# Patient Record
Sex: FEMALE | Race: BLACK OR AFRICAN AMERICAN | Employment: OTHER | URBAN - METROPOLITAN AREA
[De-identification: names, ages, dates, MRNs, and addresses within clinical notes are randomized per-mention and may not be internally consistent; named-entity substitution may affect disease eponyms.]

---

## 2017-01-20 ENCOUNTER — TELEPHONE (OUTPATIENT)
Dept: NEUROLOGY | Facility: CLINIC | Age: 72
End: 2017-01-20
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
## 2017-03-16 NOTE — PROGRESS NOTES
NEW TO CPM 3-16-17 FOR BACK PAIN SINCE 1996, FELL THROUGH SOME STAIRS AND FELL BACKWARDS. PAIN WENT AWAY. CAME BACK. SEVERE SINCE 2014. SAW NEUROSURGEON. SAID SURGERY WOULDN'T HELP. REFERRED BY DR. Ernst Sweeney FOR PAIN MEDS AND POSSIBLE MODALITIES FOR PAIN.  H

## 2017-04-11 ENCOUNTER — OFFICE VISIT (OUTPATIENT)
Dept: PAIN CLINIC | Facility: HOSPITAL | Age: 72
End: 2017-04-11
Attending: ANESTHESIOLOGY
Payer: MEDICARE

## 2017-04-11 VITALS
SYSTOLIC BLOOD PRESSURE: 110 MMHG | HEIGHT: 62 IN | BODY MASS INDEX: 44.16 KG/M2 | WEIGHT: 240 LBS | DIASTOLIC BLOOD PRESSURE: 60 MMHG

## 2017-04-11 DIAGNOSIS — M70.61 TROCHANTERIC BURSITIS OF RIGHT HIP: ICD-10-CM

## 2017-04-11 DIAGNOSIS — M54.16 LUMBAR RADICULOPATHY: ICD-10-CM

## 2017-04-11 DIAGNOSIS — M19.90 OSTEOARTHRITIS, UNSPECIFIED OSTEOARTHRITIS TYPE, UNSPECIFIED SITE: Primary | ICD-10-CM

## 2017-04-11 PROCEDURE — 99211 OFF/OP EST MAY X REQ PHY/QHP: CPT

## 2017-04-11 NOTE — PROGRESS NOTES
NEW TO CPM 3-16-17 FOR BACK PAIN SINCE 1996, FELL THROUGH SOME STAIRS AND FELL BACKWARDS. PAIN WENT AWAY. CAME BACK. SEVERE SINCE 2014. SAW NEUROSURGEON. SAID SURGERY WOULDN'T HELP. REFERRED BY DR. Stephany De Jesus FOR PAIN MEDS AND POSSIBLE MODALITIES FOR PAIN.  H

## 2017-04-11 NOTE — CHRONIC PAIN
Follow-up Note    HISTORY OF PRESENT ILLNESS:  Angeli Quinn is a 73year old old female, returns to the clinic for for evaluation of her chronic low back pain and right knee pain he saw Dr. Eliel Brush a few weeks ago and that time she discussed ethan Oral Tab Take 10 mg by mouth daily with breakfast.   Disp:  Rfl:    HYDROcodone-acetaminophen  MG Oral Tab  Disp:  Rfl: 0   lisinopril 40 MG Oral Tab Take 40 mg by mouth daily. Disp:  Rfl:    Meclizine HCl 25 MG Oral Tab Take 25 mg by mouth daily. SURGICAL HISTORY:      Past Surgical History    REPAIR ROTATOR CUFF,ACUTE Left     CARPAL TUNNEL RELEASE Right     TUBAL LIGATION      CHOLECYSTECTOMY      HAND/FINGER SURGERY UNLISTED Right 2004    Comment hand surgery       FAMILY HISTORY:  Family Present    SLR: negative   SIJ tenderness:  negative     TPs: Present:       IMAGING:  reviewed    [place] PHYSICIAN MONITORING PROGRAM REVIEWED  Yes      ASSESSMENT:   Renea Garner is a 73year old  female, with multi level lumbar spinal stenosis bilateral f

## 2017-05-17 ENCOUNTER — OFFICE VISIT (OUTPATIENT)
Dept: PAIN CLINIC | Facility: HOSPITAL | Age: 72
End: 2017-05-17
Attending: ANESTHESIOLOGY
Payer: MEDICARE

## 2017-05-17 VITALS
SYSTOLIC BLOOD PRESSURE: 112 MMHG | DIASTOLIC BLOOD PRESSURE: 60 MMHG | WEIGHT: 240 LBS | HEIGHT: 62 IN | HEART RATE: 68 BPM | BODY MASS INDEX: 44.16 KG/M2

## 2017-05-17 DIAGNOSIS — M47.817 FACET DEGENERATION OF LUMBOSACRAL REGION: Primary | ICD-10-CM

## 2017-05-17 PROCEDURE — 99211 OFF/OP EST MAY X REQ PHY/QHP: CPT

## 2017-05-17 RX ORDER — DIAZEPAM 10 MG/1
10 TABLET ORAL EVERY 8 HOURS PRN
Qty: 90 TABLET | Refills: 0 | Status: SHIPPED | OUTPATIENT
Start: 2017-05-17 | End: 2017-05-17

## 2017-05-17 RX ORDER — HYDROCODONE BITARTRATE AND ACETAMINOPHEN 10; 325 MG/1; MG/1
1 TABLET ORAL EVERY 4 HOURS PRN
Qty: 180 TABLET | Refills: 0 | Status: SHIPPED | OUTPATIENT
Start: 2017-05-24 | End: 2017-06-23

## 2017-05-17 RX ORDER — DIAZEPAM 10 MG/1
10 TABLET ORAL EVERY 8 HOURS PRN
Qty: 90 TABLET | Refills: 0 | Status: SHIPPED | OUTPATIENT
Start: 2017-06-16 | End: 2017-07-16

## 2017-05-17 NOTE — PROGRESS NOTES
renew her prescription for Norco and Valium and also going to set her up for an injection for medial nerve branch block the same areas where we did the facet injections L3-4 L4-5 L5-S1 bilaterally to see if she has a positive results from the medial branch

## 2017-05-17 NOTE — PROGRESS NOTES
NEW TO CPM 3-16-17 FOR BACK PAIN SINCE 1996, FELL THROUGH SOME STAIRS AND FELL BACKWARDS. PAIN WENT AWAY. CAME BACK. SEVERE SINCE 2014. SAW NEUROSURGEON. SAID SURGERY WOULDN'T HELP. REFERRED BY DR. Graeme Collins FOR PAIN MEDS AND POSSIBLE MODALITIES FOR PAIN.  H

## 2017-05-17 NOTE — CHRONIC PAIN
Follow-up Note    HISTORY OF PRESENT ILLNESS:  Ruperto Hannon is a 73year old old female, returns to the clinic for evaluation of her chronic lumbar pain gait dysfunction leg weakness on her last visit we recommended facet injections which she received pr 2 (two) times daily. Disp:  Rfl:    ferrous sulfate 325 (65 FE) MG Oral Tab EC Take 325 mg by mouth 3 (three) times daily with meals. Disp:  Rfl:    Pravastatin Sodium 40 MG Oral Tab Take 40 mg by mouth nightly.  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

## 2017-06-22 RX ORDER — HYDROCODONE BITARTRATE AND ACETAMINOPHEN 10; 325 MG/1; MG/1
1 TABLET ORAL EVERY 4 HOURS PRN
Qty: 180 TABLET | Refills: 0 | Status: SHIPPED | OUTPATIENT
Start: 2017-06-22 | End: 2017-06-29

## 2017-06-29 ENCOUNTER — OFFICE VISIT (OUTPATIENT)
Dept: PAIN CLINIC | Facility: HOSPITAL | Age: 72
End: 2017-06-29
Attending: ANESTHESIOLOGY
Payer: MEDICARE

## 2017-06-29 VITALS — SYSTOLIC BLOOD PRESSURE: 106 MMHG | HEART RATE: 68 BPM | DIASTOLIC BLOOD PRESSURE: 59 MMHG

## 2017-06-29 DIAGNOSIS — M48.061 LUMBAR SPINAL STENOSIS: Primary | ICD-10-CM

## 2017-06-29 DIAGNOSIS — M47.816 LUMBAR FACET ARTHROPATHY: ICD-10-CM

## 2017-06-29 PROCEDURE — 99211 OFF/OP EST MAY X REQ PHY/QHP: CPT

## 2017-06-29 RX ORDER — HYDROCODONE BITARTRATE AND ACETAMINOPHEN 10; 325 MG/1; MG/1
1 TABLET ORAL EVERY 4 HOURS PRN
Qty: 180 TABLET | Refills: 0 | Status: SHIPPED | OUTPATIENT
Start: 2017-07-20 | End: 2017-06-29

## 2017-06-29 RX ORDER — GABAPENTIN 300 MG/1
300 CAPSULE ORAL 4 TIMES DAILY
Qty: 120 CAPSULE | Refills: 1 | Status: SHIPPED | OUTPATIENT
Start: 2017-06-29 | End: 2018-04-13

## 2017-06-29 RX ORDER — GABAPENTIN 300 MG/1
300 CAPSULE ORAL 4 TIMES DAILY
Qty: 120 CAPSULE | Refills: 1 | Status: SHIPPED | OUTPATIENT
Start: 2017-06-29 | End: 2017-06-29

## 2017-06-29 RX ORDER — HYDROCODONE BITARTRATE AND ACETAMINOPHEN 10; 325 MG/1; MG/1
1 TABLET ORAL EVERY 4 HOURS PRN
Qty: 180 TABLET | Refills: 0 | Status: SHIPPED | OUTPATIENT
Start: 2017-08-19 | End: 2017-08-23

## 2017-06-29 NOTE — CHRONIC PAIN
Follow-up Note    HISTORY OF PRESENT ILLNESS:  Joselito Bey is a 73year old old female, returns to the clinic for evaluation of her chronic lumbar pain. Patient s/p lumbar medial branch blocks. Reports 2 days relief of pain. Pain has since returned. 325 mg by mouth daily. Disp:  Rfl:    simvastatin 20 MG Oral Tab Take 20 mg by mouth nightly. Disp:  Rfl:    Pravastatin Sodium 40 MG Oral Tab Take 40 mg by mouth nightly.  Disp:  Rfl:         REVIEW OF SYSTEMS:   Bowel/Bladder Incontinence: as above  Cough HISTORY:  Family History   Problem Relation Age of Onset   • Stroke Father    • Other[other] [OTHER] Mother      tuberculosis       SOCIAL HISTORY:    Social History  Social History   Marital status:   Spouse name: N/A    Years of education: N/A  Nu

## 2017-06-29 NOTE — PROGRESS NOTES
Patient here for a medication evaluation for chronic lumbar pain. Seen today by Dr. Devan Pavon (see dictation and recommendations). Obtained prescription for Gabapentin 300 mg qam, 300 mg q pm and 600 mg qhs. Will follow up in 2-3 months.   Discharged in

## 2017-08-23 ENCOUNTER — OFFICE VISIT (OUTPATIENT)
Dept: PAIN CLINIC | Facility: HOSPITAL | Age: 72
End: 2017-08-23
Attending: ANESTHESIOLOGY
Payer: MEDICARE

## 2017-08-23 VITALS
HEIGHT: 62 IN | HEART RATE: 63 BPM | RESPIRATION RATE: 18 BRPM | SYSTOLIC BLOOD PRESSURE: 118 MMHG | BODY MASS INDEX: 44.16 KG/M2 | WEIGHT: 240 LBS | DIASTOLIC BLOOD PRESSURE: 61 MMHG

## 2017-08-23 DIAGNOSIS — M47.816 LUMBAR FACET ARTHROPATHY: Primary | ICD-10-CM

## 2017-08-23 DIAGNOSIS — M48.061 LUMBAR SPINAL STENOSIS: ICD-10-CM

## 2017-08-23 PROCEDURE — 99211 OFF/OP EST MAY X REQ PHY/QHP: CPT

## 2017-08-23 RX ORDER — DIAZEPAM 10 MG/1
10 TABLET ORAL EVERY 8 HOURS PRN
Qty: 90 TABLET | Refills: 2 | Status: SHIPPED | OUTPATIENT
Start: 2017-08-23 | End: 2017-09-22

## 2017-08-23 RX ORDER — HYDROCODONE BITARTRATE AND ACETAMINOPHEN 10; 325 MG/1; MG/1
1 TABLET ORAL EVERY 4 HOURS PRN
Qty: 180 TABLET | Refills: 0 | Status: SHIPPED | OUTPATIENT
Start: 2017-09-18 | End: 2017-10-13

## 2017-08-23 NOTE — PROGRESS NOTES
NEW TO CPM 3-16-17 FOR BACK PAIN SINCE 1996, FELL THROUGH SOME STAIRS AND FELL BACKWARDS. PAIN WENT AWAY. CAME BACK. SEVERE SINCE 2014. SAW NEUROSURGEON. SAID SURGERY WOULDN'T HELP. REFERRED BY DR. Alberta Deluca FOR PAIN MEDS AND POSSIBLE MODALITIES FOR PAIN.  H

## 2017-08-23 NOTE — CHRONIC PAIN
HPI:    Patient ID: Landry Gitelman is a 81year old female.     HPI Radha Márquez returns to the pain clinic for evaluation of her of her chronic low back pain she has multiple issues with her back she did undergo a median branch nerve block ×2 her last medial bra Oral Tab 80 mg daily. Disp:  Rfl:    glipiZIDE 10 MG Oral Tab Take 10 mg by mouth daily with breakfast.   Disp:  Rfl:    lisinopril 40 MG Oral Tab Take 40 mg by mouth daily. Disp:  Rfl:    Meclizine HCl 25 MG Oral Tab Take 25 mg by mouth daily.    Disp:

## 2017-10-13 ENCOUNTER — OFFICE VISIT (OUTPATIENT)
Dept: PAIN CLINIC | Facility: HOSPITAL | Age: 72
End: 2017-10-13
Attending: NURSE PRACTITIONER
Payer: MEDICARE

## 2017-10-13 VITALS — HEART RATE: 70 BPM | DIASTOLIC BLOOD PRESSURE: 61 MMHG | SYSTOLIC BLOOD PRESSURE: 116 MMHG

## 2017-10-13 DIAGNOSIS — M15.9 PRIMARY OSTEOARTHRITIS INVOLVING MULTIPLE JOINTS: Primary | ICD-10-CM

## 2017-10-13 PROCEDURE — 99211 OFF/OP EST MAY X REQ PHY/QHP: CPT

## 2017-10-13 RX ORDER — HYDROCODONE BITARTRATE AND ACETAMINOPHEN 10; 325 MG/1; MG/1
1 TABLET ORAL EVERY 4 HOURS PRN
Qty: 180 TABLET | Refills: 0 | Status: SHIPPED | OUTPATIENT
Start: 2017-10-17 | End: 2017-11-16

## 2017-10-13 RX ORDER — AMITRIPTYLINE HYDROCHLORIDE 50 MG/1
50 TABLET, FILM COATED ORAL NIGHTLY
Status: ON HOLD | COMMUNITY
End: 2020-08-19

## 2017-10-13 RX ORDER — HYDROCODONE BITARTRATE AND ACETAMINOPHEN 10; 325 MG/1; MG/1
1 TABLET ORAL EVERY 4 HOURS PRN
Qty: 180 TABLET | Refills: 0 | Status: SHIPPED | OUTPATIENT
Start: 2017-11-17 | End: 2017-12-13
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
## 2017-10-13 NOTE — PROGRESS NOTES
Patient presents to [facility] ambulatory with walker for follow up lumbar rhizotomy done 9-18. She reports no pain relief. She did not take her pain medication today (norco) because she states \"I never take it before I go to the doctor\".   Her pain is an 8/10

## 2017-12-13 ENCOUNTER — OFFICE VISIT (OUTPATIENT)
Dept: PAIN CLINIC | Facility: HOSPITAL | Age: 72
End: 2017-12-13
Attending: ANESTHESIOLOGY
Payer: MEDICARE

## 2017-12-13 VITALS — DIASTOLIC BLOOD PRESSURE: 61 MMHG | SYSTOLIC BLOOD PRESSURE: 120 MMHG | HEART RATE: 80 BPM

## 2017-12-13 DIAGNOSIS — M48.061 LUMBAR FORAMINAL STENOSIS: ICD-10-CM

## 2017-12-13 DIAGNOSIS — M51.9 LUMBAR DISC DISEASE: ICD-10-CM

## 2017-12-13 DIAGNOSIS — M15.9 PRIMARY OSTEOARTHRITIS INVOLVING MULTIPLE JOINTS: ICD-10-CM

## 2017-12-13 DIAGNOSIS — M54.9 BILATERAL BACK PAIN, UNSPECIFIED BACK LOCATION, UNSPECIFIED CHRONICITY: Primary | ICD-10-CM

## 2017-12-13 DIAGNOSIS — M51.26 LUMBAR HERNIATED DISC: ICD-10-CM

## 2017-12-13 PROCEDURE — 99211 OFF/OP EST MAY X REQ PHY/QHP: CPT

## 2017-12-13 RX ORDER — HYDROCODONE BITARTRATE AND ACETAMINOPHEN 10; 325 MG/1; MG/1
1 TABLET ORAL EVERY 4 HOURS PRN
Qty: 180 TABLET | Refills: 0 | Status: SHIPPED | OUTPATIENT
Start: 2018-01-14 | End: 2018-02-13

## 2017-12-13 RX ORDER — HYDROCODONE BITARTRATE AND ACETAMINOPHEN 10; 325 MG/1; MG/1
1 TABLET ORAL EVERY 4 HOURS PRN
Qty: 180 TABLET | Refills: 0 | Status: SHIPPED | OUTPATIENT
Start: 2017-12-16 | End: 2018-02-16

## 2017-12-13 RX ORDER — DIAZEPAM 10 MG/1
10 TABLET ORAL EVERY 8 HOURS PRN
Qty: 90 TABLET | Refills: 2 | Status: SHIPPED | OUTPATIENT
Start: 2017-12-13 | End: 2018-02-16

## 2017-12-13 NOTE — CHRONIC PAIN
Patient presents to [facility] ambulatory for med eval.  She has generalized joint pain due to DJD and low back pain that radiates to her abdomen. She had a rhizotomy in September but states it made things worse.   She takes norco for pain but did not take it [facility]

## 2017-12-13 NOTE — CHRONIC PAIN
Follow-up Note  HISTORY OF PRESENT ILLNESS:  Zeina Pat is a 81year old old female, returns to the clinic for evaluation of her chronic lumbar pain. She continues to report \"joint\" pain and low back pain.  She denies significant improvement in her l Rfl:    lansoprazole 30 MG Oral Capsule Delayed Release Take 30 mg by mouth 2 (two) times daily. Disp:  Rfl:    Calcium Carbonate-Vitamin D 600-400 MG-UNIT Oral Tab Take 1 tablet by mouth 2 (two) times daily.  Disp:  Rfl:    Pravastatin Sodium 40 MG Oral T Neurologic gait dysfunction    Past Medical History:   Diagnosis Date   • Anemia    • Anxiety state    • Back problem    • Bursitis     r hip   • Chronic kidney disease (CKD)    • Diabetes ([facility] [address]    • Essential hypertension    • Gastritis    • Hyperlipidemi She has been managed with injection therapy and medications. - Discussed repeating injection. She wishes to defer at this time  - CPM with norco up to six tabs per day PRN. MME/day= 90.  Discussed with patient the risks of opioid medications including but

## 2018-02-16 ENCOUNTER — OFFICE VISIT (OUTPATIENT)
Dept: PAIN CLINIC | Facility: HOSPITAL | Age: 73
End: 2018-02-16
Attending: ANESTHESIOLOGY
Payer: MEDICARE

## 2018-02-16 VITALS
BODY MASS INDEX: 44.16 KG/M2 | HEIGHT: 62 IN | WEIGHT: 240 LBS | SYSTOLIC BLOOD PRESSURE: 121 MMHG | HEART RATE: 75 BPM | DIASTOLIC BLOOD PRESSURE: 62 MMHG

## 2018-02-16 DIAGNOSIS — M48.062 SPINAL STENOSIS OF LUMBAR REGION WITH NEUROGENIC CLAUDICATION: Primary | ICD-10-CM

## 2018-02-16 PROCEDURE — 99211 OFF/OP EST MAY X REQ PHY/QHP: CPT

## 2018-02-16 RX ORDER — DIAZEPAM 10 MG/1
10 TABLET ORAL EVERY 8 HOURS PRN
Qty: 90 TABLET | Refills: 1 | Status: SHIPPED | OUTPATIENT
Start: 2018-02-16 | End: 2018-03-18

## 2018-02-16 RX ORDER — HYDROCODONE BITARTRATE AND ACETAMINOPHEN 10; 325 MG/1; MG/1
1 TABLET ORAL EVERY 4 HOURS PRN
Qty: 180 TABLET | Refills: 0 | Status: SHIPPED | OUTPATIENT
Start: 2018-02-16 | End: 2018-03-18

## 2018-02-16 RX ORDER — HYDROCODONE BITARTRATE AND ACETAMINOPHEN 10; 325 MG/1; MG/1
1 TABLET ORAL EVERY 4 HOURS PRN
Qty: 180 TABLET | Refills: 0 | Status: SHIPPED | OUTPATIENT
Start: 2018-03-17 | End: 2018-04-13

## 2018-02-16 RX ORDER — HYDROCODONE BITARTRATE AND ACETAMINOPHEN 10; 325 MG/1; MG/1
1 TABLET ORAL EVERY 4 HOURS PRN
COMMUNITY
End: 2018-02-16

## 2018-02-16 NOTE — CHRONIC PAIN
Initial Consultation Note      HISTORY OF PRESENT ILLNESS:  Bonnie Anne is a 81year old old female referred to the pain clinic  for evaluation treatment of her chronic low back pain next is a very nice 27-year-old female with multiple levels of degener 600-400 MG-UNIT Oral Tab Take 1 tablet by mouth 2 (two) times daily. Disp:  Rfl:    Pravastatin Sodium 40 MG Oral Tab Take 40 mg by mouth nightly. Disp:  Rfl:    atenolol 50 MG Oral Tab Take 50 mg by mouth daily.    Disp:  Rfl:    Atorvastatin Calcium 80 MG HISTORY:  Family History   Problem Relation Age of Onset   • Stroke Father    • Other[other] [OTHER] Mother      tuberculosis       SOCIAL HISTORY:    Social History  Social History   Marital status:   Spouse name: N/A    Years of education: N/A  Nu Temperature:  normal to touch bilateral upper and lower extremities  Edema - Absent  *    Sensation (light touch/pinprick/temperature):     Right Lower Extremity:  Normal  Left Lower Extremity: Normal  Right Upper Extremity:  Normal  Left Upper Extremity

## 2018-02-16 NOTE — PROGRESS NOTES
NEW TO CPM 3-16-17 FOR BACK PAIN SINCE 1996, FELL THROUGH SOME STAIRS AND FELL BACKWARDS. PAIN WENT AWAY. CAME BACK. SEVERE SINCE 2014. SAW NEUROSURGEON. SAID SURGERY WOULDN'T HELP. REFERRED BY DR. Trini Meyer FOR PAIN MEDS AND POSSIBLE MODALITIES FOR PAIN.  H

## 2018-04-13 ENCOUNTER — OFFICE VISIT (OUTPATIENT)
Dept: PAIN CLINIC | Facility: HOSPITAL | Age: 73
End: 2018-04-13
Attending: ANESTHESIOLOGY
Payer: MEDICARE

## 2018-04-13 VITALS
HEIGHT: 61 IN | HEART RATE: 97 BPM | WEIGHT: 219 LBS | DIASTOLIC BLOOD PRESSURE: 96 MMHG | BODY MASS INDEX: 41.35 KG/M2 | SYSTOLIC BLOOD PRESSURE: 204 MMHG

## 2018-04-13 DIAGNOSIS — M47.816 LUMBAR FACET ARTHROPATHY: ICD-10-CM

## 2018-04-13 DIAGNOSIS — M15.9 PRIMARY OSTEOARTHRITIS INVOLVING MULTIPLE JOINTS: ICD-10-CM

## 2018-04-13 DIAGNOSIS — M48.062 SPINAL STENOSIS OF LUMBAR REGION WITH NEUROGENIC CLAUDICATION: Primary | ICD-10-CM

## 2018-04-13 PROCEDURE — 99211 OFF/OP EST MAY X REQ PHY/QHP: CPT

## 2018-04-13 RX ORDER — HYDROCODONE BITARTRATE AND ACETAMINOPHEN 10; 325 MG/1; MG/1
1 TABLET ORAL EVERY 4 HOURS PRN
Qty: 180 TABLET | Refills: 0 | Status: SHIPPED | OUTPATIENT
Start: 2018-05-16 | End: 2018-04-13

## 2018-04-13 RX ORDER — DIAZEPAM 10 MG/1
10 TABLET ORAL EVERY 8 HOURS PRN
COMMUNITY
End: 2018-10-01

## 2018-04-13 RX ORDER — HYDROCODONE BITARTRATE AND ACETAMINOPHEN 10; 325 MG/1; MG/1
1 TABLET ORAL EVERY 4 HOURS PRN
Qty: 180 TABLET | Refills: 0 | Status: SHIPPED | OUTPATIENT
Start: 2018-04-16 | End: 2018-05-16

## 2018-04-13 RX ORDER — ACETAMINOPHEN AND CODEINE PHOSPHATE 300; 30 MG/1; MG/1
1 TABLET ORAL EVERY 4 HOURS PRN
COMMUNITY
End: 2019-07-16 | Stop reason: ALTCHOICE

## 2018-04-13 NOTE — PROGRESS NOTES
PT OF CPM SINCE 3-16-17 FOR BACK PAIN SINCE 1996, FELL THROUGH SOME STAIRS AND FELL BACKWARDS. PAIN WENT AWAY. CAME BACK. SEVERE SINCE 2014. SAW NEUROSURGEON. SAID SURGERY WOULDN'T HELP.  REFERRED BY DR. Maggie Bajwa FOR PAIN MEDS AND POSSIBLE MODALITIES FOR PA

## 2018-04-13 NOTE — CHRONIC PAIN
Initial Consultation Note      HISTORY OF PRESENT ILLNESS:  Patricia Randle is a 81year old old female referred to the pain clinic  for evaluation treatment of her chronic low back pain next is a very nice 79-year-old female with multiple levels of degener Oral Tab Take 50 mg by mouth nightly. Disp:  Rfl:    lansoprazole 30 MG Oral Capsule Delayed Release Take 30 mg by mouth 2 (two) times daily. Disp:  Rfl:    Calcium Carbonate-Vitamin D 600-400 MG-UNIT Oral Tab Take 1 tablet by mouth 2 (two) times daily.  Luiza Vides surgery       FAMILY HISTORY:  Family History   Problem Relation Age of Onset   • Stroke Father    • Other Sadia Toro Mother      tuberculosis       SOCIAL HISTORY:    Social History  Social History   Marital status:   Spouse name: N/A    Years of e Reflex: absent bilaterally   Temperature:  normal to touch bilateral upper and lower extremities  Edema - Absent  *    Sensation (light touch/pinprick/temperature):     Right Lower Extremity:  Normal  Left Lower Extremity: Normal  Right Upper Extremity:  N

## 2018-06-12 ENCOUNTER — OFFICE VISIT (OUTPATIENT)
Dept: PAIN CLINIC | Facility: HOSPITAL | Age: 73
End: 2018-06-12
Attending: NURSE PRACTITIONER
Payer: MEDICARE

## 2018-06-12 VITALS
RESPIRATION RATE: 18 BRPM | WEIGHT: 219 LBS | HEIGHT: 61 IN | DIASTOLIC BLOOD PRESSURE: 79 MMHG | HEART RATE: 108 BPM | BODY MASS INDEX: 41.35 KG/M2 | SYSTOLIC BLOOD PRESSURE: 144 MMHG

## 2018-06-12 DIAGNOSIS — M54.42 CHRONIC BILATERAL LOW BACK PAIN WITH BILATERAL SCIATICA: Primary | ICD-10-CM

## 2018-06-12 DIAGNOSIS — G89.29 CHRONIC BILATERAL LOW BACK PAIN WITH BILATERAL SCIATICA: Primary | ICD-10-CM

## 2018-06-12 DIAGNOSIS — M54.41 CHRONIC BILATERAL LOW BACK PAIN WITH BILATERAL SCIATICA: Primary | ICD-10-CM

## 2018-06-12 DIAGNOSIS — M48.061 SPINAL STENOSIS OF LUMBAR REGION, UNSPECIFIED WHETHER NEUROGENIC CLAUDICATION PRESENT: ICD-10-CM

## 2018-06-12 PROCEDURE — 99211 OFF/OP EST MAY X REQ PHY/QHP: CPT

## 2018-06-12 RX ORDER — HYDROCODONE BITARTRATE AND ACETAMINOPHEN 10; 325 MG/1; MG/1
1 TABLET ORAL EVERY 4 HOURS PRN
Qty: 180 TABLET | Refills: 0 | Status: SHIPPED | OUTPATIENT
Start: 2018-06-19 | End: 2018-07-19

## 2018-06-12 RX ORDER — HYDROCODONE BITARTRATE AND ACETAMINOPHEN 10; 325 MG/1; MG/1
1 TABLET ORAL EVERY 4 HOURS PRN
Qty: 180 TABLET | Refills: 0 | Status: SHIPPED | OUTPATIENT
Start: 2018-07-18 | End: 2018-08-06

## 2018-06-12 NOTE — PROGRESS NOTES
PT OF CPM SINCE 3-16-17 FOR BACK PAIN SINCE 1996, FELL THROUGH SOME STAIRS AND FELL BACKWARDS. PAIN WENT AWAY. CAME BACK. SEVERE SINCE 2014. SAW NEUROSURGEON. SAID SURGERY WOULDN'T HELP.  REFERRED BY DR. Radha Rehman FOR PAIN MEDS AND POSSIBLE MODALITIES FOR PA

## 2018-06-12 NOTE — CHRONIC PAIN
Follow-up Note  HISTORY OF PRESENT ILLNESS:  Monica Mathis is a 81year old old female, returns to the clinic for evaluation of her chronic lumbar pain. Presents for medication refill today.  C/O \"hurting all over\"  States has a \"bad cold\" has had for lisinopril 40 MG Oral Tab Take 40 mg by mouth daily. Disp:  Rfl:    Meclizine HCl 25 MG Oral Tab Take 25 mg by mouth daily.    Disp:  Rfl:    MetFORMIN HCl  MG Oral Tablet 24 Hr Take 500 mg by mouth daily with breakfast.   Disp:  Rfl:    aspirin 3 CHOLECYSTECTOMY  2004: HAND/FINGER SURGERY UNLISTED Right      Comment: hand surgery  No date: REPAIR ROTATOR CUFF,ACUTE Left  No date: TUBAL LIGATION    FAMILY HISTORY:  Family History   Problem Relation Age of Onset   • Stroke Father    • Other Filippo MOSLEY instructed to call primary care MD if cold symptoms do not improve or worsen  -PHQ9 decreased from last visit; continue to monitor consider behavioral health

## 2018-08-06 ENCOUNTER — OFFICE VISIT (OUTPATIENT)
Dept: PAIN CLINIC | Facility: HOSPITAL | Age: 73
End: 2018-08-06
Attending: ANESTHESIOLOGY
Payer: MEDICARE

## 2018-08-06 VITALS
WEIGHT: 219 LBS | SYSTOLIC BLOOD PRESSURE: 148 MMHG | BODY MASS INDEX: 41.35 KG/M2 | HEIGHT: 61 IN | DIASTOLIC BLOOD PRESSURE: 68 MMHG | RESPIRATION RATE: 18 BRPM | HEART RATE: 90 BPM

## 2018-08-06 DIAGNOSIS — M15.9 PRIMARY OSTEOARTHRITIS INVOLVING MULTIPLE JOINTS: ICD-10-CM

## 2018-08-06 DIAGNOSIS — M51.26 LUMBAR HERNIATED DISC: Primary | ICD-10-CM

## 2018-08-06 DIAGNOSIS — M48.061 SPINAL STENOSIS OF LUMBAR REGION, UNSPECIFIED WHETHER NEUROGENIC CLAUDICATION PRESENT: ICD-10-CM

## 2018-08-06 DIAGNOSIS — M48.061 LUMBAR FORAMINAL STENOSIS: ICD-10-CM

## 2018-08-06 DIAGNOSIS — M51.9 LUMBAR DISC DISEASE: ICD-10-CM

## 2018-08-06 PROCEDURE — 99211 OFF/OP EST MAY X REQ PHY/QHP: CPT

## 2018-08-06 RX ORDER — HYDROCODONE BITARTRATE AND ACETAMINOPHEN 10; 325 MG/1; MG/1
1 TABLET ORAL EVERY 4 HOURS PRN
Qty: 180 TABLET | Refills: 0 | Status: SHIPPED | OUTPATIENT
Start: 2018-08-16 | End: 2018-09-15

## 2018-08-06 RX ORDER — HYDROCODONE BITARTRATE AND ACETAMINOPHEN 10; 325 MG/1; MG/1
1 TABLET ORAL EVERY 4 HOURS PRN
Qty: 180 TABLET | Refills: 0 | Status: SHIPPED | OUTPATIENT
Start: 2018-09-15 | End: 2018-09-24

## 2018-08-06 RX ORDER — HYDROCODONE BITARTRATE AND ACETAMINOPHEN 10; 325 MG/1; MG/1
1 TABLET ORAL EVERY 4 HOURS PRN
COMMUNITY
End: 2018-09-24

## 2018-08-06 NOTE — PROGRESS NOTES
NEW TO CPM 3-16-17 FOR BACK PAIN SINCE 1996, FELL THROUGH SOME STAIRS AND FELL BACKWARDS. PAIN WENT AWAY. CAME BACK. SEVERE SINCE 2014. SAW NEUROSURGEON. SAID SURGERY WOULDN'T HELP. REFERRED BY DR. Damon Simental FOR PAIN MEDS AND POSSIBLE MODALITIES FOR PAIN.  H

## 2018-08-06 NOTE — CHRONIC PAIN
[facility]  Pain Clinic  Follow Up Visit Report       Patient name: Joselito Bey 81year old female  : 1945  MRN: [id number]  Referring MD: No ref.  provider found     COMPLAINT:  Patient presents with:  Medication Eval: BACK PAIN CHOLECYSTECTOMY  2004: HAND/FINGER SURGERY UNLISTED Right      Comment: hand surgery  No date: REPAIR ROTATOR CUFF,ACUTE Left  No date: TUBAL LIGATION  FAMILY HISTORY:  Family History   Problem Relation Age of Onset   • Stroke Father    • Other Erika Hill palpitations, or shortness of breath  GI: no anorexia, nausea or vomiting, or bowel incontinence   : no dysuria, or pyuria, or bladder incontinence   Endo: no goiter, lethargy, or heat/cold intolerance  Heme/Onc: no pallor, bruising, or bleeding   Muscul follow-up as needed. Also offered her p.o. Medrol Dosepak but because of her diabetes and possible glucose fluctuations she does not want it to. Comprehensive analgesic plan was formulated.  Conservative vs. Aggressive measures were discussed at [facility]

## 2018-10-01 ENCOUNTER — OFFICE VISIT (OUTPATIENT)
Dept: PAIN CLINIC | Facility: HOSPITAL | Age: 73
End: 2018-10-01
Attending: ANESTHESIOLOGY
Payer: MEDICARE

## 2018-10-01 VITALS
DIASTOLIC BLOOD PRESSURE: 78 MMHG | HEIGHT: 62.4 IN | WEIGHT: 240 LBS | SYSTOLIC BLOOD PRESSURE: 167 MMHG | BODY MASS INDEX: 43.61 KG/M2 | HEART RATE: 106 BPM

## 2018-10-01 DIAGNOSIS — M15.9 PRIMARY OSTEOARTHRITIS INVOLVING MULTIPLE JOINTS: Primary | ICD-10-CM

## 2018-10-01 DIAGNOSIS — M48.061 SPINAL STENOSIS OF LUMBAR REGION, UNSPECIFIED WHETHER NEUROGENIC CLAUDICATION PRESENT: ICD-10-CM

## 2018-10-01 PROCEDURE — 99211 OFF/OP EST MAY X REQ PHY/QHP: CPT

## 2018-10-01 RX ORDER — DIAZEPAM 10 MG/1
10 TABLET ORAL EVERY 8 HOURS PRN
Qty: 90 TABLET | Refills: 2 | Status: SHIPPED | OUTPATIENT
Start: 2018-10-01 | End: 2018-10-31

## 2018-10-01 RX ORDER — HYDROCODONE BITARTRATE AND ACETAMINOPHEN 10; 325 MG/1; MG/1
1 TABLET ORAL EVERY 4 HOURS PRN
Qty: 180 TABLET | Refills: 0 | Status: SHIPPED | OUTPATIENT
Start: 2018-11-20 | End: 2018-12-13

## 2018-10-01 RX ORDER — HYDROCODONE BITARTRATE AND ACETAMINOPHEN 10; 325 MG/1; MG/1
1 TABLET ORAL EVERY 4 HOURS PRN
Qty: 180 TABLET | Refills: 0 | Status: SHIPPED | OUTPATIENT
Start: 2018-10-21 | End: 2018-11-20

## 2018-10-01 NOTE — CHRONIC PAIN
Follow-up Note    HISTORY OF PRESENT ILLNESS:  Prema Jerome is a 72year old old female, returns to the clinic for evaluation treatment of her chronic low back pain  Is well-known to the pain clinic she has severe spinal stenosis and the multiple element Tab Take 25 mg by mouth daily. Disp:  Rfl:    MetFORMIN HCl  MG Oral Tablet 24 Hr Take 500 mg by mouth daily with breakfast.   Disp:  Rfl:    aspirin 325 MG Oral Tab Take 325 mg by mouth daily.  Disp:  Rfl:    Calcium Carbonate-Vitamin D 600-400 MG- CHOLECYSTECTOMY  2004: HAND/FINGER SURGERY UNLISTED; Right      Comment:  hand surgery  No date: REPAIR ROTATOR CUFF,ACUTE; Left  No date: TUBAL LIGATION    FAMILY HISTORY:  Family History   Problem Relation Age of Onset   • Stroke Father    • Other (Other CTAB   Gait: Broad-based;    Spine: Kyphosis    ROM:   Lumbar spine  Flexion dec  Extension dec    Cervical Spine  ROM **    MOTOR EXAMINATION:  UPPER EXTREMITY      LEFT RIGHT   Deltoid 5/5 5/5   Biceps 5/5 5/5   Triceps 5/5 5/5 treatment options), face to face time, time spent reviewing data, obtaining patient information and discussing the care with the patients health care providers.

## 2018-10-01 NOTE — PROGRESS NOTES
NEW TO CPM 3-16-17 FOR BACK PAIN SINCE 1996, FELL THROUGH SOME STAIRS AND FELL BACKWARDS. PAIN WENT AWAY. CAME BACK. SEVERE SINCE 2014. SAW NEUROSURGEON. SAID SURGERY WOULDN'T HELP. REFERRED BY DR. Tawnya Cooper FOR PAIN MEDS AND POSSIBLE MODALITIES FOR PAIN.  H

## 2018-12-13 ENCOUNTER — OFFICE VISIT (OUTPATIENT)
Dept: PAIN CLINIC | Facility: HOSPITAL | Age: 73
End: 2018-12-13
Attending: NURSE PRACTITIONER
Payer: MEDICARE

## 2018-12-13 DIAGNOSIS — M15.9 PRIMARY OSTEOARTHRITIS INVOLVING MULTIPLE JOINTS: Primary | ICD-10-CM

## 2018-12-13 DIAGNOSIS — M54.41 CHRONIC BILATERAL LOW BACK PAIN WITH BILATERAL SCIATICA: ICD-10-CM

## 2018-12-13 DIAGNOSIS — G89.29 CHRONIC BILATERAL LOW BACK PAIN WITH BILATERAL SCIATICA: ICD-10-CM

## 2018-12-13 DIAGNOSIS — M54.42 CHRONIC BILATERAL LOW BACK PAIN WITH BILATERAL SCIATICA: ICD-10-CM

## 2018-12-13 DIAGNOSIS — M48.061 SPINAL STENOSIS OF LUMBAR REGION, UNSPECIFIED WHETHER NEUROGENIC CLAUDICATION PRESENT: ICD-10-CM

## 2018-12-13 PROCEDURE — 99212 OFFICE O/P EST SF 10 MIN: CPT

## 2018-12-13 RX ORDER — HYDROCODONE BITARTRATE AND ACETAMINOPHEN 10; 325 MG/1; MG/1
1 TABLET ORAL EVERY 6 HOURS PRN
Qty: 180 TABLET | Refills: 0 | Status: SHIPPED | OUTPATIENT
Start: 2019-02-14 | End: 2019-03-12

## 2018-12-13 RX ORDER — HYDROCODONE BITARTRATE AND ACETAMINOPHEN 10; 325 MG/1; MG/1
1 TABLET ORAL EVERY 4 HOURS PRN
Qty: 180 TABLET | Refills: 0 | Status: SHIPPED | OUTPATIENT
Start: 2019-01-17 | End: 2019-02-16

## 2018-12-13 RX ORDER — HYDROCODONE BITARTRATE AND ACETAMINOPHEN 10; 325 MG/1; MG/1
1 TABLET ORAL EVERY 4 HOURS PRN
Qty: 180 TABLET | Refills: 0 | Status: SHIPPED | OUTPATIENT
Start: 2018-12-19 | End: 2019-01-18

## 2018-12-13 NOTE — PROGRESS NOTES
Patient presents to [facility] ambulatory with walker for med eval.  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 She would have been cancer free for 9 years in April but it has come back.   She has abdominal linda

## 2018-12-13 NOTE — CHRONIC PAIN
Follow-up Note  HISTORY OF PRESENT ILLNESS:  Mickey Head is a 72year old old female, returns to the clinic for evaluation treatment of her chronic low back pain  Is well-known to the pain clinic she has severe spinal stenosis and now reports that her s Tab Take 325 mg by mouth daily. Disp:  Rfl:         REVIEW OF SYSTEMS:   Bowel/Bladder Incontinence: as above  Coughing/sneezing/straining does not exacerbate the pain.   Numbness/tingling: as above  Weakness: as above  Weight Loss: Negative   Fever: Negati HISTORY:  Social History    Socioeconomic History      Marital status:       Spouse name: Not on file      Number of children: Not on file      Years of education: Not on file      Highest education level: Not on file    Social Needs      Financial 5/5 5/5   Foot DF 5/5 5/5   Foot EHL 5/5 5/5            Sensation (light touch/pinprick/temperature):   Right Lower Extremity:  Normal  Left Lower Extremity:  Normal  Right Upper Extremity: Normal  Left Upper Extremity: Normal  SLR: negative   SIJ tenderne

## 2019-03-14 NOTE — CHRONIC PAIN
Follow-up Note  HISTORY OF PRESENT ILLNESS:  Patricia Randle is a 72year old old female, returns to the clinic for evaluation treatment of her chronic low back pain  Is well-known to the pain clinic she has severe spinal stenosis and now reports that her s MG Oral Tab Take 25 mg by mouth daily. Disp:  Rfl:    MetFORMIN HCl  MG Oral Tablet 24 Hr Take 500 mg by mouth daily with breakfast.   Disp:  Rfl:    aspirin 325 MG Oral Tab Take 325 mg by mouth daily.  Disp:  Rfl:         REVIEW OF SYSTEMS:   Bowel FAMILY HISTORY:  Family History   Problem Relation Age of Onset   • Stroke Father    • Other (Other) Mother         tuberculosis       SOCIAL HISTORY:  Social History    Socioeconomic History      Marital status:       Spouse name: Not on file Exercise: No        Bike Helmet: Not Asked        Seat Belt: Not Asked        Self-Exams: Not Asked    Social History Narrative      The patient uses the following assistive device(s):  Cane, Power Scooter, walker.         The patient does not live in a yosvany analgesic plan was formulated. Conservative vs. Aggressive measures were discussed at length including pharmacotherapy (eg. Anti- inflammatories, muscle relaxants, neuropathic medications, oral steroids, analgesics), injections, and further testing.  Risks

## 2019-03-14 NOTE — PROGRESS NOTES
Patient presents to [facility] ambulatory with walker for med eval. 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 She states she is \"always tired and doesn't feel like doing anything\". She also has joint pain.   Her February script was incorrect so

## 2019-06-07 NOTE — PROGRESS NOTES
NEW TO CPM 3-16-17 FOR BACK PAIN SINCE 1996, FELL THROUGH SOME STAIRS AND FELL BACKWARDS. PAIN WENT AWAY. CAME BACK. SEVERE SINCE 2014. SAW NEUROSURGEON. SAID SURGERY WOULDN'T HELP. REFERRED BY DR. Ish Moreau FOR PAIN MEDS AND POSSIBLE MODALITIES FOR PAIN.  H

## 2019-06-07 NOTE — CHRONIC PAIN
Follow-up Note  HISTORY OF PRESENT ILLNESS:  Yovany Deleon is a 72year old old female, returns to the clinic for evaluation treatment of her chronic low back pain  Is well-known to the pain clinic she has severe spinal stenosis and now reports that her s lisinopril 40 MG Oral Tab Take 40 mg by mouth daily. Disp:  Rfl:    Meclizine HCl 25 MG Oral Tab Take 25 mg by mouth daily.    Disp:  Rfl:    MetFORMIN HCl  MG Oral Tablet 24 Hr Take 500 mg by mouth daily with breakfast.   Disp:  Rfl:    aspirin 3 LIGATION         FAMILY HISTORY:  Family History   Problem Relation Age of Onset   • Stroke Father    • Other (Other) Mother         tuberculosis       SOCIAL HISTORY:  Social History    Socioeconomic History      Marital status:       Spouse name: Exercise: No        Bike Helmet: Not Asked        Seat Belt: Not Asked        Self-Exams: Not Asked    Social History Narrative      The patient uses the following assistive device(s):  Cane, Power Scooter, walker.         The patient does not live in a technique. FINDINGS:  CHEST:    The heart is normal size. There is no pericardial effusion. The thoracic aorta and central pulmonary arteries are normal caliber. The thyroid gland is prominent but stable in size, with no discrete nodule.  A prominent interval increase in size of the retroperitoneal mass measures centered in the central abdomen, with extension in between the aorta and IVC; the mass is approximately 7.9 cm TV x 6.9 cm AP (series 2b, image 159), compared to 7.3 cm x 6.8 cm previously by m weeks following procedure       Comprehensive analgesic plan was formulated. Conservative vs. Aggressive measures were discussed at length including pharmacotherapy (eg.  Anti- inflammatories, muscle relaxants, neuropathic medications, oral steroids, analge

## 2019-07-12 RX ORDER — HYDROCODONE BITARTRATE AND ACETAMINOPHEN 10; 325 MG/1; MG/1
1 TABLET ORAL EVERY 6 HOURS PRN
Qty: 180 TABLET | Refills: 0 | Status: CANCELLED | OUTPATIENT
Start: 2019-08-09 | End: 2019-09-08

## 2019-07-12 RX ORDER — HYDROCODONE BITARTRATE AND ACETAMINOPHEN 10; 325 MG/1; MG/1
1 TABLET ORAL EVERY 4 HOURS PRN
Qty: 180 TABLET | Refills: 0 | Status: CANCELLED | OUTPATIENT
Start: 2019-09-08 | End: 2019-10-08

## 2019-07-16 ENCOUNTER — OFFICE VISIT (OUTPATIENT)
Dept: PAIN CLINIC | Facility: HOSPITAL | Age: 74
End: 2019-07-16
Attending: NURSE PRACTITIONER
Payer: MEDICARE

## 2019-07-16 DIAGNOSIS — M54.41 CHRONIC BILATERAL LOW BACK PAIN WITH BILATERAL SCIATICA: Primary | ICD-10-CM

## 2019-07-16 DIAGNOSIS — M15.9 PRIMARY OSTEOARTHRITIS INVOLVING MULTIPLE JOINTS: ICD-10-CM

## 2019-07-16 DIAGNOSIS — G89.3 CANCER ASSOCIATED PAIN: ICD-10-CM

## 2019-07-16 DIAGNOSIS — G89.29 CHRONIC BILATERAL LOW BACK PAIN WITH BILATERAL SCIATICA: Primary | ICD-10-CM

## 2019-07-16 DIAGNOSIS — M54.42 CHRONIC BILATERAL LOW BACK PAIN WITH BILATERAL SCIATICA: Primary | ICD-10-CM

## 2019-07-16 PROCEDURE — 99211 OFF/OP EST MAY X REQ PHY/QHP: CPT

## 2019-07-16 RX ORDER — DIAZEPAM 10 MG/1
TABLET ORAL
COMMUNITY
Start: 2016-10-29 | End: 2019-07-17

## 2019-07-16 RX ORDER — OXYCODONE AND ACETAMINOPHEN 10; 325 MG/1; MG/1
1 TABLET ORAL EVERY 4 HOURS PRN
Qty: 150 TABLET | Refills: 0 | Status: SHIPPED | OUTPATIENT
Start: 2019-07-16 | End: 2019-08-15

## 2019-07-16 RX ORDER — NIFEDIPINE 30 MG/1
30 TABLET, FILM COATED, EXTENDED RELEASE ORAL DAILY
COMMUNITY
Start: 2015-05-19 | End: 2020-12-29

## 2019-07-16 RX ORDER — HYDROXYCHLOROQUINE SULFATE 200 MG/1
200 TABLET, FILM COATED ORAL DAILY
Status: ON HOLD | COMMUNITY
Start: 2017-04-06 | End: 2021-01-26

## 2019-07-16 RX ORDER — ALBUTEROL SULFATE 90 UG/1
2 AEROSOL, METERED RESPIRATORY (INHALATION)
Status: ON HOLD | COMMUNITY
Start: 2019-05-02 | End: 2021-01-26

## 2019-07-16 NOTE — CHRONIC PAIN
Follow-up Note  HISTORY OF PRESENT ILLNESS:  Bonnie Anne is a 72year old old female, returns to the clinic for evaluation treatment of her chronic low back pain  Is well-known to the pain clinic she has severe spinal stenosis and now reports that her s Capsule Delayed Release Take 30 mg by mouth 2 (two) times daily. Disp:  Rfl:    Calcium Carbonate-Vitamin D 600-400 MG-UNIT Oral Tab Take 1 tablet by mouth 2 (two) times daily. Disp:  Rfl:    atenolol 50 MG Oral Tab Take 50 mg by mouth daily.    Disp:  Rfl: [facility])    • Sarcoma ([facility] [address] 2010    surgery       SURGICAL HISTORY:  Past Surgical History:   Procedure Laterality Date   • CARPAL TUNNEL RELEASE Right    • CHOLECYSTECTOMY     • HAND/FINGER SURGERY UNLISTED Right 2004    hand surgery   • REPAIR ROTATOR CUFF, Caffeine Concern: No          5 cups soda daily        Occupational Exposure: Not Asked        Hobby Hazards: Not Asked        Sleep Concern: Not Asked        Stress Concern: Not Asked        Weight Concern: Not Asked        Special Diet: Not Asked maintaining diagnostic image quality. At least one of the following dose reduction techniques was used: Automated exposure control, adjustment of the mA and/or kV according to patient size, use of iterative reconstruction technique.     FINDINGS:  CHEST: noted.    The uterus is in place. Lobularity along the posterior uterine wall suggests the presence of a fibroid. No focal adnexal abnormality is identified.     Compared to the prior CT from 02/25/2019, there has been slight interval increase in size of th medical marijuana as this was recommended by her oncologist. She knows that if she chooses this route we will not prescribe opioids. She has not filled the paperwork out yet.    - UDS and NA updated 6/7/19, appropriate   - refill valium PRN     Discussed wi

## 2019-07-16 NOTE — PROGRESS NOTES
Patient presents to [facility] ambulatory with walker for follow jose angel. She reports that the injection made her pain worse. \"I don't want any more shots in my back\". She also has right shoulder pain and abdominal pain. States her cancer is back.   \"I'm out of

## 2019-07-18 RX ORDER — DIAZEPAM 10 MG/1
TABLET ORAL
Qty: 90 TABLET | Refills: 2 | OUTPATIENT
Start: 2019-07-18 | End: 2019-11-26

## 2019-08-16 ENCOUNTER — OFFICE VISIT (OUTPATIENT)
Dept: PAIN CLINIC | Facility: HOSPITAL | Age: 74
End: 2019-08-16
Attending: NURSE PRACTITIONER
Payer: MEDICARE

## 2019-08-16 DIAGNOSIS — G89.29 CHRONIC BILATERAL LOW BACK PAIN WITH BILATERAL SCIATICA: Primary | ICD-10-CM

## 2019-08-16 DIAGNOSIS — M54.41 CHRONIC BILATERAL LOW BACK PAIN WITH BILATERAL SCIATICA: Primary | ICD-10-CM

## 2019-08-16 DIAGNOSIS — F11.90 CHRONIC, CONTINUOUS USE OF OPIOIDS: ICD-10-CM

## 2019-08-16 DIAGNOSIS — G89.3 CANCER ASSOCIATED PAIN: ICD-10-CM

## 2019-08-16 DIAGNOSIS — M15.9 PRIMARY OSTEOARTHRITIS INVOLVING MULTIPLE JOINTS: ICD-10-CM

## 2019-08-16 DIAGNOSIS — M54.42 CHRONIC BILATERAL LOW BACK PAIN WITH BILATERAL SCIATICA: Primary | ICD-10-CM

## 2019-08-16 PROCEDURE — 99211 OFF/OP EST MAY X REQ PHY/QHP: CPT

## 2019-08-16 RX ORDER — OXYCODONE AND ACETAMINOPHEN 10; 325 MG/1; MG/1
1 TABLET ORAL EVERY 4 HOURS PRN
Qty: 150 TABLET | Refills: 0 | Status: SHIPPED | OUTPATIENT
Start: 2019-08-16 | End: 2019-09-15

## 2019-08-16 RX ORDER — OXYCODONE AND ACETAMINOPHEN 10; 325 MG/1; MG/1
1 TABLET ORAL EVERY 4 HOURS PRN
Qty: 150 TABLET | Refills: 0 | Status: SHIPPED | OUTPATIENT
Start: 2019-09-15 | End: 2019-10-09

## 2019-08-16 NOTE — CHRONIC PAIN
Follow-up Note  HISTORY OF PRESENT ILLNESS:  Angeli Quinn is a 69year old old female, returns to the clinic for evaluation treatment of her chronic low back pain  Is well-known to the pain clinic she has severe spinal stenosis and now reports that her s Disp:  Rfl:    Calcium Carbonate-Vitamin D 600-400 MG-UNIT Oral Tab Take 1 tablet by mouth 2 (two) times daily. Disp:  Rfl:    atenolol 50 MG Oral Tab Take 50 mg by mouth daily. Disp:  Rfl:    Atorvastatin Calcium 80 MG Oral Tab 80 mg daily.    Disp:  Rfl RELEASE Right    • CHOLECYSTECTOMY     • HAND/FINGER SURGERY UNLISTED Right 2004    hand surgery   • REPAIR ROTATOR CUFF,ACUTE Left    • TUBAL LIGATION         FAMILY HISTORY:  Family History   Problem Relation Age of Onset   • Stroke Father    • Other (Ot Sleep Concern: Not Asked        Stress Concern: Not Asked        Weight Concern: Not Asked        Special Diet: Not Asked        Back Care: Not Asked        Exercise: No        Bike Helmet: Not Asked        Seat Belt: Not Asked        Self-Exams: Not Ask prescription opioid use      PLAN:  - discussed patients pain expectations, Reinforced that pain free is not obtainable   - spent a large portion of visit discussing nutrition, offered a dietician consult here but patient states its too difficult to get he

## 2019-08-16 NOTE — PROGRESS NOTES
Patient presents to [facility] ambulatory with walker for med eval.  She has hx of sarcoidosis. She has chronic low back pain, joint pain and abdominal pain. She was changed from norco to percocet at last visit on 7-16 and states it does help a little more.   Sh

## 2019-10-11 ENCOUNTER — OFFICE VISIT (OUTPATIENT)
Dept: PAIN CLINIC | Facility: HOSPITAL | Age: 74
End: 2019-10-11
Attending: NURSE PRACTITIONER
Payer: MEDICARE

## 2019-10-11 VITALS — DIASTOLIC BLOOD PRESSURE: 81 MMHG | HEART RATE: 100 BPM | RESPIRATION RATE: 20 BRPM | SYSTOLIC BLOOD PRESSURE: 168 MMHG

## 2019-10-11 DIAGNOSIS — M54.50 CHRONIC MIDLINE LOW BACK PAIN WITHOUT SCIATICA: Primary | ICD-10-CM

## 2019-10-11 DIAGNOSIS — G89.29 CHRONIC MIDLINE LOW BACK PAIN WITHOUT SCIATICA: Primary | ICD-10-CM

## 2019-10-11 PROCEDURE — 99211 OFF/OP EST MAY X REQ PHY/QHP: CPT

## 2019-10-11 RX ORDER — OXYCODONE AND ACETAMINOPHEN 10; 325 MG/1; MG/1
1 TABLET ORAL EVERY 4 HOURS PRN
Qty: 150 TABLET | Refills: 0 | Status: SHIPPED | OUTPATIENT
Start: 2019-11-14 | End: 2019-10-15

## 2019-10-11 RX ORDER — OXYCODONE AND ACETAMINOPHEN 10; 325 MG/1; MG/1
1 TABLET ORAL EVERY 4 HOURS PRN
Qty: 150 TABLET | Refills: 0 | Status: SHIPPED | OUTPATIENT
Start: 2019-10-15 | End: 2019-11-14
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
## 2019-10-11 NOTE — PROGRESS NOTES
Patient presents to [facility] ambulatory with walker for med eval.  She has chronic joint pain r/t sacroma and complains of severe abdominal pain right now. She states she was going to go to the ED but \"then they will keep me and I don't want that\".   She meir

## 2019-10-15 RX ORDER — OXYCODONE AND ACETAMINOPHEN 10; 325 MG/1; MG/1
1 TABLET ORAL EVERY 4 HOURS PRN
Qty: 150 TABLET | Refills: 0 | Status: SHIPPED | OUTPATIENT
Start: 2019-10-15 | End: 2019-11-14

## 2019-11-14 ENCOUNTER — HOSPITAL ENCOUNTER (OUTPATIENT)
Dept: MRI IMAGING | Facility: HOSPITAL | Age: 74
Discharge: HOME | End: 2019-11-14
Attending: NURSE PRACTITIONER
Payer: MEDICARE

## 2019-11-14 DIAGNOSIS — M54.50 CHRONIC MIDLINE LOW BACK PAIN WITHOUT SCIATICA: ICD-10-CM

## 2019-11-14 DIAGNOSIS — G89.29 CHRONIC MIDLINE LOW BACK PAIN WITHOUT SCIATICA: ICD-10-CM

## 2019-11-14 PROCEDURE — 72148 MRI LUMBAR SPINE W/O DYE: CPT | Performed by: NURSE PRACTITIONER

## 2019-11-26 RX ORDER — DIAZEPAM 10 MG/1
TABLET ORAL
Qty: 90 TABLET | Refills: 2 | Status: SHIPPED | OUTPATIENT
Start: 2019-11-26 | End: 2020-03-23

## 2019-12-12 ENCOUNTER — OFFICE VISIT (OUTPATIENT)
Dept: PAIN CLINIC | Facility: HOSPITAL | Age: 74
End: 2019-12-12
Attending: NURSE PRACTITIONER
Payer: MEDICARE

## 2019-12-12 VITALS — SYSTOLIC BLOOD PRESSURE: 134 MMHG | DIASTOLIC BLOOD PRESSURE: 61 MMHG | HEART RATE: 68 BPM

## 2019-12-12 DIAGNOSIS — M47.816 LUMBAR FACET ARTHROPATHY: ICD-10-CM

## 2019-12-12 DIAGNOSIS — M54.41 CHRONIC BILATERAL LOW BACK PAIN WITH BILATERAL SCIATICA: Primary | ICD-10-CM

## 2019-12-12 DIAGNOSIS — F11.90 CHRONIC, CONTINUOUS USE OF OPIOIDS: ICD-10-CM

## 2019-12-12 DIAGNOSIS — G89.29 CHRONIC BILATERAL LOW BACK PAIN WITH BILATERAL SCIATICA: Primary | ICD-10-CM

## 2019-12-12 DIAGNOSIS — M15.9 PRIMARY OSTEOARTHRITIS INVOLVING MULTIPLE JOINTS: ICD-10-CM

## 2019-12-12 DIAGNOSIS — M54.42 CHRONIC BILATERAL LOW BACK PAIN WITH BILATERAL SCIATICA: Primary | ICD-10-CM

## 2019-12-12 PROCEDURE — 99211 OFF/OP EST MAY X REQ PHY/QHP: CPT

## 2019-12-12 RX ORDER — ATORVASTATIN CALCIUM 40 MG/1
40 TABLET, FILM COATED ORAL
Refills: 3 | COMMUNITY
Start: 2019-08-13

## 2019-12-12 RX ORDER — LIDOCAINE HYDROCHLORIDE 20 MG/ML
SOLUTION ORAL; TOPICAL
Refills: 3 | COMMUNITY
Start: 2019-08-16 | End: 2021-03-04

## 2019-12-12 RX ORDER — OXYCODONE AND ACETAMINOPHEN 10; 325 MG/1; MG/1
TABLET ORAL
Refills: 0 | COMMUNITY
Start: 2019-11-14 | End: 2020-01-30

## 2019-12-12 RX ORDER — OXYCODONE AND ACETAMINOPHEN 10; 325 MG/1; MG/1
1 TABLET ORAL EVERY 4 HOURS PRN
Qty: 150 TABLET | Refills: 0 | Status: SHIPPED | OUTPATIENT
Start: 2020-01-10 | End: 2019-12-13 | Stop reason: RX

## 2019-12-12 RX ORDER — OXYCODONE AND ACETAMINOPHEN 10; 325 MG/1; MG/1
1 TABLET ORAL EVERY 4 HOURS PRN
Qty: 150 TABLET | Refills: 0 | Status: SHIPPED | OUTPATIENT
Start: 2019-12-12 | End: 2019-12-13 | Stop reason: RX

## 2019-12-12 NOTE — PROGRESS NOTES
Patient presents to [facility] ambulatory with walker for med eval.  She has chronic abdominal pain, shoulder pain and right ankle pain. She has hx of sarcoidosis. She continues to see her oncologist but is not having treatment, she has had radiation.   She take

## 2019-12-12 NOTE — CHRONIC PAIN
Follow-up Note  HISTORY OF PRESENT ILLNESS:  Patricia Randle is a 69year old old female, returns to the clinic for evaluation treatment of her chronic low back pain  Is well-known to the pain clinic she has severe spinal stenosis and now reports that her s • atenolol 50 MG Oral Tab Take 50 mg by mouth daily. • Atorvastatin Calcium 80 MG Oral Tab 80 mg daily. • lisinopril 40 MG Oral Tab Take 40 mg by mouth daily. • Meclizine HCl 25 MG Oral Tab Take 25 mg by mouth daily.        • aspirin 325 HISTORY:  Family History   Problem Relation Age of Onset   • Stroke Father    • Other (Other) Mother         tuberculosis       SOCIAL HISTORY:  Social History    Socioeconomic History      Marital status:       Spouse name: Not on file      Number Exercise: No        Bike Helmet: Not Asked        Seat Belt: Not Asked        Self-Exams: Not Asked    Social History Narrative      The patient uses the following assistive device(s):  Cane, Power Scooter, walker.         The patient does not live in a [place] Recommended surgical eval prior to any injections, she declines stating that injections have never helped and she is not willing to have surgery. - patient again with aberrant behavior with medications.  I discussed with her that I do not see her oncology

## 2019-12-13 RX ORDER — OXYCODONE AND ACETAMINOPHEN 10; 325 MG/1; MG/1
1 TABLET ORAL EVERY 4 HOURS PRN
Qty: 150 TABLET | Refills: 0 | Status: SHIPPED | OUTPATIENT
Start: 2020-01-11 | End: 2020-02-10

## 2019-12-13 RX ORDER — OXYCODONE AND ACETAMINOPHEN 10; 325 MG/1; MG/1
1 TABLET ORAL EVERY 4 HOURS PRN
Qty: 150 TABLET | Refills: 0 | Status: SHIPPED | OUTPATIENT
Start: 2019-12-13 | End: 2020-01-12

## 2020-01-30 RX ORDER — OXYCODONE AND ACETAMINOPHEN 10; 325 MG/1; MG/1
TABLET ORAL
Qty: 150 TABLET | Refills: 0 | Status: SHIPPED | OUTPATIENT
Start: 2020-01-31 | End: 2020-02-28

## 2020-01-30 NOTE — TELEPHONE ENCOUNTER
Pharmacy called stating that they accidentally deleted her prescription for percocet today. She last filled 1-10 for #110 tabs, was shorted 40# due to insufficient availability. Sending new rx over to be filled 1-31.

## 2020-02-21 RX ORDER — OXYCODONE AND ACETAMINOPHEN 10; 325 MG/1; MG/1
1 TABLET ORAL EVERY 4 HOURS PRN
Qty: 150 TABLET | Refills: 0 | Status: CANCELLED | OUTPATIENT
Start: 2020-03-31 | End: 2020-04-30

## 2020-02-27 ENCOUNTER — OFFICE VISIT (OUTPATIENT)
Dept: PAIN CLINIC | Facility: HOSPITAL | Age: 75
End: 2020-02-27
Attending: NURSE PRACTITIONER
Payer: MEDICARE

## 2020-02-27 DIAGNOSIS — M54.41 CHRONIC BILATERAL LOW BACK PAIN WITH BILATERAL SCIATICA: Primary | ICD-10-CM

## 2020-02-27 DIAGNOSIS — M54.42 CHRONIC BILATERAL LOW BACK PAIN WITH BILATERAL SCIATICA: Primary | ICD-10-CM

## 2020-02-27 DIAGNOSIS — F11.90 CHRONIC, CONTINUOUS USE OF OPIOIDS: ICD-10-CM

## 2020-02-27 DIAGNOSIS — M15.9 PRIMARY OSTEOARTHRITIS INVOLVING MULTIPLE JOINTS: ICD-10-CM

## 2020-02-27 DIAGNOSIS — M48.062 SPINAL STENOSIS OF LUMBAR REGION WITH NEUROGENIC CLAUDICATION: ICD-10-CM

## 2020-02-27 DIAGNOSIS — G89.3 CANCER ASSOCIATED PAIN: ICD-10-CM

## 2020-02-27 DIAGNOSIS — G89.29 CHRONIC BILATERAL LOW BACK PAIN WITH BILATERAL SCIATICA: Primary | ICD-10-CM

## 2020-02-27 PROCEDURE — 99211 OFF/OP EST MAY X REQ PHY/QHP: CPT

## 2020-02-27 RX ORDER — OXYCODONE AND ACETAMINOPHEN 10; 325 MG/1; MG/1
1 TABLET ORAL EVERY 4 HOURS PRN
Qty: 150 TABLET | Refills: 0 | Status: SHIPPED | OUTPATIENT
Start: 2020-02-28 | End: 2020-02-28 | Stop reason: RX

## 2020-02-27 NOTE — PROGRESS NOTES
Pt presents to [facility] ambulatory with assistance of walker. Pt states pain has increased. Pt feels as the medication is not helping. \"I need more than 150 pills a month. \" Pt states she has 2 pills of Oxycodone 10/325mg left, taking 5-7 pills per day.  PT las

## 2020-02-27 NOTE — CHRONIC PAIN
Follow-up Note  HISTORY OF PRESENT ILLNESS:  Bonnie Anne is a 69year old old female, returns to the clinic for evaluation treatment of her chronic low back pain  Is well-known to the pain clinic,  she has severe spinal stenosis and a retroperitoneal sa Amitriptyline HCl 50 MG Oral Tab Take 50 mg by mouth nightly. • lansoprazole 30 MG Oral Capsule Delayed Release Take 30 mg by mouth 2 (two) times daily.      • Calcium Carbonate-Vitamin D 600-400 MG-UNIT Oral Tab Take 1 tablet by mouth 2 (two) times evelin SURGICAL HISTORY:  Past Surgical History:   Procedure Laterality Date   • CARPAL TUNNEL RELEASE Right    • CHOLECYSTECTOMY     • HAND/FINGER SURGERY UNLISTED Right 2004    hand surgery   • REPAIR ROTATOR CUFF,ACUTE Left    • TUBAL LIGATION         FA 5 cups soda daily        Occupational Exposure: Not Asked        Hobby Hazards: Not Asked        Sleep Concern: Not Asked        Stress Concern: Not Asked        Weight Concern: Not Asked        Special Diet: Not Asked        Back Care: Not Asked        Ex sarcoma currently being followed at [facility]. She is s/p RFA without any relief. She has escalated from norco to percocet and denies relief from either medication. She continues to overuse medication. PLAN:  - patient continues with  aberrant behavior.

## 2020-02-28 RX ORDER — OXYCODONE AND ACETAMINOPHEN 10; 325 MG/1; MG/1
TABLET ORAL
Qty: 150 TABLET | Refills: 0 | Status: SHIPPED | OUTPATIENT
Start: 2020-02-28 | End: 2020-03-23

## 2020-03-04 ENCOUNTER — DOCUMENTATION ONLY (OUTPATIENT)
Dept: PAIN CLINIC | Facility: HOSPITAL | Age: 75
End: 2020-03-04

## 2020-03-04 NOTE — PROGRESS NOTES
Writer spoke with pt answered all questions. No Further  needs, if pt has any additional questions pt may reach out to our office.

## 2020-03-23 ENCOUNTER — TELEPHONE (OUTPATIENT)
Dept: PAIN CLINIC | Facility: HOSPITAL | Age: 75
End: 2020-03-23

## 2020-03-23 RX ORDER — OXYCODONE AND ACETAMINOPHEN 10; 325 MG/1; MG/1
1 TABLET ORAL EVERY 4 HOURS PRN
Qty: 150 TABLET | Refills: 0 | Status: SHIPPED | OUTPATIENT
Start: 2020-03-29 | End: 2020-04-21

## 2020-03-23 RX ORDER — DIAZEPAM 10 MG/1
TABLET ORAL
Qty: 90 TABLET | Refills: 2 | Status: SHIPPED | OUTPATIENT
Start: 2020-03-23 | End: 2020-09-28

## 2020-03-27 ENCOUNTER — TELEPHONE (OUTPATIENT)
Dept: PAIN CLINIC | Facility: HOSPITAL | Age: 75
End: 2020-03-27

## 2020-03-27 NOTE — TELEPHONE ENCOUNTER
SPOKE WITH ALEXIS BENSON;  AND WE BOTH CHECK THE DATES;  HER DATES WERE CORRECT SHE IS DUE TO FILL PERCOCET 10/325 3/29/2020  NOTIFIED PT OF THIS;  PT VERBALIZED UNDERSTANDING

## 2020-06-30 ENCOUNTER — OFFICE VISIT (OUTPATIENT)
Dept: PAIN CLINIC | Facility: HOSPITAL | Age: 75
End: 2020-06-30
Attending: NURSE PRACTITIONER
Payer: MEDICARE

## 2020-06-30 DIAGNOSIS — G89.29 CHRONIC BILATERAL LOW BACK PAIN WITH BILATERAL SCIATICA: Primary | ICD-10-CM

## 2020-06-30 DIAGNOSIS — F11.90 CHRONIC, CONTINUOUS USE OF OPIOIDS: ICD-10-CM

## 2020-06-30 DIAGNOSIS — M48.062 SPINAL STENOSIS OF LUMBAR REGION WITH NEUROGENIC CLAUDICATION: ICD-10-CM

## 2020-06-30 DIAGNOSIS — M15.9 PRIMARY OSTEOARTHRITIS INVOLVING MULTIPLE JOINTS: ICD-10-CM

## 2020-06-30 DIAGNOSIS — M54.41 CHRONIC BILATERAL LOW BACK PAIN WITH BILATERAL SCIATICA: Primary | ICD-10-CM

## 2020-06-30 DIAGNOSIS — M54.42 CHRONIC BILATERAL LOW BACK PAIN WITH BILATERAL SCIATICA: Primary | ICD-10-CM

## 2020-06-30 PROCEDURE — 99211 OFF/OP EST MAY X REQ PHY/QHP: CPT

## 2020-06-30 RX ORDER — OXYCODONE AND ACETAMINOPHEN 10; 325 MG/1; MG/1
1 TABLET ORAL EVERY 4 HOURS PRN
Qty: 150 TABLET | Refills: 0 | Status: ON HOLD | OUTPATIENT
Start: 2020-07-30 | End: 2020-08-31

## 2020-06-30 RX ORDER — OXYCODONE AND ACETAMINOPHEN 10; 325 MG/1; MG/1
1 TABLET ORAL EVERY 4 HOURS PRN
Qty: 150 TABLET | Refills: 0 | Status: SHIPPED | OUTPATIENT
Start: 2020-06-30 | End: 2020-07-30

## 2020-06-30 NOTE — PROGRESS NOTES
Pt presents to [facility] ambulatory for medication fill. Pt states had a fall 2 weeks ago at home and injured back. Pt is having increased low back pain due to the fact that the patient ran out of medication yesterday 06/29/20.   Pt is taking Percocet,5 pills per

## 2020-06-30 NOTE — CHRONIC PAIN
Follow-up Note  HISTORY OF PRESENT ILLNESS:  Shanika Hedrick is a 69year old old female, returns to the clinic for evaluation treatment of her chronic low back pain  Is well-known to the pain clinic,  she has severe spinal stenosis and a retroperitoneal sa Aero Soln Inhale 2 puffs into the lungs. • Amitriptyline HCl 50 MG Oral Tab Take 50 mg by mouth nightly. • lansoprazole 30 MG Oral Capsule Delayed Release Take 30 mg by mouth 2 (two) times daily.      • Calcium Carbonate-Vitamin D 600-400 MG-UNIT Or [facility])    • Sarcoma ([facility] [address] 2010    surgery       SURGICAL HISTORY:  Past Surgical History:   Procedure Laterality Date   • CARPAL TUNNEL RELEASE Right    • CHOLECYSTECTOMY     • HAND/FINGER SURGERY UNLISTED Right 2004    hand surgery   • REPAIR ROTATOR CUFF, Asked        Caffeine Concern: No          5 cups soda daily        Occupational Exposure: Not Asked        Hobby Hazards: Not Asked        Sleep Concern: Not Asked        Stress Concern: Not Asked        Weight Concern: Not Asked        Special Diet: Not disease and now recurrent retroperitoneal sarcoma currently being followed at [facility]. She is s/p RFA without any relief. She has escalated from norco to percocet and denies relief from either medication. She continues to overuse medication.        PLAN:  - di

## 2020-08-19 ENCOUNTER — HOSPITAL ENCOUNTER (INPATIENT)
Facility: HOSPITAL | Age: 75
LOS: 12 days | Discharge: HOME | DRG: 291 | End: 2020-08-31
Attending: EMERGENCY MEDICINE | Admitting: HOSPITALIST
Payer: MEDICARE

## 2020-08-19 ENCOUNTER — APPOINTMENT (OUTPATIENT)
Dept: GENERAL RADIOLOGY | Facility: HOSPITAL | Age: 75
DRG: 291 | End: 2020-08-19
Attending: EMERGENCY MEDICINE
Payer: MEDICARE

## 2020-08-19 DIAGNOSIS — G89.3 CANCER ASSOCIATED PAIN: ICD-10-CM

## 2020-08-19 DIAGNOSIS — G89.29 CHRONIC BILATERAL LOW BACK PAIN WITH BILATERAL SCIATICA: ICD-10-CM

## 2020-08-19 DIAGNOSIS — J81.0 FLASH PULMONARY EDEMA (HCC): ICD-10-CM

## 2020-08-19 DIAGNOSIS — M54.42 CHRONIC BILATERAL LOW BACK PAIN WITH BILATERAL SCIATICA: ICD-10-CM

## 2020-08-19 DIAGNOSIS — M54.41 CHRONIC BILATERAL LOW BACK PAIN WITH BILATERAL SCIATICA: ICD-10-CM

## 2020-08-19 DIAGNOSIS — E87.70 HYPERVOLEMIA, UNSPECIFIED HYPERVOLEMIA TYPE: Primary | ICD-10-CM

## 2020-08-19 LAB
ANION GAP SERPL CALC-SCNC: 3 MMOL/L (ref 0–18)
APTT PPP: 34.1 SECONDS (ref 23.2–35.3)
BASOPHILS # BLD AUTO: 0.04 X10(3) UL (ref 0–0.2)
BASOPHILS NFR BLD AUTO: 0.5 %
BUN BLD-MCNC: 15 MG/DL (ref 7–18)
BUN/CREAT SERPL: 9.8 (ref 10–20)
CALCIUM BLD-MCNC: 7.8 MG/DL (ref 8.5–10.1)
CHLORIDE SERPL-SCNC: 112 MMOL/L (ref 98–112)
CO2 SERPL-SCNC: 30 MMOL/L (ref 21–32)
CREAT BLD-MCNC: 1.53 MG/DL (ref 0.55–1.02)
DEPRECATED RDW RBC AUTO: 57.8 FL (ref 35.1–46.3)
EOSINOPHIL # BLD AUTO: 0.23 X10(3) UL (ref 0–0.7)
EOSINOPHIL NFR BLD AUTO: 2.8 %
ERYTHROCYTE [DISTWIDTH] IN BLOOD BY AUTOMATED COUNT: 16.6 % (ref 11–15)
EST. AVERAGE GLUCOSE BLD GHB EST-MCNC: 111 MG/DL (ref 68–126)
GLUCOSE BLD-MCNC: 168 MG/DL (ref 70–99)
HAV IGM SER QL: 2.2 MG/DL (ref 1.6–2.6)
HBA1C MFR BLD HPLC: 5.5 % (ref ?–5.7)
HCT VFR BLD AUTO: 29.2 % (ref 35–48)
HGB BLD-MCNC: 9.3 G/DL (ref 12–16)
IMM GRANULOCYTES # BLD AUTO: 0.03 X10(3) UL (ref 0–1)
IMM GRANULOCYTES NFR BLD: 0.4 %
INR BLD: 1.07 (ref 0.9–1.2)
LYMPHOCYTES # BLD AUTO: 1.73 X10(3) UL (ref 1–4)
LYMPHOCYTES NFR BLD AUTO: 21.1 %
MCH RBC QN AUTO: 30.5 PG (ref 26–34)
MCHC RBC AUTO-ENTMCNC: 31.8 G/DL (ref 31–37)
MCV RBC AUTO: 95.7 FL (ref 80–100)
MONOCYTES # BLD AUTO: 0.45 X10(3) UL (ref 0.1–1)
MONOCYTES NFR BLD AUTO: 5.5 %
NEUTROPHILS # BLD AUTO: 5.72 X10 (3) UL (ref 1.5–7.7)
NEUTROPHILS # BLD AUTO: 5.72 X10(3) UL (ref 1.5–7.7)
NEUTROPHILS NFR BLD AUTO: 69.7 %
NT-PROBNP SERPL-MCNC: 6343 PG/ML (ref ?–125)
OSMOLALITY SERPL CALC.SUM OF ELEC: 305 MOSM/KG (ref 275–295)
PLATELET # BLD AUTO: 204 10(3)UL (ref 150–450)
POTASSIUM SERPL-SCNC: 3.8 MMOL/L (ref 3.5–5.1)
PROTHROMBIN TIME: 13.7 SECONDS (ref 11.8–14.5)
RBC # BLD AUTO: 3.05 X10(6)UL (ref 3.8–5.3)
SARS-COV-2 RNA RESP QL NAA+PROBE: NOT DETECTED
SODIUM SERPL-SCNC: 145 MMOL/L (ref 136–145)
TROPONIN I SERPL-MCNC: <0.045 NG/ML (ref ?–0.04)
WBC # BLD AUTO: 8.2 X10(3) UL (ref 4–11)

## 2020-08-19 PROCEDURE — 5A09357 ASSISTANCE WITH RESPIRATORY VENTILATION, LESS THAN 24 CONSECUTIVE HOURS, CONTINUOUS POSITIVE AIRWAY PRESSURE: ICD-10-PCS | Performed by: HOSPITALIST

## 2020-08-19 PROCEDURE — 85025 COMPLETE CBC W/AUTO DIFF WBC: CPT | Performed by: EMERGENCY MEDICINE

## 2020-08-19 PROCEDURE — 94002 VENT MGMT INPAT INIT DAY: CPT

## 2020-08-19 PROCEDURE — 84484 ASSAY OF TROPONIN QUANT: CPT | Performed by: EMERGENCY MEDICINE

## 2020-08-19 PROCEDURE — 85730 THROMBOPLASTIN TIME PARTIAL: CPT | Performed by: EMERGENCY MEDICINE

## 2020-08-19 PROCEDURE — 85610 PROTHROMBIN TIME: CPT | Performed by: EMERGENCY MEDICINE

## 2020-08-19 PROCEDURE — 71045 X-RAY EXAM CHEST 1 VIEW: CPT | Performed by: EMERGENCY MEDICINE

## 2020-08-19 PROCEDURE — 83880 ASSAY OF NATRIURETIC PEPTIDE: CPT | Performed by: EMERGENCY MEDICINE

## 2020-08-19 PROCEDURE — 93010 ELECTROCARDIOGRAM REPORT: CPT | Performed by: EMERGENCY MEDICINE

## 2020-08-19 PROCEDURE — 94660 CPAP INITIATION&MGMT: CPT

## 2020-08-19 PROCEDURE — 94640 AIRWAY INHALATION TREATMENT: CPT

## 2020-08-19 PROCEDURE — 99285 EMERGENCY DEPT VISIT HI MDM: CPT

## 2020-08-19 PROCEDURE — 83036 HEMOGLOBIN GLYCOSYLATED A1C: CPT | Performed by: HOSPITALIST

## 2020-08-19 PROCEDURE — 83735 ASSAY OF MAGNESIUM: CPT | Performed by: EMERGENCY MEDICINE

## 2020-08-19 PROCEDURE — 93005 ELECTROCARDIOGRAM TRACING: CPT

## 2020-08-19 PROCEDURE — 96365 THER/PROPH/DIAG IV INF INIT: CPT

## 2020-08-19 PROCEDURE — 80048 BASIC METABOLIC PNL TOTAL CA: CPT | Performed by: EMERGENCY MEDICINE

## 2020-08-19 RX ORDER — ATORVASTATIN CALCIUM 40 MG/1
40 TABLET, FILM COATED ORAL NIGHTLY
Status: DISCONTINUED | OUTPATIENT
Start: 2020-08-19 | End: 2020-08-31

## 2020-08-19 RX ORDER — ENOXAPARIN SODIUM 100 MG/ML
40 INJECTION SUBCUTANEOUS DAILY
Status: DISCONTINUED | OUTPATIENT
Start: 2020-08-19 | End: 2020-08-25

## 2020-08-19 RX ORDER — ONDANSETRON 2 MG/ML
4 INJECTION INTRAMUSCULAR; INTRAVENOUS EVERY 6 HOURS PRN
Status: DISCONTINUED | OUTPATIENT
Start: 2020-08-19 | End: 2020-08-31

## 2020-08-19 RX ORDER — FUROSEMIDE 10 MG/ML
40 INJECTION INTRAMUSCULAR; INTRAVENOUS ONCE
Status: COMPLETED | OUTPATIENT
Start: 2020-08-19 | End: 2020-08-19

## 2020-08-19 RX ORDER — HYDROXYCHLOROQUINE SULFATE 200 MG/1
200 TABLET, FILM COATED ORAL DAILY
Status: DISCONTINUED | OUTPATIENT
Start: 2020-08-19 | End: 2020-08-31

## 2020-08-19 RX ORDER — POLYETHYLENE GLYCOL 3350 17 G/17G
17 POWDER, FOR SOLUTION ORAL DAILY PRN
Status: DISCONTINUED | OUTPATIENT
Start: 2020-08-19 | End: 2020-08-31

## 2020-08-19 RX ORDER — LISINOPRIL 40 MG/1
40 TABLET ORAL DAILY
Status: DISCONTINUED | OUTPATIENT
Start: 2020-08-19 | End: 2020-08-25

## 2020-08-19 RX ORDER — OXYCODONE AND ACETAMINOPHEN 10; 325 MG/1; MG/1
1 TABLET ORAL EVERY 4 HOURS PRN
Status: DISCONTINUED | OUTPATIENT
Start: 2020-08-19 | End: 2020-08-31

## 2020-08-19 RX ORDER — SODIUM CHLORIDE 0.9 % (FLUSH) 0.9 %
3 SYRINGE (ML) INJECTION AS NEEDED
Status: DISCONTINUED | OUTPATIENT
Start: 2020-08-19 | End: 2020-08-31

## 2020-08-19 RX ORDER — MELATONIN
325 3 TIMES DAILY
Status: DISCONTINUED | OUTPATIENT
Start: 2020-08-19 | End: 2020-08-22

## 2020-08-19 RX ORDER — ATENOLOL 50 MG/1
50 TABLET ORAL DAILY
Status: DISCONTINUED | OUTPATIENT
Start: 2020-08-19 | End: 2020-08-31

## 2020-08-19 RX ORDER — DOCUSATE SODIUM 100 MG/1
100 CAPSULE, LIQUID FILLED ORAL 2 TIMES DAILY
Status: DISCONTINUED | OUTPATIENT
Start: 2020-08-19 | End: 2020-08-31

## 2020-08-19 RX ORDER — ASPIRIN 81 MG/1
81 TABLET ORAL DAILY
Status: DISCONTINUED | OUTPATIENT
Start: 2020-08-19 | End: 2020-08-31

## 2020-08-19 RX ORDER — ACETAMINOPHEN 325 MG/1
650 TABLET ORAL EVERY 4 HOURS PRN
Status: DISCONTINUED | OUTPATIENT
Start: 2020-08-19 | End: 2020-08-31

## 2020-08-19 RX ORDER — NITROGLYCERIN 20 MG/100ML
20 INJECTION INTRAVENOUS CONTINUOUS
Status: DISCONTINUED | OUTPATIENT
Start: 2020-08-19 | End: 2020-08-31

## 2020-08-19 RX ORDER — NITROGLYCERIN 20 MG/100ML
INJECTION INTRAVENOUS
Status: COMPLETED
Start: 2020-08-19 | End: 2020-08-19

## 2020-08-19 RX ORDER — FUROSEMIDE 10 MG/ML
40 INJECTION INTRAMUSCULAR; INTRAVENOUS ONCE
Status: DISCONTINUED | OUTPATIENT
Start: 2020-08-19 | End: 2020-08-19

## 2020-08-19 RX ORDER — METOCLOPRAMIDE HYDROCHLORIDE 5 MG/ML
5 INJECTION INTRAMUSCULAR; INTRAVENOUS EVERY 8 HOURS PRN
Status: DISCONTINUED | OUTPATIENT
Start: 2020-08-19 | End: 2020-08-31

## 2020-08-19 RX ORDER — BISACODYL 10 MG
10 SUPPOSITORY, RECTAL RECTAL
Status: DISCONTINUED | OUTPATIENT
Start: 2020-08-19 | End: 2020-08-31

## 2020-08-19 RX ORDER — ASPIRIN 81 MG/1
81 TABLET ORAL DAILY
COMMUNITY

## 2020-08-19 RX ORDER — HYDRALAZINE HYDROCHLORIDE 20 MG/ML
10 INJECTION INTRAMUSCULAR; INTRAVENOUS ONCE
Status: COMPLETED | OUTPATIENT
Start: 2020-08-19 | End: 2020-08-19

## 2020-08-19 RX ORDER — NIFEDIPINE 30 MG/1
30 TABLET, EXTENDED RELEASE ORAL DAILY
Status: DISCONTINUED | OUTPATIENT
Start: 2020-08-19 | End: 2020-08-21

## 2020-08-19 RX ORDER — HYDRALAZINE HYDROCHLORIDE 20 MG/ML
10 INJECTION INTRAMUSCULAR; INTRAVENOUS EVERY 6 HOURS PRN
Status: DISCONTINUED | OUTPATIENT
Start: 2020-08-19 | End: 2020-08-31

## 2020-08-19 RX ORDER — IPRATROPIUM BROMIDE AND ALBUTEROL SULFATE 2.5; .5 MG/3ML; MG/3ML
3 SOLUTION RESPIRATORY (INHALATION) ONCE
Status: COMPLETED | OUTPATIENT
Start: 2020-08-19 | End: 2020-08-19

## 2020-08-19 NOTE — ED NOTES
Pt transported to floor with transporter and son at bedside. Pt in nad at this time. Iv access patent. Vss. Corral. A&ox3. Pt on 8l hi-nicola nc. Face mask in place for transport. Belongings with pt. All questions and concerns addressed.

## 2020-08-19 NOTE — ED PROVIDER NOTES
Patient Seen in: [facility] AND [facility] Emergency Department      History   Patient presents with:  Dyspnea PASCUAL SOB    Stated Complaint: RESPIRATORY DISTRESS    HPI    74-year-old female with CKD, type 2 diabetes, hypertension, hyperlipidemia, retroperitoneal reviewed. Constitutional:       Appearance: She is well-developed. She is ill-appearing. HENT:      Head: Normocephalic and atraumatic. Neck:      Musculoskeletal: Normal range of motion. Cardiovascular:      Rate and Rhythm: Regular rhythm.  Tachyc Abnormality         Status                     ---------                               -----------         ------                     CBC W/ DIFFERENTIAL[[id number]]          Abnormal            Final result                 Please view results for thes disposition: 8/19/2020  5:27 AM             Disposition and Plan     Clinical Impression:  Hypervolemia, unspecified hypervolemia type  (primary encounter diagnosis)  Flash pulmonary edema (HCC)    Disposition:  Admit  8/19/2020  5:27 am    Follow-up:  No

## 2020-08-19 NOTE — ED NOTES
Pt states she feels a little better with bipap and after in-line neb. Pt noted to have decreased work of breathing and decreased respiratory rate.

## 2020-08-19 NOTE — CONSULTS
Reason for Consultation: HFpEF    Assessment/Plan:     1. Acute on chronic HFpEF  - Echo 1/28/2020: EF 02-21: diastolic dysfunction; RVSP 52  2. CAD  - moderate coronary calcification by CT at [facility] 7/28/2020  3.  Hypertensive crisis  - non-compliant with BP Smokeless tobacco: Never Used    Alcohol use: No      Alcohol/week: 0.0 standard drinks    Drug use: No       Medications:  nitroGLYCERIN infusion 50mg in D5W 250ml, 20 mcg/min, Intravenous, Continuous  aspirin EC tab 81 mg, 81 mg, Oral, Daily  atenolol 24 100 % — —   08/19/20 0430 (!) 163/78 — — 69 21 99 % — —   08/19/20 0415 (!) 171/81 — — 72 23 99 % — —   08/19/20 0400 (!) 184/78 — — 83 22 100 % — —   08/19/20 0353 — — — 118 — 100 % — —   08/19/20 0345 (!) 217/127 — — 120 (!) 33 100 % — —   08/19/20 03 major discrepancies. Dictated by (CST): Kendall Miranda MD on 8/19/2020 at 9:02 AM     Finalized by (CST): Kendall Miranda MD on 8/19/2020 at 9:05 AM                 Thank you for allowing me to participate in the care of your patient.        *This n

## 2020-08-19 NOTE — ED NOTES
Orders for admission. Patient and/or next of kin aware of plan and is ready to go upstairs. Please call ED RN Kimmie Joseph at listed extension should you have any further questions or concerns. Thank you.     Nurse and Hua Noble RN, [phone]    Chief Presentation: SOB

## 2020-08-19 NOTE — H&P
[facility] Hospitalist History and Physical      Patient presents with:  Dyspnea PASCUAL SOB       PCP: PHYSICIAN NONSTAFF      History of Present Illness: Patient is a 70year old female with PMH sig for HFpEF, CKD III, T2DM c/b nephropathy, HTN, HL, RP sarcoma, pHT Wt 205 lb 3.2 oz (93.1 kg)   SpO2 99%   BMI 35.22 kg/m²   General:  Alert, no distress, appears stated age. Head:  Normocephalic, without obvious abnormality, atraumatic. Eyes:  Sclera anicteric, No conjunctival pallor, EOMs intact.     Nose: Nares nor adenopathy. LUNGS/PLEURA:   Markings are diffusely prominent but no gross consolidation. No effusion or pleural thickening.  BONES:         There is demineralization of the bony s interested at this point as \"I already have cancer, I'm not quitting now\"  -3-10 min spent in smoking cessation education    Chronic medical conditions (CKD III, T2DM c/b nephropathy, HTN, HL, RP sarcoma, pHTN, HL, RA, severe obesity with BMI 35 w/ comor

## 2020-08-19 NOTE — PLAN OF CARE
Patient tachypneic, SOB, speaking in broken sentences, stopping to catch breathe. Dr. Farida Armando notified, to given IV hydralazine and place her on Bi pap. RT notified.

## 2020-08-19 NOTE — CM/SW NOTE
SW self referred pt for d/c planning. SW met w/ pt in her room. Pt verified her address and confirmed living w/ her son, Cam Leonard. They live in a single level home w/ 0 steps to enter.     Pt informed SW that she uses a cane or walker for ambulation, depen

## 2020-08-19 NOTE — PLAN OF CARE
Problem: Patient Centered Care  Goal: Patient preferences are identified and integrated in the patient's plan of care  Description  Interventions:  - What would you like us to know as we care for you?   - Provide timely, complete, and accurate informatio administer replacement therapy as ordered  Outcome: Progressing     Problem: SAFETY ADULT - FALL  Goal: Free from fall injury  Description  INTERVENTIONS:  - Assess pt frequently for physical needs  - Identify cognitive and physical deficits and behaviors

## 2020-08-20 LAB
ANION GAP SERPL CALC-SCNC: 3 MMOL/L (ref 0–18)
BUN BLD-MCNC: 21 MG/DL (ref 7–18)
BUN/CREAT SERPL: 17.5 (ref 10–20)
CALCIUM BLD-MCNC: 8.5 MG/DL (ref 8.5–10.1)
CHLORIDE SERPL-SCNC: 110 MMOL/L (ref 98–112)
CO2 SERPL-SCNC: 29 MMOL/L (ref 21–32)
CREAT BLD-MCNC: 1.2 MG/DL (ref 0.55–1.02)
DEPRECATED RDW RBC AUTO: 57 FL (ref 35.1–46.3)
ERYTHROCYTE [DISTWIDTH] IN BLOOD BY AUTOMATED COUNT: 16.3 % (ref 11–15)
GLUCOSE BLD-MCNC: 127 MG/DL (ref 70–99)
HCT VFR BLD AUTO: 27.8 % (ref 35–48)
HGB BLD-MCNC: 8.9 G/DL (ref 12–16)
MCH RBC QN AUTO: 30.4 PG (ref 26–34)
MCHC RBC AUTO-ENTMCNC: 32 G/DL (ref 31–37)
MCV RBC AUTO: 94.9 FL (ref 80–100)
OSMOLALITY SERPL CALC.SUM OF ELEC: 299 MOSM/KG (ref 275–295)
PLATELET # BLD AUTO: 193 10(3)UL (ref 150–450)
POTASSIUM SERPL-SCNC: 3.9 MMOL/L (ref 3.5–5.1)
RBC # BLD AUTO: 2.93 X10(6)UL (ref 3.8–5.3)
SODIUM SERPL-SCNC: 142 MMOL/L (ref 136–145)
WBC # BLD AUTO: 7 X10(3) UL (ref 4–11)

## 2020-08-20 PROCEDURE — 85027 COMPLETE CBC AUTOMATED: CPT | Performed by: HOSPITALIST

## 2020-08-20 PROCEDURE — 80048 BASIC METABOLIC PNL TOTAL CA: CPT | Performed by: HOSPITALIST

## 2020-08-20 RX ORDER — FUROSEMIDE 40 MG/1
40 TABLET ORAL DAILY
Status: DISCONTINUED | OUTPATIENT
Start: 2020-08-20 | End: 2020-08-22

## 2020-08-20 RX ORDER — CALCIUM CARBONATE 200(500)MG
500 TABLET,CHEWABLE ORAL 3 TIMES DAILY PRN
Status: DISCONTINUED | OUTPATIENT
Start: 2020-08-20 | End: 2020-08-31

## 2020-08-20 RX ORDER — PANTOPRAZOLE SODIUM 40 MG/1
40 TABLET, DELAYED RELEASE ORAL
Status: DISCONTINUED | OUTPATIENT
Start: 2020-08-20 | End: 2020-08-31

## 2020-08-20 NOTE — PROGRESS NOTES
[facility] Hospitalist Progress Note                                                                   [address]  8/14/1945    SUBJECTIVE:  No acute events overnight    Cr improved.   BP improv hours.    Meds:   Scheduled:   • furosemide  40 mg Oral Daily   • Pantoprazole Sodium  40 mg Oral QAM AC   • aspirin  81 mg Oral Daily   • atenolol  50 mg Oral Daily   • atorvastatin  40 mg Oral Nightly   • lisinopril  40 mg Oral Daily   • NIFEdipine ER  3 not quitting now\"  -3-10 min spent in smoking cessation education     Chronic medical conditions (CKD III, T2DM c/b nephropathy, HTN, HL, RP sarcoma, pHTN, HL, RA, severe obesity with BMI 35 w/ comorbid condition)  -stable  -cpm     Sindhu Rosen MD  [facility]

## 2020-08-20 NOTE — PLAN OF CARE
IV lasix switched to PO. Percocet PRN. Bp much better today and patient educated on compliance of medications, especially Bp meds. Patient ambulating herself in her room. 3L of O2, 98%- wean as able. Non skid socks on, bed in lowest position.      Patient a color and temperature  - Assess for signs of decreased coronary artery perfusion - ex.  Angina  - Evaluate fluid balance, assess for edema, trend weights  Outcome: Progressing  Goal: Absence of cardiac arrhythmias or at baseline  Description  INTERVENTIONS: Progressing

## 2020-08-20 NOTE — CM/SW NOTE
[employer] order form for home O2 placed on pt's chart - need MD signature.     Documentation for O2 sats: (RN to add to progress note)  Patient's O2 sat on room air is ____% at rest. Pt's O2 sat on room is ____% when ambulating, and ____% on ____ liter while ambu

## 2020-08-20 NOTE — PROGRESS NOTES
Assessment and Plan:     1. Acute on chronic HFpEF  - Echo 1/28/2020: EF 93-56: diastolic dysfunction; RVSP 52  2. CAD  - moderate coronary calcification by CT at [facility] 7/28/2020  3.  Hypertensive crisis  - non-compliant with BP meds  4. RP sarcoma (inoper Dictated by (CST): Juliana Sepulveda MD on 8/19/2020 at 9:02 AM     Finalized by (CST):  Juliana Sepulveda MD on 8/19/2020 at 9:05 AM          Ekg 12-lead    Result Date: 8/19/2020  ECG Report  Interpretation  -------------------------- Sinus Rhythm -Left a

## 2020-08-20 NOTE — PLAN OF CARE
Problem: Patient Centered Care  Goal: Patient preferences are identified and integrated in the patient's plan of care  Description  Interventions:  - What would you like us to know as we care for you?  I have chronic pain and come to the [address] [place] [address] pain [facility] if indicated  - Evaluate effectiveness of antiarrhythmic and heart rate control medications as ordered  - Initiate emergency measures for life threatening arrhythmias  - Monitor electrolytes and administer replacement therapy as ordered  Outcome: Progressi

## 2020-08-21 LAB
ANION GAP SERPL CALC-SCNC: 3 MMOL/L (ref 0–18)
BUN BLD-MCNC: 23 MG/DL (ref 7–18)
BUN/CREAT SERPL: 19.8 (ref 10–20)
CALCIUM BLD-MCNC: 8.3 MG/DL (ref 8.5–10.1)
CHLORIDE SERPL-SCNC: 110 MMOL/L (ref 98–112)
CO2 SERPL-SCNC: 27 MMOL/L (ref 21–32)
CREAT BLD-MCNC: 1.16 MG/DL (ref 0.55–1.02)
DEPRECATED HBV CORE AB SER IA-ACNC: 178.4 NG/ML (ref 18–340)
DEPRECATED RDW RBC AUTO: 56.7 FL (ref 35.1–46.3)
ERYTHROCYTE [DISTWIDTH] IN BLOOD BY AUTOMATED COUNT: 16 % (ref 11–15)
FOLATE SERPL-MCNC: 5.3 NG/ML (ref 8.7–?)
GLUCOSE BLD-MCNC: 132 MG/DL (ref 70–99)
HCT VFR BLD AUTO: 24 % (ref 35–48)
HGB BLD-MCNC: 7.6 G/DL (ref 12–16)
IRON SATURATION: 13 % (ref 15–50)
IRON SERPL-MCNC: 50 UG/DL (ref 50–170)
MCH RBC QN AUTO: 30.5 PG (ref 26–34)
MCHC RBC AUTO-ENTMCNC: 31.7 G/DL (ref 31–37)
MCV RBC AUTO: 96.4 FL (ref 80–100)
OSMOLALITY SERPL CALC.SUM OF ELEC: 296 MOSM/KG (ref 275–295)
PLATELET # BLD AUTO: 173 10(3)UL (ref 150–450)
POTASSIUM SERPL-SCNC: 3.9 MMOL/L (ref 3.5–5.1)
RBC # BLD AUTO: 2.49 X10(6)UL (ref 3.8–5.3)
SODIUM SERPL-SCNC: 140 MMOL/L (ref 136–145)
TOTAL IRON BINDING CAPACITY: 396 UG/DL (ref 240–450)
TRANSFERRIN SERPL-MCNC: 266 MG/DL (ref 200–360)
VIT B12 SERPL-MCNC: 397 PG/ML (ref 193–986)
WBC # BLD AUTO: 6.2 X10(3) UL (ref 4–11)

## 2020-08-21 PROCEDURE — 83540 ASSAY OF IRON: CPT | Performed by: HOSPITALIST

## 2020-08-21 PROCEDURE — 80048 BASIC METABOLIC PNL TOTAL CA: CPT | Performed by: HOSPITALIST

## 2020-08-21 PROCEDURE — 82746 ASSAY OF FOLIC ACID SERUM: CPT | Performed by: HOSPITALIST

## 2020-08-21 PROCEDURE — 82728 ASSAY OF FERRITIN: CPT | Performed by: HOSPITALIST

## 2020-08-21 PROCEDURE — 84466 ASSAY OF TRANSFERRIN: CPT | Performed by: HOSPITALIST

## 2020-08-21 PROCEDURE — 85027 COMPLETE CBC AUTOMATED: CPT | Performed by: HOSPITALIST

## 2020-08-21 PROCEDURE — 82607 VITAMIN B-12: CPT | Performed by: HOSPITALIST

## 2020-08-21 RX ORDER — NIFEDIPINE 60 MG/1
60 TABLET, EXTENDED RELEASE ORAL DAILY
Status: DISCONTINUED | OUTPATIENT
Start: 2020-08-22 | End: 2020-08-21

## 2020-08-21 RX ORDER — NIFEDIPINE 30 MG/1
30 TABLET, EXTENDED RELEASE ORAL DAILY
Status: DISCONTINUED | OUTPATIENT
Start: 2020-08-22 | End: 2020-08-21

## 2020-08-21 RX ORDER — FUROSEMIDE 10 MG/ML
40 INJECTION INTRAMUSCULAR; INTRAVENOUS ONCE
Status: COMPLETED | OUTPATIENT
Start: 2020-08-21 | End: 2020-08-21

## 2020-08-21 RX ORDER — NIFEDIPINE 60 MG/1
60 TABLET, EXTENDED RELEASE ORAL DAILY
Status: DISCONTINUED | OUTPATIENT
Start: 2020-08-22 | End: 2020-08-31

## 2020-08-21 RX ORDER — GABAPENTIN 300 MG/1
300 CAPSULE ORAL NIGHTLY
Status: DISCONTINUED | OUTPATIENT
Start: 2020-08-21 | End: 2020-08-31

## 2020-08-21 NOTE — PROGRESS NOTES
[facility] Hospitalist Progress Note                                                                   [address]  8/14/1945    SUBJECTIVE:  Still hypoxic, given an extra dose of IV lasix today ALB, AMYLASE, LIPASE, LDH in the last 168 hours. Invalid input(s): ALPHOS, TBIL, DBIL, TPROT    No results for input(s): PGLU in the last 168 hours.     Meds:   Scheduled:   • [START ON 8/22/2020] NIFEdipine ER  60 mg Oral Daily   • gabapentin  300 mg Or bp  -control bp  -02 prn  -diuresis as above     Tobacco abuse:  -still 1ppd smoker  -educated regarding importance of quitting - not interested at this point as \"I already have cancer, I'm not quitting now\"  -3-10 min spent in smoking cessation educatio

## 2020-08-21 NOTE — CARDIAC REHAB
CARDIAC REHAB HEART FAILURE EDUCATION    Handouts provided and reviewed: CHF Booklet and Smoking Cessation Handout.      Activity: Chair for all meals: yes       Ambulation: yes       Tolerated Activity: yes         Disease Process: Disease process reviewed

## 2020-08-21 NOTE — PROGRESS NOTES
Assessment and Plan:     1. Acute on chronic HFpEF  - Echo 1/28/2020: EF 19-97: diastolic dysfunction; RVSP 52  - BNP 6343, still on O2 2L, not on oxygen at home  2. CAD  - moderate coronary calcification by CT at [facility] 7/28/2020  3.  Hypertensive crisis > = values in this interval not displayed. Recent Labs   Lab 08/19/20  0347   INR 1.07       No results found. Echo 1/2020  CONCLUSIONS    SUMMARY:    1. Left ventricle: The cavity size is normal. Wall thickness is   mildly increased.  Systolic f

## 2020-08-21 NOTE — PLAN OF CARE
Problem: Patient Centered Care  Goal: Patient preferences are identified and integrated in the patient's plan of care  Description  Interventions:  - What would you like us to know as we care for you?  I have chronic pain and come to the [address] [place] [address] pain [facility] if indicated  - Evaluate effectiveness of antiarrhythmic and heart rate control medications as ordered  - Initiate emergency measures for life threatening arrhythmias  - Monitor electrolytes and administer replacement therapy as ordered  Outcome: Progressi

## 2020-08-21 NOTE — DIETARY NOTE
ADULT NUTRITION INITIAL ASSESSMENT    Pt is at moderate nutrition risk. Pt does not meet malnutrition criteria.       RECOMMENDATIONS TO MD:  See Nutrition Intervention     NUTRITION DIAGNOSIS/PROBLEM:  Unintentional weight loss related to physiological ca 106-112%  Of recent weight however masked by fluid.       WEIGHT HISTORY:  Patient Weight(s) for the past 336 hrs:   Weight   08/21/20 0518 91.2 kg (201 lb 1.6 oz)   08/20/20 0400 90.6 kg (199 lb 12.8 oz)   08/19/20 0628 93.1 kg (205 lb 3.2 oz)     Wt Readi edema +3 RLE & foot and +2 LLE & foot per RN documentation.  - Skin Integrity: intact.     NUTRITION PRESCRIPTION:  Diet: Cardiac, 2 gm Na and 1500 ml fluid restriction  Oral Supplements: as above  ESTIMATED NUTRITION NEEDS:  91.2 kg dosing weight  Calories

## 2020-08-22 LAB
ANION GAP SERPL CALC-SCNC: 4 MMOL/L (ref 0–18)
BUN BLD-MCNC: 26 MG/DL (ref 7–18)
BUN/CREAT SERPL: 20.6 (ref 10–20)
CALCIUM BLD-MCNC: 8.9 MG/DL (ref 8.5–10.1)
CHLORIDE SERPL-SCNC: 107 MMOL/L (ref 98–112)
CO2 SERPL-SCNC: 29 MMOL/L (ref 21–32)
CREAT BLD-MCNC: 1.26 MG/DL (ref 0.55–1.02)
DEPRECATED RDW RBC AUTO: 54.6 FL (ref 35.1–46.3)
ERYTHROCYTE [DISTWIDTH] IN BLOOD BY AUTOMATED COUNT: 15.6 % (ref 11–15)
GLUCOSE BLD-MCNC: 158 MG/DL (ref 70–99)
HCT VFR BLD AUTO: 25.3 % (ref 35–48)
HGB BLD-MCNC: 8.2 G/DL (ref 12–16)
MCH RBC QN AUTO: 30.8 PG (ref 26–34)
MCHC RBC AUTO-ENTMCNC: 32.4 G/DL (ref 31–37)
MCV RBC AUTO: 95.1 FL (ref 80–100)
OSMOLALITY SERPL CALC.SUM OF ELEC: 298 MOSM/KG (ref 275–295)
PLATELET # BLD AUTO: 211 10(3)UL (ref 150–450)
POTASSIUM SERPL-SCNC: 4 MMOL/L (ref 3.5–5.1)
RBC # BLD AUTO: 2.66 X10(6)UL (ref 3.8–5.3)
SODIUM SERPL-SCNC: 140 MMOL/L (ref 136–145)
WBC # BLD AUTO: 7.2 X10(3) UL (ref 4–11)

## 2020-08-22 PROCEDURE — 80048 BASIC METABOLIC PNL TOTAL CA: CPT | Performed by: HOSPITALIST

## 2020-08-22 PROCEDURE — 85027 COMPLETE CBC AUTOMATED: CPT | Performed by: HOSPITALIST

## 2020-08-22 RX ORDER — FOLIC ACID 1 MG/1
1 TABLET ORAL DAILY
Status: DISCONTINUED | OUTPATIENT
Start: 2020-08-22 | End: 2020-08-31

## 2020-08-22 RX ORDER — FUROSEMIDE 10 MG/ML
40 INJECTION INTRAMUSCULAR; INTRAVENOUS
Status: DISCONTINUED | OUTPATIENT
Start: 2020-08-22 | End: 2020-08-25

## 2020-08-22 RX ORDER — MELATONIN
325
Status: DISCONTINUED | OUTPATIENT
Start: 2020-08-23 | End: 2020-08-31

## 2020-08-22 NOTE — PROGRESS NOTES
[facility] Hospitalist Progress Note                                                                   [address]  8/14/1945    SUBJECTIVE:  Remains hypoxic, continuing IV diuresis.   Unable to ALT, AST, ALB, AMYLASE, LIPASE, LDH in the last 168 hours. Invalid input(s): ALPHOS, TBIL, DBIL, TPROT    No results for input(s): PGLU in the last 168 hours.     Meds:   Scheduled:   • furosemide  40 mg Intravenous BID (Diuretic)   • [START ON 8/23/2020 pulm edema/acute hypoxic resp failure:  -2/2 med non-compliance/high bp  -control bp  -02 prn  -diuresis as above     Tobacco abuse:  -still 1ppd smoker  -educated regarding importance of quitting - not interested at this point as \"I already have cancer,

## 2020-08-22 NOTE — PLAN OF CARE
Continue diuresis per cards. Pt still requiring 2 L o2 at this time. Desats when on room air. Will try o2 walk after further diuresis. Pain controlled with Percocet. Pt ambulating independently in room. Will continue to monitor.      Problem: Patient Center injury  - Provide assistive devices as appropriate  - Consider OT/PT consult to assist with strengthening/mobility  - Encourage toileting schedule  Outcome: Progressing

## 2020-08-22 NOTE — PROGRESS NOTES
Assessment and Plan:     1. Acute on chronic HFpEF  - Echo 1/28/2020: EF 60-72: diastolic dysfunction; RVSP 52  - BNP 6343, still on O2 2L, not on oxygen at home  2. CAD  - moderate coronary calcification by CT at [facility] 7/28/2020  3.  Hypertensive crisis 1/2020  CONCLUSIONS    SUMMARY:    1. Left ventricle: The cavity size is normal. Wall thickness is   mildly increased. Systolic function is vigorous. The estimated   ejection fraction is 65-70%.  Wall motion is normal; there are no   regional wall motion ab

## 2020-08-23 LAB
ANION GAP SERPL CALC-SCNC: 4 MMOL/L (ref 0–18)
BUN BLD-MCNC: 35 MG/DL (ref 7–18)
BUN/CREAT SERPL: 25.7 (ref 10–20)
CALCIUM BLD-MCNC: 8.8 MG/DL (ref 8.5–10.1)
CHLORIDE SERPL-SCNC: 106 MMOL/L (ref 98–112)
CO2 SERPL-SCNC: 32 MMOL/L (ref 21–32)
CREAT BLD-MCNC: 1.36 MG/DL (ref 0.55–1.02)
GLUCOSE BLD-MCNC: 108 MG/DL (ref 70–99)
OSMOLALITY SERPL CALC.SUM OF ELEC: 303 MOSM/KG (ref 275–295)
POTASSIUM SERPL-SCNC: 4.2 MMOL/L (ref 3.5–5.1)
SODIUM SERPL-SCNC: 142 MMOL/L (ref 136–145)

## 2020-08-23 PROCEDURE — 80048 BASIC METABOLIC PNL TOTAL CA: CPT | Performed by: HOSPITALIST

## 2020-08-23 RX ORDER — METOLAZONE 2.5 MG/1
2.5 TABLET ORAL ONCE
Status: COMPLETED | OUTPATIENT
Start: 2020-08-23 | End: 2020-08-23

## 2020-08-23 NOTE — PROGRESS NOTES
[facility] Hospitalist Progress Note                                                                   [address]  8/14/1945    SUBJECTIVE:  Getting metolazone today.      Down another pound - a 8. 3* 8.9 8.8   MG 2.2  --   --   --   --    * 127* 132* 158* 108*       No results for input(s): ALT, AST, ALB, AMYLASE, LIPASE, LDH in the last 168 hours.     Invalid input(s): ALPHOS, TBIL, DBIL, TPROT    No results for input(s): PGLU in the last 1 180  -educated regarding importance of medication compliance      Flash pulm edema/acute hypoxic resp failure:  -2/2 med non-compliance/high bp  -control bp  -02 prn  -diuresis as above    Folate deficiency  -start folate supplements   -f/u with PCP     To

## 2020-08-23 NOTE — PLAN OF CARE
Patient is a/ox4. Weaned to RA. Self care. Percocet given for pain PRN. Fluid restriction. IV lasix continued. Will continue to monitor.     Problem: Patient Centered Care  Goal: Patient preferences are identified and integrated in the patient's plan of car balance, assess for edema, trend weights  Outcome: Progressing  Goal: Absence of cardiac arrhythmias or at baseline  Description  INTERVENTIONS:  - Continuous cardiac monitoring, monitor vital signs, obtain 12 lead EKG if indicated  - Evaluate effectivenes

## 2020-08-23 NOTE — PROGRESS NOTES
Assessment and Plan:     1. Acute on chronic HFpEF  - Echo 1/28/2020: EF 88-62: diastolic dysfunction; RVSP 52  - BNP 6343, still on O2 2L, not on oxygen at home  2. CAD  - moderate coronary calcification by CT at [facility] 7/28/2020  3.  Hypertensive crisis 08/19/20  0347   INR 1.07       No results found. Echo 1/2020  CONCLUSIONS    SUMMARY:    1. Left ventricle: The cavity size is normal. Wall thickness is   mildly increased. Systolic function is vigorous. The estimated   ejection fraction is 65-70%.

## 2020-08-23 NOTE — PLAN OF CARE
VSS, no acute events overnight. Pt ambulating independently in room, voiding freely in bathroom. 2L O2 via HFNC. Pain controlled with Percocet 10mg q4h prn for pain.  Plan to continue to monitor, administer medications prn and as scheduled, follow plan of c pulses, skin color and temperature  - Assess for signs of decreased coronary artery perfusion - ex.  Angina  - Evaluate fluid balance, assess for edema, trend weights  Outcome: Progressing  Goal: Absence of cardiac arrhythmias or at baseline  Description  I retention  Outcome: Progressing

## 2020-08-24 LAB
ANION GAP SERPL CALC-SCNC: 1 MMOL/L (ref 0–18)
BUN BLD-MCNC: 40 MG/DL (ref 7–18)
BUN/CREAT SERPL: 28 (ref 10–20)
CALCIUM BLD-MCNC: 8.3 MG/DL (ref 8.5–10.1)
CHLORIDE SERPL-SCNC: 107 MMOL/L (ref 98–112)
CO2 SERPL-SCNC: 33 MMOL/L (ref 21–32)
CREAT BLD-MCNC: 1.43 MG/DL (ref 0.55–1.02)
GLUCOSE BLD-MCNC: 117 MG/DL (ref 70–99)
OSMOLALITY SERPL CALC.SUM OF ELEC: 303 MOSM/KG (ref 275–295)
POTASSIUM SERPL-SCNC: 4.6 MMOL/L (ref 3.5–5.1)
SODIUM SERPL-SCNC: 141 MMOL/L (ref 136–145)

## 2020-08-24 PROCEDURE — 80048 BASIC METABOLIC PNL TOTAL CA: CPT | Performed by: HOSPITALIST

## 2020-08-24 RX ORDER — NICOTINE 21 MG/24HR
1 PATCH, TRANSDERMAL 24 HOURS TRANSDERMAL DAILY
Status: DISCONTINUED | OUTPATIENT
Start: 2020-08-24 | End: 2020-08-31

## 2020-08-24 NOTE — PLAN OF CARE
Problem: Patient Centered Care  Goal: Patient preferences are identified and integrated in the patient's plan of care  Description  Interventions:  - What would you like us to know as we care for you?  I have chronic pain and come to the [address] [place] [address] pain [facility] baseline  Description  INTERVENTIONS:  - Continuous cardiac monitoring, monitor vital signs, obtain 12 lead EKG if indicated  - Evaluate effectiveness of antiarrhythmic and heart rate control medications as ordered  - Initiate emergency measures for life t

## 2020-08-24 NOTE — PLAN OF CARE
Problem: Patient Centered Care  Goal: Patient preferences are identified and integrated in the patient's plan of care  Description  Interventions:  - What would you like us to know as we care for you?  I have chronic pain and come to the [facility] pain [facility] baseline  Description  INTERVENTIONS:  - Continuous cardiac monitoring, monitor vital signs, obtain 12 lead EKG if indicated  - Evaluate effectiveness of antiarrhythmic and heart rate control medications as ordered  - Initiate emergency measures for life t

## 2020-08-24 NOTE — PROGRESS NOTES
[facility] Hospitalist Progress Note                                                                   [address]  [birth year]    SUBJECTIVE:  No sob    OBJECTIVE:  Temp:  [97.8 °F (36.6 °C)-98. 5 in the last 168 hours. Invalid input(s): ALPHOS, TBIL, DBIL, TPROT    No results for input(s): PGLU in the last 168 hours.     Meds:   Scheduled:   • furosemide  40 mg Intravenous BID (Diuretic)   • ferrous sulfate  325 mg Oral Daily with breakfast   • f 1ppd smoker  -educated regarding importance of quitting - not interested at this point as \"I already have cancer, I'm not quitting now\"  -3-10 min spent in smoking cessation education     Chronic medical conditions (CKD III, T2DM c/b nephropathy, HTN, HL

## 2020-08-24 NOTE — CM/SW NOTE
HOME OXYGEN EVALUATION  From Nursing Note:     OXYGEN SATURATION ON ROOM AIR AT REST:   XX%  OXYGEN SATURATION  WALKING ON ROOM AIR  XX%  OXYGEN SATURATION  WALKING ON  Alisa Manifold     Referral sent to: [facility]  Ph: [phone]

## 2020-08-24 NOTE — PLAN OF CARE
Pt c/o difficulty breathing, PO checked 88 - 89%. Pt placed on O2 @ 1L/NC, encouraged to take slow deep breath PO - 97%. Will continue to monitor resp status.

## 2020-08-24 NOTE — PROGRESS NOTES
Assessment and Plan:     1. Acute on chronic HFpEF  - Echo 1/28/2020: EF 68-52: diastolic dysfunction; RVSP 52  - BNP 6343, still on O2, not on oxygen at home  2. CAD  - moderate coronary calcification by CT at [facility] 7/28/2020  3.  Hypertensive crisis  - n 32.4   RDW 16.6*   < > 15.6*   NEPRELIM 5.72  --   --    WBC 8.2   < > 7.2   .0   < > 211.0    < > = values in this interval not displayed. Recent Labs   Lab 08/19/20  0347   INR 1.07       No results found.         Echo 1/2020  CONCLUSIONS    PEDROZA

## 2020-08-25 LAB
ANION GAP SERPL CALC-SCNC: 3 MMOL/L (ref 0–18)
BUN BLD-MCNC: 42 MG/DL (ref 7–18)
BUN/CREAT SERPL: 26.1 (ref 10–20)
CALCIUM BLD-MCNC: 8.3 MG/DL (ref 8.5–10.1)
CHLORIDE SERPL-SCNC: 105 MMOL/L (ref 98–112)
CO2 SERPL-SCNC: 32 MMOL/L (ref 21–32)
CREAT BLD-MCNC: 1.61 MG/DL (ref 0.55–1.02)
DEPRECATED RDW RBC AUTO: 53 FL (ref 35.1–46.3)
DEPRECATED RDW RBC AUTO: 53.4 FL (ref 35.1–46.3)
ERYTHROCYTE [DISTWIDTH] IN BLOOD BY AUTOMATED COUNT: 15.3 % (ref 11–15)
ERYTHROCYTE [DISTWIDTH] IN BLOOD BY AUTOMATED COUNT: 15.6 % (ref 11–15)
GLUCOSE BLD-MCNC: 120 MG/DL (ref 70–99)
HCT VFR BLD AUTO: 21.9 % (ref 35–48)
HCT VFR BLD AUTO: 22 % (ref 35–48)
HCT VFR BLD AUTO: 22.6 % (ref 35–48)
HGB BLD-MCNC: 6.9 G/DL (ref 12–16)
HGB BLD-MCNC: 7 G/DL (ref 12–16)
HGB BLD-MCNC: 7.1 G/DL (ref 12–16)
MCH RBC QN AUTO: 29.7 PG (ref 26–34)
MCH RBC QN AUTO: 30.3 PG (ref 26–34)
MCHC RBC AUTO-ENTMCNC: 31.4 G/DL (ref 31–37)
MCHC RBC AUTO-ENTMCNC: 32 G/DL (ref 31–37)
MCV RBC AUTO: 94.8 FL (ref 80–100)
MCV RBC AUTO: 94.8 FL (ref 80–100)
OSMOLALITY SERPL CALC.SUM OF ELEC: 302 MOSM/KG (ref 275–295)
PLATELET # BLD AUTO: 210 10(3)UL (ref 150–450)
PLATELET # BLD AUTO: 218 10(3)UL (ref 150–450)
POTASSIUM SERPL-SCNC: 4.2 MMOL/L (ref 3.5–5.1)
RBC # BLD AUTO: 2.31 X10(6)UL (ref 3.8–5.3)
RBC # BLD AUTO: 2.32 X10(6)UL (ref 3.8–5.3)
SODIUM SERPL-SCNC: 140 MMOL/L (ref 136–145)
WBC # BLD AUTO: 4.9 X10(3) UL (ref 4–11)
WBC # BLD AUTO: 5.1 X10(3) UL (ref 4–11)

## 2020-08-25 PROCEDURE — P9047 ALBUMIN (HUMAN), 25%, 50ML: HCPCS | Performed by: HOSPITALIST

## 2020-08-25 PROCEDURE — 85060 BLOOD SMEAR INTERPRETATION: CPT | Performed by: HOSPITALIST

## 2020-08-25 PROCEDURE — 85018 HEMOGLOBIN: CPT | Performed by: HOSPITALIST

## 2020-08-25 PROCEDURE — 85027 COMPLETE CBC AUTOMATED: CPT | Performed by: HOSPITALIST

## 2020-08-25 PROCEDURE — 85014 HEMATOCRIT: CPT | Performed by: HOSPITALIST

## 2020-08-25 PROCEDURE — 80048 BASIC METABOLIC PNL TOTAL CA: CPT | Performed by: INTERNAL MEDICINE

## 2020-08-25 RX ORDER — ALBUMIN (HUMAN) 12.5 G/50ML
25 SOLUTION INTRAVENOUS EVERY 12 HOURS
Status: COMPLETED | OUTPATIENT
Start: 2020-08-25 | End: 2020-08-26

## 2020-08-25 RX ORDER — FENTANYL 12 UG/H
1 PATCH TRANSDERMAL
Status: DISCONTINUED | OUTPATIENT
Start: 2020-08-25 | End: 2020-08-28

## 2020-08-25 RX ORDER — BUMETANIDE 0.25 MG/ML
1 INJECTION, SOLUTION INTRAMUSCULAR; INTRAVENOUS
Status: DISCONTINUED | OUTPATIENT
Start: 2020-08-25 | End: 2020-08-25

## 2020-08-25 RX ORDER — BUMETANIDE 1 MG/1
1 TABLET ORAL
Status: DISCONTINUED | OUTPATIENT
Start: 2020-08-25 | End: 2020-08-26

## 2020-08-25 RX ORDER — BUMETANIDE 1 MG/1
1 TABLET ORAL
Status: DISCONTINUED | OUTPATIENT
Start: 2020-08-25 | End: 2020-08-25

## 2020-08-25 NOTE — PLAN OF CARE
Problem: Patient Centered Care  Goal: Patient preferences are identified and integrated in the patient's plan of care  Description  Interventions:  - What would you like us to know as we care for you?  I have chronic pain and come to the [address] [place] [address] pain [facility] baseline  Description  INTERVENTIONS:  - Continuous cardiac monitoring, monitor vital signs, obtain 12 lead EKG if indicated  - Evaluate effectiveness of antiarrhythmic and heart rate control medications as ordered  - Initiate emergency measures for life t hours for her chronic back and abdomen. Pt is self care. Possible discharge back to home today when medically cleared. Call light within reach. Will continue to monitor pt. Hgb this AM is 6.9. [facility] hospitalist notified and asked for a re-draw stat.  If l

## 2020-08-25 NOTE — PLAN OF CARE
Fentanyl patch applied to R arm. Nicotine patch left arm. Percocet Q4H PRN. Hgb 7.1 this morning, redraw Hgb 7. Roomair. Walking o2 done, see previous note. IV lasix switched to PO bumex.          Problem: Patient Centered Care  Goal: Patient preferences a coronary artery perfusion - ex.  Angina  - Evaluate fluid balance, assess for edema, trend weights  Outcome: Progressing  Goal: Absence of cardiac arrhythmias or at baseline  Description  INTERVENTIONS:  - Continuous cardiac monitoring, monitor vital signs,

## 2020-08-25 NOTE — CHRONIC PAIN
[facility]  Report of Consultation    Tomasz Andrea Patient Status:  Inpatient    1945 MRN [id number]   Location [facility] 3W/SW Attending Yony Atkins MD   [facility] Day # 6 PCP PHYSICIAN NONSTAFF     Date of Admission:   10/325mg one tab every 4 hours while inpatient which she states does help with the pain. She is also using gabapentin 300mg QHS. She denies side effects from the medications. She underwent RFAs for her low back pain as an outpatient and denies any relief. drink alcohol or use drugs.     Allergies:  No Known Allergies    Medications:    Current Facility-Administered Medications:   •  nicotine (NICODERM CQ) 14 MG/24HR 1 patch, 1 patch, Transdermal, Daily  •  benzocaine-menthol (CEPACOL (SUGAR-FREE)) 1 lozenge, pressure 146/57, pulse 69, temperature 98.3 °F (36.8 °C), temperature source Oral, resp. rate 22, height 5' 4\" (1.626 m), weight 180 lb 3.2 oz (81.7 kg), SpO2 96 %, not currently breastfeeding.     General: Alert and oriented x3, NAD, appears stated age, a demineralization of the bony structures. There is mild scoliosis. Mild osteoarthritic changes are seen in the spine and both shoulders. OTHER:             Negative.      =====  CONCLUSION:   1. Cardiomegaly with moderate CHF. 2. Atherosclerosis.   3. Marge nodules are identified. Pleura/ Pleural Space: No pleural effusion or pneumothorax. Mediastinum and Sharron: No enlarged mediastinal lymph nodes.  Hilar nodes are difficult to evaluate without the use of IV contrast. Again noted are calcified mediastinal of the retroperitoneal mass, which now measures approximately 7.4 x 4.6 cm (3/38), previously up to 7.6 x 5.5 cm by my measurement. This mass remains centered just inferior to the SMA root along the anterior margin of the abdominal aorta and IVC.  Without t report and concur with its findings and conclusions, as affirmed by my electronic signature.     Resident/Fellow: Gavin Boyle on 07/28/2020 9:16 AM    Attending: Handy Aponte on 07/28/2020 10:11 AM    Impression:  Patient Active Problem List:     Back pa diagnostic component,  recommend to follow this injection with therapeutic neuroablation of the Superior Hypogastric Plexus with SHP with phenol or ethanol.  May consider addition of peripheral blocks of iliohypogastric, ilioinguinal and iliofemoral nerves

## 2020-08-25 NOTE — PROGRESS NOTES
Assessment and Plan:     1. Acute on chronic HFpEF  - Echo 1/28/2020: EF 95-08: diastolic dysfunction; RVSP 52  - BNP 6343  2. CAD  - moderate coronary calcification by CT at [facility] 7/28/2020  3.  Hypertensive crisis  - non-compliant with BP meds  4. RP maximino --    NEPRELIM 5.72  --   --   --    WBC 8.2   < > 5.1  --    .0   < > 218.0  --     < > = values in this interval not displayed. Recent Labs   Lab 08/19/20  0347   INR 1.07       No results found.         Echo 1/2020  CONCLUSIONS    SUMMARY:

## 2020-08-25 NOTE — PROGRESS NOTES
OXYGEN SATURATION ON ROOM AIR AT REST:   96%  OXYGEN SATURATION  WALKING ON ROOM AIR : 91%       No Oxygen applied when ambulating, tolerated well, and recovered to 97% after ambulating.

## 2020-08-26 ENCOUNTER — APPOINTMENT (OUTPATIENT)
Dept: MRI IMAGING | Facility: HOSPITAL | Age: 75
DRG: 291 | End: 2020-08-26
Attending: ANESTHESIOLOGY
Payer: MEDICARE

## 2020-08-26 LAB
ANION GAP SERPL CALC-SCNC: 3 MMOL/L (ref 0–18)
ANTIBODY SCREEN: NEGATIVE
BASOPHILS # BLD AUTO: 0.03 X10(3) UL (ref 0–0.2)
BASOPHILS NFR BLD AUTO: 0.7 %
BUN BLD-MCNC: 47 MG/DL (ref 7–18)
BUN/CREAT SERPL: 25.8 (ref 10–20)
CALCIUM BLD-MCNC: 8.6 MG/DL (ref 8.5–10.1)
CHLORIDE SERPL-SCNC: 104 MMOL/L (ref 98–112)
CO2 SERPL-SCNC: 32 MMOL/L (ref 21–32)
CREAT BLD-MCNC: 1.82 MG/DL (ref 0.55–1.02)
DEPRECATED RDW RBC AUTO: 54.1 FL (ref 35.1–46.3)
EOSINOPHIL # BLD AUTO: 0.29 X10(3) UL (ref 0–0.7)
EOSINOPHIL NFR BLD AUTO: 6.8 %
ERYTHROCYTE [DISTWIDTH] IN BLOOD BY AUTOMATED COUNT: 15.5 % (ref 11–15)
GLUCOSE BLD-MCNC: 113 MG/DL (ref 70–99)
HCT VFR BLD AUTO: 22.1 % (ref 35–48)
HGB BLD-MCNC: 7.1 G/DL (ref 12–16)
IMM GRANULOCYTES # BLD AUTO: 0.01 X10(3) UL (ref 0–1)
IMM GRANULOCYTES NFR BLD: 0.2 %
LYMPHOCYTES # BLD AUTO: 0.87 X10(3) UL (ref 1–4)
LYMPHOCYTES NFR BLD AUTO: 20.3 %
MCH RBC QN AUTO: 30.7 PG (ref 26–34)
MCHC RBC AUTO-ENTMCNC: 32.1 G/DL (ref 31–37)
MCV RBC AUTO: 95.7 FL (ref 80–100)
MONOCYTES # BLD AUTO: 0.39 X10(3) UL (ref 0.1–1)
MONOCYTES NFR BLD AUTO: 9.1 %
NEUTROPHILS # BLD AUTO: 2.69 X10 (3) UL (ref 1.5–7.7)
NEUTROPHILS # BLD AUTO: 2.69 X10(3) UL (ref 1.5–7.7)
NEUTROPHILS NFR BLD AUTO: 62.9 %
OSMOLALITY SERPL CALC.SUM OF ELEC: 301 MOSM/KG (ref 275–295)
PLATELET # BLD AUTO: 229 10(3)UL (ref 150–450)
POTASSIUM SERPL-SCNC: 4.4 MMOL/L (ref 3.5–5.1)
RBC # BLD AUTO: 2.31 X10(6)UL (ref 3.8–5.3)
RH BLOOD TYPE: POSITIVE
SARS-COV-2 RNA RESP QL NAA+PROBE: NOT DETECTED
SODIUM SERPL-SCNC: 139 MMOL/L (ref 136–145)
WBC # BLD AUTO: 4.3 X10(3) UL (ref 4–11)

## 2020-08-26 PROCEDURE — 85025 COMPLETE CBC W/AUTO DIFF WBC: CPT | Performed by: HOSPITALIST

## 2020-08-26 PROCEDURE — 80048 BASIC METABOLIC PNL TOTAL CA: CPT | Performed by: NURSE PRACTITIONER

## 2020-08-26 PROCEDURE — P9047 ALBUMIN (HUMAN), 25%, 50ML: HCPCS | Performed by: HOSPITALIST

## 2020-08-26 PROCEDURE — 86850 RBC ANTIBODY SCREEN: CPT | Performed by: HOSPITALIST

## 2020-08-26 PROCEDURE — 30233N1 TRANSFUSION OF NONAUTOLOGOUS RED BLOOD CELLS INTO PERIPHERAL VEIN, PERCUTANEOUS APPROACH: ICD-10-PCS | Performed by: HOSPITALIST

## 2020-08-26 PROCEDURE — 86900 BLOOD TYPING SEROLOGIC ABO: CPT | Performed by: HOSPITALIST

## 2020-08-26 PROCEDURE — 86920 COMPATIBILITY TEST SPIN: CPT

## 2020-08-26 PROCEDURE — 72148 MRI LUMBAR SPINE W/O DYE: CPT | Performed by: ANESTHESIOLOGY

## 2020-08-26 PROCEDURE — 86901 BLOOD TYPING SEROLOGIC RH(D): CPT | Performed by: HOSPITALIST

## 2020-08-26 PROCEDURE — 36430 TRANSFUSION BLD/BLD COMPNT: CPT

## 2020-08-26 RX ORDER — SODIUM CHLORIDE 9 MG/ML
INJECTION, SOLUTION INTRAVENOUS ONCE
Status: COMPLETED | OUTPATIENT
Start: 2020-08-26 | End: 2020-08-26

## 2020-08-26 NOTE — CHRONIC PAIN
[facility]  Anesthesiology Pain Management Progress Note      Patient name: Tomasz Mendez 70year old female  : 1945  MRN: [id number]    Diagnosis: [unfilled]    Reason for Consult: pain in abdomen    Current hospital day: Hospital Day

## 2020-08-26 NOTE — PLAN OF CARE
Problem: Patient Centered Care  Goal: Patient preferences are identified and integrated in the patient's plan of care  Description  Interventions:  - What would you like us to know as we care for you?  I have chronic pain and come to the [facility] pain [facility] baseline  Description  INTERVENTIONS:  - Continuous cardiac monitoring, monitor vital signs, obtain 12 lead EKG if indicated  - Evaluate effectiveness of antiarrhythmic and heart rate control medications as ordered  - Initiate emergency measures for life t home today. Call light within reach. Will continue to monitor pt.

## 2020-08-26 NOTE — PROGRESS NOTES
[facility] Hospitalist Progress Note                                                                   [address]  8/14/1945    SUBJECTIVE:  No acute events overnight.   Cr. Up - holding bumex t 1.36* 1.43* 1.61* 1.82*   CA 8.9 8.8 8.3* 8.3* 8.6   * 108* 117* 120* 113*       No results for input(s): ALT, AST, ALB, AMYLASE, LIPASE, LDH in the last 168 hours.     Invalid input(s): ALPHOS, TBIL, DBIL, TPROT    No results for input(s): PGLU in t nephrotoxins    Mixed MAXIMO and Folate deficiency Anemia:   -start folate supplements   -patient wasn't taking her iron supplements at home - encouraged to start taking  -Hgb fluctuating between 6.9-7.1 - still dyspneic - will transfuse 1u pRBC's     Hyperte

## 2020-08-26 NOTE — DIETARY NOTE
ADULT NUTRITION REASSESSMENT     Pt is at moderate nutrition risk. Pt does not meet malnutrition criteria.       RECOMMENDATIONS TO MD:  See Nutrition Intervention     NUTRITION DIAGNOSIS/PROBLEM:  Unintentional weight loss related to physiological causes( (2010). ANTHROPOMETRICS:  HT: 162.6 cm (5' 4\")  WT: 83.2 kg (183 lb 8 oz)   Down 17.6 lbs(8.8% over past 5 days. Less edema noted  BMI: Body mass index is 31.5 kg/m².   BMI CLASSIFICATION: 30-34.9 kg/m2 - obesity class I  IBW: 120 lbs        Now 153% I  105 104   CO2 33.0* 32.0 32.0   OSMOCALC 303* 302* 301*     NUTRITION RELATED PHYSICAL FINDINGS:  - Body Fat/Muscle Mass: well nourished despite weight loss history per visual exam.  - Fluid Accumulation: lower extremity edema +2 RLE & foot and +1

## 2020-08-26 NOTE — PLAN OF CARE
Patient is a/ox4. RA. Self care. Fluid restriction. Percocet for pain PRN. Plan is MRI today and to transfuse 1 unit PRBCs. Plan for tomorrow is a Superior Hypogastric Plexus Block, NPO at midnight. Consents signed. Will continue to monitor.     Problem: Pa and temperature  - Assess for signs of decreased coronary artery perfusion - ex.  Angina  - Evaluate fluid balance, assess for edema, trend weights  Outcome: Progressing  Goal: Absence of cardiac arrhythmias or at baseline  Description  INTERVENTIONS:  - Co Progressing

## 2020-08-26 NOTE — PROGRESS NOTES
Assessment and Plan:     1. Acute on chronic HFpEF  - Echo 1/28/2020: EF 57-48: diastolic dysfunction; RVSP 52  2. CAD  - moderate coronary calcification by CT at [facility] 7/28/2020  3.  Hypertensive crisis  - non-compliant with BP meds  4. RP sarcoma (inoper 120*   BUN 35* 40* 42*   CREATSERUM 1.36* 1.43* 1.61*   GFRAA 45* 42* 36*   GFRNAA 39* 36* 32*   CA 8.8 8.3* 8.3*    141 140   K 4.2 4.6 4.2    107 105   CO2 32.0 33.0* 32.0     No results for input(s): TROP, CK in the last 168 hours.   Recent L

## 2020-08-27 ENCOUNTER — APPOINTMENT (OUTPATIENT)
Dept: GENERAL RADIOLOGY | Facility: HOSPITAL | Age: 75
DRG: 291 | End: 2020-08-27
Attending: ANESTHESIOLOGY
Payer: MEDICARE

## 2020-08-27 LAB
ANION GAP SERPL CALC-SCNC: 3 MMOL/L (ref 0–18)
BUN BLD-MCNC: 56 MG/DL (ref 7–18)
BUN/CREAT SERPL: 28 (ref 10–20)
CALCIUM BLD-MCNC: 8.6 MG/DL (ref 8.5–10.1)
CHLORIDE SERPL-SCNC: 105 MMOL/L (ref 98–112)
CO2 SERPL-SCNC: 31 MMOL/L (ref 21–32)
CREAT BLD-MCNC: 2 MG/DL (ref 0.55–1.02)
DEPRECATED RDW RBC AUTO: 51.7 FL (ref 35.1–46.3)
ERYTHROCYTE [DISTWIDTH] IN BLOOD BY AUTOMATED COUNT: 15.2 % (ref 11–15)
GLUCOSE BLD-MCNC: 115 MG/DL (ref 70–99)
HCT VFR BLD AUTO: 23.5 % (ref 35–48)
HGB BLD-MCNC: 7.6 G/DL (ref 12–16)
MCH RBC QN AUTO: 30.2 PG (ref 26–34)
MCHC RBC AUTO-ENTMCNC: 32.3 G/DL (ref 31–37)
MCV RBC AUTO: 93.3 FL (ref 80–100)
OSMOLALITY SERPL CALC.SUM OF ELEC: 304 MOSM/KG (ref 275–295)
PLATELET # BLD AUTO: 215 10(3)UL (ref 150–450)
POTASSIUM SERPL-SCNC: 4.3 MMOL/L (ref 3.5–5.1)
RBC # BLD AUTO: 2.52 X10(6)UL (ref 3.8–5.3)
SODIUM SERPL-SCNC: 139 MMOL/L (ref 136–145)
WBC # BLD AUTO: 5.3 X10(3) UL (ref 4–11)

## 2020-08-27 PROCEDURE — BR161ZZ FLUOROSCOPY OF LUMBAR FACET JOINT(S) USING LOW OSMOLAR CONTRAST: ICD-10-PCS | Performed by: ANESTHESIOLOGY

## 2020-08-27 PROCEDURE — 3E0T3BZ INTRODUCTION OF ANESTHETIC AGENT INTO PERIPHERAL NERVES AND PLEXI, PERCUTANEOUS APPROACH: ICD-10-PCS | Performed by: ANESTHESIOLOGY

## 2020-08-27 PROCEDURE — BR1F1ZZ FLUOROSCOPY OF SACRUM AND COCCYX USING LOW OSMOLAR CONTRAST: ICD-10-PCS | Performed by: ANESTHESIOLOGY

## 2020-08-27 PROCEDURE — 80048 BASIC METABOLIC PNL TOTAL CA: CPT | Performed by: HOSPITALIST

## 2020-08-27 PROCEDURE — BR1B1ZZ FLUOROSCOPY OF LUMBOSACRAL JOINT USING LOW OSMOLAR CONTRAST: ICD-10-PCS | Performed by: ANESTHESIOLOGY

## 2020-08-27 PROCEDURE — 3E0T33Z INTRODUCTION OF ANTI-INFLAMMATORY INTO PERIPHERAL NERVES AND PLEXI, PERCUTANEOUS APPROACH: ICD-10-PCS | Performed by: ANESTHESIOLOGY

## 2020-08-27 PROCEDURE — 85027 COMPLETE CBC AUTOMATED: CPT | Performed by: HOSPITALIST

## 2020-08-27 RX ORDER — LIDOCAINE HYDROCHLORIDE 10 MG/ML
INJECTION, SOLUTION EPIDURAL; INFILTRATION; INTRACAUDAL; PERINEURAL AS NEEDED
Status: DISCONTINUED | OUTPATIENT
Start: 2020-08-27 | End: 2020-08-27 | Stop reason: HOSPADM

## 2020-08-27 RX ORDER — DEXAMETHASONE SODIUM PHOSPHATE 10 MG/ML
INJECTION, SOLUTION INTRAMUSCULAR; INTRAVENOUS AS NEEDED
Status: DISCONTINUED | OUTPATIENT
Start: 2020-08-27 | End: 2020-08-27 | Stop reason: HOSPADM

## 2020-08-27 RX ORDER — BUPIVACAINE HYDROCHLORIDE 2.5 MG/ML
INJECTION, SOLUTION EPIDURAL; INFILTRATION; INTRACAUDAL AS NEEDED
Status: DISCONTINUED | OUTPATIENT
Start: 2020-08-27 | End: 2020-08-27 | Stop reason: HOSPADM

## 2020-08-27 NOTE — PROGRESS NOTES
[facility] Hospitalist Progress Note                                                                   [address]  8/14/1945    SUBJECTIVE:  No acute events overnight.   Cr. Up - holding bumex t CREATSERUM 1.36* 1.43* 1.61* 1.82* 2.00*   CA 8.8 8.3* 8.3* 8.6 8.6   * 117* 120* 113* 115*       No results for input(s): ALT, AST, ALB, AMYLASE, LIPASE, LDH in the last 168 hours.     Invalid input(s): ALPHOS, TBIL, DBIL, TPROT    No results for nephrotoxins    Mixed MAXIMO and Folate deficiency Anemia:   -start folate supplements   -patient wasn't taking her iron supplements at home - encouraged to start taking  -Hgb fluctuating between 6.9-7.1 - still dyspneic - transfused 1u pRBC's 8/26 with mild

## 2020-08-27 NOTE — CARDIAC REHAB
CARDIAC REHAB HEART FAILURE EDUCATION    Handouts provided and reviewed: CHF Booklet. Activity: Chair for all meals: tolerating chair       Ambulation: walked to the bathroom         Disease Process: Disease process reviewed.  Education reviewed with so

## 2020-08-27 NOTE — PLAN OF CARE
Patient is a/ox4. RA. Self care. Percocet for pain PRN. Superior hypogastric plexus block today, NPO before the procedure. Plan is for an abdominal US. Diuretics on hold. Will continue to monitor.     Problem: Patient Centered Care  Goal: Patient preference decreased coronary artery perfusion - ex.  Angina  - Evaluate fluid balance, assess for edema, trend weights  Outcome: Progressing  Goal: Absence of cardiac arrhythmias or at baseline  Description  INTERVENTIONS:  - Continuous cardiac monitoring, monitor vi

## 2020-08-27 NOTE — PLAN OF CARE
Pt vss overnight. Percocet given for pain. Hgb increased. Superior hypogastric plexis block today.    Problem: Patient Centered Care  Goal: Patient preferences are identified and integrated in the patient's plan of care  Description  Interventions:  - What weights  Outcome: Progressing  Goal: Absence of cardiac arrhythmias or at baseline  Description  INTERVENTIONS:  - Continuous cardiac monitoring, monitor vital signs, obtain 12 lead EKG if indicated  - Evaluate effectiveness of antiarrhythmic and heart rat

## 2020-08-27 NOTE — PROGRESS NOTES
Assessment and Plan:     1. Acute on chronic HFpEF  - Echo 1/28/2020: EF 92-50: diastolic dysfunction; RVSP 52. 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  CAD  - moderate coronary calcification by 301 (H)   eGFR NON-AFR.  AMERICAN      >=60 27 (L)   eGFR       >=60 31 (L)             Recent Labs   Lab 08/25/20  0433 08/26/20  0504 08/27/20  0453   * 113* 115*   BUN 42* 47* 56*   CREATSERUM 1.61* 1.82* 2.00*   GFRAA 36* 31* 28* cavity size is normal. Wall thickness is   mildly increased. Systolic function is vigorous. The estimated   ejection fraction is 65-70%. Wall motion is normal; there are no   regional wall motion abnormalities. grade 2 diastolic   dysfunction.  Doppler para

## 2020-08-27 NOTE — CARDIAC REHAB
Attempted to meet with pt for CHF teaching but pt leaving floor for a procedure. Will follow up this afternoon.

## 2020-08-27 NOTE — CHRONIC PAIN
[facility]  Inpatient Pain Management Progress Note      Patient name: Adriana Scanlon 70year old female  : 1945  MRN: [id number]    Diagnosis: Hypervolemia, unspecified hypervolemia type  (primary encounter diagnosis)  Flash pulmon • atorvastatin  40 mg Oral Nightly   • Hydroxychloroquine Sulfate  200 mg Oral Daily   • docusate sodium  100 mg Oral BID     Continuous Infusions:  • Nitroglycerin in D5W Stopped (08/19/20 0450)     PRN Meds:.benzocaine-menthol, Calcium Carbonate Antaci for hypogastric block today   - continue Duragesic patch, consider titration up based on results of block today   - continue percocet PRN breakthrough pain   - continue gabapentin 300mg QHS       Lengthy discussion of risks, benefits, and alternative treat

## 2020-08-27 NOTE — BRIEF OP NOTE
Pre-Operative Diagnosis: Abdominal/pelvic pain cancer-related pain     Post-Operative Diagnosis: Abdominal/pelvic pain cancer-related pain     Procedure Performed:   Procedure(s): Diagnostic superior hypogastric nerve block under fluoroscopic guidance;  Lat to decrease lumbar lordosis. Continuous cardiac, pulse oximetry, and clinical monitoring were applied and kept on throughout the procedure. The patient's back was disinfected with chlorhexidine and draped with sterile towels in the usual fashion.   Timeou was taken to the recovery area before returning to the medical floor.         Wyman Cushing, DO  8/27/2020  5:02 PM

## 2020-08-28 ENCOUNTER — APPOINTMENT (OUTPATIENT)
Dept: ULTRASOUND IMAGING | Facility: HOSPITAL | Age: 75
DRG: 291 | End: 2020-08-28
Attending: INTERNAL MEDICINE
Payer: MEDICARE

## 2020-08-28 LAB
ANION GAP SERPL CALC-SCNC: 7 MMOL/L (ref 0–18)
BLOOD TYPE BARCODE: 5100
BUN BLD-MCNC: 59 MG/DL (ref 7–18)
BUN/CREAT SERPL: 28 (ref 10–20)
CALCIUM BLD-MCNC: 8.5 MG/DL (ref 8.5–10.1)
CHLORIDE SERPL-SCNC: 104 MMOL/L (ref 98–112)
CO2 SERPL-SCNC: 28 MMOL/L (ref 21–32)
CREAT BLD-MCNC: 2.11 MG/DL (ref 0.55–1.02)
DEPRECATED RDW RBC AUTO: 51.5 FL (ref 35.1–46.3)
ERYTHROCYTE [DISTWIDTH] IN BLOOD BY AUTOMATED COUNT: 15.1 % (ref 11–15)
GLUCOSE BLD-MCNC: 180 MG/DL (ref 70–99)
HCT VFR BLD AUTO: 25.3 % (ref 35–48)
HGB BLD-MCNC: 8.4 G/DL (ref 12–16)
MCH RBC QN AUTO: 30.9 PG (ref 26–34)
MCHC RBC AUTO-ENTMCNC: 33.2 G/DL (ref 31–37)
MCV RBC AUTO: 93 FL (ref 80–100)
OSMOLALITY SERPL CALC.SUM OF ELEC: 309 MOSM/KG (ref 275–295)
PLATELET # BLD AUTO: 241 10(3)UL (ref 150–450)
POTASSIUM SERPL-SCNC: 4.4 MMOL/L (ref 3.5–5.1)
RBC # BLD AUTO: 2.72 X10(6)UL (ref 3.8–5.3)
SODIUM SERPL-SCNC: 139 MMOL/L (ref 136–145)
WBC # BLD AUTO: 7.4 X10(3) UL (ref 4–11)

## 2020-08-28 PROCEDURE — 76700 US EXAM ABDOM COMPLETE: CPT | Performed by: INTERNAL MEDICINE

## 2020-08-28 PROCEDURE — 80048 BASIC METABOLIC PNL TOTAL CA: CPT | Performed by: HOSPITALIST

## 2020-08-28 PROCEDURE — 85027 COMPLETE CBC AUTOMATED: CPT | Performed by: HOSPITALIST

## 2020-08-28 PROCEDURE — 93975 VASCULAR STUDY: CPT | Performed by: INTERNAL MEDICINE

## 2020-08-28 RX ORDER — FENTANYL 12 UG/H
1 PATCH TRANSDERMAL
Status: DISCONTINUED | OUTPATIENT
Start: 2020-08-28 | End: 2020-08-31

## 2020-08-28 NOTE — CHRONIC PAIN
[facility]  Inpatient Pain Management Progress Note      Patient name: Angeli Quinn 70year old female  : 1945  MRN: [id number]    Diagnosis: Hypervolemia, unspecified hypervolemia type  (primary encounter diagnosis)  Flash pulmon Onset   • Stroke Father    • Other (Other) Mother         tuberculosis      reports that she has been smoking. She has been smoking about 1.00 pack per day. She has never used smokeless tobacco. She reports that she does not drink alcohol or use drugs. retroperitoneal sarcoma, chronic back pain admitted for acute on chronic CHF, known to the outpatient clinic chronic pain patient, with longstanding low back pain and cancer related abdominal/pelvic pain s/p POD #1  diagnostic superior hypogastric plexus b

## 2020-08-28 NOTE — PLAN OF CARE
Problem: Patient Centered Care  Goal: Patient preferences are identified and integrated in the patient's plan of care  Description  Interventions:  - What would you like us to know as we care for you?  I have chronic pain and come to the [address] [place] [address] pain [facility] baseline  Description  INTERVENTIONS:  - Continuous cardiac monitoring, monitor vital signs, obtain 12 lead EKG if indicated  - Evaluate effectiveness of antiarrhythmic and heart rate control medications as ordered  - Initiate emergency measures for life t

## 2020-08-28 NOTE — CONSULTS
[facility] - Nephrology  Inpatient Consultation    Corby Marcos Patient Status:  Inpatient    1945 MRN [id number]   Location [facility] 3W/SW Attending Alyce Garner MD   Hosp Day # 9 PCP PHYSICIAN release (PROCARDIA XL) 24 hr tab 60 mg, 60 mg, Oral, Daily  •  gabapentin (NEURONTIN) cap 300 mg, 300 mg, Oral, Nightly  •  Pantoprazole Sodium (PROTONIX) EC tab 40 mg, 40 mg, Oral, QAM AC  •  Calcium Carbonate Antacid (TUMS) chewable tab 500 mg, 500 mg, O 30 mg by mouth daily. , Disp: , Rfl: , 8/17/2020  Albuterol Sulfate HFA (PROVENTIL HFA) 108 (90 Base) MCG/ACT Inhalation Aero Soln, Inhale 2 puffs into the lungs. , Disp: , Rfl: , Taking  lansoprazole 30 MG Oral Capsule Delayed Release, Take 30 mg by mouth Genitourinary: Negative. Negative for dysuria and hematuria. Musculoskeletal: Negative. Negative for myalgias. Skin: Negative. Negative for rash. Neurological: Negative. All other systems reviewed and are negative.       PHYSICAL EXAM:     Vit OSMOCALC 309 (H) 08/28/2020     08/28/2020    K 4.4 08/28/2020     08/28/2020    CO2 28.0 08/28/2020     Lab Results   Component Value Date    WBC 7.4 08/28/2020    RBC 2.72 (L) 08/28/2020    HGB 8.4 (L) 08/28/2020    HCT 25.3 (L) 08/28/2020 Cardiomegaly with moderate CHF. 2. Atherosclerosis. 3. Demineralization. 4. Scoliosis. 5. Osteoarthritis. 6. A preliminary report was submitted and there is agreement without major discrepancies. Dictated by (CST):  Chantelle Headley MD on 8/

## 2020-08-28 NOTE — PROGRESS NOTES
[facility] Hospitalist Progress Note                                                                   [address]  8/14/1945    SUBJECTIVE:  Cr still up but seems to be reaching its zenith.   Leopold Martens CREATSERUM 1.43* 1.61* 1.82* 2.00* 2.11*   CA 8.3* 8.3* 8.6 8.6 8.5   * 120* 113* 115* 180*       No results for input(s): ALT, AST, ALB, AMYLASE, LIPASE, LDH in the last 168 hours.     Invalid input(s): ALPHOS, TBIL, DBIL, TPROT    No results for meds  -avoid nephrotoxins  -nephro consulted    Mixed MAXIMO and Folate deficiency Anemia:   -start folate supplements   -patient wasn't taking her iron supplements at home - encouraged to start taking  -Hgb fluctuating between 6.9-7.1 - still dyspneic - lino

## 2020-08-28 NOTE — PROGRESS NOTES
Assessment and Plan:     1. Acute on chronic HFpEF  - Echo 1/28/2020: EF 08-69: diastolic dysfunction; RVSP 52. Worsening edema with increasing creat with diuretics; abd ultrasound negative for IVC compression  2.  CAD  - moderate coronary calcification b 275 - 295 mOsm/kg 301 (H)   eGFR NON-AFR.  AMERICAN      >=60 27 (L)   eGFR       >=60 31 (L)             Recent Labs   Lab 08/26/20  0504 08/27/20  0453 08/28/20  0441   * 115* 180*   BUN 47* 56* 59*   CREATSERUM 1.82* 2.00* 2.11* (CST): Trena Kwong MD on 8/26/2020 at 5:24 PM          Us Abdominal Doppler (srg=[id number])+(edw [id number])    Result Date: 8/28/2020  CONCLUSION:   The visualized IVC and proximal bilateral common iliac veins are patent without significant mass effect.    If th previous  report, the left ventricular diastolic function and pulmonary  arterial systolic pressure has worsened.

## 2020-08-28 NOTE — PLAN OF CARE
Problem: Patient Centered Care  Goal: Patient preferences are identified and integrated in the patient's plan of care  Description  Interventions:  - What would you like us to know as we care for you?  I have chronic pain and come to the [address] [place] [address] pain [facility] baseline  Description  INTERVENTIONS:  - Continuous cardiac monitoring, monitor vital signs, obtain 12 lead EKG if indicated  - Evaluate effectiveness of antiarrhythmic and heart rate control medications as ordered  - Initiate emergency measures for life t

## 2020-08-29 ENCOUNTER — APPOINTMENT (OUTPATIENT)
Dept: GENERAL RADIOLOGY | Facility: HOSPITAL | Age: 75
DRG: 291 | End: 2020-08-29
Attending: ANESTHESIOLOGY
Payer: MEDICARE

## 2020-08-29 ENCOUNTER — APPOINTMENT (OUTPATIENT)
Dept: GENERAL RADIOLOGY | Facility: HOSPITAL | Age: 75
DRG: 291 | End: 2020-08-29
Attending: STUDENT IN AN ORGANIZED HEALTH CARE EDUCATION/TRAINING PROGRAM
Payer: MEDICARE

## 2020-08-29 ENCOUNTER — ANESTHESIA EVENT (OUTPATIENT)
Dept: SURGERY | Facility: HOSPITAL | Age: 75
DRG: 291 | End: 2020-08-29
Payer: MEDICARE

## 2020-08-29 ENCOUNTER — ANESTHESIA (OUTPATIENT)
Dept: SURGERY | Facility: HOSPITAL | Age: 75
DRG: 291 | End: 2020-08-29
Payer: MEDICARE

## 2020-08-29 LAB
ANION GAP SERPL CALC-SCNC: 5 MMOL/L (ref 0–18)
BILIRUB UR QL: NEGATIVE
BUN BLD-MCNC: 64 MG/DL (ref 7–18)
BUN/CREAT SERPL: 34.8 (ref 10–20)
CALCIUM BLD-MCNC: 8.5 MG/DL (ref 8.5–10.1)
CHLORIDE SERPL-SCNC: 107 MMOL/L (ref 98–112)
CO2 SERPL-SCNC: 29 MMOL/L (ref 21–32)
COLOR UR: YELLOW
CREAT BLD-MCNC: 1.84 MG/DL (ref 0.55–1.02)
DEPRECATED RDW RBC AUTO: 53.2 FL (ref 35.1–46.3)
ERYTHROCYTE [DISTWIDTH] IN BLOOD BY AUTOMATED COUNT: 15.3 % (ref 11–15)
GLUCOSE BLD-MCNC: 126 MG/DL (ref 70–99)
GLUCOSE UR-MCNC: NEGATIVE MG/DL
HCT VFR BLD AUTO: 26.7 % (ref 35–48)
HGB BLD-MCNC: 8.5 G/DL (ref 12–16)
HGB UR QL STRIP.AUTO: NEGATIVE
KETONES UR-MCNC: NEGATIVE MG/DL
LEUKOCYTE ESTERASE UR QL STRIP.AUTO: NEGATIVE
MCH RBC QN AUTO: 30.4 PG (ref 26–34)
MCHC RBC AUTO-ENTMCNC: 31.8 G/DL (ref 31–37)
MCV RBC AUTO: 95.4 FL (ref 80–100)
NITRITE UR QL STRIP.AUTO: NEGATIVE
OSMOLALITY SERPL CALC.SUM OF ELEC: 312 MOSM/KG (ref 275–295)
PH UR: 5 [PH] (ref 5–8)
PLATELET # BLD AUTO: 239 10(3)UL (ref 150–450)
POTASSIUM SERPL-SCNC: 4.4 MMOL/L (ref 3.5–5.1)
PROT UR-MCNC: NEGATIVE MG/DL
RBC # BLD AUTO: 2.8 X10(6)UL (ref 3.8–5.3)
SODIUM SERPL-SCNC: 141 MMOL/L (ref 136–145)
SP GR UR STRIP: 1.02 (ref 1–1.03)
UROBILINOGEN UR STRIP-ACNC: <2
WBC # BLD AUTO: 6.4 X10(3) UL (ref 4–11)

## 2020-08-29 PROCEDURE — 85027 COMPLETE CBC AUTOMATED: CPT | Performed by: HOSPITALIST

## 2020-08-29 PROCEDURE — 71045 X-RAY EXAM CHEST 1 VIEW: CPT | Performed by: STUDENT IN AN ORGANIZED HEALTH CARE EDUCATION/TRAINING PROGRAM

## 2020-08-29 PROCEDURE — 3E0T3TZ INTRODUCTION OF DESTRUCTIVE AGENT INTO PERIPHERAL NERVES AND PLEXI, PERCUTANEOUS APPROACH: ICD-10-PCS | Performed by: ANESTHESIOLOGY

## 2020-08-29 PROCEDURE — BR191ZZ FLUOROSCOPY OF LUMBAR SPINE USING LOW OSMOLAR CONTRAST: ICD-10-PCS | Performed by: ANESTHESIOLOGY

## 2020-08-29 PROCEDURE — 80048 BASIC METABOLIC PNL TOTAL CA: CPT | Performed by: HOSPITALIST

## 2020-08-29 PROCEDURE — 94640 AIRWAY INHALATION TREATMENT: CPT

## 2020-08-29 PROCEDURE — 81003 URINALYSIS AUTO W/O SCOPE: CPT | Performed by: ANESTHESIOLOGY

## 2020-08-29 PROCEDURE — BR1F1ZZ FLUOROSCOPY OF SACRUM AND COCCYX USING LOW OSMOLAR CONTRAST: ICD-10-PCS | Performed by: ANESTHESIOLOGY

## 2020-08-29 RX ORDER — METOPROLOL TARTRATE 5 MG/5ML
2.5 INJECTION INTRAVENOUS ONCE
Status: DISCONTINUED | OUTPATIENT
Start: 2020-08-29 | End: 2020-08-29 | Stop reason: HOSPADM

## 2020-08-29 RX ORDER — HYDROMORPHONE HYDROCHLORIDE 1 MG/ML
0.4 INJECTION, SOLUTION INTRAMUSCULAR; INTRAVENOUS; SUBCUTANEOUS EVERY 5 MIN PRN
Status: DISCONTINUED | OUTPATIENT
Start: 2020-08-29 | End: 2020-08-29 | Stop reason: HOSPADM

## 2020-08-29 RX ORDER — MORPHINE SULFATE 4 MG/ML
4 INJECTION, SOLUTION INTRAMUSCULAR; INTRAVENOUS EVERY 10 MIN PRN
Status: DISCONTINUED | OUTPATIENT
Start: 2020-08-29 | End: 2020-08-29 | Stop reason: HOSPADM

## 2020-08-29 RX ORDER — MORPHINE SULFATE 4 MG/ML
2 INJECTION, SOLUTION INTRAMUSCULAR; INTRAVENOUS EVERY 10 MIN PRN
Status: DISCONTINUED | OUTPATIENT
Start: 2020-08-29 | End: 2020-08-29 | Stop reason: HOSPADM

## 2020-08-29 RX ORDER — HYDROMORPHONE HYDROCHLORIDE 1 MG/ML
0.6 INJECTION, SOLUTION INTRAMUSCULAR; INTRAVENOUS; SUBCUTANEOUS EVERY 5 MIN PRN
Status: DISCONTINUED | OUTPATIENT
Start: 2020-08-29 | End: 2020-08-29 | Stop reason: HOSPADM

## 2020-08-29 RX ORDER — LIDOCAINE HYDROCHLORIDE 10 MG/ML
INJECTION, SOLUTION EPIDURAL; INFILTRATION; INTRACAUDAL; PERINEURAL AS NEEDED
Status: DISCONTINUED | OUTPATIENT
Start: 2020-08-29 | End: 2020-08-29 | Stop reason: SURG

## 2020-08-29 RX ORDER — ONDANSETRON 2 MG/ML
4 INJECTION INTRAMUSCULAR; INTRAVENOUS ONCE AS NEEDED
Status: DISCONTINUED | OUTPATIENT
Start: 2020-08-29 | End: 2020-08-29 | Stop reason: HOSPADM

## 2020-08-29 RX ORDER — BUPIVACAINE HYDROCHLORIDE 2.5 MG/ML
INJECTION, SOLUTION EPIDURAL; INFILTRATION; INTRACAUDAL AS NEEDED
Status: DISCONTINUED | OUTPATIENT
Start: 2020-08-29 | End: 2020-08-29 | Stop reason: HOSPADM

## 2020-08-29 RX ORDER — HYDRALAZINE HYDROCHLORIDE 20 MG/ML
INJECTION INTRAMUSCULAR; INTRAVENOUS AS NEEDED
Status: DISCONTINUED | OUTPATIENT
Start: 2020-08-29 | End: 2020-08-29 | Stop reason: SURG

## 2020-08-29 RX ORDER — LABETALOL HYDROCHLORIDE 5 MG/ML
INJECTION, SOLUTION INTRAVENOUS AS NEEDED
Status: DISCONTINUED | OUTPATIENT
Start: 2020-08-29 | End: 2020-08-29 | Stop reason: SURG

## 2020-08-29 RX ORDER — IPRATROPIUM BROMIDE AND ALBUTEROL SULFATE 2.5; .5 MG/3ML; MG/3ML
3 SOLUTION RESPIRATORY (INHALATION) ONCE
Status: COMPLETED | OUTPATIENT
Start: 2020-08-29 | End: 2020-08-29

## 2020-08-29 RX ORDER — PROCHLORPERAZINE EDISYLATE 5 MG/ML
5 INJECTION INTRAMUSCULAR; INTRAVENOUS ONCE AS NEEDED
Status: DISCONTINUED | OUTPATIENT
Start: 2020-08-29 | End: 2020-08-29 | Stop reason: HOSPADM

## 2020-08-29 RX ORDER — NALOXONE HYDROCHLORIDE 0.4 MG/ML
80 INJECTION, SOLUTION INTRAMUSCULAR; INTRAVENOUS; SUBCUTANEOUS AS NEEDED
Status: DISCONTINUED | OUTPATIENT
Start: 2020-08-29 | End: 2020-08-29 | Stop reason: HOSPADM

## 2020-08-29 RX ORDER — HYDROMORPHONE HYDROCHLORIDE 1 MG/ML
0.2 INJECTION, SOLUTION INTRAMUSCULAR; INTRAVENOUS; SUBCUTANEOUS EVERY 5 MIN PRN
Status: DISCONTINUED | OUTPATIENT
Start: 2020-08-29 | End: 2020-08-29 | Stop reason: HOSPADM

## 2020-08-29 RX ORDER — HYDROCODONE BITARTRATE AND ACETAMINOPHEN 5; 325 MG/1; MG/1
1 TABLET ORAL AS NEEDED
Status: DISCONTINUED | OUTPATIENT
Start: 2020-08-29 | End: 2020-08-29 | Stop reason: HOSPADM

## 2020-08-29 RX ORDER — SODIUM CHLORIDE, SODIUM LACTATE, POTASSIUM CHLORIDE, CALCIUM CHLORIDE 600; 310; 30; 20 MG/100ML; MG/100ML; MG/100ML; MG/100ML
INJECTION, SOLUTION INTRAVENOUS CONTINUOUS
Status: DISCONTINUED | OUTPATIENT
Start: 2020-08-29 | End: 2020-08-29 | Stop reason: HOSPADM

## 2020-08-29 RX ORDER — HYDROCODONE BITARTRATE AND ACETAMINOPHEN 5; 325 MG/1; MG/1
2 TABLET ORAL AS NEEDED
Status: DISCONTINUED | OUTPATIENT
Start: 2020-08-29 | End: 2020-08-29 | Stop reason: HOSPADM

## 2020-08-29 RX ORDER — ALCOHOL 0.98 ML/ML
5 INJECTION INTRASPINAL ONCE
Status: COMPLETED | OUTPATIENT
Start: 2020-08-29 | End: 2020-08-29

## 2020-08-29 RX ORDER — MORPHINE SULFATE 10 MG/ML
6 INJECTION, SOLUTION INTRAMUSCULAR; INTRAVENOUS EVERY 10 MIN PRN
Status: DISCONTINUED | OUTPATIENT
Start: 2020-08-29 | End: 2020-08-29 | Stop reason: HOSPADM

## 2020-08-29 RX ORDER — DEXTROSE MONOHYDRATE 25 G/50ML
50 INJECTION, SOLUTION INTRAVENOUS
Status: DISCONTINUED | OUTPATIENT
Start: 2020-08-29 | End: 2020-08-29 | Stop reason: HOSPADM

## 2020-08-29 RX ORDER — HALOPERIDOL 5 MG/ML
0.25 INJECTION INTRAMUSCULAR ONCE AS NEEDED
Status: DISCONTINUED | OUTPATIENT
Start: 2020-08-29 | End: 2020-08-29 | Stop reason: HOSPADM

## 2020-08-29 RX ADMIN — LIDOCAINE HYDROCHLORIDE 25 MG: 10 INJECTION, SOLUTION EPIDURAL; INFILTRATION; INTRACAUDAL; PERINEURAL at 10:52:00

## 2020-08-29 RX ADMIN — LABETALOL HYDROCHLORIDE 15 MG: 5 INJECTION, SOLUTION INTRAVENOUS at 12:55:00

## 2020-08-29 RX ADMIN — HYDRALAZINE HYDROCHLORIDE 5 MG: 20 INJECTION INTRAMUSCULAR; INTRAVENOUS at 12:44:00

## 2020-08-29 RX ADMIN — LABETALOL HYDROCHLORIDE 5 MG: 5 INJECTION, SOLUTION INTRAVENOUS at 13:04:00

## 2020-08-29 RX ADMIN — HYDRALAZINE HYDROCHLORIDE 5 MG: 20 INJECTION INTRAMUSCULAR; INTRAVENOUS at 12:55:00

## 2020-08-29 RX ADMIN — HYDRALAZINE HYDROCHLORIDE 5 MG: 20 INJECTION INTRAMUSCULAR; INTRAVENOUS at 10:35:00

## 2020-08-29 NOTE — ANESTHESIA PREPROCEDURE EVALUATION
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    surgery       Past Surgical History:   Procedure Laterality Date   • CARPAL TUNNEL RELEASE Right    • CHOLECYSTECTOMY     • HAND/FINGER SURGERY UNLISTED Right 2004    hand surgery [MAR Hold] fentaNYL (DURAGESIC) 12 MCG/HR 1 patch, 1 patch, Transdermal, Q72H, Timmy Curtis MD, 1 patch at 08/28/20 2052  [facility] Hold] nicotine (NICODERM CQ) 14 MG/24HR 1 patch, 1 patch, Transdermal, Daily, Timmy Curtis MD, 1 patch at 08/28/20 0930  [MAR H [MAR Hold] Normal Saline Flush 0.9 % injection 3 mL, 3 mL, Intravenous, PRN, Tesfaye Salazar MD  [facility] Hold] docusate sodium (COLACE) cap 100 mg, 100 mg, Oral, BID, Tesfaye Salazar MD, 100 mg at 08/28/20 0952  [MAR Hold] PEG 3350 (MIRALAX) powder packet 17 g, 1 Active member of club or organization: Not on file        Attends meetings of clubs or organizations: Not on file        Relationship status: Not on file      Intimate partner violence:        Fear of current or ex partner: Not on file        Emotion BP: 139/90 126/55 117/62 133/55   Pulse:  52 52 55   Resp:  18 18 18   Temp:  98.1 °F (36.7 °C) 98 °F (36.7 °C) 98 °F (36.7 °C)   TempSrc:  Oral Oral Oral   SpO2:  96% 95% 98%   Weight:   82.2 kg (181 lb 4.8 oz)    Height:            Anesthesia Evaluation

## 2020-08-29 NOTE — PLAN OF CARE
S/p hypogastric neurotomy; up ambulating to BR; denies any SOB at this time; diuretics currently on hold; call light in reach.      Problem: Patient Centered Care  Goal: Patient preferences are identified and integrated in the patient's plan of care  Neal assess for edema, trend weights  Outcome: Progressing  Goal: Absence of cardiac arrhythmias or at baseline  Description  INTERVENTIONS:  - Continuous cardiac monitoring, monitor vital signs, obtain 12 lead EKG if indicated  - Evaluate effectiveness of anti

## 2020-08-29 NOTE — PROGRESS NOTES
NEPHROLOGY DAILY PROGRESS NOTE     Follow up Reason: DICK     SUBJECTIVE:  Denies SOB, has some swelling in the legs  .     OBJECTIVE:    Total Intake/Output:    Intake/Output Summary (Last 24 hours) at 8/29/2020 1515  Last data filed at 8/29/2020 1443  Pilar Merlin mg, Oral, Q4H PRN  [MAR Hold] Normal Saline Flush 0.9 % injection 3 mL, 3 mL, Intravenous, PRN  [MAR Hold] docusate sodium (COLACE) cap 100 mg, 100 mg, Oral, BID  [MAR Hold] PEG 3350 (MIRALAX) powder packet 17 g, 17 g, Oral, Daily PRN  [MAR Hold] bisacodyl 08/29/2020    .0 08/29/2020       IMAGING:  Us Abdominal Doppler (dkp=53079)+(edw 95796)    Result Date: 8/28/2020  CONCLUSION:   The visualized IVC and proximal bilateral common iliac veins are patent without significant mass effect.    If there is    RECOMMENDATIONS:     - continue to hold ACEI and diuretics today, will reassess creatinine tomorrow    - leg elevation and compression stockings for lower extremity edema. - Checking UA. - Avoid nephrotoxins such as NSAIDs, IV contrast, fleet enema.

## 2020-08-29 NOTE — ANESTHESIA POSTPROCEDURE EVALUATION
Patient: Renea Garner    Procedure Summary     Date:  08/29/20 Room / Location:  [facility] OR 04 / [facility] OR    Anesthesia Start:  5479 Anesthesia Stop:  5322    Procedure:  SACROILIAC JOINT INJECTION AND LUMBAR FACET INJECTION (Left ) Diagnosis:

## 2020-08-30 LAB
ANION GAP SERPL CALC-SCNC: 3 MMOL/L (ref 0–18)
BUN BLD-MCNC: 56 MG/DL (ref 7–18)
BUN/CREAT SERPL: 32.7 (ref 10–20)
CALCIUM BLD-MCNC: 8.3 MG/DL (ref 8.5–10.1)
CHLORIDE SERPL-SCNC: 108 MMOL/L (ref 98–112)
CO2 SERPL-SCNC: 29 MMOL/L (ref 21–32)
CREAT BLD-MCNC: 1.71 MG/DL (ref 0.55–1.02)
DEPRECATED RDW RBC AUTO: 54.4 FL (ref 35.1–46.3)
ERYTHROCYTE [DISTWIDTH] IN BLOOD BY AUTOMATED COUNT: 15.3 % (ref 11–15)
GLUCOSE BLD-MCNC: 130 MG/DL (ref 70–99)
HCT VFR BLD AUTO: 25.4 % (ref 35–48)
HGB BLD-MCNC: 8 G/DL (ref 12–16)
MCH RBC QN AUTO: 30.2 PG (ref 26–34)
MCHC RBC AUTO-ENTMCNC: 31.5 G/DL (ref 31–37)
MCV RBC AUTO: 95.8 FL (ref 80–100)
OSMOLALITY SERPL CALC.SUM OF ELEC: 307 MOSM/KG (ref 275–295)
PLATELET # BLD AUTO: 224 10(3)UL (ref 150–450)
POTASSIUM SERPL-SCNC: 5.1 MMOL/L (ref 3.5–5.1)
RBC # BLD AUTO: 2.65 X10(6)UL (ref 3.8–5.3)
SODIUM SERPL-SCNC: 140 MMOL/L (ref 136–145)
WBC # BLD AUTO: 7.6 X10(3) UL (ref 4–11)

## 2020-08-30 PROCEDURE — 80048 BASIC METABOLIC PNL TOTAL CA: CPT | Performed by: ANESTHESIOLOGY

## 2020-08-30 PROCEDURE — 85027 COMPLETE CBC AUTOMATED: CPT | Performed by: ANESTHESIOLOGY

## 2020-08-30 NOTE — CHRONIC PAIN
[facility]  Inpatient Pain Management Progress Note      Patient name: Dearl Parvez 70year old female  : 1945  MRN: [id number]    Diagnosis: Hypervolemia, unspecified hypervolemia type  (primary encounter diagnosis)  Flash pulmon drugs.     Allergies:  No Known Allergies    Current Medications:  Scheduled Meds:  • fentaNYL  1 patch Transdermal Q72H   • nicotine  1 patch Transdermal Daily   • ferrous sulfate  325 mg Oral Daily with breakfast   • folic acid  1 mg Oral Daily   • NIFEdi neurolytic superior hypogastric plexus block with ethanol with 40% pain relief. Chronic Back pain most severe, now with mild soreness at the injection side. Recommend increasing fentanyl patch to 25mcg/hr q72 hrs. Continue percocet prn.          Lengthy

## 2020-08-30 NOTE — PROGRESS NOTES
NEPHROLOGY DAILY PROGRESS NOTE     Follow up Reason: DICK     SUBJECTIVE:  Continues to have back pain  Feels SOB at times     OBJECTIVE:    Total Intake/Output:    Intake/Output Summary (Last 24 hours) at 8/30/2020 1330  Last data filed at 8/30/2020 0900 Daily PRN  bisacodyl (DULCOLAX) rectal suppository 10 mg, 10 mg, Rectal, Daily PRN  ondansetron HCl (ZOFRAN) injection 4 mg, 4 mg, Intravenous, Q6H PRN  Metoclopramide HCl (REGLAN) injection 5 mg, 5 mg, Intravenous, Q8H PRN  hydrALAzine HCl (APRESOLINE) in 8/29/2020 at 2:24 PM     Finalized by (CST): Samantha Morrissey MD on 8/29/2020 at 2:25 PM          [facility] Fluoroscopy - N/c    Result Date: 8/29/2020  CONCLUSION:  1. 456 seconds of intraoperative fluoroscopy was utilized.     Dictated by (CST

## 2020-08-30 NOTE — BRIEF OP NOTE
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 Patient's back was prepared and draped in standard sterile fashion.   Timeout was performed before proceeding with the injection to identify the correct p dilution of dehydrated ethanol and 0.25% bupivacaine was administered after careful negative aspiration for blood. There was no complications reported by the patient, who tolerated procedure well.   The patient was taken to the recovery area before return

## 2020-08-30 NOTE — PROGRESS NOTES
[facility] Hospitalist Progress Note                                                                   [address]  8/14/1945    SUBJECTIVE:  No cp/sob, unable to see patient yesterday as in OR hours.    Invalid input(s): ALPHOS, TBIL, DBIL, TPROT    No results for input(s): PGLU in the last 168 hours.     Meds:   Scheduled:   • fentaNYL  1 patch Transdermal Q72H   • nicotine  1 patch Transdermal Daily   • ferrous sulfate  325 mg Oral Daily with b response     Hypertensive emergency:  -non compliant with bp meds at home  -improved now     Flash pulm edema/acute hypoxic resp failure:  -2/2 med non-compliance/high bp          Pain: s/p SI injection left 8/29 in OR  -MRI with multiple disc herniations,

## 2020-08-30 NOTE — PROGRESS NOTES
[facility]    Cardiology Progress Note    Tenisha Nunez Patient Status:  Inpatient    1945 MRN [id number]   Location [facility] 3W/SW Attending Robert Marinelli,  [facility] Se Day # 11 PCP PHYSICIAN NONSTAFF       Impression/Plan:  7 ml   Net 500 ml       Last 3 Weights  08/30/20 0220 : 184 lb 6.4 oz (83.6 kg)  08/29/20 0507 : 181 lb 4.8 oz (82.2 kg)  08/28/20 0500 : 181 lb 11.2 oz (82.4 kg)  08/27/20 0620 : 185 lb 11.2 oz (84.2 kg)  08/26/20 0549 : 183 lb 8 oz (83.2 kg)  08/25/20 7658 LIPASE, LDH in the last 168 hours. Invalid input(s): ALPHOS, TBIL, DBIL, TPROT    No results for input(s): TROP in the last 168 hours.     Allergies:   No Known Allergies    Medications:  fentaNYL (DURAGESIC) 12 MCG/HR 1 patch, 1 patch, Transdermal, Q72H

## 2020-08-31 ENCOUNTER — TELEPHONE (OUTPATIENT)
Dept: PAIN CLINIC | Facility: HOSPITAL | Age: 75
End: 2020-08-31

## 2020-08-31 VITALS
HEIGHT: 64 IN | DIASTOLIC BLOOD PRESSURE: 54 MMHG | TEMPERATURE: 99 F | RESPIRATION RATE: 18 BRPM | OXYGEN SATURATION: 99 % | WEIGHT: 188.81 LBS | SYSTOLIC BLOOD PRESSURE: 150 MMHG | BODY MASS INDEX: 32.23 KG/M2 | HEART RATE: 59 BPM

## 2020-08-31 LAB
ANION GAP SERPL CALC-SCNC: 2 MMOL/L (ref 0–18)
BUN BLD-MCNC: 60 MG/DL (ref 7–18)
BUN/CREAT SERPL: 34.3 (ref 10–20)
CALCIUM BLD-MCNC: 9 MG/DL (ref 8.5–10.1)
CHLORIDE SERPL-SCNC: 108 MMOL/L (ref 98–112)
CO2 SERPL-SCNC: 30 MMOL/L (ref 21–32)
CREAT BLD-MCNC: 1.75 MG/DL (ref 0.55–1.02)
DEPRECATED RDW RBC AUTO: 54.4 FL (ref 35.1–46.3)
ERYTHROCYTE [DISTWIDTH] IN BLOOD BY AUTOMATED COUNT: 15.3 % (ref 11–15)
GLUCOSE BLD-MCNC: 118 MG/DL (ref 70–99)
HCT VFR BLD AUTO: 25.6 % (ref 35–48)
HGB BLD-MCNC: 8 G/DL (ref 12–16)
MCH RBC QN AUTO: 30 PG (ref 26–34)
MCHC RBC AUTO-ENTMCNC: 31.3 G/DL (ref 31–37)
MCV RBC AUTO: 95.9 FL (ref 80–100)
OSMOLALITY SERPL CALC.SUM OF ELEC: 308 MOSM/KG (ref 275–295)
PLATELET # BLD AUTO: 207 10(3)UL (ref 150–450)
POTASSIUM SERPL-SCNC: 5 MMOL/L (ref 3.5–5.1)
RBC # BLD AUTO: 2.67 X10(6)UL (ref 3.8–5.3)
SODIUM SERPL-SCNC: 140 MMOL/L (ref 136–145)
WBC # BLD AUTO: 5 X10(3) UL (ref 4–11)

## 2020-08-31 PROCEDURE — 80048 BASIC METABOLIC PNL TOTAL CA: CPT | Performed by: ANESTHESIOLOGY

## 2020-08-31 PROCEDURE — 85027 COMPLETE CBC AUTOMATED: CPT | Performed by: ANESTHESIOLOGY

## 2020-08-31 RX ORDER — FENTANYL 25 UG/H
1 PATCH TRANSDERMAL
Status: DISCONTINUED | OUTPATIENT
Start: 2020-08-31 | End: 2020-08-31

## 2020-08-31 RX ORDER — BUMETANIDE 1 MG/1
1 TABLET ORAL DAILY
Status: DISCONTINUED | OUTPATIENT
Start: 2020-08-31 | End: 2020-08-31

## 2020-08-31 RX ORDER — NALOXONE HYDROCHLORIDE 4 MG/.1ML
4 SPRAY, METERED NASAL AS NEEDED
Qty: 1 KIT | Refills: 0 | Status: ON HOLD | OUTPATIENT
Start: 2020-08-31 | End: 2021-01-26

## 2020-08-31 RX ORDER — GABAPENTIN 300 MG/1
300 CAPSULE ORAL NIGHTLY
Qty: 30 CAPSULE | Refills: 0 | Status: SHIPPED | OUTPATIENT
Start: 2020-08-31 | End: 2020-09-30

## 2020-08-31 RX ORDER — OXYCODONE AND ACETAMINOPHEN 10; 325 MG/1; MG/1
1 TABLET ORAL EVERY 4 HOURS PRN
Qty: 35 TABLET | Refills: 0 | Status: SHIPPED | OUTPATIENT
Start: 2020-08-31 | End: 2020-11-12

## 2020-08-31 RX ORDER — FENTANYL 25 UG/H
1 PATCH TRANSDERMAL
Qty: 10 PATCH | Refills: 0 | Status: SHIPPED | OUTPATIENT
Start: 2020-09-03 | End: 2020-10-03

## 2020-08-31 RX ORDER — BUMETANIDE 1 MG/1
1 TABLET ORAL DAILY
Qty: 30 TABLET | Refills: 0 | Status: SHIPPED | OUTPATIENT
Start: 2020-08-31 | End: 2020-09-30

## 2020-08-31 NOTE — DISCHARGE SUMMARY
General Medicine Discharge Summary     Patient ID:  Ruchi Miller  70year old  8/14/1945    Admit date: 8/19/2020    Discharge date and time: 8/31/20    Attending Physician: Eliezer Frausto MD     Consults: IP CONSULT TO HOSPITALIST  IP CONSULT TO [facility] Fluoroscopy - N/c    Result Date: 8/29/2020  CONCLUSION:  1. 456 seconds of intraoperative fluoroscopy was utilized.     Dictated by (CST): Guero Morrissey MD on 8/29/2020 at 1:53 PM     Finalized by (CST): Guero Morrissey MD on 8/29/

## 2020-08-31 NOTE — PLAN OF CARE
Patient and her son Mariella Garcia were both provided with discharge instructions, education, and follow up information. Printed prescriptions for controlled substances (fentanyl patch & oxycodone tablet) were given to patient.  Patient's other prescriptions were a Discharge     Problem: CARDIOVASCULAR - ADULT  Goal: Maintains optimal cardiac output and hemodynamic stability  Description  INTERVENTIONS:  - Monitor vital signs, rhythm, and trends  - Monitor for bleeding, hypotension and signs of decreased cardiac outp oxygenation and minimize respiratory effort  - Oxygen supplementation based on oxygen saturation or ABGs  - Provide Smoking Cessation handout, if applicable  - Encourage broncho-pulmonary hygiene including cough, deep breathe, Incentive Spirometry  - Asses

## 2020-08-31 NOTE — CHRONIC PAIN
[facility]  Inpatient Pain Management Progress Note      Patient name: Deana Trent 70year old female  : 1945  MRN: [id number]    Diagnosis: Hypervolemia, unspecified hypervolemia type  (primary encounter diagnosis)  Flash pulmon drink alcohol or use drugs.     Allergies:  No Known Allergies    Current Medications:  Scheduled Meds:  • fentaNYL  1 patch Transdermal Q72H   • nicotine  1 patch Transdermal Daily   • ferrous sulfate  325 mg Oral Daily with breakfast   • folic acid  1 mg abdominal/pelvic pain s/p neurolytic superior hypogastric plexus block with ethanol with 40% pain relief. Chronic Back pain most severe, now with mild soreness at the injection side.     - add lidoderm to low back   - increase duragesic patch to 25mcg eloise

## 2020-08-31 NOTE — PROGRESS NOTES
NEPHROLOGY DAILY PROGRESS NOTE     Follow up Reason: DICK     SUBJECTIVE:  Reports having pain in her side  Breathing comes and goes  Has some swelling in her legs     OBJECTIVE:    Total Intake/Output:    Intake/Output Summary (Last 24 hours) at 8/31/2020 injection 3 mL, 3 mL, Intravenous, PRN  docusate sodium (COLACE) cap 100 mg, 100 mg, Oral, BID  PEG 3350 (MIRALAX) powder packet 17 g, 17 g, Oral, Daily PRN  bisacodyl (DULCOLAX) rectal suppository 10 mg, 10 mg, Rectal, Daily PRN  ondansetron HCl (ZOFRAN) (cpt=71045)    Result Date: 8/29/2020  CONCLUSION:  1. Persistent diffuse bilateral pulmonary interstitial alveolar opacification.     Dictated by (CST): Dilcia Morrissey MD on 8/29/2020 at 2:24 PM     Finalized by (CST): Dilcia Morrissey MD o

## 2020-08-31 NOTE — PLAN OF CARE
Problem: Patient Centered Care  Goal: Patient preferences are identified and integrated in the patient's plan of care  Description  Interventions:  - What would you like us to know as we care for you?  I have chronic pain and come to the [address] [place] [address] pain [facility] baseline  Description  INTERVENTIONS:  - Continuous cardiac monitoring, monitor vital signs, obtain 12 lead EKG if indicated  - Evaluate effectiveness of antiarrhythmic and heart rate control medications as ordered  - Initiate emergency measures for life t

## 2020-08-31 NOTE — TELEPHONE ENCOUNTER
Spoke with pt and advised that on discharge pt will be given Rx for her medication fill. Pt verbalized understanding no further needs.

## 2020-08-31 NOTE — PROGRESS NOTES
[facility]    Cardiology Progress Note    Virginia Nickerson Patient Status:  Inpatient    1945 MRN [id number]   Location [facility] 3W/SW Attending Shellie Crigler, MD   Hosp Day # 12 PCP PHYSICIAN NONSTAFF       Impression/Plan:  7 kg)  08/28/20 0500 : 181 lb 11.2 oz (82.4 kg)  08/27/20 0620 : 185 lb 11.2 oz (84.2 kg)  08/26/20 0549 : 183 lb 8 oz (83.2 kg)  08/25/20 0620 : 180 lb 3.2 oz (81.7 kg)  08/24/20 0620 : 185 lb 12.8 oz (84.3 kg)  08/23/20 0440 : 184 lb (83.5 kg)  08/22/20 06 common iliac veins are patent without significant mass effect.    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

## 2020-09-17 ENCOUNTER — OFFICE VISIT (OUTPATIENT)
Dept: PAIN CLINIC | Facility: HOSPITAL | Age: 75
End: 2020-09-17
Attending: NURSE PRACTITIONER
Payer: MEDICARE

## 2020-09-17 VITALS — OXYGEN SATURATION: 98 % | HEART RATE: 77 BPM | DIASTOLIC BLOOD PRESSURE: 71 MMHG | SYSTOLIC BLOOD PRESSURE: 149 MMHG

## 2020-09-17 DIAGNOSIS — M54.41 CHRONIC BILATERAL LOW BACK PAIN WITH BILATERAL SCIATICA: Primary | ICD-10-CM

## 2020-09-17 DIAGNOSIS — F11.90 CHRONIC, CONTINUOUS USE OF OPIOIDS: ICD-10-CM

## 2020-09-17 DIAGNOSIS — M15.9 PRIMARY OSTEOARTHRITIS INVOLVING MULTIPLE JOINTS: ICD-10-CM

## 2020-09-17 DIAGNOSIS — M48.062 SPINAL STENOSIS OF LUMBAR REGION WITH NEUROGENIC CLAUDICATION: ICD-10-CM

## 2020-09-17 DIAGNOSIS — G89.3 CANCER ASSOCIATED PAIN: ICD-10-CM

## 2020-09-17 DIAGNOSIS — M54.42 CHRONIC BILATERAL LOW BACK PAIN WITH BILATERAL SCIATICA: Primary | ICD-10-CM

## 2020-09-17 DIAGNOSIS — G89.29 CHRONIC BILATERAL LOW BACK PAIN WITH BILATERAL SCIATICA: Primary | ICD-10-CM

## 2020-09-17 PROCEDURE — 99211 OFF/OP EST MAY X REQ PHY/QHP: CPT

## 2020-09-17 RX ORDER — FENTANYL 25 UG/H
1 PATCH TRANSDERMAL
Qty: 10 PATCH | Refills: 0 | Status: SHIPPED | OUTPATIENT
Start: 2020-11-02 | End: 2020-12-02

## 2020-09-17 RX ORDER — OXYCODONE AND ACETAMINOPHEN 10; 325 MG/1; MG/1
1 TABLET ORAL EVERY 4 HOURS PRN
Qty: 180 TABLET | Refills: 0 | Status: SHIPPED | OUTPATIENT
Start: 2020-10-17 | End: 2020-11-16

## 2020-09-17 RX ORDER — OXYCODONE AND ACETAMINOPHEN 10; 325 MG/1; MG/1
1 TABLET ORAL EVERY 4 HOURS PRN
Qty: 180 TABLET | Refills: 0 | Status: SHIPPED | OUTPATIENT
Start: 2020-09-17 | End: 2020-10-17

## 2020-09-17 RX ORDER — FENTANYL 25 UG/H
1 PATCH TRANSDERMAL
Qty: 10 PATCH | Refills: 0 | Status: SHIPPED | OUTPATIENT
Start: 2020-10-03 | End: 2020-11-02

## 2020-09-17 NOTE — PROGRESS NOTES
Pt presents to [facility] ambulatory for medication fill. Pt is having increase pain as she has been out of meds for 3 days. Pt was inpatient for 11 days for SOB. Pt states was only given 35 pills at discharge. NA updated at this appt.  KELLEY Dean to see

## 2020-09-17 NOTE — CHRONIC PAIN
Follow-up Note  HISTORY OF PRESENT ILLNESS:  Joselito Bey is a 70year old old female, returns to the clinic for evaluation treatment of her chronic low back pain  Is well-known to the pain clinic,  she has severe spinal stenosis and a retroperitoneal sa needed. If patient remains unresponsive, repeat dose in other nostril 2-5 minutes after first dose. 1 kit 0   • oxyCODONE-acetaminophen  MG Oral Tab Take 1 tablet by mouth every 4 (four) hours as needed for Pain.  No more than 5 tablets per day 35 tab right mod/left severe, L4-5 right mod-severe/left mild, L3-4 right mild-mod/left mod foraminal stenosis     L4-5 mod central stenosis     Degenerative arthritis     Leg weakness     Neurologic gait dysfunction     Cancer associated pain     Hypervolemia Activity      Alcohol use: No        Alcohol/week: 0.0 standard drinks      Drug use: No      Sexual activity: Not on file    Lifestyle      Physical activity:        Days per week: Not on file        Minutes per session: Not on file      Stress: Not on fi due to lack of intravenous contrast on current study. 2. Stable borderline enlarged left common iliac chain lymph node. Otherwise, no enlarged lymph nodes in the chest, abdomen or pelvis. 3. Stable solitary pulmonary nodule in the left lower lobe.   4. Fi two months     Comprehensive analgesic plan was formulated. Conservative vs. Aggressive measures were discussed at length including pharmacotherapy (eg.  Anti- inflammatories, muscle relaxants, neuropathic medications, oral steroids, analgesics), injections

## 2020-09-28 RX ORDER — DIAZEPAM 10 MG/1
TABLET ORAL
Qty: 90 TABLET | Refills: 0 | Status: SHIPPED | OUTPATIENT
Start: 2020-09-28 | End: 2020-11-19

## 2020-10-20 ENCOUNTER — TELEPHONE (OUTPATIENT)
Dept: PAIN CLINIC | Facility: HOSPITAL | Age: 75
End: 2020-10-20

## 2020-11-12 RX ORDER — OXYCODONE AND ACETAMINOPHEN 10; 325 MG/1; MG/1
1 TABLET ORAL EVERY 4 HOURS PRN
COMMUNITY
End: 2020-11-12

## 2020-11-12 RX ORDER — FENTANYL 25 UG/H
1 PATCH TRANSDERMAL
Qty: 10 PATCH | Refills: 0 | Status: CANCELLED | OUTPATIENT
Start: 2020-12-02 | End: 2021-01-01

## 2020-11-12 RX ORDER — FENTANYL 25 UG/H
1 PATCH TRANSDERMAL
Status: ON HOLD | COMMUNITY
End: 2020-12-15

## 2020-11-12 RX ORDER — FENTANYL 25 UG/H
1 PATCH TRANSDERMAL
Qty: 10 PATCH | Refills: 0 | Status: CANCELLED | OUTPATIENT
Start: 2021-01-01 | End: 2021-01-31

## 2020-11-17 ENCOUNTER — E-VISIT (OUTPATIENT)
Dept: TELEHEALTH | Age: 75
End: 2020-11-17
Payer: MEDICARE

## 2020-11-17 ENCOUNTER — VIRTUAL PHONE E/M (OUTPATIENT)
Dept: PAIN CLINIC | Facility: HOSPITAL | Age: 75
End: 2020-11-17
Attending: NURSE PRACTITIONER
Payer: MEDICARE

## 2020-11-17 DIAGNOSIS — Z02.9 ADMINISTRATIVE ENCOUNTER: Primary | ICD-10-CM

## 2020-11-17 DIAGNOSIS — G89.29 CHRONIC BILATERAL LOW BACK PAIN WITH BILATERAL SCIATICA: ICD-10-CM

## 2020-11-17 DIAGNOSIS — M54.42 CHRONIC BILATERAL LOW BACK PAIN WITH BILATERAL SCIATICA: ICD-10-CM

## 2020-11-17 DIAGNOSIS — M54.41 CHRONIC BILATERAL LOW BACK PAIN WITH BILATERAL SCIATICA: ICD-10-CM

## 2020-11-17 RX ORDER — OXYCODONE AND ACETAMINOPHEN 10; 325 MG/1; MG/1
1 TABLET ORAL EVERY 4 HOURS PRN
Qty: 180 TABLET | Refills: 0 | Status: ON HOLD | OUTPATIENT
Start: 2020-11-17 | End: 2020-12-15

## 2020-11-17 RX ORDER — OXYCODONE AND ACETAMINOPHEN 10; 325 MG/1; MG/1
1 TABLET ORAL EVERY 4 HOURS PRN
Qty: 180 TABLET | Refills: 0 | Status: SHIPPED | OUTPATIENT
Start: 2020-12-17 | End: 2021-01-12
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
## 2020-11-17 NOTE — CHRONIC PAIN
Virtual Telephone Check-In    Prema Jerome verbally consents to a Virtual/Telephone Check-In visit on 11/17/20. Patient has been referred to the [facility] website at [email or web address] to review the yearly Consent to Treat document.     Patient Josseline Take 1 tablet by mouth every 4 (four) hours as needed for Pain (max 6 per day). 180 tablet 0   • oxyCODONE-acetaminophen  MG Oral Tab Take 1 tablet by mouth every 4 (four) hours as needed for Pain (max 6/day).  180 tablet 0   • fentaNYL 25 MCG/HR Erma Luo above  Neurological: As above    MEDICAL HISTORY:  Patient Active Problem List:     Back pain     L5-S1 left paracentral mild HNP, L4-5 right lateral recess & foraminal mod HNP, L3-4 right paracentral mild HNP     L5-S1 left mod foraminal, L3-4 bilateral m Financial resource strain: Not on file      Food insecurity        Worry: Not on file        Inability: Not on file      Transportation needs        Medical: Not on file        Non-medical: Not on file    Tobacco Use      Smoking status: Current Every Day compatible with known soft tissue sarcoma. Exact comparison is somewhat limited due to lack of intravenous contrast on current study. 2. Stable borderline enlarged left common iliac chain lymph node.  Otherwise, no enlarged lymph nodes in the chest, abdome Patient to follow in two months     Comprehensive analgesic plan was formulated. Conservative vs. Aggressive measures were discussed at length including pharmacotherapy (eg.  Anti- inflammatories, muscle relaxants, neuropathic medications, oral steroids,

## 2020-11-18 ENCOUNTER — TELEPHONE (OUTPATIENT)
Dept: PAIN CLINIC | Facility: HOSPITAL | Age: 75
End: 2020-11-18

## 2020-11-18 NOTE — TELEPHONE ENCOUNTER
Magdiel BENSON filled pain meds yesterday which was great. However, she did not send Diazepam to [facility] [employer] in [place]. Patient is requesting script be sent today as she is need of them.  Please call patient at [phone]

## 2020-11-19 RX ORDER — DIAZEPAM 10 MG/1
TABLET ORAL
Qty: 90 TABLET | Refills: 0 | Status: SHIPPED | OUTPATIENT
Start: 2020-11-19 | End: 2020-12-29

## 2020-11-21 ENCOUNTER — APPOINTMENT (OUTPATIENT)
Dept: GENERAL RADIOLOGY | Facility: HOSPITAL | Age: 75
End: 2020-11-21
Attending: NURSE PRACTITIONER
Payer: MEDICARE

## 2020-11-21 ENCOUNTER — HOSPITAL ENCOUNTER (EMERGENCY)
Facility: HOSPITAL | Age: 75
Discharge: HOME | End: 2020-11-21
Payer: MEDICARE

## 2020-11-21 ENCOUNTER — APPOINTMENT (OUTPATIENT)
Dept: CT IMAGING | Facility: HOSPITAL | Age: 75
End: 2020-11-21
Attending: NURSE PRACTITIONER
Payer: MEDICARE

## 2020-11-21 VITALS
SYSTOLIC BLOOD PRESSURE: 201 MMHG | HEIGHT: 64 IN | RESPIRATION RATE: 20 BRPM | HEART RATE: 76 BPM | TEMPERATURE: 98 F | OXYGEN SATURATION: 95 % | BODY MASS INDEX: 31.24 KG/M2 | WEIGHT: 183 LBS | DIASTOLIC BLOOD PRESSURE: 105 MMHG

## 2020-11-21 DIAGNOSIS — S70.01XA CONTUSION OF RIGHT HIP, INITIAL ENCOUNTER: ICD-10-CM

## 2020-11-21 DIAGNOSIS — I10 HYPERTENSION, UNSPECIFIED TYPE: ICD-10-CM

## 2020-11-21 DIAGNOSIS — S40.011A CONTUSION OF RIGHT SHOULDER, INITIAL ENCOUNTER: ICD-10-CM
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
## 2020-11-21 PROCEDURE — 99284 EMERGENCY DEPT VISIT MOD MDM: CPT

## 2020-11-21 PROCEDURE — 73030 X-RAY EXAM OF SHOULDER: CPT | Performed by: NURSE PRACTITIONER

## 2020-11-21 PROCEDURE — 71045 X-RAY EXAM CHEST 1 VIEW: CPT | Performed by: NURSE PRACTITIONER

## 2020-11-21 PROCEDURE — 72125 CT NECK SPINE W/O DYE: CPT | Performed by: NURSE PRACTITIONER

## 2020-11-21 PROCEDURE — 70450 CT HEAD/BRAIN W/O DYE: CPT | Performed by: NURSE PRACTITIONER

## 2020-11-21 PROCEDURE — 73502 X-RAY EXAM HIP UNI 2-3 VIEWS: CPT | Performed by: NURSE PRACTITIONER

## 2020-11-21 RX ORDER — TRAMADOL HYDROCHLORIDE 50 MG/1
50 TABLET ORAL ONCE
Status: COMPLETED | OUTPATIENT
Start: 2020-11-21 | End: 2020-11-21

## 2020-11-21 RX ORDER — NIFEDIPINE 30 MG/1
30 TABLET, EXTENDED RELEASE ORAL ONCE
Status: COMPLETED | OUTPATIENT
Start: 2020-11-21 | End: 2020-11-21

## 2020-11-21 RX ORDER — ATENOLOL 50 MG/1
50 TABLET ORAL ONCE
Status: COMPLETED | OUTPATIENT
Start: 2020-11-21 | End: 2020-11-21

## 2020-11-21 NOTE — ED INITIAL ASSESSMENT (HPI)
Patient complains of complete right side pain s/p falling at the [employer]. Denies hitting head. Denies LOC.

## 2020-11-21 NOTE — ED PROVIDER NOTES
Patient Seen in: [facility] Emergency Department      History   Patient presents with:  Fall    Stated Complaint: fall    76yo/f w hx of ckd, htn, anemia, hld, ra, walks with a cane or walker at baseline reports to the ED s/p mechanical fall.  She SpO2 100 %   O2 Device None (Room air)       Current:BP (!) 201/105   Pulse 76   Temp 98 °F (36.7 °C) (Oral)   Resp 20   Ht 162.6 cm (5' 4\")   Wt 83 kg   SpO2 95%   BMI 31.41 kg/m²         Physical Exam  Vitals signs and nursing note reviewed.    Constit subluxation.  Mild degenerative change of glenohumeral joint and AC joint.  Nonspecific remodeling of the greater tuberosity.  No suspicious bone lesion.  Degenerative changes in the spine. SOFT TISSUES: Negative. No visible soft tissue swelling.    EFFUS periventicular white matter and centrum semiovale in both cerebral hemispheres.    CEREBELLUM: No edema, hemorrhage, mass, acute infarction, or significant atrophy.     BRAINSTEM: No edema, hemorrhage, mass, acute infarction, or significant atrophy.     CARLOS arteries.  Partially   imaged coarse left thyroid calcification.                      Impression     CONCLUSION:   1.  No acute fracture or traumatic subluxation of the cervical spine.     2. Mild-to-moderate multilevel cervical spine degenerative changes,

## 2020-12-15 ENCOUNTER — APPOINTMENT (OUTPATIENT)
Dept: GENERAL RADIOLOGY | Facility: HOSPITAL | Age: 75
DRG: 291 | End: 2020-12-15
Attending: EMERGENCY MEDICINE
Payer: MEDICARE

## 2020-12-15 ENCOUNTER — HOSPITAL ENCOUNTER (INPATIENT)
Facility: HOSPITAL | Age: 75
LOS: 6 days | Discharge: HOME | DRG: 291 | End: 2020-12-21
Attending: EMERGENCY MEDICINE | Admitting: HOSPITALIST
Payer: MEDICARE

## 2020-12-15 DIAGNOSIS — J81.0 FLASH PULMONARY EDEMA (HCC): Primary | ICD-10-CM

## 2020-12-15 PROBLEM — I50.33 ACUTE ON CHRONIC DIASTOLIC (CONGESTIVE) HEART FAILURE (HCC): Status: ACTIVE | Noted: 2020-12-15

## 2020-12-15 PROCEDURE — 71045 X-RAY EXAM CHEST 1 VIEW: CPT | Performed by: EMERGENCY MEDICINE

## 2020-12-15 PROCEDURE — 99223 1ST HOSP IP/OBS HIGH 75: CPT | Performed by: HOSPITALIST

## 2020-12-15 RX ORDER — ISOSORBIDE MONONITRATE 30 MG/1
30 TABLET, EXTENDED RELEASE ORAL ONCE
Status: COMPLETED | OUTPATIENT
Start: 2020-12-15 | End: 2020-12-15

## 2020-12-15 RX ORDER — NITROGLYCERIN 0.4 MG/1
0.4 TABLET SUBLINGUAL EVERY 5 MIN PRN
Status: DISCONTINUED | OUTPATIENT
Start: 2020-12-15 | End: 2020-12-21

## 2020-12-15 RX ORDER — SODIUM CHLORIDE 0.9 % (FLUSH) 0.9 %
10 SYRINGE (ML) INJECTION AS NEEDED
Status: DISCONTINUED | OUTPATIENT
Start: 2020-12-15 | End: 2020-12-21

## 2020-12-15 RX ORDER — FUROSEMIDE 10 MG/ML
40 INJECTION INTRAMUSCULAR; INTRAVENOUS ONCE
Status: COMPLETED | OUTPATIENT
Start: 2020-12-15 | End: 2020-12-15

## 2020-12-15 RX ORDER — NITROGLYCERIN 0.4 MG/1
0.4 TABLET SUBLINGUAL EVERY 5 MIN PRN
Status: DISCONTINUED | OUTPATIENT
Start: 2020-12-15 | End: 2020-12-16

## 2020-12-15 RX ORDER — MORPHINE SULFATE 4 MG/ML
4 INJECTION, SOLUTION INTRAMUSCULAR; INTRAVENOUS EVERY 2 HOUR PRN
Status: DISCONTINUED | OUTPATIENT
Start: 2020-12-15 | End: 2020-12-19

## 2020-12-15 RX ORDER — ONDANSETRON 2 MG/ML
4 INJECTION INTRAMUSCULAR; INTRAVENOUS EVERY 6 HOURS PRN
Status: DISCONTINUED | OUTPATIENT
Start: 2020-12-15 | End: 2020-12-19

## 2020-12-15 RX ORDER — MORPHINE SULFATE 2 MG/ML
2 INJECTION, SOLUTION INTRAMUSCULAR; INTRAVENOUS EVERY 2 HOUR PRN
Status: DISCONTINUED | OUTPATIENT
Start: 2020-12-15 | End: 2020-12-19

## 2020-12-15 RX ORDER — HYDROCODONE BITARTRATE AND ACETAMINOPHEN 5; 325 MG/1; MG/1
2 TABLET ORAL EVERY 4 HOURS PRN
Status: DISCONTINUED | OUTPATIENT
Start: 2020-12-15 | End: 2020-12-21

## 2020-12-15 RX ORDER — ATENOLOL 50 MG/1
50 TABLET ORAL DAILY
Status: DISCONTINUED | OUTPATIENT
Start: 2020-12-15 | End: 2020-12-21

## 2020-12-15 RX ORDER — BUMETANIDE 0.25 MG/ML
2 INJECTION, SOLUTION INTRAMUSCULAR; INTRAVENOUS
Status: DISCONTINUED | OUTPATIENT
Start: 2020-12-15 | End: 2020-12-17

## 2020-12-15 RX ORDER — IPRATROPIUM BROMIDE AND ALBUTEROL SULFATE 2.5; .5 MG/3ML; MG/3ML
3 SOLUTION RESPIRATORY (INHALATION) EVERY 6 HOURS PRN
Status: DISCONTINUED | OUTPATIENT
Start: 2020-12-15 | End: 2020-12-16

## 2020-12-15 RX ORDER — MORPHINE SULFATE 2 MG/ML
1 INJECTION, SOLUTION INTRAMUSCULAR; INTRAVENOUS EVERY 2 HOUR PRN
Status: DISCONTINUED | OUTPATIENT
Start: 2020-12-15 | End: 2020-12-19

## 2020-12-15 RX ORDER — METOCLOPRAMIDE HYDROCHLORIDE 5 MG/ML
5 INJECTION INTRAMUSCULAR; INTRAVENOUS EVERY 8 HOURS PRN
Status: DISCONTINUED | OUTPATIENT
Start: 2020-12-15 | End: 2020-12-21

## 2020-12-15 RX ORDER — HEPARIN SODIUM 5000 [USP'U]/ML
5000 INJECTION, SOLUTION INTRAVENOUS; SUBCUTANEOUS EVERY 12 HOURS SCHEDULED
Status: DISCONTINUED | OUTPATIENT
Start: 2020-12-15 | End: 2020-12-21

## 2020-12-15 RX ORDER — ACETAMINOPHEN 325 MG/1
650 TABLET ORAL EVERY 6 HOURS PRN
Status: DISCONTINUED | OUTPATIENT
Start: 2020-12-15 | End: 2020-12-21

## 2020-12-15 RX ORDER — ONDANSETRON 2 MG/ML
4 INJECTION INTRAMUSCULAR; INTRAVENOUS EVERY 4 HOURS PRN
Status: DISCONTINUED | OUTPATIENT
Start: 2020-12-15 | End: 2020-12-21

## 2020-12-15 RX ORDER — ACETAMINOPHEN 325 MG/1
650 TABLET ORAL EVERY 4 HOURS PRN
Status: DISCONTINUED | OUTPATIENT
Start: 2020-12-15 | End: 2020-12-21

## 2020-12-15 RX ORDER — HYDROCODONE BITARTRATE AND ACETAMINOPHEN 5; 325 MG/1; MG/1
1 TABLET ORAL EVERY 4 HOURS PRN
Status: DISCONTINUED | OUTPATIENT
Start: 2020-12-15 | End: 2020-12-21

## 2020-12-15 RX ORDER — NICOTINE 21 MG/24HR
1 PATCH, TRANSDERMAL 24 HOURS TRANSDERMAL DAILY
Status: DISCONTINUED | OUTPATIENT
Start: 2020-12-15 | End: 2020-12-21

## 2020-12-15 NOTE — ED NOTES
rec'd care of pt from ems, sitting up on cart. Awake and alert and oriented x4. Came to er for shortness of breath. On cpap from ems. Removed and replaced with nasal cannula at 3L. Tolerating. Wheezing bilaterally. Able to speak in 2 word sentences.  Rafy Osborn

## 2020-12-16 ENCOUNTER — APPOINTMENT (OUTPATIENT)
Dept: NUCLEAR MEDICINE | Facility: HOSPITAL | Age: 75
DRG: 291 | End: 2020-12-16
Attending: HOSPITALIST
Payer: MEDICARE

## 2020-12-16 PROCEDURE — 78582 LUNG VENTILAT&PERFUS IMAGING: CPT | Performed by: HOSPITALIST

## 2020-12-16 PROCEDURE — 99233 SBSQ HOSP IP/OBS HIGH 50: CPT | Performed by: HOSPITALIST

## 2020-12-16 RX ORDER — NIFEDIPINE 30 MG/1
30 TABLET, EXTENDED RELEASE ORAL DAILY
Status: DISCONTINUED | OUTPATIENT
Start: 2020-12-16 | End: 2020-12-16

## 2020-12-16 RX ORDER — ASPIRIN 81 MG/1
81 TABLET ORAL DAILY
Status: DISCONTINUED | OUTPATIENT
Start: 2020-12-16 | End: 2020-12-21

## 2020-12-16 RX ORDER — DEXTROSE MONOHYDRATE 25 G/50ML
50 INJECTION, SOLUTION INTRAVENOUS
Status: DISCONTINUED | OUTPATIENT
Start: 2020-12-16 | End: 2020-12-21

## 2020-12-16 RX ORDER — ALBUTEROL SULFATE 90 UG/1
2 AEROSOL, METERED RESPIRATORY (INHALATION) EVERY 6 HOURS PRN
Status: DISCONTINUED | OUTPATIENT
Start: 2020-12-16 | End: 2020-12-21

## 2020-12-16 RX ORDER — ISOSORBIDE MONONITRATE 60 MG/1
60 TABLET, EXTENDED RELEASE ORAL DAILY
Status: DISCONTINUED | OUTPATIENT
Start: 2020-12-16 | End: 2020-12-21

## 2020-12-16 RX ORDER — GARLIC EXTRACT 500 MG
1 CAPSULE ORAL 2 TIMES DAILY
Status: DISCONTINUED | OUTPATIENT
Start: 2020-12-16 | End: 2020-12-21

## 2020-12-16 RX ORDER — DIAZEPAM 5 MG/1
10 TABLET ORAL 3 TIMES DAILY
Status: DISCONTINUED | OUTPATIENT
Start: 2020-12-16 | End: 2020-12-21

## 2020-12-16 RX ORDER — HYDROXYCHLOROQUINE SULFATE 200 MG/1
200 TABLET, FILM COATED ORAL DAILY
Status: DISCONTINUED | OUTPATIENT
Start: 2020-12-16 | End: 2020-12-21

## 2020-12-16 RX ORDER — HYDRALAZINE HYDROCHLORIDE 50 MG/1
50 TABLET, FILM COATED ORAL 2 TIMES DAILY
Status: DISCONTINUED | OUTPATIENT
Start: 2020-12-16 | End: 2020-12-17

## 2020-12-16 RX ORDER — VANCOMYCIN HYDROCHLORIDE 125 MG/1
125 CAPSULE ORAL 4 TIMES DAILY
Status: DISCONTINUED | OUTPATIENT
Start: 2020-12-16 | End: 2020-12-21

## 2020-12-16 RX ORDER — ENALAPRILAT 2.5 MG/2ML
1.25 INJECTION INTRAVENOUS EVERY 6 HOURS PRN
Status: DISCONTINUED | OUTPATIENT
Start: 2020-12-16 | End: 2020-12-21

## 2020-12-16 RX ORDER — PANTOPRAZOLE SODIUM 40 MG/1
40 TABLET, DELAYED RELEASE ORAL
Status: DISCONTINUED | OUTPATIENT
Start: 2020-12-16 | End: 2020-12-21

## 2020-12-16 RX ORDER — ATORVASTATIN CALCIUM 40 MG/1
40 TABLET, FILM COATED ORAL DAILY
Status: DISCONTINUED | OUTPATIENT
Start: 2020-12-16 | End: 2020-12-21

## 2020-12-16 RX ORDER — POTASSIUM CHLORIDE 20 MEQ/1
40 TABLET, EXTENDED RELEASE ORAL ONCE
Status: COMPLETED | OUTPATIENT
Start: 2020-12-16 | End: 2020-12-16

## 2020-12-16 RX ORDER — MELATONIN
325 3 TIMES DAILY
Status: DISCONTINUED | OUTPATIENT
Start: 2020-12-16 | End: 2020-12-21

## 2020-12-16 RX ORDER — IPRATROPIUM BROMIDE AND ALBUTEROL SULFATE 2.5; .5 MG/3ML; MG/3ML
3 SOLUTION RESPIRATORY (INHALATION)
Status: DISCONTINUED | OUTPATIENT
Start: 2020-12-17 | End: 2020-12-21

## 2020-12-16 NOTE — OCCUPATIONAL THERAPY NOTE
OCCUPATIONAL THERAPY EVALUATION - INPATIENT     Room Number: 329/329-A  Evaluation Date: 12/16/2020  Type of Evaluation: Initial  Presenting Problem: (Flash pulmonary edema )    Physician Order: IP Consult to Occupational Therapy  Reason for Therapy: ADL/I education;Patient/Family training;Equipment eval/education; Compensatory technique education       OCCUPATIONAL THERAPY MEDICAL/SOCIAL HISTORY     Problem List  Principal Problem:    Flash pulmonary edema ([address]  Active Problems:    Acute on chronic diastoli difficulty    SENSATION  Reports no BUE numbness or tingling     Communication: Makes needs known     Behavioral/Emotional/Social: Pt was pleasant and cooperative     RANGE OF MOTION   Upper extremity ROM is within functional limits    STRENGTH ASSESSMENT will complete LE dressing with SBA with AE prn.    Comment:          Goals  on: 2020  Frequency: 3x/week     ROBYN Babb/L

## 2020-12-16 NOTE — PHYSICAL THERAPY NOTE
PHYSICAL THERAPY EVALUATION - INPATIENT     Room Number: 329/329-A  Evaluation Date: 12/16/2020  Type of Evaluation: Initial   Physician Order: PT Eval and Treat    Presenting Problem: flash pulmonary edema  Reason for Therapy: Mobility Dysfunction and Daija Lopez 5x/week       PHYSICAL THERAPY MEDICAL/SOCIAL HISTORY   Problem List  Principal Problem:    Flash pulmonary edema (HCC)  Active Problems:    Acute on chronic diastolic (congestive) heart failure [facility])      Past Medical History  Past Medical History:   Diag strength is within functional limits BLE WNL    BALANCE  Static Sitting: Good  Dynamic Sitting: Good  Static Standing: Fair +  Dynamic Standin Lokesh Paulino 2 '6-Clicks' INPATIENT SHORT FORM - BASIC MOBILITY  How much difficulty does the patient current Patient will negotiate 2 stairs/one curb w/ assistive device and supervision   Goal #4   Current Status    Goal #5 Patient to demonstrate independence with home activity/exercise instructions provided to patient in preparation for discharge.    Goal #5   Cu

## 2020-12-16 NOTE — PLAN OF CARE
Alert and oriented. Forgetful. Home meds placed in . IV Bumex given. Purewick in place. Norco given for abdomen and back pain r/t to CA. Bed locked and in lowest position. Bed alarm in place. Call light in reach. Will continue to monitor.        Probl signs, rhythm, and trends  - Monitor for bleeding, hypotension and signs of decreased cardiac output  - Evaluate effectiveness of vasoactive medications to optimize hemodynamic stability  - Monitor arterial and/or venous puncture sites for bleeding and/or

## 2020-12-16 NOTE — ED PROVIDER NOTES
Patient Seen in: [facility] AND [facility] Emergency Department    History   Patient presents with:  Difficulty Breathing      HPI    Patient presents to the ED complaining of shortness of breath and anterior chest discomfort.   Symptoms started today over the la Concerns:        Caffeine Concern: No          5 cups soda daily        Exercise: No    Social History Narrative      The patient uses the following assistive device(s):  Cane, Power Scooter, walker. The patient does not live in a home with stairs. and time. Skin: Skin is warm and dry. Psychiatric: She has a normal mood and affect. Her behavior is normal.   Nursing note and vitals reviewed.       ED Course        Labs Reviewed   BASIC METABOLIC PANEL (8) - Abnormal; Notable for the following compo interstitial opacity which may reflect interstitial edema. Trace bilateral pleural effusions. Patchy left basilar opacity may reflect superimposed pneumonia. Recommend short-term follow-up chest radiographs.     Dictated by (CST): Marino Mendoza MD on of breath and I feel patient will need inpatient admission for diuresis. Severely elevated BNP and pulmonary edema on x-ray. Discussed with Dr. Kandi Daniel for admission and Dr. Wilfredo Polanco for cardiology consultation. Critical Care:   I spent a total of 43

## 2020-12-16 NOTE — H&P
[facility]    PATIENT'S NAME: Wangnatalee Brea   ATTENDING PHYSICIAN: Willard Mullen.  Opal Valenzuela MD   PATIENT ACCOUNT#:   [id number]    LOCATION:  [place] [address]  MEDICAL RECORD #:   [id number]       YOB: 1945  ADMISSION DATE:       12/1 Also had a history of cholecystectomy, left shoulder rotator cuff repair, tubal ligation, right carpal tunnel release, breast biopsy. MEDICATIONS:  Please see medication reconciliation list.    ALLERGIES:  No known drug allergies.      FAMILY HISTORY:  F chronic obstructive pulmonary disease. Patient will be admitted to telemetry floor. IV Bumex. Racquel. Cardiology consult. I will hold on ordering a new echocardiogram on her until seen by Cardiology.   Monitor her kidney function, blood pressure, an

## 2020-12-16 NOTE — CONSULTS
Reason for Consultation: HFpEF    Assessment/Plan:      1. Acute on chronic HFpEF  - Echo 1/28/2020: EF 26-65: diastolic dysfunction; RVSP 52  - Echo 9/2020 Sheridan Lara): EF 65-70%; RVSP 68 mmHg; mild AS  2.  CAD  - moderate coronary calcification by CT at [facility] 7 ILIOINGUINAL NERVE BLOCK N/A 8/27/2020    Performed by Abimbola Hunt DO at [address] [facility] Left    • SACROILIAC JOINT INJECTION AND LUMBAR FACET INJECTION Left 8/29/2020    Performed by Abimbola Hunt DO at [address] [facility] MAIN Units, 5,000 Units, Subcutaneous, Q12H [facility]    •  acetaminophen (TYLENOL) tab 650 mg, 650 mg, Oral, Q6H PRN    •  ondansetron HCl (ZOFRAN) injection 4 mg, 4 mg, Intravenous, Q6H PRN    •  Metoclopramide HCl (REGLAN) injection 5 mg, 5 mg, Intravenous, Q8H IA 12/15/20 2030 (!) 190/135 — — 71 22 — — —   12/15/20 2015 (!) 194/141 — — 66 21 — — —   12/15/20 1830 (!) 176/100 — — 63 19 95 % — —   12/15/20 1815 (!) 198/79 — — 60 (!) 30 99 % — —   12/15/20 1800 (!) 191/74 — — 56 25 100 % — —   12/15/20 1715 (!) 218/ Borderline cardiomegaly with bilateral interstitial opacity which may reflect interstitial edema. Trace bilateral pleural effusions. Patchy left basilar opacity may reflect superimposed pneumonia. Recommend short-term follow-up chest radiographs.     Dic

## 2020-12-16 NOTE — ED NOTES
Verbal order from dr Asher Dhillon to give sublingual nitroglycerin x3 pills of 0.4mg at once for a total of 1.2mg.

## 2020-12-16 NOTE — PROGRESS NOTES
[facility]    Progress Note    Melecio Duque Patient Status:  Inpatient    1945 MRN [id number]   Location [facility] 3W/SW Attending Fady Jean MD   Hosp Day # 1 PCP PHYSICIAN NONSTAFF       Subjective:   Melecio Duque Oral BID   • Isosorbide Mononitrate ER  60 mg Oral Daily   • NIFEdipine ER  90 mg Oral Daily   • Acidophilus/Pectin  1 capsule Oral BID   • Heparin Sodium (Porcine)  5,000 Units Subcutaneous Q12H [address]   • bumetanide  2 mg Intravenous BID (Diuretic)   • rah findings are very low probability for pulmonary embolism    Dictated by (CST): Karen Chris MD on 12/16/2020 at 9:26 AM     Finalized by (CST): Karen Chris MD on 12/16/2020 at 9:28 AM          Ekg 12-lead    Result Date: 12/15/2020  ECG Report

## 2020-12-16 NOTE — PLAN OF CARE
Pt alert and orientatedx4 but can be forgetful at times. No complaints of chest pain, but pt states she is short of breath. Pt saying few word sentences at a time. 3L nasal cannula. PRN neb treatments when needed. BP elevated, MD aware.   VQ scan done today ADULT  Goal: Maintains optimal cardiac output and hemodynamic stability  Description: INTERVENTIONS:  - Monitor vital signs, rhythm, and trends  - Monitor for bleeding, hypotension and signs of decreased cardiac output  - Evaluate effectiveness of vasoacti

## 2020-12-16 NOTE — ED NOTES
Orders for admission, patient is aware of plan and ready to go upstairs. Any questions, please call ED ELLIE martins  at extension [phone].    Type of COVID test sent: rapid  COVID Suspicion level: Low    Titratable drug(s) infusing: none  Rate: na    LOC at time

## 2020-12-16 NOTE — HOME CARE LIAISON
Received referral from [address] [place] [address]. Spoke with patient and her son Jessie Mcardle, confirmed agreeable to [address] [place] [address] , pending orders. All questions addressed and answered. Patient will need a face to face entered prior to discharge.

## 2020-12-17 ENCOUNTER — APPOINTMENT (OUTPATIENT)
Dept: GENERAL RADIOLOGY | Facility: HOSPITAL | Age: 75
DRG: 291 | End: 2020-12-17
Attending: HOSPITALIST
Payer: MEDICARE

## 2020-12-17 PROCEDURE — 71045 X-RAY EXAM CHEST 1 VIEW: CPT | Performed by: HOSPITALIST

## 2020-12-17 PROCEDURE — 99233 SBSQ HOSP IP/OBS HIGH 50: CPT | Performed by: HOSPITALIST

## 2020-12-17 PROCEDURE — 99223 1ST HOSP IP/OBS HIGH 75: CPT | Performed by: INTERNAL MEDICINE

## 2020-12-17 RX ORDER — METOLAZONE 5 MG/1
5 TABLET ORAL DAILY
Status: DISCONTINUED | OUTPATIENT
Start: 2020-12-17 | End: 2020-12-17

## 2020-12-17 RX ORDER — METOLAZONE 5 MG/1
5 TABLET ORAL ONCE
Status: COMPLETED | OUTPATIENT
Start: 2020-12-17 | End: 2020-12-17

## 2020-12-17 RX ORDER — METOLAZONE 2.5 MG/1
2.5 TABLET ORAL DAILY
Status: COMPLETED | OUTPATIENT
Start: 2020-12-17 | End: 2020-12-21

## 2020-12-17 RX ORDER — HYDRALAZINE HYDROCHLORIDE 50 MG/1
50 TABLET, FILM COATED ORAL EVERY 8 HOURS SCHEDULED
Status: DISCONTINUED | OUTPATIENT
Start: 2020-12-17 | End: 2020-12-21

## 2020-12-17 NOTE — PLAN OF CARE
Alert and oriented, forgetful at times, has some c/o SOB. Relived by sitting at the edge of bed and repositioning. Stella for pain. Purewick in place.  Will continue to monitior    Problem: Patient Centered Care  Goal: Patient preferences are identified and signs of decreased cardiac output  - Evaluate effectiveness of vasoactive medications to optimize hemodynamic stability  - Monitor arterial and/or venous puncture sites for bleeding and/or hematoma  - Assess quality of pulses, skin color and temperature  -

## 2020-12-17 NOTE — PROGRESS NOTES
[facility]    Progress Note    Adri Parish Patient Status:  Inpatient    1945 MRN [id number]   Location [facility] 3W/SW Attending Rosa Elena Richard MD   Hosp Day # 2 PCP PHYSICIAN NONSTAFF       Subjective:   Adri Parish mg Oral Daily   • diazepam  10 mg Oral TID   • ferrous sulfate  325 mg Oral TID   • Hydroxychloroquine Sulfate  200 mg Oral Daily   • Pantoprazole Sodium  40 mg Oral QAM AC   • vancomycin HCl  125 mg Oral QID   • Insulin Aspart Pen  1-11 Units Subcutaneous on the right. Small right-sided effusion.  3. Follow-up to interval resolution    Dictated by (CST): Ryan Ramirez MD on 12/17/2020 at 9:07 AM     Finalized by (CST): Ryan Ramirez MD on 12/17/2020 at 9:11 AM          Nm Lung Vq Vent / Perfusio

## 2020-12-17 NOTE — PLAN OF CARE
Dyspneic at rest. Cardiology aware. Continue IV Bumex as ordered. Low sodium diet. Fluid restriction reinforced. Daily weight monitoring.    Problem: CARDIOVASCULAR - ADULT  Goal: Maintains optimal cardiac output and hemodynamic stability  Description: INTE

## 2020-12-17 NOTE — PROGRESS NOTES
Assessment and Plan:     1. Acute on chronic HFpEF  - Echo 1/28/2020: EF 41-67: diastolic dysfunction; RVSP 52  - Echo 9/2020 Demaris Ser): EF 65-70%; RVSP 68 mmHg; mild AS  2. CAD  - moderate coronary calcification by CT at [facility] 7/28/2020  3.  Hypertensive cri vasculature. 2. Bilateral mixed alveolar and interstitial pulmonary congestive changes and/or superimposed multifocal pneumonitis with interval progression on the right. Small right-sided effusion.  3. Follow-up to interval resolution    Dictated by (CST):

## 2020-12-18 PROCEDURE — 99233 SBSQ HOSP IP/OBS HIGH 50: CPT | Performed by: INTERNAL MEDICINE

## 2020-12-18 RX ORDER — POTASSIUM CHLORIDE 20 MEQ/1
40 TABLET, EXTENDED RELEASE ORAL ONCE
Status: COMPLETED | OUTPATIENT
Start: 2020-12-18 | End: 2020-12-18

## 2020-12-18 NOTE — PROGRESS NOTES
Assessment and Plan:     1. Acute on chronic HFpEF  - Echo 1/28/2020: EF 05-33: diastolic dysfunction; RVSP 52  - Echo 9/2020 Minor Bitter): EF 65-70%; RVSP 68 mmHg; mild AS  2. CAD  - moderate coronary calcification by CT at [facility] 7/28/2020  3.  Hypertensive cri Portable  (cpt=71045)    Result Date: 12/17/2020  CONCLUSION:  1. Cardiomegaly. Prominent central vasculature.  2. Bilateral mixed alveolar and interstitial pulmonary congestive changes and/or superimposed multifocal pneumonitis with interval progression o

## 2020-12-18 NOTE — OCCUPATIONAL THERAPY NOTE
OCCUPATIONAL THERAPY TREATMENT NOTE - INPATIENT        Room Number: 329/329-A           Presenting Problem: (Flash pulmonary edema )    Problem List  Principal Problem:    Flash pulmonary edema (HCC)  Active Problems:    Acute on chronic diastolic (congest Form  How much help from another person does the patient currently need…  -   Putting on and taking off regular lower body clothing?: A Little  -   Bathing (including washing, rinsing, drying)?: A Little  -   Toileting, which includes using toilet, bedpan

## 2020-12-18 NOTE — PROGRESS NOTES
[facility]    Progress Note    Bárbara Field Patient Status:  Inpatient    1945 MRN [id number]   Location [facility] 3W/SW Attending Mickey John MD   Hosp Day # 3 PCP PHYSICIAN NONSTAFF       Subjective:   No acute e Oral Q8H [address]   • metolazone  2.5 mg Oral Daily   • aspirin  81 mg Oral Daily   • atorvastatin  40 mg Oral Daily   • diazepam  10 mg Oral TID   • ferrous sulfate  325 mg Oral TID   • Hydroxychloroquine Sulfate  200 mg Oral Daily   • Pantoprazole Sodium  40 08/21/2020       A1c:  Lab Results   Component Value Date    A1C 6.1 (H) 12/15/2020    A1C 5.5 08/19/2020       Culture:  No results found for this visit on 12/15/20.     Imaging/EKG:   Xr Chest Ap Portable  (cpt=71045)    Result Date: 12/17/2020  CONCLUSIO needed       CKD III  - BMP daily   - avoid any nephrotoxins   - monitor closely whle on diuretics.    - nephrology consulted, appreciate further recommendations.      C.diff colitis  - vancomycin 125mg PO QID x 10 days   - florastor   - no further diarrhea

## 2020-12-18 NOTE — CONSULTS
[facility]    PATIENT'S NAME: Sincere Sharif   ATTENDING PHYSICIAN: Austin Joseph MD   CONSULTING PHYSICIAN: Shelbi Perez.  Elsa Ramirez MD   PATIENT ACCOUNT#:   [id number]    LOCATION:  [address] [place] [address] #:   [id number]       DATE OF BIRTH for the last 3 to 4 days. PHYSICAL EXAMINATION:    VITAL SIGNS:  The patient's weight since her admission has been up and down, today it is down to 195, which is about 6 pounds down. She still feels terrible, however. NECK:  Supple.   There is no JVD

## 2020-12-18 NOTE — PLAN OF CARE
Bumex gtt infusing as ordered. 3L /O2/NC. Continue to monitor closely.    Problem: CARDIOVASCULAR - ADULT  Goal: Maintains optimal cardiac output and hemodynamic stability  Description: INTERVENTIONS:  - Monitor vital signs, rhythm, and trends  - Monitor fo

## 2020-12-18 NOTE — PLAN OF CARE
Problem: Patient Centered Care  Goal: Patient preferences are identified and integrated in the patient's plan of care  Description: Interventions:  - What would you like us to know as we care for you?  My son lives with me  - Provide timely, complete, and and/or hematoma  - Assess quality of pulses, skin color and temperature  - Assess for signs of decreased coronary artery perfusion - ex.  Angina  - Evaluate fluid balance, assess for edema, trend weights  Outcome: Progressing  Goal: Absence of cardiac arrhy

## 2020-12-18 NOTE — PROGRESS NOTES
[facility]    Progress Note    Tenisha Nunez Patient Status:  Inpatient    1945 MRN [id number]   Location [facility] 3W/SW Attending Mallika Bains MD   Hosp Day # 3 PCP PHYSICIAN NONSTAFF       Subjective:   Dave Welch noted  Musculoskeletal: full ROM all extremities good strength  no deformities  Extremities: 1+ edema  Neurological:  Grossly normal    Results:     Laboratory Data:  Lab Results   Component Value Date    WBC 5.3 12/18/2020    HGB 8.6 (L) 12/18/2020    HCT

## 2020-12-19 PROCEDURE — 99233 SBSQ HOSP IP/OBS HIGH 50: CPT | Performed by: HOSPITALIST

## 2020-12-19 PROCEDURE — 99233 SBSQ HOSP IP/OBS HIGH 50: CPT | Performed by: INTERNAL MEDICINE

## 2020-12-19 NOTE — PLAN OF CARE
Patient is alert and oriented. Currently on 3L of 02. No events during shift. Will plan to continue to monitor.      Problem: Patient Centered Care  Goal: Patient preferences are identified and integrated in the patient's plan of care  Description: Interven vasoactive medications to optimize hemodynamic stability  - Monitor arterial and/or venous puncture sites for bleeding and/or hematoma  - Assess quality of pulses, skin color and temperature  - Assess for signs of decreased coronary artery perfusion - ex.

## 2020-12-19 NOTE — PROGRESS NOTES
[facility]    Progress Note    Ruchi Miller Patient Status:  Inpatient    1945 MRN [id number]   Location [facility] 3W/SW Attending Giovanna Henriquez MD   Hosp Day # 4 PCP PHYSICIAN NONSTAFF       Subjective:   No acute e • diazepam  10 mg Oral TID   • ferrous sulfate  325 mg Oral TID   • Hydroxychloroquine Sulfate  200 mg Oral Daily   • Pantoprazole Sodium  40 mg Oral QAM AC   • vancomycin HCl  125 mg Oral QID   • Insulin Aspart Pen  1-11 Units Subcutaneous TID CC   • Is Culture:  No results found for this visit on 12/15/20. Imaging/EKG:   Xr Chest Ap Portable  (cpt=71045)    Result Date: 12/17/2020  CONCLUSION:  1. Cardiomegaly. Prominent central vasculature.  2. Bilateral mixed alveolar and interstitial pulmonar drip and metolazone     Hypertensive urgency   Hypertension  - Hypertensive urgency likely caused by dyspnea on admission, current BP well controlled.     C.diff colitis  - Stool C diff positive 12/15/20  - vancomycin 125mg PO QID x 10 days     Tobacco abus

## 2020-12-19 NOTE — PROGRESS NOTES
[facility]    Progress Note    Da Mejia Patient Status:  Inpatient    1945 MRN [id number]   Location [facility] 3W/SW Attending Jasmin Reddy MD   [facility] Day # 4 PCP PHYSICIAN NONSTAFF       Subjective:   Da Mejia is noted  Back/Spine: no abnormalities noted  Musculoskeletal: full ROM all extremities good strength  no deformities  Extremitie 1+ edema  Neurological:  Grossly normal    Results:     Laboratory Data:  Lab Results   Component Value Date    WBC 7.1 12/19/202

## 2020-12-19 NOTE — PROGRESS NOTES
Assessment and Plan:     1. Acute on chronic HFpEF  - Echo 1/28/2020: EF 05-19: diastolic dysfunction; RVSP 52  - Echo 9/2020 Danni Gallagher): EF 65-70%; RVSP 68 mmHg; mild AS  2. CAD  - moderate coronary calcification by CT at [facility] 7/28/2020  3.  Hypertensive cri last 168 hours. No results found.

## 2020-12-20 PROCEDURE — 99233 SBSQ HOSP IP/OBS HIGH 50: CPT | Performed by: HOSPITALIST

## 2020-12-20 PROCEDURE — 99233 SBSQ HOSP IP/OBS HIGH 50: CPT | Performed by: INTERNAL MEDICINE

## 2020-12-20 NOTE — PLAN OF CARE
Hannah Aox4, resting in bed, reports of pain PRN Norco given, NC in place, up with 1 and a walker, karimi in place for strict I/O, will continue to monitor.        Problem: Patient Centered Care  Goal: Patient preferences are identified and integrated in the cardiac output  - Evaluate effectiveness of vasoactive medications to optimize hemodynamic stability  - Monitor arterial and/or venous puncture sites for bleeding and/or hematoma  - Assess quality of pulses, skin color and temperature  - Assess for signs o

## 2020-12-20 NOTE — CM/SW NOTE
12/20/20 0900   CM/SW Referral Data   Referral Source Physician   Reason for Referral Discharge planning   Informant Patient   Patient Info   Patient's Mental Status Alert;Oriented   Patient lives with Children   Patient Status Prior to Admission   Inde

## 2020-12-20 NOTE — PLAN OF CARE
Bumex GTT 4ml/hr continues, plan to change to PO diuretics later today per MD note. QD venofer continues. Carter in place. 2L NC continues. [facility] before DC home. Pain medications given PRN. Monitoring.     Problem: Diabetes/Glucose EKG if indicated  - Evaluate effectiveness of antiarrhythmic and heart rate control medications as ordered  - Initiate emergency measures for life threatening arrhythmias  - Monitor electrolytes and administer replacement therapy as ordered  Outcome: Progr

## 2020-12-20 NOTE — PLAN OF CARE
Bumex gtt infusing as ordered. Strict I&O's. Daily weight monitoring. Low salt diet maintained.    Problem: CARDIOVASCULAR - ADULT  Goal: Maintains optimal cardiac output and hemodynamic stability  Description: INTERVENTIONS:  - Monitor vital signs, rhythm,

## 2020-12-20 NOTE — PROGRESS NOTES
Assessment and Plan:     1. Acute on chronic HFpEF  - Echo 1/28/2020: EF 60-54: diastolic dysfunction; RVSP 52  - Echo 9/2020 Irina Pena): EF 65-70%; RVSP 68 mmHg; mild AS  2. CAD  - moderate coronary calcification by CT at [facility] 7/28/2020  3.  Hypertensive cri 31.3   RDW 15.7* 15.7*   NEPRELIM 5.49  --    WBC 7.1 5.5   .0 181.0     No results for input(s): INR in the last 168 hours. No results found.

## 2020-12-20 NOTE — PROGRESS NOTES
[facility]    Progress Note    Tomasz Mendez Patient Status:  Inpatient    1945 MRN [id number]   Location [facility] 3W/SW Attending Cristobal Mendieta MD   Hosp Day # 5 PCP PHYSICIAN NONSTAFF       Subjective:   No acute e atorvastatin  40 mg Oral Daily   • diazepam  10 mg Oral TID   • ferrous sulfate  325 mg Oral TID   • Hydroxychloroquine Sulfate  200 mg Oral Daily   • Pantoprazole Sodium  40 mg Oral QAM AC   • vancomycin HCl  125 mg Oral QID   • Isosorbide Mononitrate ER A1C 5.5 08/19/2020       Culture:  No results found for this visit on 12/15/20. Imaging/EKG:   Xr Chest Ap Portable  (cpt=71045)    Result Date: 12/17/2020  CONCLUSION:  1. Cardiomegaly. Prominent central vasculature.  2. Bilateral mixed alveolar and in consultation  - IV Venofer  - Bumex drip and metolazone     # Hypertensive urgency   # Hypertension  - Hypertensive urgency likely caused by dyspnea on admission, current BP well controlled.     # C.diff colitis  - Stool C diff positive 12/15/20  - vancomyc

## 2020-12-21 ENCOUNTER — TELEPHONE (OUTPATIENT)
Dept: NEPHROLOGY | Facility: CLINIC | Age: 75
End: 2020-12-21

## 2020-12-21 ENCOUNTER — PATIENT OUTREACH (OUTPATIENT)
Dept: CASE MANAGEMENT | Age: 75
End: 2020-12-21

## 2020-12-21 ENCOUNTER — PATIENT MESSAGE (OUTPATIENT)
Dept: NEPHROLOGY | Facility: CLINIC | Age: 75
End: 2020-12-21

## 2020-12-21 VITALS
OXYGEN SATURATION: 97 % | HEART RATE: 60 BPM | SYSTOLIC BLOOD PRESSURE: 115 MMHG | DIASTOLIC BLOOD PRESSURE: 58 MMHG | TEMPERATURE: 98 F | RESPIRATION RATE: 18 BRPM | BODY MASS INDEX: 28.32 KG/M2 | HEIGHT: 64 IN | WEIGHT: 165.88 LBS

## 2020-12-21 DIAGNOSIS — N17.9 AKI (ACUTE KIDNEY INJURY) (HCC): Primary | ICD-10-CM

## 2020-12-21 PROBLEM — I50.33 ACUTE ON CHRONIC DIASTOLIC (CONGESTIVE) HEART FAILURE (HCC): Status: RESOLVED | Noted: 2020-12-15 | Resolved: 2020-12-21

## 2020-12-21 PROBLEM — E87.70 HYPERVOLEMIA: Status: RESOLVED | Noted: 2020-08-19 | Resolved: 2020-12-21

## 2020-12-21 PROBLEM — E87.70 HYPERVOLEMIA, UNSPECIFIED HYPERVOLEMIA TYPE: Status: RESOLVED | Noted: 2020-08-19 | Resolved: 2020-12-21

## 2020-12-21 PROBLEM — J81.0 FLASH PULMONARY EDEMA (HCC): Status: RESOLVED | Noted: 2020-08-19 | Resolved: 2020-12-21

## 2020-12-21 PROCEDURE — 99239 HOSP IP/OBS DSCHRG MGMT >30: CPT | Performed by: HOSPITALIST

## 2020-12-21 PROCEDURE — 99233 SBSQ HOSP IP/OBS HIGH 50: CPT | Performed by: INTERNAL MEDICINE

## 2020-12-21 RX ORDER — FUROSEMIDE 20 MG/1
20 TABLET ORAL EVERY EVENING
Status: DISCONTINUED | OUTPATIENT
Start: 2020-12-21 | End: 2020-12-21

## 2020-12-21 RX ORDER — FUROSEMIDE 20 MG/1
20 TABLET ORAL EVERY EVENING
Qty: 90 TABLET | Refills: 0 | Status: SHIPPED | OUTPATIENT
Start: 2020-12-21 | End: 2020-12-29 | Stop reason: DRUGHIGH

## 2020-12-21 RX ORDER — FUROSEMIDE 40 MG/1
40 TABLET ORAL DAILY
Status: DISCONTINUED | OUTPATIENT
Start: 2020-12-22 | End: 2020-12-21

## 2020-12-21 RX ORDER — TORSEMIDE 20 MG/1
TABLET ORAL
Qty: 90 TABLET | Refills: 1 | Status: SHIPPED | OUTPATIENT
Start: 2020-12-21 | End: 2020-12-22

## 2020-12-21 RX ORDER — VANCOMYCIN HYDROCHLORIDE 125 MG/1
125 CAPSULE ORAL 4 TIMES DAILY
Qty: 16 CAPSULE | Refills: 0 | Status: SHIPPED | OUTPATIENT
Start: 2020-12-21 | End: 2020-12-25

## 2020-12-21 RX ORDER — FUROSEMIDE 40 MG/1
40 TABLET ORAL EVERY MORNING
Qty: 90 TABLET | Refills: 0 | Status: SHIPPED | OUTPATIENT
Start: 2020-12-21 | End: 2021-01-07

## 2020-12-21 RX ORDER — POTASSIUM CHLORIDE 750 MG/1
10 TABLET, EXTENDED RELEASE ORAL 2 TIMES DAILY
Qty: 180 TABLET | Refills: 0 | Status: SHIPPED | OUTPATIENT
Start: 2020-12-21 | End: 2020-12-29 | Stop reason: ALTCHOICE

## 2020-12-21 NOTE — DISCHARGE SUMMARY
[facility]    Discharge Summary    Landry Gitelman Patient Status:  Inpatient    [birth year] MRN [id number]   Location [facility] 3W/SW Attending Geovanna Arnold MD   [facility] Day # 6 PCP PHYSICIAN NONSTAFF     Date of Admission: 12/15/20 consultation  - IV Venofer  - Bumex drip and metolazone during this hospitalization. Lasix 40mg in AM and 20mg in PM w/ KCl at discharge.  Follow up with Dr. Paul Tinsley in 2 weeks.     # Hypertensive urgency   # Hypertension  - Hypertensive urgency likely caused Date: 12/15/2020  CONCLUSION:   Borderline cardiomegaly with bilateral interstitial opacity which may reflect interstitial edema. Trace bilateral pleural effusions. Patchy left basilar opacity may reflect superimposed pneumonia.   Recommend short-term fol CREATSERUM 1.34*  --  1.83* 2.03*   GFRAA 45*  --  31* 27*   GFRNAA 39*  --  27* 24*   CA 8.9  --  8.7 8.5   ALB 3.2*  --  3.2* 3.0*     --  141 140   K 3.8 4.2 4.0 4.5     --  98 98   CO2 33.0*  --  38.0* 37.0*     Lab Results   Component Va BY MOUTH 3 TIMES DAILY   Refills: 3     Hydroxychloroquine Sulfate 200 MG Tabs  Commonly known as: PLAQUENIL      Take 200 mg by mouth daily.    Refills: 0     lansoprazole 30 MG Cpdr  Commonly known as: PREVACID      Take 30 mg by mouth 2 (two) times daily

## 2020-12-21 NOTE — PLAN OF CARE
Problem: Patient Centered Care  Goal: Patient preferences are identified and integrated in the patient's plan of care  Description: Interventions:  - What would you like us to know as we care for you?  My son lives with me  - Provide timely, complete, and venous puncture sites for bleeding and/or hematoma  - Assess quality of pulses, skin color and temperature  - Assess for signs of decreased coronary artery perfusion - ex.  Angina  - Evaluate fluid balance, assess for edema, trend weights  Outcome: Adequate

## 2020-12-21 NOTE — PROGRESS NOTES
Spoke with patient advise shed like me to call her son Segundo Echeverria to schedule follow up appointment.                 [facility]  Cardiology  [address]  [address] with son francesca

## 2020-12-21 NOTE — PROGRESS NOTES
[place] [facility]    Progress Note    Yovany Deleon Patient Status:  Inpatient    1945 MRN [id number]   Location [facility] 3W/SW Attending Kylee Palomino MD   [address] Day # 5 PCP PHYSICIAN NONSTAFF       Subjective:   Yovany Deleon is noted  Back/Spine: no abnormalities noted  Musculoskeletal: full ROM all extremities good strength  no deformities  Extremities:1+ edema  Neurological:  Grossly normal    Results:     Laboratory Data:  Lab Results   Component Value Date    WBC 5.5 12/20/20

## 2020-12-21 NOTE — PROGRESS NOTES
Assessment and Plan:     1. Acute on chronic HFpEF  - Echo 1/28/2020: EF 45-64: diastolic dysfunction; RVSP 52  - Echo 9/2020 Rachel Esquivel): EF 65-70%; RVSP 68 mmHg; mild AS  2. CAD  - moderate coronary calcification by CT at [facility] 7/28/2020  3.  Hypertensive cri 31.7   RDW 15.7*   < > 15.7*   NEPRELIM 5.49  --   --    WBC 7.1   < > 6.3   .0   < > 197.0    < > = values in this interval not displayed. No results for input(s): INR in the last 168 hours. No results found.

## 2020-12-21 NOTE — PHYSICAL THERAPY NOTE
PHYSICAL THERAPY TREATMENT NOTE - INPATIENT     Room Number: 329/329-A       Presenting Problem: flash pulmonary edema    Problem List  Active Problems:    * No active hospital problems.  *      PHYSICAL THERAPY ASSESSMENT     RN consulted and gave approval training;Transfer training;Balance training    SUBJECTIVE  \"My stomach hurts, I just am tired. \"     OBJECTIVE  Precautions: None    WEIGHT BEARING RESTRICTION  Weight Bearing Restriction: None                PAIN ASSESSMENT   Ratin          BALANCE Current Status  supine to sit, SBA   Goal #2 Patient is able to demonstrate transfers Sit to/from Stand at assistance level: modified independent with walker - rolling      Goal #2  Current Status  sit to stand, RW, CGA   Goal #3 Patient is able to ambul

## 2020-12-21 NOTE — PLAN OF CARE
Norco PRN for pain. Bumex DC last night at change of shift. PO herrera continues. Monitoring.       Problem: Diabetes/Glucose Control  Goal: Glucose maintained within prescribed range  Description: INTERVENTIONS:  - Monitor Blood Glucose as ordered  - Assess measures for life threatening arrhythmias  - Monitor electrolytes and administer replacement therapy as ordered  Outcome: Progressing     Problem: PAIN - ADULT  Goal: Verbalizes/displays adequate comfort level or patient's stated pain goal  Description: IN

## 2020-12-21 NOTE — PROGRESS NOTES
Spoke with patient advise shed like me to call her son Cam Leonard to schedule follow up appointment.                 [facility]  Cardiology  [address] pts son Etienne Dean

## 2020-12-22 NOTE — PROGRESS NOTES
[facility]    Progress Note    Mandy Calzada Patient Status:  Inpatient    1945 MRN [id number]   Location [facility] 3W/SW Attending No att. providers found   [facility] Day # 6 PCP PHYSICIAN NONSTAFF       Subjective:   Jose Guadalupe bruising noted  Back/Spine: no abnormalities noted  Musculoskeletal: full ROM all extremities good strength  no deformities  Extremities: no edema, cyanosis  Neurological:  Grossly normal    Results:     Laboratory Data:  Lab Results   Component Value Date

## 2020-12-28 NOTE — PROGRESS NOTES
[address] Patient Status:  Outpatient    1945 MRN [id number]   Location [facility] Sterling Ramirez is a 70year old female who presents to clinic f 12/29/2020 01:25 PM    BUN 49 (H) 12/29/2020 01:25 PM     12/29/2020 01:25 PM    K 3.7 12/29/2020 01:25 PM     12/29/2020 01:25 PM    CO2 30.0 12/29/2020 01:25 PM    GLU 94 12/29/2020 01:25 PM    CA 8.8 12/29/2020 01:25 PM    ALB 3.0 (L) 12/21/ Assessment:  Acute on chronic HF p EF with grade 2 diastolic dysfunction, NYHA class III, stage C with severe pulmonary HTN  -diuretics: furosemide 40 mg am, 20 mg afternoon- pt taking furosemide 40 mg in am and bumex 0.5 mg daily .  Not taking potassiu mg one tablet daily in the morning     Decrease atenolol to 50 mg tablet ( half of 100 mg tablet)     Set up your pills in a weekly pill box to make it easier for you to take you correct medications each day and prevent missing medications    Continue your Take 0.5 tablets (50 mg total) by mouth daily. , Disp: , Rfl:   •  diazepam 10 MG Oral Tab, Take 1 tablet (10 mg total) by mouth every 8 (eight) hours as needed. , Disp: , Rfl:   •  furosemide 40 MG Oral Tab, Take 1 tablet (40 mg total) by mouth every mornin

## 2020-12-29 ENCOUNTER — OFFICE VISIT (OUTPATIENT)
Dept: CARDIOLOGY CLINIC | Facility: HOSPITAL | Age: 75
End: 2020-12-29
Attending: NURSE PRACTITIONER
Payer: MEDICARE

## 2020-12-29 VITALS
SYSTOLIC BLOOD PRESSURE: 152 MMHG | BODY MASS INDEX: 28 KG/M2 | OXYGEN SATURATION: 99 % | DIASTOLIC BLOOD PRESSURE: 58 MMHG | HEART RATE: 66 BPM | WEIGHT: 160.63 LBS

## 2020-12-29 DIAGNOSIS — I50.33 ACUTE ON CHRONIC HEART FAILURE WITH PRESERVED EJECTION FRACTION (HFPEF) (HCC): Chronic | ICD-10-CM

## 2020-12-29 DIAGNOSIS — I50.9 HEART FAILURE, UNSPECIFIED (HCC): Primary | ICD-10-CM

## 2020-12-29 DIAGNOSIS — I50.33 ACUTE ON CHRONIC DIASTOLIC HEART FAILURE (HCC): ICD-10-CM

## 2020-12-29 PROBLEM — I27.20 PULMONARY HYPERTENSION (HCC): Chronic | Status: ACTIVE | Noted: 2020-12-29

## 2020-12-29 PROBLEM — N18.32 STAGE 3B CHRONIC KIDNEY DISEASE (HCC): Chronic | Status: ACTIVE | Noted: 2020-12-29

## 2020-12-29 PROCEDURE — 99212 OFFICE O/P EST SF 10 MIN: CPT | Performed by: NURSE PRACTITIONER

## 2020-12-29 PROCEDURE — 99214 OFFICE O/P EST MOD 30 MIN: CPT | Performed by: NURSE PRACTITIONER

## 2020-12-29 PROCEDURE — 83880 ASSAY OF NATRIURETIC PEPTIDE: CPT | Performed by: NURSE PRACTITIONER

## 2020-12-29 PROCEDURE — 36415 COLL VENOUS BLD VENIPUNCTURE: CPT | Performed by: NURSE PRACTITIONER

## 2020-12-29 PROCEDURE — 80048 BASIC METABOLIC PNL TOTAL CA: CPT | Performed by: NURSE PRACTITIONER

## 2020-12-29 RX ORDER — ISOSORBIDE MONONITRATE 60 MG/1
60 TABLET, EXTENDED RELEASE ORAL DAILY
COMMUNITY
End: 2020-12-29 | Stop reason: ALTCHOICE

## 2020-12-29 RX ORDER — ATENOLOL 100 MG/1
50 TABLET ORAL DAILY
Status: ON HOLD | COMMUNITY
Start: 2020-12-29 | End: 2021-02-01

## 2020-12-29 RX ORDER — DIAZEPAM 10 MG/1
10 TABLET ORAL EVERY 8 HOURS PRN
COMMUNITY
Start: 2020-12-29 | End: 2021-01-21

## 2020-12-29 RX ORDER — GABAPENTIN 300 MG/1
300 CAPSULE ORAL NIGHTLY
COMMUNITY

## 2020-12-29 RX ORDER — HYDRALAZINE HYDROCHLORIDE 25 MG/1
25 TABLET, FILM COATED ORAL 2 TIMES DAILY
COMMUNITY
End: 2020-12-29 | Stop reason: ALTCHOICE

## 2020-12-29 RX ORDER — NIFEDIPINE 60 MG/1
60 TABLET, FILM COATED, EXTENDED RELEASE ORAL DAILY
Status: ON HOLD | COMMUNITY
Start: 2020-12-29 | End: 2021-02-01

## 2020-12-29 NOTE — PATIENT INSTRUCTIONS
Stop taking bumex (bumetidine), hydralazine, d isosorbide and potassium chloride     Decrease nifedipine to 60 mg one tablet daily ( instead of 2 tabs daily)     Decrease furosemide to 40 mg one tablet daily in the morning     Decrease atenolol to 50 mg ta

## 2021-01-07 ENCOUNTER — OFFICE VISIT (OUTPATIENT)
Dept: CARDIOLOGY CLINIC | Facility: HOSPITAL | Age: 76
End: 2021-01-07
Attending: NURSE PRACTITIONER
Payer: MEDICARE

## 2021-01-07 VITALS
DIASTOLIC BLOOD PRESSURE: 55 MMHG | WEIGHT: 176 LBS | SYSTOLIC BLOOD PRESSURE: 133 MMHG | BODY MASS INDEX: 30 KG/M2 | HEART RATE: 52 BPM | OXYGEN SATURATION: 97 %

## 2021-01-07 DIAGNOSIS — I50.9 HEART FAILURE, UNSPECIFIED (HCC): ICD-10-CM

## 2021-01-07 DIAGNOSIS — N18.32 STAGE 3B CHRONIC KIDNEY DISEASE (HCC): Chronic | ICD-10-CM

## 2021-01-07 DIAGNOSIS — I27.20 PULMONARY HYPERTENSION (HCC): Chronic | ICD-10-CM

## 2021-01-07 DIAGNOSIS — I50.33 ACUTE ON CHRONIC HEART FAILURE WITH PRESERVED EJECTION FRACTION (HFPEF) (HCC): Primary | Chronic | ICD-10-CM

## 2021-01-07 PROBLEM — N17.9 AKI (ACUTE KIDNEY INJURY): Chronic | Status: ACTIVE | Noted: 2021-01-07

## 2021-01-07 PROBLEM — N17.9 AKI (ACUTE KIDNEY INJURY) (HCC): Chronic | Status: ACTIVE | Noted: 2021-01-07

## 2021-01-07 LAB
ANION GAP SERPL CALC-SCNC: 1 MMOL/L (ref 0–18)
BUN BLD-MCNC: 57 MG/DL (ref 7–18)
BUN/CREAT SERPL: 24.1 (ref 10–20)
CALCIUM BLD-MCNC: 8.7 MG/DL (ref 8.5–10.1)
CHLORIDE SERPL-SCNC: 114 MMOL/L (ref 98–112)
CO2 SERPL-SCNC: 30 MMOL/L (ref 21–32)
CREAT BLD-MCNC: 2.37 MG/DL
GLUCOSE BLD-MCNC: 87 MG/DL (ref 70–99)
NT-PROBNP SERPL-MCNC: 5109 PG/ML (ref ?–125)
OSMOLALITY SERPL CALC.SUM OF ELEC: 315 MOSM/KG (ref 275–295)
PATIENT FASTING Y/N/NP: NO
POTASSIUM SERPL-SCNC: 4.5 MMOL/L (ref 3.5–5.1)
SODIUM SERPL-SCNC: 145 MMOL/L (ref 136–145)

## 2021-01-07 PROCEDURE — 80048 BASIC METABOLIC PNL TOTAL CA: CPT | Performed by: NURSE PRACTITIONER

## 2021-01-07 PROCEDURE — 36415 COLL VENOUS BLD VENIPUNCTURE: CPT | Performed by: NURSE PRACTITIONER

## 2021-01-07 PROCEDURE — 83880 ASSAY OF NATRIURETIC PEPTIDE: CPT | Performed by: NURSE PRACTITIONER

## 2021-01-07 PROCEDURE — 99215 OFFICE O/P EST HI 40 MIN: CPT | Performed by: NURSE PRACTITIONER

## 2021-01-07 PROCEDURE — 99212 OFFICE O/P EST SF 10 MIN: CPT | Performed by: NURSE PRACTITIONER

## 2021-01-07 PROCEDURE — 96374 THER/PROPH/DIAG INJ IV PUSH: CPT | Performed by: NURSE PRACTITIONER

## 2021-01-07 RX ORDER — FUROSEMIDE 40 MG/1
TABLET ORAL
Qty: 135 TABLET | Refills: 0 | COMMUNITY
Start: 2021-01-07 | End: 2021-01-13

## 2021-01-07 RX ORDER — FUROSEMIDE 10 MG/ML
INJECTION INTRAMUSCULAR; INTRAVENOUS
Status: COMPLETED
Start: 2021-01-07 | End: 2021-01-07

## 2021-01-07 RX ADMIN — FUROSEMIDE: 10 INJECTION INTRAMUSCULAR; INTRAVENOUS at 14:15:00

## 2021-01-07 NOTE — PATIENT INSTRUCTIONS
Do not take furosemide tablet today then increase furosemide to 40 mg in the morning and 20 mg in the afternoon starting tomorrow 1/8/21    Continue all your same medications    Call if having any dizziness, lightheadedness, heart racing, palpitations, sang

## 2021-01-07 NOTE — PROGRESS NOTES
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 Greater than 50 minutes with this patient providing counseling, coordination of care and education given. Patient and son receptive. Reviewed all pts home meds and  medications pt should not be taking. dizziness, lightheadedness, heart racing, palpitations, chest pain, shortness of breath, coughing,  wheezing, swelling, weight gain,  or weakness    Weigh yourself daily in the morning before breakfast, call if gaining 3 lbs or more overnight or more than oxyCODONE-acetaminophen  MG Oral Tab, Take 1 tablet by mouth every 4 (four) hours as needed for Pain (max 6 per day). , Disp: 180 tablet, Rfl: 0  •  Naloxone HCl 4 MG/0.1ML Nasal Liquid, 4 mg by Nasal route as needed.  If patient remains unresponsive,

## 2021-01-12 ENCOUNTER — OFFICE VISIT (OUTPATIENT)
Dept: PAIN CLINIC | Facility: HOSPITAL | Age: 76
End: 2021-01-12
Attending: NURSE PRACTITIONER
Payer: MEDICARE

## 2021-01-12 VITALS — HEART RATE: 56 BPM | OXYGEN SATURATION: 99 % | DIASTOLIC BLOOD PRESSURE: 54 MMHG | SYSTOLIC BLOOD PRESSURE: 150 MMHG

## 2021-01-12 DIAGNOSIS — M54.42 CHRONIC BILATERAL LOW BACK PAIN WITH BILATERAL SCIATICA: ICD-10-CM

## 2021-01-12 DIAGNOSIS — G89.29 CHRONIC BILATERAL LOW BACK PAIN WITH BILATERAL SCIATICA: ICD-10-CM

## 2021-01-12 DIAGNOSIS — M54.41 CHRONIC BILATERAL LOW BACK PAIN WITH BILATERAL SCIATICA: ICD-10-CM

## 2021-01-12 DIAGNOSIS — Z79.891 ENCOUNTER FOR MONITORING OPIOID MAINTENANCE THERAPY: Primary | ICD-10-CM

## 2021-01-12 DIAGNOSIS — Z51.81 ENCOUNTER FOR MONITORING OPIOID MAINTENANCE THERAPY: Primary | ICD-10-CM

## 2021-01-12 DIAGNOSIS — G89.3 CANCER ASSOCIATED PAIN: ICD-10-CM

## 2021-01-12 LAB
AMPHET UR QL SCN: NEGATIVE
BARBITURATES UR QL SCN: NEGATIVE
CANNABINOIDS UR QL SCN: NEGATIVE
COCAINE UR QL: NEGATIVE
CREAT UR-SCNC: 197 MG/DL
MDMA UR QL SCN: NEGATIVE
METHADONE UR QL SCN: NEGATIVE
PCP UR QL SCN: NEGATIVE

## 2021-01-12 PROCEDURE — 80361 OPIATES 1 OR MORE: CPT | Performed by: NURSE PRACTITIONER

## 2021-01-12 PROCEDURE — 80307 DRUG TEST PRSMV CHEM ANLYZR: CPT | Performed by: NURSE PRACTITIONER

## 2021-01-12 PROCEDURE — 80346 BENZODIAZEPINES1-12: CPT | Performed by: NURSE PRACTITIONER

## 2021-01-12 RX ORDER — OXYCODONE AND ACETAMINOPHEN 10; 325 MG/1; MG/1
1 TABLET ORAL EVERY 4 HOURS PRN
Qty: 180 TABLET | Refills: 0 | Status: ON HOLD | OUTPATIENT
Start: 2021-02-15 | End: 2021-01-26

## 2021-01-12 RX ORDER — OXYCODONE AND ACETAMINOPHEN 10; 325 MG/1; MG/1
1 TABLET ORAL EVERY 4 HOURS PRN
Qty: 180 TABLET | Refills: 0 | Status: SHIPPED | OUTPATIENT
Start: 2021-01-16 | End: 2021-02-15

## 2021-01-12 NOTE — CHRONIC PAIN
Follow-up Note  HISTORY OF PRESENT ILLNESS:  Tenisha Nunez is a 70year old old female, returns to the clinic for evaluation treatment of her chronic low back pain  Is well-known to the pain clinic,  she has severe spinal stenosis and a retroperitoneal sa MG Oral Tab Take 1 tablet (10 mg total) by mouth every 8 (eight) hours as needed. • oxyCODONE-acetaminophen  MG Oral Tab Take 1 tablet by mouth every 4 (four) hours as needed for Pain (max 6 per day).  180 tablet 0   • Naloxone HCl 4 MG/0.1ML Nasa foraminal stenosis     L4-5 mod central stenosis     Degenerative arthritis     Leg weakness     Neurologic gait dysfunction     Cancer associated pain     Acute on chronic heart failure with preserved ejection fraction (HFpEF) ([address]     Pulmonary hyperten Sexual Activity      Alcohol use: No        Alcohol/week: 0.0 standard drinks      Drug use: No      Sexual activity: Not on file    Lifestyle      Physical activity        Days per week: Not on file        Minutes per session: Not on file      Stress: Not limited due to lack of intravenous contrast on current study. 2. Stable borderline enlarged left common iliac chain lymph node. Otherwise, no enlarged lymph nodes in the chest, abdomen or pelvis.   3. Stable solitary pulmonary nodule in the left lower lobe The anatomic cervical lordosis is preserved. BONES:             No fractures or osseous lesions are apparent. DISCS: There are mild-moderate multilevel discogenic and facet related degenerative changes of the cervical spine.    CRANIOCERVICAL AREA: defer   - offered to repeat hypogastric block and MBBs, patient is unwilling to have any procedures at this time.      - renewed percocet, will allows to take up to 6 tabs per day  - no longer using duragesic patches   - pt to follow up with [facility]  - UDS and

## 2021-01-12 NOTE — PROGRESS NOTES
PT presents to the CPM in [facility]. NA and UDS given. KELLEY Cárdenas saw PT for med eval. See notes for POC.

## 2021-01-13 ENCOUNTER — OFFICE VISIT (OUTPATIENT)
Dept: CARDIOLOGY CLINIC | Facility: HOSPITAL | Age: 76
End: 2021-01-13
Attending: NURSE PRACTITIONER
Payer: MEDICARE

## 2021-01-13 VITALS
SYSTOLIC BLOOD PRESSURE: 142 MMHG | WEIGHT: 183.69 LBS | DIASTOLIC BLOOD PRESSURE: 54 MMHG | BODY MASS INDEX: 32 KG/M2 | OXYGEN SATURATION: 100 % | HEART RATE: 47 BPM

## 2021-01-13 DIAGNOSIS — I50.9 HEART FAILURE, UNSPECIFIED (HCC): ICD-10-CM

## 2021-01-13 DIAGNOSIS — I27.20 PULMONARY HYPERTENSION (HCC): Chronic | ICD-10-CM

## 2021-01-13 DIAGNOSIS — R60.0 BILATERAL LEG EDEMA: ICD-10-CM

## 2021-01-13 DIAGNOSIS — I50.33 ACUTE ON CHRONIC HEART FAILURE WITH PRESERVED EJECTION FRACTION (HFPEF) (HCC): Primary | Chronic | ICD-10-CM

## 2021-01-13 DIAGNOSIS — N18.32 STAGE 3B CHRONIC KIDNEY DISEASE (HCC): Chronic | ICD-10-CM

## 2021-01-13 LAB
ANION GAP SERPL CALC-SCNC: 0 MMOL/L (ref 0–18)
BUN BLD-MCNC: 42 MG/DL (ref 7–18)
BUN/CREAT SERPL: 20.7 (ref 10–20)
CALCIUM BLD-MCNC: 8.2 MG/DL (ref 8.5–10.1)
CHLORIDE SERPL-SCNC: 115 MMOL/L (ref 98–112)
CO2 SERPL-SCNC: 28 MMOL/L (ref 21–32)
CREAT BLD-MCNC: 2.03 MG/DL
GLUCOSE BLD-MCNC: 130 MG/DL (ref 70–99)
NT-PROBNP SERPL-MCNC: 6738 PG/ML (ref ?–125)
OSMOLALITY SERPL CALC.SUM OF ELEC: 308 MOSM/KG (ref 275–295)
PATIENT FASTING Y/N/NP: NO
POTASSIUM SERPL-SCNC: 4.3 MMOL/L (ref 3.5–5.1)
SODIUM SERPL-SCNC: 143 MMOL/L (ref 136–145)

## 2021-01-13 PROCEDURE — 83880 ASSAY OF NATRIURETIC PEPTIDE: CPT | Performed by: NURSE PRACTITIONER

## 2021-01-13 PROCEDURE — 99212 OFFICE O/P EST SF 10 MIN: CPT | Performed by: NURSE PRACTITIONER

## 2021-01-13 PROCEDURE — 36415 COLL VENOUS BLD VENIPUNCTURE: CPT | Performed by: NURSE PRACTITIONER

## 2021-01-13 PROCEDURE — 96374 THER/PROPH/DIAG INJ IV PUSH: CPT | Performed by: NURSE PRACTITIONER

## 2021-01-13 PROCEDURE — 99215 OFFICE O/P EST HI 40 MIN: CPT | Performed by: NURSE PRACTITIONER

## 2021-01-13 PROCEDURE — 80048 BASIC METABOLIC PNL TOTAL CA: CPT | Performed by: NURSE PRACTITIONER

## 2021-01-13 RX ORDER — FUROSEMIDE 10 MG/ML
INJECTION INTRAMUSCULAR; INTRAVENOUS
Status: DISCONTINUED
Start: 2021-01-13 | End: 2021-01-13

## 2021-01-13 RX ORDER — FUROSEMIDE 40 MG/1
60 TABLET ORAL EVERY MORNING
Qty: 135 TABLET | Refills: 0 | Status: ON HOLD | OUTPATIENT
Start: 2021-01-13 | End: 2021-02-01

## 2021-01-13 RX ORDER — ISOSORBIDE DINITRATE 20 MG/1
20 TABLET ORAL 3 TIMES DAILY
Refills: 0 | COMMUNITY
Start: 2021-01-13 | End: 2021-02-09

## 2021-01-13 NOTE — PROGRESS NOTES
[address] Patient Status:  Outpatient    1945 MRN [id number]   Location Jacqueline Nazario is a 70year old female who presents to clinic f 197.0 12/21/2020 07:45 AM    CREATSERUM 2.37 (H) 01/07/2021 01:11 PM    BUN 57 (H) 01/07/2021 01:11 PM     01/07/2021 01:11 PM    K 4.5 01/07/2021 01:11 PM     (H) 01/07/2021 01:11 PM    CO2 30.0 01/07/2021 01:11 PM    GLU 87 01/07/2021 01:11 P exacerbation and when to call APN/clinic. Greater than 50 minutes with this patient providing counseling, coordination of care and education given. Patient and son receptive. Reviewed all pts home meds and  medications pt should not be taking. with sudden wt gain or worsening symptoms  - follow 2 gm sodium restricted heart healthy diet and keep fluids at about 48 oz/day   - follow up with specialty care clinic on 1/26/21 with repeat bmp  -Begin wearing compression stockings    CKD stage III  - r Pace your activity to prevent shortness of breath or fatigue.  Stop exercise if you develop chest pain, lightheadedness, or significant shortness of breath      Current Outpatient Medications:   •  [START ON 2/15/2021] oxyCODONE-acetaminophen  MG Oral daily., Disp: , Rfl:   •  Meclizine HCl 25 MG Oral Tab, Take 25 mg by mouth daily as needed.   , Disp: , Rfl:         Argelia Lerner NP  1/13/2021

## 2021-01-13 NOTE — PATIENT INSTRUCTIONS
Increase furosemide to 60 mg daily  In the morning , if you are going out in the daytime take your furosemide dose before dinner but don't skip your daily dose     Restart taking isosorbide 20 mg one tab three times daily      Continue your listed medicati

## 2021-01-16 LAB
7-AMINOCLONAZEPAM, URINE: <5 NG/ML
ALPHA-HYDROXYALPRAZOLAM, URINE: <5 NG/ML
ALPHA-HYDROXYMIDAZOLAM, URINE: <20 NG/ML
ALPRAZOLAM, URINE: <5 NG/ML
CHLORDIAZEPOXIDE, URINE: <20 NG/ML
CLONAZEPAM, URINE: <5 NG/ML
DIAZEPAM, URINE: <20 NG/ML
LORAZEPAM, URINE: <20 NG/ML
MIDAZOLAM, URINE: <20 NG/ML
NORDIAZEPAM, URINE: 1393 NG/ML
OPIATES, UR, 6-ACETYLMORPHINE: <10 NG/ML
OPIATES, URINE, CODEINE: <20 NG/ML
OPIATES, URINE, HYDROCODONE: <20 NG/ML
OPIATES, URINE, HYDROMORPHONE: <20 NG/ML
OPIATES, URINE, MORPHINE: <20 NG/ML
OPIATES, URINE, NORHYDROCODONE: <20 NG/ML
OPIATES, URINE, NOROXYCODONE: >4000 NG/ML
OPIATES, URINE, NOROXYMORPHONE: 970 NG/ML
OPIATES, URINE, OXYCODONE: 2828 NG/ML
OPIATES, URINE, OXYMORPHONE: 48 NG/ML
OXAZEPAM, URINE: 3034 NG/ML
TEMAZEPAM, URINE: 3090 NG/ML

## 2021-01-21 RX ORDER — DIAZEPAM 10 MG/1
TABLET ORAL
Qty: 90 TABLET | Refills: 0 | Status: ON HOLD | OUTPATIENT
Start: 2021-01-21 | End: 2021-02-01

## 2021-01-25 DIAGNOSIS — Z23 NEED FOR VACCINATION: ICD-10-CM

## 2021-01-26 ENCOUNTER — APPOINTMENT (OUTPATIENT)
Dept: GENERAL RADIOLOGY | Facility: HOSPITAL | Age: 76
DRG: 291 | End: 2021-01-26
Attending: EMERGENCY MEDICINE
Payer: MEDICARE

## 2021-01-26 ENCOUNTER — HOSPITAL ENCOUNTER (INPATIENT)
Facility: HOSPITAL | Age: 76
LOS: 6 days | Discharge: HOME | DRG: 291 | End: 2021-02-01
Attending: EMERGENCY MEDICINE | Admitting: HOSPITALIST
Payer: MEDICARE

## 2021-01-26 DIAGNOSIS — I10 UNCONTROLLED HYPERTENSION: ICD-10-CM

## 2021-01-26 DIAGNOSIS — I50.9 ACUTE ON CHRONIC CONGESTIVE HEART FAILURE, UNSPECIFIED HEART FAILURE TYPE (HCC): Primary | ICD-10-CM

## 2021-01-26 LAB
ANION GAP SERPL CALC-SCNC: 5 MMOL/L (ref 0–18)
BASOPHILS # BLD AUTO: 0.03 X10(3) UL (ref 0–0.2)
BASOPHILS NFR BLD AUTO: 0.7 %
BUN BLD-MCNC: 28 MG/DL (ref 7–18)
BUN/CREAT SERPL: 16.1 (ref 10–20)
CALCIUM BLD-MCNC: 8.8 MG/DL (ref 8.5–10.1)
CHLORIDE SERPL-SCNC: 114 MMOL/L (ref 98–112)
CO2 SERPL-SCNC: 23 MMOL/L (ref 21–32)
CREAT BLD-MCNC: 1.74 MG/DL
DEPRECATED RDW RBC AUTO: 55.7 FL (ref 35.1–46.3)
EOSINOPHIL # BLD AUTO: 0.09 X10(3) UL (ref 0–0.7)
EOSINOPHIL NFR BLD AUTO: 2.1 %
ERYTHROCYTE [DISTWIDTH] IN BLOOD BY AUTOMATED COUNT: 15.9 % (ref 11–15)
GLUCOSE BLD-MCNC: 88 MG/DL (ref 70–99)
HCT VFR BLD AUTO: 30.9 %
HGB BLD-MCNC: 9.9 G/DL
IMM GRANULOCYTES # BLD AUTO: 0.01 X10(3) UL (ref 0–1)
IMM GRANULOCYTES NFR BLD: 0.2 %
LYMPHOCYTES # BLD AUTO: 0.66 X10(3) UL (ref 1–4)
LYMPHOCYTES NFR BLD AUTO: 15.3 %
MCH RBC QN AUTO: 30.6 PG (ref 26–34)
MCHC RBC AUTO-ENTMCNC: 32 G/DL (ref 31–37)
MCV RBC AUTO: 95.4 FL
MONOCYTES # BLD AUTO: 0.33 X10(3) UL (ref 0.1–1)
MONOCYTES NFR BLD AUTO: 7.7 %
NEUTROPHILS # BLD AUTO: 3.18 X10 (3) UL (ref 1.5–7.7)
NEUTROPHILS # BLD AUTO: 3.18 X10(3) UL (ref 1.5–7.7)
NEUTROPHILS NFR BLD AUTO: 74 %
NT-PROBNP SERPL-MCNC: 7692 PG/ML (ref ?–125)
OSMOLALITY SERPL CALC.SUM OF ELEC: 299 MOSM/KG (ref 275–295)
PLATELET # BLD AUTO: 151 10(3)UL (ref 150–450)
POTASSIUM SERPL-SCNC: 4.5 MMOL/L (ref 3.5–5.1)
RBC # BLD AUTO: 3.24 X10(6)UL
SARS-COV-2 RNA RESP QL NAA+PROBE: NOT DETECTED
SODIUM SERPL-SCNC: 142 MMOL/L (ref 136–145)
WBC # BLD AUTO: 4.3 X10(3) UL (ref 4–11)

## 2021-01-26 PROCEDURE — 99223 1ST HOSP IP/OBS HIGH 75: CPT | Performed by: HOSPITALIST

## 2021-01-26 PROCEDURE — 71045 X-RAY EXAM CHEST 1 VIEW: CPT | Performed by: EMERGENCY MEDICINE

## 2021-01-26 RX ORDER — ATENOLOL 50 MG/1
50 TABLET ORAL DAILY
Status: DISCONTINUED | OUTPATIENT
Start: 2021-01-26 | End: 2021-01-27

## 2021-01-26 RX ORDER — BUMETANIDE 0.25 MG/ML
2 INJECTION, SOLUTION INTRAMUSCULAR; INTRAVENOUS ONCE
Status: COMPLETED | OUTPATIENT
Start: 2021-01-26 | End: 2021-01-26

## 2021-01-26 RX ORDER — METOLAZONE 2.5 MG/1
2.5 TABLET ORAL DAILY
Status: DISCONTINUED | OUTPATIENT
Start: 2021-01-26 | End: 2021-01-29

## 2021-01-26 RX ORDER — DIAZEPAM 10 MG/1
10 TABLET ORAL 3 TIMES DAILY PRN
Status: DISCONTINUED | OUTPATIENT
Start: 2021-01-26 | End: 2021-02-01

## 2021-01-26 RX ORDER — ATORVASTATIN CALCIUM 40 MG/1
40 TABLET, FILM COATED ORAL NIGHTLY
Status: DISCONTINUED | OUTPATIENT
Start: 2021-01-26 | End: 2021-02-01

## 2021-01-26 RX ORDER — MECLIZINE HYDROCHLORIDE 25 MG/1
25 TABLET ORAL DAILY PRN
Status: DISCONTINUED | OUTPATIENT
Start: 2021-01-26 | End: 2021-02-01

## 2021-01-26 RX ORDER — MELATONIN
325 3 TIMES DAILY
Status: DISCONTINUED | OUTPATIENT
Start: 2021-01-26 | End: 2021-02-01

## 2021-01-26 RX ORDER — ISOSORBIDE DINITRATE 20 MG/1
20 TABLET ORAL
Status: DISCONTINUED | OUTPATIENT
Start: 2021-01-26 | End: 2021-02-01

## 2021-01-26 RX ORDER — HEPARIN SODIUM 5000 [USP'U]/ML
5000 INJECTION, SOLUTION INTRAVENOUS; SUBCUTANEOUS EVERY 12 HOURS SCHEDULED
Status: DISCONTINUED | OUTPATIENT
Start: 2021-01-26 | End: 2021-02-01

## 2021-01-26 RX ORDER — OXYCODONE AND ACETAMINOPHEN 10; 325 MG/1; MG/1
1 TABLET ORAL EVERY 4 HOURS PRN
Status: DISCONTINUED | OUTPATIENT
Start: 2021-01-26 | End: 2021-02-01

## 2021-01-26 RX ORDER — ASPIRIN 81 MG/1
81 TABLET ORAL DAILY
Status: DISCONTINUED | OUTPATIENT
Start: 2021-01-26 | End: 2021-02-01

## 2021-01-26 RX ORDER — BUMETANIDE 0.25 MG/ML
2 INJECTION, SOLUTION INTRAMUSCULAR; INTRAVENOUS
Status: DISCONTINUED | OUTPATIENT
Start: 2021-01-26 | End: 2021-01-26 | Stop reason: ALTCHOICE

## 2021-01-26 RX ORDER — PANTOPRAZOLE SODIUM 40 MG/1
40 TABLET, DELAYED RELEASE ORAL
Status: DISCONTINUED | OUTPATIENT
Start: 2021-01-26 | End: 2021-02-01

## 2021-01-26 RX ORDER — NITROGLYCERIN 0.4 MG/1
0.4 TABLET SUBLINGUAL EVERY 5 MIN PRN
Status: DISCONTINUED | OUTPATIENT
Start: 2021-01-26 | End: 2021-02-01

## 2021-01-26 RX ORDER — ACETAMINOPHEN 325 MG/1
650 TABLET ORAL EVERY 6 HOURS PRN
Status: DISCONTINUED | OUTPATIENT
Start: 2021-01-26 | End: 2021-02-01

## 2021-01-26 RX ORDER — GABAPENTIN 300 MG/1
300 CAPSULE ORAL NIGHTLY
Status: DISCONTINUED | OUTPATIENT
Start: 2021-01-26 | End: 2021-02-01

## 2021-01-26 RX ORDER — ONDANSETRON 2 MG/ML
4 INJECTION INTRAMUSCULAR; INTRAVENOUS EVERY 4 HOURS PRN
Status: DISCONTINUED | OUTPATIENT
Start: 2021-01-26 | End: 2021-01-26

## 2021-01-26 RX ORDER — NIFEDIPINE 30 MG/1
60 TABLET, EXTENDED RELEASE ORAL DAILY
Status: DISCONTINUED | OUTPATIENT
Start: 2021-01-26 | End: 2021-01-27

## 2021-01-26 RX ORDER — METOCLOPRAMIDE HYDROCHLORIDE 5 MG/ML
5 INJECTION INTRAMUSCULAR; INTRAVENOUS EVERY 8 HOURS PRN
Status: DISCONTINUED | OUTPATIENT
Start: 2021-01-26 | End: 2021-02-01

## 2021-01-26 RX ORDER — ONDANSETRON 2 MG/ML
4 INJECTION INTRAMUSCULAR; INTRAVENOUS EVERY 6 HOURS PRN
Status: DISCONTINUED | OUTPATIENT
Start: 2021-01-26 | End: 2021-02-01

## 2021-01-26 RX ORDER — SODIUM CHLORIDE 0.9 % (FLUSH) 0.9 %
10 SYRINGE (ML) INJECTION AS NEEDED
Status: DISCONTINUED | OUTPATIENT
Start: 2021-01-26 | End: 2021-02-01

## 2021-01-26 NOTE — CONSULTS
Reason for Consultation: Acute on chronic HFpEF    Assessment/Plan:     1.  Acute on chronic HFpEF  - Echo 1/28/2020: EF 02-15: diastolic dysfunction; RVSP 52  - Echo 9/2020 Keke Clothier): EF 65-70%; RVSP 68 mmHg; mild aortic stenosis  - Dr Gilberto Suárez at [facility] follows CUFF,ACUTE Left    • SACROILIAC JOINT INJECTION AND LUMBAR FACET INJECTION Left 8/29/2020    Performed by Mo Ramirez DO at [facility] MAIN OR   • TUBAL LIGATION       Family History of Premature CAD: No   Social History:  Social History    Tobacco Use Lab 01/26/21  1451   RBC 3.24*   HGB 9.9*   HCT 30.9*   MCV 95.4   MCH 30.6   MCHC 32.0   RDW 15.9*   NEPRELIM 3.18   WBC 4.3   .0         Imaging:  Xr Chest Ap Portable  (cpt=71045)    Result Date: 1/26/2021  CONCLUSION:  1.  Mild CHF/fluid overlo

## 2021-01-26 NOTE — ED NOTES
Orders for admission, patient is aware of plan and ready to go upstairs. Any questions, please call ED RN Calista Peguero  at extension [phone]. purwick in place, sl, low suspicion for covid.

## 2021-01-26 NOTE — H&P
[facility]    PATIENT'S NAME: Evan Guidry   ATTENDING PHYSICIAN: Willard Mullen.  Opal Valenzuela MD   PATIENT ACCOUNT#:   [id number]    LOCATION:  [place] [address]  MEDICAL RECORD #:   [id number]       YOB: 1945  ADMISSION DATE:       01/26 cholecystectomy, left shoulder rotator cuff repair, tubal ligation, right carpal tunnel release, and breast biopsy. MEDICATIONS:  Please see medication reconciliation list.     ALLERGIES:  No known drug allergies.     FAMILY HISTORY:  Father had cerebrov floor.  IV Bumex monitor her hemodynamic and kidney function. Obtain cardiology consult. Repeat 2_d echocardiogram with Doppler. Fall precautions. Further recommendations to follow.     Dictated By Tristan Ascencio MD  d: 01/26/2021 16:05:41  t: 01/26/20

## 2021-01-26 NOTE — ED PROVIDER NOTES
Patient Seen in: [facility] AND [facility] Emergency Department    History   Patient presents with:  Difficulty Breathing  Swelling Edema      HPI    The patient presents to the ED complaining of increasing shortness of breath and swelling of bilateral legs.   Hi hours as needed for Pain (MAX 6/DAY). , Disp: 180 tablet, Rfl: 0, 1/26/2021 at 0900    •  gabapentin 300 MG Oral Cap, Take 300 mg by mouth nightly., Disp: , Rfl: , 1/25/2021 at 2100    •  NIFEdipine ER (AFEDITAB CR) 60 MG Oral Tablet 24 Hr, Take 60 mg by mo walker. The patient does not live in a home with stairs. ROS  Pertinent Positives: Shortness of breath  All other organ systems are reviewed and are negative. Constitutional and vital signs reviewed.       Social History and Family History el the following components:       Result Value    Chloride 114 (*)     BUN 28 (*)     Creatinine 1.74 (*)     Calculated Osmolality 299 (*)     GFR, Non- 29 (*)     GFR, -American 33 (*)     All other components within normal limits range)   acetaminophen (TYLENOL) tab 650 mg (has no administration in time range)   ondansetron HCl (ZOFRAN) injection 4 mg (has no administration in time range)   Metoclopramide HCl (REGLAN) injection 5 mg (has no administration in time range)   Atropine vitals.     Pulse Ox: 98%, Room air, Normal     Monitor Interpretation:   normal sinus rhythm, sinus bradycardia    Differential Diagnosis/ Diagnostic Considerations: CHF exacerbation, pulmonary edema, ACS    Medical Record Review: I personally reviewed jason

## 2021-01-26 NOTE — ED INITIAL ASSESSMENT (HPI)
Patient presents with shortness of breath and bilateral leg swelling. Patient taking lasix.    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

## 2021-01-27 ENCOUNTER — APPOINTMENT (OUTPATIENT)
Dept: CV DIAGNOSTICS | Facility: HOSPITAL | Age: 76
DRG: 291 | End: 2021-01-27
Attending: HOSPITALIST
Payer: MEDICARE

## 2021-01-27 LAB
ALBUMIN SERPL-MCNC: 3 G/DL (ref 3.4–5)
ALBUMIN/GLOB SERPL: 0.8 {RATIO} (ref 1–2)
ALP LIVER SERPL-CCNC: 188 U/L
ALT SERPL-CCNC: 32 U/L
ANION GAP SERPL CALC-SCNC: 4 MMOL/L (ref 0–18)
AST SERPL-CCNC: 20 U/L (ref 15–37)
BASOPHILS # BLD AUTO: 0.03 X10(3) UL (ref 0–0.2)
BASOPHILS NFR BLD AUTO: 0.6 %
BILIRUB SERPL-MCNC: 0.8 MG/DL (ref 0.1–2)
BUN BLD-MCNC: 31 MG/DL (ref 7–18)
BUN/CREAT SERPL: 18.9 (ref 10–20)
CALCIUM BLD-MCNC: 8.6 MG/DL (ref 8.5–10.1)
CHLORIDE SERPL-SCNC: 113 MMOL/L (ref 98–112)
CO2 SERPL-SCNC: 25 MMOL/L (ref 21–32)
CREAT BLD-MCNC: 1.64 MG/DL
DEPRECATED RDW RBC AUTO: 54.6 FL (ref 35.1–46.3)
EOSINOPHIL # BLD AUTO: 0.13 X10(3) UL (ref 0–0.7)
EOSINOPHIL NFR BLD AUTO: 2.8 %
ERYTHROCYTE [DISTWIDTH] IN BLOOD BY AUTOMATED COUNT: 15.8 % (ref 11–15)
GLOBULIN PLAS-MCNC: 3.8 G/DL (ref 2.8–4.4)
GLUCOSE BLD-MCNC: 113 MG/DL (ref 70–99)
HAV IGM SER QL: 2 MG/DL (ref 1.6–2.6)
HCT VFR BLD AUTO: 28.8 %
HGB BLD-MCNC: 9.4 G/DL
IMM GRANULOCYTES # BLD AUTO: 0.02 X10(3) UL (ref 0–1)
IMM GRANULOCYTES NFR BLD: 0.4 %
LYMPHOCYTES # BLD AUTO: 0.66 X10(3) UL (ref 1–4)
LYMPHOCYTES NFR BLD AUTO: 14.1 %
M PROTEIN MFR SERPL ELPH: 6.8 G/DL (ref 6.4–8.2)
MCH RBC QN AUTO: 30.8 PG (ref 26–34)
MCHC RBC AUTO-ENTMCNC: 32.6 G/DL (ref 31–37)
MCV RBC AUTO: 94.4 FL
MONOCYTES # BLD AUTO: 0.33 X10(3) UL (ref 0.1–1)
MONOCYTES NFR BLD AUTO: 7 %
NEUTROPHILS # BLD AUTO: 3.52 X10 (3) UL (ref 1.5–7.7)
NEUTROPHILS # BLD AUTO: 3.52 X10(3) UL (ref 1.5–7.7)
NEUTROPHILS NFR BLD AUTO: 75.1 %
OSMOLALITY SERPL CALC.SUM OF ELEC: 301 MOSM/KG (ref 275–295)
PLATELET # BLD AUTO: 149 10(3)UL (ref 150–450)
POTASSIUM SERPL-SCNC: 4.6 MMOL/L (ref 3.5–5.1)
RBC # BLD AUTO: 3.05 X10(6)UL
SODIUM SERPL-SCNC: 142 MMOL/L (ref 136–145)
WBC # BLD AUTO: 4.7 X10(3) UL (ref 4–11)

## 2021-01-27 PROCEDURE — 93306 TTE W/DOPPLER COMPLETE: CPT | Performed by: HOSPITALIST

## 2021-01-27 PROCEDURE — 99233 SBSQ HOSP IP/OBS HIGH 50: CPT | Performed by: HOSPITALIST

## 2021-01-27 RX ORDER — HYDRALAZINE HYDROCHLORIDE 25 MG/1
25 TABLET, FILM COATED ORAL 2 TIMES DAILY
Status: DISCONTINUED | OUTPATIENT
Start: 2021-01-27 | End: 2021-02-01

## 2021-01-27 RX ORDER — ATENOLOL 50 MG/1
25 TABLET ORAL DAILY
Status: DISCONTINUED | OUTPATIENT
Start: 2021-01-28 | End: 2021-01-30

## 2021-01-27 NOTE — PLAN OF CARE
PATIENT ADMITTED TODAY FROM ER WITH CHF, CARDIOLOGY CONSULT, ORAL AND IV DIURETICS, FLUID RESTRICTION, 2D ECHO ORDERED    Problem: Patient Centered Care  Goal: Patient preferences are identified and integrated in the patient's plan of care  Description:  In function  Description: INTERVENTIONS:  - Assess bowel function  - Maintain adequate hydration with IV or PO as ordered and tolerated  - Evaluate effectiveness of GI medications  - Encourage mobilization and activity  - Obtain nutritional consult as needed

## 2021-01-27 NOTE — SIGNIFICANT EVENT
Patient found in bed stating \"I wet all over the floor and in the bathroom. I don't feel good and I would like to get some sleep.  I want to be taken off this iv medicine\" Will call MD.

## 2021-01-27 NOTE — PROGRESS NOTES
Assessment and Plan:     1.  Acute on chronic HFpEF  - Echo 1/28/2020: EF 37-48: diastolic dysfunction; RVSP 52  - Echo 9/2020 Naila Mejias): EF 65-70%; RVSP 68 mmHg; mild aortic stenosis  - Dr Rajeev Yang at [facility] follows (onc cardiology)  - difficult to tell ideal 28.8*   MCV 94.4   MCH 30.8   MCHC 32.6   RDW 15.8*   NEPRELIM 3.52   WBC 4.7   .0*     No results for input(s): INR in the last 168 hours. Xr Chest Ap Portable  (cpt=71045)    Result Date: 1/26/2021  CONCLUSION:  1. Mild CHF/fluid overload.     D

## 2021-01-27 NOTE — PLAN OF CARE
Faye Guerin is aware of POC at this time. She can be forgetful at times. Bumex gtt continued. Patient is voiding freely with clear straw colored output. +2 to BLE. Encouraging patient to elevate legs.       Problem: Patient Centered Care  Goal: Patient pre GASTROINTESTINAL - ADULT  Goal: Maintains or returns to baseline bowel function  Description: INTERVENTIONS:  - Assess bowel function  - Maintain adequate hydration with IV or PO as ordered and tolerated  - Evaluate effectiveness of GI medications  - Encou

## 2021-01-27 NOTE — PROGRESS NOTES
[facility]  Progress Note     Liban Doran  :     Status: Inpatient  Day #: 1    Attending: Michael Irving MD  PCP: PHYSICIAN NONSTAFF      Assessment and Plan     Acute on chronic diastolic CHF  Aortic stenosis  Severe Pulm HTN  - (CST): Jhonny Hanson MD on 1/26/2021 at 3:39 PM          Ekg 12-lead    Result Date: 1/26/2021  ECG Report  Interpretation  -------------------------- Sinus Rhythm -Left atrial enlargement.  - Nonspecific T-abnormality.  ABNORMAL When compared with ECG of

## 2021-01-28 LAB
ANION GAP SERPL CALC-SCNC: 2 MMOL/L (ref 0–18)
BUN BLD-MCNC: 33 MG/DL (ref 7–18)
BUN/CREAT SERPL: 18.1 (ref 10–20)
C DIFF TOX B STL QL: POSITIVE
CALCIUM BLD-MCNC: 8.8 MG/DL (ref 8.5–10.1)
CHLORIDE SERPL-SCNC: 107 MMOL/L (ref 98–112)
CO2 SERPL-SCNC: 32 MMOL/L (ref 21–32)
CREAT BLD-MCNC: 1.82 MG/DL
GLUCOSE BLD-MCNC: 133 MG/DL (ref 70–99)
OSMOLALITY SERPL CALC.SUM OF ELEC: 301 MOSM/KG (ref 275–295)
POTASSIUM SERPL-SCNC: 4.3 MMOL/L (ref 3.5–5.1)
SODIUM SERPL-SCNC: 141 MMOL/L (ref 136–145)

## 2021-01-28 PROCEDURE — 99233 SBSQ HOSP IP/OBS HIGH 50: CPT | Performed by: HOSPITALIST

## 2021-01-28 RX ORDER — VANCOMYCIN HYDROCHLORIDE 125 MG/1
125 CAPSULE ORAL EVERY 6 HOURS
Status: DISCONTINUED | OUTPATIENT
Start: 2021-01-28 | End: 2021-02-01

## 2021-01-28 NOTE — PROGRESS NOTES
Assessment and Plan:     1.  Acute on chronic HFpEF  - Echo 1/28/2020: EF 53-93: diastolic dysfunction; RVSP 52  - Echo 9/2020 Екатерина Webb): EF 65-70%; RVSP 68 mmHg; mild aortic stenosis  - Dr Sd Harris at [facility] follows (onc cardiology)  - difficult to tell ideal 3.05*   HGB 9.4*   HCT 28.8*   MCV 94.4   MCH 30.8   MCHC 32.6   RDW 15.8*   NEPRELIM 3.52   WBC 4.7   .0*     No results for input(s): INR in the last 168 hours. Xr Chest Ap Portable  (cpt=71045)    Result Date: 1/26/2021  CONCLUSION:  1.  Mild C

## 2021-01-28 NOTE — DIETARY NOTE
ADULT NUTRITION INITIAL ASSESSMENT    Pt is at moderate nutrition risk. Pt does not meet malnutrition criteria.       RECOMMENDATIONS TO MD:  See Nutrition Intervention and CPM    ADMITTING DIAGNOSIS:   Uncontrolled hypertension [I10]  Acute on chronic con GASTROINTESTINAL: +BM 1/28/21 Large Brown green stool, +C-Diff  NUTRITION RELATED PHYSICAL FINDINGS:  - Body Fat/Muscle Mass: appears well nourished per visual exam.  - Fluid Accumulation: lower extremity edema, R LE +3, L LE +2 per RN documentation  - supplement of Magic cup daily per pt preference.     NUTRITION DIAGNOSIS/PROBLEM:  Increased nutrient needs related to physiological causes increasing needs related to acute on chronic illness as evidenced by reported Poor PO intake <75% of usual for > 1 mo

## 2021-01-28 NOTE — PROGRESS NOTES
[facility]  Progress Note     Dearl Grief  :     Status: Inpatient  Day #: 2    Attending: Louise Bautista MD  PCP: PHYSICIAN NONSTAFF      Assessment and Plan     Acute on chronic diastolic CHF  Aortic stenosis  Severe Pulm HTN  - 1/26/2021  CONCLUSION:  1. Mild CHF/fluid overload.     Dictated by (CST): Iron Al MD on 1/26/2021 at 3:37 PM     Finalized by (CST): Iron Al MD on 1/26/2021 at 3:39 PM          Ekg 12-lead    Result Date: 1/26/2021  ECG Report  Interpretation

## 2021-01-29 LAB
ANION GAP SERPL CALC-SCNC: 3 MMOL/L (ref 0–18)
BUN BLD-MCNC: 36 MG/DL (ref 7–18)
BUN/CREAT SERPL: 19 (ref 10–20)
CALCIUM BLD-MCNC: 8.8 MG/DL (ref 8.5–10.1)
CHLORIDE SERPL-SCNC: 107 MMOL/L (ref 98–112)
CO2 SERPL-SCNC: 31 MMOL/L (ref 21–32)
CREAT BLD-MCNC: 1.89 MG/DL
GLUCOSE BLD-MCNC: 98 MG/DL (ref 70–99)
OSMOLALITY SERPL CALC.SUM OF ELEC: 300 MOSM/KG (ref 275–295)
POTASSIUM SERPL-SCNC: 5 MMOL/L (ref 3.5–5.1)
SODIUM SERPL-SCNC: 141 MMOL/L (ref 136–145)

## 2021-01-29 PROCEDURE — 99233 SBSQ HOSP IP/OBS HIGH 50: CPT | Performed by: HOSPITALIST

## 2021-01-29 NOTE — PLAN OF CARE
Diuresing well on Bumex gtt 4ml/hr. Purewick in place. Continue po vanco for cdiff.  Plan for PT/OT eval.     Problem: Patient Centered Care  Goal: Patient preferences are identified and integrated in the patient's plan of care  Description: Interventions: Assess bowel function  - Maintain adequate hydration with IV or PO as ordered and tolerated  - Evaluate effectiveness of GI medications  - Encourage mobilization and activity  - Obtain nutritional consult as needed  - Establish a toileting routine/schedule

## 2021-01-29 NOTE — PLAN OF CARE
Problem: Patient Centered Care  Goal: Patient preferences are identified and integrated in the patient's plan of care  Description: Interventions:  - What would you like us to know as we care for you?  I live with my son  - Provide timely, complete, and a effectiveness of GI medications  - Encourage mobilization and activity  - Obtain nutritional consult as needed  - Establish a toileting routine/schedule  - Consider collaborating with pharmacy to review patient's medication profile  Outcome: Progressing

## 2021-01-29 NOTE — PHYSICAL THERAPY NOTE
MD order received, chart reviewed. Spoke with RN who approves participation. Patient does have a purewick but has been up ad leonie in room. She presents standing at the edge of bed and asking for a blue pad and new depends.  Explained purpose of therapy and s

## 2021-01-29 NOTE — PROGRESS NOTES
[facility] HOSP - [facility]  Progress Note     Angeli Cheyenne  : [id number]    Status: Inpatient  Day #: 3    Attending: Deondre Camarillo MD  PCP: PHYSICIAN NONSTAFF      Assessment and Plan     Acute on chronic diastolic CHF  Aortic stenosis  Severe Pulm HTN  - Daily   • hydrALAzine HCl  25 mg Oral BID   • Heparin Sodium (Porcine)  5,000 Units Subcutaneous Q12H [address]   • aspirin  81 mg Oral Daily   • atorvastatin  40 mg Oral Nightly   • ferrous sulfate  325 mg Oral TID   • gabapentin  300 mg Oral Nightly   • isosor

## 2021-01-29 NOTE — PROGRESS NOTES
Assessment and Plan:     1.  Acute on chronic HFpEF  - Echo 1/28/2020: EF 60-38: diastolic dysfunction; RVSP 52  - Echo 9/2020 Екатерина Webb): EF 65-70%; RVSP 68 mmHg; mild aortic stenosis  - Dr Sd Harris at [facility] follows (onc cardiology)  - difficult to tell ideal 8.6 8.8 8.8    141 141   K 4.6 4.3 5.0   * 107 107   CO2 25.0 32.0 31.0     No results for input(s): TROP, CK in the last 168 hours.   Recent Labs   Lab 01/27/21  0713   RBC 3.05*   HGB 9.4*   HCT 28.8*   MCV 94.4   MCH 30.8   MCHC 32.6   RDW 15

## 2021-01-30 LAB
ANION GAP SERPL CALC-SCNC: 2 MMOL/L (ref 0–18)
BUN BLD-MCNC: 46 MG/DL (ref 7–18)
BUN/CREAT SERPL: 20.9 (ref 10–20)
CALCIUM BLD-MCNC: 8.5 MG/DL (ref 8.5–10.1)
CHLORIDE SERPL-SCNC: 104 MMOL/L (ref 98–112)
CO2 SERPL-SCNC: 32 MMOL/L (ref 21–32)
CREAT BLD-MCNC: 2.2 MG/DL
DEPRECATED RDW RBC AUTO: 53.5 FL (ref 35.1–46.3)
ERYTHROCYTE [DISTWIDTH] IN BLOOD BY AUTOMATED COUNT: 15.1 % (ref 11–15)
GLUCOSE BLD-MCNC: 128 MG/DL (ref 70–99)
HCT VFR BLD AUTO: 30.6 %
HGB BLD-MCNC: 9.7 G/DL
MCH RBC QN AUTO: 30.2 PG (ref 26–34)
MCHC RBC AUTO-ENTMCNC: 31.7 G/DL (ref 31–37)
MCV RBC AUTO: 95.3 FL
OSMOLALITY SERPL CALC.SUM OF ELEC: 300 MOSM/KG (ref 275–295)
PLATELET # BLD AUTO: 151 10(3)UL (ref 150–450)
POTASSIUM SERPL-SCNC: 4.5 MMOL/L (ref 3.5–5.1)
RBC # BLD AUTO: 3.21 X10(6)UL
SODIUM SERPL-SCNC: 138 MMOL/L (ref 136–145)
WBC # BLD AUTO: 3.1 X10(3) UL (ref 4–11)

## 2021-01-30 PROCEDURE — 99233 SBSQ HOSP IP/OBS HIGH 50: CPT | Performed by: HOSPITALIST

## 2021-01-30 NOTE — PLAN OF CARE
Bumex drip continues. Diuresing well. C/o abdominal&generalized body pain, percocet given with relief. Enteric/ contact isolation maintained. Will continue to monitor.     Problem: Patient Centered Care  Goal: Patient preferences are identified and integrat

## 2021-01-30 NOTE — PROGRESS NOTES
[facility] HOSP - [facility]  Progress Note     Mira Nava  : [birth year]    Status: Inpatient  Day #: 4    Attending: Deandre Kemp MD  PCP: PHYSICIAN NONSTAFF      Assessment and Plan     Acute on chronic diastolic CHF  Aortic stenosis  Severe Pulm HTN  - 4.3 5.0 4.5   * 107 107 104   CO2 25.0 32.0 31.0 32.0   * 133* 98 128*   MG 2.0  --   --   --    BILT 0.8  --   --   --    AST 20  --   --   --    ALT 32  --   --   --    ALKPHO 188*  --   --   --    TP 6.8  --   --   --        No results sanjayun

## 2021-01-30 NOTE — PROGRESS NOTES
Assessment and Plan:     1. Acute on chronic HFpEF  - Echo 1/28/2020: EF 89-88: diastolic dysfunction; RVSP 52  - Echo 9/2020 Petra Russo): EF 65-70%; RVSP 68 mmHg; mild aortic stenosis  - Dr Reinier Lopez at [facility] follows (onc cardiology)  -only bed weights.   2. CAD 25*   GFRNAA 27* 26* 22*   CA 8.8 8.8 8.5    141 138   K 4.3 5.0 4.5    107 104   CO2 32.0 31.0 32.0     No results for input(s): TROP, CK in the last 168 hours.   Recent Labs   Lab 01/27/21  0713 01/30/21  0854   RBC 3.05* 3.21*   HGB 9.4* 9.7*

## 2021-01-30 NOTE — PLAN OF CARE
VS stable. SR/SB. Bumex gtt and atenolol dcd today. Up with assist. Strict intake and output. Fall prec.     Problem: Patient Centered Care  Goal: Patient preferences are identified and integrated in the patient's plan of care  Description: Interventions: Assess bowel function  - Maintain adequate hydration with IV or PO as ordered and tolerated  - Evaluate effectiveness of GI medications  - Encourage mobilization and activity  - Obtain nutritional consult as needed  - Establish a toileting routine/schedule

## 2021-01-30 NOTE — OCCUPATIONAL THERAPY NOTE
OCCUPATIONAL THERAPY EVALUATION - INPATIENT     Room Number: 341/341-A  Evaluation Date: 1/30/2021  Type of Evaluation: Initial  Presenting Problem: acute on chronic CHF    Physician Order: IP Consult to Occupational Therapy  Reason for Therapy: ADL/IADL D below baseline and would benefit from skilled inpatient OT to address the above deficits, maximizing patient's ability to return to prior level of function.     The patient's Approx Degree of Impairment: 32.79% has been calculated based on documentation in House  Home Layout: One level  Lives With: Son     Toilet and Equipment: Standard height toilet  Shower/Tub and Equipment: Walk-in shower;Grab bar; Shower chair  Other Equipment: None    Occupation/Status: retired  Hand Dominance: Right  Drives: No  Patient 32.79%  Standardized Score (AM-PAC Scale): 44.27  CMS Modifier (G-Code): CJ    FUNCTIONAL TRANSFER ASSESSMENT  Supine to Sit : Supervision  Sit to Stand: Contact guard assist  Toilet Transfer: CGA  Shower Transfer: NT  Chair Transfer: CGA    Bedroom Mobili

## 2021-01-30 NOTE — PHYSICAL THERAPY NOTE
PHYSICAL THERAPY EVALUATION - INPATIENT     Room Number: 341/341-A  Evaluation Date: 1/30/2021  Type of Evaluation: Initial   Physician Order: PT Eval and Treat    Presenting Problem: leg edema, CANTU, orthopnea, acute on chronic CHF  Reason for Therapy:  Johnny Hanson congestive heart failure, unspecified heart failure type ([facility] [address]  Active Problems:    Acute on chronic congestive heart failure ([facility] [address]    Uncontrolled hypertension      Past Medical History  Past Medical History:   Diagnosis Date   • Anemia    • Anxiety state and strength are within functional limits     Lower extremity ROM limited in B ankles due to swelling and pain        BALANCE           Dynamic Standing: Fair +    ADDITIONAL TESTS                                    NEUROLOGICAL FINDINGS Current Status    Goal #2 Patient is able to demonstrate transfers Sit to/from Stand at assistance level: independent with walker - rolling     Goal #2  Current Status    Goal #3 Patient is able to ambulate 100 feet with assist device: walker - rolling a

## 2021-01-31 LAB
ANION GAP SERPL CALC-SCNC: 2 MMOL/L (ref 0–18)
BUN BLD-MCNC: 52 MG/DL (ref 7–18)
BUN/CREAT SERPL: 22.1 (ref 10–20)
CALCIUM BLD-MCNC: 8.7 MG/DL (ref 8.5–10.1)
CHLORIDE SERPL-SCNC: 99 MMOL/L (ref 98–112)
CO2 SERPL-SCNC: 34 MMOL/L (ref 21–32)
CREAT BLD-MCNC: 2.35 MG/DL
GLUCOSE BLD-MCNC: 102 MG/DL (ref 70–99)
OSMOLALITY SERPL CALC.SUM OF ELEC: 294 MOSM/KG (ref 275–295)
POTASSIUM SERPL-SCNC: 4.5 MMOL/L (ref 3.5–5.1)
SODIUM SERPL-SCNC: 135 MMOL/L (ref 136–145)

## 2021-01-31 PROCEDURE — 99233 SBSQ HOSP IP/OBS HIGH 50: CPT | Performed by: HOSPITALIST

## 2021-01-31 NOTE — PHYSICAL THERAPY NOTE
PHYSICAL THERAPY TREATMENT NOTE - INPATIENT     Room Number: 341/341-A       Presenting Problem: Leg edema, CANTU, CHF    Problem List  Principal Problem:    Acute on chronic congestive heart failure, unspecified heart failure type [facility])  Active Problems: AM-PAC '6-Clicks' INPATIENT SHORT FORM - BASIC MOBILITY  How much difficulty does the patient currently have. ..  -   Turning over in bed (including adjusting bedclothes, sheets and blankets)?: None   -   Sitting down on and standing up from a chair w negotiate 5 stairs/one curb w/ assistive device and supervision   Goal #4   Current Status NT   Goal #5 Patient to demonstrate independence with home activity/exercise instructions provided to patient in preparation for discharge.    Goal #5   Current Statu

## 2021-01-31 NOTE — PROGRESS NOTES
[facility] HOSP - [facility]  Progress Note     Shanika Hedrick  : [birth year]    Status: Inpatient  Day #: 5    Attending: Meir Sheridan MD  PCP: PHYSICIAN NONSTAFF      Assessment and Plan     Acute on chronic diastolic CHF  Aortic stenosis  Severe Pulm HTN  - 4.6 4.3 5.0 4.5   * 107 107 104   CO2 25.0 32.0 31.0 32.0   * 133* 98 128*   MG 2.0  --   --   --    BILT 0.8  --   --   --    AST 20  --   --   --    ALT 32  --   --   --    ALKPHO 188*  --   --   --    TP 6.8  --   --   --        No results

## 2021-01-31 NOTE — PROGRESS NOTES
Assessment and Plan:     1. Acute on chronic HFpEF  - Echo 1/28/2020: EF 42-38: diastolic dysfunction; RVSP 52  - Echo 9/2020 Danni Gallagher): EF 65-70%; RVSP 68 mmHg; mild aortic stenosis  - Dr Philip Bai at [facility] (onc cardiology)  -only bed weights. BUN 33* 36* 46*   CREATSERUM 1.82* 1.89* 2.20*   GFRAA 31* 30* 25*   GFRNAA 27* 26* 22*   CA 8.8 8.8 8.5    141 138   K 4.3 5.0 4.5    107 104   CO2 32.0 31.0 32.0     No results for input(s): TROP, CK in the last 168 hours.   Recent Labs   La

## 2021-01-31 NOTE — PLAN OF CARE
Pt lying in bed resting. PRN pain medication given for abdominal pain. Vanco for C Diff infection. Denies  Diarrhea. No acute events overnight. Frequent monitoring.     Problem: Patient Centered Care  Goal: Patient preferences are identified and integrated to baseline bowel function  Description: INTERVENTIONS:  - Assess bowel function  - Maintain adequate hydration with IV or PO as ordered and tolerated  - Evaluate effectiveness of GI medications  - Encourage mobilization and activity  - Obtain nutritional

## 2021-01-31 NOTE — HOME CARE LIAISON
Received referral from Esha Montes. Spoke to pt about hh services and she declined at this time. Esha Montes updated.

## 2021-01-31 NOTE — CM/SW NOTE
Addendum 947:   Pt refuses [place] services per Rolanda Bain @ [facility]. SW self referral for [place] services at discharge. Pt admitted 1/26/21 for acute chronic CHF.     Per chart review and multidisciplinary rounds,  Pt lives with her son(works FT) in a ranch style home

## 2021-02-01 VITALS
HEART RATE: 64 BPM | RESPIRATION RATE: 18 BRPM | WEIGHT: 169.38 LBS | DIASTOLIC BLOOD PRESSURE: 48 MMHG | OXYGEN SATURATION: 98 % | BODY MASS INDEX: 29 KG/M2 | TEMPERATURE: 99 F | SYSTOLIC BLOOD PRESSURE: 128 MMHG

## 2021-02-01 LAB
ANION GAP SERPL CALC-SCNC: 3 MMOL/L (ref 0–18)
BUN BLD-MCNC: 55 MG/DL (ref 7–18)
BUN/CREAT SERPL: 24.4 (ref 10–20)
CALCIUM BLD-MCNC: 8.6 MG/DL (ref 8.5–10.1)
CHLORIDE SERPL-SCNC: 100 MMOL/L (ref 98–112)
CO2 SERPL-SCNC: 32 MMOL/L (ref 21–32)
CREAT BLD-MCNC: 2.25 MG/DL
GLUCOSE BLD-MCNC: 84 MG/DL (ref 70–99)
HAV IGM SER QL: 2.1 MG/DL (ref 1.6–2.6)
OSMOLALITY SERPL CALC.SUM OF ELEC: 294 MOSM/KG (ref 275–295)
POTASSIUM SERPL-SCNC: 5 MMOL/L (ref 3.5–5.1)
SODIUM SERPL-SCNC: 135 MMOL/L (ref 136–145)

## 2021-02-01 PROCEDURE — 99239 HOSP IP/OBS DSCHRG MGMT >30: CPT | Performed by: HOSPITALIST

## 2021-02-01 RX ORDER — FUROSEMIDE 40 MG/1
40 TABLET ORAL DAILY
Qty: 135 TABLET | Refills: 0 | Status: SHIPPED | COMMUNITY
Start: 2021-02-01 | End: 2021-02-23 | Stop reason: ALTCHOICE

## 2021-02-01 RX ORDER — VANCOMYCIN HYDROCHLORIDE 125 MG/1
125 CAPSULE ORAL EVERY 6 HOURS
Qty: 24 CAPSULE | Refills: 0 | Status: SHIPPED | OUTPATIENT
Start: 2021-02-01 | End: 2021-02-07

## 2021-02-01 RX ORDER — HYDRALAZINE HYDROCHLORIDE 25 MG/1
25 TABLET, FILM COATED ORAL 2 TIMES DAILY
Qty: 60 TABLET | Refills: 0 | Status: SHIPPED | OUTPATIENT
Start: 2021-02-01

## 2021-02-01 RX ORDER — DIAZEPAM 10 MG/1
10 TABLET ORAL 3 TIMES DAILY PRN
Status: SHIPPED | COMMUNITY
Start: 2021-02-01 | End: 2021-03-04

## 2021-02-01 NOTE — PLAN OF CARE
Pt ambulating around room without difficulty. C/o mild shortness of breath (pt states this is her baseline). Pt is discharging home today. Declining HHC. Cardiology cleared pt for discharge- meds adjusted. CHF f/u appt made prior to discharge.  Discharge co - ex. Angina  - Evaluate fluid balance, assess for edema, trend weights  Outcome: Adequate for Discharge     Problem: GASTROINTESTINAL - ADULT  Goal: Maintains or returns to baseline bowel function  Description: INTERVENTIONS:  - Assess bowel function  - M

## 2021-02-01 NOTE — CM/SW NOTE
02/01/21 1300   Discharge disposition   Expected discharge disposition Home or Self   Name of Facillity/Home Care/Hospice   ( [address])   Discharge transportation Private car    MDO received for discharge.  Patient declining Home Health care

## 2021-02-01 NOTE — DISCHARGE SUMMARY
[facility]  Discharge Summary     Monica Mathis  :     Status: Inpatient  Day #: 6    Attending: Ti Ramirez MD  PCP: PHYSICIAN NONSTAFF     Date of Admission:  2021  Date of Discharge:  2021     Hospital Discharge Diagn %, not currently breastfeeding.   General:  Alert, no distress  HEENT:  Normocephalic, atraumatic  Cardiac:  Regular rate, regular rhythm  Pulmonary:  Clear to auscultation bilaterally, respirations unlabored  Gastrointestinal:  Soft, non-tender, normal bow 30 MG Cpdr  Commonly known as: PREVACID      Take 30 mg by mouth 2 (two) times daily as needed. Refills: 0     Meclizine HCl 25 MG Tabs  Commonly known as: ANTIVERT      Take 25 mg by mouth daily as needed.    Refills: 0     oxyCODONE-acetaminophen

## 2021-02-01 NOTE — PLAN OF CARE
Managing pain with Percocet with some relief. PO Vanco for C.diff. Plan is to DC home today - pt declining HH services at this time.     Problem: Patient Centered Care  Goal: Patient preferences are identified and integrated in the patient's plan of care  D function  Description: INTERVENTIONS:  - Assess bowel function  - Maintain adequate hydration with IV or PO as ordered and tolerated  - Evaluate effectiveness of GI medications  - Encourage mobilization and activity  - Obtain nutritional consult as needed

## 2021-02-01 NOTE — PROGRESS NOTES
Assessment and Plan:     1. Acute on chronic HFpEF  - Echo 1/28/2020: EF 12-13: diastolic dysfunction; RVSP 52  - Echo 9/2020 Rae Navarro): EF 65-70%; RVSP 68 mmHg; mild aortic stenosis  - Dr Jase Azar at [facility] follows (onc cardiology)  -only bed weights. 100   CO2 32.0 34.0* 32.0     No results for input(s): TROP, CK in the last 168 hours.   Recent Labs   Lab 01/27/21  0713 01/30/21  0854   RBC 3.05* 3.21*   HGB 9.4* 9.7*   HCT 28.8* 30.6*   MCV 94.4 95.3   MCH 30.8 30.2   MCHC 32.6 31.7   RDW 15.8* 15.1*

## 2021-02-01 NOTE — PROGRESS NOTES
[facility] HOSP - [facility]  Progress Note     Ruchi Miller  : [birth year]    Status: Inpatient  Day #: 6    Attending: Christine Tapia MD  PCP: PHYSICIAN NONSTAFF      Assessment and Plan     Acute on chronic diastolic CHF  Aortic stenosis  Severe Pulm HTN  - BUN 31*   < > 46* 52* 55*   CREATSERUM 1.64*   < > 2.20* 2.35* 2.25*   GFRAA 36*   < > 25* 23* 24*   GFRNAA 31*   < > 22* 20* 21*   CA 8.6   < > 8.5 8.7 8.6   ALB 3.0*  --   --   --   --       < > 138 135* 135*   K 4.6   < > 4.5 4.5 5.0   *

## 2021-02-01 NOTE — DISCHARGE PLANNING
Patient was provided with discharge instructions, education, and follow up information. Printed prescriptions were called in to patient to [facility] in [employer] in [place].  Patient verbalizes understanding of follow up information, specifically medicatio

## 2021-02-01 NOTE — CM/SW NOTE
BPCI Bundled Advanced Medicare Program    Plan of care reviewed for care coordination and discharge planning. Noted pt falls under  BPCI/Medicare program, with DRG Congestive Heart Failure ([address], [address])    [facility] Proposal:  [address]   PT recommendi

## 2021-02-09 ENCOUNTER — OFFICE VISIT (OUTPATIENT)
Dept: CARDIOLOGY CLINIC | Facility: HOSPITAL | Age: 76
End: 2021-02-09
Attending: NURSE PRACTITIONER
Payer: MEDICARE

## 2021-02-09 VITALS
DIASTOLIC BLOOD PRESSURE: 62 MMHG | HEART RATE: 78 BPM | BODY MASS INDEX: 31 KG/M2 | OXYGEN SATURATION: 99 % | WEIGHT: 179 LBS | SYSTOLIC BLOOD PRESSURE: 139 MMHG

## 2021-02-09 DIAGNOSIS — I50.33 ACUTE ON CHRONIC HEART FAILURE WITH PRESERVED EJECTION FRACTION (HFPEF) (HCC): Primary | Chronic | ICD-10-CM

## 2021-02-09 DIAGNOSIS — N18.32 STAGE 3B CHRONIC KIDNEY DISEASE (HCC): Chronic | ICD-10-CM

## 2021-02-09 DIAGNOSIS — I27.20 PULMONARY HYPERTENSION (HCC): Chronic | ICD-10-CM

## 2021-02-09 DIAGNOSIS — F17.200 TOBACCO USE DISORDER: Chronic | ICD-10-CM

## 2021-02-09 DIAGNOSIS — R60.0 BILATERAL LEG EDEMA: Chronic | ICD-10-CM

## 2021-02-09 DIAGNOSIS — I50.9 HEART FAILURE, UNSPECIFIED (HCC): ICD-10-CM

## 2021-02-09 PROBLEM — I10 BENIGN HYPERTENSION: Chronic | Status: ACTIVE | Noted: 2021-02-09

## 2021-02-09 LAB
ANION GAP SERPL CALC-SCNC: 1 MMOL/L (ref 0–18)
BUN BLD-MCNC: 43 MG/DL (ref 7–18)
BUN/CREAT SERPL: 21.8 (ref 10–20)
CALCIUM BLD-MCNC: 8.6 MG/DL (ref 8.5–10.1)
CHLORIDE SERPL-SCNC: 113 MMOL/L (ref 98–112)
CO2 SERPL-SCNC: 29 MMOL/L (ref 21–32)
CREAT BLD-MCNC: 1.97 MG/DL
GLUCOSE BLD-MCNC: 88 MG/DL (ref 70–99)
NT-PROBNP SERPL-MCNC: 1482 PG/ML (ref ?–125)
OSMOLALITY SERPL CALC.SUM OF ELEC: 306 MOSM/KG (ref 275–295)
PATIENT FASTING Y/N/NP: NO
POTASSIUM SERPL-SCNC: 5.2 MMOL/L (ref 3.5–5.1)
SODIUM SERPL-SCNC: 143 MMOL/L (ref 136–145)

## 2021-02-09 PROCEDURE — 99214 OFFICE O/P EST MOD 30 MIN: CPT | Performed by: NURSE PRACTITIONER

## 2021-02-09 PROCEDURE — 83880 ASSAY OF NATRIURETIC PEPTIDE: CPT | Performed by: NURSE PRACTITIONER

## 2021-02-09 PROCEDURE — 80048 BASIC METABOLIC PNL TOTAL CA: CPT | Performed by: NURSE PRACTITIONER

## 2021-02-09 PROCEDURE — 36415 COLL VENOUS BLD VENIPUNCTURE: CPT | Performed by: NURSE PRACTITIONER

## 2021-02-09 PROCEDURE — 99212 OFFICE O/P EST SF 10 MIN: CPT | Performed by: NURSE PRACTITIONER

## 2021-02-09 RX ORDER — ISOSORBIDE DINITRATE 20 MG/1
20 TABLET ORAL 3 TIMES DAILY
Qty: 270 TABLET | Refills: 0 | Status: SHIPPED | OUTPATIENT
Start: 2021-02-09 | End: 2021-02-23 | Stop reason: ALTCHOICE

## 2021-02-09 NOTE — PATIENT INSTRUCTIONS
Increase furosemide to 40 mg twice daily for 3 days then go back to furosemide 40 mg daily in the morning     Begin taking isosorbide 20 mg one tablet 3 times daily     Continue all your same medications    Call if having any dizziness, lightheadedness, he

## 2021-02-09 NOTE — PROGRESS NOTES
[address] Patient Status:  Outpatient    1945 MRN [id number]   Location [facility] Asad Steel is a 70year old female who presents to clinic f cough, hemoptysis and wheezing  Cardiovascular: negative for chest pain, exertional chest pressure/discomfort, near-syncope, orthopnea and palpitations  Gastrointestinal: negative for  diarrhea, melena, nausea and vomiting  Hematologic/lymphatic: negative tight at ankle, +2 LLE edema from mid tibial to pedal,  Skin soft , no blisters or open wounds. Pale  Redness above R ankle  Pulses: 2+ and symmetric  Neurologic: Grossly normal    Diagnostics:  EC/15/2020 SR, nsst chgs.      Echo: 2021 EF 69% mil significantly improved but still continued hypervolemia, up 10  pounds since discharge, +2-3 vitaliy LE edema R>L greatly improved since hospitalization and CANTU with exertion walking 50 feet or more  - no hypoxemia walking 300 feet, 02 sat  % on RA, HR 8 stockings during the day, remove at nighttime     Cut down on smoking, call the [facility] get quit line- 0-800 -QUIT-YES for counseling and free nicotine patches and gum    Use incentive spirometer 4 sets of 10 breaths daily       Limit sodium to  2000 mg d Delayed Release, Take 30 mg by mouth 2 (two) times daily as needed. , Disp: , Rfl:   •  Meclizine HCl 25 MG Oral Tab, Take 25 mg by mouth daily as needed.   , Disp: , Rfl:       Elvira Huffman NP  2/9/21

## 2021-02-23 ENCOUNTER — OFFICE VISIT (OUTPATIENT)
Dept: CARDIOLOGY CLINIC | Facility: HOSPITAL | Age: 76
End: 2021-02-23
Attending: NURSE PRACTITIONER
Payer: MEDICARE

## 2021-02-23 VITALS
BODY MASS INDEX: 31 KG/M2 | DIASTOLIC BLOOD PRESSURE: 64 MMHG | WEIGHT: 183 LBS | OXYGEN SATURATION: 99 % | HEART RATE: 81 BPM | SYSTOLIC BLOOD PRESSURE: 157 MMHG

## 2021-02-23 DIAGNOSIS — N18.32 STAGE 3B CHRONIC KIDNEY DISEASE (HCC): Chronic | ICD-10-CM

## 2021-02-23 DIAGNOSIS — R60.0 BILATERAL LEG EDEMA: Chronic | ICD-10-CM

## 2021-02-23 DIAGNOSIS — I10 BENIGN HYPERTENSION: Chronic | ICD-10-CM

## 2021-02-23 DIAGNOSIS — I50.33 ACUTE ON CHRONIC HEART FAILURE WITH PRESERVED EJECTION FRACTION (HFPEF) (HCC): Primary | Chronic | ICD-10-CM

## 2021-02-23 DIAGNOSIS — I50.9 HEART FAILURE, UNSPECIFIED (HCC): ICD-10-CM

## 2021-02-23 DIAGNOSIS — F17.200 TOBACCO USE DISORDER: Chronic | ICD-10-CM

## 2021-02-23 DIAGNOSIS — I27.20 PULMONARY HYPERTENSION (HCC): Chronic | ICD-10-CM

## 2021-02-23 LAB
ANION GAP SERPL CALC-SCNC: 3 MMOL/L (ref 0–18)
BUN BLD-MCNC: 29 MG/DL (ref 7–18)
BUN/CREAT SERPL: 16.2 (ref 10–20)
CALCIUM BLD-MCNC: 8.5 MG/DL (ref 8.5–10.1)
CHLORIDE SERPL-SCNC: 110 MMOL/L (ref 98–112)
CO2 SERPL-SCNC: 30 MMOL/L (ref 21–32)
CREAT BLD-MCNC: 1.79 MG/DL
GLUCOSE BLD-MCNC: 82 MG/DL (ref 70–99)
NT-PROBNP SERPL-MCNC: 2093 PG/ML (ref ?–125)
OSMOLALITY SERPL CALC.SUM OF ELEC: 301 MOSM/KG (ref 275–295)
PATIENT FASTING Y/N/NP: NO
POTASSIUM SERPL-SCNC: 5 MMOL/L (ref 3.5–5.1)
SODIUM SERPL-SCNC: 143 MMOL/L (ref 136–145)

## 2021-02-23 PROCEDURE — 36415 COLL VENOUS BLD VENIPUNCTURE: CPT | Performed by: NURSE PRACTITIONER

## 2021-02-23 PROCEDURE — 96374 THER/PROPH/DIAG INJ IV PUSH: CPT | Performed by: NURSE PRACTITIONER

## 2021-02-23 PROCEDURE — 99212 OFFICE O/P EST SF 10 MIN: CPT | Performed by: NURSE PRACTITIONER

## 2021-02-23 PROCEDURE — 83880 ASSAY OF NATRIURETIC PEPTIDE: CPT | Performed by: NURSE PRACTITIONER

## 2021-02-23 PROCEDURE — 99215 OFFICE O/P EST HI 40 MIN: CPT | Performed by: NURSE PRACTITIONER

## 2021-02-23 PROCEDURE — 80048 BASIC METABOLIC PNL TOTAL CA: CPT | Performed by: NURSE PRACTITIONER

## 2021-02-23 RX ORDER — BUMETANIDE 1 MG/1
1 TABLET ORAL 2 TIMES DAILY
Qty: 60 TABLET | Refills: 1 | Status: SHIPPED | OUTPATIENT
Start: 2021-02-24 | End: 2021-03-22

## 2021-02-23 RX ORDER — ISOSORBIDE MONONITRATE 60 MG/1
60 TABLET, EXTENDED RELEASE ORAL DAILY
Status: ON HOLD | COMMUNITY
End: 2021-09-15

## 2021-02-23 RX ORDER — FUROSEMIDE 10 MG/ML
INJECTION INTRAMUSCULAR; INTRAVENOUS
Status: COMPLETED
Start: 2021-02-23 | End: 2021-02-23

## 2021-02-23 RX ADMIN — FUROSEMIDE 40 MG: 10 INJECTION INTRAMUSCULAR; INTRAVENOUS at 13:05:00

## 2021-02-23 NOTE — PROGRESS NOTES
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  Pale  Redness above R ankle  Pulses: 2+ and symmet meds today. Taking  Furosemide 40 mg daily.    - pro BNP up  :  2093 from  1482 <--- 6738 <--- 5109  <--2824 from >14,000  -fluid overload worsening ,  up 14  pounds since discharge, +2-3 vitaliy LE edema R>L and CANTU with exertion walking 50 feet or more  -Furo 1-800 -QUIT-YES for counseling and free nicotine patches and gum    Use incentive spirometer 4 sets of 10 breaths daily       Limit sodium to  2000 mg daily.  Common high sodium foods include frozen dinners, soups (not homemade), some cereal, vegetable juic

## 2021-03-04 ENCOUNTER — OFFICE VISIT (OUTPATIENT)
Dept: CARDIOLOGY CLINIC | Facility: HOSPITAL | Age: 76
End: 2021-03-04
Attending: NURSE PRACTITIONER
Payer: MEDICARE

## 2021-03-04 VITALS
BODY MASS INDEX: 30 KG/M2 | SYSTOLIC BLOOD PRESSURE: 149 MMHG | WEIGHT: 176 LBS | DIASTOLIC BLOOD PRESSURE: 63 MMHG | HEART RATE: 81 BPM | OXYGEN SATURATION: 99 %

## 2021-03-04 DIAGNOSIS — I50.9 HEART FAILURE, UNSPECIFIED (HCC): ICD-10-CM

## 2021-03-04 DIAGNOSIS — I10 BENIGN HYPERTENSION: Chronic | ICD-10-CM

## 2021-03-04 DIAGNOSIS — F17.200 TOBACCO USE DISORDER: Chronic | ICD-10-CM

## 2021-03-04 DIAGNOSIS — I50.33 ACUTE ON CHRONIC HEART FAILURE WITH PRESERVED EJECTION FRACTION (HFPEF) (HCC): Primary | Chronic | ICD-10-CM

## 2021-03-04 DIAGNOSIS — R60.0 BILATERAL LEG EDEMA: Chronic | ICD-10-CM

## 2021-03-04 DIAGNOSIS — N18.32 STAGE 3B CHRONIC KIDNEY DISEASE (HCC): Chronic | ICD-10-CM

## 2021-03-04 LAB
ANION GAP SERPL CALC-SCNC: 1 MMOL/L (ref 0–18)
BUN BLD-MCNC: 30 MG/DL (ref 7–18)
BUN/CREAT SERPL: 17.8 (ref 10–20)
CALCIUM BLD-MCNC: 8.5 MG/DL (ref 8.5–10.1)
CHLORIDE SERPL-SCNC: 114 MMOL/L (ref 98–112)
CO2 SERPL-SCNC: 28 MMOL/L (ref 21–32)
CREAT BLD-MCNC: 1.69 MG/DL
GLUCOSE BLD-MCNC: 89 MG/DL (ref 70–99)
NT-PROBNP SERPL-MCNC: 1740 PG/ML (ref ?–125)
OSMOLALITY SERPL CALC.SUM OF ELEC: 302 MOSM/KG (ref 275–295)
PATIENT FASTING Y/N/NP: NO
POTASSIUM SERPL-SCNC: 4.6 MMOL/L (ref 3.5–5.1)
SODIUM SERPL-SCNC: 143 MMOL/L (ref 136–145)

## 2021-03-04 PROCEDURE — 99214 OFFICE O/P EST MOD 30 MIN: CPT | Performed by: NURSE PRACTITIONER

## 2021-03-04 PROCEDURE — 80048 BASIC METABOLIC PNL TOTAL CA: CPT | Performed by: NURSE PRACTITIONER

## 2021-03-04 PROCEDURE — 99212 OFFICE O/P EST SF 10 MIN: CPT | Performed by: NURSE PRACTITIONER

## 2021-03-04 PROCEDURE — 83880 ASSAY OF NATRIURETIC PEPTIDE: CPT | Performed by: NURSE PRACTITIONER

## 2021-03-04 PROCEDURE — 36415 COLL VENOUS BLD VENIPUNCTURE: CPT | Performed by: NURSE PRACTITIONER

## 2021-03-04 RX ORDER — LIDOCAINE HYDROCHLORIDE 20 MG/ML
1 SOLUTION ORAL; TOPICAL 2 TIMES DAILY WITH MEALS
Refills: 3 | COMMUNITY
Start: 2021-03-04

## 2021-03-04 RX ORDER — POLYETHYLENE GLYCOL 3350 17 G
2 POWDER IN PACKET (EA) ORAL AS NEEDED
COMMUNITY

## 2021-03-04 RX ORDER — DIAZEPAM 10 MG/1
TABLET ORAL
Qty: 90 TABLET | Refills: 0 | Status: SHIPPED | OUTPATIENT
Start: 2021-03-04 | End: 2021-04-05

## 2021-03-04 NOTE — PROGRESS NOTES
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  P <---49/3.69<---32/2.03, K 4.6  sodium 143  -bp elevated on arrival, improved to 154/64, pt has not taken diuretics today.    - pro BNP down   : 1740 <-- 2093 from  1482 <--- 6738 <--- 5109  <--2824 from >14,000  -Improved fluid overload after switching to B Common high sodium foods include frozen dinners, soups (not homemade), some cereal, vegetable juice, canned vegetables, lunch meats, processed meats like hotdogs, sausage, tillman, pepperoni, soy sauce, pre-packaged rice or potatoes.  Please remember to read

## 2021-03-04 NOTE — PATIENT INSTRUCTIONS
Continue all your same medications including bumex 1 mg twice daily     Decrease iron ( ferusol) 324 mg capsule twice daily with food     Call if having any dizziness, lightheadedness, heart racing, palpitations, chest pain, shortness of breath, coughing,

## 2021-03-09 ENCOUNTER — OFFICE VISIT (OUTPATIENT)
Dept: PAIN CLINIC | Facility: HOSPITAL | Age: 76
End: 2021-03-09
Attending: NURSE PRACTITIONER
Payer: MEDICARE

## 2021-03-09 VITALS — HEART RATE: 69 BPM | DIASTOLIC BLOOD PRESSURE: 66 MMHG | SYSTOLIC BLOOD PRESSURE: 159 MMHG

## 2021-03-09 DIAGNOSIS — G89.29 CHRONIC BILATERAL LOW BACK PAIN WITH BILATERAL SCIATICA: ICD-10-CM

## 2021-03-09 DIAGNOSIS — G89.29 CHRONIC MIDLINE LOW BACK PAIN WITHOUT SCIATICA: ICD-10-CM

## 2021-03-09 DIAGNOSIS — Z51.81 ENCOUNTER FOR MONITORING OPIOID MAINTENANCE THERAPY: Primary | ICD-10-CM

## 2021-03-09 DIAGNOSIS — M54.50 CHRONIC MIDLINE LOW BACK PAIN WITHOUT SCIATICA: ICD-10-CM

## 2021-03-09 DIAGNOSIS — G89.3 CANCER ASSOCIATED PAIN: ICD-10-CM

## 2021-03-09 DIAGNOSIS — M54.42 CHRONIC BILATERAL LOW BACK PAIN WITH BILATERAL SCIATICA: ICD-10-CM

## 2021-03-09 DIAGNOSIS — Z79.891 ENCOUNTER FOR MONITORING OPIOID MAINTENANCE THERAPY: Primary | ICD-10-CM

## 2021-03-09 DIAGNOSIS — M54.41 CHRONIC BILATERAL LOW BACK PAIN WITH BILATERAL SCIATICA: ICD-10-CM

## 2021-03-09 DIAGNOSIS — M47.816 LUMBAR FACET ARTHROPATHY: ICD-10-CM

## 2021-03-09 DIAGNOSIS — F11.90 CHRONIC, CONTINUOUS USE OF OPIOIDS: ICD-10-CM

## 2021-03-09 DIAGNOSIS — M15.9 PRIMARY OSTEOARTHRITIS INVOLVING MULTIPLE JOINTS: ICD-10-CM

## 2021-03-09 DIAGNOSIS — M48.062 SPINAL STENOSIS OF LUMBAR REGION WITH NEUROGENIC CLAUDICATION: ICD-10-CM

## 2021-03-09 PROCEDURE — 99211 OFF/OP EST MAY X REQ PHY/QHP: CPT

## 2021-03-09 RX ORDER — OXYCODONE AND ACETAMINOPHEN 10; 325 MG/1; MG/1
1 TABLET ORAL EVERY 4 HOURS PRN
Qty: 180 TABLET | Refills: 0 | Status: SHIPPED | OUTPATIENT
Start: 2021-03-17 | End: 2021-04-16

## 2021-03-09 RX ORDER — OXYCODONE AND ACETAMINOPHEN 10; 325 MG/1; MG/1
1 TABLET ORAL EVERY 4 HOURS PRN
Qty: 180 TABLET | Refills: 0 | Status: SHIPPED | OUTPATIENT
Start: 2021-05-16 | End: 2021-05-18

## 2021-03-09 RX ORDER — OXYCODONE AND ACETAMINOPHEN 10; 325 MG/1; MG/1
1 TABLET ORAL EVERY 4 HOURS PRN
Qty: 180 TABLET | Refills: 0 | Status: SHIPPED | OUTPATIENT
Start: 2021-04-16 | End: 2021-05-16

## 2021-03-09 NOTE — PROGRESS NOTES
PT presents to the CPM in wheelchair accompanied by son. KELLEY Cárdenas saw PT for med eval.  See notes for POC.

## 2021-03-09 NOTE — CHRONIC PAIN
Follow-up Note  HISTORY OF PRESENT ILLNESS:  Paulo Duke is a 70year old old female, returns to the clinic for evaluation treatment of her chronic low back pain  Is well-known to the pain clinic,  she has severe spinal stenosis and a retroperitoneal sa atorvastatin 40 MG Oral Tab Take 40 mg by mouth. AT BEDTIME  3   • lansoprazole 30 MG Oral Capsule Delayed Release Take 30 mg by mouth 2 (two) times daily as needed. • Meclizine HCl 25 MG Oral Tab Take 25 mg by mouth daily as needed.             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    surgery       SURGICAL HISTORY:  Past Surgical History:   Procedure Laterality Date   • CARPAL TUNNEL RELEASE Right    • CHOLECYSTECTOMY     • HAND/FINGER SURGERY UNLISTED Right 2004    hand surgery   • [facility]  Financial Resource Strain:       Difficulty of Paying Living Expenses:   Food Insecurity:       Worried About [address] in the Last Year:       Ran Out of Food in the Last Year:   Transportation Needs:       Lack of Transportation ([facility] on 07/25/2019 12:46 PM    Attending: Myrna Khan on 07/25/2019 1:02 PM  PROCEDURE:  XR HIP W OR WO PELVIS 2 OR 3 VIEWS, RIGHT (CPT=73502)     COMPARISON: None. INDICATIONS:  Right hip pain radiating to back after sustaining a fall today.      DIMITRY carotid arteries. Partially   imaged coarse left thyroid calcification.              =====  CONCLUSION:   1. No acute fracture or traumatic subluxation of the cervical spine.     2. Mild-to-moderate multilevel cervical spine degenerative changes, which are medication, dose, instructions, refill dates, and pharmacy verified with patient. Prescriptions were electronically sent to pharmacy. Patient to follow in two months   Total Time: 13 minutes     Comprehensive analgesic plan was formulated.  Conservative v

## 2021-03-22 ENCOUNTER — PATIENT MESSAGE (OUTPATIENT)
Dept: FAMILY MEDICINE CLINIC | Facility: CLINIC | Age: 76
End: 2021-03-22

## 2021-03-22 DIAGNOSIS — N18.30 STAGE 3 CHRONIC KIDNEY DISEASE, UNSPECIFIED WHETHER STAGE 3A OR 3B CKD (HCC): Primary | ICD-10-CM

## 2021-03-22 RX ORDER — BUMETANIDE 1 MG/1
1 TABLET ORAL 2 TIMES DAILY
Qty: 60 TABLET | Refills: 1 | Status: SHIPPED | OUTPATIENT
Start: 2021-03-22 | End: 2021-04-19

## 2021-04-05 RX ORDER — DIAZEPAM 10 MG/1
10 TABLET ORAL 3 TIMES DAILY
Qty: 90 TABLET | Refills: 0 | Status: SHIPPED | OUTPATIENT
Start: 2021-04-05 | End: 2021-05-04

## 2021-04-19 RX ORDER — BUMETANIDE 1 MG/1
1 TABLET ORAL 2 TIMES DAILY
Qty: 60 TABLET | Refills: 1 | Status: SHIPPED | OUTPATIENT
Start: 2021-04-19 | End: 2021-09-15

## 2021-05-04 ENCOUNTER — TELEPHONE (OUTPATIENT)
Dept: PAIN CLINIC | Facility: HOSPITAL | Age: 76
End: 2021-05-04

## 2021-05-04 RX ORDER — DIAZEPAM 10 MG/1
TABLET ORAL
Qty: 90 TABLET | Refills: 0 | Status: SHIPPED | OUTPATIENT
Start: 2021-05-04 | End: 2021-06-15

## 2021-05-18 RX ORDER — OXYCODONE AND ACETAMINOPHEN 10; 325 MG/1; MG/1
1 TABLET ORAL EVERY 4 HOURS PRN
Qty: 126 TABLET | Refills: 0 | Status: SHIPPED | OUTPATIENT
Start: 2021-05-18 | End: 2021-06-15

## 2021-06-15 ENCOUNTER — OFFICE VISIT (OUTPATIENT)
Dept: PAIN CLINIC | Facility: HOSPITAL | Age: 76
End: 2021-06-15
Attending: NURSE PRACTITIONER
Payer: MEDICARE

## 2021-06-15 VITALS — HEART RATE: 65 BPM | OXYGEN SATURATION: 99 % | DIASTOLIC BLOOD PRESSURE: 66 MMHG | SYSTOLIC BLOOD PRESSURE: 150 MMHG

## 2021-06-15 DIAGNOSIS — Z51.81 ENCOUNTER FOR MONITORING OPIOID MAINTENANCE THERAPY: Primary | ICD-10-CM

## 2021-06-15 DIAGNOSIS — G89.3 CANCER ASSOCIATED PAIN: ICD-10-CM

## 2021-06-15 DIAGNOSIS — G89.29 CHRONIC MIDLINE LOW BACK PAIN WITHOUT SCIATICA: ICD-10-CM

## 2021-06-15 DIAGNOSIS — F11.90 CHRONIC, CONTINUOUS USE OF OPIOIDS: ICD-10-CM

## 2021-06-15 DIAGNOSIS — G89.29 CHRONIC BILATERAL LOW BACK PAIN WITH BILATERAL SCIATICA: ICD-10-CM

## 2021-06-15 DIAGNOSIS — M54.42 CHRONIC BILATERAL LOW BACK PAIN WITH BILATERAL SCIATICA: ICD-10-CM

## 2021-06-15 DIAGNOSIS — M54.50 CHRONIC MIDLINE LOW BACK PAIN WITHOUT SCIATICA: ICD-10-CM

## 2021-06-15 DIAGNOSIS — M47.816 LUMBAR FACET ARTHROPATHY: ICD-10-CM

## 2021-06-15 DIAGNOSIS — M48.062 SPINAL STENOSIS OF LUMBAR REGION WITH NEUROGENIC CLAUDICATION: ICD-10-CM

## 2021-06-15 DIAGNOSIS — M15.9 PRIMARY OSTEOARTHRITIS INVOLVING MULTIPLE JOINTS: ICD-10-CM

## 2021-06-15 DIAGNOSIS — M54.41 CHRONIC BILATERAL LOW BACK PAIN WITH BILATERAL SCIATICA: ICD-10-CM

## 2021-06-15 DIAGNOSIS — Z79.891 ENCOUNTER FOR MONITORING OPIOID MAINTENANCE THERAPY: Primary | ICD-10-CM

## 2021-06-15 PROCEDURE — 99211 OFF/OP EST MAY X REQ PHY/QHP: CPT

## 2021-06-15 RX ORDER — OXYCODONE AND ACETAMINOPHEN 10; 325 MG/1; MG/1
1 TABLET ORAL EVERY 4 HOURS PRN
Qty: 180 TABLET | Refills: 0 | Status: SHIPPED | OUTPATIENT
Start: 2021-07-15 | End: 2021-08-14

## 2021-06-15 RX ORDER — DIAZEPAM 10 MG/1
10 TABLET ORAL 3 TIMES DAILY
Qty: 90 TABLET | Refills: 2 | Status: SHIPPED | OUTPATIENT
Start: 2021-06-15 | End: 2021-11-15

## 2021-06-15 RX ORDER — OXYCODONE AND ACETAMINOPHEN 10; 325 MG/1; MG/1
1 TABLET ORAL EVERY 4 HOURS PRN
Qty: 180 TABLET | Refills: 0 | Status: SHIPPED | OUTPATIENT
Start: 2021-06-15 | End: 2021-07-15

## 2021-06-15 RX ORDER — OXYCODONE AND ACETAMINOPHEN 10; 325 MG/1; MG/1
1 TABLET ORAL EVERY 4 HOURS PRN
Qty: 180 TABLET | Refills: 0 | Status: SHIPPED | OUTPATIENT
Start: 2021-08-14 | End: 2021-09-07

## 2021-06-15 NOTE — PROGRESS NOTES
PT presents to the CPM in a wheelchair. Son in [place]. KELLEY Cárdenas saw PT for 3 month med eval.  See notes for POC.
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
## 2021-09-07 ENCOUNTER — OFFICE VISIT (OUTPATIENT)
Dept: PAIN CLINIC | Facility: HOSPITAL | Age: 76
End: 2021-09-07
Attending: NURSE PRACTITIONER
Payer: MEDICARE

## 2021-09-07 VITALS — OXYGEN SATURATION: 96 % | DIASTOLIC BLOOD PRESSURE: 68 MMHG | HEART RATE: 74 BPM | SYSTOLIC BLOOD PRESSURE: 154 MMHG

## 2021-09-07 DIAGNOSIS — M15.9 PRIMARY OSTEOARTHRITIS INVOLVING MULTIPLE JOINTS: ICD-10-CM

## 2021-09-07 DIAGNOSIS — M54.42 CHRONIC BILATERAL LOW BACK PAIN WITH BILATERAL SCIATICA: ICD-10-CM

## 2021-09-07 DIAGNOSIS — Z79.891 ENCOUNTER FOR MONITORING OPIOID MAINTENANCE THERAPY: Primary | ICD-10-CM

## 2021-09-07 DIAGNOSIS — M47.816 LUMBAR FACET ARTHROPATHY: ICD-10-CM

## 2021-09-07 DIAGNOSIS — G89.3 CANCER ASSOCIATED PAIN: ICD-10-CM

## 2021-09-07 DIAGNOSIS — Z51.81 ENCOUNTER FOR MONITORING OPIOID MAINTENANCE THERAPY: Primary | ICD-10-CM

## 2021-09-07 DIAGNOSIS — G89.29 CHRONIC BILATERAL LOW BACK PAIN WITH BILATERAL SCIATICA: ICD-10-CM

## 2021-09-07 DIAGNOSIS — M48.062 SPINAL STENOSIS OF LUMBAR REGION WITH NEUROGENIC CLAUDICATION: ICD-10-CM

## 2021-09-07 DIAGNOSIS — M54.41 CHRONIC BILATERAL LOW BACK PAIN WITH BILATERAL SCIATICA: ICD-10-CM

## 2021-09-07 DIAGNOSIS — F11.90 CHRONIC, CONTINUOUS USE OF OPIOIDS: ICD-10-CM

## 2021-09-07 LAB
AMPHET UR QL SCN: NEGATIVE
BARBITURATES UR QL SCN: NEGATIVE
CANNABINOIDS UR QL SCN: NEGATIVE
COCAINE UR QL: NEGATIVE
CREAT UR-SCNC: 205 MG/DL
MDMA UR QL SCN: NEGATIVE
METHADONE UR QL SCN: NEGATIVE
PCP UR QL SCN: NEGATIVE

## 2021-09-07 PROCEDURE — 80346 BENZODIAZEPINES1-12: CPT | Performed by: NURSE PRACTITIONER

## 2021-09-07 PROCEDURE — 99211 OFF/OP EST MAY X REQ PHY/QHP: CPT

## 2021-09-07 PROCEDURE — 80361 OPIATES 1 OR MORE: CPT | Performed by: NURSE PRACTITIONER

## 2021-09-07 PROCEDURE — 80307 DRUG TEST PRSMV CHEM ANLYZR: CPT | Performed by: NURSE PRACTITIONER

## 2021-09-07 RX ORDER — OXYCODONE AND ACETAMINOPHEN 10; 325 MG/1; MG/1
1 TABLET ORAL EVERY 4 HOURS PRN
Qty: 180 TABLET | Refills: 0 | Status: SHIPPED | OUTPATIENT
Start: 2021-11-16 | End: 2021-09-15

## 2021-09-07 RX ORDER — OXYCODONE AND ACETAMINOPHEN 10; 325 MG/1; MG/1
1 TABLET ORAL EVERY 4 HOURS PRN
Qty: 180 TABLET | Refills: 0 | Status: SHIPPED | OUTPATIENT
Start: 2021-09-17 | End: 2021-10-17

## 2021-09-07 RX ORDER — OXYCODONE AND ACETAMINOPHEN 10; 325 MG/1; MG/1
1 TABLET ORAL EVERY 4 HOURS PRN
Qty: 180 TABLET | Refills: 0 | Status: SHIPPED | OUTPATIENT
Start: 2021-10-17 | End: 2021-11-15

## 2021-09-07 NOTE — PROGRESS NOTES
Pt presents to CPM in wheel chair with CG. Pt states no changes although pt is having increased low back pain today. UDS and NA updated at this visit. KELLEY Cintron to see pt see notes for POC.
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
## 2021-09-10 LAB
7-AMINOCLONAZEPAM, URINE: <5 NG/ML
ALPHA-HYDROXYALPRAZOLAM, URINE: <5 NG/ML
ALPHA-HYDROXYMIDAZOLAM, URINE: <20 NG/ML
ALPRAZOLAM, URINE: <5 NG/ML
CHLORDIAZEPOXIDE, URINE: <20 NG/ML
CLONAZEPAM, URINE: <5 NG/ML
DIAZEPAM, URINE: 25 NG/ML
LORAZEPAM, URINE: <20 NG/ML
MIDAZOLAM, URINE: <20 NG/ML
NORDIAZEPAM, URINE: 2500 NG/ML
OPIATES, UR, 6-ACETYLMORPHINE: <10 NG/ML
OPIATES, URINE, CODEINE: <20 NG/ML
OPIATES, URINE, HYDROCODONE: <20 NG/ML
OPIATES, URINE, HYDROMORPHONE: <20 NG/ML
OPIATES, URINE, MORPHINE: <20 NG/ML
OPIATES, URINE, NORHYDROCODONE: <20 NG/ML
OPIATES, URINE, NOROXYCODONE: >4000 NG/ML
OPIATES, URINE, NOROXYMORPHONE: 512 NG/ML
OPIATES, URINE, OXYCODONE: 2264 NG/ML
OPIATES, URINE, OXYMORPHONE: 26 NG/ML
OXAZEPAM, URINE: >4000 NG/ML
TEMAZEPAM, URINE: >4000 NG/ML

## 2021-09-12 ENCOUNTER — APPOINTMENT (OUTPATIENT)
Dept: CT IMAGING | Facility: HOSPITAL | Age: 76
DRG: 683 | End: 2021-09-12
Attending: EMERGENCY MEDICINE
Payer: MEDICARE

## 2021-09-12 ENCOUNTER — HOSPITAL ENCOUNTER (INPATIENT)
Facility: HOSPITAL | Age: 76
LOS: 3 days | Discharge: HOME HEALTH | DRG: 683 | End: 2021-09-15
Attending: EMERGENCY MEDICINE | Admitting: INTERNAL MEDICINE
Payer: MEDICARE

## 2021-09-12 ENCOUNTER — APPOINTMENT (OUTPATIENT)
Dept: GENERAL RADIOLOGY | Facility: HOSPITAL | Age: 76
DRG: 683 | End: 2021-09-12
Attending: EMERGENCY MEDICINE
Payer: MEDICARE

## 2021-09-12 DIAGNOSIS — N28.9 ACUTE ON CHRONIC RENAL INSUFFICIENCY: ICD-10-CM

## 2021-09-12 DIAGNOSIS — R74.01 TRANSAMINITIS: ICD-10-CM

## 2021-09-12 DIAGNOSIS — E87.5 HYPERKALEMIA: Primary | ICD-10-CM

## 2021-09-12 DIAGNOSIS — N18.9 ACUTE ON CHRONIC RENAL INSUFFICIENCY: ICD-10-CM

## 2021-09-12 DIAGNOSIS — N30.00 ACUTE CYSTITIS WITHOUT HEMATURIA: ICD-10-CM

## 2021-09-12 DIAGNOSIS — R77.8 ELEVATED TROPONIN: ICD-10-CM

## 2021-09-12 PROBLEM — R79.89 ELEVATED TROPONIN: Status: ACTIVE | Noted: 2021-09-12

## 2021-09-12 LAB
ALBUMIN SERPL-MCNC: 3.5 G/DL (ref 3.4–5)
ALP LIVER SERPL-CCNC: 210 U/L
ALT SERPL-CCNC: 486 U/L
ANION GAP SERPL CALC-SCNC: 4 MMOL/L (ref 0–18)
APTT PPP: 31.8 SECONDS (ref 23.2–35.3)
AST SERPL-CCNC: 897 U/L (ref 15–37)
BASOPHILS # BLD AUTO: 0.03 X10(3) UL (ref 0–0.2)
BASOPHILS NFR BLD AUTO: 0.5 %
BILIRUB DIRECT SERPL-MCNC: <0.1 MG/DL (ref 0–0.2)
BILIRUB SERPL-MCNC: 0.4 MG/DL (ref 0.1–2)
BILIRUB UR QL: NEGATIVE
BUN BLD-MCNC: 55 MG/DL (ref 7–18)
BUN/CREAT SERPL: 26.1 (ref 10–20)
CALCIUM BLD-MCNC: 8.6 MG/DL (ref 8.5–10.1)
CHLORIDE SERPL-SCNC: 117 MMOL/L (ref 98–112)
CHOLEST SMN-MCNC: 117 MG/DL (ref ?–200)
CO2 SERPL-SCNC: 21 MMOL/L (ref 21–32)
COLOR UR: YELLOW
CREAT BLD-MCNC: 2.11 MG/DL
DEPRECATED RDW RBC AUTO: 45.1 FL (ref 35.1–46.3)
EOSINOPHIL # BLD AUTO: 0.04 X10(3) UL (ref 0–0.7)
EOSINOPHIL NFR BLD AUTO: 0.7 %
ERYTHROCYTE [DISTWIDTH] IN BLOOD BY AUTOMATED COUNT: 13.2 % (ref 11–15)
GLUCOSE BLD-MCNC: 99 MG/DL (ref 70–99)
GLUCOSE BLDC GLUCOMTR-MCNC: 100 MG/DL (ref 70–99)
GLUCOSE BLDC GLUCOMTR-MCNC: 152 MG/DL (ref 70–99)
GLUCOSE UR-MCNC: NEGATIVE MG/DL
HCT VFR BLD AUTO: 31.6 %
HDLC SERPL-MCNC: 66 MG/DL (ref 40–59)
HGB BLD-MCNC: 10.3 G/DL
HYALINE CASTS #/AREA URNS AUTO: PRESENT /LPF
IMM GRANULOCYTES # BLD AUTO: 0.01 X10(3) UL (ref 0–1)
IMM GRANULOCYTES NFR BLD: 0.2 %
INR BLD: 1.07 (ref 0.9–1.2)
KETONES UR-MCNC: NEGATIVE MG/DL
LDLC SERPL CALC-MCNC: 37 MG/DL (ref ?–100)
LYMPHOCYTES # BLD AUTO: 0.86 X10(3) UL (ref 1–4)
LYMPHOCYTES NFR BLD AUTO: 15.8 %
M PROTEIN MFR SERPL ELPH: 7.4 G/DL (ref 6.4–8.2)
MAGNESIUM SERPL-MCNC: 2.4 MG/DL (ref 1.6–2.6)
MCH RBC QN AUTO: 30.4 PG (ref 26–34)
MCHC RBC AUTO-ENTMCNC: 32.6 G/DL (ref 31–37)
MCV RBC AUTO: 93.2 FL
MONOCYTES # BLD AUTO: 0.48 X10(3) UL (ref 0.1–1)
MONOCYTES NFR BLD AUTO: 8.8 %
NEUTROPHILS # BLD AUTO: 4.04 X10 (3) UL (ref 1.5–7.7)
NEUTROPHILS # BLD AUTO: 4.04 X10(3) UL (ref 1.5–7.7)
NEUTROPHILS NFR BLD AUTO: 74 %
NITRITE UR QL STRIP.AUTO: POSITIVE
NONHDLC SERPL-MCNC: 51 MG/DL (ref ?–130)
OSMOLALITY SERPL CALC.SUM OF ELEC: 309 MOSM/KG (ref 275–295)
PH UR: 5 [PH] (ref 5–8)
PLATELET # BLD AUTO: 214 10(3)UL (ref 150–450)
POTASSIUM SERPL-SCNC: 4.8 MMOL/L (ref 3.5–5.1)
POTASSIUM SERPL-SCNC: 6.1 MMOL/L (ref 3.5–5.1)
PROT UR-MCNC: 30 MG/DL
PROTHROMBIN TIME: 13.7 SECONDS (ref 11.8–14.5)
RBC # BLD AUTO: 3.39 X10(6)UL
SARS-COV-2 RNA RESP QL NAA+PROBE: NOT DETECTED
SODIUM SERPL-SCNC: 142 MMOL/L (ref 136–145)
SP GR UR STRIP: 1.02 (ref 1–1.03)
TRIGL SERPL-MCNC: 69 MG/DL (ref 30–149)
TROPONIN I SERPL-MCNC: 0.35 NG/ML (ref ?–0.04)
UROBILINOGEN UR STRIP-ACNC: <2
VLDLC SERPL CALC-MCNC: 9 MG/DL (ref 0–30)
WBC # BLD AUTO: 5.5 X10(3) UL (ref 4–11)

## 2021-09-12 PROCEDURE — 74176 CT ABD & PELVIS W/O CONTRAST: CPT | Performed by: EMERGENCY MEDICINE

## 2021-09-12 PROCEDURE — 70450 CT HEAD/BRAIN W/O DYE: CPT | Performed by: EMERGENCY MEDICINE

## 2021-09-12 PROCEDURE — 71045 X-RAY EXAM CHEST 1 VIEW: CPT | Performed by: EMERGENCY MEDICINE

## 2021-09-12 RX ORDER — GABAPENTIN 100 MG/1
100 CAPSULE ORAL NIGHTLY
Status: DISCONTINUED | OUTPATIENT
Start: 2021-09-13 | End: 2021-09-15

## 2021-09-12 RX ORDER — ISOSORBIDE MONONITRATE 60 MG/1
60 TABLET, EXTENDED RELEASE ORAL DAILY
Status: DISCONTINUED | OUTPATIENT
Start: 2021-09-13 | End: 2021-09-15

## 2021-09-12 RX ORDER — HEPARIN SODIUM 5000 [USP'U]/ML
5000 INJECTION, SOLUTION INTRAVENOUS; SUBCUTANEOUS EVERY 12 HOURS
Status: DISCONTINUED | OUTPATIENT
Start: 2021-09-13 | End: 2021-09-15

## 2021-09-12 RX ORDER — ATORVASTATIN CALCIUM 40 MG/1
40 TABLET, FILM COATED ORAL NIGHTLY
Status: DISCONTINUED | OUTPATIENT
Start: 2021-09-13 | End: 2021-09-15

## 2021-09-12 RX ORDER — PANTOPRAZOLE SODIUM 20 MG/1
20 TABLET, DELAYED RELEASE ORAL
Status: DISCONTINUED | OUTPATIENT
Start: 2021-09-13 | End: 2021-09-15

## 2021-09-12 RX ORDER — VANCOMYCIN HYDROCHLORIDE 125 MG/1
125 CAPSULE ORAL DAILY
Status: DISCONTINUED | OUTPATIENT
Start: 2021-09-12 | End: 2021-09-15

## 2021-09-12 RX ORDER — OXYCODONE AND ACETAMINOPHEN 10; 325 MG/1; MG/1
1 TABLET ORAL EVERY 6 HOURS PRN
Status: DISCONTINUED | OUTPATIENT
Start: 2021-09-12 | End: 2021-09-15

## 2021-09-12 RX ORDER — HYDRALAZINE HYDROCHLORIDE 25 MG/1
25 TABLET, FILM COATED ORAL 2 TIMES DAILY
Status: DISCONTINUED | OUTPATIENT
Start: 2021-09-13 | End: 2021-09-15

## 2021-09-12 RX ORDER — ALBUTEROL SULFATE 2.5 MG/3ML
10 SOLUTION RESPIRATORY (INHALATION) ONCE
Status: COMPLETED | OUTPATIENT
Start: 2021-09-12 | End: 2021-09-12

## 2021-09-12 RX ORDER — SODIUM CHLORIDE 9 MG/ML
INJECTION, SOLUTION INTRAVENOUS CONTINUOUS
Status: DISCONTINUED | OUTPATIENT
Start: 2021-09-12 | End: 2021-09-14

## 2021-09-12 RX ORDER — ASPIRIN 81 MG/1
81 TABLET ORAL DAILY
Status: DISCONTINUED | OUTPATIENT
Start: 2021-09-13 | End: 2021-09-15

## 2021-09-12 RX ORDER — DEXTROSE MONOHYDRATE 25 G/50ML
50 INJECTION, SOLUTION INTRAVENOUS ONCE
Status: DISCONTINUED | OUTPATIENT
Start: 2021-09-12 | End: 2021-09-15

## 2021-09-12 RX ORDER — DEXTROSE MONOHYDRATE 25 G/50ML
50 INJECTION, SOLUTION INTRAVENOUS ONCE
Status: COMPLETED | OUTPATIENT
Start: 2021-09-12 | End: 2021-09-12

## 2021-09-12 NOTE — ED PROVIDER NOTES
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    CREATININE, URINE, RANDOM   MICROALB/CREAT RATIO, RANDOM URINE   SODIUM, URINE, RANDOM   RAINBOW DRAW LAVENDER   RAINBOW DRAW LIGHT GREEN   RAINBOW DRAW GOLD   URINE CULTURE, ROUTINE     EKG    Rate, intervals and axes as noted on mass.  SKULL: No mass or other significant visible lesion. SINUSES: Limited views demonstrate no significant mucosal thickening or fluid. ORBITS: Limited views are unremarkable. OTHER: Atherosclerotic calcification cavernous carotid arteries.  Dystrophic amount of stool in colon. No obstruction, bowel wall thickening or mesenteric mass. ABDOMINAL WALL: Small fat containing umbilical hernia. Small fat containing right inguinal hernia. URINARY BLADDER: Normal. ASCITES:    None.  PELVIC ORGANS: Posterior ut Time: 60 minutes including time spent examining and re-evaluating the patient, ordering and reviewing laboratory tests, documenting, reviewing previous records, obtaining information from the family, and speaking with consultants, admitting doctors, nurses

## 2021-09-12 NOTE — ED INITIAL ASSESSMENT (HPI)
Patient brought in by EMS from home with son. Son told EMS patient had fallen two times today unwitnessed. Patient has no complaints at this time.

## 2021-09-13 ENCOUNTER — APPOINTMENT (OUTPATIENT)
Dept: CV DIAGNOSTICS | Facility: HOSPITAL | Age: 76
DRG: 683 | End: 2021-09-13
Attending: INTERNAL MEDICINE
Payer: MEDICARE

## 2021-09-13 LAB
ALBUMIN SERPL-MCNC: 3 G/DL (ref 3.4–5)
ALBUMIN/GLOB SERPL: 0.9 {RATIO} (ref 1–2)
ALP LIVER SERPL-CCNC: 225 U/L
ALT SERPL-CCNC: 761 U/L
ANION GAP SERPL CALC-SCNC: 3 MMOL/L (ref 0–18)
AST SERPL-CCNC: 1257 U/L (ref 15–37)
BASOPHILS # BLD AUTO: 0.04 X10(3) UL (ref 0–0.2)
BASOPHILS NFR BLD AUTO: 0.9 %
BILIRUB SERPL-MCNC: 0.7 MG/DL (ref 0.1–2)
BUN BLD-MCNC: 44 MG/DL (ref 7–18)
BUN/CREAT SERPL: 25.3 (ref 10–20)
CALCIUM BLD-MCNC: 7.9 MG/DL (ref 8.5–10.1)
CHLORIDE SERPL-SCNC: 116 MMOL/L (ref 98–112)
CO2 SERPL-SCNC: 21 MMOL/L (ref 21–32)
CREAT BLD-MCNC: 1.74 MG/DL
CREAT UR-SCNC: 65 MG/DL
CREAT UR-SCNC: 65 MG/DL
DEPRECATED HBV CORE AB SER IA-ACNC: 2223.2 NG/ML
DEPRECATED RDW RBC AUTO: 44.9 FL (ref 35.1–46.3)
EOSINOPHIL # BLD AUTO: 0.09 X10(3) UL (ref 0–0.7)
EOSINOPHIL NFR BLD AUTO: 2.1 %
ERYTHROCYTE [DISTWIDTH] IN BLOOD BY AUTOMATED COUNT: 12.9 % (ref 11–15)
EST. AVERAGE GLUCOSE BLD GHB EST-MCNC: 126 MG/DL (ref 68–126)
GLOBULIN PLAS-MCNC: 3.4 G/DL (ref 2.8–4.4)
GLUCOSE BLD-MCNC: 73 MG/DL (ref 70–99)
GLUCOSE BLDC GLUCOMTR-MCNC: 88 MG/DL (ref 70–99)
GLUCOSE BLDC GLUCOMTR-MCNC: 94 MG/DL (ref 70–99)
HAV IGM SER QL: NONREACTIVE
HBA1C MFR BLD HPLC: 6 % (ref ?–5.7)
HBV CORE IGM SER QL: NONREACTIVE
HBV SURFACE AG SERPL QL IA: NONREACTIVE
HCT VFR BLD AUTO: 29.8 %
HCV AB SERPL QL IA: NONREACTIVE
HGB BLD-MCNC: 9.5 G/DL
IMM GRANULOCYTES # BLD AUTO: 0.01 X10(3) UL (ref 0–1)
IMM GRANULOCYTES NFR BLD: 0.2 %
IRON SATURATION: 28 %
IRON SERPL-MCNC: 102 UG/DL
LYMPHOCYTES # BLD AUTO: 0.85 X10(3) UL (ref 1–4)
LYMPHOCYTES NFR BLD AUTO: 20 %
M PROTEIN MFR SERPL ELPH: 6.4 G/DL (ref 6.4–8.2)
MAGNESIUM SERPL-MCNC: 2.1 MG/DL (ref 1.6–2.6)
MCH RBC QN AUTO: 29.8 PG (ref 26–34)
MCHC RBC AUTO-ENTMCNC: 31.9 G/DL (ref 31–37)
MCV RBC AUTO: 93.4 FL
MICROALBUMIN UR-MCNC: 7.87 MG/DL
MICROALBUMIN/CREAT 24H UR-RTO: 121.1 UG/MG (ref ?–30)
MONOCYTES # BLD AUTO: 0.34 X10(3) UL (ref 0.1–1)
MONOCYTES NFR BLD AUTO: 8 %
NEUTROPHILS # BLD AUTO: 2.91 X10 (3) UL (ref 1.5–7.7)
NEUTROPHILS # BLD AUTO: 2.91 X10(3) UL (ref 1.5–7.7)
NEUTROPHILS NFR BLD AUTO: 68.8 %
OSMOLALITY SERPL CALC.SUM OF ELEC: 300 MOSM/KG (ref 275–295)
PHOSPHATE SERPL-MCNC: 3.9 MG/DL (ref 2.5–4.9)
PLATELET # BLD AUTO: 174 10(3)UL (ref 150–450)
POTASSIUM SERPL-SCNC: 5.4 MMOL/L (ref 3.5–5.1)
RBC # BLD AUTO: 3.19 X10(6)UL
SODIUM SERPL-SCNC: 135 MMOL/L
SODIUM SERPL-SCNC: 140 MMOL/L (ref 136–145)
TOTAL IRON BINDING CAPACITY: 368 UG/DL (ref 240–450)
TRANSFERRIN SERPL-MCNC: 247 MG/DL (ref 200–360)
TROPONIN I SERPL-MCNC: 0.4 NG/ML (ref ?–0.04)
WBC # BLD AUTO: 4.2 X10(3) UL (ref 4–11)

## 2021-09-13 PROCEDURE — 99223 1ST HOSP IP/OBS HIGH 75: CPT | Performed by: INTERNAL MEDICINE

## 2021-09-13 PROCEDURE — 93306 TTE W/DOPPLER COMPLETE: CPT | Performed by: INTERNAL MEDICINE

## 2021-09-13 RX ORDER — AMLODIPINE BESYLATE 5 MG/1
5 TABLET ORAL DAILY
Status: DISCONTINUED | OUTPATIENT
Start: 2021-09-13 | End: 2021-09-15

## 2021-09-13 NOTE — PLAN OF CARE
Problem: Patient Centered Care  Goal: Patient preferences are identified and integrated in the patient's plan of care  Description: Interventions:  - What would you like us to know as we care for you?   - Provide timely, complete, and accurate informatio of electrolyte imbalances  - Administer electrolyte replacement as ordered  - Monitor response to electrolyte replacements, including rhythm and repeat lab results as appropriate  - Fluid restriction as ordered  - Instruct patient on fluid and nutrition re

## 2021-09-13 NOTE — ED QUICK NOTES
Report given to CCU ELLIE Coleman. Transport request placed. Pt and son updated on POC. Continue to monitor.

## 2021-09-13 NOTE — CONSULTS
[facility]    PATIENT'S NAME: Trever Martines   ATTENDING PHYSICIAN: Avni Reynolds MD   CONSULTING PHYSICIAN: Willy Shi MD   PATIENT ACCOUNT#:   [id number]    LOCATION:  2WSW [address] #:   [id number]       DATE OF ASHIA Continues to smoke 1 pack of cigarettes per day. Normally lives at home. FAMILY HISTORY:  Negative for renal pathology. REVIEW OF SYSTEMS:  The patient states she just does not feel quite right but no chest pain or shortness of breath.   No change in enlarged retroperitoneal lymph nodes. She is status post cholecystectomy but does have mild to moderate dilatation of the extrahepatic biliary tree. IMPRESSION:  The patient is a 22-year-old female who now has:    1.    Acute on chronic renal insufficie

## 2021-09-13 NOTE — PROGRESS NOTES
[facility] - [facility]    Progress Note    Angeli Quinn Patient Status:  Inpatient    1945 MRN [id number]   Location [facility] 2W/SW Attending [address], [address] Day # 1 PCP PHYSICIAN NONSTAFF       Subjective:   Seth Lance 09/13/2021    K 5.4 (H) 09/13/2021     (H) 09/13/2021    CO2 21.0 09/13/2021    GLU 73 09/13/2021    CA 7.9 (L) 09/13/2021    ALB 3.0 (L) 09/13/2021    ALKPHO 225 (H) 09/13/2021    BILT 0.7 09/13/2021    TP 6.4 09/13/2021    AST 1,257 (H) 09/13/2021 fluids  Hold diuretics   Monitor renal function  Cr trending down   Nephrology following        Elevated troponin  Cardiology consult noted   No ischemic w/u at this time        Transaminitis  CT abdomen showed large retroperitoneal mass mild to moderate d

## 2021-09-13 NOTE — H&P
[facility] Patient Status:  Inpatient    1945 MRN [id number]   [facility] 2W/SW Attending [address], [address] [facility] Day # 0 PCP PHYSICIAN NONSTAFF     Date:  2021 use: No      Alcohol/week: 0.0 standard drinks    Drug use: No    Allergies/Medications:    Allergies: No Known Allergies  [START ON 10/17/2021] oxyCODONE-acetaminophen (PERCOCET)  MG Oral Tab, Take 1 tablet by mouth every 4 (four) hours as needed for (1.626 m), weight 186 lb 4.6 oz (84.5 kg), SpO2 100 %, not currently breastfeeding.      General appearance: alert and cooperative  Head: Normocephalic,   Neck:  no JVD and supple  Pulmonary:  clear to auscultation bilaterally  Cardiovascular: S1, S2 normal 9/12/2021 at [address] by (CST): Christopher Ruano MD on 9/12/2021 at 6:25 PM          XR CHEST AP PORTABLE  (CPT=71045)    Result Date: 9/12/2021  CONCLUSION:  1. No acute cardiopulmonary finding.     Dictated by (CST): Christopher Ruano MD on 9/12/2

## 2021-09-13 NOTE — CONSULTS
[address] Patient Status:  Inpatient   Date of Birth 8/14/1945 MRN [id number]   [facility] 2W/SW Attending [address], [address] Day # 1 PCP PHYSICIAN NONSTAFF     Date of Date 11/16/21, Taking? Yes, Authorizing Provider Evelyne Silveira, APRN    Medication diazepam 10 MG Oral Tab, Sig Take 1 tablet (10 mg total) by mouth 3 (three) times daily. , Start Date 6/15/21, End Date , Taking?  Yes, Authorizing Provider Evelyne Silveira End Date 12/16/21, Taking? , Authorizing Provider Chandra Severance, APRN    Medication bumetanide (BUMEX) 1 MG Oral Tab, Sig Take 1 tablet (1 mg total) by mouth 2 (two) times daily. , Start Date 4/19/21, End Date , Taking? , [id number] Provider Jayesh Muse Pertinent positives and negatives noted in the the HPI.       Physical Exam:    Vital Signs:    09/13/21  0500   BP: 145/60   Pulse: 85   Resp: 19   Temp:      General: Alert, oriented and in no acute distress  HEENT: normocephalic; non-icteric; oral cavity Normal unenhanced liver. BILIARY:            Post cholecystectomy with mild to moderate dilatation of the extrahepatic biliary tree. SPLEEN:           Multiple calcified splenic granulomas. PANCREAS:      Normal unenhanced pancreas.     ADRENALS:      No etiologies should be considered but seem less likely. The bile duct dilation is chronic and most likely secondary to her previous cholecystectomy. Plan:    1. Follow liver enzymes. 2. Acute hepatitis panel. 3. Follow clinical course.       Keyla Obrien

## 2021-09-13 NOTE — PLAN OF CARE
Patient up to chair, ambulated in room. VSS. Remains on IV antibiotics. MD rounded and ordered for patient to be downgraded to remote tele. Awaiting on a bed for transfer. Discussed plan of care with patient. Will continue to monitor.       Problem: Patient consult as needed  - Instruct patient on self management of diabetes  Outcome: Progressing  Goal: Electrolytes maintained within normal limits  Description: INTERVENTIONS:  - Monitor labs and rhythm and assess patient for signs and symptoms of electrolyte Progressing     Problem: SAFETY ADULT - FALL  Goal: Free from fall injury  Description: INTERVENTIONS:  - Assess pt frequently for physical needs  - Identify cognitive and physical deficits and behaviors that affect risk of falls.   - [place] fall precaut

## 2021-09-13 NOTE — CONSULTS
[facility] CARDIOLOGY CONSULT      Monica Mathis is a 78year old female who I assessed as follows:  Patient presents with:  [address]:    Elevated troponin            ASSESSMENT/PLAN:     IMPRESSIONS:  1. Weakness with falls. History:  Social History    Tobacco Use      Smoking status: Current Every Day Smoker        Packs/day: 1.00      Smokeless tobacco: Never Used    Vaping Use      Vaping Use: Never used    Alcohol use: No      Alcohol/week: 0.0 standard drinks    Drug use: Max:98.8 °F (37.1 °C)    Wt Readings from Last 3 Encounters:  09/12/21 : 186 lb 4.6 oz (84.5 kg)  03/04/21 : 176 lb (79.8 kg)  02/23/21 : 183 lb (83 kg)            GENERAL: well developed, well nourished, in no apparent distress  SKIN: no rashes  HEENT: at Labs   Lab 09/13/21  0446   RBC 3.19*   HGB 9.5*   HCT 29.8*   MCV 93.4   MCH 29.8   MCHC 31.9   RDW 12.9   NEPRELIM 2.91   WBC 4.2   .0         Imaging:  CT BRAIN OR HEAD (94228)    Result Date: 9/12/2021  CONCLUSION:  1.  No acute intracranial find

## 2021-09-14 LAB
ALBUMIN SERPL-MCNC: 2.8 G/DL (ref 3.4–5)
ALBUMIN/GLOB SERPL: 0.8 {RATIO} (ref 1–2)
ALP LIVER SERPL-CCNC: 228 U/L
ALT SERPL-CCNC: 445 U/L
ANION GAP SERPL CALC-SCNC: 5 MMOL/L (ref 0–18)
AST SERPL-CCNC: 387 U/L (ref 15–37)
BASOPHILS # BLD AUTO: 0.04 X10(3) UL (ref 0–0.2)
BASOPHILS NFR BLD AUTO: 1 %
BILIRUB DIRECT SERPL-MCNC: 0.2 MG/DL (ref 0–0.2)
BILIRUB SERPL-MCNC: 0.7 MG/DL (ref 0.1–2)
BUN BLD-MCNC: 33 MG/DL (ref 7–18)
BUN/CREAT SERPL: 20.1 (ref 10–20)
CALCIUM BLD-MCNC: 8.1 MG/DL (ref 8.5–10.1)
CHLORIDE SERPL-SCNC: 117 MMOL/L (ref 98–112)
CO2 SERPL-SCNC: 21 MMOL/L (ref 21–32)
CREAT BLD-MCNC: 1.64 MG/DL
DEPRECATED RDW RBC AUTO: 42.8 FL (ref 35.1–46.3)
EOSINOPHIL # BLD AUTO: 0.21 X10(3) UL (ref 0–0.7)
EOSINOPHIL NFR BLD AUTO: 5.3 %
ERYTHROCYTE [DISTWIDTH] IN BLOOD BY AUTOMATED COUNT: 12.8 % (ref 11–15)
GLOBULIN PLAS-MCNC: 3.4 G/DL (ref 2.8–4.4)
GLUCOSE BLD-MCNC: 88 MG/DL (ref 70–99)
GLUCOSE BLDC GLUCOMTR-MCNC: 96 MG/DL (ref 70–99)
HCT VFR BLD AUTO: 28.7 %
HGB BLD-MCNC: 9.4 G/DL
IMM GRANULOCYTES # BLD AUTO: 0.01 X10(3) UL (ref 0–1)
IMM GRANULOCYTES NFR BLD: 0.3 %
LYMPHOCYTES # BLD AUTO: 0.86 X10(3) UL (ref 1–4)
LYMPHOCYTES NFR BLD AUTO: 21.8 %
M PROTEIN MFR SERPL ELPH: 6.2 G/DL (ref 6.4–8.2)
MAGNESIUM SERPL-MCNC: 1.9 MG/DL (ref 1.6–2.6)
MCH RBC QN AUTO: 30.3 PG (ref 26–34)
MCHC RBC AUTO-ENTMCNC: 32.8 G/DL (ref 31–37)
MCV RBC AUTO: 92.6 FL
MONOCYTES # BLD AUTO: 0.29 X10(3) UL (ref 0.1–1)
MONOCYTES NFR BLD AUTO: 7.4 %
NEUTROPHILS # BLD AUTO: 2.53 X10 (3) UL (ref 1.5–7.7)
NEUTROPHILS # BLD AUTO: 2.53 X10(3) UL (ref 1.5–7.7)
NEUTROPHILS NFR BLD AUTO: 64.2 %
OSMOLALITY SERPL CALC.SUM OF ELEC: 303 MOSM/KG (ref 275–295)
PHOSPHATE SERPL-MCNC: 3.3 MG/DL (ref 2.5–4.9)
PLATELET # BLD AUTO: 160 10(3)UL (ref 150–450)
POTASSIUM SERPL-SCNC: 5 MMOL/L (ref 3.5–5.1)
RBC # BLD AUTO: 3.1 X10(6)UL
SODIUM SERPL-SCNC: 143 MMOL/L (ref 136–145)
WBC # BLD AUTO: 3.9 X10(3) UL (ref 4–11)

## 2021-09-14 PROCEDURE — 99233 SBSQ HOSP IP/OBS HIGH 50: CPT | Performed by: INTERNAL MEDICINE

## 2021-09-14 RX ORDER — HYDRALAZINE HYDROCHLORIDE 20 MG/ML
10 INJECTION INTRAMUSCULAR; INTRAVENOUS EVERY 6 HOURS PRN
Status: DISCONTINUED | OUTPATIENT
Start: 2021-09-14 | End: 2021-09-15

## 2021-09-14 NOTE — PROGRESS NOTES
Assessment and Plan:     1. Acute on chronic HFpEF  - Echo 1/28/2020: EF 40-17: diastolic dysfunction; RVSP 52  - Echo 9/2020 Rachel Esquivel): EF 65-70%; RVSP 68 mmHg; mild aortic stenosis  - Dr Sharon Orellana at [facility] follows (onc cardiology)   2.  Elevated troponin  - n (85870)    Result Date: 9/12/2021  CONCLUSION:  1. No acute intracranial finding. 2. Atherosclerotic calcification cavernous carotid arteries.     Dictated by (CST): Lissett Todd MD on 9/12/2021 at 6:12 PM     Finalized by (CST): Lissett Todd MD on 9/12

## 2021-09-14 NOTE — PLAN OF CARE
PT/OT ordered to evaluate for hhc needs vs REVA  Remote tele order  Problem: Patient Centered Care  Goal: Patient preferences are identified and integrated in the patient's plan of care  Description: Interventions:  - What would you like us to know as we ca falls.  - [place] fall precautions as indicated by assessment.  - Educate pt/family on patient safety including physical limitations  - Instruct pt to call for assistance with activity based on assessment  - Modify environment to reduce risk of injury  - nutrition consult as needed  - Instruct patient on self management of diabetes  Outcome: Not Progressing     Problem: SKIN/TISSUE INTEGRITY - ADULT  Goal: Skin integrity remains intact  Description: INTERVENTIONS  - Assess and document risk factors for pre

## 2021-09-14 NOTE — PLAN OF CARE
Problem: Patient Centered Care  Goal: Patient preferences are identified and integrated in the patient's plan of care  Description: Interventions:  - What would you like us to know as we care for yo  - Provide timely, complete, and accurate information t electrolyte imbalances  - Administer electrolyte replacement as ordered  - Monitor response to electrolyte replacements, including rhythm and repeat lab results as appropriate  - Fluid restriction as ordered  - Instruct patient on fluid and nutrition restr

## 2021-09-14 NOTE — PROGRESS NOTES
[facility] AND [facility]  GI Progress Note      Elvis Obregon Patient Status:  Inpatient    1945 MRN [id number]   Location [facility] 2W/SW Attending [address], [address] Day # 2 PCP PHYSICIAN NONSTAFF          SUBJECTIVE:     She feel

## 2021-09-14 NOTE — PROGRESS NOTES
[place] [facility] - [facility]    Progress Note    Mandy Calzada Patient Status:  Inpatient    1945 MRN [id number]   Location [facility] 2W/SW Attending [address], [address] Day # 2 PCP PHYSICIAN NONSTAFF       Subjective:   Anastacio Covarrubias .0 09/14/2021    CREATSERUM 1.64 (H) 09/14/2021    BUN 33 (H) 09/14/2021     09/14/2021    K 5.0 09/14/2021     (H) 09/14/2021    CO2 21.0 09/14/2021    GLU 88 09/14/2021    CA 8.1 (L) 09/14/2021    ALB 2.8 (L) 09/14/2021    ALKPHO 22 Quinten Swain MD          Assessment and Plan:   Hyperkalemia  Acute kidney injury  Improved   Cr trending down   Nephrology following        Elevated troponin  Cardiology consult noted   No ischemic w/u at this time        Transaminitis  CT abdomen emma

## 2021-09-14 NOTE — PROGRESS NOTES
[facility]  Nephrology Daily Progress Note    Melecio Duque  [id number]  78year old      HPI:   Melecio Duque is a 78year old female. Overall feels better. Ambulating in room and anxious to go home. Eating and drinking well.   No CP a reflexes and motor skills appropriate for age    Labs:  Lab Results   Component Value Date    WBC 3.9 09/14/2021    HGB 9.4 09/14/2021    HCT 28.7 09/14/2021    .0 09/14/2021    CREATSERUM 1.64 09/14/2021    BUN 33 09/14/2021     09/14/2021 tree.  Findings may be related to the post cholecystectomy state. Correlation with cholestatic enzymes recommended. 3. Colonic diverticulosis. Large amount of stool in colon.     Dictated by (CST): Xavier James MD on 9/12/2021 at [address] b foraminal mod HNP, L3-4 right paracentral mild HNP     L5-S1 left mod foraminal, L3-4 bilateral mod foraminal, L2-3 mild-mod diffuse, L1-2 right mild foraminal bulging discs     L5-S1 right mod/left severe, L4-5 right mod-severe/left mild, L3-4 right mild-

## 2021-09-14 NOTE — PROGRESS NOTES
Pt received from PCCU. Alert and oriented x4. VSS. On room air. Tolerating diet, no nausea. Had BM, attempted to collect cdiff, pt went in hat but mixed with urine, will continue to attempt cdiff collection.  Pt educated on importance of calling prior to am

## 2021-09-14 NOTE — PROGRESS NOTES
09/13/21 2132   Provider Notification   Reason for Communication Other (comment)   Test/Procedure Name pt [facility]   Provider Name Aleshia Porter MD   Method of Communication Page   Response See orders   Notification Time 2133     Pt w

## 2021-09-14 NOTE — CM/SW NOTE
09/14/21 1400   CM/SW Referral Data   Referral Source Physician;    Reason for Referral Discharge planning   Informant Patient   Patient Info   Patient's Current Mental Status at Time of Assessment Alert; Oriented   Patient's Home Environmen

## 2021-09-15 VITALS
RESPIRATION RATE: 18 BRPM | HEART RATE: 88 BPM | TEMPERATURE: 98 F | SYSTOLIC BLOOD PRESSURE: 146 MMHG | HEIGHT: 64 IN | OXYGEN SATURATION: 98 % | DIASTOLIC BLOOD PRESSURE: 70 MMHG | BODY MASS INDEX: 30.85 KG/M2 | WEIGHT: 180.69 LBS

## 2021-09-15 LAB
ALBUMIN SERPL-MCNC: 3 G/DL (ref 3.4–5)
ALBUMIN SERPL-MCNC: 3 G/DL (ref 3.4–5)
ALP LIVER SERPL-CCNC: 223 U/L
ALT SERPL-CCNC: 307 U/L
ANION GAP SERPL CALC-SCNC: 4 MMOL/L (ref 0–18)
AST SERPL-CCNC: 171 U/L (ref 15–37)
BASOPHILS # BLD AUTO: 0.03 X10(3) UL (ref 0–0.2)
BASOPHILS NFR BLD AUTO: 0.6 %
BILIRUB DIRECT SERPL-MCNC: 0.2 MG/DL (ref 0–0.2)
BILIRUB SERPL-MCNC: 0.3 MG/DL (ref 0.1–2)
BUN BLD-MCNC: 28 MG/DL (ref 7–18)
BUN/CREAT SERPL: 19.2 (ref 10–20)
CALCIUM BLD-MCNC: 8.3 MG/DL (ref 8.5–10.1)
CHLORIDE SERPL-SCNC: 116 MMOL/L (ref 98–112)
CO2 SERPL-SCNC: 22 MMOL/L (ref 21–32)
CREAT BLD-MCNC: 1.46 MG/DL
DEPRECATED RDW RBC AUTO: 44.3 FL (ref 35.1–46.3)
EOSINOPHIL # BLD AUTO: 0.18 X10(3) UL (ref 0–0.7)
EOSINOPHIL NFR BLD AUTO: 3.7 %
ERYTHROCYTE [DISTWIDTH] IN BLOOD BY AUTOMATED COUNT: 12.9 % (ref 11–15)
GLUCOSE BLD-MCNC: 85 MG/DL (ref 70–99)
HCT VFR BLD AUTO: 28 %
HGB BLD-MCNC: 9.1 G/DL
IMM GRANULOCYTES # BLD AUTO: 0.01 X10(3) UL (ref 0–1)
IMM GRANULOCYTES NFR BLD: 0.2 %
LYMPHOCYTES # BLD AUTO: 0.95 X10(3) UL (ref 1–4)
LYMPHOCYTES NFR BLD AUTO: 19.7 %
MAGNESIUM SERPL-MCNC: 2 MG/DL (ref 1.6–2.6)
MCH RBC QN AUTO: 30.4 PG (ref 26–34)
MCHC RBC AUTO-ENTMCNC: 32.5 G/DL (ref 31–37)
MCV RBC AUTO: 93.6 FL
MONOCYTES # BLD AUTO: 0.43 X10(3) UL (ref 0.1–1)
MONOCYTES NFR BLD AUTO: 8.9 %
NEUTROPHILS # BLD AUTO: 3.22 X10 (3) UL (ref 1.5–7.7)
NEUTROPHILS # BLD AUTO: 3.22 X10(3) UL (ref 1.5–7.7)
NEUTROPHILS NFR BLD AUTO: 66.9 %
OSMOLALITY SERPL CALC.SUM OF ELEC: 299 MOSM/KG (ref 275–295)
PHOSPHATE SERPL-MCNC: 3.5 MG/DL (ref 2.5–4.9)
PLATELET # BLD AUTO: 161 10(3)UL (ref 150–450)
POTASSIUM SERPL-SCNC: 5 MMOL/L (ref 3.5–5.1)
PROT SERPL-MCNC: 6.4 G/DL (ref 6.4–8.2)
RBC # BLD AUTO: 2.99 X10(6)UL
SODIUM SERPL-SCNC: 142 MMOL/L (ref 136–145)
WBC # BLD AUTO: 4.8 X10(3) UL (ref 4–11)

## 2021-09-15 PROCEDURE — 99232 SBSQ HOSP IP/OBS MODERATE 35: CPT | Performed by: INTERNAL MEDICINE

## 2021-09-15 RX ORDER — AMLODIPINE BESYLATE 5 MG/1
5 TABLET ORAL DAILY
Qty: 30 TABLET | Refills: 0 | Status: SHIPPED | OUTPATIENT
Start: 2021-09-16

## 2021-09-15 RX ORDER — HYDRALAZINE HYDROCHLORIDE 25 MG/1
25 TABLET, FILM COATED ORAL EVERY 8 HOURS SCHEDULED
Status: DISCONTINUED | OUTPATIENT
Start: 2021-09-15 | End: 2021-09-15

## 2021-09-15 RX ORDER — ISOSORBIDE MONONITRATE 60 MG/1
60 TABLET, EXTENDED RELEASE ORAL DAILY
Qty: 30 TABLET | Refills: 0 | Status: SHIPPED | OUTPATIENT
Start: 2021-09-15

## 2021-09-15 RX ORDER — VANCOMYCIN HYDROCHLORIDE 125 MG/1
125 CAPSULE ORAL DAILY
Status: DISCONTINUED | OUTPATIENT
Start: 2021-09-15 | End: 2021-09-15

## 2021-09-15 RX ORDER — VANCOMYCIN HYDROCHLORIDE 125 MG/1
125 CAPSULE ORAL DAILY
Qty: 7 CAPSULE | Refills: 0 | Status: SHIPPED | OUTPATIENT
Start: 2021-09-16 | End: 2021-09-23

## 2021-09-15 RX ORDER — CEFADROXIL 500 MG/1
500 CAPSULE ORAL 2 TIMES DAILY
Qty: 4 CAPSULE | Refills: 0 | Status: SHIPPED | OUTPATIENT
Start: 2021-09-15 | End: 2021-09-17

## 2021-09-15 RX ORDER — MAGNESIUM HYDROXIDE/ALUMINUM HYDROXICE/SIMETHICONE 120; 1200; 1200 MG/30ML; MG/30ML; MG/30ML
30 SUSPENSION ORAL EVERY 6 HOURS PRN
Status: DISCONTINUED | OUTPATIENT
Start: 2021-09-15 | End: 2021-09-15

## 2021-09-15 NOTE — HOME CARE LIAISON
Received referral via [facility] for Home Health. Spoke w/ pt - pt currently declining HH services. Notified SW/Kailey Woodall.

## 2021-09-15 NOTE — PROGRESS NOTES
[facility]    Progress Note    Corby Marcos Patient Status:  Inpatient    1945 MRN [id number]   Location [facility] 2W/SW Attending [address], [address] Day # 3 PCP PHYSICIAN NONSTAFF       Subjective:   Andreina Campbell WBC 4.8 09/15/2021    HGB 9.1 (L) 09/15/2021    HCT 28.0 (L) 09/15/2021    .0 09/15/2021    CREATSERUM 1.46 (H) 09/15/2021    BUN 28 (H) 09/15/2021     09/15/2021    K 5.0 09/15/2021     (H) 09/15/2021    CO2 22.0 09/15/2021    GLU 85 09

## 2021-09-15 NOTE — PHYSICAL THERAPY NOTE
PHYSICAL THERAPY EVALUATION - INPATIENT     Room Number: 467/467-A  Evaluation Date: 9/15/2021  Type of Evaluation: Initial   Physician Order: PT Eval and Treat    Presenting Problem: hyperkalemia  Reason for Therapy: Mobility Dysfunction and Discharge Pl Fair  Frequency (Obs): Daily       PHYSICAL THERAPY MEDICAL/SOCIAL HISTORY     History related to current admission:      Problem List  Principal Problem:    Hyperkalemia  Active Problems:    Elevated troponin    Transaminitis    Acute on chronic renal ins blankets)?: A Little   -   Sitting down on and standing up from a chair with arms (e.g., wheelchair, bedside commode, etc.): A Little   -   Moving from lying on back to sitting on the side of the bed?: A Little   How much help from another person does the

## 2021-09-15 NOTE — PROGRESS NOTES
[facility] AND [facility]  GI Progress Note      Mandy Calzada Patient Status:  Inpatient    1945 MRN [id number]   Location [facility] 2W/SW Attending [address], [address] [place] [address] Day # 3 PCP PHYSICIAN NONSTAFF          SUBJECTIVE:     She feel

## 2021-09-15 NOTE — PROGRESS NOTES
09/15/21 1300   Clinical Encounter Type   Visited With Patient and family together   Routine Visit Introduction   Continue Visiting No     Abilio Fuentes visited with pt and pts son. Introduced self as member of spiritual care for the unit.  Pt retired

## 2021-09-15 NOTE — OCCUPATIONAL THERAPY NOTE
OCCUPATIONAL THERAPY EVALUATION - INPATIENT     Room Number: 467/467-A  Evaluation Date: 9/15/2021  Type of Evaluation: Initial  Presenting Problem:  (hyperkalemia)    Physician Order: IP Consult to Occupational Therapy  Reason for Therapy: ADL/IADL Dysfun patient on benefits of [facility] services to ensure safety with ADLs within the home environment and reduce risk of falls, but patient is refusing all [facility] services.      DISCHARGE RECOMMENDATIONS  OT Discharge Recommendations: Home; Home with home health PT/OT  OT driving, at baseline. MI for functional mobility/transfers using RW. SUBJECTIVE  \"I'm hoping I can go home today. \"    OCCUPATIONAL THERAPY EXAMINATION      OBJECTIVE  Precautions: None  Fall Risk: High fall risk    PAIN ASSESSMENT  Ratin (per obs)  Feeding: Independent (per obs)  Bathing: Min assist (per obs)  Toileting: CGA  Upper Extremity Dressing: Independent while seated (per obs)  Lower Extremity Dressing: SBA while seated at EOB for d/d socks    Education Provided: OT role in acute

## 2021-09-15 NOTE — PLAN OF CARE
Patient is alert &oriented x4. On room air. Tele in place, no calls received. IV Rocephin, PO Vanco. Percocet prn for pain management. Voiding WNL. Stool sample for c.diff still needs to be obtained. Tolerating cardiac/renal/sodium restricted diet. Up SBA. target range  - Assess barriers to adequate nutritional intake and initiate nutrition consult as needed  - Instruct patient on self management of diabetes  Outcome: Progressing  Goal: Electrolytes maintained within normal limits  Description: INTERVENTIONS precautions as appropriate  - Initiate Pressure Ulcer prevention bundle as indicated  Outcome: Progressing     Problem: SAFETY ADULT - FALL  Goal: Free from fall injury  Description: INTERVENTIONS:  - Assess pt frequently for physical needs  - Identify cog

## 2021-09-15 NOTE — PROGRESS NOTES
Assessment and Plan:     1. Acute on chronic HFpEF  - Echo 1/28/2020: EF 19-66: diastolic dysfunction; RVSP 52  - Echo 9/2020 [facility]): EF 65-70%; RVSP 68 mmHg; mild aortic stenosis  - Dr Jesus French at [facility] follows (onc cardiology)   2.  Elevated troponin  - n

## 2021-09-15 NOTE — PROGRESS NOTES
[facility] - [facility]  Nephrology Daily Progress Note    Tenisha Nunez  [id number]  78year old      HPI:   Tenisha Nunez is a 78year old female. Feeling well and hoping to go home. Has chronic diarrhea but eating and drinking well.    Ambulating deformities  Extremities: no edema, cyanosis, or clubbing  Neurological: exam appropriate for age reflexes and motor skills appropriate for age    Labs:  Lab Results   Component Value Date    WBC 4.8 09/15/2021    HGB 9.1 09/15/2021    HCT 28.0 09/15/2021 Colonic diverticulosis. Large amount of stool in colon.     Dictated by (CST): Monet Cowart MD on 9/12/2021 at [address] by (CST): Monet Cowart MD on 9/12/2021 at 6:25 PM          XR CHEST AP PORTABLE  (CPT=71045)    Result Date: 9/12/2021 mod foraminal, L2-3 mild-mod diffuse, L1-2 right mild foraminal bulging discs     L5-S1 right mod/left severe, L4-5 right mod-severe/left mild, L3-4 right mild-mod/left mod foraminal stenosis     L4-5 mod central stenosis     Degenerative arthritis     Leg

## 2021-09-15 NOTE — PLAN OF CARE
Patient is alert and orientated x4 but has poor safety cues. Patient is on a cardiac/renal diet with caloric restrictions. Patient tolerates the diet well without complaints of nausea or cramping.  Patient uses a walker and is a standby assist. Patient had ordered  - Assess for signs and symptoms of hyperglycemia and hypoglycemia  - Administer ordered medications to maintain glucose within target range  - Assess barriers to adequate nutritional intake and initiate nutrition consult as needed  - Instruct christy document dressing/incision, wound bed, drain sites and surrounding tissue  - Implement wound care per orders  - Initiate isolation precautions as appropriate  - Initiate Pressure Ulcer prevention bundle as indicated  Outcome: Progressing     Problem: SAFET

## 2021-10-15 NOTE — DISCHARGE SUMMARY
[place] [facility] - [facility]    Discharge Summary    Adri Parish Patient Status:  Inpatient    1945 MRN [id number]   Location [facility] 4W/SW/SE Attending No att. providers found   Hosp Day # 3 PCP PHYSICIAN NONSTAFF     Date of Admission: troponin cardiology was consulted and recommended to have no further ischemic work-up once medically cleared she was discharged home recommended to follow-up with the PCP and cardiologist as outpatient in 1 to 2 weeks.     Course     Hyperkalemia  Acute kid mouth daily. , Print Script, Disp-30 tablet, R-0      Home Meds - Modified    isosorbide mononitrate ER 60 MG Oral Tablet 24 Hr  Take 1 tablet (60 mg total) by mouth daily. , Normal, Disp-30 tablet, R-0      Home Meds - Unchanged    !! oxyCODONE-acetaminophe Follow up Labs: as ordered.         Jolanta Magana MD  10/15/2021

## 2021-11-15 ENCOUNTER — OFFICE VISIT (OUTPATIENT)
Dept: PAIN CLINIC | Facility: HOSPITAL | Age: 76
End: 2021-11-15
Attending: ANESTHESIOLOGY
Payer: MEDICARE

## 2021-11-15 VITALS
DIASTOLIC BLOOD PRESSURE: 71 MMHG | BODY MASS INDEX: 31.1 KG/M2 | WEIGHT: 182.19 LBS | RESPIRATION RATE: 18 BRPM | HEIGHT: 64 IN | HEART RATE: 74 BPM | SYSTOLIC BLOOD PRESSURE: 127 MMHG

## 2021-11-15 DIAGNOSIS — M54.42 CHRONIC BILATERAL LOW BACK PAIN WITH BILATERAL SCIATICA: ICD-10-CM

## 2021-11-15 DIAGNOSIS — G89.3 CANCER ASSOCIATED PAIN: ICD-10-CM

## 2021-11-15 DIAGNOSIS — M54.41 CHRONIC BILATERAL LOW BACK PAIN WITH BILATERAL SCIATICA: ICD-10-CM

## 2021-11-15 DIAGNOSIS — G89.29 CHRONIC BILATERAL LOW BACK PAIN WITH BILATERAL SCIATICA: ICD-10-CM

## 2021-11-15 DIAGNOSIS — Z79.891 ENCOUNTER FOR MONITORING OPIOID MAINTENANCE THERAPY: Primary | ICD-10-CM

## 2021-11-15 DIAGNOSIS — Z51.81 ENCOUNTER FOR MONITORING OPIOID MAINTENANCE THERAPY: Primary | ICD-10-CM

## 2021-11-15 PROCEDURE — 99211 OFF/OP EST MAY X REQ PHY/QHP: CPT

## 2021-11-15 RX ORDER — OXYCODONE AND ACETAMINOPHEN 10; 325 MG/1; MG/1
1 TABLET ORAL EVERY 4 HOURS PRN
Qty: 180 TABLET | Refills: 0 | Status: SHIPPED | OUTPATIENT
Start: 2021-12-15 | End: 2022-01-13

## 2021-11-15 RX ORDER — DIAZEPAM 10 MG/1
10 TABLET ORAL 3 TIMES DAILY
Qty: 90 TABLET | Refills: 2 | Status: SHIPPED | OUTPATIENT
Start: 2021-11-15 | End: 2021-12-15

## 2021-11-15 RX ORDER — OXYCODONE AND ACETAMINOPHEN 10; 325 MG/1; MG/1
1 TABLET ORAL EVERY 4 HOURS PRN
Qty: 180 TABLET | Refills: 0 | Status: SHIPPED | OUTPATIENT
Start: 2021-11-15 | End: 2021-12-15

## 2021-11-15 NOTE — CHRONIC PAIN
Follow-up Note  HISTORY OF PRESENT ILLNESS:  Bonnie Anne is a 78year old old female, returns to the clinic for evaluation treatment of her chronic low back pain  Is well-known to the pain clinic,  she has severe spinal stenosis and a retroperitoneal sa FEROSUL 325 (65 Fe) MG Oral Tab Take 1 tablet (325 mg total) by mouth 2 (two) times daily with meals. 3   • nicotine polacrilex (NICOTINE MINI) 2 MG Mouth/Throat Lozenge Place 2 mg inside cheek as needed for Smoking cessation.      • hydrALAzine HCl 25 MG Acute on chronic congestive heart failure, unspecified heart failure type (HCC)     Uncontrolled hypertension     Benign hypertension     Tobacco use disorder     Hyperkalemia     Elevated troponin     Transaminitis     Acute on chronic renal insufficien Asked        Special Diet: Not Asked        Back Care: Not Asked        Exercise: No        Bike Helmet: Not Asked        Seat Belt: Not Asked        Self-Exams: Not Asked    Social History Narrative      The patient uses the following assistive device(s): back after sustaining a fall today. TECHNIQUE:    3 views were obtained. FINDINGS:          BONES:             No acute fracture or subluxation. No suspicious bone lesion. Hip joints are intact. Mild degenerative change of both SI joints.   Deg cervical spine degenerative changes, which are most pronounced at C4-C5 and C5-C6. 3. Nonspecific straightening of the normal cervical lordosis. 4. Bilateral carotid bifurcation atherosclerosis. 5. Probable mild biapical emphysema.         Remote - PS including pharmacotherapy (eg. Anti- inflammatories, muscle relaxants, neuropathic medications, oral steroids, analgesics), injections, and further testing.  Risks and benefits of all options were discussed at length to patients satisfaction during a compre

## 2021-11-15 NOTE — PROGRESS NOTES
11/15/2021, Reason F/U medication eval, Presented ambulatory in wheel chair and patient has walker and Son is with patient, Chronic whole body pain 9/10, Seen by Dr. Sam Yao refer to dictation for contiune plan of care.        NA -----9/7/2021  UDS---9/7/2

## 2021-12-03 ENCOUNTER — TELEPHONE (OUTPATIENT)
Dept: PAIN CLINIC | Facility: HOSPITAL | Age: 76
End: 2021-12-03

## 2021-12-09 RX ORDER — DIAZEPAM 10 MG/1
10 TABLET ORAL 3 TIMES DAILY
Qty: 90 TABLET | Refills: 2 | OUTPATIENT
Start: 2021-12-09 | End: 2022-01-08

## 2021-12-18 ENCOUNTER — APPOINTMENT (OUTPATIENT)
Dept: CT IMAGING | Facility: HOSPITAL | Age: 76
End: 2021-12-18
Attending: EMERGENCY MEDICINE
Payer: MEDICARE

## 2021-12-18 ENCOUNTER — HOSPITAL ENCOUNTER (EMERGENCY)
Facility: HOSPITAL | Age: 76
Discharge: HOME | End: 2021-12-18
Attending: EMERGENCY MEDICINE
Payer: MEDICARE

## 2021-12-18 ENCOUNTER — APPOINTMENT (OUTPATIENT)
Dept: GENERAL RADIOLOGY | Facility: HOSPITAL | Age: 76
End: 2021-12-18
Attending: EMERGENCY MEDICINE
Payer: MEDICARE

## 2021-12-18 VITALS
BODY MASS INDEX: 27.46 KG/M2 | OXYGEN SATURATION: 96 % | TEMPERATURE: 98 F | HEIGHT: 63 IN | DIASTOLIC BLOOD PRESSURE: 73 MMHG | SYSTOLIC BLOOD PRESSURE: 176 MMHG | HEART RATE: 73 BPM | RESPIRATION RATE: 12 BRPM | WEIGHT: 155 LBS
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
## 2021-12-18 PROCEDURE — 36415 COLL VENOUS BLD VENIPUNCTURE: CPT

## 2021-12-18 PROCEDURE — 80048 BASIC METABOLIC PNL TOTAL CA: CPT | Performed by: EMERGENCY MEDICINE

## 2021-12-18 PROCEDURE — 85025 COMPLETE CBC W/AUTO DIFF WBC: CPT | Performed by: EMERGENCY MEDICINE

## 2021-12-18 PROCEDURE — 99285 EMERGENCY DEPT VISIT HI MDM: CPT

## 2021-12-18 PROCEDURE — 70450 CT HEAD/BRAIN W/O DYE: CPT | Performed by: EMERGENCY MEDICINE

## 2021-12-18 PROCEDURE — 71045 X-RAY EXAM CHEST 1 VIEW: CPT | Performed by: EMERGENCY MEDICINE

## 2021-12-18 PROCEDURE — 93005 ELECTROCARDIOGRAM TRACING: CPT

## 2021-12-18 PROCEDURE — 83735 ASSAY OF MAGNESIUM: CPT | Performed by: EMERGENCY MEDICINE

## 2021-12-18 PROCEDURE — 93010 ELECTROCARDIOGRAM REPORT: CPT | Performed by: EMERGENCY MEDICINE

## 2021-12-18 NOTE — ED INITIAL ASSESSMENT (HPI)
Patient was found by her son laying by the bed. Patient doesn't recall falling off the bed. Per son, patient is \"confused all the time. \" Per EMS, patient seemed to be confused a little bit at the beginning, but afterwards patient was oriented. .  P

## 2021-12-18 NOTE — ED PROVIDER NOTES
Patient Seen in: [facility] AND [facility] Emergency Department      History   Patient presents with:  Fall    Stated Complaint:     Subjective:   HPI    72-year-old female presents for evaluation after a fall at home. Patient fell from her bed this evening.   Virginia Reynolds 0102 94 %   O2 Device 12/18/21 0102 None (Room air)       Current:/60   Pulse 69   Temp 97.8 °F (36.6 °C) (Temporal)   Resp 10   Ht 160 cm (5' 3\")   Wt 70.3 kg   SpO2 95%   Breastfeeding No   BMI 27.46 kg/m²         Physical Exam  Vitals and nursing General: No deformity. Normal range of motion.       Right shoulder: Normal.      Left shoulder: Normal.      Right elbow: Normal.      Left elbow: Normal.      Right wrist: Normal.      Left wrist: Normal.      Right hand: Normal.      Left hand: Normal. CBC With Differential With Platelet.   Procedure                               Abnormality         Status                     ---------                               -----------         ------                     CBC W/ DIFFERENTIAL[[id number]]          Abno case directly please call [phone] (extension [phone]). If you can't reach me at this number, do not leave a voicemail. Please call [phone] ext [phone] and ask for the next available Radiologist.    Bonifacio Majano M.D.   This report has been electronica

## 2022-01-13 ENCOUNTER — OFFICE VISIT (OUTPATIENT)
Dept: PAIN CLINIC | Facility: HOSPITAL | Age: 77
End: 2022-01-13
Attending: NURSE PRACTITIONER
Payer: MEDICARE

## 2022-01-13 VITALS — HEART RATE: 85 BPM | OXYGEN SATURATION: 99 % | DIASTOLIC BLOOD PRESSURE: 61 MMHG | SYSTOLIC BLOOD PRESSURE: 138 MMHG

## 2022-01-13 DIAGNOSIS — M47.816 LUMBAR FACET ARTHROPATHY: ICD-10-CM

## 2022-01-13 DIAGNOSIS — M15.9 PRIMARY OSTEOARTHRITIS INVOLVING MULTIPLE JOINTS: ICD-10-CM

## 2022-01-13 DIAGNOSIS — F11.90 CHRONIC, CONTINUOUS USE OF OPIOIDS: ICD-10-CM

## 2022-01-13 DIAGNOSIS — G89.29 CHRONIC BILATERAL LOW BACK PAIN WITH BILATERAL SCIATICA: ICD-10-CM

## 2022-01-13 DIAGNOSIS — Z79.891 ENCOUNTER FOR MONITORING OPIOID MAINTENANCE THERAPY: Primary | ICD-10-CM

## 2022-01-13 DIAGNOSIS — G89.3 CANCER ASSOCIATED PAIN: ICD-10-CM

## 2022-01-13 DIAGNOSIS — G89.29 CHRONIC MIDLINE LOW BACK PAIN WITHOUT SCIATICA: ICD-10-CM

## 2022-01-13 DIAGNOSIS — M48.062 SPINAL STENOSIS OF LUMBAR REGION WITH NEUROGENIC CLAUDICATION: ICD-10-CM

## 2022-01-13 DIAGNOSIS — M54.41 CHRONIC BILATERAL LOW BACK PAIN WITH BILATERAL SCIATICA: ICD-10-CM

## 2022-01-13 DIAGNOSIS — M54.50 CHRONIC MIDLINE LOW BACK PAIN WITHOUT SCIATICA: ICD-10-CM

## 2022-01-13 DIAGNOSIS — M54.42 CHRONIC BILATERAL LOW BACK PAIN WITH BILATERAL SCIATICA: ICD-10-CM

## 2022-01-13 DIAGNOSIS — Z51.81 ENCOUNTER FOR MONITORING OPIOID MAINTENANCE THERAPY: Primary | ICD-10-CM

## 2022-01-13 PROCEDURE — 99211 OFF/OP EST MAY X REQ PHY/QHP: CPT

## 2022-01-13 RX ORDER — OXYCODONE AND ACETAMINOPHEN 10; 325 MG/1; MG/1
1 TABLET ORAL EVERY 4 HOURS PRN
Qty: 180 TABLET | Refills: 0 | Status: SHIPPED | OUTPATIENT
Start: 2022-02-12 | End: 2022-03-14

## 2022-01-13 RX ORDER — OXYCODONE AND ACETAMINOPHEN 10; 325 MG/1; MG/1
1 TABLET ORAL EVERY 4 HOURS PRN
Qty: 180 TABLET | Refills: 0 | Status: SHIPPED | OUTPATIENT
Start: 2022-01-13 | End: 2022-01-20

## 2022-01-13 RX ORDER — NALOXONE HYDROCHLORIDE 4 MG/.1ML
4 SPRAY, METERED NASAL AS NEEDED
Qty: 1 KIT | Refills: 0 | Status: SHIPPED | OUTPATIENT
Start: 2022-01-13

## 2022-01-13 RX ORDER — OXYCODONE 9 MG/1
1 CAPSULE, EXTENDED RELEASE ORAL EVERY 12 HOURS
Qty: 60 CAPSULE | Refills: 0 | Status: SHIPPED | OUTPATIENT
Start: 2022-01-13 | End: 2022-02-12

## 2022-01-13 NOTE — PROGRESS NOTES
PT presents to the CPM in a wheelchair. PT reports she is in excruciating pain. She is out of medication has been taking 2 pills instead of one. KELLEY Cárdenas saw PT for 3 month med eval.See notes for POC.

## 2022-01-13 NOTE — CHRONIC PAIN
Follow-up Note  HISTORY OF PRESENT ILLNESS:  Mg Hong is a 78year old old female, returns to the clinic for evaluation treatment of her chronic low back pain  Is well-known to the pain clinic,  she has severe spinal stenosis and a retroperitoneal sa HCl 25 MG Oral Tab Take 1 tablet (25 mg total) by mouth 2 (two) times a day. 60 tablet 0   • gabapentin 300 MG Oral Cap Take 300 mg by mouth nightly. • aspirin 81 MG Oral Tab EC Take 81 mg by mouth daily.      • atorvastatin 40 MG Oral Tab Take 40 mg by insufficiency     Acute cystitis without hematuria    Past Medical History:   Diagnosis Date   • Anemia    • Anxiety state    • Back problem    • Bursitis     r hip   • Chronic kidney disease (CKD)    • Diabetes ([facility]    • Essential hypertension    • Diane Seo device(s):  Elex Sterling, Bhavana, walker. The patient does not live in a home with stairs.     Social Determinants of Health  Financial Resource Strain: Not on file  Food Insecurity: Not on file  Transportation Needs: Not on file  Physical Activity: N Degenerative disc disease in lower lumbar spine. SOFT TISSUES:            Negative. No visible soft tissue swelling. EFFUSION:       None visible. OTHER:             Postsurgical clips in the right hemipelvis.              =====  CONCLUSION:   1.  No a Dictated by (CST): Michelle Figueroa MD on 11/21/2020 at 3:56 PM       Finalized by (CST): Michelle Figueroa MD on 11/21/2020 at 4:00 PM            [address]  Yes    ASSESSMENT:   Romeo Light is a 78year old  female, with l satisfaction during a comprehensive interactive discussion. All questions were answered during extended questions and answer session. Patient agreeable to discussion plan.  Greater than 50% of the time was spent with counseling (nature of discussion centere

## 2022-01-18 ENCOUNTER — APPOINTMENT (OUTPATIENT)
Dept: ULTRASOUND IMAGING | Facility: HOSPITAL | Age: 77
DRG: 684 | End: 2022-01-18
Attending: INTERNAL MEDICINE
Payer: MEDICARE

## 2022-01-18 ENCOUNTER — HOSPITAL ENCOUNTER (INPATIENT)
Facility: HOSPITAL | Age: 77
LOS: 1 days | Discharge: HOME | DRG: 684 | End: 2022-01-20
Attending: EMERGENCY MEDICINE | Admitting: INTERNAL MEDICINE
Payer: MEDICARE

## 2022-01-18 ENCOUNTER — APPOINTMENT (OUTPATIENT)
Dept: ULTRASOUND IMAGING | Facility: HOSPITAL | Age: 77
DRG: 684 | End: 2022-01-18
Attending: EMERGENCY MEDICINE
Payer: MEDICARE

## 2022-01-18 ENCOUNTER — HOSPITAL ENCOUNTER (OUTPATIENT)
Facility: HOSPITAL | Age: 77
Setting detail: OBSERVATION
Discharge: HOME | DRG: 684 | End: 2022-01-20
Attending: EMERGENCY MEDICINE | Admitting: INTERNAL MEDICINE
Payer: MEDICARE

## 2022-01-18 DIAGNOSIS — N18.9 CHRONIC RENAL IMPAIRMENT, UNSPECIFIED CKD STAGE: Primary | ICD-10-CM

## 2022-01-18 DIAGNOSIS — R53.1 WEAKNESS GENERALIZED: ICD-10-CM

## 2022-01-18 DIAGNOSIS — E86.0 DEHYDRATION: ICD-10-CM

## 2022-01-18 LAB
BILIRUB UR QL: NEGATIVE
COLOR UR: YELLOW
GLUCOSE BLDC GLUCOMTR-MCNC: 107 MG/DL (ref 70–99)
GLUCOSE BLDC GLUCOMTR-MCNC: 151 MG/DL (ref 70–99)
GLUCOSE UR-MCNC: NEGATIVE MG/DL
HGB UR QL STRIP.AUTO: NEGATIVE
HYALINE CASTS #/AREA URNS AUTO: PRESENT /LPF
KETONES UR-MCNC: NEGATIVE MG/DL
NITRITE UR QL STRIP.AUTO: NEGATIVE
PH UR: 5 [PH] (ref 5–8)
PROT UR-MCNC: 30 MG/DL
SARS-COV-2 RNA RESP QL NAA+PROBE: NOT DETECTED
SP GR UR STRIP: 1.02 (ref 1–1.03)
UROBILINOGEN UR STRIP-ACNC: <2

## 2022-01-18 PROCEDURE — 96360 HYDRATION IV INFUSION INIT: CPT

## 2022-01-18 PROCEDURE — 87086 URINE CULTURE/COLONY COUNT: CPT | Performed by: EMERGENCY MEDICINE

## 2022-01-18 PROCEDURE — 76770 US EXAM ABDO BACK WALL COMP: CPT | Performed by: EMERGENCY MEDICINE

## 2022-01-18 PROCEDURE — 93970 EXTREMITY STUDY: CPT | Performed by: INTERNAL MEDICINE

## 2022-01-18 PROCEDURE — 99285 EMERGENCY DEPT VISIT HI MDM: CPT

## 2022-01-18 PROCEDURE — 81001 URINALYSIS AUTO W/SCOPE: CPT | Performed by: EMERGENCY MEDICINE

## 2022-01-18 PROCEDURE — 82962 GLUCOSE BLOOD TEST: CPT

## 2022-01-18 PROCEDURE — 96361 HYDRATE IV INFUSION ADD-ON: CPT

## 2022-01-18 RX ORDER — OXYCODONE AND ACETAMINOPHEN 10; 325 MG/1; MG/1
1 TABLET ORAL EVERY 4 HOURS PRN
Status: DISCONTINUED | OUTPATIENT
Start: 2022-01-18 | End: 2022-01-20

## 2022-01-18 RX ORDER — METOCLOPRAMIDE HYDROCHLORIDE 5 MG/ML
5 INJECTION INTRAMUSCULAR; INTRAVENOUS EVERY 8 HOURS PRN
Status: DISCONTINUED | OUTPATIENT
Start: 2022-01-18 | End: 2022-01-20

## 2022-01-18 RX ORDER — MELATONIN
325 2 TIMES DAILY WITH MEALS
Status: DISCONTINUED | OUTPATIENT
Start: 2022-01-18 | End: 2022-01-20

## 2022-01-18 RX ORDER — NIFEDIPINE 60 MG/1
120 TABLET, FILM COATED, EXTENDED RELEASE ORAL DAILY
COMMUNITY
Start: 2020-09-03 | End: 2022-01-20

## 2022-01-18 RX ORDER — OXYCODONE HCL 10 MG/1
10 TABLET, FILM COATED, EXTENDED RELEASE ORAL EVERY 12 HOURS
Status: DISCONTINUED | OUTPATIENT
Start: 2022-01-18 | End: 2022-01-20

## 2022-01-18 RX ORDER — METOCLOPRAMIDE HYDROCHLORIDE 5 MG/ML
10 INJECTION INTRAMUSCULAR; INTRAVENOUS EVERY 8 HOURS PRN
Status: DISCONTINUED | OUTPATIENT
Start: 2022-01-18 | End: 2022-01-18

## 2022-01-18 RX ORDER — SODIUM PHOSPHATE, DIBASIC AND SODIUM PHOSPHATE, MONOBASIC 7; 19 G/133ML; G/133ML
1 ENEMA RECTAL ONCE AS NEEDED
Status: DISCONTINUED | OUTPATIENT
Start: 2022-01-18 | End: 2022-01-20

## 2022-01-18 RX ORDER — ATORVASTATIN CALCIUM 40 MG/1
40 TABLET, FILM COATED ORAL NIGHTLY
Status: DISCONTINUED | OUTPATIENT
Start: 2022-01-18 | End: 2022-01-20

## 2022-01-18 RX ORDER — HEPARIN SODIUM 5000 [USP'U]/ML
5000 INJECTION, SOLUTION INTRAVENOUS; SUBCUTANEOUS EVERY 8 HOURS SCHEDULED
Status: DISCONTINUED | OUTPATIENT
Start: 2022-01-18 | End: 2022-01-20

## 2022-01-18 RX ORDER — SODIUM CHLORIDE 9 MG/ML
INJECTION, SOLUTION INTRAVENOUS CONTINUOUS
Status: DISCONTINUED | OUTPATIENT
Start: 2022-01-18 | End: 2022-01-20

## 2022-01-18 RX ORDER — ISOSORBIDE MONONITRATE 60 MG/1
60 TABLET, EXTENDED RELEASE ORAL DAILY
Status: DISCONTINUED | OUTPATIENT
Start: 2022-01-18 | End: 2022-01-20

## 2022-01-18 RX ORDER — PANTOPRAZOLE SODIUM 40 MG/1
40 TABLET, DELAYED RELEASE ORAL
Status: DISCONTINUED | OUTPATIENT
Start: 2022-01-19 | End: 2022-01-20

## 2022-01-18 RX ORDER — HYDRALAZINE HYDROCHLORIDE 25 MG/1
25 TABLET, FILM COATED ORAL 2 TIMES DAILY
Status: DISCONTINUED | OUTPATIENT
Start: 2022-01-18 | End: 2022-01-20

## 2022-01-18 RX ORDER — POLYETHYLENE GLYCOL 3350 17 G
2 POWDER IN PACKET (EA) ORAL AS NEEDED
Status: DISCONTINUED | OUTPATIENT
Start: 2022-01-18 | End: 2022-01-20

## 2022-01-18 RX ORDER — SENNOSIDES 8.6 MG
17.2 TABLET ORAL NIGHTLY PRN
Status: DISCONTINUED | OUTPATIENT
Start: 2022-01-18 | End: 2022-01-20

## 2022-01-18 RX ORDER — AMLODIPINE BESYLATE 5 MG/1
5 TABLET ORAL DAILY
Status: DISCONTINUED | OUTPATIENT
Start: 2022-01-18 | End: 2022-01-19

## 2022-01-18 RX ORDER — ACETAMINOPHEN 325 MG/1
650 TABLET ORAL EVERY 6 HOURS PRN
Status: DISCONTINUED | OUTPATIENT
Start: 2022-01-18 | End: 2022-01-20

## 2022-01-18 RX ORDER — BUMETANIDE 0.5 MG/1
0.5 TABLET ORAL DAILY
COMMUNITY
Start: 2020-09-03 | End: 2022-01-20

## 2022-01-18 RX ORDER — GABAPENTIN 300 MG/1
300 CAPSULE ORAL NIGHTLY
Status: DISCONTINUED | OUTPATIENT
Start: 2022-01-18 | End: 2022-01-20

## 2022-01-18 RX ORDER — NIFEDIPINE 60 MG/1
120 TABLET, EXTENDED RELEASE ORAL DAILY
Status: DISCONTINUED | OUTPATIENT
Start: 2022-01-18 | End: 2022-01-19

## 2022-01-18 RX ORDER — SODIUM CHLORIDE 9 MG/ML
1000 INJECTION, SOLUTION INTRAVENOUS ONCE
Status: COMPLETED | OUTPATIENT
Start: 2022-01-18 | End: 2022-01-18

## 2022-01-18 RX ORDER — POLYETHYLENE GLYCOL 3350 17 G/17G
17 POWDER, FOR SOLUTION ORAL DAILY PRN
Status: DISCONTINUED | OUTPATIENT
Start: 2022-01-18 | End: 2022-01-20

## 2022-01-18 RX ORDER — ONDANSETRON 2 MG/ML
4 INJECTION INTRAMUSCULAR; INTRAVENOUS EVERY 6 HOURS PRN
Status: DISCONTINUED | OUTPATIENT
Start: 2022-01-18 | End: 2022-01-20

## 2022-01-18 RX ORDER — ASPIRIN 81 MG/1
81 TABLET ORAL DAILY
Status: DISCONTINUED | OUTPATIENT
Start: 2022-01-18 | End: 2022-01-20

## 2022-01-18 RX ORDER — BISACODYL 10 MG
10 SUPPOSITORY, RECTAL RECTAL
Status: DISCONTINUED | OUTPATIENT
Start: 2022-01-18 | End: 2022-01-20

## 2022-01-18 NOTE — ED INITIAL ASSESSMENT (HPI)
Pt reports pt was sent here for possible admission following blood draw today. Pt reports oncologist suggested she come here for further evaluation of abnormal kidney function labs.  Pt hx Sarcoma

## 2022-01-18 NOTE — ED QUICK NOTES
Orders for admission, patient is aware of plan and ready to go upstairs. Any questions, please call ED ELLIE Donald  at extension [phone].    Type of COVID test sent:Rapid  COVID Suspicion level: low    LOC at time of transport:a/ox3    Other pertinent informatio

## 2022-01-18 NOTE — ED PROVIDER NOTES
Patient Seen in: [facility] AND [facility] Emergency Department      History   Patient presents with:  Abnormal Labs    Stated Complaint: renal failure, creat.  2.89,     Subjective:   HPI    72-year-old female with history of sarcoma and other medical problems w Physical Exam    Constitutional: Oriented to person, place, and time. Appears well-developed. No distress. Head: Normocephalic and atraumatic. Eyes: Conjunctivae are normal. Pupils are equal, round, and reactive to light.    Neck: Normal range CONCLUSION:  1. Small echogenic kidneys suggesting medical renal disease. Simple cyst in the right kidney. Both kidneys are otherwise unremarkable, however evaluation of the left kidney is somewhat limited due to overlying bowel.  2.  Unremarkable ultras

## 2022-01-19 LAB
ALBUMIN SERPL-MCNC: 2.9 G/DL (ref 3.4–5)
ALBUMIN/GLOB SERPL: 1 {RATIO} (ref 1–2)
ALP LIVER SERPL-CCNC: 137 U/L
ALT SERPL-CCNC: 29 U/L
ANION GAP SERPL CALC-SCNC: 4 MMOL/L (ref 0–18)
AST SERPL-CCNC: 31 U/L (ref 15–37)
BASOPHILS # BLD AUTO: 0.04 X10(3) UL (ref 0–0.2)
BASOPHILS NFR BLD AUTO: 1.1 %
BILIRUB SERPL-MCNC: 0.2 MG/DL (ref 0.1–2)
BUN BLD-MCNC: 31 MG/DL (ref 7–18)
BUN/CREAT SERPL: 15.7 (ref 10–20)
CALCIUM BLD-MCNC: 8 MG/DL (ref 8.5–10.1)
CHLORIDE SERPL-SCNC: 117 MMOL/L (ref 98–112)
CHLORIDE UR-SCNC: 57 MMOL/L
CO2 SERPL-SCNC: 24 MMOL/L (ref 21–32)
CREAT BLD-MCNC: 1.97 MG/DL
CREAT UR-SCNC: 215 MG/DL
DEPRECATED RDW RBC AUTO: 45.5 FL (ref 35.1–46.3)
EOSINOPHIL # BLD AUTO: 0.15 X10(3) UL (ref 0–0.7)
EOSINOPHIL NFR BLD AUTO: 4.1 %
ERYTHROCYTE [DISTWIDTH] IN BLOOD BY AUTOMATED COUNT: 12.9 % (ref 11–15)
EST. AVERAGE GLUCOSE BLD GHB EST-MCNC: 120 MG/DL (ref 68–126)
GLOBULIN PLAS-MCNC: 2.9 G/DL (ref 2.8–4.4)
GLUCOSE BLD-MCNC: 88 MG/DL (ref 70–99)
GLUCOSE BLDC GLUCOMTR-MCNC: 101 MG/DL (ref 70–99)
GLUCOSE BLDC GLUCOMTR-MCNC: 126 MG/DL (ref 70–99)
GLUCOSE BLDC GLUCOMTR-MCNC: 133 MG/DL (ref 70–99)
GLUCOSE BLDC GLUCOMTR-MCNC: 164 MG/DL (ref 70–99)
HBA1C MFR BLD: 5.8 % (ref ?–5.7)
HCT VFR BLD AUTO: 28.9 %
HGB BLD-MCNC: 9 G/DL
IMM GRANULOCYTES # BLD AUTO: 0.01 X10(3) UL (ref 0–1)
IMM GRANULOCYTES NFR BLD: 0.3 %
IRON SATN MFR SERPL: 29 %
IRON SERPL-MCNC: 91 UG/DL
LYMPHOCYTES # BLD AUTO: 1.26 X10(3) UL (ref 1–4)
LYMPHOCYTES NFR BLD AUTO: 34.1 %
MCH RBC QN AUTO: 30 PG (ref 26–34)
MCHC RBC AUTO-ENTMCNC: 31.1 G/DL (ref 31–37)
MCV RBC AUTO: 96.3 FL
MONOCYTES # BLD AUTO: 0.35 X10(3) UL (ref 0.1–1)
MONOCYTES NFR BLD AUTO: 9.5 %
NEUTROPHILS # BLD AUTO: 1.88 X10 (3) UL (ref 1.5–7.7)
NEUTROPHILS # BLD AUTO: 1.88 X10(3) UL (ref 1.5–7.7)
NEUTROPHILS NFR BLD AUTO: 50.9 %
OSMOLALITY SERPL CALC.SUM OF ELEC: 306 MOSM/KG (ref 275–295)
PHOSPHATE SERPL-MCNC: 3.9 MG/DL (ref 2.5–4.9)
PLATELET # BLD AUTO: 181 10(3)UL (ref 150–450)
POTASSIUM SERPL-SCNC: 5.1 MMOL/L (ref 3.5–5.1)
PROT SERPL-MCNC: 5.8 G/DL (ref 6.4–8.2)
PROT UR-MCNC: 49.3 MG/DL
PTH-INTACT SERPL-MCNC: 96.8 PG/ML (ref 18.5–88)
RBC # BLD AUTO: 3 X10(6)UL
SODIUM SERPL-SCNC: 145 MMOL/L (ref 136–145)
SODIUM SERPL-SCNC: 48 MMOL/L
TIBC SERPL-MCNC: 311 UG/DL (ref 240–450)
TRANSFERRIN SERPL-MCNC: 209 MG/DL (ref 200–360)
URATE SERPL-MCNC: 6.9 MG/DL
WBC # BLD AUTO: 3.7 X10(3) UL (ref 4–11)

## 2022-01-19 PROCEDURE — 82570 ASSAY OF URINE CREATININE: CPT | Performed by: FAMILY MEDICINE

## 2022-01-19 PROCEDURE — 97162 PT EVAL MOD COMPLEX 30 MIN: CPT

## 2022-01-19 PROCEDURE — 85025 COMPLETE CBC W/AUTO DIFF WBC: CPT | Performed by: INTERNAL MEDICINE

## 2022-01-19 PROCEDURE — 83970 ASSAY OF PARATHORMONE: CPT | Performed by: FAMILY MEDICINE

## 2022-01-19 PROCEDURE — 80053 COMPREHEN METABOLIC PANEL: CPT | Performed by: INTERNAL MEDICINE

## 2022-01-19 PROCEDURE — 97166 OT EVAL MOD COMPLEX 45 MIN: CPT

## 2022-01-19 PROCEDURE — 83036 HEMOGLOBIN GLYCOSYLATED A1C: CPT | Performed by: INTERNAL MEDICINE

## 2022-01-19 PROCEDURE — 82962 GLUCOSE BLOOD TEST: CPT

## 2022-01-19 PROCEDURE — 97535 SELF CARE MNGMENT TRAINING: CPT

## 2022-01-19 PROCEDURE — 84550 ASSAY OF BLOOD/URIC ACID: CPT | Performed by: FAMILY MEDICINE

## 2022-01-19 PROCEDURE — 82436 ASSAY OF URINE CHLORIDE: CPT | Performed by: FAMILY MEDICINE

## 2022-01-19 PROCEDURE — 84466 ASSAY OF TRANSFERRIN: CPT | Performed by: FAMILY MEDICINE

## 2022-01-19 PROCEDURE — 84300 ASSAY OF URINE SODIUM: CPT | Performed by: FAMILY MEDICINE

## 2022-01-19 PROCEDURE — 97116 GAIT TRAINING THERAPY: CPT

## 2022-01-19 PROCEDURE — 84100 ASSAY OF PHOSPHORUS: CPT | Performed by: FAMILY MEDICINE

## 2022-01-19 PROCEDURE — 83540 ASSAY OF IRON: CPT | Performed by: FAMILY MEDICINE

## 2022-01-19 PROCEDURE — 84156 ASSAY OF PROTEIN URINE: CPT | Performed by: FAMILY MEDICINE

## 2022-01-19 NOTE — PROGRESS NOTES
[facility]    Progress Note    Johannamagali Stevensas Patient Status:  Observation    1945 MRN [id number]   Location [facility] 4W/SW/SE Attending Sawyer Allan MD   Hosp Day # 0 PCP PHYSICIAN NONSTAFF     Subjective:   Aaron Ziegler deep venous thrombosis in either lower extremity. A preliminary report was issued by the [address] [place] [address] Radiology teleradiology service. There are no major discrepancies. elm-remote.    Dictated by (CST): Suzanne Tay MD on 1/18/2022 at 11:59 PM     Chandra Williamson

## 2022-01-19 NOTE — PLAN OF CARE
Received patient from the ED. Patient is alert and orientated. Vitals stable. Blood sugar 109. Patient reports dyspnea with exertion. Med rec completed. Wears glasses. Had a fall 2 weeks ago. Complains of bilateral lower extremity pain. Right leg swollen.

## 2022-01-19 NOTE — PLAN OF CARE
Pt is alert and oriented x4. Tolerating renal diet. IVF infusing. PRN Percocet and scheduled Oxy ER for pain management. Up with a walker. Call light within reach. Fall precautions in place.     Problem: Patient Centered Care  Goal: Patient preferences are severity of pain and evaluate response  - Implement non-pharmacological measures as appropriate and evaluate response  - Consider cultural and social influences on pain and pain management  - Manage/alleviate anxiety  - Utilize distraction and/or relaxatio

## 2022-01-19 NOTE — H&P
[facility] Patient Status:  Observation    1945 MRN [id number]   Location [facility] 4W/SW/SE Attending Dawson Jacobs MD   Hosp Day # 0 PCP PHYSICIAN NONSTAFF     Date:  2022 • Other (Other) Mother         tuberculosis     Social History:  Social History    Tobacco Use      Smoking status: Current Every Day Smoker        Packs/day: 1.00      Smokeless tobacco: Never Used    Vaping Use      Vaping Use: Never used    Alcohol us are noted in HPI. Physical Exam:   Vital Signs:  Blood pressure 138/57, pulse 73, temperature 98.5 °F (36.9 °C), temperature source Oral, resp. rate 17, height 5' 4\" (1.626 m), weight 175 lb 12.8 oz (79.7 kg), SpO2 96 %, not currently breastfeeding. chronic pain percocet, gabapentin.  Pt/ot eval    #dyslipidemia- on ASA, statin         FEN  [email or web address]  Lytes prn  Renal diet    Heparin sq  protonix     FULL CODE  Dispo: pending renal clearance   Charan Chauhan MD  1/18/2022

## 2022-01-19 NOTE — CM/SW NOTE
01/19/22 1500   CM/SW Referral Data   Referral Source Social Work (self-referral)   Reason for Referral Discharge planning   Informant Patient   Pertinent Medical Hx   Does patient have an established PCP?  Yes   Patient Info   Patient's Current Mental S

## 2022-01-19 NOTE — CONSULTS
[facility]    Nephrology Consultation    [facility] Patient Status:  Observation    1945 MRN [id number]   Location [facility] 4W/SW/SE Attending Duglas Waters MD   Hosp Day # 0 PCP PHYSICIAN NONSTAFF     Date of Admi History  Social History    Tobacco Use      Smoking status: Current Every Day Smoker        Packs/day: 1.00      Smokeless tobacco: Never Used    Vaping Use      Vaping Use: Never used    Alcohol use: No      Alcohol/week: 0.0 standard drinks    Drug use: day).  oxyCODONE ER (XTAMPZA ER) 9 MG Oral Capsule Extended Release 12 hour Abuse-Deterrent, Take 1 capsule by mouth every 12 (twelve) hours. isosorbide mononitrate ER 60 MG Oral Tablet 24 Hr, Take 1 tablet (60 mg total) by mouth daily.   amLODIPine 5 MG O rebound tenderness  Neurologic: No focal neurological deficits. Musculoskeletal: Full range of motion of all extremities. Integument: No lesions. No erythema.   Psychiatric: Appropriate mood and affect, No focal deficits appreciated    Results:     Labo renally dose medications for GFR less than 15.    3.  Anemia: Likely anemia of inflammation, anemia of kidney disease as well. We will check iron studies to assess for iron deficiency anemia.   Would transfuse as needed to maintain a hemoglobin greater oscar
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
## 2022-01-19 NOTE — PHYSICAL THERAPY NOTE
PHYSICAL THERAPY EVALUATION - INPATIENT     Room Number: 468/468-A  Evaluation Date: 1/19/2022  Type of Evaluation: Initial   Physician Order: PT Eval and Treat    Presenting Problem: DICK on CKD; weakness; fall at home 2 weeks ago; RLE swelling;  Hx of maximino deficits in preparation for discharge.     DISCHARGE RECOMMENDATIONS  PT Discharge Recommendations: Home with home health PT;24 hour care/supervision    PLAN  PT Treatment Plan: Bed mobility;Transfer training;Gait training;Family education;Patient education [facility]) 2010    surgery       Past Surgical History  Past Surgical History:   Procedure Laterality Date   • CARPAL TUNNEL RELEASE Right    • CHOLECYSTECTOMY     • HAND/FINGER SURGERY UNLISTED Right 2004    hand surgery   • REPAIR ROTATOR CUFF,ACUTE L from a bed to a chair (including a wheelchair)?: A Little   Help from Another: Need to walk in hospital room?: A Little   Help from Another: Climbing 3-5 steps with a railing?: A Little     AM-PAC Score:  Raw Score: 18   Approx Degree of Impairment: 46.58%

## 2022-01-19 NOTE — PLAN OF CARE
Alert and orientated. Slightly drowsy in the AM. Vitals at baseline. Creat improved today. Tolerating renal diet. IVF infusing. Percocet PRN and OxyContin ER for chronic pain control. Up with walker.  PT recommending home with home health hopefully tomorrow Encourage pt to monitor pain and request assistance  - Assess pain using appropriate pain scale  - Administer analgesics based on type and severity of pain and evaluate response  - Implement non-pharmacological measures as appropriate and evaluate response

## 2022-01-20 VITALS
WEIGHT: 175.81 LBS | BODY MASS INDEX: 30.02 KG/M2 | OXYGEN SATURATION: 97 % | HEIGHT: 64 IN | HEART RATE: 71 BPM | SYSTOLIC BLOOD PRESSURE: 145 MMHG | RESPIRATION RATE: 18 BRPM | DIASTOLIC BLOOD PRESSURE: 59 MMHG | TEMPERATURE: 98 F

## 2022-01-20 LAB
ANION GAP SERPL CALC-SCNC: 3 MMOL/L (ref 0–18)
BASOPHILS # BLD AUTO: 0.04 X10(3) UL (ref 0–0.2)
BASOPHILS NFR BLD AUTO: 0.9 %
BUN BLD-MCNC: 32 MG/DL (ref 7–18)
BUN/CREAT SERPL: 18 (ref 10–20)
CALCIUM BLD-MCNC: 8.2 MG/DL (ref 8.5–10.1)
CHLORIDE SERPL-SCNC: 113 MMOL/L (ref 98–112)
CO2 SERPL-SCNC: 23 MMOL/L (ref 21–32)
CREAT BLD-MCNC: 1.78 MG/DL
DEPRECATED RDW RBC AUTO: 47 FL (ref 35.1–46.3)
EOSINOPHIL # BLD AUTO: 0.19 X10(3) UL (ref 0–0.7)
EOSINOPHIL NFR BLD AUTO: 4.3 %
ERYTHROCYTE [DISTWIDTH] IN BLOOD BY AUTOMATED COUNT: 13.2 % (ref 11–15)
GLUCOSE BLD-MCNC: 120 MG/DL (ref 70–99)
GLUCOSE BLDC GLUCOMTR-MCNC: 101 MG/DL (ref 70–99)
GLUCOSE BLDC GLUCOMTR-MCNC: 108 MG/DL (ref 70–99)
HCT VFR BLD AUTO: 26.7 %
HGB BLD-MCNC: 8.3 G/DL
IMM GRANULOCYTES # BLD AUTO: 0.01 X10(3) UL (ref 0–1)
IMM GRANULOCYTES NFR BLD: 0.2 %
LYMPHOCYTES # BLD AUTO: 1.17 X10(3) UL (ref 1–4)
LYMPHOCYTES NFR BLD AUTO: 26.5 %
MCH RBC QN AUTO: 30.2 PG (ref 26–34)
MCHC RBC AUTO-ENTMCNC: 31.1 G/DL (ref 31–37)
MCV RBC AUTO: 97.1 FL
MONOCYTES # BLD AUTO: 0.51 X10(3) UL (ref 0.1–1)
MONOCYTES NFR BLD AUTO: 11.6 %
NEUTROPHILS # BLD AUTO: 2.49 X10 (3) UL (ref 1.5–7.7)
NEUTROPHILS # BLD AUTO: 2.49 X10(3) UL (ref 1.5–7.7)
NEUTROPHILS NFR BLD AUTO: 56.5 %
OSMOLALITY SERPL CALC.SUM OF ELEC: 296 MOSM/KG (ref 275–295)
PLATELET # BLD AUTO: 177 10(3)UL (ref 150–450)
POTASSIUM SERPL-SCNC: 4.8 MMOL/L (ref 3.5–5.1)
RBC # BLD AUTO: 2.75 X10(6)UL
SODIUM SERPL-SCNC: 139 MMOL/L (ref 136–145)
WBC # BLD AUTO: 4.4 X10(3) UL (ref 4–11)

## 2022-01-20 PROCEDURE — 82962 GLUCOSE BLOOD TEST: CPT

## 2022-01-20 PROCEDURE — 80048 BASIC METABOLIC PNL TOTAL CA: CPT | Performed by: INTERNAL MEDICINE

## 2022-01-20 PROCEDURE — 36415 COLL VENOUS BLD VENIPUNCTURE: CPT | Performed by: INTERNAL MEDICINE

## 2022-01-20 PROCEDURE — 85025 COMPLETE CBC W/AUTO DIFF WBC: CPT | Performed by: INTERNAL MEDICINE

## 2022-01-20 PROCEDURE — 97116 GAIT TRAINING THERAPY: CPT

## 2022-01-20 PROCEDURE — 97530 THERAPEUTIC ACTIVITIES: CPT

## 2022-01-20 NOTE — CM/SW NOTE
DC order present    Met with patient at bedside to readdress home health care. At this time, patient is still refusing. SW encouraged patient to reach out to her PCP if she changes her mind, patient understood.      [employer] contact in

## 2022-01-20 NOTE — DISCHARGE SUMMARY
[facility]    Discharge Summary    Génesis Marcos Patient Status:  Inpatient    1945 MRN [id number]   Location [facility] 4W/SW/SE Attending Ana Minaya MD   Hosp Day # 1 PCP PHYSICIAN NONSTAFF     Date of Admission: 1 some weakness but denies any fevers, chills, nausea, vomiting, chest pain, abdominal pain, diarrhea, constipation, dysuria. Patient reports some worsening right lower extremity swelling and had fallen 2 weeks ago. Patient noted for temperature 98.5, heart BEDTIME    lansoprazole 30 MG Oral Capsule Delayed Release  Take 30 mg by mouth 2 (two) times daily as needed. Naloxone HCl 4 MG/0.1ML Nasal Liquid  4 mg by Nasal route as needed.  If patient remains unresponsive, repeat dose in other nostril 2-5 minut

## 2022-01-20 NOTE — PROGRESS NOTES
[facility] HOSP - [facility]    Progress Note    Charles Morocho Patient Status:  Observation    1945 MRN [id number]   Location [facility] 4W/SW/SE Attending Victoria Garrett MD   Hosp Day # 0 PCP PHYSICIAN NONSTAFF     Subjective:   Sheree Holloway

## 2022-01-20 NOTE — PLAN OF CARE
Discharge instructions given to patient. PIV removed and patient discharged in stable condition.      Problem: Patient Centered Care  Goal: Patient preferences are identified and integrated in the patient's plan of care  Description: Interventions:  - What and evaluate response  - Consider cultural and social influences on pain and pain management  - Manage/alleviate anxiety  - Utilize distraction and/or relaxation techniques  - Monitor for opioid side effects  - Notify MD/LIP if interventions unsuccessful o

## 2022-01-20 NOTE — PHYSICAL THERAPY NOTE
PHYSICAL THERAPY TREATMENT NOTE - INPATIENT     Room Number: 468/468-A       Presenting Problem: DICK on CKD; weakness; fall at home 2 weeks ago; RLE swelling;  Hx of sarcoma in remission    Problem List  Principal Problem:    Chronic renal impairment, unspe Treatment Plan: Bed mobility; Body mechanics; Patient education;Gait training;Transfer training;Balance training;Strengthening    SUBJECTIVE  \"I have so much pain\"    OBJECTIVE  Precautions:  (contact PLUS)    PAIN ASSESSMENT   Rating: Unable to rate  Loca walker - rolling      Goal #2  Current Status CGA with rolling walker    Goal #3 Patient is able to ambulate 150 feet with assist device: walker - rolling at assistance level: modified independent   Goal #3   Current Status 20ft with rolling walker CGA   G

## 2022-01-20 NOTE — PAYOR COMM NOTE
---------------  CONTINUED STAY REVIEW    Jacqui Tse [place] [facility] [place]  Subscriber #:  [id number]  Authorization Number: [id number]    Admit date: 1/19/22  Admit time:  8:37 PM    Admitting Physician: Neo Johns MD  Attending Physician:  Garo Devries  Positive bowel sounds. No rebound tenderness  Neurologic: No focal neurological deficits. Musculoskeletal: Full range of motion of all extremities.    Integument: No lesions. No erythema.   Psychiatric: Appropriate mood and affect, No focal deficits appre reviewed  Avoid nephrotoxic agents such as contrast and NSAIDs, aminoglycosides etc.       01/19/22 1500   CM/SW Referral Data   Referral Source Social Work (self-referral)   Reason for Referral Discharge planning   Informant Patient   Pertinent Medical Hx 2145 Given 300 mg Oral Lorrain Stands, RN      Heparin Sodium (Porcine) 5000 UNIT/ML injection 5,000 Units     Date Action Dose Route User    1/20/2022 5334 Given 5,000 Units Subcutaneous (Right Upper Arm) Lorrain Stands, RN    1/19/2022 2145 Given 5 and Mobility Yes   Discharge Needs   Anticipated D/C needs Home health care;Caregiver services;Subacute rehab   Services Requested   DON Screen Requested Yes       History & Physical    Chief Complaint:   Patient presents with:  Abnormal Labs        HPI: 2.89 on 12/18/21 and 1/18/22, patient reports no renal workup done previously. Renal US unremarkable, will hold bumex.    continue [email or web address] and renal consulted for further evaluation.      #RLE swelling- check dopplers      #HTN- resume amlodipine, hydralaz No

## 2022-01-21 NOTE — PAYOR COMM NOTE
--------------  CONTINUED STAY REVIEW    Demetrio Oliver [place] [facility]  [place]scriber #:  [id number]  Authorization Number: [id number]    Admit date: 1/19/22  Admit time:  8:37 PM    Admitting Physician: Radha Reed MD  Attending Physician:  Elsy att. providers sanjayu

## 2022-01-23 NOTE — PAYOR COMM NOTE
--------------  DISCHARGE REVIEW    Kala Stearns MA [facility]  Subscriber #:  [id number]  Authorization Number: [id number]    Admit date: 1/19/22  Admit time:   8:37 PM  Discharge Date: 1/20/2022  5:35 PM     Admitting Physician: Stephanie Logan MD  Attending Positive bowel sounds. No rebound tenderness  Neurologic: No focal neurological deficits. Musculoskeletal: Full range of motion of all extremities. No swelling noted. Integument: No lesions. No erythema. Psychiatric: Appropriate mood and affect.     Hos as needed for Pain (max 6 per day). oxyCODONE ER (XTAMPZA ER) 9 MG Oral Capsule Extended Release 12 hour Abuse-Deterrent  Take 1 capsule by mouth every 12 (twelve) hours.     isosorbide mononitrate ER 60 MG Oral Tablet 24 Hr  Take 1 tablet (60 mg total)

## 2022-02-24 RX ORDER — DIAZEPAM 10 MG/1
10 TABLET ORAL EVERY 8 HOURS PRN
COMMUNITY
End: 2022-02-24

## 2022-02-24 RX ORDER — OXYCODONE AND ACETAMINOPHEN 10; 325 MG/1; MG/1
1 TABLET ORAL EVERY 4 HOURS PRN
COMMUNITY
End: 2022-02-24

## 2022-02-24 RX ORDER — OXYCODONE 9 MG/1
1 CAPSULE, EXTENDED RELEASE ORAL EVERY 12 HOURS
COMMUNITY
End: 2022-02-24

## 2022-03-08 ENCOUNTER — OFFICE VISIT (OUTPATIENT)
Dept: PAIN CLINIC | Facility: HOSPITAL | Age: 77
End: 2022-03-08
Attending: NURSE PRACTITIONER
Payer: MEDICARE

## 2022-03-08 VITALS — HEART RATE: 79 BPM | SYSTOLIC BLOOD PRESSURE: 131 MMHG | DIASTOLIC BLOOD PRESSURE: 54 MMHG | OXYGEN SATURATION: 98 %

## 2022-03-08 DIAGNOSIS — M54.41 CHRONIC BILATERAL LOW BACK PAIN WITH BILATERAL SCIATICA: Primary | ICD-10-CM

## 2022-03-08 DIAGNOSIS — M48.062 SPINAL STENOSIS OF LUMBAR REGION WITH NEUROGENIC CLAUDICATION: ICD-10-CM

## 2022-03-08 DIAGNOSIS — M54.42 CHRONIC BILATERAL LOW BACK PAIN WITH BILATERAL SCIATICA: Primary | ICD-10-CM

## 2022-03-08 DIAGNOSIS — G89.29 CHRONIC BILATERAL LOW BACK PAIN WITH BILATERAL SCIATICA: Primary | ICD-10-CM

## 2022-03-08 DIAGNOSIS — M15.9 PRIMARY OSTEOARTHRITIS INVOLVING MULTIPLE JOINTS: ICD-10-CM

## 2022-03-08 DIAGNOSIS — G89.3 CANCER ASSOCIATED PAIN: ICD-10-CM

## 2022-03-08 DIAGNOSIS — Z79.891 ENCOUNTER FOR MONITORING OPIOID MAINTENANCE THERAPY: ICD-10-CM

## 2022-03-08 DIAGNOSIS — F11.90 CHRONIC, CONTINUOUS USE OF OPIOIDS: ICD-10-CM

## 2022-03-08 DIAGNOSIS — Z51.81 ENCOUNTER FOR MONITORING OPIOID MAINTENANCE THERAPY: ICD-10-CM

## 2022-03-08 PROCEDURE — 99211 OFF/OP EST MAY X REQ PHY/QHP: CPT

## 2022-03-08 RX ORDER — OXYCODONE 9 MG/1
1 CAPSULE, EXTENDED RELEASE ORAL EVERY 12 HOURS
Qty: 60 CAPSULE | Refills: 0 | Status: SHIPPED | OUTPATIENT
Start: 2022-03-14 | End: 2022-04-13

## 2022-03-08 RX ORDER — OXYCODONE AND ACETAMINOPHEN 10; 325 MG/1; MG/1
1 TABLET ORAL EVERY 4 HOURS PRN
Qty: 180 TABLET | Refills: 0 | Status: SHIPPED | OUTPATIENT
Start: 2022-03-14 | End: 2022-04-13

## 2022-03-08 RX ORDER — OXYCODONE 9 MG/1
1 CAPSULE, EXTENDED RELEASE ORAL EVERY 12 HOURS
Qty: 60 CAPSULE | Refills: 0 | Status: SHIPPED | OUTPATIENT
Start: 2022-04-13 | End: 2022-05-13

## 2022-03-08 RX ORDER — DIAZEPAM 10 MG/1
10 TABLET ORAL EVERY 8 HOURS PRN
Qty: 90 TABLET | Refills: 2 | Status: SHIPPED | OUTPATIENT
Start: 2022-03-08 | End: 2022-04-07

## 2022-03-08 RX ORDER — OXYCODONE AND ACETAMINOPHEN 10; 325 MG/1; MG/1
1 TABLET ORAL EVERY 4 HOURS PRN
Qty: 180 TABLET | Refills: 0 | Status: SHIPPED | OUTPATIENT
Start: 2022-04-13 | End: 2022-05-13

## 2022-03-08 NOTE — PROGRESS NOTES
PT presents to the CPM in [facility]. Son in attendance. KELLEY Cárdenas saw PT for 2 month med eval.  See notes for POC.

## 2022-03-24 NOTE — PROGRESS NOTES
[facility] Hospitalist Progress Note                                                                   [address]  8/14/1945    SUBJECTIVE:  No sob, smoking in bathroom again    OBJECTIVE:  Tem Patient denies pain/needs, states she is ready for d/c  Right groin drsg c/d/i, neurochecks WNL, no hematoma noted  drsgs to RCW/LCW c/d/i 8.8 8.3* 8.3*   MG 2.2  --   --   --   --   --   --    *   < > 132* 158* 108* 117* 120*    < > = values in this interval not displayed. No results for input(s): ALT, AST, ALB, AMYLASE, LIPASE, LDH in the last 168 hours.     Invalid input(s): AL no need to repeat    arf on ckd: cr up, bumex/albumin, hold ace    Anemia: b/l 7.6-8.9, iron sat 13%, repeat hgb 4 pm, transfuse if hgb <7     Hypertensive emergency:  -non compliant with bp meds at home  -improved now     Flash pulm edema/acute hypoxic re

## 2022-04-17 ENCOUNTER — APPOINTMENT (OUTPATIENT)
Dept: GENERAL RADIOLOGY | Facility: HOSPITAL | Age: 77
End: 2022-04-17
Attending: EMERGENCY MEDICINE
Payer: MEDICARE

## 2022-04-17 ENCOUNTER — HOSPITAL ENCOUNTER (OUTPATIENT)
Facility: HOSPITAL | Age: 77
Setting detail: OBSERVATION
LOS: 3 days | Discharge: HOME HEALTH | End: 2022-04-21
Attending: EMERGENCY MEDICINE | Admitting: HOSPITALIST
Payer: MEDICARE

## 2022-04-17 ENCOUNTER — APPOINTMENT (OUTPATIENT)
Dept: CT IMAGING | Facility: HOSPITAL | Age: 77
End: 2022-04-17
Attending: EMERGENCY MEDICINE
Payer: MEDICARE

## 2022-04-17 DIAGNOSIS — R53.83 OTHER FATIGUE: ICD-10-CM

## 2022-04-17 DIAGNOSIS — N39.0 URINARY TRACT INFECTION WITHOUT HEMATURIA, SITE UNSPECIFIED: Primary | ICD-10-CM

## 2022-04-17 DIAGNOSIS — M62.82 NON-TRAUMATIC RHABDOMYOLYSIS: ICD-10-CM

## 2022-04-17 DIAGNOSIS — R79.89 ABNORMAL LFTS: ICD-10-CM

## 2022-04-17 LAB
ALBUMIN SERPL-MCNC: 3.8 G/DL (ref 3.4–5)
ALP LIVER SERPL-CCNC: 166 U/L
ALT SERPL-CCNC: 74 U/L
ANION GAP SERPL CALC-SCNC: 8 MMOL/L (ref 0–18)
AST SERPL-CCNC: 243 U/L (ref 15–37)
BASOPHILS # BLD AUTO: 0.01 X10(3) UL (ref 0–0.2)
BASOPHILS NFR BLD AUTO: 0.1 %
BILIRUB DIRECT SERPL-MCNC: 0.1 MG/DL (ref 0–0.2)
BILIRUB SERPL-MCNC: 0.4 MG/DL (ref 0.1–2)
BILIRUB UR QL: NEGATIVE
BUN BLD-MCNC: 42 MG/DL (ref 7–18)
BUN/CREAT SERPL: 21.9 (ref 10–20)
CALCIUM BLD-MCNC: 9 MG/DL (ref 8.5–10.1)
CHLORIDE SERPL-SCNC: 109 MMOL/L (ref 98–112)
CHOLEST SERPL-MCNC: 127 MG/DL (ref ?–200)
CK SERPL-CCNC: 9145 U/L
CO2 SERPL-SCNC: 24 MMOL/L (ref 21–32)
COLOR UR: YELLOW
CREAT BLD-MCNC: 1.92 MG/DL
DEPRECATED RDW RBC AUTO: 44.6 FL (ref 35.1–46.3)
EOSINOPHIL # BLD AUTO: 0.01 X10(3) UL (ref 0–0.7)
EOSINOPHIL NFR BLD AUTO: 0.1 %
ERYTHROCYTE [DISTWIDTH] IN BLOOD BY AUTOMATED COUNT: 13.2 % (ref 11–15)
GLUCOSE BLD-MCNC: 113 MG/DL (ref 70–99)
GLUCOSE BLDC GLUCOMTR-MCNC: 120 MG/DL (ref 70–99)
GLUCOSE BLDC GLUCOMTR-MCNC: 133 MG/DL (ref 70–99)
GLUCOSE BLDC GLUCOMTR-MCNC: 167 MG/DL (ref 70–99)
GLUCOSE UR-MCNC: NEGATIVE MG/DL
HCT VFR BLD AUTO: 35.5 %
HDLC SERPL-MCNC: 65 MG/DL (ref 40–59)
HGB BLD-MCNC: 11.4 G/DL
IMM GRANULOCYTES # BLD AUTO: 0.01 X10(3) UL (ref 0–1)
IMM GRANULOCYTES NFR BLD: 0.1 %
KETONES UR-MCNC: NEGATIVE MG/DL
LDLC SERPL CALC-MCNC: 39 MG/DL (ref ?–100)
LEUKOCYTE ESTERASE UR QL STRIP.AUTO: NEGATIVE
LIPASE SERPL-CCNC: 39 U/L (ref 73–393)
LYMPHOCYTES # BLD AUTO: 1.1 X10(3) UL (ref 1–4)
LYMPHOCYTES NFR BLD AUTO: 14.5 %
MCH RBC QN AUTO: 29.7 PG (ref 26–34)
MCHC RBC AUTO-ENTMCNC: 32.1 G/DL (ref 31–37)
MCV RBC AUTO: 92.4 FL
MONOCYTES # BLD AUTO: 0.54 X10(3) UL (ref 0.1–1)
MONOCYTES NFR BLD AUTO: 7.1 %
NEUTROPHILS # BLD AUTO: 5.9 X10 (3) UL (ref 1.5–7.7)
NEUTROPHILS # BLD AUTO: 5.9 X10(3) UL (ref 1.5–7.7)
NEUTROPHILS NFR BLD AUTO: 78.1 %
NITRITE UR QL STRIP.AUTO: NEGATIVE
NONHDLC SERPL-MCNC: 62 MG/DL (ref ?–130)
OSMOLALITY SERPL CALC.SUM OF ELEC: 303 MOSM/KG (ref 275–295)
PH UR: 5 [PH] (ref 5–8)
PLATELET # BLD AUTO: 205 10(3)UL (ref 150–450)
POTASSIUM SERPL-SCNC: 4.2 MMOL/L (ref 3.5–5.1)
PROT SERPL-MCNC: 8 G/DL (ref 6.4–8.2)
PROT UR-MCNC: 100 MG/DL
RBC # BLD AUTO: 3.84 X10(6)UL
RBC #/AREA URNS AUTO: >10 /HPF
SARS-COV-2 RNA RESP QL NAA+PROBE: NOT DETECTED
SODIUM SERPL-SCNC: 141 MMOL/L (ref 136–145)
SP GR UR STRIP: 1.02 (ref 1–1.03)
TRIGL SERPL-MCNC: 132 MG/DL (ref 30–149)
TROPONIN I HIGH SENSITIVITY: 58 NG/L
TROPONIN I HIGH SENSITIVITY: 60 NG/L
UROBILINOGEN UR STRIP-ACNC: <2
VIT C UR-MCNC: NEGATIVE MG/DL
VLDLC SERPL CALC-MCNC: 18 MG/DL (ref 0–30)
WBC # BLD AUTO: 7.6 X10(3) UL (ref 4–11)

## 2022-04-17 PROCEDURE — 93010 ELECTROCARDIOGRAM REPORT: CPT | Performed by: EMERGENCY MEDICINE

## 2022-04-17 PROCEDURE — 70450 CT HEAD/BRAIN W/O DYE: CPT | Performed by: EMERGENCY MEDICINE

## 2022-04-17 PROCEDURE — 80076 HEPATIC FUNCTION PANEL: CPT | Performed by: EMERGENCY MEDICINE

## 2022-04-17 PROCEDURE — 80048 BASIC METABOLIC PNL TOTAL CA: CPT | Performed by: EMERGENCY MEDICINE

## 2022-04-17 PROCEDURE — 82962 GLUCOSE BLOOD TEST: CPT

## 2022-04-17 PROCEDURE — 96365 THER/PROPH/DIAG IV INF INIT: CPT

## 2022-04-17 PROCEDURE — 96361 HYDRATE IV INFUSION ADD-ON: CPT

## 2022-04-17 PROCEDURE — 96366 THER/PROPH/DIAG IV INF ADDON: CPT

## 2022-04-17 PROCEDURE — 83690 ASSAY OF LIPASE: CPT | Performed by: EMERGENCY MEDICINE

## 2022-04-17 PROCEDURE — 96375 TX/PRO/DX INJ NEW DRUG ADDON: CPT

## 2022-04-17 PROCEDURE — 93005 ELECTROCARDIOGRAM TRACING: CPT

## 2022-04-17 PROCEDURE — 71045 X-RAY EXAM CHEST 1 VIEW: CPT | Performed by: EMERGENCY MEDICINE

## 2022-04-17 PROCEDURE — 99285 EMERGENCY DEPT VISIT HI MDM: CPT

## 2022-04-17 PROCEDURE — 80061 LIPID PANEL: CPT | Performed by: EMERGENCY MEDICINE

## 2022-04-17 PROCEDURE — 81001 URINALYSIS AUTO W/SCOPE: CPT | Performed by: EMERGENCY MEDICINE

## 2022-04-17 PROCEDURE — 85025 COMPLETE CBC W/AUTO DIFF WBC: CPT | Performed by: EMERGENCY MEDICINE

## 2022-04-17 PROCEDURE — 84484 ASSAY OF TROPONIN QUANT: CPT | Performed by: EMERGENCY MEDICINE

## 2022-04-17 PROCEDURE — 82550 ASSAY OF CK (CPK): CPT | Performed by: EMERGENCY MEDICINE

## 2022-04-17 RX ORDER — SENNOSIDES 8.6 MG
17.2 TABLET ORAL NIGHTLY PRN
Status: DISCONTINUED | OUTPATIENT
Start: 2022-04-17 | End: 2022-04-21

## 2022-04-17 RX ORDER — LABETALOL HYDROCHLORIDE 5 MG/ML
20 INJECTION, SOLUTION INTRAVENOUS ONCE
Status: COMPLETED | OUTPATIENT
Start: 2022-04-17 | End: 2022-04-17

## 2022-04-17 RX ORDER — ASPIRIN 81 MG/1
81 TABLET ORAL DAILY
Status: DISCONTINUED | OUTPATIENT
Start: 2022-04-17 | End: 2022-04-21

## 2022-04-17 RX ORDER — ONDANSETRON 2 MG/ML
4 INJECTION INTRAMUSCULAR; INTRAVENOUS EVERY 6 HOURS PRN
Status: DISCONTINUED | OUTPATIENT
Start: 2022-04-17 | End: 2022-04-21

## 2022-04-17 RX ORDER — METOCLOPRAMIDE HYDROCHLORIDE 5 MG/ML
5 INJECTION INTRAMUSCULAR; INTRAVENOUS EVERY 8 HOURS PRN
Status: DISCONTINUED | OUTPATIENT
Start: 2022-04-17 | End: 2022-04-21

## 2022-04-17 RX ORDER — HYDROCODONE BITARTRATE AND ACETAMINOPHEN 5; 325 MG/1; MG/1
1 TABLET ORAL EVERY 4 HOURS PRN
Status: DISCONTINUED | OUTPATIENT
Start: 2022-04-17 | End: 2022-04-17

## 2022-04-17 RX ORDER — MELATONIN
325 2 TIMES DAILY WITH MEALS
Status: DISCONTINUED | OUTPATIENT
Start: 2022-04-17 | End: 2022-04-21

## 2022-04-17 RX ORDER — NICOTINE POLACRILEX 4 MG
15 LOZENGE BUCCAL
Status: DISCONTINUED | OUTPATIENT
Start: 2022-04-17 | End: 2022-04-21

## 2022-04-17 RX ORDER — OXYCODONE HCL 10 MG/1
10 TABLET, FILM COATED, EXTENDED RELEASE ORAL EVERY 12 HOURS
Status: DISCONTINUED | OUTPATIENT
Start: 2022-04-17 | End: 2022-04-21

## 2022-04-17 RX ORDER — POLYETHYLENE GLYCOL 3350 17 G/17G
17 POWDER, FOR SOLUTION ORAL DAILY PRN
Status: DISCONTINUED | OUTPATIENT
Start: 2022-04-17 | End: 2022-04-21

## 2022-04-17 RX ORDER — ISOSORBIDE MONONITRATE 60 MG/1
60 TABLET, EXTENDED RELEASE ORAL DAILY
Status: DISCONTINUED | OUTPATIENT
Start: 2022-04-17 | End: 2022-04-21

## 2022-04-17 RX ORDER — SODIUM CHLORIDE 9 MG/ML
INJECTION, SOLUTION INTRAVENOUS CONTINUOUS
Status: DISCONTINUED | OUTPATIENT
Start: 2022-04-17 | End: 2022-04-21

## 2022-04-17 RX ORDER — AMLODIPINE BESYLATE 5 MG/1
5 TABLET ORAL DAILY
Status: DISCONTINUED | OUTPATIENT
Start: 2022-04-17 | End: 2022-04-21

## 2022-04-17 RX ORDER — NICOTINE POLACRILEX 4 MG
30 LOZENGE BUCCAL
Status: DISCONTINUED | OUTPATIENT
Start: 2022-04-17 | End: 2022-04-21

## 2022-04-17 RX ORDER — HYDRALAZINE HYDROCHLORIDE 25 MG/1
25 TABLET, FILM COATED ORAL 2 TIMES DAILY
Status: DISCONTINUED | OUTPATIENT
Start: 2022-04-17 | End: 2022-04-21

## 2022-04-17 RX ORDER — OXYCODONE AND ACETAMINOPHEN 10; 325 MG/1; MG/1
1 TABLET ORAL EVERY 4 HOURS PRN
Status: DISCONTINUED | OUTPATIENT
Start: 2022-04-17 | End: 2022-04-21

## 2022-04-17 RX ORDER — HYDROCODONE BITARTRATE AND ACETAMINOPHEN 5; 325 MG/1; MG/1
2 TABLET ORAL EVERY 4 HOURS PRN
Status: DISCONTINUED | OUTPATIENT
Start: 2022-04-17 | End: 2022-04-20

## 2022-04-17 RX ORDER — HYDRALAZINE HYDROCHLORIDE 20 MG/ML
20 INJECTION INTRAMUSCULAR; INTRAVENOUS EVERY 6 HOURS PRN
Status: DISCONTINUED | OUTPATIENT
Start: 2022-04-17 | End: 2022-04-21

## 2022-04-17 RX ORDER — HEPARIN SODIUM 5000 [USP'U]/ML
5000 INJECTION, SOLUTION INTRAVENOUS; SUBCUTANEOUS EVERY 8 HOURS SCHEDULED
Status: DISCONTINUED | OUTPATIENT
Start: 2022-04-17 | End: 2022-04-21

## 2022-04-17 RX ORDER — GABAPENTIN 300 MG/1
300 CAPSULE ORAL NIGHTLY
Status: DISCONTINUED | OUTPATIENT
Start: 2022-04-17 | End: 2022-04-21

## 2022-04-17 RX ORDER — DEXTROSE MONOHYDRATE 25 G/50ML
50 INJECTION, SOLUTION INTRAVENOUS
Status: DISCONTINUED | OUTPATIENT
Start: 2022-04-17 | End: 2022-04-21

## 2022-04-17 RX ORDER — ATORVASTATIN CALCIUM 40 MG/1
40 TABLET, FILM COATED ORAL DAILY
Status: DISCONTINUED | OUTPATIENT
Start: 2022-04-17 | End: 2022-04-17

## 2022-04-17 RX ORDER — BISACODYL 10 MG
10 SUPPOSITORY, RECTAL RECTAL
Status: DISCONTINUED | OUTPATIENT
Start: 2022-04-17 | End: 2022-04-21

## 2022-04-17 RX ORDER — ACETAMINOPHEN 325 MG/1
650 TABLET ORAL EVERY 4 HOURS PRN
Status: DISCONTINUED | OUTPATIENT
Start: 2022-04-17 | End: 2022-04-21

## 2022-04-17 RX ORDER — PANTOPRAZOLE SODIUM 20 MG/1
20 TABLET, DELAYED RELEASE ORAL
Status: DISCONTINUED | OUTPATIENT
Start: 2022-04-18 | End: 2022-04-21

## 2022-04-17 NOTE — ED QUICK NOTES
Orders for admission, patient is aware of plan and ready to go upstairs. Any questions, please call ED RN Hope Parmar at [phone]. Patient Covid vaccination status: Unvaccinated     COVID Test Ordered in ED: Rapid SARS-CoV-2 by PCR    COVID Suspicion at Admission: Low clinical suspicion for COVID    Running Infusions:      Mental Status/LOC at time of transport: aox4 and wakes to verbal stimulus, but drowsy.   Other pertinent information:   CIWA score: N/A   NIH score:  N/A
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
## 2022-04-17 NOTE — ED INITIAL ASSESSMENT (HPI)
Pt brought via ems from home, per ems pt lives at home with her son and ems was called for generalized pain. Pt arrived disheleved and malodorous.
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
## 2022-04-18 ENCOUNTER — APPOINTMENT (OUTPATIENT)
Dept: ULTRASOUND IMAGING | Facility: HOSPITAL | Age: 77
End: 2022-04-18
Attending: HOSPITALIST
Payer: MEDICARE

## 2022-04-18 LAB
ANION GAP SERPL CALC-SCNC: 6 MMOL/L (ref 0–18)
BASOPHILS # BLD AUTO: 0.03 X10(3) UL (ref 0–0.2)
BASOPHILS NFR BLD AUTO: 0.5 %
BUN BLD-MCNC: 44 MG/DL (ref 7–18)
BUN/CREAT SERPL: 27.8 (ref 10–20)
CALCIUM BLD-MCNC: 8.6 MG/DL (ref 8.5–10.1)
CHLORIDE SERPL-SCNC: 116 MMOL/L (ref 98–112)
CK SERPL-CCNC: 4416 U/L
CO2 SERPL-SCNC: 23 MMOL/L (ref 21–32)
CREAT BLD-MCNC: 1.58 MG/DL
DEPRECATED RDW RBC AUTO: 45.7 FL (ref 35.1–46.3)
EOSINOPHIL # BLD AUTO: 0.07 X10(3) UL (ref 0–0.7)
EOSINOPHIL NFR BLD AUTO: 1.2 %
ERYTHROCYTE [DISTWIDTH] IN BLOOD BY AUTOMATED COUNT: 13.2 % (ref 11–15)
GLUCOSE BLD-MCNC: 113 MG/DL (ref 70–99)
GLUCOSE BLDC GLUCOMTR-MCNC: 107 MG/DL (ref 70–99)
GLUCOSE BLDC GLUCOMTR-MCNC: 113 MG/DL (ref 70–99)
GLUCOSE BLDC GLUCOMTR-MCNC: 156 MG/DL (ref 70–99)
GLUCOSE BLDC GLUCOMTR-MCNC: 156 MG/DL (ref 70–99)
HCT VFR BLD AUTO: 30.6 %
HGB BLD-MCNC: 9.5 G/DL
IMM GRANULOCYTES # BLD AUTO: 0.02 X10(3) UL (ref 0–1)
IMM GRANULOCYTES NFR BLD: 0.4 %
LYMPHOCYTES # BLD AUTO: 0.98 X10(3) UL (ref 1–4)
LYMPHOCYTES NFR BLD AUTO: 17.5 %
MCH RBC QN AUTO: 29.3 PG (ref 26–34)
MCHC RBC AUTO-ENTMCNC: 31 G/DL (ref 31–37)
MCV RBC AUTO: 94.4 FL
MONOCYTES # BLD AUTO: 0.41 X10(3) UL (ref 0.1–1)
MONOCYTES NFR BLD AUTO: 7.3 %
NEUTROPHILS # BLD AUTO: 4.1 X10 (3) UL (ref 1.5–7.7)
NEUTROPHILS # BLD AUTO: 4.1 X10(3) UL (ref 1.5–7.7)
NEUTROPHILS NFR BLD AUTO: 73.1 %
OSMOLALITY SERPL CALC.SUM OF ELEC: 312 MOSM/KG (ref 275–295)
PLATELET # BLD AUTO: 192 10(3)UL (ref 150–450)
POTASSIUM SERPL-SCNC: 3.9 MMOL/L (ref 3.5–5.1)
RBC # BLD AUTO: 3.24 X10(6)UL
SODIUM SERPL-SCNC: 145 MMOL/L (ref 136–145)
WBC # BLD AUTO: 5.6 X10(3) UL (ref 4–11)

## 2022-04-18 PROCEDURE — 97166 OT EVAL MOD COMPLEX 45 MIN: CPT

## 2022-04-18 PROCEDURE — 85025 COMPLETE CBC W/AUTO DIFF WBC: CPT | Performed by: HOSPITALIST

## 2022-04-18 PROCEDURE — 92610 EVALUATE SWALLOWING FUNCTION: CPT

## 2022-04-18 PROCEDURE — 93971 EXTREMITY STUDY: CPT | Performed by: HOSPITALIST

## 2022-04-18 PROCEDURE — 82962 GLUCOSE BLOOD TEST: CPT

## 2022-04-18 PROCEDURE — 82550 ASSAY OF CK (CPK): CPT | Performed by: HOSPITALIST

## 2022-04-18 PROCEDURE — 80048 BASIC METABOLIC PNL TOTAL CA: CPT | Performed by: HOSPITALIST

## 2022-04-18 PROCEDURE — 97530 THERAPEUTIC ACTIVITIES: CPT

## 2022-04-19 LAB
ALBUMIN SERPL-MCNC: 2.6 G/DL (ref 3.4–5)
ANION GAP SERPL CALC-SCNC: 6 MMOL/L (ref 0–18)
BASOPHILS # BLD AUTO: 0.04 X10(3) UL (ref 0–0.2)
BASOPHILS NFR BLD AUTO: 0.8 %
BUN BLD-MCNC: 38 MG/DL (ref 7–18)
BUN/CREAT SERPL: 25.5 (ref 10–20)
CALCIUM BLD-MCNC: 8.3 MG/DL (ref 8.5–10.1)
CHLORIDE SERPL-SCNC: 117 MMOL/L (ref 98–112)
CK SERPL-CCNC: 2562 U/L
CO2 SERPL-SCNC: 21 MMOL/L (ref 21–32)
CREAT BLD-MCNC: 1.49 MG/DL
DEPRECATED RDW RBC AUTO: 50 FL (ref 35.1–46.3)
EOSINOPHIL # BLD AUTO: 0.19 X10(3) UL (ref 0–0.7)
EOSINOPHIL NFR BLD AUTO: 3.6 %
ERYTHROCYTE [DISTWIDTH] IN BLOOD BY AUTOMATED COUNT: 13.5 % (ref 11–15)
GLUCOSE BLD-MCNC: 92 MG/DL (ref 70–99)
GLUCOSE BLDC GLUCOMTR-MCNC: 113 MG/DL (ref 70–99)
GLUCOSE BLDC GLUCOMTR-MCNC: 132 MG/DL (ref 70–99)
GLUCOSE BLDC GLUCOMTR-MCNC: 150 MG/DL (ref 70–99)
GLUCOSE BLDC GLUCOMTR-MCNC: 156 MG/DL (ref 70–99)
GLUCOSE BLDC GLUCOMTR-MCNC: 99 MG/DL (ref 70–99)
HCT VFR BLD AUTO: 31.4 %
HGB BLD-MCNC: 9.2 G/DL
IMM GRANULOCYTES # BLD AUTO: 0.03 X10(3) UL (ref 0–1)
IMM GRANULOCYTES NFR BLD: 0.6 %
LYMPHOCYTES # BLD AUTO: 1.25 X10(3) UL (ref 1–4)
LYMPHOCYTES NFR BLD AUTO: 23.5 %
MCH RBC QN AUTO: 29.7 PG (ref 26–34)
MCHC RBC AUTO-ENTMCNC: 29.3 G/DL (ref 31–37)
MCV RBC AUTO: 101.3 FL
MONOCYTES # BLD AUTO: 0.46 X10(3) UL (ref 0.1–1)
MONOCYTES NFR BLD AUTO: 8.7 %
NEUTROPHILS # BLD AUTO: 3.34 X10 (3) UL (ref 1.5–7.7)
NEUTROPHILS # BLD AUTO: 3.34 X10(3) UL (ref 1.5–7.7)
NEUTROPHILS NFR BLD AUTO: 62.8 %
OSMOLALITY SERPL CALC.SUM OF ELEC: 307 MOSM/KG (ref 275–295)
PHOSPHATE SERPL-MCNC: 2.6 MG/DL (ref 2.5–4.9)
PLATELET # BLD AUTO: 176 10(3)UL (ref 150–450)
POTASSIUM SERPL-SCNC: 4.2 MMOL/L (ref 3.5–5.1)
RBC # BLD AUTO: 3.1 X10(6)UL
SODIUM SERPL-SCNC: 144 MMOL/L (ref 136–145)
WBC # BLD AUTO: 5.3 X10(3) UL (ref 4–11)

## 2022-04-19 PROCEDURE — 97116 GAIT TRAINING THERAPY: CPT

## 2022-04-19 PROCEDURE — 80069 RENAL FUNCTION PANEL: CPT | Performed by: HOSPITALIST

## 2022-04-19 PROCEDURE — 82550 ASSAY OF CK (CPK): CPT | Performed by: HOSPITALIST

## 2022-04-19 PROCEDURE — 82962 GLUCOSE BLOOD TEST: CPT

## 2022-04-19 PROCEDURE — 92526 ORAL FUNCTION THERAPY: CPT

## 2022-04-19 PROCEDURE — 97162 PT EVAL MOD COMPLEX 30 MIN: CPT

## 2022-04-19 PROCEDURE — 85025 COMPLETE CBC W/AUTO DIFF WBC: CPT | Performed by: HOSPITALIST

## 2022-04-19 RX ORDER — VANCOMYCIN HYDROCHLORIDE 125 MG/1
125 CAPSULE ORAL DAILY
Status: DISCONTINUED | OUTPATIENT
Start: 2022-04-19 | End: 2022-04-21

## 2022-04-19 NOTE — PROGRESS NOTES
[facility]    CDI Prediction Tool Protocol (Vancomycin Initiated)    OVP (oral vancomycin prophylaxis) 125 mg PO Daily is being started in this patient based on a score of 16.1. Score Breakdown:  History of CDI > 1 year ago AND high risk antibiotic use (8 points)  High risk antibiotic use (5 points)  Age 77 - 78 years (2 points)  PPI use in hospital (1 point)  History of CDI regardless of high risk antibiotic use (0.1 point)    This patient is currently at high risk for developing CDI due to his/her score being >/=13 points and is being started on prophylactic oral vancomycin to prevent Cdiff. This patient does not have C. difficile infection. All measures taken within this protocol are to decrease the risk of CDI development.     Scarlett Pompa, PharmD  4/19/2022  7:20 AM  [address] Extension: [phone]
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
## 2022-04-20 LAB
ALBUMIN SERPL-MCNC: 2.7 G/DL (ref 3.4–5)
ANION GAP SERPL CALC-SCNC: 6 MMOL/L (ref 0–18)
BASOPHILS # BLD AUTO: 0.04 X10(3) UL (ref 0–0.2)
BASOPHILS NFR BLD AUTO: 0.8 %
BUN BLD-MCNC: 30 MG/DL (ref 7–18)
BUN/CREAT SERPL: 25 (ref 10–20)
CALCIUM BLD-MCNC: 8.6 MG/DL (ref 8.5–10.1)
CHLORIDE SERPL-SCNC: 116 MMOL/L (ref 98–112)
CK SERPL-CCNC: 1950 U/L
CO2 SERPL-SCNC: 21 MMOL/L (ref 21–32)
CREAT BLD-MCNC: 1.2 MG/DL
DEPRECATED RDW RBC AUTO: 47.8 FL (ref 35.1–46.3)
EOSINOPHIL # BLD AUTO: 0.24 X10(3) UL (ref 0–0.7)
EOSINOPHIL NFR BLD AUTO: 4.7 %
ERYTHROCYTE [DISTWIDTH] IN BLOOD BY AUTOMATED COUNT: 13.2 % (ref 11–15)
GLUCOSE BLD-MCNC: 93 MG/DL (ref 70–99)
GLUCOSE BLDC GLUCOMTR-MCNC: 116 MG/DL (ref 70–99)
GLUCOSE BLDC GLUCOMTR-MCNC: 126 MG/DL (ref 70–99)
GLUCOSE BLDC GLUCOMTR-MCNC: 131 MG/DL (ref 70–99)
GLUCOSE BLDC GLUCOMTR-MCNC: 234 MG/DL (ref 70–99)
HCT VFR BLD AUTO: 31.7 %
HGB BLD-MCNC: 9.4 G/DL
IMM GRANULOCYTES # BLD AUTO: 0.02 X10(3) UL (ref 0–1)
IMM GRANULOCYTES NFR BLD: 0.4 %
LYMPHOCYTES # BLD AUTO: 1.29 X10(3) UL (ref 1–4)
LYMPHOCYTES NFR BLD AUTO: 25 %
MCH RBC QN AUTO: 29.5 PG (ref 26–34)
MCHC RBC AUTO-ENTMCNC: 29.7 G/DL (ref 31–37)
MCV RBC AUTO: 99.4 FL
MONOCYTES # BLD AUTO: 0.38 X10(3) UL (ref 0.1–1)
MONOCYTES NFR BLD AUTO: 7.4 %
NEUTROPHILS # BLD AUTO: 3.18 X10 (3) UL (ref 1.5–7.7)
NEUTROPHILS # BLD AUTO: 3.18 X10(3) UL (ref 1.5–7.7)
NEUTROPHILS NFR BLD AUTO: 61.7 %
OSMOLALITY SERPL CALC.SUM OF ELEC: 302 MOSM/KG (ref 275–295)
PHOSPHATE SERPL-MCNC: 2.6 MG/DL (ref 2.5–4.9)
PLATELET # BLD AUTO: 168 10(3)UL (ref 150–450)
POTASSIUM SERPL-SCNC: 4.2 MMOL/L (ref 3.5–5.1)
RBC # BLD AUTO: 3.19 X10(6)UL
SODIUM SERPL-SCNC: 143 MMOL/L (ref 136–145)
WBC # BLD AUTO: 5.2 X10(3) UL (ref 4–11)

## 2022-04-20 PROCEDURE — 85025 COMPLETE CBC W/AUTO DIFF WBC: CPT | Performed by: HOSPITALIST

## 2022-04-20 PROCEDURE — 92526 ORAL FUNCTION THERAPY: CPT

## 2022-04-20 PROCEDURE — 82962 GLUCOSE BLOOD TEST: CPT

## 2022-04-20 PROCEDURE — 80069 RENAL FUNCTION PANEL: CPT | Performed by: HOSPITALIST

## 2022-04-20 PROCEDURE — 82550 ASSAY OF CK (CPK): CPT | Performed by: HOSPITALIST

## 2022-04-21 ENCOUNTER — TELEPHONE (OUTPATIENT)
Dept: INTERNAL MEDICINE UNIT | Facility: HOSPITAL | Age: 77
End: 2022-04-21

## 2022-04-21 VITALS
WEIGHT: 172.81 LBS | RESPIRATION RATE: 18 BRPM | HEART RATE: 88 BPM | TEMPERATURE: 98 F | OXYGEN SATURATION: 98 % | SYSTOLIC BLOOD PRESSURE: 128 MMHG | HEIGHT: 64 IN | DIASTOLIC BLOOD PRESSURE: 55 MMHG | BODY MASS INDEX: 29.5 KG/M2

## 2022-04-21 LAB
ANION GAP SERPL CALC-SCNC: 7 MMOL/L (ref 0–18)
BUN BLD-MCNC: 20 MG/DL (ref 7–18)
BUN/CREAT SERPL: 19.2 (ref 10–20)
CALCIUM BLD-MCNC: 9.1 MG/DL (ref 8.5–10.1)
CHLORIDE SERPL-SCNC: 115 MMOL/L (ref 98–112)
CO2 SERPL-SCNC: 23 MMOL/L (ref 21–32)
CREAT BLD-MCNC: 1.04 MG/DL
GLUCOSE BLD-MCNC: 109 MG/DL (ref 70–99)
GLUCOSE BLDC GLUCOMTR-MCNC: 113 MG/DL (ref 70–99)
GLUCOSE BLDC GLUCOMTR-MCNC: 117 MG/DL (ref 70–99)
GLUCOSE BLDC GLUCOMTR-MCNC: 121 MG/DL (ref 70–99)
OSMOLALITY SERPL CALC.SUM OF ELEC: 303 MOSM/KG (ref 275–295)
POTASSIUM SERPL-SCNC: 4.1 MMOL/L (ref 3.5–5.1)
SODIUM SERPL-SCNC: 145 MMOL/L (ref 136–145)

## 2022-04-21 PROCEDURE — 82962 GLUCOSE BLOOD TEST: CPT

## 2022-04-21 PROCEDURE — 80048 BASIC METABOLIC PNL TOTAL CA: CPT | Performed by: HOSPITALIST

## 2022-04-21 PROCEDURE — 92526 ORAL FUNCTION THERAPY: CPT

## 2022-04-21 RX ORDER — VANCOMYCIN HYDROCHLORIDE 125 MG/1
125 CAPSULE ORAL DAILY
Qty: 10 CAPSULE | Refills: 0 | Status: SHIPPED | COMMUNITY
Start: 2022-04-22 | End: 2022-04-21

## 2022-04-21 RX ORDER — OXYCODONE 9 MG/1
1 CAPSULE, EXTENDED RELEASE ORAL EVERY 12 HOURS
Qty: 60 CAPSULE | Refills: 0 | Status: SHIPPED | OUTPATIENT
Start: 2022-04-21 | End: 2022-04-21

## 2022-04-21 RX ORDER — OXYCODONE 9 MG/1
1 CAPSULE, EXTENDED RELEASE ORAL EVERY 12 HOURS
Refills: 0 | Status: ON HOLD | COMMUNITY
Start: 2022-04-21

## 2022-04-21 RX ORDER — VANCOMYCIN HYDROCHLORIDE 125 MG/1
125 CAPSULE ORAL DAILY
Qty: 10 CAPSULE | Refills: 0 | Status: ON HOLD | OUTPATIENT
Start: 2022-04-22

## 2022-04-21 RX ORDER — CEFDINIR 300 MG/1
300 CAPSULE ORAL 2 TIMES DAILY
Qty: 6 CAPSULE | Refills: 0 | Status: SHIPPED | OUTPATIENT
Start: 2022-04-21 | End: 2022-04-21

## 2022-04-21 RX ORDER — CEFDINIR 300 MG/1
300 CAPSULE ORAL 2 TIMES DAILY
Qty: 6 CAPSULE | Refills: 0 | Status: ON HOLD | OUTPATIENT
Start: 2022-04-21 | End: 2022-04-24

## 2022-04-21 NOTE — TELEPHONE ENCOUNTER
Fax received from [address] [place] [address] stating Vancomycin tabs were not covered by the patients insurance. MD Notified. Good RX coupon called in to pharmacy. Price drop to $74 .

## 2022-04-21 NOTE — CM/SW NOTE
SW following for discharge planning. MDO present for discharge. SW met with patient and her son, Marlyn Eaton, at bedside to confirm discharge plan. SW discussed REVA and HH. Patient and Marlyn Eaton both are declining REVA and report that goal is for patient to return home with family support. Marlyn Eaton reports that he has time off work and is going to apply w/ FMLA through his employer to increase assistance and supervision for patient. Patient is agreeable to [facility]. SW discussed [facility] services and frequency of visits. SW discussed homemaker services. Patient is agreeable to referral for homemaker services. SW shared [employer] list of available HH. Patient confirmed agreement for [facility] and reports that choice is [facility]. Reserved via [employer] and notified of DC today. SW made referral for homemaker services. Plan: Home w/ son, [facility], Referral for homemaker services    Updated RN. DANIAL/RAJ to remain available for support and/or discharge planning.      CATINA Gresham, [facility]   [id number]

## 2022-04-22 ENCOUNTER — HOSPITAL ENCOUNTER (OUTPATIENT)
Facility: HOSPITAL | Age: 77
Setting detail: OBSERVATION
Discharge: NURSING, REHAB OR LONG-TERM CARE | End: 2022-04-26
Attending: EMERGENCY MEDICINE | Admitting: HOSPITALIST
Payer: MEDICARE

## 2022-04-22 ENCOUNTER — APPOINTMENT (OUTPATIENT)
Dept: MRI IMAGING | Facility: HOSPITAL | Age: 77
End: 2022-04-22
Attending: EMERGENCY MEDICINE
Payer: MEDICARE

## 2022-04-22 ENCOUNTER — APPOINTMENT (OUTPATIENT)
Dept: CT IMAGING | Facility: HOSPITAL | Age: 77
End: 2022-04-22
Attending: EMERGENCY MEDICINE
Payer: MEDICARE

## 2022-04-22 DIAGNOSIS — R41.82 ALTERED MENTAL STATUS, UNSPECIFIED ALTERED MENTAL STATUS TYPE: Primary | ICD-10-CM

## 2022-04-22 LAB
ALBUMIN SERPL-MCNC: 3.3 G/DL (ref 3.4–5)
ALP LIVER SERPL-CCNC: 131 U/L
ALT SERPL-CCNC: 38 U/L
AMMONIA PLAS-MCNC: 27 UMOL/L (ref 11–32)
AMPHET UR QL SCN: NEGATIVE
ANION GAP SERPL CALC-SCNC: 5 MMOL/L (ref 0–18)
AST SERPL-CCNC: 41 U/L (ref 15–37)
BASOPHILS # BLD AUTO: 0.03 X10(3) UL (ref 0–0.2)
BASOPHILS NFR BLD AUTO: 0.5 %
BILIRUB DIRECT SERPL-MCNC: <0.1 MG/DL (ref 0–0.2)
BILIRUB DIRECT SERPL-MCNC: <0.1 MG/DL (ref 0–0.2)
BILIRUB SERPL-MCNC: 0.4 MG/DL (ref 0.1–2)
BILIRUB UR QL: NEGATIVE
BUN BLD-MCNC: 17 MG/DL (ref 7–18)
BUN/CREAT SERPL: 13.9 (ref 10–20)
CALCIUM BLD-MCNC: 9.1 MG/DL (ref 8.5–10.1)
CANNABINOIDS UR QL SCN: NEGATIVE
CHLORIDE SERPL-SCNC: 111 MMOL/L (ref 98–112)
CK SERPL-CCNC: 727 U/L
CO2 SERPL-SCNC: 25 MMOL/L (ref 21–32)
COCAINE UR QL: NEGATIVE
COLOR UR: YELLOW
CREAT BLD-MCNC: 1.22 MG/DL
CREAT UR-SCNC: 117 MG/DL
DEPRECATED RDW RBC AUTO: 48.7 FL (ref 35.1–46.3)
EOSINOPHIL # BLD AUTO: 0.42 X10(3) UL (ref 0–0.7)
EOSINOPHIL NFR BLD AUTO: 7.4 %
ERYTHROCYTE [DISTWIDTH] IN BLOOD BY AUTOMATED COUNT: 13.6 % (ref 11–15)
GLUCOSE BLD-MCNC: 128 MG/DL (ref 70–99)
GLUCOSE BLDC GLUCOMTR-MCNC: 106 MG/DL (ref 70–99)
GLUCOSE BLDC GLUCOMTR-MCNC: 107 MG/DL (ref 70–99)
GLUCOSE BLDC GLUCOMTR-MCNC: 111 MG/DL (ref 70–99)
GLUCOSE BLDC GLUCOMTR-MCNC: 135 MG/DL (ref 70–99)
GLUCOSE BLDC GLUCOMTR-MCNC: 153 MG/DL (ref 70–99)
GLUCOSE UR-MCNC: NEGATIVE MG/DL
HCT VFR BLD AUTO: 31.3 %
HGB BLD-MCNC: 9.5 G/DL
HGB UR QL STRIP.AUTO: NEGATIVE
IMM GRANULOCYTES # BLD AUTO: 0.05 X10(3) UL (ref 0–1)
IMM GRANULOCYTES NFR BLD: 0.9 %
LEUKOCYTE ESTERASE UR QL STRIP.AUTO: NEGATIVE
LYMPHOCYTES # BLD AUTO: 0.95 X10(3) UL (ref 1–4)
LYMPHOCYTES NFR BLD AUTO: 16.8 %
MCH RBC QN AUTO: 29.6 PG (ref 26–34)
MCHC RBC AUTO-ENTMCNC: 30.4 G/DL (ref 31–37)
MCV RBC AUTO: 97.5 FL
MDMA UR QL SCN: NEGATIVE
MONOCYTES # BLD AUTO: 0.36 X10(3) UL (ref 0.1–1)
MONOCYTES NFR BLD AUTO: 6.4 %
NEUTROPHILS # BLD AUTO: 3.84 X10 (3) UL (ref 1.5–7.7)
NEUTROPHILS # BLD AUTO: 3.84 X10(3) UL (ref 1.5–7.7)
NEUTROPHILS NFR BLD AUTO: 68 %
NITRITE UR QL STRIP.AUTO: NEGATIVE
OSMOLALITY SERPL CALC.SUM OF ELEC: 295 MOSM/KG (ref 275–295)
PH UR: 5 [PH] (ref 5–8)
PLATELET # BLD AUTO: 207 10(3)UL (ref 150–450)
POTASSIUM SERPL-SCNC: 4 MMOL/L (ref 3.5–5.1)
PROT SERPL-MCNC: 7.5 G/DL (ref 6.4–8.2)
PROT UR-MCNC: NEGATIVE MG/DL
RBC # BLD AUTO: 3.21 X10(6)UL
SARS-COV-2 RNA RESP QL NAA+PROBE: NOT DETECTED
SODIUM SERPL-SCNC: 141 MMOL/L (ref 136–145)
SP GR UR STRIP: >1.03 (ref 1–1.03)
TSI SER-ACNC: 3.12 MIU/ML (ref 0.36–3.74)
UROBILINOGEN UR STRIP-ACNC: <2
VIT B12 SERPL-MCNC: 399 PG/ML (ref 193–986)
VIT C UR-MCNC: NEGATIVE MG/DL
WBC # BLD AUTO: 5.7 X10(3) UL (ref 4–11)

## 2022-04-22 PROCEDURE — 70498 CT ANGIOGRAPHY NECK: CPT | Performed by: EMERGENCY MEDICINE

## 2022-04-22 PROCEDURE — 99219 INITIAL OBSERVATION CARE,LEVL II: CPT | Performed by: HOSPITALIST

## 2022-04-22 PROCEDURE — 95816 EEG AWAKE AND DROWSY: CPT | Performed by: OTHER

## 2022-04-22 PROCEDURE — 70551 MRI BRAIN STEM W/O DYE: CPT | Performed by: EMERGENCY MEDICINE

## 2022-04-22 PROCEDURE — 70496 CT ANGIOGRAPHY HEAD: CPT | Performed by: EMERGENCY MEDICINE

## 2022-04-22 PROCEDURE — 70450 CT HEAD/BRAIN W/O DYE: CPT | Performed by: EMERGENCY MEDICINE

## 2022-04-22 RX ORDER — HYDRALAZINE HYDROCHLORIDE 25 MG/1
25 TABLET, FILM COATED ORAL EVERY 8 HOURS SCHEDULED
Status: DISCONTINUED | OUTPATIENT
Start: 2022-04-22 | End: 2022-04-26

## 2022-04-22 RX ORDER — ONDANSETRON 2 MG/ML
4 INJECTION INTRAMUSCULAR; INTRAVENOUS EVERY 6 HOURS PRN
Status: DISCONTINUED | OUTPATIENT
Start: 2022-04-22 | End: 2022-04-26

## 2022-04-22 RX ORDER — SODIUM CHLORIDE 9 MG/ML
INJECTION, SOLUTION INTRAVENOUS CONTINUOUS
Status: DISCONTINUED | OUTPATIENT
Start: 2022-04-22 | End: 2022-04-26

## 2022-04-22 RX ORDER — NICOTINE POLACRILEX 4 MG
15 LOZENGE BUCCAL
Status: DISCONTINUED | OUTPATIENT
Start: 2022-04-22 | End: 2022-04-26

## 2022-04-22 RX ORDER — DEXTROSE MONOHYDRATE 25 G/50ML
50 INJECTION, SOLUTION INTRAVENOUS
Status: DISCONTINUED | OUTPATIENT
Start: 2022-04-22 | End: 2022-04-26

## 2022-04-22 RX ORDER — ISOSORBIDE MONONITRATE 60 MG/1
60 TABLET, EXTENDED RELEASE ORAL DAILY
Status: DISCONTINUED | OUTPATIENT
Start: 2022-04-22 | End: 2022-04-26

## 2022-04-22 RX ORDER — NICOTINE POLACRILEX 4 MG
30 LOZENGE BUCCAL
Status: DISCONTINUED | OUTPATIENT
Start: 2022-04-22 | End: 2022-04-26

## 2022-04-22 RX ORDER — HYDRALAZINE HYDROCHLORIDE 20 MG/ML
10 INJECTION INTRAMUSCULAR; INTRAVENOUS EVERY 4 HOURS PRN
Status: DISCONTINUED | OUTPATIENT
Start: 2022-04-22 | End: 2022-04-26

## 2022-04-22 RX ORDER — HEPARIN SODIUM 5000 [USP'U]/ML
5000 INJECTION, SOLUTION INTRAVENOUS; SUBCUTANEOUS EVERY 12 HOURS SCHEDULED
Status: DISCONTINUED | OUTPATIENT
Start: 2022-04-22 | End: 2022-04-26

## 2022-04-22 NOTE — CM/SW NOTE
Received MDO for discharge planning. PT/OT evaluations pending. Patient was discharged from [facility] yesterday to home w/ her son Sigifredo Burris. Spoke w/ son Marlyn Eaton. He states he started FMLA today and plan from previous discharge was to be home 24/7 to care for patient. Discussed discharge plan w/ Marlyn Eaton, he now agrees to SNF placement. He states [facility] his home and would prefer that facility if accepted. Discussed that patient is a [facility] & will check if in network. Referral made to SNF facilities via [facility], added [facility]. PASRR/level 1 completed & uploaded in [address]. / to send PT/OT evaluations when available. Patient will need insurance authorization which was started via [employer] (ID# [id number]). Will need list of options & insurance authorization.     Yue Mahmood, [facility]

## 2022-04-22 NOTE — CM/SW NOTE
Department  notified of request for REVA, aidin referrals started. Assigned CM/SW to follow up with pt/family on further discharge planning. Darci Chris       Submitted clinical via [employer] portal.  [facility] Case/Auth ID is [id number]. Final insurance authorization is pending at this time. SW/CM assigned to the case will continue to follow auth status.

## 2022-04-22 NOTE — PLAN OF CARE
Neuro checks completed per order. IV fluids infusing. SLP/PT/OT to see pt today. EEG and MRI brain complete. Problem: Patient Centered Care  Goal: Patient preferences are identified and integrated in the patient's plan of care  Description: Interventions:  - What would you like us to know as we care for you?  Was recently admitted for UTI  - Provide timely, complete, and accurate information to patient/family  - Incorporate patient and family knowledge, values, beliefs, and cultural backgrounds into the planning and delivery of care  - Encourage patient/family to participate in care and decision-making at the level they choose  - Honor patient and family perspectives and choices  Outcome: Progressing     Problem: Diabetes/Glucose Control  Goal: Glucose maintained within prescribed range  Description: INTERVENTIONS:  - Monitor Blood Glucose as ordered  - Assess for signs and symptoms of hyperglycemia and hypoglycemia  - Administer ordered medications to maintain glucose within target range  - Assess barriers to adequate nutritional intake and initiate nutrition consult as needed  - Instruct patient on self management of diabetes  Outcome: Progressing     Problem: Patient/Family Goals  Goal: Patient/Family Long Term Goal  Description: Patient's Long Term Goal: Go home    Interventions:  - EEG  - MRI brain  - IVF  - Tele monitoring  - See additional Care Plan goals for specific interventions  Outcome: Progressing  Goal: Patient/Family Short Term Goal  Description: Patient's Short Term Goal: Return to baseline cognition and strength    Interventions:   - EEG  - MRI brain  - Neuro checks  - See additional Care Plan goals for specific interventions  Outcome: Progressing
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
## 2022-04-22 NOTE — ED QUICK NOTES
Orders for admission, patient is aware of plan and ready to go upstairs. Any questions, please call ED RN Sen Morgan at extension [phone]. Patient Covid vaccination status: Unvaccinated     COVID Test Ordered in ED: Rapid SARS-CoV-2 by PCR    COVID Suspicion at Admission: Low clinical suspicion for COVID    Running Infusions:  None    Mental Status/LOC at time of transport: AOx1     Other pertinent information: pt. Recently dc from hospital. Per pt. Son he believes pt. Was last walking with walker last Sunday.    CIWA score: N/A   NIH score:  3

## 2022-04-22 NOTE — PROCEDURES
[facility], [address]      PATIENT'S NAME: Olvin Lee   ATTENDING PHYSICIAN: Shawn Prado. Miguel Angel Lane MD   PATIENT ACCOUNT #: [de-identified] LOCATION: [facility] Leyla Buckner #: [id number] YOB: 1945   DATE OF SERVICE: 04/22/2022       ELECTROENCEPHALOGRAM REPORT    DATE OF EXAMINATION:  04/22/2022  AGE: 78 Yrs. SEX: F   EEG #:      INTERPRETATION:  This is a routine awake-drowsy electroencephalogram using referential and bipolar montages. Background activity is poorly-formed 9 Hz alpha activity. Symmetrical.  There is diffuse intermittent moderate amplitude 6-7 Hz theta activity. Drowsiness was recorded. No stage 2 sleep. IMPRESSION:  Moderate generalized slowing of cerebral activity. No focal slowing. No focal or generalized epileptiform discharges.       Dictated By Rhonda Jimenez MD  d: 04/22/2022 09:52:01  t: 04/22/2022 12:16:29  Job [id number]  [facility]/

## 2022-04-22 NOTE — ED QUICK NOTES
MRI Jenn Ramirez) paged to come in for pts MRI--per MD. Per Tiffanie Berrios to be in at [facility]. MD aware and ok with plan for 0530 MRI.
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
## 2022-04-22 NOTE — ED INITIAL ASSESSMENT (HPI)
Pt. Presents to ED via EMS with a complaint of left side weakness. Pt was in hospital yesterday with a diagnoses of UTI.  Son states when she got home pt was not acting normal.

## 2022-04-23 LAB
ALBUMIN SERPL-MCNC: 3.2 G/DL (ref 3.4–5)
ALBUMIN/GLOB SERPL: 0.9 {RATIO} (ref 1–2)
ALP LIVER SERPL-CCNC: 120 U/L
ALT SERPL-CCNC: 32 U/L
ANION GAP SERPL CALC-SCNC: 6 MMOL/L (ref 0–18)
AST SERPL-CCNC: 34 U/L (ref 15–37)
BASOPHILS # BLD AUTO: 0.03 X10(3) UL (ref 0–0.2)
BASOPHILS NFR BLD AUTO: 0.5 %
BILIRUB SERPL-MCNC: 0.3 MG/DL (ref 0.1–2)
BUN BLD-MCNC: 12 MG/DL (ref 7–18)
BUN/CREAT SERPL: 10.6 (ref 10–20)
CALCIUM BLD-MCNC: 9 MG/DL (ref 8.5–10.1)
CHLORIDE SERPL-SCNC: 115 MMOL/L (ref 98–112)
CK SERPL-CCNC: 460 U/L
CO2 SERPL-SCNC: 22 MMOL/L (ref 21–32)
CREAT BLD-MCNC: 1.13 MG/DL
DEPRECATED RDW RBC AUTO: 46 FL (ref 35.1–46.3)
EOSINOPHIL # BLD AUTO: 0.12 X10(3) UL (ref 0–0.7)
EOSINOPHIL NFR BLD AUTO: 1.9 %
ERYTHROCYTE [DISTWIDTH] IN BLOOD BY AUTOMATED COUNT: 13.3 % (ref 11–15)
GLOBULIN PLAS-MCNC: 3.5 G/DL (ref 2.8–4.4)
GLUCOSE BLD-MCNC: 101 MG/DL (ref 70–99)
GLUCOSE BLDC GLUCOMTR-MCNC: 109 MG/DL (ref 70–99)
GLUCOSE BLDC GLUCOMTR-MCNC: 109 MG/DL (ref 70–99)
GLUCOSE BLDC GLUCOMTR-MCNC: 112 MG/DL (ref 70–99)
GLUCOSE BLDC GLUCOMTR-MCNC: 163 MG/DL (ref 70–99)
HCT VFR BLD AUTO: 31 %
HGB BLD-MCNC: 9.6 G/DL
IMM GRANULOCYTES # BLD AUTO: 0.03 X10(3) UL (ref 0–1)
IMM GRANULOCYTES NFR BLD: 0.5 %
LYMPHOCYTES # BLD AUTO: 0.82 X10(3) UL (ref 1–4)
LYMPHOCYTES NFR BLD AUTO: 12.7 %
MAGNESIUM SERPL-MCNC: 1.9 MG/DL (ref 1.6–2.6)
MCH RBC QN AUTO: 29.4 PG (ref 26–34)
MCHC RBC AUTO-ENTMCNC: 31 G/DL (ref 31–37)
MCV RBC AUTO: 94.8 FL
MONOCYTES # BLD AUTO: 0.36 X10(3) UL (ref 0.1–1)
MONOCYTES NFR BLD AUTO: 5.6 %
NEUTROPHILS # BLD AUTO: 5.09 X10 (3) UL (ref 1.5–7.7)
NEUTROPHILS # BLD AUTO: 5.09 X10(3) UL (ref 1.5–7.7)
NEUTROPHILS NFR BLD AUTO: 78.8 %
OSMOLALITY SERPL CALC.SUM OF ELEC: 296 MOSM/KG (ref 275–295)
PHOSPHATE SERPL-MCNC: 2.8 MG/DL (ref 2.5–4.9)
PLATELET # BLD AUTO: 230 10(3)UL (ref 150–450)
POTASSIUM SERPL-SCNC: 3.8 MMOL/L (ref 3.5–5.1)
PROT SERPL-MCNC: 6.7 G/DL (ref 6.4–8.2)
RBC # BLD AUTO: 3.27 X10(6)UL
SODIUM SERPL-SCNC: 143 MMOL/L (ref 136–145)
WBC # BLD AUTO: 6.5 X10(3) UL (ref 4–11)

## 2022-04-23 PROCEDURE — 99226 SUBSEQUENT OBSERVATION CARE: CPT | Performed by: OTHER

## 2022-04-23 PROCEDURE — 99226 SUBSEQUENT OBSERVATION CARE: CPT | Performed by: HOSPITALIST

## 2022-04-23 RX ORDER — AMLODIPINE BESYLATE 5 MG/1
5 TABLET ORAL DAILY
Status: DISCONTINUED | OUTPATIENT
Start: 2022-04-23 | End: 2022-04-26

## 2022-04-23 RX ORDER — VANCOMYCIN HYDROCHLORIDE 125 MG/1
125 CAPSULE ORAL DAILY
Status: DISCONTINUED | OUTPATIENT
Start: 2022-04-23 | End: 2022-04-26

## 2022-04-23 RX ORDER — CEFDINIR 300 MG/1
300 CAPSULE ORAL 2 TIMES DAILY
Status: COMPLETED | OUTPATIENT
Start: 2022-04-23 | End: 2022-04-25

## 2022-04-23 RX ORDER — GABAPENTIN 300 MG/1
300 CAPSULE ORAL NIGHTLY
Status: DISCONTINUED | OUTPATIENT
Start: 2022-04-23 | End: 2022-04-26

## 2022-04-23 RX ORDER — OXYCODONE HCL 10 MG/1
10 TABLET, FILM COATED, EXTENDED RELEASE ORAL EVERY 12 HOURS
Status: DISCONTINUED | OUTPATIENT
Start: 2022-04-23 | End: 2022-04-26

## 2022-04-23 RX ORDER — ASPIRIN 81 MG/1
81 TABLET ORAL DAILY
Status: DISCONTINUED | OUTPATIENT
Start: 2022-04-23 | End: 2022-04-26

## 2022-04-23 RX ORDER — PANTOPRAZOLE SODIUM 40 MG/1
40 TABLET, DELAYED RELEASE ORAL
Status: DISCONTINUED | OUTPATIENT
Start: 2022-04-23 | End: 2022-04-26

## 2022-04-23 NOTE — PLAN OF CARE
Problem: Patient Centered Care  Goal: Patient preferences are identified and integrated in the patient's plan of care  Description: Interventions:    - Provide timely, complete, and accurate information to patient/family  - Incorporate patient and family knowledge, values, beliefs, and cultural backgrounds into the planning and delivery of care  - Encourage patient/family to participate in care and decision-making at the level they choose  - Honor patient and family perspectives and choices  4/22/2022 2227 by Cleo Paula RN  Outcome: Progressing  4/22/2022 2227 by Cleo Paula RN  Outcome: Progressing     Problem: Diabetes/Glucose Control  Goal: Glucose maintained within prescribed range  Description: INTERVENTIONS:  - Monitor Blood Glucose as ordered  - Assess for signs and symptoms of hyperglycemia and hypoglycemia  - Administer ordered medications to maintain glucose within target range  - Assess barriers to adequate nutritional intake and initiate nutrition consult as needed  - Instruct patient on self management of diabetes  4/22/2022 2227 by Cleo Paula RN  Outcome: Progressing  4/22/2022 2227 by Cleo Paula RN  Outcome: Progressing     Problem: Patient/Family Goals  Goal: Patient/Family Long Term Goal  Description: Patient's Long Term Goal: Go home    Interventions:  - EEG  - MRI brain  - IVF  - Tele monitoring  - See additional Care Plan goals for specific interventions  4/22/2022 2227 by Cleo Paula RN  Outcome: Progressing  4/22/2022 2227 by Cleo Paula RN  Outcome: Progressing  Goal: Patient/Family Short Term Goal  Description: Patient's Short Term Goal: Return to baseline cognition and strength    Interventions:   - EEG  - MRI brain  - Neuro checks  - See additional Care Plan goals for specific interventions  4/22/2022 2227 by Cleo Paula RN  Outcome: Progressing  4/22/2022 2227 by Cleo Paula RN  Outcome: Progressing

## 2022-04-24 LAB
ALBUMIN SERPL-MCNC: 2.8 G/DL (ref 3.4–5)
ALBUMIN/GLOB SERPL: 0.9 {RATIO} (ref 1–2)
ALP LIVER SERPL-CCNC: 106 U/L
ALT SERPL-CCNC: 25 U/L
ANION GAP SERPL CALC-SCNC: 5 MMOL/L (ref 0–18)
AST SERPL-CCNC: 22 U/L (ref 15–37)
BASOPHILS # BLD AUTO: 0.02 X10(3) UL (ref 0–0.2)
BASOPHILS NFR BLD AUTO: 0.3 %
BILIRUB SERPL-MCNC: 0.3 MG/DL (ref 0.1–2)
BUN BLD-MCNC: 12 MG/DL (ref 7–18)
BUN/CREAT SERPL: 11.7 (ref 10–20)
CALCIUM BLD-MCNC: 8.5 MG/DL (ref 8.5–10.1)
CHLORIDE SERPL-SCNC: 115 MMOL/L (ref 98–112)
CO2 SERPL-SCNC: 25 MMOL/L (ref 21–32)
CREAT BLD-MCNC: 1.03 MG/DL
DEPRECATED RDW RBC AUTO: 48.5 FL (ref 35.1–46.3)
EOSINOPHIL # BLD AUTO: 0.2 X10(3) UL (ref 0–0.7)
EOSINOPHIL NFR BLD AUTO: 3.3 %
ERYTHROCYTE [DISTWIDTH] IN BLOOD BY AUTOMATED COUNT: 13.9 % (ref 11–15)
GLOBULIN PLAS-MCNC: 3.2 G/DL (ref 2.8–4.4)
GLUCOSE BLD-MCNC: 90 MG/DL (ref 70–99)
GLUCOSE BLDC GLUCOMTR-MCNC: 112 MG/DL (ref 70–99)
GLUCOSE BLDC GLUCOMTR-MCNC: 114 MG/DL (ref 70–99)
GLUCOSE BLDC GLUCOMTR-MCNC: 131 MG/DL (ref 70–99)
GLUCOSE BLDC GLUCOMTR-MCNC: 202 MG/DL (ref 70–99)
HCT VFR BLD AUTO: 27.7 %
HGB BLD-MCNC: 8.6 G/DL
IMM GRANULOCYTES # BLD AUTO: 0.02 X10(3) UL (ref 0–1)
IMM GRANULOCYTES NFR BLD: 0.3 %
LYMPHOCYTES # BLD AUTO: 1.04 X10(3) UL (ref 1–4)
LYMPHOCYTES NFR BLD AUTO: 17.3 %
MCH RBC QN AUTO: 29.5 PG (ref 26–34)
MCHC RBC AUTO-ENTMCNC: 31 G/DL (ref 31–37)
MCV RBC AUTO: 94.9 FL
MONOCYTES # BLD AUTO: 0.5 X10(3) UL (ref 0.1–1)
MONOCYTES NFR BLD AUTO: 8.3 %
NEUTROPHILS # BLD AUTO: 4.22 X10 (3) UL (ref 1.5–7.7)
NEUTROPHILS # BLD AUTO: 4.22 X10(3) UL (ref 1.5–7.7)
NEUTROPHILS NFR BLD AUTO: 70.5 %
OSMOLALITY SERPL CALC.SUM OF ELEC: 299 MOSM/KG (ref 275–295)
PLATELET # BLD AUTO: 221 10(3)UL (ref 150–450)
POTASSIUM SERPL-SCNC: 3.6 MMOL/L (ref 3.5–5.1)
PROT SERPL-MCNC: 6 G/DL (ref 6.4–8.2)
RBC # BLD AUTO: 2.92 X10(6)UL
SODIUM SERPL-SCNC: 145 MMOL/L (ref 136–145)
WBC # BLD AUTO: 6 X10(3) UL (ref 4–11)

## 2022-04-24 PROCEDURE — 99226 SUBSEQUENT OBSERVATION CARE: CPT | Performed by: HOSPITALIST

## 2022-04-24 NOTE — PLAN OF CARE
Problem: Patient Centered Care  Goal: Patient preferences are identified and integrated in the patient's plan of care  Description: Interventions:  - What would you like us to know as we care for you?   - Provide timely, complete, and accurate information to patient/family  - Incorporate patient and family knowledge, values, beliefs, and cultural backgrounds into the planning and delivery of care  - Encourage patient/family to participate in care and decision-making at the level they choose  - Honor patient and family perspectives and choices  Outcome: Progressing     Problem: Diabetes/Glucose Control  Goal: Glucose maintained within prescribed range  Description: INTERVENTIONS:  - Monitor Blood Glucose as ordered  - Assess for signs and symptoms of hyperglycemia and hypoglycemia  - Administer ordered medications to maintain glucose within target range  - Assess barriers to adequate nutritional intake and initiate nutrition consult as needed  - Instruct patient on self management of diabetes  Outcome: Progressing     Problem: Patient/Family Goals  Goal: Patient/Family Long Term Goal  Description: Patient's Long Term Goal: Go home    Interventions:  - EEG  - MRI brain  - IVF  - Tele monitoring  - See additional Care Plan goals for specific interventions  Outcome: Progressing  Goal: Patient/Family Short Term Goal  Description: Patient's Short Term Goal: Return to baseline cognition and strength    Interventions:   - EEG  - MRI brain  - Neuro checks  - See additional Care Plan goals for specific interventions  Outcome: Progressing

## 2022-04-25 LAB
GLUCOSE BLDC GLUCOMTR-MCNC: 106 MG/DL (ref 70–99)
GLUCOSE BLDC GLUCOMTR-MCNC: 107 MG/DL (ref 70–99)
GLUCOSE BLDC GLUCOMTR-MCNC: 115 MG/DL (ref 70–99)
GLUCOSE BLDC GLUCOMTR-MCNC: 166 MG/DL (ref 70–99)
T PALLIDUM AB SER QL: NEGATIVE

## 2022-04-25 PROCEDURE — 99226 SUBSEQUENT OBSERVATION CARE: CPT | Performed by: HOSPITALIST

## 2022-04-25 NOTE — PLAN OF CARE
Neuro q 4. Plan is Video swallow and [facility] [place] when medically cleared for discharge. Call light within reach. Fall precautions in place. Problem: Patient Centered Care  Goal: Patient preferences are identified and integrated in the patient's plan of care  Description: Interventions:  - What would you like us to know as we care for you? I am from home home with my son   - Provide timely, complete, and accurate information to patient/family  - Incorporate patient and family knowledge, values, beliefs, and cultural backgrounds into the planning and delivery of care  - Encourage patient/family to participate in care and decision-making at the level they choose  - Honor patient and family perspectives and choices  Outcome: Progressing     Problem: Diabetes/Glucose Control  Goal: Glucose maintained within prescribed range  Description: INTERVENTIONS:  - Monitor Blood Glucose as ordered  - Assess for signs and symptoms of hyperglycemia and hypoglycemia  - Administer ordered medications to maintain glucose within target range  - Assess barriers to adequate nutritional intake and initiate nutrition consult as needed  - Instruct patient on self management of diabetes  Outcome: Progressing     Problem: Patient/Family Goals  Goal: Patient/Family Long Term Goal  Description: Patient's Long Term Goal: Go home    Interventions:  - EEG  - MRI brain  - IVF  - Tele monitoring  - See additional Care Plan goals for specific interventions  Outcome: Progressing  Goal: Patient/Family Short Term Goal  Description: Patient's Short Term Goal: Return to baseline cognition and strength    Interventions:   - EEG  - MRI brain  - Neuro checks  - See additional Care Plan goals for specific interventions  Outcome: Progressing     Problem: NEUROLOGICAL - ADULT  Goal: Achieves stable or improved neurological status  Description: INTERVENTIONS  - Assess for and report changes in neurological status  - Initiate measures to prevent increased intracranial pressure  - Maintain blood pressure and fluid volume within ordered parameters to optimize cerebral perfusion and minimize risk of hemorrhage  - Monitor temperature, glucose, and sodium.  Initiate appropriate interventions as ordered  Outcome: Progressing

## 2022-04-25 NOTE — PLAN OF CARE
Problem: Diabetes/Glucose Control  Goal: Glucose maintained within prescribed range  Description: INTERVENTIONS:  - Monitor Blood Glucose as ordered  - Assess for signs and symptoms of hyperglycemia and hypoglycemia  - Administer ordered medications to maintain glucose within target range  - Assess barriers to adequate nutritional intake and initiate nutrition consult as needed  - Instruct patient on self management of diabetes  4/25/2022 0817 by Clementina Hollis RN  Outcome: Progressing    Problem: NEUROLOGICAL - ADULT  Goal: Achieves stable or improved neurological status  Description: INTERVENTIONS  - Assess for and report changes in neurological status  - Initiate measures to prevent increased intracranial pressure  - Maintain blood pressure and fluid volume within ordered parameters to optimize cerebral perfusion and minimize risk of hemorrhage  - Monitor temperature, glucose, and sodium. Initiate appropriate interventions as ordered  Outcome: Progressing    Received awake,oriented. Slept fairly during the night. IVF continued. Possible discharge to [facility] today. Call light within reach. Instructed to call for assistance. Will continue to monitor pt.

## 2022-04-25 NOTE — SLP NOTE
SPEECH DAILY NOTE - INPATIENT    ASSESSMENT & PLAN   ASSESSMENT  Pt seen to monitor tolerance of PO diet, train compensatory strategies and assess candidacy for diet upgrade. Pt afebrile and tolerating room air. Pt presented with trials of current diet while sitting upright in bed. Slow, but complete mastication of soft solid. No oral residue. The pharyngeal response was mildly delayed and with slow, sluggish excursion with mildly reduced ROM. Cough noted x1 with mildly thick liquids. Recommend continue soft bite sized diet with mildly thick liquids in single sips. VFSS is warranted to rule out aspiration, assess pharyngeal physiology and develop a treatment plan. Diet Recommendations - Solids: Mechanical soft chopped/ Soft & Bite Sized  Diet Recommendations - Liquids: Nectar thick liquids/ Mildly thick    Compensatory Strategies Recommended: No straws; Alternate consistencies  Aspiration Precautions: Upright position; Slow rate;Small bites and sips  Medication Administration Recommendations: Whole in puree    Patient Experiencing Pain: No                Discharge Recommendations  Discharge Recommendations/Plan: Undetermined    Treatment Plan  Treatment Plan/Recommendations: Aspiration precautions    Interdisciplinary Communication: Discussed with RN            GOALS  Goal #1 The patient will tolerate bite size consistency and mildly thick  liquids without overt signs or symptoms of aspiration with 100 % accuracy over one session(s). Cough x1 with mildly thick liquids. Slow sluggish swallows. In Progress   Goal #2 The patient/family/caregiver will demonstrate understanding and implementation of aspiration precautions and swallow strategies independently over one session(s). Family not available. Reviewed strategies with patient.     In Progress   Goal #3 The patient will utilize compensatory strategies as outlined by  BSSE (clinical evaluation) including Slow rate, Small bites, Small sips, Alternate liquids/solids, No straws, Upright 90 degrees with PRN feeding assistance 90 % of the time across one session. Assistance provided and pt cued for strategies.   In Progress       FOLLOW UP  Follow Up Needed (Documentation Required): Yes  SLP Follow-up Date: 04/26/22  Number of Visits to Meet Established Goals: 1    Session: 1    If you have any questions, please contact Leyla Muniz [address] MA/[facility]-SLP  Speech Language Pathologist  [address]

## 2022-04-25 NOTE — CM/SW NOTE
Spoke to patient and son. Patient was at [facility] and patient prefers this. Referral updated and [facility] accepted patient per patient's choice. Requires open from facility. Patient is ready for dc. Report to be called into [phone]. Plan:  DC to [facility] as son as insurance is approved.     Conner Ramirez RN  [phone]

## 2022-04-26 ENCOUNTER — APPOINTMENT (OUTPATIENT)
Dept: GENERAL RADIOLOGY | Facility: HOSPITAL | Age: 77
End: 2022-04-26
Attending: HOSPITALIST
Payer: MEDICARE

## 2022-04-26 VITALS
DIASTOLIC BLOOD PRESSURE: 51 MMHG | HEART RATE: 78 BPM | RESPIRATION RATE: 18 BRPM | OXYGEN SATURATION: 100 % | BODY MASS INDEX: 30 KG/M2 | TEMPERATURE: 98 F | SYSTOLIC BLOOD PRESSURE: 144 MMHG | WEIGHT: 177.38 LBS

## 2022-04-26 LAB — GLUCOSE BLDC GLUCOMTR-MCNC: 107 MG/DL (ref 70–99)

## 2022-04-26 PROCEDURE — 74230 X-RAY XM SWLNG FUNCJ C+: CPT | Performed by: HOSPITALIST

## 2022-04-26 PROCEDURE — 99226 SUBSEQUENT OBSERVATION CARE: CPT | Performed by: HOSPITALIST

## 2022-04-26 RX ORDER — OXYCODONE HCL 10 MG/1
10 TABLET, FILM COATED, EXTENDED RELEASE ORAL EVERY 12 HOURS
Qty: 60 TABLET | Refills: 0 | Status: SHIPPED | OUTPATIENT
Start: 2022-04-26 | End: 2022-05-26

## 2022-04-26 RX ORDER — HYDRALAZINE HYDROCHLORIDE 25 MG/1
25 TABLET, FILM COATED ORAL 3 TIMES DAILY
Qty: 60 TABLET | Refills: 0 | Status: SHIPPED | COMMUNITY
Start: 2022-04-26

## 2022-04-26 NOTE — PROGRESS NOTES
Patient is alert and oriented x3. Denies pain. Ambulates x1 with a walker. Large incontinent bowel movement this morning. Plan is for discharge to rehab. Patient and family updated on plan of care. Will continue to monitor.

## 2022-04-26 NOTE — PLAN OF CARE
Michael Redmond is A/O x3. Confused at times throughout the night. IVF continued. No complaints overnight. Plan for video swallow. Will go to [address] pending medical clearance. Problem: Patient Centered Care  Goal: Patient preferences are identified and integrated in the patient's plan of care  Description: Interventions:  - What would you like us to know as we care for you?  From home with son and [facility]  - Provide timely, complete, and accurate information to patient/family  - Incorporate patient and family knowledge, values, beliefs, and cultural backgrounds into the planning and delivery of care  - Encourage patient/family to participate in care and decision-making at the level they choose  - Honor patient and family perspectives and choices  Outcome: Progressing     Problem: Diabetes/Glucose Control  Goal: Glucose maintained within prescribed range  Description: INTERVENTIONS:  - Monitor Blood Glucose as ordered  - Assess for signs and symptoms of hyperglycemia and hypoglycemia  - Administer ordered medications to maintain glucose within target range  - Assess barriers to adequate nutritional intake and initiate nutrition consult as needed  - Instruct patient on self management of diabetes  Outcome: Progressing     Problem: Patient/Family Goals  Goal: Patient/Family Long Term Goal  Description: Patient's Long Term Goal: Go home    Interventions:  - EEG  - MRI brain  - IVF  - Tele monitoring  - See additional Care Plan goals for specific interventions  Outcome: Progressing  Goal: Patient/Family Short Term Goal  Description: Patient's Short Term Goal: Return to baseline cognition and strength    Interventions:   - EEG  - MRI brain  - Neuro checks  - See additional Care Plan goals for specific interventions  Outcome: Progressing     Problem: NEUROLOGICAL - ADULT  Goal: Achieves stable or improved neurological status  Description: INTERVENTIONS  - Assess for and report changes in neurological status  - Initiate measures to prevent increased intracranial pressure  - Maintain blood pressure and fluid volume within ordered parameters to optimize cerebral perfusion and minimize risk of hemorrhage  - Monitor temperature, glucose, and sodium.  Initiate appropriate interventions as ordered  Outcome: Progressing

## 2022-04-26 NOTE — DISCHARGE PLANNING
I called and gave report to nurse Lilian Ramirez at [facility] rehab [facility]. Patient's physical and history were relayed to nursing staff and included past medical history, admitting diagnosis of altered mental status. Patient will be discharging at 12:30pm as arranged by social work/case management. Patient's diet: Regular/General diet Fluid Consistency: Thin Liquids ; Texture Consistency: Soft / Easy to Chew ; Is Patient on Accuchecks? Yes; Misc Restriction: No Straw   Patient takes medication   Patient's mobility status is one pill at a time with aspiration precautions.     Cherelle Mccloud, Discharge Leader [id number]

## 2022-04-27 LAB
MMA: 0.2 UMOL/L
VITAMIN B1 (THIAMINE), WHOLE B: 67 NMOL/L

## 2022-05-12 ENCOUNTER — OFFICE VISIT (OUTPATIENT)
Dept: PAIN CLINIC | Facility: HOSPITAL | Age: 77
End: 2022-05-12
Attending: ANESTHESIOLOGY
Payer: MEDICARE

## 2022-05-12 VITALS
SYSTOLIC BLOOD PRESSURE: 120 MMHG | BODY MASS INDEX: 31.07 KG/M2 | HEART RATE: 78 BPM | HEIGHT: 64 IN | RESPIRATION RATE: 18 BRPM | WEIGHT: 182 LBS | DIASTOLIC BLOOD PRESSURE: 57 MMHG

## 2022-05-12 DIAGNOSIS — M15.9 PRIMARY OSTEOARTHRITIS INVOLVING MULTIPLE JOINTS: ICD-10-CM

## 2022-05-12 DIAGNOSIS — Z79.891 ENCOUNTER FOR MONITORING OPIOID MAINTENANCE THERAPY: ICD-10-CM

## 2022-05-12 DIAGNOSIS — M48.062 SPINAL STENOSIS OF LUMBAR REGION WITH NEUROGENIC CLAUDICATION: ICD-10-CM

## 2022-05-12 DIAGNOSIS — Z51.81 ENCOUNTER FOR MONITORING OPIOID MAINTENANCE THERAPY: ICD-10-CM

## 2022-05-12 DIAGNOSIS — G89.29 CHRONIC BILATERAL LOW BACK PAIN WITH BILATERAL SCIATICA: Primary | ICD-10-CM

## 2022-05-12 DIAGNOSIS — F11.90 CHRONIC, CONTINUOUS USE OF OPIOIDS: ICD-10-CM

## 2022-05-12 DIAGNOSIS — M54.41 CHRONIC BILATERAL LOW BACK PAIN WITH BILATERAL SCIATICA: Primary | ICD-10-CM

## 2022-05-12 DIAGNOSIS — G89.3 CANCER ASSOCIATED PAIN: ICD-10-CM

## 2022-05-12 DIAGNOSIS — M47.816 LUMBAR FACET ARTHROPATHY: ICD-10-CM

## 2022-05-12 DIAGNOSIS — M54.42 CHRONIC BILATERAL LOW BACK PAIN WITH BILATERAL SCIATICA: Primary | ICD-10-CM

## 2022-05-12 PROCEDURE — 99211 OFF/OP EST MAY X REQ PHY/QHP: CPT

## 2022-05-12 NOTE — PROGRESS NOTES
5/12/2022-presents in [facility] accompanied her son Brisa Cobian;  Medical management for chronic lbp, bilat. Shldr, bilat knee, bilat. Ankle pain; Today 8/10; pt is still in rehab at [facility]; planning to be d/cd Sat. 5/14/2022; states she needs refills on her Percocet; seen by Dr. Fani Agudelo; refer to dictation for the POC.

## 2022-05-20 ENCOUNTER — TELEPHONE (OUTPATIENT)
Dept: PAIN CLINIC | Facility: HOSPITAL | Age: 77
End: 2022-05-20

## 2022-05-20 RX ORDER — OXYCODONE AND ACETAMINOPHEN 10; 325 MG/1; MG/1
1 TABLET ORAL EVERY 4 HOURS PRN
Qty: 180 TABLET | Refills: 0 | Status: SHIPPED | OUTPATIENT
Start: 2022-05-20 | End: 2022-06-19

## 2022-05-27 RX ORDER — OXYCODONE AND ACETAMINOPHEN 10; 325 MG/1; MG/1
1 TABLET ORAL EVERY 4 HOURS PRN
COMMUNITY

## 2022-06-09 ENCOUNTER — OFFICE VISIT (OUTPATIENT)
Dept: PAIN CLINIC | Facility: HOSPITAL | Age: 77
End: 2022-06-09
Attending: ANESTHESIOLOGY
Payer: MEDICARE

## 2022-06-09 VITALS
HEIGHT: 64 IN | HEART RATE: 84 BPM | DIASTOLIC BLOOD PRESSURE: 56 MMHG | BODY MASS INDEX: 31.07 KG/M2 | WEIGHT: 182 LBS | SYSTOLIC BLOOD PRESSURE: 112 MMHG

## 2022-06-09 DIAGNOSIS — Z79.891 ENCOUNTER FOR MONITORING OPIOID MAINTENANCE THERAPY: Primary | ICD-10-CM

## 2022-06-09 DIAGNOSIS — R60.0 BILATERAL LEG EDEMA: Chronic | ICD-10-CM

## 2022-06-09 DIAGNOSIS — M48.061 SPINAL STENOSIS OF LUMBAR REGION AT MULTIPLE LEVELS: ICD-10-CM

## 2022-06-09 DIAGNOSIS — Z51.81 ENCOUNTER FOR MONITORING OPIOID MAINTENANCE THERAPY: Primary | ICD-10-CM

## 2022-06-09 DIAGNOSIS — R29.898 WEAKNESS OF BOTH LOWER EXTREMITIES: ICD-10-CM

## 2022-06-09 DIAGNOSIS — M54.41 CHRONIC BILATERAL LOW BACK PAIN WITH BILATERAL SCIATICA: ICD-10-CM

## 2022-06-09 DIAGNOSIS — G89.29 CHRONIC BILATERAL LOW BACK PAIN WITH BILATERAL SCIATICA: ICD-10-CM

## 2022-06-09 DIAGNOSIS — M54.42 CHRONIC BILATERAL LOW BACK PAIN WITH BILATERAL SCIATICA: ICD-10-CM

## 2022-06-09 PROCEDURE — 99211 OFF/OP EST MAY X REQ PHY/QHP: CPT

## 2022-06-09 RX ORDER — OXYCODONE AND ACETAMINOPHEN 10; 325 MG/1; MG/1
1 TABLET ORAL EVERY 4 HOURS PRN
Qty: 180 TABLET | Refills: 0 | Status: SHIPPED | OUTPATIENT
Start: 2022-06-19 | End: 2022-07-19

## 2022-06-09 RX ORDER — DIAZEPAM 10 MG/1
10 TABLET ORAL EVERY 8 HOURS PRN
Qty: 90 TABLET | Refills: 2 | Status: SHIPPED | OUTPATIENT
Start: 2022-06-19 | End: 2022-07-19

## 2022-06-09 RX ORDER — OXYCODONE AND ACETAMINOPHEN 10; 325 MG/1; MG/1
1 TABLET ORAL EVERY 4 HOURS PRN
Qty: 180 TABLET | Refills: 0 | Status: SHIPPED | OUTPATIENT
Start: 2022-08-18 | End: 2022-09-17

## 2022-06-09 RX ORDER — OXYCODONE AND ACETAMINOPHEN 10; 325 MG/1; MG/1
1 TABLET ORAL EVERY 4 HOURS PRN
Qty: 180 TABLET | Refills: 0 | Status: SHIPPED | OUTPATIENT
Start: 2022-07-19 | End: 2022-08-18

## 2022-06-09 NOTE — PROGRESS NOTES
6/9/2022---presents in [facility] accompanied her son Brisa Cobian;  Medical management for chronic lbp, bilat. Shldr, bilat knee, bilat. Ankle pain; Today 8/10; stable on medications; Dr. Kortney Markham; refer to dictation for the POC.

## 2022-08-11 ENCOUNTER — APPOINTMENT (OUTPATIENT)
Dept: CT IMAGING | Facility: HOSPITAL | Age: 77
End: 2022-08-11
Attending: EMERGENCY MEDICINE
Payer: MEDICARE

## 2022-08-11 ENCOUNTER — HOSPITAL ENCOUNTER (EMERGENCY)
Facility: HOSPITAL | Age: 77
Discharge: HOME | End: 2022-08-11
Attending: EMERGENCY MEDICINE
Payer: MEDICARE

## 2022-08-11 VITALS
DIASTOLIC BLOOD PRESSURE: 54 MMHG | OXYGEN SATURATION: 100 % | SYSTOLIC BLOOD PRESSURE: 128 MMHG | HEART RATE: 70 BPM | RESPIRATION RATE: 20 BRPM

## 2022-08-11 DIAGNOSIS — M79.10 MYALGIA: ICD-10-CM
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
## 2022-08-11 PROCEDURE — 72125 CT NECK SPINE W/O DYE: CPT | Performed by: EMERGENCY MEDICINE

## 2022-08-11 PROCEDURE — 99284 EMERGENCY DEPT VISIT MOD MDM: CPT

## 2022-08-11 PROCEDURE — 96374 THER/PROPH/DIAG INJ IV PUSH: CPT

## 2022-08-11 PROCEDURE — 70450 CT HEAD/BRAIN W/O DYE: CPT | Performed by: EMERGENCY MEDICINE

## 2022-08-11 RX ORDER — OXYCODONE AND ACETAMINOPHEN 10; 325 MG/1; MG/1
1 TABLET ORAL EVERY 4 HOURS PRN
Qty: 20 TABLET | Refills: 0 | Status: SHIPPED | OUTPATIENT
Start: 2022-08-11

## 2022-08-11 RX ORDER — MORPHINE SULFATE 4 MG/ML
6 INJECTION, SOLUTION INTRAMUSCULAR; INTRAVENOUS ONCE
Status: COMPLETED | OUTPATIENT
Start: 2022-08-11 | End: 2022-08-11

## 2022-08-11 NOTE — ED INITIAL ASSESSMENT (HPI)
Patient presents with:  Fall    Patient is A&Ox4 , arrived via ems with c/o unwitnessed fall. Per patient, she does not remember the fall. Patient is able to answer questions but unable to keep eyes open. States she did not hit her head and denies any LOC. Patient c/o generalized body pain and stated the cancer makes her body aching all over. Patient changed into gown. Side rails up x2, call light within reach, blanket provided.

## 2022-08-11 NOTE — ED QUICK NOTES
Patient ambulated in bathroom, stated she normally uses a walker at home and did not use one today prior to the fall. Patient able to ambulate with some difficulty but tolerated well.

## 2022-09-01 PROBLEM — E78.5 DYSLIPIDEMIA: Status: ACTIVE | Noted: 2022-09-01

## 2022-09-01 PROBLEM — C48.0 RETROPERITONEAL SARCOMA (HCC): Status: ACTIVE | Noted: 2018-12-27

## 2022-09-01 PROBLEM — I35.0 AORTIC STENOSIS: Status: ACTIVE | Noted: 2022-09-01

## 2022-09-01 PROBLEM — K59.03 CONSTIPATION DUE TO OPIOID THERAPY: Status: ACTIVE | Noted: 2017-09-18

## 2022-09-01 PROBLEM — R19.00 ABDOMINAL MASS: Status: ACTIVE | Noted: 2018-11-01

## 2022-09-01 PROBLEM — T40.2X5A CONSTIPATION DUE TO OPIOID THERAPY: Status: ACTIVE | Noted: 2017-09-18

## 2022-09-01 PROBLEM — R60.9 EDEMA: Status: ACTIVE | Noted: 2020-09-05

## 2022-09-01 PROBLEM — E78.00 HYPERCHOLESTEREMIA: Status: ACTIVE | Noted: 2022-09-01

## 2022-09-01 PROBLEM — R06.00 DYSPNEA: Status: ACTIVE | Noted: 2020-09-05

## 2022-09-01 NOTE — PROGRESS NOTES
PT presents to the [facility] on an electric scooter. Son in attendance. PT was recently in the ER. PT states she has been falling a lot. \"I've been falling every day\". PT is having 9/10 R hip pain today in addition to her back pain. . Dr. Nani Morrison saw PT for med eval.  See notes for POC.

## 2022-09-13 ENCOUNTER — APPOINTMENT (OUTPATIENT)
Dept: CT IMAGING | Facility: HOSPITAL | Age: 77
End: 2022-09-13
Attending: EMERGENCY MEDICINE
Payer: MEDICARE

## 2022-09-13 ENCOUNTER — APPOINTMENT (OUTPATIENT)
Dept: GENERAL RADIOLOGY | Facility: HOSPITAL | Age: 77
End: 2022-09-13
Attending: EMERGENCY MEDICINE
Payer: MEDICARE

## 2022-09-13 ENCOUNTER — HOSPITAL ENCOUNTER (OUTPATIENT)
Facility: HOSPITAL | Age: 77
Setting detail: OBSERVATION
Discharge: HOME | End: 2022-09-14
Attending: EMERGENCY MEDICINE | Admitting: INTERNAL MEDICINE
Payer: MEDICARE

## 2022-09-13 DIAGNOSIS — R07.9 CHEST PAIN OF UNCERTAIN ETIOLOGY: Primary | ICD-10-CM

## 2022-09-13 LAB
ANION GAP SERPL CALC-SCNC: 8 MMOL/L (ref 0–18)
BASOPHILS # BLD AUTO: 0.04 X10(3) UL (ref 0–0.2)
BASOPHILS NFR BLD AUTO: 0.7 %
BUN BLD-MCNC: 21 MG/DL (ref 7–18)
BUN/CREAT SERPL: 15.1 (ref 10–20)
CALCIUM BLD-MCNC: 9.1 MG/DL (ref 8.5–10.1)
CHLORIDE SERPL-SCNC: 112 MMOL/L (ref 98–112)
CO2 SERPL-SCNC: 20 MMOL/L (ref 21–32)
CREAT BLD-MCNC: 1.39 MG/DL
D DIMER PPP FEU-MCNC: 0.96 UG/ML FEU (ref ?–0.75)
DEPRECATED RDW RBC AUTO: 50.1 FL (ref 35.1–46.3)
EOSINOPHIL # BLD AUTO: 0.06 X10(3) UL (ref 0–0.7)
EOSINOPHIL NFR BLD AUTO: 1.1 %
ERYTHROCYTE [DISTWIDTH] IN BLOOD BY AUTOMATED COUNT: 15.2 % (ref 11–15)
GFR SERPLBLD BASED ON 1.73 SQ M-ARVRAT: 40 ML/MIN/1.73M2 (ref 60–?)
GLUCOSE BLD-MCNC: 82 MG/DL (ref 70–99)
HCT VFR BLD AUTO: 31.2 %
HGB BLD-MCNC: 9.8 G/DL
IMM GRANULOCYTES # BLD AUTO: 0.01 X10(3) UL (ref 0–1)
IMM GRANULOCYTES NFR BLD: 0.2 %
LYMPHOCYTES # BLD AUTO: 1.2 X10(3) UL (ref 1–4)
LYMPHOCYTES NFR BLD AUTO: 21.6 %
MCH RBC QN AUTO: 28.5 PG (ref 26–34)
MCHC RBC AUTO-ENTMCNC: 31.4 G/DL (ref 31–37)
MCV RBC AUTO: 90.7 FL
MONOCYTES # BLD AUTO: 0.35 X10(3) UL (ref 0.1–1)
MONOCYTES NFR BLD AUTO: 6.3 %
NEUTROPHILS # BLD AUTO: 3.89 X10 (3) UL (ref 1.5–7.7)
NEUTROPHILS # BLD AUTO: 3.89 X10(3) UL (ref 1.5–7.7)
NEUTROPHILS NFR BLD AUTO: 70.1 %
NT-PROBNP SERPL-MCNC: 1335 PG/ML (ref ?–450)
OSMOLALITY SERPL CALC.SUM OF ELEC: 292 MOSM/KG (ref 275–295)
PLATELET # BLD AUTO: 239 10(3)UL (ref 150–450)
POTASSIUM SERPL-SCNC: 4.6 MMOL/L (ref 3.5–5.1)
RBC # BLD AUTO: 3.44 X10(6)UL
SARS-COV-2 RNA RESP QL NAA+PROBE: NOT DETECTED
SODIUM SERPL-SCNC: 140 MMOL/L (ref 136–145)
TROPONIN I HIGH SENSITIVITY: 16 NG/L
WBC # BLD AUTO: 5.6 X10(3) UL (ref 4–11)

## 2022-09-13 PROCEDURE — 83880 ASSAY OF NATRIURETIC PEPTIDE: CPT | Performed by: EMERGENCY MEDICINE

## 2022-09-13 PROCEDURE — 85025 COMPLETE CBC W/AUTO DIFF WBC: CPT | Performed by: EMERGENCY MEDICINE

## 2022-09-13 PROCEDURE — 71260 CT THORAX DX C+: CPT | Performed by: EMERGENCY MEDICINE

## 2022-09-13 PROCEDURE — 99285 EMERGENCY DEPT VISIT HI MDM: CPT

## 2022-09-13 PROCEDURE — 80048 BASIC METABOLIC PNL TOTAL CA: CPT | Performed by: EMERGENCY MEDICINE

## 2022-09-13 PROCEDURE — 85379 FIBRIN DEGRADATION QUANT: CPT | Performed by: EMERGENCY MEDICINE

## 2022-09-13 PROCEDURE — 36415 COLL VENOUS BLD VENIPUNCTURE: CPT

## 2022-09-13 PROCEDURE — 93010 ELECTROCARDIOGRAM REPORT: CPT | Performed by: EMERGENCY MEDICINE

## 2022-09-13 PROCEDURE — 82962 GLUCOSE BLOOD TEST: CPT

## 2022-09-13 PROCEDURE — 93005 ELECTROCARDIOGRAM TRACING: CPT

## 2022-09-13 PROCEDURE — 71045 X-RAY EXAM CHEST 1 VIEW: CPT | Performed by: EMERGENCY MEDICINE

## 2022-09-13 PROCEDURE — 84484 ASSAY OF TROPONIN QUANT: CPT | Performed by: EMERGENCY MEDICINE

## 2022-09-13 RX ORDER — HYDRALAZINE HYDROCHLORIDE 25 MG/1
25 TABLET, FILM COATED ORAL 3 TIMES DAILY
Status: DISCONTINUED | OUTPATIENT
Start: 2022-09-13 | End: 2022-09-14

## 2022-09-13 RX ORDER — ACETAMINOPHEN 500 MG
500 TABLET ORAL EVERY 4 HOURS PRN
Status: DISCONTINUED | OUTPATIENT
Start: 2022-09-13 | End: 2022-09-14

## 2022-09-13 RX ORDER — AMLODIPINE BESYLATE 5 MG/1
5 TABLET ORAL DAILY
Status: DISCONTINUED | OUTPATIENT
Start: 2022-09-14 | End: 2022-09-14

## 2022-09-13 RX ORDER — OXYCODONE AND ACETAMINOPHEN 10; 325 MG/1; MG/1
1 TABLET ORAL ONCE
Status: COMPLETED | OUTPATIENT
Start: 2022-09-13 | End: 2022-09-13

## 2022-09-13 RX ORDER — HEPARIN SODIUM 5000 [USP'U]/ML
5000 INJECTION, SOLUTION INTRAVENOUS; SUBCUTANEOUS EVERY 8 HOURS SCHEDULED
Status: DISCONTINUED | OUTPATIENT
Start: 2022-09-13 | End: 2022-09-14

## 2022-09-13 RX ORDER — ONDANSETRON 2 MG/ML
4 INJECTION INTRAMUSCULAR; INTRAVENOUS EVERY 6 HOURS PRN
Status: DISCONTINUED | OUTPATIENT
Start: 2022-09-13 | End: 2022-09-14

## 2022-09-13 RX ORDER — ATORVASTATIN CALCIUM 40 MG/1
40 TABLET, FILM COATED ORAL NIGHTLY
Status: DISCONTINUED | OUTPATIENT
Start: 2022-09-14 | End: 2022-09-14

## 2022-09-13 RX ORDER — MELATONIN
3 NIGHTLY PRN
Status: DISCONTINUED | OUTPATIENT
Start: 2022-09-13 | End: 2022-09-14

## 2022-09-13 RX ORDER — GABAPENTIN 300 MG/1
300 CAPSULE ORAL NIGHTLY
Status: DISCONTINUED | OUTPATIENT
Start: 2022-09-13 | End: 2022-09-14

## 2022-09-13 RX ORDER — ASPIRIN 81 MG/1
81 TABLET ORAL DAILY
Status: DISCONTINUED | OUTPATIENT
Start: 2022-09-14 | End: 2022-09-14

## 2022-09-13 NOTE — ED INITIAL ASSESSMENT (HPI)
PATIENT TO ED VIA EMS CO OF 10/10 CHEST PAIN, WORSENS WITH MOVEMENT WITH PASCUAL X 3 DAYS, PATIENT RECEIVED 4 ASA AND NITROGLYCERIN WITHOUT RELIEF

## 2022-09-14 ENCOUNTER — APPOINTMENT (OUTPATIENT)
Dept: CV DIAGNOSTICS | Facility: HOSPITAL | Age: 77
End: 2022-09-14
Attending: INTERNAL MEDICINE
Payer: MEDICARE

## 2022-09-14 ENCOUNTER — APPOINTMENT (OUTPATIENT)
Dept: NUCLEAR MEDICINE | Facility: HOSPITAL | Age: 77
End: 2022-09-14
Attending: INTERNAL MEDICINE
Payer: MEDICARE

## 2022-09-14 VITALS
HEART RATE: 83 BPM | WEIGHT: 156.19 LBS | DIASTOLIC BLOOD PRESSURE: 57 MMHG | TEMPERATURE: 99 F | HEIGHT: 64 IN | RESPIRATION RATE: 18 BRPM | SYSTOLIC BLOOD PRESSURE: 138 MMHG | BODY MASS INDEX: 26.67 KG/M2 | OXYGEN SATURATION: 98 %

## 2022-09-14 PROBLEM — R07.9 CHEST PAIN OF UNCERTAIN ETIOLOGY: Status: RESOLVED | Noted: 2022-09-13 | Resolved: 2022-09-14

## 2022-09-14 LAB
ANION GAP SERPL CALC-SCNC: 6 MMOL/L (ref 0–18)
BASOPHILS # BLD AUTO: 0.03 X10(3) UL (ref 0–0.2)
BASOPHILS NFR BLD AUTO: 0.5 %
BUN BLD-MCNC: 23 MG/DL (ref 7–18)
BUN/CREAT SERPL: 15.4 (ref 10–20)
C DIFF TOX B STL QL: POSITIVE
CALCIUM BLD-MCNC: 8.8 MG/DL (ref 8.5–10.1)
CHLORIDE SERPL-SCNC: 112 MMOL/L (ref 98–112)
CO2 SERPL-SCNC: 23 MMOL/L (ref 21–32)
CREAT BLD-MCNC: 1.49 MG/DL
DEPRECATED RDW RBC AUTO: 50.8 FL (ref 35.1–46.3)
EOSINOPHIL # BLD AUTO: 0.03 X10(3) UL (ref 0–0.7)
EOSINOPHIL NFR BLD AUTO: 0.5 %
ERYTHROCYTE [DISTWIDTH] IN BLOOD BY AUTOMATED COUNT: 15.2 % (ref 11–15)
EST. AVERAGE GLUCOSE BLD GHB EST-MCNC: 137 MG/DL (ref 68–126)
GFR SERPLBLD BASED ON 1.73 SQ M-ARVRAT: 36 ML/MIN/1.73M2 (ref 60–?)
GLUCOSE BLD-MCNC: 174 MG/DL (ref 70–99)
GLUCOSE BLDC GLUCOMTR-MCNC: 86 MG/DL (ref 70–99)
GLUCOSE BLDC GLUCOMTR-MCNC: 96 MG/DL (ref 70–99)
HBA1C MFR BLD: 6.4 % (ref ?–5.7)
HCT VFR BLD AUTO: 32.4 %
HGB BLD-MCNC: 10.4 G/DL
IMM GRANULOCYTES # BLD AUTO: 0.02 X10(3) UL (ref 0–1)
IMM GRANULOCYTES NFR BLD: 0.3 %
INR BLD: 1.08 (ref 0.85–1.16)
LYMPHOCYTES # BLD AUTO: 0.64 X10(3) UL (ref 1–4)
LYMPHOCYTES NFR BLD AUTO: 10.2 %
MCH RBC QN AUTO: 29.2 PG (ref 26–34)
MCHC RBC AUTO-ENTMCNC: 32.1 G/DL (ref 31–37)
MCV RBC AUTO: 91 FL
MONOCYTES # BLD AUTO: 0.25 X10(3) UL (ref 0.1–1)
MONOCYTES NFR BLD AUTO: 4 %
NEUTROPHILS # BLD AUTO: 5.29 X10 (3) UL (ref 1.5–7.7)
NEUTROPHILS # BLD AUTO: 5.29 X10(3) UL (ref 1.5–7.7)
NEUTROPHILS NFR BLD AUTO: 84.5 %
OSMOLALITY SERPL CALC.SUM OF ELEC: 300 MOSM/KG (ref 275–295)
PLATELET # BLD AUTO: 220 10(3)UL (ref 150–450)
POTASSIUM SERPL-SCNC: 4.5 MMOL/L (ref 3.5–5.1)
PROTHROMBIN TIME: 13.9 SECONDS (ref 11.6–14.8)
RBC # BLD AUTO: 3.56 X10(6)UL
SODIUM SERPL-SCNC: 141 MMOL/L (ref 136–145)
TROPONIN I HIGH SENSITIVITY: 18 NG/L
WBC # BLD AUTO: 6.3 X10(3) UL (ref 4–11)

## 2022-09-14 PROCEDURE — 78452 HT MUSCLE IMAGE SPECT MULT: CPT | Performed by: INTERNAL MEDICINE

## 2022-09-14 PROCEDURE — 93306 TTE W/DOPPLER COMPLETE: CPT | Performed by: INTERNAL MEDICINE

## 2022-09-14 PROCEDURE — 93018 CV STRESS TEST I&R ONLY: CPT | Performed by: INTERNAL MEDICINE

## 2022-09-14 PROCEDURE — 85610 PROTHROMBIN TIME: CPT | Performed by: INTERNAL MEDICINE

## 2022-09-14 PROCEDURE — 93016 CV STRESS TEST SUPVJ ONLY: CPT | Performed by: INTERNAL MEDICINE

## 2022-09-14 PROCEDURE — 80048 BASIC METABOLIC PNL TOTAL CA: CPT | Performed by: INTERNAL MEDICINE

## 2022-09-14 PROCEDURE — 83036 HEMOGLOBIN GLYCOSYLATED A1C: CPT | Performed by: INTERNAL MEDICINE

## 2022-09-14 PROCEDURE — 93017 CV STRESS TEST TRACING ONLY: CPT | Performed by: INTERNAL MEDICINE

## 2022-09-14 PROCEDURE — 85025 COMPLETE CBC W/AUTO DIFF WBC: CPT | Performed by: INTERNAL MEDICINE

## 2022-09-14 PROCEDURE — 87493 C DIFF AMPLIFIED PROBE: CPT | Performed by: INTERNAL MEDICINE

## 2022-09-14 PROCEDURE — 82962 GLUCOSE BLOOD TEST: CPT

## 2022-09-14 PROCEDURE — 84484 ASSAY OF TROPONIN QUANT: CPT | Performed by: INTERNAL MEDICINE

## 2022-09-14 RX ORDER — OXYCODONE AND ACETAMINOPHEN 10; 325 MG/1; MG/1
1 TABLET ORAL EVERY 6 HOURS PRN
Status: DISCONTINUED | OUTPATIENT
Start: 2022-09-14 | End: 2022-09-14

## 2022-09-14 RX ORDER — VANCOMYCIN HYDROCHLORIDE 125 MG/1
125 CAPSULE ORAL EVERY 6 HOURS
Qty: 40 CAPSULE | Refills: 0 | Status: SHIPPED | OUTPATIENT
Start: 2022-09-14 | End: 2022-09-24

## 2022-09-14 RX ORDER — HYDRALAZINE HYDROCHLORIDE 25 MG/1
25 TABLET, FILM COATED ORAL 3 TIMES DAILY
Qty: 60 TABLET | Refills: 0 | Status: SHIPPED | COMMUNITY
Start: 2022-09-14

## 2022-09-14 RX ORDER — VANCOMYCIN HYDROCHLORIDE 125 MG/1
125 CAPSULE ORAL EVERY 6 HOURS
Status: DISCONTINUED | OUTPATIENT
Start: 2022-09-14 | End: 2022-09-14

## 2022-09-14 RX ORDER — KETOROLAC TROMETHAMINE 15 MG/ML
15 INJECTION, SOLUTION INTRAMUSCULAR; INTRAVENOUS EVERY 6 HOURS PRN
Status: DISCONTINUED | OUTPATIENT
Start: 2022-09-14 | End: 2022-09-14

## 2022-09-14 NOTE — ED NOTES
Orders for admission, patient is aware of plan and ready to go upstairs. Any questions, please call ED ELLIE Walsh  at extension [phone].    Type of COVID test sent:rapid  COVID Suspicion level: Low    Titratable drug(s) infusing:n/a    Rate:n/a    LOC at time of transport:x4    Other pertinent information:    States \"too weak to ambulate\"    CIWA score=n/a  NIH score=n/a

## 2022-09-14 NOTE — CM/SW NOTE
09/14/22 1300   Discharge disposition   Expected discharge disposition Hm or Self   Patient Declines Recommended Services Yes   Discharge transportation Private car     Per chart review, pt has DC order for today. SW spoke w/ pt again and [facility]. Pt is cleared from SW/CM stand point.     PLAN: Home, declining [address], [facility] [address], [facility]

## 2022-09-14 NOTE — CM/SW NOTE
09/14/22 1000   CM/SW Referral Data   Referral Source Social Work (self-referral)   Reason for Referral Discharge planning   Informant Son  Valenitna Del Real)   Pertinent Medical Hx   Does patient have an established PCP? Yes  Domingo Padron)   Patient Info   Patient's [address]   Patient lives with Son   Patient Status Prior to Admission   Independent with ADLs and Mobility No   Pt. requires assistance with Housework;Driving; Ambulating   Services in place prior to admission DME/Supplies at home   Type of DME/Supplies Straight Cane;Standard Walker;Scooter   Discharge Needs   Anticipated D/C needs Home health care   Choice of Post-Acute Provider   Informed patient of right to choose their preferred provider Yes   List of appropriate post-acute services provided to patient/family with quality data   (pending in Aidin - needs f/up)     10:35AM  SW self referred pt for DC Planning after RN rounds report unsure how pt ambulates at home. SW attempted to see pt multiple times this AM - pt currently out of her room at a stress test.    DANIAL contacted pt's son Thomas Carpenter via phone. Above assessment completed. Thomas Carpenter confirmed he works outside the home and pt is left alone at that time. Pt primarily uses a cane for ambulation but also has a walker and scooter if needed. Pt does not drive or cook. Pt's son prepares meals and drives pt to/from MD appts. Thomas Carpenter confirmed pt's PCP for SW. Confirmed pt has hx w/ REVA at [facility]. Pt's son denies hx w/ [facility] services. Per chart, pt may have hx w/ [facility]. DANIAL discussed benefits of [facility] services upon DC - pt's son confirmed agreeable. DANIAL explained referral process and next steps - Ever Meline expressed understanding. DANIAL sent [facility] referrals via Aidin. F2F entered/sent. Will need f/up to provide [facility] list & obtain final agency choice prior to DC.    12:40PM  DANIAL obtained list of accepting [facility] providers via Aidin. DANIAL met w/ pt in her room to discuss.  DANIAL explained speaking w/ her son earlier. Pt expressed understanding. SW discussed HH w/ pt. She does not feel services are needed at this time. SW provided her w/ [facility] list of quality data. SW provided direct phone # and instructed pt to call SW prior to DC if she changes her mind about going home w/ HH. Pt also inquired about CG's. SW informed her that is typically private pay/OOP. Pt stated her neighbor gets it through her insurance. SW informed her that her [employer] might cover it but she would have to call the phone # on the back of her insurance card to confirm. Pt expressed understanding to all of the above. [facility] accepted for services at DC if pt becomes agreeable. PLAN: Home, declining [facility] at this time - pending med clear     SW/RAJ to remain available for support and/or discharge planning.          Javier Hare, MSW, [facility]

## 2022-09-14 NOTE — PLAN OF CARE
Problem: CARDIOVASCULAR - ADULT  Goal: Maintains optimal cardiac output and hemodynamic stability  Description: INTERVENTIONS:  - Monitor vital signs, rhythm, and trends  - Monitor for bleeding, hypotension and signs of decreased cardiac output  - Evaluate effectiveness of vasoactive medications to optimize hemodynamic stability  - Monitor arterial and/or venous puncture sites for bleeding and/or hematoma  - Assess quality of pulses, skin color and temperature  - Assess for signs of decreased coronary artery perfusion - ex. Angina  - Evaluate fluid balance, assess for edema, trend weights  Outcome: Progressing  Goal: Absence of cardiac arrhythmias or at baseline  Description: INTERVENTIONS:  - Continuous cardiac monitoring, monitor vital signs, obtain 12 lead EKG if indicated  - Evaluate effectiveness of antiarrhythmic and heart rate control medications as ordered  - Initiate emergency measures for life threatening arrhythmias  - Monitor electrolytes and administer replacement therapy as ordered  Outcome: Progressing     Received awake,oriented. Still with complaints of mid chest pain(aching),worse on movement.  notified with orders for toradol. Percocet given for rt.hip pain. Plans for lexiscan and ECHO today. Home med needs to be reconciled(pt.unable to recall amlodipine dosage)- notified. Endorsed to Day Jennifer ARGUETA. Will continue to monitor pt.

## 2022-09-14 NOTE — PROGRESS NOTES
Patient cleared for discharge. After visit summary given. Educated patient and son on medications, follow up care and when to seek care. Education given on C. Diff infection and how to clean, and to take full course of vanco. All questions answered. IV and tele monitor removed. Patient to discharge home with son via private vehicle.

## 2022-09-14 NOTE — PLAN OF CARE
Patient denies chest pain and shortness of breath. Room air. Ambulates with walker and standby assist. Diarrhea today, Dr. Nupur Simeon notified. Echo and stress test today. Plan is to discharge home with son this afternoon. Problem: Patient Centered Care  Goal: Patient preferences are identified and integrated in the patient's plan of care  Description: Interventions:  - What would you like us to know as we care for you? I live with my son  - Provide timely, complete, and accurate information to patient/family  - Incorporate patient and family knowledge, values, beliefs, and cultural backgrounds into the planning and delivery of care  - Encourage patient/family to participate in care and decision-making at the level they choose  - Honor patient and family perspectives and choices  Outcome: Progressing     Problem: Patient/Family Goals  Goal: Patient/Family Long Term Goal  Description: Patient's Long Term Goal: Return home    Interventions:  - Consults  - stress test  - echo  - See additional Care Plan goals for specific interventions  Outcome: Progressing  Goal: Patient/Family Short Term Goal  Description: Patient's Short Term Goal: manage pain    Interventions:   - prn medications  - cardiac workup  - See additional Care Plan goals for specific interventions  Outcome: Progressing     Problem: CARDIOVASCULAR - ADULT  Goal: Maintains optimal cardiac output and hemodynamic stability  Description: INTERVENTIONS:  - Monitor vital signs, rhythm, and trends  - Monitor for bleeding, hypotension and signs of decreased cardiac output  - Evaluate effectiveness of vasoactive medications to optimize hemodynamic stability  - Monitor arterial and/or venous puncture sites for bleeding and/or hematoma  - Assess quality of pulses, skin color and temperature  - Assess for signs of decreased coronary artery perfusion - ex.  Angina  - Evaluate fluid balance, assess for edema, trend weights  Outcome: Progressing  Goal: Absence of cardiac arrhythmias or at baseline  Description: INTERVENTIONS:  - Continuous cardiac monitoring, monitor vital signs, obtain 12 lead EKG if indicated  - Evaluate effectiveness of antiarrhythmic and heart rate control medications as ordered  - Initiate emergency measures for life threatening arrhythmias  - Monitor electrolytes and administer replacement therapy as ordered  Outcome: Progressing

## 2022-11-01 ENCOUNTER — OFFICE VISIT (OUTPATIENT)
Dept: PAIN CLINIC | Facility: HOSPITAL | Age: 77
End: 2022-11-01
Attending: ANESTHESIOLOGY
Payer: MEDICARE

## 2022-11-01 VITALS
DIASTOLIC BLOOD PRESSURE: 74 MMHG | HEIGHT: 64 IN | HEART RATE: 69 BPM | BODY MASS INDEX: 31.07 KG/M2 | OXYGEN SATURATION: 93 % | SYSTOLIC BLOOD PRESSURE: 128 MMHG | WEIGHT: 182 LBS

## 2022-11-01 DIAGNOSIS — M48.061 SPINAL STENOSIS OF LUMBAR REGION AT MULTIPLE LEVELS: Primary | ICD-10-CM

## 2022-11-01 DIAGNOSIS — G89.3 CANCER ASSOCIATED PAIN: ICD-10-CM

## 2022-11-01 PROCEDURE — 99211 OFF/OP EST MAY X REQ PHY/QHP: CPT

## 2022-11-01 RX ORDER — DIAZEPAM 10 MG/1
10 TABLET ORAL EVERY 8 HOURS PRN
Qty: 90 TABLET | Refills: 1 | Status: SHIPPED | OUTPATIENT
Start: 2022-11-17 | End: 2022-12-17

## 2022-11-01 RX ORDER — OXYCODONE AND ACETAMINOPHEN 10; 325 MG/1; MG/1
1 TABLET ORAL EVERY 4 HOURS PRN
Qty: 180 TABLET | Refills: 0 | Status: SHIPPED | OUTPATIENT
Start: 2022-11-17 | End: 2022-11-02

## 2022-11-01 NOTE — PROGRESS NOTES
Pain Navigator    Pt presents ambulatory to the [facility].  Seen by Jacqueline   Here for med eval / refills

## 2022-11-02 ENCOUNTER — TELEPHONE (OUTPATIENT)
Dept: PAIN CLINIC | Facility: HOSPITAL | Age: 77
End: 2022-11-02

## 2022-11-02 RX ORDER — OXYCODONE AND ACETAMINOPHEN 10; 325 MG/1; MG/1
1 TABLET ORAL EVERY 4 HOURS PRN
Qty: 180 TABLET | Refills: 0 | Status: SHIPPED | OUTPATIENT
Start: 2022-12-17 | End: 2023-01-16

## 2022-11-04 RX ORDER — OXYCODONE AND ACETAMINOPHEN 10; 325 MG/1; MG/1
1 TABLET ORAL EVERY 4 HOURS PRN
Qty: 180 TABLET | Refills: 0 | Status: SHIPPED | OUTPATIENT
Start: 2022-11-17

## 2022-12-14 ENCOUNTER — TELEPHONE (OUTPATIENT)
Dept: PAIN CLINIC | Facility: HOSPITAL | Age: 77
End: 2022-12-14

## 2022-12-14 RX ORDER — OXYCODONE AND ACETAMINOPHEN 10; 325 MG/1; MG/1
1 TABLET ORAL EVERY 4 HOURS PRN
Qty: 180 TABLET | Refills: 0 | Status: SHIPPED | OUTPATIENT
Start: 2022-12-15

## 2022-12-14 NOTE — TELEPHONE ENCOUNTER
Pt had left a message that she needs her Percocet e-prescribed to [facility] [place]. Chart and [place]- reviewed. Pt last filled Percocet 10/325 #180 tablets on 11/16/2022. Thus, I sent the prescription for December to her pharmacy. I then called to let patient know.

## 2022-12-28 ENCOUNTER — APPOINTMENT (OUTPATIENT)
Dept: GENERAL RADIOLOGY | Facility: HOSPITAL | Age: 77
End: 2022-12-28
Attending: STUDENT IN AN ORGANIZED HEALTH CARE EDUCATION/TRAINING PROGRAM
Payer: MEDICARE

## 2022-12-28 ENCOUNTER — HOSPITAL ENCOUNTER (OUTPATIENT)
Facility: HOSPITAL | Age: 77
Setting detail: OBSERVATION
Discharge: NURSING, REHAB OR LONG-TERM CARE | End: 2023-01-01
Attending: STUDENT IN AN ORGANIZED HEALTH CARE EDUCATION/TRAINING PROGRAM | Admitting: HOSPITALIST
Payer: MEDICARE

## 2022-12-28 ENCOUNTER — APPOINTMENT (OUTPATIENT)
Dept: GENERAL RADIOLOGY | Facility: HOSPITAL | Age: 77
End: 2022-12-28
Attending: INTERNAL MEDICINE
Payer: MEDICARE

## 2022-12-28 ENCOUNTER — APPOINTMENT (OUTPATIENT)
Dept: CT IMAGING | Facility: HOSPITAL | Age: 77
End: 2022-12-28
Attending: STUDENT IN AN ORGANIZED HEALTH CARE EDUCATION/TRAINING PROGRAM
Payer: MEDICARE

## 2022-12-28 DIAGNOSIS — N17.9 AKI (ACUTE KIDNEY INJURY) (HCC): Primary | ICD-10-CM

## 2022-12-28 DIAGNOSIS — R53.1 WEAKNESS GENERALIZED: ICD-10-CM

## 2022-12-28 DIAGNOSIS — T79.6XXA TRAUMATIC RHABDOMYOLYSIS, INITIAL ENCOUNTER (HCC): ICD-10-CM

## 2022-12-28 LAB
ALBUMIN SERPL-MCNC: 3.3 G/DL (ref 3.4–5)
ALP LIVER SERPL-CCNC: 218 U/L
ALT SERPL-CCNC: 40 U/L
ANION GAP SERPL CALC-SCNC: 4 MMOL/L (ref 0–18)
AST SERPL-CCNC: 44 U/L (ref 15–37)
BASOPHILS # BLD AUTO: 0.04 X10(3) UL (ref 0–0.2)
BASOPHILS NFR BLD AUTO: 0.8 %
BILIRUB DIRECT SERPL-MCNC: <0.1 MG/DL (ref 0–0.2)
BILIRUB SERPL-MCNC: 0.4 MG/DL (ref 0.1–2)
BILIRUB UR QL: NEGATIVE
BUN BLD-MCNC: 29 MG/DL (ref 7–18)
BUN/CREAT SERPL: 16.8 (ref 10–20)
CALCIUM BLD-MCNC: 9.3 MG/DL (ref 8.5–10.1)
CHLORIDE SERPL-SCNC: 111 MMOL/L (ref 98–112)
CK SERPL-CCNC: 322 U/L
CLARITY UR: CLEAR
CO2 SERPL-SCNC: 26 MMOL/L (ref 21–32)
COLOR UR: YELLOW
CREAT BLD-MCNC: 1.73 MG/DL
DEPRECATED RDW RBC AUTO: 45.1 FL (ref 35.1–46.3)
EOSINOPHIL # BLD AUTO: 0.09 X10(3) UL (ref 0–0.7)
EOSINOPHIL NFR BLD AUTO: 1.8 %
ERYTHROCYTE [DISTWIDTH] IN BLOOD BY AUTOMATED COUNT: 12.8 % (ref 11–15)
EST. AVERAGE GLUCOSE BLD GHB EST-MCNC: 117 MG/DL (ref 68–126)
GFR SERPLBLD BASED ON 1.73 SQ M-ARVRAT: 30 ML/MIN/1.73M2 (ref 60–?)
GLUCOSE BLD-MCNC: 92 MG/DL (ref 70–99)
GLUCOSE BLDC GLUCOMTR-MCNC: 101 MG/DL (ref 70–99)
GLUCOSE BLDC GLUCOMTR-MCNC: 112 MG/DL (ref 70–99)
GLUCOSE BLDC GLUCOMTR-MCNC: 157 MG/DL (ref 70–99)
GLUCOSE BLDC GLUCOMTR-MCNC: 99 MG/DL (ref 70–99)
GLUCOSE UR-MCNC: NEGATIVE MG/DL
HBA1C MFR BLD: 5.7 % (ref ?–5.7)
HCT VFR BLD AUTO: 33 %
HGB BLD-MCNC: 10.5 G/DL
HGB UR QL STRIP.AUTO: NEGATIVE
IMM GRANULOCYTES # BLD AUTO: 0 X10(3) UL (ref 0–1)
IMM GRANULOCYTES NFR BLD: 0 %
KETONES UR-MCNC: NEGATIVE MG/DL
LEUKOCYTE ESTERASE UR QL STRIP.AUTO: NEGATIVE
LYMPHOCYTES # BLD AUTO: 0.76 X10(3) UL (ref 1–4)
LYMPHOCYTES NFR BLD AUTO: 15.3 %
MCH RBC QN AUTO: 30.5 PG (ref 26–34)
MCHC RBC AUTO-ENTMCNC: 31.8 G/DL (ref 31–37)
MCV RBC AUTO: 95.9 FL
MONOCYTES # BLD AUTO: 0.35 X10(3) UL (ref 0.1–1)
MONOCYTES NFR BLD AUTO: 7 %
NEUTROPHILS # BLD AUTO: 3.73 X10 (3) UL (ref 1.5–7.7)
NEUTROPHILS # BLD AUTO: 3.73 X10(3) UL (ref 1.5–7.7)
NEUTROPHILS NFR BLD AUTO: 75.1 %
NITRITE UR QL STRIP.AUTO: NEGATIVE
OSMOLALITY SERPL CALC.SUM OF ELEC: 297 MOSM/KG (ref 275–295)
PH UR: 5 [PH] (ref 5–8)
PLATELET # BLD AUTO: 230 10(3)UL (ref 150–450)
POTASSIUM SERPL-SCNC: 5.7 MMOL/L (ref 3.5–5.1)
PROT SERPL-MCNC: 7.1 G/DL (ref 6.4–8.2)
PROT UR-MCNC: NEGATIVE MG/DL
RBC # BLD AUTO: 3.44 X10(6)UL
SARS-COV-2 RNA RESP QL NAA+PROBE: NOT DETECTED
SODIUM SERPL-SCNC: 141 MMOL/L (ref 136–145)
SP GR UR STRIP: 1.01 (ref 1–1.03)
UROBILINOGEN UR STRIP-ACNC: <2
VIT C UR-MCNC: NEGATIVE MG/DL
WBC # BLD AUTO: 5 X10(3) UL (ref 4–11)

## 2022-12-28 PROCEDURE — 70450 CT HEAD/BRAIN W/O DYE: CPT | Performed by: STUDENT IN AN ORGANIZED HEALTH CARE EDUCATION/TRAINING PROGRAM

## 2022-12-28 PROCEDURE — 73610 X-RAY EXAM OF ANKLE: CPT | Performed by: INTERNAL MEDICINE

## 2022-12-28 PROCEDURE — 72170 X-RAY EXAM OF PELVIS: CPT | Performed by: STUDENT IN AN ORGANIZED HEALTH CARE EDUCATION/TRAINING PROGRAM

## 2022-12-28 PROCEDURE — 99223 1ST HOSP IP/OBS HIGH 75: CPT | Performed by: INTERNAL MEDICINE

## 2022-12-28 PROCEDURE — 71045 X-RAY EXAM CHEST 1 VIEW: CPT | Performed by: STUDENT IN AN ORGANIZED HEALTH CARE EDUCATION/TRAINING PROGRAM

## 2022-12-28 RX ORDER — SENNOSIDES 8.6 MG
17.2 TABLET ORAL NIGHTLY PRN
Status: DISCONTINUED | OUTPATIENT
Start: 2022-12-28 | End: 2023-01-01

## 2022-12-28 RX ORDER — DEXTROSE MONOHYDRATE 25 G/50ML
50 INJECTION, SOLUTION INTRAVENOUS
Status: DISCONTINUED | OUTPATIENT
Start: 2022-12-28 | End: 2023-01-01

## 2022-12-28 RX ORDER — ACETAMINOPHEN 500 MG
500 TABLET ORAL EVERY 4 HOURS PRN
Status: DISCONTINUED | OUTPATIENT
Start: 2022-12-28 | End: 2023-01-01

## 2022-12-28 RX ORDER — TRAMADOL HYDROCHLORIDE 50 MG/1
50 TABLET ORAL EVERY 12 HOURS PRN
Status: DISCONTINUED | OUTPATIENT
Start: 2022-12-28 | End: 2022-12-31

## 2022-12-28 RX ORDER — HYDRALAZINE HYDROCHLORIDE 25 MG/1
25 TABLET, FILM COATED ORAL 3 TIMES DAILY
Status: DISCONTINUED | OUTPATIENT
Start: 2022-12-28 | End: 2022-12-28

## 2022-12-28 RX ORDER — NICOTINE POLACRILEX 4 MG
15 LOZENGE BUCCAL
Status: DISCONTINUED | OUTPATIENT
Start: 2022-12-28 | End: 2023-01-01

## 2022-12-28 RX ORDER — BISACODYL 10 MG
10 SUPPOSITORY, RECTAL RECTAL
Status: DISCONTINUED | OUTPATIENT
Start: 2022-12-28 | End: 2023-01-01

## 2022-12-28 RX ORDER — SODIUM CHLORIDE 9 MG/ML
125 INJECTION, SOLUTION INTRAVENOUS CONTINUOUS
Status: DISCONTINUED | OUTPATIENT
Start: 2022-12-28 | End: 2022-12-29

## 2022-12-28 RX ORDER — HYDRALAZINE HYDROCHLORIDE 25 MG/1
25 TABLET, FILM COATED ORAL ONCE
Status: COMPLETED | OUTPATIENT
Start: 2022-12-28 | End: 2022-12-28

## 2022-12-28 RX ORDER — ACETAMINOPHEN 325 MG/1
650 TABLET ORAL EVERY 6 HOURS PRN
Status: DISCONTINUED | OUTPATIENT
Start: 2022-12-28 | End: 2023-01-01

## 2022-12-28 RX ORDER — SODIUM CHLORIDE 9 MG/ML
INJECTION, SOLUTION INTRAVENOUS CONTINUOUS
Status: DISCONTINUED | OUTPATIENT
Start: 2022-12-28 | End: 2023-01-01

## 2022-12-28 RX ORDER — LISINOPRIL 10 MG/1
10 TABLET ORAL DAILY
Status: DISCONTINUED | OUTPATIENT
Start: 2022-12-28 | End: 2022-12-28

## 2022-12-28 RX ORDER — AMLODIPINE BESYLATE 10 MG/1
10 TABLET ORAL DAILY
Status: DISCONTINUED | OUTPATIENT
Start: 2022-12-28 | End: 2023-01-01

## 2022-12-28 RX ORDER — POLYETHYLENE GLYCOL 3350 17 G/17G
17 POWDER, FOR SOLUTION ORAL DAILY PRN
Status: DISCONTINUED | OUTPATIENT
Start: 2022-12-28 | End: 2023-01-01

## 2022-12-28 RX ORDER — ISOSORBIDE MONONITRATE 30 MG/1
60 TABLET, EXTENDED RELEASE ORAL DAILY
Status: DISCONTINUED | OUTPATIENT
Start: 2022-12-28 | End: 2023-01-01

## 2022-12-28 RX ORDER — DIAZEPAM 10 MG/1
10 TABLET ORAL EVERY 8 HOURS PRN
COMMUNITY
End: 2023-01-01

## 2022-12-28 RX ORDER — ATORVASTATIN CALCIUM 40 MG/1
40 TABLET, FILM COATED ORAL NIGHTLY
Status: DISCONTINUED | OUTPATIENT
Start: 2022-12-28 | End: 2023-01-01

## 2022-12-28 RX ORDER — NICOTINE POLACRILEX 4 MG
30 LOZENGE BUCCAL
Status: DISCONTINUED | OUTPATIENT
Start: 2022-12-28 | End: 2023-01-01

## 2022-12-28 RX ORDER — GABAPENTIN 300 MG/1
300 CAPSULE ORAL NIGHTLY
Status: DISCONTINUED | OUTPATIENT
Start: 2022-12-28 | End: 2023-01-01

## 2022-12-28 NOTE — CM/SW NOTE
Submitted clinical via [facility] portal.  [employer] Case/Auth ID is [id number]. Final insurance authorization is pending at this time. SW/CM assigned to the case will continue to follow auth status.       Ana Norris   December 28, 2022   16:19
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
## 2022-12-28 NOTE — CM/SW NOTE
12/28/22 1600   / Referral Data   Referral Source    Reason for Referral Discharge planning   Informant Patient   Pertinent Medical Hx   Does patient have an established PCP? No   Patient Info   Patient's Current Mental Status at Time of Assessment Alert;Oriented   Patient's Home Environment House   Patient lives with Alone   Patient Status Prior to Admission   Independent with ADLs and Mobility Yes   Discharge Needs   Anticipated D/C needs Subacute rehab   Services Requested   PASRR Level 1 Submitted Yes   Choice of Post-Acute Provider   Informed patient of right to choose their preferred provider Yes     Pt discussed during nursing rounds. Dx DICK, weakness. From home alone, independent at baseline. PT recommending REVA at dc, pt agreeable w/dc recommendation. REVA referrals sent in [facility]. List of accepting facilities will be needed for choice.  requested that [address] [place] [address] submit for ins auth for REVA via [facility] portal and that choice of facility be added to portal once obtained. Plan: REVA pending facility choice, ins auth, and medical clearance. / to remain available for support and/or discharge planning.      CHLOE Washington    [phone]

## 2022-12-28 NOTE — ED INITIAL ASSESSMENT (HPI)
Pt [facility]. Pt states she got up and walk to the bathroom and is unsure how she fell but states she wasn't using her walker. Pt states she did hit her head. Denies LOC. Pt A&ox4. Denies CP. No Dizziness.  No n/v.

## 2022-12-28 NOTE — PLAN OF CARE
Patient alert and oriented, on room air. Admitted this morning. No complaints of pain. Need stool sample for rule out c-diff due to patient having loose bowel movements. Patient up 1x assist with a walker. PT/OT saw patient, recommending REVA. Call light within reach. All safety precautions in place. Frequent rounding by staff. Problem: Patient Centered Care  Goal: Patient preferences are identified and integrated in the patient's plan of care  Description: Interventions:  - What would you like us to know as we care for you?  Patient lives home alone where her son often helps her to the grocery store and to doctors visits.   - Provide timely, complete, and accurate information to patient/family  - Incorporate patient and family knowledge, values, beliefs, and cultural backgrounds into the planning and delivery of care  - Encourage patient/family to participate in care and decision-making at the level they choose  - Honor patient and family perspectives and choices  Outcome: Progressing     Problem: Diabetes/Glucose Control  Goal: Glucose maintained within prescribed range  Description: INTERVENTIONS:  - Monitor Blood Glucose as ordered  - Assess for signs and symptoms of hyperglycemia and hypoglycemia  - Administer ordered medications to maintain glucose within target range  - Assess barriers to adequate nutritional intake and initiate nutrition consult as needed  - Instruct patient on self management of diabetes  Outcome: Progressing

## 2022-12-29 LAB
ANION GAP SERPL CALC-SCNC: 6 MMOL/L (ref 0–18)
BUN BLD-MCNC: 21 MG/DL (ref 7–18)
BUN/CREAT SERPL: 17.1 (ref 10–20)
C DIFF TOX B STL QL: POSITIVE
CALCIUM BLD-MCNC: 8.6 MG/DL (ref 8.5–10.1)
CHLORIDE SERPL-SCNC: 114 MMOL/L (ref 98–112)
CK SERPL-CCNC: 214 U/L
CO2 SERPL-SCNC: 25 MMOL/L (ref 21–32)
CREAT BLD-MCNC: 1.23 MG/DL
DEPRECATED RDW RBC AUTO: 42.6 FL (ref 35.1–46.3)
ERYTHROCYTE [DISTWIDTH] IN BLOOD BY AUTOMATED COUNT: 12.3 % (ref 11–15)
GFR SERPLBLD BASED ON 1.73 SQ M-ARVRAT: 46 ML/MIN/1.73M2 (ref 60–?)
GLUCOSE BLD-MCNC: 92 MG/DL (ref 70–99)
GLUCOSE BLDC GLUCOMTR-MCNC: 107 MG/DL (ref 70–99)
GLUCOSE BLDC GLUCOMTR-MCNC: 124 MG/DL (ref 70–99)
GLUCOSE BLDC GLUCOMTR-MCNC: 159 MG/DL (ref 70–99)
GLUCOSE BLDC GLUCOMTR-MCNC: 99 MG/DL (ref 70–99)
HCT VFR BLD AUTO: 26.6 %
HGB BLD-MCNC: 8.6 G/DL
MCH RBC QN AUTO: 30.4 PG (ref 26–34)
MCHC RBC AUTO-ENTMCNC: 32.3 G/DL (ref 31–37)
MCV RBC AUTO: 94 FL
OSMOLALITY SERPL CALC.SUM OF ELEC: 303 MOSM/KG (ref 275–295)
PLATELET # BLD AUTO: 192 10(3)UL (ref 150–450)
POTASSIUM SERPL-SCNC: 4.4 MMOL/L (ref 3.5–5.1)
RBC # BLD AUTO: 2.83 X10(6)UL
SODIUM SERPL-SCNC: 145 MMOL/L (ref 136–145)
WBC # BLD AUTO: 5 X10(3) UL (ref 4–11)

## 2022-12-29 PROCEDURE — 99233 SBSQ HOSP IP/OBS HIGH 50: CPT | Performed by: INTERNAL MEDICINE

## 2022-12-29 RX ORDER — VANCOMYCIN HYDROCHLORIDE 125 MG/1
125 CAPSULE ORAL 4 TIMES DAILY
Status: DISCONTINUED | OUTPATIENT
Start: 2022-12-29 | End: 2023-01-01

## 2022-12-29 NOTE — PLAN OF CARE
Patient vital signs stable overnight. Patient did report pain overnight; MD was notified. Gave Tramadol for pain. No acute changes overnight. Safety precautions in place. Call light within reach. Continue to monitor. Problem: Patient Centered Care  Goal: Patient preferences are identified and integrated in the patient's plan of care  Description: Interventions:  - What would you like us to know as we care for you? Patient lives alone with occasional help from son.   - Provide timely, complete, and accurate information to patient/family  - Incorporate patient and family knowledge, values, beliefs, and cultural backgrounds into the planning and delivery of care  - Encourage patient/family to participate in care and decision-making at the level they choose  - Honor patient and family perspectives and choices  Outcome: Progressing     Problem: Diabetes/Glucose Control  Goal: Glucose maintained within prescribed range  Description: INTERVENTIONS:  - Monitor Blood Glucose as ordered  - Assess for signs and symptoms of hyperglycemia and hypoglycemia  - Administer ordered medications to maintain glucose within target range  - Assess barriers to adequate nutritional intake and initiate nutrition consult as needed  - Instruct patient on self management of diabetes  Outcome: Progressing

## 2022-12-29 NOTE — PLAN OF CARE
Patient alert and oriented, on room air. Up 1x assist wit a walker. Vital signs stable. Stool sample positive for C-diff, MD notified. Patient started on PO vanco. Call light within reach, all safety precautions in place. Frequent rounding by staff. Problem: Patient Centered Care  Goal: Patient preferences are identified and integrated in the patient's plan of care  Description: Interventions:  - What would you like us to know as we care for you? Patient sons birthday is today 12[birth year]29, she was so excited to call him.    - Provide timely, complete, and accurate information to patient/family  - Incorporate patient and family knowledge, values, beliefs, and cultural backgrounds into the planning and delivery of care  - Encourage patient/family to participate in care and decision-making at the level they choose  - Honor patient and family perspectives and choices  Outcome: Progressing     Problem: Diabetes/Glucose Control  Goal: Glucose maintained within prescribed range  Description: INTERVENTIONS:  - Monitor Blood Glucose as ordered  - Assess for signs and symptoms of hyperglycemia and hypoglycemia  - Administer ordered medications to maintain glucose within target range  - Assess barriers to adequate nutritional intake and initiate nutrition consult as needed  - Instruct patient on self management of diabetes  Outcome: Progressing

## 2022-12-29 NOTE — CDS QUERY
Type/Etiology of Stated Diagnosis  CLINICAL DOCUMENTATION CLARIFICATION FORM    Dear Doctor:  Clinical information (provided below) includes a diagnosis that requires additional specificity regarding type/etiology. For accurate ICD-10-CM code assignment to reflect severity of illness and risk of mortality,  PLEASE (X)  SELECT THAT WHICH APPLY TO THE DIAGNOSIS OF:    RHABDOMYOLYSIS   ______________________________________________  SELECTION BY PROVIDER ONLY    ( X )  Traumatic      (  )  Other/Unspecified     (  )  Other (please specify):     (  )  Unable to Determine (please comment)           ER MD MDM:   Found down, unclear downtime, no definite signs of trauma on examination. Patient is mentating and perfusing appropriately at this time, wounds are a bit unkept and she appears mildly dehydrated. ER MD Clinical Imp: Traumatic rhabdomyolysis, initial encounter ([facility] / DICK   Lab Results:   / BUN 21 / Cr 1.23   Therapeutic Treatment(s):  mL/hr continuous   H&P: Acute Kidney Injury   -with mild rhabdomyolysis  - Starting IVF  - Avoiding Nephrotoxic agents  - Cont to monitor    PMH:  DM 2, RA      If you have any questions, please contact Clinical Documentation  Specialist:  LIU Angeles at [phone]     Thank You!      THIS FORM IS A PERMANENT PART OF THE MEDICAL RECORD

## 2022-12-29 NOTE — PROGRESS NOTES
[facility] Pharmacy Note:  Renal Dose Adjustment for Tramadol Melanie Coca)    Allie Alfonso has been prescribed Tramadol (ULTRAM) 50 mg orally every 6 hours as needed for pain. Estimated Creatinine Clearance: 24.3 mL/min (A) (based on SCr of 1.73 mg/dL (H)). Her calculated creatinine clearance is < 30 ml/min, therefore, the dose of Tramadol (ULTRAM) has been changed to 50 mg every 12 hours as needed for pain per P&T approved protocol. Pharmacy will continue to follow, and if renal function improves, will resume the original order.      Thank you,  Evan Coppola, PharmD  12/28/2022 8:18 PM

## 2022-12-29 NOTE — CM/SW NOTE
REVA list provided to patient at bedside. SW contact information provided, requested patient to call back with her choice. 159pm  Notified by RN that patient has chosen [facility] [address] [place] [address], SW reserved in [facility]. 240pm   Notified by Rambo Barbour obtained 12/29/2022-01/03/2023. SW notified MD and RN, likely will be ready tomorrow. PLAN: REVA- [facility] [address] [place] [address]. Chris Hernandez approved-Case/Auth ID is [id number].       CATINA Ga, [facility]    [id number]

## 2022-12-30 LAB
ANION GAP SERPL CALC-SCNC: 7 MMOL/L (ref 0–18)
BASOPHILS # BLD AUTO: 0.04 X10(3) UL (ref 0–0.2)
BASOPHILS NFR BLD AUTO: 0.5 %
BUN BLD-MCNC: 15 MG/DL (ref 7–18)
BUN/CREAT SERPL: 11.4 (ref 10–20)
CALCIUM BLD-MCNC: 8.8 MG/DL (ref 8.5–10.1)
CHLORIDE SERPL-SCNC: 111 MMOL/L (ref 98–112)
CO2 SERPL-SCNC: 27 MMOL/L (ref 21–32)
CREAT BLD-MCNC: 1.32 MG/DL
DEPRECATED RDW RBC AUTO: 43.8 FL (ref 35.1–46.3)
EOSINOPHIL # BLD AUTO: 0.03 X10(3) UL (ref 0–0.7)
EOSINOPHIL NFR BLD AUTO: 0.4 %
ERYTHROCYTE [DISTWIDTH] IN BLOOD BY AUTOMATED COUNT: 12.6 % (ref 11–15)
GFR SERPLBLD BASED ON 1.73 SQ M-ARVRAT: 42 ML/MIN/1.73M2 (ref 60–?)
GLUCOSE BLD-MCNC: 117 MG/DL (ref 70–99)
GLUCOSE BLDC GLUCOMTR-MCNC: 111 MG/DL (ref 70–99)
GLUCOSE BLDC GLUCOMTR-MCNC: 116 MG/DL (ref 70–99)
GLUCOSE BLDC GLUCOMTR-MCNC: 120 MG/DL (ref 70–99)
GLUCOSE BLDC GLUCOMTR-MCNC: 127 MG/DL (ref 70–99)
HCT VFR BLD AUTO: 31.1 %
HGB BLD-MCNC: 9.9 G/DL
IMM GRANULOCYTES # BLD AUTO: 0.03 X10(3) UL (ref 0–1)
IMM GRANULOCYTES NFR BLD: 0.4 %
LYMPHOCYTES # BLD AUTO: 0.93 X10(3) UL (ref 1–4)
LYMPHOCYTES NFR BLD AUTO: 11.7 %
MCH RBC QN AUTO: 30.3 PG (ref 26–34)
MCHC RBC AUTO-ENTMCNC: 31.8 G/DL (ref 31–37)
MCV RBC AUTO: 95.1 FL
MONOCYTES # BLD AUTO: 0.29 X10(3) UL (ref 0.1–1)
MONOCYTES NFR BLD AUTO: 3.6 %
NEUTROPHILS # BLD AUTO: 6.63 X10 (3) UL (ref 1.5–7.7)
NEUTROPHILS # BLD AUTO: 6.63 X10(3) UL (ref 1.5–7.7)
NEUTROPHILS NFR BLD AUTO: 83.4 %
OSMOLALITY SERPL CALC.SUM OF ELEC: 302 MOSM/KG (ref 275–295)
PLATELET # BLD AUTO: 234 10(3)UL (ref 150–450)
POTASSIUM SERPL-SCNC: 3.8 MMOL/L (ref 3.5–5.1)
RBC # BLD AUTO: 3.27 X10(6)UL
SODIUM SERPL-SCNC: 145 MMOL/L (ref 136–145)
WBC # BLD AUTO: 8 X10(3) UL (ref 4–11)

## 2022-12-30 PROCEDURE — 99233 SBSQ HOSP IP/OBS HIGH 50: CPT | Performed by: INTERNAL MEDICINE

## 2022-12-30 RX ORDER — ONDANSETRON 2 MG/ML
4 INJECTION INTRAMUSCULAR; INTRAVENOUS EVERY 6 HOURS PRN
Status: DISCONTINUED | OUTPATIENT
Start: 2022-12-30 | End: 2023-01-01

## 2022-12-30 NOTE — PLAN OF CARE
Patient vital signs stable overnight. No complaints of pain overnight. Patient had three episode of emesis; PRN Zofran given for nausea/vomiting. IV fluids continued. PO Vancomycin given. Safety precautions in place, call light in place. Continue to monitor. Problem: Patient Centered Care  Goal: Patient preferences are identified and integrated in the patient's plan of care  Description: Interventions:  - What would you like us to know as we care for you? From home alone.   - Provide timely, complete, and accurate information to patient/family  - Incorporate patient and family knowledge, values, beliefs, and cultural backgrounds into the planning and delivery of care  - Encourage patient/family to participate in care and decision-making at the level they choose  - Honor patient and family perspectives and choices  Outcome: Progressing     Problem: Diabetes/Glucose Control  Goal: Glucose maintained within prescribed range  Description: INTERVENTIONS:  - Monitor Blood Glucose as ordered  - Assess for signs and symptoms of hyperglycemia and hypoglycemia  - Administer ordered medications to maintain glucose within target range  - Assess barriers to adequate nutritional intake and initiate nutrition consult as needed  - Instruct patient on self management of diabetes  Outcome: Progressing

## 2022-12-31 LAB
ANION GAP SERPL CALC-SCNC: 5 MMOL/L (ref 0–18)
BASOPHILS # BLD AUTO: 0.03 X10(3) UL (ref 0–0.2)
BASOPHILS NFR BLD AUTO: 0.4 %
BUN BLD-MCNC: 9 MG/DL (ref 7–18)
BUN/CREAT SERPL: 7.8 (ref 10–20)
CALCIUM BLD-MCNC: 8.3 MG/DL (ref 8.5–10.1)
CHLORIDE SERPL-SCNC: 110 MMOL/L (ref 98–112)
CO2 SERPL-SCNC: 24 MMOL/L (ref 21–32)
CREAT BLD-MCNC: 1.15 MG/DL
DEPRECATED RDW RBC AUTO: 43.7 FL (ref 35.1–46.3)
EOSINOPHIL # BLD AUTO: 0.01 X10(3) UL (ref 0–0.7)
EOSINOPHIL NFR BLD AUTO: 0.1 %
ERYTHROCYTE [DISTWIDTH] IN BLOOD BY AUTOMATED COUNT: 12.4 % (ref 11–15)
GFR SERPLBLD BASED ON 1.73 SQ M-ARVRAT: 50 ML/MIN/1.73M2 (ref 60–?)
GLUCOSE BLD-MCNC: 104 MG/DL (ref 70–99)
GLUCOSE BLDC GLUCOMTR-MCNC: 109 MG/DL (ref 70–99)
GLUCOSE BLDC GLUCOMTR-MCNC: 109 MG/DL (ref 70–99)
GLUCOSE BLDC GLUCOMTR-MCNC: 145 MG/DL (ref 70–99)
GLUCOSE BLDC GLUCOMTR-MCNC: 160 MG/DL (ref 70–99)
HCT VFR BLD AUTO: 28.8 %
HGB BLD-MCNC: 9.1 G/DL
IMM GRANULOCYTES # BLD AUTO: 0.02 X10(3) UL (ref 0–1)
IMM GRANULOCYTES NFR BLD: 0.3 %
LYMPHOCYTES # BLD AUTO: 0.78 X10(3) UL (ref 1–4)
LYMPHOCYTES NFR BLD AUTO: 10.3 %
MCH RBC QN AUTO: 30.4 PG (ref 26–34)
MCHC RBC AUTO-ENTMCNC: 31.6 G/DL (ref 31–37)
MCV RBC AUTO: 96.3 FL
MONOCYTES # BLD AUTO: 0.29 X10(3) UL (ref 0.1–1)
MONOCYTES NFR BLD AUTO: 3.8 %
NEUTROPHILS # BLD AUTO: 6.47 X10 (3) UL (ref 1.5–7.7)
NEUTROPHILS # BLD AUTO: 6.47 X10(3) UL (ref 1.5–7.7)
NEUTROPHILS NFR BLD AUTO: 85.1 %
OSMOLALITY SERPL CALC.SUM OF ELEC: 287 MOSM/KG (ref 275–295)
PLATELET # BLD AUTO: 194 10(3)UL (ref 150–450)
POTASSIUM SERPL-SCNC: 3.9 MMOL/L (ref 3.5–5.1)
RBC # BLD AUTO: 2.99 X10(6)UL
SODIUM SERPL-SCNC: 139 MMOL/L (ref 136–145)
WBC # BLD AUTO: 7.6 X10(3) UL (ref 4–11)

## 2022-12-31 PROCEDURE — 99233 SBSQ HOSP IP/OBS HIGH 50: CPT | Performed by: INTERNAL MEDICINE

## 2022-12-31 RX ORDER — TRAMADOL HYDROCHLORIDE 50 MG/1
50 TABLET ORAL EVERY 8 HOURS PRN
Status: DISCONTINUED | OUTPATIENT
Start: 2022-12-31 | End: 2023-01-01

## 2022-12-31 RX ORDER — VANCOMYCIN HYDROCHLORIDE 125 MG/1
125 CAPSULE ORAL 4 TIMES DAILY
Qty: 40 CAPSULE | Refills: 0 | Status: SHIPPED | OUTPATIENT
Start: 2022-12-31 | End: 2023-01-10

## 2022-12-31 RX ORDER — HYDROCODONE BITARTRATE AND ACETAMINOPHEN 5; 325 MG/1; MG/1
1 TABLET ORAL EVERY 6 HOURS PRN
Status: DISCONTINUED | OUTPATIENT
Start: 2022-12-31 | End: 2023-01-01

## 2022-12-31 NOTE — PLAN OF CARE
Patient alert and oriented, on room air. Up standby with a walker. No complaints of nausea on shift. Patient tolerating diet. PRN norco given for pain. IV fluids infusing. No diarrhea during shift. Call light within reach. All safety precautions in place. Frequent rounding by staff. Problem: Patient Centered Care  Goal: Patient preferences are identified and integrated in the patient's plan of care  Description: Interventions:  - What would you like us to know as we care for you? Patient lives at home alone where her son frequently helps her out at home with groceries and doctors appointments. - Provide timely, complete, and accurate information to patient/family  - Incorporate patient and family knowledge, values, beliefs, and cultural backgrounds into the planning and delivery of care  - Encourage patient/family to participate in care and decision-making at the level they choose  - Honor patient and family perspectives and choices  Outcome: Progressing     Problem: Diabetes/Glucose Control  Goal: Glucose maintained within prescribed range  Description: INTERVENTIONS:  - Monitor Blood Glucose as ordered  - Assess for signs and symptoms of hyperglycemia and hypoglycemia  - Administer ordered medications to maintain glucose within target range  - Assess barriers to adequate nutritional intake and initiate nutrition consult as needed  - Instruct patient on self management of diabetes  Outcome: Progressing     Problem: Patient/Family Goals  Goal: Patient/Family Long Term Goal  Description: Patient's Long Term Goal: go home     Interventions:  -  Monitor vital signs  - Monitor appropriate labs  - Follow MD orders  - Diagnostics per order  - See additional Care Plan goals for specific interventions  Outcome: Progressing  Goal: Patient/Family Short Term Goal  Description: Patient's Short Term Goal: no diarrhea    Interventions:   - continue intake and output monitoring.  -monitor vital signs.   -follow md orders  -abx per order  - See additional Care Plan goals for specific interventions  Outcome: Progressing

## 2022-12-31 NOTE — PLAN OF CARE
Vss. bm x6 today, no emesis noted but very poor appetite, will continue to encourage oral intake as tolerated. Iv fluids maintained. Bed alarm active and call light within reach. Problem: Patient Centered Care  Goal: Patient preferences are identified and integrated in the patient's plan of care  Description: Interventions:  - What would you like us to know as we care for you? My son help with groceries but I live alone. - Provide timely, complete, and accurate information to patient/family  - Incorporate patient and family knowledge, values, beliefs, and cultural backgrounds into the planning and delivery of care  - Encourage patient/family to participate in care and decision-making at the level they choose  - Honor patient and family perspectives and choices  Outcome: Progressing     Problem: Diabetes/Glucose Control  Goal: Glucose maintained within prescribed range  Description: INTERVENTIONS:  - Monitor Blood Glucose as ordered  - Assess for signs and symptoms of hyperglycemia and hypoglycemia  - Administer ordered medications to maintain glucose within target range  - Assess barriers to adequate nutritional intake and initiate nutrition consult as needed  - Instruct patient on self management of diabetes  Outcome: Progressing     Problem: Patient/Family Goals  Goal: Patient/Family Long Term Goal  Description: Patient's Long Term Goal: go home     Interventions:  -  Monitor vital signs  - Monitor appropriate labs  - Follow MD orders  - Diagnostics per order  - See additional Care Plan goals for specific interventions  Outcome: Progressing  Goal: Patient/Family Short Term Goal  Description: Patient's Short Term Goal: no diarrhea    Interventions:   - continue intake and output monitoring.  -monitor vital signs.   -follow md orders  -abx per order  - See additional Care Plan goals for specific interventions  Outcome: Progressing

## 2022-12-31 NOTE — CM/SW NOTE
Pt is ready for dc, MD dc order entered. Ins auth for REVA already obtained on 12/29. The [address] [place] [address] unable to admit patient after CM made several attempts via phone call. No one is staffed in admissions office, and DON failed to return call to .  will make another attempt to transfer patient to [facility] tomorrow 1/1. Jhonny Elvira placed on will call from 1/1 to 1/3, PCS updated. RN and MD made aware of the above. Plan: The [address] [place] [address] for [facility] pending bed availability.     INDIGO SalgueroN    [phone]

## 2022-12-31 NOTE — PLAN OF CARE
Pt had 2 episodes of emesis overnight. Nausea eased after PRN Zofran. Legs with edema and kept elevated on pillows. No drainage strikethrough noted on leg wound dressings. Isolation precautions and fall risk precautions maintained. Problem: Patient Centered Care  Goal: Patient preferences are identified and integrated in the patient's plan of care  Description: Interventions:  - What would you like us to know as we care for you? \"I don't want to go to the [facility]\"  - Provide timely, complete, and accurate information to patient/family  - Incorporate patient and family knowledge, values, beliefs, and cultural backgrounds into the planning and delivery of care  - Encourage patient/family to participate in care and decision-making at the level they choose  - Honor patient and family perspectives and choices  Outcome: Progressing     Problem: Diabetes/Glucose Control  Goal: Glucose maintained within prescribed range  Description: INTERVENTIONS:  - Monitor Blood Glucose as ordered  - Assess for signs and symptoms of hyperglycemia and hypoglycemia  - Administer ordered medications to maintain glucose within target range  - Assess barriers to adequate nutritional intake and initiate nutrition consult as needed  - Instruct patient on self management of diabetes  Outcome: Progressing     Problem: Patient/Family Goals  Goal: Patient/Family Long Term Goal  Description: Patient's Long Term Goal: go home     Interventions:  -  Monitor vital signs  - Monitor appropriate labs  - Follow MD orders  - Diagnostics per order  - See additional Care Plan goals for specific interventions  Outcome: Progressing  Goal: Patient/Family Short Term Goal  Description: Patient's Short Term Goal: no diarrhea    Interventions:   - continue intake and output monitoring.  -monitor vital signs.   -follow md orders  -abx per order  - See additional Care Plan goals for specific interventions  Outcome: Progressing right facial droop

## 2023-01-01 VITALS
OXYGEN SATURATION: 95 % | DIASTOLIC BLOOD PRESSURE: 58 MMHG | WEIGHT: 167.19 LBS | RESPIRATION RATE: 20 BRPM | HEART RATE: 79 BPM | HEIGHT: 64 IN | TEMPERATURE: 98 F | BODY MASS INDEX: 28.54 KG/M2 | SYSTOLIC BLOOD PRESSURE: 122 MMHG

## 2023-01-01 LAB
ANION GAP SERPL CALC-SCNC: 3 MMOL/L (ref 0–18)
BASOPHILS # BLD AUTO: 0.04 X10(3) UL (ref 0–0.2)
BASOPHILS NFR BLD AUTO: 0.6 %
BUN BLD-MCNC: 12 MG/DL (ref 7–18)
BUN/CREAT SERPL: 7.2 (ref 10–20)
CALCIUM BLD-MCNC: 8.2 MG/DL (ref 8.5–10.1)
CHLORIDE SERPL-SCNC: 112 MMOL/L (ref 98–112)
CO2 SERPL-SCNC: 27 MMOL/L (ref 21–32)
CREAT BLD-MCNC: 1.67 MG/DL
DEPRECATED RDW RBC AUTO: 44.5 FL (ref 35.1–46.3)
EOSINOPHIL # BLD AUTO: 0.24 X10(3) UL (ref 0–0.7)
EOSINOPHIL NFR BLD AUTO: 3.8 %
ERYTHROCYTE [DISTWIDTH] IN BLOOD BY AUTOMATED COUNT: 12.3 % (ref 11–15)
GFR SERPLBLD BASED ON 1.73 SQ M-ARVRAT: 32 ML/MIN/1.73M2 (ref 60–?)
GLUCOSE BLD-MCNC: 85 MG/DL (ref 70–99)
GLUCOSE BLDC GLUCOMTR-MCNC: 94 MG/DL (ref 70–99)
GLUCOSE BLDC GLUCOMTR-MCNC: 98 MG/DL (ref 70–99)
HCT VFR BLD AUTO: 27 %
HGB BLD-MCNC: 8.2 G/DL
IMM GRANULOCYTES # BLD AUTO: 0.01 X10(3) UL (ref 0–1)
IMM GRANULOCYTES NFR BLD: 0.2 %
LYMPHOCYTES # BLD AUTO: 1.22 X10(3) UL (ref 1–4)
LYMPHOCYTES NFR BLD AUTO: 19.2 %
MCH RBC QN AUTO: 29.7 PG (ref 26–34)
MCHC RBC AUTO-ENTMCNC: 30.4 G/DL (ref 31–37)
MCV RBC AUTO: 97.8 FL
MONOCYTES # BLD AUTO: 0.35 X10(3) UL (ref 0.1–1)
MONOCYTES NFR BLD AUTO: 5.5 %
NEUTROPHILS # BLD AUTO: 4.51 X10 (3) UL (ref 1.5–7.7)
NEUTROPHILS # BLD AUTO: 4.51 X10(3) UL (ref 1.5–7.7)
NEUTROPHILS NFR BLD AUTO: 70.7 %
OSMOLALITY SERPL CALC.SUM OF ELEC: 293 MOSM/KG (ref 275–295)
PLATELET # BLD AUTO: 167 10(3)UL (ref 150–450)
POTASSIUM SERPL-SCNC: 3.8 MMOL/L (ref 3.5–5.1)
RBC # BLD AUTO: 2.76 X10(6)UL
SARS-COV-2 RNA RESP QL NAA+PROBE: NOT DETECTED
SODIUM SERPL-SCNC: 142 MMOL/L (ref 136–145)
WBC # BLD AUTO: 6.4 X10(3) UL (ref 4–11)

## 2023-01-01 PROCEDURE — 99239 HOSP IP/OBS DSCHRG MGMT >30: CPT | Performed by: INTERNAL MEDICINE

## 2023-01-01 NOTE — CM/SW NOTE
01/01/23 0900   Discharge disposition   Expected discharge disposition [address] [facility]   (The [address] [place] [address])   Discharge transportation [address]     Pt discussed during nursing rounds. Pt is stable for dc today. MD dc order entered. Pt will dc to The [facility] for REVA.  at facility confirmed bed is available today. Pt agreeable to transfer today. [address] scheduled for 1pm . Number for nurse report is [phone]. Plan: The [address] [place] [address] for REVA today. / to remain available for support and/or discharge planning.      CHLOE Alvarado    [phone]

## 2023-01-01 NOTE — PLAN OF CARE
Patient discharged via [address] to [address] [place] [address]. All discharge education completed and questions answered. Report given to Yoel Dumas at [address] [place] [address]. Problem: Patient Centered Care  Goal: Patient preferences are identified and integrated in the patient's plan of care  Description: Interventions:  - What would you like us to know as we care for you? From home alone, but son lives nearby and helps out  - Provide timely, complete, and accurate information to patient/family  - Incorporate patient and family knowledge, values, beliefs, and cultural backgrounds into the planning and delivery of care  - Encourage patient/family to participate in care and decision-making at the level they choose  - Honor patient and family perspectives and choices  Outcome: Adequate for Discharge     Problem: Diabetes/Glucose Control  Goal: Glucose maintained within prescribed range  Description: INTERVENTIONS:  - Monitor Blood Glucose as ordered  - Assess for signs and symptoms of hyperglycemia and hypoglycemia  - Administer ordered medications to maintain glucose within target range  - Assess barriers to adequate nutritional intake and initiate nutrition consult as needed  - Instruct patient on self management of diabetes  Outcome: Adequate for Discharge     Problem: Patient/Family Goals  Goal: Patient/Family Long Term Goal  Description: Patient's Long Term Goal: go home     Interventions:  -  Monitor vital signs  - Monitor appropriate labs  - Follow MD orders  - Diagnostics per order  - See additional Care Plan goals for specific interventions  Outcome: Adequate for Discharge  Goal: Patient/Family Short Term Goal  Description: Patient's Short Term Goal: no diarrhea    Interventions:   - continue intake and output monitoring.  -monitor vital signs.   -follow md orders  -abx per order  - See additional Care Plan goals for specific interventions  Outcome: Adequate for Discharge

## 2023-01-01 NOTE — PLAN OF CARE
Pt slept well overnight. No episodes of emesis nor diarrhea. Has discharge orders to [facility] pending acceptance and bed availability. Call light in reach and safety precautions in place. Problem: Patient Centered Care  Goal: Patient preferences are identified and integrated in the patient's plan of care  Description: Interventions:  - What would you like us to know as we care for you? \"I don't want to go to the [address] but it's [address] to my house\". - Provide timely, complete, and accurate information to patient/family  - Incorporate patient and family knowledge, values, beliefs, and cultural backgrounds into the planning and delivery of care  - Encourage patient/family to participate in care and decision-making at the level they choose  - Honor patient and family perspectives and choices  Outcome: Progressing     Problem: Diabetes/Glucose Control  Goal: Glucose maintained within prescribed range  Description: INTERVENTIONS:  - Monitor Blood Glucose as ordered  - Assess for signs and symptoms of hyperglycemia and hypoglycemia  - Administer ordered medications to maintain glucose within target range  - Assess barriers to adequate nutritional intake and initiate nutrition consult as needed  - Instruct patient on self management of diabetes  Outcome: Progressing     Problem: Patient/Family Goals  Goal: Patient/Family Long Term Goal  Description: Patient's Long Term Goal: go home     Interventions:  -  Monitor vital signs  - Monitor appropriate labs  - Follow MD orders  - Diagnostics per order  - See additional Care Plan goals for specific interventions  Outcome: Progressing  Goal: Patient/Family Short Term Goal  Description: Patient's Short Term Goal: no diarrhea    Interventions:   - continue intake and output monitoring.  -monitor vital signs.   -follow md orders  -abx per order  - See additional Care Plan goals for specific interventions  Outcome: Progressing

## 2023-01-02 NOTE — PAYOR COMM NOTE
--------------  DISCHARGE REVIEW    Angela Meléndez MA O  Subscriber #:  [id number]  Authorization Number: [id number]    Admit date: 12/28/22  Admit time:   8:08 AM  Discharge Date: 1/1/2023  1:17 PM     Admitting Physician: Madeleine Baeza MD  Attending Physician:  No att. providers found  Primary Care Physician: Pcp, None        REVIEWER COMMENTS  Discharged to [facility]

## 2023-01-05 ENCOUNTER — OFFICE VISIT (OUTPATIENT)
Dept: PAIN CLINIC | Facility: HOSPITAL | Age: 78
End: 2023-01-05
Attending: NURSE PRACTITIONER
Payer: MEDICARE

## 2023-01-05 VITALS — OXYGEN SATURATION: 98 % | HEART RATE: 71 BPM | SYSTOLIC BLOOD PRESSURE: 112 MMHG | DIASTOLIC BLOOD PRESSURE: 63 MMHG

## 2023-01-05 DIAGNOSIS — Z79.891 ENCOUNTER FOR MONITORING OPIOID MAINTENANCE THERAPY: Primary | ICD-10-CM

## 2023-01-05 DIAGNOSIS — Z51.81 ENCOUNTER FOR MONITORING OPIOID MAINTENANCE THERAPY: Primary | ICD-10-CM

## 2023-01-05 DIAGNOSIS — M48.061 SPINAL STENOSIS OF LUMBAR REGION AT MULTIPLE LEVELS: ICD-10-CM

## 2023-01-05 PROCEDURE — 99211 OFF/OP EST MAY X REQ PHY/QHP: CPT

## 2023-01-05 RX ORDER — OXYCODONE AND ACETAMINOPHEN 10; 325 MG/1; MG/1
1 TABLET ORAL EVERY 4 HOURS PRN
Qty: 180 TABLET | Refills: 0 | Status: SHIPPED | OUTPATIENT
Start: 2023-01-14 | End: 2023-02-13

## 2023-01-05 RX ORDER — DIAZEPAM 10 MG/1
10 TABLET ORAL EVERY 8 HOURS PRN
Qty: 90 TABLET | Refills: 1 | Status: SHIPPED | OUTPATIENT
Start: 2023-01-17 | End: 2023-02-16

## 2023-01-05 RX ORDER — OXYCODONE AND ACETAMINOPHEN 10; 325 MG/1; MG/1
1 TABLET ORAL EVERY 4 HOURS PRN
Qty: 180 TABLET | Refills: 0 | Status: SHIPPED | OUTPATIENT
Start: 2023-02-13 | End: 2023-03-15

## 2023-01-05 NOTE — PROGRESS NOTES
PT presents to the C[facility] in [facility]. Son in attendance. PT states she has been falling a lot. Currently in rehab after recent hospital stay. PT is having 8/10. KELLEY Valles saw PT for med eval.  See notes for POC.

## 2023-03-02 ENCOUNTER — OFFICE VISIT (OUTPATIENT)
Dept: PAIN CLINIC | Facility: HOSPITAL | Age: 78
End: 2023-03-02
Attending: NURSE PRACTITIONER
Payer: MEDICARE

## 2023-03-02 VITALS — SYSTOLIC BLOOD PRESSURE: 119 MMHG | HEART RATE: 84 BPM | OXYGEN SATURATION: 97 % | DIASTOLIC BLOOD PRESSURE: 62 MMHG

## 2023-03-02 DIAGNOSIS — Z51.81 ENCOUNTER FOR MONITORING OPIOID MAINTENANCE THERAPY: Primary | ICD-10-CM

## 2023-03-02 DIAGNOSIS — M48.061 SPINAL STENOSIS OF LUMBAR REGION AT MULTIPLE LEVELS: ICD-10-CM

## 2023-03-02 DIAGNOSIS — Z79.891 ENCOUNTER FOR MONITORING OPIOID MAINTENANCE THERAPY: Primary | ICD-10-CM

## 2023-03-02 LAB
AMPHET UR QL SCN: NEGATIVE
BARBITURATES UR QL SCN: NEGATIVE
CANNABINOIDS UR QL SCN: NEGATIVE
COCAINE UR QL: NEGATIVE
CREAT UR-SCNC: 128 MG/DL
MDMA UR QL SCN: NEGATIVE
METHADONE UR QL SCN: NEGATIVE
PCP UR QL SCN: NEGATIVE

## 2023-03-02 PROCEDURE — 80307 DRUG TEST PRSMV CHEM ANLYZR: CPT | Performed by: NURSE PRACTITIONER

## 2023-03-02 PROCEDURE — 80361 OPIATES 1 OR MORE: CPT | Performed by: NURSE PRACTITIONER

## 2023-03-02 PROCEDURE — 99211 OFF/OP EST MAY X REQ PHY/QHP: CPT

## 2023-03-02 PROCEDURE — 80346 BENZODIAZEPINES1-12: CPT | Performed by: NURSE PRACTITIONER

## 2023-03-02 RX ORDER — DIAZEPAM 10 MG/1
10 TABLET ORAL EVERY 8 HOURS PRN
Qty: 90 TABLET | Refills: 1 | Status: SHIPPED | OUTPATIENT
Start: 2023-03-15 | End: 2023-04-14

## 2023-03-02 RX ORDER — OXYCODONE AND ACETAMINOPHEN 10; 325 MG/1; MG/1
1 TABLET ORAL EVERY 4 HOURS PRN
Qty: 180 TABLET | Refills: 0 | Status: SHIPPED | OUTPATIENT
Start: 2023-04-14 | End: 2023-05-14

## 2023-03-02 RX ORDER — OXYCODONE AND ACETAMINOPHEN 10; 325 MG/1; MG/1
1 TABLET ORAL EVERY 4 HOURS PRN
Qty: 180 TABLET | Refills: 0 | Status: SHIPPED | OUTPATIENT
Start: 2023-03-15 | End: 2023-04-14

## 2023-03-05 LAB
OPIATES, UR, 6-ACETYLMORPHINE: <10 NG/ML
OPIATES, URINE, CODEINE: <20 NG/ML
OPIATES, URINE, HYDROCODONE: <20 NG/ML
OPIATES, URINE, HYDROMORPHONE: <20 NG/ML
OPIATES, URINE, MORPHINE: <20 NG/ML
OPIATES, URINE, NORHYDROCODONE: <20 NG/ML
OPIATES, URINE, NOROXYCODONE: >4000 NG/ML
OPIATES, URINE, NOROXYMORPHONE: 640 NG/ML
OPIATES, URINE, OXYCODONE: 668 NG/ML
OPIATES, URINE, OXYMORPHONE: 76 NG/ML

## 2023-03-06 LAB
7-AMINOCLONAZEPAM, URINE: <5 NG/ML
ALPHA-HYDROXYALPRAZOLAM, URINE: <5 NG/ML
ALPHA-HYDROXYMIDAZOLAM, URINE: <20 NG/ML
ALPRAZOLAM, URINE: <5 NG/ML
CHLORDIAZEPOXIDE, URINE: <20 NG/ML
CLONAZEPAM, URINE: <5 NG/ML
DIAZEPAM, URINE: <20 NG/ML
LORAZEPAM, URINE: <20 NG/ML
MIDAZOLAM, URINE: <20 NG/ML
NORDIAZEPAM, URINE: 1025 NG/ML
OXAZEPAM, URINE: >4000 NG/ML
TEMAZEPAM, URINE: 3484 NG/ML

## 2023-05-11 ENCOUNTER — OFFICE VISIT (OUTPATIENT)
Dept: PAIN CLINIC | Facility: HOSPITAL | Age: 78
End: 2023-05-11
Attending: NURSE PRACTITIONER
Payer: MEDICARE

## 2023-05-11 VITALS — HEART RATE: 74 BPM | DIASTOLIC BLOOD PRESSURE: 62 MMHG | SYSTOLIC BLOOD PRESSURE: 120 MMHG | OXYGEN SATURATION: 98 %

## 2023-05-11 DIAGNOSIS — Z51.81 ENCOUNTER FOR MONITORING OPIOID MAINTENANCE THERAPY: Primary | ICD-10-CM

## 2023-05-11 DIAGNOSIS — G89.3 CANCER ASSOCIATED PAIN: ICD-10-CM

## 2023-05-11 DIAGNOSIS — M48.061 SPINAL STENOSIS OF LUMBAR REGION AT MULTIPLE LEVELS: ICD-10-CM

## 2023-05-11 DIAGNOSIS — Z79.891 ENCOUNTER FOR MONITORING OPIOID MAINTENANCE THERAPY: Primary | ICD-10-CM

## 2023-05-11 PROCEDURE — 99211 OFF/OP EST MAY X REQ PHY/QHP: CPT

## 2023-05-11 RX ORDER — OXYCODONE AND ACETAMINOPHEN 10; 325 MG/1; MG/1
1 TABLET ORAL EVERY 4 HOURS PRN
Qty: 180 TABLET | Refills: 0 | Status: SHIPPED | OUTPATIENT
Start: 2023-06-12 | End: 2023-05-11 | Stop reason: CLARIF

## 2023-05-11 RX ORDER — DIAZEPAM 10 MG/1
10 TABLET ORAL EVERY 8 HOURS PRN
Qty: 90 TABLET | Refills: 1 | Status: SHIPPED | OUTPATIENT
Start: 2023-05-11 | End: 2023-06-10

## 2023-05-11 RX ORDER — OXYCODONE AND ACETAMINOPHEN 10; 325 MG/1; MG/1
1 TABLET ORAL EVERY 4 HOURS PRN
Qty: 180 TABLET | Refills: 0 | Status: SHIPPED | OUTPATIENT
Start: 2023-05-13

## 2023-05-11 RX ORDER — OXYCODONE AND ACETAMINOPHEN 10; 325 MG/1; MG/1
1 TABLET ORAL EVERY 4 HOURS PRN
Qty: 180 TABLET | Refills: 0 | Status: SHIPPED | OUTPATIENT
Start: 2023-06-12

## 2023-05-11 RX ORDER — OXYCODONE AND ACETAMINOPHEN 10; 325 MG/1; MG/1
1 TABLET ORAL EVERY 4 HOURS PRN
Qty: 180 TABLET | Refills: 0 | Status: SHIPPED | OUTPATIENT
Start: 2023-05-13 | End: 2023-05-11 | Stop reason: CLARIF

## 2023-06-05 ENCOUNTER — APPOINTMENT (OUTPATIENT)
Dept: GENERAL RADIOLOGY | Facility: HOSPITAL | Age: 78
End: 2023-06-05
Attending: EMERGENCY MEDICINE
Payer: MEDICARE

## 2023-06-05 ENCOUNTER — HOSPITAL ENCOUNTER (INPATIENT)
Facility: HOSPITAL | Age: 78
LOS: 4 days | Discharge: HOME HEALTH | End: 2023-06-09
Attending: EMERGENCY MEDICINE | Admitting: HOSPITALIST
Payer: MEDICARE

## 2023-06-05 DIAGNOSIS — R07.9 CHEST PAIN WITH HIGH RISK FOR CARDIAC ETIOLOGY: Primary | ICD-10-CM

## 2023-06-05 DIAGNOSIS — E87.5 HYPERKALEMIA: ICD-10-CM

## 2023-06-05 DIAGNOSIS — N28.9 RENAL INSUFFICIENCY: ICD-10-CM

## 2023-06-05 LAB
ALBUMIN SERPL-MCNC: 3.4 G/DL (ref 3.4–5)
ALBUMIN/GLOB SERPL: 0.9 {RATIO} (ref 1–2)
ALP LIVER SERPL-CCNC: 151 U/L
ALT SERPL-CCNC: 90 U/L
ANION GAP SERPL CALC-SCNC: 4 MMOL/L (ref 0–18)
AST SERPL-CCNC: 37 U/L (ref 15–37)
BASOPHILS # BLD AUTO: 0.04 X10(3) UL (ref 0–0.2)
BASOPHILS NFR BLD AUTO: 0.7 %
BILIRUB SERPL-MCNC: 0.2 MG/DL (ref 0.1–2)
BUN BLD-MCNC: 54 MG/DL (ref 7–18)
BUN/CREAT SERPL: 23.9 (ref 10–20)
CALCIUM BLD-MCNC: 9 MG/DL (ref 8.5–10.1)
CHLORIDE SERPL-SCNC: 118 MMOL/L (ref 98–112)
CO2 SERPL-SCNC: 17 MMOL/L (ref 21–32)
CREAT BLD-MCNC: 2.26 MG/DL
D DIMER PPP FEU-MCNC: 1.13 UG/ML FEU (ref ?–0.75)
DEPRECATED RDW RBC AUTO: 46.6 FL (ref 35.1–46.3)
EOSINOPHIL # BLD AUTO: 0.15 X10(3) UL (ref 0–0.7)
EOSINOPHIL NFR BLD AUTO: 2.6 %
ERYTHROCYTE [DISTWIDTH] IN BLOOD BY AUTOMATED COUNT: 13.6 % (ref 11–15)
GFR SERPLBLD BASED ON 1.73 SQ M-ARVRAT: 22 ML/MIN/1.73M2 (ref 60–?)
GLOBULIN PLAS-MCNC: 3.9 G/DL (ref 2.8–4.4)
GLUCOSE BLD-MCNC: 223 MG/DL (ref 70–99)
GLUCOSE BLDC GLUCOMTR-MCNC: 123 MG/DL (ref 70–99)
GLUCOSE BLDC GLUCOMTR-MCNC: 148 MG/DL (ref 70–99)
GLUCOSE BLDC GLUCOMTR-MCNC: 177 MG/DL (ref 70–99)
HCT VFR BLD AUTO: 32.8 %
HGB BLD-MCNC: 10.3 G/DL
IMM GRANULOCYTES # BLD AUTO: 0.02 X10(3) UL (ref 0–1)
IMM GRANULOCYTES NFR BLD: 0.3 %
LYMPHOCYTES # BLD AUTO: 2.21 X10(3) UL (ref 1–4)
LYMPHOCYTES NFR BLD AUTO: 38.2 %
MCH RBC QN AUTO: 29.2 PG (ref 26–34)
MCHC RBC AUTO-ENTMCNC: 31.4 G/DL (ref 31–37)
MCV RBC AUTO: 92.9 FL
MONOCYTES # BLD AUTO: 0.43 X10(3) UL (ref 0.1–1)
MONOCYTES NFR BLD AUTO: 7.4 %
NEUTROPHILS # BLD AUTO: 2.94 X10 (3) UL (ref 1.5–7.7)
NEUTROPHILS # BLD AUTO: 2.94 X10(3) UL (ref 1.5–7.7)
NEUTROPHILS NFR BLD AUTO: 50.8 %
OSMOLALITY SERPL CALC.SUM OF ELEC: 310 MOSM/KG (ref 275–295)
PLATELET # BLD AUTO: 206 10(3)UL (ref 150–450)
POTASSIUM SERPL-SCNC: 5.6 MMOL/L (ref 3.5–5.1)
PROT SERPL-MCNC: 7.3 G/DL (ref 6.4–8.2)
Q-T INTERVAL: 316 MS
QRS DURATION: 90 MS
QTC CALCULATION (BEZET): 435 MS
R AXIS: 41 DEGREES
RBC # BLD AUTO: 3.53 X10(6)UL
SARS-COV-2 RNA RESP QL NAA+PROBE: NOT DETECTED
SODIUM SERPL-SCNC: 139 MMOL/L (ref 136–145)
T AXIS: 98 DEGREES
TROPONIN I HIGH SENSITIVITY: 13 NG/L
VENTRICULAR RATE: 114 BPM
WBC # BLD AUTO: 5.8 X10(3) UL (ref 4–11)

## 2023-06-05 PROCEDURE — 99223 1ST HOSP IP/OBS HIGH 75: CPT | Performed by: HOSPITALIST

## 2023-06-05 PROCEDURE — 71045 X-RAY EXAM CHEST 1 VIEW: CPT | Performed by: EMERGENCY MEDICINE

## 2023-06-05 RX ORDER — HEPARIN SODIUM 5000 [USP'U]/ML
5000 INJECTION, SOLUTION INTRAVENOUS; SUBCUTANEOUS EVERY 12 HOURS SCHEDULED
Status: DISCONTINUED | OUTPATIENT
Start: 2023-06-05 | End: 2023-06-07

## 2023-06-05 RX ORDER — ONDANSETRON 2 MG/ML
4 INJECTION INTRAMUSCULAR; INTRAVENOUS EVERY 6 HOURS PRN
Status: DISCONTINUED | OUTPATIENT
Start: 2023-06-05 | End: 2023-06-09

## 2023-06-05 RX ORDER — CHOLECALCIFEROL (VITAMIN D3) 125 MCG
2000 CAPSULE ORAL DAILY
COMMUNITY

## 2023-06-05 RX ORDER — ACETAMINOPHEN 500 MG
500 TABLET ORAL EVERY 4 HOURS PRN
Status: DISCONTINUED | OUTPATIENT
Start: 2023-06-05 | End: 2023-06-09

## 2023-06-05 RX ORDER — SODIUM POLYSTYRENE SULFONATE 4.1 MEQ/G
15 POWDER, FOR SUSPENSION ORAL; RECTAL ONCE
Status: COMPLETED | OUTPATIENT
Start: 2023-06-05 | End: 2023-06-05

## 2023-06-05 RX ORDER — HYDROCODONE BITARTRATE AND ACETAMINOPHEN 5; 325 MG/1; MG/1
1 TABLET ORAL EVERY 4 HOURS PRN
Status: DISCONTINUED | OUTPATIENT
Start: 2023-06-05 | End: 2023-06-06

## 2023-06-05 RX ORDER — NICOTINE POLACRILEX 4 MG
30 LOZENGE BUCCAL
Status: DISCONTINUED | OUTPATIENT
Start: 2023-06-05 | End: 2023-06-09

## 2023-06-05 RX ORDER — NICOTINE POLACRILEX 4 MG
15 LOZENGE BUCCAL
Status: DISCONTINUED | OUTPATIENT
Start: 2023-06-05 | End: 2023-06-09

## 2023-06-05 RX ORDER — DEXTROSE MONOHYDRATE 25 G/50ML
50 INJECTION, SOLUTION INTRAVENOUS
Status: DISCONTINUED | OUTPATIENT
Start: 2023-06-05 | End: 2023-06-09

## 2023-06-05 RX ORDER — MORPHINE SULFATE 2 MG/ML
INJECTION, SOLUTION INTRAMUSCULAR; INTRAVENOUS
Status: COMPLETED
Start: 2023-06-05 | End: 2023-06-05

## 2023-06-05 RX ORDER — MORPHINE SULFATE 2 MG/ML
2 INJECTION, SOLUTION INTRAMUSCULAR; INTRAVENOUS ONCE
Status: COMPLETED | OUTPATIENT
Start: 2023-06-05 | End: 2023-06-05

## 2023-06-05 RX ORDER — METOCLOPRAMIDE HYDROCHLORIDE 5 MG/ML
5 INJECTION INTRAMUSCULAR; INTRAVENOUS EVERY 8 HOURS PRN
Status: DISCONTINUED | OUTPATIENT
Start: 2023-06-05 | End: 2023-06-09

## 2023-06-05 RX ORDER — HYDROCODONE BITARTRATE AND ACETAMINOPHEN 5; 325 MG/1; MG/1
2 TABLET ORAL EVERY 4 HOURS PRN
Status: DISCONTINUED | OUTPATIENT
Start: 2023-06-05 | End: 2023-06-06

## 2023-06-05 RX ORDER — DEXTROSE MONOHYDRATE 25 G/50ML
25 INJECTION, SOLUTION INTRAVENOUS ONCE
Status: COMPLETED | OUTPATIENT
Start: 2023-06-05 | End: 2023-06-05

## 2023-06-05 RX ORDER — SODIUM CHLORIDE 9 MG/ML
INJECTION, SOLUTION INTRAVENOUS CONTINUOUS
Status: DISCONTINUED | OUTPATIENT
Start: 2023-06-05 | End: 2023-06-08

## 2023-06-05 RX ORDER — METOPROLOL TARTRATE 5 MG/5ML
5 INJECTION INTRAVENOUS ONCE
Status: COMPLETED | OUTPATIENT
Start: 2023-06-05 | End: 2023-06-05

## 2023-06-05 RX ORDER — ACETAMINOPHEN 325 MG/1
650 TABLET ORAL EVERY 4 HOURS PRN
Status: DISCONTINUED | OUTPATIENT
Start: 2023-06-05 | End: 2023-06-09

## 2023-06-05 NOTE — ED INITIAL ASSESSMENT (HPI)
Pt to ED from home for c/o chest pain. Pt reports pain in her chest that radiates to her back. Pt took 162 mg ASA at home and EMS gave another 162 mg ASA. Pt arrives tachycardic in 140s.  A/Ox4 Hx HTN

## 2023-06-05 NOTE — ED QUICK NOTES
Patient's son called for update on pt. Requested to be informed about patient's status on whether the patient is to be admitted or going home.

## 2023-06-05 NOTE — H&P
Psychiatric    PATIENT'S NAME: Simon Dutton   ATTENDING PHYSICIAN: Tarik Quintero MD   PATIENT ACCOUNT#:   [id number]    LOCATION:  [address]  MEDICAL RECORD #:   [id number]       YOB: 1945  ADMISSION DATE:       06/05/2023    HISTORY AND PHYSICAL EXAMINATION    CHIEF COMPLAINT:  Acute kidney injury and hyperkalemia. HISTORY OF PRESENT ILLNESS:  Patient is a 56-year-old [facility] American female who had known underlying retroperitoneal recurrent leiomyosarcoma, after radiation therapy showed stable findings on April 2023 CT scan. Today, came into the emergency department with complaint of midsternal chest discomfort that has been going on for the last several days. CBC and troponin were unremarkable. D-dimer slightly elevated at 1. 13. V/Q scan, rule out PE, still pending. Chemistry showed BUN and creatinine of 54 and 2.26; GFR is 22, which is below her baseline; potassium 5.6; chloride 118; bicarb 17; and glucose 223. EKG today showed junctional rhythm, rate 100. 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 She has history of retroperitoneal leiomyosarcoma, status post surgical resection with recurrence requiring radiation therapy. Imaging studies including CT of the abdomen showed stabilization as of April 2023. PAST SURGICAL HISTORY:  As mentioned above. Also, she had right carpal tunnel release, left rotator cuff repair, tubal ligation, cholecystectomy. MEDICATIONS:  Please see medication reconciliation list.     ALLERGIES:  No known drug allergies. FAMILY HISTORY:  Mother had TB. Father had cerebrovascular accident. SOCIAL HISTORY:  Chronic tobacco use. No alcohol or drug use. Lives by herself.   Usually independent for basic activities of daily living. REVIEW OF SYSTEMS:  Patient reports poor appetite. She only takes protein shakes. She continues to take lisinopril for blood pressure. She has been feeling fatigued, tired, and lightheaded especially when she sits up. She had poor appetite overall. She started having midsternal chest discomfort, sharp in nature, increased with deep breathing a few days ago. She does have limited mobility. Reviewing the record, she had negative stress test on a hospitalization in September 2022. PHYSICAL EXAMINATION:    GENERAL:  Alert and oriented to time, place and person. Poor eye contact. No acute distress. VITAL SIGNS:  Temperature 97.4, pulse 115, respiratory rate 22, blood pressure 95/57, pulse ox 96% on room air. HEENT:  Atraumatic. Oropharynx clear. Dry mucous membranes. Normal hard and soft palate. Eyes:  Anicteric sclerae. NECK:  Supple. No lymphadenopathy. Trachea midline. Full range of motion. LUNGS:  Clear to auscultation bilaterally. Normal respiratory effort. HEART:  Palpation to midsternal area reproduces similar pain to her chief complaint. ABDOMEN:  Soft, nondistended. No tenderness. Positive bowel sounds. EXTREMITIES:  Trace edema. No clubbing or cyanosis, both legs. NEUROLOGIC:  Motor and sensory intact. ASSESSMENT:    1. Acute on chronic kidney injury with metabolic acidosis and hyperkalemia. 2.   Diabetes mellitus type 2.   3.   Retroperitoneal leiomyosarcoma, recurrent, stable on imaging studies as outlined above. 4.   Chest pain, musculoskeletal, with low probability for cardiac etiology. 5.   Accelerated junctional rhythm. PLAN:  Patient will be admitted to telemetry floor. We will hold lisinopril. Start her on IV fluids. Monitor her kidney function. Monitor Accu-Cheks. Obtain cardiology and nephrology consults.   Obtain kidney ultrasound, rule out obstructive uropathy, though her kidney function most likely deteriorated from poor oral intake and lisinopril. Place her on fall precautions. Further recommendations to follow.      Dictated By Ragini Mccoy MD  d: 06/05/2023 18:25:08  t: 06/05/2023 18:37:45  [facility] [id number]  ML/

## 2023-06-06 ENCOUNTER — APPOINTMENT (OUTPATIENT)
Dept: ULTRASOUND IMAGING | Facility: HOSPITAL | Age: 78
End: 2023-06-06
Attending: HOSPITALIST
Payer: MEDICARE

## 2023-06-06 ENCOUNTER — APPOINTMENT (OUTPATIENT)
Dept: NUCLEAR MEDICINE | Facility: HOSPITAL | Age: 78
End: 2023-06-06
Attending: EMERGENCY MEDICINE
Payer: MEDICARE

## 2023-06-06 LAB
ANION GAP SERPL CALC-SCNC: 0 MMOL/L (ref 0–18)
ANION GAP SERPL CALC-SCNC: 0 MMOL/L (ref 0–18)
BASOPHILS # BLD AUTO: 0.04 X10(3) UL (ref 0–0.2)
BASOPHILS NFR BLD AUTO: 0.7 %
BUN BLD-MCNC: 47 MG/DL (ref 7–18)
BUN BLD-MCNC: 52 MG/DL (ref 7–18)
BUN/CREAT SERPL: 24.2 (ref 10–20)
BUN/CREAT SERPL: 26.3 (ref 10–20)
CALCIUM BLD-MCNC: 8.4 MG/DL (ref 8.5–10.1)
CALCIUM BLD-MCNC: 8.6 MG/DL (ref 8.5–10.1)
CHLORIDE SERPL-SCNC: 120 MMOL/L (ref 98–112)
CHLORIDE SERPL-SCNC: 121 MMOL/L (ref 98–112)
CO2 SERPL-SCNC: 20 MMOL/L (ref 21–32)
CO2 SERPL-SCNC: 22 MMOL/L (ref 21–32)
CREAT BLD-MCNC: 1.94 MG/DL
CREAT BLD-MCNC: 1.98 MG/DL
DEPRECATED RDW RBC AUTO: 46.8 FL (ref 35.1–46.3)
EOSINOPHIL # BLD AUTO: 0.19 X10(3) UL (ref 0–0.7)
EOSINOPHIL NFR BLD AUTO: 3.3 %
ERYTHROCYTE [DISTWIDTH] IN BLOOD BY AUTOMATED COUNT: 13.7 % (ref 11–15)
EST. AVERAGE GLUCOSE BLD GHB EST-MCNC: 137 MG/DL (ref 68–126)
GFR SERPLBLD BASED ON 1.73 SQ M-ARVRAT: 26 ML/MIN/1.73M2 (ref 60–?)
GFR SERPLBLD BASED ON 1.73 SQ M-ARVRAT: 27 ML/MIN/1.73M2 (ref 60–?)
GLUCOSE BLD-MCNC: 42 MG/DL (ref 70–99)
GLUCOSE BLD-MCNC: 89 MG/DL (ref 70–99)
GLUCOSE BLDC GLUCOMTR-MCNC: 107 MG/DL (ref 70–99)
GLUCOSE BLDC GLUCOMTR-MCNC: 132 MG/DL (ref 70–99)
GLUCOSE BLDC GLUCOMTR-MCNC: 140 MG/DL (ref 70–99)
GLUCOSE BLDC GLUCOMTR-MCNC: 94 MG/DL (ref 70–99)
HBA1C MFR BLD: 6.4 % (ref ?–5.7)
HCT VFR BLD AUTO: 29.1 %
HGB BLD-MCNC: 9.2 G/DL
IMM GRANULOCYTES # BLD AUTO: 0.01 X10(3) UL (ref 0–1)
IMM GRANULOCYTES NFR BLD: 0.2 %
LYMPHOCYTES # BLD AUTO: 1.36 X10(3) UL (ref 1–4)
LYMPHOCYTES NFR BLD AUTO: 23.4 %
MCH RBC QN AUTO: 29.4 PG (ref 26–34)
MCHC RBC AUTO-ENTMCNC: 31.6 G/DL (ref 31–37)
MCV RBC AUTO: 93 FL
MONOCYTES # BLD AUTO: 0.48 X10(3) UL (ref 0.1–1)
MONOCYTES NFR BLD AUTO: 8.2 %
NEUTROPHILS # BLD AUTO: 3.74 X10 (3) UL (ref 1.5–7.7)
NEUTROPHILS # BLD AUTO: 3.74 X10(3) UL (ref 1.5–7.7)
NEUTROPHILS NFR BLD AUTO: 64.2 %
OSMOLALITY SERPL CALC.SUM OF ELEC: 301 MOSM/KG (ref 275–295)
OSMOLALITY SERPL CALC.SUM OF ELEC: 308 MOSM/KG (ref 275–295)
PLATELET # BLD AUTO: 173 10(3)UL (ref 150–450)
POTASSIUM SERPL-SCNC: 5.4 MMOL/L (ref 3.5–5.1)
POTASSIUM SERPL-SCNC: 6.1 MMOL/L (ref 3.5–5.1)
RBC # BLD AUTO: 3.13 X10(6)UL
SODIUM SERPL-SCNC: 141 MMOL/L (ref 136–145)
SODIUM SERPL-SCNC: 142 MMOL/L (ref 136–145)
WBC # BLD AUTO: 5.8 X10(3) UL (ref 4–11)

## 2023-06-06 PROCEDURE — 99233 SBSQ HOSP IP/OBS HIGH 50: CPT | Performed by: HOSPITALIST

## 2023-06-06 PROCEDURE — 76775 US EXAM ABDO BACK WALL LIM: CPT | Performed by: HOSPITALIST

## 2023-06-06 PROCEDURE — 93970 EXTREMITY STUDY: CPT | Performed by: HOSPITALIST

## 2023-06-06 PROCEDURE — 99223 1ST HOSP IP/OBS HIGH 75: CPT | Performed by: INTERNAL MEDICINE

## 2023-06-06 PROCEDURE — 78582 LUNG VENTILAT&PERFUS IMAGING: CPT | Performed by: EMERGENCY MEDICINE

## 2023-06-06 RX ORDER — OXYCODONE AND ACETAMINOPHEN 10; 325 MG/1; MG/1
1 TABLET ORAL EVERY 4 HOURS PRN
Status: DISCONTINUED | OUTPATIENT
Start: 2023-06-06 | End: 2023-06-09

## 2023-06-06 RX ORDER — ATORVASTATIN CALCIUM 40 MG/1
40 TABLET, FILM COATED ORAL NIGHTLY
Status: DISCONTINUED | OUTPATIENT
Start: 2023-06-06 | End: 2023-06-09

## 2023-06-06 RX ORDER — ISOSORBIDE MONONITRATE 60 MG/1
60 TABLET, EXTENDED RELEASE ORAL DAILY
Status: DISCONTINUED | OUTPATIENT
Start: 2023-06-06 | End: 2023-06-09

## 2023-06-06 RX ORDER — DIAZEPAM 5 MG/1
10 TABLET ORAL EVERY 8 HOURS PRN
Status: DISCONTINUED | OUTPATIENT
Start: 2023-06-06 | End: 2023-06-09

## 2023-06-06 RX ORDER — DEXTROSE MONOHYDRATE 25 G/50ML
50 INJECTION, SOLUTION INTRAVENOUS ONCE
Status: COMPLETED | OUTPATIENT
Start: 2023-06-06 | End: 2023-06-06

## 2023-06-06 RX ORDER — ASPIRIN 81 MG/1
81 TABLET ORAL DAILY
Status: DISCONTINUED | OUTPATIENT
Start: 2023-06-06 | End: 2023-06-09

## 2023-06-06 RX ORDER — PANTOPRAZOLE SODIUM 40 MG/1
40 TABLET, DELAYED RELEASE ORAL
Status: DISCONTINUED | OUTPATIENT
Start: 2023-06-06 | End: 2023-06-09

## 2023-06-06 RX ORDER — GABAPENTIN 300 MG/1
300 CAPSULE ORAL NIGHTLY
Status: DISCONTINUED | OUTPATIENT
Start: 2023-06-06 | End: 2023-06-09

## 2023-06-06 NOTE — ED QUICK NOTES
Orders for admission, patient is aware of plan and ready to go upstairs. Any questions, please call ED RN Deisy Landon at extension [phone]. Patient Covid vaccination status: Unvaccinated     COVID Test Ordered in ED: Rapid SARS-CoV-2 by PCR    COVID Suspicion at Admission: N/A    Running Infusions:  None    Mental Status/LOC at time of transport: A/Ox4    Other pertinent information: From home, ambulatory with walker. Increased weakness as of lately.   CIWA score: N/A   NIH score:  N/A

## 2023-06-06 NOTE — PLAN OF CARE
Pt A/Ox4. Room air. Complains of generalized pain including chest,  bilat lower legs and stomach(chronic). Percocet given. Potassium critical in am- lokelma and insulin given to reverse. Repeat K- 5.4. Blood glucose critical- md aware and last . VQ scan neg for PE. Kidney US showed cyst. Bladder scan after void- 23ml; not retaining. IVF running at 75ml hr. Venous doppler performed. Chest pain in PM- pain meds given, VS stable, cards notified, and ekg performed. No significance on ekg. Safety precautions in place and call light within reach. Problem: Patient Centered Care  Goal: Patient preferences are identified and integrated in the patient's plan of care  Description: Interventions:  - What would you like us to know as we care for you?  Home by self  - Provide timely, complete, and accurate information to patient/family  - Incorporate patient and family knowledge, values, beliefs, and cultural backgrounds into the planning and delivery of care  - Encourage patient/family to participate in care and decision-making at the level they choose  - Honor patient and family perspectives and choices  6/6/2023 1456 by Robbie Napier RN  Outcome: Progressing  6/6/2023 1421 by Robbie Napier RN  Outcome: Progressing     Problem: Diabetes/Glucose Control  Goal: Glucose maintained within prescribed range  Description: INTERVENTIONS:  - Monitor Blood Glucose as ordered  - Assess for signs and symptoms of hyperglycemia and hypoglycemia  - Administer ordered medications to maintain glucose within target range  - Assess barriers to adequate nutritional intake and initiate nutrition consult as needed  - Instruct patient on self management of diabetes  6/6/2023 1456 by Robbie Napier RN  Outcome: Progressing  6/6/2023 1421 by Robbie Napier RN  Outcome: Progressing     Problem: Patient/Family Goals  Goal: Patient/Family Long Term Goal  Description: Patient's Long Term Goal:     Interventions:  -   - See additional Care Plan goals for specific interventions  6/6/2023 1456 by Robbie Napier RN  Outcome: Progressing  6/6/2023 1421 by Robbie Napier RN  Outcome: Progressing  Goal: Patient/Family Short Term Goal  Description: Patient's Short Term Goal:     Interventions:   -  - See additional Care Plan goals for specific interventions  6/6/2023 1456 by Robbie Napier RN  Outcome: Progressing  6/6/2023 1421 by Robbie Napier RN  Outcome: Progressing     Problem: PAIN - ADULT  Goal: Verbalizes/displays adequate comfort level or patient's stated pain goal  Description: INTERVENTIONS:  - Encourage pt to monitor pain and request assistance  - Assess pain using appropriate pain scale  - Administer analgesics based on type and severity of pain and evaluate response  - Implement non-pharmacological measures as appropriate and evaluate response  - Consider cultural and social influences on pain and pain management  - Manage/alleviate anxiety  - Utilize distraction and/or relaxation techniques  - Monitor for opioid side effects  - Notify MD/LIP if interventions unsuccessful or patient reports new pain  - Anticipate increased pain with activity and pre-medicate as appropriate  6/6/2023 1456 by Robbie Napier RN  Outcome: Progressing  6/6/2023 1421 by Robbie Napier RN  Outcome: Progressing     Problem: CARDIOVASCULAR - ADULT  Goal: Maintains optimal cardiac output and hemodynamic stability  Description: INTERVENTIONS:  - Monitor vital signs, rhythm, and trends  - Monitor for bleeding, hypotension and signs of decreased cardiac output  - Evaluate effectiveness of vasoactive medications to optimize hemodynamic stability  - Monitor arterial and/or venous puncture sites for bleeding and/or hematoma  - Assess quality of pulses, skin color and temperature  - Assess for signs of decreased coronary artery perfusion - ex.  Angina  - Evaluate fluid balance, assess for edema, trend weights  6/6/2023 1456 by Wandy Altman Ana Vaca RN  Outcome: Progressing  6/6/2023 1421 by Jayy Jung RN  Outcome: Progressing  Goal: Absence of cardiac arrhythmias or at baseline  Description: INTERVENTIONS:  - Continuous cardiac monitoring, monitor vital signs, obtain 12 lead EKG if indicated  - Evaluate effectiveness of antiarrhythmic and heart rate control medications as ordered  - Initiate emergency measures for life threatening arrhythmias  - Monitor electrolytes and administer replacement therapy as ordered  6/6/2023 1456 by Jayy Jung RN  Outcome: Progressing  6/6/2023 1421 by Jyay Jung RN  Outcome: Progressing

## 2023-06-06 NOTE — CM/SW NOTE
06/06/23 1100   CM/SW Referral Data   Referral Source Social Work (self-referral)   Reason for Referral Discharge planning   Informant Patient   Patient [address]   Number of Levels in Home 1   Number of Stair in Home 0   Patient lives with Alone   Patient Status Prior to Admission   Independent with ADLs and Mobility No   Pt. requires assistance with Ambulating   Services in place prior to admission DME/Supplies at home   Type of DME/Supplies Straight Cane;Standard Walker;Scooter   Discharge Needs   Anticipated D/C needs Home health care; To be determined   Services Requested   PASRR Level 1 Submitted   (no, will need completed if rec REVA, unlikely need)   Choice of Post-Acute Provider   Informed patient of right to choose their preferred provider Yes   List of appropriate post-acute services provided to patient/family with quality data   ([facility] ref in [facility] - needs f/up)     SW self referred pt for DC Planning after RN rounds report that pt mentioned need for CG at home. Met w/ pt at bedside. Above assessment completed. Pt informed SW she has been in contact w/ her MD and SW for at home CG's. SW offered additional resources. Pt currently declining. Discussed HH w/ pt. Pt stating she is open to [facility] but unsure what she will need at DC. Per pt, \"every time I come to the hospital, I go to [address] [place] [address] for rehab after. \" Pt confirmed she would like to go to [address] [place] [address] if REVA is needed. Per RN rounds this AM, pt was presenting w/ no needs. SW requested [facility] send tentative [facility] referrals via [facility]. F2F entered. DANIAL also sent tentative referral to [address] [place] [address] in the event REVA is recommended for DC. UPDATE: PASRR completed and uploaded to pending REVA referral. [address] [place] [address] can accept if REVA needed at time of DC. Need for DC:  1. Confirm any needs  2. If needed: HH list/choice  3.  If REVA: needs ins auth      PLAN: Home w/ possible HH vs [facility] - pending above & clinical course      SW/CM to remain available for support and/or discharge planning.          Diya Islas, MSW, [address] [facility]

## 2023-06-06 NOTE — PLAN OF CARE
Pt A&O x4. Denies SOB. Complained of pain in chest and stomach - PRN norco was given. Plan is for ultrasound of kidney today. Problem: Patient Centered Care  Goal: Patient preferences are identified and integrated in the patient's plan of care  Description: Interventions:  - What would you like us to know as we care for you?  Home alone  - Provide timely, complete, and accurate information to patient/family  - Incorporate patient and family knowledge, values, beliefs, and cultural backgrounds into the planning and delivery of care  - Encourage patient/family to participate in care and decision-making at the level they choose  - Honor patient and family perspectives and choices  Outcome: Progressing     Problem: Diabetes/Glucose Control  Goal: Glucose maintained within prescribed range  Description: INTERVENTIONS:  - Monitor Blood Glucose as ordered  - Assess for signs and symptoms of hyperglycemia and hypoglycemia  - Administer ordered medications to maintain glucose within target range  - Assess barriers to adequate nutritional intake and initiate nutrition consult as needed  - Instruct patient on self management of diabetes  Outcome: Progressing     Problem: Patient/Family Goals  Goal: Patient/Family Long Term Goal  Description: Patient's Long Term Goal:     Interventions:  - See additional Care Plan goals for specific interventions  Outcome: Progressing  Goal: Patient/Family Short Term Goal  Description: Patient's Short Term Goal:     Interventions:   - See additional Care Plan goals for specific interventions  Outcome: Progressing     Problem: PAIN - ADULT  Goal: Verbalizes/displays adequate comfort level or patient's stated pain goal  Description: INTERVENTIONS:  - Encourage pt to monitor pain and request assistance  - Assess pain using appropriate pain scale  - Administer analgesics based on type and severity of pain and evaluate response  - Implement non-pharmacological measures as appropriate and evaluate response  - Consider cultural and social influences on pain and pain management  - Manage/alleviate anxiety  - Utilize distraction and/or relaxation techniques  - Monitor for opioid side effects  - Notify MD/LIP if interventions unsuccessful or patient reports new pain  - Anticipate increased pain with activity and pre-medicate as appropriate  Outcome: Progressing     Problem: CARDIOVASCULAR - ADULT  Goal: Maintains optimal cardiac output and hemodynamic stability  Description: INTERVENTIONS:  - Monitor vital signs, rhythm, and trends  - Monitor for bleeding, hypotension and signs of decreased cardiac output  - Evaluate effectiveness of vasoactive medications to optimize hemodynamic stability  - Monitor arterial and/or venous puncture sites for bleeding and/or hematoma  - Assess quality of pulses, skin color and temperature  - Assess for signs of decreased coronary artery perfusion - ex.  Angina  - Evaluate fluid balance, assess for edema, trend weights  Outcome: Progressing  Goal: Absence of cardiac arrhythmias or at baseline  Description: INTERVENTIONS:  - Continuous cardiac monitoring, monitor vital signs, obtain 12 lead EKG if indicated  - Evaluate effectiveness of antiarrhythmic and heart rate control medications as ordered  - Initiate emergency measures for life threatening arrhythmias  - Monitor electrolytes and administer replacement therapy as ordered  Outcome: Progressing

## 2023-06-06 NOTE — CM/SW NOTE
Department  notified of request for anselmo Young referrals started. Assigned CM/SW to follow up with pt/family on further discharge planning.      [facility]   June 06, 2023   11:58

## 2023-06-07 LAB
ALBUMIN SERPL-MCNC: 3.2 G/DL (ref 3.4–5)
ANION GAP SERPL CALC-SCNC: 1 MMOL/L (ref 0–18)
ATRIAL RATE: 75 BPM
BASOPHILS # BLD AUTO: 0.02 X10(3) UL (ref 0–0.2)
BASOPHILS NFR BLD AUTO: 0.5 %
BUN BLD-MCNC: 46 MG/DL (ref 7–18)
BUN/CREAT SERPL: 26.3 (ref 10–20)
CALCIUM BLD-MCNC: 8.6 MG/DL (ref 8.5–10.1)
CHLORIDE SERPL-SCNC: 119 MMOL/L (ref 98–112)
CO2 SERPL-SCNC: 19 MMOL/L (ref 21–32)
CREAT BLD-MCNC: 1.75 MG/DL
DEPRECATED HBV CORE AB SER IA-ACNC: 185.4 NG/ML
DEPRECATED RDW RBC AUTO: 46.8 FL (ref 35.1–46.3)
EOSINOPHIL # BLD AUTO: 0.15 X10(3) UL (ref 0–0.7)
EOSINOPHIL NFR BLD AUTO: 3.6 %
ERYTHROCYTE [DISTWIDTH] IN BLOOD BY AUTOMATED COUNT: 13.7 % (ref 11–15)
GFR SERPLBLD BASED ON 1.73 SQ M-ARVRAT: 30 ML/MIN/1.73M2 (ref 60–?)
GLUCOSE BLD-MCNC: 82 MG/DL (ref 70–99)
GLUCOSE BLDC GLUCOMTR-MCNC: 102 MG/DL (ref 70–99)
GLUCOSE BLDC GLUCOMTR-MCNC: 105 MG/DL (ref 70–99)
GLUCOSE BLDC GLUCOMTR-MCNC: 123 MG/DL (ref 70–99)
GLUCOSE BLDC GLUCOMTR-MCNC: 136 MG/DL (ref 70–99)
HCT VFR BLD AUTO: 30.7 %
HGB BLD-MCNC: 9.6 G/DL
IMM GRANULOCYTES # BLD AUTO: 0.01 X10(3) UL (ref 0–1)
IMM GRANULOCYTES NFR BLD: 0.2 %
IRON SATN MFR SERPL: 36 %
IRON SERPL-MCNC: 142 UG/DL
LYMPHOCYTES # BLD AUTO: 0.88 X10(3) UL (ref 1–4)
LYMPHOCYTES NFR BLD AUTO: 21 %
MCH RBC QN AUTO: 29.3 PG (ref 26–34)
MCHC RBC AUTO-ENTMCNC: 31.3 G/DL (ref 31–37)
MCV RBC AUTO: 93.6 FL
MONOCYTES # BLD AUTO: 0.35 X10(3) UL (ref 0.1–1)
MONOCYTES NFR BLD AUTO: 8.4 %
NEUTROPHILS # BLD AUTO: 2.78 X10 (3) UL (ref 1.5–7.7)
NEUTROPHILS # BLD AUTO: 2.78 X10(3) UL (ref 1.5–7.7)
NEUTROPHILS NFR BLD AUTO: 66.3 %
OSMOLALITY SERPL CALC.SUM OF ELEC: 299 MOSM/KG (ref 275–295)
P AXIS: 49 DEGREES
P-R INTERVAL: 130 MS
PHOSPHATE SERPL-MCNC: 3.4 MG/DL (ref 2.5–4.9)
PLATELET # BLD AUTO: 176 10(3)UL (ref 150–450)
POTASSIUM SERPL-SCNC: 5.5 MMOL/L (ref 3.5–5.1)
POTASSIUM SERPL-SCNC: 5.7 MMOL/L (ref 3.5–5.1)
PTH-INTACT SERPL-MCNC: 86.1 PG/ML (ref 18.5–88)
Q-T INTERVAL: 392 MS
QRS DURATION: 72 MS
QTC CALCULATION (BEZET): 437 MS
R AXIS: 18 DEGREES
RBC # BLD AUTO: 3.28 X10(6)UL
SODIUM SERPL-SCNC: 139 MMOL/L (ref 136–145)
T AXIS: 79 DEGREES
TIBC SERPL-MCNC: 395 UG/DL (ref 240–450)
TRANSFERRIN SERPL-MCNC: 265 MG/DL (ref 200–360)
VENTRICULAR RATE: 75 BPM
WBC # BLD AUTO: 4.2 X10(3) UL (ref 4–11)

## 2023-06-07 PROCEDURE — 99233 SBSQ HOSP IP/OBS HIGH 50: CPT | Performed by: INTERNAL MEDICINE

## 2023-06-07 PROCEDURE — 99233 SBSQ HOSP IP/OBS HIGH 50: CPT | Performed by: HOSPITALIST

## 2023-06-07 RX ORDER — SODIUM BICARBONATE 650 MG/1
650 TABLET ORAL 2 TIMES DAILY
Status: DISCONTINUED | OUTPATIENT
Start: 2023-06-07 | End: 2023-06-09

## 2023-06-07 NOTE — CM/SW NOTE
MDO for discharge planning to rehab. PT/OT recommending [facility]. CM met with patient at bedside. Patient is agreeable. Patient requested the [facility], she has been there two times previously. Reserved in aidin and notified them of discharge soon. Will need insurance auth. CM requested facility to initiate auth. Plan: [facility] pending insurance auth and medical clearance.      See Smith, RN, BSN

## 2023-06-07 NOTE — PLAN OF CARE
Patient with potassium early this morning of 5.5. Repeat this afternoon was 5.7. Valtessa packet 1x ordered. Diet changed to low potassium. Pt was seen by PT and OT today. Likely discharge to rehab. Pt refusing IV line placement, order from physician placed (pt does not need IV line). Plan for likely discharge to [facility] tomorrow pending placement. Problem: Patient Centered Care  Goal: Patient preferences are identified and integrated in the patient's plan of care  Description: Interventions:  - What would you like us to know as we care for you?  I live at home alone  - Provide timely, complete, and accurate information to patient/family  - Incorporate patient and family knowledge, values, beliefs, and cultural backgrounds into the planning and delivery of care  - Encourage patient/family to participate in care and decision-making at the level they choose  - Honor patient and family perspectives and choices  Outcome: Progressing     Problem: Diabetes/Glucose Control  Goal: Glucose maintained within prescribed range  Description: INTERVENTIONS:  - Monitor Blood Glucose as ordered  - Assess for signs and symptoms of hyperglycemia and hypoglycemia  - Administer ordered medications to maintain glucose within target range  - Assess barriers to adequate nutritional intake and initiate nutrition consult as needed  - Instruct patient on self management of diabetes  Outcome: Progressing     Problem: Patient/Family Goals  Goal: Patient/Family Long Term Goal  Description: Patient's Long Term Goal: To stay out of the hospital    Interventions:  - Medical management, appropriate follow up care  - See additional Care Plan goals for specific interventions  Outcome: Progressing  Goal: Patient/Family Short Term Goal  Description: Patient's Short Term Goal: To feel better    Interventions:   - Hospitalization, nephrology consult  - See additional Care Plan goals for specific interventions  Outcome: Progressing     Problem: PAIN - ADULT  Goal: Verbalizes/displays adequate comfort level or patient's stated pain goal  Description: INTERVENTIONS:  - Encourage pt to monitor pain and request assistance  - Assess pain using appropriate pain scale  - Administer analgesics based on type and severity of pain and evaluate response  - Implement non-pharmacological measures as appropriate and evaluate response  - Consider cultural and social influences on pain and pain management  - Manage/alleviate anxiety  - Utilize distraction and/or relaxation techniques  - Monitor for opioid side effects  - Notify MD/LIP if interventions unsuccessful or patient reports new pain  - Anticipate increased pain with activity and pre-medicate as appropriate  Outcome: Progressing     Problem: CARDIOVASCULAR - ADULT  Goal: Maintains optimal cardiac output and hemodynamic stability  Description: INTERVENTIONS:  - Monitor vital signs, rhythm, and trends  - Monitor for bleeding, hypotension and signs of decreased cardiac output  - Evaluate effectiveness of vasoactive medications to optimize hemodynamic stability  - Monitor arterial and/or venous puncture sites for bleeding and/or hematoma  - Assess quality of pulses, skin color and temperature  - Assess for signs of decreased coronary artery perfusion - ex.  Angina  - Evaluate fluid balance, assess for edema, trend weights  Outcome: Progressing  Goal: Absence of cardiac arrhythmias or at baseline  Description: INTERVENTIONS:  - Continuous cardiac monitoring, monitor vital signs, obtain 12 lead EKG if indicated  - Evaluate effectiveness of antiarrhythmic and heart rate control medications as ordered  - Initiate emergency measures for life threatening arrhythmias  - Monitor electrolytes and administer replacement therapy as ordered  Outcome: Progressing

## 2023-06-08 LAB
ALBUMIN SERPL-MCNC: 3.2 G/DL (ref 3.4–5)
ANION GAP SERPL CALC-SCNC: 3 MMOL/L (ref 0–18)
BUN BLD-MCNC: 44 MG/DL (ref 7–18)
BUN/CREAT SERPL: 27.5 (ref 10–20)
CALCIUM BLD-MCNC: 8.7 MG/DL (ref 8.5–10.1)
CHLORIDE SERPL-SCNC: 115 MMOL/L (ref 98–112)
CO2 SERPL-SCNC: 22 MMOL/L (ref 21–32)
CREAT BLD-MCNC: 1.6 MG/DL
GFR SERPLBLD BASED ON 1.73 SQ M-ARVRAT: 33 ML/MIN/1.73M2 (ref 60–?)
GLUCOSE BLD-MCNC: 96 MG/DL (ref 70–99)
GLUCOSE BLDC GLUCOMTR-MCNC: 103 MG/DL (ref 70–99)
GLUCOSE BLDC GLUCOMTR-MCNC: 117 MG/DL (ref 70–99)
GLUCOSE BLDC GLUCOMTR-MCNC: 119 MG/DL (ref 70–99)
GLUCOSE BLDC GLUCOMTR-MCNC: 85 MG/DL (ref 70–99)
OSMOLALITY SERPL CALC.SUM OF ELEC: 301 MOSM/KG (ref 275–295)
PHOSPHATE SERPL-MCNC: 3.4 MG/DL (ref 2.5–4.9)
POTASSIUM SERPL-SCNC: 5.6 MMOL/L (ref 3.5–5.1)
SODIUM SERPL-SCNC: 140 MMOL/L (ref 136–145)

## 2023-06-08 PROCEDURE — 99233 SBSQ HOSP IP/OBS HIGH 50: CPT | Performed by: INTERNAL MEDICINE

## 2023-06-08 PROCEDURE — 99233 SBSQ HOSP IP/OBS HIGH 50: CPT | Performed by: HOSPITALIST

## 2023-06-08 RX ORDER — FLUDROCORTISONE ACETATE 0.1 MG/1
0.1 TABLET ORAL DAILY
Status: COMPLETED | OUTPATIENT
Start: 2023-06-08 | End: 2023-06-08

## 2023-06-08 RX ORDER — FLUDROCORTISONE ACETATE 0.1 MG/1
0.1 TABLET ORAL DAILY
Status: DISCONTINUED | OUTPATIENT
Start: 2023-06-08 | End: 2023-06-08

## 2023-06-08 NOTE — CM/SW NOTE
10: 40AM  Updates sent this AM per request of [address]. Per RN rounds - pt medically cleared pending ins auth. DANIAL spoke to Faith w/ [employer] on WILL CALL through 6/10. Quote is $50 OOP. PCS completed and will need date added day of actual DC.    03:45PM  Consulted w/ helio Diaz - confirmed insurance [employer] is still pending at this time. PLAN: [facility] [employer] on WILL CALL, PCS completed (needs date) - pending ins auth      SW/CM to remain available for support and/or discharge planning.        Charity Pedroza, MSW, [address] [facility]

## 2023-06-08 NOTE — PLAN OF CARE
No acute changes overnight. Pt requested nicotine gum - MD said they will place order today. Plan is to go to [facility] when cleared. Problem: Patient Centered Care  Goal: Patient preferences are identified and integrated in the patient's plan of care  Description: Interventions:  - What would you like us to know as we care for you?  I live at home alone  - Provide timely, complete, and accurate information to patient/family  - Incorporate patient and family knowledge, values, beliefs, and cultural backgrounds into the planning and delivery of care  - Encourage patient/family to participate in care and decision-making at the level they choose  - Honor patient and family perspectives and choices  Outcome: Progressing     Problem: Diabetes/Glucose Control  Goal: Glucose maintained within prescribed range  Description: INTERVENTIONS:  - Monitor Blood Glucose as ordered  - Assess for signs and symptoms of hyperglycemia and hypoglycemia  - Administer ordered medications to maintain glucose within target range  - Assess barriers to adequate nutritional intake and initiate nutrition consult as needed  - Instruct patient on self management of diabetes  Outcome: Progressing     Problem: Patient/Family Goals  Goal: Patient/Family Long Term Goal  Description: Patient's Long Term Goal: To stay out of the hospital    Interventions:  - Medical management, appropriate follow up care  - See additional Care Plan goals for specific interventions  Outcome: Progressing  Goal: Patient/Family Short Term Goal  Description: Patient's Short Term Goal: To feel better    Interventions:   - Hospitalization, nephrology consult  - See additional Care Plan goals for specific interventions  Outcome: Progressing     Problem: PAIN - ADULT  Goal: Verbalizes/displays adequate comfort level or patient's stated pain goal  Description: INTERVENTIONS:  - Encourage pt to monitor pain and request assistance  - Assess pain using appropriate pain scale  - Administer analgesics based on type and severity of pain and evaluate response  - Implement non-pharmacological measures as appropriate and evaluate response  - Consider cultural and social influences on pain and pain management  - Manage/alleviate anxiety  - Utilize distraction and/or relaxation techniques  - Monitor for opioid side effects  - Notify MD/LIP if interventions unsuccessful or patient reports new pain  - Anticipate increased pain with activity and pre-medicate as appropriate  Outcome: Progressing     Problem: CARDIOVASCULAR - ADULT  Goal: Maintains optimal cardiac output and hemodynamic stability  Description: INTERVENTIONS:  - Monitor vital signs, rhythm, and trends  - Monitor for bleeding, hypotension and signs of decreased cardiac output  - Evaluate effectiveness of vasoactive medications to optimize hemodynamic stability  - Monitor arterial and/or venous puncture sites for bleeding and/or hematoma  - Assess quality of pulses, skin color and temperature  - Assess for signs of decreased coronary artery perfusion - ex.  Angina  - Evaluate fluid balance, assess for edema, trend weights  Outcome: Progressing  Goal: Absence of cardiac arrhythmias or at baseline  Description: INTERVENTIONS:  - Continuous cardiac monitoring, monitor vital signs, obtain 12 lead EKG if indicated  - Evaluate effectiveness of antiarrhythmic and heart rate control medications as ordered  - Initiate emergency measures for life threatening arrhythmias  - Monitor electrolytes and administer replacement therapy as ordered  Outcome: Progressing

## 2023-06-08 NOTE — PLAN OF CARE
Patients K this morning was 5.6. Elaina Dais packet given this afternoon along with n/o for Florinef for Hyperkalemia. Pt is medically cleared for discharge. Awaiting insurance approval for pt to discharge to [address] [place] [address]. Problem: Patient Centered Care  Goal: Patient preferences are identified and integrated in the patient's plan of care  Description: Interventions:  - What would you like us to know as we care for you?  I live at home alone  - Provide timely, complete, and accurate information to patient/family  - Incorporate patient and family knowledge, values, beliefs, and cultural backgrounds into the planning and delivery of care  - Encourage patient/family to participate in care and decision-making at the level they choose  - Honor patient and family perspectives and choices  Outcome: Progressing     Problem: Diabetes/Glucose Control  Goal: Glucose maintained within prescribed range  Description: INTERVENTIONS:  - Monitor Blood Glucose as ordered  - Assess for signs and symptoms of hyperglycemia and hypoglycemia  - Administer ordered medications to maintain glucose within target range  - Assess barriers to adequate nutritional intake and initiate nutrition consult as needed  - Instruct patient on self management of diabetes  Outcome: Progressing     Problem: Patient/Family Goals  Goal: Patient/Family Long Term Goal  Description: Patient's Long Term Goal: To stay out of the hospital    Interventions:  - Medical management, appropriate follow up care  - See additional Care Plan goals for specific interventions  Outcome: Progressing  Goal: Patient/Family Short Term Goal  Description: Patient's Short Term Goal: To feel better    Interventions:   - Hospitalization, nephrology consult  - See additional Care Plan goals for specific interventions  Outcome: Progressing     Problem: PAIN - ADULT  Goal: Verbalizes/displays adequate comfort level or patient's stated pain goal  Description: INTERVENTIONS:  - Encourage pt to monitor pain and request assistance  - Assess pain using appropriate pain scale  - Administer analgesics based on type and severity of pain and evaluate response  - Implement non-pharmacological measures as appropriate and evaluate response  - Consider cultural and social influences on pain and pain management  - Manage/alleviate anxiety  - Utilize distraction and/or relaxation techniques  - Monitor for opioid side effects  - Notify MD/LIP if interventions unsuccessful or patient reports new pain  - Anticipate increased pain with activity and pre-medicate as appropriate  Outcome: Progressing     Problem: CARDIOVASCULAR - ADULT  Goal: Maintains optimal cardiac output and hemodynamic stability  Description: INTERVENTIONS:  - Monitor vital signs, rhythm, and trends  - Monitor for bleeding, hypotension and signs of decreased cardiac output  - Evaluate effectiveness of vasoactive medications to optimize hemodynamic stability  - Monitor arterial and/or venous puncture sites for bleeding and/or hematoma  - Assess quality of pulses, skin color and temperature  - Assess for signs of decreased coronary artery perfusion - ex.  Angina  - Evaluate fluid balance, assess for edema, trend weights  Outcome: Progressing  Goal: Absence of cardiac arrhythmias or at baseline  Description: INTERVENTIONS:  - Continuous cardiac monitoring, monitor vital signs, obtain 12 lead EKG if indicated  - Evaluate effectiveness of antiarrhythmic and heart rate control medications as ordered  - Initiate emergency measures for life threatening arrhythmias  - Monitor electrolytes and administer replacement therapy as ordered  Outcome: Progressing

## 2023-06-09 VITALS
HEIGHT: 64 IN | OXYGEN SATURATION: 99 % | RESPIRATION RATE: 16 BRPM | DIASTOLIC BLOOD PRESSURE: 59 MMHG | SYSTOLIC BLOOD PRESSURE: 150 MMHG | HEART RATE: 63 BPM | BODY MASS INDEX: 29.4 KG/M2 | TEMPERATURE: 99 F | WEIGHT: 172.19 LBS

## 2023-06-09 LAB
ALBUMIN SERPL-MCNC: 2.8 G/DL (ref 3.4–5)
ANION GAP SERPL CALC-SCNC: 5 MMOL/L (ref 0–18)
BUN BLD-MCNC: 42 MG/DL (ref 7–18)
BUN/CREAT SERPL: 26.8 (ref 10–20)
CALCIUM BLD-MCNC: 8.2 MG/DL (ref 8.5–10.1)
CHLORIDE SERPL-SCNC: 117 MMOL/L (ref 98–112)
CO2 SERPL-SCNC: 21 MMOL/L (ref 21–32)
CREAT BLD-MCNC: 1.57 MG/DL
DEPRECATED RDW RBC AUTO: 43.5 FL (ref 35.1–46.3)
ERYTHROCYTE [DISTWIDTH] IN BLOOD BY AUTOMATED COUNT: 13.1 % (ref 11–15)
GFR SERPLBLD BASED ON 1.73 SQ M-ARVRAT: 34 ML/MIN/1.73M2 (ref 60–?)
GLUCOSE BLD-MCNC: 78 MG/DL (ref 70–99)
GLUCOSE BLDC GLUCOMTR-MCNC: 87 MG/DL (ref 70–99)
GLUCOSE BLDC GLUCOMTR-MCNC: 89 MG/DL (ref 70–99)
HCT VFR BLD AUTO: 26.6 %
HGB BLD-MCNC: 8.6 G/DL
MCH RBC QN AUTO: 29.5 PG (ref 26–34)
MCHC RBC AUTO-ENTMCNC: 32.3 G/DL (ref 31–37)
MCV RBC AUTO: 91.1 FL
OSMOLALITY SERPL CALC.SUM OF ELEC: 305 MOSM/KG (ref 275–295)
PHOSPHATE SERPL-MCNC: 3.4 MG/DL (ref 2.5–4.9)
PLATELET # BLD AUTO: 163 10(3)UL (ref 150–450)
POTASSIUM SERPL-SCNC: 5.2 MMOL/L (ref 3.5–5.1)
RBC # BLD AUTO: 2.92 X10(6)UL
SODIUM SERPL-SCNC: 143 MMOL/L (ref 136–145)
WBC # BLD AUTO: 4.2 X10(3) UL (ref 4–11)

## 2023-06-09 PROCEDURE — 99232 SBSQ HOSP IP/OBS MODERATE 35: CPT | Performed by: INTERNAL MEDICINE

## 2023-06-09 PROCEDURE — 99239 HOSP IP/OBS DSCHRG MGMT >30: CPT | Performed by: HOSPITALIST

## 2023-06-09 RX ORDER — SODIUM BICARBONATE 650 MG/1
650 TABLET ORAL DAILY
Qty: 30 TABLET | Refills: 0 | Status: SHIPPED | OUTPATIENT
Start: 2023-06-09

## 2023-06-09 NOTE — DISCHARGE INSTRUCTIONS
Home Health: Sometimes managing your health at home requires assistance. The [facility] team has recognized your preference to use [address] They can be reached by phone at [phone]. The fax number for your reference is [phone]. A representative from the home health agency will contact you or your family to schedule your first visit.       Mable Baltazar (formerly [facility])  [address]  Phone: [phone]  Fax: [phone]      Blood draw for CBC and renal panel in 3-5 days, results to Dr. Odalis Mchugh

## 2023-06-09 NOTE — CM/SW NOTE
Received request from Dr. Héctor Stafford for Eliquis coverage. SW confirmed Rx sent via e-script to pt's pharmacy. DANIAL/RAJ contacted pt's [facility] via phone #: [phone] and spoke to Spencer Castaneda. Per Spencer Castaneda, pt's OOP cost is $0/month. MD, RN, and KHARI RN updated.       Dean Espinoza, MSW, [facility]

## 2023-06-09 NOTE — PLAN OF CARE
Patient cleared for discharging home today. Discharge instructions reviewed with the patient and son including med changes follow up appts and home health. . Pt discharged home with her son in stable condition    Problem: Patient Centered Care  Goal: Patient preferences are identified and integrated in the patient's plan of care  Description: Interventions:  - What would you like us to know as we care for you?  I live at home alone  - Provide timely, complete, and accurate information to patient/family  - Incorporate patient and family knowledge, values, beliefs, and cultural backgrounds into the planning and delivery of care  - Encourage patient/family to participate in care and decision-making at the level they choose  - Honor patient and family perspectives and choices  Outcome: Completed     Problem: Diabetes/Glucose Control  Goal: Glucose maintained within prescribed range  Description: INTERVENTIONS:  - Monitor Blood Glucose as ordered  - Assess for signs and symptoms of hyperglycemia and hypoglycemia  - Administer ordered medications to maintain glucose within target range  - Assess barriers to adequate nutritional intake and initiate nutrition consult as needed  - Instruct patient on self management of diabetes  Outcome: Completed     Problem: Patient/Family Goals  Goal: Patient/Family Long Term Goal  Description: Patient's Long Term Goal: To stay out of the hospital    Interventions:  - Medical management, appropriate follow up care  - See additional Care Plan goals for specific interventions  Outcome: Completed  Goal: Patient/Family Short Term Goal  Description: Patient's Short Term Goal: To feel better    Interventions:   - Hospitalization, nephrology consult  - See additional Care Plan goals for specific interventions  Outcome: Completed     Problem: PAIN - ADULT  Goal: Verbalizes/displays adequate comfort level or patient's stated pain goal  Description: INTERVENTIONS:  - Encourage pt to monitor pain and request assistance  - Assess pain using appropriate pain scale  - Administer analgesics based on type and severity of pain and evaluate response  - Implement non-pharmacological measures as appropriate and evaluate response  - Consider cultural and social influences on pain and pain management  - Manage/alleviate anxiety  - Utilize distraction and/or relaxation techniques  - Monitor for opioid side effects  - Notify MD/LIP if interventions unsuccessful or patient reports new pain  - Anticipate increased pain with activity and pre-medicate as appropriate  Outcome: Completed     Problem: CARDIOVASCULAR - ADULT  Goal: Maintains optimal cardiac output and hemodynamic stability  Description: INTERVENTIONS:  - Monitor vital signs, rhythm, and trends  - Monitor for bleeding, hypotension and signs of decreased cardiac output  - Evaluate effectiveness of vasoactive medications to optimize hemodynamic stability  - Monitor arterial and/or venous puncture sites for bleeding and/or hematoma  - Assess quality of pulses, skin color and temperature  - Assess for signs of decreased coronary artery perfusion - ex.  Angina  - Evaluate fluid balance, assess for edema, trend weights  Outcome: Completed  Goal: Absence of cardiac arrhythmias or at baseline  Description: INTERVENTIONS:  - Continuous cardiac monitoring, monitor vital signs, obtain 12 lead EKG if indicated  - Evaluate effectiveness of antiarrhythmic and heart rate control medications as ordered  - Initiate emergency measures for life threatening arrhythmias  - Monitor electrolytes and administer replacement therapy as ordered  Outcome: Completed

## 2023-06-09 NOTE — PLAN OF CARE
Pt is alert & oriented x4. Currently on room air. PRN pain medication administered for pain - see MAR. Safety precautions in place. Bed is locked and in lowest position. Problem: Patient Centered Care  Goal: Patient preferences are identified and integrated in the patient's plan of care  Description: Interventions:  - What would you like us to know as we care for you?  I live at home alone  - Provide timely, complete, and accurate information to patient/family  - Incorporate patient and family knowledge, values, beliefs, and cultural backgrounds into the planning and delivery of care  - Encourage patient/family to participate in care and decision-making at the level they choose  - Honor patient and family perspectives and choices  Outcome: Progressing     Problem: Diabetes/Glucose Control  Goal: Glucose maintained within prescribed range  Description: INTERVENTIONS:  - Monitor Blood Glucose as ordered  - Assess for signs and symptoms of hyperglycemia and hypoglycemia  - Administer ordered medications to maintain glucose within target range  - Assess barriers to adequate nutritional intake and initiate nutrition consult as needed  - Instruct patient on self management of diabetes  Outcome: Progressing     Problem: Patient/Family Goals  Goal: Patient/Family Long Term Goal  Description: Patient's Long Term Goal: To stay out of the hospital    Interventions:  - Medical management, appropriate follow up care  - See additional Care Plan goals for specific interventions  Outcome: Progressing  Goal: Patient/Family Short Term Goal  Description: Patient's Short Term Goal: To feel better    Interventions:   - Hospitalization, nephrology consult  - See additional Care Plan goals for specific interventions  Outcome: Progressing     Problem: PAIN - ADULT  Goal: Verbalizes/displays adequate comfort level or patient's stated pain goal  Description: INTERVENTIONS:  - Encourage pt to monitor pain and request assistance  - Assess pain using appropriate pain scale  - Administer analgesics based on type and severity of pain and evaluate response  - Implement non-pharmacological measures as appropriate and evaluate response  - Consider cultural and social influences on pain and pain management  - Manage/alleviate anxiety  - Utilize distraction and/or relaxation techniques  - Monitor for opioid side effects  - Notify MD/LIP if interventions unsuccessful or patient reports new pain  - Anticipate increased pain with activity and pre-medicate as appropriate  Outcome: Progressing     Problem: CARDIOVASCULAR - ADULT  Goal: Maintains optimal cardiac output and hemodynamic stability  Description: INTERVENTIONS:  - Monitor vital signs, rhythm, and trends  - Monitor for bleeding, hypotension and signs of decreased cardiac output  - Evaluate effectiveness of vasoactive medications to optimize hemodynamic stability  - Monitor arterial and/or venous puncture sites for bleeding and/or hematoma  - Assess quality of pulses, skin color and temperature  - Assess for signs of decreased coronary artery perfusion - ex.  Angina  - Evaluate fluid balance, assess for edema, trend weights  Outcome: Progressing  Goal: Absence of cardiac arrhythmias or at baseline  Description: INTERVENTIONS:  - Continuous cardiac monitoring, monitor vital signs, obtain 12 lead EKG if indicated  - Evaluate effectiveness of antiarrhythmic and heart rate control medications as ordered  - Initiate emergency measures for life threatening arrhythmias  - Monitor electrolytes and administer replacement therapy as ordered  Outcome: Progressing

## 2023-06-09 NOTE — DIETARY NOTE
Nutrition Education Note    MD consult received for \"low potassium\" diet education. Provided \"Potassium content of foods\" handout. Pt acknowledged understanding. RD to follow up per protocol.     Oxana Barker, MS, [address] [place] [address]  Clinical Nutrition  [phone]

## 2023-06-12 ENCOUNTER — PATIENT OUTREACH (OUTPATIENT)
Dept: CASE MANAGEMENT | Age: 78
End: 2023-06-12

## 2023-06-12 NOTE — PROGRESS NOTES
TCM chart review. No TCM as patient follows with outside [facility] PCP. Encounter closing.

## 2023-06-20 ENCOUNTER — MED REC SCAN ONLY (OUTPATIENT)
Facility: CLINIC | Age: 78
End: 2023-06-20

## 2023-06-22 ENCOUNTER — OFFICE VISIT (OUTPATIENT)
Dept: PAIN CLINIC | Facility: HOSPITAL | Age: 78
End: 2023-06-22
Attending: ANESTHESIOLOGY
Payer: MEDICARE

## 2023-06-22 VITALS — OXYGEN SATURATION: 94 % | DIASTOLIC BLOOD PRESSURE: 57 MMHG | HEART RATE: 74 BPM | SYSTOLIC BLOOD PRESSURE: 102 MMHG

## 2023-06-22 DIAGNOSIS — G89.3 CANCER RELATED PAIN: Primary | ICD-10-CM

## 2023-06-22 DIAGNOSIS — M79.89 BILATERAL SWELLING OF FEET: ICD-10-CM

## 2023-06-22 DIAGNOSIS — C48.0 RETROPERITONEAL SARCOMA (HCC): ICD-10-CM

## 2023-06-22 DIAGNOSIS — M79.672 PAIN IN BOTH FEET: ICD-10-CM

## 2023-06-22 DIAGNOSIS — M79.671 PAIN IN BOTH FEET: ICD-10-CM

## 2023-06-22 DIAGNOSIS — R60.0 BILATERAL LEG EDEMA: Chronic | ICD-10-CM

## 2023-06-22 PROCEDURE — 99211 OFF/OP EST MAY X REQ PHY/QHP: CPT

## 2023-06-22 RX ORDER — OXYCODONE HCL 10 MG/1
10 TABLET, FILM COATED, EXTENDED RELEASE ORAL DAILY
Qty: 30 TABLET | Refills: 0 | Status: SHIPPED | OUTPATIENT
Start: 2023-07-20 | End: 2023-08-19

## 2023-06-22 RX ORDER — OXYCODONE HCL 10 MG/1
10 TABLET, FILM COATED, EXTENDED RELEASE ORAL DAILY
Qty: 30 TABLET | Refills: 0 | Status: SHIPPED | OUTPATIENT
Start: 2023-06-22 | End: 2023-07-22

## 2023-06-22 RX ORDER — OXYCODONE AND ACETAMINOPHEN 10; 325 MG/1; MG/1
1 TABLET ORAL EVERY 4 HOURS PRN
Qty: 180 TABLET | Refills: 0 | Status: SHIPPED | OUTPATIENT
Start: 2023-07-12 | End: 2023-08-11

## 2023-06-22 RX ORDER — DIAZEPAM 10 MG/1
10 TABLET ORAL EVERY 8 HOURS PRN
Qty: 90 TABLET | Refills: 2 | Status: SHIPPED | OUTPATIENT
Start: 2023-07-09 | End: 2023-10-07

## 2023-06-22 RX ORDER — OXYCODONE AND ACETAMINOPHEN 10; 325 MG/1; MG/1
1 TABLET ORAL EVERY 4 HOURS PRN
Qty: 180 TABLET | Refills: 0 | Status: SHIPPED | OUTPATIENT
Start: 2023-08-11 | End: 2023-09-10
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
## 2023-06-22 NOTE — PROGRESS NOTES
PT presents to the CPM in [facility]. Son in attendance. Pt reports recent hospital admit on 06.05.23 for a blood clot. .  Increased BLE pain and swelling. 9/10 pain. Dr. Dallin Mayorga saw PT for med eval. See notes for POC.

## 2023-06-23 ENCOUNTER — HOSPITAL ENCOUNTER (OUTPATIENT)
Dept: ULTRASOUND IMAGING | Facility: HOSPITAL | Age: 78
Discharge: HOME | End: 2023-06-23
Attending: ANESTHESIOLOGY
Payer: MEDICARE

## 2023-06-23 DIAGNOSIS — M79.671 PAIN IN BOTH FEET: ICD-10-CM

## 2023-06-23 DIAGNOSIS — M79.89 BILATERAL SWELLING OF FEET: ICD-10-CM

## 2023-06-23 DIAGNOSIS — M79.672 PAIN IN BOTH FEET: ICD-10-CM

## 2023-06-23 PROCEDURE — 93970 EXTREMITY STUDY: CPT | Performed by: ANESTHESIOLOGY

## 2023-06-26 ENCOUNTER — APPOINTMENT (OUTPATIENT)
Dept: GENERAL RADIOLOGY | Facility: HOSPITAL | Age: 78
End: 2023-06-26
Attending: EMERGENCY MEDICINE
Payer: MEDICARE

## 2023-06-26 ENCOUNTER — HOSPITAL ENCOUNTER (INPATIENT)
Facility: HOSPITAL | Age: 78
LOS: 3 days | Discharge: HOME HEALTH | End: 2023-06-30
Attending: EMERGENCY MEDICINE
Payer: MEDICARE

## 2023-06-26 ENCOUNTER — HOSPITAL ENCOUNTER (INPATIENT)
Facility: HOSPITAL | Age: 78
LOS: 3 days | Discharge: HOME HEALTH | End: 2023-06-30
Attending: EMERGENCY MEDICINE | Admitting: INTERNAL MEDICINE
Payer: MEDICARE

## 2023-06-26 DIAGNOSIS — E87.5 HYPERKALEMIA: ICD-10-CM

## 2023-06-26 DIAGNOSIS — I82.4Z2 DEEP VEIN THROMBOSIS (DVT) OF DISTAL VEIN OF LEFT LOWER EXTREMITY, UNSPECIFIED CHRONICITY (HCC): ICD-10-CM

## 2023-06-26 DIAGNOSIS — I35.9 AORTIC VALVULAR DISEASE: ICD-10-CM

## 2023-06-26 DIAGNOSIS — N17.9 AKI (ACUTE KIDNEY INJURY) (HCC): ICD-10-CM

## 2023-06-26 DIAGNOSIS — R06.09 EXERTIONAL DYSPNEA: Primary | ICD-10-CM

## 2023-06-26 PROBLEM — M79.671 PAIN IN BOTH FEET: Status: ACTIVE | Noted: 2023-06-26

## 2023-06-26 PROBLEM — M79.672 PAIN IN BOTH FEET: Status: ACTIVE | Noted: 2023-06-26

## 2023-06-26 PROBLEM — M79.89 BILATERAL SWELLING OF FEET: Status: ACTIVE | Noted: 2023-06-26

## 2023-06-26 PROBLEM — G89.3 CANCER RELATED PAIN: Status: ACTIVE | Noted: 2019-07-16

## 2023-06-26 LAB
BASOPHILS # BLD AUTO: 0.03 X10(3) UL (ref 0–0.2)
BASOPHILS NFR BLD AUTO: 0.5 %
DEPRECATED RDW RBC AUTO: 50.3 FL (ref 35.1–46.3)
EOSINOPHIL # BLD AUTO: 0.26 X10(3) UL (ref 0–0.7)
EOSINOPHIL NFR BLD AUTO: 4.1 %
ERYTHROCYTE [DISTWIDTH] IN BLOOD BY AUTOMATED COUNT: 14.4 % (ref 11–15)
HCT VFR BLD AUTO: 28.3 %
HGB BLD-MCNC: 8.8 G/DL
IMM GRANULOCYTES # BLD AUTO: 0.02 X10(3) UL (ref 0–1)
IMM GRANULOCYTES NFR BLD: 0.3 %
LYMPHOCYTES # BLD AUTO: 1.26 X10(3) UL (ref 1–4)
LYMPHOCYTES NFR BLD AUTO: 19.9 %
MCH RBC QN AUTO: 29.8 PG (ref 26–34)
MCHC RBC AUTO-ENTMCNC: 31.1 G/DL (ref 31–37)
MCV RBC AUTO: 95.9 FL
MONOCYTES # BLD AUTO: 0.48 X10(3) UL (ref 0.1–1)
MONOCYTES NFR BLD AUTO: 7.6 %
NEUTROPHILS # BLD AUTO: 4.29 X10 (3) UL (ref 1.5–7.7)
NEUTROPHILS # BLD AUTO: 4.29 X10(3) UL (ref 1.5–7.7)
NEUTROPHILS NFR BLD AUTO: 67.6 %
PLATELET # BLD AUTO: 228 10(3)UL (ref 150–450)
RBC # BLD AUTO: 2.95 X10(6)UL
WBC # BLD AUTO: 6.3 X10(3) UL (ref 4–11)

## 2023-06-26 PROCEDURE — 71045 X-RAY EXAM CHEST 1 VIEW: CPT | Performed by: EMERGENCY MEDICINE

## 2023-06-26 RX ORDER — MORPHINE SULFATE 4 MG/ML
4 INJECTION, SOLUTION INTRAMUSCULAR; INTRAVENOUS ONCE
Status: COMPLETED | OUTPATIENT
Start: 2023-06-26 | End: 2023-06-26

## 2023-06-27 ENCOUNTER — APPOINTMENT (OUTPATIENT)
Dept: CT IMAGING | Facility: HOSPITAL | Age: 78
End: 2023-06-27
Attending: EMERGENCY MEDICINE
Payer: MEDICARE

## 2023-06-27 ENCOUNTER — APPOINTMENT (OUTPATIENT)
Dept: PICC SERVICES | Facility: HOSPITAL | Age: 78
End: 2023-06-27
Attending: INTERNAL MEDICINE
Payer: MEDICARE

## 2023-06-27 ENCOUNTER — APPOINTMENT (OUTPATIENT)
Dept: NUCLEAR MEDICINE | Facility: HOSPITAL | Age: 78
End: 2023-06-27
Attending: EMERGENCY MEDICINE
Payer: MEDICARE

## 2023-06-27 ENCOUNTER — APPOINTMENT (OUTPATIENT)
Dept: CV DIAGNOSTICS | Facility: HOSPITAL | Age: 78
End: 2023-06-27
Attending: EMERGENCY MEDICINE
Payer: MEDICARE

## 2023-06-27 PROBLEM — I82.4Z2 DEEP VEIN THROMBOSIS (DVT) OF DISTAL VEIN OF LEFT LOWER EXTREMITY, UNSPECIFIED CHRONICITY (HCC): Status: ACTIVE | Noted: 2023-06-27

## 2023-06-27 PROBLEM — R06.09 EXERTIONAL DYSPNEA: Status: ACTIVE | Noted: 2023-06-27

## 2023-06-27 PROBLEM — I35.9 AORTIC VALVULAR DISEASE: Status: ACTIVE | Noted: 2023-06-27

## 2023-06-27 LAB
ALBUMIN SERPL-MCNC: 3.3 G/DL (ref 3.4–5)
ALBUMIN/GLOB SERPL: 0.9 {RATIO} (ref 1–2)
ALP LIVER SERPL-CCNC: 192 U/L
ALT SERPL-CCNC: 218 U/L
ANION GAP SERPL CALC-SCNC: 1 MMOL/L (ref 0–18)
ANION GAP SERPL CALC-SCNC: 5 MMOL/L (ref 0–18)
AST SERPL-CCNC: 115 U/L (ref 15–37)
ATRIAL RATE: 81 BPM
BASE EXCESS BLD CALC-SCNC: 0.7 MMOL/L (ref ?–2)
BASOPHILS # BLD AUTO: 0.04 X10(3) UL (ref 0–0.2)
BASOPHILS NFR BLD AUTO: 0.8 %
BILIRUB SERPL-MCNC: 0.3 MG/DL (ref 0.1–2)
BILIRUB UR QL: NEGATIVE
BUN BLD-MCNC: 63 MG/DL (ref 7–18)
BUN BLD-MCNC: 67 MG/DL (ref 7–18)
BUN/CREAT SERPL: 30.1 (ref 10–20)
BUN/CREAT SERPL: 30.3 (ref 10–20)
CA-I BLD-SCNC: 1.17 MMOL/L (ref 0.95–1.32)
CALCIUM BLD-MCNC: 8.1 MG/DL (ref 8.5–10.1)
CALCIUM BLD-MCNC: 8.3 MG/DL (ref 8.5–10.1)
CHLORIDE SERPL-SCNC: 115 MMOL/L (ref 98–112)
CHLORIDE SERPL-SCNC: 116 MMOL/L (ref 98–112)
CK SERPL-CCNC: 249 U/L
CK SERPL-CCNC: 255 U/L
CLARITY UR: CLEAR
CO2 SERPL-SCNC: 22 MMOL/L (ref 21–32)
CO2 SERPL-SCNC: 24 MMOL/L (ref 21–32)
COHGB MFR BLD: 6.1 % (ref 0–3)
COLOR UR: COLORLESS
CREAT BLD-MCNC: 2.09 MG/DL
CREAT BLD-MCNC: 2.21 MG/DL
DEPRECATED RDW RBC AUTO: 49.3 FL (ref 35.1–46.3)
EOSINOPHIL # BLD AUTO: 0.26 X10(3) UL (ref 0–0.7)
EOSINOPHIL NFR BLD AUTO: 5.2 %
ERYTHROCYTE [DISTWIDTH] IN BLOOD BY AUTOMATED COUNT: 14.2 % (ref 11–15)
GFR SERPLBLD BASED ON 1.73 SQ M-ARVRAT: 23 ML/MIN/1.73M2 (ref 60–?)
GFR SERPLBLD BASED ON 1.73 SQ M-ARVRAT: 24 ML/MIN/1.73M2 (ref 60–?)
GLOBULIN PLAS-MCNC: 3.6 G/DL (ref 2.8–4.4)
GLUCOSE BLD-MCNC: 121 MG/DL (ref 70–99)
GLUCOSE BLD-MCNC: 92 MG/DL (ref 70–99)
GLUCOSE BLDC GLUCOMTR-MCNC: 100 MG/DL (ref 70–99)
GLUCOSE BLDC GLUCOMTR-MCNC: 112 MG/DL (ref 70–99)
GLUCOSE BLDC GLUCOMTR-MCNC: 123 MG/DL (ref 70–99)
GLUCOSE BLDC GLUCOMTR-MCNC: 135 MG/DL (ref 70–99)
GLUCOSE BLDC GLUCOMTR-MCNC: 156 MG/DL (ref 70–99)
GLUCOSE BLDC GLUCOMTR-MCNC: 185 MG/DL (ref 70–99)
GLUCOSE BLDC GLUCOMTR-MCNC: 98 MG/DL (ref 70–99)
GLUCOSE UR-MCNC: NORMAL MG/DL
HCO3 BLDA-SCNC: 25.3 MEQ/L (ref 21–27)
HCT VFR BLD AUTO: 26.8 %
HGB BLD-MCNC: 8.5 G/DL
HGB BLD-MCNC: 8.9 G/DL
HGB UR QL STRIP.AUTO: NEGATIVE
IMM GRANULOCYTES # BLD AUTO: 0.01 X10(3) UL (ref 0–1)
IMM GRANULOCYTES NFR BLD: 0.2 %
KETONES UR-MCNC: NEGATIVE MG/DL
LACTATE BLD-SCNC: 0.6 MMOL/L (ref 0.5–2)
LEUKOCYTE ESTERASE UR QL STRIP.AUTO: NEGATIVE
LYMPHOCYTES # BLD AUTO: 0.93 X10(3) UL (ref 1–4)
LYMPHOCYTES NFR BLD AUTO: 18.6 %
MAGNESIUM SERPL-MCNC: 2.6 MG/DL (ref 1.6–2.6)
MCH RBC QN AUTO: 29.7 PG (ref 26–34)
MCHC RBC AUTO-ENTMCNC: 31.7 G/DL (ref 31–37)
MCV RBC AUTO: 93.7 FL
METHGB MFR BLD: 2 % SAT (ref 0.4–1.5)
MODIFIED ALLEN TEST: POSITIVE
MONOCYTES # BLD AUTO: 0.44 X10(3) UL (ref 0.1–1)
MONOCYTES NFR BLD AUTO: 8.8 %
NEUTROPHILS # BLD AUTO: 3.33 X10 (3) UL (ref 1.5–7.7)
NEUTROPHILS # BLD AUTO: 3.33 X10(3) UL (ref 1.5–7.7)
NEUTROPHILS NFR BLD AUTO: 66.4 %
NITRITE UR QL STRIP.AUTO: NEGATIVE
NT-PROBNP SERPL-MCNC: 1139 PG/ML (ref ?–450)
O2 CT BLD-SCNC: 11.2 VOL% (ref 15–23)
OSMOLALITY SERPL CALC.SUM OF ELEC: 311 MOSM/KG (ref 275–295)
OSMOLALITY SERPL CALC.SUM OF ELEC: 313 MOSM/KG (ref 275–295)
P AXIS: 59 DEGREES
P-R INTERVAL: 138 MS
PCO2 BLDA: 44 MM HG (ref 35–45)
PH BLDA: 7.38 [PH] (ref 7.35–7.45)
PH UR: 6.5 [PH] (ref 5–8)
PLATELET # BLD AUTO: 190 10(3)UL (ref 150–450)
PO2 BLDA: 79 MM HG (ref 80–100)
POTASSIUM BLD-SCNC: 6.3 MMOL/L (ref 3.6–5.1)
POTASSIUM SERPL-SCNC: 5.8 MMOL/L (ref 3.5–5.1)
POTASSIUM SERPL-SCNC: 6.1 MMOL/L (ref 3.5–5.1)
POTASSIUM SERPL-SCNC: 6.5 MMOL/L (ref 3.5–5.1)
PROT SERPL-MCNC: 6.9 G/DL (ref 6.4–8.2)
PROT UR-MCNC: NEGATIVE MG/DL
PUNCTURE CHARGE: YES
Q-T INTERVAL: 380 MS
QRS DURATION: 86 MS
QTC CALCULATION (BEZET): 441 MS
R AXIS: 20 DEGREES
RBC # BLD AUTO: 2.86 X10(6)UL
SAO2 % BLDA: 96.6 % (ref 94–100)
SODIUM BLD-SCNC: 136 MMOL/L (ref 135–145)
SODIUM SERPL-SCNC: 141 MMOL/L (ref 136–145)
SODIUM SERPL-SCNC: 142 MMOL/L (ref 136–145)
SP GR UR STRIP: 1.01 (ref 1–1.03)
T AXIS: 63 DEGREES
TROPONIN I HIGH SENSITIVITY: 14 NG/L
UROBILINOGEN UR STRIP-ACNC: NORMAL
VENTRICULAR RATE: 81 BPM
WBC # BLD AUTO: 5 X10(3) UL (ref 4–11)

## 2023-06-27 PROCEDURE — 93306 TTE W/DOPPLER COMPLETE: CPT | Performed by: EMERGENCY MEDICINE

## 2023-06-27 PROCEDURE — 78582 LUNG VENTILAT&PERFUS IMAGING: CPT | Performed by: EMERGENCY MEDICINE

## 2023-06-27 PROCEDURE — 99223 1ST HOSP IP/OBS HIGH 75: CPT | Performed by: INTERNAL MEDICINE

## 2023-06-27 PROCEDURE — 05HA33Z INSERTION OF INFUSION DEVICE INTO LEFT BRACHIAL VEIN, PERCUTANEOUS APPROACH: ICD-10-PCS | Performed by: INTERNAL MEDICINE

## 2023-06-27 PROCEDURE — 74176 CT ABD & PELVIS W/O CONTRAST: CPT | Performed by: EMERGENCY MEDICINE

## 2023-06-27 RX ORDER — PANTOPRAZOLE SODIUM 40 MG/1
40 TABLET, DELAYED RELEASE ORAL
Status: DISCONTINUED | OUTPATIENT
Start: 2023-06-27 | End: 2023-06-30

## 2023-06-27 RX ORDER — GABAPENTIN 300 MG/1
300 CAPSULE ORAL NIGHTLY
Status: DISCONTINUED | OUTPATIENT
Start: 2023-06-27 | End: 2023-06-28

## 2023-06-27 RX ORDER — METOCLOPRAMIDE HYDROCHLORIDE 5 MG/ML
5 INJECTION INTRAMUSCULAR; INTRAVENOUS EVERY 8 HOURS PRN
Status: DISCONTINUED | OUTPATIENT
Start: 2023-06-27 | End: 2023-06-30

## 2023-06-27 RX ORDER — DEXTROSE MONOHYDRATE 25 G/50ML
50 INJECTION, SOLUTION INTRAVENOUS
Status: DISCONTINUED | OUTPATIENT
Start: 2023-06-27 | End: 2023-06-30

## 2023-06-27 RX ORDER — DEXTROSE MONOHYDRATE 25 G/50ML
50 INJECTION, SOLUTION INTRAVENOUS ONCE
Status: COMPLETED | OUTPATIENT
Start: 2023-06-27 | End: 2023-06-27

## 2023-06-27 RX ORDER — ISOSORBIDE MONONITRATE 60 MG/1
60 TABLET, EXTENDED RELEASE ORAL DAILY
Status: DISCONTINUED | OUTPATIENT
Start: 2023-06-27 | End: 2023-06-30

## 2023-06-27 RX ORDER — MELATONIN
3 NIGHTLY PRN
Status: DISCONTINUED | OUTPATIENT
Start: 2023-06-27 | End: 2023-06-30

## 2023-06-27 RX ORDER — OXYCODONE AND ACETAMINOPHEN 10; 325 MG/1; MG/1
1 TABLET ORAL EVERY 4 HOURS PRN
Status: DISCONTINUED | OUTPATIENT
Start: 2023-06-27 | End: 2023-06-28

## 2023-06-27 RX ORDER — NICOTINE POLACRILEX 4 MG
30 LOZENGE BUCCAL
Status: DISCONTINUED | OUTPATIENT
Start: 2023-06-27 | End: 2023-06-30

## 2023-06-27 RX ORDER — MORPHINE SULFATE 4 MG/ML
4 INJECTION, SOLUTION INTRAMUSCULAR; INTRAVENOUS EVERY 2 HOUR PRN
Status: DISCONTINUED | OUTPATIENT
Start: 2023-06-27 | End: 2023-06-30

## 2023-06-27 RX ORDER — OXYCODONE HCL 10 MG/1
10 TABLET, FILM COATED, EXTENDED RELEASE ORAL DAILY
Status: DISCONTINUED | OUTPATIENT
Start: 2023-06-27 | End: 2023-06-29

## 2023-06-27 RX ORDER — MORPHINE SULFATE 2 MG/ML
1 INJECTION, SOLUTION INTRAMUSCULAR; INTRAVENOUS EVERY 2 HOUR PRN
Status: DISCONTINUED | OUTPATIENT
Start: 2023-06-27 | End: 2023-06-30

## 2023-06-27 RX ORDER — ASPIRIN 81 MG/1
81 TABLET ORAL DAILY
Status: DISCONTINUED | OUTPATIENT
Start: 2023-06-27 | End: 2023-06-30

## 2023-06-27 RX ORDER — ONDANSETRON 2 MG/ML
4 INJECTION INTRAMUSCULAR; INTRAVENOUS EVERY 6 HOURS PRN
Status: DISCONTINUED | OUTPATIENT
Start: 2023-06-27 | End: 2023-06-30

## 2023-06-27 RX ORDER — BISACODYL 10 MG
10 SUPPOSITORY, RECTAL RECTAL
Status: DISCONTINUED | OUTPATIENT
Start: 2023-06-27 | End: 2023-06-30

## 2023-06-27 RX ORDER — MORPHINE SULFATE 2 MG/ML
2 INJECTION, SOLUTION INTRAMUSCULAR; INTRAVENOUS EVERY 2 HOUR PRN
Status: DISCONTINUED | OUTPATIENT
Start: 2023-06-27 | End: 2023-06-30

## 2023-06-27 RX ORDER — SENNOSIDES 8.6 MG
17.2 TABLET ORAL NIGHTLY PRN
Status: DISCONTINUED | OUTPATIENT
Start: 2023-06-27 | End: 2023-06-30

## 2023-06-27 RX ORDER — NICOTINE POLACRILEX 4 MG
15 LOZENGE BUCCAL
Status: DISCONTINUED | OUTPATIENT
Start: 2023-06-27 | End: 2023-06-30

## 2023-06-27 RX ORDER — ACETAMINOPHEN 500 MG
500 TABLET ORAL EVERY 4 HOURS PRN
Status: DISCONTINUED | OUTPATIENT
Start: 2023-06-27 | End: 2023-06-30

## 2023-06-27 RX ORDER — BENZONATATE 100 MG/1
200 CAPSULE ORAL 3 TIMES DAILY PRN
Status: DISCONTINUED | OUTPATIENT
Start: 2023-06-27 | End: 2023-06-30

## 2023-06-27 RX ORDER — DEXTROSE AND SODIUM CHLORIDE 5; .45 G/100ML; G/100ML
INJECTION, SOLUTION INTRAVENOUS ONCE
Status: COMPLETED | OUTPATIENT
Start: 2023-06-27 | End: 2023-06-27

## 2023-06-27 RX ORDER — ATORVASTATIN CALCIUM 40 MG/1
40 TABLET, FILM COATED ORAL NIGHTLY
Status: DISCONTINUED | OUTPATIENT
Start: 2023-06-27 | End: 2023-06-30

## 2023-06-27 RX ORDER — SODIUM CHLORIDE 9 MG/ML
INJECTION, SOLUTION INTRAVENOUS CONTINUOUS
Status: DISCONTINUED | OUTPATIENT
Start: 2023-06-27 | End: 2023-06-27

## 2023-06-27 RX ORDER — POLYETHYLENE GLYCOL 3350 17 G/17G
17 POWDER, FOR SOLUTION ORAL DAILY PRN
Status: DISCONTINUED | OUTPATIENT
Start: 2023-06-27 | End: 2023-06-30

## 2023-06-27 NOTE — PLAN OF CARE
Patient complains of shortness of breath with exertion. Complains of generalized pain, managed by medications. Elevated potassium -see orders. Echo and V/Q scan completed today. Plan is to monitor labs in AM. Patient updated on plan of care. Problem: Patient Centered Care  Goal: Patient preferences are identified and integrated in the patient's plan of care  Description: Interventions:  - What would you like us to know as we care for you?  Home alone, sons involved in care  - Provide timely, complete, and accurate information to patient/family  - Incorporate patient and family knowledge, values, beliefs, and cultural backgrounds into the planning and delivery of care  - Encourage patient/family to participate in care and decision-making at the level they choose  - Honor patient and family perspectives and choices  Outcome: Progressing     Problem: Diabetes/Glucose Control  Goal: Glucose maintained within prescribed range  Description: INTERVENTIONS:  - Monitor Blood Glucose as ordered  - Assess for signs and symptoms of hyperglycemia and hypoglycemia  - Administer ordered medications to maintain glucose within target range  - Assess barriers to adequate nutritional intake and initiate nutrition consult as needed  - Instruct patient on self management of diabetes  Outcome: Progressing     Problem: Patient/Family Goals  Goal: Patient/Family Long Term Goal  Description: Patient's Long Term Goal: return home    Interventions:  - cardiac monitor   -IVF  -   - See additional Care Plan goals for specific interventions  Outcome: Progressing  Goal: Patient/Family Short Term Goal  Description: Patient's Short Term Goal: managepain    Interventions:   - prnmedication  - repositioning   - increase ambulation   - See additional Care Plan goals for specific interventions  Outcome: Progressing

## 2023-06-27 NOTE — PROGRESS NOTES
Patient seen and examined independently today. She complains of pain \"everywhere\". She states this is her usual pain and she takes Percocet at home for this. She states her legs back arms hurt. She is on room air is in no acute distress otherwise. Eating breakfast.      -Pain control with OxyContin and Percocet as needed  -Awaiting IV access may need vascular to address, discussed with nursing  -Awaiting CT abdomen pelvis and VQ scan  -CK added  -Potassium 6.1 somewhat improved from yesterday we will repeat insulin and dextrose as well as Lokelma and repeat potassium at noon today.  -further recommendations to follow. Se Pool MD

## 2023-06-27 NOTE — ED QUICK NOTES
Orders for admission, patient is aware of plan and ready to go upstairs. Any questions, please call ED RN Leif Going  at extension [phone]. Patient Covid vaccination status: Unvaccinated     COVID Test Ordered in ED: None    COVID Suspicion at Admission: N/A    Running Infusions:  None    Mental Status/LOC at time of transport:  A&ox4. Other pertinent information: Pt ambulates with a walker. Pt continent. Uses a brief.    CIWA score: N/A   NIH score:  N/A

## 2023-06-27 NOTE — ED QUICK NOTES
Pt updated on room assignment, remains agreeable to plan of care for admission. Pt is A&Ox4, respirations even and unlabored, skin warm and dry. VSS, IV intact, D5 45 continued to floor.

## 2023-06-27 NOTE — CM/SW NOTE
06/27/23 0700   CM/SW Referral Data   Referral Source Social Work (self-referral)   Reason for Referral Discharge planning;Readmission   Informant EMR   Readmission Assessment   Factors that patient feels contributed to this readmission Acute/Chronic Clinical Presentation   Pt's living situation prior to admission? Home alone   Pt's level of independence at discharge? Some assist (mod)   Was full assessment completed by SW/CM on prior admission? Yes   Was the recommended discharge plan achieved? Yes   Was pt. discharged w/out services? No   Medical Hx   Does patient have an established PCP? Yes  [facility])   Patient [address]   Number of Levels in Home 1   Number of Stair in Home 0   Patient lives with Alone   Patient Status Prior to Admission   Independent with ADLs and Mobility No   Pt. requires assistance with Ambulating   Services in place prior to admission DME/Supplies at [facility] Provider Info [facility]   Type of DME/Supplies Straight Cane;Standard Walker;Scooter   Discharge Needs   Anticipated D/C needs Home health care; To be determined       Self referred pt for DC Planning as pt is a READMISSION from home alone. Pt was last Dc'd from [address] [place] [address] on 6/9/23 after her insurance denied request for [employer]. Above assessment completed based on chart review and SW's knowledge of pt from previous Admissions. Current clinicals sent to [phone] [address] via [employer]. RHODA order entered/sent. PLAN: Home w/ [facility] - pending clinical course & med clear       SW/CM to remain available for support and/or discharge planning.        Javier Hare, MSW, [address] [facility]

## 2023-06-27 NOTE — PLAN OF CARE
Problem: Patient Centered Care  Goal: Patient preferences are identified and integrated in the patient's plan of care  Description: Interventions:  - What would you like us to know as we care for you?  Home alone, sons involved in care  - Provide timely, complete, and accurate information to patient/family  - Incorporate patient and family knowledge, values, beliefs, and cultural backgrounds into the planning and delivery of care  - Encourage patient/family to participate in care and decision-making at the level they choose  - Honor patient and family perspectives and choices  Outcome: Not Progressing     Problem: Diabetes/Glucose Control  Goal: Glucose maintained within prescribed range  Description: INTERVENTIONS:  - Monitor Blood Glucose as ordered  - Assess for signs and symptoms of hyperglycemia and hypoglycemia  - Administer ordered medications to maintain glucose within target range  - Assess barriers to adequate nutritional intake and initiate nutrition consult as needed  - Instruct patient on self management of diabetes  Outcome: Not Progressing     Problem: Patient/Family Goals  Goal: Patient/Family Long Term Goal  Description: Patient's Long Term Goal: return home    Interventions:  - cardiac monitor   -IVF  -   - See additional Care Plan goals for specific interventions  Outcome: Not Progressing  Goal: Patient/Family Short Term Goal  Description: Patient's Short Term Goal:     Interventions:   -   - See additional Care Plan goals for specific interventions  Outcome: Not Progressing

## 2023-06-27 NOTE — ED INITIAL ASSESSMENT (HPI)
Pt to ED for DVT in left leg for the past month, sent here by MD due to DVT getting larger after recent ultrasound. Pt is on Eliquis. Pt also complaining of chest pain and back pain that has also been occurring since DVT was discovered.

## 2023-06-28 LAB
ALBUMIN SERPL-MCNC: 3.2 G/DL (ref 3.4–5)
ANION GAP SERPL CALC-SCNC: 3 MMOL/L (ref 0–18)
BUN BLD-MCNC: 50 MG/DL (ref 7–18)
BUN/CREAT SERPL: 28.9 (ref 10–20)
CALCIUM BLD-MCNC: 8.9 MG/DL (ref 8.5–10.1)
CHLORIDE SERPL-SCNC: 114 MMOL/L (ref 98–112)
CO2 SERPL-SCNC: 25 MMOL/L (ref 21–32)
CREAT BLD-MCNC: 1.73 MG/DL
GFR SERPLBLD BASED ON 1.73 SQ M-ARVRAT: 30 ML/MIN/1.73M2 (ref 60–?)
GLUCOSE BLD-MCNC: 86 MG/DL (ref 70–99)
GLUCOSE BLDC GLUCOMTR-MCNC: 115 MG/DL (ref 70–99)
GLUCOSE BLDC GLUCOMTR-MCNC: 123 MG/DL (ref 70–99)
GLUCOSE BLDC GLUCOMTR-MCNC: 133 MG/DL (ref 70–99)
GLUCOSE BLDC GLUCOMTR-MCNC: 136 MG/DL (ref 70–99)
OSMOLALITY SERPL CALC.SUM OF ELEC: 307 MOSM/KG (ref 275–295)
PHOSPHATE SERPL-MCNC: 4 MG/DL (ref 2.5–4.9)
POTASSIUM SERPL-SCNC: 5.8 MMOL/L (ref 3.5–5.1)
SODIUM SERPL-SCNC: 142 MMOL/L (ref 136–145)

## 2023-06-28 PROCEDURE — 99233 SBSQ HOSP IP/OBS HIGH 50: CPT | Performed by: INTERNAL MEDICINE

## 2023-06-28 RX ORDER — OXYCODONE AND ACETAMINOPHEN 10; 325 MG/1; MG/1
2 TABLET ORAL EVERY 4 HOURS PRN
Status: DISCONTINUED | OUTPATIENT
Start: 2023-06-28 | End: 2023-06-30

## 2023-06-28 RX ORDER — GABAPENTIN 300 MG/1
300 CAPSULE ORAL 2 TIMES DAILY
Status: DISCONTINUED | OUTPATIENT
Start: 2023-06-28 | End: 2023-06-29

## 2023-06-28 NOTE — PLAN OF CARE
Problem: Patient Centered Care  Goal: Patient preferences are identified and integrated in the patient's plan of care  Description: Interventions:  - What would you like us to know as we care for you?  Home alone, sons involved in care  - Provide timely, complete, and accurate information to patient/family  - Incorporate patient and family knowledge, values, beliefs, and cultural backgrounds into the planning and delivery of care  - Encourage patient/family to participate in care and decision-making at the level they choose  - Honor patient and family perspectives and choices  Outcome: Progressing     Problem: Diabetes/Glucose Control  Goal: Glucose maintained within prescribed range  Description: INTERVENTIONS:  - Monitor Blood Glucose as ordered  - Assess for signs and symptoms of hyperglycemia and hypoglycemia  - Administer ordered medications to maintain glucose within target range  - Assess barriers to adequate nutritional intake and initiate nutrition consult as needed  - Instruct patient on self management of diabetes  Outcome: Progressing     Problem: Patient/Family Goals  Goal: Patient/Family Long Term Goal  Description: Patient's Long Term Goal: return home    Interventions:  - cardiac monitor   -IVF  -   - See additional Care Plan goals for specific interventions  Outcome: Progressing  Goal: Patient/Family Short Term Goal  Description: Patient's Short Term Goal: BREATHE BETTER    Interventions:   - IV LASIX  - MONITOR K+     - See additional Care Plan goals for specific interventions  Outcome: Progressing

## 2023-06-28 NOTE — PLAN OF CARE
Patient denies shortness of breath. Chronic pain managed with medication. Patient ambulating in room without complaints. Patient remains high, see mar. Plan is to recheck labs in AM. Patient updated on plan of care. Problem: Patient Centered Care  Goal: Patient preferences are identified and integrated in the patient's plan of care  Description: Interventions:  - What would you like us to know as we care for you?  Home alone, sons involved in care  - Provide timely, complete, and accurate information to patient/family  - Incorporate patient and family knowledge, values, beliefs, and cultural backgrounds into the planning and delivery of care  - Encourage patient/family to participate in care and decision-making at the level they choose  - Honor patient and family perspectives and choices  Outcome: Progressing     Problem: Diabetes/Glucose Control  Goal: Glucose maintained within prescribed range  Description: INTERVENTIONS:  - Monitor Blood Glucose as ordered  - Assess for signs and symptoms of hyperglycemia and hypoglycemia  - Administer ordered medications to maintain glucose within target range  - Assess barriers to adequate nutritional intake and initiate nutrition consult as needed  - Instruct patient on self management of diabetes  Outcome: Progressing     Problem: Patient/Family Goals  Goal: Patient/Family Long Term Goal  Description: Patient's Long Term Goal: return home    Interventions:  - cardiac monitor   -IVF  -   - See additional Care Plan goals for specific interventions  Outcome: Progressing  Goal: Patient/Family Short Term Goal  Description: Patient's Short Term Goal: BREATHE BETTER    Interventions:   - IV LASIX  - MONITOR K+     - See additional Care Plan goals for specific interventions  Outcome: Progressing

## 2023-06-29 LAB
ALBUMIN SERPL-MCNC: 2.9 G/DL (ref 3.4–5)
ANION GAP SERPL CALC-SCNC: 2 MMOL/L (ref 0–18)
ANION GAP SERPL CALC-SCNC: 2 MMOL/L (ref 0–18)
BUN BLD-MCNC: 48 MG/DL (ref 7–18)
BUN BLD-MCNC: 48 MG/DL (ref 7–18)
BUN/CREAT SERPL: 27.4 (ref 10–20)
BUN/CREAT SERPL: 27.4 (ref 10–20)
CALCIUM BLD-MCNC: 8.1 MG/DL (ref 8.5–10.1)
CALCIUM BLD-MCNC: 8.1 MG/DL (ref 8.5–10.1)
CHLORIDE SERPL-SCNC: 115 MMOL/L (ref 98–112)
CHLORIDE SERPL-SCNC: 115 MMOL/L (ref 98–112)
CO2 SERPL-SCNC: 26 MMOL/L (ref 21–32)
CO2 SERPL-SCNC: 26 MMOL/L (ref 21–32)
CREAT BLD-MCNC: 1.75 MG/DL
CREAT BLD-MCNC: 1.75 MG/DL
GFR SERPLBLD BASED ON 1.73 SQ M-ARVRAT: 30 ML/MIN/1.73M2 (ref 60–?)
GFR SERPLBLD BASED ON 1.73 SQ M-ARVRAT: 30 ML/MIN/1.73M2 (ref 60–?)
GLUCOSE BLD-MCNC: 95 MG/DL (ref 70–99)
GLUCOSE BLD-MCNC: 95 MG/DL (ref 70–99)
GLUCOSE BLDC GLUCOMTR-MCNC: 103 MG/DL (ref 70–99)
GLUCOSE BLDC GLUCOMTR-MCNC: 107 MG/DL (ref 70–99)
GLUCOSE BLDC GLUCOMTR-MCNC: 112 MG/DL (ref 70–99)
GLUCOSE BLDC GLUCOMTR-MCNC: 139 MG/DL (ref 70–99)
OSMOLALITY SERPL CALC.SUM OF ELEC: 308 MOSM/KG (ref 275–295)
OSMOLALITY SERPL CALC.SUM OF ELEC: 308 MOSM/KG (ref 275–295)
PHOSPHATE SERPL-MCNC: 3.6 MG/DL (ref 2.5–4.9)
POTASSIUM SERPL-SCNC: 5.1 MMOL/L (ref 3.5–5.1)
POTASSIUM SERPL-SCNC: 5.1 MMOL/L (ref 3.5–5.1)
SODIUM SERPL-SCNC: 143 MMOL/L (ref 136–145)
SODIUM SERPL-SCNC: 143 MMOL/L (ref 136–145)

## 2023-06-29 PROCEDURE — 99232 SBSQ HOSP IP/OBS MODERATE 35: CPT | Performed by: INTERNAL MEDICINE

## 2023-06-29 PROCEDURE — 99233 SBSQ HOSP IP/OBS HIGH 50: CPT | Performed by: INTERNAL MEDICINE

## 2023-06-29 RX ORDER — OXYCODONE HCL 20 MG/1
20 TABLET, FILM COATED, EXTENDED RELEASE ORAL EVERY MORNING
Status: DISCONTINUED | OUTPATIENT
Start: 2023-06-29 | End: 2023-06-30

## 2023-06-29 RX ORDER — OXYCODONE HCL 10 MG/1
10 TABLET, FILM COATED, EXTENDED RELEASE ORAL EVERY EVENING
Status: DISCONTINUED | OUTPATIENT
Start: 2023-06-29 | End: 2023-06-30

## 2023-06-29 RX ORDER — GABAPENTIN 300 MG/1
300 CAPSULE ORAL EVERY 8 HOURS
Status: DISCONTINUED | OUTPATIENT
Start: 2023-06-29 | End: 2023-06-30

## 2023-06-29 RX ORDER — OXYCODONE HCL 10 MG/1
10 TABLET, FILM COATED, EXTENDED RELEASE ORAL ONCE
Status: COMPLETED | OUTPATIENT
Start: 2023-06-29 | End: 2023-06-29

## 2023-06-29 RX ORDER — FUROSEMIDE 10 MG/ML
40 INJECTION INTRAMUSCULAR; INTRAVENOUS ONCE
Status: COMPLETED | OUTPATIENT
Start: 2023-06-29 | End: 2023-06-29

## 2023-06-29 NOTE — PLAN OF CARE
Patient complaining of back, legs, and lower abdominal pain. Scheduled OxyContin administered. Patient reports pain decreased with pain medication but is never completely gone. Son at bedside today. Patient updated on POC and verbalized understanding. Problem: Patient Centered Care  Goal: Patient preferences are identified and integrated in the patient's plan of care  Description: Interventions:  - What would you like us to know as we care for you?  Home alone, sons involved in care  - Provide timely, complete, and accurate information to patient/family  - Incorporate patient and family knowledge, values, beliefs, and cultural backgrounds into the planning and delivery of care  - Encourage patient/family to participate in care and decision-making at the level they choose  - Honor patient and family perspectives and choices  Outcome: Progressing     Problem: Diabetes/Glucose Control  Goal: Glucose maintained within prescribed range  Description: INTERVENTIONS:  - Monitor Blood Glucose as ordered  - Assess for signs and symptoms of hyperglycemia and hypoglycemia  - Administer ordered medications to maintain glucose within target range  - Assess barriers to adequate nutritional intake and initiate nutrition consult as needed  - Instruct patient on self management of diabetes  Outcome: Progressing     Problem: Patient/Family Goals  Goal: Patient/Family Long Term Goal  Description: Patient's Long Term Goal: Return home    Interventions:  - Medication compliance  - Follow provider recommendations  - See additional Care Plan goals for specific interventions  Outcome: Progressing  Goal: Patient/Family Short Term Goal  Description: Patient's Short Term Goal: Breathe better    Interventions:   - IV Lasix x1  - Monitor labs and vital signs  - Medication compliance  - Follow provider recommendations  - See additional Care Plan goals for specific interventions  Outcome: Progressing     Problem: METABOLIC/FLUID AND ELECTROLYTES - ADULT  Goal: Electrolytes maintained within normal limits  Description: INTERVENTIONS:  - Monitor labs and rhythm and assess patient for signs and symptoms of electrolyte imbalances  - Administer electrolyte replacement as ordered  - Monitor response to electrolyte replacements, including rhythm and repeat lab results as appropriate  - Fluid restriction as ordered  - Instruct patient on fluid and nutrition restrictions as appropriate  Outcome: Progressing  Goal: Hemodynamic stability and optimal renal function maintained  Description: INTERVENTIONS:  - Monitor labs and assess for signs and symptoms of volume excess or deficit  - Monitor intake, output and patient weight  - Monitor urine specific gravity, serum osmolarity and serum sodium as indicated or ordered  - Monitor response to interventions for patient's volume status, including labs, urine output, blood pressure (other measures as available)  - Encourage oral intake as appropriate  - Instruct patient on fluid and nutrition restrictions as appropriate  Outcome: Progressing     Problem: PAIN - ADULT  Goal: Verbalizes/displays adequate comfort level or patient's stated pain goal  Description: INTERVENTIONS:  - Encourage pt to monitor pain and request assistance  - Assess pain using appropriate pain scale  - Administer analgesics based on type and severity of pain and evaluate response  - Implement non-pharmacological measures as appropriate and evaluate response  - Consider cultural and social influences on pain and pain management  - Manage/alleviate anxiety  - Utilize distraction and/or relaxation techniques  - Monitor for opioid side effects  - Notify MD/LIP if interventions unsuccessful or patient reports new pain  - Anticipate increased pain with activity and pre-medicate as appropriate  Outcome: Progressing

## 2023-06-29 NOTE — PLAN OF CARE
Patient given Lokema q8. No arrhythmia, CP or SOB overnight. Pt up ambulating in the hallways without any discomfort. Given Percocet prn for shoulder pain, generalized pain which afforded relief. Blood drawn for renal function panel this morning. Problem: Patient Centered Care  Goal: Patient preferences are identified and integrated in the patient's plan of care  Description: Interventions:  - What would you like us to know as we care for you?  Home alone, sons involved in care  - Provide timely, complete, and accurate information to patient/family  - Incorporate patient and family knowledge, values, beliefs, and cultural backgrounds into the planning and delivery of care  - Encourage patient/family to participate in care and decision-making at the level they choose  - Honor patient and family perspectives and choices  Outcome: Progressing     Problem: Diabetes/Glucose Control  Goal: Glucose maintained within prescribed range  Description: INTERVENTIONS:  - Monitor Blood Glucose as ordered  - Assess for signs and symptoms of hyperglycemia and hypoglycemia  - Administer ordered medications to maintain glucose within target range  - Assess barriers to adequate nutritional intake and initiate nutrition consult as needed  - Instruct patient on self management of diabetes  Outcome: Progressing     Problem: Patient/Family Goals  Goal: Patient/Family Long Term Goal  Description: Patient's Long Term Goal: return home    Interventions:  - cardiac monitor   -IVF  -   - See additional Care Plan goals for specific interventions  Outcome: Progressing  Goal: Patient/Family Short Term Goal  Description: Patient's Short Term Goal: BREATHE BETTER    Interventions:   - IV LASIX  - MONITOR K+     - See additional Care Plan goals for specific interventions  Outcome: Progressing     Problem: METABOLIC/FLUID AND ELECTROLYTES - ADULT  Goal: Electrolytes maintained within normal limits  Description: INTERVENTIONS:  - Monitor labs and rhythm and assess patient for signs and symptoms of electrolyte imbalances  - Administer electrolyte replacement as ordered  - Monitor response to electrolyte replacements, including rhythm and repeat lab results as appropriate  - Fluid restriction as ordered  - Instruct patient on fluid and nutrition restrictions as appropriate  Outcome: Progressing  Goal: Hemodynamic stability and optimal renal function maintained  Description: INTERVENTIONS:  - Monitor labs and assess for signs and symptoms of volume excess or deficit  - Monitor intake, output and patient weight  - Monitor urine specific gravity, serum osmolarity and serum sodium as indicated or ordered  - Monitor response to interventions for patient's volume status, including labs, urine output, blood pressure (other measures as available)  - Encourage oral intake as appropriate  - Instruct patient on fluid and nutrition restrictions as appropriate  Outcome: Progressing     Problem: PAIN - ADULT  Goal: Verbalizes/displays adequate comfort level or patient's stated pain goal  Description: INTERVENTIONS:  - Encourage pt to monitor pain and request assistance  - Assess pain using appropriate pain scale  - Administer analgesics based on type and severity of pain and evaluate response  - Implement non-pharmacological measures as appropriate and evaluate response  - Consider cultural and social influences on pain and pain management  - Manage/alleviate anxiety  - Utilize distraction and/or relaxation techniques  - Monitor for opioid side effects  - Notify MD/LIP if interventions unsuccessful or patient reports new pain  - Anticipate increased pain with activity and pre-medicate as appropriate  Outcome: Progressing

## 2023-06-30 VITALS
TEMPERATURE: 99 F | HEIGHT: 64 IN | DIASTOLIC BLOOD PRESSURE: 46 MMHG | RESPIRATION RATE: 16 BRPM | OXYGEN SATURATION: 97 % | BODY MASS INDEX: 29.42 KG/M2 | SYSTOLIC BLOOD PRESSURE: 113 MMHG | WEIGHT: 172.31 LBS | HEART RATE: 68 BPM

## 2023-06-30 PROBLEM — N18.30 STAGE 3 CHRONIC KIDNEY DISEASE (HCC): Status: ACTIVE | Noted: 2020-12-29

## 2023-06-30 LAB
ANION GAP SERPL CALC-SCNC: 2 MMOL/L (ref 0–18)
BUN BLD-MCNC: 48 MG/DL (ref 7–18)
BUN/CREAT SERPL: 25.7 (ref 10–20)
CALCIUM BLD-MCNC: 8.3 MG/DL (ref 8.5–10.1)
CHLORIDE SERPL-SCNC: 110 MMOL/L (ref 98–112)
CO2 SERPL-SCNC: 28 MMOL/L (ref 21–32)
CREAT BLD-MCNC: 1.87 MG/DL
GFR SERPLBLD BASED ON 1.73 SQ M-ARVRAT: 28 ML/MIN/1.73M2 (ref 60–?)
GLUCOSE BLD-MCNC: 130 MG/DL (ref 70–99)
GLUCOSE BLDC GLUCOMTR-MCNC: 107 MG/DL (ref 70–99)
GLUCOSE BLDC GLUCOMTR-MCNC: 94 MG/DL (ref 70–99)
OSMOLALITY SERPL CALC.SUM OF ELEC: 304 MOSM/KG (ref 275–295)
POTASSIUM SERPL-SCNC: 5 MMOL/L (ref 3.5–5.1)
SODIUM SERPL-SCNC: 140 MMOL/L (ref 136–145)

## 2023-06-30 PROCEDURE — 99239 HOSP IP/OBS DSCHRG MGMT >30: CPT | Performed by: INTERNAL MEDICINE

## 2023-06-30 PROCEDURE — 99233 SBSQ HOSP IP/OBS HIGH 50: CPT | Performed by: INTERNAL MEDICINE

## 2023-06-30 RX ORDER — FUROSEMIDE 10 MG/ML
40 INJECTION INTRAMUSCULAR; INTRAVENOUS ONCE
Status: COMPLETED | OUTPATIENT
Start: 2023-06-30 | End: 2023-06-30

## 2023-06-30 RX ORDER — FUROSEMIDE 20 MG/1
20 TABLET ORAL
Qty: 12 TABLET | Refills: 0 | Status: SHIPPED | OUTPATIENT
Start: 2023-07-03 | End: 2023-08-02

## 2023-06-30 RX ORDER — FUROSEMIDE 20 MG/1
20 TABLET ORAL
Status: DISCONTINUED | OUTPATIENT
Start: 2023-07-03 | End: 2023-06-30

## 2023-06-30 RX ORDER — LANSOPRAZOLE 30 MG/1
30 CAPSULE, DELAYED RELEASE ORAL 2 TIMES DAILY PRN
Qty: 60 CAPSULE | Refills: 0 | Status: SHIPPED | OUTPATIENT
Start: 2023-06-30 | End: 2023-07-30

## 2023-06-30 NOTE — PROGRESS NOTES
Discharge instructions reviewed with patient and son. All questions answered. Prescriptions for furosemide, apixaban, lansoprazole, and vitamin D3 sent to patient's pharmacy by providers. Patient and son verbalized understanding of discharge instructions. Patient's son carrying her belongings and walker to exit for discharge. Patient transported in wheelchair by PCT at 1556. No belongings left in room.

## 2023-06-30 NOTE — PLAN OF CARE
Patient complaining of back, bilateral lower extremity, and abdominal pain. Patient had miralax at [phone]. Patient still has not had a bowel movement. Patient declined further medication at this time. Patient updated on POC and verbalized understanding. Problem: Patient Centered Care  Goal: Patient preferences are identified and integrated in the patient's plan of care  Description: Interventions:  - What would you like us to know as we care for you?  Home alone, sons involved in care  - Provide timely, complete, and accurate information to patient/family  - Incorporate patient and family knowledge, values, beliefs, and cultural backgrounds into the planning and delivery of care  - Encourage patient/family to participate in care and decision-making at the level they choose  - Honor patient and family perspectives and choices  Outcome: Progressing     Problem: Diabetes/Glucose Control  Goal: Glucose maintained within prescribed range  Description: INTERVENTIONS:  - Monitor Blood Glucose as ordered  - Assess for signs and symptoms of hyperglycemia and hypoglycemia  - Administer ordered medications to maintain glucose within target range  - Assess barriers to adequate nutritional intake and initiate nutrition consult as needed  - Instruct patient on self management of diabetes  Outcome: Progressing     Problem: Patient/Family Goals  Goal: Patient/Family Long Term Goal  Description: Patient's Long Term Goal: Return home    Interventions:  - Medication compliance  - Follow provider recommendations  - See additional Care Plan goals for specific interventions  Outcome: Progressing  Goal: Patient/Family Short Term Goal  Description: Patient's Short Term Goal: Breathe better    Interventions:   - Monitor labs and vital signs  - Medication compliance  - Follow provider recommendations  - See additional Care Plan goals for specific interventions  Outcome: Progressing     Problem: METABOLIC/FLUID AND ELECTROLYTES - ADULT  Goal: Electrolytes maintained within normal limits  Description: INTERVENTIONS:  - Monitor labs and rhythm and assess patient for signs and symptoms of electrolyte imbalances  - Administer electrolyte replacement as ordered  - Monitor response to electrolyte replacements, including rhythm and repeat lab results as appropriate  - Fluid restriction as ordered  - Instruct patient on fluid and nutrition restrictions as appropriate  Outcome: Progressing  Goal: Hemodynamic stability and optimal renal function maintained  Description: INTERVENTIONS:  - Monitor labs and assess for signs and symptoms of volume excess or deficit  - Monitor intake, output and patient weight  - Monitor urine specific gravity, serum osmolarity and serum sodium as indicated or ordered  - Monitor response to interventions for patient's volume status, including labs, urine output, blood pressure (other measures as available)  - Encourage oral intake as appropriate  - Instruct patient on fluid and nutrition restrictions as appropriate  Outcome: Progressing     Problem: PAIN - ADULT  Goal: Verbalizes/displays adequate comfort level or patient's stated pain goal  Description: INTERVENTIONS:  - Encourage pt to monitor pain and request assistance  - Assess pain using appropriate pain scale  - Administer analgesics based on type and severity of pain and evaluate response  - Implement non-pharmacological measures as appropriate and evaluate response  - Consider cultural and social influences on pain and pain management  - Manage/alleviate anxiety  - Utilize distraction and/or relaxation techniques  - Monitor for opioid side effects  - Notify MD/LIP if interventions unsuccessful or patient reports new pain  - Anticipate increased pain with activity and pre-medicate as appropriate  Outcome: Progressing

## 2023-06-30 NOTE — CM/SW NOTE
06/30/23 1300   Discharge disposition   Expected discharge disposition Home-Health   Post Acute Care Provider [facility] known as [facility])   Discharge transportation Private car       Per chart, pt has DC order for today. [facility] notified via [employer] messenger. Pt is cleared from SW/CM stand point.        PLAN: Home w/ [address] Glendy Espinoza, MSW, [address] [facility]

## 2023-06-30 NOTE — CDS QUERY
.How to Answer this Query:     1.) DON'T CLICK COSIGN BUTTON FIRST  1.) Click \"3 dots. Ifrah Mcdonnell \" to the right of cosign button and click EDIT on the toolbar  2.) Type an \"X\" in the bracket for the diagnosis that applies. (You may also add additional clinical details as you feel necessary to substantiate your response). 3.) Finally click \"Sign\" to complete response. Thank you    . Clinical Significance - Opioid Use  CLINICAL DOCUMENTATION CLARIFICATION FORM  Dear Doctor Lizy Garrett:  Please clarify the pattern of usage of Opioids    PLEASE (X) ALL DIAGNOSES THAT APPLY. THIS SECTION FOR PROVIDER DOCUMENTATION ONLY       (  )  Opioid dependence    (X  )  Opioid use    (  )  Other (please specify):     Documentation from Medical Records:  Risk Factors:  Dr. Tatyana Huber -H&P (6/27/23): [de-identified] 78year old female with a past medical history of of HTN, HLD, CKD, retroperitoneal leiomyosarcoma s/p resection and recent DVT\"    Clinical Indicators:  Dr. Tatyana DAVID (6/27/23): \"Chronic pain syndrome\"  Dr. Lizy Garrett (6/27/23): \"She complains of pain \"everywhere\". She states this is her usual pain and she takes Percocet at home for this. She states her legs back arms hurt. Dr. Lizy Garrett (6/27/23): \"Chronic pain management: poorly controlled          -2/2 retroperitoneal leiomyosarcoma s/p resection\"    Treatment:  Dr. Lizy Garrett (6/27/23): \"On oxycontin 10mg once daily (max due to renal          failure). Increased oxycodone from 10 to 20 mg po prn q6 h, increased          gabapentin from 300 at bedtime to bid\"    If you have any questions, please contact Clinical :  Karan Hammond RN at [phone]     Thank You!     THIS FORM IS A PERMANENT PART OF THE MEDICAL RECORD

## 2023-06-30 NOTE — PLAN OF CARE
Patient up in the room without difficulty. No reports of SOB, CP, or headaches. Given prn Percocet for chronic, generalized pain which afforded some relief. Segundo Mccartney continued. Pt did not have any BM in the past 2 days which is unusual for her. She reports that she goes daily. Given Miralax prn as ordered. Side effects of Percocet and Oxycodone discussed with patient. Problem: Patient Centered Care  Goal: Patient preferences are identified and integrated in the patient's plan of care  Description: Interventions:  - What would you like us to know as we care for you?  Home alone, sons involved in care  - Provide timely, complete, and accurate information to patient/family  - Incorporate patient and family knowledge, values, beliefs, and cultural backgrounds into the planning and delivery of care  - Encourage patient/family to participate in care and decision-making at the level they choose  - Honor patient and family perspectives and choices  Outcome: Progressing     Problem: Diabetes/Glucose Control  Goal: Glucose maintained within prescribed range  Description: INTERVENTIONS:  - Monitor Blood Glucose as ordered  - Assess for signs and symptoms of hyperglycemia and hypoglycemia  - Administer ordered medications to maintain glucose within target range  - Assess barriers to adequate nutritional intake and initiate nutrition consult as needed  - Instruct patient on self management of diabetes  Outcome: Progressing     Problem: Patient/Family Goals  Goal: Patient/Family Long Term Goal  Description: Patient's Long Term Goal: Return home    Interventions:  - Medication compliance  - Follow provider recommendations  - See additional Care Plan goals for specific interventions  Outcome: Progressing  Goal: Patient/Family Short Term Goal  Description: Patient's Short Term Goal: Breathe better    Interventions:   - IV Lasix x1  - Monitor labs and vital signs  - Medication compliance  - Follow provider recommendations  - See additional Care Plan goals for specific interventions  Outcome: Progressing     Problem: METABOLIC/FLUID AND ELECTROLYTES - ADULT  Goal: Electrolytes maintained within normal limits  Description: INTERVENTIONS:  - Monitor labs and rhythm and assess patient for signs and symptoms of electrolyte imbalances  - Administer electrolyte replacement as ordered  - Monitor response to electrolyte replacements, including rhythm and repeat lab results as appropriate  - Fluid restriction as ordered  - Instruct patient on fluid and nutrition restrictions as appropriate  Outcome: Progressing  Goal: Hemodynamic stability and optimal renal function maintained  Description: INTERVENTIONS:  - Monitor labs and assess for signs and symptoms of volume excess or deficit  - Monitor intake, output and patient weight  - Monitor urine specific gravity, serum osmolarity and serum sodium as indicated or ordered  - Monitor response to interventions for patient's volume status, including labs, urine output, blood pressure (other measures as available)  - Encourage oral intake as appropriate  - Instruct patient on fluid and nutrition restrictions as appropriate  Outcome: Progressing     Problem: PAIN - ADULT  Goal: Verbalizes/displays adequate comfort level or patient's stated pain goal  Description: INTERVENTIONS:  - Encourage pt to monitor pain and request assistance  - Assess pain using appropriate pain scale  - Administer analgesics based on type and severity of pain and evaluate response  - Implement non-pharmacological measures as appropriate and evaluate response  - Consider cultural and social influences on pain and pain management  - Manage/alleviate anxiety  - Utilize distraction and/or relaxation techniques  - Monitor for opioid side effects  - Notify MD/LIP if interventions unsuccessful or patient reports new pain  - Anticipate increased pain with activity and pre-medicate as appropriate  Outcome: Progressing

## 2023-06-30 NOTE — DIETARY NOTE
NUTRITION EDUCATION NOTE     Received consult for low K+ diet education. Verbally reviewed basic diet restrictions. Provided with Potassium Content of Food NCM handout to reinforce. Receptive to instruction. Son present for visit. Expect fair compliance.         Bhavik Lester RD, LDN  Clinical Dietitian  P: [phone]

## 2023-07-03 ENCOUNTER — PATIENT OUTREACH (OUTPATIENT)
Dept: CASE MANAGEMENT | Age: 78
End: 2023-07-03

## 2023-07-11 ENCOUNTER — TELEPHONE (OUTPATIENT)
Dept: ENDOCRINOLOGY CLINIC | Facility: CLINIC | Age: 78
End: 2023-07-11

## 2023-07-11 NOTE — TELEPHONE ENCOUNTER
Receive lab results for this patient  I do not think patient follows with our endo group  This might be for the nephrology physician  Can we please forward to staff of Dr. Faustino Craig    Thanks

## 2023-07-28 ENCOUNTER — HOSPITAL ENCOUNTER (INPATIENT)
Facility: HOSPITAL | Age: 78
LOS: 3 days | Discharge: HOME | End: 2023-07-31
Attending: EMERGENCY MEDICINE | Admitting: HOSPITALIST
Payer: MEDICARE

## 2023-07-28 ENCOUNTER — APPOINTMENT (OUTPATIENT)
Dept: CT IMAGING | Facility: HOSPITAL | Age: 78
End: 2023-07-28
Attending: EMERGENCY MEDICINE
Payer: MEDICARE

## 2023-07-28 DIAGNOSIS — E87.5 HYPERKALEMIA: ICD-10-CM

## 2023-07-28 DIAGNOSIS — N17.9 AKI (ACUTE KIDNEY INJURY) (HCC): Primary | ICD-10-CM

## 2023-07-28 DIAGNOSIS — S09.8XXA BLUNT HEAD TRAUMA, INITIAL ENCOUNTER: ICD-10-CM

## 2023-07-28 DIAGNOSIS — S01.311A LACERATION OF AURICLE OF RIGHT EAR, INITIAL ENCOUNTER: ICD-10-CM

## 2023-07-28 LAB
ANION GAP SERPL CALC-SCNC: 5 MMOL/L (ref 0–18)
BASOPHILS # BLD AUTO: 0.03 X10(3) UL (ref 0–0.2)
BASOPHILS NFR BLD AUTO: 0.5 %
BILIRUB UR QL: NEGATIVE
BUN BLD-MCNC: 68 MG/DL (ref 7–18)
BUN/CREAT SERPL: 26.9 (ref 10–20)
CALCIUM BLD-MCNC: 9.1 MG/DL (ref 8.5–10.1)
CHLORIDE SERPL-SCNC: 116 MMOL/L (ref 98–112)
CLARITY UR: CLEAR
CO2 SERPL-SCNC: 21 MMOL/L (ref 21–32)
CREAT BLD-MCNC: 2.53 MG/DL
DEPRECATED RDW RBC AUTO: 50.6 FL (ref 35.1–46.3)
EGFRCR SERPLBLD CKD-EPI 2021: 19 ML/MIN/1.73M2 (ref 60–?)
EOSINOPHIL # BLD AUTO: 0.02 X10(3) UL (ref 0–0.7)
EOSINOPHIL NFR BLD AUTO: 0.3 %
ERYTHROCYTE [DISTWIDTH] IN BLOOD BY AUTOMATED COUNT: 14.5 % (ref 11–15)
GLUCOSE BLD-MCNC: 124 MG/DL (ref 70–99)
GLUCOSE BLDC GLUCOMTR-MCNC: 128 MG/DL (ref 70–99)
GLUCOSE BLDC GLUCOMTR-MCNC: 131 MG/DL (ref 70–99)
GLUCOSE BLDC GLUCOMTR-MCNC: 131 MG/DL (ref 70–99)
GLUCOSE BLDC GLUCOMTR-MCNC: 132 MG/DL (ref 70–99)
GLUCOSE UR-MCNC: NORMAL MG/DL
HCT VFR BLD AUTO: 29 %
HGB BLD-MCNC: 9.1 G/DL
HGB UR QL STRIP.AUTO: NEGATIVE
HYALINE CASTS #/AREA URNS AUTO: PRESENT /LPF
IMM GRANULOCYTES # BLD AUTO: 0.03 X10(3) UL (ref 0–1)
IMM GRANULOCYTES NFR BLD: 0.5 %
KETONES UR-MCNC: NEGATIVE MG/DL
LEUKOCYTE ESTERASE UR QL STRIP.AUTO: NEGATIVE
LYMPHOCYTES # BLD AUTO: 0.35 X10(3) UL (ref 1–4)
LYMPHOCYTES NFR BLD AUTO: 5.4 %
MCH RBC QN AUTO: 30.1 PG (ref 26–34)
MCHC RBC AUTO-ENTMCNC: 31.4 G/DL (ref 31–37)
MCV RBC AUTO: 96 FL
MONOCYTES # BLD AUTO: 0.24 X10(3) UL (ref 0.1–1)
MONOCYTES NFR BLD AUTO: 3.7 %
NEUTROPHILS # BLD AUTO: 5.77 X10 (3) UL (ref 1.5–7.7)
NEUTROPHILS # BLD AUTO: 5.77 X10(3) UL (ref 1.5–7.7)
NEUTROPHILS NFR BLD AUTO: 89.6 %
NITRITE UR QL STRIP.AUTO: NEGATIVE
OSMOLALITY SERPL CALC.SUM OF ELEC: 315 MOSM/KG (ref 275–295)
PH UR: 5.5 [PH] (ref 5–8)
PLATELET # BLD AUTO: 230 10(3)UL (ref 150–450)
POTASSIUM SERPL-SCNC: 6.4 MMOL/L (ref 3.5–5.1)
PROT UR-MCNC: NEGATIVE MG/DL
RBC # BLD AUTO: 3.02 X10(6)UL
SODIUM SERPL-SCNC: 142 MMOL/L (ref 136–145)
SP GR UR STRIP: 1.02 (ref 1–1.03)
UROBILINOGEN UR STRIP-ACNC: NORMAL
WBC # BLD AUTO: 6.4 X10(3) UL (ref 4–11)

## 2023-07-28 PROCEDURE — 99223 1ST HOSP IP/OBS HIGH 75: CPT | Performed by: INTERNAL MEDICINE

## 2023-07-28 PROCEDURE — 99222 1ST HOSP IP/OBS MODERATE 55: CPT | Performed by: HOSPITALIST

## 2023-07-28 PROCEDURE — 70450 CT HEAD/BRAIN W/O DYE: CPT | Performed by: EMERGENCY MEDICINE

## 2023-07-28 RX ORDER — ACETAMINOPHEN 500 MG
500 TABLET ORAL EVERY 4 HOURS PRN
Status: DISCONTINUED | OUTPATIENT
Start: 2023-07-28 | End: 2023-07-31

## 2023-07-28 RX ORDER — GABAPENTIN 300 MG/1
300 CAPSULE ORAL NIGHTLY
Status: DISCONTINUED | OUTPATIENT
Start: 2023-07-28 | End: 2023-07-31

## 2023-07-28 RX ORDER — HYDRALAZINE HYDROCHLORIDE 50 MG/1
50 TABLET, FILM COATED ORAL 2 TIMES DAILY
Status: DISCONTINUED | OUTPATIENT
Start: 2023-07-28 | End: 2023-07-31

## 2023-07-28 RX ORDER — AMLODIPINE BESYLATE 10 MG/1
1 TABLET ORAL DAILY
COMMUNITY

## 2023-07-28 RX ORDER — AMLODIPINE BESYLATE 10 MG/1
10 TABLET ORAL DAILY
Status: DISCONTINUED | OUTPATIENT
Start: 2023-07-28 | End: 2023-07-31

## 2023-07-28 RX ORDER — ATORVASTATIN CALCIUM 40 MG/1
40 TABLET, FILM COATED ORAL NIGHTLY
Status: DISCONTINUED | OUTPATIENT
Start: 2023-07-28 | End: 2023-07-31

## 2023-07-28 RX ORDER — LISINOPRIL 10 MG/1
1 TABLET ORAL DAILY
COMMUNITY

## 2023-07-28 RX ORDER — SODIUM CHLORIDE 9 MG/ML
INJECTION, SOLUTION INTRAVENOUS CONTINUOUS
Status: DISCONTINUED | OUTPATIENT
Start: 2023-07-28 | End: 2023-07-29

## 2023-07-28 RX ORDER — ISOSORBIDE MONONITRATE 60 MG/1
60 TABLET, EXTENDED RELEASE ORAL DAILY
Status: DISCONTINUED | OUTPATIENT
Start: 2023-07-28 | End: 2023-07-31

## 2023-07-28 RX ORDER — MECLIZINE HYDROCHLORIDE 25 MG/1
25 TABLET ORAL
COMMUNITY
Start: 2023-06-13

## 2023-07-28 RX ORDER — HYDRALAZINE HYDROCHLORIDE 50 MG/1
1 TABLET, FILM COATED ORAL 2 TIMES DAILY
COMMUNITY

## 2023-07-28 RX ORDER — METOCLOPRAMIDE HYDROCHLORIDE 5 MG/ML
5 INJECTION INTRAMUSCULAR; INTRAVENOUS EVERY 8 HOURS PRN
Status: DISCONTINUED | OUTPATIENT
Start: 2023-07-28 | End: 2023-07-31

## 2023-07-28 RX ORDER — DIAZEPAM 5 MG/1
10 TABLET ORAL EVERY 8 HOURS PRN
Status: DISCONTINUED | OUTPATIENT
Start: 2023-07-28 | End: 2023-07-28

## 2023-07-28 RX ORDER — PANTOPRAZOLE SODIUM 40 MG/1
40 TABLET, DELAYED RELEASE ORAL
Status: DISCONTINUED | OUTPATIENT
Start: 2023-07-28 | End: 2023-07-31

## 2023-07-28 RX ORDER — ONDANSETRON 2 MG/ML
4 INJECTION INTRAMUSCULAR; INTRAVENOUS EVERY 6 HOURS PRN
Status: DISCONTINUED | OUTPATIENT
Start: 2023-07-28 | End: 2023-07-31

## 2023-07-28 RX ORDER — HYDRALAZINE HYDROCHLORIDE 20 MG/ML
10 INJECTION INTRAMUSCULAR; INTRAVENOUS
Status: DISCONTINUED | OUTPATIENT
Start: 2023-07-28 | End: 2023-07-31

## 2023-07-28 NOTE — ED QUICK NOTES
Orders for admission, patient is aware of plan and ready to go upstairs. Any questions, please call ED RN Bowen at extension [phone]. Patient Covid vaccination status: Unvaccinated     COVID Test Ordered in ED: None    COVID Suspicion at Admission: N/A    Running Infusions:      Mental Status/LOC at time of transport: drowsy, from private home with family.      Other pertinent information:   CIWA score: N/A   NIH score:  N/A

## 2023-07-28 NOTE — ED QUICK NOTES
Potassium 6.4 mmol/L reported to MD Emilie Ta face to face, per MD Emilie Ta PT does not need to be bladder scan at this time.

## 2023-07-28 NOTE — H&P
[facility]    PATIENT'S NAME: Marc Sevilla   ATTENDING PHYSICIAN: Morro Holland MD   PATIENT ACCOUNT#:   [id number]    LOCATION:  [address]  MEDICAL RECORD #:   [id number]       YOB: 1945  ADMISSION DATE:       07/28/2023    HISTORY AND PHYSICAL EXAMINATION    CHIEF COMPLAINT:  Acute on chronic kidney injury and hyperkalemia. HISTORY OF PRESENT ILLNESS:  The patient is a 29-year-old [facility] [place] female who was hospitalized twice in the last month for a similar presentation of acute kidney injury and hyperkalemia. She had underlying recurrent retroperitoneal leiomyosarcoma. She was supposed to be on a renal diet for recurrent hyperkalemia. Today paramedics were called because the patient was obtunded, drowsy, and unable to get out of her bed. Usually she gets like this, per her son, once she takes her pain medications and diazepam at home. In the emergency department CBC showed hemoglobin of 9.1. Chemistry showed GFR of 19 which is below her baseline, BUN and creatinine of 68 and 2.53, and potassium 6.4. Patient was given [facility] and she will be admitted to the hospital for further management. PAST MEDICAL HISTORY:  Mild aortic stenosis; pulmonary artery hypertension; chronic lower extremity edema; history of C difficile; borderline diabetic; chronic kidney disease stage 3 on the basis of diabetic nephropathy; hypertension; hyperlipidemia; generalized osteoarthritis; musculoskeletal deconditioning; retroperitoneal leiomyosarcoma, status post surgical resection with recurrence requiring radiation therapy, persistent disease in the retroperitoneal area per imaging studies as of June 2023; history of left lower extremity DVT recently, was started on Eliquis. PAST SURGICAL HISTORY:  As mentioned above. She also had right carpal tunnel release, left rotator cuff repair, tubal ligation, and cholecystectomy. ALLERGIES:  No known drug allergies.     FAMILY HISTORY: Mother had TB. Father had cerebrovascular accident. SOCIAL HISTORY:  Chronic tobacco use. No alcohol or drug use. Lives with her son. She has a helper who comes at home, but not clear about the exact life situation at home. Patient is not able to tell me what type of diet she is taking at home. REVIEW OF SYSTEMS:  The patient is very obtunded, unable to answer questions clearly. She said she has chronic pain in her right flank area. Other 12-point review of systems unobtainable at this point. PHYSICAL EXAMINATION:    GENERAL:  Obtunded, but no distress. Alert, easily awakened. VITAL SIGNS:  Temperature 97.9, pulse 82, respiratory rate 20, blood pressure 166/70, pulse oximetry 97% on room air. HEENT:  Atraumatic. Oropharynx clear. Dry mucous membranes. Normal hard and soft palate. Eyes:  Anicteric sclerae. NECK:  Supple. No lymphadenopathy. LUNGS:  Clear to auscultation bilaterally. Normal respiratory effort. HEART:  Regular rate and rhythm. S1 and S2 auscultated. No murmur. ABDOMEN:  Soft, nondistended. No tenderness. Discomfort to palpation, right side abdomen. EXTREMITIES:  Edema of +1 to 2, both legs. No clubbing or cyanosis. NEUROLOGIC:  Unable to assess, but no acute focal findings. ASSESSMENT AND PLAN:    1. Hyperkalemia. 2.   Acute on chronic kidney injury. 3.   Mesenteric leiomyosarcoma. 4.   Chronic lower extremity stasis. Patient will be admitted to general medical floor. Gentle hydration. Remote telemetry. Received Musa Kelley in the emergency room. Obtain nephrology consult. Monitor her kidney function. Hold Lasix. Reeducate on renal diet. Fall precautions. Further recommendations to follow.     Dictated By Lisa Braga MD  d: 07/28/2023 12:06:10  t: 07/28/2023 12:44:56  Job [id number]  VD/

## 2023-07-28 NOTE — ED INITIAL ASSESSMENT (HPI)
Pt to ED via stretcher, EMS reports fall 1 hour prior to arrival, unwitnessed, unknown medical hx and unknown home medications, norco taken 1 hr ago per EMS. Dry blood found behind R ear, no active bleeding, MD Herman notified.

## 2023-07-29 LAB
ANION GAP SERPL CALC-SCNC: 2 MMOL/L (ref 0–18)
BASOPHILS # BLD AUTO: 0.03 X10(3) UL (ref 0–0.2)
BASOPHILS NFR BLD AUTO: 0.6 %
BUN BLD-MCNC: 52 MG/DL (ref 7–18)
BUN/CREAT SERPL: 28.1 (ref 10–20)
C DIFF GDH + TOXINS A+B STL QL IA.RAPID: NOT DETECTED
C DIFF TOX B STL QL: POSITIVE
CALCIUM BLD-MCNC: 8.3 MG/DL (ref 8.5–10.1)
CHLORIDE SERPL-SCNC: 118 MMOL/L (ref 98–112)
CO2 SERPL-SCNC: 24 MMOL/L (ref 21–32)
CREAT BLD-MCNC: 1.85 MG/DL
DEPRECATED RDW RBC AUTO: 49.1 FL (ref 35.1–46.3)
EGFRCR SERPLBLD CKD-EPI 2021: 28 ML/MIN/1.73M2 (ref 60–?)
EOSINOPHIL # BLD AUTO: 0.02 X10(3) UL (ref 0–0.7)
EOSINOPHIL NFR BLD AUTO: 0.4 %
ERYTHROCYTE [DISTWIDTH] IN BLOOD BY AUTOMATED COUNT: 14.4 % (ref 11–15)
GLUCOSE BLD-MCNC: 102 MG/DL (ref 70–99)
GLUCOSE BLDC GLUCOMTR-MCNC: 110 MG/DL (ref 70–99)
GLUCOSE BLDC GLUCOMTR-MCNC: 122 MG/DL (ref 70–99)
GLUCOSE BLDC GLUCOMTR-MCNC: 163 MG/DL (ref 70–99)
GLUCOSE BLDC GLUCOMTR-MCNC: 205 MG/DL (ref 70–99)
HCT VFR BLD AUTO: 25.4 %
HGB BLD-MCNC: 8 G/DL
IMM GRANULOCYTES # BLD AUTO: 0.01 X10(3) UL (ref 0–1)
IMM GRANULOCYTES NFR BLD: 0.2 %
LYMPHOCYTES # BLD AUTO: 0.48 X10(3) UL (ref 1–4)
LYMPHOCYTES NFR BLD AUTO: 10.4 %
MCH RBC QN AUTO: 30.1 PG (ref 26–34)
MCHC RBC AUTO-ENTMCNC: 31.5 G/DL (ref 31–37)
MCV RBC AUTO: 95.5 FL
MONOCYTES # BLD AUTO: 0.73 X10(3) UL (ref 0.1–1)
MONOCYTES NFR BLD AUTO: 15.8 %
NEUTROPHILS # BLD AUTO: 3.35 X10 (3) UL (ref 1.5–7.7)
NEUTROPHILS # BLD AUTO: 3.35 X10(3) UL (ref 1.5–7.7)
NEUTROPHILS NFR BLD AUTO: 72.6 %
OSMOLALITY SERPL CALC.SUM OF ELEC: 312 MOSM/KG (ref 275–295)
PLATELET # BLD AUTO: 194 10(3)UL (ref 150–450)
POTASSIUM SERPL-SCNC: 5.8 MMOL/L (ref 3.5–5.1)
RBC # BLD AUTO: 2.66 X10(6)UL
SODIUM SERPL-SCNC: 144 MMOL/L (ref 136–145)
WBC # BLD AUTO: 4.6 X10(3) UL (ref 4–11)

## 2023-07-29 PROCEDURE — 99233 SBSQ HOSP IP/OBS HIGH 50: CPT | Performed by: INTERNAL MEDICINE

## 2023-07-29 PROCEDURE — 99233 SBSQ HOSP IP/OBS HIGH 50: CPT | Performed by: HOSPITALIST

## 2023-07-29 RX ORDER — VANCOMYCIN HYDROCHLORIDE 125 MG/1
125 CAPSULE ORAL EVERY 6 HOURS
Status: DISCONTINUED | OUTPATIENT
Start: 2023-07-29 | End: 2023-07-31

## 2023-07-29 RX ORDER — METOPROLOL TARTRATE 5 MG/5ML
5 INJECTION INTRAVENOUS ONCE
Status: DISCONTINUED | OUTPATIENT
Start: 2023-07-30 | End: 2023-07-30

## 2023-07-29 RX ORDER — HYDROCODONE BITARTRATE AND ACETAMINOPHEN 5; 325 MG/1; MG/1
1 TABLET ORAL EVERY 6 HOURS PRN
Status: DISCONTINUED | OUTPATIENT
Start: 2023-07-29 | End: 2023-07-31

## 2023-07-29 RX ORDER — METOPROLOL TARTRATE 5 MG/5ML
INJECTION INTRAVENOUS
Status: COMPLETED
Start: 2023-07-29 | End: 2023-07-30

## 2023-07-29 NOTE — PLAN OF CARE
Problem: Patient Centered Care  Goal: Patient preferences are identified and integrated in the patient's plan of care  Description: Interventions:  - What would you like us to know as we care for you?  Lives home with family  - Provide timely, complete, and accurate information to patient/family  - Incorporate patient and family knowledge, values, beliefs, and cultural backgrounds into the planning and delivery of care  - Encourage patient/family to participate in care and decision-making at the level they choose  - Honor patient and family perspectives and choices  Outcome: Progressing     Problem: Patient/Family Goals  Goal: Patient/Family Long Term Goal  Description: Patient's Long Term Goal: get better and discharge home    Interventions:  - assess q shift prn  -meds as ordered  - See additional Care Plan goals for specific interventions  Outcome: Progressing  Goal: Patient/Family Short Term Goal  Description: Patient's Short Term Goal: will remain without injury    Interventions:   - call light and personal belongings are within reach  -pain controlled  -bathroom assistance  -hourly rounds done  - See additional Care Plan goals for specific interventions  Outcome: Progressing

## 2023-07-29 NOTE — PLAN OF CARE
Patient is lethargic & oriented x4 on room air. Temp 100.4 PRN tylenol given. Vital signs stable at this time. No acute changes noted throughout shift; patient slept. Fall precautions maintained - bed alarm on, bed locked in lowest position, call light and personal belongings within reach, non-skid socks in place. Frequent rounding by nursing staff. Plan IVF. Problem: Patient Centered Care  Goal: Patient preferences are identified and integrated in the patient's plan of care  Description: Interventions:  - What would you like us to know as we care for you?  From home alone  - Provide timely, complete, and accurate information to patient/family  - Incorporate patient and family knowledge, values, beliefs, and cultural backgrounds into the planning and delivery of care  - Encourage patient/family to participate in care and decision-making at the level they choose  - Honor patient and family perspectives and choices  Outcome: Progressing     Problem: Patient/Family Goals  Goal: Patient/Family Long Term Goal  Description: Patient's Long Term Goal: To go home    Interventions:  - Follow MD orders  - See additional Care Plan goals for specific interventions  Outcome: Progressing  Goal: Patient/Family Short Term Goal  Description: Patient's Short Term Goal: Manage DICK    Interventions:   - Follow MD orders  - See additional Care Plan goals for specific interventions  Outcome: Progressing

## 2023-07-30 LAB
ALBUMIN SERPL-MCNC: 2.6 G/DL (ref 3.4–5)
ANION GAP SERPL CALC-SCNC: 2 MMOL/L (ref 0–18)
BASOPHILS # BLD AUTO: 0.02 X10(3) UL (ref 0–0.2)
BASOPHILS NFR BLD AUTO: 0.4 %
BUN BLD-MCNC: 46 MG/DL (ref 7–18)
BUN/CREAT SERPL: 25.4 (ref 10–20)
CALCIUM BLD-MCNC: 7.8 MG/DL (ref 8.5–10.1)
CHLORIDE SERPL-SCNC: 121 MMOL/L (ref 98–112)
CO2 SERPL-SCNC: 22 MMOL/L (ref 21–32)
CREAT BLD-MCNC: 1.81 MG/DL
DEPRECATED RDW RBC AUTO: 51.1 FL (ref 35.1–46.3)
EGFRCR SERPLBLD CKD-EPI 2021: 29 ML/MIN/1.73M2 (ref 60–?)
EOSINOPHIL # BLD AUTO: 0.06 X10(3) UL (ref 0–0.7)
EOSINOPHIL NFR BLD AUTO: 1.3 %
ERYTHROCYTE [DISTWIDTH] IN BLOOD BY AUTOMATED COUNT: 14.5 % (ref 11–15)
GLUCOSE BLD-MCNC: 107 MG/DL (ref 70–99)
GLUCOSE BLDC GLUCOMTR-MCNC: 107 MG/DL (ref 70–99)
GLUCOSE BLDC GLUCOMTR-MCNC: 127 MG/DL (ref 70–99)
GLUCOSE BLDC GLUCOMTR-MCNC: 159 MG/DL (ref 70–99)
GLUCOSE BLDC GLUCOMTR-MCNC: 217 MG/DL (ref 70–99)
HCT VFR BLD AUTO: 24.3 %
HGB BLD-MCNC: 7.4 G/DL
IMM GRANULOCYTES # BLD AUTO: 0.01 X10(3) UL (ref 0–1)
IMM GRANULOCYTES NFR BLD: 0.2 %
LYMPHOCYTES # BLD AUTO: 0.72 X10(3) UL (ref 1–4)
LYMPHOCYTES NFR BLD AUTO: 15.5 %
MAGNESIUM SERPL-MCNC: 2 MG/DL (ref 1.6–2.6)
MCH RBC QN AUTO: 29.4 PG (ref 26–34)
MCHC RBC AUTO-ENTMCNC: 30.5 G/DL (ref 31–37)
MCV RBC AUTO: 96.4 FL
MONOCYTES # BLD AUTO: 0.52 X10(3) UL (ref 0.1–1)
MONOCYTES NFR BLD AUTO: 11.2 %
NEUTROPHILS # BLD AUTO: 3.33 X10 (3) UL (ref 1.5–7.7)
NEUTROPHILS # BLD AUTO: 3.33 X10(3) UL (ref 1.5–7.7)
NEUTROPHILS NFR BLD AUTO: 71.4 %
OSMOLALITY SERPL CALC.SUM OF ELEC: 312 MOSM/KG (ref 275–295)
PHOSPHATE SERPL-MCNC: 2.9 MG/DL (ref 2.5–4.9)
PLATELET # BLD AUTO: 179 10(3)UL (ref 150–450)
POTASSIUM SERPL-SCNC: 5.5 MMOL/L (ref 3.5–5.1)
Q-T INTERVAL: 284 MS
QRS DURATION: 98 MS
QTC CALCULATION (BEZET): 451 MS
R AXIS: 30 DEGREES
RBC # BLD AUTO: 2.52 X10(6)UL
SODIUM SERPL-SCNC: 145 MMOL/L (ref 136–145)
T AXIS: 137 DEGREES
VENTRICULAR RATE: 152 BPM
WBC # BLD AUTO: 4.7 X10(3) UL (ref 4–11)

## 2023-07-30 PROCEDURE — 99232 SBSQ HOSP IP/OBS MODERATE 35: CPT | Performed by: INTERNAL MEDICINE

## 2023-07-30 PROCEDURE — 99233 SBSQ HOSP IP/OBS HIGH 50: CPT | Performed by: HOSPITALIST

## 2023-07-30 RX ORDER — DILTIAZEM HYDROCHLORIDE 5 MG/ML
10 INJECTION INTRAVENOUS ONCE
Status: COMPLETED | OUTPATIENT
Start: 2023-07-30 | End: 2023-07-30

## 2023-07-30 RX ORDER — METOPROLOL TARTRATE 5 MG/5ML
5 INJECTION INTRAVENOUS ONCE
Status: COMPLETED | OUTPATIENT
Start: 2023-07-30 | End: 2023-07-30

## 2023-07-30 NOTE — PLAN OF CARE
Patient a/ox4. Pt had SVT, -150's with chest pain; MD notified, see MAR.      Problem: Patient Centered Care  Goal: Patient preferences are identified and integrated in the patient's plan of care  Description: Interventions:  - Provide timely, complete, and accurate information to patient/family  - Incorporate patient and family knowledge, values, beliefs, and cultural backgrounds into the planning and delivery of care  - Encourage patient/family to participate in care and decision-making at the level they choose  - Honor patient and family perspectives and choices  Outcome: Progressing     Problem: CARDIOVASCULAR - ADULT  Goal: Absence of cardiac arrhythmias or at baseline  Description: INTERVENTIONS:  - Continuous cardiac monitoring, monitor vital signs, obtain 12 lead EKG if indicated  - Evaluate effectiveness of antiarrhythmic and heart rate control medications as ordered  - Initiate emergency measures for life threatening arrhythmias  - Monitor electrolytes and administer replacement therapy as ordered  Outcome: Progressing

## 2023-07-30 NOTE — PLAN OF CARE
Problem: Patient Centered Care  Goal: Patient preferences are identified and integrated in the patient's plan of care  Description: Interventions:  - What would you like us to know as we care for you?lives home alone  - Provide timely, complete, and accurate information to patient/family  - Incorporate patient and family knowledge, values, beliefs, and cultural backgrounds into the planning and delivery of care  - Encourage patient/family to participate in care and decision-making at the level they choose  - Honor patient and family perspectives and choices  Outcome: Progressing     Problem: CARDIOVASCULAR - ADULT  Goal: Absence of cardiac arrhythmias or at baseline  Description: INTERVENTIONS:  - Continuous cardiac monitoring, monitor vital signs, obtain 12 lead EKG if indicated  - Evaluate effectiveness of antiarrhythmic and heart rate control medications as ordered  - Initiate emergency measures for life threatening arrhythmias  - Monitor electrolytes and administer replacement therapy as ordered  Outcome: Progressing

## 2023-07-31 VITALS
OXYGEN SATURATION: 98 % | HEART RATE: 60 BPM | DIASTOLIC BLOOD PRESSURE: 50 MMHG | HEIGHT: 65 IN | TEMPERATURE: 99 F | WEIGHT: 178.63 LBS | BODY MASS INDEX: 29.76 KG/M2 | RESPIRATION RATE: 18 BRPM | SYSTOLIC BLOOD PRESSURE: 125 MMHG

## 2023-07-31 DIAGNOSIS — N18.30 STAGE 3 CHRONIC KIDNEY DISEASE, UNSPECIFIED WHETHER STAGE 3A OR 3B CKD (HCC): Primary | ICD-10-CM

## 2023-07-31 LAB
ALBUMIN SERPL-MCNC: 2.7 G/DL (ref 3.4–5)
ANION GAP SERPL CALC-SCNC: 3 MMOL/L (ref 0–18)
BASOPHILS # BLD AUTO: 0.02 X10(3) UL (ref 0–0.2)
BASOPHILS NFR BLD AUTO: 0.5 %
BUN BLD-MCNC: 40 MG/DL (ref 7–18)
BUN/CREAT SERPL: 25.3 (ref 10–20)
CALCIUM BLD-MCNC: 8.4 MG/DL (ref 8.5–10.1)
CHLORIDE SERPL-SCNC: 119 MMOL/L (ref 98–112)
CO2 SERPL-SCNC: 21 MMOL/L (ref 21–32)
CREAT BLD-MCNC: 1.58 MG/DL
DEPRECATED RDW RBC AUTO: 49 FL (ref 35.1–46.3)
EGFRCR SERPLBLD CKD-EPI 2021: 34 ML/MIN/1.73M2 (ref 60–?)
EOSINOPHIL # BLD AUTO: 0.11 X10(3) UL (ref 0–0.7)
EOSINOPHIL NFR BLD AUTO: 2.8 %
ERYTHROCYTE [DISTWIDTH] IN BLOOD BY AUTOMATED COUNT: 14.4 % (ref 11–15)
GLUCOSE BLD-MCNC: 114 MG/DL (ref 70–99)
GLUCOSE BLDC GLUCOMTR-MCNC: 104 MG/DL (ref 70–99)
GLUCOSE BLDC GLUCOMTR-MCNC: 114 MG/DL (ref 70–99)
HCT VFR BLD AUTO: 24.8 %
HGB BLD-MCNC: 7.8 G/DL
IMM GRANULOCYTES # BLD AUTO: 0.01 X10(3) UL (ref 0–1)
IMM GRANULOCYTES NFR BLD: 0.3 %
LYMPHOCYTES # BLD AUTO: 0.82 X10(3) UL (ref 1–4)
LYMPHOCYTES NFR BLD AUTO: 20.8 %
MAGNESIUM SERPL-MCNC: 1.6 MG/DL (ref 1.6–2.6)
MCH RBC QN AUTO: 29.5 PG (ref 26–34)
MCHC RBC AUTO-ENTMCNC: 31.5 G/DL (ref 31–37)
MCV RBC AUTO: 93.9 FL
MONOCYTES # BLD AUTO: 0.39 X10(3) UL (ref 0.1–1)
MONOCYTES NFR BLD AUTO: 9.9 %
NEUTROPHILS # BLD AUTO: 2.59 X10 (3) UL (ref 1.5–7.7)
NEUTROPHILS # BLD AUTO: 2.59 X10(3) UL (ref 1.5–7.7)
NEUTROPHILS NFR BLD AUTO: 65.7 %
OSMOLALITY SERPL CALC.SUM OF ELEC: 307 MOSM/KG (ref 275–295)
PHOSPHATE SERPL-MCNC: 2.8 MG/DL (ref 2.5–4.9)
PLATELET # BLD AUTO: 173 10(3)UL (ref 150–450)
POTASSIUM SERPL-SCNC: 5.2 MMOL/L (ref 3.5–5.1)
RBC # BLD AUTO: 2.64 X10(6)UL
SODIUM SERPL-SCNC: 143 MMOL/L (ref 136–145)
WBC # BLD AUTO: 3.9 X10(3) UL (ref 4–11)

## 2023-07-31 PROCEDURE — 99232 SBSQ HOSP IP/OBS MODERATE 35: CPT | Performed by: INTERNAL MEDICINE

## 2023-07-31 PROCEDURE — 99239 HOSP IP/OBS DSCHRG MGMT >30: CPT | Performed by: INTERNAL MEDICINE

## 2023-07-31 RX ORDER — FLUDROCORTISONE ACETATE 0.1 MG/1
0.1 TABLET ORAL ONCE
Status: COMPLETED | OUTPATIENT
Start: 2023-07-31 | End: 2023-07-31

## 2023-07-31 RX ORDER — PANTOPRAZOLE SODIUM 40 MG/1
40 TABLET, DELAYED RELEASE ORAL
Qty: 60 TABLET | Refills: 0 | Status: SHIPPED | OUTPATIENT
Start: 2023-07-31 | End: 2023-08-30

## 2023-07-31 RX ORDER — VANCOMYCIN HYDROCHLORIDE 125 MG/1
125 CAPSULE ORAL EVERY 6 HOURS
Qty: 28 CAPSULE | Refills: 0 | Status: SHIPPED | OUTPATIENT
Start: 2023-07-31 | End: 2023-08-07

## 2023-07-31 RX ORDER — MAGNESIUM OXIDE 400 MG/1
400 TABLET ORAL ONCE
Status: COMPLETED | OUTPATIENT
Start: 2023-07-31 | End: 2023-07-31

## 2023-07-31 NOTE — DISCHARGE PLANNING
Patient was provided with discharge instructions, education, and follow up information. Patient's family member present for discharge instructions with patient's consent. Prescriptions were already sent electronically to patient's pharmacy. Patient verbalizes understanding of follow up information, specifically following up with PCP, nephrologist and cardiologist (has appointments set up for both), medication changes, when to take the next dose of each medication, chronic kidney disease discharge instructions and diet, c.dif understanding and treatment, cleaning and self care responsibility. Patient has no questions after reviewing all instructions and will be going home.      Ez Patterson RN, Discharge Leader [id number]

## 2023-07-31 NOTE — PLAN OF CARE
Patient A&Ox4 on RA. Vitals stable. No acute events overnight. Provided norco prn pain. On eliquis for PE/DVT prophylaxis. Safety precautions maintained, call light and personal belongings within reach. Problem: Patient Centered Care  Goal: Patient preferences are identified and integrated in the patient's plan of care  Description: Interventions:  - What would you like us to know as we care for you?  From home alone  - Provide timely, complete, and accurate information to patient/family  - Incorporate patient and family knowledge, values, beliefs, and cultural backgrounds into the planning and delivery of care  - Encourage patient/family to participate in care and decision-making at the level they choose  - Honor patient and family perspectives and choices  Outcome: Progressing     Problem: Patient/Family Goals  Goal: Patient/Family Long Term Goal  Description: Patient's Long Term Goal: to go home    Interventions:  - vital signs  - labs and electrolytes  - enteric precautions  - PO vanco  -veltassa  -CT head  - See additional Care Plan goals for specific interventions  Outcome: Progressing  Goal: Patient/Family Short Term Goal  Description: Patient's Short Term Goal: better lab results    Interventions:   - labs and electrolytes  - veltassa  - electrolyte protocol   - See additional Care Plan goals for specific interventions  Outcome: Progressing     Problem: CARDIOVASCULAR - ADULT  Goal: Absence of cardiac arrhythmias or at baseline  Description: INTERVENTIONS:  - Continuous cardiac monitoring, monitor vital signs, obtain 12 lead EKG if indicated  - Evaluate effectiveness of antiarrhythmic and heart rate control medications as ordered  - Initiate emergency measures for life threatening arrhythmias  - Monitor electrolytes and administer replacement therapy as ordered  Outcome: Progressing  Goal: Maintains optimal cardiac output and hemodynamic stability  Description: INTERVENTIONS:  - Monitor vital signs, rhythm, and trends  - Monitor for bleeding, hypotension and signs of decreased cardiac output  - Evaluate effectiveness of vasoactive medications to optimize hemodynamic stability  - Monitor arterial and/or venous puncture sites for bleeding and/or hematoma  - Assess quality of pulses, skin color and temperature  - Assess for signs of decreased coronary artery perfusion - ex. Angina  - Evaluate fluid balance, assess for edema, trend weights  Outcome: Progressing     Problem: PAIN - ADULT  Goal: Verbalizes/displays adequate comfort level or patient's stated pain goal  Description: INTERVENTIONS:  - Encourage pt to monitor pain and request assistance  - Assess pain using appropriate pain scale  - Administer analgesics based on type and severity of pain and evaluate response  - Implement non-pharmacological measures as appropriate and evaluate response  - Consider cultural and social influences on pain and pain management  - Manage/alleviate anxiety  - Utilize distraction and/or relaxation techniques  - Monitor for opioid side effects  - Notify MD/LIP if interventions unsuccessful or patient reports new pain  - Anticipate increased pain with activity and pre-medicate as appropriate  Outcome: Progressing     Problem: RISK FOR INFECTION - ADULT  Goal: Absence of fever/infection during anticipated neutropenic period  Description: INTERVENTIONS  - Monitor WBC  - Administer growth factors as ordered  - Implement neutropenic guidelines  Outcome: Progressing     Problem: SAFETY ADULT - FALL  Goal: Free from fall injury  Description: INTERVENTIONS:  - Assess pt frequently for physical needs  - Identify cognitive and physical deficits and behaviors that affect risk of falls.   - [place] fall precautions as indicated by assessment.  - Educate pt/family on patient safety including physical limitations  - Instruct pt to call for assistance with activity based on assessment  - Modify environment to reduce risk of injury  - Provide assistive devices as appropriate  - Consider OT/PT consult to assist with strengthening/mobility  - Encourage toileting schedule  Outcome: Progressing

## 2023-08-01 ENCOUNTER — PATIENT OUTREACH (OUTPATIENT)
Dept: CASE MANAGEMENT | Age: 78
End: 2023-08-01

## 2023-08-01 NOTE — PROGRESS NOTES
TCM chart review. No TCM as patient follows with outside [facility] PCP. Encounter closing.

## 2023-08-22 ENCOUNTER — OFFICE VISIT (OUTPATIENT)
Dept: PAIN CLINIC | Facility: HOSPITAL | Age: 78
End: 2023-08-22
Attending: ANESTHESIOLOGY
Payer: MEDICARE

## 2023-08-22 VITALS
WEIGHT: 182 LBS | BODY MASS INDEX: 31.07 KG/M2 | HEART RATE: 83 BPM | HEIGHT: 64 IN | DIASTOLIC BLOOD PRESSURE: 54 MMHG | SYSTOLIC BLOOD PRESSURE: 102 MMHG

## 2023-08-22 DIAGNOSIS — M47.816 LUMBAR FACET ARTHROPATHY: ICD-10-CM

## 2023-08-22 DIAGNOSIS — Z79.891 ENCOUNTER FOR MONITORING OPIOID MAINTENANCE THERAPY: ICD-10-CM

## 2023-08-22 DIAGNOSIS — G89.3 CANCER RELATED PAIN: Primary | ICD-10-CM

## 2023-08-22 DIAGNOSIS — M48.061 SPINAL STENOSIS OF LUMBAR REGION AT MULTIPLE LEVELS: ICD-10-CM

## 2023-08-22 DIAGNOSIS — Z51.81 ENCOUNTER FOR MONITORING OPIOID MAINTENANCE THERAPY: ICD-10-CM

## 2023-08-22 DIAGNOSIS — M79.89 BILATERAL SWELLING OF FEET: ICD-10-CM

## 2023-08-22 DIAGNOSIS — M15.9 PRIMARY OSTEOARTHRITIS INVOLVING MULTIPLE JOINTS: ICD-10-CM

## 2023-08-22 PROCEDURE — 99211 OFF/OP EST MAY X REQ PHY/QHP: CPT

## 2023-08-22 RX ORDER — OXYCODONE AND ACETAMINOPHEN 10; 325 MG/1; MG/1
1 TABLET ORAL EVERY 4 HOURS PRN
Qty: 180 TABLET | Refills: 0 | Status: SHIPPED | OUTPATIENT
Start: 2023-10-10 | End: 2023-11-09

## 2023-08-22 RX ORDER — DIAZEPAM 10 MG/1
10 TABLET ORAL EVERY 8 HOURS PRN
Qty: 90 TABLET | Refills: 2 | Status: SHIPPED | OUTPATIENT
Start: 2023-10-07 | End: 2024-01-05

## 2023-08-22 RX ORDER — OXYCODONE AND ACETAMINOPHEN 10; 325 MG/1; MG/1
1 TABLET ORAL EVERY 4 HOURS PRN
Qty: 180 TABLET | Refills: 0 | Status: SHIPPED | OUTPATIENT
Start: 2023-09-10 | End: 2023-08-23
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
## 2023-08-22 NOTE — PROGRESS NOTES
08/22/23- PT presents to [facility] in w/c accompanied by son. Pt reports pain rating as a 8/10. Reports pain in the shoulders bilaterally, hands, feet, knees, abdomen, upper and lower back. Pt reports tingling in the hands and feet. Bilateral swelling in the legs. Seen by Dr. Joan Gannon. Review dictation for further plan of care.     [place], [place].

## 2023-08-23 ENCOUNTER — OFFICE VISIT (OUTPATIENT)
Facility: CLINIC | Age: 78
End: 2023-08-23

## 2023-08-23 ENCOUNTER — LAB ENCOUNTER (OUTPATIENT)
Dept: LAB | Facility: HOSPITAL | Age: 78
End: 2023-08-23
Attending: INTERNAL MEDICINE
Payer: MEDICARE

## 2023-08-23 VITALS
DIASTOLIC BLOOD PRESSURE: 55 MMHG | WEIGHT: 191 LBS | SYSTOLIC BLOOD PRESSURE: 110 MMHG | BODY MASS INDEX: 33 KG/M2 | HEART RATE: 77 BPM

## 2023-08-23 DIAGNOSIS — E11.21 TYPE 2 DIABETES MELLITUS WITH NEPHROPATHY (HCC): ICD-10-CM

## 2023-08-23 DIAGNOSIS — N18.30 HYPERTENSIVE KIDNEY DISEASE WITH STAGE 3 CHRONIC KIDNEY DISEASE, UNSPECIFIED WHETHER STAGE 3A OR 3B CKD (HCC): ICD-10-CM

## 2023-08-23 DIAGNOSIS — I12.9 HYPERTENSIVE KIDNEY DISEASE WITH STAGE 3 CHRONIC KIDNEY DISEASE, UNSPECIFIED WHETHER STAGE 3A OR 3B CKD (HCC): ICD-10-CM

## 2023-08-23 DIAGNOSIS — N18.30 STAGE 3 CHRONIC KIDNEY DISEASE, UNSPECIFIED WHETHER STAGE 3A OR 3B CKD (HCC): Primary | ICD-10-CM

## 2023-08-23 DIAGNOSIS — N18.30 ANEMIA DUE TO STAGE 3 CHRONIC KIDNEY DISEASE, UNSPECIFIED WHETHER STAGE 3A OR 3B CKD: ICD-10-CM

## 2023-08-23 DIAGNOSIS — E87.5 HYPERKALEMIA: ICD-10-CM

## 2023-08-23 DIAGNOSIS — N25.81 SECONDARY HYPERPARATHYROIDISM OF RENAL ORIGIN (HCC): ICD-10-CM

## 2023-08-23 DIAGNOSIS — N18.30 STAGE 3 CHRONIC KIDNEY DISEASE, UNSPECIFIED WHETHER STAGE 3A OR 3B CKD (HCC): ICD-10-CM

## 2023-08-23 DIAGNOSIS — I50.32 DIASTOLIC DYSFUNCTION WITH CHRONIC HEART FAILURE (HCC): ICD-10-CM

## 2023-08-23 DIAGNOSIS — D63.1 ANEMIA DUE TO STAGE 3 CHRONIC KIDNEY DISEASE, UNSPECIFIED WHETHER STAGE 3A OR 3B CKD: ICD-10-CM

## 2023-08-23 DIAGNOSIS — R60.9 EDEMA, UNSPECIFIED TYPE: ICD-10-CM

## 2023-08-23 LAB
ALBUMIN SERPL-MCNC: 3.4 G/DL (ref 3.4–5)
ANION GAP SERPL CALC-SCNC: 3 MMOL/L (ref 0–18)
BILIRUB UR QL: NEGATIVE
BUN BLD-MCNC: 60 MG/DL (ref 7–18)
BUN/CREAT SERPL: 24.3 (ref 10–20)
CALCIUM BLD-MCNC: 8.5 MG/DL (ref 8.5–10.1)
CHLORIDE SERPL-SCNC: 117 MMOL/L (ref 98–112)
CLARITY UR: CLEAR
CO2 SERPL-SCNC: 23 MMOL/L (ref 21–32)
CREAT BLD-MCNC: 2.47 MG/DL
CREAT UR-SCNC: 95.5 MG/DL
DEPRECATED RDW RBC AUTO: 51.1 FL (ref 35.1–46.3)
EGFRCR SERPLBLD CKD-EPI 2021: 20 ML/MIN/1.73M2 (ref 60–?)
ERYTHROCYTE [DISTWIDTH] IN BLOOD BY AUTOMATED COUNT: 14.3 % (ref 11–15)
GLUCOSE BLD-MCNC: 105 MG/DL (ref 70–99)
GLUCOSE UR-MCNC: NORMAL MG/DL
HCT VFR BLD AUTO: 27 %
HGB BLD-MCNC: 8.1 G/DL
HGB UR QL STRIP.AUTO: NEGATIVE
KETONES UR-MCNC: NEGATIVE MG/DL
LEUKOCYTE ESTERASE UR QL STRIP.AUTO: NEGATIVE
MCH RBC QN AUTO: 29.6 PG (ref 26–34)
MCHC RBC AUTO-ENTMCNC: 30 G/DL (ref 31–37)
MCV RBC AUTO: 98.5 FL
NITRITE UR QL STRIP.AUTO: NEGATIVE
OSMOLALITY SERPL CALC.SUM OF ELEC: 313 MOSM/KG (ref 275–295)
PH UR: 5.5 [PH] (ref 5–8)
PHOSPHATE SERPL-MCNC: 4.2 MG/DL (ref 2.5–4.9)
PLATELET # BLD AUTO: 204 10(3)UL (ref 150–450)
POTASSIUM SERPL-SCNC: 5.7 MMOL/L (ref 3.5–5.1)
PROT UR-MCNC: 13.7 MG/DL
PROT UR-MCNC: NEGATIVE MG/DL
PROT/CREAT UR-RTO: 0.14
PTH-INTACT SERPL-MCNC: 110.3 PG/ML (ref 18.5–88)
RBC # BLD AUTO: 2.74 X10(6)UL
SODIUM SERPL-SCNC: 143 MMOL/L (ref 136–145)
SP GR UR STRIP: 1.02 (ref 1–1.03)
UROBILINOGEN UR STRIP-ACNC: NORMAL
WBC # BLD AUTO: 5.1 X10(3) UL (ref 4–11)

## 2023-08-23 PROCEDURE — 83970 ASSAY OF PARATHORMONE: CPT

## 2023-08-23 PROCEDURE — 99214 OFFICE O/P EST MOD 30 MIN: CPT | Performed by: INTERNAL MEDICINE

## 2023-08-23 PROCEDURE — 80069 RENAL FUNCTION PANEL: CPT

## 2023-08-23 PROCEDURE — 85027 COMPLETE CBC AUTOMATED: CPT

## 2023-08-23 PROCEDURE — 1111F DSCHRG MED/CURRENT MED MERGE: CPT | Performed by: INTERNAL MEDICINE

## 2023-08-23 PROCEDURE — 3074F SYST BP LT 130 MM HG: CPT | Performed by: INTERNAL MEDICINE

## 2023-08-23 PROCEDURE — 36415 COLL VENOUS BLD VENIPUNCTURE: CPT

## 2023-08-23 PROCEDURE — 1159F MED LIST DOCD IN RCRD: CPT | Performed by: INTERNAL MEDICINE

## 2023-08-23 PROCEDURE — 84156 ASSAY OF PROTEIN URINE: CPT

## 2023-08-23 PROCEDURE — 82570 ASSAY OF URINE CREATININE: CPT

## 2023-08-23 PROCEDURE — 3078F DIAST BP <80 MM HG: CPT | Performed by: INTERNAL MEDICINE

## 2023-08-23 PROCEDURE — 1160F RVW MEDS BY RX/DR IN RCRD: CPT | Performed by: INTERNAL MEDICINE

## 2023-08-23 RX ORDER — FUROSEMIDE 20 MG/1
20 TABLET ORAL
Qty: 30 TABLET | Refills: 1 | Status: SHIPPED | OUTPATIENT
Start: 2023-08-23

## 2023-08-23 NOTE — PATIENT INSTRUCTIONS
Decrease amlodipine to 5 mg by mouth daily  Start furosemide 20 mg by mouth every Monday Wednesday and Friday  Low salt diet   Low potassium diet  Do blood test today

## 2023-08-24 ENCOUNTER — TELEPHONE (OUTPATIENT)
Facility: CLINIC | Age: 78
End: 2023-08-24

## 2023-08-24 DIAGNOSIS — N18.30 STAGE 3 CHRONIC KIDNEY DISEASE, UNSPECIFIED WHETHER STAGE 3A OR 3B CKD (HCC): Primary | ICD-10-CM

## 2023-08-24 NOTE — TELEPHONE ENCOUNTER
Called and reviewed labs with her  Renal function worse    K is elevated    Asked her to take veltassa daily ( she admits to not taking it regularly) ' states takes it here and there\".   Reiterated low K diet  Dec amlodipine to 5 mg /day and monitor BP    Dec salt intake    RN: please an earlier appt in October  ( oct 11 or whatever pt prefers ) with preclinic labs  Also please see if we can order BP cuff for her  thanks

## 2023-08-24 NOTE — TELEPHONE ENCOUNTER
Rajinder Pierce MD  [facility] Clinical Staff25 minutes ago (12:16 PM)     Please make an earlier appt in October  Can we order Bp cuff for her

## 2023-08-24 NOTE — TELEPHONE ENCOUNTER
Spoke to pt. Scheduled for 10-25 for f/u. Let know our office does not order bp cuffs but she can get them otc at the pharmacy. Verbalized understanding.

## 2023-10-04 ENCOUNTER — MED REC SCAN ONLY (OUTPATIENT)
Dept: NEPHROLOGY | Facility: CLINIC | Age: 78
End: 2023-10-04

## 2023-10-06 ENCOUNTER — APPOINTMENT (OUTPATIENT)
Dept: CT IMAGING | Facility: HOSPITAL | Age: 78
DRG: 552 | End: 2023-10-06
Attending: EMERGENCY MEDICINE

## 2023-10-06 ENCOUNTER — APPOINTMENT (OUTPATIENT)
Dept: MRI IMAGING | Facility: HOSPITAL | Age: 78
End: 2023-10-06
Attending: EMERGENCY MEDICINE

## 2023-10-06 ENCOUNTER — APPOINTMENT (OUTPATIENT)
Dept: CT IMAGING | Facility: HOSPITAL | Age: 78
End: 2023-10-06
Attending: EMERGENCY MEDICINE

## 2023-10-06 ENCOUNTER — APPOINTMENT (OUTPATIENT)
Dept: MRI IMAGING | Facility: HOSPITAL | Age: 78
DRG: 552 | End: 2023-10-06
Attending: EMERGENCY MEDICINE

## 2023-10-06 ENCOUNTER — APPOINTMENT (OUTPATIENT)
Dept: GENERAL RADIOLOGY | Facility: HOSPITAL | Age: 78
DRG: 552 | End: 2023-10-06
Attending: EMERGENCY MEDICINE

## 2023-10-06 ENCOUNTER — HOSPITAL ENCOUNTER (INPATIENT)
Facility: HOSPITAL | Age: 78
LOS: 6 days | Discharge: HOME HEALTH | DRG: 552 | End: 2023-10-12
Attending: EMERGENCY MEDICINE | Admitting: HOSPITALIST

## 2023-10-06 ENCOUNTER — HOSPITAL ENCOUNTER (INPATIENT)
Facility: HOSPITAL | Age: 78
LOS: 6 days | Discharge: HOME HEALTH | End: 2023-10-12
Attending: EMERGENCY MEDICINE | Admitting: HOSPITALIST

## 2023-10-06 ENCOUNTER — HOSPITAL ENCOUNTER (OUTPATIENT)
Facility: HOSPITAL | Age: 78
Setting detail: OBSERVATION
Discharge: HOME HEALTH | DRG: 552 | End: 2023-10-12
Attending: EMERGENCY MEDICINE | Admitting: HOSPITALIST

## 2023-10-06 ENCOUNTER — APPOINTMENT (OUTPATIENT)
Dept: GENERAL RADIOLOGY | Facility: HOSPITAL | Age: 78
End: 2023-10-06
Attending: EMERGENCY MEDICINE

## 2023-10-06 DIAGNOSIS — C49.9 LEIOMYOSARCOMA (HCC): ICD-10-CM
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
## 2023-10-06 DIAGNOSIS — R53.1 WEAKNESS: Primary | ICD-10-CM

## 2023-10-06 LAB
ALBUMIN SERPL-MCNC: 3.2 G/DL (ref 3.4–5)
ALP LIVER SERPL-CCNC: 222 U/L
ALT SERPL-CCNC: 86 U/L
ANION GAP SERPL CALC-SCNC: 4 MMOL/L (ref 0–18)
AST SERPL-CCNC: 96 U/L (ref 15–37)
BASOPHILS # BLD AUTO: 0.05 X10(3) UL (ref 0–0.2)
BASOPHILS NFR BLD AUTO: 0.9 %
BILIRUB DIRECT SERPL-MCNC: <0.1 MG/DL (ref 0–0.2)
BILIRUB SERPL-MCNC: 0.4 MG/DL (ref 0.1–2)
BUN BLD-MCNC: 36 MG/DL (ref 7–18)
BUN/CREAT SERPL: 20.7 (ref 10–20)
CALCIUM BLD-MCNC: 8.9 MG/DL (ref 8.5–10.1)
CHLORIDE SERPL-SCNC: 116 MMOL/L (ref 98–112)
CO2 SERPL-SCNC: 22 MMOL/L (ref 21–32)
CREAT BLD-MCNC: 1.74 MG/DL
DEPRECATED RDW RBC AUTO: 44.6 FL (ref 35.1–46.3)
EGFRCR SERPLBLD CKD-EPI 2021: 30 ML/MIN/1.73M2 (ref 60–?)
EOSINOPHIL # BLD AUTO: 0.16 X10(3) UL (ref 0–0.7)
EOSINOPHIL NFR BLD AUTO: 2.7 %
ERYTHROCYTE [DISTWIDTH] IN BLOOD BY AUTOMATED COUNT: 13.1 % (ref 11–15)
GLUCOSE BLD-MCNC: 110 MG/DL (ref 70–99)
GLUCOSE BLDC GLUCOMTR-MCNC: 95 MG/DL (ref 70–99)
HCT VFR BLD AUTO: 30.3 %
HGB BLD-MCNC: 9.4 G/DL
IMM GRANULOCYTES # BLD AUTO: 0.02 X10(3) UL (ref 0–1)
IMM GRANULOCYTES NFR BLD: 0.3 %
LIPASE SERPL-CCNC: 16 U/L (ref 13–75)
LYMPHOCYTES # BLD AUTO: 0.65 X10(3) UL (ref 1–4)
LYMPHOCYTES NFR BLD AUTO: 11.1 %
MCH RBC QN AUTO: 29 PG (ref 26–34)
MCHC RBC AUTO-ENTMCNC: 31 G/DL (ref 31–37)
MCV RBC AUTO: 93.5 FL
MONOCYTES # BLD AUTO: 0.4 X10(3) UL (ref 0.1–1)
MONOCYTES NFR BLD AUTO: 6.8 %
NEUTROPHILS # BLD AUTO: 4.59 X10 (3) UL (ref 1.5–7.7)
NEUTROPHILS # BLD AUTO: 4.59 X10(3) UL (ref 1.5–7.7)
NEUTROPHILS NFR BLD AUTO: 78.2 %
NT-PROBNP SERPL-MCNC: 1526 PG/ML (ref ?–450)
OSMOLALITY SERPL CALC.SUM OF ELEC: 303 MOSM/KG (ref 275–295)
PLATELET # BLD AUTO: 206 10(3)UL (ref 150–450)
POTASSIUM SERPL-SCNC: 4.8 MMOL/L (ref 3.5–5.1)
PROT SERPL-MCNC: 7.1 G/DL (ref 6.4–8.2)
RBC # BLD AUTO: 3.24 X10(6)UL
SODIUM SERPL-SCNC: 142 MMOL/L (ref 136–145)
TROPONIN I HIGH SENSITIVITY: 15 NG/L
WBC # BLD AUTO: 5.9 X10(3) UL (ref 4–11)

## 2023-10-06 PROCEDURE — 70450 CT HEAD/BRAIN W/O DYE: CPT | Performed by: EMERGENCY MEDICINE

## 2023-10-06 PROCEDURE — 72158 MRI LUMBAR SPINE W/O & W/DYE: CPT | Performed by: EMERGENCY MEDICINE

## 2023-10-06 PROCEDURE — 74176 CT ABD & PELVIS W/O CONTRAST: CPT | Performed by: EMERGENCY MEDICINE

## 2023-10-06 PROCEDURE — 72128 CT CHEST SPINE W/O DYE: CPT | Performed by: EMERGENCY MEDICINE

## 2023-10-06 PROCEDURE — 72131 CT LUMBAR SPINE W/O DYE: CPT | Performed by: EMERGENCY MEDICINE

## 2023-10-06 PROCEDURE — 72125 CT NECK SPINE W/O DYE: CPT | Performed by: EMERGENCY MEDICINE

## 2023-10-06 PROCEDURE — 71045 X-RAY EXAM CHEST 1 VIEW: CPT | Performed by: EMERGENCY MEDICINE

## 2023-10-06 RX ORDER — BISACODYL 10 MG
10 SUPPOSITORY, RECTAL RECTAL
Status: DISCONTINUED | OUTPATIENT
Start: 2023-10-06 | End: 2023-10-12

## 2023-10-06 RX ORDER — AMLODIPINE BESYLATE 10 MG/1
10 TABLET ORAL DAILY
Status: DISCONTINUED | OUTPATIENT
Start: 2023-10-07 | End: 2023-10-12

## 2023-10-06 RX ORDER — POLYETHYLENE GLYCOL 3350 17 G/17G
17 POWDER, FOR SOLUTION ORAL DAILY PRN
Status: DISCONTINUED | OUTPATIENT
Start: 2023-10-06 | End: 2023-10-12

## 2023-10-06 RX ORDER — ISOSORBIDE MONONITRATE 30 MG/1
60 TABLET, EXTENDED RELEASE ORAL DAILY
Status: DISCONTINUED | OUTPATIENT
Start: 2023-10-06 | End: 2023-10-12

## 2023-10-06 RX ORDER — GADOTERATE MEGLUMINE 376.9 MG/ML
20 INJECTION INTRAVENOUS
Status: COMPLETED | OUTPATIENT
Start: 2023-10-06 | End: 2023-10-06

## 2023-10-06 RX ORDER — SENNOSIDES 8.6 MG
17.2 TABLET ORAL NIGHTLY PRN
Status: DISCONTINUED | OUTPATIENT
Start: 2023-10-06 | End: 2023-10-12

## 2023-10-06 RX ORDER — MELATONIN
2000 DAILY
Status: DISCONTINUED | OUTPATIENT
Start: 2023-10-07 | End: 2023-10-12

## 2023-10-06 RX ORDER — MORPHINE SULFATE 4 MG/ML
4 INJECTION, SOLUTION INTRAMUSCULAR; INTRAVENOUS EVERY 2 HOUR PRN
Status: DISCONTINUED | OUTPATIENT
Start: 2023-10-06 | End: 2023-10-12

## 2023-10-06 RX ORDER — MECLIZINE HCL 12.5 MG/1
25 TABLET ORAL 3 TIMES DAILY PRN
Status: DISCONTINUED | OUTPATIENT
Start: 2023-10-06 | End: 2023-10-12

## 2023-10-06 RX ORDER — MORPHINE SULFATE 2 MG/ML
2 INJECTION, SOLUTION INTRAMUSCULAR; INTRAVENOUS EVERY 2 HOUR PRN
Status: DISCONTINUED | OUTPATIENT
Start: 2023-10-06 | End: 2023-10-12

## 2023-10-06 RX ORDER — HYDRALAZINE HYDROCHLORIDE 25 MG/1
50 TABLET, FILM COATED ORAL 2 TIMES DAILY
Status: DISCONTINUED | OUTPATIENT
Start: 2023-10-06 | End: 2023-10-12

## 2023-10-06 RX ORDER — OXYCODONE AND ACETAMINOPHEN 10; 325 MG/1; MG/1
1 TABLET ORAL EVERY 4 HOURS PRN
Status: DISCONTINUED | OUTPATIENT
Start: 2023-10-10 | End: 2023-10-06

## 2023-10-06 RX ORDER — OXYCODONE AND ACETAMINOPHEN 10; 325 MG/1; MG/1
1 TABLET ORAL EVERY 4 HOURS PRN
Status: DISCONTINUED | OUTPATIENT
Start: 2023-10-06 | End: 2023-10-12

## 2023-10-06 RX ORDER — METOCLOPRAMIDE HYDROCHLORIDE 5 MG/ML
5 INJECTION INTRAMUSCULAR; INTRAVENOUS EVERY 8 HOURS PRN
Status: DISCONTINUED | OUTPATIENT
Start: 2023-10-06 | End: 2023-10-12

## 2023-10-06 RX ORDER — ACETAMINOPHEN 500 MG
500 TABLET ORAL EVERY 4 HOURS PRN
Status: DISCONTINUED | OUTPATIENT
Start: 2023-10-06 | End: 2023-10-12

## 2023-10-06 RX ORDER — ONDANSETRON 2 MG/ML
4 INJECTION INTRAMUSCULAR; INTRAVENOUS EVERY 6 HOURS PRN
Status: DISCONTINUED | OUTPATIENT
Start: 2023-10-06 | End: 2023-10-12

## 2023-10-06 RX ORDER — MORPHINE SULFATE 2 MG/ML
1 INJECTION, SOLUTION INTRAMUSCULAR; INTRAVENOUS EVERY 2 HOUR PRN
Status: DISCONTINUED | OUTPATIENT
Start: 2023-10-06 | End: 2023-10-12

## 2023-10-06 RX ORDER — OXYCODONE AND ACETAMINOPHEN 10; 325 MG/1; MG/1
2 TABLET ORAL EVERY 4 HOURS PRN
Status: DISCONTINUED | OUTPATIENT
Start: 2023-10-06 | End: 2023-10-12

## 2023-10-06 RX ORDER — ATORVASTATIN CALCIUM 40 MG/1
40 TABLET, FILM COATED ORAL NIGHTLY
Status: DISCONTINUED | OUTPATIENT
Start: 2023-10-06 | End: 2023-10-12

## 2023-10-06 RX ORDER — FUROSEMIDE 20 MG/1
20 TABLET ORAL
Status: DISCONTINUED | OUTPATIENT
Start: 2023-10-09 | End: 2023-10-12

## 2023-10-06 RX ORDER — DIAZEPAM 5 MG/1
10 TABLET ORAL EVERY 8 HOURS PRN
Status: DISCONTINUED | OUTPATIENT
Start: 2023-10-06 | End: 2023-10-12

## 2023-10-06 RX ORDER — OXYCODONE AND ACETAMINOPHEN 10; 325 MG/1; MG/1
2 TABLET ORAL EVERY 4 HOURS PRN
Status: DISCONTINUED | OUTPATIENT
Start: 2023-10-10 | End: 2023-10-06

## 2023-10-06 RX ORDER — SODIUM CHLORIDE 9 MG/ML
INJECTION, SOLUTION INTRAVENOUS CONTINUOUS
Status: DISCONTINUED | OUTPATIENT
Start: 2023-10-06 | End: 2023-10-08

## 2023-10-06 RX ORDER — GABAPENTIN 300 MG/1
300 CAPSULE ORAL 2 TIMES DAILY
Status: DISCONTINUED | OUTPATIENT
Start: 2023-10-06 | End: 2023-10-12

## 2023-10-06 NOTE — ED INITIAL ASSESSMENT (HPI)
Fall last night (up to 3 in past week?), generalized pain, but now stating in her stomach.  C-collar in place pta.    +eliquis (last dose today)

## 2023-10-06 NOTE — ED QUICK NOTES
Rounding Completed    Plan of Care reviewed. Waiting for mri. Elimination needs assessed. Provided additional warm blanket for comfort. Bed is locked and in lowest position. Call light within reach.

## 2023-10-07 ENCOUNTER — APPOINTMENT (OUTPATIENT)
Dept: MRI IMAGING | Facility: HOSPITAL | Age: 78
DRG: 552 | End: 2023-10-07
Attending: HOSPITALIST

## 2023-10-07 ENCOUNTER — APPOINTMENT (OUTPATIENT)
Dept: MRI IMAGING | Facility: HOSPITAL | Age: 78
End: 2023-10-07
Attending: HOSPITALIST

## 2023-10-07 LAB
ADENOVIRUS PCR:: NOT DETECTED
ALBUMIN SERPL-MCNC: 2.7 G/DL (ref 3.4–5)
ALBUMIN/GLOB SERPL: 0.8 {RATIO} (ref 1–2)
ALP LIVER SERPL-CCNC: 425 U/L
ALT SERPL-CCNC: 427 U/L
ANION GAP SERPL CALC-SCNC: 3 MMOL/L (ref 0–18)
APAP SERPL-MCNC: <2 UG/ML (ref 10–30)
AST SERPL-CCNC: 775 U/L (ref 15–37)
B PARAPERT DNA SPEC QL NAA+PROBE: NOT DETECTED
B PERT DNA SPEC QL NAA+PROBE: NOT DETECTED
BASOPHILS # BLD AUTO: 0.03 X10(3) UL (ref 0–0.2)
BASOPHILS NFR BLD AUTO: 0.9 %
BILIRUB SERPL-MCNC: 0.7 MG/DL (ref 0.1–2)
BILIRUB UR QL: NEGATIVE
BUN BLD-MCNC: 30 MG/DL (ref 7–18)
BUN/CREAT SERPL: 21.9 (ref 10–20)
C DIFF GDH + TOXINS A+B STL QL IA.RAPID: NOT DETECTED
C DIFF TOX B STL QL: POSITIVE
C PNEUM DNA SPEC QL NAA+PROBE: NOT DETECTED
CALCIUM BLD-MCNC: 8.4 MG/DL (ref 8.5–10.1)
CHLORIDE SERPL-SCNC: 117 MMOL/L (ref 98–112)
CK SERPL-CCNC: 207 U/L
CLARITY UR: CLEAR
CO2 SERPL-SCNC: 23 MMOL/L (ref 21–32)
COLOR UR: YELLOW
CORONAVIRUS 229E PCR:: NOT DETECTED
CORONAVIRUS HKU1 PCR:: NOT DETECTED
CORONAVIRUS NL63 PCR:: NOT DETECTED
CORONAVIRUS OC43 PCR:: NOT DETECTED
CREAT BLD-MCNC: 1.37 MG/DL
DEPRECATED RDW RBC AUTO: 45 FL (ref 35.1–46.3)
EGFRCR SERPLBLD CKD-EPI 2021: 40 ML/MIN/1.73M2 (ref 60–?)
EOSINOPHIL # BLD AUTO: 0.16 X10(3) UL (ref 0–0.7)
EOSINOPHIL NFR BLD AUTO: 4.7 %
ERYTHROCYTE [DISTWIDTH] IN BLOOD BY AUTOMATED COUNT: 13.2 % (ref 11–15)
FLUAV RNA SPEC QL NAA+PROBE: NOT DETECTED
FLUBV RNA SPEC QL NAA+PROBE: NOT DETECTED
GLOBULIN PLAS-MCNC: 3.6 G/DL (ref 2.8–4.4)
GLUCOSE BLD-MCNC: 83 MG/DL (ref 70–99)
GLUCOSE UR-MCNC: NORMAL MG/DL
HAPTOGLOB SERPL-MCNC: 155 MG/DL (ref 30–200)
HAV IGM SER QL: NONREACTIVE
HBV CORE IGM SER QL: NONREACTIVE
HBV SURFACE AG SERPL QL IA: NONREACTIVE
HCT VFR BLD AUTO: 31.2 %
HCV AB SERPL QL IA: NONREACTIVE
HGB BLD-MCNC: 9.8 G/DL
HGB UR QL STRIP.AUTO: NEGATIVE
IMM GRANULOCYTES # BLD AUTO: 0.01 X10(3) UL (ref 0–1)
IMM GRANULOCYTES NFR BLD: 0.3 %
INR BLD: 1.11 (ref 0.85–1.16)
KETONES UR-MCNC: NEGATIVE MG/DL
LDH SERPL L TO P-CCNC: 355 U/L
LEUKOCYTE ESTERASE UR QL STRIP.AUTO: NEGATIVE
LYMPHOCYTES # BLD AUTO: 0.7 X10(3) UL (ref 1–4)
LYMPHOCYTES NFR BLD AUTO: 20.4 %
MCH RBC QN AUTO: 29.3 PG (ref 26–34)
MCHC RBC AUTO-ENTMCNC: 31.4 G/DL (ref 31–37)
MCV RBC AUTO: 93.1 FL
METAPNEUMOVIRUS PCR:: NOT DETECTED
MONOCYTES # BLD AUTO: 0.3 X10(3) UL (ref 0.1–1)
MONOCYTES NFR BLD AUTO: 8.7 %
MYCOPLASMA PNEUMONIA PCR:: NOT DETECTED
NEUTROPHILS # BLD AUTO: 2.23 X10 (3) UL (ref 1.5–7.7)
NEUTROPHILS # BLD AUTO: 2.23 X10(3) UL (ref 1.5–7.7)
NEUTROPHILS NFR BLD AUTO: 65 %
NITRITE UR QL STRIP.AUTO: NEGATIVE
OSMOLALITY SERPL CALC.SUM OF ELEC: 301 MOSM/KG (ref 275–295)
PARAINFLUENZA 1 PCR:: NOT DETECTED
PARAINFLUENZA 2 PCR:: NOT DETECTED
PARAINFLUENZA 3 PCR:: NOT DETECTED
PARAINFLUENZA 4 PCR:: NOT DETECTED
PH UR: 5.5 [PH] (ref 5–8)
PLATELET # BLD AUTO: 202 10(3)UL (ref 150–450)
POTASSIUM SERPL-SCNC: 4 MMOL/L (ref 3.5–5.1)
PROT SERPL-MCNC: 6.3 G/DL (ref 6.4–8.2)
PROTHROMBIN TIME: 15 SECONDS (ref 11.6–14.8)
RBC # BLD AUTO: 3.35 X10(6)UL
RHINOVIRUS/ENTERO PCR:: NOT DETECTED
RSV RNA SPEC QL NAA+PROBE: NOT DETECTED
SARS-COV-2 RNA NPH QL NAA+NON-PROBE: NOT DETECTED
SODIUM SERPL-SCNC: 143 MMOL/L (ref 136–145)
SP GR UR STRIP: 1.02 (ref 1–1.03)
TSI SER-ACNC: 0.61 MIU/ML (ref 0.36–3.74)
UROBILINOGEN UR STRIP-ACNC: 3
WBC # BLD AUTO: 3.4 X10(3) UL (ref 4–11)

## 2023-10-07 PROCEDURE — 72146 MRI CHEST SPINE W/O DYE: CPT | Performed by: HOSPITALIST

## 2023-10-07 PROCEDURE — 99223 1ST HOSP IP/OBS HIGH 75: CPT | Performed by: STUDENT IN AN ORGANIZED HEALTH CARE EDUCATION/TRAINING PROGRAM

## 2023-10-07 PROCEDURE — 72157 MRI CHEST SPINE W/O & W/DYE: CPT | Performed by: HOSPITALIST

## 2023-10-07 RX ORDER — GADOTERATE MEGLUMINE 376.9 MG/ML
20 INJECTION INTRAVENOUS
Status: COMPLETED | OUTPATIENT
Start: 2023-10-07 | End: 2023-10-07

## 2023-10-07 RX ORDER — VANCOMYCIN HYDROCHLORIDE 50 MG/ML
125 KIT ORAL 4 TIMES DAILY
Status: DISCONTINUED | OUTPATIENT
Start: 2023-10-07 | End: 2023-10-09

## 2023-10-07 NOTE — PLAN OF CARE
Went for MRI to determine treatment plan. Tolerating diet. IVF infusing. Voiding freely via [id number]. remains weak, strict fall precautions in place. Percocet available prn for pain control. Repeat EKG completed, remote tele applied. VSS. Problem: Diabetes/Glucose Control  Goal: Glucose maintained within prescribed range  Description: INTERVENTIONS:  - Monitor Blood Glucose as ordered  - Assess for signs and symptoms of hyperglycemia and hypoglycemia  - Administer ordered medications to maintain glucose within target range  - Assess barriers to adequate nutritional intake and initiate nutrition consult as needed  - Instruct patient on self management of diabetes  Outcome: Progressing     Problem: Patient Centered Care  Goal: Patient preferences are identified and integrated in the patient's plan of care  Description: Interventions:  - What would you like us to know as we care for you?  I am usually on the 3rd floor  - Provide timely, complete, and accurate information to patient/family  - Incorporate patient and family knowledge, values, beliefs, and cultural backgrounds into the planning and delivery of care  - Encourage patient/family to participate in care and decision-making at the level they choose  - Honor patient and family perspectives and choices  Outcome: Progressing     Problem: SKIN/TISSUE INTEGRITY - ADULT  Goal: Skin integrity remains intact  Description: INTERVENTIONS  - Assess and document risk factors for pressure ulcer development  - Assess and document skin integrity  - Monitor for areas of redness and/or skin breakdown  - Initiate interventions, skin care algorithm/standards of care as needed  Outcome: Progressing     Problem: MUSCULOSKELETAL - ADULT  Goal: Return mobility to safest level of function  Description: INTERVENTIONS:  - Assess patient stability and activity tolerance for standing, transferring and ambulating w/ or w/o assistive devices  - Assist with transfers and ambulation using safe patient handling equipment as needed  - Ensure adequate protection for wounds/incisions during mobilization  - Obtain PT/OT consults as needed  - Advance activity as appropriate  - Communicate ordered activity level and limitations with patient/family  Outcome: Progressing  Goal: Maintain proper alignment of affected body part  Description: INTERVENTIONS:  - Support and protect limb and body alignment per provider's orders  - Instruct and reinforce with patient and family use of appropriate assistive device and precautions (e.g. spinal or hip dislocation precautions)  Outcome: Progressing     Problem: PAIN - ADULT  Goal: Verbalizes/displays adequate comfort level or patient's stated pain goal  Description: INTERVENTIONS:  - Encourage pt to monitor pain and request assistance  - Assess pain using appropriate pain scale  - Administer analgesics based on type and severity of pain and evaluate response  - Implement non-pharmacological measures as appropriate and evaluate response  - Consider cultural and social influences on pain and pain management  - Manage/alleviate anxiety  - Utilize distraction and/or relaxation techniques  - Monitor for opioid side effects  - Notify MD/LIP if interventions unsuccessful or patient reports new pain  - Anticipate increased pain with activity and pre-medicate as appropriate  Outcome: Progressing     Problem: SAFETY ADULT - FALL  Goal: Free from fall injury  Description: INTERVENTIONS:  - Assess pt frequently for physical needs  - Identify cognitive and physical deficits and behaviors that affect risk of falls.   - Seibert fall precautions as indicated by assessment.  - Educate pt/family on patient safety including physical limitations  - Instruct pt to call for assistance with activity based on assessment  - Modify environment to reduce risk of injury  - Provide assistive devices as appropriate  - Consider OT/PT consult to assist with strengthening/mobility  - Encourage toileting schedule  Outcome: Progressing     Problem: DISCHARGE PLANNING  Goal: Discharge to home or other facility with appropriate resources  Description: INTERVENTIONS:  - Identify barriers to discharge w/pt and caregiver  - Include patient/family/discharge partner in discharge planning  - Arrange for needed discharge resources and transportation as appropriate  - Identify discharge learning needs (meds, wound care, etc)  - Arrange for interpreters to assist at discharge as needed  - Consider post-discharge preferences of patient/family/discharge partner  - Complete POLST form as appropriate  - Assess patient's ability to be responsible for managing their own health  - Refer to Case Management Department for coordinating discharge planning if the patient needs post-hospital services based on physician/LIP order or complex needs related to functional status, cognitive ability or social support system  Outcome: Progressing

## 2023-10-07 NOTE — PLAN OF CARE
Problem: Diabetes/Glucose Control  Goal: Glucose maintained within prescribed range  Description: INTERVENTIONS:  - Monitor Blood Glucose as ordered  - Assess for signs and symptoms of hyperglycemia and hypoglycemia  - Administer ordered medications to maintain glucose within target range  - Assess barriers to adequate nutritional intake and initiate nutrition consult as needed  - Instruct patient on self management of diabetes  Outcome: Progressing     Problem: Patient Centered Care  Goal: Patient preferences are identified and integrated in the patient's plan of care  Description: Interventions:  - What would you like us to know as we care for you?  I am usually on the 3rd floor  - Provide timely, complete, and accurate information to patient/family  - Incorporate patient and family knowledge, values, beliefs, and cultural backgrounds into the planning and delivery of care  - Encourage patient/family to participate in care and decision-making at the level they choose  - Honor patient and family perspectives and choices  Outcome: Progressing     Problem: SKIN/TISSUE INTEGRITY - ADULT  Goal: Skin integrity remains intact  Description: INTERVENTIONS  - Assess and document risk factors for pressure ulcer development  - Assess and document skin integrity  - Monitor for areas of redness and/or skin breakdown  - Initiate interventions, skin care algorithm/standards of care as needed  Outcome: Progressing     Problem: MUSCULOSKELETAL - ADULT  Goal: Return mobility to safest level of function  Description: INTERVENTIONS:  - Assess patient stability and activity tolerance for standing, transferring and ambulating w/ or w/o assistive devices  - Assist with transfers and ambulation using safe patient handling equipment as needed  - Ensure adequate protection for wounds/incisions during mobilization  - Obtain PT/OT consults as needed  - Advance activity as appropriate  - Communicate ordered activity level and limitations with patient/family  Outcome: Progressing  Goal: Maintain proper alignment of affected body part  Description: INTERVENTIONS:  - Support and protect limb and body alignment per provider's orders  - Instruct and reinforce with patient and family use of appropriate assistive device and precautions (e.g. spinal or hip dislocation precautions)  Outcome: Progressing     Problem: PAIN - ADULT  Goal: Verbalizes/displays adequate comfort level or patient's stated pain goal  Description: INTERVENTIONS:  - Encourage pt to monitor pain and request assistance  - Assess pain using appropriate pain scale  - Administer analgesics based on type and severity of pain and evaluate response  - Implement non-pharmacological measures as appropriate and evaluate response  - Consider cultural and social influences on pain and pain management  - Manage/alleviate anxiety  - Utilize distraction and/or relaxation techniques  - Monitor for opioid side effects  - Notify MD/LIP if interventions unsuccessful or patient reports new pain  - Anticipate increased pain with activity and pre-medicate as appropriate  Outcome: Progressing     Problem: SAFETY ADULT - FALL  Goal: Free from fall injury  Description: INTERVENTIONS:  - Assess pt frequently for physical needs  - Identify cognitive and physical deficits and behaviors that affect risk of falls.   - [place] fall precautions as indicated by assessment.  - Educate pt/family on patient safety including physical limitations  - Instruct pt to call for assistance with activity based on assessment  - Modify environment to reduce risk of injury  - Provide assistive devices as appropriate  - Consider OT/PT consult to assist with strengthening/mobility  - Encourage toileting schedule  Outcome: Progressing     Problem: DISCHARGE PLANNING  Goal: Discharge to home or other facility with appropriate resources  Description: INTERVENTIONS:  - Identify barriers to discharge w/pt and caregiver  - Include patient/family/discharge partner in discharge planning  - Arrange for needed discharge resources and transportation as appropriate  - Identify discharge learning needs (meds, wound care, etc)  - Arrange for interpreters to assist at discharge as needed  - Consider post-discharge preferences of patient/family/discharge partner  - Complete POLST form as appropriate  - Assess patient's ability to be responsible for managing their own health  - Refer to Case Management Department for coordinating discharge planning if the patient needs post-hospital services based on physician/LIP order or complex needs related to functional status, cognitive ability or social support system  Outcome: Tanvir Wolf arrived from ED this evening. Patient is alert and oriented x4, drowsy and withdrawn. Room air. Pain controlled with PRN Percocet. Denies any nausea. Renal diet. Moderate assist. Claudette Oh in place. IVF. Plan for neurosurgery to see in AM. Safety measures in place, call light within reach, will continue to monitor.

## 2023-10-07 NOTE — OCCUPATIONAL THERAPY NOTE
Chart reviewed. Hold eval per RN. Awaiting clearance from neuro. OT will re-attempt as able and when medically appropriate.

## 2023-10-07 NOTE — CM/SW NOTE
HH orders received. Per chart review pt states they are current with [facility]. Pt was last dc'd from [address] [place] [address] with [facility]. HH ref send to [id number] asking if pt is current with their service. Pola Bonds.  Shona Hall RN, BSN  Nurse   [phone]

## 2023-10-08 PROBLEM — M47.14 THORACIC MYELOPATHY: Status: ACTIVE | Noted: 2023-10-08

## 2023-10-08 PROBLEM — G82.20 PARAPARESIS (HCC): Status: ACTIVE | Noted: 2023-10-08

## 2023-10-08 PROBLEM — M47.816 LUMBAR SPONDYLOSIS: Status: ACTIVE | Noted: 2023-10-08

## 2023-10-08 LAB
ALBUMIN SERPL-MCNC: 2.6 G/DL (ref 3.4–5)
ALBUMIN/GLOB SERPL: 0.8 {RATIO} (ref 1–2)
ALP LIVER SERPL-CCNC: 345 U/L
ALT SERPL-CCNC: 212 U/L
ANION GAP SERPL CALC-SCNC: 0 MMOL/L (ref 0–18)
AST SERPL-CCNC: 186 U/L (ref 15–37)
ATRIAL RATE: 67 BPM
ATRIAL RATE: 84 BPM
BASOPHILS # BLD AUTO: 0.02 X10(3) UL (ref 0–0.2)
BASOPHILS NFR BLD AUTO: 0.4 %
BILIRUB SERPL-MCNC: 0.3 MG/DL (ref 0.1–2)
BUN BLD-MCNC: 29 MG/DL (ref 7–18)
BUN/CREAT SERPL: 20.9 (ref 10–20)
CALCIUM BLD-MCNC: 8.3 MG/DL (ref 8.5–10.1)
CHLORIDE SERPL-SCNC: 118 MMOL/L (ref 98–112)
CO2 SERPL-SCNC: 28 MMOL/L (ref 21–32)
CREAT BLD-MCNC: 1.39 MG/DL
DEPRECATED RDW RBC AUTO: 45.6 FL (ref 35.1–46.3)
EGFRCR SERPLBLD CKD-EPI 2021: 39 ML/MIN/1.73M2 (ref 60–?)
EOSINOPHIL # BLD AUTO: 0.17 X10(3) UL (ref 0–0.7)
EOSINOPHIL NFR BLD AUTO: 3.3 %
ERYTHROCYTE [DISTWIDTH] IN BLOOD BY AUTOMATED COUNT: 13.2 % (ref 11–15)
GLOBULIN PLAS-MCNC: 3.1 G/DL (ref 2.8–4.4)
GLUCOSE BLD-MCNC: 113 MG/DL (ref 70–99)
HCT VFR BLD AUTO: 26.7 %
HGB BLD-MCNC: 8.3 G/DL
IMM GRANULOCYTES # BLD AUTO: 0.01 X10(3) UL (ref 0–1)
IMM GRANULOCYTES NFR BLD: 0.2 %
LYMPHOCYTES # BLD AUTO: 0.83 X10(3) UL (ref 1–4)
LYMPHOCYTES NFR BLD AUTO: 16.2 %
MCH RBC QN AUTO: 29 PG (ref 26–34)
MCHC RBC AUTO-ENTMCNC: 31.1 G/DL (ref 31–37)
MCV RBC AUTO: 93.4 FL
MONOCYTES # BLD AUTO: 0.39 X10(3) UL (ref 0.1–1)
MONOCYTES NFR BLD AUTO: 7.6 %
NEUTROPHILS # BLD AUTO: 3.71 X10 (3) UL (ref 1.5–7.7)
NEUTROPHILS # BLD AUTO: 3.71 X10(3) UL (ref 1.5–7.7)
NEUTROPHILS NFR BLD AUTO: 72.3 %
OSMOLALITY SERPL CALC.SUM OF ELEC: 309 MOSM/KG (ref 275–295)
P AXIS: 55 DEGREES
P AXIS: 70 DEGREES
P-R INTERVAL: 128 MS
P-R INTERVAL: 134 MS
PLATELET # BLD AUTO: 193 10(3)UL (ref 150–450)
POTASSIUM SERPL-SCNC: 4.1 MMOL/L (ref 3.5–5.1)
PROT SERPL-MCNC: 5.7 G/DL (ref 6.4–8.2)
Q-T INTERVAL: 410 MS
Q-T INTERVAL: 434 MS
QRS DURATION: 66 MS
QRS DURATION: 82 MS
QTC CALCULATION (BEZET): 458 MS
QTC CALCULATION (BEZET): 484 MS
R AXIS: 27 DEGREES
R AXIS: 44 DEGREES
RBC # BLD AUTO: 2.86 X10(6)UL
SODIUM SERPL-SCNC: 146 MMOL/L (ref 136–145)
T AXIS: 54 DEGREES
T AXIS: 71 DEGREES
VENTRICULAR RATE: 67 BPM
VENTRICULAR RATE: 84 BPM
WBC # BLD AUTO: 5.1 X10(3) UL (ref 4–11)

## 2023-10-08 PROCEDURE — 99232 SBSQ HOSP IP/OBS MODERATE 35: CPT | Performed by: STUDENT IN AN ORGANIZED HEALTH CARE EDUCATION/TRAINING PROGRAM
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
## 2023-10-08 NOTE — PLAN OF CARE
No acute events. Percocet given for pain control. Tolerating diet well. Saline locked. Contact precautions for c.diff, po vanco treatment given. PT/OT evals ordered. Voiding freely. VSS. Problem: Diabetes/Glucose Control  Goal: Glucose maintained within prescribed range  Description: INTERVENTIONS:  - Monitor Blood Glucose as ordered  - Assess for signs and symptoms of hyperglycemia and hypoglycemia  - Administer ordered medications to maintain glucose within target range  - Assess barriers to adequate nutritional intake and initiate nutrition consult as needed  - Instruct patient on self management of diabetes  Outcome: Progressing     Problem: Patient Centered Care  Goal: Patient preferences are identified and integrated in the patient's plan of care  Description: Interventions:  - What would you like us to know as we care for you?  I am usually on the 3rd floor  - Provide timely, complete, and accurate information to patient/family  - Incorporate patient and family knowledge, values, beliefs, and cultural backgrounds into the planning and delivery of care  - Encourage patient/family to participate in care and decision-making at the level they choose  - Honor patient and family perspectives and choices  Outcome: Progressing     Problem: SKIN/TISSUE INTEGRITY - ADULT  Goal: Skin integrity remains intact  Description: INTERVENTIONS  - Assess and document risk factors for pressure ulcer development  - Assess and document skin integrity  - Monitor for areas of redness and/or skin breakdown  - Initiate interventions, skin care algorithm/standards of care as needed  Outcome: Progressing     Problem: MUSCULOSKELETAL - ADULT  Goal: Return mobility to safest level of function  Description: INTERVENTIONS:  - Assess patient stability and activity tolerance for standing, transferring and ambulating w/ or w/o assistive devices  - Assist with transfers and ambulation using safe patient handling equipment as needed  - Ensure adequate protection for wounds/incisions during mobilization  - Obtain PT/OT consults as needed  - Advance activity as appropriate  - Communicate ordered activity level and limitations with patient/family  Outcome: Progressing  Goal: Maintain proper alignment of affected body part  Description: INTERVENTIONS:  - Support and protect limb and body alignment per provider's orders  - Instruct and reinforce with patient and family use of appropriate assistive device and precautions (e.g. spinal or hip dislocation precautions)  Outcome: Progressing     Problem: PAIN - ADULT  Goal: Verbalizes/displays adequate comfort level or patient's stated pain goal  Description: INTERVENTIONS:  - Encourage pt to monitor pain and request assistance  - Assess pain using appropriate pain scale  - Administer analgesics based on type and severity of pain and evaluate response  - Implement non-pharmacological measures as appropriate and evaluate response  - Consider cultural and social influences on pain and pain management  - Manage/alleviate anxiety  - Utilize distraction and/or relaxation techniques  - Monitor for opioid side effects  - Notify MD/LIP if interventions unsuccessful or patient reports new pain  - Anticipate increased pain with activity and pre-medicate as appropriate  Outcome: Progressing     Problem: SAFETY ADULT - FALL  Goal: Free from fall injury  Description: INTERVENTIONS:  - Assess pt frequently for physical needs  - Identify cognitive and physical deficits and behaviors that affect risk of falls.   - [place] fall precautions as indicated by assessment.  - Educate pt/family on patient safety including physical limitations  - Instruct pt to call for assistance with activity based on assessment  - Modify environment to reduce risk of injury  - Provide assistive devices as appropriate  - Consider OT/PT consult to assist with strengthening/mobility  - Encourage toileting schedule  Outcome: Progressing     Problem: DISCHARGE PLANNING  Goal: Discharge to home or other facility with appropriate resources  Description: INTERVENTIONS:  - Identify barriers to discharge w/pt and caregiver  - Include patient/family/discharge partner in discharge planning  - Arrange for needed discharge resources and transportation as appropriate  - Identify discharge learning needs (meds, wound care, etc)  - Arrange for interpreters to assist at discharge as needed  - Consider post-discharge preferences of patient/family/discharge partner  - Complete POLST form as appropriate  - Assess patient's ability to be responsible for managing their own health  - Refer to Case Management Department for coordinating discharge planning if the patient needs post-hospital services based on physician/LIP order or complex needs related to functional status, cognitive ability or social support system  Outcome: Progressing

## 2023-10-08 NOTE — PLAN OF CARE
Patient is A/O x4, renal diet, Stool culture was positive for C. Diff, physician notified started on oral antibiotics, renal diet, Tele (no calls), purewick to void, one dose of Perkeset given for pain during the shift, on room air, stand and pivot with stand by assist, Possible MRI with sedation planned for 10/8, call light in place. Problem: Diabetes/Glucose Control  Goal: Glucose maintained within prescribed range  Description: INTERVENTIONS:  - Monitor Blood Glucose as ordered  - Assess for signs and symptoms of hyperglycemia and hypoglycemia  - Administer ordered medications to maintain glucose within target range  - Assess barriers to adequate nutritional intake and initiate nutrition consult as needed  - Instruct patient on self management of diabetes  Outcome: Progressing     Problem: Patient Centered Care  Goal: Patient preferences are identified and integrated in the patient's plan of care  Description: Interventions:  - What would you like us to know as we care for you?  I am usually on the 3rd floor  - Provide timely, complete, and accurate information to patient/family  - Incorporate patient and family knowledge, values, beliefs, and cultural backgrounds into the planning and delivery of care  - Encourage patient/family to participate in care and decision-making at the level they choose  - Honor patient and family perspectives and choices  Outcome: Progressing     Problem: SKIN/TISSUE INTEGRITY - ADULT  Goal: Skin integrity remains intact  Description: INTERVENTIONS  - Assess and document risk factors for pressure ulcer development  - Assess and document skin integrity  - Monitor for areas of redness and/or skin breakdown  - Initiate interventions, skin care algorithm/standards of care as needed  Outcome: Progressing     Problem: MUSCULOSKELETAL - ADULT  Goal: Return mobility to safest level of function  Description: INTERVENTIONS:  - Assess patient stability and activity tolerance for standing, transferring and ambulating w/ or w/o assistive devices  - Assist with transfers and ambulation using safe patient handling equipment as needed  - Ensure adequate protection for wounds/incisions during mobilization  - Obtain PT/OT consults as needed  - Advance activity as appropriate  - Communicate ordered activity level and limitations with patient/family  Outcome: Progressing  Goal: Maintain proper alignment of affected body part  Description: INTERVENTIONS:  - Support and protect limb and body alignment per provider's orders  - Instruct and reinforce with patient and family use of appropriate assistive device and precautions (e.g. spinal or hip dislocation precautions)  Outcome: Progressing     Problem: PAIN - ADULT  Goal: Verbalizes/displays adequate comfort level or patient's stated pain goal  Description: INTERVENTIONS:  - Encourage pt to monitor pain and request assistance  - Assess pain using appropriate pain scale  - Administer analgesics based on type and severity of pain and evaluate response  - Implement non-pharmacological measures as appropriate and evaluate response  - Consider cultural and social influences on pain and pain management  - Manage/alleviate anxiety  - Utilize distraction and/or relaxation techniques  - Monitor for opioid side effects  - Notify MD/LIP if interventions unsuccessful or patient reports new pain  - Anticipate increased pain with activity and pre-medicate as appropriate  Outcome: Progressing     Problem: DISCHARGE PLANNING  Goal: Discharge to home or other facility with appropriate resources  Description: INTERVENTIONS:  - Identify barriers to discharge w/pt and caregiver  - Include patient/family/discharge partner in discharge planning  - Arrange for needed discharge resources and transportation as appropriate  - Identify discharge learning needs (meds, wound care, etc)  - Arrange for interpreters to assist at discharge as needed  - Consider post-discharge preferences of patient/family/discharge partner  - Complete POLST form as appropriate  - Assess patient's ability to be responsible for managing their own health  - Refer to Case Management Department for coordinating discharge planning if the patient needs post-hospital services based on physician/LIP order or complex needs related to functional status, cognitive ability or social support system  Outcome: Progressing     Problem: SAFETY ADULT - FALL  Goal: Free from fall injury  Description: INTERVENTIONS:  - Assess pt frequently for physical needs  - Identify cognitive and physical deficits and behaviors that affect risk of falls.   - [place] fall precautions as indicated by assessment.  - Educate pt/family on patient safety including physical limitations  - Instruct pt to call for assistance with activity based on assessment  - Modify environment to reduce risk of injury  - Provide assistive devices as appropriate  - Consider OT/PT consult to assist with strengthening/mobility  - Encourage toileting schedule  Outcome: Progressing

## 2023-10-08 NOTE — CM/SW NOTE
MDO Patient has been to [address] [place] [address] in the past and has liked. If she needs rehab she would prefer to go there. PT/OT evals pending. Tentative referral pending in [facility] for SNF. Notified The [address] [place] [address] that they are preference, awaiting review and response. Confirmed patient is current with [facility] [address]. Message sent to Add via Aidin to confirm current services so that RHODA can be placed. NEED PASSR**    PLAN: PT/OT pending. REVA vs HHC-Current with [facility] [address], need RHODA once disciplines confirmed. Referral pending in [place] for REVA (prefers [address] [place] [address]).      CATINA Reaves, [facility]    [id number]

## 2023-10-09 ENCOUNTER — APPOINTMENT (OUTPATIENT)
Dept: GENERAL RADIOLOGY | Facility: HOSPITAL | Age: 78
End: 2023-10-09
Attending: HOSPITALIST

## 2023-10-09 ENCOUNTER — APPOINTMENT (OUTPATIENT)
Dept: GENERAL RADIOLOGY | Facility: HOSPITAL | Age: 78
DRG: 552 | End: 2023-10-09
Attending: HOSPITALIST

## 2023-10-09 LAB
ALBUMIN SERPL-MCNC: 2.8 G/DL (ref 3.4–5)
ALBUMIN/GLOB SERPL: 0.8 {RATIO} (ref 1–2)
ALP LIVER SERPL-CCNC: 320 U/L
ALT SERPL-CCNC: 153 U/L
ANION GAP SERPL CALC-SCNC: 2 MMOL/L (ref 0–18)
AST SERPL-CCNC: 91 U/L (ref 15–37)
BASOPHILS # BLD AUTO: 0.04 X10(3) UL (ref 0–0.2)
BASOPHILS NFR BLD AUTO: 0.8 %
BILIRUB SERPL-MCNC: 0.3 MG/DL (ref 0.1–2)
BUN BLD-MCNC: 26 MG/DL (ref 7–18)
BUN/CREAT SERPL: 18.1 (ref 10–20)
CALCIUM BLD-MCNC: 8.3 MG/DL (ref 8.5–10.1)
CHLORIDE SERPL-SCNC: 116 MMOL/L (ref 98–112)
CO2 SERPL-SCNC: 26 MMOL/L (ref 21–32)
CREAT BLD-MCNC: 1.44 MG/DL
DEPRECATED RDW RBC AUTO: 46.6 FL (ref 35.1–46.3)
EBV NA IGG SER QL IA: POSITIVE
EBV VCA IGG SER QL IA: POSITIVE
EBV VCA IGM SER QL IA: NEGATIVE
EGFRCR SERPLBLD CKD-EPI 2021: 38 ML/MIN/1.73M2 (ref 60–?)
EOSINOPHIL # BLD AUTO: 0.17 X10(3) UL (ref 0–0.7)
EOSINOPHIL NFR BLD AUTO: 3.5 %
ERYTHROCYTE [DISTWIDTH] IN BLOOD BY AUTOMATED COUNT: 13.5 % (ref 11–15)
GLOBULIN PLAS-MCNC: 3.4 G/DL (ref 2.8–4.4)
GLUCOSE BLD-MCNC: 103 MG/DL (ref 70–99)
HCT VFR BLD AUTO: 27 %
HGB BLD-MCNC: 8.3 G/DL
IMM GRANULOCYTES # BLD AUTO: 0.01 X10(3) UL (ref 0–1)
IMM GRANULOCYTES NFR BLD: 0.2 %
LYMPHOCYTES # BLD AUTO: 1.09 X10(3) UL (ref 1–4)
LYMPHOCYTES NFR BLD AUTO: 22.4 %
MCH RBC QN AUTO: 29 PG (ref 26–34)
MCHC RBC AUTO-ENTMCNC: 30.7 G/DL (ref 31–37)
MCV RBC AUTO: 94.4 FL
MONOCYTES # BLD AUTO: 0.37 X10(3) UL (ref 0.1–1)
MONOCYTES NFR BLD AUTO: 7.6 %
NEUTROPHILS # BLD AUTO: 3.19 X10 (3) UL (ref 1.5–7.7)
NEUTROPHILS # BLD AUTO: 3.19 X10(3) UL (ref 1.5–7.7)
NEUTROPHILS NFR BLD AUTO: 65.5 %
OSMOLALITY SERPL CALC.SUM OF ELEC: 303 MOSM/KG (ref 275–295)
PLATELET # BLD AUTO: 197 10(3)UL (ref 150–450)
POTASSIUM SERPL-SCNC: 4.2 MMOL/L (ref 3.5–5.1)
PROT SERPL-MCNC: 6.2 G/DL (ref 6.4–8.2)
Q-T INTERVAL: 366 MS
QRS DURATION: 88 MS
QTC CALCULATION (BEZET): 462 MS
R AXIS: 10 DEGREES
RBC # BLD AUTO: 2.86 X10(6)UL
SODIUM SERPL-SCNC: 144 MMOL/L (ref 136–145)
T AXIS: 36 DEGREES
VENTRICULAR RATE: 96 BPM
WBC # BLD AUTO: 4.9 X10(3) UL (ref 4–11)

## 2023-10-09 PROCEDURE — 73130 X-RAY EXAM OF HAND: CPT | Performed by: HOSPITALIST

## 2023-10-09 NOTE — PLAN OF CARE
Henna Fernandez is alert and oriented x4. , vital signs are stable, on room air,  is on renal  diet and tolerating it well,  DVT Prophylaxis -  Elequis, history of DVT Left leg is edematous related to the DVT, Hematoma present on the right hand under the thumb the swelling has remained the same for the past day, fall precautions in place include use of the Bed Alarm, stand by assist with the walker, on tele no calls     Saline locked PIV, taking Percoset for pain management last dose provided at 2000, Call Light in reach and calls appropriately. Problem: Diabetes/Glucose Control  Goal: Glucose maintained within prescribed range  Description: INTERVENTIONS:  - Monitor Blood Glucose as ordered  - Assess for signs and symptoms of hyperglycemia and hypoglycemia  - Administer ordered medications to maintain glucose within target range  - Assess barriers to adequate nutritional intake and initiate nutrition consult as needed  - Instruct patient on self management of diabetes  Outcome: Progressing     Problem: Patient Centered Care  Goal: Patient preferences are identified and integrated in the patient's plan of care  Description: Interventions:  - What would you like us to know as we care for you?  I am usually on the 3rd floor  - Provide timely, complete, and accurate information to patient/family  - Incorporate patient and family knowledge, values, beliefs, and cultural backgrounds into the planning and delivery of care  - Encourage patient/family to participate in care and decision-making at the level they choose  - Honor patient and family perspectives and choices  Outcome: Progressing     Problem: SKIN/TISSUE INTEGRITY - ADULT  Goal: Skin integrity remains intact  Description: INTERVENTIONS  - Assess and document risk factors for pressure ulcer development  - Assess and document skin integrity  - Monitor for areas of redness and/or skin breakdown  - Initiate interventions, skin care algorithm/standards of care as needed  Outcome: Progressing     Problem: MUSCULOSKELETAL - ADULT  Goal: Return mobility to safest level of function  Description: INTERVENTIONS:  - Assess patient stability and activity tolerance for standing, transferring and ambulating w/ or w/o assistive devices  - Assist with transfers and ambulation using safe patient handling equipment as needed  - Ensure adequate protection for wounds/incisions during mobilization  - Obtain PT/OT consults as needed  - Advance activity as appropriate  - Communicate ordered activity level and limitations with patient/family  Outcome: Progressing  Goal: Maintain proper alignment of affected body part  Description: INTERVENTIONS:  - Support and protect limb and body alignment per provider's orders  - Instruct and reinforce with patient and family use of appropriate assistive device and precautions (e.g. spinal or hip dislocation precautions)  Outcome: Progressing     Problem: MUSCULOSKELETAL - ADULT  Goal: Maintain proper alignment of affected body part  Description: INTERVENTIONS:  - Support and protect limb and body alignment per provider's orders  - Instruct and reinforce with patient and family use of appropriate assistive device and precautions (e.g. spinal or hip dislocation precautions)  Outcome: Progressing     Problem: PAIN - ADULT  Goal: Verbalizes/displays adequate comfort level or patient's stated pain goal  Description: INTERVENTIONS:  - Encourage pt to monitor pain and request assistance  - Assess pain using appropriate pain scale  - Administer analgesics based on type and severity of pain and evaluate response  - Implement non-pharmacological measures as appropriate and evaluate response  - Consider cultural and social influences on pain and pain management  - Manage/alleviate anxiety  - Utilize distraction and/or relaxation techniques  - Monitor for opioid side effects  - Notify MD/LIP if interventions unsuccessful or patient reports new pain  - Anticipate increased pain with activity and pre-medicate as appropriate  Outcome: Progressing

## 2023-10-09 NOTE — CM/SW NOTE
Department  asked to send updates to [facility] referral for REVA. Assigned SW/CM to follow up with patient/family on discharge plan. Note:  PT not managed thru [facility].     Clint Chery [facility]

## 2023-10-09 NOTE — PHYSICAL THERAPY NOTE
PHYSICAL THERAPY EVALUATION - INPATIENT     Room Number: 466/466-A  Evaluation Date: 10/9/2023  Type of Evaluation: Initial   Physician Order: PT Eval and Treat    Presenting Problem: weakness  Co-Morbidities : spinal cord compression; back pain  Reason for Therapy: Mobility Dysfunction and Discharge Planning    PHYSICAL THERAPY ASSESSMENT     Patient is a 70year old female admitted 10/6/2023 for bilateral LE weakness. Per neurosurgery consult \"Her presentation was very concerning for thoracic myelopathy. MRI thoracic spine demonstrated severe spinal stenosis and cord compression in lower thoracic region. Had a discussion with patient regarding natural history of thoracic myleopathy as well as surgical management of it. We also discussed risks, benefits, and alternatives to surgery. She is not interested in surgery. \"  Patient's current functional deficits include impaired tolerance toward activity due to fatigue, impaired bed mobility, transfers, ambulation and balance, which are below the patient's pre-admission status. Patient received supine in bed. RN approved activity. Coordinated session with OT. Therapist educated pt on POC and physiological benefits of mobilization. Education on spinal precautions and techniques to maintain optimal spinal alignment when performing functional tasks. Patient A&O x 4. Agreeable to participate. Primary complaint is leg, back and stomach pain which she rates at 7 out of 10 per pain scale. Strength in bilateral LE's: WNL. Reports tingling in bilateral LE's which is \"baseline\" per patient report. Bed mobility: SBA supine>sit with instruction on log roll technique. Sat EOB for 6 minutes requiring supervision for sitting balance. Transfers: CG with use of RW. Instruction on appropriate UE positioning with transitional movements. Cues for eccentric lowering from stand>sit position. CG to maintain static standing with bilateral UE support on RW. Ambulation: CG with use of RW. 40ft. Decreased jessica speed. Guarded posture. Cues for safe management of RW with turns. Instruction on upright posture. Patient in chair at end of session with needs in reach and chair alarm activated. The patient's Approx Degree of Impairment: 50.57% has been calculated based on documentation in the [facility] '6 clicks' Inpatient Basic Mobility Short Form. Research supports that patients with this level of impairment may benefit from sub-acute rehab to optimize functional outcomes. Patient will benefit from continued IP PT services to address these deficits in preparation for discharge. 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 Has a caregiver that assists with housekeeping needs only(cleaning, cooking, laundry) 4 days/week 3hours/day. SUBJECTIVE  \"I'm sorry, I'm distracted. I'm looking for my alert necklace\"     PHYSICAL THERAPY EXAMINATION     OBJECTIVE  Precautions: Bed/chair alarm;Spine  Fall Risk: High fall risk        PAIN ASSESSMENT  Ratin  Location: legs, back, stomach  Management Techniques: Activity promotion; Body mechanics;Repositioning    COGNITION  Overall Cognitive Status:  WFL - within functional limits  Awareness of Deficits:  decreased awareness of deficits    RANGE OF MOTION AND STRENGTH ASSESSMENT  Upper extremity ROM and strength are within functional limits   Lower extremity ROM is within functional limits   Lower extremity strength is within functional limits     BALANCE  Static Sitting: Good  Dynamic Sitting: Fair +  Static Standing: Fair  Dynamic Standing: Fair -        NEUROLOGICAL FINDINGS           Sensation: reports tingling bilateral feet and R hand (baseline). ACTIVITY TOLERANCE  Pulse: 84  Heart Rate Source: Monitor     BP: 148/64  BP Location: Right arm  BP Method: Automatic  Patient Position: Sitting    O2 WALK       AM-PAC '6-Clicks' INPATIENT SHORT FORM - BASIC MOBILITY  How much difficulty does the patient currently have. .. Patient Difficulty: Turning over in bed (including adjusting bedclothes, sheets and blankets)?: A Little   Patient Difficulty: Sitting down on and standing up from a chair with arms (e.g., wheelchair, bedside commode, etc.): A Little   Patient Difficulty: Moving from lying on back to sitting on the side of the bed?: A Little   How much help from another person does the patient currently need. ..    Help from Another: Moving to and from a bed to a chair (including a wheelchair)?: A Little   Help from Another: Need to walk in hospital room?: A Little   Help from Another: Climbing 3-5 steps with a railing?: A Lot     AM-PAC Score:  Raw Score: 17   Approx Degree of Impairment: 50.57%   Standardized Score (AM-PAC Scale): 42.13   CMS Modifier (G-Code): CK    FUNCTIONAL ABILITY STATUS  Functional Mobility/Gait Assessment  Gait Assistance: Contact guard assist  Distance (ft): 40ft  Assistive Device: Rolling walker  Pattern:  (decreased jessica speed, decreased step length)    Exercise/Education Provided:  Bed mobility  Body mechanics  Energy conservation  Functional activity tolerated  Gait training  Posture  ROM  Strengthening  Transfer training    Patient End of Session: Up in chair;Needs met;Call light within reach;RN aware of session/findings; Alarm set    CURRENT GOALS    Goals to be met by: 10/23/23  Patient Goal Patient's self-stated goal is: return to PLOF   Goal #1 Patient is able to demonstrate supine - sit EOB @ level: independent     Goal #1   Current Status    Goal #2 Patient is able to demonstrate transfers Sit to/from Stand at assistance level: modified independent with walker - rolling     Goal #2  Current Status    Goal #3 Patient is able to ambulate 150 feet with assist device: walker - rolling at assistance level: supervision   Goal #3   Current Status    Goal #4 Patient to demonstrate independence with home activity/exercise instructions provided to patient in preparation for discharge.    Goal #4   Current Status      Patient Evaluation Complexity Level:  History Moderate - 1 or 2 personal factors and/or co-morbidities   Examination of body systems Moderate - addressing a total of 3 or more elements   Clinical Presentation Moderate - Evolving   Clinical Decision Making Moderate Complexity       Gait Trainin minutes  Therapeutic Activity: 15 minutes

## 2023-10-09 NOTE — CM/SW NOTE
CM f/u on dc planning. CM rec'd msg from PT indicating rec is REVA and pt is requesting [address]. CM req [facility]SC start ins auth. PASRR done    CM sent upd to [facility] re above, they have not yet reviewed the ref. [id number]  CM met w/ pt to conf above. Pt has been to [facility] 2 previous times and is agreeable to return. Pt is aware ins Dylan Cope is needed. Per [facility] pt is not a Eddie ins auth. CM res'd [facility] in aidin and req liaison start auth asap.    10/10 [facility] still pending    [phone]  Per SNF liaison, the inusrance is sending ref to their MD for review. CM notified MD/RN with above. 10/11 1130  Ins co requested upd therapy notes for REVA eval. CM notified PT dept and req eval td and notes asap. [phone]  PT notes added to ref and liaison notified to send to ins co    Plan  Alisha pending ins auth    / to remain available for support and/or discharge planning.      Trinity Gonzalez, ELLIE    Ext [phone]

## 2023-10-09 NOTE — CDS QUERY
How to answer this Query:    1.) DON'T CLICK COSIGN BUTTON FIRST  2.) Click \"3 dots. Christopher Powers \" to the right of cosign button and click EDIT on the toolbar.  2.) Type an \"X\" in the bracket for the diagnosis that applies. (You may also add additional clinical details as you feel necessary to substantiate your response). 3.) Finally click \"Sign\" to complete response. Thank David Francis  Dear Doctor: Anthony Cooper information (provided below) includes a diagnosis of Heart Failure. Please document acuity    PLEASE (X) ALL DIAGNOSES THAT APPLY. (    ) Chronic Diastolic Heart Failure    (    ) Other - please specify:       Documentation from the Medical Record: [address]=Assessment/Plan:  Patient Active Problem List:  Acute on chronic heart failure with preserved ejection fraction (HFpEF)       Home meds-lasix po    If you have any questions, please contact Clinical :  Francesco Brumfield RN at [phone]     Thank You!      THIS FORM IS A PERMANENT PART OF THE MEDICAL RECORD

## 2023-10-10 LAB
ALBUMIN SERPL-MCNC: 2.8 G/DL (ref 3.4–5)
ALBUMIN/GLOB SERPL: 0.8 {RATIO} (ref 1–2)
ALP LIVER SERPL-CCNC: 280 U/L
ALT SERPL-CCNC: 112 U/L
ANION GAP SERPL CALC-SCNC: 3 MMOL/L (ref 0–18)
AST SERPL-CCNC: 52 U/L (ref 15–37)
BASOPHILS # BLD AUTO: 0.04 X10(3) UL (ref 0–0.2)
BASOPHILS NFR BLD AUTO: 0.8 %
BILIRUB SERPL-MCNC: 0.2 MG/DL (ref 0.1–2)
BUN BLD-MCNC: 27 MG/DL (ref 7–18)
BUN/CREAT SERPL: 19.6 (ref 10–20)
CALCIUM BLD-MCNC: 8.5 MG/DL (ref 8.5–10.1)
CHLORIDE SERPL-SCNC: 114 MMOL/L (ref 98–112)
CMV IGG AB: 5.8 U/ML
CMV IGM AB: <30 AU/ML
CO2 SERPL-SCNC: 27 MMOL/L (ref 21–32)
CREAT BLD-MCNC: 1.38 MG/DL
DEPRECATED RDW RBC AUTO: 45.2 FL (ref 35.1–46.3)
EGFRCR SERPLBLD CKD-EPI 2021: 40 ML/MIN/1.73M2 (ref 60–?)
EOSINOPHIL # BLD AUTO: 0.23 X10(3) UL (ref 0–0.7)
EOSINOPHIL NFR BLD AUTO: 4.8 %
ERYTHROCYTE [DISTWIDTH] IN BLOOD BY AUTOMATED COUNT: 13.2 % (ref 11–15)
GLOBULIN PLAS-MCNC: 3.4 G/DL (ref 2.8–4.4)
GLUCOSE BLD-MCNC: 93 MG/DL (ref 70–99)
HCT VFR BLD AUTO: 26.8 %
HGB BLD-MCNC: 8.4 G/DL
IMM GRANULOCYTES # BLD AUTO: 0.01 X10(3) UL (ref 0–1)
IMM GRANULOCYTES NFR BLD: 0.2 %
LEAD BLOOD ADULT: 2.7 UG/DL
LYMPHOCYTES # BLD AUTO: 0.99 X10(3) UL (ref 1–4)
LYMPHOCYTES NFR BLD AUTO: 20.6 %
M2 MITOCHONDRIAL AB: <20 UNITS
MCH RBC QN AUTO: 29.1 PG (ref 26–34)
MCHC RBC AUTO-ENTMCNC: 31.3 G/DL (ref 31–37)
MCV RBC AUTO: 92.7 FL
MONOCYTES # BLD AUTO: 0.42 X10(3) UL (ref 0.1–1)
MONOCYTES NFR BLD AUTO: 8.7 %
NEUTROPHILS # BLD AUTO: 3.12 X10 (3) UL (ref 1.5–7.7)
NEUTROPHILS # BLD AUTO: 3.12 X10(3) UL (ref 1.5–7.7)
NEUTROPHILS NFR BLD AUTO: 64.9 %
OSMOLALITY SERPL CALC.SUM OF ELEC: 303 MOSM/KG (ref 275–295)
PLATELET # BLD AUTO: 200 10(3)UL (ref 150–450)
POTASSIUM SERPL-SCNC: 4.3 MMOL/L (ref 3.5–5.1)
PROT SERPL-MCNC: 6.2 G/DL (ref 6.4–8.2)
RBC # BLD AUTO: 2.89 X10(6)UL
SODIUM SERPL-SCNC: 144 MMOL/L (ref 136–145)
WBC # BLD AUTO: 4.8 X10(3) UL (ref 4–11)

## 2023-10-10 NOTE — PLAN OF CARE
Patient is alert and orientated x4. Room air. Remote tele. Renal diet. Gets Percocet for pain. Pending insurance to go to [facility]. Call light with in reach. All safety measures in place. Problem: Diabetes/Glucose Control  Goal: Glucose maintained within prescribed range  Description: INTERVENTIONS:  - Monitor Blood Glucose as ordered  - Assess for signs and symptoms of hyperglycemia and hypoglycemia  - Administer ordered medications to maintain glucose within target range  - Assess barriers to adequate nutritional intake and initiate nutrition consult as needed  - Instruct patient on self management of diabetes  Outcome: Progressing     Problem: Patient Centered Care  Goal: Patient preferences are identified and integrated in the patient's plan of care  Description: Interventions:  - What would you like us to know as we care for you?  I am usually on the 3rd floor  - Provide timely, complete, and accurate information to patient/family  - Incorporate patient and family knowledge, values, beliefs, and cultural backgrounds into the planning and delivery of care  - Encourage patient/family to participate in care and decision-making at the level they choose  - Honor patient and family perspectives and choices  Outcome: Progressing     Problem: SKIN/TISSUE INTEGRITY - ADULT  Goal: Skin integrity remains intact  Description: INTERVENTIONS  - Assess and document risk factors for pressure ulcer development  - Assess and document skin integrity  - Monitor for areas of redness and/or skin breakdown  - Initiate interventions, skin care algorithm/standards of care as needed  Outcome: Progressing     Problem: MUSCULOSKELETAL - ADULT  Goal: Return mobility to safest level of function  Description: INTERVENTIONS:  - Assess patient stability and activity tolerance for standing, transferring and ambulating w/ or w/o assistive devices  - Assist with transfers and ambulation using safe patient handling equipment as needed  - Ensure adequate protection for wounds/incisions during mobilization  - Obtain PT/OT consults as needed  - Advance activity as appropriate  - Communicate ordered activity level and limitations with patient/family  Outcome: Progressing  Goal: Maintain proper alignment of affected body part  Description: INTERVENTIONS:  - Support and protect limb and body alignment per provider's orders  - Instruct and reinforce with patient and family use of appropriate assistive device and precautions (e.g. spinal or hip dislocation precautions)  Outcome: Progressing     Problem: PAIN - ADULT  Goal: Verbalizes/displays adequate comfort level or patient's stated pain goal  Description: INTERVENTIONS:  - Encourage pt to monitor pain and request assistance  - Assess pain using appropriate pain scale  - Administer analgesics based on type and severity of pain and evaluate response  - Implement non-pharmacological measures as appropriate and evaluate response  - Consider cultural and social influences on pain and pain management  - Manage/alleviate anxiety  - Utilize distraction and/or relaxation techniques  - Monitor for opioid side effects  - Notify MD/LIP if interventions unsuccessful or patient reports new pain  - Anticipate increased pain with activity and pre-medicate as appropriate  Outcome: Progressing     Problem: SAFETY ADULT - FALL  Goal: Free from fall injury  Description: INTERVENTIONS:  - Assess pt frequently for physical needs  - Identify cognitive and physical deficits and behaviors that affect risk of falls.   - [place] fall precautions as indicated by assessment.  - Educate pt/family on patient safety including physical limitations  - Instruct pt to call for assistance with activity based on assessment  - Modify environment to reduce risk of injury  - Provide assistive devices as appropriate  - Consider OT/PT consult to assist with strengthening/mobility  - Encourage toileting schedule  Outcome: Progressing     Problem: DISCHARGE PLANNING  Goal: Discharge to home or other facility with appropriate resources  Description: INTERVENTIONS:  - Identify barriers to discharge w/pt and caregiver  - Include patient/family/discharge partner in discharge planning  - Arrange for needed discharge resources and transportation as appropriate  - Identify discharge learning needs (meds, wound care, etc)  - Arrange for interpreters to assist at discharge as needed  - Consider post-discharge preferences of patient/family/discharge partner  - Complete POLST form as appropriate  - Assess patient's ability to be responsible for managing their own health  - Refer to Case Management Department for coordinating discharge planning if the patient needs post-hospital services based on physician/LIP order or complex needs related to functional status, cognitive ability or social support system  Outcome: Progressing

## 2023-10-10 NOTE — PLAN OF CARE
Vitals within normal limits. Room Air. Ambulating with standby assist and walker. Ambulated halls. PRN Percocet for c/o back pain. Remote tele in place with no calls - discontinued, medically cleared for discharge. +Voiding. +Formed BM. Tolerating renal diet, good appetite. Denies nausea or vomiting. IV site was sore and painful, IV discontinued and patient declined IV insertion. MD aware. Plan to discharge to [facility] and then resume home health care services. Problem: Diabetes/Glucose Control  Goal: Glucose maintained within prescribed range  Description: INTERVENTIONS:  - Monitor Blood Glucose as ordered  - Assess for signs and symptoms of hyperglycemia and hypoglycemia  - Administer ordered medications to maintain glucose within target range  - Assess barriers to adequate nutritional intake and initiate nutrition consult as needed  - Instruct patient on self management of diabetes  Outcome: Progressing     Problem: Patient Centered Care  Goal: Patient preferences are identified and integrated in the patient's plan of care  Description: Interventions:  - What would you like us to know as we care for you?  I am usually on the 3rd floor  - Provide timely, complete, and accurate information to patient/family  - Incorporate patient and family knowledge, values, beliefs, and cultural backgrounds into the planning and delivery of care  - Encourage patient/family to participate in care and decision-making at the level they choose  - Honor patient and family perspectives and choices  Outcome: Progressing     Problem: SKIN/TISSUE INTEGRITY - ADULT  Goal: Skin integrity remains intact  Description: INTERVENTIONS  - Assess and document risk factors for pressure ulcer development  - Assess and document skin integrity  - Monitor for areas of redness and/or skin breakdown  - Initiate interventions, skin care algorithm/standards of care as needed  Outcome: Progressing     Problem: MUSCULOSKELETAL - ADULT  Goal: Return mobility to safest level of function  Description: INTERVENTIONS:  - Assess patient stability and activity tolerance for standing, transferring and ambulating w/ or w/o assistive devices  - Assist with transfers and ambulation using safe patient handling equipment as needed  - Ensure adequate protection for wounds/incisions during mobilization  - Obtain PT/OT consults as needed  - Advance activity as appropriate  - Communicate ordered activity level and limitations with patient/family  Outcome: Progressing  Goal: Maintain proper alignment of affected body part  Description: INTERVENTIONS:  - Support and protect limb and body alignment per provider's orders  - Instruct and reinforce with patient and family use of appropriate assistive device and precautions (e.g. spinal or hip dislocation precautions)  Outcome: Progressing     Problem: PAIN - ADULT  Goal: Verbalizes/displays adequate comfort level or patient's stated pain goal  Description: INTERVENTIONS:  - Encourage pt to monitor pain and request assistance  - Assess pain using appropriate pain scale  - Administer analgesics based on type and severity of pain and evaluate response  - Implement non-pharmacological measures as appropriate and evaluate response  - Consider cultural and social influences on pain and pain management  - Manage/alleviate anxiety  - Utilize distraction and/or relaxation techniques  - Monitor for opioid side effects  - Notify MD/LIP if interventions unsuccessful or patient reports new pain  - Anticipate increased pain with activity and pre-medicate as appropriate  Outcome: Progressing     Problem: SAFETY ADULT - FALL  Goal: Free from fall injury  Description: INTERVENTIONS:  - Assess pt frequently for physical needs  - Identify cognitive and physical deficits and behaviors that affect risk of falls.   - [place] fall precautions as indicated by assessment.  - Educate pt/family on patient safety including physical limitations  - Instruct pt to call for assistance with activity based on assessment  - Modify environment to reduce risk of injury  - Provide assistive devices as appropriate  - Consider OT/PT consult to assist with strengthening/mobility  - Encourage toileting schedule  Outcome: Progressing     Problem: DISCHARGE PLANNING  Goal: Discharge to home or other facility with appropriate resources  Description: INTERVENTIONS:  - Identify barriers to discharge w/pt and caregiver  - Include patient/family/discharge partner in discharge planning  - Arrange for needed discharge resources and transportation as appropriate  - Identify discharge learning needs (meds, wound care, etc)  - Arrange for interpreters to assist at discharge as needed  - Consider post-discharge preferences of patient/family/discharge partner  - Complete POLST form as appropriate  - Assess patient's ability to be responsible for managing their own health  - Refer to Case Management Department for coordinating discharge planning if the patient needs post-hospital services based on physician/LIP order or complex needs related to functional status, cognitive ability or social support system  Outcome: Progressing

## 2023-10-11 LAB
ALBUMIN SERPL-MCNC: 2.6 G/DL (ref 3.4–5)
ALBUMIN/GLOB SERPL: 0.8 {RATIO} (ref 1–2)
ALP LIVER SERPL-CCNC: 244 U/L
ALT SERPL-CCNC: 83 U/L
ANION GAP SERPL CALC-SCNC: 4 MMOL/L (ref 0–18)
AST SERPL-CCNC: 37 U/L (ref 15–37)
BILIRUB SERPL-MCNC: 0.2 MG/DL (ref 0.1–2)
BUN BLD-MCNC: 28 MG/DL (ref 7–18)
BUN/CREAT SERPL: 19 (ref 10–20)
CALCIUM BLD-MCNC: 8.5 MG/DL (ref 8.5–10.1)
CHLORIDE SERPL-SCNC: 114 MMOL/L (ref 98–112)
CO2 SERPL-SCNC: 26 MMOL/L (ref 21–32)
CREAT BLD-MCNC: 1.47 MG/DL
EGFRCR SERPLBLD CKD-EPI 2021: 37 ML/MIN/1.73M2 (ref 60–?)
GLOBULIN PLAS-MCNC: 3.2 G/DL (ref 2.8–4.4)
GLUCOSE BLD-MCNC: 94 MG/DL (ref 70–99)
OSMOLALITY SERPL CALC.SUM OF ELEC: 303 MOSM/KG (ref 275–295)
POTASSIUM SERPL-SCNC: 4.4 MMOL/L (ref 3.5–5.1)
PROT SERPL-MCNC: 5.8 G/DL (ref 6.4–8.2)
SODIUM SERPL-SCNC: 144 MMOL/L (ref 136–145)

## 2023-10-11 NOTE — PLAN OF CARE
PT/OT recommending REVA. Pending insurance auth. Vitals within normal limits. Room Air. Remote tele in place no calls. Ambulating with standby assist and walker. Tolerating renal diet. Denies nausea or vomiting. 100% of meals. +Voiding. Last BM 10/10. PRN percocet for pain control. Call light within reach. Problem: Diabetes/Glucose Control  Goal: Glucose maintained within prescribed range  Description: INTERVENTIONS:  - Monitor Blood Glucose as ordered  - Assess for signs and symptoms of hyperglycemia and hypoglycemia  - Administer ordered medications to maintain glucose within target range  - Assess barriers to adequate nutritional intake and initiate nutrition consult as needed  - Instruct patient on self management of diabetes  Outcome: Progressing     Problem: Patient Centered Care  Goal: Patient preferences are identified and integrated in the patient's plan of care  Description: Interventions:  - What would you like us to know as we care for you?  I am usually on the 3rd floor  - Provide timely, complete, and accurate information to patient/family  - Incorporate patient and family knowledge, values, beliefs, and cultural backgrounds into the planning and delivery of care  - Encourage patient/family to participate in care and decision-making at the level they choose  - Honor patient and family perspectives and choices  Outcome: Progressing     Problem: SKIN/TISSUE INTEGRITY - ADULT  Goal: Skin integrity remains intact  Description: INTERVENTIONS  - Assess and document risk factors for pressure ulcer development  - Assess and document skin integrity  - Monitor for areas of redness and/or skin breakdown  - Initiate interventions, skin care algorithm/standards of care as needed  Outcome: Progressing     Problem: MUSCULOSKELETAL - ADULT  Goal: Return mobility to safest level of function  Description: INTERVENTIONS:  - Assess patient stability and activity tolerance for standing, transferring and ambulating w/ or w/o assistive devices  - Assist with transfers and ambulation using safe patient handling equipment as needed  - Ensure adequate protection for wounds/incisions during mobilization  - Obtain PT/OT consults as needed  - Advance activity as appropriate  - Communicate ordered activity level and limitations with patient/family  Outcome: Progressing  Goal: Maintain proper alignment of affected body part  Description: INTERVENTIONS:  - Support and protect limb and body alignment per provider's orders  - Instruct and reinforce with patient and family use of appropriate assistive device and precautions (e.g. spinal or hip dislocation precautions)  Outcome: Progressing     Problem: PAIN - ADULT  Goal: Verbalizes/displays adequate comfort level or patient's stated pain goal  Description: INTERVENTIONS:  - Encourage pt to monitor pain and request assistance  - Assess pain using appropriate pain scale  - Administer analgesics based on type and severity of pain and evaluate response  - Implement non-pharmacological measures as appropriate and evaluate response  - Consider cultural and social influences on pain and pain management  - Manage/alleviate anxiety  - Utilize distraction and/or relaxation techniques  - Monitor for opioid side effects  - Notify MD/LIP if interventions unsuccessful or patient reports new pain  - Anticipate increased pain with activity and pre-medicate as appropriate  Outcome: Progressing     Problem: SAFETY ADULT - FALL  Goal: Free from fall injury  Description: INTERVENTIONS:  - Assess pt frequently for physical needs  - Identify cognitive and physical deficits and behaviors that affect risk of falls.   - [place] fall precautions as indicated by assessment.  - Educate pt/family on patient safety including physical limitations  - Instruct pt to call for assistance with activity based on assessment  - Modify environment to reduce risk of injury  - Provide assistive devices as appropriate  - Consider OT/PT consult to assist with strengthening/mobility  - Encourage toileting schedule  Outcome: Progressing     Problem: DISCHARGE PLANNING  Goal: Discharge to home or other facility with appropriate resources  Description: INTERVENTIONS:  - Identify barriers to discharge w/pt and caregiver  - Include patient/family/discharge partner in discharge planning  - Arrange for needed discharge resources and transportation as appropriate  - Identify discharge learning needs (meds, wound care, etc)  - Arrange for interpreters to assist at discharge as needed  - Consider post-discharge preferences of patient/family/discharge partner  - Complete POLST form as appropriate  - Assess patient's ability to be responsible for managing their own health  - Refer to Case Management Department for coordinating discharge planning if the patient needs post-hospital services based on physician/LIP order or complex needs related to functional status, cognitive ability or social support system  Outcome: Progressing

## 2023-10-11 NOTE — PHYSICAL THERAPY NOTE
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 Pt was received sitting at EOB and agreeable to activity. Son initially at bedside. Educated on role of therapy and goals for this session. Pt verbalized understanding. Pt with c/o moderate chronic back pain throughout session. Transfers: SBA for STS with RW. Pt attempted to pull up on RW to stand; requires verbal cues for safe hand placement prior to transfer, with poor carryover noted. Gait: ambulated 60 ft with RW and fluctuating CGA to SBA. No unsteady gait or LOB, but very slow pace and shuffled gait, decreased step length and foot clearance, significantly flexed posture with verbal cues given to correct and poor carryover noted due to back pain. Also requiring verbal cues for management of RW and to maintain appropriate proximity to RW. HR up to 121 bpm with activity and pt reporting increased fatigue and weakness with longer distance ambulation. Bed mobility: SBA sit>supine in bed at end of session. Pt reported feeling overall weak and deconditioned; does not feel safe to return home alone. Pt was left resting in bed with needs within reach, bed alarm on, VSS, handoff to RN complete. The patient's Approx Degree of Impairment: 50.57% has been calculated based on documentation in the [facility] '6 clicks' Inpatient Basic Mobility Short Form. Research supports that patients with this level of impairment may benefit from REVA.  PT continues to recommend DC to REVA as pt is a high fall risk and lives alone and has minimal support/assistance at home, is currently deconditioned and currently requiring CGA to SBA for ADLs and mobility and is functioning below her functional baseline. Will benefit from REVA to address the above deficits and facilitate SAFE return to independent PLOF. DISCHARGE RECOMMENDATIONS  PT Discharge Recommendations: Sub-acute rehabilitation     PLAN  PT Treatment Plan: Body mechanics; Bed mobility; Endurance; Energy conservation;Patient education;Gait training;Transfer training;Balance training  Frequency (Obs): 3-5x/week    SUBJECTIVE  \"I really need to go to rehab\"    OBJECTIVE  Precautions: Bed/chair alarm    WEIGHT BEARING RESTRICTION  none    PAIN ASSESSMENT   Rating:  (did not rate)  Location: lower back  Management Techniques: Activity promotion; Body mechanics;Repositioning    BALANCE  Static Sitting: Good  Dynamic Sitting: Fair +  Static Standing: Fair  Dynamic Standing: Fair -    ACTIVITY TOLERANCE  Pulse: (!) 121  Heart Rate Source: Monitor    AM-PAC '6-Clicks' INPATIENT SHORT FORM - BASIC MOBILITY  How much difficulty does the patient currently have. .. Patient Difficulty: Turning over in bed (including adjusting bedclothes, sheets and blankets)?: A Little   Patient Difficulty: Sitting down on and standing up from a chair with arms (e.g., wheelchair, bedside commode, etc.): A Little   Patient Difficulty: Moving from lying on back to sitting on the side of the bed?: A Little   How much help from another person does the patient currently need. ..    Help from Another: Moving to and from a bed to a chair (including a wheelchair)?: A Little   Help from Another: Need to walk in hospital room?: A Little   Help from Another: Climbing 3-5 steps with a railing?: A Lot     AM-PAC Score:  Raw Score: 17   Approx Degree of Impairment: 50.57%   Standardized Score (AM-PAC Scale): 42.13   CMS Modifier (G-Code): CK    FUNCTIONAL ABILITY STATUS  Functional Mobility/Gait Assessment  Gait Assistance: Contact guard assist  Distance (ft): 60  Assistive Device: Rolling walker  Pattern: Shuffle (decreased jessica speed, flexed posture)    Patient End of Session: In bed;Needs met;Call light within reach;RN aware of session/findings; All patient questions and concerns addressed; Alarm set; Discussed recommendations with /    CURRENT GOALS   Goals to be met by: 10/23/23  Patient Goal Patient's self-stated goal is: return to PLOF   Goal #1 Patient is able to demonstrate supine - sit EOB @ level: independent      Goal #1   Current Status  SBA   Goal #2 Patient is able to demonstrate transfers Sit to/from Stand at assistance level: modified independent with walker - rolling      Goal #2  Current Status SBA with RW    Goal #3 Patient is able to ambulate 150 feet with assist device: walker - rolling at assistance level: supervision   Goal #3   Current Status 60 ft with RW and CGA   Goal #4 Patient to demonstrate independence with home activity/exercise instructions provided to patient in preparation for discharge.    Goal #4   Current Status In progress      Gait Trainin minutes

## 2023-10-11 NOTE — PLAN OF CARE
Problem: Diabetes/Glucose Control  Goal: Glucose maintained within prescribed range  Description: INTERVENTIONS:  - Monitor Blood Glucose as ordered  - Assess for signs and symptoms of hyperglycemia and hypoglycemia  - Administer ordered medications to maintain glucose within target range  - Assess barriers to adequate nutritional intake and initiate nutrition consult as needed  - Instruct patient on self management of diabetes  Outcome: Progressing     Problem: Patient Centered Care  Goal: Patient preferences are identified and integrated in the patient's plan of care  Description: Interventions:  - What would you like us to know as we care for you?  I am usually on the 3rd floor  - Provide timely, complete, and accurate information to patient/family  - Incorporate patient and family knowledge, values, beliefs, and cultural backgrounds into the planning and delivery of care  - Encourage patient/family to participate in care and decision-making at the level they choose  - Honor patient and family perspectives and choices  Outcome: Progressing     Problem: SKIN/TISSUE INTEGRITY - ADULT  Goal: Skin integrity remains intact  Description: INTERVENTIONS  - Assess and document risk factors for pressure ulcer development  - Assess and document skin integrity  - Monitor for areas of redness and/or skin breakdown  - Initiate interventions, skin care algorithm/standards of care as needed  Outcome: Progressing     Problem: MUSCULOSKELETAL - ADULT  Goal: Return mobility to safest level of function  Description: INTERVENTIONS:  - Assess patient stability and activity tolerance for standing, transferring and ambulating w/ or w/o assistive devices  - Assist with transfers and ambulation using safe patient handling equipment as needed  - Ensure adequate protection for wounds/incisions during mobilization  - Obtain PT/OT consults as needed  - Advance activity as appropriate  - Communicate ordered activity level and limitations with patient/family  Outcome: Progressing  Goal: Maintain proper alignment of affected body part  Description: INTERVENTIONS:  - Support and protect limb and body alignment per provider's orders  - Instruct and reinforce with patient and family use of appropriate assistive device and precautions (e.g. spinal or hip dislocation precautions)  Outcome: Progressing     Problem: PAIN - ADULT  Goal: Verbalizes/displays adequate comfort level or patient's stated pain goal  Description: INTERVENTIONS:  - Encourage pt to monitor pain and request assistance  - Assess pain using appropriate pain scale  - Administer analgesics based on type and severity of pain and evaluate response  - Implement non-pharmacological measures as appropriate and evaluate response  - Consider cultural and social influences on pain and pain management  - Manage/alleviate anxiety  - Utilize distraction and/or relaxation techniques  - Monitor for opioid side effects  - Notify MD/LIP if interventions unsuccessful or patient reports new pain  - Anticipate increased pain with activity and pre-medicate as appropriate  Outcome: Progressing     Problem: SAFETY ADULT - FALL  Goal: Free from fall injury  Description: INTERVENTIONS:  - Assess pt frequently for physical needs  - Identify cognitive and physical deficits and behaviors that affect risk of falls.   - [place] fall precautions as indicated by assessment.  - Educate pt/family on patient safety including physical limitations  - Instruct pt to call for assistance with activity based on assessment  - Modify environment to reduce risk of injury  - Provide assistive devices as appropriate  - Consider OT/PT consult to assist with strengthening/mobility  - Encourage toileting schedule  Outcome: Progressing     Problem: DISCHARGE PLANNING  Goal: Discharge to home or other facility with appropriate resources  Description: INTERVENTIONS:  - Identify barriers to discharge w/pt and caregiver  - Include patient/family/discharge partner in discharge planning  - Arrange for needed discharge resources and transportation as appropriate  - Identify discharge learning needs (meds, wound care, etc)  - Arrange for interpreters to assist at discharge as needed  - Consider post-discharge preferences of patient/family/discharge partner  - Complete POLST form as appropriate  - Assess patient's ability to be responsible for managing their own health  - Refer to Case Management Department for coordinating discharge planning if the patient needs post-hospital services based on physician/LIP order or complex needs related to functional status, cognitive ability or social support system  Outcome: Progressing   A/Ox4. Vital signs stable on room air. Remote tele in place. Tolerating renal diet. Ambulating with SBA and a walker. PRN Percocet for pain management. Voiding freely. Fall precautions in place, call light within reach, frequent rounding done.

## 2023-10-12 VITALS
OXYGEN SATURATION: 95 % | RESPIRATION RATE: 18 BRPM | TEMPERATURE: 98 F | DIASTOLIC BLOOD PRESSURE: 53 MMHG | HEIGHT: 64 IN | WEIGHT: 176.19 LBS | HEART RATE: 75 BPM | SYSTOLIC BLOOD PRESSURE: 123 MMHG | BODY MASS INDEX: 30.08 KG/M2

## 2023-10-12 LAB
ALBUMIN SERPL-MCNC: 2.7 G/DL (ref 3.4–5)
ALBUMIN/GLOB SERPL: 0.8 {RATIO} (ref 1–2)
ALP LIVER SERPL-CCNC: 222 U/L
ALT SERPL-CCNC: 68 U/L
ANION GAP SERPL CALC-SCNC: 3 MMOL/L (ref 0–18)
AST SERPL-CCNC: 28 U/L (ref 15–37)
BILIRUB SERPL-MCNC: 0.3 MG/DL (ref 0.1–2)
BUN BLD-MCNC: 33 MG/DL (ref 7–18)
BUN/CREAT SERPL: 20.6 (ref 10–20)
CALCIUM BLD-MCNC: 8.5 MG/DL (ref 8.5–10.1)
CHLORIDE SERPL-SCNC: 112 MMOL/L (ref 98–112)
CO2 SERPL-SCNC: 26 MMOL/L (ref 21–32)
CREAT BLD-MCNC: 1.6 MG/DL
EGFRCR SERPLBLD CKD-EPI 2021: 33 ML/MIN/1.73M2 (ref 60–?)
GLOBULIN PLAS-MCNC: 3.2 G/DL (ref 2.8–4.4)
GLUCOSE BLD-MCNC: 106 MG/DL (ref 70–99)
OSMOLALITY SERPL CALC.SUM OF ELEC: 300 MOSM/KG (ref 275–295)
POTASSIUM SERPL-SCNC: 4.5 MMOL/L (ref 3.5–5.1)
PROT SERPL-MCNC: 5.9 G/DL (ref 6.4–8.2)
SODIUM SERPL-SCNC: 141 MMOL/L (ref 136–145)

## 2023-10-12 NOTE — CM/SW NOTE
Patient discussed in rounds, discharge today. Insurance pending for [facility], updates sent via [employer]. 1:15p: Per Vinny Liliana, insurance with intent to deny, P2P available. Per Елена Bradshaw RN, plan for home w/ home health. Patient is currently w/ [address] ([phone]), RHODA previously sent. Patient stable for discharge, confirmed w/ Shantal ARGUETA. 130p: Spoke w/ patient, she reports she is current w/ [facility] ([phone]) not [facility]. Canceled referral to [facility] & sent updates to [employer] via [employer]. Spoke w/ Mechelle Carr w/ [employer], confirmed plan.     Sal Plascencia, [address]

## 2023-10-12 NOTE — DISCHARGE INSTRUCTIONS
Sometimes managing your health at home requires assistance. The [facility] team has recognized [facility] ([phone]). A representative from the home health agency will contact you or your family to schedule your first visit.

## 2023-10-12 NOTE — DISCHARGE SUMMARY
General Medicine Discharge Summary     Patient ID:  Beth Fernando  70year old  8/14/1945    Admit date: 10/6/2023    Discharge date and time: 10/12/23    Attending Physician: Stephanie Kendall MD     Primary Care Physician: None Pcp     Reason for admission: weakness, falls    Discharge Diagnoses: Weakness [R53.1]  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 XXXA]    Discharged Condition: stable    Disposition: home    Consults:   Consultants         Provider   Role Specialty     Juanito Leal MD  Consulting Physician [place], MD  Consulting Physician GASTROENTEROLOGY              HPI: Per Dr. Dwayne Bella is a 70year old female with PMH sig for CKD stage III, generalized anxiety disorder, anemia, DVT diagnosed in June 2023 on Eliquis, type 2 diabetes, hypertension, hyperlipidemia, rheumatoid arthritis and known sarcoma status post prior resection with recurrence complicated by chronic sonali pain who presented with oral falls at home. Patient had recent admission on July 28, 2023 for DICK, hyperkalemia, falls and discharged home. Patient now reporting acute on chronic abdominal pain, increased falls and weakness at home. No chest pain or shortness of breath. No nausea vomiting diarrhea. She has noted a cough recently and some pain when she coughs but no exertional or rest chest pain. No fevers at home. CT done of head, cervical thoracic and lumbar spine and abdomen pelvis. Head CT negative for acute finding. No acute finding noted on cervical thoracic lumbar spine. However repeat MRI recommended, neurosurgery on consult. CT of abdomen shows 5.5 x 4 x 8 cm right upper retroperitoneal mass which is unchanged from 2021.     Hospital Course:     Beth Fernando is a 70year old female with PMH sig for CKD stage III, generalized anxiety disorder, anemia, DVT diagnosed in June 2023 on Eliquis, type 2 diabetes, hypertension, hyperlipidemia, rheumatoid arthritis and known sarcoma status post prior resection with recurrence complicated by chronic sonali pain who presented with oral falls at home. Patient had recent admission on July 28, 2023 for DICK, hyperkalemia, falls and discharged home. Patient now reporting acute on chronic abdominal pain, increased falls and weakness at home. No chest pain or shortness of breath. No nausea vomiting diarrhea. She has noted a cough recently and some pain when she coughs but no exertional or rest chest pain. Patient with concern concern for thoracic source of her weakness. Thoracic MRI completed. She does have some thoracic stenosis but she would not want to proceed with surgery. She states she understands the consequences could be bilateral lower extremity paralysis. She states she feels better today and would not consider surgery at this time and exam does correlate. Planned for SNF on discharge but still no approval from insurance. Plan for discharge home with [facility]. Falls, weakness  -No acute findings on imaging, MRI pending, neurosurgery consult  -May be secondary to deconditioning, likely will need PT OT pending MRI results  -Hemoglobin is 9.4 which is higher than baseline previously in the eights,  -Creatinine is stable at 1.74, most recent outpatient creatinine 2.47 and IR 1.58  -No LOC, hold Eliquis until repeat MRI and neurosurgery eval, if prolonged hold will need heparin drip  Patient with mild cough, Chan work-up negative. - Thoracic MRI with stenosis, patient clinically improved on 10/8/2023. She would not want to consider surgery, understands possible consequences could be bilateral paralysis. - Discussed with neurosurgery okay to proceed with physical therapy as tolerated, PT OT evaluation and social work for possible placement for rehab strength is improved today.      Right hand pain, noted after fall  - X-ray negative on 10/9/2023     Patient with marked jump in her LFTs, hold statin-> improving  -We will get GI consult. Further recommendations to follow  -unclear etiology, on amdit ast 96, alt 86, ap 222->427, 775, 425, bili normal   -Discussed with GI, appreciate recommendations. Follow-up labs, trend, improving overall. Possible mild rhabdo from falls. Hypertension, hyperlipidemia  -Continue home Imdur, Norvasc 10, hydralazine 50 twice daily, Lasix 3 times a week  - Hold home statin     Type 2 diabetes  -Apparently diet controlled, not on oral meds at home, monitor, sliding scale if needed     CKD stage III, baseline appears to be around 1.5-1.8 chronic hyperkalemia  -Stable on admission  -Follows with Dr. Lavonne Sheets, will consult if needed  -Continue home oral binder     Anemia of chronic disease, CKD  - Outpatient hemoglobin 8.1, hemoconcentrated on admission. No downtrended and back to her prior baseline, monitor     Recurrent sarcoma chronic abdominal pain  -Continue home Percocet, Valium as needed, gabapentin     DVT diagnosed in June 2023, patient status post 3 months of treatment,  -Eliquis resumed    Exam  GEN: female in NAD  HEENT: EOMI  Pulm: CTAB, no crackles or wheezes  CV: RRR, systolic murmur  ABD: Soft, non-tender, non-distended, +BS  SKIN: warm, dry  EXT: no edema    Operative Procedures:      Imaging: XR HAND (MIN 3 VIEWS), RIGHT (CPT=73130)    Result Date: 10/9/2023  CONCLUSION: No fracture or dislocation    Dictated by (CST): Justine Kong MD on 10/09/2023 at 2:00 PM     Finalized by (CST): Justine Kong MD on 10/09/2023 at 2:01 PM               Home Medication Changes:     I reconciled current and discharge medications on day of discharge. These medication changes have been made as below         Medication List        CONTINUE taking these medications      amLODIPine 10 MG Tabs  Commonly known as: Norvasc     apixaban 5 MG Tabs  Commonly known as: Eliquis  Take 1 tablet (5 mg total) by mouth 2 (two) times daily.      atorvastatin 40 MG Tabs  Commonly known as: Lipitor     * cholecalciferol 50 MCG (2000 UT) Tabs     * Vitamin D3 25 MCG (1000 UT) Caps     diazePAM 10 MG Tabs  Commonly known as: Valium  Take 1 tablet (10 mg total) by mouth every 8 (eight) hours as needed (MAX 3/DAY). furosemide 20 MG Tabs  Commonly known as: Lasix  Take 1 tablet (20 mg total) by mouth 3 (three) times a week.     gabapentin 300 MG Caps  Commonly known as: Neurontin     hydrALAZINE 50 MG Tabs  Commonly known as: Apresoline     isosorbide mononitrate ER 60 MG Tb24  Commonly known as: Imdur  Take 1 tablet (60 mg total) by mouth daily. meclizine 25 MG Tabs  Commonly known as: Antivert     oxyCODONE-acetaminophen  MG Tabs  Commonly known as: Percocet  Take 1 tablet by mouth every 4 (four) hours as needed for Pain (max 6/day). patiromer 16.8 g Pack  Commonly known as: Veltassa  Take 1 packet (16.8 g total) by mouth daily. * This list has 2 medication(s) that are the same as other medications prescribed for you. Read the directions carefully, and ask your doctor or other care provider to review them with you. STOP taking these medications      pantoprazole 40 MG Tbec  Commonly known as: Protonix     vancomycin 125 MG Caps  Commonly known as: Vancocin              Activity: activity as tolerated  Diet: diabetic diet  Wound Care: none needed  Code Status: Full Code  O2: n/a    Follow-up with:    PCP   Specialist        ANTONETTE LUCIA instructions:         Follow-up with labs: none    Total Time Coordinating Care: 31 minutes    Patient had opportunity to ask questions and state understand and agree with therapeutic plan as outlined      Rajan Lockett MD  [facility] Hospitalist

## 2023-10-12 NOTE — PLAN OF CARE
Vitals within normal limits. Room Air. Remote tele in place with no calls - discontinued. Ambulating with one assist and walker. Tolerating diet. Denies nausea or vomiting. PRN Percocet for back pain. Insurance auth for REVA denied. Medically cleared for discharge home with [facility]. Discharge instructions discussed with patient. She verbalized understanding. Son picked patient up. Problem: Diabetes/Glucose Control  Goal: Glucose maintained within prescribed range  Description: INTERVENTIONS:  - Monitor Blood Glucose as ordered  - Assess for signs and symptoms of hyperglycemia and hypoglycemia  - Administer ordered medications to maintain glucose within target range  - Assess barriers to adequate nutritional intake and initiate nutrition consult as needed  - Instruct patient on self management of diabetes  Outcome: Adequate for Discharge     Problem: Patient Centered Care  Goal: Patient preferences are identified and integrated in the patient's plan of care  Description: Interventions:  - What would you like us to know as we care for you?  I am usually on the 3rd floor  - Provide timely, complete, and accurate information to patient/family  - Incorporate patient and family knowledge, values, beliefs, and cultural backgrounds into the planning and delivery of care  - Encourage patient/family to participate in care and decision-making at the level they choose  - Honor patient and family perspectives and choices  Outcome: Adequate for Discharge     Problem: SKIN/TISSUE INTEGRITY - ADULT  Goal: Skin integrity remains intact  Description: INTERVENTIONS  - Assess and document risk factors for pressure ulcer development  - Assess and document skin integrity  - Monitor for areas of redness and/or skin breakdown  - Initiate interventions, skin care algorithm/standards of care as needed  Outcome: Adequate for Discharge     Problem: MUSCULOSKELETAL - ADULT  Goal: Return mobility to safest level of function  Description: INTERVENTIONS:  - Assess patient stability and activity tolerance for standing, transferring and ambulating w/ or w/o assistive devices  - Assist with transfers and ambulation using safe patient handling equipment as needed  - Ensure adequate protection for wounds/incisions during mobilization  - Obtain PT/OT consults as needed  - Advance activity as appropriate  - Communicate ordered activity level and limitations with patient/family  Outcome: Adequate for Discharge  Goal: Maintain proper alignment of affected body part  Description: INTERVENTIONS:  - Support and protect limb and body alignment per provider's orders  - Instruct and reinforce with patient and family use of appropriate assistive device and precautions (e.g. spinal or hip dislocation precautions)  Outcome: Adequate for Discharge     Problem: PAIN - ADULT  Goal: Verbalizes/displays adequate comfort level or patient's stated pain goal  Description: INTERVENTIONS:  - Encourage pt to monitor pain and request assistance  - Assess pain using appropriate pain scale  - Administer analgesics based on type and severity of pain and evaluate response  - Implement non-pharmacological measures as appropriate and evaluate response  - Consider cultural and social influences on pain and pain management  - Manage/alleviate anxiety  - Utilize distraction and/or relaxation techniques  - Monitor for opioid side effects  - Notify MD/LIP if interventions unsuccessful or patient reports new pain  - Anticipate increased pain with activity and pre-medicate as appropriate  Outcome: Adequate for Discharge     Problem: SAFETY ADULT - FALL  Goal: Free from fall injury  Description: INTERVENTIONS:  - Assess pt frequently for physical needs  - Identify cognitive and physical deficits and behaviors that affect risk of falls.   - [place] fall precautions as indicated by assessment.  - Educate pt/family on patient safety including physical limitations  - Instruct pt to call for assistance with activity based on assessment  - Modify environment to reduce risk of injury  - Provide assistive devices as appropriate  - Consider OT/PT consult to assist with strengthening/mobility  - Encourage toileting schedule  Outcome: Adequate for Discharge     Problem: DISCHARGE PLANNING  Goal: Discharge to home or other facility with appropriate resources  Description: INTERVENTIONS:  - Identify barriers to discharge w/pt and caregiver  - Include patient/family/discharge partner in discharge planning  - Arrange for needed discharge resources and transportation as appropriate  - Identify discharge learning needs (meds, wound care, etc)  - Arrange for interpreters to assist at discharge as needed  - Consider post-discharge preferences of patient/family/discharge partner  - Complete POLST form as appropriate  - Assess patient's ability to be responsible for managing their own health  - Refer to Case Management Department for coordinating discharge planning if the patient needs post-hospital services based on physician/LIP order or complex needs related to functional status, cognitive ability or social support system  Outcome: Adequate for Discharge

## 2023-10-12 NOTE — PLAN OF CARE
Patient is alert and orientated x4. Room air. Remote tele no calls. Renal diet. Given percocet for pain. Call light with in reach. All safety measures in place. Problem: Diabetes/Glucose Control  Goal: Glucose maintained within prescribed range  Description: INTERVENTIONS:  - Monitor Blood Glucose as ordered  - Assess for signs and symptoms of hyperglycemia and hypoglycemia  - Administer ordered medications to maintain glucose within target range  - Assess barriers to adequate nutritional intake and initiate nutrition consult as needed  - Instruct patient on self management of diabetes  Outcome: Progressing     Problem: Patient Centered Care  Goal: Patient preferences are identified and integrated in the patient's plan of care  Description: Interventions:  - What would you like us to know as we care for you?  I am usually on the 3rd floor  - Provide timely, complete, and accurate information to patient/family  - Incorporate patient and family knowledge, values, beliefs, and cultural backgrounds into the planning and delivery of care  - Encourage patient/family to participate in care and decision-making at the level they choose  - Honor patient and family perspectives and choices  Outcome: Progressing     Problem: SKIN/TISSUE INTEGRITY - ADULT  Goal: Skin integrity remains intact  Description: INTERVENTIONS  - Assess and document risk factors for pressure ulcer development  - Assess and document skin integrity  - Monitor for areas of redness and/or skin breakdown  - Initiate interventions, skin care algorithm/standards of care as needed  Outcome: Progressing     Problem: MUSCULOSKELETAL - ADULT  Goal: Return mobility to safest level of function  Description: INTERVENTIONS:  - Assess patient stability and activity tolerance for standing, transferring and ambulating w/ or w/o assistive devices  - Assist with transfers and ambulation using safe patient handling equipment as needed  - Ensure adequate protection for wounds/incisions during mobilization  - Obtain PT/OT consults as needed  - Advance activity as appropriate  - Communicate ordered activity level and limitations with patient/family  Outcome: Progressing  Goal: Maintain proper alignment of affected body part  Description: INTERVENTIONS:  - Support and protect limb and body alignment per provider's orders  - Instruct and reinforce with patient and family use of appropriate assistive device and precautions (e.g. spinal or hip dislocation precautions)  Outcome: Progressing     Problem: PAIN - ADULT  Goal: Verbalizes/displays adequate comfort level or patient's stated pain goal  Description: INTERVENTIONS:  - Encourage pt to monitor pain and request assistance  - Assess pain using appropriate pain scale  - Administer analgesics based on type and severity of pain and evaluate response  - Implement non-pharmacological measures as appropriate and evaluate response  - Consider cultural and social influences on pain and pain management  - Manage/alleviate anxiety  - Utilize distraction and/or relaxation techniques  - Monitor for opioid side effects  - Notify MD/LIP if interventions unsuccessful or patient reports new pain  - Anticipate increased pain with activity and pre-medicate as appropriate  Outcome: Progressing     Problem: SAFETY ADULT - FALL  Goal: Free from fall injury  Description: INTERVENTIONS:  - Assess pt frequently for physical needs  - Identify cognitive and physical deficits and behaviors that affect risk of falls.   - [place] fall precautions as indicated by assessment.  - Educate pt/family on patient safety including physical limitations  - Instruct pt to call for assistance with activity based on assessment  - Modify environment to reduce risk of injury  - Provide assistive devices as appropriate  - Consider OT/PT consult to assist with strengthening/mobility  - Encourage toileting schedule  Outcome: Progressing     Problem: DISCHARGE PLANNING  Goal: Discharge to home or other facility with appropriate resources  Description: INTERVENTIONS:  - Identify barriers to discharge w/pt and caregiver  - Include patient/family/discharge partner in discharge planning  - Arrange for needed discharge resources and transportation as appropriate  - Identify discharge learning needs (meds, wound care, etc)  - Arrange for interpreters to assist at discharge as needed  - Consider post-discharge preferences of patient/family/discharge partner  - Complete POLST form as appropriate  - Assess patient's ability to be responsible for managing their own health  - Refer to Case Management Department for coordinating discharge planning if the patient needs post-hospital services based on physician/LIP order or complex needs related to functional status, cognitive ability or social support system  Outcome: Progressing

## 2023-10-13 ENCOUNTER — PATIENT OUTREACH (OUTPATIENT)
Dept: CASE MANAGEMENT | Age: 78
End: 2023-10-13

## 2023-10-13 NOTE — PAYOR COMM NOTE
--------------  DISCHARGE REVIEW    [facility]  Subscriber #:  [id number]  Authorization Number: [id number]    Admit date: 10/6/23  Admit time:   8:15 PM  Discharge Date: 10/12/2023  4:16 PM     Admitting Physician: Regi Davila MD  Attending Physician:  No att. providers found  Primary Care Physician: Pcp, None          Discharge Summary Notes        Discharge Summary signed by Lynnette Zurita MD at 10/12/2023 12:30 PM       Author: Lynnette Zurita MD Specialty: HOSPITALIST Author Type: Physician    Filed: 10/12/2023 12:30 PM Date of Service: 10/12/2023 12:29 PM Status: Signed    : Lynnette Zurita MD (Physician)           General Medicine Discharge Summary     Patient ID:  Allie Alfonso  70year old  8/14/1945    Admit date: 10/6/2023    Discharge date and time: 10/12/23    Attending Physician: Lynnette Zurita MD     Primary Care Physician: None Pcp     Reason for admission: weakness, falls    Discharge Diagnoses: Weakness [R53.1]  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 XXXA]    Discharged Condition: stable    Disposition: home    Consults:   Consultants         Provider   Role Specialty     Carolina Perez MD  Consulting Physician [place] MD  Consulting Physician GASTROENTEROLOGY              HPI: Per Dr. 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 Patient had recent admission on July 28, 2023 for DICK, hyperkalemia, falls and discharged home. Patient now reporting acute on chronic abdominal pain, increased falls and weakness at home. No chest pain or shortness of breath. No nausea vomiting diarrhea.   She has noted a cough recently and some pain when she coughs but no exertional or rest chest pain. No fevers at home. CT done of head, cervical thoracic and lumbar spine and abdomen pelvis. Head CT negative for acute finding. No acute finding noted on cervical thoracic lumbar spine. However repeat MRI recommended, neurosurgery on consult. CT of abdomen shows 5.5 x 4 x 8 cm right upper retroperitoneal mass which is unchanged from 2021. 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 Patient had recent admission on July 28, 2023 for DICK, hyperkalemia, falls and discharged home. Patient now reporting acute on chronic abdominal pain, increased falls and weakness at home. No chest pain or shortness of breath. No nausea vomiting diarrhea. She has noted a cough recently and some pain when she coughs but no exertional or rest chest pain. Patient with concern concern for thoracic source of her weakness. Thoracic MRI completed. She does have some thoracic stenosis but she would not want to proceed with surgery. She states she understands the consequences could be bilateral lower extremity paralysis. She states she feels better today and would not consider surgery at this time and exam does correlate. Planned for SNF on discharge but still no approval from insurance. Plan for discharge home with [facility].       Falls, weakness  -No acute findings on imaging, MRI pending, neurosurgery consult  -May be secondary to deconditioning, likely will need PT OT pending MRI results  -Hemoglobin is 9.4 which is higher than baseline previously in the eights,  -Creatinine is stable at 1.74, most recent outpatient creatinine 2.47 and IR 1.58  -No LOC, hold Eliquis until repeat MRI and neurosurgery eval, if prolonged hold will need heparin drip  Patient with mild cough, Chan work-up negative. - Thoracic MRI with stenosis, patient clinically improved on 10/8/2023. She would not want to consider surgery, understands possible consequences could be bilateral paralysis. - Discussed with neurosurgery okay to proceed with physical therapy as tolerated, PT OT evaluation and social work for possible placement for rehab strength is improved today. Right hand pain, noted after fall  - X-ray negative on 10/9/2023     Patient with marked jump in her LFTs, hold statin-> improving  -We will get GI consult. Further recommendations to follow  -unclear etiology, on amdit ast 96, alt 86, ap 222->427, 775, 425, bili normal   -Discussed with GI, appreciate recommendations. Follow-up labs, trend, improving overall. Possible mild rhabdo from falls. Hypertension, hyperlipidemia  -Continue home Imdur, Norvasc 10, hydralazine 50 twice daily, Lasix 3 times a week  - Hold home statin     Type 2 diabetes  -Apparently diet controlled, not on oral meds at home, monitor, sliding scale if needed     CKD stage III, baseline appears to be around 1.5-1.8 chronic hyperkalemia  -Stable on admission  -Follows with Dr. Nicolasa Avelar, will consult if needed  -Continue home oral binder     Anemia of chronic disease, CKD  - Outpatient hemoglobin 8.1, hemoconcentrated on admission.   No downtrended and back to her prior baseline, monitor     Recurrent sarcoma chronic abdominal pain  -Continue home Percocet, Valium as needed, gabapentin     DVT diagnosed in June 2023, patient status post 3 months of treatment,  -Eliquis resumed    Exam  GEN: female in NAD  HEENT: EOMI  Pulm: CTAB, no crackles or wheezes  CV: RRR, systolic murmur  ABD: Soft, non-tender, non-distended, +BS  SKIN: warm, dry  EXT: no edema    Operative Procedures:      Imaging: XR HAND (MIN 3 VIEWS), RIGHT (CPT=73130)    Result Date: 10/9/2023  CONCLUSION: No fracture or dislocation    Dictated by (CST): Yoli Conway MD on 10/09/2023 at 2:00 PM     Finalized by (CST): Yoli Conway MD on 10/09/2023 at 2:01 PM               Home Medication Changes:     I reconciled current and discharge medications on day of discharge. These medication changes have been made as below         Medication List        CONTINUE taking these medications      amLODIPine 10 MG Tabs  Commonly known as: Norvasc     apixaban 5 MG Tabs  Commonly known as: Eliquis  Take 1 tablet (5 mg total) by mouth 2 (two) times daily. atorvastatin 40 MG Tabs  Commonly known as: Lipitor     * cholecalciferol 50 MCG (2000 UT) Tabs     * Vitamin D3 25 MCG (1000 UT) Caps     diazePAM 10 MG Tabs  Commonly known as: Valium  Take 1 tablet (10 mg total) by mouth every 8 (eight) hours as needed (MAX 3/DAY). furosemide 20 MG Tabs  Commonly known as: Lasix  Take 1 tablet (20 mg total) by mouth 3 (three) times a week.     gabapentin 300 MG Caps  Commonly known as: Neurontin     hydrALAZINE 50 MG Tabs  Commonly known as: Apresoline     isosorbide mononitrate ER 60 MG Tb24  Commonly known as: Imdur  Take 1 tablet (60 mg total) by mouth daily. meclizine 25 MG Tabs  Commonly known as: Antivert     oxyCODONE-acetaminophen  MG Tabs  Commonly known as: Percocet  Take 1 tablet by mouth every 4 (four) hours as needed for Pain (max 6/day). patiromer 16.8 g Pack  Commonly known as: Veltassa  Take 1 packet (16.8 g total) by mouth daily. * This list has 2 medication(s) that are the same as other medications prescribed for you. Read the directions carefully, and ask your doctor or other care provider to review them with you.                 STOP taking these medications      pantoprazole 40 MG Tbec  Commonly known as: Protonix     vancomycin 125 MG Caps  Commonly known as: Vancocin              Activity: activity as tolerated  Diet: diabetic diet  Wound Care: none needed  Code Status: Full Code  O2: n/a    Follow-up with:    PCP   Specialist        ANTONETTE LUCIA instructions:         Follow-up with labs: none    Total Time Coordinating Care: 31 minutes    Patient had opportunity to ask questions and state understand and agree with therapeutic plan as outlined      Lisa Pinon MD  [facility] Hospitalist          Electronically signed by Bashir Huber MD on 10/12/2023 12:30 PM         REVIEWER COMMENTS

## 2023-10-13 NOTE — PROGRESS NOTES
TCM chart review. No TCM as patient follows with outside [facility] PCP. Encounter closing.

## 2023-10-14 LAB — VITAMIN B1 WHOLE BLD: 81.6 NMOL/L

## 2023-10-16 LAB — A-1-ANTITRYPSIN: 160 MG/DL

## 2023-10-23 ENCOUNTER — TELEPHONE (OUTPATIENT)
Dept: NEPHROLOGY | Facility: CLINIC | Age: 78
End: 2023-10-23

## 2023-10-23 NOTE — TELEPHONE ENCOUNTER
Spoke to pt. Reminded pt of pre-clinic labs. Labs ordered on 8-25 by Dr. Aurora Larry. Verbalized understanding.

## 2023-10-25 ENCOUNTER — LAB ENCOUNTER (OUTPATIENT)
Dept: LAB | Facility: HOSPITAL | Age: 78
End: 2023-10-25
Attending: INTERNAL MEDICINE

## 2023-10-25 ENCOUNTER — OFFICE VISIT (OUTPATIENT)
Facility: CLINIC | Age: 78
End: 2023-10-25

## 2023-10-25 VITALS — DIASTOLIC BLOOD PRESSURE: 70 MMHG | HEART RATE: 77 BPM | SYSTOLIC BLOOD PRESSURE: 145 MMHG

## 2023-10-25 DIAGNOSIS — R60.9 EDEMA, UNSPECIFIED TYPE: ICD-10-CM

## 2023-10-25 DIAGNOSIS — N18.30 STAGE 3 CHRONIC KIDNEY DISEASE, UNSPECIFIED WHETHER STAGE 3A OR 3B CKD (HCC): ICD-10-CM

## 2023-10-25 DIAGNOSIS — N18.30 STAGE 3 CHRONIC KIDNEY DISEASE, UNSPECIFIED WHETHER STAGE 3A OR 3B CKD (HCC): Primary | ICD-10-CM

## 2023-10-25 DIAGNOSIS — N18.30 HYPERTENSIVE KIDNEY DISEASE WITH STAGE 3 CHRONIC KIDNEY DISEASE, UNSPECIFIED WHETHER STAGE 3A OR 3B CKD (HCC): ICD-10-CM

## 2023-10-25 DIAGNOSIS — D63.1 ANEMIA DUE TO STAGE 3 CHRONIC KIDNEY DISEASE, UNSPECIFIED WHETHER STAGE 3A OR 3B CKD: ICD-10-CM

## 2023-10-25 DIAGNOSIS — N18.30 ANEMIA DUE TO STAGE 3 CHRONIC KIDNEY DISEASE, UNSPECIFIED WHETHER STAGE 3A OR 3B CKD: ICD-10-CM

## 2023-10-25 DIAGNOSIS — F17.200 TOBACCO DEPENDENCE: ICD-10-CM

## 2023-10-25 DIAGNOSIS — E87.5 HYPERKALEMIA: ICD-10-CM

## 2023-10-25 DIAGNOSIS — N25.81 SECONDARY HYPERPARATHYROIDISM OF RENAL ORIGIN (HCC): ICD-10-CM

## 2023-10-25 DIAGNOSIS — I12.9 HYPERTENSIVE KIDNEY DISEASE WITH STAGE 3 CHRONIC KIDNEY DISEASE, UNSPECIFIED WHETHER STAGE 3A OR 3B CKD (HCC): ICD-10-CM

## 2023-10-25 DIAGNOSIS — I50.32 DIASTOLIC DYSFUNCTION WITH CHRONIC HEART FAILURE (HCC): ICD-10-CM

## 2023-10-25 DIAGNOSIS — E11.21 TYPE 2 DIABETES MELLITUS WITH NEPHROPATHY (HCC): ICD-10-CM

## 2023-10-25 LAB
ALBUMIN SERPL-MCNC: 2.9 G/DL (ref 3.4–5)
ANION GAP SERPL CALC-SCNC: 2 MMOL/L (ref 0–18)
BASOPHILS # BLD AUTO: 0.04 X10(3) UL (ref 0–0.2)
BASOPHILS NFR BLD AUTO: 0.7 %
BUN BLD-MCNC: 35 MG/DL (ref 7–18)
BUN/CREAT SERPL: 19.3 (ref 10–20)
CALCIUM BLD-MCNC: 8.5 MG/DL (ref 8.5–10.1)
CHLORIDE SERPL-SCNC: 114 MMOL/L (ref 98–112)
CO2 SERPL-SCNC: 30 MMOL/L (ref 21–32)
CREAT BLD-MCNC: 1.81 MG/DL
CREAT UR-SCNC: 136 MG/DL
DEPRECATED HBV CORE AB SER IA-ACNC: 80.7 NG/ML
DEPRECATED RDW RBC AUTO: 47.5 FL (ref 35.1–46.3)
EGFRCR SERPLBLD CKD-EPI 2021: 29 ML/MIN/1.73M2 (ref 60–?)
EOSINOPHIL # BLD AUTO: 0.22 X10(3) UL (ref 0–0.7)
EOSINOPHIL NFR BLD AUTO: 3.9 %
ERYTHROCYTE [DISTWIDTH] IN BLOOD BY AUTOMATED COUNT: 13.8 % (ref 11–15)
GLUCOSE BLD-MCNC: 123 MG/DL (ref 70–99)
HCT VFR BLD AUTO: 27.6 %
HGB BLD-MCNC: 8.4 G/DL
IMM GRANULOCYTES # BLD AUTO: 0.02 X10(3) UL (ref 0–1)
IMM GRANULOCYTES NFR BLD: 0.4 %
IRON SATN MFR SERPL: 13 %
IRON SERPL-MCNC: 52 UG/DL
LYMPHOCYTES # BLD AUTO: 1.08 X10(3) UL (ref 1–4)
LYMPHOCYTES NFR BLD AUTO: 19 %
MCH RBC QN AUTO: 28.9 PG (ref 26–34)
MCHC RBC AUTO-ENTMCNC: 30.4 G/DL (ref 31–37)
MCV RBC AUTO: 94.8 FL
MONOCYTES # BLD AUTO: 0.41 X10(3) UL (ref 0.1–1)
MONOCYTES NFR BLD AUTO: 7.2 %
NEUTROPHILS # BLD AUTO: 3.92 X10 (3) UL (ref 1.5–7.7)
NEUTROPHILS # BLD AUTO: 3.92 X10(3) UL (ref 1.5–7.7)
NEUTROPHILS NFR BLD AUTO: 68.8 %
OSMOLALITY SERPL CALC.SUM OF ELEC: 311 MOSM/KG (ref 275–295)
PHOSPHATE SERPL-MCNC: 3.6 MG/DL (ref 2.5–4.9)
PLATELET # BLD AUTO: 215 10(3)UL (ref 150–450)
POTASSIUM SERPL-SCNC: 4.7 MMOL/L (ref 3.5–5.1)
PROT UR-MCNC: 23.7 MG/DL
PROT/CREAT UR-RTO: 0.17
PTH-INTACT SERPL-MCNC: 142 PG/ML (ref 18.5–88)
RBC # BLD AUTO: 2.91 X10(6)UL
SODIUM SERPL-SCNC: 146 MMOL/L (ref 136–145)
TIBC SERPL-MCNC: 395 UG/DL (ref 240–450)
TRANSFERRIN SERPL-MCNC: 265 MG/DL (ref 200–360)
WBC # BLD AUTO: 5.7 X10(3) UL (ref 4–11)

## 2023-10-25 PROCEDURE — 83970 ASSAY OF PARATHORMONE: CPT

## 2023-10-25 PROCEDURE — 83540 ASSAY OF IRON: CPT

## 2023-10-25 PROCEDURE — 80069 RENAL FUNCTION PANEL: CPT

## 2023-10-25 PROCEDURE — 1111F DSCHRG MED/CURRENT MED MERGE: CPT | Performed by: INTERNAL MEDICINE

## 2023-10-25 PROCEDURE — 82728 ASSAY OF FERRITIN: CPT

## 2023-10-25 PROCEDURE — 84466 ASSAY OF TRANSFERRIN: CPT

## 2023-10-25 PROCEDURE — 3077F SYST BP >= 140 MM HG: CPT | Performed by: INTERNAL MEDICINE

## 2023-10-25 PROCEDURE — 82570 ASSAY OF URINE CREATININE: CPT

## 2023-10-25 PROCEDURE — 36415 COLL VENOUS BLD VENIPUNCTURE: CPT

## 2023-10-25 PROCEDURE — 1159F MED LIST DOCD IN RCRD: CPT | Performed by: INTERNAL MEDICINE

## 2023-10-25 PROCEDURE — 99214 OFFICE O/P EST MOD 30 MIN: CPT | Performed by: INTERNAL MEDICINE

## 2023-10-25 PROCEDURE — 1160F RVW MEDS BY RX/DR IN RCRD: CPT | Performed by: INTERNAL MEDICINE

## 2023-10-25 PROCEDURE — 85025 COMPLETE CBC W/AUTO DIFF WBC: CPT

## 2023-10-25 PROCEDURE — 3078F DIAST BP <80 MM HG: CPT | Performed by: INTERNAL MEDICINE

## 2023-10-25 PROCEDURE — 84156 ASSAY OF PROTEIN URINE: CPT
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
## 2023-10-25 NOTE — PATIENT INSTRUCTIONS
Low potassium diet  Take veltassa powder every day  Take lasix 20 mg Monday wed and fri ( extra dose for 2 weeks)   Low salt diet  Stop sodium bicarbonate  Labs and fu in 3 months

## 2023-10-30 ENCOUNTER — TELEPHONE (OUTPATIENT)
Facility: CLINIC | Age: 78
End: 2023-10-30

## 2023-10-30 DIAGNOSIS — N18.30 STAGE 3 CHRONIC KIDNEY DISEASE, UNSPECIFIED WHETHER STAGE 3A OR 3B CKD (HCC): Primary | ICD-10-CM

## 2023-10-30 NOTE — TELEPHONE ENCOUNTER
I tried calling pt , did not   Please let her know I reviewed labs  Cr stable, slightly lower than her baseline    Hydrate well  Start OTC iron tablets  ( three times a week, can cause constipation so be careful)   Labs and fu in 6 months    thanks

## 2023-10-31 ENCOUNTER — HOSPITAL ENCOUNTER (OUTPATIENT)
Dept: GENERAL RADIOLOGY | Facility: HOSPITAL | Age: 78
Discharge: HOME | End: 2023-10-31
Attending: FAMILY MEDICINE
Payer: MEDICARE

## 2023-10-31 DIAGNOSIS — S69.90XA INJURY OF FINGER: ICD-10-CM

## 2023-10-31 PROCEDURE — 73130 X-RAY EXAM OF HAND: CPT | Performed by: FAMILY MEDICINE

## 2023-11-01 RX ORDER — MELATONIN
325 SEE ADMIN INSTRUCTIONS
COMMUNITY

## 2023-11-01 NOTE — TELEPHONE ENCOUNTER
Spoke to pt ( confirmed). Canceled January appt as is too early. Pt will call back at later date to schedule appt for April. Will have labs prior. Verbalized understanding. [address] [place] [address] updated.

## 2023-12-07 ENCOUNTER — TELEPHONE (OUTPATIENT)
Dept: NEPHROLOGY | Facility: CLINIC | Age: 78
End: 2023-12-07

## 2023-12-07 NOTE — TELEPHONE ENCOUNTER
Called the patient  She had blood work done at home  I dont have access to the labs however did ask her whether taking pitoromer powder as prescribed. She has NOT been taking it daily . Have asked her to start taking it asap (starting today once a day)'  She has the packets at home  Also advised low K diet  And to come to er if not feeling well  For now she is refusing ER  and lanny; start taking pitoromer.  Will come if not feeling well

## 2023-12-12 ENCOUNTER — HOSPITAL ENCOUNTER (EMERGENCY)
Facility: HOSPITAL | Age: 78
Discharge: HOME | End: 2023-12-12
Attending: EMERGENCY MEDICINE
Payer: MEDICARE

## 2023-12-12 ENCOUNTER — APPOINTMENT (OUTPATIENT)
Dept: CT IMAGING | Facility: HOSPITAL | Age: 78
End: 2023-12-12
Payer: MEDICARE

## 2023-12-12 VITALS
SYSTOLIC BLOOD PRESSURE: 139 MMHG | OXYGEN SATURATION: 98 % | HEART RATE: 68 BPM | RESPIRATION RATE: 16 BRPM | BODY MASS INDEX: 30 KG/M2 | TEMPERATURE: 98 F | WEIGHT: 174.19 LBS | DIASTOLIC BLOOD PRESSURE: 45 MMHG

## 2023-12-12 DIAGNOSIS — S00.93XA CONTUSION OF HEAD, UNSPECIFIED PART OF HEAD, INITIAL ENCOUNTER: Primary | ICD-10-CM

## 2023-12-12 DIAGNOSIS — G89.3 CANCER ASSOCIATED PAIN: ICD-10-CM

## 2023-12-12 PROCEDURE — 99284 EMERGENCY DEPT VISIT MOD MDM: CPT

## 2023-12-12 PROCEDURE — 70450 CT HEAD/BRAIN W/O DYE: CPT | Performed by: EMERGENCY MEDICINE

## 2023-12-12 PROCEDURE — 99283 EMERGENCY DEPT VISIT LOW MDM: CPT

## 2023-12-12 NOTE — ED INITIAL ASSESSMENT (HPI)
Pt arrives via EMS from home, pt had a fall Friday, states she hit her head, did not get evaluated. Today, [facility] called EMS, pt also states she denies hitting her head Friday but has been c/o HA since then.

## 2023-12-18 NOTE — TELEPHONE ENCOUNTER
Can we order BMP for her   Recent high K   Had asked her to take janine  Now please repeat bmp thanks

## 2023-12-19 ENCOUNTER — TELEPHONE (OUTPATIENT)
Facility: CLINIC | Age: 78
End: 2023-12-19

## 2023-12-19 NOTE — TELEPHONE ENCOUNTER
Pt wants to speak with RN before she has labs drawn. She wants to know what is being order and is fasting required.    Please call

## 2023-12-26 ENCOUNTER — LAB ENCOUNTER (OUTPATIENT)
Dept: LAB | Facility: HOSPITAL | Age: 78
End: 2023-12-26
Attending: INTERNAL MEDICINE
Payer: MEDICARE

## 2023-12-26 DIAGNOSIS — N18.30 STAGE 3 CHRONIC KIDNEY DISEASE, UNSPECIFIED WHETHER STAGE 3A OR 3B CKD (HCC): ICD-10-CM

## 2023-12-26 LAB
ALBUMIN SERPL-MCNC: 3.8 G/DL (ref 3.2–4.8)
ANION GAP SERPL CALC-SCNC: 2 MMOL/L (ref 0–18)
BASOPHILS # BLD AUTO: 0.03 X10(3) UL (ref 0–0.2)
BASOPHILS NFR BLD AUTO: 0.6 %
BUN BLD-MCNC: 40 MG/DL (ref 9–23)
BUN/CREAT SERPL: 20.5 (ref 10–20)
CALCIUM BLD-MCNC: 8.8 MG/DL (ref 8.7–10.4)
CHLORIDE SERPL-SCNC: 111 MMOL/L (ref 98–112)
CO2 SERPL-SCNC: 30 MMOL/L (ref 21–32)
CREAT BLD-MCNC: 1.95 MG/DL
CREAT UR-SCNC: 181 MG/DL
DEPRECATED RDW RBC AUTO: 47.8 FL (ref 35.1–46.3)
EGFRCR SERPLBLD CKD-EPI 2021: 26 ML/MIN/1.73M2 (ref 60–?)
EOSINOPHIL # BLD AUTO: 0.15 X10(3) UL (ref 0–0.7)
EOSINOPHIL NFR BLD AUTO: 3.1 %
ERYTHROCYTE [DISTWIDTH] IN BLOOD BY AUTOMATED COUNT: 13.8 % (ref 11–15)
GLUCOSE BLD-MCNC: 135 MG/DL (ref 70–99)
HCT VFR BLD AUTO: 27.7 %
HGB BLD-MCNC: 8.2 G/DL
IMM GRANULOCYTES # BLD AUTO: 0.01 X10(3) UL (ref 0–1)
IMM GRANULOCYTES NFR BLD: 0.2 %
LYMPHOCYTES # BLD AUTO: 0.87 X10(3) UL (ref 1–4)
LYMPHOCYTES NFR BLD AUTO: 17.8 %
MCH RBC QN AUTO: 28.1 PG (ref 26–34)
MCHC RBC AUTO-ENTMCNC: 29.6 G/DL (ref 31–37)
MCV RBC AUTO: 94.9 FL
MONOCYTES # BLD AUTO: 0.38 X10(3) UL (ref 0.1–1)
MONOCYTES NFR BLD AUTO: 7.8 %
NEUTROPHILS # BLD AUTO: 3.45 X10 (3) UL (ref 1.5–7.7)
NEUTROPHILS # BLD AUTO: 3.45 X10(3) UL (ref 1.5–7.7)
NEUTROPHILS NFR BLD AUTO: 70.5 %
OSMOLALITY SERPL CALC.SUM OF ELEC: 308 MOSM/KG (ref 275–295)
PHOSPHATE SERPL-MCNC: 4 MG/DL (ref 2.4–5.1)
PLATELET # BLD AUTO: 215 10(3)UL (ref 150–450)
POTASSIUM SERPL-SCNC: 5.5 MMOL/L (ref 3.5–5.1)
PROT UR-MCNC: 23.5 MG/DL (ref ?–14)
PROT/CREAT UR-RTO: 0.13
PTH-INTACT SERPL-MCNC: 121.7 PG/ML (ref 18.5–88)
RBC # BLD AUTO: 2.92 X10(6)UL
SODIUM SERPL-SCNC: 143 MMOL/L (ref 136–145)
WBC # BLD AUTO: 4.9 X10(3) UL (ref 4–11)

## 2023-12-26 PROCEDURE — 85025 COMPLETE CBC W/AUTO DIFF WBC: CPT

## 2023-12-26 PROCEDURE — 36415 COLL VENOUS BLD VENIPUNCTURE: CPT

## 2023-12-26 PROCEDURE — 80069 RENAL FUNCTION PANEL: CPT

## 2023-12-26 PROCEDURE — 82570 ASSAY OF URINE CREATININE: CPT

## 2023-12-26 PROCEDURE — 84156 ASSAY OF PROTEIN URINE: CPT

## 2023-12-26 PROCEDURE — 83970 ASSAY OF PARATHORMONE: CPT

## 2023-12-31 ENCOUNTER — APPOINTMENT (OUTPATIENT)
Dept: CT IMAGING | Facility: HOSPITAL | Age: 78
End: 2023-12-31
Attending: EMERGENCY MEDICINE
Payer: MEDICARE

## 2023-12-31 ENCOUNTER — APPOINTMENT (OUTPATIENT)
Dept: GENERAL RADIOLOGY | Facility: HOSPITAL | Age: 78
End: 2023-12-31
Attending: EMERGENCY MEDICINE
Payer: MEDICARE

## 2023-12-31 ENCOUNTER — HOSPITAL ENCOUNTER (EMERGENCY)
Facility: HOSPITAL | Age: 78
Discharge: HOME | End: 2023-12-31
Attending: EMERGENCY MEDICINE
Payer: MEDICARE

## 2023-12-31 VITALS
DIASTOLIC BLOOD PRESSURE: 69 MMHG | SYSTOLIC BLOOD PRESSURE: 156 MMHG | BODY MASS INDEX: 34.15 KG/M2 | HEART RATE: 68 BPM | WEIGHT: 200 LBS | HEIGHT: 64 IN | TEMPERATURE: 98 F | RESPIRATION RATE: 26 BRPM | OXYGEN SATURATION: 100 %

## 2023-12-31 DIAGNOSIS — M79.10 MYALGIA: Primary | ICD-10-CM

## 2023-12-31 DIAGNOSIS — N18.9 CHRONIC KIDNEY DISEASE, UNSPECIFIED CKD STAGE: ICD-10-CM

## 2023-12-31 DIAGNOSIS — C49.9 SARCOMA (HCC): ICD-10-CM

## 2023-12-31 DIAGNOSIS — E87.5 HYPERKALEMIA: ICD-10-CM

## 2023-12-31 LAB
ALBUMIN SERPL-MCNC: 4.1 G/DL (ref 3.2–4.8)
ALBUMIN/GLOB SERPL: 1.3 {RATIO} (ref 1–2)
ALP LIVER SERPL-CCNC: 143 U/L
ALT SERPL-CCNC: 19 U/L
ANION GAP SERPL CALC-SCNC: 2 MMOL/L (ref 0–18)
AST SERPL-CCNC: 31 U/L (ref ?–34)
BASOPHILS # BLD AUTO: 0.03 X10(3) UL (ref 0–0.2)
BASOPHILS NFR BLD AUTO: 0.5 %
BILIRUB SERPL-MCNC: 0.3 MG/DL (ref 0.2–1.1)
BILIRUB UR QL: NEGATIVE
BUN BLD-MCNC: 21 MG/DL (ref 9–23)
BUN/CREAT SERPL: 11.9 (ref 10–20)
CALCIUM BLD-MCNC: 9 MG/DL (ref 8.7–10.4)
CHLORIDE SERPL-SCNC: 114 MMOL/L (ref 98–112)
CK SERPL-CCNC: 219 U/L
CLARITY UR: CLEAR
CO2 SERPL-SCNC: 25 MMOL/L (ref 21–32)
CREAT BLD-MCNC: 1.76 MG/DL
DEPRECATED RDW RBC AUTO: 46 FL (ref 35.1–46.3)
EGFRCR SERPLBLD CKD-EPI 2021: 30 ML/MIN/1.73M2 (ref 60–?)
EOSINOPHIL # BLD AUTO: 0.1 X10(3) UL (ref 0–0.7)
EOSINOPHIL NFR BLD AUTO: 1.6 %
ERYTHROCYTE [DISTWIDTH] IN BLOOD BY AUTOMATED COUNT: 13.7 % (ref 11–15)
FLUAV + FLUBV RNA SPEC NAA+PROBE: NEGATIVE
FLUAV + FLUBV RNA SPEC NAA+PROBE: NEGATIVE
GLOBULIN PLAS-MCNC: 3.2 G/DL (ref 2.8–4.4)
GLUCOSE BLD-MCNC: 99 MG/DL (ref 70–99)
GLUCOSE BLDC GLUCOMTR-MCNC: 108 MG/DL (ref 70–99)
GLUCOSE UR-MCNC: NORMAL MG/DL
HCT VFR BLD AUTO: 28.9 %
HGB BLD-MCNC: 9 G/DL
HGB UR QL STRIP.AUTO: NEGATIVE
IMM GRANULOCYTES # BLD AUTO: 0.01 X10(3) UL (ref 0–1)
IMM GRANULOCYTES NFR BLD: 0.2 %
KETONES UR-MCNC: NEGATIVE MG/DL
LEUKOCYTE ESTERASE UR QL STRIP.AUTO: NEGATIVE
LYMPHOCYTES # BLD AUTO: 1.35 X10(3) UL (ref 1–4)
LYMPHOCYTES NFR BLD AUTO: 22.1 %
MCH RBC QN AUTO: 28.2 PG (ref 26–34)
MCHC RBC AUTO-ENTMCNC: 31.1 G/DL (ref 31–37)
MCV RBC AUTO: 90.6 FL
MONOCYTES # BLD AUTO: 0.37 X10(3) UL (ref 0.1–1)
MONOCYTES NFR BLD AUTO: 6 %
NEUTROPHILS # BLD AUTO: 4.26 X10 (3) UL (ref 1.5–7.7)
NEUTROPHILS # BLD AUTO: 4.26 X10(3) UL (ref 1.5–7.7)
NEUTROPHILS NFR BLD AUTO: 69.6 %
NITRITE UR QL STRIP.AUTO: NEGATIVE
OSMOLALITY SERPL CALC.SUM OF ELEC: 295 MOSM/KG (ref 275–295)
PH UR: 7 [PH] (ref 5–8)
PLATELET # BLD AUTO: 228 10(3)UL (ref 150–450)
POTASSIUM SERPL-SCNC: 5.5 MMOL/L (ref 3.5–5.1)
PROT SERPL-MCNC: 7.3 G/DL (ref 5.7–8.2)
PROT UR-MCNC: NEGATIVE MG/DL
RBC # BLD AUTO: 3.19 X10(6)UL
RSV RNA SPEC NAA+PROBE: NEGATIVE
SARS-COV-2 RNA RESP QL NAA+PROBE: NOT DETECTED
SODIUM SERPL-SCNC: 141 MMOL/L (ref 136–145)
SP GR UR STRIP: 1.01 (ref 1–1.03)
UROBILINOGEN UR STRIP-ACNC: NORMAL
WBC # BLD AUTO: 6.1 X10(3) UL (ref 4–11)

## 2023-12-31 PROCEDURE — 70450 CT HEAD/BRAIN W/O DYE: CPT | Performed by: EMERGENCY MEDICINE

## 2023-12-31 PROCEDURE — 71045 X-RAY EXAM CHEST 1 VIEW: CPT | Performed by: EMERGENCY MEDICINE

## 2023-12-31 PROCEDURE — 82550 ASSAY OF CK (CPK): CPT | Performed by: EMERGENCY MEDICINE

## 2023-12-31 PROCEDURE — 96361 HYDRATE IV INFUSION ADD-ON: CPT

## 2023-12-31 PROCEDURE — 96375 TX/PRO/DX INJ NEW DRUG ADDON: CPT

## 2023-12-31 PROCEDURE — 99285 EMERGENCY DEPT VISIT HI MDM: CPT

## 2023-12-31 PROCEDURE — 96374 THER/PROPH/DIAG INJ IV PUSH: CPT

## 2023-12-31 PROCEDURE — 80053 COMPREHEN METABOLIC PANEL: CPT | Performed by: EMERGENCY MEDICINE

## 2023-12-31 PROCEDURE — 81003 URINALYSIS AUTO W/O SCOPE: CPT | Performed by: EMERGENCY MEDICINE

## 2023-12-31 PROCEDURE — 74176 CT ABD & PELVIS W/O CONTRAST: CPT | Performed by: EMERGENCY MEDICINE

## 2023-12-31 PROCEDURE — 0241U SARS-COV-2/FLU A AND B/RSV BY PCR (GENEXPERT): CPT | Performed by: EMERGENCY MEDICINE

## 2023-12-31 PROCEDURE — 85025 COMPLETE CBC W/AUTO DIFF WBC: CPT | Performed by: EMERGENCY MEDICINE

## 2023-12-31 PROCEDURE — 82962 GLUCOSE BLOOD TEST: CPT

## 2023-12-31 RX ORDER — MORPHINE SULFATE 2 MG/ML
2 INJECTION, SOLUTION INTRAMUSCULAR; INTRAVENOUS ONCE
Status: COMPLETED | OUTPATIENT
Start: 2023-12-31 | End: 2023-12-31

## 2023-12-31 RX ORDER — DEXAMETHASONE SODIUM PHOSPHATE 10 MG/ML
10 INJECTION, SOLUTION INTRAMUSCULAR; INTRAVENOUS ONCE
Status: COMPLETED | OUTPATIENT
Start: 2023-12-31 | End: 2023-12-31

## 2023-12-31 RX ORDER — PREDNISONE 20 MG/1
40 TABLET ORAL DAILY
Qty: 8 TABLET | Refills: 0 | Status: SHIPPED | OUTPATIENT
Start: 2023-12-31 | End: 2024-01-04

## 2023-12-31 NOTE — ED QUICK NOTES
Patient's son, Denys Correia, called for update and stated he will pick her up when she is ready for discharge. MD made aware. Denys Correia, son, [phone].

## 2023-12-31 NOTE — ED QUICK NOTES
Per MD okay for pt to take Hydralazine 50mg PO that she had from home.  Pt tolerated medication well

## 2023-12-31 NOTE — ED QUICK NOTES
Patient and her son provided with discharge instructions. Verbalized understanding for plan of care at home and follow up. All questions/ concerns addressed prior to discharge.

## 2024-01-19 ENCOUNTER — APPOINTMENT (OUTPATIENT)
Dept: INTERVENTIONAL RADIOLOGY/VASCULAR | Facility: HOSPITAL | Age: 79
End: 2024-01-19
Attending: INTERNAL MEDICINE
Payer: MEDICARE

## 2024-01-19 ENCOUNTER — HOSPITAL ENCOUNTER (INPATIENT)
Facility: HOSPITAL | Age: 79
LOS: 5 days | Discharge: HOME HEALTH | End: 2024-01-24
Attending: EMERGENCY MEDICINE | Admitting: HOSPITALIST
Payer: MEDICARE

## 2024-01-19 ENCOUNTER — APPOINTMENT (OUTPATIENT)
Dept: GENERAL RADIOLOGY | Facility: HOSPITAL | Age: 79
End: 2024-01-19
Attending: EMERGENCY MEDICINE
Payer: MEDICARE

## 2024-01-19 DIAGNOSIS — K04.7 DENTAL INFECTION: ICD-10-CM

## 2024-01-19 DIAGNOSIS — K04.7 DENTAL ABSCESS: ICD-10-CM

## 2024-01-19 DIAGNOSIS — I47.10 SVT (SUPRAVENTRICULAR TACHYCARDIA): ICD-10-CM

## 2024-01-19 DIAGNOSIS — R07.9 CHEST PAIN: Primary | ICD-10-CM

## 2024-01-19 LAB
ALBUMIN SERPL-MCNC: 4.4 G/DL (ref 3.2–4.8)
ALP LIVER SERPL-CCNC: 183 U/L
ALT SERPL-CCNC: 34 U/L
ANION GAP SERPL CALC-SCNC: 8 MMOL/L (ref 0–18)
APTT PPP: 32.5 SECONDS (ref 23.3–35.6)
AST SERPL-CCNC: 35 U/L (ref ?–34)
ATRIAL RATE: 97 BPM
BASOPHILS # BLD AUTO: 0.06 X10(3) UL (ref 0–0.2)
BASOPHILS NFR BLD AUTO: 0.7 %
BILIRUB DIRECT SERPL-MCNC: 0.2 MG/DL (ref ?–0.3)
BILIRUB SERPL-MCNC: 0.3 MG/DL (ref 0.2–1.1)
BNP SERPL-MCNC: 211 PG/ML
BUN BLD-MCNC: 40 MG/DL (ref 9–23)
BUN/CREAT SERPL: 20.1 (ref 10–20)
CALCIUM BLD-MCNC: 9.7 MG/DL (ref 8.7–10.4)
CHLORIDE SERPL-SCNC: 115 MMOL/L (ref 98–112)
CO2 SERPL-SCNC: 20 MMOL/L (ref 21–32)
CREAT BLD-MCNC: 1.99 MG/DL
DEPRECATED RDW RBC AUTO: 47.6 FL (ref 35.1–46.3)
EGFRCR SERPLBLD CKD-EPI 2021: 26 ML/MIN/1.73M2 (ref 60–?)
EOSINOPHIL # BLD AUTO: 0.21 X10(3) UL (ref 0–0.7)
EOSINOPHIL NFR BLD AUTO: 2.6 %
ERYTHROCYTE [DISTWIDTH] IN BLOOD BY AUTOMATED COUNT: 14 % (ref 11–15)
GLUCOSE BLD-MCNC: 141 MG/DL (ref 70–99)
GLUCOSE BLDC GLUCOMTR-MCNC: 142 MG/DL (ref 70–99)
GLUCOSE BLDC GLUCOMTR-MCNC: 181 MG/DL (ref 70–99)
HCT VFR BLD AUTO: 34.7 %
HGB BLD-MCNC: 11.2 G/DL
IMM GRANULOCYTES # BLD AUTO: 0.02 X10(3) UL (ref 0–1)
IMM GRANULOCYTES NFR BLD: 0.2 %
INR BLD: 1.21 (ref 0.8–1.2)
LYMPHOCYTES # BLD AUTO: 2.24 X10(3) UL (ref 1–4)
LYMPHOCYTES NFR BLD AUTO: 27.5 %
MCH RBC QN AUTO: 29.6 PG (ref 26–34)
MCHC RBC AUTO-ENTMCNC: 32.3 G/DL (ref 31–37)
MCV RBC AUTO: 91.8 FL
MONOCYTES # BLD AUTO: 0.59 X10(3) UL (ref 0.1–1)
MONOCYTES NFR BLD AUTO: 7.2 %
NEUTROPHILS # BLD AUTO: 5.02 X10 (3) UL (ref 1.5–7.7)
NEUTROPHILS # BLD AUTO: 5.02 X10(3) UL (ref 1.5–7.7)
NEUTROPHILS NFR BLD AUTO: 61.8 %
OSMOLALITY SERPL CALC.SUM OF ELEC: 308 MOSM/KG (ref 275–295)
P AXIS: 61 DEGREES
P-R INTERVAL: 136 MS
PLATELET # BLD AUTO: 247 10(3)UL (ref 150–450)
POTASSIUM SERPL-SCNC: 5 MMOL/L (ref 3.5–5.1)
PROT SERPL-MCNC: 7.7 G/DL (ref 5.7–8.2)
PROTHROMBIN TIME: 16.1 SECONDS (ref 11.6–14.8)
Q-T INTERVAL: 294 MS
Q-T INTERVAL: 370 MS
QRS DURATION: 70 MS
QRS DURATION: 84 MS
QTC CALCULATION (BEZET): 469 MS
QTC CALCULATION (BEZET): 479 MS
R AXIS: 21 DEGREES
R AXIS: 50 DEGREES
RBC # BLD AUTO: 3.78 X10(6)UL
SODIUM SERPL-SCNC: 143 MMOL/L (ref 136–145)
T AXIS: 111 DEGREES
T AXIS: 87 DEGREES
TROPONIN I SERPL HS-MCNC: 15 NG/L
VENTRICULAR RATE: 160 BPM
VENTRICULAR RATE: 97 BPM
WBC # BLD AUTO: 8.1 X10(3) UL (ref 4–11)

## 2024-01-19 PROCEDURE — 99223 1ST HOSP IP/OBS HIGH 75: CPT | Performed by: HOSPITALIST

## 2024-01-19 PROCEDURE — 71045 X-RAY EXAM CHEST 1 VIEW: CPT | Performed by: EMERGENCY MEDICINE

## 2024-01-19 RX ORDER — OXYCODONE AND ACETAMINOPHEN 10; 325 MG/1; MG/1
1 TABLET ORAL EVERY 4 HOURS PRN
Status: DISCONTINUED | OUTPATIENT
Start: 2024-01-19 | End: 2024-01-24

## 2024-01-19 RX ORDER — ACETAMINOPHEN 500 MG
500 TABLET ORAL EVERY 4 HOURS PRN
Status: DISCONTINUED | OUTPATIENT
Start: 2024-01-19 | End: 2024-01-24

## 2024-01-19 RX ORDER — DILTIAZEM HYDROCHLORIDE 5 MG/ML
10 INJECTION INTRAVENOUS ONCE
Status: COMPLETED | OUTPATIENT
Start: 2024-01-19 | End: 2024-01-19

## 2024-01-19 RX ORDER — GABAPENTIN 300 MG/1
300 CAPSULE ORAL NIGHTLY
Status: DISCONTINUED | OUTPATIENT
Start: 2024-01-19 | End: 2024-01-24

## 2024-01-19 RX ORDER — DILTIAZEM HYDROCHLORIDE 5 MG/ML
INJECTION INTRAVENOUS
Status: COMPLETED
Start: 2024-01-19 | End: 2024-01-19

## 2024-01-19 RX ORDER — METOCLOPRAMIDE HYDROCHLORIDE 5 MG/ML
5 INJECTION INTRAMUSCULAR; INTRAVENOUS EVERY 8 HOURS PRN
Status: DISCONTINUED | OUTPATIENT
Start: 2024-01-19 | End: 2024-01-24

## 2024-01-19 RX ORDER — DIAZEPAM 10 MG/1
10 TABLET ORAL EVERY 8 HOURS PRN
Status: DISCONTINUED | OUTPATIENT
Start: 2024-01-19 | End: 2024-01-24

## 2024-01-19 RX ORDER — ADENOSINE 3 MG/ML
INJECTION, SOLUTION INTRAVENOUS
Status: COMPLETED
Start: 2024-01-19 | End: 2024-01-19

## 2024-01-19 RX ORDER — SODIUM CHLORIDE 9 MG/ML
INJECTION, SOLUTION INTRAVENOUS CONTINUOUS
Status: DISCONTINUED | OUTPATIENT
Start: 2024-01-19 | End: 2024-01-24

## 2024-01-19 RX ORDER — ONDANSETRON 2 MG/ML
4 INJECTION INTRAMUSCULAR; INTRAVENOUS EVERY 6 HOURS PRN
Status: DISCONTINUED | OUTPATIENT
Start: 2024-01-19 | End: 2024-01-24

## 2024-01-19 RX ORDER — ADENOSINE 3 MG/ML
6 INJECTION, SOLUTION INTRAVENOUS ONCE
Status: COMPLETED | OUTPATIENT
Start: 2024-01-19 | End: 2024-01-19
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
## 2024-01-19 NOTE — HISTORICAL OFFICE NOTE
Continuity of Care Document  8/2/2023  Hannah Boss - 78 y.o. Female; born [birth year]Summary of episode note, generated on Aug. 22, 2023August 22, 2023   CHIEF COMPLAINT    CHIEF COMPLAINT  Reason for Visit/Chief Complaint   Consult   This is a 77-year-old with renal disease hypertension hypercholesterol with a pulmonary pressures aortic stenosis who presents for follow-up after recent hospitalization for DVT with confusion hyperkalemia and transient SVT. We are asked to assess cardiac issues. Patient denies chest pain but is very tired. She gets around slowly with a walker. Old records were reviewed. EKG showed SVT and July with prior EKG showing sinus with nonspecific changes. She had an echo in June showing normal LV function left atrial enlargement mild MR mild to moderate AI and mild AAS with elevated pulmonary pressures 40-50. A nuclear stress test in September 2022 was normal. Patient is not a good historian. She denied cardiac issues and is unclear where she is on nitrates and aspirin with other meds.     PROBLEMS  Reconcile with Patient's ChartPROBLEMS  Problem Effective Dates Date resolved Problem Status   Paroxysmal SVT (supraventricular tachycardia), [SNOMED-CT: 89063974] 8/3/2023 - Active   Mild pulmonary hypertension, [SNOMED-CT: 235040396] 8/3/2023 - Active   Edema, localized, [SNOMED-CT: 123921708] 8/3/2023 - Active   Hypertension (HTN), primary, [SNOMED-CT: 42924374] 8/3/2023 - Active   Hyperlipidemia, mixed, [SNOMED-CT: 454015661] 8/3/2023 - Active   Exertional dyspnea, [SNOMED-CT: 12369383] 8/3/2023 - Active     ENCOUNTER DIAGNOSIS    ENCOUNTER DIAGNOSIS  Problem Effective Dates Date resolved Problem Status   Paroxysmal SVT (supraventricular tachycardia), [SNOMED-CT: 23900908] 8/3/2023 - Active   Mild pulmonary hypertension, [SNOMED-CT: 500161504] 8/3/2023 - Active   Edema, localized, [SNOMED-CT: 919809951] 8/3/2023 - Active   Hypertension (HTN), primary, [SNOMED-CT: 22474041]  8/3/2023 - Active   Hyperlipidemia, mixed, [SNOMED-CT: 500549459] 8/3/2023 - Active   Exertional dyspnea, [SNOMED-CT: 95612156] 8/3/2023 - Active     VITAL SIGNS    VITAL SIGNS  Date / Time: 8/3/2023   BP Systolic 112 mmHg   BP Diastolic 52 mmHg   Height 63 inches   Weight 178 lbs   Pulse Rate 73 bpm   BSA (Body Surface Area) 1.9 cc/m2   BMI (Body Mass Index) 31.5 cc/m2   Blood Pressure 112 / 52 mmHg     PHYSICAL EXAMINATION    PHYSICAL EXAMINATION  Header Details   Constitutional 97o2%   Vitals Left Arm Sitting  / 52 mmHg, Pulse rate 73 bpm, Height in 5' 3\", BMI: 31.5, Weight in 178.57 lbs (or) 81 kgs, BSA : 1.92 cc/m²   General Appearance No Acute Distress   Cardiovascular      ALLERGIES, ADVERSE REACTIONS, ALERTS    No data available    MEDICATIONS ADMINISTERED DURING VISIT    No data available    MEDICATIONS  Reconcile with Patient's ChartMEDICATIONS  Medication Start Date Route/Frequency Status   amLODIPine 10 mg tablet, [RxNorm: 651856] 8/3/2023 Take 1 tablet orally once a day. Active   apixaban 5 MG Oral Tab, [RxNorm: 2342438] 8/3/2023 Take 1 tablet (5 mg total) by mouth 2 (two) times daily. Active   aspirin 81 MG Oral Tab EC, [RxNorm: 930803] 8/3/2023 Take 1 tablet (81 mg total) by mouth daily. Active   atorvastatin 40 MG Oral Tab, [RxNorm: 085121] 8/3/2023 Take 1 tablet (40 mg total) by mouth. AT BEDTIME Active   cholecalciferol 50 MCG (2000 UT) Oral Tab, [RxNorm: 842744] 8/3/2023 Take 1 tablet (2,000 Units total) by mouth daily. Active   diazePAM 10 MG Oral Tab, [RxNorm: 670054] 8/3/2023 Take 1 tablet (10 mg total) by mouth every 8 (eight) hours as needed (MAX 3/DAY). Active   gabapentin 300 MG Oral Cap, [RxNorm: 910060] 8/3/2023 Take 1 capsule (300 mg total) by mouth nightly. Active   hydrALAZINE 50 mg tablet, [RxNorm: 677803] 8/3/2023 Take 1 tablet orally 2 times a day. Active   isosorbide mononitrate ER 60 MG Oral Tablet 24 Hr, [RxNorm: 380102] 8/3/2023 Take 1 tablet (60 mg total) by mouth daily.  Active   lisinopriL 10 mg tablet, [RxNorm: 690746] 8/3/2023 Take 1 tablet orally once a day. Active   meclizine 25 MG Oral Tab, [RxNorm: 225755] 8/3/2023 Take 1 tablet (25 mg total) by mouth. Active   oxyCODONE-acetaminophen (PERCOCET)  MG Oral Tab, [RxNorm: 2082358] 8/3/2023 Take 1 tablet by mouth every 4 (four) hours as needed for Pain (Max 6/day). Active   pantoprazole 40 mg tablet,delayed release, [RxNorm: 474627] 8/3/2023 Take 1 tablet orally 2 times a day. Active   patiromer, [RxNorm: 0] 8/3/2023 16.8 pack by mouth daily Active   Vancocin 125 mg capsule, [RxNorm: 484523] 8/3/2023 Take 1 capsule orally every 6 hours for 7 days Active   Vitamin D3 25 mcg (1,000 unit) capsule, [RxNorm: 154380] 8/3/2023 Take 2 capsules orally once a day. Active   amLODIPine 5 mg tablet, [RxNorm: 188859] 8/3/2023 Take 1 tablet orally once a day. Active     ASSESSMENT    Conclusion this is a 73-year-old with renal insufficiency hypertension with cholesterol mild pulm hypertension aortic valve disease who was recently hospitalized and found to have DVT with worsening renal function. She also had transient SVT. At this time she does not complain of palpitations chest pain or shortness of breath. With history patient needs to get a blood pressure machine and some blood pressure meds can be adjusted. Would start with decrease amlodipine to 5 mg daily and see if this helps her legs without without raising her blood pressure. If able we will try to decrease or stop amlodipine and substitute low-dose Cardizem which may be better with history of SVT. She also may stop aspirin at this time since there is no evidence of stroke or coronary disease. She will continue blood thinners for DVT monitored by her primary. Patient to see nephrologist and get follow-up labs. Moderate valve disease we will follow clinically for now and adjust meds.Decrease amlodipine to 5 mg dailyCBC and CMP in 1 week if not done by primary or follow-up doctorsGet  blood pressure machine for home monitoringMonitor and record resting blood pressure and pulse for 1 week and call with results. When checking blood pressure sit comfortably in a chair with the blood pressure cuff in place and rest 5 minutes before checking blood pressure. Check blood pressure and if elevated repeat 2 additional blood pressures and throw away the first 1 and average the second 2.Virtual visit with KELLEY Lugo in 2 weeksFollow-up 2 months This note used Dragon technology. Transcription errors are not uncommon and may not have been corrected prior to electronically signing the note. Should you find these errors please consult the clinician for interpretation (or apply common sense adjustment when safe and appropriate).     FAMILY HISTORY    No data available    GENERAL STATUS    No data available    PAST MEDICAL HISTORY    PAST MEDICAL HISTORY  Problem Date diagonsed Date resolved Status   Paroxysmal SVT (supraventricular tachycardia), [SNOMED-CT: 71465348] 8/3/2023 - Active   Mild pulmonary hypertension, [SNOMED-CT: 611886438] 8/3/2023 - Active   Edema, localized, [SNOMED-CT: 200291510] 8/3/2023 - Active   Hypertension (HTN), primary, [SNOMED-CT: 74379527] 8/3/2023 - Active   Hyperlipidemia, mixed, [SNOMED-CT: 947349301] 8/3/2023 - Active   Exertional dyspnea, [SNOMED-CT: 95137313] 8/3/2023 - Active     HISTORY OF PRESENT ILLNESS    This is a 77-year-old with renal disease hypertension hypercholesterol with a pulmonary pressures aortic stenosis who presents for follow-up after recent hospitalization for DVT with confusion hyperkalemia and transient SVT. We are asked to assess cardiac issues. Patient denies chest pain but is very tired. She gets around slowly with a walker. Old records were reviewed. EKG showed SVT and July with prior EKG showing sinus with nonspecific changes. She had an echo in June showing normal LV function left atrial enlargement mild MR mild to moderate AI and mild AAS with  elevated pulmonary pressures 40-50. A nuclear stress test in September 2022 was normal. Patient is not a good historian. She denied cardiac issues and is unclear where she is on nitrates and aspirin with other meds.     IMMUNIZATIONS  Reconcile with Patient's ChartNo data available    PLAN OF CARE    PLAN OF CARE  Planned Care Date   CBC (Complete Blood Count) 1/1/1900   CMP (comprehensive metabolic panel) 1/1/1900   Take 1 tablet orally once a day.-amLODIPine 5 mg tablet 8/3/2023   Follow up visit - Abdirahman Buchanan 1/1/1900   Follow up visit - Hue Elias 1/1/1900     PROCEDURES    No data available    RESULTS    RESULTS  Name Result Date Location - Ordered By   CBC (Complete Blood Count) [LOINC: 73876-2] Pending 1 Week     CMP (comprehensive metabolic panel) [LOINC: 53682-8] Pending 1 Week       REVIEW OF SYSTEMS    REVIEW OF SYSTEMS  Header Details   Eyes No history of Blurry vision, Visual changes, Double vision, Discharge, Eye pain, Dry eyes, Decreased vision   Cardiovascular Edema  No history of Chest pain, CANTU, Palpitations, Syncope, PND, Orthopnea, Claudication   Respiratory No history of SOB, Cough, Hemoptysis, Wheezing, Pleurisy, Sputum   Others Review Of Systems   bilateral pedal edema     SOCIAL HISTORY    SOCIAL HISTORY  Social History Element Description Effective Dates   Smoking status Heavy tobacco smoker -     FUNCTIONAL STATUS    No data available    MEDICAL EQUIPMENT    No data available    Goals Sections    No data available    REASON FOR REFERRAL    No data available    Health Concerns Section    No data available    COGNITIVE/MENTAL STATUS    No data available    Patient Demographics    Patient Demographics  Patient Address Communication Language Race / Ethnicity Marital Status   [address]  [place], [place] [id number] [phone] (Mobile) English (Preferred) Black Or  / Unknown      Document Information    Primary Care Provider Other Service Providers Document  Coverage Dates   Dewayne Gary  NPI: [id number]  [phone] (Work)  [address], [address], [place] [address]  [place] [address]  Interpreting Physicians  [facility]  [phone] (Work)  [address] [place] [address] Lockhart ELLIE Dias  NPI: [id number]  [phone] (Work)  [address], [address], [place] [address]  [place] [address]  Nurses     Melida Mendoza  NPI: [id number]  [phone] (Work)  [address], [address], [place] [address]  [place] [address]  Nurses     Carlos Alberto Miller  NPI: [id number]  [phone] (Work)  [address], [address], [place] [address]  [place] [address]  Nurses Aug. 03, 2023August 03, 2023      Organization   [place] [phone] (Work)  [address], [address], [place] [address]  [place] [address]     Encounter Providers Encounter Date    Aug. 03, 2023August 03, 2023     Legal Authenticator    Abdirahman Buchanan  NPI: [id number]  [phone] (Work)  [address], [address], [place] [address]  [place] [address]

## 2024-01-19 NOTE — H&P
[facility]    PATIENT'S NAME: MERNA ALEXANDER   ATTENDING PHYSICIAN: Morro Herman MD   PATIENT ACCOUNT#:   [id number]    LOCATION:  [address]  MEDICAL RECORD #:   [id number]       YOB: 1945  ADMISSION DATE:       01/19/2024    HISTORY AND PHYSICAL EXAMINATION    CHIEF COMPLAINT:  Acute kidney injury, hypovolemia, and supraventricular tachycardia.    HISTORY OF PRESENT ILLNESS:  The patient is a 78-year-old  female with known retroperitoneal leiomyosarcoma who presented to the emergency department for evaluation of chest pain, shortness of breath.  Upon arrival noted to be in supraventricular tachycardia rhythm.  She was given adenosine and Cardizem.  Converted to sinus rhythm.  CBC showed hemoglobin of 11.2 which is stable.  Chemistry showed BUN and creatinine of 40 and 1.99; GFR 26, below her baseline; bicarbonate 20; chloride 115.  B natriuretic peptide 200.  Chest x-ray showed no acute findings.  VQ scan still pending.  Patient will be admitted to the hospital for further management.    PAST MEDICAL HISTORY:  Patient had a history of mild aortic stenosis, pulmonary artery hypertension, chronic lower extremity edema, history of C. 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  She had persistent disease with slowly enlarging retroperitoneal mass per imaging studies as of December 31, 2023.  History of left lower extremity DVT, started on Eliquis.    PAST SURGICAL HISTORY:  As mentioned above.  Also, had a history of right carpal tunnel release, left rotator cuff repair, tubal ligation, and cholecystectomy.    ALLERGIES:  No known drug allergies.    FAMILY HISTORY:  Mother had TB.  Father had cerebrovascular  accident.    SOCIAL HISTORY:  Chronic tobacco use.  No alcohol or drug use.  Lives with her son.  She had a helper at home.  Requires assistance in her basic activities of daily living.     REVIEW OF SYSTEMS:  Patient described deep upper back discomfort and lower abdominal discomfort, became worse today.  She felt lightheaded, dizzy with palpitations.  Now, she feels a little bit better.  She reported very poor appetite.  She continued to take her blood pressure medications.  Noted also to be on Lasix diuretic.  Other 12-point review of systems is negative.  No fever or chills.      PHYSICAL EXAMINATION:    GENERAL:  Appears fatigued, tired, and dehydrated.  VITAL SIGNS:  Temperature 98.0; pulse 78, initially 162 when she was in supraventricular tachycardia rhythm; respiratory rate 18; blood pressure 114/54; pulse ox 96% on room air.    HEENT:  Atraumatic.  Oropharynx clear.  Dry mucous membranes.  Normal hard and soft palate.  Eyes:  Anicteric sclerae.    NECK:  Supple.  No lymphadenopathy.  Trachea midline.  Full range of motion.  LUNGS:  Clear to auscultation bilaterally.  Normal respiratory effort.    HEART:  Regular rate, rhythm.  S1 and S2 auscultated.    ABDOMEN:  Soft, nondistended.  No tenderness.  Positive bowel sounds.    EXTREMITIES:  Edema +1.   NEUROLOGIC:  Motor and sensory intact.      ASSESSMENT:    1.   Supraventricular tachycardia, most likely instigated by low volume state.  Converted to sinus rhythm after IV Cardizem and adenosine.   2.   Acute on chronic kidney injury with hypovolemia and dehydration with metabolic acidosis.  3.   Underlying retroperitoneal leiomyosarcoma.    PLAN:  Patient will be admitted to general medical floor.  Overall poor prognosis.  Hold old blood pressure medications and diuretics.  Start her on IV fluids.  Monitor hemodynamic status.  Follow up on VQ scan, rule out PE.  Fall precautions.  Physical therapy.  Further recommendations to follow.     Dictated By Erich  MD Samara  d: 01/19/2024 17:17:16  t: 01/19/2024 17:20:00  Job [id number]  FB/   yes

## 2024-01-19 NOTE — ED QUICK NOTES
Orders for admission, patient is aware of plan and ready to go upstairs. Any questions, please call ED RN Savanna at extension [phone].     Patient Covid vaccination status: Unvaccinated     COVID Test Ordered in ED: None    COVID Suspicion at Admission: N/A    Running Infusions:  None    Mental Status/LOC at time of transport: A&Ox3-4    Other pertinent information:   CIWA score: N/A   NIH score:  N/A

## 2024-01-19 NOTE — ED INITIAL ASSESSMENT (HPI)
To ED with c/o chest pain. Per EMS, chest pain started an hour ago. +STEMI on EKG by EMS. Aspirin given enroute
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
## 2024-01-19 NOTE — ED QUICK NOTES
Assume care at this time. Pt resting comfortably in cart, no distress noted on exam. Pt expresses no other needs at this time. Call light within reach.

## 2024-01-20 ENCOUNTER — APPOINTMENT (OUTPATIENT)
Dept: CV DIAGNOSTICS | Facility: HOSPITAL | Age: 79
End: 2024-01-20
Attending: INTERNAL MEDICINE
Payer: MEDICARE

## 2024-01-20 ENCOUNTER — APPOINTMENT (OUTPATIENT)
Dept: CT IMAGING | Facility: HOSPITAL | Age: 79
End: 2024-01-20
Attending: HOSPITALIST
Payer: MEDICARE

## 2024-01-20 ENCOUNTER — APPOINTMENT (OUTPATIENT)
Dept: NUCLEAR MEDICINE | Facility: HOSPITAL | Age: 79
End: 2024-01-20
Attending: EMERGENCY MEDICINE
Payer: MEDICARE

## 2024-01-20 PROBLEM — N17.9 ACUTE RENAL FAILURE: Status: ACTIVE | Noted: 2021-01-07

## 2024-01-20 PROBLEM — N17.9 ACUTE RENAL FAILURE (HCC): Status: ACTIVE | Noted: 2021-01-07

## 2024-01-20 LAB
ANION GAP SERPL CALC-SCNC: 7 MMOL/L (ref 0–18)
BASOPHILS # BLD AUTO: 0.04 X10(3) UL (ref 0–0.2)
BASOPHILS NFR BLD AUTO: 0.5 %
BUN BLD-MCNC: 27 MG/DL (ref 9–23)
BUN/CREAT SERPL: 17.2 (ref 10–20)
CALCIUM BLD-MCNC: 8.8 MG/DL (ref 8.7–10.4)
CHLORIDE SERPL-SCNC: 116 MMOL/L (ref 98–112)
CO2 SERPL-SCNC: 19 MMOL/L (ref 21–32)
CREAT BLD-MCNC: 1.57 MG/DL
DEPRECATED RDW RBC AUTO: 46.5 FL (ref 35.1–46.3)
EGFRCR SERPLBLD CKD-EPI 2021: 34 ML/MIN/1.73M2 (ref 60–?)
EOSINOPHIL # BLD AUTO: 0.14 X10(3) UL (ref 0–0.7)
EOSINOPHIL NFR BLD AUTO: 1.7 %
ERYTHROCYTE [DISTWIDTH] IN BLOOD BY AUTOMATED COUNT: 14 % (ref 11–15)
EST. AVERAGE GLUCOSE BLD GHB EST-MCNC: 131 MG/DL (ref 68–126)
GLUCOSE BLD-MCNC: 106 MG/DL (ref 70–99)
GLUCOSE BLDC GLUCOMTR-MCNC: 111 MG/DL (ref 70–99)
GLUCOSE BLDC GLUCOMTR-MCNC: 124 MG/DL (ref 70–99)
GLUCOSE BLDC GLUCOMTR-MCNC: 132 MG/DL (ref 70–99)
GLUCOSE BLDC GLUCOMTR-MCNC: 142 MG/DL (ref 70–99)
HBA1C MFR BLD: 6.2 % (ref ?–5.7)
HCT VFR BLD AUTO: 27.1 %
HGB BLD-MCNC: 8.9 G/DL
IMM GRANULOCYTES # BLD AUTO: 0.02 X10(3) UL (ref 0–1)
IMM GRANULOCYTES NFR BLD: 0.2 %
LYMPHOCYTES # BLD AUTO: 0.9 X10(3) UL (ref 1–4)
LYMPHOCYTES NFR BLD AUTO: 10.6 %
MCH RBC QN AUTO: 29.6 PG (ref 26–34)
MCHC RBC AUTO-ENTMCNC: 32.8 G/DL (ref 31–37)
MCV RBC AUTO: 90 FL
MONOCYTES # BLD AUTO: 0.42 X10(3) UL (ref 0.1–1)
MONOCYTES NFR BLD AUTO: 5 %
NEUTROPHILS # BLD AUTO: 6.95 X10 (3) UL (ref 1.5–7.7)
NEUTROPHILS # BLD AUTO: 6.95 X10(3) UL (ref 1.5–7.7)
NEUTROPHILS NFR BLD AUTO: 82 %
OSMOLALITY SERPL CALC.SUM OF ELEC: 300 MOSM/KG (ref 275–295)
PLATELET # BLD AUTO: 208 10(3)UL (ref 150–450)
POTASSIUM SERPL-SCNC: 5.2 MMOL/L (ref 3.5–5.1)
RBC # BLD AUTO: 3.01 X10(6)UL
SODIUM SERPL-SCNC: 142 MMOL/L (ref 136–145)
WBC # BLD AUTO: 8.5 X10(3) UL (ref 4–11)

## 2024-01-20 PROCEDURE — 93306 TTE W/DOPPLER COMPLETE: CPT | Performed by: INTERNAL MEDICINE

## 2024-01-20 PROCEDURE — 70490 CT SOFT TISSUE NECK W/O DYE: CPT | Performed by: HOSPITALIST

## 2024-01-20 PROCEDURE — 99233 SBSQ HOSP IP/OBS HIGH 50: CPT | Performed by: HOSPITALIST

## 2024-01-20 PROCEDURE — 78582 LUNG VENTILAT&PERFUS IMAGING: CPT | Performed by: EMERGENCY MEDICINE

## 2024-01-20 RX ORDER — DILTIAZEM HYDROCHLORIDE 120 MG/1
120 CAPSULE, EXTENDED RELEASE ORAL DAILY
Status: DISCONTINUED | OUTPATIENT
Start: 2024-01-20 | End: 2024-01-23

## 2024-01-20 NOTE — PLAN OF CARE
Patient is A&Ox4, room air, stand by assist. Continuing IV fluids gtt @75 & antibiotics. Pt had echo and CT of R side mandible see chart for results. Plan for oral surgeon consult. Call light within reach and fall precautions in place.     Problem: Patient Centered Care  Goal: Patient preferences are identified and integrated in the patient's plan of care  Description: Interventions:  - What would you like us to know as we care for you? From home with son  - Provide timely, complete, and accurate information to patient/family  - Incorporate patient and family knowledge, values, beliefs, and cultural backgrounds into the planning and delivery of care  - Encourage patient/family to participate in care and decision-making at the level they choose  - Honor patient and family perspectives and choices  Outcome: Progressing     Problem: Patient/Family Goals  Goal: Patient/Family Long Term Goal  Description: Patient's Long Term Goal: To go home    Interventions:  - Follow medical regimen  - See additional Care Plan goals for specific interventions  Outcome: Progressing  Goal: Patient/Family Short Term Goal  Description: Patient's Short Term Goal: To not have Chest pain    Interventions:   - Follow medical regimen  - See additional Care Plan goals for specific interventions  Outcome: Progressing

## 2024-01-20 NOTE — PROGRESS NOTES
Patient is alert and oriented x4. Room air. Vital signs are stable. Pt had complaints of pain, PRN medication ordered. Frequent rounding done throughout the shift. Safety precautions in place.

## 2024-01-20 NOTE — PLAN OF CARE
Patient is a/o x4. RA. IVF running. Pt had c/o pain, PRN medication given. No acute changes overnight. Call light within reach. Plan for VQ scan           Problem: Patient Centered Care  Goal: Patient preferences are identified and integrated in the patient's plan of care  Description: Interventions:  - What would you like us to know as we care for you?   - Provide timely, complete, and accurate information to patient/family  - Incorporate patient and family knowledge, values, beliefs, and cultural backgrounds into the planning and delivery of care  - Encourage patient/family to participate in care and decision-making at the level they choose  - Honor patient and family perspectives and choices  Outcome: Progressing

## 2024-01-20 NOTE — PROGRESS NOTES
[facility]    Progress Note    Hannah Boss Patient Status:  Inpatient    1945 MRN [id number]   Location [facility] 3W/SW Attending Brianne Pagan MD   Hosp Day # 1 PCP None Pcp     Chief complaint: CP  Subjective:     Fatigue  Chest pain improved, still some dyspnea  Rt facial swelling worse today, she attributes to dental infection, she says, its hard to chew now    Objective:   Blood pressure (!) 167/69, pulse 75, temperature 98.8 °F (37.1 °C), temperature source Oral, resp. rate 18, weight 178 lb 4.8 oz (80.9 kg), SpO2 99%, not currently breastfeeding.    GEN: NAD, tired looking, speaks in full sentences, no drooling  HEENT: conjunctivae/corneas clear. PERRL, EOM's intact. +Rt facial swelling  Neck: no adenopathy, no carotid bruit, no JVD, supple  Pulmonary:  clear to auscultation bilaterally  Cardiovascular: S1, S2 normal, no murmur, click, rub or gallop, regular rate and rhythm  Abdominal: soft, non-tender; bowel sounds normal;    Extremities: no cyanosis or edema  Pulses: palpable and symmetric  Skin: warm and dry  Neurologic: Alert and oriented X 3, moves all extremities  Psychiatric: calm, cooperative    Assessment and Plan:     Supraventricular tachycardia, most likely instigated by low volume state.   Converted to sinus rhythm after IV Cardizem and adenosine in ER.  -seen by cardiology, started on low dose of oral cardizem  -tele  -ECHO     Acute on chronic kidney injury with hypovolemia and dehydration with metabolic acidosis.  Initial Cr~  Improving with IVF, Cr-->1.57  -decr rate of IVF  -lasix on hold    Dyspnea  VQ low probability  BNP mildly elevated    Weakness, progressive, with falls  Poor appetite  Multiple ER visits  Had thoracic MRI during  hospitalization showing some stenosis, pt declined surgery    Rt facial sweling, pt attributes to dental infection  Worsening  S/p unasyn x1 in ER, no imaging  -check CT stat  -cont unasyn    H/o  retroperitoneal leiomyosarcoma, s/p surgery, and radiation, with persistent disease.  Pulmonary HTN  Aortic stenosis   Chronic LE edema  H/o c. Diff  IGT, A1c~6.2  CKD3   HTN  HL  OA  RA  H/o LLE DVT June 2023, on AC  Amxiety      DVT prophylaxis: AC     Dispo: pending progress, PT/OT       Chart reviewed, including current vitals, notes, labs and imaging  Pertinent past medical records reviewed  Labs ordered and medications adjusted as outlined above  Coordinate care with care team/consultants  Discussed with patient and family at bedside results of tests, management plan as outlined above, and the need for ongoing hospitalization  D/w RN     MDM high  severe acute illness/or exacerbation of chronic illness posing a threat to life, IV medications, requiring close monitoring in hospital.       Results:     Lab Results   Component Value Date    WBC 8.5 01/20/2024    HGB 8.9 (L) 01/20/2024    HCT 27.1 (L) 01/20/2024    .0 01/20/2024    CREATSERUM 1.57 (H) 01/20/2024    BUN 27 (H) 01/20/2024     01/20/2024    K 5.2 (H) 01/20/2024     (H) 01/20/2024    CO2 19.0 (L) 01/20/2024     (H) 01/20/2024    CA 8.8 01/20/2024    ALB 4.4 01/19/2024    ALKPHO 183 (H) 01/19/2024    BILT 0.3 01/19/2024    TP 7.7 01/19/2024    AST 35 (H) 01/19/2024    ALT 34 01/19/2024    PTT 32.5 01/19/2024    INR 1.21 (H) 01/19/2024    TSH 0.615 10/07/2023    LIP 16 10/06/2023    DDIMER 1.13 (H) 06/05/2023    MG 1.6 07/31/2023    PHOS 4.0 12/26/2023    TROP 0.398 (HH) 09/13/2021     (H) 12/31/2023    B12 399 04/22/2022       NM LUNG VQ VENT / PERFUSION SCAN  (CPT=78582)    Result Date: 1/20/2024  CONCLUSION: Low probability exam for pulmonary embolus.  Ventilation-perfusion changes are present suggesting COPD/emphysema or parenchymal lung disease.   Dictated by (CST): Galileo Oliveros MD on 1/20/2024 at 7:43 AM     Finalized by (CST): Galileo Oliveros MD on 1/20/2024 at 7:44 AM          XR CHEST AP PORTABLE   (CPT=71045)    Result Date: 1/19/2024  CONCLUSION: No acute cardiopulmonary abnormality.   Dictated by (CST): Galileo Oliveros MD on 1/19/2024 at 4:19 PM     Finalized by (CST): Galileo Oliveros MD on 1/19/2024 at 4:20 PM         EKG 12 Lead    Result Date: 1/19/2024  Marked baseline artifact Supraventricular tachycardia Cannot rule out Inferior infarct , age undetermined ST & T wave abnormality, consider anterolateral ischemia Abnormal ECG When compared with ECG of 07-OCT-2023 15:49, Vent. rate has increased BY  76 BPM Minimal criteria for Inferior infarct are now Present ST now depressed in Inferior leads ST now depressed in Anterior-lateral leads T wave inversion now evident in Anterior-lateral leads Confirmed by STEVE FLORIAN JORDAN ([id number]) on 1/19/2024 4:49:27 PM    EKG 12 Lead    Result Date: 1/19/2024  Normal sinus rhythm Normal ECG When compared with ECG of 19-JAN-2024 14:31, Vent. rate has decreased BY  63 BPM Minimal criteria for Inferior infarct are no longer Present ST no longer depressed in Inferior leads ST no longer depressed in Anterior-lateral leads T wave inversion no longer evident in Anterior-lateral leads Confirmed by BRITTA BRIZUELA ([id number]) on 1/19/2024 3:19:33 PM       JOSE WALKER MD  1/20/2024
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
## 2024-01-20 NOTE — PROGRESS NOTES
[facility] Dosing Service  Antibiotic Dosing    Hannah Boss is a 78 year old for whom pharmacy is dosing Unasyn for treatment of  cellulitis. .  Other antibiotics (Not dosed by pharmacy):     Allergies: has No Known Allergies.    Vitals: /71 (BP Location: Left arm)   Pulse 77   Temp 98.8 °F (37.1 °C) (Oral)   Resp 18   Wt 80.9 kg (178 lb 4.8 oz)   SpO2 99%   BMI 30.61 kg/m²     Labs:   WBC   Date Value Ref Range Status   01/20/2024 8.5 4.0 - 11.0 x10(3) uL Final     Creatinine   Date Value Ref Range Status   01/20/2024 1.57 (H) 0.55 - 1.02 mg/dL Final     BUN   Date Value Ref Range Status   01/20/2024 27 (H) 9 - 23 mg/dL Final       CrCl: estimated creatinine clearance is 26.3 mL/min (A) (based on SCr of 1.57 mg/dL (H)).    Cultures:     Radiology:     Assessment/Plan: Unasyn 3 gm every 12 hours per protocol.    Pharmacy will continue to follow.  We appreciate the opportunity to assist in the care of this patient.    Thank you,   Gabriela Burch, PharmD  1/20/2024  3:15 PM

## 2024-01-20 NOTE — PHYSICAL THERAPY NOTE
PT orders received. Chart reviewed. Pt was seen sitting at EOB with RN present. Introduced self and role. Pt declined participation with PT today, stated she still has chest pain and is tired. Continued to refused despite encouragement and education provided. Will follow up tomorrow for PT evaluation as appropriate and able.

## 2024-01-21 ENCOUNTER — APPOINTMENT (OUTPATIENT)
Dept: GENERAL RADIOLOGY | Facility: HOSPITAL | Age: 79
End: 2024-01-21
Attending: DENTIST
Payer: MEDICARE

## 2024-01-21 LAB
ALBUMIN SERPL-MCNC: 3.5 G/DL (ref 3.2–4.8)
ANION GAP SERPL CALC-SCNC: 7 MMOL/L (ref 0–18)
BASOPHILS # BLD AUTO: 0.02 X10(3) UL (ref 0–0.2)
BASOPHILS NFR BLD AUTO: 0.2 %
BUN BLD-MCNC: 25 MG/DL (ref 9–23)
BUN/CREAT SERPL: 18.9 (ref 10–20)
CALCIUM BLD-MCNC: 8.6 MG/DL (ref 8.7–10.4)
CHLORIDE SERPL-SCNC: 114 MMOL/L (ref 98–112)
CO2 SERPL-SCNC: 20 MMOL/L (ref 21–32)
CREAT BLD-MCNC: 1.32 MG/DL
DEPRECATED RDW RBC AUTO: 44.6 FL (ref 35.1–46.3)
EGFRCR SERPLBLD CKD-EPI 2021: 42 ML/MIN/1.73M2 (ref 60–?)
EOSINOPHIL # BLD AUTO: 0.07 X10(3) UL (ref 0–0.7)
EOSINOPHIL NFR BLD AUTO: 0.8 %
ERYTHROCYTE [DISTWIDTH] IN BLOOD BY AUTOMATED COUNT: 13.7 % (ref 11–15)
GLUCOSE BLD-MCNC: 108 MG/DL (ref 70–99)
GLUCOSE BLDC GLUCOMTR-MCNC: 125 MG/DL (ref 70–99)
GLUCOSE BLDC GLUCOMTR-MCNC: 145 MG/DL (ref 70–99)
GLUCOSE BLDC GLUCOMTR-MCNC: 151 MG/DL (ref 70–99)
GLUCOSE BLDC GLUCOMTR-MCNC: 160 MG/DL (ref 70–99)
HCT VFR BLD AUTO: 26.5 %
HGB BLD-MCNC: 8.8 G/DL
IMM GRANULOCYTES # BLD AUTO: 0.03 X10(3) UL (ref 0–1)
IMM GRANULOCYTES NFR BLD: 0.3 %
LYMPHOCYTES # BLD AUTO: 1.15 X10(3) UL (ref 1–4)
LYMPHOCYTES NFR BLD AUTO: 13.3 %
MAGNESIUM SERPL-MCNC: 1.6 MG/DL (ref 1.6–2.6)
MCH RBC QN AUTO: 29.4 PG (ref 26–34)
MCHC RBC AUTO-ENTMCNC: 33.2 G/DL (ref 31–37)
MCV RBC AUTO: 88.6 FL
MONOCYTES # BLD AUTO: 0.51 X10(3) UL (ref 0.1–1)
MONOCYTES NFR BLD AUTO: 5.9 %
NEUTROPHILS # BLD AUTO: 6.85 X10 (3) UL (ref 1.5–7.7)
NEUTROPHILS # BLD AUTO: 6.85 X10(3) UL (ref 1.5–7.7)
NEUTROPHILS NFR BLD AUTO: 79.5 %
OSMOLALITY SERPL CALC.SUM OF ELEC: 297 MOSM/KG (ref 275–295)
PHOSPHATE SERPL-MCNC: 2.3 MG/DL (ref 2.4–5.1)
PLATELET # BLD AUTO: 182 10(3)UL (ref 150–450)
POTASSIUM SERPL-SCNC: 4.9 MMOL/L (ref 3.5–5.1)
RBC # BLD AUTO: 2.99 X10(6)UL
SODIUM SERPL-SCNC: 141 MMOL/L (ref 136–145)
WBC # BLD AUTO: 8.6 X10(3) UL (ref 4–11)

## 2024-01-21 PROCEDURE — 70355 PANORAMIC X-RAY OF JAWS: CPT | Performed by: DENTIST

## 2024-01-21 PROCEDURE — 99233 SBSQ HOSP IP/OBS HIGH 50: CPT | Performed by: HOSPITALIST

## 2024-01-21 RX ORDER — MAGNESIUM OXIDE 400 MG/1
400 TABLET ORAL 2 TIMES DAILY WITH MEALS
Status: DISCONTINUED | OUTPATIENT
Start: 2024-01-21 | End: 2024-01-24

## 2024-01-21 RX ORDER — MAGNESIUM OXIDE 400 MG/1
400 TABLET ORAL ONCE
Status: COMPLETED | OUTPATIENT
Start: 2024-01-21 | End: 2024-01-21

## 2024-01-21 RX ORDER — MORPHINE SULFATE 2 MG/ML
1 INJECTION, SOLUTION INTRAMUSCULAR; INTRAVENOUS EVERY 2 HOUR PRN
Status: DISCONTINUED | OUTPATIENT
Start: 2024-01-21 | End: 2024-01-24

## 2024-01-21 NOTE — PLAN OF CARE
Pt alert OX4. Pt on RA. No fever. PRN Percocet given for pain. Pt went for a x ray of jaw - oral surgery on consult - Pt on IV Unasyn for facial cellulitis - pt scheduled for a mouth abscess irrigation & debridement tomorrow afternoon - consent signed - NPO after midnight. No BM today - C diff sample still pending - OK to discontinue c - diff order per protocol if no BM till midnight as per MD. Electrolytes replaced as per protocol. Pt continuing on IV fluids. Call light within reach - safety precautions in place - frequent rounding by nursing staffs.     Problem: Patient Centered Care  Goal: Patient preferences are identified and integrated in the patient's plan of care  Description: Interventions:  - What would you like us to know as we care for you? From home with son  - Provide timely, complete, and accurate information to patient/family  - Incorporate patient and family knowledge, values, beliefs, and cultural backgrounds into the planning and delivery of care  - Encourage patient/family to participate in care and decision-making at the level they choose  - Honor patient and family perspectives and choices  Outcome: Progressing     Problem: Patient/Family Goals  Goal: Patient/Family Long Term Goal  Description: Patient's Long Term Goal: To go home    Interventions:  - Follow medical regimen  - See additional Care Plan goals for specific interventions  Outcome: Progressing  Goal: Patient/Family Short Term Goal  Description: Patient's Short Term Goal: To not have Chest pain    Interventions:   - Follow medical regimen  - See additional Care Plan goals for specific interventions  Outcome: Progressing

## 2024-01-21 NOTE — PLAN OF CARE
Patient is alert & oriented x4 on RA. Vital signs stable at this time. No acute changes noted throughout shift; patient slept. Fall precautions maintained - bed alarm on, bed locked in lowest position, call light and personal belongings within reach, non-skid socks in place. Frequent rounding by nursing staff. Plan iv abx, oral surgery consult.     Problem: Patient Centered Care  Goal: Patient preferences are identified and integrated in the patient's plan of care  Description: Interventions:  - What would you like us to know as we care for you? From home with son  - Provide timely, complete, and accurate information to patient/family  - Incorporate patient and family knowledge, values, beliefs, and cultural backgrounds into the planning and delivery of care  - Encourage patient/family to participate in care and decision-making at the level they choose  - Honor patient and family perspectives and choices  Outcome: Progressing     Problem: Patient/Family Goals  Goal: Patient/Family Long Term Goal  Description: Patient's Long Term Goal: To go home    Interventions:  - Follow medical regimen  - See additional Care Plan goals for specific interventions  Outcome: Progressing  Goal: Patient/Family Short Term Goal  Description: Patient's Short Term Goal: To not have Chest pain    Interventions:   - Follow medical regimen  - See additional Care Plan goals for specific interventions  Outcome: Progressing

## 2024-01-21 NOTE — PHYSICAL THERAPY NOTE
PHYSICAL THERAPY EVALUATION - INPATIENT     Room Number: 304/304-A  Evaluation Date: 1/21/2024  Type of Evaluation: Initial   Physician Order: PT Eval and Treat    Presenting Problem: The patient is a 78-year-old  female with known retroperitoneal leiomyosarcoma who presented to the emergency department for evaluation of chest pain, shortness of breath.  Upon arrival noted to be in supraventricular tachycardia rhythm.  She was given adenosine and Cardizem.  Converted to sinus rhythm.  CBC showed hemoglobin of 11.2 which is stable.  Chemistry showed BUN and creatinine of 40 and 1.99; GFR 26, below her baseline; bicarbonate 20; chloride 115.  B natriuretic peptide 200.  Chest x-ray showed no acute findings.  VQ scan still pending.  Patient will be admitted to the hospital for further management.     Reason for Therapy: Mobility Dysfunction and Discharge Planning    PHYSICAL THERAPY ASSESSMENT     Patient is a 78 year old female admitted 1/19/2024 for shortness of breath.  Patient's current functional deficits include impaired strength, impaired ROM, impaired standing balance, impaired ambulation tolerance, increased level of physical assistance for performance of ADL's and functional mobility, which are below the patient's pre-admission status.      The patient's Approx Degree of Impairment: 41.77% has been calculated based on documentation in the [facility] '6 clicks' Inpatient Basic Mobility Short Form.  Research supports that patients with this level of impairment may benefit from Home with home health services.    Patient will benefit from continued IP PT services to address these deficits in preparation for discharge.    DISCHARGE RECOMMENDATIONS  PT Discharge Recommendations: Home with home health PT/OT;Intermittent Supervision    PLAN  PT Treatment Plan: Bed mobility;Body mechanics;Coordination;Endurance;Energy conservation;Gait training;Strengthening;Stair training;Transfer training;Balance  training  Rehab Potential : Fair  Frequency (Obs): 5x/week     PHYSICAL THERAPY MEDICAL/SOCIAL HISTORY     History related to current admission: The patient is a 78-year-old  female with known retroperitoneal leiomyosarcoma who presented to the emergency department for evaluation of chest pain, shortness of breath.  Upon arrival noted to be in supraventricular tachycardia rhythm.  She was given adenosine and Cardizem.  Converted to sinus rhythm.  CBC showed hemoglobin of 11.2 which is stable.  Chemistry showed BUN and creatinine of 40 and 1.99; GFR 26, below her baseline; bicarbonate 20; chloride 115.  B natriuretic peptide 200.  Chest x-ray showed no acute findings.  VQ scan still pending.  Patient will be admitted to the hospital for further management.     Problem List  Principal Problem:    Chest pain  Active Problems:    Acute renal failure (HCC)    SVT (supraventricular tachycardia)    Dental infection      HOME SITUATION  Home Situation  Type of Home: House  Home Layout: One level  Stairs to Enter : 0  Lives With: Alone;Caregiver part-time (3-4 days a week for 4 hours)  Drives: No  Patient Owned Equipment: Cane;Rolling walker (commode, shower chair, grab bars)  Patient Regularly Uses: None     Prior Level of [place]: Prior to admission patient reports she lived home alone. Has caregiver supports 3-4x/week for ~4 hours. Caregiver takes care of cooking/cleaning activities. Able to manage at home on her own with UE support on cane or rolling walker.     SUBJECTIVE  \"We can walk\"    PHYSICAL THERAPY EXAMINATION     OBJECTIVE  Precautions: Bed/chair alarm  Fall Risk: Standard fall risk    WEIGHT BEARING RESTRICTION  Weight Bearing Restriction: None     PAIN ASSESSMENT  Ratin     COGNITION  Overall Cognitive Status:  WFL - within functional limits  Arousal/Alertness:  appropriate responses to stimuli  Orientation Level:  oriented x4  Safety Judgement:  decreased awareness of need for  safety    RANGE OF MOTION AND STRENGTH ASSESSMENT  Upper extremity ROM and strength are within functional limits BUE.  Lower extremity ROM is within functional limits BLE.  Lower extremity strength is within functional limits BLE.    BALANCE  Static Sitting: Good  Dynamic Sitting: Fair  Static Standing: Fair  Dynamic Standing: Poor    ACTIVITY TOLERANCE  Pulse: 78  Heart Rate Source: Monitor     BP: 155/57  BP Location: Right arm  BP Method: Automatic  Patient Position: Lying    O2 WALK  Oxygen Therapy  SPO2% on Room Air at Rest: 99  SPO2% Ambulation on Room Air: 99    AM-PAC '6-Clicks' INPATIENT SHORT FORM - BASIC MOBILITY  How much difficulty does the patient currently have...  Patient Difficulty: Turning over in bed (including adjusting bedclothes, sheets and blankets)?: None   Patient Difficulty: Sitting down on and standing up from a chair with arms (e.g., wheelchair, bedside commode, etc.): A Little   Patient Difficulty: Moving from lying on back to sitting on the side of the bed?: A Little   How much help from another person does the patient currently need...   Help from Another: Moving to and from a bed to a chair (including a wheelchair)?: A Little   Help from Another: Need to walk in hospital room?: A Little   Help from Another: Climbing 3-5 steps with a railing?: A Little     AM-PAC Score:  Raw Score: 19   Approx Degree of Impairment: 41.77%   Standardized Score (AM-PAC Scale): 45.44   CMS Modifier (G-Code): CK    FUNCTIONAL ABILITY STATUS  Functional Mobility/Gait Assessment  Gait Assistance: Contact guard assist  Distance (ft): 75 feet  Assistive Device: Rolling walker  Pattern:  (decreased jessica, decreased step length, forward flexed posture, narrow base of support, verbal cues for sequencing and rolling walker management.)  Stairs:  (Deferred as not a goal for safe discharge.)    Bed Mobility: Patient requires standby assistance to perform supine to sit transfer safely.    Transfers: Patient  requires standby assistance to perform sit to stand transfer from edge of bed.    Exercise/Education Provided:  Bed mobility  Body mechanics  Energy conservation  Functional activity tolerated  Gait training  Strengthening  Transfer training    Patient End of Session: Up in chair;Needs met;RN aware of session/findings;Call light within reach;Alarm set    CURRENT GOALS    Goals to be met by: 2/4//24  Patient Goal Patient's self-stated goal is: to return home   Goal #1 Patient is able to demonstrate supine - sit EOB @ level: modified independent     Goal #1   Current Status    Goal #2 Patient is able to demonstrate transfers Sit to/from Stand at assistance level: modified independent with  least restrictive device.     Goal #2  Current Status    Goal #3 Patient is able to ambulate >100 feet with assist device:  least restrictive device  at assistance level: modified independent   Goal #3   Current Status    Goal #4 Patient will negotiate 1 stairs/one curb w/ assistive device and supervision   Goal #4   Current Status    Goal #5 Patient to demonstrate independence with home activity/exercise instructions provided to patient in preparation for discharge.   Goal #5   Current Status    Goal #6    Goal #6  Current Status      Patient Evaluation Complexity Level:   History High - 3 or more personal factors and/or co-morbidities   Examination of body systems Moderate - addressing a total of 3 or more elements   Clinical Presentation Moderate - Evolving   Clinical Decision Making Moderate Complexity     Gait Training: 10 minutes     Yuly Trammell PT, DPT
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
## 2024-01-21 NOTE — PROGRESS NOTES
[facility]    Progress Note    Hannah Boss Patient Status:  Inpatient    1945 MRN [id number]   Location [facility] 3W/SW Attending Brianne Pagan MD   Hosp Day # 2 PCP None Pcp     Chief complaint: CP  Subjective:     Not feeling well mostly because of facial swelling  Fatigue  Fever early this morning  Chest pain improved, still some dyspnea       Objective:   Blood pressure 160/65, pulse 79, temperature 98.4 °F (36.9 °C), temperature source Oral, resp. rate 18, weight 172 lb (78 kg), SpO2 98%, not currently breastfeeding.    GEN: NAD, tired looking, speaks in full sentences, no drooling  HEENT: conjunctivae/corneas clear. PERRL, EOM's intact. +Rt facial swelling  Neck: no adenopathy, no carotid bruit, no JVD, supple  Pulmonary:  clear to auscultation bilaterally  Cardiovascular: S1, S2 normal, no murmur, click, rub or gallop, regular rate and rhythm  Abdominal: soft, non-tender; bowel sounds normal;    Extremities: no cyanosis or edema  Pulses: palpable and symmetric  Skin: warm and dry  Neurologic: Alert and oriented X 3, moves all extremities  Psychiatric: calm, cooperative    Assessment and Plan:     Supraventricular tachycardia, most likely instigated by low volume state.   Converted to sinus rhythm after IV Cardizem and adenosine in ER.  -seen by cardiology, started on low dose of oral cardizem  -tele, stable  -ECHO EF 55-60%, gr 1 dx, mildly incr RV pressure, LA mod dilated, mod aortic stenosis, pulmonary pressure 46 mmHg  -->outpatient sleep studies     Acute on chronic kidney injury with hypovolemia and dehydration with metabolic acidosis.  Initial Cr~1.99  Improving with IVF, Cr-->1.57-->1.3  -decr rate of IVF  -lasix on hold    Dyspnea  VQ low probability  BNP mildly elevated  CT neck with moderate centrilobular emphysema-->no h/o COPD/emphysema-->might benefit from inhaler and outpatient PFT/pulmonary clinic follow up    Weakness, progressive, with falls  Poor  appetite  Multiple ER visits  Had thoracic MRI during October'2023 hospitalization showing some stenosis, pt declined surgery    Rt facial sweling, pt attributes to dental infection  Worsening  S/p unasyn x1 in ER, no imaging  -check CT stat c/w severe cellulitis, extensive carious dentition with multiple periapical lucencies  -fever today  -cont unasyn  -oral surgery consult, d/w Dr. Cordon -->needs OR, will hold eliquis, plans for procedure tomorrow afternoon  -I d/w Dr. Pollock, no cardiac concerns for procedure    H/o retroperitoneal leiomyosarcoma, s/p surgery, and radiation, with persistent disease.  Pulmonary HTN  Aortic stenosis   Chronic LE edema  H/o c. Diff  IGT, A1c~6.2  CKD3   HTN  HL  OA  RA  H/o LLE DVT June 2023, on AC  Amxiety      DVT prophylaxis: AC     Dispo: pending progress, PT/OT       Chart reviewed, including current vitals, notes, labs and imaging  Pertinent past medical records reviewed  Labs ordered and medications adjusted as outlined above  Coordinate care with care team/consultants  Discussed with patient the results of tests, management plan as outlined above, and the need for ongoing hospitalization  D/w RN     MDM high  severe acute illness/or exacerbation of chronic illness posing a threat to life, IV medications, requiring close monitoring in hospital.       Results:     Lab Results   Component Value Date    WBC 8.6 01/21/2024    HGB 8.8 (L) 01/21/2024    HCT 26.5 (L) 01/21/2024    .0 01/21/2024    CREATSERUM 1.32 (H) 01/21/2024    BUN 25 (H) 01/21/2024     01/21/2024    K 4.9 01/21/2024     (H) 01/21/2024    CO2 20.0 (L) 01/21/2024     (H) 01/21/2024    CA 8.6 (L) 01/21/2024    ALB 3.5 01/21/2024    ALKPHO 183 (H) 01/19/2024    BILT 0.3 01/19/2024    TP 7.7 01/19/2024    AST 35 (H) 01/19/2024    ALT 34 01/19/2024    PTT 32.5 01/19/2024    INR 1.21 (H) 01/19/2024    TSH 0.615 10/07/2023    LIP 16 10/06/2023    DDIMER 1.13 (H) 06/05/2023    MG 1.6  01/21/2024    PHOS 2.3 (L) 01/21/2024    TROP 0.398 (HH) 09/13/2021     (H) 12/31/2023    B12 399 04/22/2022       XR PANORAMIC OF JAWS (CPT=70355)    Result Date: 1/21/2024  CONCLUSION:   Extensive carious dentition with out acute fracture or dislocation.    Dictated by (CST): Rolan Dyer MD on 1/21/2024 at 10:41 AM     Finalized by (CST): Rolan Dyer MD on 1/21/2024 at 10:43 AM          CT SOFT TISSUE OF NECK (CPT=70490)    Result Date: 1/20/2024  CONCLUSION:   Limited exam due to lack of IV contrast.  Severe right mandibular and facial cellulitis without abscess.  Extensive carious dentition with multiple periapical lucencies and destruction of multiple teeth.  The cellulitis may be odontogenic in origin.  No osteomyelitis  Moderate centrilobular emphysema   Dictated by (CST): Rolan Dyer MD on 1/20/2024 at 6:14 PM     Finalized by (CST): Rolan Dyer MD on 1/20/2024 at 6:20 PM          NM LUNG VQ VENT / PERFUSION SCAN  (CPT=78582)    Result Date: 1/20/2024  CONCLUSION: Low probability exam for pulmonary embolus.  Ventilation-perfusion changes are present suggesting COPD/emphysema or parenchymal lung disease.   Dictated by (CST): Galileo Oliveros MD on 1/20/2024 at 7:43 AM     Finalized by (CST): Galileo Oliveros MD on 1/20/2024 at 7:44 AM          XR CHEST AP PORTABLE  (CPT=71045)    Result Date: 1/19/2024  CONCLUSION: No acute cardiopulmonary abnormality.   Dictated by (CST): Galileo Oliveros MD on 1/19/2024 at 4:19 PM     Finalized by (CST): Galileo Oliveros MD on 1/19/2024 at 4:20 PM         EKG 12 Lead    Result Date: 1/19/2024  Marked baseline artifact Supraventricular tachycardia Cannot rule out Inferior infarct , age undetermined ST & T wave abnormality, consider anterolateral ischemia Abnormal ECG When compared with ECG of 07-OCT-2023 15:49, Vent. rate has increased BY  76 BPM Minimal criteria for Inferior infarct are now Present ST now depressed in Inferior leads ST now depressed in Anterior-lateral leads T  wave inversion now evident in Anterior-lateral leads Confirmed by STEVE FLORIAN, NILSA ([id number]) on 1/19/2024 4:49:27 PM    EKG 12 Lead    Result Date: 1/19/2024  Normal sinus rhythm Normal ECG When compared with ECG of 19-JAN-2024 14:31, Vent. rate has decreased BY  63 BPM Minimal criteria for Inferior infarct are no longer Present ST no longer depressed in Inferior leads ST no longer depressed in Anterior-lateral leads T wave inversion no longer evident in Anterior-lateral leads Confirmed by BRITTA BRIZUELA ([id number]) on 1/19/2024 3:19:33 PM       JOSE WALKER MD  1/21/2024
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
## 2024-01-21 NOTE — OCCUPATIONAL THERAPY NOTE
OCCUPATIONAL THERAPY EVALUATION - INPATIENT     Room Number: 304/304-A  Evaluation Date: 1/21/2024  Type of Evaluation: Initial  Presenting Problem:  (chest pain)    Physician Order: IP Consult to Occupational Therapy  Reason for Therapy: ADL/IADL Dysfunction and Discharge Planning    OCCUPATIONAL THERAPY ASSESSMENT   Patient is a 78 year old female admitted 1/19/2024 for chest pain.  In this OT evaluation patient presents with the following impairments: decreased endurance, limited mobility.  These deficits manifest functionally while performing adls and transfers.   The patient is below baseline and would benefit from skilled inpatient OT to address the above deficits, maximizing patient's ability to return to prior level of function.    RN contacted prior to start of care. Treatment coordinated w/ PT. PPE worn for cdiff. Pt agreeable to participation in therapy. Gait belt used during dynamic activity. Pt received in bed, alert and oriented to person/place/cues for year. On initial contact pt transferred supine<>sit at eob w/ supervision. Pt independently maintained unsupported sitting eob. Pt performing sit<>stand bed/chair w/ cga. Pt ambulating w/ rw and cga;occasional vc for walker management. Pt currently requires limited assist to manage le dressing task. Pt independent w/ self feeding. Pt reporting that she feels she is moving at her baseline. Pt would benefit from continued skilled OT to maximize independence and mobility in preparation for return to home w/ home therapies. At end of session pt remaining up in chair w/ all needs in reach;alarm in place but not activated as power cord is not available (PCT aware). RN aware of pt's status and performance in therapy    The patient's Approx Degree of Impairment: 32.79% has been calculated based on documentation in the [facility] '6 clicks' Inpatient Daily Activity Short Form.  Research supports that patients with this level of impairment may benefit from return to   home.     DISCHARGE RECOMMENDATIONS  OT Discharge Recommendations: Home with home health PT/OT; Intermittent Supervision  OT Device Recommendations: TBD    PLAN  OT Treatment Plan: Compensatory technique education;Patient/Family training;Patient/Family education;Endurance training;Functional transfer training;ADL training;Energy conservation/work simplification techniques    OCCUPATIONAL THERAPY MEDICAL/SOCIAL HISTORY   Problem List  Principal Problem:    Chest pain  Active Problems:    Acute renal failure (HCC)    SVT (supraventricular tachycardia)    Dental infection    HOME SITUATION  Type of Home: House  Home Layout: One level  Lives With: Caregiver part-time (CG 3hrs/day 4 days)  Toilet and Equipment: 3-in-1 commode  Shower/Tub and Equipment: Shower chair  Other Equipment: -- (cane,rw)  Drives: No  Patient Regularly Uses: None    Stairs in Home: none  Use of Assistive Device(s): cane inside the home    Prior Level of Towns: Ptapt was living alone in a 1 story home. Pt reports being mod I for adls and ambulation w/ cane. Pt has CG assist 4days/week for 3 hours. CG assists w/ homemaking and errands if needed. Pt also reported that she 'has help' if she needs it. Pt receives Meals on Wheels. Pt does not drive    SUBJECTIVE  \"It is too cold in here\"    OCCUPATIONAL THERAPY EXAMINATION    OBJECTIVE  Precautions: Bed/chair alarm  Fall Risk: Standard fall risk    PAIN ASSESSMENT  Ratin    ACTIVITY TOLERANCE  good    O2 SATURATIONS  Activity on room air    Behavioral/Emotional/Social: cooperative;somewhat flat affect    RANGE OF MOTION   Upper extremity ROM is within functional limits     STRENGTH ASSESSMENT  Upper extremity strength is within functional limits     ACTIVITIES OF DAILY LIVING ASSESSMENT  AM-PAC ‘6-Clicks’ Inpatient Daily Activity Short Form  How much help from another person does the patient currently need…  -   Putting on and taking off regular lower body clothing?: A Little  -   Bathing  (including washing, rinsing, drying)?: A Little  -   Toileting, which includes using toilet, bedpan or urinal? : A Little  -   Putting on and taking off regular upper body clothing?: None  -   Taking care of personal grooming such as brushing teeth?: None  -   Eating meals?: None    AM-PAC Score:  Score: 21  Approx Degree of Impairment: 32.79%  Standardized Score (AM-PAC Scale): 44.27  CMS Modifier (G-Code): CJ    FUNCTIONAL ADL ASSESSMENT  Eating: independent  Grooming: setup assist  UB Dressing: setup assist  LB Dressing: min assist  Toileting: na      EDUCATION PROVIDED  Patient : Role of Occupational Therapy; Plan of Care; Functional Transfer Techniques; Fall Prevention  Patient's Response to Education: Verbalized Understanding    Patient End of Session: Up in chair;Needs met;Call light within reach;RN aware of session/findings;All patient questions and concerns addressed (PCT aware that alarm is not activated as power cord is not available)  OT Goals  Patients self stated goal is: unstated     Patient will complete functional transfer with supervision  Comment:     Patient will complete toileting with mod i  Comment:     Patient will tolerate standing for 5 minutes in prep for adls with supervision   Comment:    Patient will complete dressing task with mod i  Comment:          Goals  on: 24  Frequency: 3-5x/week    Patient Evaluation Complexity Level:   Occupational Profile/Medical History LOW - Brief history including review of medical or therapy records    Specific performance deficits impacting engagement in ADL/IADL LOW  1 - 3 performance deficits    Client Assessment/Performance Deficits MODERATE - Comorbidities and min to mod modifications of tasks    Clinical Decision Making LOW - Analysis of occupational profile, problem-focused assessments, limited treatment options    Overall Complexity LOW     Therapeutic Activity: 20 minutes

## 2024-01-21 NOTE — PROGRESS NOTES
[facility] Pharmacy Note:  Renal Adjustment for ampicillin/sulbactam (UNASYN)    Hannah Boss is a 78 year old patient who has been prescribed ampicillin/sulbactam (UNASYN) 3g every 12 hrs.  The estimated creatinine clearance is 31.3 mL/min (A) (based on SCr of 1.32 mg/dL (H)). The dose has been adjusted to ampicillin/sulbactam (UNASYN) 3 g every 6 hrs per hospital renal dose adjustment protocol for treatment of cellulitis.  Pharmacy will follow and adjust dose as warranted for additional renal function changes.    Thank you,    Karena Barrow, PharmD  1/21/2024  11:54 AM

## 2024-01-22 ENCOUNTER — APPOINTMENT (OUTPATIENT)
Dept: PICC SERVICES | Facility: HOSPITAL | Age: 79
End: 2024-01-22
Attending: HOSPITALIST
Payer: MEDICARE

## 2024-01-22 ENCOUNTER — ANESTHESIA EVENT (OUTPATIENT)
Dept: SURGERY | Facility: HOSPITAL | Age: 79
End: 2024-01-22
Payer: MEDICARE

## 2024-01-22 ENCOUNTER — ANESTHESIA (OUTPATIENT)
Dept: SURGERY | Facility: HOSPITAL | Age: 79
End: 2024-01-22
Payer: MEDICARE

## 2024-01-22 LAB
GLUCOSE BLDC GLUCOMTR-MCNC: 103 MG/DL (ref 70–99)
GLUCOSE BLDC GLUCOMTR-MCNC: 114 MG/DL (ref 70–99)
GLUCOSE BLDC GLUCOMTR-MCNC: 124 MG/DL (ref 70–99)
GLUCOSE BLDC GLUCOMTR-MCNC: 154 MG/DL (ref 70–99)
MAGNESIUM SERPL-MCNC: 1.7 MG/DL (ref 1.6–2.6)
PHOSPHATE SERPL-MCNC: 2.5 MG/DL (ref 2.4–5.1)

## 2024-01-22 PROCEDURE — 0CDXXZ1 EXTRACTION OF LOWER TOOTH, MULTIPLE, EXTERNAL APPROACH: ICD-10-PCS | Performed by: DENTIST

## 2024-01-22 PROCEDURE — 0W950ZZ DRAINAGE OF LOWER JAW, OPEN APPROACH: ICD-10-PCS | Performed by: DENTIST

## 2024-01-22 PROCEDURE — 99233 SBSQ HOSP IP/OBS HIGH 50: CPT | Performed by: HOSPITALIST

## 2024-01-22 RX ORDER — MORPHINE SULFATE 10 MG/ML
6 INJECTION, SOLUTION INTRAMUSCULAR; INTRAVENOUS EVERY 10 MIN PRN
Status: DISCONTINUED | OUTPATIENT
Start: 2024-01-22 | End: 2024-01-22 | Stop reason: HOSPADM

## 2024-01-22 RX ORDER — SODIUM CHLORIDE, SODIUM LACTATE, POTASSIUM CHLORIDE, CALCIUM CHLORIDE 600; 310; 30; 20 MG/100ML; MG/100ML; MG/100ML; MG/100ML
INJECTION, SOLUTION INTRAVENOUS CONTINUOUS PRN
Status: DISCONTINUED | OUTPATIENT
Start: 2024-01-22 | End: 2024-01-22 | Stop reason: SURG

## 2024-01-22 RX ORDER — NICOTINE POLACRILEX 4 MG
15 LOZENGE BUCCAL
Status: DISCONTINUED | OUTPATIENT
Start: 2024-01-22 | End: 2024-01-22 | Stop reason: HOSPADM

## 2024-01-22 RX ORDER — DEXTROSE MONOHYDRATE 25 G/50ML
50 INJECTION, SOLUTION INTRAVENOUS
Status: DISCONTINUED | OUTPATIENT
Start: 2024-01-22 | End: 2024-01-22 | Stop reason: HOSPADM

## 2024-01-22 RX ORDER — SODIUM CHLORIDE, SODIUM LACTATE, POTASSIUM CHLORIDE, CALCIUM CHLORIDE 600; 310; 30; 20 MG/100ML; MG/100ML; MG/100ML; MG/100ML
INJECTION, SOLUTION INTRAVENOUS CONTINUOUS
Status: DISCONTINUED | OUTPATIENT
Start: 2024-01-22 | End: 2024-01-22 | Stop reason: HOSPADM

## 2024-01-22 RX ORDER — NALOXONE HYDROCHLORIDE 0.4 MG/ML
0.08 INJECTION, SOLUTION INTRAMUSCULAR; INTRAVENOUS; SUBCUTANEOUS AS NEEDED
Status: DISCONTINUED | OUTPATIENT
Start: 2024-01-22 | End: 2024-01-22 | Stop reason: HOSPADM

## 2024-01-22 RX ORDER — NICOTINE POLACRILEX 4 MG
30 LOZENGE BUCCAL
Status: DISCONTINUED | OUTPATIENT
Start: 2024-01-22 | End: 2024-01-22 | Stop reason: HOSPADM

## 2024-01-22 RX ORDER — GLYCOPYRROLATE 0.2 MG/ML
INJECTION, SOLUTION INTRAMUSCULAR; INTRAVENOUS AS NEEDED
Status: DISCONTINUED | OUTPATIENT
Start: 2024-01-22 | End: 2024-01-22 | Stop reason: SURG

## 2024-01-22 RX ORDER — MAGNESIUM OXIDE 400 MG/1
400 TABLET ORAL ONCE
Status: COMPLETED | OUTPATIENT
Start: 2024-01-22 | End: 2024-01-22

## 2024-01-22 RX ORDER — HYDROMORPHONE HYDROCHLORIDE 1 MG/ML
0.2 INJECTION, SOLUTION INTRAMUSCULAR; INTRAVENOUS; SUBCUTANEOUS EVERY 5 MIN PRN
Status: DISCONTINUED | OUTPATIENT
Start: 2024-01-22 | End: 2024-01-22 | Stop reason: HOSPADM

## 2024-01-22 RX ORDER — MORPHINE SULFATE 4 MG/ML
2 INJECTION, SOLUTION INTRAMUSCULAR; INTRAVENOUS EVERY 10 MIN PRN
Status: DISCONTINUED | OUTPATIENT
Start: 2024-01-22 | End: 2024-01-22 | Stop reason: HOSPADM

## 2024-01-22 RX ORDER — MORPHINE SULFATE 4 MG/ML
4 INJECTION, SOLUTION INTRAMUSCULAR; INTRAVENOUS EVERY 10 MIN PRN
Status: DISCONTINUED | OUTPATIENT
Start: 2024-01-22 | End: 2024-01-22 | Stop reason: HOSPADM

## 2024-01-22 RX ORDER — LIDOCAINE HYDROCHLORIDE AND EPINEPHRINE 10; 10 MG/ML; UG/ML
INJECTION, SOLUTION INFILTRATION; PERINEURAL AS NEEDED
Status: DISCONTINUED | OUTPATIENT
Start: 2024-01-22 | End: 2024-01-22 | Stop reason: HOSPADM

## 2024-01-22 RX ORDER — HYDROMORPHONE HYDROCHLORIDE 1 MG/ML
0.6 INJECTION, SOLUTION INTRAMUSCULAR; INTRAVENOUS; SUBCUTANEOUS EVERY 5 MIN PRN
Status: DISCONTINUED | OUTPATIENT
Start: 2024-01-22 | End: 2024-01-22 | Stop reason: HOSPADM

## 2024-01-22 RX ORDER — ONDANSETRON 2 MG/ML
INJECTION INTRAMUSCULAR; INTRAVENOUS AS NEEDED
Status: DISCONTINUED | OUTPATIENT
Start: 2024-01-22 | End: 2024-01-22 | Stop reason: SURG

## 2024-01-22 RX ORDER — HYDROMORPHONE HYDROCHLORIDE 1 MG/ML
0.4 INJECTION, SOLUTION INTRAMUSCULAR; INTRAVENOUS; SUBCUTANEOUS EVERY 5 MIN PRN
Status: DISCONTINUED | OUTPATIENT
Start: 2024-01-22 | End: 2024-01-22 | Stop reason: HOSPADM

## 2024-01-22 RX ADMIN — ONDANSETRON 4 MG: 2 INJECTION INTRAMUSCULAR; INTRAVENOUS at 16:32:00

## 2024-01-22 RX ADMIN — SODIUM CHLORIDE, SODIUM LACTATE, POTASSIUM CHLORIDE, CALCIUM CHLORIDE: 600; 310; 30; 20 INJECTION, SOLUTION INTRAVENOUS at 16:28:00

## 2024-01-22 RX ADMIN — SODIUM CHLORIDE, SODIUM LACTATE, POTASSIUM CHLORIDE, CALCIUM CHLORIDE: 600; 310; 30; 20 INJECTION, SOLUTION INTRAVENOUS at 17:36:00

## 2024-01-22 RX ADMIN — GLYCOPYRROLATE 0.2 MG: 0.2 INJECTION, SOLUTION INTRAMUSCULAR; INTRAVENOUS at 16:32:00

## 2024-01-22 NOTE — CM/SW NOTE
01/22/24 0900   CM/SW Referral Data   Referral Source Social Work (self-referral)   Reason for Referral Discharge planning   Informant EMR   Medical Hx   Does patient have an established PCP? Yes  (Diana Lopez)   Patient Info   Patient's Home Environment House   Number of Levels in Home 1   Number of Stair in Home 0   Patient lives with Alone   Patient Status Prior to Admission   Independent with ADLs and Mobility No   Pt. requires assistance with Housework;Driving;Ambulating   Services in place prior to admission Home Health Care;DME/Supplies at home   Home Health Provider Info [facility]   Type of DME/Supplies Standard Walker;Straight Cane;Scooter   Discharge Needs   Anticipated D/C needs Home health care       09:30AM  SW self referred pt for DC Planning after chart review demonstrates recommendation for HH.    Above assessment completed based on SW's knowledge of pt from past Admissions as well as chart review.    Pt lives alone in a single level home. Pt has above DME listed. Pt is also current w/ [facility].    DANIAL spoke to [facility] w/ [facility] via phone. Confirmed pt is current w/ their services. Per Nerissa, pt's services are set to end 1/30/2024.     DANIAL informed [facility] clinicals and F2F to be sent to them in Aidin for resume/restart services.    Clinicals sent in [facility] to [facility]. F2F entered/sent.    04:05PM  Received message from PT Madelyn - pt stating she wants to go to [facility] at [facility]. PT confirmed recommendation for HH. PT stating pt is currently CGA/SBA w/ ambulation. Rehab is not needed at this time. PT to f/up post pt's procedure today to re-confirm.    DANIAL requested PT/OT see pt again tomorrow for f/up. Based on that evaluation and how pt does, SW to f/up and explain options.     Pt has a [employer] insurance. With current PT/OT notes, pt would not qualify for [facility] approval.    Pt has CG 3-4 days/week for approx 4hrs/day.    Addendum, 1/23/2024  Per chart, PT saw  yesterday afternoon. Pt remains appropriate for home w/ HH. RN updated this AM in rounds. She will notify SW if pt brings up REVA/rehab again.    PLAN: Home w/ [facility] - pending med clear      SW/CM to remain available for support and/or discharge planning.         Chloe Hays, MSW, LSW [phone]
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
## 2024-01-22 NOTE — BRIEF OP NOTE
Pre-Operative Diagnosis: Dental abscess [K04.7]     Post-Operative Diagnosis: Dental abscess [K04.7]      Procedure Performed:   Incision and Drainage of Dental Abscess and Removal of Infected Teeth    Surgeon(s) and Role:     * Lane Choudhury DDS - Primary    Assistant(s):   RN     Surgical Findings: Gross Periapical Dental Abscess Teeth 28,29,31     Specimen: Teeth 28,29,31     Estimated Blood Loss: 20ml     Dictation Number:  TBD    LANE CHOUDHURY DDS  1/22/2024  5:34 PM

## 2024-01-22 NOTE — DIETARY NOTE
BRIEF DIETITIAN NOTE     Received consult for \"h/o malignancy and poor oral intake.\" Chart reviewed, pt's weight is relatively stable with some fluctuations noted. Pt has been eating well during most of admission with % at most meals. Visited pt at bedside. Reports poor PO only since her mouth began hurting d/t oral abscess. Scheduled for debridement this afternoon. Pt denies weight loss and was eating well at home prior to abscess. Found to be at no nutrition risk at this time. Will continue to monitor. Pt agreeable to Ensure shake when diet allows following debridement to have something calorie-dense to sip on while recovering. Please consult RD if further nutrition intervention is needed.     Percent Meals Eaten (last 6 days)       Date/Time Percent Meals Eaten (%)    01/20/24 0930 25 %    01/20/24 1629 70 %    01/20/24 1900 120 %    01/21/24 0924 100 %    01/21/24 1230 100 %    01/21/24 1811 100 %    01/22/24 0900 0 %             Patient Weight(s) for the past 336 hrs:   Weight   01/22/24 0459 77.7 kg (171 lb 3.2 oz)   01/21/24 0427 78 kg (172 lb)   01/20/24 0601 80.9 kg (178 lb 4.8 oz)   01/19/24 1823 78.1 kg (172 lb 1.6 oz)        Wt Readings from Last 6 Encounters:   01/22/24 77.7 kg (171 lb 3.2 oz)   12/31/23 90.7 kg (200 lb)   12/12/23 79 kg (174 lb 2.6 oz)   10/08/23 79.9 kg (176 lb 3.2 oz)   08/23/23 86.6 kg (191 lb)   08/22/23 82.6 kg (182 lb)        F/u per protocol or as appropriate.       Marie Melendez RD, LDN  Clinical Dietitian  P: [phone]

## 2024-01-22 NOTE — PLAN OF CARE
Patient is Aox4. No acute distress, reports jaw pain and chronic generalized pain, relieved by PRN percocet. On room air. Standby with walker. IV fluids. IV unasyn. Procedure to drain dental abscess today.     Problem: RISK FOR INFECTION - ADULT  Goal: Absence of fever/infection during anticipated neutropenic period  Description: INTERVENTIONS  - Monitor WBC  - Administer growth factors as ordered  - Implement neutropenic guidelines  Outcome: Progressing     Problem: PAIN - ADULT  Goal: Verbalizes/displays adequate comfort level or patient's stated pain goal  Description: INTERVENTIONS:  - Encourage pt to monitor pain and request assistance  - Assess pain using appropriate pain scale  - Administer analgesics based on type and severity of pain and evaluate response  - Implement non-pharmacological measures as appropriate and evaluate response  - Consider cultural and social influences on pain and pain management  - Manage/alleviate anxiety  - Utilize distraction and/or relaxation techniques  - Monitor for opioid side effects  - Notify MD/LIP if interventions unsuccessful or patient reports new pain  - Anticipate increased pain with activity and pre-medicate as appropriate  Outcome: Progressing

## 2024-01-22 NOTE — PROGRESS NOTES
Progress Note  Hannah Boss Patient Status:  Inpatient    1945 MRN [id number]   Location [facility] 3W/SW Attending Brianne Pagan MD   Hosp Day # 3 PCP None Pcp     Subjective:  Complaints of pain to the right jaw, denies any chest pain, SOB or dizziness  + fever    Objective:  /89 (BP Location: Right arm)   Pulse 78   Temp 100 °F (37.8 °C) (Oral)   Resp 17   Wt 171 lb 3.2 oz (77.7 kg)   SpO2 97%   BMI 29.39 kg/m²     Telemetry: NSR      Intake/Output:    Intake/Output Summary (Last 24 hours) at 2024 0916  Last data filed at 2024 2355  Gross per 24 hour   Intake 840 ml   Output --   Net 840 ml       Last 3 Weights   24 0459 171 lb 3.2 oz (77.7 kg)   24 0427 172 lb (78 kg)   24 0601 178 lb 4.8 oz (80.9 kg)   24 1823 172 lb 1.6 oz (78.1 kg)   23 1319 200 lb (90.7 kg)   23 1002 174 lb 2.6 oz (79 kg)       Labs:  Recent Labs   Lab 24  1435 24  0804 24  0807   * 106* 108*   BUN 40* 27* 25*   CREATSERUM 1.99* 1.57* 1.32*   EGFRCR 26* 34* 42*   CA 9.7 8.8 8.6*    142 141   K 5.0 5.2* 4.9   * 116* 114*   CO2 20.0* 19.0* 20.0*     Recent Labs   Lab 24  1434 24  0856 24  0807   RBC 3.78* 3.01* 2.99*   HGB 11.2* 8.9* 8.8*   HCT 34.7* 27.1* 26.5*   MCV 91.8 90.0 88.6   MCH 29.6 29.6 29.4   MCHC 32.3 32.8 33.2   RDW 14.0 14.0 13.7   NEPRELIM 5.02 6.95 6.85   WBC 8.1 8.5 8.6   .0 208.0 182.0         Recent Labs   Lab 24  1435   TROPHS 15     Lab Results   Component Value Date    INR 1.21 (H) 2024    INR 1.11 10/07/2023    INR 1.08 2022       Diagnostics:       Review of Systems   Respiratory: Negative.     Cardiovascular: Negative.        Physical Exam:    Physical Exam  Vitals reviewed.   Constitutional:       General: She is not in acute distress.     Appearance: She is obese. She is not ill-appearing.   HENT:      Head: Normocephalic.   Neck:      Vascular: No JVD.    Cardiovascular:      Rate and Rhythm: Normal rate and regular rhythm.      Pulses:           Dorsalis pedis pulses are 1+ on the right side and 1+ on the left side.      Heart sounds: S1 normal and S2 normal. Murmur heard.      Systolic murmur is present with a grade of 3/6.   Abdominal:      General: Abdomen is flat. There is no distension.      Palpations: Abdomen is soft.      Tenderness: There is no abdominal tenderness.   Musculoskeletal:         General: Normal range of motion.      Cervical back: Normal range of motion and neck supple. No rigidity.      Right lower le+ Edema present.      Left lower le+ Edema present.   Neurological:      Mental Status: She is alert.   Psychiatric:         Mood and Affect: Mood normal.         Medications:   ampicillin-sulbactam  3 g Intravenous q6h    magnesium oxide  400 mg Oral BID with meals    dilTIAZem ER  120 mg Oral Daily    gabapentin  300 mg Oral Nightly      sodium chloride 75 mL/hr at 24 0501       Assessment:    Right facial/ mandibular swelling  - gross right mandible vestibular dental abscess  - pt to go for I& D today - oral surgery following    SVT/AVNRT  - last echo  with LVEF 55-60%  - on diltiazem ER  - no episode of SVT over night    Moderate aortic stenosis/ mild regurgitation  - systolic murmur present   - denies any SOB or dizziness  - mean systolic gradient of 21mm Hg    History of DVT/PE   - on apixaban 5mg BID    DICK  Creatinine trending down, creatinine of 1.32 today    H/o Leiomyosarcoma      Plan:  - no SVT over night  -Continue diltiazem  ER daily  - restart apixaban post procedure when okay with oral surgeon       ELISA Matias  [facility]  2024  9:16 AM

## 2024-01-22 NOTE — PROGRESS NOTES
Attempted follow up PT treatment- pt reporting she just returned to supine and declining OOB activity due to low back pain. Will re-attempt as schedule permits.   Plan for mouth abscess I&D later today.     Thank you,  Madelyn Castillo, PT, DPT

## 2024-01-22 NOTE — PHYSICAL THERAPY NOTE
PHYSICAL THERAPY TREATMENT NOTE - INPATIENT     Room Number: [facility]       Presenting Problem: The patient is a 78-year-old  female with known retroperitoneal leiomyosarcoma who presented to the emergency department for evaluation of chest pain, shortness of breath.  Upon arrival noted to be in supraventricular tachycardia rhythm.  She was given adenosine and Cardizem.  Converted to sinus rhythm.  CBC showed hemoglobin of 11.2 which is stable.  Chemistry showed BUN and creatinine of 40 and 1.99; GFR 26, below her baseline; bicarbonate 20; chloride 115.  B natriuretic peptide 200.  Chest x-ray showed no acute findings.  VQ scan still pending.  Patient will be admitted to the hospital for further management.       Problem List  Principal Problem:    Chest pain  Active Problems:    Acute renal failure (HCC)    SVT (supraventricular tachycardia)    Dental infection      PHYSICAL THERAPY ASSESSMENT     Patient received supine in bed with son present. RN approved activity. Therapist educated pt on POC and physiological benefits of mobilization. Cues for activity pacing and energy conservation. Patient agreeable to participate. Primary complaint is facial/mouth pain and low back which she rates at 8 out of 10 per pain scale. Significant facial swelling (plan for I&D dental abscess later this date).   Bed mobility: SBA supine>sit. CGA sit>supine. Instructed on log roll technique 2* back pain.   Transfers: CG with use of RW. Cues for appropriate UE positioning with transitional movements. SBA to maintain static standing with bilateral UE support on RW.   Ambulation: CG with use of RW. 125ft. Decreased jessica speed, flexed posture. Cues for upright positioning and instruction for pt to ambulate closer to RW.     Patient supine in bed at end of session with needs in reach and alarm activated- provided with heat back to low back for comfort.     Patient making progress toward IP PT goals.  The patient's  Approx Degree of Impairment: 46.58% has been calculated based on documentation in the [facility] '6 clicks' Inpatient Basic Mobility Short Form.  Research supports that patients with this level of impairment may benefit from home with home PT and intermittent Supervision.    DISCHARGE RECOMMENDATIONS  PT Discharge Recommendations: Home with home health PT/OT;Intermittent Supervision     PLAN  PT Treatment Plan: Bed mobility;Body mechanics;Coordination;Endurance;Energy conservation;Gait training;Strengthening;Stair training;Transfer training;Balance training  Frequency (Obs): 5x/week    SUBJECTIVE  \"I want to go to rehab\"     OBJECTIVE  Precautions: Bed/chair alarm    PAIN ASSESSMENT   Ratin  Location: back and mouth  Management Techniques: Activity promotion;Body mechanics;Repositioning    BALANCE  Static Sitting: Good  Dynamic Sitting: Fair +  Static Standing: Fair  Dynamic Standing: Poor +    ACTIVITY TOLERANCE  Pulse: 77  Heart Rate Source: Monitor      O2 WALK  Oxygen Therapy  SPO2% Ambulation on Room Air: 97    AM-formerly [facility] '6-Clicks' INPATIENT SHORT FORM - BASIC MOBILITY  How much difficulty does the patient currently have...  Patient Difficulty: Turning over in bed (including adjusting bedclothes, sheets and blankets)?: A Little   Patient Difficulty: Sitting down on and standing up from a chair with arms (e.g., wheelchair, bedside commode, etc.): A Little   Patient Difficulty: Moving from lying on back to sitting on the side of the bed?: A Little   How much help from another person does the patient currently need...   Help from Another: Moving to and from a bed to a chair (including a wheelchair)?: A Little   Help from Another: Need to walk in hospital room?: A Little   Help from Another: Climbing 3-5 steps with a railing?: A Little     AM-PAC Score:  Raw Score: 18   Approx Degree of Impairment: 46.58%   Standardized Score (AM-PAC Scale): 43.63   CMS Modifier (G-Code): CK    FUNCTIONAL ABILITY STATUS  Functional  Mobility/Gait Assessment  Gait Assistance: Contact guard assist  Distance (ft): 125ft  Assistive Device: Rolling walker  Pattern: Shuffle (decreased jessica speed, flexed posture)  Stairs:  (Deferred as not a goal for safe discharge.)      THERAPEUTIC EXERCISES  Lower Extremity Ankle pumps     Position Sitting       Patient End of Session: In bed;Needs met;Call light within reach;RN aware of session/findings;Family present;Alarm set    CURRENT GOALS   Goals to be met by: 2/4//24  Patient Goal Patient's self-stated goal is: to return home   Goal #1 Patient is able to demonstrate supine - sit EOB @ level: modified independent      Goal #1   Current Status  SBA/CG supine<>sit   Goal #2 Patient is able to demonstrate transfers Sit to/from Stand at assistance level: modified independent with  least restrictive device.      Goal #2  Current Status  CGA with RW   Goal #3 Patient is able to ambulate >100 feet with assist device:  least restrictive device  at assistance level: modified independent   Goal #3   Current Status  CGA with RW 125ft   Goal #4 Patient will negotiate 1 stairs/one curb w/ assistive device and supervision   Goal #4   Current Status  NT   Goal #5 Patient to demonstrate independence with home activity/exercise instructions provided to patient in preparation for discharge.   Goal #5   Current Status  ongoing     Gait Training: 15 minutes  Therapeutic Activity: 9 minutes

## 2024-01-22 NOTE — ANESTHESIA POSTPROCEDURE EVALUATION
Patient: Hannah Boss    Procedure Summary       Date: 01/22/24 Room / Location: [facility] MAIN OR 02 / [facility] MAIN OR    Anesthesia Start: 1627 Anesthesia Stop:     Procedure: IRRIGATION AND DEBRIDEMENT DENTAL ABSCESS (Mouth) Diagnosis:       Dental abscess      (Dental abscess [K04.7])    Surgeons: Art Cordon DDS Anesthesiologist: Lucho Hurst MD    Anesthesia Type: general ASA Status: 3            Anesthesia Type: No value filed.    Vitals Value Taken Time   /74 01/22/24 1736   Temp 98.2 °F (36.8 °C) 01/22/24 1735   Pulse 106 01/22/24 1736   Resp 29 01/22/24 1736   SpO2 98 % 01/22/24 1736   Vitals shown include unfiled device data.    [facility] AN Post Evaluation:   Patient Evaluated in PACU  Patient Participation: complete - patient participated  Level of Consciousness: awake  Pain Management: adequate  Airway Patency:patent  Dental exam unchanged from preop  Yes    Cardiovascular Status: acceptable  Respiratory Status: acceptable  Postoperative Hydration acceptable      LUCHO HURST MD  1/22/2024 5:37 PM
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
## 2024-01-23 ENCOUNTER — APPOINTMENT (OUTPATIENT)
Dept: ULTRASOUND IMAGING | Facility: HOSPITAL | Age: 79
End: 2024-01-23
Attending: NURSE PRACTITIONER
Payer: MEDICARE

## 2024-01-23 LAB
ALBUMIN SERPL-MCNC: 3.3 G/DL (ref 3.2–4.8)
ANION GAP SERPL CALC-SCNC: 5 MMOL/L (ref 0–18)
BASOPHILS # BLD AUTO: 0.04 X10(3) UL (ref 0–0.2)
BASOPHILS NFR BLD AUTO: 0.6 %
BUN BLD-MCNC: 15 MG/DL (ref 9–23)
BUN/CREAT SERPL: 12.4 (ref 10–20)
CALCIUM BLD-MCNC: 8.4 MG/DL (ref 8.7–10.4)
CHLORIDE SERPL-SCNC: 110 MMOL/L (ref 98–112)
CO2 SERPL-SCNC: 23 MMOL/L (ref 21–32)
CREAT BLD-MCNC: 1.21 MG/DL
DEPRECATED RDW RBC AUTO: 44.5 FL (ref 35.1–46.3)
EGFRCR SERPLBLD CKD-EPI 2021: 46 ML/MIN/1.73M2 (ref 60–?)
EOSINOPHIL # BLD AUTO: 0.12 X10(3) UL (ref 0–0.7)
EOSINOPHIL NFR BLD AUTO: 1.7 %
ERYTHROCYTE [DISTWIDTH] IN BLOOD BY AUTOMATED COUNT: 13.3 % (ref 11–15)
GLUCOSE BLD-MCNC: 90 MG/DL (ref 70–99)
GLUCOSE BLDC GLUCOMTR-MCNC: 102 MG/DL (ref 70–99)
GLUCOSE BLDC GLUCOMTR-MCNC: 145 MG/DL (ref 70–99)
GLUCOSE BLDC GLUCOMTR-MCNC: 156 MG/DL (ref 70–99)
GLUCOSE BLDC GLUCOMTR-MCNC: 99 MG/DL (ref 70–99)
HCT VFR BLD AUTO: 24.5 %
HGB BLD-MCNC: 7.9 G/DL
IMM GRANULOCYTES # BLD AUTO: 0.01 X10(3) UL (ref 0–1)
IMM GRANULOCYTES NFR BLD: 0.1 %
LYMPHOCYTES # BLD AUTO: 0.82 X10(3) UL (ref 1–4)
LYMPHOCYTES NFR BLD AUTO: 11.8 %
MAGNESIUM SERPL-MCNC: 1.8 MG/DL (ref 1.6–2.6)
MCH RBC QN AUTO: 29.3 PG (ref 26–34)
MCHC RBC AUTO-ENTMCNC: 32.2 G/DL (ref 31–37)
MCV RBC AUTO: 90.7 FL
MONOCYTES # BLD AUTO: 0.42 X10(3) UL (ref 0.1–1)
MONOCYTES NFR BLD AUTO: 6.1 %
NEUTROPHILS # BLD AUTO: 5.52 X10 (3) UL (ref 1.5–7.7)
NEUTROPHILS # BLD AUTO: 5.52 X10(3) UL (ref 1.5–7.7)
NEUTROPHILS NFR BLD AUTO: 79.7 %
OSMOLALITY SERPL CALC.SUM OF ELEC: 286 MOSM/KG (ref 275–295)
PHOSPHATE SERPL-MCNC: 2.8 MG/DL (ref 2.4–5.1)
PLATELET # BLD AUTO: 209 10(3)UL (ref 150–450)
POTASSIUM SERPL-SCNC: 5.1 MMOL/L (ref 3.5–5.1)
RBC # BLD AUTO: 2.7 X10(6)UL
SODIUM SERPL-SCNC: 138 MMOL/L (ref 136–145)
WBC # BLD AUTO: 6.9 X10(3) UL (ref 4–11)

## 2024-01-23 PROCEDURE — 93970 EXTREMITY STUDY: CPT | Performed by: NURSE PRACTITIONER

## 2024-01-23 PROCEDURE — 99233 SBSQ HOSP IP/OBS HIGH 50: CPT | Performed by: HOSPITALIST

## 2024-01-23 RX ORDER — HEPARIN SODIUM 5000 [USP'U]/ML
5000 INJECTION, SOLUTION INTRAVENOUS; SUBCUTANEOUS ONCE
Status: COMPLETED | OUTPATIENT
Start: 2024-01-23 | End: 2024-01-23

## 2024-01-23 RX ORDER — DILTIAZEM HYDROCHLORIDE 180 MG/1
180 CAPSULE, EXTENDED RELEASE ORAL DAILY
Status: DISCONTINUED | OUTPATIENT
Start: 2024-01-24 | End: 2024-01-23

## 2024-01-23 RX ORDER — DILTIAZEM HYDROCHLORIDE 120 MG/1
120 CAPSULE, EXTENDED RELEASE ORAL DAILY
Status: DISCONTINUED | OUTPATIENT
Start: 2024-01-24 | End: 2024-01-24

## 2024-01-23 RX ORDER — MAGNESIUM OXIDE 400 MG/1
400 TABLET ORAL ONCE
Status: COMPLETED | OUTPATIENT
Start: 2024-01-23 | End: 2024-01-23

## 2024-01-23 NOTE — PLAN OF CARE
Pt. Hannah is A&Ox 4 . Lives at home with son . Patient is ambulating using standby assist with walker . Patient is experiencing pain, tx with oxycodone. Continent of bowel and bladder. IVF and IV antibiotics given. Doppler done for leg swelling. Patient tolerating diet being advanced to regular. Call light within reach, fall precautions maintained.     Plan is restart eliquis tomorrow and possible discharge tomorrow with home health .    Problem: Patient Centered Care  Goal: Patient preferences are identified and integrated in the patient's plan of care  Description: Interventions:  - What would you like us to know as we care for you? From home with son  - Provide timely, complete, and accurate information to patient/family  - Incorporate patient and family knowledge, values, beliefs, and cultural backgrounds into the planning and delivery of care  - Encourage patient/family to participate in care and decision-making at the level they choose  - Honor patient and family perspectives and choices  Outcome: Progressing     Problem: Patient/Family Goals  Goal: Patient/Family Long Term Goal  Description: Patient's Long Term Goal: To go home    Interventions:  - Follow medical regimen  - See additional Care Plan goals for specific interventions  Outcome: Progressing  Goal: Patient/Family Short Term Goal  Description: Patient's Short Term Goal: To not have Chest pain    Interventions:   - Follow medical regimen  - See additional Care Plan goals for specific interventions  Outcome: Progressing     Problem: RISK FOR INFECTION - ADULT  Goal: Absence of fever/infection during anticipated neutropenic period  Description: INTERVENTIONS  - Monitor WBC  - Administer growth factors as ordered  - Implement neutropenic guidelines  Outcome: Progressing     Problem: PAIN - ADULT  Goal: Verbalizes/displays adequate comfort level or patient's stated pain goal  Description: INTERVENTIONS:  - Encourage pt to monitor pain and request  assistance  - Assess pain using appropriate pain scale  - Administer analgesics based on type and severity of pain and evaluate response  - Implement non-pharmacological measures as appropriate and evaluate response  - Consider cultural and social influences on pain and pain management  - Manage/alleviate anxiety  - Utilize distraction and/or relaxation techniques  - Monitor for opioid side effects  - Notify MD/LIP if interventions unsuccessful or patient reports new pain  - Anticipate increased pain with activity and pre-medicate as appropriate  Outcome: Progressing

## 2024-01-23 NOTE — OPERATIVE REPORT
[facility]    PATIENT'S NAME: MERNA ALEXANDER   ATTENDING PHYSICIAN: Brianne Pagan MD   OPERATING PHYSICIAN: Art Cordon DDS   PATIENT ACCOUNT#:   [id number]    LOCATION:  [address] [place] [address]  MEDICAL RECORD #:   [id number]       YOB: 1945  ADMISSION DATE:       01/19/2024      OPERATION DATE:  01/22/2024    OPERATIVE REPORT      PREOPERATIVE DIAGNOSIS:    1.   Right mandible vestibular dental abscess.  2.   Infected teeth 28, 29, and 31.  POSTOPERATIVE DIAGNOSIS:    1.   Right mandible vestibular dental abscess.  2.   Infected teeth 28, 29, and 31.  PROCEDURE:    1.   Incision and drainage of dental abscess.  2.   Removal of infected teeth 28, 29, and 31.    ASSISTANT:  No assistant surgeon present.    ANESTHESIA TYPE:  General.    INDICATIONS:  The patient is a 78-year-old female who presented to the emergency department complaining of chest pain.  While in the hospital, the patient then complained of right lower jaw pain and swelling.  Clinical and radiographic examination was significant for a right mandibular buccal vestibule dental abscess likely from infected teeth numbers 28, 29, and 31.  Treatment options were discussed with the patient, and the decision was made to perform said procedure in the operating room under general anesthesia.    OPERATIVE TECHNIQUE:  The patient was transported to the operating room where general anesthesia via orotracheal intubation was administered by the anesthesia department.  The patient was subsequently prepped and draped in the usual sterile fashion.  Attention was then directed intraorally.  A wet Ray-Ck gauze was placed at the posterior pharynx to serve as throat packing.  Attention was then directed to the right mandible.  A large fluctuant mass at the lateral aspect of the right body of the mandible was identified.  Using a #15 scalpel blade, an incision was made into the abscess.  Approximately 3 to 4 mL of gross purulence and  infected fluid was expressed to the wound.  At this time, culture swab was taken for both aerobic and anaerobic culture and sensitivity.  Attention was then directed to the infected lower right teeth.  Once again, using a #15 scalpel blade, a buccal sulcular incision was made from approximately tooth #31 to approximately tooth #27.  Following placement of the incision, a buccal mucoperiosteal flap was then elevated.  Teeth numbers 28, 29, and 31 were atraumatically removed using dental instrumentation consisting of dental forceps and instruments.  Extraction sites were thoroughly curetted to remove any remnants of debris or infection.  At this time, the surgical site was copiously irrigated with sterile saline.  The incision site at the buccal vestibular abscess was also thoroughly rinsed and irrigated with sterile saline.  Gelfoam packing was placed at the extraction sites and the wounds were then closed with multiple 3-0 Vicryl sutures.  Approximately 3 mL of lidocaine 1% with 1:100,000 epinephrine was then infiltrated in the right mandibular buccal vestibule and given for inferior alveolar nerve blockade.  At this time, the oral cavity was once again copiously irrigated with sterile saline.  The previously placed throat pack was then removed.  All wounds appeared to be grossly hemostatic at the completion of the procedure.     The lap and sponge count was correct.  The patient tolerated the procedure well.  She was then extubated without incident and then transported to recovery room in stable condition.    Dictated By Art Cordon DDS  d: 01/22/2024 19:54:46  t: 01/22/2024 23:37:29  Job [id number]  JW/

## 2024-01-23 NOTE — PROGRESS NOTES
[facility]    Progress Note    Hannah Boss Patient Status:  Inpatient    1945 MRN [id number]   Location [facility] 3W/SW Attending Brianne Pagan MD   Hosp Day # 3 PCP None Pcp     Chief complaint: CP  Subjective:     Not feeling well mostly because of facial swelling and pain, about the same  Fatigue  Low grade temps  Chest pain improved, still some dyspnea       Objective:   Blood pressure (!) 179/68, pulse 91, temperature 98.1 °F (36.7 °C), temperature source Axillary, resp. rate 22, weight 171 lb 3.2 oz (77.7 kg), SpO2 95%, not currently breastfeeding.    GEN: NAD, tired looking, speaks in full sentences, no drooling  HEENT: conjunctivae/corneas clear. PERRL, EOM's intact. +Rt facial swelling  Neck: no adenopathy, no carotid bruit, no JVD, supple  Pulmonary:  clear to auscultation bilaterally  Cardiovascular: S1, S2 normal, no murmur, click, rub or gallop, regular rate and rhythm  Abdominal: soft, non-tender; bowel sounds normal;    Extremities: no cyanosis or edema  Pulses: palpable and symmetric  Skin: warm and dry  Neurologic: Alert and oriented X 3, moves all extremities  Psychiatric: calm, cooperative    Assessment and Plan:     Supraventricular tachycardia, most likely instigated by low volume state and developing dental infection.   Converted to sinus rhythm after IV Cardizem and adenosine in ER.  -seen by cardiology, started on low dose of oral cardizem  -tele, stable  -ECHO EF 55-60%, gr 1 dx, mildly incr RV pressure, LA mod dilated, mod aortic stenosis, pulmonary pressure 46 mmHg  -->outpatient sleep studies     Acute on chronic kidney injury with hypovolemia and dehydration with metabolic acidosis.  Initial Cr~1.99  Improving with IVF, Cr-->1.57-->1.3  -decr rate of IVF-->stop likely in am (NPO today for OR)  -lasix on hold    Dyspnea  VQ low probability  BNP mildly elevated  CT neck with moderate centrilobular emphysema-->no h/o COPD/emphysema-->might benefit  from inhaler and outpatient PFT/pulmonary clinic follow up    Weakness, progressive, with falls  Poor appetite  Multiple ER visits  Had thoracic MRI during October'2023 hospitalization showing some stenosis, pt declined surgery    Rt facial swelling due to dental infection  Worsening  S/p unasyn x1 in ER, no imaging  -check CT c/w severe cellulitis, extensive carious dentition with multiple periapical lucencies  -fever yesterday  -cont unasyn  -oral surgery consult, d/w Dr. Cordon -->plans for OR later today,  hold eliquis   -I d/w Dr. Pollock, no cardiac concerns for procedure    H/o retroperitoneal leiomyosarcoma, s/p surgery, and radiation, with persistent disease.  Pulmonary HTN  Aortic stenosis   Chronic LE edema  H/o c. Diff  IGT, A1c~6.2  CKD3   HTN  HL  OA  RA  H/o LLE DVT June 2023, on AC  Amxiety      DVT prophylaxis: AC, now on hold for OR, restart once ok with Dr. Cordon, ? In am, might need first a prophylactic dose of heparin sq/or lovenox subcutaneous prior restarting eliquis     Dispo: pending progress, PT/OT       Chart reviewed, including current vitals, notes, labs and imaging  Pertinent past medical records reviewed  Labs ordered and medications adjusted as outlined above  Coordinate care with care team/consultants  Discussed with patient the results of tests, management plan as outlined above, and the need for ongoing hospitalization  D/w RN     MDM high  severe acute illness/or exacerbation of chronic illness posing a threat to life, IV medications, requiring close monitoring in hospital.       Results:     Lab Results   Component Value Date    WBC 8.6 01/21/2024    HGB 8.8 (L) 01/21/2024    HCT 26.5 (L) 01/21/2024    .0 01/21/2024    CREATSERUM 1.32 (H) 01/21/2024    BUN 25 (H) 01/21/2024     01/21/2024    K 4.9 01/21/2024     (H) 01/21/2024    CO2 20.0 (L) 01/21/2024     (H) 01/21/2024    CA 8.6 (L) 01/21/2024    ALB 3.5 01/21/2024    ALKPHO 183 (H) 01/19/2024     BILT 0.3 01/19/2024    TP 7.7 01/19/2024    AST 35 (H) 01/19/2024    ALT 34 01/19/2024    PTT 32.5 01/19/2024    INR 1.21 (H) 01/19/2024    TSH 0.615 10/07/2023    LIP 16 10/06/2023    DDIMER 1.13 (H) 06/05/2023    MG 1.7 01/22/2024    PHOS 2.5 01/22/2024    TROP 0.398 (HH) 09/13/2021     (H) 12/31/2023    B12 399 04/22/2022       XR PANORAMIC OF JAWS (CPT=70355)    Result Date: 1/21/2024  CONCLUSION:   Extensive carious dentition with out acute fracture or dislocation.    Dictated by (CST): Rolan Dyer MD on 1/21/2024 at 10:41 AM     Finalized by (CST): Rolan Dyer MD on 1/21/2024 at 10:43 AM                 JOSE WALKER MD  1/22/2024

## 2024-01-23 NOTE — PROGRESS NOTES
[facility]  Hospitalist Progress Note     Hannah Boss Patient Status:  Inpatient    1945  78 year old CSN [id number]   Location 304/304-A Attending Gildardo Shrestha MD   Hosp Day # 4 PCP None Pcp     Assessment & Plan:   ----------------------------------  Dental abscess.  Status post drainage and extraction of 3 teeth.  Doing well overall.  -Full liquid diet  -Pain control  -Restart Eliquis tomorrow  -Unasyn, discharge on Augmentin    Palpitations.  Occasional PSVT, not sure if this correlates.  Overall patient is feeling better.  -Cardiology signed off  -Continue diltiazem  -Telemetry    dvt prophylaxis: sc heparin, restart Eliquis tomorrow  code status: full code  dispo: home upon medical clearance tomorrow morning    I personally reviewed the available laboratories, imaging including x-rays. I discussed/will discuss the case with [facility], cardiology. I ordered laboratories, studies including CBC. I adjusted medications including Unasyn. Medical decision making high, risk is high.    >55min spent, >50% spent counseling and coordinating care in the form of educating pt/family and d/w consultants and staff. Most of the time spent discussing the above plan.       Subjective:   ----------------------------------  Pain relatively well-controlled.  Tolerating full liquid diet.  Asking for more solid food.  No chest pain or palpitations.  No shortness of breath.      Objective:   Chief Complaint:   Chief Complaint   Patient presents with    Arrythmia/Palpitations     ----------------------------------  Temp:  [97.8 °F (36.6 °C)-99.4 °F (37.4 °C)] 97.8 °F (36.6 °C)  Pulse:  [] 68  Resp:  [14-24] 19  BP: (129-183)/(45-74) 129/45  SpO2:  [93 %-100 %] 93 %  Gen: A+Ox3.  No distress.   HEENT: NCAT, neck supple, no carotid bruit.  Facial swelling noted  CV: RRR, S1S2, and intact distal pulses. No gallop, rub, murmur.  Pulm: Effort and breath sounds normal. No distress, wheezes, rales, rhonchi.  Abd:  Soft, NTND, BS normal, no mass, no HSM, no rebound/guarding.   Neuro: Normal reflexes, CN. Sensory/motor exams grossly normal deficit.   MS: No joint effusions.  No peripheral edema.  Skin: Skin is warm and dry. No rashes, erythema, diaphoresis.   Psych: Normal mood and affect. Calm, cooperative    Labs:  Lab Results   Component Value Date    HGB 7.9 (L) 01/23/2024    WBC 6.9 01/23/2024    .0 01/23/2024     01/23/2024    K 5.1 01/23/2024    CREATSERUM 1.21 (H) 01/23/2024    INR 1.21 (H) 01/19/2024    AST 35 (H) 01/19/2024    ALT 34 01/19/2024    TROP 0.398 (HH) 09/13/2021          [START ON 1/24/2024] dilTIAZem ER  120 mg Oral Daily    [START ON 1/24/2024] apixaban  5 mg Oral BID    heparin  5,000 Units Subcutaneous Once    ampicillin-sulbactam  3 g Intravenous q6h    magnesium oxide  400 mg Oral BID with meals    gabapentin  300 mg Oral Nightly     morphINE PF, influenza virus vaccine PF, acetaminophen, ondansetron, metoclopramide, diazePAM, oxyCODONE-acetaminophen  **Certification      PHYSICIAN Certification of Need for Inpatient Hospitalization - Initial Certification    Patient will require inpatient services that will reasonably be expected to span two midnight's based on the clinical documentation in H+P.   Based on patients current state of illness, I anticipate that, after discharge, patient will require TBD.

## 2024-01-23 NOTE — PLAN OF CARE
Pt. Hannah is A&Ox 4 . Lives at home with son . Patient is ambulating using standby assist with walker . Patient is experiencing pain. Tx for pain with oxycodone . Continent of bowel and bladder. Incision and drainage completed. IV abx given. Fall precautions maintained, call light within reach.    Plan is IV antibiotics .    Problem: Patient Centered Care  Goal: Patient preferences are identified and integrated in the patient's plan of care  Description: Interventions:  - What would you like us to know as we care for you? From home with son  - Provide timely, complete, and accurate information to patient/family  - Incorporate patient and family knowledge, values, beliefs, and cultural backgrounds into the planning and delivery of care  - Encourage patient/family to participate in care and decision-making at the level they choose  - Honor patient and family perspectives and choices  Outcome: Progressing     Problem: Patient/Family Goals  Goal: Patient/Family Long Term Goal  Description: Patient's Long Term Goal: To go home    Interventions:  - Follow medical regimen  - See additional Care Plan goals for specific interventions  Outcome: Progressing  Goal: Patient/Family Short Term Goal  Description: Patient's Short Term Goal: To not have Chest pain    Interventions:   - Follow medical regimen  - See additional Care Plan goals for specific interventions  Outcome: Progressing     Problem: RISK FOR INFECTION - ADULT  Goal: Absence of fever/infection during anticipated neutropenic period  Description: INTERVENTIONS  - Monitor WBC  - Administer growth factors as ordered  - Implement neutropenic guidelines  Outcome: Progressing     Problem: PAIN - ADULT  Goal: Verbalizes/displays adequate comfort level or patient's stated pain goal  Description: INTERVENTIONS:  - Encourage pt to monitor pain and request assistance  - Assess pain using appropriate pain scale  - Administer analgesics based on type and severity of pain and  evaluate response  - Implement non-pharmacological measures as appropriate and evaluate response  - Consider cultural and social influences on pain and pain management  - Manage/alleviate anxiety  - Utilize distraction and/or relaxation techniques  - Monitor for opioid side effects  - Notify MD/LIP if interventions unsuccessful or patient reports new pain  - Anticipate increased pain with activity and pre-medicate as appropriate  Outcome: Progressing

## 2024-01-23 NOTE — PAYOR COMM NOTE
1/22-1/23  CONTINUED STAY REVIEW    Payor: LAUREEN ARTEAGA [facility] [place]  Subscriber #:  [id number]  Authorization Number: [id number]    Admit date: 1/19/24  Admit time:  6:13 PM    Admitting Physician: Erich Pascual MD  Attending Physician:  Gildardo Shrestha MD  Primary Care Physician: Pcp, None          MEDICATIONS ADMINISTERED IN LAST 1 DAY:  ampicillin-sulbactam (Unasyn) 3 g in sodium chloride 0.9% 100mL IVPB-ADD       Date Action Dose Route User    1/23/2024 0503 New Bag 3 g Intravenous Madeline Perry RN    1/22/2024 2308 New Bag 3 g Intravenous Madeline Perry RN    1/22/2024 1848 New Bag 3 g Intravenous Enrrique Mckee RN    1/22/2024 1216 New Bag 3 g Intravenous Enrrique Mckee RN          dilTIAZem ER (Dilacor XR) 24 hr cap 120 mg       Date Action Dose Route User    1/23/2024 0811 Given by Other 120 mg Oral Enrrique Mckee RN          fentaNYL (Sublimaze) 50 mcg/mL injection       Date Action Dose Route User    1/22/2024 1631 Given 100 mcg Intravenous Lucho Bailey MD          gabapentin (Neurontin) cap 300 mg       Date Action Dose Route User    1/22/2024 2131 Given 300 mg Oral Madeline Perry RN          gelatin adsorbable (Gelfoam) 12-7 MM external sponge       Date Action Dose Route User    1/22/2024 1648 Given 1 each Topical (Mouth) Art Cordon DDS          glycopyrrolate (Robinul) 0.2 MG/ML injection       Date Action Dose Route User    1/22/2024 1632 Given 0.2 mg Intravenous Lucho Bailey MD          HYDROmorphone (Dilaudid) 1 MG/ML injection 0.4 mg       Date Action Dose Route User    1/22/2024 1807 Given by Other 0.3 mg Intravenous Kasey Mayen RN          lactated ringers infusion       Date Action Dose Route User    1/22/2024 1628 New Bag (none) Intravenous Lucho Bailey MD          lidocaine 1%-EPINEPHrine 1:100,000 (Xylocaine-Epinephrine) injection       Date Action Dose Route User    1/22/2024 1718 Given 3 mL Infiltration (Right Mouth) Art Cordon, MARIBELS          magnesium  oxide (Mag-Ox) tab 400 mg       Date Action Dose Route User    1/23/2024 0811 Given by Other 400 mg Oral Enrrique Mckee RN    1/22/2024 2132 Given 400 mg Oral Madeline Perry RN          magnesium oxide (Mag-Ox) tab 400 mg       Date Action Dose Route User    1/22/2024 1214 Given 400 mg Oral Enrrique Mckee RN          ondansetron (Zofran) 4 MG/2ML injection       Date Action Dose Route User    1/22/2024 1632 Given 4 mg Intravenous Lucho Bailey MD          oxyCODONE-acetaminophen (Percocet)  MG per tab 1 tablet       Date Action Dose Route User    1/23/2024 0520 Given 1 tablet Oral Madeline Perry RN    1/22/2024 2132 Given 1 tablet Oral Madeline Perry RN    1/22/2024 1330 Given 1 tablet Oral Enrrique Mckee RN          propofol (Diprivan) 10 MG/ML injection       Date Action Dose Route User    1/22/2024 1631 Given 200 mg Intravenous Lucho Bailey MD          sodium chloride 0.9% infusion       Date Action Dose Route User    1/22/2024 1848 New Bag (none) Intravenous Enrrique Mckee RN            Vitals (last day)       Date/Time Temp Pulse Resp BP SpO2 Weight O2 Device O2 Flow Rate (L/min) [facility]    01/23/24 0808 97.8 °F (36.6 °C) 68 19 129/45 93 % -- None (Room air) -- RS    01/23/24 0522 -- -- -- -- -- 175 lb 4.8 oz -- -- DS    01/23/24 0506 98.1 °F (36.7 °C) 77 18 158/65 94 % -- None (Room air) -- ML    01/23/24 0400 -- 76 -- -- -- -- -- -- GT    01/22/24 2134 99.4 °F (37.4 °C) 85 18 150/45 93 % -- None (Room air) -- ML    01/22/24 1900 -- 84 -- -- -- -- -- -- GT    01/22/24 1851 98.1 °F (36.7 °C) 91 22 179/68 95 % -- None (Room air) -- RS    01/22/24 1826 98.9 °F (37.2 °C) 84 22 174/69 100 % -- Nasal cannula 2 L/min MA    01/22/24 1816 -- 86 18 171/67 100 % -- Nasal cannula 2 L/min MA    01/22/24 1806 -- 87 20 161/68 100 % -- Nasal cannula 2 L/min MA    01/22/24 1756 -- 91 20 176/72 100 % -- Nasal cannula 2 L/min MA    01/22/24 1746 -- 96 22 177/71 100 % -- Nasal cannula 2 L/min MA    01/22/24 1736  98.7 °F (37.1 °C) 109 24 183/74 97 % -- Nasal cannula 3 L/min VT    01/22/24 1735 98.2 °F (36.8 °C) 100 14 183/74 98 % -- -- -- AS    01/22/24 1550 99.2 °F (37.3 °C) 69 18 142/53 96 % -- None (Room air) -- CP    01/22/24 1150 -- 77 -- -- -- -- -- -- TL    01/22/24 0807 100 °F (37.8 °C) 78 17 144/89 97 % -- None (Room air) -- RS    01/22/24 0459 99.6 °F (37.6 °C) 72 16 -- 95 % 171 lb 3.2 oz None (Room air) -- ML     1/22 CARDIOLOGY NOTE    Subjective:  Complaints of pain to the right jaw, denies any chest pain, SOB or dizziness  + fever     Objective:  /89 (BP Location: Right arm)   Pulse 78   Temp 100 °F (37.8 °C) (Oral)   Resp 17   Wt 171 lb 3.2 oz (77.7 kg)   SpO2 97%   BMI 29.39 kg/m²      Telemetry: NSR        Intake/Output:     Intake/Output Summary (Last 24 hours) at 1/22/2024 0916  Last data filed at 1/21/2024 2355      Gross per 24 hour   Intake 840 ml   Output --   Net 840 ml          Medications:   ampicillin-sulbactam  3 g Intravenous q6h    magnesium oxide  400 mg Oral BID with meals    dilTIAZem ER  120 mg Oral Daily    gabapentin  300 mg Oral Nightly       sodium chloride 75 mL/hr at 01/22/24 0501         Assessment:     Right facial/ mandibular swelling  - gross right mandible vestibular dental abscess  - pt to go for I& D today - oral surgery following     SVT/AVNRT  - last echo 1/20 with LVEF 55-60%  - on diltiazem ER  - no episode of SVT over night     Moderate aortic stenosis/ mild regurgitation  - systolic murmur present   - denies any SOB or dizziness  - mean systolic gradient of 21mm Hg     History of DVT/PE   - on apixaban 5mg BID     DICK  Creatinine trending down, creatinine of 1.32 today     H/o Leiomyosarcoma        Plan:  - no SVT over night  -Continue diltiazem  ER daily  - restart apixaban post procedure when okay with oral surgeon     1/22 HOSPITALIST NOTE    Chief complaint: CP  Subjective:      Not feeling well mostly because of facial swelling and pain, about the  same  Fatigue  Low grade temps  Chest pain improved, still some dyspnea        Objective:   Blood pressure (!) 179/68, pulse 91, temperature 98.1 °F (36.7 °C), temperature source Axillary, resp. rate 22, weight 171 lb 3.2 oz (77.7 kg), SpO2 95%      Assessment and Plan:      Supraventricular tachycardia, most likely instigated by low volume state and developing dental infection.   Converted to sinus rhythm after IV Cardizem and adenosine in ER.  -seen by cardiology, started on low dose of oral cardizem  -tele, stable  -ECHO EF 55-60%, gr 1 dx, mildly incr RV pressure, LA mod dilated, mod aortic stenosis, pulmonary pressure 46 mmHg  -->outpatient sleep studies     Acute on chronic kidney injury with hypovolemia and dehydration with metabolic acidosis.  Initial Cr~1.99  Improving with IVF, Cr-->1.57-->1.3  -decr rate of IVF-->stop likely in am (NPO today for OR)  -lasix on hold     Dyspnea  VQ low probability  BNP mildly elevated  CT neck with moderate centrilobular emphysema-->no h/o COPD/emphysema-->might benefit from inhaler and outpatient PFT/pulmonary clinic follow up     Weakness, progressive, with falls  Poor appetite  Multiple ER visits  Had thoracic MRI during October'2023 hospitalization showing some stenosis, pt declined surgery     Rt facial swelling due to dental infection  Worsening  S/p unasyn x1 in ER, no imaging  -check CT c/w severe cellulitis, extensive carious dentition with multiple periapical lucencies  -fever yesterday  -cont unasyn  -oral surgery consult, d/w Dr. Cordon -->plans for OR later today,  hold eliquis   -I d/w Dr. Pollock, no cardiac concerns for procedure     H/o retroperitoneal leiomyosarcoma, s/p surgery, and radiation, with persistent disease.  Pulmonary HTN  Aortic stenosis   Chronic LE edema  H/o c. Diff  IGT, A1c~6.2  CKD3   HTN  HL  OA  RA  H/o LLE DVT June 2023, on AC  Amxiety        DVT prophylaxis: AC, now on hold for OR, restart once ok with Dr. Cordon, ? In am, might  need first a prophylactic dose of heparin sq/or lovenox subcutaneous prior restarting eliquis     Dispo: pending progress, PT/OT    1/22 OP NOTE    OPERATIVE REPORT        PREOPERATIVE DIAGNOSIS:    1.       Right mandible vestibular dental abscess.  2.       Infected teeth 28, 29, and 31.  POSTOPERATIVE DIAGNOSIS:    1.       Right mandible vestibular dental abscess.  2.       Infected teeth 28, 29, and 31.  PROCEDURE:    1.       Incision and drainage of dental abscess.  2.       Removal of infected teeth 28, 29, and 31.

## 2024-01-23 NOTE — PROGRESS NOTES
Progress Note  Hannah Boss Patient Status:  Inpatient    1945 MRN [id number]   Location [facility] 3W/SW Attending Gildardo Shrestha MD   Hosp Day # 4 PCP None Pcp     Subjective:  Feeling better than yesterday    Objective:  /45 (BP Location: Right arm)   Pulse 68   Temp 97.8 °F (36.6 °C) (Axillary)   Resp 19   Wt 175 lb 4.8 oz (79.5 kg)   SpO2 93%   BMI 30.09 kg/m²     Telemetry: NSR, short run of PSVT over night      Intake/Output:    Intake/Output Summary (Last 24 hours) at 2024 0826  Last data filed at 2024 0629  Gross per 24 hour   Intake 2610 ml   Output 5 ml   Net 2605 ml       Last 3 Weights   24 0522 175 lb 4.8 oz (79.5 kg)   24 0459 171 lb 3.2 oz (77.7 kg)   24 0427 172 lb (78 kg)   24 0601 178 lb 4.8 oz (80.9 kg)   24 1823 172 lb 1.6 oz (78.1 kg)   23 1319 200 lb (90.7 kg)   23 1002 174 lb 2.6 oz (79 kg)       Labs:  Recent Labs   Lab 24  0804 24  0807 24  0640   * 108* 90   BUN 27* 25* 15   CREATSERUM 1.57* 1.32* 1.21*   EGFRCR 34* 42* 46*   CA 8.8 8.6* 8.4*    141 138   K 5.2* 4.9 5.1   * 114* 110   CO2 19.0* 20.0* 23.0     Recent Labs   Lab 24  0856 24  0807 24  0640   RBC 3.01* 2.99* 2.70*   HGB 8.9* 8.8* 7.9*   HCT 27.1* 26.5* 24.5*   MCV 90.0 88.6 90.7   MCH 29.6 29.4 29.3   MCHC 32.8 33.2 32.2   RDW 14.0 13.7 13.3   NEPRELIM 6.95 6.85 5.52   WBC 8.5 8.6 6.9   .0 182.0 209.0         Recent Labs   Lab 24  1435   TROPHS 15     Lab Results   Component Value Date    INR 1.21 (H) 2024    INR 1.11 10/07/2023    INR 1.08 2022       Diagnostics:       Review of Systems   Respiratory: Negative.     Cardiovascular: Negative.    Musculoskeletal:         Pain to the bilateral lower extremity calf       Physical Exam:    Physical Exam  Vitals reviewed.   Constitutional:       General: She is not in acute distress.     Appearance: She is obese. She is  not ill-appearing.   HENT:      Head: Normocephalic.      Comments: Right facial swelling  Neck:      Vascular: No JVD.   Cardiovascular:      Rate and Rhythm: Normal rate and regular rhythm.      Pulses:           Dorsalis pedis pulses are 1+ on the right side and 1+ on the left side.      Heart sounds: S1 normal and S2 normal. Murmur heard.      Systolic murmur is present with a grade of 3/6.   Abdominal:      General: Abdomen is flat. There is no distension.      Palpations: Abdomen is soft.      Tenderness: There is no abdominal tenderness.   Musculoskeletal:         General: Normal range of motion.      Cervical back: Normal range of motion and neck supple. No rigidity.      Right lower le+ Edema present.      Left lower le+ Edema present.   Neurological:      Mental Status: She is alert.   Psychiatric:         Mood and Affect: Mood normal.         Medications:   ampicillin-sulbactam  3 g Intravenous q6h    magnesium oxide  400 mg Oral BID with meals    dilTIAZem ER  120 mg Oral Daily    gabapentin  300 mg Oral Nightly      sodium chloride 75 mL/hr at 24 8398       Assessment/Plan:    Right facial/ mandibular swelling  - gross right mandible vestibular dental abscess  - s/p I&D of dental abscess and removal of infected teeth 28, 29, & 31     SVT/AVNRT  - last echo  with LVEF 55-60%  - on diltiazem ER  - small run of PSVT over night, denies any complaints      Moderate aortic stenosis/ mild regurgitation  - systolic murmur present   - denies any SOB or dizziness  - mean systolic gradient of 21mm Hg     History of DVT/PE   - on apixaban 5mg BID     DICK  Creatinine trending down, creatinine of 1.21 today     H/o Leiomyosarcoma        Plan:  -increase the diltiazem ER to 180mg daily for PSVT  - complaints of pain to the bilateral lower extremities, will get venous ultrasound to r/o DVT. Even though pt has low probability with being on xarelto.   - restart apixaban post procedure when okay with  oral surgeon       ELISA Matias  [facility] [place]  1/23/2024  8:26 AM      I saw and examined the patient agree with attached findings.  Very brief episodes of paroxysmal SVT overnight, asymptomatic.  Patient otherwise feels well today, feels lower extremity edema has improved.  Continue diltiazem, resume Eliquis when okay with oral surgeon.  We will sign off, please contact us any questions.    Abdirahman Alejandro MD  [facility] [place]

## 2024-01-23 NOTE — PLAN OF CARE
Patient is Aox4. Sutures in mouth intact, swelling noted to jaw. Reports pain in R jaw, relief with PRN percocet. On room air. Standby assist with walker. IV fluids running. IV antibiotics. Discharge home with [facility] when medically cleared.     Problem: PAIN - ADULT  Goal: Verbalizes/displays adequate comfort level or patient's stated pain goal  Description: INTERVENTIONS:  - Encourage pt to monitor pain and request assistance  - Assess pain using appropriate pain scale  - Administer analgesics based on type and severity of pain and evaluate response  - Implement non-pharmacological measures as appropriate and evaluate response  - Consider cultural and social influences on pain and pain management  - Manage/alleviate anxiety  - Utilize distraction and/or relaxation techniques  - Monitor for opioid side effects  - Notify MD/LIP if interventions unsuccessful or patient reports new pain  - Anticipate increased pain with activity and pre-medicate as appropriate  Outcome: Progressing     Problem: RISK FOR INFECTION - ADULT  Goal: Absence of fever/infection during anticipated neutropenic period  Description: INTERVENTIONS  - Monitor WBC  - Administer growth factors as ordered  - Implement neutropenic guidelines  Outcome: Progressing

## 2024-01-23 NOTE — PAYOR COMM NOTE
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 stenosis, pt declined surgery     Rt facial sweling, pt attributes to dental infection  Worsening  S/p unasyn x1 in ER, no imaging  -check CT stat c/w severe cellulitis, extensive carious dentition with multiple periapical lucencies  -fever today  -cont unasyn  -oral surgery consult, d/w Dr. Cordon -->needs OR, will hold eliquis, plans for procedure tomorrow afternoon  -I d/w Dr. Pollock, no cardiac concerns for procedure     H/o retroperitoneal leiomyosarcoma, s/p surgery, and radiation, with persistent disease.  Pulmonary HTN  Aortic stenosis   Chronic LE edema  H/o c. Diff  IGT, A1c~6.2  CKD3   HTN  HL  OA  RA  H/o LLE DVT June 2023, on AC  Amxiety        DVT prophylaxis: AC     Dispo: pending progress, PT/OT     Lab Results   Component Value Date     WBC 8.6 01/21/2024     HGB 8.8 (L) 01/21/2024     HCT 26.5 (L) 01/21/2024     .0 01/21/2024     CREATSERUM 1.32 (H) 01/21/2024     BUN 25 (H) 01/21/2024      01/21/2024     K 4.9 01/21/2024      (H) 01/21/2024     CO2 20.0 (L) 01/21/2024      (H) 01/21/2024     CA 8.6 (L) 01/21/2024     ALB 3.5 01/21/2024                 XR PANORAMIC OF JAWS (CPT=70355)     Result Date: 1/21/2024  CONCLUSION:           Extensive carious dentition with out acute fracture or dislocation.    Dictated by (CST): Rolan Dyer MD on 1/21/2024 at 10:41 AM     Finalized by (CST): Rolan Dyer MD on 1/21/2024 at 10:43 AM           CT SOFT TISSUE OF NECK (CPT=70490)     Result Date: 1/20/2024  CONCLUSION:          Limited exam due to lack of IV contrast.  Severe right mandibular and facial cellulitis without abscess.  Extensive carious dentition with multiple periapical lucencies and destruction of multiple teeth.  The cellulitis may be odontogenic in origin.  No osteomyelitis  Moderate centrilobular emphysema   Dictated by (CST): Rolan Dyer MD on 1/20/2024 at 6:14     1/21 DENTAL CONSULT NOTE  Reason for Consultation: Dental Abscess        Subjective:   Hannah Boss is a 78 year old female complaining of chest pain for two days.  She is being evaluated by Cardiology to rule out MI.  She also has history of retroperitoneal sarcoma.  She reports several day history of right facial pain and swelling several days prior to admission.  She reports poor dental health and has not seen a dentist in many years.  Denies fever and chills.  Slightly painful swallowing solids and liquids.         Assessment & Plan:   A: 78 year old female admitted for chest pain and rule out MI.  She has gross right mandible vestibular dental abscess and multiple infected teeth.  Tmax 101.1 today.  No gross elevation of WBC at this time.     P:     Continue IV antibiotics - Unasyn  Pain management as needed  NPO after breakfast tomorrow  Consent to be signed  Treatment options given.  Advised that Incision and Drainage of Dental Abscess and Removal of infected teeth needed.  Patient had morning dose of Eliquis today.  Discussed management of Eliquis with Dr. Pagan.  Patient taking Eliquis for DVT.  Will hold Eliquis for now and schedule for I&D tomorrow afternoon, 1/22/24.    1/22 CARDIOLOGY NOTE    Subjective:  Complaints of pain to the right jaw, denies any  chest pain, SOB or dizziness  + fever     Objective:  /89 (BP Location: Right arm)   Pulse 78   Temp 100 °F (37.8 °C) (Oral)   Resp 17   Wt 171 lb 3.2 oz (77.7 kg)   SpO2 97%   BMI 29.39 kg/m²      Telemetry: NSR        Intake/Output:     Intake/Output Summary (Last 24 hours) at 2024 0916  Last data filed at 2024 2355      Gross per 24 hour   Intake 840 ml   Output --   Net 840 ml      Recent Labs   Lab 24  1435 24  0804 24  0807   * 106* 108*   BUN 40* 27* 25*   CREATSERUM 1.99* 1.57* 1.32*   EGFRCR 26* 34* 42*   CA 9.7 8.8 8.6*    142 141   K 5.0 5.2* 4.9   * 116* 114*   CO2 20.0* 19.0* 20.0*            Recent Labs   Lab 24  1434 24  0856 24  0807   RBC 3.78* 3.01* 2.99*   HGB 11.2* 8.9* 8.8*   HCT 34.7* 27.1* 26.5*   MCV 91.8 90.0 88.6   MCH 29.6 29.6 29.4   MCHC 32.3 32.8 33.2   RDW 14.0 14.0 13.7   NEPRELIM 5.02 6.95 6.85   WBC 8.1 8.5 8.6   .0 208.0 182.0       Right lower le+ Edema present.      Left lower le+ Edema present.       Medications:   ampicillin-sulbactam  3 g Intravenous q6h    magnesium oxide  400 mg Oral BID with meals    dilTIAZem ER  120 mg Oral Daily    gabapentin  300 mg Oral Nightly       sodium chloride 75 mL/hr at 24 0501         Assessment:     Right facial/ mandibular swelling  - gross right mandible vestibular dental abscess  - pt to go for I& D today - oral surgery following     SVT/AVNRT  - last echo  with LVEF 55-60%  - on diltiazem ER  - no episode of SVT over night     Moderate aortic stenosis/ mild regurgitation  - systolic murmur present   - denies any SOB or dizziness  - mean systolic gradient of 21mm Hg     History of DVT/PE   - on apixaban 5mg BID     DICK  Creatinine trending down, creatinine of 1.32 today     H/o Leiomyosarcoma        Plan:  - no SVT over night  -Continue diltiazem  ER daily  - restart apixaban post procedure when okay with oral surgeon

## 2024-01-24 VITALS
DIASTOLIC BLOOD PRESSURE: 48 MMHG | HEART RATE: 63 BPM | OXYGEN SATURATION: 94 % | BODY MASS INDEX: 31 KG/M2 | WEIGHT: 179 LBS | TEMPERATURE: 99 F | RESPIRATION RATE: 16 BRPM | SYSTOLIC BLOOD PRESSURE: 128 MMHG

## 2024-01-24 LAB
GLUCOSE BLDC GLUCOMTR-MCNC: 116 MG/DL (ref 70–99)
MAGNESIUM SERPL-MCNC: 2 MG/DL (ref 1.6–2.6)

## 2024-01-24 PROCEDURE — 99239 HOSP IP/OBS DSCHRG MGMT >30: CPT | Performed by: HOSPITALIST

## 2024-01-24 RX ORDER — DILTIAZEM HYDROCHLORIDE 120 MG/1
120 CAPSULE, COATED, EXTENDED RELEASE ORAL DAILY
Qty: 30 CAPSULE | Refills: 0 | Status: SHIPPED | OUTPATIENT
Start: 2024-01-24 | End: 2024-02-23

## 2024-01-24 RX ORDER — AMOXICILLIN AND CLAVULANATE POTASSIUM 875; 125 MG/1; MG/1
1 TABLET, FILM COATED ORAL 2 TIMES DAILY
Qty: 28 TABLET | Refills: 0 | Status: SHIPPED | OUTPATIENT
Start: 2024-01-24 | End: 2024-02-07

## 2024-01-24 RX ORDER — OXYCODONE AND ACETAMINOPHEN 10; 325 MG/1; MG/1
1 TABLET ORAL EVERY 4 HOURS PRN
Qty: 20 TABLET | Refills: 0 | Status: SHIPPED | OUTPATIENT
Start: 2024-01-24 | End: 2024-01-25

## 2024-01-24 NOTE — PAYOR COMM NOTE
--------------  DISCHARGE REVIEW    Payor: LAUREEN MA [facility] [place]  Subscriber #:  [id number]  Authorization Number: [id number]    Admit date: 1/19/24  Admit time:   6:13 PM  Discharge Date: 1/24/2024 11:04 AM     Admitting Physician: Erich Pascual MD  Attending Physician:  No att. providers found  Primary Care Physician: Pcp, None       Discharge Summary Notes    No notes of this type exist for this encounter.         REVIEWER COMMENTS

## 2024-01-24 NOTE — PROGRESS NOTES
[facility]    Progress Note    Hannah Boss Patient Status:  Inpatient    1945 MRN [id number]   Location [facility] 3W/SW Attending Gildardo Shrestha MD   Hosp Day # 4 PCP None Pcp     Chief Complaint: Dental Abscess    Subjective:     HENT:  Positive for dental problem (Fells better today.  Minimal pain.  Tolerates diet.  No problem swallowing.). Negative for facial swelling.        Objective:   Blood pressure 141/59, pulse 70, temperature 97.9 °F (36.6 °C), temperature source Axillary, resp. rate 20, weight 175 lb 4.8 oz (79.5 kg), SpO2 94%, not currently breastfeeding.  Physical Exam  HENT:      Head:      Jaw: Trismus (Slightly limited opening.  Better today.) and swelling (Right mandible swelling less today.  Not very tender.) present.      Mouth/Throat:      Dentition: Gingival swelling and dental abscesses (Intra oral wounds hemostatic.  Sutures intact.) present.         Results:   Lab Results   Component Value Date    WBC 6.9 2024    HGB 7.9 (L) 2024    HCT 24.5 (L) 2024    .0 2024    CREATSERUM 1.21 (H) 2024    BUN 15 2024     2024    K 5.1 2024     2024    CO2 23.0 2024    GLU 90 2024    CA 8.4 (L) 2024    ALB 3.3 2024    ALKPHO 183 (H) 2024    BILT 0.3 2024    TP 7.7 2024    AST 35 (H) 2024    ALT 34 2024    PTT 32.5 2024    INR 1.21 (H) 2024    TSH 0.615 10/07/2023    LIP 16 10/06/2023    DDIMER 1.13 (H) 2023    MG 1.8 2024    PHOS 2.8 2024    TROP 0.398 (HH) 2021    TROPHS 15 2024     (H) 2023    B12 399 2022       US VENOUS DOPPLER LEG BILAT - DIAG IMG (CPT=93970)    Result Date: 2024  CONCLUSION:   No evidence of right or left lower extremity deep venous thrombosis.  Please note that the left peroneal veins are not well assessed.   elm-remote  Dictated by (CST): Omega Ramirez MD  on 1/23/2024 at 1:09 PM     Finalized by (CST): Omega Ramirez MD on 1/23/2024 at 1:10 PM               Assessment & Plan:     Chest pain  Improved.  Cardiology following      Acute renal failure (HCC)  Resolved      SVT (supraventricular tachycardia)  Stable      Dental infection  A:  Patient s/p Incision and Drainage of Dental Abscess and Removal of Multiple Infected Teeth POD 1.  Patient doing well.  She is afebrile and is tolerating diet well.      P:    Continue IV antibiotics - Unasyn  Follow Cultures  Advance diet as tolerated  Pain management as needed  Resume Eliquis in AM.  Please observe afterwards for few hours. If no bleeding, then can discharge tomorrow.  Recommend Augmentin PO for 14 days and pain medication.  Patient to call my office for follow up in 1 week.  Thank you.              LANE CHOUDHURY DDS  1/23/2024

## 2024-01-24 NOTE — CM/SW NOTE
01/24/24 0800   Discharge disposition   Expected discharge disposition Home-Health   Post Acute Care Provider   ([facility])   Discharge transportation Private car       Per chart, pt has DC order for today.    [facility] notified via [employer] messenger.    Pt is cleared from SW/CM stand point.       PLAN: Home w/ [facility]         CATINA Valle, LSW [id number]

## 2024-01-24 NOTE — PROGRESS NOTES
Discharge instructions reviewed with patient. Patient verbalized understanding of instructions.Tele and PIV removed. Patient escorted to private vehicle.     Yulisa ACOSTA RN

## 2024-01-24 NOTE — PLAN OF CARE
Received patient at 0100. She is alert and oriented x4. Swelling to R face. Norco administered for pain management with good relief. On IVF 0.9 NS @ 50ml/hr. Receiving Unasyn for abx. Plan for home with [facility] pending medical clearance.     Problem: Patient Centered Care  Goal: Patient preferences are identified and integrated in the patient's plan of care  Description: Interventions:  - What would you like us to know as we care for you? From home with son  - Provide timely, complete, and accurate information to patient/family  - Incorporate patient and family knowledge, values, beliefs, and cultural backgrounds into the planning and delivery of care  - Encourage patient/family to participate in care and decision-making at the level they choose  - Honor patient and family perspectives and choices  Outcome: Progressing     Problem: RISK FOR INFECTION - ADULT  Goal: Absence of fever/infection during anticipated neutropenic period  Description: INTERVENTIONS  - Monitor WBC  - Administer growth factors as ordered  - Implement neutropenic guidelines  Outcome: Progressing     Problem: PAIN - ADULT  Goal: Verbalizes/displays adequate comfort level or patient's stated pain goal  Description: INTERVENTIONS:  - Encourage pt to monitor pain and request assistance  - Assess pain using appropriate pain scale  - Administer analgesics based on type and severity of pain and evaluate response  - Implement non-pharmacological measures as appropriate and evaluate response  - Consider cultural and social influences on pain and pain management  - Manage/alleviate anxiety  - Utilize distraction and/or relaxation techniques  - Monitor for opioid side effects  - Notify MD/LIP if interventions unsuccessful or patient reports new pain  - Anticipate increased pain with activity and pre-medicate as appropriate  Outcome: Progressing

## 2024-01-24 NOTE — PLAN OF CARE
Problem: Patient Centered Care  Goal: Patient preferences are identified and integrated in the patient's plan of care  Description: Interventions:  - What would you like us to know as we care for you? From home with son  - Provide timely, complete, and accurate information to patient/family  - Incorporate patient and family knowledge, values, beliefs, and cultural backgrounds into the planning and delivery of care  - Encourage patient/family to participate in care and decision-making at the level they choose  - Honor patient and family perspectives and choices  Outcome: Adequate for Discharge     Problem: Patient/Family Goals  Goal: Patient/Family Long Term Goal  Description: Patient's Long Term Goal: To go home    Interventions:  - Follow medical regimen  - See additional Care Plan goals for specific interventions  Outcome: Adequate for Discharge  Goal: Patient/Family Short Term Goal  Description: Patient's Short Term Goal: To not have Chest pain    Interventions:   - Follow medical regimen  - See additional Care Plan goals for specific interventions  Outcome: Adequate for Discharge     Problem: RISK FOR INFECTION - ADULT  Goal: Absence of fever/infection during anticipated neutropenic period  Description: INTERVENTIONS  - Monitor WBC  - Administer growth factors as ordered  - Implement neutropenic guidelines  Outcome: Adequate for Discharge     Problem: PAIN - ADULT  Goal: Verbalizes/displays adequate comfort level or patient's stated pain goal  Description: INTERVENTIONS:  - Encourage pt to monitor pain and request assistance  - Assess pain using appropriate pain scale  - Administer analgesics based on type and severity of pain and evaluate response  - Implement non-pharmacological measures as appropriate and evaluate response  - Consider cultural and social influences on pain and pain management  - Manage/alleviate anxiety  - Utilize distraction and/or relaxation techniques  - Monitor for opioid side  effects  - Notify MD/LIP if interventions unsuccessful or patient reports new pain  - Anticipate increased pain with activity and pre-medicate as appropriate  Outcome: Adequate for Discharge

## 2024-01-24 NOTE — PROGRESS NOTES
[facility]    Progress Note    Hannah Boss Patient Status:  Inpatient    1945 MRN [id number]   Location [facility] 3W/SW Attending Gildardo Shrestha MD   Hosp Day # 5 PCP None Pcp     Chief Complaint: Dental Abscess    Subjective:     HENT:  Positive for dental problem and facial swelling (Doing well.  Minimal to no pain. Tolerates diet well.).        Objective:   Blood pressure 128/48, pulse 63, temperature 98.7 °F (37.1 °C), temperature source Oral, resp. rate 16, weight 179 lb (81.2 kg), SpO2 94%, not currently breastfeeding.  Physical Exam  HENT:      Head:      Jaw: Trismus (Mouth opening improved since yesterday,) and swelling (Right mandible swelling decreased.  Not grossly tender.) present.      Mouth/Throat:      Dentition: Gingival swelling and dental abscesses (Inta oral wound heomstatic.  Sutures intact.) present.         Results:   Lab Results   Component Value Date    WBC 6.9 2024    HGB 7.9 (L) 2024    HCT 24.5 (L) 2024    .0 2024    CREATSERUM 1.21 (H) 2024    BUN 15 2024     2024    K 5.1 2024     2024    CO2 23.0 2024    GLU 90 2024    CA 8.4 (L) 2024    ALB 3.3 2024    ALKPHO 183 (H) 2024    BILT 0.3 2024    TP 7.7 2024    AST 35 (H) 2024    ALT 34 2024    PTT 32.5 2024    INR 1.21 (H) 2024    TSH 0.615 10/07/2023    LIP 16 10/06/2023    DDIMER 1.13 (H) 2023    MG 2.0 2024    PHOS 2.8 2024    TROP 0.398 (HH) 2021    TROPHS 15 2024     (H) 2023    B12 399 2022       US VENOUS DOPPLER LEG BILAT - DIAG IMG (CPT=93970)    Result Date: 2024  CONCLUSION:   No evidence of right or left lower extremity deep venous thrombosis.  Please note that the left peroneal veins are not well assessed.   elm-remote  Dictated by (CST): Omega Ramirez MD on 2024 at 1:09 PM     Finalized by  (CST): Omega Ramirez MD on 1/23/2024 at 1:10 PM               Assessment & Plan:     Chest pain  Per Cardiology.  Stable      Acute renal failure (HCC)  Resolved      SVT (supraventricular tachycardia)  Per Cardiology.  Stable      Dental infection  A:  Pateint s/p Incision and Drainage of Dental Abscess and Removal of Multiple Infected Teeth.  POD 2.  Patient doing well.  Minimal pain.  Afebrile and tolerates diet well.    P:     Continue IV antibiotics - Unasuyn  Follow Cultures  Pain management as needed  Advance diet as tolerated  Eliquis take this AM.  Patient doing well.  No gross bleeding.  Discharge to home per Dr. Shrestha.  Please consider Augmentin 875mg PO bid x 14 days and pain medication.  Patient to call office for follow up in 1 week. Thank you.              LANE CHOUDHURY DDS  1/24/2024

## 2024-01-24 NOTE — PLAN OF CARE
Pt A/Ox4, stand-by assist. IV fluids and Unasyn continued. Plan for home health with [facility] once medically cleared.     Problem: Patient Centered Care  Goal: Patient preferences are identified and integrated in the patient's plan of care  Description: Interventions:  - What would you like us to know as we care for you? From home with son  - Provide timely, complete, and accurate information to patient/family  - Incorporate patient and family knowledge, values, beliefs, and cultural backgrounds into the planning and delivery of care  - Encourage patient/family to participate in care and decision-making at the level they choose  - Honor patient and family perspectives and choices  Outcome: Progressing     Problem: Patient/Family Goals  Goal: Patient/Family Long Term Goal  Description: Patient's Long Term Goal: To go home    Interventions:  - Follow medical regimen  - See additional Care Plan goals for specific interventions  Outcome: Progressing  Goal: Patient/Family Short Term Goal  Description: Patient's Short Term Goal: To not have Chest pain    Interventions:   - Follow medical regimen  - See additional Care Plan goals for specific interventions  Outcome: Progressing     Problem: RISK FOR INFECTION - ADULT  Goal: Absence of fever/infection during anticipated neutropenic period  Description: INTERVENTIONS  - Monitor WBC  - Administer growth factors as ordered  - Implement neutropenic guidelines  Outcome: Progressing     Problem: PAIN - ADULT  Goal: Verbalizes/displays adequate comfort level or patient's stated pain goal  Description: INTERVENTIONS:  - Encourage pt to monitor pain and request assistance  - Assess pain using appropriate pain scale  - Administer analgesics based on type and severity of pain and evaluate response  - Implement non-pharmacological measures as appropriate and evaluate response  - Consider cultural and social influences on pain and pain management  - Manage/alleviate anxiety  -  Utilize distraction and/or relaxation techniques  - Monitor for opioid side effects  - Notify MD/LIP if interventions unsuccessful or patient reports new pain  - Anticipate increased pain with activity and pre-medicate as appropriate  Outcome: Progressing

## 2024-01-25 ENCOUNTER — PATIENT OUTREACH (OUTPATIENT)
Dept: CASE MANAGEMENT | Age: 79
End: 2024-01-25

## 2024-01-25 DIAGNOSIS — M47.816 LUMBAR FACET ARTHROPATHY: Primary | ICD-10-CM

## 2024-01-25 DIAGNOSIS — K04.7 DENTAL ABSCESS: ICD-10-CM

## 2024-01-25 NOTE — PROGRESS NOTES
TCM chart review.  No TCM as patient follows with outside [facility] PCP.  Encounter closing.

## 2024-01-25 NOTE — TELEPHONE ENCOUNTER
Previous script was discontinued when pt was admitted to [facility]  This is replacement for that script

## 2024-01-26 RX ORDER — OXYCODONE AND ACETAMINOPHEN 10; 325 MG/1; MG/1
1 TABLET ORAL EVERY 4 HOURS PRN
Qty: 180 TABLET | Refills: 0 | Status: SHIPPED | OUTPATIENT
Start: 2024-02-05 | End: 2024-03-06

## 2024-02-29 ENCOUNTER — OFFICE VISIT (OUTPATIENT)
Dept: PAIN CLINIC | Facility: HOSPITAL | Age: 79
End: 2024-02-29
Attending: ANESTHESIOLOGY
Payer: MEDICARE

## 2024-02-29 VITALS — OXYGEN SATURATION: 96 % | SYSTOLIC BLOOD PRESSURE: 155 MMHG | DIASTOLIC BLOOD PRESSURE: 73 MMHG | HEART RATE: 69 BPM

## 2024-02-29 DIAGNOSIS — M15.9 PRIMARY OSTEOARTHRITIS INVOLVING MULTIPLE JOINTS: ICD-10-CM

## 2024-02-29 DIAGNOSIS — R29.898 WEAKNESS OF BOTH LOWER EXTREMITIES: ICD-10-CM

## 2024-02-29 DIAGNOSIS — G89.3 CANCER RELATED PAIN: Primary | ICD-10-CM

## 2024-02-29 DIAGNOSIS — M54.41 CHRONIC BILATERAL LOW BACK PAIN WITH BILATERAL SCIATICA: ICD-10-CM

## 2024-02-29 DIAGNOSIS — M79.671 PAIN IN BOTH FEET: ICD-10-CM

## 2024-02-29 DIAGNOSIS — M47.816 LUMBAR SPONDYLOSIS: ICD-10-CM

## 2024-02-29 DIAGNOSIS — G89.29 CHRONIC BILATERAL LOW BACK PAIN WITH BILATERAL SCIATICA: ICD-10-CM

## 2024-02-29 DIAGNOSIS — M79.672 PAIN IN BOTH FEET: ICD-10-CM

## 2024-02-29 DIAGNOSIS — M48.061 SPINAL STENOSIS OF LUMBAR REGION AT MULTIPLE LEVELS: ICD-10-CM

## 2024-02-29 DIAGNOSIS — C49.9 LEIOMYOSARCOMA (HCC): ICD-10-CM

## 2024-02-29 DIAGNOSIS — M54.42 CHRONIC BILATERAL LOW BACK PAIN WITH BILATERAL SCIATICA: ICD-10-CM

## 2024-02-29 DIAGNOSIS — K04.7 DENTAL ABSCESS: ICD-10-CM

## 2024-02-29 PROCEDURE — 99211 OFF/OP EST MAY X REQ PHY/QHP: CPT

## 2024-02-29 RX ORDER — DIAZEPAM 10 MG/1
10 TABLET ORAL EVERY 8 HOURS PRN
Qty: 90 TABLET | Refills: 2 | Status: SHIPPED | OUTPATIENT
Start: 2024-04-04 | End: 2024-03-10

## 2024-02-29 RX ORDER — OXYCODONE AND ACETAMINOPHEN 10; 325 MG/1; MG/1
1 TABLET ORAL EVERY 4 HOURS PRN
Qty: 180 TABLET | Refills: 0 | Status: SHIPPED | OUTPATIENT
Start: 2024-04-04 | End: 2024-03-10

## 2024-02-29 RX ORDER — OXYCODONE AND ACETAMINOPHEN 10; 325 MG/1; MG/1
1 TABLET ORAL EVERY 4 HOURS PRN
Qty: 180 TABLET | Refills: 0 | Status: SHIPPED | OUTPATIENT
Start: 2024-03-05 | End: 2024-03-10
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
## 2024-02-29 NOTE — PROGRESS NOTES
PT presents to the [facility] in ; son in attendance.  Pt reports recent fall and \"is hurting everywhere\". 9/10 pain today.   Dr. Contreras saw PT for med eval. See notes for POC.

## 2024-03-05 ENCOUNTER — TELEPHONE (OUTPATIENT)
Dept: NEPHROLOGY | Facility: CLINIC | Age: 79
End: 2024-03-05

## 2024-03-05 NOTE — TELEPHONE ENCOUNTER
[facility], kidney speciality calling to inform our office that referral will be faxed today. Patient request that our scheduling office contact the patient to assist with scheduling follow up. Informed that I will contact patient, but we need to ensure referral received, last faxed on 2/15 and provided cale [phone] fax to resend.  Any concerns of fax not being received please contact [phone],thanks.

## 2024-03-08 ENCOUNTER — APPOINTMENT (OUTPATIENT)
Dept: CT IMAGING | Facility: HOSPITAL | Age: 79
End: 2024-03-08
Payer: MEDICARE

## 2024-03-08 ENCOUNTER — HOSPITAL ENCOUNTER (INPATIENT)
Facility: HOSPITAL | Age: 79
LOS: 2 days | Discharge: HOME HEALTH | End: 2024-03-10
Attending: EMERGENCY MEDICINE | Admitting: INTERNAL MEDICINE
Payer: MEDICARE

## 2024-03-08 ENCOUNTER — APPOINTMENT (OUTPATIENT)
Dept: CT IMAGING | Facility: HOSPITAL | Age: 79
End: 2024-03-08
Attending: STUDENT IN AN ORGANIZED HEALTH CARE EDUCATION/TRAINING PROGRAM
Payer: MEDICARE

## 2024-03-08 ENCOUNTER — APPOINTMENT (OUTPATIENT)
Dept: MRI IMAGING | Facility: HOSPITAL | Age: 79
End: 2024-03-08
Attending: EMERGENCY MEDICINE
Payer: MEDICARE

## 2024-03-08 DIAGNOSIS — R41.82 ALTERED MENTAL STATUS, UNSPECIFIED ALTERED MENTAL STATUS TYPE: Primary | ICD-10-CM

## 2024-03-08 PROBLEM — Z86.718 HISTORY OF DVT (DEEP VEIN THROMBOSIS): Status: RESOLVED | Noted: 2024-02-06 | Resolved: 2024-03-08

## 2024-03-08 PROBLEM — G82.20 PARAPARESIS OF BOTH LOWER LIMBS (HCC): Status: ACTIVE | Noted: 2024-03-08

## 2024-03-08 PROBLEM — D64.9 ANEMIA: Status: ACTIVE | Noted: 2024-03-08

## 2024-03-08 PROBLEM — E11.9 DIABETES MELLITUS (HCC): Status: RESOLVED | Noted: 2023-01-11 | Resolved: 2024-03-08

## 2024-03-08 PROBLEM — R60.0 EDEMA OF EXTREMITIES: Status: ACTIVE | Noted: 2020-09-05

## 2024-03-08 LAB
ALBUMIN SERPL-MCNC: 3.9 G/DL (ref 3.2–4.8)
ALBUMIN/GLOB SERPL: 1.4 {RATIO} (ref 1–2)
ALP LIVER SERPL-CCNC: 219 U/L
ALT SERPL-CCNC: 29 U/L
ANION GAP SERPL CALC-SCNC: 2 MMOL/L (ref 0–18)
APTT PPP: 35 SECONDS (ref 23.3–35.6)
AST SERPL-CCNC: 45 U/L (ref ?–34)
ATRIAL RATE: 82 BPM
BASOPHILS # BLD AUTO: 0.02 X10(3) UL (ref 0–0.2)
BASOPHILS NFR BLD AUTO: 0.2 %
BILIRUB SERPL-MCNC: 0.3 MG/DL (ref 0.2–1.1)
BILIRUB UR QL: NEGATIVE
BUN BLD-MCNC: 25 MG/DL (ref 9–23)
BUN/CREAT SERPL: 16.2 (ref 10–20)
CALCIUM BLD-MCNC: 9 MG/DL (ref 8.7–10.4)
CHLORIDE SERPL-SCNC: 114 MMOL/L (ref 98–112)
CLARITY UR: CLEAR
CO2 SERPL-SCNC: 24 MMOL/L (ref 21–32)
CREAT BLD-MCNC: 1.54 MG/DL
DEPRECATED RDW RBC AUTO: 48.2 FL (ref 35.1–46.3)
EGFRCR SERPLBLD CKD-EPI 2021: 35 ML/MIN/1.73M2 (ref 60–?)
EOSINOPHIL # BLD AUTO: 0.01 X10(3) UL (ref 0–0.7)
EOSINOPHIL NFR BLD AUTO: 0.1 %
ERYTHROCYTE [DISTWIDTH] IN BLOOD BY AUTOMATED COUNT: 14.2 % (ref 11–15)
GLOBULIN PLAS-MCNC: 2.8 G/DL (ref 2.8–4.4)
GLUCOSE BLD-MCNC: 150 MG/DL (ref 70–99)
GLUCOSE BLDC GLUCOMTR-MCNC: 135 MG/DL (ref 70–99)
GLUCOSE BLDC GLUCOMTR-MCNC: 191 MG/DL (ref 70–99)
GLUCOSE BLDC GLUCOMTR-MCNC: 98 MG/DL (ref 70–99)
GLUCOSE UR-MCNC: NORMAL MG/DL
HCT VFR BLD AUTO: 26.9 %
HGB BLD-MCNC: 8.8 G/DL
IMM GRANULOCYTES # BLD AUTO: 0.02 X10(3) UL (ref 0–1)
IMM GRANULOCYTES NFR BLD: 0.2 %
INR BLD: 1.3 (ref 0.8–1.2)
KETONES UR-MCNC: NEGATIVE MG/DL
LEUKOCYTE ESTERASE UR QL STRIP.AUTO: NEGATIVE
LYMPHOCYTES # BLD AUTO: 0.48 X10(3) UL (ref 1–4)
LYMPHOCYTES NFR BLD AUTO: 6 %
MCH RBC QN AUTO: 30 PG (ref 26–34)
MCHC RBC AUTO-ENTMCNC: 32.7 G/DL (ref 31–37)
MCV RBC AUTO: 91.8 FL
MONOCYTES # BLD AUTO: 0.16 X10(3) UL (ref 0.1–1)
MONOCYTES NFR BLD AUTO: 2 %
NEUTROPHILS # BLD AUTO: 7.32 X10 (3) UL (ref 1.5–7.7)
NEUTROPHILS # BLD AUTO: 7.32 X10(3) UL (ref 1.5–7.7)
NEUTROPHILS NFR BLD AUTO: 91.5 %
NITRITE UR QL STRIP.AUTO: NEGATIVE
OSMOLALITY SERPL CALC.SUM OF ELEC: 297 MOSM/KG (ref 275–295)
P AXIS: 81 DEGREES
P-R INTERVAL: 192 MS
PH UR: 5.5 [PH] (ref 5–8)
PLATELET # BLD AUTO: 191 10(3)UL (ref 150–450)
POTASSIUM SERPL-SCNC: 5.2 MMOL/L (ref 3.5–5.1)
PROT SERPL-MCNC: 6.7 G/DL (ref 5.7–8.2)
PROT UR-MCNC: 20 MG/DL
PROTHROMBIN TIME: 17 SECONDS (ref 11.6–14.8)
Q-T INTERVAL: 400 MS
QRS DURATION: 66 MS
QTC CALCULATION (BEZET): 467 MS
R AXIS: 64 DEGREES
RBC # BLD AUTO: 2.93 X10(6)UL
SODIUM SERPL-SCNC: 140 MMOL/L (ref 136–145)
SP GR UR STRIP: 1.02 (ref 1–1.03)
T AXIS: 88 DEGREES
TROPONIN I SERPL HS-MCNC: 11 NG/L
UROBILINOGEN UR STRIP-ACNC: NORMAL
VENTRICULAR RATE: 82 BPM
WBC # BLD AUTO: 8 X10(3) UL (ref 4–11)

## 2024-03-08 PROCEDURE — 99223 1ST HOSP IP/OBS HIGH 75: CPT | Performed by: OTHER

## 2024-03-08 PROCEDURE — 70496 CT ANGIOGRAPHY HEAD: CPT | Performed by: STUDENT IN AN ORGANIZED HEALTH CARE EDUCATION/TRAINING PROGRAM

## 2024-03-08 PROCEDURE — 70498 CT ANGIOGRAPHY NECK: CPT | Performed by: STUDENT IN AN ORGANIZED HEALTH CARE EDUCATION/TRAINING PROGRAM

## 2024-03-08 PROCEDURE — 70551 MRI BRAIN STEM W/O DYE: CPT | Performed by: EMERGENCY MEDICINE

## 2024-03-08 PROCEDURE — 99223 1ST HOSP IP/OBS HIGH 75: CPT | Performed by: INTERNAL MEDICINE

## 2024-03-08 PROCEDURE — 95816 EEG AWAKE AND DROWSY: CPT | Performed by: OTHER

## 2024-03-08 PROCEDURE — 70450 CT HEAD/BRAIN W/O DYE: CPT | Performed by: EMERGENCY MEDICINE

## 2024-03-08 RX ORDER — ATORVASTATIN CALCIUM 80 MG/1
80 TABLET, FILM COATED ORAL NIGHTLY
Status: DISCONTINUED | OUTPATIENT
Start: 2024-03-08 | End: 2024-03-09

## 2024-03-08 RX ORDER — NICOTINE POLACRILEX 4 MG
15 LOZENGE BUCCAL
Status: DISCONTINUED | OUTPATIENT
Start: 2024-03-08 | End: 2024-03-09

## 2024-03-08 RX ORDER — ENOXAPARIN SODIUM 100 MG/ML
40 INJECTION SUBCUTANEOUS DAILY
Status: DISCONTINUED | OUTPATIENT
Start: 2024-03-08 | End: 2024-03-08

## 2024-03-08 RX ORDER — NICOTINE POLACRILEX 4 MG
30 LOZENGE BUCCAL
Status: DISCONTINUED | OUTPATIENT
Start: 2024-03-08 | End: 2024-03-09

## 2024-03-08 RX ORDER — ASPIRIN 325 MG
325 TABLET ORAL DAILY
Status: DISCONTINUED | OUTPATIENT
Start: 2024-03-08 | End: 2024-03-09

## 2024-03-08 RX ORDER — ACETAMINOPHEN 325 MG/1
650 TABLET ORAL EVERY 4 HOURS PRN
Status: DISCONTINUED | OUTPATIENT
Start: 2024-03-08 | End: 2024-03-10

## 2024-03-08 RX ORDER — HYDRALAZINE HYDROCHLORIDE 20 MG/ML
10 INJECTION INTRAMUSCULAR; INTRAVENOUS EVERY 2 HOUR PRN
Status: DISCONTINUED | OUTPATIENT
Start: 2024-03-08 | End: 2024-03-10

## 2024-03-08 RX ORDER — METOCLOPRAMIDE HYDROCHLORIDE 5 MG/ML
10 INJECTION INTRAMUSCULAR; INTRAVENOUS EVERY 8 HOURS PRN
Status: DISCONTINUED | OUTPATIENT
Start: 2024-03-08 | End: 2024-03-10

## 2024-03-08 RX ORDER — DEXTROSE MONOHYDRATE 25 G/50ML
50 INJECTION, SOLUTION INTRAVENOUS
Status: DISCONTINUED | OUTPATIENT
Start: 2024-03-08 | End: 2024-03-09

## 2024-03-08 RX ORDER — GABAPENTIN 300 MG/1
300 CAPSULE ORAL NIGHTLY
Status: DISCONTINUED | OUTPATIENT
Start: 2024-03-08 | End: 2024-03-10

## 2024-03-08 RX ORDER — ONDANSETRON 2 MG/ML
4 INJECTION INTRAMUSCULAR; INTRAVENOUS EVERY 6 HOURS PRN
Status: DISCONTINUED | OUTPATIENT
Start: 2024-03-08 | End: 2024-03-10

## 2024-03-08 RX ORDER — ACETAMINOPHEN 650 MG/1
650 SUPPOSITORY RECTAL EVERY 4 HOURS PRN
Status: DISCONTINUED | OUTPATIENT
Start: 2024-03-08 | End: 2024-03-10

## 2024-03-08 RX ORDER — ASPIRIN 300 MG/1
300 SUPPOSITORY RECTAL DAILY
Status: DISCONTINUED | OUTPATIENT
Start: 2024-03-08 | End: 2024-03-09

## 2024-03-08 RX ORDER — LABETALOL HYDROCHLORIDE 5 MG/ML
10 INJECTION, SOLUTION INTRAVENOUS EVERY 10 MIN PRN
Status: DISCONTINUED | OUTPATIENT
Start: 2024-03-08 | End: 2024-03-10

## 2024-03-08 RX ORDER — SODIUM CHLORIDE 9 MG/ML
INJECTION, SOLUTION INTRAVENOUS CONTINUOUS
Status: DISCONTINUED | OUTPATIENT
Start: 2024-03-08 | End: 2024-03-09

## 2024-03-08 NOTE — ED QUICK NOTES
Reassessment pt's neuro status; NIH 37. This score reflects some areas that were BORIS d/t pt unable to follow commands. Pt is able to open eyes with verbal stimulation; right eye is non-reactive. While MD at bedside, she was able to squeeze bilateral hands on command. She was unable to perform other commands to complete a full neuro assessment baseline. Per night shift RN, son is on the way to the hospital. He told EMS that her last known well was last night, unknown time.
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
## 2024-03-08 NOTE — ED PROVIDER NOTES
Patient Seen in: [facility] 3w/sw      History     Chief Complaint   Patient presents with    Stroke     Stated Complaint: Altered mental status, unspecified altered mental status type    Subjective:   HPI    Patient presents the emergency department with altered mental status.  Reportedly she was found on the ground 3 hours prior to arrival here.  Last known well was yesterday.  Further history is unable to be obtained due to patient's mental status.     Objective:   No pertinent past medical history.            No pertinent past surgical history.              No pertinent social history.            Review of Systems    Positive for stated complaint: Altered mental status, unspecified altered mental status type  Other systems are as noted in HPI.  Constitutional and vital signs reviewed.      All other systems reviewed and negative except as noted above.    Physical Exam     ED Triage Vitals [03/08/24 0702]   BP (!) 204/87   Pulse 87   Resp 16   Temp    Temp src    SpO2 93 %   O2 Device None (Room air)       Current:BP (!) 208/78 (BP Location: Left arm)   Pulse 85   Resp 16   SpO2 96%         Physical Exam  Vitals and nursing note reviewed.   Constitutional:       Appearance: She is well-developed.      Comments: Extremely somnolent but arousable to loud verbal stimuli   HENT:      Head: Normocephalic.      Nose: Nose normal.      Mouth/Throat:      Mouth: Mucous membranes are moist.   Eyes:      Conjunctiva/sclera: Conjunctivae normal.      Pupils: Pupils are equal, round, and reactive to light.   Cardiovascular:      Rate and Rhythm: Normal rate and regular rhythm.      Heart sounds: No murmur heard.  Pulmonary:      Effort: Pulmonary effort is normal. No respiratory distress.      Breath sounds: Normal breath sounds.   Abdominal:      General: There is no distension.      Palpations: Abdomen is soft.      Tenderness: There is no abdominal tenderness.   Musculoskeletal:         General: No tenderness.  Normal range of motion.      Cervical back: Normal range of motion and neck supple.   Skin:     General: Skin is warm and dry.      Findings: No rash.   Neurological:      Comments: Moves all fours on command.  Will not follow organized neurologic examination.               ED Course     Labs Reviewed   COMP METABOLIC PANEL (14) - Abnormal; Notable for the following components:       Result Value    Glucose 150 (*)     Potassium 5.2 (*)     Chloride 114 (*)     BUN 25 (*)     Creatinine 1.54 (*)     Calculated Osmolality 297 (*)     eGFR-Cr 35 (*)     AST 45 (*)     Alkaline Phosphatase 219 (*)     All other components within normal limits   URINALYSIS WITH CULTURE REFLEX - Abnormal; Notable for the following components:    Blood Urine Trace (*)     Protein Urine 20 (*)     RBC Urine 3-5 (*)     All other components within normal limits   PROTHROMBIN TIME (PT) - Abnormal; Notable for the following components:    PT 17.0 (*)     INR 1.30 (*)     All other components within normal limits   POCT GLUCOSE - Abnormal; Notable for the following components:    POC Glucose  191 (*)     All other components within normal limits   CBC W/ DIFFERENTIAL - Abnormal; Notable for the following components:    RBC 2.93 (*)     HGB 8.8 (*)     HCT 26.9 (*)     RDW-SD 48.2 (*)     Lymphocyte Absolute 0.48 (*)     All other components within normal limits   TROPONIN I HIGH SENSITIVITY - Normal   PTT, ACTIVATED - Normal   CBC WITH DIFFERENTIAL WITH PLATELET    Narrative:     The following orders were created for panel order CBC With Differential With Platelet.  Procedure                               Abnormality         Status                     ---------                               -----------         ------                     CBC W/ DIFFERENTIAL[[id number]]          Abnormal            Final result                 Please view results for these tests on the individual orders.   RAINBOW DRAW LAVENDER   RAINBOW DRAW LIGHT GREEN    RAINBOW DRAW BLUE   RAINBOW DRAW LAVENDER   RAINBOW DRAW GOLD   RAINBOW DRAW LIGHT GREEN     EKG    Rate, intervals and axes as noted on EKG Report.  Rate: 82 bpm  Rhythm: Sinus Rhythm  Reading: Normal EKG           TNK/ NI Documentation:    Date/Time last known well:   N/A    NIHSS on presentation: 37     Chief Complaint   Patient presents with    Stroke     IV Tenecteplase (TNK) administered: No; Patient is not a Candidate for IV TNK due to: Out of time window period or time of onset unknown    Candidate for Endovascular thrombectomy (EVT): No; Patient is not a candidate for Endovascular Thrombectomy due to: No large vessel occlusion ( LVO)  on CTA/MRA imaging      Disposition: Admit           [facility]        Admission disposition: 3/8/2024  8:12 AM                  Medical Decision Making  Differential diagnosis considered for stroke, seizure, electrolyte disturbance, dehydration, infectious process.    Problems Addressed:  Altered mental status, unspecified altered mental status type: acute illness or injury    Amount and/or Complexity of Data Reviewed  Labs: ordered. Decision-making details documented in ED Course.     Details: Laboratory studies show mild hyperkalemia and dehydration.  Radiology: ordered and independent interpretation performed. Decision-making details documented in ED Course.     Details: CT the brain shows no bleed, CTA of the head and neck shows no large vessel occlusion  ECG/medicine tests: ordered and independent interpretation performed. Decision-making details documented in ED Course.  Discussion of management or test interpretation with external provider(s): Patient was seen by Dr. King in the emergency department the neurologist.  Possibilities are seizure, pain medication side effect, depression, stroke.  Will admit for observation and further evaluation.  Discussed with the hospitalist.    Risk  Decision regarding hospitalization.        Disposition and Plan     Clinical  Impression:  1. Altered mental status, unspecified altered mental status type         Disposition:  Admit  3/8/2024  8:12 am    Follow-up:  No follow-up provider specified.  We recommend that you schedule follow up care with a primary care provider within the next three months to obtain basic health screening including reassessment of your blood pressure.      Medications Prescribed:  Current Discharge Medication List                            Hospital Problems       Present on Admission  Date Reviewed: 2/29/2024            ICD-10-CM Noted POA    * (Principal) Altered mental status, unspecified altered mental status type R41.82 4/22/2022 Unknown    Anemia D64.9 3/8/2024 Yes    Other chronic pain G89.29 4/19/2013 Yes    Paraparesis of both lower limbs (HCC) G82.20 3/8/2024 Unknown

## 2024-03-08 NOTE — PLAN OF CARE
Patient remains lethargic. At times will awake and participate other times will not, MD aware- no new orders. EEG completed. Call light within reach. Safety precautions in place. Plan: MRI, IV fluids, Npo until speech reassessment.   Problem: PAIN - ADULT  Goal: Verbalizes/displays adequate comfort level or patient's stated pain goal  Description: INTERVENTIONS:  - Encourage pt to monitor pain and request assistance  - Assess pain using appropriate pain scale  - Administer analgesics based on type and severity of pain and evaluate response  - Implement non-pharmacological measures as appropriate and evaluate response  - Consider cultural and social influences on pain and pain management  - Manage/alleviate anxiety  - Utilize distraction and/or relaxation techniques  - Monitor for opioid side effects  - Notify MD/LIP if interventions unsuccessful or patient reports new pain  - Anticipate increased pain with activity and pre-medicate as appropriate  Outcome: Progressing     Problem: RISK FOR INFECTION - ADULT  Goal: Absence of fever/infection during anticipated neutropenic period  Description: INTERVENTIONS  - Monitor WBC  - Administer growth factors as ordered  - Implement neutropenic guidelines  Outcome: Progressing     Problem: SAFETY ADULT - FALL  Goal: Free from fall injury  Description: INTERVENTIONS:  - Assess pt frequently for physical needs  - Identify cognitive and physical deficits and behaviors that affect risk of falls.  - [place] fall precautions as indicated by assessment.  - Educate pt/family on patient safety including physical limitations  - Instruct pt to call for assistance with activity based on assessment  - Modify environment to reduce risk of injury  - Provide assistive devices as appropriate  - Consider OT/PT consult to assist with strengthening/mobility  - Encourage toileting schedule  Outcome: Progressing     Problem: DISCHARGE PLANNING  Goal: Discharge to home or other facility with  appropriate resources  Description: INTERVENTIONS:  - Identify barriers to discharge w/pt and caregiver  - Include patient/family/discharge partner in discharge planning  - Arrange for needed discharge resources and transportation as appropriate  - Identify discharge learning needs (meds, wound care, etc)  - Arrange for interpreters to assist at discharge as needed  - Consider post-discharge preferences of patient/family/discharge partner  - Complete POLST form as appropriate  - Assess patient's ability to be responsible for managing their own health  - Refer to Case Management Department for coordinating discharge planning if the patient needs post-hospital services based on physician/LIP order or complex needs related to functional status, cognitive ability or social support system  Outcome: Progressing

## 2024-03-08 NOTE — PROCEDURES
EEG report    REFERRING PHYSICIAN: Joel Rogers MD    PCP and phone number:  Diana Lopez DO  [phone]    TECHNIQUE: 21 channels of EEG, 2 channels of EOG, and 1 channel of EKG were recorded utilizing the [employer] 10/20 System. The recording was performed in a digitized monopolar referential format and playback was reformatted into various referential and bipolar montages utilizing appropriate filter settings. Automatic seizure and spike detection programs were utilized throughout the recording.  Video was not recorded during the study    CLINICAL DATA:  Patient is sent for the evaluation of possible seizures.    MEDICATION:  Continuous Medications:   sodium chloride         Scheduled Medications:  No current outpatient medications on file.    PRN Medications:  glucose **OR** glucose **OR** glucose-vitamin C **OR** dextrose **OR** glucose **OR** glucose **OR** glucose-vitamin C, acetaminophen **OR** acetaminophen, labetalol, hydrALAzine, ondansetron, metoclopramide    ACTIVATION:  Hyperventilation: Not done  Photic stimulation: Not done  Sleep: Normal sleep architecture was seen.    BACKGROUND  While the patient was awake, the posterior dominant rhythm consisted of well-regulated 9-10 Hz rhythmic waveforms, symmetrically distributed over both posterior quadrants and was reactive to eye opening.    EEG ABNORMALITY  None    IMPRESSION:  This is a normal EEG. No focal, lateralized, or epileptiform features are noted. Clinical correlation required.
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
## 2024-03-08 NOTE — ED INITIAL ASSESSMENT (HPI)
Fell at some point this morning, has bilateral weakness, garbled speech, BS WNL, NSR. Pt alert to name, no verbal response. +blood thinners  LKW sometime last night.

## 2024-03-08 NOTE — ED QUICK NOTES
Orders for admission, patient is aware of plan and ready to go upstairs. Any questions, please call ED RN Larissa at extension [phone].     Patient Covid vaccination status: Unvaccinated     COVID Test Ordered in ED: None    COVID Suspicion at Admission: N/A    Running Infusions:  None    Mental Status/LOC at time of transport: Aox0; BORIS    Other pertinent information:   CIWA score: N/A   NIH score:  N/A

## 2024-03-09 LAB
ANION GAP SERPL CALC-SCNC: 3 MMOL/L (ref 0–18)
BASOPHILS # BLD AUTO: 0.03 X10(3) UL (ref 0–0.2)
BASOPHILS NFR BLD AUTO: 0.4 %
BUN BLD-MCNC: 20 MG/DL (ref 9–23)
BUN/CREAT SERPL: 15.4 (ref 10–20)
C DIFF GDH + TOXINS A+B STL QL IA.RAPID: NOT DETECTED
C DIFF TOX B STL QL: POSITIVE
CALCIUM BLD-MCNC: 9.6 MG/DL (ref 8.7–10.4)
CHLORIDE SERPL-SCNC: 114 MMOL/L (ref 98–112)
CHOLEST SERPL-MCNC: 115 MG/DL (ref ?–200)
CO2 SERPL-SCNC: 26 MMOL/L (ref 21–32)
CREAT BLD-MCNC: 1.3 MG/DL
DEPRECATED RDW RBC AUTO: 46.2 FL (ref 35.1–46.3)
EGFRCR SERPLBLD CKD-EPI 2021: 43 ML/MIN/1.73M2 (ref 60–?)
EOSINOPHIL # BLD AUTO: 0.07 X10(3) UL (ref 0–0.7)
EOSINOPHIL NFR BLD AUTO: 0.9 %
ERYTHROCYTE [DISTWIDTH] IN BLOOD BY AUTOMATED COUNT: 13.7 % (ref 11–15)
GLUCOSE BLD-MCNC: 101 MG/DL (ref 70–99)
GLUCOSE BLDC GLUCOMTR-MCNC: 104 MG/DL (ref 70–99)
GLUCOSE BLDC GLUCOMTR-MCNC: 110 MG/DL (ref 70–99)
GLUCOSE BLDC GLUCOMTR-MCNC: 120 MG/DL (ref 70–99)
HCT VFR BLD AUTO: 31.2 %
HDLC SERPL-MCNC: 56 MG/DL (ref 40–59)
HGB BLD-MCNC: 9.8 G/DL
IMM GRANULOCYTES # BLD AUTO: 0.02 X10(3) UL (ref 0–1)
IMM GRANULOCYTES NFR BLD: 0.3 %
LDLC SERPL CALC-MCNC: 43 MG/DL (ref ?–100)
LYMPHOCYTES # BLD AUTO: 1.02 X10(3) UL (ref 1–4)
LYMPHOCYTES NFR BLD AUTO: 13.3 %
MAGNESIUM SERPL-MCNC: 1.8 MG/DL (ref 1.6–2.6)
MCH RBC QN AUTO: 28.6 PG (ref 26–34)
MCHC RBC AUTO-ENTMCNC: 31.4 G/DL (ref 31–37)
MCV RBC AUTO: 91 FL
MONOCYTES # BLD AUTO: 0.26 X10(3) UL (ref 0.1–1)
MONOCYTES NFR BLD AUTO: 3.4 %
NEUTROPHILS # BLD AUTO: 6.28 X10 (3) UL (ref 1.5–7.7)
NEUTROPHILS # BLD AUTO: 6.28 X10(3) UL (ref 1.5–7.7)
NEUTROPHILS NFR BLD AUTO: 81.7 %
NONHDLC SERPL-MCNC: 59 MG/DL (ref ?–130)
OSMOLALITY SERPL CALC.SUM OF ELEC: 299 MOSM/KG (ref 275–295)
PLATELET # BLD AUTO: 226 10(3)UL (ref 150–450)
POTASSIUM SERPL-SCNC: 4.2 MMOL/L (ref 3.5–5.1)
RBC # BLD AUTO: 3.43 X10(6)UL
SODIUM SERPL-SCNC: 143 MMOL/L (ref 136–145)
TRIGL SERPL-MCNC: 80 MG/DL (ref 30–149)
VLDLC SERPL CALC-MCNC: 11 MG/DL (ref 0–30)
WBC # BLD AUTO: 7.7 X10(3) UL (ref 4–11)

## 2024-03-09 PROCEDURE — 99233 SBSQ HOSP IP/OBS HIGH 50: CPT | Performed by: INTERNAL MEDICINE

## 2024-03-09 RX ORDER — VANCOMYCIN HYDROCHLORIDE 125 MG/1
125 CAPSULE ORAL 4 TIMES DAILY
Status: DISCONTINUED | OUTPATIENT
Start: 2024-03-09 | End: 2024-03-10

## 2024-03-09 RX ORDER — MAGNESIUM OXIDE 400 MG/1
400 TABLET ORAL ONCE
Status: COMPLETED | OUTPATIENT
Start: 2024-03-09 | End: 2024-03-09

## 2024-03-09 RX ORDER — ASPIRIN 81 MG/1
81 TABLET ORAL DAILY
Status: DISCONTINUED | OUTPATIENT
Start: 2024-03-10 | End: 2024-03-10

## 2024-03-09 RX ORDER — LISINOPRIL 10 MG/1
10 TABLET ORAL DAILY
Status: DISCONTINUED | OUTPATIENT
Start: 2024-03-09 | End: 2024-03-10

## 2024-03-09 RX ORDER — ATORVASTATIN CALCIUM 40 MG/1
40 TABLET, FILM COATED ORAL NIGHTLY
Status: DISCONTINUED | OUTPATIENT
Start: 2024-03-09 | End: 2024-03-10

## 2024-03-09 NOTE — PLAN OF CARE
Patient drowsy, but arousable. Orientation fluctuated, neuro checks difficult to do as patient would not always follow commands. Did tolerate ambulating 1+RW to the bathroom throughout the night. IVF running. Frequent nurse rounding done.    Problem: PAIN - ADULT  Goal: Verbalizes/displays adequate comfort level or patient's stated pain goal  Description: INTERVENTIONS:  - Encourage pt to monitor pain and request assistance  - Assess pain using appropriate pain scale  - Administer analgesics based on type and severity of pain and evaluate response  - Implement non-pharmacological measures as appropriate and evaluate response  - Consider cultural and social influences on pain and pain management  - Manage/alleviate anxiety  - Utilize distraction and/or relaxation techniques  - Monitor for opioid side effects  - Notify MD/LIP if interventions unsuccessful or patient reports new pain  - Anticipate increased pain with activity and pre-medicate as appropriate  3/9/2024 0320 by Gloria Hyatt RN  Outcome: Progressing  3/9/2024 0319 by Gloria Hyatt RN  Outcome: Progressing     Problem: RISK FOR INFECTION - ADULT  Goal: Absence of fever/infection during anticipated neutropenic period  Description: INTERVENTIONS  - Monitor WBC  - Administer growth factors as ordered  - Implement neutropenic guidelines  3/9/2024 0320 by Gloria Hyatt, RN  Outcome: Progressing  3/9/2024 0319 by Gloria Hyatt RN  Outcome: Progressing     Problem: SAFETY ADULT - FALL  Goal: Free from fall injury  Description: INTERVENTIONS:  - Assess pt frequently for physical needs  - Identify cognitive and physical deficits and behaviors that affect risk of falls.  - [place] fall precautions as indicated by assessment.  - Educate pt/family on patient safety including physical limitations  - Instruct pt to call for assistance with activity based on assessment  - Modify environment to reduce risk of injury  - Provide assistive devices as appropriate  -  Consider OT/PT consult to assist with strengthening/mobility  - Encourage toileting schedule  3/9/2024 0320 by Gloria Hyatt RN  Outcome: Progressing  3/9/2024 0319 by Gloria Hyatt RN  Outcome: Progressing     Problem: DISCHARGE PLANNING  Goal: Discharge to home or other facility with appropriate resources  Description: INTERVENTIONS:  - Identify barriers to discharge w/pt and caregiver  - Include patient/family/discharge partner in discharge planning  - Arrange for needed discharge resources and transportation as appropriate  - Identify discharge learning needs (meds, wound care, etc)  - Arrange for interpreters to assist at discharge as needed  - Consider post-discharge preferences of patient/family/discharge partner  - Complete POLST form as appropriate  - Assess patient's ability to be responsible for managing their own health  - Refer to Case Management Department for coordinating discharge planning if the patient needs post-hospital services based on physician/LIP order or complex needs related to functional status, cognitive ability or social support system  3/9/2024 0320 by Gloria Hyatt RN  Outcome: Progressing  3/9/2024 0319 by Gloria Hyatt RN  Outcome: Progressing     Problem: Patient Centered Care  Goal: Patient preferences are identified and integrated in the patient's plan of care  Description: Interventions:  - What would you like us to know as we care for you? From home with son  - Provide timely, complete, and accurate information to patient/family  - Incorporate patient and family knowledge, values, beliefs, and cultural backgrounds into the planning and delivery of care  - Encourage patient/family to participate in care and decision-making at the level they choose  - Honor patient and family perspectives and choices  3/9/2024 0320 by Gloria Hyatt RN  Outcome: Progressing  3/9/2024 0319 by Gloria Hyatt RN  Outcome: Progressing     Problem: Diabetes/Glucose Control  Goal: Glucose  maintained within prescribed range  Description: INTERVENTIONS:  - Monitor Blood Glucose as ordered  - Assess for signs and symptoms of hyperglycemia and hypoglycemia  - Administer ordered medications to maintain glucose within target range  - Assess barriers to adequate nutritional intake and initiate nutrition consult as needed  - Instruct patient on self management of diabetes  3/9/2024 0320 by Gloria Hyatt RN  Outcome: Progressing  3/9/2024 0319 by Gloria Hyatt RN  Outcome: Progressing     Problem: Patient/Family Goals  Goal: Patient/Family Long Term Goal  Description: Patient's Long Term Goal: discharge home    Interventions:  - Stroke w/u  Monitor vitals  Monitor labs  Neuro checks  - See additional Care Plan goals for specific interventions  3/9/2024 0320 by Gloria Hyatt RN  Outcome: Progressing  3/9/2024 0319 by Gloria Hyatt RN  Outcome: Progressing  Goal: Patient/Family Short Term Goal  Description: Patient's Short Term Goal: become stronger    Interventions:   - PT/OT eval  Increase mobility  - See additional Care Plan goals for specific interventions  3/9/2024 0320 by Gloria Hyatt RN  Outcome: Progressing  3/9/2024 0319 by Gloria Hyatt RN  Outcome: Progressing     Problem: Impaired Functional Mobility  Goal: Achieve highest/safest level of mobility/gait  Description: Interventions:  - Assess patient's functional ability and stability  - Promote increasing activity/tolerance for mobility and gait  - Educate and engage patient/family in tolerated activity level and precautions  - Recommend use of  RW for transfers and ambulation  3/9/2024 0320 by Gloria Hyatt RN  Outcome: Progressing  3/9/2024 0319 by Gloria Hyatt RN  Outcome: Progressing     Problem: Impaired Activities of Daily Living  Goal: Achieve highest/safest level of independence in self care  Description: Interventions:  - Assess ability and encourage patient to participate in ADLs to maximize function  - Promote sitting position  while performing ADLs such as feeding, grooming, and bathing  - Educate and encourage patient/family in tolerated functional activity level and precautions during self-care  - Encourage patient to incorporate impaired side during daily activities to promote function  3/9/2024 0320 by Gloria Hyatt RN  Outcome: Progressing  3/9/2024 0319 by Gloria Hyatt RN  Outcome: Progressing     Problem: Impaired Swallowing  Goal: Minimize aspiration risk  Description: Interventions:  - Patient should be alert and upright for all feedings (90 degrees preferred)  - Offer food and liquids at a slow rate  - No straws  - Encourage small bites of food and small sips of liquid  - Offer pills one at a time, crush or deliver with applesauce as needed  - Discontinue feeding and notify MD (or speech pathologist) if coughing or persistent throat clearing or wet/gurgly vocal quality is noted  3/9/2024 0320 by Gloria Hyatt RN  Outcome: Progressing  3/9/2024 0319 by Gloria Hyatt RN  Outcome: Progressing

## 2024-03-09 NOTE — SLP NOTE
SPEECH DAILY NOTE - INPATIENT    ASSESSMENT & PLAN   ASSESSMENT  Pt was seen for ongoing dysphagia intervention. Pt shows substantial improvement in alertness and participation from the previous visit. A repeat clinical exam was completed and the exam is indicative of mild oral dysphagia. RN was consulted prior to the visit and she stated that pt was upgraded to a regular diet and thin liquids before this visit.  Pt was sitting upright on a chair. Pt is fully alert and oriented. Followed simple verbal commands with 100% accuracy. Pt was presented with thin liquids, pudding and solid consistencies. Oral retrieval of food was adequate. Pt exhibited oral deficits characterized by prolonged mastication, attempting to swallow unprepared bolus for the solid consistency and mild residue in the oral cavity for pudding and solid consistencies. Pt also appeared to have reduced bilabial seal during oral stage. Pt produced one instance of a cough for the pudding consistency which occurred when pt was attempting to talk while swallowing. No other overt s/s of laryngeal penetration or aspiration were noted. Pt had a clear vocal quality and stable respiratory status throughout the session.     Recommendation: Regular diet and thin liquids with strict precautions such as smaller bites and sips, upright posture and multiple swallows.     RN was informed of the session. SLP to f/u to ensure tolerance to recommended diet and compliance to safe swallow strategies.     Diet Recommendations - Solids: Regular  Diet Recommendations - Liquids: Thin Liquids    Compensatory Strategies Recommended: Slow rate;Alternate consistencies;Small bites and sips;Multiple swallows  Aspiration Precautions: Upright position;Slow rate;Small bites and sips  Medication Administration Recommendations: Crushed in puree;Whole in puree    Patient Experiencing Pain: No                Treatment Plan  Treatment Plan/Recommendations: Aspiration  precautions    Interdisciplinary Communication: Discussed with RN            GOALS  Goal #1 The patient will tolerate regular solid consistency and thin liquids without overt signs or symptoms of aspiration with 90 % accuracy over 3 session(s).  In Progress   Goal #2 The patient/family/caregiver will demonstrate understanding and implementation of aspiration precautions and swallow strategies independently over 3 session(s).    In Progress     FOLLOW UP  Follow Up Needed (Documentation Required): Yes  SLP Follow-up Date: 03/10/24  Number of Visits to Meet Established Goals: 2    Session: 1    If you have any questions, please contact Francisco Kaplan, SLP  Francisco Kaplan, Ph.D., [facility]-SLP  Speech-Language Pathologist  Phone number Ext.: [phone]

## 2024-03-09 NOTE — PHYSICAL THERAPY NOTE
----- Message from Jackie Garcia CNP sent at 6/15/2023  4:19 PM CDT -----  Pls let her know she is negative for Lyme. Encourage her to schedule establish care visit with PCP in clinic as our visit was just acute visit.       PHYSICAL THERAPY EVALUATION - INPATIENT     Room Number: 325/325-A  Evaluation Date: 3/9/2024  Type of Evaluation: Initial   Physician Order: PT Eval and Treat    Presenting Problem: AMS  Co-Morbidities : CAD, aortic stenosis, DM2, CKD3, HTN, HLD, HFpEF, fibromyalgia, OA, L rotator cuff repair, neurological gait dysfunction also going back to 2016, paraparesis, history of traumatic rhabdomyolysis in December 2022,  thoracic myelopathy (T11-T12) discovered on her 10/23 admission , retroperitoneal sarcoma s/p resection with recurrence complicated by chronic abdominal pain, and daily opioid use  Reason for Therapy: Mobility Dysfunction and Discharge Planning    PHYSICAL THERAPY ASSESSMENT   Patient is a 78 year old female admitted 3/8/2024 for AMS.  Prior to admission, patient's baseline is [place] with a cane, intermittent use of a rolling walker, independent with ADLs, assist with IADLs from part time caregiver.  Patient is currently functioning near baseline with bed mobility, transfers, and gait.  Patient is requiring contact guard assist as a result of the following impairments: decreased endurance/aerobic capacity, pain, decreased muscular endurance, medical status, and impaired posture .  Physical Therapy will continue to follow for duration of hospitalization.    Patient will benefit from continued skilled PT Services at discharge to promote functional independence in home.  Anticipate patient will return home with home health PT.    PLAN  PT Treatment Plan: Body mechanics;Endurance;Patient education;Gait training;Transfer training;Balance training;Strengthening  Rehab Potential : Good  Frequency (Obs): 3-5x/week    PHYSICAL THERAPY MEDICAL/SOCIAL HISTORY   History related to current admission: traumatic rhabdomyolysis in December 2022,  thoracic myelopathy (T11-T12) discovered on her 10/23 admission , retroperitoneal sarcoma s/p resection with recurrence complicated by chronic abdominal pain, and daily opioid  use, multiple re-admissions over past year, typically CGA-SBA with the walker for household distances, history of home health    Problem List  Principal Problem:    Altered mental status, unspecified altered mental status type  Active Problems:    Other chronic pain    Anemia    Paraparesis of both lower limbs (HCC)      HOME SITUATION  Home Situation  Type of Home: House  Home Layout: One level  Stairs to Enter : 0  Lives With: Alone;Caregiver part-time (caregiver supports 4x/week for ~3 hours.)  Drives: No  Patient Owned Equipment: Rolling walker;Cane;Other (Comment) (scooter)  Patient Regularly Uses: None     Prior Level of [place]: Maile with a cane, intermittent use of a rolling walker, independent with ADLs, assist with IADLs from part time caregiver    SUBJECTIVE  \"I can't stand up that straight for very long, my back and my stomach hurt.\"    PHYSICAL THERAPY EXAMINATION   OBJECTIVE  Precautions: Aspiration (BP <200/110)  Fall Risk: Standard fall risk    WEIGHT BEARING RESTRICTION  Weight Bearing Restriction: None                PAIN ASSESSMENT  Ratin  Location: back, stomach  Management Techniques: Body mechanics;Activity promotion    COGNITION  Overall Cognitive Status:  WFL - within functional limits    RANGE OF MOTION AND STRENGTH ASSESSMENT  Upper extremity ROM and strength are within functional limits  BUEs  Lower extremity ROM is within functional limits  BLEs  Lower extremity strength is within functional limits  BLEs except for decreased bilateral hip extensor strength, history of altered gait     BALANCE  Static Sitting: Normal  Dynamic Sitting: Normal  Static Standing: Good  Dynamic Standing: Fair +    ACTIVITY TOLERANCE  Pulse: 61  Heart Rate Source: Monitor     BP: (!) 189/54 (pre, RN aware, cleared for therapy, per chart review goal <200/110)  BP Location: Right arm  BP Method: Automatic  Patient Position: Semi-[place]    O2 WALK  Oxygen Therapy  SPO2% on Room Air at Rest: 100    AM-PAC  '6-Clicks' INPATIENT SHORT FORM - BASIC MOBILITY  How much difficulty does the patient currently have...  Patient Difficulty: Turning over in bed (including adjusting bedclothes, sheets and blankets)?: A Little   Patient Difficulty: Sitting down on and standing up from a chair with arms (e.g., wheelchair, bedside commode, etc.): A Little   Patient Difficulty: Moving from lying on back to sitting on the side of the bed?: A Little   How much help from another person does the patient currently need...   Help from Another: Moving to and from a bed to a chair (including a wheelchair)?: A Little   Help from Another: Need to walk in hospital room?: A Little   Help from Another: Climbing 3-5 steps with a railing?: A Little     AM-PAC Score:  Raw Score: 18   Approx Degree of Impairment: 46.58%   Standardized Score (AM-PAC Scale): 43.63   CMS Modifier (G-Code): CK    FUNCTIONAL ABILITY STATUS  Functional Mobility/Gait Assessment  Gait Assistance: Contact guard assist  Distance (ft): 75'  Assistive Device: Rolling walker  Pattern: Within Functional Limits (kyphotic posture with decreased hip extension, able to correct to ~30 degrees flexion but progressively increased hip flexion with fatigue, limited by stomach and back pain, no LOB)  Stairs: Other (comment) (none at home)  Rolling: modified independent  Supine to Sit: modified independent  Sit to Stand: modified independent - from edge of bed, toilet    ADDITIONAL INFORMATION    Pt tolerating household distances with rolling walker, no stairs required to access home, functioning near baseline level with main limitation being pain. Pt able to ambulate short distances <10' without an assistive device without loss of balance. Patient received semi-fowlers in bed, agreeable to physical therapy evaluation. Vital signs monitored as noted above, patient experiencing hypertension, medical team aware and cleared for activity with BP <200/110 . Education with patient provided verbally  on physical therapy plan of care and physiological benefits of out of bed mobility. Next session anticipate to progress gait.    Patient history and/or personal factors that may impact the plan of care include limited social support, co-morbidities (CAD, aortic stenosis, DM2, CKD3, HTN, HLD, HFpEF, fibromyalgia, OA, L rotator cuff repair, neurological gait dysfunction, chronic opioid use) affecting activity tolerance, pain levels, medical status, and has history of recent hospitalization. Based on the physical therapy examination of the noted systems and functional activity/participation limitations, the patient presentation is evolving given the patient demonstrates worsening of previously stable condition and demonstrates worsening of co-morbidities.    Exercise/Education Provided:  Body mechanics  Energy conservation  Functional activity tolerated  Gait training  Posture  Transfer training    The patient's Approx Degree of Impairment: 46.58% has been calculated based on documentation in the [facility] '6 clicks' Inpatient Basic Mobility Short Form.  Research supports that patients with this level of impairment may benefit from home with home-health PT.  Final disposition will be made by interdisciplinary medical team.    Patient End of Session: In bed;Needs met;Call light within reach;RN aware of session/findings;All patient questions and concerns addressed    CURRENT GOALS  Goals to be met by: 3/19/24  Patient Goal Patient's self-stated goal is: return home safely   Goal #1 Patient is able to demonstrate supine - sit EOB @ level: independent     Goal #1   Current Status    Goal #2 Patient is able to demonstrate transfers EOB to/from [facility] at assistance level: modified independent with cane - straight     Goal #2  Current Status    Goal #3 Patient is able to ambulate 60 feet with assist device: cane - straight at assistance level: modified independent   Goal #3   Current Status    Goal #4 Patient will perform  sit-to-stand transfer from bedside chair at [facility] independent using cane   Goal #4   Current Status    Goal #5 Patient to demonstrate independence with home activity/exercise instructions provided to patient in preparation for discharge.   Goal #5   Current Status    Goal #6    Goal #6  Current Status      Patient Evaluation Complexity Level:  History High - 3 or more personal factors and/or co-morbidities   Examination of body systems Moderate - addressing a total of 3 or more elements   Clinical Presentation  Moderate - Evolving   Clinical Decision Making  Moderate Complexity     Gait Trainin minutes  Therapeutic Activity:  10 minutes      Esme Yeung, PT, DPT  [facility]  [id number]

## 2024-03-09 NOTE — PLAN OF CARE
Ms. Hannah Boss is A&O x 3 with forgetfulness and confusion.  Lives at home with son.    One assist with walker.  Incontinent of bowel and bladder.  VSS:  stable.  Denies pain.  Plan:  PO vanco for cdiff, monitor overnight then probable discharge tomorrow  Will continue to monitor and treat.    Problem: PAIN - ADULT  Goal: Verbalizes/displays adequate comfort level or patient's stated pain goal  Description: INTERVENTIONS:  - Encourage pt to monitor pain and request assistance  - Assess pain using appropriate pain scale  - Administer analgesics based on type and severity of pain and evaluate response  - Implement non-pharmacological measures as appropriate and evaluate response  - Consider cultural and social influences on pain and pain management  - Manage/alleviate anxiety  - Utilize distraction and/or relaxation techniques  - Monitor for opioid side effects  - Notify MD/LIP if interventions unsuccessful or patient reports new pain  - Anticipate increased pain with activity and pre-medicate as appropriate  Outcome: Progressing     Problem: RISK FOR INFECTION - ADULT  Goal: Absence of fever/infection during anticipated neutropenic period  Description: INTERVENTIONS  - Monitor WBC  - Administer growth factors as ordered  - Implement neutropenic guidelines  Outcome: Progressing     Problem: SAFETY ADULT - FALL  Goal: Free from fall injury  Description: INTERVENTIONS:  - Assess pt frequently for physical needs  - Identify cognitive and physical deficits and behaviors that affect risk of falls.  - Emmett fall precautions as indicated by assessment.  - Educate pt/family on patient safety including physical limitations  - Instruct pt to call for assistance with activity based on assessment  - Modify environment to reduce risk of injury  - Provide assistive devices as appropriate  - Consider OT/PT consult to assist with strengthening/mobility  - Encourage toileting schedule  Outcome: Progressing     Problem:  DISCHARGE PLANNING  Goal: Discharge to home or other facility with appropriate resources  Description: INTERVENTIONS:  - Identify barriers to discharge w/pt and caregiver  - Include patient/family/discharge partner in discharge planning  - Arrange for needed discharge resources and transportation as appropriate  - Identify discharge learning needs (meds, wound care, etc)  - Arrange for interpreters to assist at discharge as needed  - Consider post-discharge preferences of patient/family/discharge partner  - Complete POLST form as appropriate  - Assess patient's ability to be responsible for managing their own health  - Refer to Case Management Department for coordinating discharge planning if the patient needs post-hospital services based on physician/LIP order or complex needs related to functional status, cognitive ability or social support system  Outcome: Progressing     Problem: Patient Centered Care  Goal: Patient preferences are identified and integrated in the patient's plan of care  Description: Interventions:  - What would you like us to know as we care for you? From home with son  - Provide timely, complete, and accurate information to patient/family  - Incorporate patient and family knowledge, values, beliefs, and cultural backgrounds into the planning and delivery of care  - Encourage patient/family to participate in care and decision-making at the level they choose  - Honor patient and family perspectives and choices  Outcome: Progressing     Problem: Diabetes/Glucose Control  Goal: Glucose maintained within prescribed range  Description: INTERVENTIONS:  - Monitor Blood Glucose as ordered  - Assess for signs and symptoms of hyperglycemia and hypoglycemia  - Administer ordered medications to maintain glucose within target range  - Assess barriers to adequate nutritional intake and initiate nutrition consult as needed  - Instruct patient on self management of diabetes  Outcome: Progressing     Problem:  Patient/Family Goals  Goal: Patient/Family Long Term Goal  Description: Patient's Long Term Goal: discharge home    Interventions:  - Stroke w/u  Monitor vitals  Monitor labs  Neuro checks  - See additional Care Plan goals for specific interventions  Outcome: Progressing  Goal: Patient/Family Short Term Goal  Description: Patient's Short Term Goal: become stronger    Interventions:   - PT/OT eval  Increase mobility  - See additional Care Plan goals for specific interventions  Outcome: Progressing     Problem: NEUROLOGICAL - ADULT  Goal: Achieves stable or improved neurological status  Description: INTERVENTIONS  - Assess for and report changes in neurological status  - Initiate measures to prevent increased intracranial pressure  - Maintain blood pressure and fluid volume within ordered parameters to optimize cerebral perfusion and minimize risk of hemorrhage  - Monitor temperature, glucose, and sodium. Initiate appropriate interventions as ordered  Outcome: Progressing  Goal: Achieves maximal functionality and self care  Description: INTERVENTIONS  - Monitor swallowing and airway patency with patient fatigue and changes in neurological status  - Encourage and assist patient to increase activity and self care with guidance from PT/OT  - Encourage visually impaired, hearing impaired and aphasic patients to use assistive/communication devices  Outcome: Progressing     Problem: Impaired Functional Mobility  Goal: Achieve highest/safest level of mobility/gait  Description: Interventions:  - Assess patient's functional ability and stability  - Promote increasing activity/tolerance for mobility and gait  - Educate and engage patient/family in tolerated activity level and precautions  - Recommend use of  RW for transfers and ambulation  Outcome: Progressing     Problem: Impaired Activities of Daily Living  Goal: Achieve highest/safest level of independence in self care  Description: Interventions:  - Assess ability and  encourage patient to participate in ADLs to maximize function  - Promote sitting position while performing ADLs such as feeding, grooming, and bathing  - Educate and encourage patient/family in tolerated functional activity level and precautions during self-care    Outcome: Progressing     Problem: Impaired Swallowing  Goal: Minimize aspiration risk  Description: Interventions:  - Patient should be alert and upright for all feedings (90 degrees preferred)  - Offer food and liquids at a slow rate  - No straws  - Encourage small bites of food and small sips of liquid  - Offer pills one at a time, crush or deliver with applesauce as needed  - Discontinue feeding and notify MD (or speech pathologist) if coughing or persistent throat clearing or wet/gurgly vocal quality is noted  Outcome: Progressing

## 2024-03-09 NOTE — DIETARY NOTE
BRIEF DIETITIAN NOTE     Pt screened for MST reporting weight loss and declined PO intake. Pt known to dietary from past admission in January 2024. Pt know to typically have good PO intakes at home. No intakes documented yet. Weight appears relatively stable per EMR wt hx review. Some fluctuations noted throughout likely d/t fluid fluctuations. Currently +3 edema noted to BLE and feet. No risk at this time. Please consult RD if further nutrition intervention is needed. Will f/u sooner than LOS to monitor intakes.     Percent Meals Eaten (last 6 days)       Date/Time Percent Meals Eaten (%)    03/08/24 1420 0 %             Patient Weight(s) for the past 336 hrs:   Weight   03/09/24 0400 79.1 kg (174 lb 6.4 oz)   03/08/24 2122 81 kg (178 lb 9.6 oz)        Wt Readings from Last 6 Encounters:   03/09/24 79.1 kg (174 lb 6.4 oz)   01/24/24 81.2 kg (179 lb)   12/31/23 90.7 kg (200 lb)   12/12/23 79 kg (174 lb 2.6 oz)   10/08/23 79.9 kg (176 lb 3.2 oz)   08/23/23 86.6 kg (191 lb)        F/u per protocol or as appropriate.       Marie Melendez RD, LDN  Clinical Dietitian  P: [phone]

## 2024-03-09 NOTE — PROGRESS NOTES
[facility]  part of [facility]ist Progress Note     Hannah Boss Patient Status:  Inpatient    1945 MRN [id number]   Location [facility] 3W/SW Attending Joel Rogers MD   Hosp Day # 1 PCP Diana Lopez DO     Chief Complaint:   Chief Complaint   Patient presents with    Stroke        Subjective:     Patient seen lying in bed.  No family at bedside.  Patient denies acute events overnight.  Patient states she is feeling better today.  Much more alert and conversive.  Patient complaining of chronic back pain.  Patient also having some abdominal cramping and diarrhea.      Objective:      Vital signs:  Vitals:    24 0820 24 1230 24 1319 24 1324   BP: (!) 179/71 (!) 175/64 (!) 178/72 (!) 189/54   BP Location: Right arm Right arm Right arm Right arm   Pulse: 83 70 65 61   Resp: 17 17 17    Temp: 98.4 °F (36.9 °C) 98.4 °F (36.9 °C) 98.4 °F (36.9 °C)    TempSrc: Oral Oral Oral    SpO2: 100% 100% 100%    Weight:           Intake/Output Summary (Last 24 hours) at 3/9/2024 1607  Last data filed at 3/9/2024 1000  Gross per 24 hour   Intake 1022.5 ml   Output 2050 ml   Net -1027.5 ml           Physical Exam:    GENERAL:  Awake and alert, in no acute distress.  HEART:  Regular rhythm, regular rate  LUNGS:  Air entry was minimally decreased.  No crackles or wheezes   ABDOMEN: Soft and non-tender.  EXTREMITIES: Chronic bilateral lower extremity edema appreciated  NEUROLOGICAL: AAO x 3, person, hospital, .  Generalized weakness.     PSYCHIATRIC: Flat mood    Diagnostic Data:    Labs:    Recent Labs   Lab 24  0725 24  0924 24  0851   WBC 8.0  --  7.7   HGB 8.8*  --  9.8*   MCV 91.8  --  91.0   .0  --  226.0   INR  --  1.30*  --        Recent Labs   Lab 24  0725 24  0851   * 101*   BUN 25* 20   CREATSERUM 1.54* 1.30*   CA 9.0 9.6   ALB 3.9  --     143   K 5.2* 4.2   * 114*   CO2 24.0 26.0    ALKPHO 219*  --    AST 45*  --    ALT 29  --    BILT 0.3  --    TP 6.7  --            Estimated Creatinine Clearance: 31.8 mL/min (A) (based on SCr of 1.3 mg/dL (H)).    Recent Labs   Lab 03/08/24  0924   PTP 17.0*   INR 1.30*            COVID-19  Lab Results   Component Value Date    COVID19 Not Detected 12/31/2023    COVID19 Not Detected 10/06/2023    COVID19 Not Detected 06/05/2023       Pro-Calcitonin  No results for input(s): \"PCT\" in the last 168 hours.    Cardiac  No results for input(s): \"TROP\", \"PBNP\" in the last 168 hours.    Inflammatory Markers  No results for input(s): \"CRP\", \"ROSARIO\", \"LDH\", \"DDIMER\" in the last 168 hours.    Culture:  No results found for this visit on 03/08/24.    MRI BRAIN WO ACUTE (3) SEQUENCE (CPT=70551)    Result Date: 3/8/2024  CONCLUSION:   Fast diffusion protocol with limited evaluation of the brain parenchyma.  Mild chronic microvascular ischemic disease without acute ischemia or hemorrhage.    Dictated by (CST): Rolan Dyer MD on 3/08/2024 at 7:59 PM     Finalized by (CST): Rolan Dyer MD on 3/08/2024 at 8:02 PM          CT STROKE CTA BRAIN/CTA NECK (W IV)(CPT=70496/21611)    Result Date: 3/8/2024  CONCLUSION:  1. No major vessel occlusion, hemodynamically significant stenosis, dissection, AVM or aneurysm. 2. Mild emphysema. 3. Mild carotid atherosclerosis.  This report was called to Dr. Conway at 7:38 a.m.    Dictated by (CST): Eric Alarcon MD on 3/08/2024 at 7:26 AM     Finalized by (CST): Eric Alarcon MD on 3/08/2024 at 7:41 AM          CT STROKE BRAIN (NO IV)(CPT=70450)    Result Date: 3/8/2024  CONCLUSION:   No acute intracranial abnormality.  No acute intracranial hemorrhage.  These findings were communicated with Dr. Shaggy Conway by Dr. Jagdish Cisneros at 0725 hours using telephone on 03/08/2024.    Dictated by (CST): Jagdish Cisneros MD on 3/08/2024 at 7:23 AM     Finalized by (CST): Jagdish Cisneros MD on 3/08/2024 at 7:25 AM           EKG    Result Date:  3/8/2024  Normal sinus rhythm Possible Left atrial enlargement Borderline ECG When compared with ECG of 19-JAN-2024 14:41, No significant change was found Confirmed by BRITTA BRIZUELA ([id number]) on 3/8/2024 5:39:57 PM     Medications:    atorvastatin  40 mg Oral Nightly    insulin aspart  1-5 Units Subcutaneous TID CC    lisinopril  10 mg Oral Daily    [START ON 3/10/2024] aspirin  81 mg Oral Daily    vancomycin  125 mg Oral QID    gabapentin  300 mg Oral Nightly    apixaban  5 mg Oral BID       Assessment & Plan:        Altered mental status, unspecified altered mental status type  -Unclear etiology  -Initial concern for possible CVA  -Initiated on stroke protocol  -MRI, CT and CTA imaging without signs of acute stroke  -Possibly metabolic encephalopathy in patient with signs of electrolyte abnormalities and CKD.  -Patient also developed diarrhea.  C. difficile noted to be positive by PCR, but with negative EIA.  As patient with active symptoms of abdominal cramping and diarrhea, starting p.o. vancomycin.  -Also some concern for possible toxic encephalopathy in patient with chronic opioid and benzodiazepine use.  -Holding narcotics and benzos at this time.     Acute Kidney Injury on CKD  III   -Resolving  -With hyperkalemia  -Weaning off IVF  - Avoiding Nephrotoxic agents  - Cont to monitor       Other chronic pain  -Patient on chronic oxycodone  -Attempting to limit opioids in setting of altered mental status as above  -Consider pain consultation      Recurrent falls  -Possibly related to electrolyte abnormalities and opioid use  -PT/OT  -Continue to monitor     Anemia of chronic disease  -Patient with history of CKD  -Hemoglobin similar to previous  -No signs of acute blood loss noted  -Continue to monitor    Diarrhea   -C. difficile noted to be positive by PCR, but with negative EIA.    -As patient with active symptoms of abdominal cramping and diarrhea, starting p.o. vancomycin.    Plan of care discussed with patient   at bedside . Discussed management/test result(s) with RN.      Quality:  DVT Prophylaxis: Eliquis  CODE status: Full  Estimated date of discharge: TBD  Discharge is dependent on: clinical stability      Joel Rogers MD          This note was prepared using Dragon Medical voice recognition dictation software. As a result errors may occur. When identified these errors have been corrected. While every attempt is made to correct errors during dictation discrepancies may still exist

## 2024-03-10 VITALS
DIASTOLIC BLOOD PRESSURE: 70 MMHG | TEMPERATURE: 99 F | SYSTOLIC BLOOD PRESSURE: 159 MMHG | WEIGHT: 170 LBS | OXYGEN SATURATION: 100 % | HEART RATE: 75 BPM | RESPIRATION RATE: 18 BRPM | BODY MASS INDEX: 29 KG/M2

## 2024-03-10 LAB
ANION GAP SERPL CALC-SCNC: 4 MMOL/L (ref 0–18)
BASOPHILS # BLD AUTO: 0.04 X10(3) UL (ref 0–0.2)
BASOPHILS NFR BLD AUTO: 0.6 %
BUN BLD-MCNC: 18 MG/DL (ref 9–23)
BUN/CREAT SERPL: 13.2 (ref 10–20)
CALCIUM BLD-MCNC: 9.2 MG/DL (ref 8.7–10.4)
CHLORIDE SERPL-SCNC: 114 MMOL/L (ref 98–112)
CO2 SERPL-SCNC: 27 MMOL/L (ref 21–32)
CREAT BLD-MCNC: 1.36 MG/DL
DEPRECATED RDW RBC AUTO: 45.1 FL (ref 35.1–46.3)
EGFRCR SERPLBLD CKD-EPI 2021: 40 ML/MIN/1.73M2 (ref 60–?)
EOSINOPHIL # BLD AUTO: 0.14 X10(3) UL (ref 0–0.7)
EOSINOPHIL NFR BLD AUTO: 2 %
ERYTHROCYTE [DISTWIDTH] IN BLOOD BY AUTOMATED COUNT: 13.7 % (ref 11–15)
GLUCOSE BLD-MCNC: 98 MG/DL (ref 70–99)
HCT VFR BLD AUTO: 26.5 %
HGB BLD-MCNC: 8.2 G/DL
IMM GRANULOCYTES # BLD AUTO: 0.02 X10(3) UL (ref 0–1)
IMM GRANULOCYTES NFR BLD: 0.3 %
LYMPHOCYTES # BLD AUTO: 1.26 X10(3) UL (ref 1–4)
LYMPHOCYTES NFR BLD AUTO: 18.2 %
MAGNESIUM SERPL-MCNC: 1.9 MG/DL (ref 1.6–2.6)
MCH RBC QN AUTO: 28 PG (ref 26–34)
MCHC RBC AUTO-ENTMCNC: 30.9 G/DL (ref 31–37)
MCV RBC AUTO: 90.4 FL
MONOCYTES # BLD AUTO: 0.48 X10(3) UL (ref 0.1–1)
MONOCYTES NFR BLD AUTO: 6.9 %
NEUTROPHILS # BLD AUTO: 4.99 X10 (3) UL (ref 1.5–7.7)
NEUTROPHILS # BLD AUTO: 4.99 X10(3) UL (ref 1.5–7.7)
NEUTROPHILS NFR BLD AUTO: 72 %
OSMOLALITY SERPL CALC.SUM OF ELEC: 302 MOSM/KG (ref 275–295)
PLATELET # BLD AUTO: 179 10(3)UL (ref 150–450)
POTASSIUM SERPL-SCNC: 4.2 MMOL/L (ref 3.5–5.1)
RBC # BLD AUTO: 2.93 X10(6)UL
SODIUM SERPL-SCNC: 145 MMOL/L (ref 136–145)
WBC # BLD AUTO: 6.9 X10(3) UL (ref 4–11)

## 2024-03-10 PROCEDURE — 99239 HOSP IP/OBS DSCHRG MGMT >30: CPT | Performed by: INTERNAL MEDICINE

## 2024-03-10 RX ORDER — LISINOPRIL 10 MG/1
10 TABLET ORAL DAILY
Qty: 30 TABLET | Refills: 0 | Status: SHIPPED | OUTPATIENT
Start: 2024-03-11 | End: 2024-04-10

## 2024-03-10 RX ORDER — VANCOMYCIN HYDROCHLORIDE 125 MG/1
125 CAPSULE ORAL 4 TIMES DAILY
Qty: 40 CAPSULE | Refills: 0 | Status: SHIPPED | OUTPATIENT
Start: 2024-03-10 | End: 2024-03-20

## 2024-03-10 NOTE — CM/SW NOTE
03/10/24 1200   Discharge disposition   Expected discharge disposition Home-Health   Post Acute Care Provider Other  ([facility])   Home services after discharge None   Discharge transportation Private car     The patient received a MDO for discharge.    The patient will be transported home by he son via private car.    The patient is reserved with [facility].    The patient and her son have no questions or concerns at this time.    SW/CM to remain available for support and/or discharge planning.     Jennie Ramey MSW, LSW  Discharge Planner [id number]

## 2024-03-10 NOTE — PLAN OF CARE
Problem: PAIN - ADULT  Goal: Verbalizes/displays adequate comfort level or patient's stated pain goal  Description: INTERVENTIONS:  - Encourage pt to monitor pain and request assistance  - Assess pain using appropriate pain scale  - Administer analgesics based on type and severity of pain and evaluate response  - Implement non-pharmacological measures as appropriate and evaluate response  - Consider cultural and social influences on pain and pain management  - Manage/alleviate anxiety  - Utilize distraction and/or relaxation techniques  - Monitor for opioid side effects  - Notify MD/LIP if interventions unsuccessful or patient reports new pain  - Anticipate increased pain with activity and pre-medicate as appropriate  Outcome: Progressing     Problem: RISK FOR INFECTION - ADULT  Goal: Absence of fever/infection during anticipated neutropenic period  Description: INTERVENTIONS  - Monitor WBC  - Administer growth factors as ordered  - Implement neutropenic guidelines  Outcome: Progressing     Problem: SAFETY ADULT - FALL  Goal: Free from fall injury  Description: INTERVENTIONS:  - Assess pt frequently for physical needs  - Identify cognitive and physical deficits and behaviors that affect risk of falls.  - [place] fall precautions as indicated by assessment.  - Educate pt/family on patient safety including physical limitations  - Instruct pt to call for assistance with activity based on assessment  - Modify environment to reduce risk of injury  - Provide assistive devices as appropriate  - Consider OT/PT consult to assist with strengthening/mobility  - Encourage toileting schedule  Outcome: Progressing     Problem: DISCHARGE PLANNING  Goal: Discharge to home or other facility with appropriate resources  Description: INTERVENTIONS:  - Identify barriers to discharge w/pt and caregiver  - Include patient/family/discharge partner in discharge planning  - Arrange for needed discharge resources and transportation as  appropriate  - Identify discharge learning needs (meds, wound care, etc)  - Arrange for interpreters to assist at discharge as needed  - Consider post-discharge preferences of patient/family/discharge partner  - Complete POLST form as appropriate  - Assess patient's ability to be responsible for managing their own health  - Refer to Case Management Department for coordinating discharge planning if the patient needs post-hospital services based on physician/LIP order or complex needs related to functional status, cognitive ability or social support system  Outcome: Progressing     Problem: Patient Centered Care  Goal: Patient preferences are identified and integrated in the patient's plan of care  Description: Interventions:  - What would you like us to know as we care for you? From home with son  - Provide timely, complete, and accurate information to patient/family  - Incorporate patient and family knowledge, values, beliefs, and cultural backgrounds into the planning and delivery of care  - Encourage patient/family to participate in care and decision-making at the level they choose  - Honor patient and family perspectives and choices  Outcome: Progressing     Problem: Diabetes/Glucose Control  Goal: Glucose maintained within prescribed range  Description: INTERVENTIONS:  - Monitor Blood Glucose as ordered  - Assess for signs and symptoms of hyperglycemia and hypoglycemia  - Administer ordered medications to maintain glucose within target range  - Assess barriers to adequate nutritional intake and initiate nutrition consult as needed  - Instruct patient on self management of diabetes  Outcome: Progressing     Problem: Patient/Family Goals  Goal: Patient/Family Long Term Goal  Description: Patient's Long Term Goal: discharge home    Interventions:  - Stroke w/u  Monitor vitals  Monitor labs  Neuro checks  - See additional Care Plan goals for specific interventions  Outcome: Progressing  Goal: Patient/Family Short  Term Goal  Description: Patient's Short Term Goal: become stronger    Interventions:   - PT/OT eval  Increase mobility  - See additional Care Plan goals for specific interventions  Outcome: Progressing     Problem: NEUROLOGICAL - ADULT  Goal: Achieves stable or improved neurological status  Description: INTERVENTIONS  - Assess for and report changes in neurological status  - Initiate measures to prevent increased intracranial pressure  - Maintain blood pressure and fluid volume within ordered parameters to optimize cerebral perfusion and minimize risk of hemorrhage  - Monitor temperature, glucose, and sodium. Initiate appropriate interventions as ordered  Outcome: Progressing  Goal: Achieves maximal functionality and self care  Description: INTERVENTIONS  - Monitor swallowing and airway patency with patient fatigue and changes in neurological status  - Encourage and assist patient to increase activity and self care with guidance from PT/OT  - Encourage visually impaired, hearing impaired and aphasic patients to use assistive/communication devices  Outcome: Progressing     Problem: Impaired Functional Mobility  Goal: Achieve highest/safest level of mobility/gait  Description: Interventions:  - Assess patient's functional ability and stability  - Promote increasing activity/tolerance for mobility and gait  - Educate and engage patient/family in tolerated activity level and precautions  Outcome: Progressing     Problem: Impaired Activities of Daily Living  Goal: Achieve highest/safest level of independence in self care  Description: Interventions:  - Assess ability and encourage patient to participate in ADLs to maximize function  - Promote sitting position while performing ADLs such as feeding, grooming, and bathing  - Educate and encourage patient/family in tolerated functional activity level and precautions during self-care  Outcome: Progressing     Problem: Impaired Swallowing  Goal: Minimize aspiration  risk  Description: Interventions:  - Patient should be alert and upright for all feedings (90 degrees preferred)  - Offer food and liquids at a slow rate  - No straws  - Encourage small bites of food and small sips of liquid  - Offer pills one at a time, crush or deliver with applesauce as needed  - Discontinue feeding and notify MD (or speech pathologist) if coughing or persistent throat clearing or wet/gurgly vocal quality is noted  Outcome: Progressing

## 2024-03-10 NOTE — DISCHARGE SUMMARY
[facility]  part of [facility]    Discharge Summary    Hannah Boss Patient Status:  Inpatient    1945 MRN [id number]   Location [facility] 3W/SW Attending Joel Rogers MD   Hosp Day # 2 PCP Diana Lopez DO     Date of Admission: 3/8/2024     Date of Discharge: 03/10/24      Lace+ Score: 78  59-90 High Risk  29-58 Medium Risk  0-28   Low Risk.    TCM Follow-Up Recommendation:  LACE > 58: High Risk of readmission after discharge from the hospital.    DISCHARGE DX: Principal Problem:    Altered mental status, unspecified altered mental status type  Active Problems:    Other chronic pain    Anemia    Paraparesis of both lower limbs (HCC)       The patient was seen and examined on day of discharge and this discharge summary is in conjunction with any daily progress note from day of discharge.    HPI per admitting physician: \"This is a 78 year oldfemale who is brought into the emergency department after been found with altered mental status.  Patient was reportedly found on ground.  Per ED physician, last known well was around 1 day prior to admission.  At time of interview, patient becoming more alert.  Patient described feeling generally weak.  Patient also noted multiple falls over the past several days.  Patient unable to state if she hit her head or lost consciousness.  Patient complaining of some generalized pain and is asking for her home oxycodone.  Patient described generalized weakness in both of her lower extremities.  Patient denied focal deficits or sensory changes.  Patient otherwise denied chest pain, shortness of breath, abdominal pain, nausea or any confused chills. \"    Hospital Course:        Altered mental status, unspecified altered mental status type  -Unclear etiology  -Initial concern for possible CVA  -Initiated on stroke protocol  -MRI, CT and CTA imaging without signs of acute stroke  -Possibly metabolic encephalopathy in patient with signs of  electrolyte abnormalities and CKD.  -Patient also developed diarrhea.  C. difficile noted to be positive by PCR, but with negative EIA.  As patient with active symptoms of abdominal cramping and diarrhea, starting p.o. vancomycin.  -Also some concern for possible toxic encephalopathy in patient with chronic opioid and benzodiazepine use.  -Recommend holding narcotics and benzos at this time.     Acute Kidney Injury on CKD  III   -Resolving  -With hyperkalemia  -Weaned off IVF  - Avoiding Nephrotoxic agents       Other chronic pain  -Patient on chronic oxycodone  -Attempting to limit opioids in setting of altered mental status as above      Recurrent falls  -Possibly related to electrolyte abnormalities and opioid use  -PT/OT rec outpt therapy     Anemia of chronic disease  -Patient with history of CKD  -Hemoglobin similar to previous  -No signs of acute blood loss noted     Diarrhea   -C. difficile noted to be positive by PCR, but with negative EIA.    -As patient with active symptoms of abdominal cramping and diarrhea, complete course of p.o. vancomycin.        Physical Exam:    Vitals:    03/09/24 2049 03/10/24 0431 03/10/24 0512 03/10/24 0849   BP: (!) 188/54  (!) 162/70 159/70   BP Location: Right arm  Right arm Right arm   Pulse:    75   Resp: 16  18 18   Temp: 98.3 °F (36.8 °C)  97.9 °F (36.6 °C) 98.5 °F (36.9 °C)   TempSrc: Oral  Oral Oral   SpO2: 100%  98% 100%   Weight:  169 lb 1.6 oz (76.7 kg) 170 lb (77.1 kg)      Patient Weight for the past 72 hrs:   Weight   03/08/24 2122 178 lb 9.6 oz (81 kg)   03/09/24 0400 174 lb 6.4 oz (79.1 kg)   03/10/24 0431 169 lb 1.6 oz (76.7 kg)   03/10/24 0512 170 lb (77.1 kg)       Intake/Output Summary (Last 24 hours) at 3/10/2024 1133  Last data filed at 3/10/2024 0900  Gross per 24 hour   Intake 380 ml   Output --   Net 380 ml       GENERAL:  Awake and alert, in no acute distress.  HEART:  Regular rhythm, regular rate  LUNGS:  Air entry was minimally decreased.  No  crackles or wheezes   ABDOMEN: Soft and non-tender.  EXTREMITIES: Chronic bilateral lower extremity edema appreciated  NEUROLOGICAL: AAO x 3, person, hospital, 2024.  Generalized weakness.     PSYCHIATRIC: Normal mood    CULTURE:   No results found for this visit on 03/08/24.    IMAGING STUDIES: SOME MAY NEED FOLLOW UP WITH PCP   MRI BRAIN WO ACUTE (3) SEQUENCE (CPT=70551)    Result Date: 3/8/2024  CONCLUSION:   Fast diffusion protocol with limited evaluation of the brain parenchyma.  Mild chronic microvascular ischemic disease without acute ischemia or hemorrhage.    Dictated by (CST): Rolan Dyer MD on 3/08/2024 at 7:59 PM     Finalized by (CST): Rolan Dyer MD on 3/08/2024 at 8:02 PM          CT STROKE CTA BRAIN/CTA NECK (W IV)(CPT=70496/70685)    Result Date: 3/8/2024  CONCLUSION:  1. No major vessel occlusion, hemodynamically significant stenosis, dissection, AVM or aneurysm. 2. Mild emphysema. 3. Mild carotid atherosclerosis.  This report was called to Dr. Conway at 7:38 a.m.    Dictated by (CST): Eric Alarcon MD on 3/08/2024 at 7:26 AM     Finalized by (CST): Eric Alarcon MD on 3/08/2024 at 7:41 AM          CT STROKE BRAIN (NO IV)(CPT=70450)    Result Date: 3/8/2024  CONCLUSION:   No acute intracranial abnormality.  No acute intracranial hemorrhage.  These findings were communicated with Dr. Shaggy Conway by Dr. Jagdish Cisneros at 0725 hours using telephone on 03/08/2024.    Dictated by (CST): Jagdish Cisneros MD on 3/08/2024 at 7:23 AM     Finalized by (CST): Jagdish Cisneros MD on 3/08/2024 at 7:25 AM           LABS :     Lab Results   Component Value Date    WBC 6.9 03/10/2024    HGB 8.2 (L) 03/10/2024    HCT 26.5 (L) 03/10/2024    .0 03/10/2024    CREATSERUM 1.36 (H) 03/10/2024    BUN 18 03/10/2024     03/10/2024    K 4.2 03/10/2024     (H) 03/10/2024    CO2 27.0 03/10/2024    GLU 98 03/10/2024    CA 9.2 03/10/2024    ALB 3.9 03/08/2024    ALKPHO 219 (H) 03/08/2024    BILT 0.3 03/08/2024     TP 6.7 03/08/2024    AST 45 (H) 03/08/2024    ALT 29 03/08/2024    PTT 35.0 03/08/2024    INR 1.30 (H) 03/08/2024    TSH 0.615 10/07/2023    LIP 16 10/06/2023    DDIMER 1.13 (H) 06/05/2023    MG 1.9 03/10/2024    PHOS 2.8 01/23/2024    TROP 0.398 (HH) 09/13/2021     (H) 12/31/2023    B12 399 04/22/2022       Recent Labs   Lab 03/08/24  0725 03/09/24  0851 03/10/24  0536   RBC 2.93* 3.43* 2.93*   HGB 8.8* 9.8* 8.2*   HCT 26.9* 31.2* 26.5*   MCV 91.8 91.0 90.4   MCH 30.0 28.6 28.0   MCHC 32.7 31.4 30.9*   RDW 14.2 13.7 13.7   NEPRELIM 7.32 6.28 4.99   WBC 8.0 7.7 6.9   .0 226.0 179.0     Recent Labs   Lab 03/08/24  0725 03/09/24  0851 03/10/24  0536   * 101* 98   BUN 25* 20 18   CREATSERUM 1.54* 1.30* 1.36*   CA 9.0 9.6 9.2   ALB 3.9  --   --     143 145   K 5.2* 4.2 4.2   * 114* 114*   CO2 24.0 26.0 27.0   ALKPHO 219*  --   --    AST 45*  --   --    ALT 29  --   --    BILT 0.3  --   --    TP 6.7  --   --      Lab Results   Component Value Date    INR 1.30 (H) 03/08/2024    INR 1.21 (H) 01/19/2024    INR 1.11 10/07/2023       Disposition: Discharge to Home    Condition at Discharge: Stable     Discharge Medications:      Discharge Medications        START taking these medications        Instructions Prescription details   lisinopril 10 MG Tabs  Commonly known as: Zestril  Start taking on: March 11, 2024      Take 1 tablet (10 mg total) by mouth daily.   Stop taking on: April 10, 2024  Quantity: 30 tablet  Refills: 0     vancomycin 125 MG Caps  Commonly known as: Vancocin      Take 1 capsule (125 mg total) by mouth 4 (four) times daily for 10 days.   Stop taking on: March 20, 2024  Quantity: 40 capsule  Refills: 0            CONTINUE taking these medications        Instructions Prescription details   apixaban 5 MG Tabs  Commonly known as: Eliquis      Take 1 tablet (5 mg total) by mouth 2 (two) times daily.   Quantity: 60 tablet  Refills: 0     atorvastatin 40 MG Tabs  Commonly known as:  Lipitor      Take 1 tablet (40 mg total) by mouth. AT BEDTIME   Refills: 3     cholecalciferol 50 MCG (2000 UT) Tabs      Take 1 tablet (2,000 Units total) by mouth daily.   Refills: 0     ferrous sulfate 325 (65 FE) MG Tbec      Take 1 tablet (325 mg total) by mouth daily with breakfast. Three times a week   Refills: 0     furosemide 20 MG Tabs  Commonly known as: Lasix      Take 1 tablet (20 mg total) by mouth 3 (three) times a week.   Quantity: 30 tablet  Refills: 1     gabapentin 300 MG Caps  Commonly known as: Neurontin      Take 1 capsule (300 mg total) by mouth nightly.   Refills: 0     meclizine 25 MG Tabs  Commonly known as: Antivert      Take 1 tablet (25 mg total) by mouth.   Refills: 0            STOP taking these medications      amoxicillin clavulanate 875-125 MG Tabs  Commonly known as: Augmentin        diazePAM 10 MG Tabs  Commonly known as: Valium        dilTIAZem  MG Cp24  Commonly known as: Cardizem CD        oxyCODONE-acetaminophen  MG Tabs  Commonly known as: Percocet                  Where to Get Your Medications        These medications were sent to [facility] [address] [place], [place] - [address] [facility] [phone], [phone]  [address] [facility] [address]      Phone: [phone]   lisinopril 10 MG Tabs  vancomycin 125 MG Caps         Follow up Visits  Prestno King DO  [address] [id number] [phone]    Follow up in 1 month(s)  Neuro/Stroke follow up    Diana Lopez DO    Consultants         Provider   Role Specialty     Preston King DO  Consulting Physician NEUROLOGY              Other Discharge Instructions:       ----------------------------------------------------  36 MIN SPENT ON THIS DC   Joel Rogers MD    3/10/2024

## 2024-03-10 NOTE — PROGRESS NOTES
[facility]  [address] [place] [address], [address]  [facility] [address]  [phone]        INPATIENT NEUROLOGY   FOLLOW UP PROGRESS NOTE  [facility]  part of [facility]    Hannah Boss Patient Status:  Inpatient     1945 MRN [id number]    Location [facility] 3W/SW Attending Joel Rogers MD    Hosp Day # 1 PCP Diana Lopez DO    Date of Admission:  3/8/2024  Date of Consult Follow Up:  3/9/2024     Assessment and Plan:   Hannah Boss is a 78 year old {NEU_HANDEDNESS:840::\"right handed\"} female w/ a pmhx sig. for  *** who ***. On exam ****.     ***  Differential Diagnosis:  ***  ***     Plan    Diagnostics:  ***  ***  Therapeutics:  ***  Neurochecks Q4    1. Altered mental status, unspecified altered mental status type         Procedures    CBC With Differential With Platelet    Comp Metabolic Panel (14)    Troponin I (High Sensitivity)    Urinalysis with Culture Reflex    Prothrombin Time (PT)    PTT, Activated    Basic Metabolic Panel (8)    Lipid Panel    Magnesium    CBC With Differential With Platelet    Magnesium    CBC With Differential With Platelet    Basic Metabolic Panel (8)    CT STROKE BRAIN (NO IV)(CPT=70450)    CT STROKE CTA BRAIN/CTA NECK (W IV)(CPT=70496/47305)    MRI BRAIN WO ACUTE (3) SEQUENCE (CPT=70551)           INTERVAL EVENTS  24: ***       SUBJECTIVE:     ***  Pertinent positive and negatives per HPI.  All others were reviewed and negative.       Objective   OBJECTIVE:   Last vitals and weight :  Blood pressure (!) 188/54, pulse 75, temperature 98.3 °F (36.8 °C), temperature source Oral, resp. rate 16, weight 174 lb 6.4 oz (79.1 kg), SpO2 100%, not currently breastfeeding.   Vitals:    24 1324 24 1800 24 1900 24 2049   BP: (!) 189/54   (!) 188/54   BP Location: Right arm   Right arm   Pulse: 61 75 75    Resp:    16   Temp:    98.3 °F (36.8 °C)   TempSrc:    Oral   SpO2:    100%   Weight:           Exam:  - General: {Exam; general:68329::\"appears stated age\",\"no distress\"}  - CV: {CARDIOVASCULAR ASSESSMENT:172091451:s:\"S1, S2 normal, no murmur, click, rub or gallop, regular rate and rhythm.\"}   Carotids: {[facility] AMB CAROTID BRUIT:0586234030}  - Pulmonary:{[facility] ANE Exam, Pulmonary:19374}   Neurologic Exam  - Mental Status: Alert and attentive. {Orientation:69991}.  Speech is spontaneous, fluent, and prosodic. Comprehension intact. Repetition intact. Phrase length and rate are normal. No paraphasic errors, neologisms, anomia, neglect, apraxia, or R/L confusion.   - Cranial Nerves: No gaze preference. Visual fields:{FINDINGS; EYE EXAM VISUAL FIELD DEFECTS:12242::\"normal\"}  Pupils are {Numbers; 0-6:88816::\"4\"}mm briskly constricting to {Numbers; 0-6:87755::\"3\"}mm and equally round and reactive to light  in a well lit room. EOMI. No nystagmus. No ptosis. V1-V3 intact B/L to light touch.{trace subtle:59505::\"No pathological facial asymmetry.\"} {nasolabial fold:21197::\"No flattening of the nasolabial fold. \"}.  Hearing grossly intact.  Tongue midline. No atrophy or fasiculations of the tongue noted. Palate and uvula elevate symmetrically.  Shoulder shrug symmetric.  - Motor:  normal tone, normal bulk. No interosseous wasting. No flattening of hypothenar eminences.    Right Left     Motor Strength    5 5   Knee extensors 5 5    Pronator drift: {Pronator drift:21302::\"No pronator drift\"}   Index finger rolling: No orbiting.   Finger Taps: {finger taps:21303::\"Finger taps are symmetric in rate and amplitude. \"}.    Rapid movements: {rapid movements:9369::\"Rapid/fine movements are symmetric. As expected their dominant hand is slightly faster.\"}   Leg Drift: none    Asterixis: No asterixis noted.   Tremor:   - Sensory:   Light touch:normal   Temperature: normal  Vibration: normal   - Cerebellum: No truncal ataxia. No titubations. No dysmetria, no dysdiadochokinesis. No overshoot.  Reflexes:    C5 C6 C7  L4 S1   R  2+ 2+  2+ 2+   L 2+ 2+  2+ 2+   Adductor Spread: {adductor spread:9371::\"No adductor spread noted.\"}      Lucy's sign:{Responses; absent/present cva tenderness:707::\"absent\"}   Nonsustained clonus: Absent   Sustained clonus: Absent   - Plantar response: {EXAM; NEURO PED BABINSKI:30734::\"flexor bilaterally\"}    Medications:   atorvastatin  40 mg Oral Nightly    lisinopril  10 mg Oral Daily    [START ON 3/10/2024] aspirin  81 mg Oral Daily    vancomycin  125 mg Oral QID    gabapentin  300 mg Oral Nightly    apixaban  5 mg Oral BID       PRNS: acetaminophen **OR** acetaminophen, labetalol, hydrALAzine, ondansetron, metoclopramide    Infusions:          Results:   Laboratory Data:  Lab Results   Component Value Date    WBC 7.7 03/09/2024    HGB 9.8 (L) 03/09/2024    HCT 31.2 (L) 03/09/2024    .0 03/09/2024    CREATSERUM 1.30 (H) 03/09/2024    BUN 20 03/09/2024     03/09/2024    K 4.2 03/09/2024     (H) 03/09/2024    CO2 26.0 03/09/2024     (H) 03/09/2024    CA 9.6 03/09/2024    ALB 3.9 03/08/2024    ALKPHO 219 (H) 03/08/2024    TP 6.7 03/08/2024    AST 45 (H) 03/08/2024    ALT 29 03/08/2024    PTT 35.0 03/08/2024    INR 1.30 (H) 03/08/2024    PTP 17.0 (H) 03/08/2024    TSH 0.615 10/07/2023    LIP 16 10/06/2023    DDIMER 1.13 (H) 06/05/2023    MG 1.8 03/09/2024    PHOS 2.8 01/23/2024    TROP 0.398 (HH) 09/13/2021     (H) 12/31/2023    B12 399 04/22/2022     Recent Results (from the past 72 hour(s))   POCT Glucose    Collection Time: 03/08/24  6:58 AM   Result Value Ref Range    POC Glucose  191 (H) 70 - 99 mg/dL   Comp Metabolic Panel (14)    Collection Time: 03/08/24  7:25 AM   Result Value Ref Range    Glucose 150 (H) 70 - 99 mg/dL    Sodium 140 136 - 145 mmol/L    Potassium 5.2 (H) 3.5 - 5.1 mmol/L    Chloride 114 (H) 98 - 112 mmol/L    CO2 24.0 21.0 - 32.0 mmol/L    Anion Gap 2 0 - 18 mmol/L    BUN 25 (H) 9 - 23 mg/dL    Creatinine 1.54 (H) 0.55 - 1.02 mg/dL    BUN/CREA Ratio 16.2  10.0 - 20.0    Calcium, Total 9.0 8.7 - 10.4 mg/dL    Calculated Osmolality 297 (H) 275 - 295 mOsm/kg    eGFR-Cr 35 (L) >=60 mL/min/1.73m2    ALT 29 10 - 49 U/L    AST 45 (H) <=34 U/L    Alkaline Phosphatase 219 (H) 55 - 142 U/L    Bilirubin, Total 0.3 0.2 - 1.1 mg/dL    Total Protein 6.7 5.7 - 8.2 g/dL    Albumin 3.9 3.2 - 4.8 g/dL    Globulin  2.8 2.8 - 4.4 g/dL    A/G Ratio 1.4 1.0 - 2.0   Troponin I (High Sensitivity)    Collection Time: 03/08/24  7:25 AM   Result Value Ref Range    Troponin I (High Sensitivity) 11 <=34 ng/L   RAINBOW DRAW LAVENDER    Collection Time: 03/08/24  7:25 AM   Result Value Ref Range    Hold Lavender Auto Resulted    RAINBOW DRAW LIGHT GREEN    Collection Time: 03/08/24  7:25 AM   Result Value Ref Range    Hold Lt Green Auto Resulted    CBC W/ DIFFERENTIAL    Collection Time: 03/08/24  7:25 AM   Result Value Ref Range    WBC 8.0 4.0 - 11.0 x10(3) uL    RBC 2.93 (L) 3.80 - 5.30 x10(6)uL    HGB 8.8 (L) 12.0 - 16.0 g/dL    HCT 26.9 (L) 35.0 - 48.0 %    MCV 91.8 80.0 - 100.0 fL    MCH 30.0 26.0 - 34.0 pg    MCHC 32.7 31.0 - 37.0 g/dL    RDW-SD 48.2 (H) 35.1 - 46.3 fL    RDW 14.2 11.0 - 15.0 %    .0 150.0 - 450.0 10(3)uL    Neutrophil Absolute Prelim 7.32 1.50 - 7.70 x10 (3) uL    Neutrophil Absolute 7.32 1.50 - 7.70 x10(3) uL    Lymphocyte Absolute 0.48 (L) 1.00 - 4.00 x10(3) uL    Monocyte Absolute 0.16 0.10 - 1.00 x10(3) uL    Eosinophil Absolute 0.01 0.00 - 0.70 x10(3) uL    Basophil Absolute 0.02 0.00 - 0.20 x10(3) uL    Immature Granulocyte Absolute 0.02 0.00 - 1.00 x10(3) uL    Neutrophil % 91.5 %    Lymphocyte % 6.0 %    Monocyte % 2.0 %    Eosinophil % 0.1 %    Basophil % 0.2 %    Immature Granulocyte % 0.2 %   Urinalysis with Culture Reflex    Collection Time: 03/08/24  7:38 AM    Specimen: Urine, clean catch   Result Value Ref Range    Urine Color Light-Yellow Yellow    Clarity Urine Clear Clear    Spec Gravity 1.017 1.005 - 1.030    Glucose Urine Normal Normal mg/dL     Bilirubin Urine Negative Negative    Ketones Urine Negative Negative mg/dL    Blood Urine Trace (A) Negative    pH Urine 5.5 5.0 - 8.0    Protein Urine 20 (A) Negative mg/dL    Urobilinogen Urine Normal Normal    Nitrite Urine Negative Negative    Leukocyte Esterase Urine Negative Negative    WBC Urine 1-5 0 - 5 /HPF    RBC Urine 3-5 (A) 0 - 2 /HPF    Bacteria Urine None Seen None Seen /HPF    Squamous Epi. Cells None Seen None Seen /HPF    Renal Tubular Epithelial Cells None Seen None Seen /HPF    Transitional Cells None Seen None Seen /HPF    Yeast Urine None Seen None Seen /HPF   RAINBOW DRAW GOLD    Collection Time: 03/08/24  7:39 AM   Result Value Ref Range    Hold Gold Auto Resulted    EKG    Collection Time: 03/08/24  7:39 AM   Result Value Ref Range    Ventricular rate 82 BPM    Atrial rate 82 BPM    P-R Interval 192 ms    QRS Duration 66 ms    Q-T Interval 400 ms    QTC Calculation (Bezet) 467 ms    P Axis 81 degrees    R Axis 64 degrees    T Axis 88 degrees   RAINBOW DRAW LAVENDER    Collection Time: 03/08/24  7:40 AM   Result Value Ref Range    Hold Lavender Auto Resulted    RAINBOW DRAW LIGHT GREEN    Collection Time: 03/08/24  7:40 AM   Result Value Ref Range    Hold Lt Green Auto Resulted    RAINBOW DRAW BLUE    Collection Time: 03/08/24  8:53 AM   Result Value Ref Range    Hold Blue Auto Resulted    Prothrombin Time (PT)    Collection Time: 03/08/24  9:24 AM   Result Value Ref Range    PT 17.0 (H) 11.6 - 14.8 seconds    INR 1.30 (H) 0.80 - 1.20   PTT, Activated    Collection Time: 03/08/24  9:24 AM   Result Value Ref Range    PTT 35.0 23.3 - 35.6 seconds   POCT Glucose    Collection Time: 03/08/24 12:00 PM   Result Value Ref Range    POC Glucose  135 (H) 70 - 99 mg/dL   POCT Glucose    Collection Time: 03/08/24  5:27 PM   Result Value Ref Range    POC Glucose  98 70 - 99 mg/dL   POCT Glucose    Collection Time: 03/09/24 12:18 AM   Result Value Ref Range    POC Glucose  104 (H) 70 - 99 mg/dL   POCT  Glucose    Collection Time: 03/09/24  6:23 AM   Result Value Ref Range    POC Glucose  110 (H) 70 - 99 mg/dL   Basic Metabolic Panel (8)    Collection Time: 03/09/24  8:51 AM   Result Value Ref Range    Glucose 101 (H) 70 - 99 mg/dL    Sodium 143 136 - 145 mmol/L    Potassium 4.2 3.5 - 5.1 mmol/L    Chloride 114 (H) 98 - 112 mmol/L    CO2 26.0 21.0 - 32.0 mmol/L    Anion Gap 3 0 - 18 mmol/L    BUN 20 9 - 23 mg/dL    Creatinine 1.30 (H) 0.55 - 1.02 mg/dL    BUN/CREA Ratio 15.4 10.0 - 20.0    Calcium, Total 9.6 8.7 - 10.4 mg/dL    Calculated Osmolality 299 (H) 275 - 295 mOsm/kg    eGFR-Cr 43 (L) >=60 mL/min/1.73m2   Lipid Panel    Collection Time: 03/09/24  8:51 AM   Result Value Ref Range    Cholesterol, Total 115 <200 mg/dL    HDL Cholesterol 56 40 - 59 mg/dL    Triglycerides 80 30 - 149 mg/dL    LDL Cholesterol 43 <100 mg/dL    VLDL 11 0 - 30 mg/dL    Non HDL Chol 59 <130 mg/dL   Magnesium    Collection Time: 03/09/24  8:51 AM   Result Value Ref Range    Magnesium 1.8 1.6 - 2.6 mg/dL   CBC W/ DIFFERENTIAL    Collection Time: 03/09/24  8:51 AM   Result Value Ref Range    WBC 7.7 4.0 - 11.0 x10(3) uL    RBC 3.43 (L) 3.80 - 5.30 x10(6)uL    HGB 9.8 (L) 12.0 - 16.0 g/dL    HCT 31.2 (L) 35.0 - 48.0 %    MCV 91.0 80.0 - 100.0 fL    MCH 28.6 26.0 - 34.0 pg    MCHC 31.4 31.0 - 37.0 g/dL    RDW-SD 46.2 35.1 - 46.3 fL    RDW 13.7 11.0 - 15.0 %    .0 150.0 - 450.0 10(3)uL    Neutrophil Absolute Prelim 6.28 1.50 - 7.70 x10 (3) uL    Neutrophil Absolute 6.28 1.50 - 7.70 x10(3) uL    Lymphocyte Absolute 1.02 1.00 - 4.00 x10(3) uL    Monocyte Absolute 0.26 0.10 - 1.00 x10(3) uL    Eosinophil Absolute 0.07 0.00 - 0.70 x10(3) uL    Basophil Absolute 0.03 0.00 - 0.20 x10(3) uL    Immature Granulocyte Absolute 0.02 0.00 - 1.00 x10(3) uL    Neutrophil % 81.7 %    Lymphocyte % 13.3 %    Monocyte % 3.4 %    Eosinophil % 0.9 %    Basophil % 0.4 %    Immature Granulocyte % 0.3 %   Clostridium difficile(toxigenic)PCR    Collection  Time: 03/09/24 10:59 AM    Specimen: Stool   Result Value Ref Range    C. Difficile Toxin B Gene Positive (A) Negative   Clostridium difficile by EIA    Collection Time: 03/09/24 10:59 AM    Specimen: Stool   Result Value Ref Range    Expression of C. difficile toxin A/B Genes Not Detected Not Detected   POCT Glucose    Collection Time: 03/09/24  1:28 PM   Result Value Ref Range    POC Glucose  120 (H) 70 - 99 mg/dL         Test results/Imaging:   CT STROKE CTA BRAIN/CTA NECK (W IV)(CPT=70496/51471)    Result Date: 3/8/2024  CONCLUSION:  1. No major vessel occlusion, hemodynamically significant stenosis, dissection, AVM or aneurysm. 2. Mild emphysema. 3. Mild carotid atherosclerosis.  This report was called to Dr. Conway at 7:38 a.m.    Dictated by (CST): Eric Alarcon MD on 3/08/2024 at 7:26 AM     Finalized by (CST): Eric Alarcon MD on 3/08/2024 at 7:41 AM          CT STROKE BRAIN (NO IV)(CPT=70450)    Result Date: 3/8/2024  CONCLUSION:   No acute intracranial abnormality.  No acute intracranial hemorrhage.  These findings were communicated with Dr. Shaggy Conway by Dr. Jagdish Cisneros at 0725 hours using telephone on 03/08/2024.    Dictated by (CST): Jagdish Cisneros MD on 3/08/2024 at 7:23 AM     Finalized by (CST): Jagdish Cisneros MD on 3/08/2024 at 7:25 AM          CT BRAIN OR HEAD (20140)    Result Date: 12/31/2023  CONCLUSION:  No acute intracranial hemorrhage, hydrocephalus, or mass effect.  If there is clinical concern for acute ischemic stroke, MRI of the brain is recommended.    Dictated by (CST): Celi Jimenez MD on 12/31/2023 at 3:21 PM     Finalized by (CST): Celi Jimenez MD on 12/31/2023 at 3:25 PM          CT BRAIN OR HEAD (53605)    Result Date: 12/12/2023  CONCLUSION:  1.  No acute intracranial process. 2.  There are mild microvascular white matter ischemic changes, likely related to long-standing hypertension and/or diabetes. 3.  Atherosclerosis in the anterior and posterior circulations.    Dictated by (CST): Galileo Oliveros MD on 2023 at 10:49 AM     Finalized by (CST): Galileo Oliveros MD on 2023 at 10:52 AM          EKG    Result Date: 3/8/2024  Normal sinus rhythm Possible Left atrial enlargement Borderline ECG When compared with ECG of 2024 14:41, No significant change was found Confirmed by BRITTA BRIZUELA ([id number]) on 3/8/2024 5:39:57 PM     Performed an independent visualization of ***  Imaging revealed:  ***    Education/Instructions given to: {Persons; family members:866548}   Barriers to Learning:{BARRIERS TO EDUCATION:3997::\"None\"}  Content: Refer to note above. Evaluation/Outcome: {Verbalized understandin::\"Verbalized understanding\"}    Disclaimer:   This record was dictated using Dragon software. There may be errors due to voice recognition problems that were not realized and corrected during the completion of the note.      This document is not intended to support charting by exception.  Sections left blank in a completed note should be presumed not to have been done.     Total face to face time was ***, more than 50% of the time was spent in counseling and/or coordination of care related to ***.    Thank you.  Preston King D.O.   Vascular & General Neurology    3/9/2024  10:44 PM

## 2024-03-10 NOTE — DISCHARGE INSTRUCTIONS
HOME HEALTH:    Sometimes managing your health at home requires assistance.  The [facility] team has recognized your preference to use   [employer]  [facility] [place] [address]  Phone: [phone]  Fax: [phone]  A representative from the home health agency will contact you or your family to schedule your first visit.

## 2024-03-10 NOTE — PLAN OF CARE
Cleared for discharge. Instructions given and reviewed with patient. All belonging with patient. Family here to transport patient home.   Problem: PAIN - ADULT  Goal: Verbalizes/displays adequate comfort level or patient's stated pain goal  Description: INTERVENTIONS:  - Encourage pt to monitor pain and request assistance  - Assess pain using appropriate pain scale  - Administer analgesics based on type and severity of pain and evaluate response  - Implement non-pharmacological measures as appropriate and evaluate response  - Consider cultural and social influences on pain and pain management  - Manage/alleviate anxiety  - Utilize distraction and/or relaxation techniques  - Monitor for opioid side effects  - Notify MD/LIP if interventions unsuccessful or patient reports new pain  - Anticipate increased pain with activity and pre-medicate as appropriate  Outcome: Adequate for Discharge     Problem: RISK FOR INFECTION - ADULT  Goal: Absence of fever/infection during anticipated neutropenic period  Description: INTERVENTIONS  - Monitor WBC  - Administer growth factors as ordered  - Implement neutropenic guidelines  Outcome: Adequate for Discharge     Problem: SAFETY ADULT - FALL  Goal: Free from fall injury  Description: INTERVENTIONS:  - Assess pt frequently for physical needs  - Identify cognitive and physical deficits and behaviors that affect risk of falls.  - [place] fall precautions as indicated by assessment.  - Educate pt/family on patient safety including physical limitations  - Instruct pt to call for assistance with activity based on assessment  - Modify environment to reduce risk of injury  - Provide assistive devices as appropriate  - Consider OT/PT consult to assist with strengthening/mobility  - Encourage toileting schedule  Outcome: Adequate for Discharge     Problem: DISCHARGE PLANNING  Goal: Discharge to home or other facility with appropriate resources  Description: INTERVENTIONS:  - Identify  barriers to discharge w/pt and caregiver  - Include patient/family/discharge partner in discharge planning  - Arrange for needed discharge resources and transportation as appropriate  - Identify discharge learning needs (meds, wound care, etc)  - Arrange for interpreters to assist at discharge as needed  - Consider post-discharge preferences of patient/family/discharge partner  - Complete POLST form as appropriate  - Assess patient's ability to be responsible for managing their own health  - Refer to Case Management Department for coordinating discharge planning if the patient needs post-hospital services based on physician/LIP order or complex needs related to functional status, cognitive ability or social support system  Outcome: Adequate for Discharge     Problem: Patient Centered Care  Goal: Patient preferences are identified and integrated in the patient's plan of care  Description: Interventions:  - What would you like us to know as we care for you? From home with son  - Provide timely, complete, and accurate information to patient/family  - Incorporate patient and family knowledge, values, beliefs, and cultural backgrounds into the planning and delivery of care  - Encourage patient/family to participate in care and decision-making at the level they choose  - Honor patient and family perspectives and choices  Outcome: Adequate for Discharge     Problem: Diabetes/Glucose Control  Goal: Glucose maintained within prescribed range  Description: INTERVENTIONS:  - Monitor Blood Glucose as ordered  - Assess for signs and symptoms of hyperglycemia and hypoglycemia  - Administer ordered medications to maintain glucose within target range  - Assess barriers to adequate nutritional intake and initiate nutrition consult as needed  - Instruct patient on self management of diabetes  Outcome: Adequate for Discharge     Problem: Patient/Family Goals  Goal: Patient/Family Long Term Goal  Description: Patient's Long Term Goal:  discharge home    Interventions:  - Stroke w/u  Monitor vitals  Monitor labs  Neuro checks  - See additional Care Plan goals for specific interventions  Outcome: Adequate for Discharge  Goal: Patient/Family Short Term Goal  Description: Patient's Short Term Goal: become stronger    Interventions:   - PT/OT eval  Increase mobility  - See additional Care Plan goals for specific interventions  Outcome: Adequate for Discharge     Problem: NEUROLOGICAL - ADULT  Goal: Achieves stable or improved neurological status  Description: INTERVENTIONS  - Assess for and report changes in neurological status  - Initiate measures to prevent increased intracranial pressure  - Maintain blood pressure and fluid volume within ordered parameters to optimize cerebral perfusion and minimize risk of hemorrhage  - Monitor temperature, glucose, and sodium. Initiate appropriate interventions as ordered  Outcome: Adequate for Discharge  Goal: Achieves maximal functionality and self care  Description: INTERVENTIONS  - Monitor swallowing and airway patency with patient fatigue and changes in neurological status  - Encourage and assist patient to increase activity and self care with guidance from PT/OT  - Encourage visually impaired, hearing impaired and aphasic patients to use assistive/communication devices  Outcome: Adequate for Discharge     Problem: Impaired Functional Mobility  Goal: Achieve highest/safest level of mobility/gait  Description: Interventions:  - Assess patient's functional ability and stability  - Promote increasing activity/tolerance for mobility and gait  - Educate and engage patient/family in tolerated activity level and precautions  - Recommend use of  RW for transfers and ambulation  Outcome: Adequate for Discharge     Problem: Impaired Activities of Daily Living  Goal: Achieve highest/safest level of independence in self care  Description: Interventions:  - Assess ability and encourage patient to participate in ADLs to  maximize function  - Promote sitting position while performing ADLs such as feeding, grooming, and bathing  - Educate and encourage patient/family in tolerated functional activity level and precautions during self-care    Outcome: Adequate for Discharge     Problem: Impaired Swallowing  Goal: Minimize aspiration risk  Description: Interventions:  - Patient should be alert and upright for all feedings (90 degrees preferred)  - Offer food and liquids at a slow rate  - No straws  - Encourage small bites of food and small sips of liquid  - Offer pills one at a time, crush or deliver with applesauce as needed  - Discontinue feeding and notify MD (or speech pathologist) if coughing or persistent throat clearing or wet/gurgly vocal quality is noted  Outcome: Adequate for Discharge
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
## 2024-03-11 NOTE — PAYOR COMM NOTE
--------------  DISCHARGE REVIEW    Payor: LAUREEN ARTEAGA O  Subscriber #:  [id number]  Authorization Number: [id number]    Admit date: 3/8/24  Admit time:  11:48 AM  Discharge Date: 3/10/2024 12:47 PM         [facility]  part of [facility]    Discharge Summary    Hannah Boss Patient Status:  Inpatient    1945 MRN [id number]   Location [facility] 3W/SW Attending Joel Rogers MD   Hosp Day # 2 PCP Diana Lopez DO     Date of Admission: 3/8/2024     Date of Discharge: 03/10/24      Lace+ Score: 78  59-90 High Risk  29-58 Medium Risk  0-28   Low Risk.    TCM Follow-Up Recommendation:  LACE > 58: High Risk of readmission after discharge from the hospital.    DISCHARGE DX: Principal Problem:    Altered mental status, unspecified altered mental status type  Active Problems:    Other chronic pain    Anemia    Paraparesis of both lower limbs (HCC)       The patient was seen and examined on day of discharge and this discharge summary is in conjunction with any daily progress note from day of discharge.    HPI per admitting physician: \"This is a 78 year oldfemale who is brought into the emergency department after been found with altered mental status.  Patient was reportedly found on ground.  Per ED physician, last known well was around 1 day prior to admission.  At time of interview, patient becoming more alert.  Patient described feeling generally weak.  Patient also noted multiple falls over the past several days.  Patient unable to state if she hit her head or lost consciousness.  Patient complaining of some generalized pain and is asking for her home oxycodone.  Patient described generalized weakness in both of her lower extremities.  Patient denied focal deficits or sensory changes.  Patient otherwise denied chest pain, shortness of breath, abdominal pain, nausea or any confused chills. \"    Hospital Course:        Altered mental status, unspecified altered mental status  type  -Unclear etiology  -Initial concern for possible CVA  -Initiated on stroke protocol  -MRI, CT and CTA imaging without signs of acute stroke  -Possibly metabolic encephalopathy in patient with signs of electrolyte abnormalities and CKD.  -Patient also developed diarrhea.  C. difficile noted to be positive by PCR, but with negative EIA.  As patient with active symptoms of abdominal cramping and diarrhea, starting p.o. vancomycin.  -Also some concern for possible toxic encephalopathy in patient with chronic opioid and benzodiazepine use.  -Recommend holding narcotics and benzos at this time.     Acute Kidney Injury on CKD  III   -Resolving  -With hyperkalemia  -Weaned off IVF  - Avoiding Nephrotoxic agents       Other chronic pain  -Patient on chronic oxycodone  -Attempting to limit opioids in setting of altered mental status as above      Recurrent falls  -Possibly related to electrolyte abnormalities and opioid use  -PT/OT rec outpt therapy     Anemia of chronic disease  -Patient with history of CKD  -Hemoglobin similar to previous  -No signs of acute blood loss noted     Diarrhea   -C. difficile noted to be positive by PCR, but with negative EIA.    -As patient with active symptoms of abdominal cramping and diarrhea, complete course of p.o. vancomycin.        Physical Exam:    Vitals:    03/09/24 2049 03/10/24 0431 03/10/24 0512 03/10/24 0849   BP: (!) 188/54  (!) 162/70 159/70   BP Location: Right arm  Right arm Right arm   Pulse:    75   Resp: 16  18 18   Temp: 98.3 °F (36.8 °C)  97.9 °F (36.6 °C) 98.5 °F (36.9 °C)   TempSrc: Oral  Oral Oral   SpO2: 100%  98% 100%   Weight:  169 lb 1.6 oz (76.7 kg) 170 lb (77.1 kg)      Patient Weight for the past 72 hrs:   Weight   03/08/24 2122 178 lb 9.6 oz (81 kg)   03/09/24 0400 174 lb 6.4 oz (79.1 kg)   03/10/24 0431 169 lb 1.6 oz (76.7 kg)   03/10/24 0512 170 lb (77.1 kg)       Intake/Output Summary (Last 24 hours) at 3/10/2024 1133  Last data filed at 3/10/2024  0900  Gross per 24 hour   Intake 380 ml   Output --   Net 380 ml       GENERAL:  Awake and alert, in no acute distress.  HEART:  Regular rhythm, regular rate  LUNGS:  Air entry was minimally decreased.  No crackles or wheezes   ABDOMEN: Soft and non-tender.  EXTREMITIES: Chronic bilateral lower extremity edema appreciated  NEUROLOGICAL: AAO x 3, person, hospital, 2024.  Generalized weakness.     PSYCHIATRIC: Normal mood    CULTURE:   No results found for this visit on 03/08/24.    IMAGING STUDIES: SOME MAY NEED FOLLOW UP WITH PCP   MRI BRAIN WO ACUTE (3) SEQUENCE (CPT=70551)    Result Date: 3/8/2024  CONCLUSION:   Fast diffusion protocol with limited evaluation of the brain parenchyma.  Mild chronic microvascular ischemic disease without acute ischemia or hemorrhage.    Dictated by (CST): Rolan Dyer MD on 3/08/2024 at 7:59 PM     Finalized by (CST): Rolan Dyer MD on 3/08/2024 at 8:02 PM          CT STROKE CTA BRAIN/CTA NECK (W IV)(CPT=70496/64680)    Result Date: 3/8/2024  CONCLUSION:  1. No major vessel occlusion, hemodynamically significant stenosis, dissection, AVM or aneurysm. 2. Mild emphysema. 3. Mild carotid atherosclerosis.  This report was called to Dr. Conway at 7:38 a.m.    Dictated by (CST): Eric Alarcon MD on 3/08/2024 at 7:26 AM     Finalized by (CST): Eric Alarcon MD on 3/08/2024 at 7:41 AM          CT STROKE BRAIN (NO IV)(CPT=70450)    Result Date: 3/8/2024  CONCLUSION:   No acute intracranial abnormality.  No acute intracranial hemorrhage.  These findings were communicated with Dr. Shaggy Conway by Dr. Jagdish Cisneros at 0725 hours using telephone on 03/08/2024.    Dictated by (CST): Jagdish Cisneros MD on 3/08/2024 at 7:23 AM     Finalized by (CST): Jagdish Cisneros MD on 3/08/2024 at 7:25 AM           LABS :     Lab Results   Component Value Date    WBC 6.9 03/10/2024    HGB 8.2 (L) 03/10/2024    HCT 26.5 (L) 03/10/2024    .0 03/10/2024    CREATSERUM 1.36 (H) 03/10/2024    BUN 18 03/10/2024      03/10/2024    K 4.2 03/10/2024     (H) 03/10/2024    CO2 27.0 03/10/2024    GLU 98 03/10/2024    CA 9.2 03/10/2024    ALB 3.9 03/08/2024    ALKPHO 219 (H) 03/08/2024    BILT 0.3 03/08/2024    TP 6.7 03/08/2024    AST 45 (H) 03/08/2024    ALT 29 03/08/2024    PTT 35.0 03/08/2024    INR 1.30 (H) 03/08/2024    TSH 0.615 10/07/2023    LIP 16 10/06/2023    DDIMER 1.13 (H) 06/05/2023    MG 1.9 03/10/2024    PHOS 2.8 01/23/2024    TROP 0.398 (HH) 09/13/2021     (H) 12/31/2023    B12 399 04/22/2022       Recent Labs   Lab 03/08/24  0725 03/09/24  0851 03/10/24  0536   RBC 2.93* 3.43* 2.93*   HGB 8.8* 9.8* 8.2*   HCT 26.9* 31.2* 26.5*   MCV 91.8 91.0 90.4   MCH 30.0 28.6 28.0   MCHC 32.7 31.4 30.9*   RDW 14.2 13.7 13.7   NEPRELIM 7.32 6.28 4.99   WBC 8.0 7.7 6.9   .0 226.0 179.0     Recent Labs   Lab 03/08/24  0725 03/09/24  0851 03/10/24  0536   * 101* 98   BUN 25* 20 18   CREATSERUM 1.54* 1.30* 1.36*   CA 9.0 9.6 9.2   ALB 3.9  --   --     143 145   K 5.2* 4.2 4.2   * 114* 114*   CO2 24.0 26.0 27.0   ALKPHO 219*  --   --    AST 45*  --   --    ALT 29  --   --    BILT 0.3  --   --    TP 6.7  --   --      Lab Results   Component Value Date    INR 1.30 (H) 03/08/2024    INR 1.21 (H) 01/19/2024    INR 1.11 10/07/2023       Disposition: Discharge to Home    Condition at Discharge: Stable     Discharge Medications:      Discharge Medications        START taking these medications        Instructions Prescription details   lisinopril 10 MG Tabs  Commonly known as: Zestril  Start taking on: March 11, 2024      Take 1 tablet (10 mg total) by mouth daily.   Stop taking on: April 10, 2024  Quantity: 30 tablet  Refills: 0     vancomycin 125 MG Caps  Commonly known as: Vancocin      Take 1 capsule (125 mg total) by mouth 4 (four) times daily for 10 days.   Stop taking on: March 20, 2024  Quantity: 40 capsule  Refills: 0            CONTINUE taking these medications        Instructions  Prescription details   apixaban 5 MG Tabs  Commonly known as: Eliquis      Take 1 tablet (5 mg total) by mouth 2 (two) times daily.   Quantity: 60 tablet  Refills: 0     atorvastatin 40 MG Tabs  Commonly known as: Lipitor      Take 1 tablet (40 mg total) by mouth. AT BEDTIME   Refills: 3     cholecalciferol 50 MCG (2000 UT) Tabs      Take 1 tablet (2,000 Units total) by mouth daily.   Refills: 0     ferrous sulfate 325 (65 FE) MG Tbec      Take 1 tablet (325 mg total) by mouth daily with breakfast. Three times a week   Refills: 0     furosemide 20 MG Tabs  Commonly known as: Lasix      Take 1 tablet (20 mg total) by mouth 3 (three) times a week.   Quantity: 30 tablet  Refills: 1     gabapentin 300 MG Caps  Commonly known as: Neurontin      Take 1 capsule (300 mg total) by mouth nightly.   Refills: 0     meclizine 25 MG Tabs  Commonly known as: Antivert      Take 1 tablet (25 mg total) by mouth.   Refills: 0            STOP taking these medications      amoxicillin clavulanate 875-125 MG Tabs  Commonly known as: Augmentin        diazePAM 10 MG Tabs  Commonly known as: Valium        dilTIAZem  MG Cp24  Commonly known as: Cardizem CD        oxyCODONE-acetaminophen  MG Tabs  Commonly known as: Percocet                  Where to Get Your Medications        These medications were sent to [facility] [address] [place], [place] - [address] [facility] [phone], [phone]  [address] [facility] [address]      Phone: [phone]   lisinopril 10 MG Tabs  vancomycin 125 MG Caps         Follow up Visits  Preston King DO  [address] [id number] [phone]    Follow up in 1 month(s)  Neuro/Stroke follow up    Diana Lopez DO    Consultants         Provider   Role Specialty     Preston King DO  Consulting Physician NEUROLOGY              Other Discharge Instructions:       ----------------------------------------------------  36 MIN SPENT ON THIS DC   Joel Rogers  MD    3/10/2024      Electronically signed by Joel Rogers MD on 3/11/2024  8:32 AM         REVIEWER COMMENTS

## 2024-03-12 ENCOUNTER — PATIENT OUTREACH (OUTPATIENT)
Dept: CASE MANAGEMENT | Age: 79
End: 2024-03-12

## 2024-03-12 NOTE — PROGRESS NOTES
Neuro/Stroke apt request (discharged 03/10)    Dr Preston King  NEUROLOGY  [facility] [address] [address]  [phone]  Follow up 1 month  Pt son, John advised he was at work and will call later to schedule apt  Closing encounter

## 2024-03-14 ENCOUNTER — OFFICE VISIT (OUTPATIENT)
Facility: CLINIC | Age: 79
End: 2024-03-14
Payer: MEDICARE

## 2024-03-14 VITALS — SYSTOLIC BLOOD PRESSURE: 120 MMHG | DIASTOLIC BLOOD PRESSURE: 55 MMHG | HEART RATE: 64 BPM

## 2024-03-14 DIAGNOSIS — I12.9 HYPERTENSIVE KIDNEY DISEASE WITH STAGE 3 CHRONIC KIDNEY DISEASE, UNSPECIFIED WHETHER STAGE 3A OR 3B CKD (HCC): ICD-10-CM

## 2024-03-14 DIAGNOSIS — F17.200 TOBACCO DEPENDENCE: ICD-10-CM

## 2024-03-14 DIAGNOSIS — N18.30 STAGE 3 CHRONIC KIDNEY DISEASE, UNSPECIFIED WHETHER STAGE 3A OR 3B CKD (HCC): Primary | ICD-10-CM

## 2024-03-14 DIAGNOSIS — N18.30 HYPERTENSIVE KIDNEY DISEASE WITH STAGE 3 CHRONIC KIDNEY DISEASE, UNSPECIFIED WHETHER STAGE 3A OR 3B CKD (HCC): ICD-10-CM

## 2024-03-14 DIAGNOSIS — D63.1 ANEMIA DUE TO STAGE 3 CHRONIC KIDNEY DISEASE, UNSPECIFIED WHETHER STAGE 3A OR 3B CKD (HCC): ICD-10-CM

## 2024-03-14 DIAGNOSIS — I50.32 DIASTOLIC DYSFUNCTION WITH CHRONIC HEART FAILURE (HCC): ICD-10-CM

## 2024-03-14 DIAGNOSIS — N18.30 ANEMIA DUE TO STAGE 3 CHRONIC KIDNEY DISEASE, UNSPECIFIED WHETHER STAGE 3A OR 3B CKD (HCC): ICD-10-CM

## 2024-03-14 DIAGNOSIS — R60.9 EDEMA, UNSPECIFIED TYPE: ICD-10-CM

## 2024-03-14 DIAGNOSIS — E11.21 TYPE 2 DIABETES MELLITUS WITH NEPHROPATHY (HCC): ICD-10-CM

## 2024-03-14 DIAGNOSIS — E87.5 HYPERKALEMIA: ICD-10-CM

## 2024-03-14 DIAGNOSIS — N25.81 SECONDARY HYPERPARATHYROIDISM OF RENAL ORIGIN (HCC): ICD-10-CM

## 2024-03-14 PROCEDURE — 1160F RVW MEDS BY RX/DR IN RCRD: CPT | Performed by: INTERNAL MEDICINE

## 2024-03-14 PROCEDURE — 99214 OFFICE O/P EST MOD 30 MIN: CPT | Performed by: INTERNAL MEDICINE

## 2024-03-14 PROCEDURE — 1159F MED LIST DOCD IN RCRD: CPT | Performed by: INTERNAL MEDICINE

## 2024-03-14 PROCEDURE — 3078F DIAST BP <80 MM HG: CPT | Performed by: INTERNAL MEDICINE

## 2024-03-14 PROCEDURE — 1111F DSCHRG MED/CURRENT MED MERGE: CPT | Performed by: INTERNAL MEDICINE

## 2024-03-14 PROCEDURE — 3074F SYST BP LT 130 MM HG: CPT | Performed by: INTERNAL MEDICINE
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
## 2024-03-19 ENCOUNTER — APPOINTMENT (OUTPATIENT)
Dept: GENERAL RADIOLOGY | Facility: HOSPITAL | Age: 79
End: 2024-03-19
Attending: EMERGENCY MEDICINE
Payer: MEDICARE

## 2024-03-19 ENCOUNTER — HOSPITAL ENCOUNTER (INPATIENT)
Facility: HOSPITAL | Age: 79
LOS: 4 days | Discharge: HOME HEALTH | End: 2024-03-23
Attending: EMERGENCY MEDICINE | Admitting: HOSPITALIST
Payer: MEDICARE

## 2024-03-19 ENCOUNTER — APPOINTMENT (OUTPATIENT)
Dept: CT IMAGING | Facility: HOSPITAL | Age: 79
End: 2024-03-19
Attending: EMERGENCY MEDICINE
Payer: MEDICARE

## 2024-03-19 DIAGNOSIS — I45.5 SINUS PAUSE: ICD-10-CM

## 2024-03-19 DIAGNOSIS — I47.10 PSVT (PAROXYSMAL SUPRAVENTRICULAR TACHYCARDIA) (HCC): Primary | ICD-10-CM

## 2024-03-19 DIAGNOSIS — N17.9 AKI (ACUTE KIDNEY INJURY) (HCC): ICD-10-CM

## 2024-03-19 DIAGNOSIS — R07.9 CHEST PAIN OF UNCERTAIN ETIOLOGY: ICD-10-CM

## 2024-03-19 LAB
ALBUMIN SERPL-MCNC: 4.1 G/DL (ref 3.2–4.8)
ALBUMIN/GLOB SERPL: 1.3 {RATIO} (ref 1–2)
ALP LIVER SERPL-CCNC: 263 U/L
ALT SERPL-CCNC: 29 U/L
ANION GAP SERPL CALC-SCNC: 0 MMOL/L (ref 0–18)
AST SERPL-CCNC: 59 U/L (ref ?–34)
BASOPHILS # BLD AUTO: 0.04 X10(3) UL (ref 0–0.2)
BASOPHILS NFR BLD AUTO: 0.6 %
BILIRUB SERPL-MCNC: 0.3 MG/DL (ref 0.2–1.1)
BNP SERPL-MCNC: 220 PG/ML
BUN BLD-MCNC: 39 MG/DL (ref 9–23)
BUN/CREAT SERPL: 16.4 (ref 10–20)
CALCIUM BLD-MCNC: 9 MG/DL (ref 8.7–10.4)
CHLORIDE SERPL-SCNC: 112 MMOL/L (ref 98–112)
CO2 SERPL-SCNC: 26 MMOL/L (ref 21–32)
CREAT BLD-MCNC: 2.38 MG/DL
DEPRECATED RDW RBC AUTO: 46.7 FL (ref 35.1–46.3)
EGFRCR SERPLBLD CKD-EPI 2021: 21 ML/MIN/1.73M2 (ref 60–?)
EOSINOPHIL # BLD AUTO: 0.23 X10(3) UL (ref 0–0.7)
EOSINOPHIL NFR BLD AUTO: 3.7 %
ERYTHROCYTE [DISTWIDTH] IN BLOOD BY AUTOMATED COUNT: 13.6 % (ref 11–15)
GLOBULIN PLAS-MCNC: 3.1 G/DL (ref 2.8–4.4)
GLUCOSE BLD-MCNC: 138 MG/DL (ref 70–99)
GLUCOSE BLDC GLUCOMTR-MCNC: 130 MG/DL (ref 70–99)
HCT VFR BLD AUTO: 31.6 %
HGB BLD-MCNC: 9.6 G/DL
IMM GRANULOCYTES # BLD AUTO: 0.01 X10(3) UL (ref 0–1)
IMM GRANULOCYTES NFR BLD: 0.2 %
LIPASE SERPL-CCNC: 22 U/L (ref 13–75)
LYMPHOCYTES # BLD AUTO: 1.06 X10(3) UL (ref 1–4)
LYMPHOCYTES NFR BLD AUTO: 16.9 %
MAGNESIUM SERPL-MCNC: 2.2 MG/DL (ref 1.6–2.6)
MCH RBC QN AUTO: 28.4 PG (ref 26–34)
MCHC RBC AUTO-ENTMCNC: 30.4 G/DL (ref 31–37)
MCV RBC AUTO: 93.5 FL
MONOCYTES # BLD AUTO: 0.36 X10(3) UL (ref 0.1–1)
MONOCYTES NFR BLD AUTO: 5.7 %
NEUTROPHILS # BLD AUTO: 4.59 X10 (3) UL (ref 1.5–7.7)
NEUTROPHILS # BLD AUTO: 4.59 X10(3) UL (ref 1.5–7.7)
NEUTROPHILS NFR BLD AUTO: 72.9 %
OSMOLALITY SERPL CALC.SUM OF ELEC: 298 MOSM/KG (ref 275–295)
PLATELET # BLD AUTO: 278 10(3)UL (ref 150–450)
POTASSIUM SERPL-SCNC: 4.6 MMOL/L (ref 3.5–5.1)
PROT SERPL-MCNC: 7.2 G/DL (ref 5.7–8.2)
Q-T INTERVAL: 316 MS
QRS DURATION: 92 MS
QTC CALCULATION (BEZET): 494 MS
R AXIS: 32 DEGREES
RBC # BLD AUTO: 3.38 X10(6)UL
SODIUM SERPL-SCNC: 138 MMOL/L (ref 136–145)
T AXIS: 76 DEGREES
TROPONIN I SERPL HS-MCNC: 14 NG/L
VENTRICULAR RATE: 147 BPM
WBC # BLD AUTO: 6.3 X10(3) UL (ref 4–11)

## 2024-03-19 PROCEDURE — 99223 1ST HOSP IP/OBS HIGH 75: CPT | Performed by: HOSPITALIST

## 2024-03-19 PROCEDURE — 71045 X-RAY EXAM CHEST 1 VIEW: CPT | Performed by: EMERGENCY MEDICINE

## 2024-03-19 PROCEDURE — 74176 CT ABD & PELVIS W/O CONTRAST: CPT | Performed by: EMERGENCY MEDICINE

## 2024-03-19 RX ORDER — ISOSORBIDE MONONITRATE 60 MG/1
60 TABLET, EXTENDED RELEASE ORAL DAILY
Status: ON HOLD | COMMUNITY
Start: 2024-01-31 | End: 2024-03-19

## 2024-03-19 RX ORDER — DILTIAZEM HYDROCHLORIDE 5 MG/ML
10 INJECTION INTRAVENOUS ONCE
Status: COMPLETED | OUTPATIENT
Start: 2024-03-19 | End: 2024-03-19

## 2024-03-19 RX ORDER — DILTIAZEM HYDROCHLORIDE 120 MG/1
120 CAPSULE, COATED, EXTENDED RELEASE ORAL DAILY
COMMUNITY
Start: 2024-02-05 | End: 2024-03-23

## 2024-03-19 RX ORDER — AMLODIPINE BESYLATE 5 MG/1
5 TABLET ORAL DAILY
COMMUNITY
Start: 2024-01-08 | End: 2024-03-23

## 2024-03-19 RX ORDER — HYDRALAZINE HYDROCHLORIDE 50 MG/1
50 TABLET, FILM COATED ORAL 2 TIMES DAILY
Status: ON HOLD | COMMUNITY
Start: 2024-01-31 | End: 2024-03-19

## 2024-03-19 RX ORDER — ONDANSETRON 2 MG/ML
4 INJECTION INTRAMUSCULAR; INTRAVENOUS EVERY 6 HOURS PRN
Status: DISCONTINUED | OUTPATIENT
Start: 2024-03-19 | End: 2024-03-23

## 2024-03-19 RX ORDER — SODIUM CHLORIDE 9 MG/ML
INJECTION, SOLUTION INTRAVENOUS CONTINUOUS
Status: DISCONTINUED | OUTPATIENT
Start: 2024-03-19 | End: 2024-03-21

## 2024-03-19 RX ORDER — PANTOPRAZOLE SODIUM 40 MG/1
40 TABLET, DELAYED RELEASE ORAL
COMMUNITY
Start: 2024-01-03

## 2024-03-19 RX ORDER — ACETAMINOPHEN 500 MG
500 TABLET ORAL EVERY 4 HOURS PRN
Status: DISCONTINUED | OUTPATIENT
Start: 2024-03-19 | End: 2024-03-23

## 2024-03-19 RX ORDER — PANTOPRAZOLE SODIUM 40 MG/1
40 TABLET, DELAYED RELEASE ORAL
Status: DISCONTINUED | OUTPATIENT
Start: 2024-03-20 | End: 2024-03-23

## 2024-03-19 RX ORDER — METOCLOPRAMIDE HYDROCHLORIDE 5 MG/ML
5 INJECTION INTRAMUSCULAR; INTRAVENOUS EVERY 8 HOURS PRN
Status: DISCONTINUED | OUTPATIENT
Start: 2024-03-19 | End: 2024-03-23

## 2024-03-19 RX ORDER — GABAPENTIN 300 MG/1
300 CAPSULE ORAL NIGHTLY
Status: DISCONTINUED | OUTPATIENT
Start: 2024-03-19 | End: 2024-03-23
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
## 2024-03-19 NOTE — H&P
[facility]    PATIENT'S NAME: MERNA ALEXANDER   ATTENDING PHYSICIAN: Shaggy Saucedo MD   PATIENT ACCOUNT#:   [id number]    LOCATION:  [place] [address]  MEDICAL RECORD #:   [id number]       YOB: 1945  ADMISSION DATE:       03/19/2024    HISTORY AND PHYSICAL EXAMINATION    CHIEF COMPLAINT:  Acute on chronic kidney injury, hypovolemia, and supraventricular tachycardia.     HISTORY OF PRESENT ILLNESS:  The patient is a 78-year-old  female who came into the emergency department for evaluation of fatigue and palpitations with dizziness.  Upon arrival to the emergency room noted to be in supraventricular tachycardia, rate 140.  She was given 1 dose of Cardizem and converted to sinus rhythm.  Then, she had another recurrence, given another loading dose of Cardizem and she developed 2-second to 3-second sinus pause and converted to sinus rhythm.  Blood pressure noted to be low, 100/44.  CBC showed hemoglobin of 9.6, which is stable.  BUN and creatinine of 39 and 2.38.  GFR is 21, which is way below her baseline.  Glucose 138.  She was given IV fluids, and she will be admitted to the hospital for further management.  Chest x-ray showed no acute finding.  CT scan of the abdomen and pelvis still pending.  Currently in sinus rhythm with no ST-T changes to suggest ischemia.  Troponin was negative.    PAST MEDICAL HISTORY:  The patient has history of mild aortic stenosis, normal left ventricular function; supraventricular tachycardia, currently on Cardizem; pulmonary artery hypertension; lower extremity stasis and edema; C. difficile infection; prediabetic state; chronic kidney disease stage 3; hypertension; hyperlipidemia; osteoarthritis; left lower extremity DVT, currently on Eliquis.    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  Latest imaging in December 2023 showed possible recurrence  and enlargement of tumor.  She had right carpal tunnel release, left rotator cuff repair, tubal ligation, and cholecystectomy.  Recent teeth extraction, I and D for dental abscess.    MEDICATIONS:  Please see medication reconciliation list.    ALLERGIES:  No known drug allergies.    FAMILY HISTORY:  Mother had TB.  Father had cerebrovascular accident.    SOCIAL HISTORY:  On and off tobacco use.  No alcohol or drug use.  Lives with her family.  Requires assistance in her basic activities of daily living.    REVIEW OF SYSTEMS:  The patient reports her appetite has been on and off and she has been taking all her medications including Eliquis and lisinopril.  She has been feeling lightheaded, dizzy, and fatigued.  Today, she felt palpitations and lightheadedness and decided to come into the emergency department for evaluation.  She is also complaining of some chest discomfort.  Other 12-point review of systems negative.      PHYSICAL EXAMINATION:    GENERAL:  Appears fatigued, tired.  VITAL SIGNS:  Temperature 98.1.  Pulse currently sinus bradycardia at 46.  Respiratory rate 13.  Blood pressure 108/48.  Pulse ox 100% on room air.  HEENT:  Atraumatic.  NECK:  Supple.  No lymphadenopathy.  Trachea midline.  Full range of motion.  LUNGS:  Clear to auscultation bilaterally.  Normal respiratory effort.  HEART:  Regular rate and rhythm.  S1 and S2 auscultated.  Initially, she was in fast supraventricular tachycardia.  ABDOMEN:  Soft, nondistended.  No tenderness.  Positive bowel sounds.  EXTREMITIES:  +2 to 3 edema with stasis.  NEUROLOGIC:  Motor and sensory intact.      ASSESSMENT AND PLAN:    1.   Acute on chronic kidney injury, most likely related to lisinopril and poor oral intake.  2.   Hypovolemia, possibly triggering supraventricular tachycardia.  3.   Anemia of chronic kidney disease.  Hemoglobin is stable today.  4.   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  CT of the abdomen still pending.    The  patient will be admitted to general medical floor.  Continue IV fluids.  Monitor rhythm.  Hold and probably discontinue lisinopril.  Cardiology consult was notified.  Fall precautions.  Further recommendations to follow.    Dictated By Erich Pascual MD  d: 03/19/2024 15:06:45  t: 03/19/2024 15:14:30  Job [id number]  FB/    cc: Shaggy Saucedo MD

## 2024-03-19 NOTE — HISTORICAL OFFICE NOTE
Facility Logo [facility]  [address], [address], [place] [address]  [phone]      Hannah Boss  Progress Note  Demographics:  Name: Hannah Boss YOB: 1945  Age: 78, Female Medical Record No: [id number]  Visited Date/Time: 02/06/2024 01:30 PM    Chief Complaints  hospital f/u from chest pain  History of Present Illness  This is a patient of Dr. Buchanan who was seen once in the office in August.  She has a history of hypertension, chronic kidney disease, aortic stenosis, hyperlipidemia, left DVT in 2023, pulmonary hypertension, PSVT, leimyosarcoma and cigarette smoking.    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  She was started on diltiazem.  An echocardiogram showed preserved systolic function with ejection fraction 55 to 60%, grade 1 diastolic dysfunction and moderate aortic stenosis.  Patient did have a dental consult and had dental extractions and treatment for dental abscess during her hospitalization.  She comes today for follow-up.    Patient reports that she is feeling much better.  She denies any further tachycardia episodes.  She reports that she is tolerating diltiazem.  She is compliant with anticoagulation for her history of PE and DVT.  Cardiac risk factors Heavy tobacco smoker  Past Medical History  1.Paroxysmal SVT (supraventricular tachycardia)  2.Nicotine dependence with current use  3.History of DVT (deep vein thrombosis) - Hx  4.History of pulmonary embolus (PE)  5.Mild pulmonary hypertension  6.Edema, localized  7.Hypertension (HTN), primary  8.Hyperlipidemia, mixed  9.Exertional dyspnea  Family History  No significant family history noted.  Social History  Smoking status Heavy tobacco smoker  Tobacco usage - Yes (Heavy cigarette smoker (20-39 cigs/day) (finding))  Review of systems  Cardiovascular CANTU and Edema  No history of Chest pain, Palpitations, Syncope, PND,  Orthopnea and Claudication  Respiratory No history of SOB, Wheezing and Sputum  Hem/Lymphatic No history of Easy bruising, Blood clots, Hx of blood transfusion, Anemia and Bleeding problems  Physical Examination  Constitutional 100%o2  Vitals Left Arm Sitting  / 52 mmHg, Pulse rate 74 bpm, Regular, Height in 5' 3\", BMI: 30.8, Weight in 174 lbs (or) 79 kgs and BSA : 1.9 cc/m²  General Appearance No Acute Distress  Neck No JVD  Respiratory Lungs clear with normal breath sounds  Cardiovascular 3/6 murmur and Regular rhythm. Normal rate present. Normal and normal S1 and S2    Lower Extremities Edema 2+  Allergies  No known medication allergies.  Medications (Info obtained by: Verbal)  1.amoxicillin clavulanate 875-125 MG Oral Tab, Take 1 tablet by mouth 2 (two) times daily for 14 days.  2.apixaban 5 MG Oral Tab, Take 1 tablet (5 mg total) by mouth 2 (two) times daily.  3.atorvastatin 40 MG Oral Tab, Take 1 tablet (40 mg total) by mouth. AT BEDTIME  4.cholecalciferol 50 MCG (2000 UT) Oral Tab, Take 1 tablet (2,000 Units total) by mouth daily.  5.diazePAM 10 MG Oral Tab, Take 1 tablet (10 mg total) by mouth every 8 (eight) hours as needed (MAX 3/DAY).  6.dilTIAZem  MG Oral Capsule SR 24 Hr, Take 1 capsule (120 mg total) by mouth daily.  7.ferrous sulfate 325 (65 FE) MG Oral Tab EC, Take 1 tablet (325 mg total) by mouth See Admin Instructions. Three times a week  8.furosemide 20 MG Oral Tab, Take 1 tablet (20 mg total) by mouth 3 (three) times a week.  9.gabapentin 300 MG Oral Cap, Take 1 capsule (300 mg total) by mouth nightly.  10.meclizine 25 MG Oral Tab, Take 1 tablet (25 mg total) by mouth.  11.oxyCODONE-acetaminophen (PERCOCET)  MG Oral Tab, Take 1 tablet by mouth every 4 (four) hours as needed for Pain (max 6/day).  Impression  1.Paroxysmal SVT (supraventricular tachycardia)  2.Nicotine dependence with current use  3.History of DVT (deep vein thrombosis) - Hx  4.History of pulmonary embolus  (PE)  5.Edema, localized  6.Hypertension (HTN), primary  7.Hyperlipidemia, mixed  8.Exertional dyspnea  Assessment & Plan  1.  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  Patient was started on diltiazem which was continued at discharge.  Reports further episodes.  She will continue present management.  Discussed that if she has recurrent events would likely recommend EP consult for possible ablation.  2.  Thromboembolism-history of DVT and pulmonary embolism in the past.  Patient is on lifelong anticoagulation and denies bleeding problems.  3.  Lower extremity edema-likely secondary to DVT.  Patient takes furosemide daily unless she is out of the home.  Reports that she keeps her legs elevated and has a hospital bed.  4.  Hypertension-blood pressure controlled on diltiazem and diuretic.  5.  Nicotine dependence-current everyday smoker.  6.  Aortic stenosis-moderate on January echo with preserved systolic function.  Like the patient to follow-up with Dr. Buchanan in about 2 months.    Plan:  Continue present management  Call if you have recurrent episodes of tachycardia otherwise see Dr. Buchanan in 2 months  Future appointments  1.Follow up visit - Abdirahman Buchanan MD (2 Months)  Miscellaneous  1.Tobacco Cessation reviewed with the patient (Smoking cessation drug therapy (regime/therapy))  2.Weight monitoring (regime/therapy)  Patient instructions  Continue present management  Call if you have recurrent episodes of tachycardia otherwise see Dr. Buchanan in 2 months    Care Providers: Olivia PÉREZ and Esme ELLER  Electronically Authenticated by  Olivia PÉREZ  02/06/2024 02:01:01 PM  Disclaimer: Components of this note were documented using voice recognition system and are subject to errors not corrected at proofreading. Contact the author of this note for any clarifications.

## 2024-03-19 NOTE — ED INITIAL ASSESSMENT (HPI)
Pt from home via EMS for Chest pain. Pt states pain is mostly located on right side . Pt states pain worse with movement. Pt states some SOB, denies NVD.        Pt received 325 ASA
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
## 2024-03-20 LAB
ANION GAP SERPL CALC-SCNC: 1 MMOL/L (ref 0–18)
BASOPHILS # BLD AUTO: 0.04 X10(3) UL (ref 0–0.2)
BASOPHILS NFR BLD AUTO: 0.8 %
BILIRUB UR QL: NEGATIVE
BUN BLD-MCNC: 37 MG/DL (ref 9–23)
BUN/CREAT SERPL: 18.5 (ref 10–20)
CALCIUM BLD-MCNC: 8.3 MG/DL (ref 8.7–10.4)
CHLORIDE SERPL-SCNC: 113 MMOL/L (ref 98–112)
CLARITY UR: CLEAR
CO2 SERPL-SCNC: 26 MMOL/L (ref 21–32)
CREAT BLD-MCNC: 2 MG/DL
DEPRECATED RDW RBC AUTO: 45.5 FL (ref 35.1–46.3)
EGFRCR SERPLBLD CKD-EPI 2021: 25 ML/MIN/1.73M2 (ref 60–?)
EOSINOPHIL # BLD AUTO: 0.19 X10(3) UL (ref 0–0.7)
EOSINOPHIL NFR BLD AUTO: 3.6 %
ERYTHROCYTE [DISTWIDTH] IN BLOOD BY AUTOMATED COUNT: 13.5 % (ref 11–15)
GLUCOSE BLD-MCNC: 100 MG/DL (ref 70–99)
GLUCOSE BLDC GLUCOMTR-MCNC: 109 MG/DL (ref 70–99)
GLUCOSE BLDC GLUCOMTR-MCNC: 110 MG/DL (ref 70–99)
GLUCOSE BLDC GLUCOMTR-MCNC: 150 MG/DL (ref 70–99)
GLUCOSE BLDC GLUCOMTR-MCNC: 162 MG/DL (ref 70–99)
GLUCOSE UR-MCNC: NORMAL MG/DL
HCT VFR BLD AUTO: 25.3 %
HCT VFR BLD AUTO: 28.6 %
HEMOCCULT STL QL: POSITIVE
HGB BLD-MCNC: 7.9 G/DL
HGB BLD-MCNC: 9.2 G/DL
HGB UR QL STRIP.AUTO: NEGATIVE
IMM GRANULOCYTES # BLD AUTO: 0.01 X10(3) UL (ref 0–1)
IMM GRANULOCYTES NFR BLD: 0.2 %
IRON SATN MFR SERPL: 15 %
IRON SERPL-MCNC: 57 UG/DL
KETONES UR-MCNC: NEGATIVE MG/DL
LEUKOCYTE ESTERASE UR QL STRIP.AUTO: 250
LYMPHOCYTES # BLD AUTO: 0.95 X10(3) UL (ref 1–4)
LYMPHOCYTES NFR BLD AUTO: 17.9 %
MCH RBC QN AUTO: 28.6 PG (ref 26–34)
MCHC RBC AUTO-ENTMCNC: 31.2 G/DL (ref 31–37)
MCV RBC AUTO: 91.7 FL
MONOCYTES # BLD AUTO: 0.38 X10(3) UL (ref 0.1–1)
MONOCYTES NFR BLD AUTO: 7.1 %
NEUTROPHILS # BLD AUTO: 3.75 X10 (3) UL (ref 1.5–7.7)
NEUTROPHILS # BLD AUTO: 3.75 X10(3) UL (ref 1.5–7.7)
NEUTROPHILS NFR BLD AUTO: 70.4 %
OSMOLALITY SERPL CALC.SUM OF ELEC: 299 MOSM/KG (ref 275–295)
PH UR: 5.5 [PH] (ref 5–8)
PLATELET # BLD AUTO: 216 10(3)UL (ref 150–450)
POTASSIUM SERPL-SCNC: 4.7 MMOL/L (ref 3.5–5.1)
PROT UR-MCNC: NEGATIVE MG/DL
RBC # BLD AUTO: 2.76 X10(6)UL
SODIUM SERPL-SCNC: 140 MMOL/L (ref 136–145)
SP GR UR STRIP: 1.01 (ref 1–1.03)
TIBC SERPL-MCNC: 377 UG/DL (ref 250–425)
TRANSFERRIN SERPL-MCNC: 253 MG/DL (ref 250–380)
UROBILINOGEN UR STRIP-ACNC: NORMAL
WBC # BLD AUTO: 5.3 X10(3) UL (ref 4–11)
WBC #/AREA URNS AUTO: >50 /HPF

## 2024-03-20 PROCEDURE — 99233 SBSQ HOSP IP/OBS HIGH 50: CPT | Performed by: HOSPITALIST

## 2024-03-20 RX ORDER — CEFAZOLIN SODIUM/WATER 2 G/20 ML
2 SYRINGE (ML) INTRAVENOUS EVERY 12 HOURS
Status: DISCONTINUED | OUTPATIENT
Start: 2024-03-20 | End: 2024-03-22

## 2024-03-20 RX ORDER — OXYCODONE AND ACETAMINOPHEN 7.5; 325 MG/1; MG/1
1 TABLET ORAL EVERY 6 HOURS PRN
Status: DISCONTINUED | OUTPATIENT
Start: 2024-03-20 | End: 2024-03-23

## 2024-03-20 RX ORDER — VANCOMYCIN HYDROCHLORIDE 125 MG/1
125 CAPSULE ORAL DAILY
Status: DISCONTINUED | OUTPATIENT
Start: 2024-03-20 | End: 2024-03-23

## 2024-03-20 RX ORDER — HYDROCODONE BITARTRATE AND ACETAMINOPHEN 5; 325 MG/1; MG/1
1 TABLET ORAL EVERY 4 HOURS PRN
Status: DISCONTINUED | OUTPATIENT
Start: 2024-03-20 | End: 2024-03-20

## 2024-03-20 RX ORDER — DILTIAZEM HYDROCHLORIDE 240 MG/1
240 CAPSULE, COATED, EXTENDED RELEASE ORAL DAILY
Status: DISCONTINUED | OUTPATIENT
Start: 2024-03-20 | End: 2024-03-23

## 2024-03-20 NOTE — PROGRESS NOTES
Progress Note     Hannah Boss Patient Status:  Inpatient    1945 MRN [id number]   Location [facility] 3W/SW Attending Woodrow Justin MD   Hosp Day # 1 PCP Diana Lopez DO     Chief Complaint: SVT    Subjective:   S: Patient here with svt,  Feels better   Complains of ch. Back pain, no worse than baseline  A+O x 3 today    Review of Systems:   10 point ROS completed and was negative, except for pertinent positive and negatives stated in subjective.    Objective:   Vital signs:  Temp:  [97.7 °F (36.5 °C)-98.4 °F (36.9 °C)] 98.2 °F (36.8 °C)  Pulse:  [54-67] 65  Resp:  [12-22] 18  BP: (120-185)/(44-64) 120/44  SpO2:  [96 %-99 %] 99 %    Wt Readings from Last 6 Encounters:   24 178 lb (80.7 kg)   03/10/24 170 lb (77.1 kg)   24 179 lb (81.2 kg)   23 200 lb (90.7 kg)   23 174 lb 2.6 oz (79 kg)   10/08/23 176 lb 3.2 oz (79.9 kg)         Physical Exam:    General: No acute distress. Alert ,         Respiratory: Clear to auscultation bilaterally. No wheezes. No rhonchi.  Cardiovascular: S1, S2. Regular rate and rhythm. No murmurs, rubs or gallops.   Abdomen: Soft, nontender, nondistended.  Positive bowel sounds. No rebound or guarding.  Neurologic: No focal neurological deficits.   Musculoskeletal: Moves all extremities.  Extremities: No edema.    Results:   Diagnostic Data:      Labs:    Labs Last 24 Hours:   BMP     CBC    Other     Na 140 Cl 113 BUN 37 Glu 100   Hb 9.2   PTT - Procal -   K 4.7 CO2 26.0 Cr 2.00   WBC 5.3 >< .0  INR - CRP -   Renal Lytes Endo    Hct 28.6   Trop - D dim -   eGFR - Ca 8.3 POC Gluc  162    LFT   pBNP - Lactic -   eGFR AA - PO4 - A1c -   AST - APk - Prot -  LDL -     Mg - TSH -   ALT - T vitaliy - Alb -        COVID-19 Lab Results    COVID-19  Lab Results   Component Value Date    COVID19 Not Detected 2023    COVID19 Not Detected 10/06/2023    COVID19 Not Detected 2023       Pro-Calcitonin  No results for input(s): \"PCT\" in the last  168 hours.    Cardiac  No results for input(s): \"TROP\", \"PBNP\" in the last 168 hours.    Creatinine Kinase  No results for input(s): \"CK\" in the last 168 hours.    Inflammatory Markers  No results for input(s): \"CRP\", \"ROSARIO\", \"LDH\", \"DDIMER\" in the last 168 hours.    Imaging: Imaging data reviewed in Epic.    Medications:    dilTIAZem ER  240 mg Oral Daily    apixaban  5 mg Oral BID    gabapentin  300 mg Oral Nightly    pantoprazole  40 mg Oral BID AC       Assessment & Plan:   ASSESSMENT / PLAN:     SVT  -Resolved  -Required IV diltiazem on admission  -Increase Cardizem to 6-40 daily  - started Eliquis, will be held for anemia  -Cardiology consultation appreciated  -EP consultation if there is recurrence    Acute Metabolic Encephalopathy  UTI  -Encephalopathy in the setting of UTI improved overnight  -Last admission had workup for stroke and was ruled out  -Probably metabolic encephalopathy with underlying UTI with E. coli  -Continue IV antibiotics--> IV cefazolin  -Add p.o. vancomycin with history of C. difficile  -Also some concern for possible toxic encephalopathy in patient with chronic opioid and benzodiazepine use.  -Resume opioids at lower dose from home     Acute Kidney Injury on CKD  III   -Improving continue IV fluids  -Baseline creatinine 1.21.  -2.3 on admission trending down to 2.0  -Consideration for nephrology consultation pending labs in the morning  -Home lisinopril held  - Avoiding Nephrotoxic agents  - Cont to monitor     Other chronic pain  -Patient on chronic oxycodone  -Managed by pain service as outpatient      Hx Recurrent falls  -Possibly related to electrolyte abnormalities and opioid use and UTI  -PT/OT  -Continue to monitor     Anemia of chronic disease  -Patient with history of CKD  -Hemoglobin did decrease but this is with hydration and has been as low as 7.9 in the past  -No signs of acute blood loss noted  -Continue to monitor  -Check iron studies and Hemoccult     Diarrhea   -C.  difficile noted to be positive by PCR, but with negative EIA.    -As patient with active symptoms of abdominal cramping and diarrhea, starting p.o. vancomycin.     History of retroperitoneal Leiomyosarcoma  -CT scan without contrast was done showed a stable 6.5 x 4.5 cm retroperitoneal mass near the pancreatic uncinated and duodenum with mass effect.  -Outpatient follow-up    Quality:  DVT Prophylaxis: Eliquis  CODE status: full  Carter:    Central line: n/a      Will the patient be referred to [facility] on discharge?: yes  Estimated date of discharge: TBD  Discharge is dependent on: clinical improvement  At this point Ms. Boss is expected to be discharge to: home with home health care    Plan of care discussed with patient,nursing, cardiology    Coordinated care with providers and counseling re: treatment plan and workup    MDM: High complexity     GODWIN AVILEZ MD    Supplementary Documentation:         **Certification      PHYSICIAN Certification of Need for Inpatient Hospitalization - Initial Certification    Patient will require inpatient services that will reasonably be expected to span two midnight's based on the clinical documentation in H+P.   Based on patients current state of illness, I anticipate that, after discharge, patient will require TBD.
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
## 2024-03-20 NOTE — PROGRESS NOTES
[facility] - CARDIOLOGY PROGRESS NOTE      Hannah Boss Patient Status:  Inpatient    1945 MRN [id number]   Location [facility] 3W/SW Attending Woodrow Justin MD   Hosp Day # 1 PCP Diana Lopez DO         Assessment and Plan:     PSVT (paroxysmal supraventricular tachycardia)  No recurrence at this time on diltiazem.  Will stop IV Cardizem and change Cardizem CD to 40 daily.  Monitor as ambulates.  If recurrent arrhythmias we will have EP assess.  Started on Eliquis which we will hold with drop in hemoglobin and monitor      Acute renal failure (ARF) (HCC)  Creatinine trending downward treatment per primary.  Patient taken off of lisinopril      Chest pain of uncertain etiology  Presently resolved.  If recurrent symptoms assess for ischemia.    Anemia  Eliquis on hold will follow.  May need additional workup    Subjective:   Hannah Boss is a(n) 78 year old female with no new c/o today.  No recurrent arrhythmias still on low-dose IV Cardizem this morning    Objective:   Blood pressure 138/59, pulse 67, temperature 98.4 °F (36.9 °C), temperature source Oral, resp. rate 18, height 162.6 cm (5' 4\"), weight 178 lb (80.7 kg), SpO2 98%, not currently breastfeeding.  Intake/Output:        Last 24 hours:   Intake/Output Summary (Last 24 hours) at 3/20/2024 1039  Last data filed at 3/20/2024 0518  Gross per 24 hour   Intake 3952.29 ml   Output 800 ml   Net 3152.29 ml      This shift: No intake/output data recorded.    Exam  Gen: Comfortable, in no acute distress,    Psych.alert and oriented x3  HEENT: atraumatic, normal conjunctiva, moist mucosa  Neck:supple,no JVD, no bruits  Lungs: clear to ascultation, normal respiratory effort  Cardiac: regular rate and rhythm, nl S1,S2, no pathologic murmur  Abd: Abdomen soft, nontender, nondistended,  bowel sounds present  Ext:  no clubbing, no cyanosis,no edema  Neuro: no focal deficits  Skin: no rashes or  lesions        Scheduled Meds:    dilTIAZem ER  240 mg Oral Daily    apixaban  5 mg Oral BID    gabapentin  300 mg Oral Nightly    pantoprazole  40 mg Oral BID AC       Results:     Lab Results   Component Value Date    WBC 5.3 03/20/2024    HGB 7.9 (L) 03/20/2024    HCT 25.3 (L) 03/20/2024    .0 03/20/2024    CREATSERUM 2.00 (H) 03/20/2024    BUN 37 (H) 03/20/2024     03/20/2024    K 4.7 03/20/2024     (H) 03/20/2024    CO2 26.0 03/20/2024     (H) 03/20/2024    CA 8.3 (L) 03/20/2024    ALB 4.1 03/19/2024    ALKPHO 263 (H) 03/19/2024    BILT 0.3 03/19/2024    TP 7.2 03/19/2024    AST 59 (H) 03/19/2024    ALT 29 03/19/2024    PTT 35.0 03/08/2024    INR 1.30 (H) 03/08/2024    TSH 0.615 10/07/2023    LIP 22 03/19/2024    DDIMER 1.13 (H) 06/05/2023    MG 2.2 03/19/2024    PHOS 2.8 01/23/2024    TROP 0.398 (HH) 09/13/2021     (H) 12/31/2023    B12 399 04/22/2022       CT ABDOMEN+PELVIS(CPT=74176)    Result Date: 3/19/2024  CONCLUSION: No explanation for right-sided pain    Dictated by (CST): Davey Castlilo MD on 3/19/2024 at 3:28 PM     Finalized by (CST): Davey Castillo MD on 3/19/2024 at 3:34 PM          XR CHEST AP PORTABLE  (CPT=71045)    Result Date: 3/19/2024  PROCEDURE: XR CHEST AP PORTABLE  (CPT=71045) TIME: 1406  COMPARISON: [facility], XR CHEST AP PORTABLE (CPT=71045), 1/19/2024, 4:10 PM.  INDICATIONS: Chest pain  TECHNIQUE:   Single view.   Findings and impression:  Stable heart, likely normal.  Clear lungs.  Normal pleura  Dictated by (CST): Davey Castillo MD on 3/19/2024 at 2:39 PM     Finalized by (CST): Davey Castillo MD on 3/19/2024 at 2:40 PM         EKG    Result Date: 3/19/2024  Supraventricular tachycardia Nonspecific ST abnormality Abnormal ECG When compared with ECG of 08-MAR-2024 07:39, Vent. rate has increased BY  65 BPM Questionable change in QRS duration Confirmed by STEVE FLORIAN, NILSA ([id number]) on 3/19/2024 1:52:25 PM       Abdirahman Buchanan,  MD  [facility]  3/20/2024

## 2024-03-20 NOTE — PLAN OF CARE
Problem: Patient Centered Care  Goal: Patient preferences are identified and integrated in the patient's plan of care  Description: Interventions:  - What would you like us to know as we care for you? Home with family  - Provide timely, complete, and accurate information to patient/family  - Incorporate patient and family knowledge, values, beliefs, and cultural backgrounds into the planning and delivery of care  - Encourage patient/family to participate in care and decision-making at the level they choose  - Honor patient and family perspectives and choices  Outcome: Progressing     Problem: Patient/Family Goals  Goal: Patient/Family Long Term Goal  Description: Patient's Long Term Goal: to get stronger    Interventions:  - PT/OT  - See additional Care Plan goals for specific interventions  Outcome: Progressing  Goal: Patient/Family Short Term Goal  Description: Patient's Short Term Goal: to feel better    Interventions:   - PRN medication   - See additional Care Plan goals for specific interventions  Outcome: Progressing     Problem: CARDIOVASCULAR - ADULT  Goal: Maintains optimal cardiac output and hemodynamic stability  Description: INTERVENTIONS:  - Monitor vital signs, rhythm, and trends  - Monitor for bleeding, hypotension and signs of decreased cardiac output  - Evaluate effectiveness of vasoactive medications to optimize hemodynamic stability  - Monitor arterial and/or venous puncture sites for bleeding and/or hematoma  - Assess quality of pulses, skin color and temperature  - Assess for signs of decreased coronary artery perfusion - ex. Angina  - Evaluate fluid balance, assess for edema, trend weights  Outcome: Progressing  Goal: Absence of cardiac arrhythmias or at baseline  Description: INTERVENTIONS:  - Continuous cardiac monitoring, monitor vital signs, obtain 12 lead EKG if indicated  - Evaluate effectiveness of antiarrhythmic and heart rate control medications as ordered  - Initiate emergency  measures for life threatening arrhythmias  - Monitor electrolytes and administer replacement therapy as ordered  Outcome: Progressing     Pt alert and oriented X 4. Pt on room air. Pt had complaints of back pain, PRN medication given. Pt on IV fluids. Pt on IV abx. Safety precautions in place, call light within reach.

## 2024-03-20 NOTE — PROGRESS NOTES
Patient arrived from ED at 1849 on cardizem drip at 2.5 mls/hr. Patient walked to the bathroom with the rolling walker with minimum assistance. Vital signs stable. Updated patient on POC. Endorsed care to oncoming RN.

## 2024-03-20 NOTE — CM/SW NOTE
03/20/24 0900   CM/DANIAL Referral Data   Referral Source Social Work (self-referral)   Reason for Referral Discharge planning;Readmission   Informant EMR   Readmission Assessment   Factors that patient feels contributed to this readmission Acute/Chronic Clinical Presentation   Pt's level of independence at discharge? Some assist (mod)   Was full assessment completed by DANIAL/RAJ on prior admission? Yes   Was the recommended discharge plan achieved? Yes   Was pt. discharged w/out services? No   Medical Hx   Does patient have an established PCP? Yes  (Diana Lopez)   Patient Info   Patient's Home Environment House   Number of Levels in Home 1   Number of Stair in Home 0   Patient lives with Alone   Patient Status Prior to Admission   Independent with ADLs and Mobility No   Pt. requires assistance with Housework;Driving;Ambulating   Services in place prior to admission Home Health Care;DME/Supplies at home   Home Health Provider Info [facility]   Type of DME/Supplies Straight Cane;Standard Walker;Scooter   Discharge Needs   Anticipated D/C needs Home health care       SW self referred pt for DC planning. Pt is a READMISSION that last Dc'd from [facility] on 3/10/2024.    Above assessment completed based on chart review.    DANIAL spoke to [facility] w/ [facility] and confirmed pt is current w/ their  services. Clinicals sent to [facility] via [employer]. F2F for RN/PT/OT entered and sent.      PL[facility]  - pending clinical course       SW/RAJ to remain available for support and/or discharge planning.         Chloe Hays, MSW, LSW [id number]

## 2024-03-20 NOTE — PLAN OF CARE
Patient A&Ox4 on room air. On cardizem drip and IVF infusing. Heart rate controlled in 60's. Safety precautions maintained, call light and personal belongings within reach. Continuing to monitor.    Problem: Patient Centered Care  Goal: Patient preferences are identified and integrated in the patient's plan of care  Description: Interventions:  - What would you like us to know as we care for you? From home w/ son  - Provide timely, complete, and accurate information to patient/family  - Incorporate patient and family knowledge, values, beliefs, and cultural backgrounds into the planning and delivery of care  - Encourage patient/family to participate in care and decision-making at the level they choose  - Honor patient and family perspectives and choices  Outcome: Progressing     Problem: CARDIOVASCULAR - ADULT  Goal: Maintains optimal cardiac output and hemodynamic stability  Description: INTERVENTIONS:  - Monitor vital signs, rhythm, and trends  - Monitor for bleeding, hypotension and signs of decreased cardiac output  - Evaluate effectiveness of vasoactive medications to optimize hemodynamic stability  - Monitor arterial and/or venous puncture sites for bleeding and/or hematoma  - Assess quality of pulses, skin color and temperature  - Assess for signs of decreased coronary artery perfusion - ex. Angina  - Evaluate fluid balance, assess for edema, trend weights  Outcome: Progressing  Goal: Absence of cardiac arrhythmias or at baseline  Description: INTERVENTIONS:  - Continuous cardiac monitoring, monitor vital signs, obtain 12 lead EKG if indicated  - Evaluate effectiveness of antiarrhythmic and heart rate control medications as ordered  - Initiate emergency measures for life threatening arrhythmias  - Monitor electrolytes and administer replacement therapy as ordered  Outcome: Progressing

## 2024-03-21 LAB
ALBUMIN SERPL-MCNC: 3.1 G/DL (ref 3.2–4.8)
ANION GAP SERPL CALC-SCNC: 3 MMOL/L (ref 0–18)
BASOPHILS # BLD AUTO: 0.04 X10(3) UL (ref 0–0.2)
BASOPHILS NFR BLD AUTO: 0.9 %
BUN BLD-MCNC: 25 MG/DL (ref 9–23)
BUN/CREAT SERPL: 15.2 (ref 10–20)
CALCIUM BLD-MCNC: 8.3 MG/DL (ref 8.7–10.4)
CHLORIDE SERPL-SCNC: 115 MMOL/L (ref 98–112)
CO2 SERPL-SCNC: 22 MMOL/L (ref 21–32)
CREAT BLD-MCNC: 1.65 MG/DL
DEPRECATED HBV CORE AB SER IA-ACNC: 171.6 NG/ML
DEPRECATED RDW RBC AUTO: 46.7 FL (ref 35.1–46.3)
EGFRCR SERPLBLD CKD-EPI 2021: 32 ML/MIN/1.73M2 (ref 60–?)
EOSINOPHIL # BLD AUTO: 0.19 X10(3) UL (ref 0–0.7)
EOSINOPHIL NFR BLD AUTO: 4 %
ERYTHROCYTE [DISTWIDTH] IN BLOOD BY AUTOMATED COUNT: 13.7 % (ref 11–15)
GLUCOSE BLD-MCNC: 82 MG/DL (ref 70–99)
GLUCOSE BLDC GLUCOMTR-MCNC: 100 MG/DL (ref 70–99)
GLUCOSE BLDC GLUCOMTR-MCNC: 110 MG/DL (ref 70–99)
GLUCOSE BLDC GLUCOMTR-MCNC: 114 MG/DL (ref 70–99)
GLUCOSE BLDC GLUCOMTR-MCNC: 144 MG/DL (ref 70–99)
HCT VFR BLD AUTO: 24.8 %
HGB BLD-MCNC: 7.7 G/DL
IMM GRANULOCYTES # BLD AUTO: 0.01 X10(3) UL (ref 0–1)
IMM GRANULOCYTES NFR BLD: 0.2 %
LYMPHOCYTES # BLD AUTO: 1.13 X10(3) UL (ref 1–4)
LYMPHOCYTES NFR BLD AUTO: 24 %
MAGNESIUM SERPL-MCNC: 2 MG/DL (ref 1.6–2.6)
MCH RBC QN AUTO: 28.9 PG (ref 26–34)
MCHC RBC AUTO-ENTMCNC: 31 G/DL (ref 31–37)
MCV RBC AUTO: 93.2 FL
MONOCYTES # BLD AUTO: 0.32 X10(3) UL (ref 0.1–1)
MONOCYTES NFR BLD AUTO: 6.8 %
NEUTROPHILS # BLD AUTO: 3.01 X10 (3) UL (ref 1.5–7.7)
NEUTROPHILS # BLD AUTO: 3.01 X10(3) UL (ref 1.5–7.7)
NEUTROPHILS NFR BLD AUTO: 64.1 %
OSMOLALITY SERPL CALC.SUM OF ELEC: 293 MOSM/KG (ref 275–295)
PHOSPHATE SERPL-MCNC: 3.3 MG/DL (ref 2.4–5.1)
PLATELET # BLD AUTO: 145 10(3)UL (ref 150–450)
POTASSIUM SERPL-SCNC: 4.9 MMOL/L (ref 3.5–5.1)
RBC # BLD AUTO: 2.66 X10(6)UL
SODIUM SERPL-SCNC: 140 MMOL/L (ref 136–145)
WBC # BLD AUTO: 4.7 X10(3) UL (ref 4–11)

## 2024-03-21 PROCEDURE — 99233 SBSQ HOSP IP/OBS HIGH 50: CPT | Performed by: HOSPITALIST

## 2024-03-21 RX ORDER — FUROSEMIDE 10 MG/ML
40 INJECTION INTRAMUSCULAR; INTRAVENOUS ONCE
Status: COMPLETED | OUTPATIENT
Start: 2024-03-21 | End: 2024-03-21

## 2024-03-21 NOTE — PROGRESS NOTES
Progress Note     Hannah Boss Patient Status:  Inpatient    1945 MRN [id number]   Location [facility] 3W/SW Attending Woodrow Justin MD   Hosp Day # 2 PCP Diana Lopez DO     Chief Complaint: SVT    Subjective:   S: Patient with no acute events overnight.  No abd pain/n/v.  No chest pain.     Review of Systems:   10 point ROS completed and was negative, except for pertinent positive and negatives stated in subjective.    Objective:   Vital signs:  Temp:  [98.1 °F (36.7 °C)-98.4 °F (36.9 °C)] 98.2 °F (36.8 °C)  Pulse:  [60-66] 65  Resp:  [16-18] 18  BP: ()/(44-94) 156/73  SpO2:  [94 %-99 %] 99 %    Wt Readings from Last 6 Encounters:   24 183 lb 4.8 oz (83.1 kg)   03/10/24 170 lb (77.1 kg)   24 179 lb (81.2 kg)   23 200 lb (90.7 kg)   23 174 lb 2.6 oz (79 kg)   10/08/23 176 lb 3.2 oz (79.9 kg)         Physical Exam:      Gen: No acute distress  Pulm: Lungs clear, normal respiratory effort  CV: Heart with regular rate and rhythm  Abd: Abdomen soft, nontender, nondistended, bowel sounds present  Neuro: No acute focal deficits  MSK: moves extremities  Skin: Warm and dry  Psych: Normal affect  Ext: no cyanosis      Results:   Diagnostic Data:      Labs:    Labs Last 24 Hours:   BMP     CBC    Other     Na 140 Cl 115 BUN 25 Glu 82   Hb 7.7   PTT - Procal -   K 4.9 CO2 22.0 Cr 1.65   WBC 4.7 >< .0  INR - CRP -   Renal Lytes Endo    Hct 24.8   Trop - D dim -   eGFR - Ca 8.3 POC Gluc  100    LFT   pBNP - Lactic -   eGFR AA - PO4 3.3 A1c -   AST - APk - Prot -  LDL -     Mg 2.0 TSH -   ALT - T vitaliy - Alb 3.1        COVID-19 Lab Results    COVID-19  Lab Results   Component Value Date    COVID19 Not Detected 2023    COVID19 Not Detected 10/06/2023    COVID19 Not Detected 2023       Pro-Calcitonin  No results for input(s): \"PCT\" in the last 168 hours.    Cardiac  No results for input(s): \"TROP\", \"PBNP\" in the last 168 hours.    Creatinine Kinase  No results  for input(s): \"CK\" in the last 168 hours.    Inflammatory Markers  No results for input(s): \"CRP\", \"ROSARIO\", \"LDH\", \"DDIMER\" in the last 168 hours.    Imaging: Imaging data reviewed in Epic.    Medications:    dilTIAZem ER  240 mg Oral Daily    ceFAZolin  2 g Intravenous Q12H    vancomycin  125 mg Oral Daily    apixaban  5 mg Oral BID    gabapentin  300 mg Oral Nightly    pantoprazole  40 mg Oral BID AC       Assessment & Plan:   ASSESSMENT / PLAN:     SVT  -Resolved  -Required IV diltiazem on admission  -Now changed to oral diltiazem  - started Eliquis, will be held for anemia  -Cardiology consultation appreciated  -EP consultation if there is recurrence    Acute Metabolic Encephalopathy  -2/2 UTI  -Encephalopathy in the setting of UTI improved overnight  -Last admission had workup for stroke and was ruled out  -Continue IV antibiotics--> IV cefazolin  -Add p.o. vancomycin with history of C. difficile  -Also some concern for possible toxic encephalopathy in patient with chronic opioid and benzodiazepine use.  -Resume opioids at lower dose from home     Acute diastolic heart failure exacerbation  -given a dose of IV lasix  -monitor response     Acute Kidney Injury on CKD  III   -Improving continue IV fluids  -Baseline creatinine 1.21.  -2.3 on admission trending down   -Home lisinopril held  - Avoiding Nephrotoxic agents  - Cont to monitor     Other chronic pain  -Patient on chronic oxycodone  -Managed by pain service as outpatient      Hx Recurrent falls  -Possibly related to electrolyte abnormalities and opioid use and UTI  -PT/OT  -Continue to monitor     Anemia of chronic disease  -Patient with history of CKD  -Hemoglobin did decrease but this is with hydration and has been as low as 7.9 in the past  -Hgb lower today  -Continue to monitor  -Check iron studies and Hemoccult - stool positive - will get GI eval as eliquis is being held for this     Diarrhea   -C. difficile noted to be positive by PCR, but with  negative EIA.    -As patient with active symptoms of abdominal cramping and diarrhea, starting p.o. vancomycin.     History of retroperitoneal Leiomyosarcoma  -CT scan without contrast was done showed a stable 6.5 x 4.5 cm retroperitoneal mass near the pancreatic uncinated and duodenum with mass effect.  -Outpatient follow-up    Quality:  DVT Prophylaxis: Eliquis  CODE status: full  Carter:    Central line: n/a      Will the patient be referred to TCC on discharge?: yes  Estimated date of discharge: TBD  Discharge is dependent on: clinical improvement  At this point Ms. Boss is expected to be discharge to: home with home health care    Plan of care discussed with patient,nursing, cardiology    Coordinated care with providers and counseling re: treatment plan and workup    MDM: High complexity

## 2024-03-21 NOTE — PROGRESS NOTES
Progress Note  Hannah Boss Patient Status:  Inpatient    1945 MRN [id number]   Location [facility] 3W/SW Attending Eileen Echeverria MD   Hosp Day # 2 PCP Diana Lopez DO     Subjective:  C/o generalized pain, shortness of breath    Objective:  /52 (BP Location: Right arm)   Pulse 66   Temp 98.2 °F (36.8 °C) (Oral)   Resp 16   Ht 5' 4\" (1.626 m)   Wt 183 lb 4.8 oz (83.1 kg)   SpO2 99%   BMI 31.46 kg/m²     Telemetry: SR/SB    Intake/Output:    Intake/Output Summary (Last 24 hours) at 3/21/2024 1010  Last data filed at 3/21/2024 0615  Gross per 24 hour   Intake 2510 ml   Output 650 ml   Net 1860 ml     Last 3 Weights   24 0403 183 lb 4.8 oz (83.1 kg)   24 0435 178 lb (80.7 kg)   24 1858 178 lb 3.2 oz (80.8 kg)   24 1854 178 lb 3.2 oz (80.8 kg)   24 1330 169 lb (76.7 kg)   03/10/24 0512 170 lb (77.1 kg)   03/10/24 0431 169 lb 1.6 oz (76.7 kg)   24 0400 174 lb 6.4 oz (79.1 kg)   24 2122 178 lb 9.6 oz (81 kg)   24 0441 179 lb (81.2 kg)   24 0522 175 lb 4.8 oz (79.5 kg)   24 0459 171 lb 3.2 oz (77.7 kg)   24 0427 172 lb (78 kg)   24 0601 178 lb 4.8 oz (80.9 kg)   24 1823 172 lb 1.6 oz (78.1 kg)     Labs:  Recent Labs   Lab 24  1348 24  0656 24  0704   * 100* 82   BUN 39* 37* 25*   CREATSERUM 2.38* 2.00* 1.65*   EGFRCR 21* 25* 32*   CA 9.0 8.3* 8.3*    140 140   K 4.6 4.7 4.9    113* 115*   CO2 26.0 26.0 22.0     Recent Labs   Lab 24  1348 24  0656 24  1137 24  0704   RBC 3.38* 2.76*  --  2.66*   HGB 9.6* 7.9* 9.2* 7.7*   HCT 31.6* 25.3* 28.6* 24.8*   MCV 93.5 91.7  --  93.2   MCH 28.4 28.6  --  28.9   MCHC 30.4* 31.2  --  31.0   RDW 13.6 13.5  --  13.7   NEPRELIM 4.59 3.75  --  3.01   WBC 6.3 5.3  --  4.7   .0 216.0  --  145.0*     Recent Labs   Lab 24  1348   TROPHS 14     Lab Results   Component Value Date/Time    HDL 56  03/09/2024 08:51 AM    LDL 43 03/09/2024 08:51 AM    TRIG 80 03/09/2024 08:51 AM     Lab Results   Component Value Date    DDIMER 1.13 (H) 06/05/2023     Lab Results   Component Value Date    TSH 0.615 10/07/2023     Review of Systems:   Constitutional: No fevers, chills, fatigue or night sweats.  ENT: No mouth pain, neck pain, running nose, headaches or swollen glands.  Skin: No rashes, pruritus or skin changes,  Respiratory: Denies cough, wheezing or shortness of breath.  CV: Denies chest pain, palpitations, orthopnea, PND or dizziness.  Musculoskeletal: No joint pain, stiffness or swelling.  GI: No nausea, vomiting or diarrhea. No blood in stools.  Neurologic: No seizures, tremors, weakness or numbness.     Physical Exam:  General: Alert, cooperative, no distress, appears stated age.  Neck: Supple, symmetrical, trachea midline, no adenopathy, thyroid: no enlargment/tenderness/nodules, no carotid bruit and no JVD.  Lungs: diminished bilaterally, rhonchi upper lobes.  Chest wall: No tenderness or deformity.  Heart: Regular rate and rhythm, S1, S2 normal, no murmur, click, rub or gallop.  Abdomen: Soft, non-tender. Bowel sounds normal. No masses,  No organomegaly.  Extremities: Extremities normal, atraumatic, no cyanosis, 1+ BLE edema.  Pulses: 2+ and symmetric all extremities.  Neurologic: Grossly intact.    Medications:   dilTIAZem ER  240 mg Oral Daily    ceFAZolin  2 g Intravenous Q12H    vancomycin  125 mg Oral Daily    apixaban  5 mg Oral BID    gabapentin  300 mg Oral Nightly    pantoprazole  40 mg Oral BID AC     Assessment:    Chronic HFpEF  LVEF 55-60% with G1DD  -appears overloaded on exam today  -will give dose lasix    Paroxysmal SVT   Remains in sinus rhythm/sinus yanci 50-60s on cardizem   -EP to assess if recurrence    HTN  Moderate control 110-160s  -not on antihypertensives aside from diltiazem due to DICK, was on lisinopril at home, states she has been on amlodipine in the past and it was stopped  for unknown reasons    HLD-controlled on statin    Rheumatoid Arthritis    DICK on CKDIII  Baseline 1.3-1.5  -Up to 2.4 this admission, now coming down after IVF     Hx DVT  On eliquis    DM    Chronic Anemia  Hgb baseline ~8, today   -7.7 today, down from 9.2 yesterday, holding eliquis    Hypoalbuminemia  Albumin 3.1 today    Acute encephalopathy  2/2 UTI  -IV abx per primary    Retroperitoneal Leiomyosarcoma    Plan:  -Give one time dose of IV lasix 40mg, further doses pending Cr tolerance and response  -Continue diltiazem 240mg daily  -Continue to hold eliquis and monitor Hgb  -further recs per primary    Plan of care discussed with patient, RN.      Kailee Fong, APRN  3/21/2024  10:10 AM  [phone] [place]  [phone] Rajinder        L3  Seen and examined bedside. Agree with the present management, will follow with you.    40 mg IV Lasix x 1.  Continue Cardizem 240 mg daily.  Continue holding Eliquis further evaluation pending for Hemoccult positive stool as per GI  Thank you for allowing me to participate in the care of your patient, feel free to contact me if you have any questions.    Siva Caro MD  [place] [facility] [place]  Interventional Cardiology.  [phone]

## 2024-03-21 NOTE — PLAN OF CARE
Problem: Patient Centered Care  Goal: Patient preferences are identified and integrated in the patient's plan of care  Description: Interventions:  - What would you like us to know as we care for you? From home w/ family  - Provide timely, complete, and accurate information to patient/family  - Incorporate patient and family knowledge, values, beliefs, and cultural backgrounds into the planning and delivery of care  - Encourage patient/family to participate in care and decision-making at the level they choose  - Honor patient and family perspectives and choices  Outcome: Progressing    Problem: CARDIOVASCULAR - ADULT  Goal: Maintains optimal cardiac output and hemodynamic stability  Description: INTERVENTIONS:  - Monitor vital signs, rhythm, and trends  - Monitor for bleeding, hypotension and signs of decreased cardiac output  - Evaluate effectiveness of vasoactive medications to optimize hemodynamic stability  - Monitor arterial and/or venous puncture sites for bleeding and/or hematoma  - Assess quality of pulses, skin color and temperature  - Assess for signs of decreased coronary artery perfusion - ex. Angina  - Evaluate fluid balance, assess for edema, trend weights  Outcome: Progressing  Goal: Absence of cardiac arrhythmias or at baseline  Description: INTERVENTIONS:  - Continuous cardiac monitoring, monitor vital signs, obtain 12 lead EKG if indicated  - Evaluate effectiveness of antiarrhythmic and heart rate control medications as ordered  - Initiate emergency measures for life threatening arrhythmias  - Monitor electrolytes and administer replacement therapy as ordered  Outcome: Progressing     Patient A&Ox4 on room air. Percocet given prn. IV ancef continued. Stool occult positive, MD aware and continuing to monitor hgb. Safety precautions maintained, call light and personal belongings within reach. Plan is for home w/ home health when medically cleared.
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
## 2024-03-21 NOTE — CDS QUERY
Dear Doctor Justin,  Please clarify the clinical validity of the Acute Metabolic Encephalopathy:    ( ) Ruled out.  ( ) confirmed as evidenced by clinical indicators (please provide additional supporting documentation: _________________________________________________________________________________  ( ) Other- please specify:_____________________________________________    Clinical indicators:  Progress note 3/20 - AOX3  Nursing notes 3/20 -AOX 4    Progress note 3/20: Acute Metabolic Encephalopathy with underlying UTI  some concern for possible toxic encephalopathy  Last admission had workup for stroke and was ruled out    Risk factors: 78yr F with Fatigue DICK on CKD3, UTI, chronic opioid and benzodiazepine use.    Treatment:  IV cefazolin, Resume opioids at lower dose       If you have any questions, please contact Clinical Documentation  Specialist:  EMEKA Weinberg at [phone]     Thank You!     THIS FORM IS A PERMANENT PART OF THE MEDICAL RECORD

## 2024-03-21 NOTE — PLAN OF CARE
Problem: Patient Centered Care  Goal: Patient preferences are identified and integrated in the patient's plan of care  Description: Interventions:  - What would you like us to know as we care for you?   - Provide timely, complete, and accurate information to patient/family  - Incorporate patient and family knowledge, values, beliefs, and cultural backgrounds into the planning and delivery of care  - Encourage patient/family to participate in care and decision-making at the level they choose  - Honor patient and family perspectives and choices  Outcome: Progressing     Problem: Patient/Family Goals  Goal: Patient/Family Long Term Goal  Description: Patient's Long Term Goal:     Interventions:  -   - See additional Care Plan goals for specific interventions  Outcome: Progressing  Goal: Patient/Family Short Term Goal  Description: Patient's Short Term Goal:     Interventions:   -   - See additional Care Plan goals for specific interventions  Outcome: Progressing     Problem: CARDIOVASCULAR - ADULT  Goal: Maintains optimal cardiac output and hemodynamic stability  Description: INTERVENTIONS:  - Monitor vital signs, rhythm, and trends  - Monitor for bleeding, hypotension and signs of decreased cardiac output  - Evaluate effectiveness of vasoactive medications to optimize hemodynamic stability  - Monitor arterial and/or venous puncture sites for bleeding and/or hematoma  - Assess quality of pulses, skin color and temperature  - Assess for signs of decreased coronary artery perfusion - ex. Angina  - Evaluate fluid balance, assess for edema, trend weights  Outcome: Progressing  Goal: Absence of cardiac arrhythmias or at baseline  Description: INTERVENTIONS:  - Continuous cardiac monitoring, monitor vital signs, obtain 12 lead EKG if indicated  - Evaluate effectiveness of antiarrhythmic and heart rate control medications as ordered  - Initiate emergency measures for life threatening arrhythmias  - Monitor electrolytes and  administer replacement therapy as ordered  Outcome: Progressing     Pt alert and oriented X 4. Pt on room air. Pt had complaints of back pain, PRN medication given. Bowel prep started for EGD and colonoscopy tomorrow. Safety precautions in place, call light within reach.

## 2024-03-22 ENCOUNTER — ANESTHESIA EVENT (OUTPATIENT)
Dept: ENDOSCOPY | Facility: HOSPITAL | Age: 79
End: 2024-03-22
Payer: MEDICARE

## 2024-03-22 ENCOUNTER — ANESTHESIA (OUTPATIENT)
Dept: ENDOSCOPY | Facility: HOSPITAL | Age: 79
End: 2024-03-22
Payer: MEDICARE

## 2024-03-22 LAB
ANION GAP SERPL CALC-SCNC: 6 MMOL/L (ref 0–18)
BASOPHILS # BLD AUTO: 0.04 X10(3) UL (ref 0–0.2)
BASOPHILS NFR BLD AUTO: 0.6 %
BUN BLD-MCNC: 23 MG/DL (ref 9–23)
BUN/CREAT SERPL: 14.3 (ref 10–20)
CALCIUM BLD-MCNC: 9.1 MG/DL (ref 8.7–10.4)
CHLORIDE SERPL-SCNC: 109 MMOL/L (ref 98–112)
CO2 SERPL-SCNC: 27 MMOL/L (ref 21–32)
CREAT BLD-MCNC: 1.61 MG/DL
DEPRECATED RDW RBC AUTO: 45.1 FL (ref 35.1–46.3)
EGFRCR SERPLBLD CKD-EPI 2021: 33 ML/MIN/1.73M2 (ref 60–?)
EOSINOPHIL # BLD AUTO: 0.26 X10(3) UL (ref 0–0.7)
EOSINOPHIL NFR BLD AUTO: 4.2 %
ERYTHROCYTE [DISTWIDTH] IN BLOOD BY AUTOMATED COUNT: 13.5 % (ref 11–15)
GLUCOSE BLD-MCNC: 88 MG/DL (ref 70–99)
GLUCOSE BLDC GLUCOMTR-MCNC: 111 MG/DL (ref 70–99)
GLUCOSE BLDC GLUCOMTR-MCNC: 193 MG/DL (ref 70–99)
GLUCOSE BLDC GLUCOMTR-MCNC: 99 MG/DL (ref 70–99)
HCT VFR BLD AUTO: 29.4 %
HGB BLD-MCNC: 9.1 G/DL
IMM GRANULOCYTES # BLD AUTO: 0.01 X10(3) UL (ref 0–1)
IMM GRANULOCYTES NFR BLD: 0.2 %
LYMPHOCYTES # BLD AUTO: 1.16 X10(3) UL (ref 1–4)
LYMPHOCYTES NFR BLD AUTO: 18.7 %
MAGNESIUM SERPL-MCNC: 1.7 MG/DL (ref 1.6–2.6)
MCH RBC QN AUTO: 28 PG (ref 26–34)
MCHC RBC AUTO-ENTMCNC: 31 G/DL (ref 31–37)
MCV RBC AUTO: 90.5 FL
MONOCYTES # BLD AUTO: 0.35 X10(3) UL (ref 0.1–1)
MONOCYTES NFR BLD AUTO: 5.7 %
NEUTROPHILS # BLD AUTO: 4.37 X10 (3) UL (ref 1.5–7.7)
NEUTROPHILS # BLD AUTO: 4.37 X10(3) UL (ref 1.5–7.7)
NEUTROPHILS NFR BLD AUTO: 70.6 %
OSMOLALITY SERPL CALC.SUM OF ELEC: 297 MOSM/KG (ref 275–295)
PLATELET # BLD AUTO: 256 10(3)UL (ref 150–450)
POTASSIUM SERPL-SCNC: 4.4 MMOL/L (ref 3.5–5.1)
RBC # BLD AUTO: 3.25 X10(6)UL
SODIUM SERPL-SCNC: 142 MMOL/L (ref 136–145)
WBC # BLD AUTO: 6.2 X10(3) UL (ref 4–11)

## 2024-03-22 PROCEDURE — 0DB98ZX EXCISION OF DUODENUM, VIA NATURAL OR ARTIFICIAL OPENING ENDOSCOPIC, DIAGNOSTIC: ICD-10-PCS | Performed by: INTERNAL MEDICINE

## 2024-03-22 PROCEDURE — 0DBL8ZX EXCISION OF TRANSVERSE COLON, VIA NATURAL OR ARTIFICIAL OPENING ENDOSCOPIC, DIAGNOSTIC: ICD-10-PCS | Performed by: INTERNAL MEDICINE

## 2024-03-22 PROCEDURE — 99233 SBSQ HOSP IP/OBS HIGH 50: CPT | Performed by: HOSPITALIST

## 2024-03-22 PROCEDURE — 0DB68ZX EXCISION OF STOMACH, VIA NATURAL OR ARTIFICIAL OPENING ENDOSCOPIC, DIAGNOSTIC: ICD-10-PCS | Performed by: INTERNAL MEDICINE

## 2024-03-22 RX ORDER — SODIUM CHLORIDE, SODIUM LACTATE, POTASSIUM CHLORIDE, CALCIUM CHLORIDE 600; 310; 30; 20 MG/100ML; MG/100ML; MG/100ML; MG/100ML
INJECTION, SOLUTION INTRAVENOUS CONTINUOUS PRN
Status: DISCONTINUED | OUTPATIENT
Start: 2024-03-22 | End: 2024-03-22 | Stop reason: SURG

## 2024-03-22 RX ORDER — LIDOCAINE HYDROCHLORIDE 10 MG/ML
INJECTION, SOLUTION EPIDURAL; INFILTRATION; INTRACAUDAL; PERINEURAL AS NEEDED
Status: DISCONTINUED | OUTPATIENT
Start: 2024-03-22 | End: 2024-03-22 | Stop reason: SURG

## 2024-03-22 RX ORDER — SODIUM CHLORIDE, SODIUM LACTATE, POTASSIUM CHLORIDE, CALCIUM CHLORIDE 600; 310; 30; 20 MG/100ML; MG/100ML; MG/100ML; MG/100ML
INJECTION, SOLUTION INTRAVENOUS CONTINUOUS
Status: DISCONTINUED | OUTPATIENT
Start: 2024-03-22 | End: 2024-03-23

## 2024-03-22 RX ORDER — CEPHALEXIN 500 MG/1
500 CAPSULE ORAL EVERY 8 HOURS SCHEDULED
Status: DISCONTINUED | OUTPATIENT
Start: 2024-03-22 | End: 2024-03-23

## 2024-03-22 RX ORDER — FUROSEMIDE 40 MG/1
40 TABLET ORAL DAILY
Status: DISCONTINUED | OUTPATIENT
Start: 2024-03-22 | End: 2024-03-23

## 2024-03-22 RX ORDER — MAGNESIUM OXIDE 400 MG/1
400 TABLET ORAL ONCE
Status: COMPLETED | OUTPATIENT
Start: 2024-03-22 | End: 2024-03-22

## 2024-03-22 RX ORDER — NALOXONE HYDROCHLORIDE 0.4 MG/ML
0.08 INJECTION, SOLUTION INTRAMUSCULAR; INTRAVENOUS; SUBCUTANEOUS ONCE AS NEEDED
Status: ACTIVE | OUTPATIENT
Start: 2024-03-22 | End: 2024-03-22

## 2024-03-22 RX ORDER — AMLODIPINE BESYLATE 2.5 MG/1
2.5 TABLET ORAL DAILY
Status: DISCONTINUED | OUTPATIENT
Start: 2024-03-22 | End: 2024-03-23

## 2024-03-22 RX ADMIN — SODIUM CHLORIDE, SODIUM LACTATE, POTASSIUM CHLORIDE, CALCIUM CHLORIDE: 600; 310; 30; 20 INJECTION, SOLUTION INTRAVENOUS at 14:47:00

## 2024-03-22 RX ADMIN — LIDOCAINE HYDROCHLORIDE 50 MG: 10 INJECTION, SOLUTION EPIDURAL; INFILTRATION; INTRACAUDAL; PERINEURAL at 14:47:00

## 2024-03-22 NOTE — PROGRESS NOTES
Progress Note     Hannah Boss Patient Status:  Inpatient    1945 MRN [id number]   Location [facility] 3W/SW Attending Woodrow Justin MD   Hosp Day # 3 PCP Diana Lopez DO     Chief Complaint: SVT    Subjective:   S: Patient with no acute events overnight.  No abd pain/n/v.  No chest pain.   Waiting for EGD    Review of Systems:   10 point ROS completed and was negative, except for pertinent positive and negatives stated in subjective.    Objective:   Vital signs:  Temp:  [97.8 °F (36.6 °C)-98.4 °F (36.9 °C)] 98 °F (36.7 °C)  Pulse:  [48-60] 48  Resp:  [16-22] 16  BP: (116-178)/(43-72) 117/43  SpO2:  [95 %-100 %] 100 %    Wt Readings from Last 6 Encounters:   24 176 lb 4.8 oz (80 kg)   03/10/24 170 lb (77.1 kg)   24 179 lb (81.2 kg)   23 200 lb (90.7 kg)   23 174 lb 2.6 oz (79 kg)   10/08/23 176 lb 3.2 oz (79.9 kg)         Physical Exam:      Gen: No acute distress  Pulm: Lungs clear, normal respiratory effort  CV: Heart with regular rate and rhythm  Abd: Abdomen soft, nontender, nondistended, bowel sounds present  Neuro: No acute focal deficits  MSK: moves extremities  Skin: Warm and dry  Psych: Normal affect  Ext: no cyanosis      Results:   Diagnostic Data:      Labs:    Labs Last 24 Hours:   BMP     CBC    Other     Na 142 Cl 109 BUN 23 Glu 88   Hb 9.1   PTT - Procal -   K 4.4 CO2 27.0 Cr 1.61   WBC 6.2 >< .0  INR - CRP -   Renal Lytes Endo    Hct 29.4   Trop - D dim -   eGFR - Ca 9.1 POC Gluc  111    LFT   pBNP - Lactic -   eGFR AA - PO4 - A1c -   AST - APk - Prot -  LDL -     Mg 1.7 TSH -   ALT - T vitaliy - Alb -        COVID-19 Lab Results    COVID-19  Lab Results   Component Value Date    COVID19 Not Detected 2023    COVID19 Not Detected 10/06/2023    COVID19 Not Detected 2023       Pro-Calcitonin  No results for input(s): \"PCT\" in the last 168 hours.    Cardiac  No results for input(s): \"TROP\", \"PBNP\" in the last 168 hours.    Creatinine  Kinase  No results for input(s): \"CK\" in the last 168 hours.    Inflammatory Markers  Recent Labs   Lab 03/21/24  0704   ROSARIO 171.6       Imaging: Imaging data reviewed in Epic.    Medications:    amLODIPine  2.5 mg Oral Daily    furosemide  40 mg Oral Daily    dilTIAZem ER  240 mg Oral Daily    ceFAZolin  2 g Intravenous Q12H    vancomycin  125 mg Oral Daily    gabapentin  300 mg Oral Nightly    pantoprazole  40 mg Oral BID AC       Assessment & Plan:   ASSESSMENT / PLAN:     SVT  -Resolved  -Required IV diltiazem on admission  -Now changed to oral diltiazem, dose incerased to 240 daily  - started Eliquis, will be held for anemia  -Cardiology consultation appreciated  -EP consultation if there is recurrence    Acute Metabolic Encephalopathy  -2/2 UTI  -Encephalopathy in the setting of UTI improved overnight  -Last admission had workup for stroke and was ruled out  -Continue IV antibiotics--> IV cefazolin  -Add p.o. vancomycin with history of C. Difficile  -Urine culture growing Klebsiella continue IV cefazolin the Klebsiella is sensitive to cefazolin  -Also some concern for possible toxic encephalopathy in patient with chronic opioid and benzodiazepine use.  -Resume opioids at lower dose from home     Acute diastolic heart failure exacerbation  -given a dose of IV lasix  -monitor response   -Start oral Lasix daily    Acute Kidney Injury on CKD  III   -Improving continue IV fluids  -Baseline creatinine 1.5  -2.3 on admission trending down 1.6  -Home lisinopril held by nephrologist due to hyperkalemia  - Avoiding Nephrotoxic agents  - Cont to monitor     Other chronic pain  -Patient on chronic oxycodone  -Managed by pain service as outpatient      Hx Recurrent falls  -Possibly related to electrolyte abnormalities and opioid use and UTI  -PT/OT  -Continue to monitor     Anemia of chronic disease  -Patient with history of CKD  -Hemoglobin did decrease but this is with hydration and has been as low as 7.9 in the  past  -Continue to monitor  -Check iron studies and Hemoccult - stool positive - will get GI eval as eliquis is being held for this  -GI evaluation appreciated  -Plan endoscopy today     Diarrhea   -C. difficile noted to be positive by PCR, but with negative EIA.    -As patient with active symptoms of abdominal cramping and diarrhea, starting p.o. vancomycin.     History of retroperitoneal Leiomyosarcoma  -CT scan without contrast was done showed a stable 6.5 x 4.5 cm retroperitoneal mass near the pancreatic uncinated and duodenum with mass effect.  -Outpatient follow-up    Quality:  DVT Prophylaxis: Eliquis  CODE status: full  Carter:    Central line: n/a      Will the patient be referred to TCC on discharge?: yes  Estimated date of discharge: TBD  Discharge is dependent on: clinical improvement  At this point Ms. Boss is expected to be discharge to: home with home health care    Plan of care discussed with patient,nursing, cardiology    Coordinated care with providers and counseling re: treatment plan and workup    MDM: High complexity

## 2024-03-22 NOTE — OPERATIVE REPORT
EGD/Colonoscopy Operative Report    Hannah Boss Patient Status:  Inpatient    1945 MRN [id number]   Location [facility] ENDOSCOPY LAB SUITES Attending Woodrow Justin MD   Hosp Day #   3 PCP Diana Lopez DO     Pre-Operative Diagnosis: iron deficiency anemia     Post-Operative Diagnosis:    ESOPHAGUS:  Normal   STOMACH:  Gastritis   DUODENUM:  Normal   COLON:      1.  Polyp x 1    2.  Diverticulosis    3.  Grade 1 Internal Hemorrhoids      Procedure Performed:  EGD w/Biopsy  COLONOSCOPY w/Cold Snare Polypectomy    Informed Consent: Informed consent for both the procedure and sedation were obtained from the patient. The potentially life-threatening complications of sedation, bleeding,  perforation, transfusion or repeat endoscopy were reviewed along with the possible need for hospitalization, surgical management, transfusion or repeat endoscopy should one of these complications arise. The patient understands and is agreeable to proceed.  Sedation Type: MAC-Patient received sedation with monitored anesthesia provided by an anesthesiologist    Moderate Sedation Time: NA    A trained sedation nurse was present to assist in monitoring the patient during the entire length of moderate sedation time.    Cecum Withdrawal Time:  9    Date of previous colonoscopy:     Procedure Description: The patient was placed in the left lateral decubitus position. A bite block was placed in the patient’s mouth. The endoscope was inserted through the mouth and advanced under direct visualization to the descending duodenum and was then withdrawn to examine the duodenal bulb and gastric antrum.  The endoscope was then retroflexed to examine the angulus, GE junction, cardia, body and fundus,  then withdrawn to examine the esophagus. The endoscope was then removed from the patient. The patient tolerated the procedure well with no immediate complications. The patient was  then repositioned for colonoscopy.    After careful digital rectal examination, the Adult colonoscope was inserted into the rectum and advanced to the level of the cecum under direct visualization. The cecum was identified by landmarks, including the appendiceal orifice and ileoceccal valve. Careful examination of the entire colon was performed during withdrawal of the endoscope. The scope was withdrawn to the rectum and retroflexion was performed.  The patient tolerated the procedure well with no immediate complications. The patient was transferred to the recovery area in stable condition.   Quality of Preparation: Adequate  Aronchick Bowel Prep Scale:  2    Estimated Blood Loss:  < 1 ml    Findings:   Esophagus:  Normal appearing esophageal mucosa.  Z-line was regular at 38 cm from the incisors.  Stomach:   Moderate erythema and edema in the distal body and antrum.  Random biopsies taken from antrum, incisura and gastric body.  Duodenum:  Normal appearing duodenal mucosa.  Random biopsies taken from the descending duodenum and duodenal bulb.    Terminal Ileum:   Unable to intubate however normal appearance at the level of the ileocecal valve.  Colon:    - 1 sessile polyp measuring 4 mm was found in the transverse colon s/p removal with cold snare.  - Mild diverticulosis in the right colon.  Rectum:  Grade 1 internal hemorrhoids seen on retroflexion.  Normal manual rectal exam.      Recommendations:   - Follow up pathology results  - No etiology of GI source of anemia.  Can hold off on Video Capsule given lack of actual iron deficiency.  - Start on Eliquis.  Monitor labs.  - General diet.    Discharge:  The patient was given an after visit summary detailing the procedure, findings, recommendations and follow up plans.    Doc Hernandez MD  3/22/2024  3:13 PM

## 2024-03-22 NOTE — H&P
The H&P dated 3/21/24 was reviewed by Doc Hernandez MD today, the patient was examined and no significant changes have occurred in the patient's condition since the H&P was performed.  I discussed with the patient and/or legal representative the potential benefits, risks and side effects of this procedure; the likelihood of the patient achieving goals; and potential problems that might occur during recuperation. I discussed reasonable alternatives to the procedure, including risks, benefits and side effects related to the alternatives and risks related to not receiving this procedure. We will proceed with procedure as planned.    Informed consent was obtained for esophagogastroduodenoscopy/colonoscopy with possible biopsy, dilation, polypectomy, therapy, banding and control of bleeding after explanation of risks, benefits and alternatives to the procedure. Risks include but not limited to bleeding, infection, perforation, missed polyps or cancer and risks of sedation.

## 2024-03-22 NOTE — PLAN OF CARE
Pt alert and oriented x4. Pt on room air. Completed egd/colonoscopy. Pt refused lasix. Pt ambulating sba.   Problem: Patient Centered Care  Goal: Patient preferences are identified and integrated in the patient's plan of care  Description: Interventions:  - What would you like us to know as we care for you? From home alone  - Provide timely, complete, and accurate information to patient/family  - Incorporate patient and family knowledge, values, beliefs, and cultural backgrounds into the planning and delivery of care  - Encourage patient/family to participate in care and decision-making at the level they choose  - Honor patient and family perspectives and choices  3/22/2024 1505 by Ullrich, Emily  Outcome: Progressing  3/22/2024 1309 by Ullrich, Emily  Outcome: Progressing     Problem: Patient/Family Goals  Goal: Patient/Family Long Term Goal  Description: Patient's Long Term Goal: Discharge    Interventions:  - Monitor vs, assess pain, assess stool gi symptoms  - See additional Care Plan goals for specific interventions  3/22/2024 1505 by Ullrich, Emily  Outcome: Progressing  3/22/2024 1309 by Ullrich, Emily  Outcome: Progressing  Goal: Patient/Family Short Term Goal  Description: Patient's Short Term Goal: manage gi symptoms    Interventions:   - assess vs, assess pain, I&O.   - See additional Care Plan goals for specific interventions  3/22/2024 1505 by Ullrich, Emily  Outcome: Progressing  3/22/2024 1309 by Ullrich, Emily  Outcome: Progressing     Problem: CARDIOVASCULAR - ADULT  Goal: Maintains optimal cardiac output and hemodynamic stability  Description: INTERVENTIONS:  - Monitor vital signs, rhythm, and trends  - Monitor for bleeding, hypotension and signs of decreased cardiac output  - Evaluate effectiveness of vasoactive medications to optimize hemodynamic stability  - Monitor arterial and/or venous puncture sites for bleeding and/or hematoma  - Assess quality of pulses, skin color and temperature  -  Assess for signs of decreased coronary artery perfusion - ex. Angina  - Evaluate fluid balance, assess for edema, trend weights  3/22/2024 1505 by Ullrich, Emily  Outcome: Progressing  3/22/2024 1309 by Ullrich, Emily  Outcome: Progressing  Goal: Absence of cardiac arrhythmias or at baseline  Description: INTERVENTIONS:  - Continuous cardiac monitoring, monitor vital signs, obtain 12 lead EKG if indicated  - Evaluate effectiveness of antiarrhythmic and heart rate control medications as ordered  - Initiate emergency measures for life threatening arrhythmias  - Monitor electrolytes and administer replacement therapy as ordered  3/22/2024 1505 by Ullrich, Emily  Outcome: Progressing  3/22/2024 1309 by Ullrich, Emily  Outcome: Progressing     Problem: SAFETY ADULT - FALL  Goal: Free from fall injury  Description: INTERVENTIONS:  - Assess pt frequently for physical needs  - Identify cognitive and physical deficits and behaviors that affect risk of falls.  - Henning fall precautions as indicated by assessment.  - Educate pt/family on patient safety including physical limitations  - Instruct pt to call for assistance with activity based on assessment  - Modify environment to reduce risk of injury  - Provide assistive devices as appropriate  - Consider OT/PT consult to assist with strengthening/mobility  - Encourage toileting schedule  3/22/2024 1505 by Ullrich, Emily  Outcome: Progressing  3/22/2024 1309 by Ullrich, Emily  Outcome: Progressing     Problem: METABOLIC/FLUID AND ELECTROLYTES - ADULT  Goal: Glucose maintained within prescribed range  Description: INTERVENTIONS:  - Monitor Blood Glucose as ordered  - Assess for signs and symptoms of hyperglycemia and hypoglycemia  - Administer ordered medications to maintain glucose within target range  - Assess barriers to adequate nutritional intake and initiate nutrition consult as needed  - Instruct patient on self management of diabetes  3/22/2024 1505 by Ullrich,  Nallely  Outcome: Progressing  3/22/2024 1309 by Ullrich, Emily  Outcome: Progressing  Goal: Electrolytes maintained within normal limits  Description: INTERVENTIONS:  - Monitor labs and rhythm and assess patient for signs and symptoms of electrolyte imbalances  - Administer electrolyte replacement as ordered  - Monitor response to electrolyte replacements, including rhythm and repeat lab results as appropriate  - Fluid restriction as ordered  - Instruct patient on fluid and nutrition restrictions as appropriate  3/22/2024 1505 by Ullrich, Emily  Outcome: Progressing  3/22/2024 1309 by Ullrich, Emily  Outcome: Progressing     Problem: PAIN - ADULT  Goal: Verbalizes/displays adequate comfort level or patient's stated pain goal  Description: INTERVENTIONS:  - Encourage pt to monitor pain and request assistance  - Assess pain using appropriate pain scale  - Administer analgesics based on type and severity of pain and evaluate response  - Implement non-pharmacological measures as appropriate and evaluate response  - Consider cultural and social influences on pain and pain management  - Manage/alleviate anxiety  - Utilize distraction and/or relaxation techniques  - Monitor for opioid side effects  - Notify MD/LIP if interventions unsuccessful or patient reports new pain  - Anticipate increased pain with activity and pre-medicate as appropriate  3/22/2024 1505 by Ullrich, Emily  Outcome: Progressing  3/22/2024 1309 by Ullrich, Emily  Outcome: Progressing

## 2024-03-22 NOTE — PROGRESS NOTES
Progress Note  Hannah Boss Patient Status:  Inpatient    1945 MRN [id number]   Location [facility] 3W/SW Attending Eileen Echeverria MD   Hosp Day # 3 PCP Diana Lopez DO     Subjective:  Denies cardiac complaints, states breathing is fine and then stated \"I feel the same\"    Objective:  /60 (BP Location: Right arm)   Pulse 60   Temp 97.8 °F (36.6 °C) (Oral)   Resp 16   Ht 5' 4\" (1.626 m)   Wt 176 lb 4.8 oz (80 kg)   SpO2 95%   BMI 30.26 kg/m²     Telemetry: SR/SB    Intake/Output:    Intake/Output Summary (Last 24 hours) at 3/22/2024 0911  Last data filed at 3/22/2024 0636  Gross per 24 hour   Intake 2420 ml   Output 1101 ml   Net 1319 ml     Last 3 Weights   24 0430 176 lb 4.8 oz (80 kg)   24 0403 183 lb 4.8 oz (83.1 kg)   24 0435 178 lb (80.7 kg)   24 1858 178 lb 3.2 oz (80.8 kg)   24 1854 178 lb 3.2 oz (80.8 kg)   24 1330 169 lb (76.7 kg)   03/10/24 0512 170 lb (77.1 kg)   03/10/24 0431 169 lb 1.6 oz (76.7 kg)   24 0400 174 lb 6.4 oz (79.1 kg)   24 2122 178 lb 9.6 oz (81 kg)   24 0441 179 lb (81.2 kg)   24 0522 175 lb 4.8 oz (79.5 kg)   24 0459 171 lb 3.2 oz (77.7 kg)   24 0427 172 lb (78 kg)   24 0601 178 lb 4.8 oz (80.9 kg)   24 1823 172 lb 1.6 oz (78.1 kg)     Labs:  Recent Labs   Lab 24  0656 24  0704 24  0654   * 82 88   BUN 37* 25* 23   CREATSERUM 2.00* 1.65* 1.61*   EGFRCR 25* 32* 33*   CA 8.3* 8.3* 9.1    140 142   K 4.7 4.9 4.4   * 115* 109   CO2 26.0 22.0 27.0     Recent Labs   Lab 24  0656 24  1137 24  0704 24  0654   RBC 2.76*  --  2.66* 3.25*   HGB 7.9* 9.2* 7.7* 9.1*   HCT 25.3* 28.6* 24.8* 29.4*   MCV 91.7  --  93.2 90.5   MCH 28.6  --  28.9 28.0   MCHC 31.2  --  31.0 31.0   RDW 13.5  --  13.7 13.5   NEPRELIM 3.75  --  3.01 4.37   WBC 5.3  --  4.7 6.2   .0  --  145.0* 256.0     Recent Labs   Lab  03/19/24  1348   TROPHS 14     Lab Results   Component Value Date/Time    HDL 56 03/09/2024 08:51 AM    LDL 43 03/09/2024 08:51 AM    TRIG 80 03/09/2024 08:51 AM     Lab Results   Component Value Date    DDIMER 1.13 (H) 06/05/2023     Lab Results   Component Value Date    TSH 0.615 10/07/2023     Review of Systems:   Constitutional: No fevers, chills, fatigue or night sweats.  ENT: No mouth pain, neck pain, running nose, headaches or swollen glands.  Skin: No rashes, pruritus or skin changes,  Respiratory: Denies cough, wheezing or shortness of breath.  CV: Denies chest pain, palpitations, orthopnea, PND or dizziness.  Musculoskeletal: No joint pain, stiffness or swelling.  GI: No nausea, vomiting or diarrhea. No blood in stools.  Neurologic: No seizures, tremors, weakness or numbness.     Physical Exam:  General: Alert, cooperative, no distress, appears stated age.  Neck: Supple, symmetrical, trachea midline, no adenopathy, thyroid: no enlargment/tenderness/nodules, no carotid bruit and no JVD.  Lungs: clear to auscultation bilaterally  Chest wall: No tenderness or deformity.  Heart: Regular rate and rhythm, S1, S2 normal, no murmur, click, rub or gallop.  Abdomen: Soft, non-tender. Bowel sounds normal. No masses,  No organomegaly.  Extremities: Extremities normal, atraumatic, no cyanosis, moderate BLE, improving.  Pulses: 2+ and symmetric all extremities.  Neurologic: Grossly intact.    Medications:   magnesium oxide  400 mg Oral Once    dilTIAZem ER  240 mg Oral Daily    ceFAZolin  2 g Intravenous Q12H    vancomycin  125 mg Oral Daily    gabapentin  300 mg Oral Nightly    pantoprazole  40 mg Oral BID AC     Assessment:    Chronic HFpEF  LVEF 55-60% with G1DD  -Still slightly overloaded on exam today, improving after lasix yesterday  -will transition to PO lasix  -weight up 7# from admission, down 7# from peak    Paroxysmal SVT   Remains in sinus rhythm/sinus yanci 50-60s on cardizem   -EP to assess if  recurrence    HTN  Moderate control 110-160s  -not on antihypertensives aside from diltiazem due to DICK, was on lisinopril at home, states she has been on amlodipine in the past and it was stopped for unknown reasons    HLD-controlled on statin    Rheumatoid Arthritis    DICK on CKD3  Baseline 1.3-1.5  -Up to 2.4 this admission, now coming down after IVF     Hx DVT  On eliquis    DM    Chronic Anemia  Hgb baseline ~8  -improved to 9.1 today with holding eliquis  -plan for EGD/colonoscopy today    Hypoalbuminemia  Albumin 3.1 today    Acute encephalopathy  2/2 UTI  -IV abx per primary    Retroperitoneal Leiomyosarcoma    Plan:  -Start lasix 40mg daily, monitor strict I/Os, daily weights, daily BMP  -resume amlodipine 2.5mg, can increase to 5mg if needed for better BP control  -Continue diltiazem 240mg daily  -Continue to hold eliquis and monitor Hgb  -further recs per primary  -possible discharge home this weekend if BP and volume status improved and cleared by other services     Plan of care discussed with patient, RN.      Kailee Fong, APRN  03/22/24  9:12 AM  [phone] [place]  [phone] [place]      L3  Seen and examined bedside. Agree with the present management, will follow with you.    Plan for EGD today for Hemoccult positive stool colonoscopy.  Continue Cardizem start Lasix 40 mg p.o. daily resume amlodipine 2.5 mg daily.  Thank you for allowing me to participate in the care of your patient, feel free to contact me if you have any questions.    Siva Caro MD  [place] [facility] [place]  Interventional Cardiology.  [phone]
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
## 2024-03-22 NOTE — PLAN OF CARE
Bowel prep ongoing. Tolerating prep fairly. No reports of abdominal pain. No melena noted. No solid foods since yesterday. For EGD and Colonoscopy this afternoon. BGM this morning is 99 mg/dl.    Problem: Patient Centered Care  Goal: Patient preferences are identified and integrated in the patient's plan of care  Description: Interventions:  - What would you like us to know as we care for you?   - Provide timely, complete, and accurate information to patient/family  - Incorporate patient and family knowledge, values, beliefs, and cultural backgrounds into the planning and delivery of care  - Encourage patient/family to participate in care and decision-making at the level they choose  - Honor patient and family perspectives and choices  Outcome: Progressing     Problem: CARDIOVASCULAR - ADULT  Goal: Maintains optimal cardiac output and hemodynamic stability  Description: INTERVENTIONS:  - Monitor vital signs, rhythm, and trends  - Monitor for bleeding, hypotension and signs of decreased cardiac output  - Evaluate effectiveness of vasoactive medications to optimize hemodynamic stability  - Monitor arterial and/or venous puncture sites for bleeding and/or hematoma  - Assess quality of pulses, skin color and temperature  - Assess for signs of decreased coronary artery perfusion - ex. Angina  - Evaluate fluid balance, assess for edema, trend weights  Outcome: Progressing  Goal: Absence of cardiac arrhythmias or at baseline  Description: INTERVENTIONS:  - Continuous cardiac monitoring, monitor vital signs, obtain 12 lead EKG if indicated  - Evaluate effectiveness of antiarrhythmic and heart rate control medications as ordered  - Initiate emergency measures for life threatening arrhythmias  - Monitor electrolytes and administer replacement therapy as ordered  Outcome: Progressing     Problem: SAFETY ADULT - FALL  Goal: Free from fall injury  Description: INTERVENTIONS:  - Assess pt frequently for physical needs  - Identify  cognitive and physical deficits and behaviors that affect risk of falls.  - [place] fall precautions as indicated by assessment.  - Educate pt/family on patient safety including physical limitations  - Instruct pt to call for assistance with activity based on assessment  - Modify environment to reduce risk of injury  - Provide assistive devices as appropriate  - Consider OT/PT consult to assist with strengthening/mobility  - Encourage toileting schedule  Outcome: Progressing     Problem: METABOLIC/FLUID AND ELECTROLYTES - ADULT  Goal: Glucose maintained within prescribed range  Description: INTERVENTIONS:  - Monitor Blood Glucose as ordered  - Assess for signs and symptoms of hyperglycemia and hypoglycemia  - Administer ordered medications to maintain glucose within target range  - Assess barriers to adequate nutritional intake and initiate nutrition consult as needed  - Instruct patient on self management of diabetes  Outcome: Progressing  Goal: Electrolytes maintained within normal limits  Description: INTERVENTIONS:  - Monitor labs and rhythm and assess patient for signs and symptoms of electrolyte imbalances  - Administer electrolyte replacement as ordered  - Monitor response to electrolyte replacements, including rhythm and repeat lab results as appropriate  - Fluid restriction as ordered  - Instruct patient on fluid and nutrition restrictions as appropriate  Outcome: Progressing     Problem: PAIN - ADULT  Goal: Verbalizes/displays adequate comfort level or patient's stated pain goal  Description: INTERVENTIONS:  - Encourage pt to monitor pain and request assistance  - Assess pain using appropriate pain scale  - Administer analgesics based on type and severity of pain and evaluate response  - Implement non-pharmacological measures as appropriate and evaluate response  - Consider cultural and social influences on pain and pain management  - Manage/alleviate anxiety  - Utilize distraction and/or relaxation  techniques  - Monitor for opioid side effects  - Notify MD/LIP if interventions unsuccessful or patient reports new pain  - Anticipate increased pain with activity and pre-medicate as appropriate  Outcome: Progressing

## 2024-03-22 NOTE — ANESTHESIA POSTPROCEDURE EVALUATION
Patient: Hannah Boss    Procedure Summary       Date: 03/22/24 Room / Location: [facility] ENDOSCOPY 04 / [facility] ENDOSCOPY    Anesthesia Start: 1415 Anesthesia Stop: 1511    Procedures:       ESOPHAGOGASTRODUODENOSCOPY (EGD)      COLONOSCOPY Diagnosis: (gastritis, colon polyps,)    Surgeons: Doc Hernandez MD Anesthesiologist: Thuy Beth CRNA    Anesthesia Type: MAC ASA Status: 4            Anesthesia Type: MAC    Vitals Value Taken Time   /43 03/22/24 1509   Temp 98 °F (36.7 °C) 03/22/24 1509   Pulse 48 03/22/24 1509   Resp 16 03/22/24 1509   SpO2 100 % 03/22/24 1509       [facility] AN Post Evaluation:   Patient Evaluated in PACU  Patient Participation: complete - patient participated  Level of Consciousness: sleepy but conscious  Pain Score: 0  Pain Management: adequate  Airway Patency:patent  Dental exam unchanged from preop  Yes    Cardiovascular Status: stable  Respiratory Status: acceptable and nasal cannula  Postoperative Hydration stable      Thuy Beth CRNA  3/22/2024 3:11 PM

## 2024-03-23 VITALS
RESPIRATION RATE: 18 BRPM | HEIGHT: 64 IN | DIASTOLIC BLOOD PRESSURE: 64 MMHG | HEART RATE: 60 BPM | OXYGEN SATURATION: 100 % | WEIGHT: 175.13 LBS | BODY MASS INDEX: 29.9 KG/M2 | SYSTOLIC BLOOD PRESSURE: 138 MMHG | TEMPERATURE: 98 F

## 2024-03-23 LAB
ANION GAP SERPL CALC-SCNC: 0 MMOL/L (ref 0–18)
ATRIAL RATE: 56 BPM
BASOPHILS # BLD AUTO: 0.03 X10(3) UL (ref 0–0.2)
BASOPHILS NFR BLD AUTO: 0.5 %
BUN BLD-MCNC: 25 MG/DL (ref 9–23)
BUN/CREAT SERPL: 15.8 (ref 10–20)
CALCIUM BLD-MCNC: 8.6 MG/DL (ref 8.7–10.4)
CHLORIDE SERPL-SCNC: 114 MMOL/L (ref 98–112)
CO2 SERPL-SCNC: 30 MMOL/L (ref 21–32)
CREAT BLD-MCNC: 1.58 MG/DL
DEPRECATED RDW RBC AUTO: 44.5 FL (ref 35.1–46.3)
EGFRCR SERPLBLD CKD-EPI 2021: 34 ML/MIN/1.73M2 (ref 60–?)
EOSINOPHIL # BLD AUTO: 0.19 X10(3) UL (ref 0–0.7)
EOSINOPHIL NFR BLD AUTO: 3 %
ERYTHROCYTE [DISTWIDTH] IN BLOOD BY AUTOMATED COUNT: 13.7 % (ref 11–15)
GLUCOSE BLD-MCNC: 103 MG/DL (ref 70–99)
GLUCOSE BLDC GLUCOMTR-MCNC: 108 MG/DL (ref 70–99)
GLUCOSE BLDC GLUCOMTR-MCNC: 125 MG/DL (ref 70–99)
HCT VFR BLD AUTO: 24.3 %
HGB BLD-MCNC: 7.6 G/DL
IMM GRANULOCYTES # BLD AUTO: 0.01 X10(3) UL (ref 0–1)
IMM GRANULOCYTES NFR BLD: 0.2 %
LYMPHOCYTES # BLD AUTO: 1.11 X10(3) UL (ref 1–4)
LYMPHOCYTES NFR BLD AUTO: 17.7 %
MAGNESIUM SERPL-MCNC: 1.7 MG/DL (ref 1.6–2.6)
MCH RBC QN AUTO: 27.7 PG (ref 26–34)
MCHC RBC AUTO-ENTMCNC: 31.3 G/DL (ref 31–37)
MCV RBC AUTO: 88.7 FL
MONOCYTES # BLD AUTO: 0.36 X10(3) UL (ref 0.1–1)
MONOCYTES NFR BLD AUTO: 5.7 %
NEUTROPHILS # BLD AUTO: 4.58 X10 (3) UL (ref 1.5–7.7)
NEUTROPHILS # BLD AUTO: 4.58 X10(3) UL (ref 1.5–7.7)
NEUTROPHILS NFR BLD AUTO: 72.9 %
OSMOLALITY SERPL CALC.SUM OF ELEC: 303 MOSM/KG (ref 275–295)
P AXIS: 59 DEGREES
P-R INTERVAL: 148 MS
PLATELET # BLD AUTO: 216 10(3)UL (ref 150–450)
POTASSIUM SERPL-SCNC: 5 MMOL/L (ref 3.5–5.1)
POTASSIUM SERPL-SCNC: 5.5 MMOL/L (ref 3.5–5.1)
Q-T INTERVAL: 462 MS
QRS DURATION: 68 MS
QTC CALCULATION (BEZET): 445 MS
R AXIS: 33 DEGREES
RBC # BLD AUTO: 2.74 X10(6)UL
SODIUM SERPL-SCNC: 144 MMOL/L (ref 136–145)
T AXIS: 47 DEGREES
VENTRICULAR RATE: 56 BPM
WBC # BLD AUTO: 6.3 X10(3) UL (ref 4–11)

## 2024-03-23 PROCEDURE — 99239 HOSP IP/OBS DSCHRG MGMT >30: CPT | Performed by: HOSPITALIST

## 2024-03-23 RX ORDER — VANCOMYCIN HYDROCHLORIDE 125 MG/1
125 CAPSULE ORAL 4 TIMES DAILY
Qty: 40 CAPSULE | Refills: 0 | Status: SHIPPED | OUTPATIENT
Start: 2024-03-23 | End: 2024-03-23

## 2024-03-23 RX ORDER — CEPHALEXIN 500 MG/1
500 CAPSULE ORAL EVERY 8 HOURS SCHEDULED
Qty: 9 CAPSULE | Refills: 0 | Status: SHIPPED | OUTPATIENT
Start: 2024-03-23 | End: 2024-03-26

## 2024-03-23 RX ORDER — VANCOMYCIN HYDROCHLORIDE 125 MG/1
125 CAPSULE ORAL DAILY
Qty: 7 CAPSULE | Refills: 0 | Status: SHIPPED | OUTPATIENT
Start: 2024-03-24

## 2024-03-23 RX ORDER — DILTIAZEM HYDROCHLORIDE 240 MG/1
240 CAPSULE, COATED, EXTENDED RELEASE ORAL DAILY
Qty: 30 CAPSULE | Refills: 1 | Status: SHIPPED | OUTPATIENT
Start: 2024-03-24

## 2024-03-23 RX ORDER — MAGNESIUM OXIDE 400 MG/1
400 TABLET ORAL ONCE
Status: COMPLETED | OUTPATIENT
Start: 2024-03-23 | End: 2024-03-23

## 2024-03-23 NOTE — DISCHARGE PLANNING
Patient was provided with discharge instructions, education, and follow up information. Patient's son present for discharge instructions with patient's consent. Prescriptions were already sent electronically to patient's pharmacy. Patient verbalizes understanding of follow up information, specifically physicians to follow up with, medications prescribed, dose, timing, purpose and side effects, foods high and low in potassium and when to call MD or EMS. Patient has no questions after reviewing all instructions and will be going home with her son.     Krys ARGUETA, Discharge Leader [id number]

## 2024-03-23 NOTE — PLAN OF CARE
Seen by mds & updated. K+ monitored & decreased after med to 5.0. EKG done, bladder scan done post void-0, md updated, ok for dc home, dc instructions & f/u care reviewed w/pt & son & v/u, sw aware for [facility] to draw labs on 3/27 & send to dr francisco, see orders, notes, assessment, iv site d/maru, catheter & tip intact, cdi, no c/o or distress, assessment unchanged, discharged via wc w/all belongings w/son.  Problem: Patient Centered Care  Goal: Patient preferences are identified and integrated in the patient's plan of care  Description: Interventions:  - What would you like us to know as we care for you? From home alone  - Provide timely, complete, and accurate information to patient/family  - Incorporate patient and family knowledge, values, beliefs, and cultural backgrounds into the planning and delivery of care  - Encourage patient/family to participate in care and decision-making at the level they choose  - Honor patient and family perspectives and choices  Outcome: Adequate for Discharge

## 2024-03-23 NOTE — PROGRESS NOTES
[facility] Cardiology Progress Note    Hannah Boss Patient Status:  Inpatient    1945 MRN [id number]   Location [facility] 3W/SW Attending Woodrow Justin MD   Hosp Day # 4 PCP Diana Lopez DO     Subjective:  Denies cp, sob. No telemetry events overnight     Objective:  /66 (BP Location: Right arm)   Pulse 65   Temp 98.9 °F (37.2 °C) (Oral)   Resp 18   Ht 5' 4\" (1.626 m)   Wt 175 lb 1.6 oz (79.4 kg)   SpO2 99%   BMI 30.06 kg/m²     Telemetry: NSR       Intake/Output:  No intake or output data in the 24 hours ending 24 1028    Last 3 Weights   24 0418 175 lb 1.6 oz (79.4 kg)   24 0430 176 lb 4.8 oz (80 kg)   24 0403 183 lb 4.8 oz (83.1 kg)   24 0435 178 lb (80.7 kg)   24 1858 178 lb 3.2 oz (80.8 kg)   24 1854 178 lb 3.2 oz (80.8 kg)   24 1330 169 lb (76.7 kg)   03/10/24 0512 170 lb (77.1 kg)   03/10/24 0431 169 lb 1.6 oz (76.7 kg)   24 0400 174 lb 6.4 oz (79.1 kg)   24 2122 178 lb 9.6 oz (81 kg)   24 0441 179 lb (81.2 kg)   24 0522 175 lb 4.8 oz (79.5 kg)   24 0459 171 lb 3.2 oz (77.7 kg)   24 0427 172 lb (78 kg)   24 0601 178 lb 4.8 oz (80.9 kg)   24 1823 172 lb 1.6 oz (78.1 kg)       Labs:  Recent Labs   Lab 24  0704 24  0654 24  0718   GLU 82 88 103*   BUN 25* 23 25*   CREATSERUM 1.65* 1.61* 1.58*   EGFRCR 32* 33* 34*   CA 8.3* 9.1 8.6*    142 144   K 4.9 4.4 5.5*   * 109 114*   CO2 22.0 27.0 30.0     Recent Labs   Lab 24  0704 24  0654 24  0718   RBC 2.66* 3.25* 2.74*   HGB 7.7* 9.1* 7.6*   HCT 24.8* 29.4* 24.3*   MCV 93.2 90.5 88.7   MCH 28.9 28.0 27.7   MCHC 31.0 31.0 31.3   RDW 13.7 13.5 13.7   NEPRELIM 3.01 4.37 4.58   WBC 4.7 6.2 6.3   .0* 256.0 216.0         Recent Labs   Lab 24  1348   TROPHS 14       Diagnostics:         Review of Systems   Respiratory: Negative.     Cardiovascular:  Positive for leg  swelling. Negative for chest pain, palpitations and orthopnea.     Physical Exam:    General: Alert and oriented x 3. No apparent distress.   HEENT: Normocephalic, anicteric sclera, neck supple, no thyromegaly or adenopathy.  Neck: No JVD, carotids 2+, no bruits.  Cardiac: Regular rate & rhythm. S1, S2 normal. No murmur, pericardial rub, S3, or extra cardiac sounds.  Lungs: Clear without wheezes, rales, rhonchi or dullness.  Normal excursions and effort.  Abdomen: Soft, non-tender. No organosplenomegally, mass or rebound, BS-present.  Extremities: Without clubbing or cyanosis.  +1 BLE edema   Neurologic: Alert and oriented, normal affect. No focal defects  Skin: Warm and dry.       Medications:   amLODIPine  2.5 mg Oral Daily    furosemide  40 mg Oral Daily    cephalexin  500 mg Oral Q8H ERNESTO    dilTIAZem ER  240 mg Oral Daily    vancomycin  125 mg Oral Daily    gabapentin  300 mg Oral Nightly    pantoprazole  40 mg Oral BID AC      lactated ringers         Assessment:    Chronic HFpEF  - LVEF 55-60% with G1DD  - Mildly elevated BNP   - 3/19 CXR unremarkable   - On oral lasix . Chronic BLE edema improved per pt      Paroxysmal SVT   - Remains in sinus rhythm/sinus yanci 50-60s on cardizem ER  - Consider EP evaluation if recurrent. No further recurrences reported      HTN  - Stable. On amlodipine      HLD  - on statin at home      Hyperkalemia   - Home ACEi on hold      Rheumatoid Arthritis     DICK on CKD3  - Baseline 1.3-1.5  - Improved w/ hydration      Hx DVT  - On eliquis     DM  - Per PMD      Chronic Anemia  - Hgb baseline ~8. Hgb 7.6 today   - 3/22 EGD/colonoscopy w/ no GI source of anemia. OK to resume eliquis      Hypoalbuminemia  - last albumin 3.1      Acute encephalopathy  - 2/2 UTI  - IV abx per primary     Retroperitoneal Leiomyosarcoma    Plan:    No recurrences of SVT reported or noted on telemetry   Remains in NSR / SB. Asymptomatic . Continue diltiazem ER   Mild BLE edema. Chronic & improved per pt.  Continue home lasix regimen & recommend compression stockings  Follows Nephrology as outpatient   EGD/colonoscopy w/ no GI source of anemia. Ok to resume eliquis per GI  Hyperkalemia noted. PMD following. Plan for [facility]    D/w PMD, will sign off at this time. Please call for any questions / concerns     KELLEY Sepulveda  3/23/2024  10:28 AM  Ph [phone] ([place])  Ph [phone] ([place])

## 2024-03-23 NOTE — PROGRESS NOTES
Progress Note     Hannah Boss Patient Status:  Inpatient    1945 MRN [id number]   Location [facility] 3W/SW Attending Woodrow Justin MD   Hosp Day # 4 PCP Diana Lopez DO     Chief Complaint: SVT    Subjective:   No acute events overnight  Feeling better wants to go home  Discussed elevated potassium level from this morning  Has not been on lisinopril but has not been following a low K diet here    Review of Systems:   10 point ROS completed and was negative, except for pertinent positive and negatives stated in subjective.    Objective:   Vital signs:  Temp:  [97.6 °F (36.4 °C)-98.9 °F (37.2 °C)] 97.6 °F (36.4 °C)  Pulse:  [50-68] 60  Resp:  [16-18] 18  BP: (117-144)/(57-69) 138/64  SpO2:  [97 %-100 %] 100 %    Wt Readings from Last 6 Encounters:   24 175 lb 1.6 oz (79.4 kg)   03/10/24 170 lb (77.1 kg)   24 179 lb (81.2 kg)   23 200 lb (90.7 kg)   23 174 lb 2.6 oz (79 kg)   10/08/23 176 lb 3.2 oz (79.9 kg)         Physical Exam:      Gen: No acute distress  Pulm: Lungs clear, normal respiratory effort  CV: Heart with regular rate and rhythm  Abd: Abdomen soft, nontender, nondistended, bowel sounds present  Neuro: No acute focal deficits  MSK: moves extremities  Skin: Warm and dry  Psych: Normal affect  Ext: no cyanosis      Results:   Diagnostic Data:      Labs:    Labs Last 24 Hours:   BMP     CBC    Other     Na 144 Cl 114 BUN 25 Glu 103   Hb 7.6   PTT - Procal -   K 5.5 CO2 30.0 Cr 1.58   WBC 6.3 >< .0  INR - CRP -   Renal Lytes Endo    Hct 24.3   Trop - D dim -   eGFR - Ca 8.6 POC Gluc  108    LFT   pBNP - Lactic -   eGFR AA - PO4 - A1c -   AST - APk - Prot -  LDL -     Mg 1.7 TSH -   ALT - T vitaliy - Alb -        COVID-19 Lab Results    COVID-19  Lab Results   Component Value Date    COVID19 Not Detected 2023    COVID19 Not Detected 10/06/2023    COVID19 Not Detected 2023       Pro-Calcitonin  No results for input(s): \"PCT\" in the last 168  hours.    Cardiac  No results for input(s): \"TROP\", \"PBNP\" in the last 168 hours.    Creatinine Kinase  No results for input(s): \"CK\" in the last 168 hours.    Inflammatory Markers  Recent Labs   Lab 03/21/24  0704   ROSARIO 171.6       Imaging: Imaging data reviewed in Epic.    Medications:    apixaban  5 mg Oral BID    sodium zirconium cyclosilicate  10 g Oral Q8H    amLODIPine  2.5 mg Oral Daily    furosemide  40 mg Oral Daily    cephalexin  500 mg Oral Q8H ERNESTO    dilTIAZem ER  240 mg Oral Daily    vancomycin  125 mg Oral Daily    gabapentin  300 mg Oral Nightly    pantoprazole  40 mg Oral BID AC       Assessment & Plan:   ASSESSMENT / PLAN:     SVT  -Resolved  -Required IV diltiazem on admission  -Now changed to oral diltiazem, dose incerased to 240 daily  - started Eliquis, will be held for anemia  -Cardiology consultation appreciated  -EP consultation if there is recurrence    Acute Metabolic Encephalopathy  -2/2 UTI  -Encephalopathy in the setting of UTI improved overnight  -Last admission had workup for stroke and was ruled out  -Continue IV antibiotics--> IV cefazolin  -Add p.o. vancomycin with history of C. Difficile  -Urine culture growing Klebsiella continue IV cefazolin the Klebsiella is sensitive to cefazolin  -Also some concern for possible toxic encephalopathy in patient with chronic opioid and benzodiazepine use.  -Resume opioids at lower dose from home     Acute diastolic heart failure exacerbation  -given a dose of IV lasix  -monitor response   -Start oral Lasix daily    Acute Kidney Injury on CKD  III   -Improving continue IV fluids  -Baseline creatinine 1.5  -2.3 on admission trending down 1.58  -Home lisinopril held by nephrologist due to hyperkalemia  - Avoiding Nephrotoxic agents  - Cont to monitor labs as an outpatient     Hyperkalemia  -Has had hyperkalemia in the past has been taken off of lisinopril  -Has not had a low potassium diet while hospitalized-  -plan [facility] today, home with low  potassium diet and outpatient follow-up with nephrology if potassium is improved    Other chronic pain  -Patient on chronic oxycodone  -Managed by pain service as outpatient      Hx Recurrent falls  -Possibly related to electrolyte abnormalities and opioid use and UTI  -PT/OT  -Continue to monitor     Anemia of chronic disease  -Patient with history of CKD  -Hemoglobin did decrease but this is with hydration and has been as low as 7.9 in the past  -Continue to monitor  -Check iron studies and Hemoccult - stool positive - will get GI eval as eliquis is being held for this  -GI evaluation appreciated  -endoscopy and colonoscopy on 3/22 showed a normal EGD, a sessile polyp which was removed from transverse colon, mild diverticulosis in the right colon, grade 1 internal hemorrhoids  -Follow-up with GI in 2 to 4 weeks     Diarrhea   -C. difficile noted to be positive by PCR, but with negative EIA.    -As patient with active symptoms of abdominal cramping and diarrhea, starting p.o. vancomycin.     History of retroperitoneal Leiomyosarcoma  -CT scan without contrast was done showed a stable 6.5 x 4.5 cm retroperitoneal mass near the pancreatic uncinated and duodenum with mass effect.  -Outpatient follow-up    Quality:  DVT Prophylaxis: Eliquis  CODE status: full  Carter:    Central line: n/a      Will the patient be referred to [facility] on discharge?: yes  Estimated date of discharge: TBD  Discharge is dependent on: clinical improvement  At this point Ms. Boss is expected to be discharge to: home with home health care    Plan of care discussed with patient,nursing, cardiology    Coordinated care with providers and counseling re: treatment plan and workup    MDM: High complexity

## 2024-03-23 NOTE — DISCHARGE INSTRUCTIONS
Follow-up with PCP in 1 to 2 weeks  Follow-up with nephrology next week  Home health care will obtain renal function panel on 327 and follow-up with nephrology  Low potassium diet as instructed by nephrology  Follow-up with gastroenterology in 2 to 4 weeks  Follow-up with your oncologist as an outpatient    For Home Health:   Plan labs CBC and renal function panel on 3/27 with results to Dr. Reynaga

## 2024-03-23 NOTE — PROGRESS NOTES
[facility]  part of [facility]    Progress Note    Hannah Boss Patient Status:  Inpatient    1945 MRN [id number]   Location [facility] 3W/SW Attending Woodrow Justin MD   Hosp Day # 4 PCP Diana Lopez DO     Subjective:   No acute events.      Objective:   Blood pressure 144/66, pulse 65, temperature 98.9 °F (37.2 °C), temperature source Oral, resp. rate 18, height 5' 4\" (1.626 m), weight 175 lb 1.6 oz (79.4 kg), SpO2 99%, not currently breastfeeding.    GENERAL: in no apparent distress  CHEST/CARDIO: RRR without murmur  LUNGS: clear to auscultation  GI: good BS's and no tenderness      Results:   Lab Results   Component Value Date    WBC 6.3 2024    HGB 7.6 (L) 2024    HCT 24.3 (L) 2024    .0 2024    CREATSERUM 1.58 (H) 2024    BUN 25 (H) 2024     2024    K 5.5 (H) 2024     (H) 2024    CO2 30.0 2024     (H) 2024    CA 8.6 (L) 2024    ALB 3.1 (L) 2024    ALKPHO 263 (H) 2024    BILT 0.3 2024    TP 7.2 2024    AST 59 (H) 2024    ALT 29 2024    PTT 35.0 2024    INR 1.30 (H) 2024    TSH 0.615 10/07/2023    LIP 22 2024    DDIMER 1.13 (H) 2023    MG 1.7 2024    PHOS 3.3 2024    TROPHS 14 2024     (H) 2023    B12 399 2022       No results found.  EKG 12 Lead    Result Date: 3/23/2024  Sinus bradycardia Cannot rule out Anterior infarct , age undetermined Abnormal ECG When compared with ECG of 19-MAR-2024 13:34, Vent. rate has decreased BY  91 BPM QRS duration has decreased ST no longer depressed in Inferior leads ST no longer depressed in Anterior-lateral leads     Assessment & Plan:   78 year old female with a history of CKD Stage 3, Chronic Pain, CCY, Pancreatitis, SVT on Eliquis, Recurrent Falls, Anemia of Chronic Disease, HTN, HLD, RA, DVT and CAD who presents with SVT.  GI consulted for  worsening of baseline anemia.     Acute on Chronic Anemia:  DICK on CKD:  SVT (resolved):  Diarrhea:  - Patient presents with SVT (resolved), UTI with encephalopathy (improved), diarrhea (chronic) and downtrending Hgb from already low baseline without overt bleeding.  FOBT positive but inaccurate in setting of blood thinners.  Her iron studies are stable, not consistent with MAXIMO.    - Last Colonoscopy at [facility] in 2013.  EGD 2015 negative.  MRCP in 2016 with tapering CBD, EUS negative.    - EGD with gastritis and pathology pending.  Colonoscopy with 1 small polyp.  Hgb stable.  Eliquis being restarted.          Plan:  - Resume Eliquis  - Follow up GI in 2-4 weeks    Doc Hernandez M.D.  [facility]  Gastroenterology & Hepatology

## 2024-03-23 NOTE — DISCHARGE SUMMARY
[facility]  part of [facility]    Discharge Summary    Hannah Boss Patient Status:  Inpatient    1945 MRN [id number]   Location [facility] 3W/SW Attending Woodrow Justin MD   Hosp Day # 4 PCP Diana Lopez DO     Date of Admission: 3/19/2024 Disposition: Home Health Care Services     Date of Discharge: .todat      Admitting Diagnosis: PSVT (paroxysmal supraventricular tachycardia) ([facility]) [I47.10]  DICK (acute kidney injury) ([facility]) [N17.9]  Sinus pause [I45.5]  Chest pain of uncertain etiology [R07.9]    Hospital Discharge Diagnoses:  SVT    Lace+ Score: 80  59-90 High Risk  29-58 Medium Risk  0-28   Low Risk.    TCM Follow-Up Recommendation:  LACE > 58: High Risk of readmission after discharge from the hospital.      Problem List:   Patient Active Problem List   Diagnosis    Back pain    L5-S1 left paracentral mild HNP, L4-5 right lateral recess & foraminal mod HNP, L3-4 right paracentral mild HNP    L5-S1 left mod foraminal, L3-4 bilateral mod foraminal, L2-3 mild-mod diffuse, L1-2 right mild foraminal bulging discs    L5-S1 right mod/left severe, L4-5 right mod-severe/left mild, L3-4 right mild-mod/left mod foraminal stenosis    Spinal stenosis of lumbar region at multiple levels    Degenerative arthritis    Leg weakness    Neurologic gait dysfunction    Cancer related pain    Acute on chronic heart failure with preserved ejection fraction (HFpEF) (HCC)    Pulmonary hypertension (HCC)    Stage 3 chronic kidney disease (HCC)    Acute renal failure (HCC)    Bilateral leg edema    Acute on chronic congestive heart failure, unspecified heart failure type (HCC)    Uncontrolled hypertension    Benign hypertension    Tobacco use disorder    Hyperkalemia    Elevated troponin    Transaminitis    Acute on chronic renal insufficiency    Acute cystitis without hematuria    Chronic renal impairment    Chronic renal impairment, unspecified CKD stage    Dehydration    Weakness generalized     Lethargy    Urinary tract infection without hematuria, site unspecified    Other fatigue    Non-traumatic rhabdomyolysis    Abnormal LFTs    Altered mental status    Altered mental status, unspecified altered mental status type    Abdominal mass    Aortic stenosis    Nicotine dependence with current use    Constipation due to opioid therapy    Dyslipidemia    Dyspnea    Edema of extremities    Other chronic pain    Gastritis    Hypercholesteremia    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HCC)    Traumatic rhabdomyolysis, initial encounter (HCC)    Chest pain with high risk for cardiac etiology    Renal insufficiency    Pain in both feet    Bilateral swelling of feet    Exertional dyspnea    Deep vein thrombosis (DVT) of distal vein of left lower extremity, unspecified chronicity (HCC)    Aortic valvular disease    Blunt head trauma, initial encounter    Laceration of auricle of right ear, initial encounter    Weakness    Fall, initial encounter    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HCC)    Paraparesis (HCC)    Thoracic myelopathy    Lumbar spondylosis    SVT (supraventricular tachycardia)    Chest pain    Dental infection    Anemia    Paraparesis of both lower limbs (HCC)    Acute renal failure (ARF) (HCC)    Acute kidney injury (HCC)    PSVT (paroxysmal supraventricular tachycardia)    Sinus pause    Chest pain of uncertain etiology    DICK (acute kidney injury) ([facility])       Reason for Admission:   SVT  Acute Metabolic Encephalopathy  Acute diastolic heart failure exacerbation  Acute Kidney Injury on CKD  III   Hyperkalemia  Chronic pain  Hx Recurrent falls  Anemia of chronic disease  Diarrhea   History of retroperitoneal Leiomyosarcoma    Physical Exam:   See PN      History of Present Illness:   Per Dr. Pascual   The patient is a 78-year-old  female who came into the emergency department for evaluation of fatigue and palpitations with dizziness.  Upon arrival to the emergency room noted to be in supraventricular tachycardia,  rate 140.  She was given 1 dose of Cardizem and converted to sinus rhythm.  Then, she had another recurrence, given another loading dose of Cardizem and she developed 2-second to 3-second sinus pause and converted to sinus rhythm.  Blood pressure noted to be low, 100/44.  CBC showed hemoglobin of 9.6, which is stable.  BUN and creatinine of 39 and 2.38.  GFR is 21, which is way below her baseline.  Glucose 138.  She was given IV fluids, and she will be admitted to the hospital for further management.  Chest x-ray showed no acute finding.  CT scan of the abdomen and pelvis still pending.  Currently in sinus rhythm with no ST-T changes to suggest ischemia.  Troponin was negative.       Hospital Course:   SVT  -Resolved  -Required IV diltiazem on admission  -Now changed to oral diltiazem, dose increased to 240 daily  - started Eliquis, will be held for anemia  -Cardiology consultation appreciated  -EP consultation if there is recurrence     Acute Metabolic Encephalopathy  Klebsiella UTI  -2/2 UTI  -Encephalopathy in the setting of UTI improved overnight  -Last admission had workup for stroke and was ruled out  -Continue IV antibiotics--> IV cefazolin  -Add p.o. vancomycin with history of C. Difficile  -Urine culture growing Klebsiella continue IV cefazolin the Klebsiella is sensitive to cefazolin  -Also some concern for possible toxic encephalopathy in patient with chronic opioid and benzodiazepine use.  -Resume opioids at lower dose from home  -home with po keflex to complete course     Acute diastolic heart failure exacerbation  -given a dose of IV lasix  -monitor response   -Start oral Lasix daily     Acute Kidney Injury on CKD  III   -Improving continue IV fluids  -Baseline creatinine 1.5  -2.3 on admission trending down 1.58  -Home lisinopril held by nephrologist due to hyperkalemia  - Avoiding Nephrotoxic agents  - Cont to monitor labs as an outpatient     Hyperkalemia  -Has had hyperkalemia in the past has been  taken off of lisinopril  -Has not had a low potassium diet while hospitalized-  -K 5.5, down to 5.0 with lokelma  -plan Lokelma today, home with low potassium diet and outpatient -follow-up with nephrology  -plan labs as OP     Other chronic pain  -Patient on chronic oxycodone  -Managed by pain service as outpatient      Hx Recurrent falls  -Possibly related to electrolyte abnormalities and opioid use and UTI  -PT/OT  -Continue to monitor     Anemia of chronic disease  -Patient with history of CKD  -Hemoglobin did decrease but this is with hydration and has been as low as 7.9 in the past  -Continue to monitor  -Check iron studies and Hemoccult - stool positive - will get GI eval as eliquRaidarrr is being held for this  -GI evaluation appreciated  -endoscopy and colonoscopy on 3/22 showed a normal EGD, a sessile polyp which was removed from transverse colon, mild diverticulosis in the right colon, grade 1 internal hemorrhoids  -Follow-up with GI in 2 to 4 weeks     Diarrhea   -C. difficile noted to be positive by PCR, but with negative EIA.    -As patient with active symptoms of abdominal cramping and diarrhea, starting p.o. vancomycin.     History of retroperitoneal Leiomyosarcoma  -CT scan without contrast was done showed a stable 6.5 x 4.5 cm retroperitoneal mass near the pancreatic uncinated and duodenum with mass effect.  -Outpatient follow-up    Consultations:   Cardiology  GI    Procedures: EGD/Colonoscopy    Complications: n/a    Discharge Condition: Good    Discharge Medications:      Discharge Medications        START taking these medications        Instructions Prescription details   cephalexin 500 MG Caps  Commonly known as: Keflex      Take 1 capsule (500 mg total) by mouth every 8 (eight) hours for 3 days.   Stop taking on: March 26, 2024  Quantity: 9 capsule  Refills: 0     dilTIAZem  MG Cp24  Commonly known as: CardIZEM CD  Start taking on: March 24, 2024  Replaces: dilTIAZem  MG Cp24      Take  1 capsule (240 mg total) by mouth daily.   Quantity: 30 capsule  Refills: 1            CHANGE how you take these medications        Instructions Prescription details   vancomycin 125 MG Caps  Commonly known as: Vancocin  What changed: Another medication with the same name was added. Make sure you understand how and when to take each.      Take 1 capsule (125 mg total) by mouth 4 (four) times daily for 10 days.   Stop taking on: April 2, 2024  Quantity: 40 capsule  Refills: 0     vancomycin 125 MG Caps  Commonly known as: Vancocin  Start taking on: March 24, 2024  What changed: You were already taking a medication with the same name, and this prescription was added. Make sure you understand how and when to take each.      Take 1 capsule (125 mg total) by mouth daily.   Quantity: 7 capsule  Refills: 0            CONTINUE taking these medications        Instructions Prescription details   apixaban 5 MG Tabs  Commonly known as: Eliquis      Take 1 tablet (5 mg total) by mouth 2 (two) times daily.   Quantity: 60 tablet  Refills: 0     atorvastatin 40 MG Tabs  Commonly known as: Lipitor      Take 1 tablet (40 mg total) by mouth. AT BEDTIME   Refills: 3     cholecalciferol 50 MCG (2000 UT) Tabs      Take 1 tablet (2,000 Units total) by mouth daily.   Refills: 0     ferrous sulfate 325 (65 FE) MG Tbec      Take 1 tablet (325 mg total) by mouth daily with breakfast. Three times a week   Refills: 0     gabapentin 300 MG Caps  Commonly known as: Neurontin      Take 1 capsule (300 mg total) by mouth nightly.   Refills: 0     meclizine 25 MG Tabs  Commonly known as: Antivert      Take 1 tablet (25 mg total) by mouth.   Refills: 0     pantoprazole 40 MG Tbec  Commonly known as: Protonix      Take 1 tablet (40 mg total) by mouth 2 (two) times daily before meals.   Refills: 0            STOP taking these medications      amLODIPine 5 MG Tabs  Commonly known as: Norvasc        dilTIAZem  MG Cp24  Commonly known as: Cardizem  CD  Replaced by: dilTIAZem  MG Cp24               ASK your doctor about these medications        Instructions Prescription details   furosemide 20 MG Tabs  Commonly known as: Lasix      Take 1 tablet (20 mg total) by mouth 3 (three) times a week.   Quantity: 30 tablet  Refills: 1               Where to Get Your Medications        These medications were sent to [facility] [address] [place] [facility] [address] [phone], [phone]  [address] [facility] [address]      Phone: [phone]   cephalexin 500 MG Caps  dilTIAZem  MG Cp24  vancomycin 125 MG Caps  vancomycin 125 MG Caps         Follow up Visits: Follow-up with pcp in 1 week    Follow up Labs: renal function panel and cbc     Other Discharge Instructions: follow-up with nephrology, GI and oncology per routine    GODWIN AVILEZ MD  3/23/2024  4:38 PM    > 35 min

## 2024-03-23 NOTE — PLAN OF CARE
No abdominal pain, fever, or any bowel or urinary changes. No nausea/vomiting. Several unsuccessful IV attempts. Dr Justin aware. IV antibiotics for UTI changed to po. For possible DC today.     Problem: Patient Centered Care  Goal: Patient preferences are identified and integrated in the patient's plan of care  Description: Interventions:  - What would you like us to know as we care for you? From home alone  - Provide timely, complete, and accurate information to patient/family  - Incorporate patient and family knowledge, values, beliefs, and cultural backgrounds into the planning and delivery of care  - Encourage patient/family to participate in care and decision-making at the level they choose  - Honor patient and family perspectives and choices  Outcome: Progressing     Problem: Patient/Family Goals  Goal: Patient/Family Long Term Goal  Description: Patient's Long Term Goal: Discharge    Interventions:  - Monitor vs, assess pain, assess stool gi symptoms  - See additional Care Plan goals for specific interventions  Outcome: Progressing  Goal: Patient/Family Short Term Goal  Description: Patient's Short Term Goal: manage gi symptoms    Interventions:   - assess vs, assess pain, I&O.   - See additional Care Plan goals for specific interventions  Outcome: Progressing     Problem: CARDIOVASCULAR - ADULT  Goal: Maintains optimal cardiac output and hemodynamic stability  Description: INTERVENTIONS:  - Monitor vital signs, rhythm, and trends  - Monitor for bleeding, hypotension and signs of decreased cardiac output  - Evaluate effectiveness of vasoactive medications to optimize hemodynamic stability  - Monitor arterial and/or venous puncture sites for bleeding and/or hematoma  - Assess quality of pulses, skin color and temperature  - Assess for signs of decreased coronary artery perfusion - ex. Angina  - Evaluate fluid balance, assess for edema, trend weights  Outcome: Progressing  Goal: Absence of cardiac arrhythmias  or at baseline  Description: INTERVENTIONS:  - Continuous cardiac monitoring, monitor vital signs, obtain 12 lead EKG if indicated  - Evaluate effectiveness of antiarrhythmic and heart rate control medications as ordered  - Initiate emergency measures for life threatening arrhythmias  - Monitor electrolytes and administer replacement therapy as ordered  Outcome: Progressing     Problem: SAFETY ADULT - FALL  Goal: Free from fall injury  Description: INTERVENTIONS:  - Assess pt frequently for physical needs  - Identify cognitive and physical deficits and behaviors that affect risk of falls.  - [place] fall precautions as indicated by assessment.  - Educate pt/family on patient safety including physical limitations  - Instruct pt to call for assistance with activity based on assessment  - Modify environment to reduce risk of injury  - Provide assistive devices as appropriate  - Consider OT/PT consult to assist with strengthening/mobility  - Encourage toileting schedule  Outcome: Progressing     Problem: METABOLIC/FLUID AND ELECTROLYTES - ADULT  Goal: Glucose maintained within prescribed range  Description: INTERVENTIONS:  - Monitor Blood Glucose as ordered  - Assess for signs and symptoms of hyperglycemia and hypoglycemia  - Administer ordered medications to maintain glucose within target range  - Assess barriers to adequate nutritional intake and initiate nutrition consult as needed  - Instruct patient on self management of diabetes  Outcome: Progressing  Goal: Electrolytes maintained within normal limits  Description: INTERVENTIONS:  - Monitor labs and rhythm and assess patient for signs and symptoms of electrolyte imbalances  - Administer electrolyte replacement as ordered  - Monitor response to electrolyte replacements, including rhythm and repeat lab results as appropriate  - Fluid restriction as ordered  - Instruct patient on fluid and nutrition restrictions as appropriate  Outcome: Progressing     Problem:  PAIN - ADULT  Goal: Verbalizes/displays adequate comfort level or patient's stated pain goal  Description: INTERVENTIONS:  - Encourage pt to monitor pain and request assistance  - Assess pain using appropriate pain scale  - Administer analgesics based on type and severity of pain and evaluate response  - Implement non-pharmacological measures as appropriate and evaluate response  - Consider cultural and social influences on pain and pain management  - Manage/alleviate anxiety  - Utilize distraction and/or relaxation techniques  - Monitor for opioid side effects  - Notify MD/LIP if interventions unsuccessful or patient reports new pain  - Anticipate increased pain with activity and pre-medicate as appropriate  Outcome: Progressing

## 2024-03-25 NOTE — PAYOR COMM NOTE
--------------  DISCHARGE REVIEW    Payor: LAUREEN ARTEAGA O  Subscriber #:  [id number]  Authorization Number: [id number]    Admit date: 3/19/24  Admit time:   6:50 PM  Discharge Date: 3/23/2024  5:37 PM     Admitting Physician: Erich Pascual MD  Attending Physician:  No att. providers found  Primary Care Physician: Diana Lopez DO          Discharge Summary Notes        Discharge Summary signed by Woodrow Justin MD at 3/23/2024  4:44 PM       Author: Woodrow Justin MD Specialty: HOSPITALIST Author Type: Physician    Filed: 3/23/2024  4:44 PM Date of Service: 3/23/2024  4:38 PM Status: Signed    : Woodrow Justin MD (Physician)         [facility]  part of [facility]    Discharge Summary    Hannah Boss Patient Status:  Inpatient    1945 MRN [id number]   Location [facility] 3W/ Attending Woodrow Justin MD   Hosp Day # 4 PCP Diana Lopez DO     Date of Admission: 3/19/2024 Disposition: Home Health Care Services     Date of Discharge: .todat      Admitting Diagnosis: PSVT (paroxysmal supraventricular tachycardia) (HCC) [I47.10]  DICK (acute kidney injury) (HCC) [N17.9]  Sinus pause [I45.5]  Chest pain of uncertain etiology [R07.9]    Hospital Discharge Diagnoses:  SVT    Lace+ Score: 80  59-90 High Risk  29-58 Medium Risk  0-28   Low Risk.    TCM Follow-Up Recommendation:  LACE > 58: High Risk of readmission after discharge from the hospital.      Problem List:   Patient Active Problem List   Diagnosis    Back pain    L5-S1 left paracentral mild HNP, L4-5 right lateral recess & foraminal mod HNP, L3-4 right paracentral mild HNP    L5-S1 left mod foraminal, L3-4 bilateral mod foraminal, L2-3 mild-mod diffuse, L1-2 right mild foraminal bulging discs    L5-S1 right mod/left severe, L4-5 right mod-severe/left mild, L3-4 right mild-mod/left mod foraminal stenosis    Spinal stenosis of lumbar region at multiple levels    Degenerative arthritis    Leg weakness     Neurologic gait dysfunction    Cancer related pain    Acute on chronic heart failure with preserved ejection fraction (HFpEF) (HCC)    Pulmonary hypertension (HCC)    Stage 3 chronic kidney disease (HCC)    Acute renal failure (HCC)    Bilateral leg edema    Acute on chronic congestive heart failure, unspecified heart failure type (HCC)    Uncontrolled hypertension    Benign hypertension    Tobacco use disorder    Hyperkalemia    Elevated troponin    Transaminitis    Acute on chronic renal insufficiency    Acute cystitis without hematuria    Chronic renal impairment    Chronic renal impairment, unspecified CKD stage    Dehydration    Weakness generalized    Lethargy    Urinary tract infection without hematuria, site unspecified    Other fatigue    Non-traumatic rhabdomyolysis    Abnormal LFTs    Altered mental status    Altered mental status, unspecified altered mental status type    Abdominal mass    Aortic stenosis    Nicotine dependence with current use    Constipation due to opioid therapy    Dyslipidemia    Dyspnea    Edema of extremities    Other chronic pain    Gastritis    Hypercholesteremia    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HCC)    Traumatic rhabdomyolysis, initial encounter ([facility])    Chest pain with high risk for cardiac etiology    Renal insufficiency    Pain in both feet    Bilateral swelling of feet    Exertional dyspnea    Deep vein thrombosis (DVT) of distal vein of left lower extremity, unspecified chronicity (HCC)    Aortic valvular disease    Blunt head trauma, initial encounter    Laceration of auricle of right ear, initial encounter    Weakness    Fall, initial encounter    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HCC)    Paraparesis (HCC)    Thoracic myelopathy    Lumbar spondylosis    SVT (supraventricular tachycardia)    Chest pain    Dental infection    Anemia    Paraparesis of both lower limbs (HCC)    Acute renal failure (ARF) (HCC)    Acute kidney injury (HCC)    PSVT (paroxysmal supraventricular tachycardia)    Sinus  pause    Chest pain of uncertain etiology    DICK (acute kidney injury) (HCC)       Reason for Admission:   SVT  Acute Metabolic Encephalopathy  Acute diastolic heart failure exacerbation  Acute Kidney Injury on CKD  III   Hyperkalemia  Chronic pain  Hx Recurrent falls  Anemia of chronic disease  Diarrhea   History of retroperitoneal Leiomyosarcoma    Physical Exam:   See PN      History of Present Illness:   Per Dr. Pascual   The patient is a 78-year-old  female who came into the emergency department for evaluation of fatigue and palpitations with dizziness.  Upon arrival to the emergency room noted to be in supraventricular tachycardia, rate 140.  She was given 1 dose of Cardizem and converted to sinus rhythm.  Then, she had another recurrence, given another loading dose of Cardizem and she developed 2-second to 3-second sinus pause and converted to sinus rhythm.  Blood pressure noted to be low, 100/44.  CBC showed hemoglobin of 9.6, which is stable.  BUN and creatinine of 39 and 2.38.  GFR is 21, which is way below her baseline.  Glucose 138.  She was given IV fluids, and she will be admitted to the hospital for further management.  Chest x-ray showed no acute finding.  CT scan of the abdomen and pelvis still pending.  Currently in sinus rhythm with no ST-T changes to suggest ischemia.  Troponin was negative.       Hospital Course:   SVT  -Resolved  -Required IV diltiazem on admission  -Now changed to oral diltiazem, dose increased to 240 daily  - started Eliquis, will be held for anemia  -Cardiology consultation appreciated  -EP consultation if there is recurrence     Acute Metabolic Encephalopathy  Klebsiella UTI  -2/2 UTI  -Encephalopathy in the setting of UTI improved overnight  -Last admission had workup for stroke and was ruled out  -Continue IV antibiotics--> IV cefazolin  -Add p.o. vancomycin with history of C. Difficile  -Urine culture growing Klebsiella continue IV cefazolin the  Klebsiella is sensitive to cefazolin  -Also some concern for possible toxic encephalopathy in patient with chronic opioid and benzodiazepine use.  -Resume opioids at lower dose from home  -home with po keflex to complete course     Acute diastolic heart failure exacerbation  -given a dose of IV lasix  -monitor response   -Start oral Lasix daily     Acute Kidney Injury on CKD  III   -Improving continue IV fluids  -Baseline creatinine 1.5  -2.3 on admission trending down 1.58  -Home lisinopril held by nephrologist due to hyperkalemia  - Avoiding Nephrotoxic agents  - Cont to monitor labs as an outpatient     Hyperkalemia  -Has had hyperkalemia in the past has been taken off of lisinopril  -Has not had a low potassium diet while hospitalized-  -K 5.5, down to 5.0 with lokelma  -plan Lokelma today, home with low potassium diet and outpatient -follow-up with nephrology  -plan labs as OP     Other chronic pain  -Patient on chronic oxycodone  -Managed by pain service as outpatient      Hx Recurrent falls  -Possibly related to electrolyte abnormalities and opioid use and UTI  -PT/OT  -Continue to monitor     Anemia of chronic disease  -Patient with history of CKD  -Hemoglobin did decrease but this is with hydration and has been as low as 7.9 in the past  -Continue to monitor  -Check iron studies and Hemoccult - stool positive - will get GI eval as eliquis is being held for this  -GI evaluation appreciated  -endoscopy and colonoscopy on 3/22 showed a normal EGD, a sessile polyp which was removed from transverse colon, mild diverticulosis in the right colon, grade 1 internal hemorrhoids  -Follow-up with GI in 2 to 4 weeks     Diarrhea   -C. difficile noted to be positive by PCR, but with negative EIA.    -As patient with active symptoms of abdominal cramping and diarrhea, starting p.o. vancomycin.     History of retroperitoneal Leiomyosarcoma  -CT scan without contrast was done showed a stable 6.5 x 4.5 cm retroperitoneal  mass near the pancreatic uncinated and duodenum with mass effect.  -Outpatient follow-up    Consultations:   Cardiology  GI    Procedures: EGD/Colonoscopy    Complications: n/a    Discharge Condition: Good    Discharge Medications:      Discharge Medications        START taking these medications        Instructions Prescription details   cephalexin 500 MG Caps  Commonly known as: Keflex      Take 1 capsule (500 mg total) by mouth every 8 (eight) hours for 3 days.   Stop taking on: March 26, 2024  Quantity: 9 capsule  Refills: 0     dilTIAZem  MG Cp24  Commonly known as: CardIZEM CD  Start taking on: March 24, 2024  Replaces: dilTIAZem  MG Cp24      Take 1 capsule (240 mg total) by mouth daily.   Quantity: 30 capsule  Refills: 1            CHANGE how you take these medications        Instructions Prescription details   vancomycin 125 MG Caps  Commonly known as: Vancocin  What changed: Another medication with the same name was added. Make sure you understand how and when to take each.      Take 1 capsule (125 mg total) by mouth 4 (four) times daily for 10 days.   Stop taking on: April 2, 2024  Quantity: 40 capsule  Refills: 0     vancomycin 125 MG Caps  Commonly known as: Vancocin  Start taking on: March 24, 2024  What changed: You were already taking a medication with the same name, and this prescription was added. Make sure you understand how and when to take each.      Take 1 capsule (125 mg total) by mouth daily.   Quantity: 7 capsule  Refills: 0            CONTINUE taking these medications        Instructions Prescription details   apixaban 5 MG Tabs  Commonly known as: Eliquis      Take 1 tablet (5 mg total) by mouth 2 (two) times daily.   Quantity: 60 tablet  Refills: 0     atorvastatin 40 MG Tabs  Commonly known as: Lipitor      Take 1 tablet (40 mg total) by mouth. AT BEDTIME   Refills: 3     cholecalciferol 50 MCG (2000 UT) Tabs      Take 1 tablet (2,000 Units total) by mouth daily.   Refills:  0     ferrous sulfate 325 (65 FE) MG Tbec      Take 1 tablet (325 mg total) by mouth daily with breakfast. Three times a week   Refills: 0     gabapentin 300 MG Caps  Commonly known as: Neurontin      Take 1 capsule (300 mg total) by mouth nightly.   Refills: 0     meclizine 25 MG Tabs  Commonly known as: Antivert      Take 1 tablet (25 mg total) by mouth.   Refills: 0     pantoprazole 40 MG Tbec  Commonly known as: Protonix      Take 1 tablet (40 mg total) by mouth 2 (two) times daily before meals.   Refills: 0            STOP taking these medications      amLODIPine 5 MG Tabs  Commonly known as: Norvasc        dilTIAZem  MG Cp24  Commonly known as: Cardizem CD  Replaced by: dilTIAZem  MG Cp24               ASK your doctor about these medications        Instructions Prescription details   furosemide 20 MG Tabs  Commonly known as: Lasix      Take 1 tablet (20 mg total) by mouth 3 (three) times a week.   Quantity: 30 tablet  Refills: 1               Where to Get Your Medications        These medications were sent to [place] [id number] [place] [facility] [address] [phone], [phone]  [address] [facility] [address]      Phone: [phone]   cephalexin 500 MG Caps  dilTIAZem  MG Cp24  vancomycin 125 MG Caps  vancomycin 125 MG Caps         Follow up Visits: Follow-up with pcp in 1 week    Follow up Labs: renal function panel and cbc     Other Discharge Instructions: follow-up with nephrology, GI and oncology per routine    GODWIN AVILEZ MD  3/23/2024  4:38 PM    > 35 min       Electronically signed by Godwin Avilez MD on 3/23/2024  4:44 PM         REVIEWER COMMENTS

## 2024-03-25 NOTE — CM/SW NOTE
Received message from [facility] asking about pt's DC date.    Per chart, pt was DC\"d Friday evening after SW left for the day.     [facility] notified of this via [employer] messenger. DC Summary uploaded to [facility] [employer] for their records.      Chloe Hays, MSW, LSW [id number]

## 2024-04-03 DIAGNOSIS — M79.672 PAIN IN BOTH FEET: ICD-10-CM

## 2024-04-03 DIAGNOSIS — G89.3 CANCER ASSOCIATED PAIN: ICD-10-CM

## 2024-04-03 DIAGNOSIS — M79.671 PAIN IN BOTH FEET: ICD-10-CM

## 2024-04-03 DIAGNOSIS — M15.9 PRIMARY OSTEOARTHRITIS INVOLVING MULTIPLE JOINTS: ICD-10-CM

## 2024-04-03 DIAGNOSIS — M47.816 LUMBAR SPONDYLOSIS: ICD-10-CM

## 2024-04-03 DIAGNOSIS — M48.062 SPINAL STENOSIS OF LUMBAR REGION WITH NEUROGENIC CLAUDICATION: Primary | ICD-10-CM

## 2024-04-03 RX ORDER — DIAZEPAM 10 MG/1
10 TABLET ORAL EVERY 8 HOURS PRN
COMMUNITY
End: 2024-04-03

## 2024-04-03 RX ORDER — OXYCODONE AND ACETAMINOPHEN 10; 325 MG/1; MG/1
1 TABLET ORAL EVERY 4 HOURS PRN
COMMUNITY
End: 2024-04-03

## 2024-04-03 RX ORDER — OXYCODONE AND ACETAMINOPHEN 10; 325 MG/1; MG/1
1 TABLET ORAL EVERY 4 HOURS PRN
COMMUNITY

## 2024-04-03 RX ORDER — DIAZEPAM 10 MG/1
10 TABLET ORAL EVERY 8 HOURS PRN
Qty: 90 TABLET | Refills: 1 | Status: SHIPPED | OUTPATIENT
Start: 2024-04-05 | End: 2024-06-04

## 2024-04-03 RX ORDER — OXYCODONE AND ACETAMINOPHEN 10; 325 MG/1; MG/1
1 TABLET ORAL EVERY 4 HOURS PRN
Qty: 180 TABLET | Refills: 0 | Status: SHIPPED | OUTPATIENT
Start: 2024-04-05 | End: 2024-05-05

## 2024-04-25 ENCOUNTER — LAB ENCOUNTER (OUTPATIENT)
Dept: LAB | Facility: HOSPITAL | Age: 79
End: 2024-04-25
Attending: STUDENT IN AN ORGANIZED HEALTH CARE EDUCATION/TRAINING PROGRAM
Payer: MEDICARE

## 2024-04-25 ENCOUNTER — OFFICE VISIT (OUTPATIENT)
Dept: PAIN CLINIC | Facility: HOSPITAL | Age: 79
End: 2024-04-25
Attending: STUDENT IN AN ORGANIZED HEALTH CARE EDUCATION/TRAINING PROGRAM
Payer: MEDICARE

## 2024-04-25 VITALS
HEART RATE: 72 BPM | OXYGEN SATURATION: 93 % | DIASTOLIC BLOOD PRESSURE: 75 MMHG | SYSTOLIC BLOOD PRESSURE: 155 MMHG | BODY MASS INDEX: 30 KG/M2 | WEIGHT: 173 LBS

## 2024-04-25 DIAGNOSIS — Z79.891 ENCOUNTER FOR MONITORING OPIOID MAINTENANCE THERAPY: ICD-10-CM

## 2024-04-25 DIAGNOSIS — G89.3 CANCER ASSOCIATED PAIN: ICD-10-CM

## 2024-04-25 DIAGNOSIS — M79.672 PAIN IN BOTH FEET: ICD-10-CM

## 2024-04-25 DIAGNOSIS — M15.9 PRIMARY OSTEOARTHRITIS INVOLVING MULTIPLE JOINTS: ICD-10-CM

## 2024-04-25 DIAGNOSIS — Z79.891 ENCOUNTER FOR MONITORING OPIOID MAINTENANCE THERAPY: Primary | ICD-10-CM

## 2024-04-25 DIAGNOSIS — Z51.81 ENCOUNTER FOR MONITORING OPIOID MAINTENANCE THERAPY: Primary | ICD-10-CM

## 2024-04-25 DIAGNOSIS — M79.671 PAIN IN BOTH FEET: ICD-10-CM

## 2024-04-25 DIAGNOSIS — M47.816 LUMBAR SPONDYLOSIS: ICD-10-CM

## 2024-04-25 DIAGNOSIS — M48.062 SPINAL STENOSIS OF LUMBAR REGION WITH NEUROGENIC CLAUDICATION: ICD-10-CM

## 2024-04-25 DIAGNOSIS — Z51.81 ENCOUNTER FOR MONITORING OPIOID MAINTENANCE THERAPY: ICD-10-CM

## 2024-04-25 PROCEDURE — 80347 BENZODIAZEPINES 13 OR MORE: CPT

## 2024-04-25 PROCEDURE — 80365 DRUG SCREENING OXYCODONE: CPT

## 2024-04-25 PROCEDURE — 80307 DRUG TEST PRSMV CHEM ANLYZR: CPT

## 2024-04-25 PROCEDURE — 99211 OFF/OP EST MAY X REQ PHY/QHP: CPT

## 2024-04-25 PROCEDURE — 80356 HEROIN METABOLITE: CPT

## 2024-04-25 PROCEDURE — 80361 OPIATES 1 OR MORE: CPT

## 2024-04-25 RX ORDER — OXYCODONE AND ACETAMINOPHEN 10; 325 MG/1; MG/1
1 TABLET ORAL EVERY 4 HOURS PRN
Qty: 180 TABLET | Refills: 0 | Status: SHIPPED | OUTPATIENT
Start: 2024-05-05 | End: 2024-06-04

## 2024-04-25 RX ORDER — DIAZEPAM 10 MG/1
10 TABLET ORAL EVERY 8 HOURS PRN
Qty: 90 TABLET | Refills: 1 | Status: SHIPPED | OUTPATIENT
Start: 2024-06-04 | End: 2024-09-02

## 2024-04-25 RX ORDER — OXYCODONE AND ACETAMINOPHEN 10; 325 MG/1; MG/1
1 TABLET ORAL EVERY 4 HOURS PRN
Qty: 180 TABLET | Refills: 0 | Status: SHIPPED | OUTPATIENT
Start: 2024-06-04 | End: 2024-05-03

## 2024-04-25 NOTE — PATIENT INSTRUCTIONS
Refill policies:    Allow 2-3 business days for refills; controlled substances may take longer.  Contact your pharmacy at least 5 days prior to running out of medication and have them send an electronic request or submit request through the “request refill” option in your [employer] account.  Refills are not addressed on weekends; covering physicians do not authorize routine medications on weekends.  No narcotics or controlled substances are refilled after noon on Fridays or by on call physicians.  By law, narcotics must be electronically prescribed.  A 30 day supply with no refills is the maximum allowed.  If your prescription is due for a refill, you may be due for a follow up appointment.  To best provide you care, patients receiving routine medications need to be seen at least once a year.  Patients receiving narcotic/controlled substance medications need to be seen at least once every 3 months.  In the event that your preferred pharmacy does not have the requested medication in stock (e.g. Backordered), it is your responsibility to find another pharmacy that has the requested medication available.  We will gladly send a new prescription to that pharmacy at your request.    Scheduling Tests:    If your physician has ordered radiology tests such as MRI or CT scans, please contact Central Scheduling at [phone] right away to schedule the test.  Once scheduled, the [facility] Centralized Referral Team will work with your insurance carrier to obtain pre-certification or prior authorization.  Depending on your insurance carrier, approval may take 3-10 days.  It is highly recommended patients assure they have received an authorization before having a test performed.  If test is done without insurance authorization, patient may be responsible for the entire amount billed.      Precertification and Prior Authorizations:  If your physician has recommended that you have a procedure or additional testing performed the [facility]  Centralized Referral Team will contact your insurance carrier to obtain pre-certification or prior authorization.    You are strongly encouraged to contact your insurance carrier to verify that your procedure/test has been approved and is a COVERED benefit.  Although the [facility] Centralized Referral Team does its due diligence, the insurance carrier gives the disclaimer that \"Although the procedure is authorized, this does not guarantee payment.\"    Ultimately the patient is responsible for payment.   Thank you for your understanding in this matter.  Paperwork Completion:  If you require FMLA or disability paperwork for your recovery, please make sure to either drop it off or have it faxed to our office at [phone]. Be sure the form has your name and date of birth on it.  The form will be faxed to our Forms Department and they will complete it for you.  There is a 25$ fee for all forms that need to be filled out.  Please be aware there is a 10-14 day turnaround time.  You will need to sign a release of information (KEYA) form if your paperwork does not come with one.  You may call the Forms Department with any questions at [phone].  Their fax number is [phone].
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
## 2024-04-25 NOTE — PROGRESS NOTES
Patient presents in office today with reported pain in shoulders,  back and legs    Current pain level reported = 8/10    Last reported dose of Percocet       Narcotic Contract renewal 04.25.25    Urine Drug screen

## 2024-04-30 ENCOUNTER — HOSPITAL ENCOUNTER (INPATIENT)
Facility: HOSPITAL | Age: 79
LOS: 3 days | Discharge: HOME HEALTH | End: 2024-05-03
Attending: EMERGENCY MEDICINE | Admitting: HOSPITALIST
Payer: MEDICARE

## 2024-04-30 ENCOUNTER — APPOINTMENT (OUTPATIENT)
Dept: GENERAL RADIOLOGY | Facility: HOSPITAL | Age: 79
End: 2024-04-30
Payer: MEDICARE

## 2024-04-30 ENCOUNTER — APPOINTMENT (OUTPATIENT)
Dept: CT IMAGING | Facility: HOSPITAL | Age: 79
DRG: 291 | End: 2024-04-30
Attending: EMERGENCY MEDICINE
Payer: MEDICARE

## 2024-04-30 ENCOUNTER — APPOINTMENT (OUTPATIENT)
Dept: GENERAL RADIOLOGY | Facility: HOSPITAL | Age: 79
End: 2024-04-30
Attending: EMERGENCY MEDICINE
Payer: MEDICARE

## 2024-04-30 ENCOUNTER — APPOINTMENT (OUTPATIENT)
Dept: GENERAL RADIOLOGY | Facility: HOSPITAL | Age: 79
DRG: 291 | End: 2024-04-30
Payer: MEDICARE

## 2024-04-30 ENCOUNTER — HOSPITAL ENCOUNTER (INPATIENT)
Facility: HOSPITAL | Age: 79
LOS: 3 days | Discharge: HOME HEALTH | DRG: 291 | End: 2024-05-03
Attending: EMERGENCY MEDICINE | Admitting: HOSPITALIST
Payer: MEDICARE

## 2024-04-30 ENCOUNTER — APPOINTMENT (OUTPATIENT)
Dept: CT IMAGING | Facility: HOSPITAL | Age: 79
End: 2024-04-30
Attending: EMERGENCY MEDICINE
Payer: MEDICARE

## 2024-04-30 ENCOUNTER — APPOINTMENT (OUTPATIENT)
Dept: GENERAL RADIOLOGY | Facility: HOSPITAL | Age: 79
DRG: 291 | End: 2024-04-30
Attending: EMERGENCY MEDICINE
Payer: MEDICARE

## 2024-04-30 DIAGNOSIS — I50.9 ACUTE ON CHRONIC CONGESTIVE HEART FAILURE, UNSPECIFIED HEART FAILURE TYPE (HCC): ICD-10-CM

## 2024-04-30 DIAGNOSIS — R41.82 ALTERED MENTAL STATUS, UNSPECIFIED ALTERED MENTAL STATUS TYPE: Primary | ICD-10-CM

## 2024-04-30 LAB
ALBUMIN SERPL-MCNC: 4.3 G/DL (ref 3.2–4.8)
ALBUMIN/GLOB SERPL: 1.4 {RATIO} (ref 1–2)
ALP LIVER SERPL-CCNC: 217 U/L
ALT SERPL-CCNC: 25 U/L
ANION GAP SERPL CALC-SCNC: 2 MMOL/L (ref 0–18)
AST SERPL-CCNC: 59 U/L (ref ?–34)
BASE EXCESS BLD CALC-SCNC: 0.6 MMOL/L (ref ?–2)
BASOPHILS # BLD AUTO: 0.03 X10(3) UL (ref 0–0.2)
BASOPHILS NFR BLD AUTO: 0.5 %
BILIRUB SERPL-MCNC: 0.3 MG/DL (ref 0.2–1.1)
BILIRUB UR QL: NEGATIVE
BNP SERPL-MCNC: 146 PG/ML
BUN BLD-MCNC: 29 MG/DL (ref 9–23)
BUN/CREAT SERPL: 13.5 (ref 10–20)
CALCIUM BLD-MCNC: 9.2 MG/DL (ref 8.7–10.4)
CHLORIDE SERPL-SCNC: 114 MMOL/L (ref 98–112)
CLARITY UR: CLEAR
CO2 SERPL-SCNC: 25 MMOL/L (ref 21–32)
CREAT BLD-MCNC: 2.15 MG/DL
DEPRECATED RDW RBC AUTO: 43 FL (ref 35.1–46.3)
EGFRCR SERPLBLD CKD-EPI 2021: 23 ML/MIN/1.73M2 (ref 60–?)
EOSINOPHIL # BLD AUTO: 0.01 X10(3) UL (ref 0–0.7)
EOSINOPHIL NFR BLD AUTO: 0.2 %
ERYTHROCYTE [DISTWIDTH] IN BLOOD BY AUTOMATED COUNT: 13.2 % (ref 11–15)
FLUAV + FLUBV RNA SPEC NAA+PROBE: NEGATIVE
FLUAV + FLUBV RNA SPEC NAA+PROBE: NEGATIVE
GLOBULIN PLAS-MCNC: 3 G/DL (ref 2.8–4.4)
GLUCOSE BLD-MCNC: 133 MG/DL (ref 70–99)
GLUCOSE BLDC GLUCOMTR-MCNC: 107 MG/DL (ref 70–99)
GLUCOSE BLDC GLUCOMTR-MCNC: 149 MG/DL (ref 70–99)
GLUCOSE UR-MCNC: NORMAL MG/DL
HCO3 BLDA-SCNC: 25.3 MEQ/L (ref 21–27)
HCT VFR BLD AUTO: 28.2 %
HGB BLD-MCNC: 9 G/DL
HGB UR QL STRIP.AUTO: NEGATIVE
IMM GRANULOCYTES # BLD AUTO: 0.01 X10(3) UL (ref 0–1)
IMM GRANULOCYTES NFR BLD: 0.2 %
KETONES UR-MCNC: NEGATIVE MG/DL
LEUKOCYTE ESTERASE UR QL STRIP.AUTO: NEGATIVE
LYMPHOCYTES # BLD AUTO: 0.47 X10(3) UL (ref 1–4)
LYMPHOCYTES NFR BLD AUTO: 8.1 %
MCH RBC QN AUTO: 28.6 PG (ref 26–34)
MCHC RBC AUTO-ENTMCNC: 31.9 G/DL (ref 31–37)
MCV RBC AUTO: 89.5 FL
MONOCYTES # BLD AUTO: 0.23 X10(3) UL (ref 0.1–1)
MONOCYTES NFR BLD AUTO: 3.9 %
NEUTROPHILS # BLD AUTO: 5.08 X10 (3) UL (ref 1.5–7.7)
NEUTROPHILS # BLD AUTO: 5.08 X10(3) UL (ref 1.5–7.7)
NEUTROPHILS NFR BLD AUTO: 87.1 %
NITRITE UR QL STRIP.AUTO: NEGATIVE
O2 CT BLD-SCNC: 11.4 VOL% (ref 15–23)
OSMOLALITY SERPL CALC.SUM OF ELEC: 300 MOSM/KG (ref 275–295)
PCO2 BLDA: 48 MM HG (ref 35–45)
PH BLDA: 7.35 [PH] (ref 7.35–7.45)
PH UR: 5.5 [PH] (ref 5–8)
PLATELET # BLD AUTO: 181 10(3)UL (ref 150–450)
PO2 BLDA: 61 MM HG (ref 80–100)
POTASSIUM SERPL-SCNC: 5.2 MMOL/L (ref 3.5–5.1)
PROT SERPL-MCNC: 7.3 G/DL (ref 5.7–8.2)
PUNCTURE CHARGE: YES
RBC # BLD AUTO: 3.15 X10(6)UL
RSV RNA SPEC NAA+PROBE: NEGATIVE
SAO2 % BLDA: 93.8 % (ref 94–100)
SARS-COV-2 RNA RESP QL NAA+PROBE: NOT DETECTED
SODIUM SERPL-SCNC: 141 MMOL/L (ref 136–145)
SP GR UR STRIP: 1.02 (ref 1–1.03)
UROBILINOGEN UR STRIP-ACNC: NORMAL
WBC # BLD AUTO: 5.8 X10(3) UL (ref 4–11)

## 2024-04-30 PROCEDURE — 70450 CT HEAD/BRAIN W/O DYE: CPT | Performed by: EMERGENCY MEDICINE

## 2024-04-30 PROCEDURE — 99223 1ST HOSP IP/OBS HIGH 75: CPT | Performed by: HOSPITALIST

## 2024-04-30 PROCEDURE — 71045 X-RAY EXAM CHEST 1 VIEW: CPT | Performed by: EMERGENCY MEDICINE

## 2024-04-30 RX ORDER — HYDRALAZINE HYDROCHLORIDE 20 MG/ML
INJECTION INTRAMUSCULAR; INTRAVENOUS
Status: COMPLETED
Start: 2024-04-30 | End: 2024-04-30

## 2024-04-30 RX ORDER — LABETALOL HYDROCHLORIDE 5 MG/ML
10 INJECTION, SOLUTION INTRAVENOUS EVERY 4 HOURS PRN
Status: DISCONTINUED | OUTPATIENT
Start: 2024-04-30 | End: 2024-05-03

## 2024-04-30 RX ORDER — HYDRALAZINE HYDROCHLORIDE 20 MG/ML
5 INJECTION INTRAMUSCULAR; INTRAVENOUS ONCE
Status: COMPLETED | OUTPATIENT
Start: 2024-04-30 | End: 2024-04-30

## 2024-04-30 RX ORDER — FUROSEMIDE 10 MG/ML
40 INJECTION INTRAMUSCULAR; INTRAVENOUS
Status: DISCONTINUED | OUTPATIENT
Start: 2024-05-01 | End: 2024-05-02

## 2024-04-30 RX ORDER — ONDANSETRON 2 MG/ML
4 INJECTION INTRAMUSCULAR; INTRAVENOUS EVERY 6 HOURS PRN
Status: DISCONTINUED | OUTPATIENT
Start: 2024-04-30 | End: 2024-05-03

## 2024-04-30 RX ORDER — PANTOPRAZOLE SODIUM 40 MG/1
40 TABLET, DELAYED RELEASE ORAL
Status: DISCONTINUED | OUTPATIENT
Start: 2024-05-01 | End: 2024-05-03

## 2024-04-30 RX ORDER — ACETAMINOPHEN 500 MG
500 TABLET ORAL EVERY 4 HOURS PRN
Status: DISCONTINUED | OUTPATIENT
Start: 2024-04-30 | End: 2024-05-03

## 2024-04-30 RX ORDER — METOCLOPRAMIDE HYDROCHLORIDE 5 MG/ML
5 INJECTION INTRAMUSCULAR; INTRAVENOUS EVERY 8 HOURS PRN
Status: DISCONTINUED | OUTPATIENT
Start: 2024-04-30 | End: 2024-05-03

## 2024-04-30 RX ORDER — FUROSEMIDE 10 MG/ML
40 INJECTION INTRAMUSCULAR; INTRAVENOUS ONCE
Status: COMPLETED | OUTPATIENT
Start: 2024-04-30 | End: 2024-04-30

## 2024-04-30 RX ORDER — GABAPENTIN 300 MG/1
300 CAPSULE ORAL NIGHTLY
Status: DISCONTINUED | OUTPATIENT
Start: 2024-04-30 | End: 2024-05-03

## 2024-04-30 RX ORDER — ENALAPRIL MALEATE 5 MG/1
5 TABLET ORAL DAILY
Status: DISCONTINUED | OUTPATIENT
Start: 2024-04-30 | End: 2024-04-30

## 2024-04-30 RX ORDER — FUROSEMIDE 10 MG/ML
40 INJECTION INTRAMUSCULAR; INTRAVENOUS
Status: DISCONTINUED | OUTPATIENT
Start: 2024-04-30 | End: 2024-04-30

## 2024-04-30 RX ORDER — HYDRALAZINE HYDROCHLORIDE 20 MG/ML
5 INJECTION INTRAMUSCULAR; INTRAVENOUS EVERY 4 HOURS PRN
Status: DISCONTINUED | OUTPATIENT
Start: 2024-04-30 | End: 2024-04-30

## 2024-04-30 NOTE — ED INITIAL ASSESSMENT (HPI)
Pt arrived via EMS for c/o weakness and generalized body aches x 2 hours, per medics. Pt is AAOx4, verbal at this time, denies n/v/d.

## 2024-04-30 NOTE — H&P
[facility]    PATIENT'S NAME: MERNA ALEXANDER   ATTENDING PHYSICIAN: Graeme Duffy MD   PATIENT ACCOUNT#:   [id number]    LOCATION:  [address]  MEDICAL RECORD #:   [id number]       YOB: 1945  ADMISSION DATE:       04/30/2024    HISTORY AND PHYSICAL EXAMINATION    CHIEF COMPLAINT:  Drowsiness and acute on chronic diastolic heart failure.    HISTORY OF PRESENT ILLNESS:  Patient is a 78-year-old  female who was brought in today for evaluation by her family for fatigue.  Patient noted to be very somnolent in the emergency room.  Chest x-ray showed increased fluid status and heart failure with congestion.  CT scan of the brain showed no acute findings.  CBC, no leukocytosis or left shift.  B-natriuretic peptide 146.  GeneXpert viral panel was negative.  Chemistry showed GFR is 23, which is around her baseline; BUN and creatinine of 29 and 2.15; potassium 5.2.  Started on IV Lasix.  Also, blood gas was ordered, which is still pending.  Blood cultures were obtained.      PAST MEDICAL HISTORY:  Moderate aortic stenosis with heart failure with preserved ejection fraction.  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  She had right carpal tunnel release, left rotator cuff repair, tubal ligation, cholecystectomy, dental abscess I and D.    MEDICATIONS:  Please see medication reconciliation list.     ALLERGIES:  No known drug allergies.    FAMILY HISTORY:  Mother had TB.  Father had cerebrovascular accident.      SOCIAL  HISTORY:  Chronic tobacco use.  No alcohol or drug use.  Lives with her family.  Requires assistance in her basic activities of daily living.     REVIEW OF SYSTEMS:  Difficult to obtain from the patient.  When I walked in the room, she was snoring and sleeping.  I woke her up.  She was slightly somnolent but able to answer questions but not able to contribute much to her recent history.  Reportedly, she has been fatigued lately.        PHYSICAL EXAMINATION:    GENERAL:  Somnolent but easily arousable.   VITAL SIGNS:  Temperature 99.1, pulse 61, respiratory rate 14, blood pressure 138/50, pulse ox 94% on room air.  HEENT:  Atraumatic.  Oropharynx clear.  Moist mucous membranes.  Ears and nose normal.  Eyes:  Anicteric sclerae.    NECK:  Supple.  Mild jugular venous distention.   LUNGS:  Diminished breathing sounds, both lung bases.    HEART:  Regular rate and rhythm.  S1 and S2 auscultated.  No murmur.    ABDOMEN:  Soft, nondistended.  No tenderness.  Positive bowel sounds.   EXTREMITIES:  There is +2 to 3 edema, both legs.  No clubbing or cyanosis.   NEUROLOGIC:  Motor and sensory intact.      ASSESSMENT:    1.   Acute on chronic heart failure with underlying aortic stenosis.  2.   Somnolence.  Rule out CO2 narcosis considering also use of pain medications, oxycodone.    3.   Acute on chronic kidney injury.    PLAN:  Patient will be admitted to telemetry floor.  We will start her on IV Lasix.  Monitor hemodynamic status, electrolytes and kidney function.  If pCO2 on blood gas is elevated, we will apply BiPAP machine.  Obtain cardiology consult.  Hold all pain medications for now.  Aspiration precautions and fall precautions.  Further recommendations to follow.     Dictated By Erich Pascual MD  d: 04/30/2024 17:24:43  t: 04/30/2024 17:51:35  Job [id number]  /
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
## 2024-04-30 NOTE — ED QUICK NOTES
Orders for admission, patient is aware of plan and ready to go upstairs. Any questions, please call ED RN Bridget at extension [phone].     Patient Covid vaccination status: Unvaccinated     COVID Test Ordered in ED: SARS-CoV-2/Flu A and B/RSV by PCR (GeneXpert)    COVID Suspicion at Admission: N/A    Running Infusions:  None    Mental Status/LOC at time of transport: A&Ox4, Lethargic,     Other pertinent information:   CIWA score: N/A   NIH score:  N/A

## 2024-05-01 ENCOUNTER — APPOINTMENT (OUTPATIENT)
Dept: CT IMAGING | Facility: HOSPITAL | Age: 79
DRG: 291 | End: 2024-05-01
Attending: HOSPITALIST
Payer: MEDICARE

## 2024-05-01 ENCOUNTER — APPOINTMENT (OUTPATIENT)
Dept: CT IMAGING | Facility: HOSPITAL | Age: 79
End: 2024-05-01
Attending: HOSPITALIST
Payer: MEDICARE

## 2024-05-01 LAB
ANION GAP SERPL CALC-SCNC: 3 MMOL/L (ref 0–18)
ATRIAL RATE: 63 BPM
BUN BLD-MCNC: 28 MG/DL (ref 9–23)
BUN/CREAT SERPL: 14 (ref 10–20)
CALCIUM BLD-MCNC: 9.2 MG/DL (ref 8.7–10.4)
CHLORIDE SERPL-SCNC: 112 MMOL/L (ref 98–112)
CO2 SERPL-SCNC: 26 MMOL/L (ref 21–32)
CREAT BLD-MCNC: 2 MG/DL
DEPRECATED RDW RBC AUTO: 42.9 FL (ref 35.1–46.3)
EGFRCR SERPLBLD CKD-EPI 2021: 25 ML/MIN/1.73M2 (ref 60–?)
ERYTHROCYTE [DISTWIDTH] IN BLOOD BY AUTOMATED COUNT: 13.2 % (ref 11–15)
EST. AVERAGE GLUCOSE BLD GHB EST-MCNC: 120 MG/DL (ref 68–126)
GLUCOSE BLD-MCNC: 88 MG/DL (ref 70–99)
GLUCOSE BLDC GLUCOMTR-MCNC: 104 MG/DL (ref 70–99)
GLUCOSE BLDC GLUCOMTR-MCNC: 111 MG/DL (ref 70–99)
GLUCOSE BLDC GLUCOMTR-MCNC: 136 MG/DL (ref 70–99)
GLUCOSE BLDC GLUCOMTR-MCNC: 153 MG/DL (ref 70–99)
HBA1C MFR BLD: 5.8 % (ref ?–5.7)
HCT VFR BLD AUTO: 28.4 %
HGB BLD-MCNC: 8.9 G/DL
MCH RBC QN AUTO: 27.6 PG (ref 26–34)
MCHC RBC AUTO-ENTMCNC: 31.3 G/DL (ref 31–37)
MCV RBC AUTO: 88.2 FL
OSMOLALITY SERPL CALC.SUM OF ELEC: 297 MOSM/KG (ref 275–295)
P AXIS: 81 DEGREES
P-R INTERVAL: 128 MS
PLATELET # BLD AUTO: 175 10(3)UL (ref 150–450)
POTASSIUM SERPL-SCNC: 4.8 MMOL/L (ref 3.5–5.1)
Q-T INTERVAL: 434 MS
QRS DURATION: 70 MS
QTC CALCULATION (BEZET): 444 MS
R AXIS: 49 DEGREES
RBC # BLD AUTO: 3.22 X10(6)UL
SODIUM SERPL-SCNC: 141 MMOL/L (ref 136–145)
T AXIS: 83 DEGREES
VENTRICULAR RATE: 63 BPM
WBC # BLD AUTO: 4.5 X10(3) UL (ref 4–11)

## 2024-05-01 PROCEDURE — 74176 CT ABD & PELVIS W/O CONTRAST: CPT | Performed by: HOSPITALIST

## 2024-05-01 PROCEDURE — 99233 SBSQ HOSP IP/OBS HIGH 50: CPT | Performed by: HOSPITALIST

## 2024-05-01 RX ORDER — HYDRALAZINE HYDROCHLORIDE 20 MG/ML
10 INJECTION INTRAMUSCULAR; INTRAVENOUS EVERY 4 HOURS PRN
Status: DISCONTINUED | OUTPATIENT
Start: 2024-05-01 | End: 2024-05-03

## 2024-05-01 RX ORDER — DOCUSATE SODIUM 100 MG/1
100 CAPSULE, LIQUID FILLED ORAL 2 TIMES DAILY
Status: DISCONTINUED | OUTPATIENT
Start: 2024-05-01 | End: 2024-05-03

## 2024-05-01 RX ORDER — POLYETHYLENE GLYCOL 3350 17 G/17G
17 POWDER, FOR SOLUTION ORAL DAILY
Status: DISCONTINUED | OUTPATIENT
Start: 2024-05-01 | End: 2024-05-03

## 2024-05-01 RX ORDER — OXYCODONE HYDROCHLORIDE 5 MG/1
5 TABLET ORAL EVERY 6 HOURS PRN
Status: DISCONTINUED | OUTPATIENT
Start: 2024-05-01 | End: 2024-05-03

## 2024-05-01 NOTE — PROGRESS NOTES
[facility]  part of [facility]  Hospitalist Progress Note     Hannah Boss Patient Status:  Inpatient    1945  78 year old CSN [id number]   Location 567/567-A Attending Gildardo Shrestha MD   Hosp Day # 1 PCP Diana Lopez DO     Assessment & Plan:   ----------------------------------  Congestive heart failure, acute on chronic diastolic. +radiographic/physical exam evidence of pulmonary edema, +peripheral edema.  -Cardiology consult appreciated  -Diuresis with IV lasix  -GDMT per cards  -Echo recently done  -Strict I/O  -Daily weights  -Low salt diet  -Monitor renal function  -Telemetry  -Supp O2 as needed, titrate off as tolerated    Acute encephalopathy, toxic/metabolic.  Due to CHF, possibly some overmedication at home.  Symptoms are much improved. There is questionable underlying dementia.  No evidence of extreme CO2 retention.  -Reorientation  -Treat known active medical issues    Acute kidney injury.   Baseline creatinine is 1.3, highest creatinine during this admission 2.1.  Most likely cause is heart failure  -Nephrology consult if fails to continue improving  -Monitor urine output  -Repeat chemistry in the morning  -No indication for dialysis  -Hold diuretics  -Hold ACEi/ARB  -Avoid nephrotoxins/IV contrast/hypotension  -Renal US if fails to continue improving  -IVF: None    Abdominal tenderness.  No mention of this at the time of admission.  Patient is a little bit tender in the left side of the abdomen.  No rebound or guarding.  -CT abdomen and pelvis with oral contrast    Other problems  Hypertension  History of PSVT  CKD stage III  History of C. difficile  Chronic stasis dermatitis  Dyslipidemia  History of DVT    dvt prophylaxis: eliquis  code status: full code  dispo: home upon medical clearance    I personally reviewed the available laboratories, imaging including chest x-ray. I discussed/will discuss the case with cardiology. I ordered laboratories, studies including  BMP. I adjusted medications including IV Lasix. Medical decision making high, risk is high.    Subjective:   ----------------------------------  Feeling a lot better.  She feels that her thinking is quite clear.  Breathing is improved.  No significant cough.  Appetite okay.  Has not really been walking around.      Objective:   Chief Complaint:   Chief Complaint   Patient presents with    Fatigue     ----------------------------------  Temp:  [97.3 °F (36.3 °C)-99.1 °F (37.3 °C)] 97.3 °F (36.3 °C)  Pulse:  [52-98] 98  Resp:  [12-19] 18  BP: (138-209)/(39-84) 160/62  SpO2:  [93 %-100 %] 98 %  Gen: A+Ox3.  No distress.   HEENT: NCAT, neck supple, no carotid bruit.  CV: RRR, S1S2, and intact distal pulses. No gallop, rub, murmur.  Pulm: Effort and breath sounds normal. No distress, wheezes, rales, rhonchi.  Abd: Soft, mild to moderate tenderness on the left side of the abdomen.  No masses.  No rebound or guarding.  Neuro: Normal reflexes, CN. Sensory/motor exams grossly normal deficit.   MS: No joint effusions.  1+ peripheral edema.  Skin: Skin is warm and dry. No rashes, erythema, diaphoresis.   Psych: Normal mood and affect. Calm, cooperative    Labs:  Lab Results   Component Value Date    HGB 8.9 (L) 05/01/2024    WBC 4.5 05/01/2024    .0 05/01/2024     05/01/2024    K 4.8 05/01/2024    CREATSERUM 2.00 (H) 05/01/2024    INR 1.30 (H) 03/08/2024    AST 59 (H) 04/30/2024    ALT 25 04/30/2024    TROP 0.398 (HH) 09/13/2021          docusate sodium  100 mg Oral BID    polyethylene glycol (PEG 3350)  17 g Oral Daily    furosemide  40 mg Intravenous BID (Diuretic)    apixaban  5 mg Oral BID    gabapentin  300 mg Oral Nightly    pantoprazole  40 mg Oral BID AC       hydrALAzine    oxyCODONE    acetaminophen    ondansetron    metoclopramide    labetalol  **Certification      PHYSICIAN Certification of Need for Inpatient Hospitalization - Initial Certification    Patient will require inpatient services that will  reasonably be expected to span two midnight's based on the clinical documentation in H+P.   Based on patients current state of illness, I anticipate that, after discharge, patient will require TBD.

## 2024-05-01 NOTE — CM/SW NOTE
05/01/24 1600   CM/SW Referral Data   Referral Source Physician   Reason for Referral Discharge planning   Informant Son   Medical Hx   Does patient have an established PCP? Yes   Patient Info   Patient's Home Environment House   Number of Levels in Home 1   Number of Stair in Home 0   Patient lives with Alone   Patient Status Prior to Admission   Independent with ADLs and Mobility Yes   Services in place prior to admission Home Health Care;DME/Supplies at home   Home Health Provider Info [facility]   Type of DME/Supplies Straight Cane   Discharge Needs   Anticipated D/C needs Home health care       Patient w AMS. DANIAL called and spoke with sonJohn. Introduced self and role. Patient lives alone at (address on face sheet). John reports he checks in with patient daily at home. Reports that patient has someone assist with cleaning/chores 2x a week. John reports he assists patient with all transportation needs. Confirmed PCP is Dr. Lopez. Confirmed patient has hx with [facility], however was not certain whether or not patient is current.     DANIAL notified John of PT rec for home health care, John expressed understanding.     John states he does not feel patient needs additional care in the home at this time, however would be open to DANIAL providing resources for future reference.     No further questions at this time.       DANIAL sent updates and RHODA to [facility], requesting to confirm if still current.       PLAN: From home alone, deborah assists with care. Awaiting confirmation from [facility] that patient is still current*      Kailey Armstrong, MSW, LSW    [id number]

## 2024-05-01 NOTE — ED QUICK NOTES
Resumed care of pt. Upon initial assessment pt is sleeping. Pt is A&OX4. Pt is able to answer questions appropriately. Pt denies cp/sob/dizziness. Pt NSR on monitor. Pt updated on poc.

## 2024-05-01 NOTE — PLAN OF CARE
Problem: Patient Centered Care  Goal: Patient preferences are identified and integrated in the patient's plan of care  Description: Interventions:  - What would you like us to know as we care for you? From home with son  - Provide timely, complete, and accurate information to patient/family  - Incorporate patient and family knowledge, values, beliefs, and cultural backgrounds into the planning and delivery of care  - Encourage patient/family to participate in care and decision-making at the level they choose  - Honor patient and family perspectives and choices  Outcome: Progressing     Problem: PAIN - ADULT  Goal: Verbalizes/displays adequate comfort level or patient's stated pain goal  Description: INTERVENTIONS:  - Encourage pt to monitor pain and request assistance  - Assess pain using appropriate pain scale  - Administer analgesics based on type and severity of pain and evaluate response  - Implement non-pharmacological measures as appropriate and evaluate response  - Consider cultural and social influences on pain and pain management  - Manage/alleviate anxiety  - Utilize distraction and/or relaxation techniques  - Monitor for opioid side effects  - Notify MD/LIP if interventions unsuccessful or patient reports new pain  - Anticipate increased pain with activity and pre-medicate as appropriate  Outcome: Progressing     Problem: SAFETY ADULT - FALL  Goal: Free from fall injury  Description: INTERVENTIONS:  - Assess pt frequently for physical needs  - Identify cognitive and physical deficits and behaviors that affect risk of falls.  - [place] fall precautions as indicated by assessment.  - Educate pt/family on patient safety including physical limitations  - Instruct pt to call for assistance with activity based on assessment  - Modify environment to reduce risk of injury  - Provide assistive devices as appropriate  - Consider OT/PT consult to assist with strengthening/mobility  - Encourage toileting  schedule  Outcome: Progressing     Problem: DISCHARGE PLANNING  Goal: Discharge to home or other facility with appropriate resources  Description: INTERVENTIONS:  - Identify barriers to discharge w/pt and caregiver  - Include patient/family/discharge partner in discharge planning  - Arrange for needed discharge resources and transportation as appropriate  - Identify discharge learning needs (meds, wound care, etc)  - Arrange for interpreters to assist at discharge as needed  - Consider post-discharge preferences of patient/family/discharge partner  - Complete POLST form as appropriate  - Assess patient's ability to be responsible for managing their own health  - Refer to Case Management Department for coordinating discharge planning if the patient needs post-hospital services based on physician/LIP order or complex needs related to functional status, cognitive ability or social support system  Outcome: Progressing     Problem: SKIN/TISSUE INTEGRITY - ADULT  Goal: Skin integrity remains intact  Description: INTERVENTIONS  - Assess and document risk factors for pressure ulcer development  - Assess and document skin integrity  - Monitor for areas of redness and/or skin breakdown  - Initiate interventions, skin care algorithm/standards of care as needed  Outcome: Progressing     Problem: MUSCULOSKELETAL - ADULT  Goal: Return mobility to safest level of function  Description: INTERVENTIONS:  - Assess patient stability and activity tolerance for standing, transferring and ambulating w/ or w/o assistive devices  - Assist with transfers and ambulation using safe patient handling equipment as needed  - Ensure adequate protection for wounds/incisions during mobilization  - Obtain PT/OT consults as needed  - Advance activity as appropriate  - Communicate ordered activity level and limitations with patient/family  Outcome: Progressing   Patient is A/O x3, No complains of pain or discomfort. CXR shows mild early CHF with edema,  cardiology is on consult, patient on 40mg of IV lasix. CT abdomen/pelvis ordered with contrast, re: abdominal pain. PT/OT on consult.

## 2024-05-01 NOTE — OCCUPATIONAL THERAPY NOTE
OCCUPATIONAL THERAPY EVALUATION - INPATIENT     Room Number: 567/567-A  Evaluation Date: 5/1/2024  Type of Evaluation: Initial  Presenting Problem: AMS, weakness, body aches, acute on chronic CHF  Hx CHF, DM, CKD, HTN, OA, RA, multiple falls     Physician Order: IP Consult to Occupational Therapy  Reason for Therapy: ADL/IADL Dysfunction and Discharge Planning    OCCUPATIONAL THERAPY ASSESSMENT   Patient is a 78 year old female admitted 4/30/2024 for AMS, weakness, body aches, acute on chronic CHF.  Prior to admission, patient's baseline is indep with ADLs and MI for fx mobility using cane within home. Receives caregiver assist with IADLs; has Meals on Wheels. Patient is currently functioning near baseline with ADLs and fx mobility/transfers.  Patient is requiring up to mod assist for ADLs as a result of the following impairments: decreased functional strength, decreased functional reach, decreased endurance, pain, impaired balance, and decreased muscular endurance. Occupational Therapy will continue to follow for duration of hospitalization.    Patient will benefit from continued skilled OT Services at discharge to promote functional independence and safety with additional support and return home with home health OT    PLAN  OT Treatment Plan: Balance activities;Energy conservation/work simplification techniques;ADL training;Functional transfer training;Endurance training;Patient/Family education;Patient/Family training;Equipment eval/education;Compensatory technique education  OT Device Recommendations: TBD    OCCUPATIONAL THERAPY MEDICAL/SOCIAL HISTORY   Problem List  Principal Problem:    Altered mental status, unspecified altered mental status type  Active Problems:    Acute on chronic congestive heart failure, unspecified heart failure type (HCC)    HOME SITUATION  Type of Home: House  Home Layout: One level  Lives With: Son; Caregiver part-time (4 days/week, 3 hours/day)  Toilet and Equipment: 3-in-1  commode  Shower/Tub and Equipment: Shower chair  Drives: No  Use of Assistive Device(s): cane; owns RW    SUBJECTIVE  \"I move very slowly.\"    OCCUPATIONAL THERAPY EXAMINATION    OBJECTIVE  Precautions: Aspiration; Bed/chair alarm  Fall Risk: -- (moderate fall risk)    PAIN ASSESSMENT  Rating: -- (not rated)  Location: not localized  Management Techniques: Activity promotion; Body mechanics; Repositioning    ACTIVITY TOLERANCE  Pulse: 98  Heart Rate Source: Monitor                   O2 SATURATIONS  Oxygen Therapy  SPO2% on Room Air at Rest: 95  SPO2% Ambulation on Room Air: 95    COGNITION  Overall Cognitive Status:  WFL - within functional limits    SENSATION  Light touch:  intact    RANGE OF MOTION   Upper extremity ROM is within functional limits     STRENGTH ASSESSMENT  Upper extremity strength is within functional limits     COORDINATION  Gross Motor: WFL   Fine Motor: WFL     ACTIVITIES OF DAILY LIVING ASSESSMENT  AM-PAC ‘6-Clicks’ Inpatient Daily Activity Short Form  How much help from another person does the patient currently need…  -   Putting on and taking off regular lower body clothing?: A Lot  -   Bathing (including washing, rinsing, drying)?: A Lot  -   Toileting, which includes using toilet, bedpan or urinal? : A Lot  -   Putting on and taking off regular upper body clothing?: A Little  -   Taking care of personal grooming such as brushing teeth?: A Little  -   Eating meals?: None    AM-PAC Score:  Score: 16  Approx Degree of Impairment: 53.32%  Standardized Score (AM-PAC Scale): 35.96  CMS Modifier (G-Code): CK    BED MOBILITY  Supine to Sit: SUP  Sit to Supine: Min assist for B LE    FUNCTIONAL TRANSFER ASSESSMENT  Sit <> Stand from EOB: CGA    FUNCTIONAL MOBILITY   CGA for hallway fx mobility using RW  Comments:  Demonstrated forward flexed posture; reported this is her baseline.    FUNCTIONAL ADL ASSESSMENT  Eating: setup assist seated (per obs)   Grooming: setup assist seated (per obs)   UB  Dressing: setup assist seated (per obs)   LB Dressing: mod assist  Toileting: mod assist (per obs)      EDUCATION PROVIDED  Patient: Role of Occupational Therapy; Plan of Care; Discharge Recommendations; Functional Transfer Techniques; Fall Prevention; Posture/Positioning; Energy Conservation; Compensatory ADL Techniques; Proper Body Mechanics  Patient's Response to Education: Verbalized Understanding; Returned Demonstration    The patient's Approx Degree of Impairment: 53.32% has been calculated based on documentation in the [facility] '6 clicks' Inpatient Daily Activity Short Form.  Research supports that patients with this level of impairment may benefit from rehab. However, patient appears to be close to functional baseline.  Final disposition will be made by interdisciplinary medical team.     Patient End of Session: Needs met;RN aware of session/findings;Call light within reach;All patient questions and concerns addressed;Alarm set;In bed    OT Goals  Patients self stated goal is: none stated     Patient will complete functional transfer with SUP  Comment:     Patient will complete toileting with SUP  Comment:     Patient will complete grooming in standing at sink with SUP  Comment:    Patient will complete item retrieval with SUP  Comment:          Goals  on: 5/15/24  Frequency: 3-5x/week    Patient Evaluation Complexity Level:   Occupational Profile/Medical History MODERATE - Expanded review of history including review of medical or therapy record   Specific performance deficits impacting engagement in ADL/IADL MODERATE  3 - 5 performance deficits   Client Assessment/Performance Deficits MODERATE - Comorbidities and min to mod modifications of tasks    Clinical Decision Making MODERATE - Analysis of occupational profile, detailed assessments, several treatment options    Overall Complexity MODERATE     OT Session Time  Therapeutic Activity: 10 minutes    ROBYN Salazar/L  [facility]  [facility]  #[id number]

## 2024-05-01 NOTE — PLAN OF CARE
Problem: Patient Centered Care  Goal: Patient preferences are identified and integrated in the patient's plan of care  Description: Interventions:  - What would you like us to know as we care for you? From home with son  - Provide timely, complete, and accurate information to patient/family  - Incorporate patient and family knowledge, values, beliefs, and cultural backgrounds into the planning and delivery of care  - Encourage patient/family to participate in care and decision-making at the level they choose  - Honor patient and family perspectives and choices  Outcome: Progressing     Problem: PAIN - ADULT  Goal: Verbalizes/displays adequate comfort level or patient's stated pain goal  Description: INTERVENTIONS:  - Encourage pt to monitor pain and request assistance  - Assess pain using appropriate pain scale  - Administer analgesics based on type and severity of pain and evaluate response  - Implement non-pharmacological measures as appropriate and evaluate response  - Consider cultural and social influences on pain and pain management  - Manage/alleviate anxiety  - Utilize distraction and/or relaxation techniques  - Monitor for opioid side effects  - Notify MD/LIP if interventions unsuccessful or patient reports new pain  - Anticipate increased pain with activity and pre-medicate as appropriate  Outcome: Progressing     Problem: SAFETY ADULT - FALL  Goal: Free from fall injury  Description: INTERVENTIONS:  - Assess pt frequently for physical needs  - Identify cognitive and physical deficits and behaviors that affect risk of falls.  - [place] fall precautions as indicated by assessment.  - Educate pt/family on patient safety including physical limitations  - Instruct pt to call for assistance with activity based on assessment  - Modify environment to reduce risk of injury  - Provide assistive devices as appropriate  - Consider OT/PT consult to assist with strengthening/mobility  - Encourage toileting  schedule  Outcome: Progressing     Problem: DISCHARGE PLANNING  Goal: Discharge to home or other facility with appropriate resources  Description: INTERVENTIONS:  - Identify barriers to discharge w/pt and caregiver  - Include patient/family/discharge partner in discharge planning  - Arrange for needed discharge resources and transportation as appropriate  - Identify discharge learning needs (meds, wound care, etc)  - Arrange for interpreters to assist at discharge as needed  - Consider post-discharge preferences of patient/family/discharge partner  - Complete POLST form as appropriate  - Assess patient's ability to be responsible for managing their own health  - Refer to Case Management Department for coordinating discharge planning if the patient needs post-hospital services based on physician/LIP order or complex needs related to functional status, cognitive ability or social support system  Outcome: Progressing     Problem: Patient/Family Goals  Goal: Patient/Family Long Term Goal  Description: Patient's Long Term Goal: Discharge from the hospital    Interventions:  - Monitor vital signs  - Monitor appropriate labs  - Monitor blood glucose levels  - Pain management  - Administer medications per order  - Follow MD orders  - Diagnostics per order  - Update / inform patient and family on plan of care  - Discharge planning  - See additional Care Plan goals for specific interventions  Outcome: Progressing  Goal: Patient/Family Short Term Goal  Description: Patient's Short Term Goal: Improve somnolence     Interventions:   - Monitor vital signs  - Monitor appropriate labs  - Monitor blood glucose levels  - Pain management  - Administer medications per order  - Follow MD orders  - Diagnostics per order  - Update / inform patient and family on plan of care  - See additional Care Plan goals for specific interventions  Outcome: Progressing     Problem: CARDIOVASCULAR - ADULT  Goal: Maintains optimal cardiac output and  hemodynamic stability  Description: INTERVENTIONS:  - Monitor vital signs, rhythm, and trends  - Monitor for bleeding, hypotension and signs of decreased cardiac output  - Evaluate effectiveness of vasoactive medications to optimize hemodynamic stability  - Monitor arterial and/or venous puncture sites for bleeding and/or hematoma  - Assess quality of pulses, skin color and temperature  - Assess for signs of decreased coronary artery perfusion - ex. Angina  - Evaluate fluid balance, assess for edema, trend weights  Outcome: Progressing  Goal: Absence of cardiac arrhythmias or at baseline  Description: INTERVENTIONS:  - Continuous cardiac monitoring, monitor vital signs, obtain 12 lead EKG if indicated  - Evaluate effectiveness of antiarrhythmic and heart rate control medications as ordered  - Initiate emergency measures for life threatening arrhythmias  - Monitor electrolytes and administer replacement therapy as ordered  Outcome: Progressing     Problem: SKIN/TISSUE INTEGRITY - ADULT  Goal: Skin integrity remains intact  Description: INTERVENTIONS  - Assess and document risk factors for pressure ulcer development  - Assess and document skin integrity  - Monitor for areas of redness and/or skin breakdown  - Initiate interventions, skin care algorithm/standards of care as needed  Outcome: Progressing     Problem: MUSCULOSKELETAL - ADULT  Goal: Return mobility to safest level of function  Description: INTERVENTIONS:  - Assess patient stability and activity tolerance for standing, transferring and ambulating w/ or w/o assistive devices  - Assist with transfers and ambulation using safe patient handling equipment as needed  - Ensure adequate protection for wounds/incisions during mobilization  - Obtain PT/OT consults as needed  - Advance activity as appropriate  - Communicate ordered activity level and limitations with patient/family  Outcome: Progressing     Problem: NEUROLOGICAL - ADULT  Goal: Achieves stable or  improved neurological status  Description: INTERVENTIONS  - Assess for and report changes in neurological status  - Initiate measures to prevent increased intracranial pressure  - Maintain blood pressure and fluid volume within ordered parameters to optimize cerebral perfusion and minimize risk of hemorrhage  - Monitor temperature, glucose, and sodium. Initiate appropriate interventions as ordered  Outcome: Progressing      Monitoring vital signs- Dr. Metzger notified of elevated blood pressure readings, antihypertensives provided as needed per MD order. Monitoring blood glucose levels. No acute changes noted at this moment. Safety and fall precautions maintained- bed alarm on, bed locked in lowest position, call light within reach. Frequent rounding by nursing staff.

## 2024-05-01 NOTE — DIETARY NOTE
NUTRITION EDUCATION NOTE     Received consult for heart failure nutrition education. Verbally reviewed basic cardiac diet restrictions. Provided with Heart Failure Nutrition Therapy NCM handout to reinforce. Receptive to instruction. Would benefit from outpt f/u. Expect fair compliance. RD contact information provided to pt.      Kaylen Damico MS, DEMETRICE, RDN, LDN  Clinical Dietitian  P: [phone]

## 2024-05-01 NOTE — PHYSICAL THERAPY NOTE
PHYSICAL THERAPY EVALUATION - INPATIENT     Room Number: 567/567-A  Evaluation Date: 5/1/2024  Type of Evaluation: Initial   Physician Order: PT Eval and Treat    Presenting Problem: AMS, acute on chronic CHF  Co-Morbidities : chf, ckd, dm, htn  Reason for Therapy: Mobility Dysfunction and Discharge Planning    PHYSICAL THERAPY ASSESSMENT   Patient is a 78 year old female admitted 4/30/2024 for AMS, diagnosed with acute on chronic CHF.  Prior to admission, patient's baseline is independent with cane or rw, pt reports she lives with her son, also has a part time caregiver that assists with household tasks.  Patient is currently functioning near baseline with transfers and gait.  Patient is requiring contact guard assist as a result of the following impairments: decreased functional strength and decreased endurance/aerobic capacity.  Physical Therapy will continue to follow for duration of hospitalization.    Patient will benefit from continued skilled PT Services at discharge to promote functional independence in home.  Anticipate patient will return home with home health PT.    PLAN  PT Treatment Plan: Endurance;Energy conservation;Patient education;Gait training;Strengthening  Rehab Potential : Good  Frequency (Obs): 3-5x/week    PHYSICAL THERAPY MEDICAL/SOCIAL HISTORY        Problem List  Principal Problem:    Altered mental status, unspecified altered mental status type  Active Problems:    Acute on chronic congestive heart failure, unspecified heart failure type (HCC)      HOME SITUATION  Home Situation  Type of Home: House  Home Layout: One level  Lives With: Son;Caregiver part-time ([employer] 3:30-6:30 Mon, Tues, Thurs, Fri for household tasks)  Drives: No  Patient Owned Equipment: Rolling walker;Cane     Prior Level of [place]: Pt reports she is independent with mobility and adls, pta, has rw and cane that she uses as needed, chronic LBP, flexed posture.    SUBJECTIVE  \"This is how I do it\" referring to her  flexed posture during ambulation with rw    PHYSICAL THERAPY EXAMINATION   OBJECTIVE     Fall Risk: Standard fall risk    WEIGHT BEARING RESTRICTION  Weight Bearing Restriction: None                PAIN ASSESSMENT  Rating: Unable to rate  Location: generalized  Management Techniques: Activity promotion    COGNITION  Overall Cognitive Status:  WFL - within functional limits    RANGE OF MOTION AND STRENGTH ASSESSMENT  Upper extremity ROM and strength are within functional limits   Lower extremity ROM is within functional limits   Lower extremity strength is within functional limits     BALANCE  Static Sitting: Good  Dynamic Sitting: Good  Static Standing: Fair -  Dynamic Standing: Fair -    ACTIVITY TOLERANCE  Pulse: 98  Heart Rate Source: Monitor        O2 WALK  Oxygen Therapy  SPO2% on Room Air at Rest: 95  SPO2% Ambulation on Room Air: 95    AM-PAC '6-Clicks' INPATIENT SHORT FORM - BASIC MOBILITY  How much difficulty does the patient currently have...  Patient Difficulty: Turning over in bed (including adjusting bedclothes, sheets and blankets)?: None   Patient Difficulty: Sitting down on and standing up from a chair with arms (e.g., wheelchair, bedside commode, etc.): A Little   Patient Difficulty: Moving from lying on back to sitting on the side of the bed?: None   How much help from another person does the patient currently need...   Help from Another: Moving to and from a bed to a chair (including a wheelchair)?: A Little   Help from Another: Need to walk in hospital room?: A Little   Help from Another: Climbing 3-5 steps with a railing?: A Little     AM-PAC Score:  Raw Score: 20   Approx Degree of Impairment: 35.83%   Standardized Score (AM-PAC Scale): 47.67   CMS Modifier (G-Code): CJ    FUNCTIONAL ABILITY STATUS  Functional Mobility/Gait Assessment  Gait Assistance: Contact guard assist  Distance (ft): 30  Assistive Device: Rolling walker  Pattern: Shuffle (significant flexed posture)  Rolling:  independent  Supine to Sit: supervision  Sit to Supine: supervision  Sit to Stand: contact guard assist    Exercise/Education Provided:  Energy conservation  Functional activity tolerated  Gait training  Posture    Patient ok to see per rn.   Pt recd in supine, on room air, educated in role of physical therapy, goals for session, importance of consistent mobility.  Pt agreeable to physical therapy.    Pt educated in pacing, energy conservation, safe use of rw, upright posture, pt with significantly flexed posture, reports this is her baseline posture.  Gait training deficits include shuffling, cueing for pacing.   Pt tolerated ambulation fair.   Pt returned to supine due to upcoming test, encouraged to also ambulate with [facility] staff in addition to PT in order to improve endurance, strength, and activity tolerance.   Rn aware of session and assist needed, recommendations for activity.     The patient's Approx Degree of Impairment: 35.83% has been calculated based on documentation in the [facility] '6 clicks' Inpatient Basic Mobility Short Form.  Research supports that patients with this level of impairment may benefit from home with home PT.  Final disposition will be made by interdisciplinary medical team.    Patient End of Session: In bed;Needs met;Call light within reach;RN aware of session/findings;All patient questions and concerns addressed    CURRENT GOALS  Goals to be met by: 5/5/24  Patient Goal Patient's self-stated goal is: did not state   Goal #1 Patient is able to demonstrate supine - sit EOB @ level: independent     Goal #1   Current Status    Goal #2 Patient is able to demonstrate transfers Sit to/from Stand at assistance level: modified independent with walker - rolling     Goal #2  Current Status    Goal #3 Patient is able to ambulate 50 feet with assist device: walker - rolling at assistance level: modified independent   Goal #3   Current Status      Patient Evaluation Complexity Level:  History Moderate -  1 or 2 personal factors and/or co-morbidities   Examination of body systems Moderate - addressing a total of 3 or more elements   Clinical Presentation  Moderate - Evolving   Clinical Decision Making  Moderate Complexity     Therapeutic Activity:  15 minutes
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
## 2024-05-01 NOTE — HISTORICAL OFFICE NOTE
Continuity of Care Document  8/2/2023  Hannah Boss - 78 y.o. Female; born [birth year]Summary of episode note, generated on Aug. 22, 2023August 22, 2023   CHIEF COMPLAINT    CHIEF COMPLAINT  Reason for Visit/Chief Complaint   Consult   This is a 77-year-old with renal disease hypertension hypercholesterol with a pulmonary pressures aortic stenosis who presents for follow-up after recent hospitalization for DVT with confusion hyperkalemia and transient SVT. We are asked to assess cardiac issues. Patient denies chest pain but is very tired. She gets around slowly with a walker. Old records were reviewed. EKG showed SVT and July with prior EKG showing sinus with nonspecific changes. She had an echo in June showing normal LV function left atrial enlargement mild MR mild to moderate AI and mild AAS with elevated pulmonary pressures 40-50. A nuclear stress test in September 2022 was normal. Patient is not a good historian. She denied cardiac issues and is unclear where she is on nitrates and aspirin with other meds.     PROBLEMS  Reconcile with Patient's ChartPROBLEMS  Problem Effective Dates Date resolved Problem Status   Paroxysmal SVT (supraventricular tachycardia), [SNOMED-CT: 37948841] 8/3/2023 - Active   Mild pulmonary hypertension, [SNOMED-CT: 468105553] 8/3/2023 - Active   Edema, localized, [SNOMED-CT: 168674155] 8/3/2023 - Active   Hypertension (HTN), primary, [SNOMED-CT: 86297981] 8/3/2023 - Active   Hyperlipidemia, mixed, [SNOMED-CT: 194658336] 8/3/2023 - Active   Exertional dyspnea, [SNOMED-CT: 10501326] 8/3/2023 - Active     ENCOUNTER DIAGNOSIS    ENCOUNTER DIAGNOSIS  Problem Effective Dates Date resolved Problem Status   Paroxysmal SVT (supraventricular tachycardia), [SNOMED-CT: 36868366] 8/3/2023 - Active   Mild pulmonary hypertension, [SNOMED-CT: 043971409] 8/3/2023 - Active   Edema, localized, [SNOMED-CT: 153448201] 8/3/2023 - Active   Hypertension (HTN), primary, [SNOMED-CT: 45323468]  8/3/2023 - Active   Hyperlipidemia, mixed, [SNOMED-CT: 205403947] 8/3/2023 - Active   Exertional dyspnea, [SNOMED-CT: 12131394] 8/3/2023 - Active     VITAL SIGNS    VITAL SIGNS  Date / Time: 8/3/2023   BP Systolic 112 mmHg   BP Diastolic 52 mmHg   Height 63 inches   Weight 178 lbs   Pulse Rate 73 bpm   BSA (Body Surface Area) 1.9 cc/m2   BMI (Body Mass Index) 31.5 cc/m2   Blood Pressure 112 / 52 mmHg     PHYSICAL EXAMINATION    PHYSICAL EXAMINATION  Header Details   Constitutional 97o2%   Vitals Left Arm Sitting  / 52 mmHg, Pulse rate 73 bpm, Height in 5' 3\", BMI: 31.5, Weight in 178.57 lbs (or) 81 kgs, BSA : 1.92 cc/m²   General Appearance No Acute Distress   Cardiovascular      ALLERGIES, ADVERSE REACTIONS, ALERTS    No data available    MEDICATIONS ADMINISTERED DURING VISIT    No data available    MEDICATIONS  Reconcile with Patient's ChartMEDICATIONS  Medication Start Date Route/Frequency Status   amLODIPine 10 mg tablet, [RxNorm: 828572] 8/3/2023 Take 1 tablet orally once a day. Active   apixaban 5 MG Oral Tab, [RxNorm: 5634931] 8/3/2023 Take 1 tablet (5 mg total) by mouth 2 (two) times daily. Active   aspirin 81 MG Oral Tab EC, [RxNorm: 852352] 8/3/2023 Take 1 tablet (81 mg total) by mouth daily. Active   atorvastatin 40 MG Oral Tab, [RxNorm: 355416] 8/3/2023 Take 1 tablet (40 mg total) by mouth. AT BEDTIME Active   cholecalciferol 50 MCG (2000 UT) Oral Tab, [RxNorm: 488096] 8/3/2023 Take 1 tablet (2,000 Units total) by mouth daily. Active   diazePAM 10 MG Oral Tab, [RxNorm: 994545] 8/3/2023 Take 1 tablet (10 mg total) by mouth every 8 (eight) hours as needed (MAX 3/DAY). Active   gabapentin 300 MG Oral Cap, [RxNorm: 717562] 8/3/2023 Take 1 capsule (300 mg total) by mouth nightly. Active   hydrALAZINE 50 mg tablet, [RxNorm: 421811] 8/3/2023 Take 1 tablet orally 2 times a day. Active   isosorbide mononitrate ER 60 MG Oral Tablet 24 Hr, [RxNorm: 820615] 8/3/2023 Take 1 tablet (60 mg total) by mouth daily.  Active   lisinopriL 10 mg tablet, [RxNorm: 277086] 8/3/2023 Take 1 tablet orally once a day. Active   meclizine 25 MG Oral Tab, [RxNorm: 780081] 8/3/2023 Take 1 tablet (25 mg total) by mouth. Active   oxyCODONE-acetaminophen (PERCOCET)  MG Oral Tab, [RxNorm: 4674788] 8/3/2023 Take 1 tablet by mouth every 4 (four) hours as needed for Pain (Max 6/day). Active   pantoprazole 40 mg tablet,delayed release, [RxNorm: 232032] 8/3/2023 Take 1 tablet orally 2 times a day. Active   patiromer, [RxNorm: 0] 8/3/2023 16.8 pack by mouth daily Active   Vancocin 125 mg capsule, [RxNorm: 851948] 8/3/2023 Take 1 capsule orally every 6 hours for 7 days Active   Vitamin D3 25 mcg (1,000 unit) capsule, [RxNorm: 227459] 8/3/2023 Take 2 capsules orally once a day. Active   amLODIPine 5 mg tablet, [RxNorm: 160884] 8/3/2023 Take 1 tablet orally once a day. Active     ASSESSMENT    Conclusion this is a 73-year-old with renal insufficiency hypertension with cholesterol mild pulm hypertension aortic valve disease who was recently hospitalized and found to have DVT with worsening renal function. She also had transient SVT. At this time she does not complain of palpitations chest pain or shortness of breath. With history patient needs to get a blood pressure machine and some blood pressure meds can be adjusted. Would start with decrease amlodipine to 5 mg daily and see if this helps her legs without without raising her blood pressure. If able we will try to decrease or stop amlodipine and substitute low-dose Cardizem which may be better with history of SVT. She also may stop aspirin at this time since there is no evidence of stroke or coronary disease. She will continue blood thinners for DVT monitored by her primary. Patient to see nephrologist and get follow-up labs. Moderate valve disease we will follow clinically for now and adjust meds.Decrease amlodipine to 5 mg dailyCBC and CMP in 1 week if not done by primary or follow-up doctorsGet  blood pressure machine for home monitoringMonitor and record resting blood pressure and pulse for 1 week and call with results. When checking blood pressure sit comfortably in a chair with the blood pressure cuff in place and rest 5 minutes before checking blood pressure. Check blood pressure and if elevated repeat 2 additional blood pressures and throw away the first 1 and average the second 2.Virtual visit with KELLEY Lugo in 2 weeksFollow-up 2 months This note used Dragon technology. Transcription errors are not uncommon and may not have been corrected prior to electronically signing the note. Should you find these errors please consult the clinician for interpretation (or apply common sense adjustment when safe and appropriate).     FAMILY HISTORY    No data available    GENERAL STATUS    No data available    PAST MEDICAL HISTORY    PAST MEDICAL HISTORY  Problem Date diagonsed Date resolved Status   Paroxysmal SVT (supraventricular tachycardia), [SNOMED-CT: 35394992] 8/3/2023 - Active   Mild pulmonary hypertension, [SNOMED-CT: 317945342] 8/3/2023 - Active   Edema, localized, [SNOMED-CT: 235357804] 8/3/2023 - Active   Hypertension (HTN), primary, [SNOMED-CT: 37682949] 8/3/2023 - Active   Hyperlipidemia, mixed, [SNOMED-CT: 096800640] 8/3/2023 - Active   Exertional dyspnea, [SNOMED-CT: 55203826] 8/3/2023 - Active     HISTORY OF PRESENT ILLNESS    This is a 77-year-old with renal disease hypertension hypercholesterol with a pulmonary pressures aortic stenosis who presents for follow-up after recent hospitalization for DVT with confusion hyperkalemia and transient SVT. We are asked to assess cardiac issues. Patient denies chest pain but is very tired. She gets around slowly with a walker. Old records were reviewed. EKG showed SVT and July with prior EKG showing sinus with nonspecific changes. She had an echo in June showing normal LV function left atrial enlargement mild MR mild to moderate AI and mild AAS with  elevated pulmonary pressures 40-50. A nuclear stress test in September 2022 was normal. Patient is not a good historian. She denied cardiac issues and is unclear where she is on nitrates and aspirin with other meds.     IMMUNIZATIONS  Reconcile with Patient's ChartNo data available    PLAN OF CARE    PLAN OF CARE  Planned Care Date   CBC (Complete Blood Count) 1/1/1900   CMP (comprehensive metabolic panel) 1/1/1900   Take 1 tablet orally once a day.-amLODIPine 5 mg tablet 8/3/2023   Follow up visit - Abdirahman Buchanan 1/1/1900   Follow up visit - Hue Elias 1/1/1900     PROCEDURES    No data available    RESULTS    RESULTS  Name Result Date Location - Ordered By   CBC (Complete Blood Count) [LOINC: 25038-5] Pending 1 Week     CMP (comprehensive metabolic panel) [LOINC: 00182-4] Pending 1 Week       REVIEW OF SYSTEMS    REVIEW OF SYSTEMS  Header Details   Eyes No history of Blurry vision, Visual changes, Double vision, Discharge, Eye pain, Dry eyes, Decreased vision   Cardiovascular Edema  No history of Chest pain, CANTU, Palpitations, Syncope, PND, Orthopnea, Claudication   Respiratory No history of SOB, Cough, Hemoptysis, Wheezing, Pleurisy, Sputum   Others Review Of Systems   bilateral pedal edema     SOCIAL HISTORY    SOCIAL HISTORY  Social History Element Description Effective Dates   Smoking status Heavy tobacco smoker -     FUNCTIONAL STATUS    No data available    MEDICAL EQUIPMENT    No data available    Goals Sections    No data available    REASON FOR REFERRAL    No data available    Health Concerns Section    No data available    COGNITIVE/MENTAL STATUS    No data available    Patient Demographics    Patient Demographics  Patient Address Patient Name Communication   [address] [place] [address] Hannah Boss [phone] (Mobile)     Patient Demographics  Language Race / Ethnicity Marital Status   English (Preferred) Black Or  / Unknown      Document  Information    Primary Care Provider Other Service Providers Document Coverage Dates   Dewayne Gary  NPI: [id number]  [phone] (Work)  [address], [address], [place] [address]  [place] [address]  Interpreting Physicians  [facility]  [phone] (Work)  [address] [place] [address] Richy Dias  NPI: [id number]  [phone] (Work)  [address], [address], [place] [address]  [place] [address]  Nurses     Melida Mendoza  NPI: [id number]  [phone] (Work)  [address], [address], [place] [address]  [place] [address]  Nurses     Carlos Alberto Miller  NPI: [id number]  [phone] (Work)  [address], [address], [place] [address]  [place] [address]  Nurses Aug. 03, 2023August 03, 2023      Organization   [facility]  [phone] (Work)  [address], [address], [place] [address]  [place] [address]     Encounter Providers Encounter Date    Aug. 03, 2023August 03, 2023     Legal Authenticator    Abdirahman Buchanan  NPI: [id number]  [phone] (Work)  [address], [address], [place] [address]  [place] [address]

## 2024-05-02 LAB
7-AMINOCLONAZ CONF BLD: NEGATIVE NG/ML
7-AMINOCLONAZ CONF BLD: NEGATIVE NG/ML
ALPRAZOLAM CONF BLD: NEGATIVE NG/ML
ALPRAZOLAM CONF BLD: NEGATIVE NG/ML
ANION GAP SERPL CALC-SCNC: 2 MMOL/L (ref 0–18)
BASOPHILS # BLD AUTO: 0.04 X10(3) UL (ref 0–0.2)
BASOPHILS NFR BLD AUTO: 0.8 %
BENZO CLASS BLD: POSITIVE
BENZO CLASS BLD: POSITIVE
BUN BLD-MCNC: 36 MG/DL (ref 9–23)
BUN/CREAT SERPL: 17.3 (ref 10–20)
CALCIUM BLD-MCNC: 9.3 MG/DL (ref 8.7–10.4)
CHLORDIAZEPOX CONF BLD: NEGATIVE
CHLORDIAZEPOX CONF BLD: NEGATIVE
CHLORIDE SERPL-SCNC: 108 MMOL/L (ref 98–112)
CLONAZEPAM CONF BLD: NEGATIVE NG/ML
CLONAZEPAM CONF BLD: NEGATIVE NG/ML
CO2 SERPL-SCNC: 30 MMOL/L (ref 21–32)
CREAT BLD-MCNC: 2.08 MG/DL
DEPRECATED RDW RBC AUTO: 42.7 FL (ref 35.1–46.3)
DESALKYLFLURAZ CONF BLD: NEGATIVE NG/ML
DESALKYLFLURAZ CONF BLD: NEGATIVE NG/ML
DESMETHYLDIAZ CONF BLD: 335 NG/ML
DESMETHYLDIAZ CONF BLD: 335 NG/ML
DIAZEPAM CONF BLD: 298 NG/ML
DIAZEPAM CONF BLD: 298 NG/ML
EGFRCR SERPLBLD CKD-EPI 2021: 24 ML/MIN/1.73M2 (ref 60–?)
EOSINOPHIL # BLD AUTO: 0.14 X10(3) UL (ref 0–0.7)
EOSINOPHIL NFR BLD AUTO: 2.7 %
ERYTHROCYTE [DISTWIDTH] IN BLOOD BY AUTOMATED COUNT: 13.2 % (ref 11–15)
FLURAZEPAM CONF BLD: NEGATIVE NG/ML
FLURAZEPAM CONF BLD: NEGATIVE NG/ML
GLUCOSE BLD-MCNC: 108 MG/DL (ref 70–99)
GLUCOSE BLDC GLUCOMTR-MCNC: 140 MG/DL (ref 70–99)
GLUCOSE BLDC GLUCOMTR-MCNC: 146 MG/DL (ref 70–99)
GLUCOSE BLDC GLUCOMTR-MCNC: 160 MG/DL (ref 70–99)
HCT VFR BLD AUTO: 30.5 %
HGB BLD-MCNC: 10 G/DL
IMM GRANULOCYTES # BLD AUTO: 0.02 X10(3) UL (ref 0–1)
IMM GRANULOCYTES NFR BLD: 0.4 %
LORAZEPAM CONF BLD: NEGATIVE NG/ML
LORAZEPAM CONF BLD: NEGATIVE NG/ML
LYMPHOCYTES # BLD AUTO: 0.99 X10(3) UL (ref 1–4)
LYMPHOCYTES NFR BLD AUTO: 18.8 %
Lab: NEGATIVE
MCH RBC QN AUTO: 29 PG (ref 26–34)
MCHC RBC AUTO-ENTMCNC: 32.8 G/DL (ref 31–37)
MCV RBC AUTO: 88.4 FL
MIDAZOLAM CONF BLD: NEGATIVE NG/ML
MIDAZOLAM CONF BLD: NEGATIVE NG/ML
MONOCYTES # BLD AUTO: 0.37 X10(3) UL (ref 0.1–1)
MONOCYTES NFR BLD AUTO: 7 %
NEUTROPHILS # BLD AUTO: 3.71 X10 (3) UL (ref 1.5–7.7)
NEUTROPHILS # BLD AUTO: 3.71 X10(3) UL (ref 1.5–7.7)
NEUTROPHILS NFR BLD AUTO: 70.3 %
OSMOLALITY SERPL CALC.SUM OF ELEC: 299 MOSM/KG (ref 275–295)
OXAZEPAM CONF BLD: NEGATIVE NG/ML
OXAZEPAM CONF BLD: NEGATIVE NG/ML
PLATELET # BLD AUTO: 198 10(3)UL (ref 150–450)
POTASSIUM SERPL-SCNC: 5 MMOL/L (ref 3.5–5.1)
RBC # BLD AUTO: 3.45 X10(6)UL
SODIUM SERPL-SCNC: 140 MMOL/L (ref 136–145)
TEMAZEPAM CONF BLD: NEGATIVE NG/ML
TEMAZEPAM CONF BLD: NEGATIVE NG/ML
TRIAZOLAM CONF BLD: NEGATIVE NG/ML
TRIAZOLAM CONF BLD: NEGATIVE NG/ML
WBC # BLD AUTO: 5.3 X10(3) UL (ref 4–11)

## 2024-05-02 PROCEDURE — 99233 SBSQ HOSP IP/OBS HIGH 50: CPT | Performed by: HOSPITALIST

## 2024-05-02 RX ORDER — FUROSEMIDE 20 MG/1
20 TABLET ORAL DAILY
Status: DISCONTINUED | OUTPATIENT
Start: 2024-05-03 | End: 2024-05-03

## 2024-05-02 NOTE — CM/SW NOTE
Pt discussed in RN DC rounds. SW received confirmation that patient is current with [facility] for PT/OT. SW placed [facility] for PT, since [facility] doesn't have a OT in the area.     Plan: Pending medical clearance, DC to Home with [facility], *signed [facility]ODA    SW/RAJ to remain available for support and/or discharge planning.     Avelina Bueno, MSW, LSW   x [phone]

## 2024-05-02 NOTE — PROGRESS NOTES
[facility]  part of [facility]  Hospitalist Progress Note     Hannah Boss Patient Status:  Inpatient    1945  78 year old CSN [id number]   Location 567/567-A Attending Gildardo Shrestha MD   Hosp Day # 2 PCP Diana Lopez DO     Assessment & Plan:   ----------------------------------  Congestive heart failure, acute on chronic diastolic. +radiographic/physical exam evidence of pulmonary edema, +peripheral edema but improving  -Cardiology consult appreciated  -Diuresis with IV lasix  -GDMT per cards  -Echo recently done  -Strict I/O  -Daily weights  -Low salt diet  -Monitor renal function  -Telemetry  -Supp O2 as needed, titrate off as tolerated    Acute encephalopathy, toxic/metabolic.  Due to CHF, possibly some overmedication at home.  Symptoms are much improved. There is questionable underlying dementia.  No evidence of extreme CO2 retention.  She has been doing very well the last 48 hours.  -Reorientation  -Treat known active medical issues    Acute kidney injury.   Baseline creatinine is 1.3, highest creatinine during this admission 2.1.  Most likely cause is heart failure  -Nephrology consult if fails to continue improving  -Monitor urine output  -Repeat chemistry in the morning  -No indication for dialysis  -Hold diuretics  -Hold ACEi/ARB  -Avoid nephrotoxins/IV contrast/hypotension  -Renal US if fails to continue improving  -IVF: None    Abdominal tenderness.  No mention of this at the time of admission.  Now resolved  -CT abdomen and pelvis with oral contrast neg for acute pathology    Other problems  Hypertension  History of PSVT  CKD stage III  History of C. difficile  Chronic stasis dermatitis  Dyslipidemia  History of DVT    dvt prophylaxis: eliquis  code status: full code  dispo: home upon medical clearance, when ok with cards    I personally reviewed the available laboratories, imaging including CT. I discussed/will discuss the case with cardiology. I ordered laboratories,  studies including renal panel. I adjusted medications including IV Lasix. Medical decision making high, risk is high.    Subjective:   ----------------------------------  Patient feels good.  Wants to go home.  Denies abdominal pain.  Breathing is good.  No chest pain.      Objective:   Chief Complaint:   Chief Complaint   Patient presents with    Fatigue     ----------------------------------  Temp:  [97.3 °F (36.3 °C)-98.7 °F (37.1 °C)] 98.7 °F (37.1 °C)  Pulse:  [68-98] 88  Resp:  [18] 18  BP: (138-160)/(57-71) 154/71  SpO2:  [95 %-98 %] 97 %  Gen: A+Ox3.  No distress.   HEENT: NCAT, neck supple, no carotid bruit.  CV: RRR, S1S2, and intact distal pulses. No gallop, rub, murmur.  Pulm: Effort and breath sounds normal. No distress, wheezes, rales, rhonchi.  Abd: Soft, mild to moderate tenderness on the left side of the abdomen.  No masses.  No rebound or guarding.  Neuro: Normal reflexes, CN. Sensory/motor exams grossly normal deficit.   MS: No joint effusions.  Trace peripheral edema.  Skin: Skin is warm and dry. No rashes, erythema, diaphoresis.   Psych: Normal mood and affect. Calm, cooperative    Labs:  Lab Results   Component Value Date    HGB 10.0 (L) 05/02/2024    WBC 5.3 05/02/2024    .0 05/02/2024     05/02/2024    K 5.0 05/02/2024    CREATSERUM 2.08 (H) 05/02/2024    INR 1.30 (H) 03/08/2024    AST 59 (H) 04/30/2024    ALT 25 04/30/2024    TROP 0.398 (HH) 09/13/2021          docusate sodium  100 mg Oral BID    polyethylene glycol (PEG 3350)  17 g Oral Daily    furosemide  40 mg Intravenous BID (Diuretic)    apixaban  5 mg Oral BID    gabapentin  300 mg Oral Nightly    pantoprazole  40 mg Oral BID AC       hydrALAzine    oxyCODONE    acetaminophen    ondansetron    metoclopramide    labetalol  **Certification      PHYSICIAN Certification of Need for Inpatient Hospitalization - Initial Certification    Patient will require inpatient services that will reasonably be expected to span two  midnight's based on the clinical documentation in H+P.   Based on patients current state of illness, I anticipate that, after discharge, patient will require TBD.

## 2024-05-02 NOTE — PLAN OF CARE
Problem: Patient Centered Care  Goal: Patient preferences are identified and integrated in the patient's plan of care  Description: Interventions:  - What would you like us to know as we care for you? From home with son  - Provide timely, complete, and accurate information to patient/family  - Incorporate patient and family knowledge, values, beliefs, and cultural backgrounds into the planning and delivery of care  - Encourage patient/family to participate in care and decision-making at the level they choose  - Honor patient and family perspectives and choices  Outcome: Progressing     Problem: PAIN - ADULT  Goal: Verbalizes/displays adequate comfort level or patient's stated pain goal  Description: INTERVENTIONS:  - Encourage pt to monitor pain and request assistance  - Assess pain using appropriate pain scale  - Administer analgesics based on type and severity of pain and evaluate response  - Implement non-pharmacological measures as appropriate and evaluate response  - Consider cultural and social influences on pain and pain management  - Manage/alleviate anxiety  - Utilize distraction and/or relaxation techniques  - Monitor for opioid side effects  - Notify MD/LIP if interventions unsuccessful or patient reports new pain  - Anticipate increased pain with activity and pre-medicate as appropriate  Outcome: Progressing     Problem: SAFETY ADULT - FALL  Goal: Free from fall injury  Description: INTERVENTIONS:  - Assess pt frequently for physical needs  - Identify cognitive and physical deficits and behaviors that affect risk of falls.  - [place] fall precautions as indicated by assessment.  - Educate pt/family on patient safety including physical limitations  - Instruct pt to call for assistance with activity based on assessment  - Modify environment to reduce risk of injury  - Provide assistive devices as appropriate  - Consider OT/PT consult to assist with strengthening/mobility  - Encourage toileting  schedule  Outcome: Progressing     Problem: DISCHARGE PLANNING  Goal: Discharge to home or other facility with appropriate resources  Description: INTERVENTIONS:  - Identify barriers to discharge w/pt and caregiver  - Include patient/family/discharge partner in discharge planning  - Arrange for needed discharge resources and transportation as appropriate  - Identify discharge learning needs (meds, wound care, etc)  - Arrange for interpreters to assist at discharge as needed  - Consider post-discharge preferences of patient/family/discharge partner  - Complete POLST form as appropriate  - Assess patient's ability to be responsible for managing their own health  - Refer to Case Management Department for coordinating discharge planning if the patient needs post-hospital services based on physician/LIP order or complex needs related to functional status, cognitive ability or social support system  Outcome: Progressing     Problem: Patient/Family Goals  Goal: Patient/Family Long Term Goal  Description: Patient's Long Term Goal: Discharge from the hospital    Interventions:  - Monitor vital signs  - Monitor appropriate labs  - Monitor blood glucose levels  - Pain management  - Administer medications per order  - Follow MD orders  - Diagnostics per order  - Update / inform patient and family on plan of care  - Discharge planning  - See additional Care Plan goals for specific interventions  Outcome: Progressing  Goal: Patient/Family Short Term Goal  Description: Patient's Short Term Goal: Improve somnolence     Interventions:   - Monitor vital signs  - Monitor appropriate labs  - Monitor blood glucose levels  - Pain management  - Administer medications per order  - Follow MD orders  - Diagnostics per order  - Update / inform patient and family on plan of care  - See additional Care Plan goals for specific interventions  Outcome: Progressing     Problem: CARDIOVASCULAR - ADULT  Goal: Maintains optimal cardiac output and  hemodynamic stability  Description: INTERVENTIONS:  - Monitor vital signs, rhythm, and trends  - Monitor for bleeding, hypotension and signs of decreased cardiac output  - Evaluate effectiveness of vasoactive medications to optimize hemodynamic stability  - Monitor arterial and/or venous puncture sites for bleeding and/or hematoma  - Assess quality of pulses, skin color and temperature  - Assess for signs of decreased coronary artery perfusion - ex. Angina  - Evaluate fluid balance, assess for edema, trend weights  Outcome: Progressing  Goal: Absence of cardiac arrhythmias or at baseline  Description: INTERVENTIONS:  - Continuous cardiac monitoring, monitor vital signs, obtain 12 lead EKG if indicated  - Evaluate effectiveness of antiarrhythmic and heart rate control medications as ordered  - Initiate emergency measures for life threatening arrhythmias  - Monitor electrolytes and administer replacement therapy as ordered  Outcome: Progressing     Problem: SKIN/TISSUE INTEGRITY - ADULT  Goal: Skin integrity remains intact  Description: INTERVENTIONS  - Assess and document risk factors for pressure ulcer development  - Assess and document skin integrity  - Monitor for areas of redness and/or skin breakdown  - Initiate interventions, skin care algorithm/standards of care as needed  Outcome: Progressing     Problem: MUSCULOSKELETAL - ADULT  Goal: Return mobility to safest level of function  Description: INTERVENTIONS:  - Assess patient stability and activity tolerance for standing, transferring and ambulating w/ or w/o assistive devices  - Assist with transfers and ambulation using safe patient handling equipment as needed  - Ensure adequate protection for wounds/incisions during mobilization  - Obtain PT/OT consults as needed  - Advance activity as appropriate  - Communicate ordered activity level and limitations with patient/family  Outcome: Progressing     Problem: NEUROLOGICAL - ADULT  Goal: Achieves stable or  improved neurological status  Description: INTERVENTIONS  - Assess for and report changes in neurological status  - Initiate measures to prevent increased intracranial pressure  - Maintain blood pressure and fluid volume within ordered parameters to optimize cerebral perfusion and minimize risk of hemorrhage  - Monitor temperature, glucose, and sodium. Initiate appropriate interventions as ordered  Outcome: Progressing      Monitoring vital signs- stable at this time. Monitoring blood glucose levels. No acute changes noted at this moment. Safety and fall precautions maintained- bed alarm on, bed locked in lowest position, call light within reach. Frequent rounding by nursing staff.

## 2024-05-02 NOTE — PLAN OF CARE
Problem: Patient Centered Care  Goal: Patient preferences are identified and integrated in the patient's plan of care  Description: Interventions:  - What would you like us to know as we care for you? From home with son  - Provide timely, complete, and accurate information to patient/family  - Incorporate patient and family knowledge, values, beliefs, and cultural backgrounds into the planning and delivery of care  - Encourage patient/family to participate in care and decision-making at the level they choose  - Honor patient and family perspectives and choices  Outcome: Progressing     Problem: PAIN - ADULT  Goal: Verbalizes/displays adequate comfort level or patient's stated pain goal  Description: INTERVENTIONS:  - Encourage pt to monitor pain and request assistance  - Assess pain using appropriate pain scale  - Administer analgesics based on type and severity of pain and evaluate response  - Implement non-pharmacological measures as appropriate and evaluate response  - Consider cultural and social influences on pain and pain management  - Manage/alleviate anxiety  - Utilize distraction and/or relaxation techniques  - Monitor for opioid side effects  - Notify MD/LIP if interventions unsuccessful or patient reports new pain  - Anticipate increased pain with activity and pre-medicate as appropriate  Outcome: Progressing     Problem: SAFETY ADULT - FALL  Goal: Free from fall injury  Description: INTERVENTIONS:  - Assess pt frequently for physical needs  - Identify cognitive and physical deficits and behaviors that affect risk of falls.  - [place] fall precautions as indicated by assessment.  - Educate pt/family on patient safety including physical limitations  - Instruct pt to call for assistance with activity based on assessment  - Modify environment to reduce risk of injury  - Provide assistive devices as appropriate  - Consider OT/PT consult to assist with strengthening/mobility  - Encourage toileting  schedule  Outcome: Progressing     Problem: DISCHARGE PLANNING  Goal: Discharge to home or other facility with appropriate resources  Description: INTERVENTIONS:  - Identify barriers to discharge w/pt and caregiver  - Include patient/family/discharge partner in discharge planning  - Arrange for needed discharge resources and transportation as appropriate  - Identify discharge learning needs (meds, wound care, etc)  - Arrange for interpreters to assist at discharge as needed  - Consider post-discharge preferences of patient/family/discharge partner  - Complete POLST form as appropriate  - Assess patient's ability to be responsible for managing their own health  - Refer to Case Management Department for coordinating discharge planning if the patient needs post-hospital services based on physician/LIP order or complex needs related to functional status, cognitive ability or social support system  Outcome: Progressing     Problem: CARDIOVASCULAR - ADULT  Goal: Maintains optimal cardiac output and hemodynamic stability  Description: INTERVENTIONS:  - Monitor vital signs, rhythm, and trends  - Monitor for bleeding, hypotension and signs of decreased cardiac output  - Evaluate effectiveness of vasoactive medications to optimize hemodynamic stability  - Monitor arterial and/or venous puncture sites for bleeding and/or hematoma  - Assess quality of pulses, skin color and temperature  - Assess for signs of decreased coronary artery perfusion - ex. Angina  - Evaluate fluid balance, assess for edema, trend weights  Outcome: Progressing  Goal: Absence of cardiac arrhythmias or at baseline  Description: INTERVENTIONS:  - Continuous cardiac monitoring, monitor vital signs, obtain 12 lead EKG if indicated  - Evaluate effectiveness of antiarrhythmic and heart rate control medications as ordered  - Initiate emergency measures for life threatening arrhythmias  - Monitor electrolytes and administer replacement therapy as  ordered  Outcome: Progressing     Problem: SKIN/TISSUE INTEGRITY - ADULT  Goal: Skin integrity remains intact  Description: INTERVENTIONS  - Assess and document risk factors for pressure ulcer development  - Assess and document skin integrity  - Monitor for areas of redness and/or skin breakdown  - Initiate interventions, skin care algorithm/standards of care as needed  Outcome: Progressing     Problem: MUSCULOSKELETAL - ADULT  Goal: Return mobility to safest level of function  Description: INTERVENTIONS:  - Assess patient stability and activity tolerance for standing, transferring and ambulating w/ or w/o assistive devices  - Assist with transfers and ambulation using safe patient handling equipment as needed  - Ensure adequate protection for wounds/incisions during mobilization  - Obtain PT/OT consults as needed  - Advance activity as appropriate  - Communicate ordered activity level and limitations with patient/family  Outcome: Progressing     Problem: NEUROLOGICAL - ADULT  Goal: Achieves stable or improved neurological status  Description: INTERVENTIONS  - Assess for and report changes in neurological status  - Initiate measures to prevent increased intracranial pressure  - Maintain blood pressure and fluid volume within ordered parameters to optimize cerebral perfusion and minimize risk of hemorrhage  - Monitor temperature, glucose, and sodium. Initiate appropriate interventions as ordered  Outcome: Progressing     Patient A/Ox4, encourage activity, fall precaution, up in chair for meals. Pain manage per protocol. Continue IV diuretic. Monitor VS. Possible discharge.  for discharge support. Call light within reach.

## 2024-05-02 NOTE — PROGRESS NOTES
[facility]  Cardiology Progress Note    Hannah Boss Patient Status:  Inpatient    1945 MRN [id number]   Location [facility] 5SW/SE Attending Gildardo Shrestha MD   Hosp Day # 2 PCP Diana Lopez,      Subjective     Patient feels well today.  Breathing and edema completely at baseline.    Objective:   Patient Vitals for the past 24 hrs:   BP Temp Temp src Pulse Resp SpO2 Weight   24 0900 (!) 161/67 98.1 °F (36.7 °C) Oral -- 18 96 % --   24 0539 -- -- -- -- -- -- 158 lb 4.8 oz (71.8 kg)   24 0534 154/71 98.7 °F (37.1 °C) Oral 88 18 97 % --   24 2155 156/71 98.4 °F (36.9 °C) Oral 84 18 95 % --   24 1700 138/57 98.7 °F (37.1 °C) Oral 68 18 95 % --     Intake/Output:   Last 3 shifts:   Intake/Output                   24 0700 - 24 0659 24 07 - 24 0659 24 0700 - 24 0659       Intake    P.O.  180  720  120    P.O. 180 720 120    I.V.  10  10  --    I.V. 10 10 --    Total Intake 190 730 120       Output    Urine  2700  2260  --    Urine 1000 -- --    Intermittent/Straight Cath (mL) 250 -- --    Incontinent Urine Occurrence 1 x 1 x --    Output (mL) (External Urinary Catheter) 1450 2260 --    Stool  --  --  --    Stool Count Calculated for I/O -- 1 x --    Total Output 2700 2260 --       Net I/O     -2510 -1530 120             Scheduled Meds:    docusate sodium  100 mg Oral BID    polyethylene glycol (PEG 3350)  17 g Oral Daily    furosemide  40 mg Intravenous BID (Diuretic)    apixaban  5 mg Oral BID    gabapentin  300 mg Oral Nightly    pantoprazole  40 mg Oral BID AC       Results:     Lab Results   Component Value Date    WBC 5.3 2024    HGB 10.0 (L) 2024    HCT 30.5 (L) 2024    .0 2024    CREATSERUM 2.08 (H) 2024    BUN 36 (H) 2024     2024    K 5.0 2024     2024    CO2 30.0 2024     (H) 2024    CA 9.3 2024    ALB 4.3  04/30/2024    ALKPHO 217 (H) 04/30/2024    BILT 0.3 04/30/2024    TP 7.3 04/30/2024    AST 59 (H) 04/30/2024    ALT 25 04/30/2024    PTT 35.0 03/08/2024    INR 1.30 (H) 03/08/2024    TSH 0.615 10/07/2023    LIP 22 03/19/2024    DDIMER 1.13 (H) 06/05/2023    MG 1.7 03/23/2024    PHOS 3.3 03/21/2024    TROP 0.398 (HH) 09/13/2021     (H) 12/31/2023    B12 399 04/22/2022       Recent Labs   Lab 04/30/24  1512 05/01/24  0558 05/02/24  0615   * 88 108*   BUN 29* 28* 36*   CREATSERUM 2.15* 2.00* 2.08*   CA 9.2 9.2 9.3    141 140   K 5.2* 4.8 5.0   * 112 108   CO2 25.0 26.0 30.0     Recent Labs   Lab 04/30/24  1524 05/01/24  0558 05/02/24  0615   RBC 3.15* 3.22* 3.45*   HGB 9.0* 8.9* 10.0*   HCT 28.2* 28.4* 30.5*   MCV 89.5 88.2 88.4   MCH 28.6 27.6 29.0   MCHC 31.9 31.3 32.8   RDW 13.2 13.2 13.2   NEPRELIM 5.08  --  3.71   WBC 5.8 4.5 5.3   .0 175.0 198.0       Recent Labs   Lab 04/30/24  1627   BNPML 146*       No results for input(s): \"TROP\", \"CK\" in the last 168 hours.    CT ABDOMEN+PELVIS(CPT=74176)    Result Date: 5/1/2024  CONCLUSION:  1. No acute intra-abdominal process. 2. 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 3. Uncomplicated distal colonic diverticulosis. 4. Status post cholecystectomy.  No biliary ductal dilatation. 5. Lesser incidental findings as above.    Dictated by (CST): Elijah Welch MD on 5/01/2024 at 12:49 PM     Finalized by (CST): Elijah Welch MD on 5/01/2024 at 1:00 PM          CT BRAIN OR HEAD (81843)    Result Date: 4/30/2024  CONCLUSION: No acute intracranial hemorrhage, hydrocephalus, or mass effect.  If there is clinical concern for acute ischemic stroke, MRI of the brain is recommended.    Dictated by (CST): Celi Jimenez MD on 4/30/2024 at 4:45 PM     Finalized by (CST): Celi Jimenez MD on 4/30/2024 at 4:49 PM          XR CHEST AP/PA (1 VIEW) (CPT=71045)    Result Date:  4/30/2024  CONCLUSION: Interstitial prominence with vascular congestion and cardiomegaly.  Findings suggest mild/early CHF/edema.    Dictated by (CST): Galileo Oliveros MD on 4/30/2024 at 3:56 PM     Finalized by (CST): Galileo Oliveros MD on 4/30/2024 at 3:56 PM         EKG 12 Lead    Result Date: 5/1/2024  Normal sinus rhythm Possible Left atrial enlargement Borderline ECG When compared with ECG of 23-MAR-2024 12:00, No significant change was found Confirmed by STEVE TENORIO ELMER ([id number]) on 5/1/2024 2:43:39 PM          Exam:     General: Alert and oriented x 3. No apparent distress.   HEENT: Normocephalic, anicteric sclera, neck supple, no thyromegaly or adenopathy.  Neck: No JVD, carotids 2+, no bruits.  Cardiac: Regular rate and rhythm. S1, S2 normal. No murmur, pericardial rub, S3, or extra cardiac sounds.  Lungs: Clear without wheezes, rales, rhonchi or dullness.  Normal excursions and effort.  Abdomen: Soft, non-tender. No organosplenomegally, mass or rebound, BS-present.  Extremities: Without clubbing or cyanosis. No edema.    Neurologic: Alert and oriented, normal affect. No focal defects  Skin: Warm and dry.     Assessment and Plan:   Altered mental status, unspecified altered mental status type  Patient more awake.  Treatment per primary     Acute on chronic congestive heart failure, diastolic heart failure type (HCC)  Patient with very mild volume overload by x-ray.  Cautious diuresis and monitor with renal disease.  Recent echo shows moderate aortic stenosis and normal LV function.  Now feeling much better, symptoms at baseline.  Can discontinue further IV Lasix, start oral Lasix tomorrow.  Patient reports having first MI medications at home that she takes intermittently.     Generalized weakness and abdominal pain  Workup per primary.  With weakness would hold atorvastatin for now chronic anemia     History of DVT/PE on blood thinners    Abdirahman Alejandro MD  [facility]  5/2/2024

## 2024-05-02 NOTE — PAYOR COMM NOTE
--------------  ADMISSION REVIEW     Payor: LAUREEN ARTEAGA [facility]  Subscriber #:  [id number]  Authorization Number: [id number]    Admit date: 4/30/24  Admit time:  8:32 PM       REVIEW DOCUMENTATION:     ED Provider Notes        Patient Seen in: [facility] Emergency Department      Chief Complaint   Patient presents with    Fatigue   HPI    Patient is a 78-year-old female with history as listed below.  She presents via EMS with a history of weakness and sleepiness for the last couple hours.  Patient is arousable and denies any specific complaint but seems somewhat sleepy.      HX CKD CAD DM   ED Triage Vitals [04/30/24 1456]   /55   Pulse 65   Resp 16   Temp 99.1 °F (37.3 °C)   Temp src Oral   SpO2 95 %   O2 Device None (Room air)       Current:/50   Pulse 61   Temp 99.1 °F (37.3 °C) (Oral)   Resp 14   Ht 162.6 cm (5' 4\")   Wt 80 kg   SpO2 94%   BMI 30.27 kg/m²         Physical Exam    Constitutional: Oriented to person, place, and time. Appears somnolent but easily arousable HEENT:   Head: Normocephalic and atraumatic.   Right Ear: External ear normal.   Left Ear: External ear normal.   Nose: Nose normal.   Mouth/Throat: Oropharynx is clear and moist.   Eyes: Conjunctivae and EOM are normal. Pupils are equal, round, and reactive to light.   Neck: Neck supple.   Cardiovascular: Normal rate, regular rhythm, normal heart sounds and intact distal pulses.    Pulmonary/Chest: Effort normal and breath sounds with basilar crackles. No respiratory distress.   Abdominal: Soft. Bowel sounds are normal. Exhibits no distension and no mass. There is no tenderness. There is no rebound and no guarding.   Musculoskeletal: Normal range of motion.  2+ lower extremity edema  Lymphadenopathy: No cervical adenopathy.   Neurological: Alert and oriented to person, place, and time. Normal reflexes. No cranial nerve deficit. No motor os sensory defecits noted Coordination normal.   Skin: Skin is warm and dry.   Psychiatric:  Normal mood and affect. Behavior is normal. Judgment and thought content normal.   Nursing note and vitals reviewed.        ED Course     Labs Reviewed   URINALYSIS WITH CULTURE REFLEX - Abnormal; Notable for the following components:       Result Value    Protein Urine Trace (*)     All other components within normal limits   COMP METABOLIC PANEL (14) - Abnormal; Notable for the following components:    Glucose 133 (*)     Potassium 5.2 (*)     Chloride 114 (*)     BUN 29 (*)     Creatinine 2.15 (*)     Calculated Osmolality 300 (*)     eGFR-Cr 23 (*)     AST 59 (*)     Alkaline Phosphatase 217 (*)     All other components within normal limits   BNP (B TYPE NATRIURETIC PEPTIDE) - Abnormal; Notable for the following components:    Beta Natriuretic Peptide 146 (*)     All other components within normal limits   ARTERIAL BLOOD GAS - Abnormal; Notable for the following components:    ABG pCO2 48 (*)     ABG PO2 61 (*)     ABG O2 Saturation 93.8 (*)     Oxygen Content 11.4 (*)     All other components within normal limits   POCT GLUCOSE - Abnormal; Notable for the following components:    POC Glucose  149 (*)     All other components within normal limits   CBC W/ DIFFERENTIAL - Abnormal; Notable for the following components:    RBC 3.15 (*)     HGB 9.0 (*)     HCT 28.2 (*)     Lymphocyte Absolute 0.47 (*)     All other components within normal limits   SARS-COV-2/FLU A AND B/RSV BY PCR ([place]) - Normal    Narrative:     This test is intended for the qualitative detection and differentiation of SARS-CoV-2, influenza A, influenza B, and respiratory syncytial virus (RSV) viral RNA in nasopharyngeal or nares swabs from individuals suspected of respiratory viral infection consistent with COVID-19 by their healthcare provider. Signs and symptoms of respiratory viral infection due to SARS-CoV-2, influenza, and RSV can be similar.    Test performed using the Xpert Xpress SARS-CoV-2/FLU/RSV (real time RT-PCR)  assay on the  [employer] instrument, [address], [place] [address].   This test is being used under the Food and Drug Administration's Emergency Use Authorization.    The authorized Fact Sheet for Healthcare Providers for this assay is available upon request from the laboratory.   CBC WITH DIFFERENTIAL WITH PLATELET    Narrative:     The following orders were created for panel order CBC With Differential With Platelet.  Procedure                               Abnormality         Status                     ---------                               -----------         ------                     CBC W/ DIFFERENTIAL[[id number]]          Abnormal            Final result                 Please view results for these tests on the individual orders.   RAINBOW DRAW LAVENDER   RAINBOW DRAW LIGHT GREEN   RAINBOW DRAW BLUE   RAINBOW DRAW GOLD   BLOOD CULTURE   BLOOD CULTURE     EKG    Rate, intervals and axes as noted on EKG Report.  Rate: 63  Rhythm: Sinus Rhythm  Reading: Normal sinus rhythm no acute ischemic      I personally reviewed and interpreted the images : Increased intravascular markings noted on chest x-ray    CT BRAIN OR HEAD (24905)    Result Date: 4/30/2024  CONCLUSION: No acute intracranial hemorrhage, hydrocephalus, or mass effect.  If there is clinical concern for acute ischemic stroke, MRI of the brain is recommended.        XR CHEST AP/PA (1 VIEW) (CPT=71045)    Result Date: 4/30/2024  CONCLUSION: Interstitial prominence with vascular congestion and cardiomegaly.  Findings suggest mild/early CHF/edema.      Vitals:    04/30/24 1456 04/30/24 1500 04/30/24 1530   BP: 158/55 148/52 138/50   Pulse: 65 64 61   Resp: 16 13 14   Temp: 99.1 °F (37.3 °C)     TempSrc: Oral     SpO2: 95% 93% 94%   Weight: 80 kg     Height: 162.6 cm (5' 4\")       Differential Diagnosis/ Diagnostic Considerations: 78-year-old female with history of recent admit with UTI now with weakness and somnolence consider recurrent UTI consider sepsis consider  electrolyte disturbance consider encephalopathy consider CVA consider opioid induced symptoms patient appears somewhat volume overloaded as well    Medical Decision Making    Clinical Impression:  1. Altered mental status, unspecified altered mental status type    2. Acute on chronic congestive heart failure, unspecified heart failure type (HCC)       H & P      CHIEF COMPLAINT:  Drowsiness and acute on chronic diastolic heart failure.     HISTORY OF PRESENT ILLNESS:  Patient is a 78-year-old  female who was brought in today for evaluation by her family for fatigue.  Patient noted to be very somnolent in the emergency room.  Chest x-ray showed increased fluid status and heart failure with congestion.  CT scan of the brain showed no acute findings.  CBC, no leukocytosis or left shift.  B-natriuretic peptide 146.  GeneXpert viral panel was negative.  Chemistry showed GFR is 23, which is around her baseline; BUN and creatinine of 29 and 2.15; potassium 5.2.  Started on IV Lasix.  Also, blood gas was ordered, which is still pending.  Blood cultures were obtained.       PAST MEDICAL HISTORY:  Moderate aortic stenosis with heart failure with preserved ejection fraction.  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      LUNGS DIMINISHED       ASSESSMENT:    1.       Acute on chronic heart failure with underlying aortic stenosis.  2.       Somnolence.  Rule out CO2 narcosis considering also use of pain medications, oxycodone.    3.       Acute on  chronic kidney injury.     PLAN:  Patient will be admitted to telemetry floor.  We will start her on IV Lasix.  Monitor hemodynamic status, electrolytes and kidney function.  If pCO2 on blood gas is elevated, we will apply BiPAP machine.  .  Hold all pain medications for now.  Aspiration precautions and fall precautions.       5/1  CARDS  CONSULT        Acute on chronic congestive heart failure, diastolic heart failure type (HCC)  Patient with very mild volume overload by x-ray.  Cautious diuresis and monitor with renal disease.  Recent echo shows moderate aortic stenosis and normal LV function.  Will monitor response to diuretics.  Repeat echo if  persistent symptoms.       Generalized weakness and abdominal pain    ----------------------------------  Congestive heart failure, acute on chronic diastolic. +radiographic/physical exam evidence of pulmonary edema, +peripheral edema.  -Cardiology consult appreciated  -Diuresis with IV lasix  -GDMT per cards  -Echo recently done  -Strict I/O     Acute encephalopathy, toxic/metabolic.  Due to CHF, possibly some overmedication at home.  Symptoms are much improved.     Acute kidney injury.   Baseline creatinine is 1.3, highest creatinine during this admission 2.1.  Most likely cause is heart failure  -Nephrology consult if fails to continue improving  -Monitor urine output  -Repeat chemistry in the morning  -No indication for dialysis  -Hold diuretics           MEDICATIONS ADMINISTERED IN LAST 1 DAY:  apixaban (Eliquis) tab 5 mg       Date Action Dose Route User    5/1/2024 2200 Given 5 mg Oral Анна Lozada RN    5/1/2024 1000 Given 5 mg Oral Iliana Bocanegra RN          docusate sodium (Colace) cap 100 mg       Date Action Dose Route User    5/1/2024 2200 Given 100 mg Oral Анна Lozada RN    5/1/2024 1000 Given 100 mg Oral Iliana Bocanegra, ELLIE          furosemide (Lasix) 10 mg/mL injection 40 mg       Date Action Dose Route User    5/1/2024 1755 Given 40 mg  Intravenous Iliana Bocanegra RN    5/1/2024 1000 Given 40 mg Intravenous Iliana Bocanegra RN          gabapentin (Neurontin) cap 300 mg       Date Action Dose Route User    5/1/2024 2200 Given 300 mg Oral Анна Lozada RN          oxyCODONE immediate release tab 5 mg       Date Action Dose Route User    5/1/2024 2213 Given 5 mg Oral Анна Lozada RN    5/1/2024 1519 Given 5 mg Oral Iliana Bocanegra RN          pantoprazole (Protonix) DR tab 40 mg       Date Action Dose Route User    5/2/2024 0537 Given 40 mg Oral Анна Lozada RN    5/1/2024 1755 Given 40 mg Oral Iliana Bocanegra RN          polyethylene glycol (PEG 3350) (Miralax) 17 g oral packet 17 g       Date Action Dose Route User    5/1/2024 1000 Given 17 g Oral Iliana Bocanegra RN            Vitals (last day)       Date/Time Temp Pulse Resp BP SpO2 Weight O2 Device O2 Flow Rate (L/min) [facility]    05/02/24 0539 -- -- -- -- -- 158 lb 4.8 oz -- --     05/02/24 0534 98.7 °F (37.1 °C) 88 18 154/71 97 % -- None (Room air) --     05/01/24 2155 98.4 °F (36.9 °C) 84 18 156/71 95 % -- None (Room air) --     05/01/24 1700 98.7 °F (37.1 °C) 68 18 138/57 95 % -- None (Room air) -- MS    05/01/24 1056 -- 98 -- -- -- -- -- -- GR    05/01/24 1055 -- 98 -- -- -- -- -- -- SP    05/01/24 1000 97.3 °F (36.3 °C) 72 18 160/62 98 % -- None (Room air) -- MS    05/01/24 0446 -- -- -- -- -- 169 lb -- --     05/01/24 0442 98.3 °F (36.8 °C) 71 18 148/51 97 % -- None (Room air) --           CIWA Scores (since admission)       None

## 2024-05-03 VITALS
SYSTOLIC BLOOD PRESSURE: 150 MMHG | TEMPERATURE: 98 F | DIASTOLIC BLOOD PRESSURE: 61 MMHG | HEART RATE: 76 BPM | WEIGHT: 160 LBS | HEIGHT: 64 IN | RESPIRATION RATE: 18 BRPM | OXYGEN SATURATION: 96 % | BODY MASS INDEX: 27.31 KG/M2

## 2024-05-03 LAB
ALBUMIN SERPL-MCNC: 4.1 G/DL (ref 3.2–4.8)
ANION GAP SERPL CALC-SCNC: 1 MMOL/L (ref 0–18)
BUN BLD-MCNC: 42 MG/DL (ref 9–23)
BUN/CREAT SERPL: 22.2 (ref 10–20)
CALCIUM BLD-MCNC: 9.3 MG/DL (ref 8.7–10.4)
CHLORIDE SERPL-SCNC: 111 MMOL/L (ref 98–112)
CO2 SERPL-SCNC: 30 MMOL/L (ref 21–32)
CREAT BLD-MCNC: 1.89 MG/DL
EGFRCR SERPLBLD CKD-EPI 2021: 27 ML/MIN/1.73M2 (ref 60–?)
GLUCOSE BLD-MCNC: 116 MG/DL (ref 70–99)
GLUCOSE BLDC GLUCOMTR-MCNC: 104 MG/DL (ref 70–99)
GLUCOSE BLDC GLUCOMTR-MCNC: 125 MG/DL (ref 70–99)
OSMOLALITY SERPL CALC.SUM OF ELEC: 305 MOSM/KG (ref 275–295)
PHOSPHATE SERPL-MCNC: 3.6 MG/DL (ref 2.4–5.1)
POTASSIUM SERPL-SCNC: 4.9 MMOL/L (ref 3.5–5.1)
SODIUM SERPL-SCNC: 142 MMOL/L (ref 136–145)

## 2024-05-03 PROCEDURE — 99239 HOSP IP/OBS DSCHRG MGMT >30: CPT | Performed by: HOSPITALIST

## 2024-05-03 RX ORDER — FUROSEMIDE 20 MG/1
20 TABLET ORAL DAILY
Qty: 30 TABLET | Refills: 0 | Status: SHIPPED | OUTPATIENT
Start: 2024-05-04

## 2024-05-03 NOTE — CM/SW NOTE
05/03/24 1100   Discharge disposition   Expected discharge disposition Home-Health   Post Acute Care Provider   ([facility])   Discharge transportation Private car     Pt discussed during nursing rounds. Pt is stable for AL today. MD AL order entered. [facility] will resume RN and therapy services at AL, agency notified of AL home today. Pt's son will provide transport at AL.    Plan: Home w/[facility] today.    / to remain available for support and/or discharge planning.     INDIGO HyattN    [phone]

## 2024-05-03 NOTE — DISCHARGE SUMMARY
[facility]  part of [facility]     DISCHARGE SUMMARY     Hannah Boss Patient Status:  Inpatient    1945 MRN [id number]   Location [facility] 5SW/SE Attending Gildardo Shrestha MD   Hosp Day # 3 PCP Diana Lopez DO     DATE OF ADMISSION: 2024  DATE OF DISCHARGE:  24  DISPOSITION: home  CONDITION ON DISCHARGE: good    DISCHARGE DIAGNOSES:  Congestive heart failure, acute on chronic diastolic  Acute encephalopathy, toxic/metabolic  Acute kidney injury  Abdominal tenderness  Hypertension  History of PSVT  CKD stage III  History of C. difficile  Chronic stasis dermatitis  Dyslipidemia  History of DVT    HISTORY OF PRESENT ILLNESS (COPIED FROM ADMISSION H&P)  Patient is a 78-year-old  female who was brought in today for evaluation by her family for fatigue.  Patient noted to be very somnolent in the emergency room.  Chest x-ray showed increased fluid status and heart failure with congestion.  CT scan of the brain showed no acute findings.  CBC, no leukocytosis or left shift.  B-natriuretic peptide 146.  GeneXpert viral panel was negative.  Chemistry showed GFR is 23, which is around her baseline; BUN and creatinine of 29 and 2.15; potassium 5.2.  Started on IV Lasix.  Also, blood gas was ordered, which is still pending.  Blood cultures were obtained.       Difficult to obtain from the patient.  When I walked in the room, she was snoring and sleeping.  I woke her up.  She was slightly somnolent but able to answer questions but not able to contribute much to her recent history.  Reportedly, she has been fatigued lately.      HOSPITAL COURSE:  Patient was admitted.  Without specific intervention her mental status improved.  There is a possibility she may have taken too much pain medication at home.  Found to be in heart failure, seen by cardiology and started on IV diuretics.  She did very well over the next few days, switch to oral diuretics and cleared to go  home by cardiology.    Patient understands return to the emergency room for increased pain, fever, discharge, shortness of breath, chest pain, new neurologic symptoms, other concerning symptoms.    PHYSICAL EXAM:  Temp:  [97.8 °F (36.6 °C)-98.8 °F (37.1 °C)] 98.3 °F (36.8 °C)  Pulse:  [70-76] 76  Resp:  [18] 18  BP: (114-150)/(46-64) 150/61  SpO2:  [96 %-99 %] 96 %  Gen: A+Ox3.  No distress.   HEENT: NCAT, neck supple, no carotid bruit.  CV: RRR, S1S2, and intact distal pulses. No gallop, rub, murmur.  Pulm: Effort and breath sounds normal. No distress, wheezes, rales, rhonchi.  Abd: Soft, NTND, BS normal, no mass, no HSM, no rebound/guarding.   Neuro: Normal reflexes, CN. Sensory/motor exams grossly normal deficit. Coordination  and gait normal.   MS: No joint effusions.  No peripheral edema.  Skin: Skin is warm and dry. No rashes, erythema, diaphoresis.   Psych: Normal mood and affect. Behavior and judgment normal.     DISCHARGE MEDICATIONS     Discharge Medications        START taking these medications        Instructions Prescription details   furosemide 20 MG Tabs  Commonly known as: Lasix  Start taking on: May 4, 2024      Take 1 tablet (20 mg total) by mouth daily.   Quantity: 30 tablet  Refills: 0            CHANGE how you take these medications        Instructions Prescription details   oxyCODONE-acetaminophen  MG Tabs  Commonly known as: Percocet  Start taking on: May 5, 2024  What changed: Another medication with the same name was removed. Continue taking this medication, and follow the directions you see here.      Take 1 tablet by mouth every 4 (four) hours as needed for Pain (max 6/day).   Stop taking on: June 4, 2024  Quantity: 180 tablet  Refills: 0            CONTINUE taking these medications        Instructions Prescription details   apixaban 5 MG Tabs  Commonly known as: Eliquis      Take 1 tablet (5 mg total) by mouth 2 (two) times daily.   Quantity: 60 tablet  Refills: 0      cholecalciferol 50 MCG (2000 UT) Tabs  Commonly known as: Vitamin D3      Take 1 tablet (2,000 Units total) by mouth daily.   Refills: 0     diazePAM 10 MG Tabs  Commonly known as: Valium  Start taking on: June 4, 2024      Take 1 tablet (10 mg total) by mouth every 8 (eight) hours as needed for Anxiety (max 3/day).   Stop taking on: September 2, 2024  Quantity: 90 tablet  Refills: 1     dilTIAZem  MG Cp24  Commonly known as: CardIZEM CD      Take 1 capsule (240 mg total) by mouth daily.   Quantity: 30 capsule  Refills: 1     ferrous sulfate 325 (65 FE) MG Tbec      Take 1 tablet (325 mg total) by mouth daily with breakfast. Three times a week   Refills: 0     gabapentin 300 MG Caps  Commonly known as: Neurontin      Take 1 capsule (300 mg total) by mouth nightly.   Refills: 0     meclizine 25 MG Tabs  Commonly known as: Antivert      Take 1 tablet (25 mg total) by mouth.   Refills: 0     pantoprazole 40 MG Tbec  Commonly known as: Protonix      Take 1 tablet (40 mg total) by mouth 2 (two) times daily before meals.   Refills: 0            STOP taking these medications      atorvastatin 40 MG Tabs  Commonly known as: Lipitor        vancomycin 125 MG Caps  Commonly known as: Vancocin                  Where to Get Your Medications        These medications were sent to [facility] [id number] [place], [place] - [address] [facility] [phone], [phone]  [address] [facility] [address]      Phone: [phone]   furosemide 20 MG Tabs         CONSULTANTS  Consultants         Provider   Role Specialty     Jermaine Collado MD      Consulting Physician Interventional, Cardiology            FOLLOW UP:  Abdirahman Buchanan MD  [address] [place] [address]  [facility] [address]  [phone]    Follow up in 1 week(s)  Office will call you to schedule the appointment    Diana Lopez DO  [address]  [facility] [id number] [phone]    Follow up in 1 week(s)  Post Discharge Followup    The above plan and follow-up  instructions were reviewed with the patient and they verbalized understanding and agreement.  They understand to return to the emergency room for any concerning signs or symptoms.  Greater than 30 minutes spent on discharge.  -----------------------    Hospital Discharge Diagnoses:  chf    Lace+ Score: 79  59-90 High Risk  29-58 Medium Risk  0-28   Low Risk.    TCM Follow-Up Recommendation:  LACE > 58: High Risk of readmission after discharge from the hospital.

## 2024-05-03 NOTE — PLAN OF CARE
Prn pain meds givenx1. No other complaints. Pt refusing bed alarm, educated abt use of call light and falls precautions.  Problem: Patient Centered Care  Goal: Patient preferences are identified and integrated in the patient's plan of care  Description: Interventions:  - What would you like us to know as we care for you? From home with son  - Provide timely, complete, and accurate information to patient/family  - Incorporate patient and family knowledge, values, beliefs, and cultural backgrounds into the planning and delivery of care  - Encourage patient/family to participate in care and decision-making at the level they choose  - Honor patient and family perspectives and choices  Outcome: Progressing     Problem: PAIN - ADULT  Goal: Verbalizes/displays adequate comfort level or patient's stated pain goal  Description: INTERVENTIONS:  - Encourage pt to monitor pain and request assistance  - Assess pain using appropriate pain scale  - Administer analgesics based on type and severity of pain and evaluate response  - Implement non-pharmacological measures as appropriate and evaluate response  - Consider cultural and social influences on pain and pain management  - Manage/alleviate anxiety  - Utilize distraction and/or relaxation techniques  - Monitor for opioid side effects  - Notify MD/LIP if interventions unsuccessful or patient reports new pain  - Anticipate increased pain with activity and pre-medicate as appropriate  Outcome: Progressing     Problem: SAFETY ADULT - FALL  Goal: Free from fall injury  Description: INTERVENTIONS:  - Assess pt frequently for physical needs  - Identify cognitive and physical deficits and behaviors that affect risk of falls.  - [place] fall precautions as indicated by assessment.  - Educate pt/family on patient safety including physical limitations  - Instruct pt to call for assistance with activity based on assessment  - Modify environment to reduce risk of injury  - Provide  assistive devices as appropriate  - Consider OT/PT consult to assist with strengthening/mobility  - Encourage toileting schedule  Outcome: Progressing     Problem: DISCHARGE PLANNING  Goal: Discharge to home or other facility with appropriate resources  Description: INTERVENTIONS:  - Identify barriers to discharge w/pt and caregiver  - Include patient/family/discharge partner in discharge planning  - Arrange for needed discharge resources and transportation as appropriate  - Identify discharge learning needs (meds, wound care, etc)  - Arrange for interpreters to assist at discharge as needed  - Consider post-discharge preferences of patient/family/discharge partner  - Complete POLST form as appropriate  - Assess patient's ability to be responsible for managing their own health  - Refer to Case Management Department for coordinating discharge planning if the patient needs post-hospital services based on physician/LIP order or complex needs related to functional status, cognitive ability or social support system  Outcome: Progressing     Problem: Patient/Family Goals  Goal: Patient/Family Long Term Goal  Description: Patient's Long Term Goal: Discharge from the hospital    Interventions:  - Monitor vital signs  - Monitor appropriate labs  - Monitor blood glucose levels  - Pain management  - Administer medications per order  - Follow MD orders  - Diagnostics per order  - Update / inform patient and family on plan of care  - Discharge planning  - See additional Care Plan goals for specific interventions  Outcome: Progressing  Goal: Patient/Family Short Term Goal  Description: Patient's Short Term Goal: Improve somnolence     Interventions:   - Monitor vital signs  - Monitor appropriate labs  - Monitor blood glucose levels  - Pain management  - Administer medications per order  - Follow MD orders  - Diagnostics per order  - Update / inform patient and family on plan of care  - See additional Care Plan goals for specific  interventions  Outcome: Progressing     Problem: CARDIOVASCULAR - ADULT  Goal: Maintains optimal cardiac output and hemodynamic stability  Description: INTERVENTIONS:  - Monitor vital signs, rhythm, and trends  - Monitor for bleeding, hypotension and signs of decreased cardiac output  - Evaluate effectiveness of vasoactive medications to optimize hemodynamic stability  - Monitor arterial and/or venous puncture sites for bleeding and/or hematoma  - Assess quality of pulses, skin color and temperature  - Assess for signs of decreased coronary artery perfusion - ex. Angina  - Evaluate fluid balance, assess for edema, trend weights  Outcome: Progressing  Goal: Absence of cardiac arrhythmias or at baseline  Description: INTERVENTIONS:  - Continuous cardiac monitoring, monitor vital signs, obtain 12 lead EKG if indicated  - Evaluate effectiveness of antiarrhythmic and heart rate control medications as ordered  - Initiate emergency measures for life threatening arrhythmias  - Monitor electrolytes and administer replacement therapy as ordered  Outcome: Progressing     Problem: SKIN/TISSUE INTEGRITY - ADULT  Goal: Skin integrity remains intact  Description: INTERVENTIONS  - Assess and document risk factors for pressure ulcer development  - Assess and document skin integrity  - Monitor for areas of redness and/or skin breakdown  - Initiate interventions, skin care algorithm/standards of care as needed  Outcome: Progressing     Problem: MUSCULOSKELETAL - ADULT  Goal: Return mobility to safest level of function  Description: INTERVENTIONS:  - Assess patient stability and activity tolerance for standing, transferring and ambulating w/ or w/o assistive devices  - Assist with transfers and ambulation using safe patient handling equipment as needed  - Ensure adequate protection for wounds/incisions during mobilization  - Obtain PT/OT consults as needed  - Advance activity as appropriate  - Communicate ordered activity level and  limitations with patient/family  Outcome: Progressing     Problem: NEUROLOGICAL - ADULT  Goal: Achieves stable or improved neurological status  Description: INTERVENTIONS  - Assess for and report changes in neurological status  - Initiate measures to prevent increased intracranial pressure  - Maintain blood pressure and fluid volume within ordered parameters to optimize cerebral perfusion and minimize risk of hemorrhage  - Monitor temperature, glucose, and sodium. Initiate appropriate interventions as ordered  Outcome: Progressing

## 2024-05-03 NOTE — DISCHARGE INSTRUCTIONS
Sometimes managing your health at home requires assistance.  The [facility] team has recognized your preference to use [facility], Phone: [phone]. A representative from the home health agency will contact you or your family to schedule your first visit.

## 2024-05-03 NOTE — PROGRESS NOTES
Progress Note  Hannah Boss Patient Status:  Inpatient    1945 MRN [id number]   Location [facility] 5SW/SE Attending Gildardo Shrestha MD   Hosp Day # 3 PCP Diana Lopez DO     Subjective:  Pt stated she is ready to go home. Denies any chest pain or SOB.       Objective:  /61 (BP Location: Right arm)   Pulse 76   Temp 98.3 °F (36.8 °C) (Oral)   Resp 18   Ht 5' 4\" (1.626 m)   Wt 160 lb (72.6 kg)   SpO2 96%   BMI 27.46 kg/m²         Intake/Output:    Intake/Output Summary (Last 24 hours) at 5/3/2024 1019  Last data filed at 5/3/2024 0816  Gross per 24 hour   Intake 845 ml   Output 880 ml   Net -35 ml       Last 3 Weights   24 0700 160 lb (72.6 kg)   24 0539 158 lb 4.8 oz (71.8 kg)   24 0446 169 lb (76.7 kg)   24 2042 171 lb (77.6 kg)   24 1456 176 lb 5.9 oz (80 kg)   24 1212 173 lb (78.5 kg)   24 0418 175 lb 1.6 oz (79.4 kg)   24 0430 176 lb 4.8 oz (80 kg)   24 0403 183 lb 4.8 oz (83.1 kg)   24 0435 178 lb (80.7 kg)   24 1858 178 lb 3.2 oz (80.8 kg)   24 1854 178 lb 3.2 oz (80.8 kg)   24 1330 169 lb (76.7 kg)       Labs:  Recent Labs   Lab 24  0558 24  0615 24  0701   GLU 88 108* 116*   BUN 28* 36* 42*   CREATSERUM 2.00* 2.08* 1.89*   EGFRCR 25* 24* 27*   CA 9.2 9.3 9.3    140 142   K 4.8 5.0 4.9    108 111   CO2 26.0 30.0 30.0     Recent Labs   Lab 24  1524 24  0558 24  0615   RBC 3.15* 3.22* 3.45*   HGB 9.0* 8.9* 10.0*   HCT 28.2* 28.4* 30.5*   MCV 89.5 88.2 88.4   MCH 28.6 27.6 29.0   MCHC 31.9 31.3 32.8   RDW 13.2 13.2 13.2   NEPRELIM 5.08  --  3.71   WBC 5.8 4.5 5.3   .0 175.0 198.0         No results for input(s): \"TROP\", \"TROPHS\", \"CK\" in the last 168 hours.  Lab Results   Component Value Date    INR 1.30 (H) 2024    INR 1.21 (H) 2024    INR 1.11 10/07/2023       Diagnostics:       Review of Systems   Respiratory: Negative.      Cardiovascular: Negative.        Physical Exam:    Physical Exam  Vitals reviewed.   Constitutional:       General: She is not in acute distress.     Appearance: Normal appearance. She is not ill-appearing.   Cardiovascular:      Rate and Rhythm: Normal rate and regular rhythm.      Pulses: Normal pulses.           Radial pulses are 2+ on the right side and 2+ on the left side.        Dorsalis pedis pulses are 2+ on the right side and 2+ on the left side.      Heart sounds: Murmur heard.      Systolic murmur is present with a grade of 3/6.      Comments: Mild edema to BLE  Pulmonary:      Effort: Pulmonary effort is normal.      Breath sounds: Normal breath sounds. No stridor. No wheezing, rhonchi or rales.   Abdominal:      General: Abdomen is flat.      Palpations: Abdomen is soft.   Neurological:      Mental Status: She is alert and oriented to person, place, and time.         Medications:   furosemide  20 mg Oral Daily    docusate sodium  100 mg Oral BID    polyethylene glycol (PEG 3350)  17 g Oral Daily    apixaban  5 mg Oral BID    gabapentin  300 mg Oral Nightly    pantoprazole  40 mg Oral BID AC         Assessment/Plan:    Altered mental status, unspecified altered mental status type  Patient more awake.    Treatment per primary     Acute on chronic congestive heart failure, diastolic heart failure type (HCC)  - mild volume overload by x-ray.     - echo from January 2024 shows moderate aortic stenosis and normal LV function.    - continue on lasix 20mg daily    Generalized weakness and abdominal pain  - Workup per primary.    - atorvastatin on hold due to generalized weakness     History of DVT/PE   - on eliquis     Plan:  - may hold the atorvastatin due to generalized weakness, may resume as outpatient  - Resume lasix 20mg daily and apixaban   - f/u with cardiology as outpatient in 1-2 weeks. Discussed with pt regarding following up with heart failure clinic, pt refused.     Plan discussed with pt and  RN   Cardiology will sign off, call with any questions or concerns      ELISA Matias  [facility]  5/3/2024  10:19 AM

## 2024-05-03 NOTE — PLAN OF CARE
Problem: Patient Centered Care  Goal: Patient preferences are identified and integrated in the patient's plan of care  Description: Interventions:  - What would you like us to know as we care for you? From home with son  - Provide timely, complete, and accurate information to patient/family  - Incorporate patient and family knowledge, values, beliefs, and cultural backgrounds into the planning and delivery of care  - Encourage patient/family to participate in care and decision-making at the level they choose  - Honor patient and family perspectives and choices  Outcome: Adequate for Discharge     Problem: PAIN - ADULT  Goal: Verbalizes/displays adequate comfort level or patient's stated pain goal  Description: INTERVENTIONS:  - Encourage pt to monitor pain and request assistance  - Assess pain using appropriate pain scale  - Administer analgesics based on type and severity of pain and evaluate response  - Implement non-pharmacological measures as appropriate and evaluate response  - Consider cultural and social influences on pain and pain management  - Manage/alleviate anxiety  - Utilize distraction and/or relaxation techniques  - Monitor for opioid side effects  - Notify MD/LIP if interventions unsuccessful or patient reports new pain  - Anticipate increased pain with activity and pre-medicate as appropriate  Outcome: Adequate for Discharge     Problem: SAFETY ADULT - FALL  Goal: Free from fall injury  Description: INTERVENTIONS:  - Assess pt frequently for physical needs  - Identify cognitive and physical deficits and behaviors that affect risk of falls.  - [place] fall precautions as indicated by assessment.  - Educate pt/family on patient safety including physical limitations  - Instruct pt to call for assistance with activity based on assessment  - Modify environment to reduce risk of injury  - Provide assistive devices as appropriate  - Consider OT/PT consult to assist with strengthening/mobility  -  Encourage toileting schedule  Outcome: Adequate for Discharge     Problem: DISCHARGE PLANNING  Goal: Discharge to home or other facility with appropriate resources  Description: INTERVENTIONS:  - Identify barriers to discharge w/pt and caregiver  - Include patient/family/discharge partner in discharge planning  - Arrange for needed discharge resources and transportation as appropriate  - Identify discharge learning needs (meds, wound care, etc)  - Arrange for interpreters to assist at discharge as needed  - Consider post-discharge preferences of patient/family/discharge partner  - Complete POLST form as appropriate  - Assess patient's ability to be responsible for managing their own health  - Refer to Case Management Department for coordinating discharge planning if the patient needs post-hospital services based on physician/LIP order or complex needs related to functional status, cognitive ability or social support system  Outcome: Adequate for Discharge     Problem: Patient/Family Goals  Goal: Patient/Family Long Term Goal  Description: Patient's Long Term Goal: Discharge from the hospital    Interventions:  - Monitor vital signs  - Monitor appropriate labs  - Monitor blood glucose levels  - Pain management  - Administer medications per order  - Follow MD orders  - Diagnostics per order  - Update / inform patient and family on plan of care  - Discharge planning  - See additional Care Plan goals for specific interventions  Outcome: Adequate for Discharge  Goal: Patient/Family Short Term Goal  Description: Patient's Short Term Goal: Improve somnolence     Interventions:   - Monitor vital signs  - Monitor appropriate labs  - Monitor blood glucose levels  - Pain management  - Administer medications per order  - Follow MD orders  - Diagnostics per order  - Update / inform patient and family on plan of care  - See additional Care Plan goals for specific interventions  Outcome: Adequate for Discharge     Problem:  CARDIOVASCULAR - ADULT  Goal: Maintains optimal cardiac output and hemodynamic stability  Description: INTERVENTIONS:  - Monitor vital signs, rhythm, and trends  - Monitor for bleeding, hypotension and signs of decreased cardiac output  - Evaluate effectiveness of vasoactive medications to optimize hemodynamic stability  - Monitor arterial and/or venous puncture sites for bleeding and/or hematoma  - Assess quality of pulses, skin color and temperature  - Assess for signs of decreased coronary artery perfusion - ex. Angina  - Evaluate fluid balance, assess for edema, trend weights  Outcome: Adequate for Discharge  Goal: Absence of cardiac arrhythmias or at baseline  Description: INTERVENTIONS:  - Continuous cardiac monitoring, monitor vital signs, obtain 12 lead EKG if indicated  - Evaluate effectiveness of antiarrhythmic and heart rate control medications as ordered  - Initiate emergency measures for life threatening arrhythmias  - Monitor electrolytes and administer replacement therapy as ordered  Outcome: Adequate for Discharge     Problem: SKIN/TISSUE INTEGRITY - ADULT  Goal: Skin integrity remains intact  Description: INTERVENTIONS  - Assess and document risk factors for pressure ulcer development  - Assess and document skin integrity  - Monitor for areas of redness and/or skin breakdown  - Initiate interventions, skin care algorithm/standards of care as needed  Outcome: Adequate for Discharge     Problem: MUSCULOSKELETAL - ADULT  Goal: Return mobility to safest level of function  Description: INTERVENTIONS:  - Assess patient stability and activity tolerance for standing, transferring and ambulating w/ or w/o assistive devices  - Assist with transfers and ambulation using safe patient handling equipment as needed  - Ensure adequate protection for wounds/incisions during mobilization  - Obtain PT/OT consults as needed  - Advance activity as appropriate  - Communicate ordered activity level and limitations with  patient/family  Outcome: Adequate for Discharge     Problem: NEUROLOGICAL - ADULT  Goal: Achieves stable or improved neurological status  Description: INTERVENTIONS  - Assess for and report changes in neurological status  - Initiate measures to prevent increased intracranial pressure  - Maintain blood pressure and fluid volume within ordered parameters to optimize cerebral perfusion and minimize risk of hemorrhage  - Monitor temperature, glucose, and sodium. Initiate appropriate interventions as ordered  Outcome: Adequate for Discharge  Patient is medically stable and is being discharge home today.

## 2024-05-05 LAB
6-ACETYLMORPH CONF BLD: NEGATIVE
6-ACETYLMORPH CONF BLD: NEGATIVE
AMPHET IA BLD: NEGATIVE NG/ML
BARBIT IA BLD: NEGATIVE UG/ML
COCAINE MET IA BLD: NEGATIVE NG/ML
CODEINE CONF  BLD: NEGATIVE NG/ML
CODEINE CONF  BLD: NEGATIVE NG/ML
DIHYDROCODEINE CONF BLD: NEGATIVE NG/ML
DIHYDROCODEINE CONF BLD: NEGATIVE NG/ML
HYDROCODONE CONF BLD: NEGATIVE NG/ML
HYDROCODONE CONF BLD: NEGATIVE NG/ML
HYDROMORPH CONF BLD: NEGATIVE NG/ML
HYDROMORPH CONF BLD: NEGATIVE NG/ML
METHADONE IA BLD: NEGATIVE NG/ML
MORPHINE CONF BLD: NEGATIVE NG/ML
MORPHINE CONF BLD: NEGATIVE NG/ML
OPIATE CLASS  BLD: NEGATIVE
OPIATE CLASS  BLD: NEGATIVE
OPIATES IA BLD: NEGATIVE NG/ML
OXYCODONE CLASS BLD: POSITIVE
OXYCODONE CONF  BLD: 32.5 NG/ML
OXYMORPH CONF BLD: NEGATIVE NG/ML
PHENCYCLIDINE IA BLD: NEGATIVE NG/ML
PROPOXY IA BLD: NEGATIVE NG/ML
THC MET IA BLD: NEGATIVE NG/ML

## 2024-05-07 NOTE — PAYOR COMM NOTE
--------------  DISCHARGE REVIEW    Payor: [employer]  Subscriber #:  [id number]  Authorization Number: [id number]    Admit date: 24  Admit time:   8:32 PM  Discharge Date: 5/3/2024  5:33 PM       [facility]  part of [facility]     DISCHARGE SUMMARY     Hannah Boss Patient Status:  Inpatient    1945 MRN [id number]   Location [facility] 5SW/SE Attending Gildardo Shrestha MD   Hosp Day # 3 PCP Diana Lopez DO     DATE OF ADMISSION: 2024  DATE OF DISCHARGE:  24  DISPOSITION: home  CONDITION ON DISCHARGE: good    DISCHARGE DIAGNOSES:  Congestive heart failure, acute on chronic diastolic  Acute encephalopathy, toxic/metabolic  Acute kidney injury  Abdominal tenderness  Hypertension  History of PSVT  CKD stage III  History of C. difficile  Chronic stasis dermatitis  Dyslipidemia  History of DVT    HISTORY OF PRESENT ILLNESS (COPIED FROM ADMISSION H&P)  Patient is a 78-year-old  female who was brought in today for evaluation by her family for fatigue.  Patient noted to be very somnolent in the emergency room.  Chest x-ray showed increased fluid status and heart failure with congestion.  CT scan of the brain showed no acute findings.  CBC, no leukocytosis or left shift.  B-natriuretic peptide 146.  GeneXpert viral panel was negative.  Chemistry showed GFR is 23, which is around her baseline; BUN and creatinine of 29 and 2.15; potassium 5.2.  Started on IV Lasix.  Also, blood gas was ordered, which is still pending.  Blood cultures were obtained.       Difficult to obtain from the patient.  When I walked in the room, she was snoring and sleeping.  I woke her up.  She was slightly somnolent but able to answer questions but not able to contribute much to her recent history.  Reportedly, she has been fatigued lately.      HOSPITAL COURSE:  Patient was admitted.  Without specific intervention her mental status improved.  There is a possibility she may have  taken too much pain medication at home.  Found to be in heart failure, seen by cardiology and started on IV diuretics.  She did very well over the next few days, switch to oral diuretics and cleared to go home by cardiology.    Patient understands return to the emergency room for increased pain, fever, discharge, shortness of breath, chest pain, new neurologic symptoms, other concerning symptoms.    PHYSICAL EXAM:  Temp:  [97.8 °F (36.6 °C)-98.8 °F (37.1 °C)] 98.3 °F (36.8 °C)  Pulse:  [70-76] 76  Resp:  [18] 18  BP: (114-150)/(46-64) 150/61  SpO2:  [96 %-99 %] 96 %  Gen: A+Ox3.  No distress.   HEENT: NCAT, neck supple, no carotid bruit.  CV: RRR, S1S2, and intact distal pulses. No gallop, rub, murmur.  Pulm: Effort and breath sounds normal. No distress, wheezes, rales, rhonchi.  Abd: Soft, NTND, BS normal, no mass, no HSM, no rebound/guarding.   Neuro: Normal reflexes, CN. Sensory/motor exams grossly normal deficit. Coordination  and gait normal.   MS: No joint effusions.  No peripheral edema.  Skin: Skin is warm and dry. No rashes, erythema, diaphoresis.   Psych: Normal mood and affect. Behavior and judgment normal.     DISCHARGE MEDICATIONS     Discharge Medications        START taking these medications        Instructions Prescription details   furosemide 20 MG Tabs  Commonly known as: Lasix  Start taking on: May 4, 2024      Take 1 tablet (20 mg total) by mouth daily.   Quantity: 30 tablet  Refills: 0            CHANGE how you take these medications        Instructions Prescription details   oxyCODONE-acetaminophen  MG Tabs  Commonly known as: Percocet  Start taking on: May 5, 2024  What changed: Another medication with the same name was removed. Continue taking this medication, and follow the directions you see here.      Take 1 tablet by mouth every 4 (four) hours as needed for Pain (max 6/day).   Stop taking on: June 4, 2024  Quantity: 180 tablet  Refills: 0            CONTINUE taking these medications         Instructions Prescription details   apixaban 5 MG Tabs  Commonly known as: Eliquis      Take 1 tablet (5 mg total) by mouth 2 (two) times daily.   Quantity: 60 tablet  Refills: 0     cholecalciferol 50 MCG (2000 UT) Tabs  Commonly known as: Vitamin D3      Take 1 tablet (2,000 Units total) by mouth daily.   Refills: 0     diazePAM 10 MG Tabs  Commonly known as: Valium  Start taking on: June 4, 2024      Take 1 tablet (10 mg total) by mouth every 8 (eight) hours as needed for Anxiety (max 3/day).   Stop taking on: September 2, 2024  Quantity: 90 tablet  Refills: 1     dilTIAZem  MG Cp24  Commonly known as: CardIZEM CD      Take 1 capsule (240 mg total) by mouth daily.   Quantity: 30 capsule  Refills: 1     ferrous sulfate 325 (65 FE) MG Tbec      Take 1 tablet (325 mg total) by mouth daily with breakfast. Three times a week   Refills: 0     gabapentin 300 MG Caps  Commonly known as: Neurontin      Take 1 capsule (300 mg total) by mouth nightly.   Refills: 0     meclizine 25 MG Tabs  Commonly known as: Antivert      Take 1 tablet (25 mg total) by mouth.   Refills: 0     pantoprazole 40 MG Tbec  Commonly known as: Protonix      Take 1 tablet (40 mg total) by mouth 2 (two) times daily before meals.   Refills: 0            STOP taking these medications      atorvastatin 40 MG Tabs  Commonly known as: Lipitor        vancomycin 125 MG Caps  Commonly known as: Vancocin                  Where to Get Your Medications        These medications were sent to [facility] [id number] [place], [place] - [address] [facility] [address] [phone], [phone]  [address] [facility] [address]      Phone: [phone]   furosemide 20 MG Tabs         CONSULTANTS  Consultants         Provider   Role Specialty     Jermaine Collado MD      Consulting Physician Interventional, Cardiology            FOLLOW UP:  Abdirahman Buchanan MD  [address] [facility] [address]  [facility] [id number] [phone]    Follow up in 1 week(s)  Office will call you  to schedule the appointment    Diana Lopez DO  [address] [place] [id number]  [phone]    Follow up in 1 week(s)  Post Discharge Followup    The above plan and follow-up instructions were reviewed with the patient and they verbalized understanding and agreement.  They understand to return to the emergency room for any concerning signs or symptoms.  Greater than 30 minutes spent on discharge.  -----------------------    Hospital Discharge Diagnoses:  chf    Lace+ Score: 79  59-90 High Risk  29-58 Medium Risk  0-28   Low Risk.    TCM Follow-Up Recommendation:  LACE > 58: High Risk of readmission after discharge from the hospital.      Electronically signed by Gildardo Shrestha MD on 5/3/2024  1:34 PM         REVIEWER COMMENTS

## 2024-06-06 ENCOUNTER — TELEPHONE (OUTPATIENT)
Dept: NEPHROLOGY | Facility: CLINIC | Age: 79
End: 2024-06-06

## 2024-06-06 DIAGNOSIS — N18.30 STAGE 3 CHRONIC KIDNEY DISEASE, UNSPECIFIED WHETHER STAGE 3A OR 3B CKD (HCC): Primary | ICD-10-CM

## 2024-06-06 RX ORDER — HYDROCODONE BITARTRATE AND ACETAMINOPHEN 10; 325 MG/1; MG/1
1 TABLET ORAL EVERY 4 HOURS PRN
Qty: 180 TABLET | Refills: 0 | Status: CANCELLED | OUTPATIENT
Start: 2024-06-07 | End: 2024-07-07

## 2024-06-06 NOTE — TELEPHONE ENCOUNTER
Dr Lopez/pts PCP called to speak to Dr Reynaga regarding some swelling in patient's legs.  He states it is not urgent but would like to discuss.  Please call.

## 2024-06-07 ENCOUNTER — OFFICE VISIT (OUTPATIENT)
Dept: PAIN CLINIC | Facility: HOSPITAL | Age: 79
End: 2024-06-07
Attending: NURSE PRACTITIONER
Payer: MEDICARE

## 2024-06-07 VITALS
WEIGHT: 160 LBS | BODY MASS INDEX: 27 KG/M2 | SYSTOLIC BLOOD PRESSURE: 128 MMHG | DIASTOLIC BLOOD PRESSURE: 68 MMHG | HEART RATE: 70 BPM | OXYGEN SATURATION: 99 %

## 2024-06-07 DIAGNOSIS — M48.062 SPINAL STENOSIS OF LUMBAR REGION WITH NEUROGENIC CLAUDICATION: ICD-10-CM

## 2024-06-07 RX ORDER — OXYCODONE AND ACETAMINOPHEN 10; 325 MG/1; MG/1
1 TABLET ORAL EVERY 4 HOURS PRN
Qty: 180 TABLET | Refills: 0 | Status: SHIPPED | OUTPATIENT
Start: 2024-07-07 | End: 2024-06-07 | Stop reason: CLARIF

## 2024-06-07 RX ORDER — OXYCODONE AND ACETAMINOPHEN 10; 325 MG/1; MG/1
1-2 TABLET ORAL EVERY 4 HOURS PRN
Qty: 180 TABLET | Refills: 0 | Status: SHIPPED | OUTPATIENT
Start: 2024-07-02 | End: 2024-08-01

## 2024-06-07 RX ORDER — OXYCODONE AND ACETAMINOPHEN 10; 325 MG/1; MG/1
1-2 TABLET ORAL EVERY 4 HOURS PRN
Qty: 180 TABLET | Refills: 0 | Status: SHIPPED | OUTPATIENT
Start: 2024-08-07 | End: 2024-06-07 | Stop reason: CLARIF

## 2024-06-07 RX ORDER — OXYCODONE AND ACETAMINOPHEN 10; 325 MG/1; MG/1
1-2 TABLET ORAL EVERY 4 HOURS PRN
Qty: 180 TABLET | Refills: 0 | Status: SHIPPED | OUTPATIENT
Start: 2024-08-02 | End: 2024-09-01
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
## 2024-06-07 NOTE — PROGRESS NOTES
Patient presents in office today with reported pain in abdomin wrapping around to mid back    Current pain level reported = 9/10    Last reported dose of Oxy around 6a    Narcotic Contract renewal 4/25/25  Urine Drug screen 5/5/24

## 2024-06-07 NOTE — TELEPHONE ENCOUNTER
Dr Reynaga received a call from Dr Lopez seen patient yesterday noted patient has +2 legs edema ,lungs clear ,feeling tired,instructed to hold Furosemide due to low GFR until further advise by Nephrologist.Rn spoke to patient  verified reported doing fine swelling still the same since discharged from the hospital denied any shortness of breath.Advised to go to ER if symptoms worse or SOB,pt verbalized understanding. Has appt to see you on 24.

## 2024-06-10 ENCOUNTER — TELEPHONE (OUTPATIENT)
Dept: NEPHROLOGY | Facility: CLINIC | Age: 79
End: 2024-06-10

## 2024-06-10 NOTE — TELEPHONE ENCOUNTER
Dr Reynaga please see note below,please see encounter today from Rn at [facility] asking for Furosemide dose recommendation,thanks.  furosemide 20 MG Oral Tab, Take 1 tablet (20 mg total) by mouth daily.

## 2024-06-10 NOTE — TELEPHONE ENCOUNTER
She was discharged on lasix 20 mg daily    I did call Dr Lopez and left a VM    Aslo can we please make an appt with me with preclinic bmp, cbc and UPC and ipth thanks

## 2024-06-10 NOTE — TELEPHONE ENCOUNTER
Roxanne RN from [facility] called to ask for recommendations regarding patient's furosemide. Please call.       furosemide 20 MG Oral Tab, Take 1 tablet (20 mg total) by mouth daily., Disp: 30 tablet, Rfl: 0

## 2024-06-11 NOTE — TELEPHONE ENCOUNTER
Dr Reynaga Rn called Dr Lopez's office #[phone] left a message to call back .Rn also spoke to patient is doing fine no shortness of breath,legs swelling still the same ,advised to keep it elevated as possible ,,instructed to take Furosemide 20 mg daily  and due labs prior to visit on 6/18/24 patient verbalized understanding.

## 2024-06-17 ENCOUNTER — HOSPITAL ENCOUNTER (EMERGENCY)
Facility: HOSPITAL | Age: 79
Discharge: HOME | End: 2024-06-17
Attending: EMERGENCY MEDICINE

## 2024-06-17 ENCOUNTER — APPOINTMENT (OUTPATIENT)
Dept: GENERAL RADIOLOGY | Facility: HOSPITAL | Age: 79
DRG: 291 | End: 2024-06-17
Attending: HOSPITALIST

## 2024-06-17 ENCOUNTER — APPOINTMENT (OUTPATIENT)
Dept: CT IMAGING | Facility: HOSPITAL | Age: 79
DRG: 291 | End: 2024-06-17
Attending: EMERGENCY MEDICINE

## 2024-06-17 ENCOUNTER — APPOINTMENT (OUTPATIENT)
Dept: CT IMAGING | Facility: HOSPITAL | Age: 79
End: 2024-06-17
Attending: EMERGENCY MEDICINE

## 2024-06-17 ENCOUNTER — HOSPITAL ENCOUNTER (INPATIENT)
Facility: HOSPITAL | Age: 79
LOS: 3 days | Discharge: HOME HEALTH | DRG: 291 | End: 2024-06-20
Attending: EMERGENCY MEDICINE | Admitting: HOSPITALIST

## 2024-06-17 ENCOUNTER — HOSPITAL ENCOUNTER (EMERGENCY)
Facility: HOSPITAL | Age: 79
Discharge: HOME | DRG: 291 | End: 2024-06-17
Attending: EMERGENCY MEDICINE

## 2024-06-17 VITALS
SYSTOLIC BLOOD PRESSURE: 165 MMHG | RESPIRATION RATE: 20 BRPM | DIASTOLIC BLOOD PRESSURE: 74 MMHG | HEART RATE: 80 BPM | OXYGEN SATURATION: 99 % | TEMPERATURE: 99 F

## 2024-06-17 DIAGNOSIS — M48.062 SPINAL STENOSIS OF LUMBAR REGION WITH NEUROGENIC CLAUDICATION: ICD-10-CM

## 2024-06-17 DIAGNOSIS — M79.671 PAIN IN BOTH FEET: ICD-10-CM

## 2024-06-17 DIAGNOSIS — G89.3 CANCER ASSOCIATED PAIN: ICD-10-CM

## 2024-06-17 DIAGNOSIS — M79.672 PAIN IN BOTH FEET: ICD-10-CM

## 2024-06-17 DIAGNOSIS — I50.31 ACUTE HEART FAILURE WITH PRESERVED EJECTION FRACTION (HCC): ICD-10-CM

## 2024-06-17 DIAGNOSIS — M15.9 PRIMARY OSTEOARTHRITIS INVOLVING MULTIPLE JOINTS: ICD-10-CM

## 2024-06-17 DIAGNOSIS — M47.816 LUMBAR SPONDYLOSIS: ICD-10-CM
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
## 2024-06-17 DIAGNOSIS — R41.82 ALTERED MENTAL STATUS, UNSPECIFIED ALTERED MENTAL STATUS TYPE: Primary | ICD-10-CM

## 2024-06-17 PROBLEM — E87.0 HYPERNATREMIA: Status: ACTIVE | Noted: 2024-06-17

## 2024-06-17 PROBLEM — I50.9 ACUTE HEART FAILURE (HCC): Status: ACTIVE | Noted: 2024-06-17

## 2024-06-17 PROBLEM — R73.9 HYPERGLYCEMIA: Status: ACTIVE | Noted: 2024-06-17

## 2024-06-17 LAB
ANION GAP SERPL CALC-SCNC: 1 MMOL/L (ref 0–18)
ANION GAP SERPL CALC-SCNC: 1 MMOL/L (ref 0–18)
ATRIAL RATE: 92 BPM
BASOPHILS # BLD AUTO: 0.03 X10(3) UL (ref 0–0.2)
BASOPHILS # BLD AUTO: 0.04 X10(3) UL (ref 0–0.2)
BASOPHILS NFR BLD AUTO: 0.4 %
BASOPHILS NFR BLD AUTO: 0.4 %
BNP SERPL-MCNC: 302 PG/ML
BUN BLD-MCNC: 30 MG/DL (ref 9–23)
BUN BLD-MCNC: 32 MG/DL (ref 9–23)
BUN/CREAT SERPL: 16.2 (ref 10–20)
BUN/CREAT SERPL: 16.2 (ref 10–20)
CALCIUM BLD-MCNC: 9.7 MG/DL (ref 8.7–10.4)
CALCIUM BLD-MCNC: 9.7 MG/DL (ref 8.7–10.4)
CHLORIDE SERPL-SCNC: 120 MMOL/L (ref 98–112)
CHLORIDE SERPL-SCNC: 120 MMOL/L (ref 98–112)
CO2 SERPL-SCNC: 24 MMOL/L (ref 21–32)
CO2 SERPL-SCNC: 24 MMOL/L (ref 21–32)
CREAT BLD-MCNC: 1.85 MG/DL
CREAT BLD-MCNC: 1.97 MG/DL
DEPRECATED RDW RBC AUTO: 46 FL (ref 35.1–46.3)
DEPRECATED RDW RBC AUTO: 47 FL (ref 35.1–46.3)
EGFRCR SERPLBLD CKD-EPI 2021: 26 ML/MIN/1.73M2 (ref 60–?)
EGFRCR SERPLBLD CKD-EPI 2021: 28 ML/MIN/1.73M2 (ref 60–?)
EOSINOPHIL # BLD AUTO: 0.01 X10(3) UL (ref 0–0.7)
EOSINOPHIL # BLD AUTO: 0.03 X10(3) UL (ref 0–0.7)
EOSINOPHIL NFR BLD AUTO: 0.1 %
EOSINOPHIL NFR BLD AUTO: 0.4 %
ERYTHROCYTE [DISTWIDTH] IN BLOOD BY AUTOMATED COUNT: 14.2 % (ref 11–15)
ERYTHROCYTE [DISTWIDTH] IN BLOOD BY AUTOMATED COUNT: 14.6 % (ref 11–15)
GLUCOSE BLD-MCNC: 107 MG/DL (ref 70–99)
GLUCOSE BLD-MCNC: 130 MG/DL (ref 70–99)
GLUCOSE BLDC GLUCOMTR-MCNC: 105 MG/DL (ref 70–99)
GLUCOSE BLDC GLUCOMTR-MCNC: 133 MG/DL (ref 70–99)
GLUCOSE BLDC GLUCOMTR-MCNC: 146 MG/DL (ref 70–99)
HCT VFR BLD AUTO: 27 %
HCT VFR BLD AUTO: 27.5 %
HGB BLD-MCNC: 8.7 G/DL
HGB BLD-MCNC: 8.7 G/DL
IMM GRANULOCYTES # BLD AUTO: 0.02 X10(3) UL (ref 0–1)
IMM GRANULOCYTES # BLD AUTO: 0.02 X10(3) UL (ref 0–1)
IMM GRANULOCYTES NFR BLD: 0.2 %
IMM GRANULOCYTES NFR BLD: 0.3 %
LYMPHOCYTES # BLD AUTO: 0.44 X10(3) UL (ref 1–4)
LYMPHOCYTES # BLD AUTO: 0.69 X10(3) UL (ref 1–4)
LYMPHOCYTES NFR BLD AUTO: 4.9 %
LYMPHOCYTES NFR BLD AUTO: 9.5 %
MCH RBC QN AUTO: 28.2 PG (ref 26–34)
MCH RBC QN AUTO: 28.5 PG (ref 26–34)
MCHC RBC AUTO-ENTMCNC: 31.6 G/DL (ref 31–37)
MCHC RBC AUTO-ENTMCNC: 32.2 G/DL (ref 31–37)
MCV RBC AUTO: 88.5 FL
MCV RBC AUTO: 89 FL
MONOCYTES # BLD AUTO: 0.31 X10(3) UL (ref 0.1–1)
MONOCYTES # BLD AUTO: 0.39 X10(3) UL (ref 0.1–1)
MONOCYTES NFR BLD AUTO: 3.5 %
MONOCYTES NFR BLD AUTO: 5.4 %
NEUTROPHILS # BLD AUTO: 6.09 X10 (3) UL (ref 1.5–7.7)
NEUTROPHILS # BLD AUTO: 6.09 X10(3) UL (ref 1.5–7.7)
NEUTROPHILS # BLD AUTO: 8.14 X10 (3) UL (ref 1.5–7.7)
NEUTROPHILS # BLD AUTO: 8.14 X10(3) UL (ref 1.5–7.7)
NEUTROPHILS NFR BLD AUTO: 84 %
NEUTROPHILS NFR BLD AUTO: 90.9 %
OSMOLALITY SERPL CALC.SUM OF ELEC: 307 MOSM/KG (ref 275–295)
OSMOLALITY SERPL CALC.SUM OF ELEC: 309 MOSM/KG (ref 275–295)
P AXIS: 77 DEGREES
P-R INTERVAL: 138 MS
PLATELET # BLD AUTO: 230 10(3)UL (ref 150–450)
PLATELET # BLD AUTO: 270 10(3)UL (ref 150–450)
POTASSIUM SERPL-SCNC: 5.3 MMOL/L (ref 3.5–5.1)
POTASSIUM SERPL-SCNC: 5.6 MMOL/L (ref 3.5–5.1)
Q-T INTERVAL: 366 MS
QRS DURATION: 70 MS
QTC CALCULATION (BEZET): 452 MS
R AXIS: 50 DEGREES
RBC # BLD AUTO: 3.05 X10(6)UL
RBC # BLD AUTO: 3.09 X10(6)UL
SODIUM SERPL-SCNC: 145 MMOL/L (ref 136–145)
SODIUM SERPL-SCNC: 145 MMOL/L (ref 136–145)
T AXIS: 55 DEGREES
VENTRICULAR RATE: 92 BPM
WBC # BLD AUTO: 7.3 X10(3) UL (ref 4–11)
WBC # BLD AUTO: 9 X10(3) UL (ref 4–11)

## 2024-06-17 PROCEDURE — 72125 CT NECK SPINE W/O DYE: CPT | Performed by: EMERGENCY MEDICINE

## 2024-06-17 PROCEDURE — 80048 BASIC METABOLIC PNL TOTAL CA: CPT | Performed by: EMERGENCY MEDICINE

## 2024-06-17 PROCEDURE — 36415 COLL VENOUS BLD VENIPUNCTURE: CPT

## 2024-06-17 PROCEDURE — 85025 COMPLETE CBC W/AUTO DIFF WBC: CPT | Performed by: EMERGENCY MEDICINE

## 2024-06-17 PROCEDURE — 99223 1ST HOSP IP/OBS HIGH 75: CPT | Performed by: HOSPITALIST

## 2024-06-17 PROCEDURE — 70450 CT HEAD/BRAIN W/O DYE: CPT | Performed by: EMERGENCY MEDICINE

## 2024-06-17 PROCEDURE — 99284 EMERGENCY DEPT VISIT MOD MDM: CPT

## 2024-06-17 PROCEDURE — 71045 X-RAY EXAM CHEST 1 VIEW: CPT | Performed by: HOSPITALIST

## 2024-06-17 PROCEDURE — 82962 GLUCOSE BLOOD TEST: CPT

## 2024-06-17 RX ORDER — METOCLOPRAMIDE HYDROCHLORIDE 5 MG/ML
5 INJECTION INTRAMUSCULAR; INTRAVENOUS EVERY 8 HOURS PRN
Status: DISCONTINUED | OUTPATIENT
Start: 2024-06-17 | End: 2024-06-20

## 2024-06-17 RX ORDER — BUMETANIDE 0.25 MG/ML
1 INJECTION INTRAMUSCULAR; INTRAVENOUS
Status: DISCONTINUED | OUTPATIENT
Start: 2024-06-17 | End: 2024-06-20

## 2024-06-17 RX ORDER — ONDANSETRON 2 MG/ML
4 INJECTION INTRAMUSCULAR; INTRAVENOUS EVERY 6 HOURS PRN
Status: DISCONTINUED | OUTPATIENT
Start: 2024-06-17 | End: 2024-06-20

## 2024-06-17 RX ORDER — ACETAMINOPHEN 500 MG
500 TABLET ORAL EVERY 4 HOURS PRN
Status: DISCONTINUED | OUTPATIENT
Start: 2024-06-17 | End: 2024-06-18

## 2024-06-17 RX ORDER — HYDRALAZINE HYDROCHLORIDE 20 MG/ML
10 INJECTION INTRAMUSCULAR; INTRAVENOUS EVERY 6 HOURS PRN
Status: DISCONTINUED | OUTPATIENT
Start: 2024-06-17 | End: 2024-06-18

## 2024-06-17 RX ORDER — TEMAZEPAM 7.5 MG/1
15 CAPSULE ORAL NIGHTLY PRN
Status: DISCONTINUED | OUTPATIENT
Start: 2024-06-17 | End: 2024-06-18

## 2024-06-17 NOTE — ED INITIAL ASSESSMENT (HPI)
Pt to ED via EMS for a fall out of bed and hit head on object. Pt is on blood thinners. Pt arrives a&ox4, with slow responses and does not remember exact details of events, unsure if there was loc. Pt complains of headache, states \"everything hurts.\" Trauma 2 called at 0040 prior to arrival.

## 2024-06-17 NOTE — ED PROVIDER NOTES
Patient Seen in: [facility] Emergency Department      History     Chief Complaint   Patient presents with    Altered Mental Status     Stated Complaint: weakness    Subjective:   HPI    78-year-old female brought by EMS for evaluation of increased weakness.  Patient seen in the emergency department earlier this morning after an unwitnessed fall.  EMS reports that as they arrived at the hospital patient seemed more lethargic.  Patient currently denies pain.  She is lethargic but does arouse to voice.  She is on Eliquis.    Objective:   No pertinent past medical history.            No pertinent past surgical history.              No pertinent social history.            Review of Systems    Positive for stated complaint: weakness  Other systems are as noted in HPI.  Constitutional and vital signs reviewed.      All other systems reviewed and negative except as noted above.    Physical Exam     ED Triage Vitals [06/17/24 1353]   /54   Pulse 91   Resp    Temp 98.2 °F (36.8 °C)   Temp src Temporal   SpO2 97 %   O2 Device None (Room air)       Current Vitals:   Vital Signs  BP: 153/54  Pulse: 91  Temp: 98.2 °F (36.8 °C)  Temp src: Temporal    Oxygen Therapy  SpO2: 97 %  O2 Device: None (Room air)            Physical Exam  Constitutional:       Comments: Lethargic, arouses to verbal stimuli   HENT:      Head: Normocephalic and atraumatic.   Cardiovascular:      Rate and Rhythm: Normal rate and regular rhythm.      Heart sounds: Murmur heard.   Pulmonary:      Effort: Pulmonary effort is normal. No respiratory distress.      Breath sounds: Normal breath sounds.   Abdominal:      Palpations: Abdomen is soft.      Tenderness: There is no abdominal tenderness.   Musculoskeletal:      Cervical back: No rigidity.      Comments: Chronic swelling to bilateral lower extremities   Neurological:      Sensory: No sensory deficit.      Comments: Upper extremities 4/5 bilaterally, lower extremities 3/5 bilaterally                ED Course     Labs Reviewed   BASIC METABOLIC PANEL (8) - Abnormal; Notable for the following components:       Result Value    Glucose 107 (*)     Potassium 5.3 (*)     Chloride 120 (*)     BUN 30 (*)     Creatinine 1.85 (*)     Calculated Osmolality 307 (*)     eGFR-Cr 28 (*)     All other components within normal limits   POCT GLUCOSE - Abnormal; Notable for the following components:    POC Glucose  133 (*)     All other components within normal limits   CBC W/ DIFFERENTIAL - Abnormal; Notable for the following components:    RBC 3.05 (*)     HGB 8.7 (*)     HCT 27.0 (*)     Lymphocyte Absolute 0.69 (*)     All other components within normal limits   CBC WITH DIFFERENTIAL WITH PLATELET    Narrative:     The following orders were created for panel order CBC With Differential With Platelet.  Procedure                               Abnormality         Status                     ---------                               -----------         ------                     CBC W/ DIFFERENTIAL[[id number]]          Abnormal            Final result                 Please view results for these tests on the individual orders.        Imaging Results Available and Reviewed while in ED:   CT BRAIN OR HEAD ([id number])   Final Result   PROCEDURE: CT BRAIN OR HEAD (CPT=70450)       COMPARISON: [facility], CT BRAIN OR HEAD (CPT=70450),    6/17/2024, 1:34 AM.  [facility], CT BRAIN OR HEAD    (CPT=70450), 4/30/2024, 4:30 PM.       INDICATIONS: Transient alteration of awareness status post fall.       TECHNIQUE: CT images were obtained without contrast material.  Automated    exposure control for dose reduction was used.  Dose information is    transmitted to the ACR (American College of Radiology) NRDR (National    Radiology Data Registry) which includes the    Dose Index Registry.        FINDINGS:    LIMITATIONS: The examination is limited by patient motion.   CSF SPACES: Ventricles, cisterns, and sulci  are appropriate for age.  No    hydrocephalus, subarachnoid hemorrhage, or mass.  No midline shift.     CEREBRUM: Age-appropriate atrophy is present, without visible acute    hemorrhage or lesion.     WHITE MATTER: Normal white matter.     CEREBELLUM: Age-appropriate atrophy is present, without visible acute    hemorrhage or lesion.     BRAINSTEM: Age-appropriate atrophy is present, without visible acute    hemorrhage or lesion.     CALVARIUM: No apparent fracture, mass, or other significant visible    lesion.     SINUSES: Limited views demonstrate no significant mucosal thickening or    fluid.     ORBITS: Limited views are unremarkable.         OTHER: There is stable mild subcutaneous edema in the right frontal scalp.     Atherosclerotic calcifications are again noted in the carotid siphons.                    =====   CONCLUSION:    1. Stable mild right frontal scalp contusion.  No calvarial fracture or    acute intracranial process.   2. Stable mild generalized atrophy and large vessel calcific    atherosclerosis.               Dictated by (CST): Elijah Welch MD on 6/17/2024 at 2:47 PM        Finalized by (CST): Elijah Welch MD on 6/17/2024 at 2:50 PM                 Vitals:    06/17/24 1353   BP: 153/54   Pulse: 91   Temp: 98.2 °F (36.8 °C)   TempSrc: Temporal   SpO2: 97%     *I personally reviewed and interpreted all ED vitals.                MDM      Medical Decision Making  Differential diagnosis includes but is not limited to include delayed intracranial hemorrhage, concussion, electrolyte abnormality, medication     Patient with increased fatigue and lethargy, may be related to oxycodone, however given that patient is unable to ambulate on her own and has had an acute change in her mentation will admit for further observation.  Discussed with and admitted to hospitalist.        Problems Addressed:  Altered mental status, unspecified altered mental status type: acute illness or injury    Amount  and/or Complexity of Data Reviewed  Independent Historian: EMS  External Data Reviewed: labs, radiology and notes.     Details: CT image from this morning reviewed, noted to have trace frontal right subgaleal hematoma without evidence of underlying calvarial fracture or intracranial hemorrhage    Patient had similar admission 5/3/2024 for fatigue, during that admission patient was somnolent but able to answer questions as well.    improvement in hyperkalemia, otherwise CBC and BMP stable compared to labs from 114 this  Labs: ordered.  Radiology: ordered and independent interpretation performed.     Details: CT images reviewed, no obvious intracranial hemorrhage, other and incidental findings deferred to radiology        Disposition and Plan     Clinical Impression:  1. Altered mental status, unspecified altered mental status type         Disposition:  There is no disposition on file for this visit.  There is no disposition time on file for this visit.    Follow-up:  No follow-up provider specified.  We recommend that you schedule follow up care with a primary care provider within the next three months to obtain basic health screening including reassessment of your blood pressure.      Medications Prescribed:  Current Discharge Medication List

## 2024-06-17 NOTE — ED QUICK NOTES
Orders for admission, patient is aware of plan and ready to go upstairs. Any questions, please call ED ELLIE Moraes at extension [phone].     Patient Covid vaccination status: Unvaccinated     COVID Test Ordered in ED: None    COVID Suspicion at Admission: N/A    Running Infusions:  None    Mental Status/LOC at time of transport: a&o x3    Other pertinent information:   CIWA score: N/A   NIH score:  N/A

## 2024-06-17 NOTE — HISTORICAL OFFICE NOTE
[facility]  Outside Information  Continuity of Care Document  2/5/2024  Hannah Boss - 78 y.o. Female; born [birth year]Summary of episode note, generated on Feb. 29, 2024February 29, 2024   CHIEF COMPLAINT    CHIEF COMPLAINT  Reason for Visit/Chief Complaint   hospital f/u from chest pain   This is a patient of Dr. Buchanan who was seen once in the office in August. 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 She was started on diltiazem. An echocardiogram showed preserved systolic function with ejection fraction 55 to 60%, grade 1 diastolic dysfunction and moderate aortic stenosis. Patient did have a dental consult and had dental extractions and treatment for dental abscess during her hospitalization. She comes today for follow-up.Patient reports that she is feeling much better. She denies any further tachycardia episodes. She reports that she is tolerating diltiazem. She is compliant with anticoagulation for her history of PE and DVT.     PROBLEMS  Reconcile with Patient's ChartPROBLEMS  Problem Effective Dates Date resolved Problem Status   Paroxysmal SVT (supraventricular tachycardia), [SNOMED-CT: 75702842] 8/3/2023 - Active   Nicotine dependence with current use, [SNOMED-CT: 91918770] 2/6/2024 - Active   History of DVT (deep vein thrombosis) - Hx, [SNOMED-CT: 426536591] 2/6/2024 - Active   History of pulmonary embolus (PE), [SNOMED-CT: 283786302] 2/6/2024 - Active   Mild pulmonary hypertension, [SNOMED-CT: 089307117] 8/3/2023 - Active   Edema, localized, [SNOMED-CT: 419245804] 8/3/2023 - Active   Hypertension (HTN), primary, [SNOMED-CT: 76504363] 8/3/2023 - Active   Hyperlipidemia, mixed, [SNOMED-CT: 694735372] 8/3/2023 - Active    Exertional dyspnea, [SNOMED-CT: 02227458] 8/3/2023 - Active     ENCOUNTER DIAGNOSIS    ENCOUNTER DIAGNOSIS  Problem Effective Dates Date resolved Problem Status   Paroxysmal SVT (supraventricular tachycardia), [SNOMED-CT: 53368290] 8/3/2023 - Active   Nicotine dependence with current use, [SNOMED-CT: 76585608] 2/6/2024 - Active   History of DVT (deep vein thrombosis) - Hx, [SNOMED-CT: 773361421] 2/6/2024 - Active   History of pulmonary embolus (PE), [SNOMED-CT: 279022444] 2/6/2024 - Active   Edema, localized, [SNOMED-CT: 362126525] 8/3/2023 - Active   Hypertension (HTN), primary, [SNOMED-CT: 48237934] 8/3/2023 - Active   Hyperlipidemia, mixed, [SNOMED-CT: 813487509] 8/3/2023 - Active   Exertional dyspnea, [SNOMED-CT: 92861760] 8/3/2023 - Active     VITAL SIGNS    VITAL SIGNS  Date / Time: 2/6/2024   BP Systolic 134 mmHg   BP Diastolic 52 mmHg   Height 63 inches   Weight 174 lbs   Pulse Rate 74 bpm   BSA (Body Surface Area) 1.9 cc/m2   BMI (Body Mass Index) 30.8 cc/m2   Blood Pressure 134 / 52 mmHg     PHYSICAL EXAMINATION    PHYSICAL EXAMINATION  Header Details   Constitutional 100%o2   Vitals Left Arm Sitting  / 52 mmHg, Pulse rate 74 bpm, Regular, Height in 5' 3\", BMI: 30.8, Weight in 174.17 lbs (or) 79 kgs, BSA : 1.9 cc/m²   General Appearance No Acute Distress   Neck No JVD   Respiratory Lungs clear with normal breath sounds   Cardiovascular 3/6 murmur, Regular rhythm. Normal rate present. Normal and normal S1 and S2   Lower Extremities Edema 2+     ALLERGIES, ADVERSE REACTIONS, ALERTS    No data available    MEDICATIONS ADMINISTERED DURING VISIT    No data available    MEDICATIONS  Reconcile with Patient's ChartMEDICATIONS  Medication Start Date Route/Frequency Status   amoxicillin clavulanate 875-125 MG Oral Tab, [RxNorm: 404816] 2/5/2024 Take 1 tablet by mouth 2 (two) times daily for 14 days. Active   apixaban 5 MG Oral Tab, [RxNorm: 7421683] 2/5/2024 Take 1 tablet (5 mg total) by mouth 2 (two) times  daily. Active   atorvastatin 40 MG Oral Tab, [RxNorm: 344477] 2/5/2024 Take 1 tablet (40 mg total) by mouth. AT BEDTIME Active   cholecalciferol 50 MCG (2000 UT) Oral Tab, [RxNorm: 347219] 2/5/2024 Take 1 tablet (2,000 Units total) by mouth daily. Active   diazePAM 10 MG Oral Tab, [RxNorm: 839079] 2/5/2024 Take 1 tablet (10 mg total) by mouth every 8 (eight) hours as needed (MAX 3/DAY). Active   dilTIAZem  MG Oral Capsule SR 24 Hr, [RxNorm: 832151] 2/5/2024 Take 1 capsule (120 mg total) by mouth daily. Active   ferrous sulfate 325 (65 FE) MG Oral Tab EC, [RxNorm: 758811] 2/5/2024 Take 1 tablet (325 mg total) by mouth See Admin Instructions. Three times a week Active   furosemide 20 MG Oral Tab, [RxNorm: 380267] 2/5/2024 Take 1 tablet (20 mg total) by mouth 3 (three) times a week. Active   gabapentin 300 MG Oral Cap, [RxNorm: 462857] 2/5/2024 Take 1 capsule (300 mg total) by mouth nightly. Active   meclizine 25 MG Oral Tab, [RxNorm: 096093] 2/5/2024 Take 1 tablet (25 mg total) by mouth. Active   oxyCODONE-acetaminophen (PERCOCET)  MG Oral Tab, [RxNorm: 5002799] 2/5/2024 Take 1 tablet by mouth every 4 (four) hours as needed for Pain (max 6/day). Active     ASSESSMENT    1. 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 Patient was started on diltiazem which was continued at discharge. Reports further episodes. She will continue present management. Discussed that if she has recurrent events would likely recommend EP consult for possible ablation.  2. Thromboembolism-history of DVT and pulmonary embolism in the past. Patient is on lifelong anticoagulation and denies bleeding problems.  3. Lower extremity edema-likely secondary to DVT. Patient takes furosemide daily unless she is out of the home. Reports that she keeps her legs elevated and has a hospital bed.  4. Hypertension-blood pressure controlled on diltiazem and diuretic.  5. Nicotine dependence-current everyday smoker.  6. Aortic  stenosis-moderate on January echo with preserved systolic function. Like the patient to follow-up with Dr. Buchanan in about 2 months.Plan:  Continue present management  Call if you have recurrent episodes of tachycardia otherwise see Dr. Buchanan in 2 months     FAMILY HISTORY    No data available    GENERAL STATUS    No data available    PAST MEDICAL HISTORY    PAST MEDICAL HISTORY  Problem Date diagonsed Date resolved Status   Paroxysmal SVT (supraventricular tachycardia), [SNOMED-CT: 42982845] 8/3/2023 - Active   Nicotine dependence with current use, [SNOMED-CT: 26050240] 2/6/2024 - Active   History of DVT (deep vein thrombosis) - Hx, [SNOMED-CT: 625220635] 2/6/2024 - Active   History of pulmonary embolus (PE), [SNOMED-CT: 870513741] 2/6/2024 - Active   Mild pulmonary hypertension, [SNOMED-CT: 959982877] 8/3/2023 - Active   Edema, localized, [SNOMED-CT: 344291035] 8/3/2023 - Active   Hypertension (HTN), primary, [SNOMED-CT: 70279552] 8/3/2023 - Active   Hyperlipidemia, mixed, [SNOMED-CT: 329336775] 8/3/2023 - Active   Exertional dyspnea, [SNOMED-CT: 35606564] 8/3/2023 - Active     HISTORY OF PRESENT ILLNESS    This is a patient of Dr. Buchanan who was seen once in the office in August. 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 She was started on diltiazem. An echocardiogram showed preserved systolic function with ejection fraction 55 to 60%, grade 1 diastolic dysfunction and moderate aortic stenosis. Patient did have a dental consult and had dental extractions and treatment for dental abscess during her hospitalization. She comes today for follow-up.Patient reports that she is feeling much better. She denies any further tachycardia episodes. She reports that she is  tolerating diltiazem. She is compliant with anticoagulation for her history of PE and DVT.     IMMUNIZATIONS  Reconcile with Patient's ChartNo data available    PLAN OF CARE    PLAN OF CARE  Planned Care Date   Follow up visit - Abdirahman Buchanan MD 1/1/1900     PROCEDURES    No data available    LAB RESULTS    No data available    REVIEW OF SYSTEMS    REVIEW OF SYSTEMS  Header Details   Cardiovascular CANTU, Edema  No history of Chest pain, Palpitations, Syncope, PND, Orthopnea, Claudication   Respiratory No history of SOB, Wheezing, Sputum   Hem/Lymphatic No history of Easy bruising, Blood clots, Hx of blood transfusion, Anemia, Bleeding problems     SOCIAL HISTORY    SOCIAL HISTORY  Social History Element Description Effective Dates   Smoking status Heavy tobacco smoker -     FUNCTIONAL STATUS    No data available    MEDICAL EQUIPMENT    No data available    Goals Sections    No data available    REASON FOR REFERRAL    No data available    Health Concerns Section    No data available    COGNITIVE/MENTAL STATUS    No data available    Patient Demographics    Patient Demographics  Patient Address Patient Name Communication   [address] [place] [id number] Hannah Boss [phone] (Mobile)     Patient Demographics  Language Race / Ethnicity Marital Status   English - Spoken (Preferred) Black or  / Unknown      Document Information    Primary Care Provider Other Service Providers Document Coverage Dates   Olivia Leone  NPI: [id number]  [phone] (Work)  [address] [facility], [address], [place] [address]  [place] [address]  Interpreting Physicians  [facility] [place]  [phone] (Work)  [address] [place] [address] Esme Euceda  NPI: [id number]  [phone] (Work)  [address] [facility], [address], [place] [address]  [place] [address]  Nurses Feb. 06, 2024February 06, 2024      Organization   [facility]  [place]  [phone] (Work)  [address] [facility], [address], [place] [address]  [place] [address]     Encounter Providers Encounter Date    Feb. 06, 2024February 06, 2024     Legal Authenticator    Olivia Leone  NPI: [id number]  [phone] (Work)  [address] [facility], [address], [place] [address]  [place] [address]

## 2024-06-17 NOTE — ED INITIAL ASSESSMENT (HPI)
Per EMS, pt with weakness to legs & abd pain. Pt was discharged 3 hours ago and returning for worsening weakness. Pt had fall earlier this morning and workup was negative. Per EMS, pt was a&o x4 at scene and noted to respond to EMS less while en route. , VSS
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
## 2024-06-18 LAB
ANION GAP SERPL CALC-SCNC: 3 MMOL/L (ref 0–18)
BASOPHILS # BLD AUTO: 0.05 X10(3) UL (ref 0–0.2)
BASOPHILS NFR BLD AUTO: 0.7 %
BUN BLD-MCNC: 28 MG/DL (ref 9–23)
BUN/CREAT SERPL: 18.4 (ref 10–20)
C DIFF TOX B STL QL: NEGATIVE
CALCIUM BLD-MCNC: 9.4 MG/DL (ref 8.7–10.4)
CHLORIDE SERPL-SCNC: 116 MMOL/L (ref 98–112)
CO2 SERPL-SCNC: 25 MMOL/L (ref 21–32)
CREAT BLD-MCNC: 1.52 MG/DL
DEPRECATED RDW RBC AUTO: 45.4 FL (ref 35.1–46.3)
EGFRCR SERPLBLD CKD-EPI 2021: 35 ML/MIN/1.73M2 (ref 60–?)
EOSINOPHIL # BLD AUTO: 0.08 X10(3) UL (ref 0–0.7)
EOSINOPHIL NFR BLD AUTO: 1.1 %
ERYTHROCYTE [DISTWIDTH] IN BLOOD BY AUTOMATED COUNT: 14.2 % (ref 11–15)
GLUCOSE BLD-MCNC: 85 MG/DL (ref 70–99)
GLUCOSE BLDC GLUCOMTR-MCNC: 118 MG/DL (ref 70–99)
GLUCOSE BLDC GLUCOMTR-MCNC: 131 MG/DL (ref 70–99)
GLUCOSE BLDC GLUCOMTR-MCNC: 132 MG/DL (ref 70–99)
GLUCOSE BLDC GLUCOMTR-MCNC: 95 MG/DL (ref 70–99)
HCT VFR BLD AUTO: 28.5 %
HGB BLD-MCNC: 9.1 G/DL
IMM GRANULOCYTES # BLD AUTO: 0.02 X10(3) UL (ref 0–1)
IMM GRANULOCYTES NFR BLD: 0.3 %
LYMPHOCYTES # BLD AUTO: 0.91 X10(3) UL (ref 1–4)
LYMPHOCYTES NFR BLD AUTO: 12.6 %
MCH RBC QN AUTO: 28 PG (ref 26–34)
MCHC RBC AUTO-ENTMCNC: 31.9 G/DL (ref 31–37)
MCV RBC AUTO: 87.7 FL
MONOCYTES # BLD AUTO: 0.42 X10(3) UL (ref 0.1–1)
MONOCYTES NFR BLD AUTO: 5.8 %
NEUTROPHILS # BLD AUTO: 5.76 X10 (3) UL (ref 1.5–7.7)
NEUTROPHILS # BLD AUTO: 5.76 X10(3) UL (ref 1.5–7.7)
NEUTROPHILS NFR BLD AUTO: 79.5 %
OSMOLALITY SERPL CALC.SUM OF ELEC: 303 MOSM/KG (ref 275–295)
PLATELET # BLD AUTO: 230 10(3)UL (ref 150–450)
POTASSIUM SERPL-SCNC: 5 MMOL/L (ref 3.5–5.1)
RBC # BLD AUTO: 3.25 X10(6)UL
SODIUM SERPL-SCNC: 144 MMOL/L (ref 136–145)
WBC # BLD AUTO: 7.2 X10(3) UL (ref 4–11)

## 2024-06-18 PROCEDURE — 99233 SBSQ HOSP IP/OBS HIGH 50: CPT | Performed by: HOSPITALIST

## 2024-06-18 RX ORDER — HYDROCODONE BITARTRATE AND ACETAMINOPHEN 5; 325 MG/1; MG/1
1 TABLET ORAL EVERY 6 HOURS PRN
Status: DISCONTINUED | OUTPATIENT
Start: 2024-06-18 | End: 2024-06-20

## 2024-06-18 RX ORDER — MECLIZINE HCL 12.5 MG/1
25 TABLET ORAL 3 TIMES DAILY PRN
Status: DISCONTINUED | OUTPATIENT
Start: 2024-06-18 | End: 2024-06-20

## 2024-06-18 RX ORDER — DIAZEPAM 2 MG/1
2 TABLET ORAL EVERY 8 HOURS PRN
Status: DISCONTINUED | OUTPATIENT
Start: 2024-06-18 | End: 2024-06-20

## 2024-06-18 RX ORDER — DILTIAZEM HYDROCHLORIDE 120 MG/1
240 CAPSULE, EXTENDED RELEASE ORAL DAILY
Status: DISCONTINUED | OUTPATIENT
Start: 2024-06-18 | End: 2024-06-20

## 2024-06-18 RX ORDER — CALCIUM CARBONATE 500 MG/1
TABLET, CHEWABLE ORAL 3 TIMES DAILY PRN
Status: DISCONTINUED | OUTPATIENT
Start: 2024-06-18 | End: 2024-06-20

## 2024-06-18 RX ORDER — GABAPENTIN 300 MG/1
300 CAPSULE ORAL NIGHTLY
Status: DISCONTINUED | OUTPATIENT
Start: 2024-06-18 | End: 2024-06-20

## 2024-06-18 RX ORDER — TROLAMINE SALICYLATE 10 G/100G
CREAM TOPICAL 3 TIMES DAILY PRN
Status: DISCONTINUED | OUTPATIENT
Start: 2024-06-18 | End: 2024-06-20

## 2024-06-18 RX ORDER — TEMAZEPAM 7.5 MG/1
7.5 CAPSULE ORAL NIGHTLY PRN
Status: DISCONTINUED | OUTPATIENT
Start: 2024-06-18 | End: 2024-06-20

## 2024-06-18 RX ORDER — HYDRALAZINE HYDROCHLORIDE 20 MG/ML
5 INJECTION INTRAMUSCULAR; INTRAVENOUS EVERY 4 HOURS PRN
Status: DISCONTINUED | OUTPATIENT
Start: 2024-06-18 | End: 2024-06-20

## 2024-06-18 RX ORDER — PANTOPRAZOLE SODIUM 40 MG/1
40 TABLET, DELAYED RELEASE ORAL
Status: DISCONTINUED | OUTPATIENT
Start: 2024-06-18 | End: 2024-06-20

## 2024-06-18 RX ORDER — ACETAMINOPHEN 500 MG
500 TABLET ORAL EVERY 4 HOURS PRN
Status: DISCONTINUED | OUTPATIENT
Start: 2024-06-18 | End: 2024-06-20

## 2024-06-18 RX ORDER — HYDRALAZINE HYDROCHLORIDE 25 MG/1
25 TABLET, FILM COATED ORAL EVERY 8 HOURS SCHEDULED
Status: DISCONTINUED | OUTPATIENT
Start: 2024-06-18 | End: 2024-06-20

## 2024-06-18 NOTE — PLAN OF CARE
Patient is aox4. Room air. IV bumex.  Problem: Patient Centered Care  Goal: Patient preferences are identified and integrated in the patient's plan of care  Description: Interventions:  - What would you like us to know as we care for you? From home with son  - Provide timely, complete, and accurate information to patient/family  - Incorporate patient and family knowledge, values, beliefs, and cultural backgrounds into the planning and delivery of care  - Encourage patient/family to participate in care and decision-making at the level they choose  - Honor patient and family perspectives and choices  6/18/2024 0545 by Lucho Flor, RN  Outcome: Progressing     Problem: Diabetes/Glucose Control  Goal: Glucose maintained within prescribed range  Description: INTERVENTIONS:  - Monitor Blood Glucose as ordered  - Assess for signs and symptoms of hyperglycemia and hypoglycemia  - Administer ordered medications to maintain glucose within target range  - Assess barriers to adequate nutritional intake and initiate nutrition consult as needed  - Instruct patient on self management of diabetes  6/18/2024 0545 by Lucho Flor, RN  Outcome: Progressing     Problem: Patient/Family Goals  Goal: Patient/Family Long Term Goal  Description: Patient's Long Term Goal: to return home    Interventions:  - vital signs  - follow md orders  - See additional Care Plan goals for specific interventions  6/18/2024 0545 by Lucho Flor RN  Outcome: Progressing  Goal: Patient/Family Short Term Goal  Description: Patient's Short Term Goal: to get better    Interventions:   - follow medication plan  - See additional Care Plan goals for specific interventions  6/18/2024 0545 by Lucho Flor, RN  Outcome: Progressing     Problem: CARDIOVASCULAR - ADULT  Goal: Maintains optimal cardiac output and hemodynamic stability  Description: INTERVENTIONS:  - Monitor vital signs, rhythm, and trends  - Monitor for bleeding, hypotension and signs  of decreased cardiac output  - Evaluate effectiveness of vasoactive medications to optimize hemodynamic stability  - Monitor arterial and/or venous puncture sites for bleeding and/or hematoma  - Assess quality of pulses, skin color and temperature  - Assess for signs of decreased coronary artery perfusion - ex. Angina  - Evaluate fluid balance, assess for edema, trend weights  6/18/2024 0545 by Lucho Flor, RN  Outcome: Progressing  Goal: Absence of cardiac arrhythmias or at baseline  Description: INTERVENTIONS:  - Continuous cardiac monitoring, monitor vital signs, obtain 12 lead EKG if indicated  - Evaluate effectiveness of antiarrhythmic and heart rate control medications as ordered  - Initiate emergency measures for life threatening arrhythmias  - Monitor electrolytes and administer replacement therapy as ordered  6/18/2024 0545 by Lucho Flor RN  Outcome: Progressing     Problem: SAFETY ADULT - FALL  Goal: Free from fall injury  Description: INTERVENTIONS:  - Assess pt frequently for physical needs  - Identify cognitive and physical deficits and behaviors that affect risk of falls.  - [place] fall precautions as indicated by assessment.  - Educate pt/family on patient safety including physical limitations  - Instruct pt to call for assistance with activity based on assessment  - Modify environment to reduce risk of injury  - Provide assistive devices as appropriate  - Consider OT/PT consult to assist with strengthening/mobility  - Encourage toileting schedule  6/18/2024 0546 by Lucho Flor, RN  Outcome: Progressing

## 2024-06-18 NOTE — PAYOR COMM NOTE
--------------  ADMISSION REVIEW     Payor: [facility] [facility] [place] #:  [id number]  Authorization Number: [id number]    Admit date: 6/17/24  Admit time:  6:55 PM       REVIEW DOCUMENTATION:    Patient Seen in: [facility] Emergency Department      History     Chief Complaint   Patient presents with    Altered Mental Status     Stated Complaint: weakness    Subjective:   HPI    78-year-old female brought by EMS for evaluation of increased weakness.  Patient seen in the emergency department earlier this morning after an unwitnessed fall.  EMS reports that as they arrived at the hospital patient seemed more lethargic.  Patient currently denies pain.  She is lethargic but does arouse to voice.  She is on Eliquis.    Physical Exam     ED Triage Vitals [06/17/24 1353]   /54   Pulse 91   Resp    Temp 98.2 °F (36.8 °C)   Temp src Temporal   SpO2 97 %   O2 Device None (Room air)       Current Vitals:   Vital Signs  BP: 153/54  Pulse: 91  Temp: 98.2 °F (36.8 °C)  Temp src: Temporal    Oxygen Therapy  SpO2: 97 %  O2 Device: None (Room air)    Physical Exam  Constitutional:       Comments: Lethargic, arouses to verbal stimuli   HENT:      Head: Normocephalic and atraumatic.   Cardiovascular:      Rate and Rhythm: Normal rate and regular rhythm.      Heart sounds: Murmur heard.   Pulmonary:      Effort: Pulmonary effort is normal. No respiratory distress.      Breath sounds: Normal breath sounds.   Abdominal:      Palpations: Abdomen is soft.      Tenderness: There is no abdominal tenderness.   Musculoskeletal:      Cervical back: No rigidity.      Comments: Chronic swelling to bilateral lower extremities   Neurological:      Sensory: No sensory deficit.      Comments: Upper extremities 4/5 bilaterally, lower extremities 3/5 bilaterally     ED Course     Labs Reviewed   BASIC METABOLIC PANEL (8) - Abnormal; Notable for the following components:       Result Value    Glucose 107 (*)     Potassium 5.3 (*)     Chloride  120 (*)     BUN 30 (*)     Creatinine 1.85 (*)     Calculated Osmolality 307 (*)     eGFR-Cr 28 (*)     All other components within normal limits   POCT GLUCOSE - Abnormal; Notable for the following components:    POC Glucose  133 (*)     All other components within normal limits   CBC W/ DIFFERENTIAL - Abnormal; Notable for the following components:    RBC 3.05 (*)     HGB 8.7 (*)     HCT 27.0 (*)     Lymphocyte Absolute 0.69 (*)       Imaging Results Available and Reviewed while in ED:   CT BRAIN OR HEAD (40834)   Final Result   PROCEDURE: CT BRAIN OR HEAD (CPT=70450)   CONCLUSION:    1. Stable mild right frontal scalp contusion.  No calvarial fracture or    acute intracranial process.   2. Stable mild generalized atrophy and large vessel calcific    atherosclerosis.         Disposition and Plan     Clinical Impression:  1. Altered mental status, unspecified altered mental status type         Signed by Madelyn Jules MD on 6/17/2024  3:17 PM         HISTORY AND PHYSICAL      ASSESSMENT:    1.       Acute on chronic diastolic heart failure.  2.       Possible opioid overdose from pain medications and physical exam.   3.       Essential hypertension.  4.       Chronic kidney disease stage 4.       PLAN:  Patient will be admitted to telemetry floor.  IV Bumex.  Monitor hemodynamic status and blood pressure.  Hold all her oral medications and pain medications until she is more alert.  Obtain cardiology consult.     Cardiology Consult Note     Hannah Boss is a 78 year old female with a history of diabetes, CAD, CKD, hypertension, hyperlipidemia, DVT, ablation, SVT, rheumatoid arthritis, tobacco abuse, aortic valve stenosis who presents to the hospital after falling out of bed and hitting her head and complains of everything hurting.  She was transferred to the emergency room and was noted to be lethargic.  /54 mm regular, pulse 91,  temperature 98.2, SpO2 97%.  Lab work pertinent for creatinine 1.85,  potassium 5.3, hemoglobin of 8.7.  CT head negative for intracranial trauma.  Patient is being admitted for evaluation of altered mental status.  Cardiology consulted for heart failure.     General: Obtunded  HEENT: Normocephalic, anicteric sclera, neck supple.  Neck: No JVD, carotids 2+, no bruits.  Cardiac: Regular rate and rhythm. S1, S2 normal.  2/6 systolic murmur , pericardial rub, S3.  Lungs: Clear without wheezes, rales, rhonchi or dullness.  Normal excursions and effort.  Abdomen: Soft, non-tender. BS-present.  Extremities: Without clubbing, cyanosis +2 lower extremity edema..  Peripheral pulses are 2+.  Neurologic: Non-focal  Skin: Warm and dry.         Assessment:    Fall, head trauma  Altered mental status  Volume overload  Diabetes  CAD  Murmur/aortic valve stenosis  History of SVT  History of DVT on chronic anticoagulation  Rheumatoid arthritis  CKD        Plan:  78-year-old female with above history presenting after a fall, complains of diffuse pain and now admitted for management of altered mental status, volume overload.  CT head shows no intracranial bleed or trauma.  Lab work largely unchanged from prior.  I agree with IV Lasix, continue IV Lasix in the hospital.  CXR  CONCLUSION:   No acute cardiopulmonary process.       MEDICATIONS ADMINISTERED IN LAST 1 DAY:  bumetanide (Bumex) 0.25 MG/ML injection 1 mg       Date Action Dose Route User    6/17/2024 2034 Given 1 mg Intravenous Lucho Flor RN          hydrALAzine (Apresoline) 20 mg/mL injection 10 mg       Date Action Dose Route User    6/17/2024 2112 Given 10 mg Intravenous Lucho Flor RN          HYDROcodone-acetaminophen (Norco) 5-325 MG per tab 1 tablet       Date Action Dose Route User    6/18/2024 0148 Given 1 tablet Oral Lucho Flor RN            Vitals (last day)       Date/Time Temp Pulse Resp BP SpO2 Weight O2 Device O2 Flow Rate (L/min) [facility]    06/18/24 0500 -- -- -- -- -- 160 lb 11.2 oz -- -- GR    06/18/24 0150 98.6  °F (37 °C) 104 20 161/71 100 % -- None (Room air) -- AL    06/17/24 2241 -- 101 -- 152/68 -- -- -- -- AL    06/17/24 2142 -- 92 -- 169/71 -- -- -- -- AL    06/17/24 2110 -- 86 -- 182/77 -- -- -- -- AL    06/17/24 2006 -- -- -- -- -- 165 lb 1.6 oz -- -- AL    06/17/24 1958 98 °F (36.7 °C) 79 16 192/75 98 % -- None (Room air) -- AL    06/17/24 1853 98.3 °F (36.8 °C) 89 20 184/73 93 % -- None (Room air) -- YD    06/17/24 1353 98.2 °F (36.8 °C) 91 -- 153/54 97 % -- None (Room air) -- AK          CIWA Scores (since admission)       None

## 2024-06-18 NOTE — H&P
[facility]    PATIENT'S NAME: MERNA ALEXANDER   ATTENDING PHYSICIAN: Erich Pascual MD   PATIENT ACCOUNT#:   [id number]    LOCATION:  [address] [place] [address]  MEDICAL RECORD #:   [id number]       YOB: 1945  ADMISSION DATE:       06/17/2024    HISTORY AND PHYSICAL EXAMINATION    #####EDITING MAY BE REQUIRED#####    CHIEF COMPLAINT:  Encephalopathy and acute on chronic diastolic heart failure.    HISTORY OF PRESENT ILLNESS:  Patient is a 78-year-old  female who is chronically on oxycodone pain medication.  Was brought in today for drowsiness and acute on chronic diastolic heart failure.  CBC and chemistry were unremarkable, numbers at baseline.  GFR 28, BUN and creatinine of 30 and 1.82.  Hemoglobin 8.7.  CT scan of the brain showed stable mild right frontal scalp contusion and no acute intracranial findings.  EKG today showed normal sinus rhythm with left atrial enlargement.  Patient had a chest x-ray which is still pending.      PAST MEDICAL HISTORY:  Moderate aortic stenosis with left ventricular diastolic dysfunction grade 1 and mild to moderate pulmonary artery hypertension.  History of paroxysmal supraventricular tachycardia; essential hypertension; chronic kidney disease stage 4; anemia of chronic kidney disease; chronic stasis dermatitis, both lower extremities.  History of C difficile infection; prediabetic state; hypertension; hyperlipidemia; osteoarthritis; chronic back pain; left lower extremity DVT, chronically anticoagulated with Eliquis.    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  She had right carpal tunnel release, left rotator cuff repair, tubal ligation, cholecystectomy, dental abscess I and D.    MEDICATIONS:  Please see medication reconciliation list.     ALLERGIES:  No known drug allergies.     FAMILY HISTORY:  Mother had TB.  Father had cerebrovascular accident.    SOCIAL  HISTORY:  Chronic tobacco use.  No alcohol or drug use.  Lives with her family.  Requires assistance in her basic activities of daily living.     REVIEW OF SYSTEMS:  Difficult to obtain from the patient.  Very drowsy.  Will answer with moaning and 1-syllable words.  Patient had similar presentation in late April 2024, deemed to be fluid overloaded and overmedicated with pain medications at that time.  Her mental status improved spontaneously.      PHYSICAL EXAMINATION:    GENERAL:  Patient is drowsy.  Opens her eyes to tactile and verbal stimuli and falls asleep quickly.  VITAL SIGNS:  Temperature 98.2, pulse 91, respiratory rate 20, blood pressure 160/70, pulse ox 97% on room air.    HEENT:  Atraumatic.  Oropharynx ______.  ________ mucous membranes.  Eyes:  Pinpoint pupils.  NECK:  Supple.  Mild jugular venous distention.   LUNGS:  Diminished breathing sounds, both lung bases.    HEART:  Regular rate and rhythm.  S1 and S2 auscultated.    ABDOMEN:  Soft, nondistended.  No tenderness.  Positive bowel sounds.   EXTREMITIES:  There is +2 to 3 edema, both legs.   No clubbing or cyanosis.   NEUROLOGIC:  Unable to assess.    ASSESSMENT:    1.   Acute on chronic diastolic heart failure.  2.   Possible opioid overdose from pain medications and physical exam.   3.   Essential hypertension.  4.   Chronic kidney disease stage 4.      PLAN:  Patient will be admitted to telemetry floor.  IV Bumex.  Monitor hemodynamic status and blood pressure.  Hold all her oral medications and pain medications until she is more alert.  Obtain cardiology consult.  Further recommendations to follow.     Dictated By Erich Pascual MD  d: 06/17/2024 15:47:06  t: 06/17/2024 16:14:42  Job [id number]  /

## 2024-06-18 NOTE — PROGRESS NOTES
Progress Note  Hannah Boss Patient Status:  Inpatient    1945 MRN [id number]   Location [facility] 5SW/SE Attending Brianne Pagan MD   Hosp Day # 1 PCP Diana Lopez DO     Subjective:  C/o pain in chest, back, states this is not new pain    Objective:  BP (!) 163/67 (BP Location: Right arm)   Pulse 107   Temp 98.2 °F (36.8 °C) (Oral)   Resp 18   Wt 160 lb 11.2 oz (72.9 kg)   SpO2 96%   BMI 27.58 kg/m²     Telemetry: SR/ST    Intake/Output:    Intake/Output Summary (Last 24 hours) at 2024 1634  Last data filed at 2024 1110  Gross per 24 hour   Intake 500 ml   Output 1600 ml   Net -1100 ml     Last 3 Weights   24 0500 160 lb 11.2 oz (72.9 kg)   24 165 lb 1.6 oz (74.9 kg)   24 1234 160 lb (72.6 kg)   24 0700 160 lb (72.6 kg)   24 0539 158 lb 4.8 oz (71.8 kg)   24 0446 169 lb (76.7 kg)   24 2042 171 lb (77.6 kg)   24 1456 176 lb 5.9 oz (80 kg)     Labs:  Recent Labs   Lab 24  01124  1427 24  0622   * 107* 85   BUN 32* 30* 28*   CREATSERUM 1.97* 1.85* 1.52*   EGFRCR 26* 28* 35*   CA 9.7 9.7 9.4    145 144   K 5.6* 5.3* 5.0   * 120* 116*   CO2 24.0 24.0 25.0     Recent Labs   Lab 24  0114 24  1427 24  0622   RBC 3.09* 3.05* 3.25*   HGB 8.7* 8.7* 9.1*   HCT 27.5* 27.0* 28.5*   MCV 89.0 88.5 87.7   MCH 28.2 28.5 28.0   MCHC 31.6 32.2 31.9   RDW 14.6 14.2 14.2   NEPRELIM 8.14* 6.09 5.76   WBC 9.0 7.3 7.2   .0 230.0 230.0     No results for input(s): \"TROP\", \"TROPHS\", \"CK\" in the last 168 hours.  Lab Results   Component Value Date/Time    HDL 56 2024 08:51 AM    LDL 43 2024 08:51 AM    TRIG 80 2024 08:51 AM     Lab Results   Component Value Date    DDIMER 1.13 (H) 2023     Lab Results   Component Value Date    TSH 0.615 10/07/2023     Review of Systems:   Constitutional: No fevers, chills, fatigue or night sweats.  ENT: No mouth pain, neck  pain, running nose, headaches or swollen glands.  Skin: No rashes, pruritus or skin changes,  Respiratory: Denies cough, wheezing or shortness of breath.  CV: Denies chest pain, palpitations, orthopnea, PND or dizziness.  Musculoskeletal: No joint pain, stiffness or swelling.  GI: No nausea, vomiting or diarrhea. No blood in stools.  Neurologic: No seizures, tremors, weakness or numbness.     Physical Exam:  General: Alert, cooperative, no distress, appears stated age.  Neck: Supple, symmetrical, trachea midline, no adenopathy, thyroid: no enlargment/tenderness/nodules, no carotid bruit and no JVD.  Lungs: Clear to auscultation bilaterally.  Chest wall: No tenderness or deformity.  Heart: Regular rate and rhythm, S1, S2 normal, no murmur, click, rub or gallop.  Abdomen: Soft, non-tender. Bowel sounds normal. No masses,  No organomegaly.  Extremities: Extremities normal, atraumatic, no cyanosis or edema.  Pulses: 2+ and symmetric all extremities.  Neurologic: Grossly intact.    Medications:   apixaban  5 mg Oral BID    dilTIAZem ER  240 mg Oral Daily    gabapentin  300 mg Oral Nightly    pantoprazole  40 mg Oral BID AC    lidocaine-menthol  1 patch Transdermal Daily    bumetanide  1 mg Intravenous BID (Diuretic)     Assessment:    Acute on chronic HFpEF  LVEF 55-60% on January, 2024 echo with G1DD  -now admitted with volume overload, fall, AMS  -diuresing with IV bumex BID  -I/Os net neg 1.6L, weight down 5# overnight, Cr improving with diuresis  -GDMT: historical diuretics lasix daily 20mg, not on MRA with CKD    Altered mental status  Possibly 2/2 opioid overdose  -primary following    Fall  with head trauma  Primary following    HTN  BP 160s  -PTA just on diltiazem, history of hydralazine, assumably stopped due to controlled BP with diltiazem and lasix  -will add additional agent    HLD-not on statin, LDL controlled    CAD    DMII    Hx DVT  Eliquis    Hx SVT  S/p ablation    Aortic stenosis  Mild stenosis on  January echo with mild to moderate regurgitation  -mean gradient 12mmHg    Mitral valve regurgitation  Mild on January echo     RA    CKD  Cr improving with diuresis    Plan:  -Continue bumex BID  -Continue diltiazem  -Add hydralazine 25mg TID, PRN IVP 5mg for SBP >160  -Monitor strict I/Os, daily weight, daily BMP  -further recs per primary    Plan of care discussed with patient, RN.        Kailee Fong, APRN  6/18/2024  4:34 PM  [phone] [place]  [phone] [place]

## 2024-06-18 NOTE — CARDIAC REHAB
CARDIAC REHAB HEART FAILURE EDUCATION    Handouts provided and reviewed: CHF Booklet.     Activity: Chair for all meals:        Ambulation:        Tolerated Activity:          Disease Process: Disease process reviewed.    Reviewed the following: DAILY WEIGHT MONITORING: Reviewed      SODIUM RESTRICTION: Reviewed      FLUID RESTRICTION: Reviewed      RISK FACTORS: Reviewed      SMOKING CESSATION: Reviewed      HOME EXERCISE ACTIVITY: Reviewed      OUTPATIENT CARDIAC REHAB: Referred to Cardiac Rehabilitation Phase 2.      WHEN TO CONTACT YOUR PHYSICIAN: Reviewed      HEART FAILURE CLINIC: [phone]

## 2024-06-18 NOTE — PLAN OF CARE
No acute changes today. Safety precautions in place. Frequent rounding by staff. Bed in lowest position, alarm on. Call light is within reach.    Problem: Patient Centered Care  Goal: Patient preferences are identified and integrated in the patient's plan of care  Description: Interventions:  - What would you like us to know as we care for you? From home with son  - Provide timely, complete, and accurate information to patient/family  - Incorporate patient and family knowledge, values, beliefs, and cultural backgrounds into the planning and delivery of care  - Encourage patient/family to participate in care and decision-making at the level they choose  - Honor patient and family perspectives and choices  Outcome: Progressing     Problem: Diabetes/Glucose Control  Goal: Glucose maintained within prescribed range  Description: INTERVENTIONS:  - Monitor Blood Glucose as ordered  - Assess for signs and symptoms of hyperglycemia and hypoglycemia  - Administer ordered medications to maintain glucose within target range  - Assess barriers to adequate nutritional intake and initiate nutrition consult as needed  - Instruct patient on self management of diabetes  Outcome: Progressing     Problem: SAFETY ADULT - FALL  Goal: Free from fall injury  Description: INTERVENTIONS:  - Assess pt frequently for physical needs  - Identify cognitive and physical deficits and behaviors that affect risk of falls.  - [place] fall precautions as indicated by assessment.  - Educate pt/family on patient safety including physical limitations  - Instruct pt to call for assistance with activity based on assessment  - Modify environment to reduce risk of injury  - Provide assistive devices as appropriate  - Consider OT/PT consult to assist with strengthening/mobility  - Encourage toileting schedule  Outcome: Progressing

## 2024-06-18 NOTE — OCCUPATIONAL THERAPY NOTE
OCCUPATIONAL THERAPY EVALUATION - INPATIENT     Room Number: 562/562-A  Evaluation Date: 6/18/2024  Type of Evaluation: Initial  Presenting Problem: AMS; fall    Physician Order: IP Consult to Occupational Therapy  Reason for Therapy: ADL/IADL Dysfunction and Discharge Planning    OCCUPATIONAL THERAPY ASSESSMENT   Patient is a 78 year old female admitted 6/17/2024 for AMS; fall OOB.  Prior to admission, patient's baseline is home alone with part time caregiver and typically mod I with most ADLs; cG assist for higher level IADLs.  Patient is currently functioning near baseline with self care and functional moiblity.  Patient is requiring up to Min A with ADLs and CGA-Min A for fxl ambulation as a result of the following impairments: activity tolerance, endurance, deconditioning. Occupational Therapy will continue to follow for duration of hospitalization.    Patient will benefit from continued skilled OT Services at discharge to promote functional independence and safety with additional support and return home with home health OT    PLAN  OT Treatment Plan: Balance activities;Energy conservation/work simplification techniques;ADL training;Functional transfer training;Endurance training;Cognitive reorientation;Patient/Family training;Patient/Family education;Compensatory technique education  OT Device Recommendations: TBD    OCCUPATIONAL THERAPY MEDICAL/SOCIAL HISTORY   Problem List   Principal Problem:    Altered mental status, unspecified altered mental status type  Active Problems:    Hypernatremia    Hyperglycemia    Acute heart failure (HCC)    HOME SITUATION  Type of Home: House  Home Layout: One level  Lives With: Caregiver part-time; Alone (family checks in frequently)  Toilet and Equipment: Standard height toilet  Shower/Tub and Equipment: Tub-shower combo  Other Equipment: -- (RW and cane)  Drives: No  Patient Regularly Uses: None    SUBJECTIVE  I just rolled out of bed  The caregiver helps with things  around the house    OCCUPATIONAL THERAPY EXAMINATION   OBJECTIVE  Precautions: Bed/chair alarm  Fall Risk: High fall risk    WEIGHT BEARING RESTRICTION     PAIN ASSESSMENT  Ratin  Location: back  Management Techniques: Activity promotion    ACTIVITY TOLERANCE  Pulse: 69        BP: (!) 167/76  BP Location: Left arm  BP Method: Automatic  Patient Position: Sitting    O2 SATURATIONS  Oxygen Therapy  SPO2% on Room Air at Rest: 97    COGNITION  Alert and oriented; reliable historian, able to follow all simple commands presented; continue to assess    RANGE OF MOTION   Upper extremity ROM is within functional limits     STRENGTH ASSESSMENT  Upper extremity strength is within functional limits     ACTIVITIES OF DAILY LIVING ASSESSMENT  AM-PAC ‘6-Clicks’ Inpatient Daily Activity Short Form  How much help from another person does the patient currently need…  -   Putting on and taking off regular lower body clothing?: A Little  -   Bathing (including washing, rinsing, drying)?: A Little  -   Toileting, which includes using toilet, bedpan or urinal? : A Little  -   Putting on and taking off regular upper body clothing?: A Little  -   Taking care of personal grooming such as brushing teeth?: None  -   Eating meals?: None    AM-PAC Score:  Score: 20  Approx Degree of Impairment: 38.32%  Standardized Score (AM-PAC Scale): 42.03  CMS Modifier (G-Code): CJ    FUNCTIONAL TRANSFER ASSESSMENT  Sit to Stand: Edge of Bed  Edge of Bed: Contact Guard Assist  Toilet Transfer: Contact Guard Assist    BED MOBILITY  Rolling: Stand-by Assist  Supine to Sit : Stand-by Assist    BALANCE ASSESSMENT  Static Sitting: Stand-by Assist  Static Standing: Contact Guard Assist    FUNCTIONAL ADL ASSESSMENT  Eating: Independent  Grooming Seated: Stand-by Assist  Grooming Standing: Contact Guard Assist  Bathing Seated: Minimal Assist  UB Dressing Seated: Stand-by Assist  LB Dressing Seated: Minimal Assist  Toileting Seated: Contact Guard  Assist    THERAPEUTIC EXERCISE     Skilled Therapy Provided: Up and ambulatory in room with assist; up to bathroom and managing toileting tasks with CGA for clothing mangement and Min A to don new brief with increased time; toilet t/f completed with CGA and cues for sequencing and hand placement; stood at sink for hand wash with CGA; back to chair with Min A; tolerating all tasks presented; pt is on track to d/c home with increased initial support; left up in chair,stable at exit.     EDUCATION PROVIDED  Patient: Role of Occupational Therapy; Plan of Care; Discharge Recommendations; Functional Transfer Techniques; Fall Prevention; Posture/Positioning; Compensatory ADL Techniques  Patient's Response to Education: Verbalized Understanding    The patient's Approx Degree of Impairment: 38.32% has been calculated based on documentation in the [facility] '6 clicks' Inpatient Daily Activity Short Form.  Research supports that patients with this level of impairment may benefit from home with HH  Final disposition will be made by interdisciplinary medical team.    Patient End of Session: Up in chair;Needs met;Call light within reach;RN aware of session/findings;All patient questions and concerns addressed;Alarm set    OT Goals  Patient self-stated goal is: to go home     Patient will complete LE dressing with SBA   Comment:     Patient will complete toilet transfer with SBA   Comment:     Patient will complete self care task at sink level with sBA   Comment:    Patient will complete toileting Mod I   Comment:         Goals  on:   Frequency:3x week    Patient Evaluation Complexity Level:   Occupational Profile/Medical History LOW - Brief history including review of medical or therapy records    Specific performance deficits impacting engagement in ADL/IADL LOW  1 - 3 performance deficits    Client Assessment/Performance Deficits LOW - No comorbidities nor modifications of tasks    Clinical Decision Making LOW - Analysis  of occupational profile, problem-focused assessments, limited treatment options    Overall Complexity LOW     OT Session Time: 23 minutes  Self-Care Home Management: 15 minutes  Therapeutic Activity: 8 minutes    Reece Chiu, Occupational Therapist, OTR/L ext [phone]

## 2024-06-18 NOTE — PHYSICAL THERAPY NOTE
PHYSICAL THERAPY EVALUATION - INPATIENT     Room Number: 562/562-A  Evaluation Date: 6/18/2024  Type of Evaluation: Initial   Physician Order: PT Eval and Treat    Presenting Problem: AMS, fall out of bed, weakness  Co-Morbidities : diabetes, CAD, CKD, hypertension, hyperlipidemia, DVT, ablation, SVT, rheumatoid arthritis, tobacco abuse, aortic valve stenosis  Reason for Therapy: Mobility Dysfunction and Discharge Planning    PHYSICAL THERAPY ASSESSMENT   Patient is a 78 year old female admitted 6/17/2024 for AMS, fall out of bed, weakness. Prior to admission, patient's baseline is Mod I for functional mobility with use of cane and rolling walker, Mod I for ADLs. Part-time caregiver assists with household tasks. Patient is currently functioning below baseline with bed mobility, transfers, gait, standing prolonged periods, and performing household tasks.  Patient is requiring contact guard assist and minimal assist as a result of the following impairments: decreased functional strength, decreased endurance/aerobic capacity, pain, impaired standing balance, decreased muscular endurance, and medical status.  Physical Therapy will continue to follow for duration of hospitalization.    Patient will benefit from continued skilled PT Services at discharge to promote functional independence and safety with additional support and return home with home health PT.    PLAN  PT Treatment Plan: Bed mobility;Body mechanics;Coordination;Endurance;Energy conservation;Patient education;Gait training;Strengthening;Transfer training;Balance training  Rehab Potential : Good  Frequency (Obs): 5x/week    PHYSICAL THERAPY MEDICAL/SOCIAL HISTORY     Problem List  Principal Problem:    Altered mental status, unspecified altered mental status type  Active Problems:    Hypernatremia    Hyperglycemia    Acute heart failure (HCC)      HOME SITUATION  Home Situation  Type of Home: House  Home Layout: One level  Stairs to Enter : 0  Lives With:  Caregiver part-time;Alone (family checks in frequently)  Drives: No  Patient Owned Equipment: Rolling walker;Cane  Patient Regularly Uses: None     SUBJECTIVE  \"I have to go to the bathroom\"    PHYSICAL THERAPY EXAMINATION   OBJECTIVE  Precautions: Bed/chair alarm  Fall Risk: High fall risk    WEIGHT BEARING RESTRICTION  Weight Bearing Restriction: None    PAIN ASSESSMENT  Ratin  Location: chronic back pain  Management Techniques: Activity promotion;Body mechanics;Repositioning    COGNITION  Overall Cognitive Status:  WFL - within functional limits    RANGE OF MOTION AND STRENGTH ASSESSMENT  Upper extremity ROM and strength are within functional limits   Lower extremity ROM is within functional limits   Lower extremity strength is within functional limits     BALANCE  Static Sitting: Good  Dynamic Sitting: Fair +  Static Standing: Fair  Dynamic Standing: Fair -    ACTIVITY TOLERANCE  Pulse: 99  Heart Rate Source: Monitor     BP: (!) 167/76  BP Location: Left arm  BP Method: Automatic  Patient Position: Lying    O2 WALK  Oxygen Therapy  SPO2% on Room Air at Rest: 98    AM-PAC '6-Clicks' INPATIENT SHORT FORM - BASIC MOBILITY  How much difficulty does the patient currently have...  Patient Difficulty: Turning over in bed (including adjusting bedclothes, sheets and blankets)?: A Little   Patient Difficulty: Sitting down on and standing up from a chair with arms (e.g., wheelchair, bedside commode, etc.): A Little   Patient Difficulty: Moving from lying on back to sitting on the side of the bed?: A Little   How much help from another person does the patient currently need...   Help from Another: Moving to and from a bed to a chair (including a wheelchair)?: A Little   Help from Another: Need to walk in hospital room?: A Little   Help from Another: Climbing 3-5 steps with a railing?: A Little     AM-PAC Score:  Raw Score: 18   Approx Degree of Impairment: 46.58%   Standardized Score (AM-PAC Scale): 43.63   CMS Modifier  (G-Code): CK    FUNCTIONAL ABILITY STATUS  Functional Mobility/Gait Assessment  Gait Assistance: Minimum assistance;Contact guard assist  Distance (ft): 25 ft x 2  Assistive Device: Rolling walker  Pattern: Shuffle (decreased jessica speed, decreased step length, flexed posture)  Supine to Sit: contact guard assist  Sit to Stand: contact guard assist from EOB and toilet    Exercise/Education Provided:  Bed mobility  Body mechanics  Energy conservation  Functional activity tolerated  Gait training  Posture  Transfer training    The patient's Approx Degree of Impairment: 46.58% has been calculated based on documentation in the [facility] '6 clicks' Inpatient Basic Mobility Short Form.  Research supports that patients with this level of impairment may benefit from HHPT.  Final disposition will be made by interdisciplinary medical team.    Patient in bed upon arrival. RN approved activity. Educated patient on POC and benefits of mobilization. Agreeable to participate. Patient reporting chronic back pain, rated 8 out of 10 per the pain scale.  Patient End of Session: Up in chair;Call light within reach;Needs met;RN aware of session/findings;All patient questions and concerns addressed;Alarm set    CURRENT GOALS  Goals to be met by: 6/25/24  Patient Goal Patient's self-stated goal is: none stated   Goal #1 Patient is able to demonstrate supine - sit EOB @ level: supervision     Goal #1   Current Status    Goal #2 Patient is able to demonstrate transfers Sit to/from Stand at assistance level: supervision with walker - rolling     Goal #2  Current Status    Goal #3 Patient is able to ambulate 150 feet with assist device: walker - rolling at assistance level: supervision   Goal #3   Current Status    Goal #4 Patient to demonstrate independence with home activity/exercise instructions provided to patient in preparation for discharge.   Goal #4   Current Status      Patient Evaluation Complexity Level:  History Moderate - 1 or 2  personal factors and/or co-morbidities   Examination of body systems Moderate - addressing a total of 3 or more elements   Clinical Presentation Low- Stable   Clinical Decision Making  Low Complexity     Gait Trainin minutes  Therapeutic Activity:  10 minutes

## 2024-06-18 NOTE — PROGRESS NOTES
[facility]  [facility] of [facility]    Progress Note    Hannah Boss Patient Status:  Inpatient    1945 MRN [id number]   Location [facility] 5SW/SE Attending Brianne Pagan MD   Hosp Day # 1 PCP Diana Lopez DO     Chief complaint: AMS  Subjective:   AMS resolved, now back to self, interactive, pleasant, eating breakfast  C/o multiple chronic pains from her previous cancer  States, \"I cannot walk\"  Says, her right leg is always swollen more than left  Breathing better  No high fevers    Objective:   Blood pressure (!) 161/71, pulse 104, temperature 98.6 °F (37 °C), temperature source Oral, resp. rate 20, weight 160 lb 11.2 oz (72.9 kg), SpO2 100%, not currently breastfeeding.    HEENT: conjunctivae/corneas clear. PERRL, EOM's intact.  Neck: no adenopathy, no carotid bruit, no JVD, supple  Pulmonary: Coarse, diminished to auscultation bilaterally  Cardiovascular: S1, S2 normal, no murmur, click, rub or gallop, regular rate and rhythm  Abdominal: soft, non-tender; bowel sounds normal; no masses,  no organomegaly  Extremities: vitaliy edema Rt>>L  Pulses: palpable and symmetric  Skin: Warm and dry  Neurologic: Alert and oriented X 3, unsteady gait  Psychiatric: calm, cooperative    Assessment and Plan:     Acute on chronic diastolic heart failure.  -Cardiology is following  -Bumex IV  -Monitor urine output, I&D, electrolytes, and renal functions    Moderate aortic stenosis  Moderate pulmonary artery hypertension  Paroxysmal supraventricular tachycardia  HL    Acute encephalopathy  Possible opioid overdose from pain medications/oxy   -Check B12  -Neurochecks, not back to self  -try opioid medication, local lidocaine patch, creams to decrease use of opioids    Fall w/head trauma  -Conservative management  -PT/OT eval    Essential hypertension.  -Restart home medication    Chronic kidney disease stage 4.   Cr at baseline~2 (on admission.  1.97)  Hyperkalemia on admission K~5.6,  improved  -monitor    Anemia chronic disease  Chronic stasis dermatitis lower extremities  H/o C. 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 Pt states that she just saw her oncologist, doing okay  H/o cholecystectomy  H/o RA  nicotine use disorder    H/o LLE DVT--> continue Eliquis      DVT prophylaxis: Eliquis     Dispo: Pending progress       Chart reviewed, including current vitals, notes, labs and imaging  Pertinent past medical records reviewed  Labs ordered and medications adjusted as outlined above  Coordinate care with care team/consultants  Discussed with patient the results of tests, management plan as outlined above, and the need for ongoing hospitalization  D/w RN     MDM high  severe acute illness/or exacerbation of chronic illness posing a threat to life, IV medications, requiring close monitoring in hospital.       Results:     Lab Results   Component Value Date    WBC 7.2 06/18/2024    HGB 9.1 (L) 06/18/2024    HCT 28.5 (L) 06/18/2024    .0 06/18/2024    CREATSERUM 1.52 (H) 06/18/2024    BUN 28 (H) 06/18/2024     06/18/2024    K 5.0 06/18/2024     (H) 06/18/2024    CO2 25.0 06/18/2024    GLU 85 06/18/2024    CA 9.4 06/18/2024    ALB 4.1 05/03/2024    ALKPHO 217 (H) 04/30/2024    BILT 0.3 04/30/2024    TP 7.3 04/30/2024    AST 59 (H) 04/30/2024    ALT 25 04/30/2024    PTT 35.0 03/08/2024    INR 1.30 (H) 03/08/2024    TSH 0.615 10/07/2023    LIP 22 03/19/2024    DDIMER 1.13 (H) 06/05/2023    MG 1.7 03/23/2024    PHOS 3.6 05/03/2024    TROP 0.398 (HH) 09/13/2021     (H) 12/31/2023    B12 399 04/22/2022       XR CHEST AP PORTABLE  (CPT=71045)    Result Date: 6/17/2024  CONCLUSION:  No acute cardiopulmonary process.    Dictated by (CST): Elijah Welch MD on 6/17/2024 at 4:14 PM     Finalized by (CST): Elijah Welch MD on 6/17/2024 at 4:16 PM          CT BRAIN OR HEAD  (91046)    Result Date: 6/17/2024  CONCLUSION:  1. Stable mild right frontal scalp contusion.  No calvarial fracture or acute intracranial process. 2. Stable mild generalized atrophy and large vessel calcific atherosclerosis.    Dictated by (CST): Elijah Welch MD on 6/17/2024 at 2:47 PM     Finalized by (CST): Elijah Welch MD on 6/17/2024 at 2:50 PM          CT SPINE CERVICAL (CPT=72125)    Result Date: 6/17/2024  CONCLUSION:  1. No acute fracture or posttraumatic malalignment cervical spine is evident.  2. Multilevel degenerative disc disease throughout the cervical spine.  3. Lesser incidental findings as above.    A preliminary report was issued by the [facility] teleradiology service. There are no major discrepancies.   Dictated by (CST): Go Choudhary MD on 6/17/2024 at 9:54 AM     Finalized by (CST): Go Choudhary MD on 6/17/2024 at 9:56 AM          CT BRAIN OR HEAD ([id number])    Result Date: 6/17/2024  CONCLUSION:  1. Trace right frontal subgaleal hematoma without evidence of underlying calvarial fracture, coup/contrecoup intraparenchymal contusion, or intracranial hemorrhage.  2. Senescent changes of parenchymal volume loss with sequela of chronic microvascular ischemic disease. There is also large vessel atherosclerosis.  3. Lesser incidental findings as above.    A preliminary report was issued by the [facility] teleradiology service. There are no major discrepancies.   Dictated by (CST): Go Choudhary MD on 6/17/2024 at 9:49 AM     Finalized by (CST): Go Choudhary MD on 6/17/2024 at 9:53 AM         EKG    Result Date: 6/17/2024  Normal sinus rhythm Possible Left atrial enlargement Borderline ECG When compared with ECG of 30-APR-2024 14:58, No significant change was found Confirmed by Shalom Alvarenga ([id number]) on 6/17/2024 3:13:56 PM       JOSE WALKER MD  6/18/2024

## 2024-06-19 LAB
ALBUMIN SERPL-MCNC: 3.4 G/DL (ref 3.2–4.8)
ANION GAP SERPL CALC-SCNC: 0 MMOL/L (ref 0–18)
BUN BLD-MCNC: 29 MG/DL (ref 9–23)
BUN/CREAT SERPL: 17.8 (ref 10–20)
CALCIUM BLD-MCNC: 8.7 MG/DL (ref 8.7–10.4)
CHLORIDE SERPL-SCNC: 116 MMOL/L (ref 98–112)
CO2 SERPL-SCNC: 26 MMOL/L (ref 21–32)
CREAT BLD-MCNC: 1.63 MG/DL
EGFRCR SERPLBLD CKD-EPI 2021: 32 ML/MIN/1.73M2 (ref 60–?)
GLUCOSE BLD-MCNC: 113 MG/DL (ref 70–99)
GLUCOSE BLDC GLUCOMTR-MCNC: 123 MG/DL (ref 70–99)
GLUCOSE BLDC GLUCOMTR-MCNC: 142 MG/DL (ref 70–99)
GLUCOSE BLDC GLUCOMTR-MCNC: 144 MG/DL (ref 70–99)
GLUCOSE BLDC GLUCOMTR-MCNC: 146 MG/DL (ref 70–99)
MAGNESIUM SERPL-MCNC: 1.6 MG/DL (ref 1.6–2.6)
OSMOLALITY SERPL CALC.SUM OF ELEC: 301 MOSM/KG (ref 275–295)
PHOSPHATE SERPL-MCNC: 3.5 MG/DL (ref 2.4–5.1)
POTASSIUM SERPL-SCNC: 4.2 MMOL/L (ref 3.5–5.1)
SODIUM SERPL-SCNC: 142 MMOL/L (ref 136–145)
VIT B12 SERPL-MCNC: 497 PG/ML (ref 211–911)

## 2024-06-19 PROCEDURE — 99233 SBSQ HOSP IP/OBS HIGH 50: CPT | Performed by: HOSPITALIST

## 2024-06-19 RX ORDER — MAGNESIUM OXIDE 400 MG/1
400 TABLET ORAL ONCE
Status: COMPLETED | OUTPATIENT
Start: 2024-06-19 | End: 2024-06-19

## 2024-06-19 NOTE — CM/SW NOTE
06/19/24 1100   CM/SW Referral Data   Referral Source Patient;Social Work (self-referral)   Reason for Referral Discharge planning   Informant Patient   Medical Hx   Does patient have an established PCP? Yes   Patient Info   Patient's Current Mental Status at Time of Assessment Alert;Oriented   Patient's Home Environment House   Patient lives with Son   Patient Status Prior to Admission   Independent with ADLs and Mobility Yes   Services in place prior to admission DME/Supplies at home   Type of DME/Supplies Straight Cane;Wheeled Walker;Wheelchair   Discharge Needs   Anticipated D/C needs Home health care   Choice of Post-Acute Provider   Informed patient of right to choose their preferred provider Yes   Patient/family choice [facility]     SW initiated self referral for discharge planning. SW introduced self and role to patient .Pt is alert and oriented at time off assessment. Pt provided above information. Pt lives at address on file with son, has DME at home, hx of REVA and reports that she is current with [facility]. Anticipated therapy need: Home with Home Healthcare. Pt is agreeable to continuing with [facility]. SW sent HH referral via [facility] and sent to [facility].     Plan: Pending medical clearance, DC to Home with [facility], *[facility] acceptance    SW/CM to remain available for support and/or discharge planning.     Avelina Bueno, MSW, LSW   x [phone]

## 2024-06-19 NOTE — PROGRESS NOTES
Progress Note  Hannah Boss Patient Status:  Inpatient    1945 MRN [id number]   Location [facility] 5SW/SE Attending Brianne Pagan MD   Hosp Day # 2 PCP Diana Lopez DO     Subjective:  C/o pain in chest, back, states this is not new pain    Objective:  /47 (BP Location: Left arm)   Pulse 91   Temp 98.4 °F (36.9 °C) (Oral)   Resp 18   Wt 158 lb 6.4 oz (71.8 kg)   SpO2 99%   BMI 27.19 kg/m²     Telemetry: SR/ST    Intake/Output:    Intake/Output Summary (Last 24 hours) at 2024 1332  Last data filed at 2024 0907  Gross per 24 hour   Intake 462 ml   Output 1000 ml   Net -538 ml     Last 3 Weights   24 0500 158 lb 6.4 oz (71.8 kg)   24 0500 160 lb 11.2 oz (72.9 kg)   24 165 lb 1.6 oz (74.9 kg)   24 1234 160 lb (72.6 kg)   24 0700 160 lb (72.6 kg)   24 0539 158 lb 4.8 oz (71.8 kg)   24 0446 169 lb (76.7 kg)   24 2042 171 lb (77.6 kg)   24 1456 176 lb 5.9 oz (80 kg)     Labs:  Recent Labs   Lab 24  1427 24  0622 24  0734   * 85 113*   BUN 30* 28* 29*   CREATSERUM 1.85* 1.52* 1.63*   EGFRCR 28* 35* 32*   CA 9.7 9.4 8.7    144 142   K 5.3* 5.0 4.2   * 116* 116*   CO2 24.0 25.0 26.0     Recent Labs   Lab 24  0114 24  1427 24  0622   RBC 3.09* 3.05* 3.25*   HGB 8.7* 8.7* 9.1*   HCT 27.5* 27.0* 28.5*   MCV 89.0 88.5 87.7   MCH 28.2 28.5 28.0   MCHC 31.6 32.2 31.9   RDW 14.6 14.2 14.2   NEPRELIM 8.14* 6.09 5.76   WBC 9.0 7.3 7.2   .0 230.0 230.0     No results for input(s): \"TROP\", \"TROPHS\", \"CK\" in the last 168 hours.  Lab Results   Component Value Date/Time    HDL 56 2024 08:51 AM    LDL 43 2024 08:51 AM    TRIG 80 2024 08:51 AM     Lab Results   Component Value Date    DDIMER 1.13 (H) 2023     Lab Results   Component Value Date    TSH 0.615 10/07/2023     Review of Systems:   Constitutional: No fevers, chills, fatigue or night  sweats.  ENT: No mouth pain, neck pain, running nose, headaches or swollen glands.  Skin: No rashes, pruritus or skin changes,  Respiratory: Denies cough, wheezing or shortness of breath.  CV: Denies chest pain, palpitations, orthopnea, PND or dizziness.  Musculoskeletal: No joint pain, stiffness or swelling.  GI: No nausea, vomiting or diarrhea. No blood in stools.  Neurologic: No seizures, tremors, weakness or numbness.     Physical Exam:  General: Alert, cooperative, no distress, appears stated age.  Neck: Supple, symmetrical, trachea midline, no adenopathy, thyroid: no enlargment/tenderness/nodules, no carotid bruit and no JVD.  Lungs: Clear to auscultation bilaterally.  Chest wall: No tenderness or deformity.  Heart: Regular rate and rhythm, S1, S2 normal, +3/6 murmur, no click, rub or gallop.  Abdomen: Soft, non-tender. Bowel sounds normal. No masses,  No organomegaly.  Extremities: Extremities normal, atraumatic, no cyanosis, 1+RLE edema, mild LLE.  Pulses: 2+ and symmetric all extremities.  Neurologic: Grossly intact.    Medications:   apixaban  5 mg Oral BID    dilTIAZem ER  240 mg Oral Daily    gabapentin  300 mg Oral Nightly    pantoprazole  40 mg Oral BID AC    lidocaine-menthol  1 patch Transdermal Daily    hydrALAZINE  25 mg Oral Q8H ERNESTO    bumetanide  1 mg Intravenous BID (Diuretic)     Assessment:    Acute on chronic HFpEF  LVEF 55-60% on January, 2024 echo with G1DD  -now admitted with volume overload, fall, AMS  -diuresing with IV bumex BID  -I/Os net neg 1.9L, 1L UOP past 24 hours, weight down 7#, Cr improving with diuresis  -GDMT: historical diuretics lasix daily 20mg, not on MRA with CKD    Altered mental status  Possibly 2/2 opioid overdose  -primary following    Fall  with head trauma  Primary following    HTN  BP 160s  -PTA just on diltiazem, history of hydralazine, assumably stopped due to controlled BP with diltiazem and lasix  -better controlled with resuming hydralazine    HLD-not on  statin, LDL controlled    CAD    DMII    Hx DVT  Eliquis    Hx SVT  S/p ablation    Aortic stenosis  Mild stenosis on January echo with mild to moderate regurgitation  -mean gradient 12mmHg    Mitral valve regurgitation  Mild on January echo     RA    CKD  Cr improving with diuresis    Plan:  -Continue bumex BID for one more day, convert to PO tomorrow  -Continue diltiazem  -Continue hydralazine 25mg TID, PRN IVP 5mg for SBP >160  -Monitor strict I/Os, daily weight, daily BMP  -further recs per primary  -Likely discharge home tomorrow      Plan of care discussed with patient, RN.        Kailee Fong, APRN  06/19/24  1:32 PM  [phone] [place]  [phone] [place]      Cardiology addendum:  Pt examined and assessed independently.Agree with above.  Patient breathing better and swelling is improved.  Agree with Bumex today and switch to oral meds if stable in a.m.  Continue other cardiac meds    Abdirahman Buchanan MD FACC L2

## 2024-06-19 NOTE — PLAN OF CARE
Pt alert and oriented. Vitals stable. Plan for p.o bumex tomorrow. Pt ambulating with walker. Appetite good.   Problem: Patient Centered Care  Goal: Patient preferences are identified and integrated in the patient's plan of care  Description: Interventions:  - What would you like us to know as we care for you? From home with son  - Provide timely, complete, and accurate information to patient/family  - Incorporate patient and family knowledge, values, beliefs, and cultural backgrounds into the planning and delivery of care  - Encourage patient/family to participate in care and decision-making at the level they choose  - Honor patient and family perspectives and choices  Outcome: Progressing     Problem: Diabetes/Glucose Control  Goal: Glucose maintained within prescribed range  Description: INTERVENTIONS:  - Monitor Blood Glucose as ordered  - Assess for signs and symptoms of hyperglycemia and hypoglycemia  - Administer ordered medications to maintain glucose within target range  - Assess barriers to adequate nutritional intake and initiate nutrition consult as needed  - Instruct patient on self management of diabetes  Outcome: Progressing     Problem: Patient/Family Goals  Goal: Patient/Family Long Term Goal  Description: Patient's Long Term Goal: to return home    Interventions:  - vital signs  - follow md orders  - See additional Care Plan goals for specific interventions  Outcome: Progressing  Goal: Patient/Family Short Term Goal  Description: Patient's Short Term Goal: to get better    Interventions:   - follow medication plan  - See additional Care Plan goals for specific interventions  Outcome: Progressing     Problem: CARDIOVASCULAR - ADULT  Goal: Maintains optimal cardiac output and hemodynamic stability  Description: INTERVENTIONS:  - Monitor vital signs, rhythm, and trends  - Monitor for bleeding, hypotension and signs of decreased cardiac output  - Evaluate effectiveness of vasoactive medications to  optimize hemodynamic stability  - Monitor arterial and/or venous puncture sites for bleeding and/or hematoma  - Assess quality of pulses, skin color and temperature  - Assess for signs of decreased coronary artery perfusion - ex. Angina  - Evaluate fluid balance, assess for edema, trend weights  Outcome: Progressing  Goal: Absence of cardiac arrhythmias or at baseline  Description: INTERVENTIONS:  - Continuous cardiac monitoring, monitor vital signs, obtain 12 lead EKG if indicated  - Evaluate effectiveness of antiarrhythmic and heart rate control medications as ordered  - Initiate emergency measures for life threatening arrhythmias  - Monitor electrolytes and administer replacement therapy as ordered  Outcome: Progressing     Problem: SAFETY ADULT - FALL  Goal: Free from fall injury  Description: INTERVENTIONS:  - Assess pt frequently for physical needs  - Identify cognitive and physical deficits and behaviors that affect risk of falls.  - [place] fall precautions as indicated by assessment.  - Educate pt/family on patient safety including physical limitations  - Instruct pt to call for assistance with activity based on assessment  - Modify environment to reduce risk of injury  - Provide assistive devices as appropriate  - Consider OT/PT consult to assist with strengthening/mobility  - Encourage toileting schedule  Outcome: Progressing

## 2024-06-19 NOTE — DIETARY NOTE
Education Nutrition Note    Received consult for Heart failure diet education.    Visited pt. Eating well.  Wt fluctuation noted d/t fluid retention and Diuretics. Pt reported usual dry wt 160# but if retaining fluids or increasing edema wt goes up to 170#.      Pt received Heart failure diet education and hand out just last month ( on 5/1/2024, provided by an RD) from recent past admit. Pt reports following diet therapy and is knowledgeable on dietary restrictions. Discussed current fluid restrictions. This writer reinforced compliance to heart healthy nutrition therapy. Pt verbalized understanding.   Would benefit from outpatient follow up.       Mae Mederos RD, LDN, [facility]  Clinical Dietitian  [phone]

## 2024-06-19 NOTE — PLAN OF CARE
Problem: Patient Centered Care  Goal: Patient preferences are identified and integrated in the patient's plan of care  Description: Interventions:  - What would you like us to know as we care for you? From home with son  - Provide timely, complete, and accurate information to patient/family  - Incorporate patient and family knowledge, values, beliefs, and cultural backgrounds into the planning and delivery of care  - Encourage patient/family to participate in care and decision-making at the level they choose  - Honor patient and family perspectives and choices  Outcome: Progressing     Problem: Diabetes/Glucose Control  Goal: Glucose maintained within prescribed range  Description: INTERVENTIONS:  - Monitor Blood Glucose as ordered  - Assess for signs and symptoms of hyperglycemia and hypoglycemia  - Administer ordered medications to maintain glucose within target range  - Assess barriers to adequate nutritional intake and initiate nutrition consult as needed  - Instruct patient on self management of diabetes  Outcome: Progressing     Problem: Patient/Family Goals  Goal: Patient/Family Long Term Goal  Description: Patient's Long Term Goal: to return home    Interventions:  - vital signs  - follow md orders  - See additional Care Plan goals for specific interventions  Outcome: Progressing  Goal: Patient/Family Short Term Goal  Description: Patient's Short Term Goal: to get better    Interventions:   - follow medication plan  - See additional Care Plan goals for specific interventions  Outcome: Progressing     Problem: CARDIOVASCULAR - ADULT  Goal: Maintains optimal cardiac output and hemodynamic stability  Description: INTERVENTIONS:  - Monitor vital signs, rhythm, and trends  - Monitor for bleeding, hypotension and signs of decreased cardiac output  - Evaluate effectiveness of vasoactive medications to optimize hemodynamic stability  - Monitor arterial and/or venous puncture sites for bleeding and/or hematoma  -  Assess quality of pulses, skin color and temperature  - Assess for signs of decreased coronary artery perfusion - ex. Angina  - Evaluate fluid balance, assess for edema, trend weights  Outcome: Progressing  Goal: Absence of cardiac arrhythmias or at baseline  Description: INTERVENTIONS:  - Continuous cardiac monitoring, monitor vital signs, obtain 12 lead EKG if indicated  - Evaluate effectiveness of antiarrhythmic and heart rate control medications as ordered  - Initiate emergency measures for life threatening arrhythmias  - Monitor electrolytes and administer replacement therapy as ordered  Outcome: Progressing     Problem: SAFETY ADULT - FALL  Goal: Free from fall injury  Description: INTERVENTIONS:  - Assess pt frequently for physical needs  - Identify cognitive and physical deficits and behaviors that affect risk of falls.  - [place] fall precautions as indicated by assessment.  - Educate pt/family on patient safety including physical limitations  - Instruct pt to call for assistance with activity based on assessment  - Modify environment to reduce risk of injury  - Provide assistive devices as appropriate  - Consider OT/PT consult to assist with strengthening/mobility  - Encourage toileting schedule  Outcome: Progressing

## 2024-06-20 VITALS
OXYGEN SATURATION: 96 % | SYSTOLIC BLOOD PRESSURE: 133 MMHG | BODY MASS INDEX: 27 KG/M2 | TEMPERATURE: 98 F | HEART RATE: 81 BPM | DIASTOLIC BLOOD PRESSURE: 50 MMHG | WEIGHT: 155.13 LBS | RESPIRATION RATE: 20 BRPM

## 2024-06-20 LAB
ALBUMIN SERPL-MCNC: 3.4 G/DL (ref 3.2–4.8)
ANION GAP SERPL CALC-SCNC: 1 MMOL/L (ref 0–18)
BUN BLD-MCNC: 42 MG/DL (ref 9–23)
BUN/CREAT SERPL: 22.7 (ref 10–20)
CALCIUM BLD-MCNC: 9 MG/DL (ref 8.7–10.4)
CHLORIDE SERPL-SCNC: 113 MMOL/L (ref 98–112)
CO2 SERPL-SCNC: 28 MMOL/L (ref 21–32)
CREAT BLD-MCNC: 1.85 MG/DL
EGFRCR SERPLBLD CKD-EPI 2021: 28 ML/MIN/1.73M2 (ref 60–?)
GLUCOSE BLD-MCNC: 131 MG/DL (ref 70–99)
GLUCOSE BLDC GLUCOMTR-MCNC: 139 MG/DL (ref 70–99)
GLUCOSE BLDC GLUCOMTR-MCNC: 141 MG/DL (ref 70–99)
MAGNESIUM SERPL-MCNC: 1.6 MG/DL (ref 1.6–2.6)
OSMOLALITY SERPL CALC.SUM OF ELEC: 306 MOSM/KG (ref 275–295)
PHOSPHATE SERPL-MCNC: 4.1 MG/DL (ref 2.4–5.1)
POTASSIUM SERPL-SCNC: 4.2 MMOL/L (ref 3.5–5.1)
SODIUM SERPL-SCNC: 142 MMOL/L (ref 136–145)

## 2024-06-20 PROCEDURE — 99239 HOSP IP/OBS DSCHRG MGMT >30: CPT | Performed by: HOSPITALIST

## 2024-06-20 RX ORDER — TROLAMINE SALICYLATE 10 G/100G
CREAM TOPICAL
Qty: 35 G | Refills: 0 | Status: SHIPPED | OUTPATIENT
Start: 2024-06-20

## 2024-06-20 RX ORDER — MAGNESIUM OXIDE 400 MG/1
400 TABLET ORAL ONCE
Status: COMPLETED | OUTPATIENT
Start: 2024-06-20 | End: 2024-06-20

## 2024-06-20 RX ORDER — BUMETANIDE 1 MG/1
1 TABLET ORAL
Status: DISCONTINUED | OUTPATIENT
Start: 2024-06-20 | End: 2024-06-20

## 2024-06-20 RX ORDER — MAGNESIUM OXIDE 400 MG/1
400 TABLET ORAL DAILY
Qty: 30 TABLET | Refills: 0 | Status: SHIPPED | OUTPATIENT
Start: 2024-06-20

## 2024-06-20 RX ORDER — FUROSEMIDE 20 MG/1
20 TABLET ORAL DAILY
Status: DISCONTINUED | OUTPATIENT
Start: 2024-06-21 | End: 2024-06-20

## 2024-06-20 RX ORDER — DIAZEPAM 10 MG/1
5 TABLET ORAL EVERY 8 HOURS PRN
Status: SHIPPED | COMMUNITY
Start: 2024-06-20

## 2024-06-20 RX ORDER — MECLIZINE HYDROCHLORIDE 25 MG/1
25 TABLET ORAL 3 TIMES DAILY PRN
Status: SHIPPED | COMMUNITY
Start: 2024-06-20

## 2024-06-20 RX ORDER — HYDRALAZINE HYDROCHLORIDE 25 MG/1
25 TABLET, FILM COATED ORAL 2 TIMES DAILY
Qty: 60 TABLET | Refills: 0 | Status: SHIPPED | OUTPATIENT
Start: 2024-06-20

## 2024-06-20 NOTE — PHYSICAL THERAPY NOTE
Chart reviewed for PT treatment. Patient presents in bathroom and reports she needs some time and then plans to get to chair with staff. Will check back later as time allows.

## 2024-06-20 NOTE — PLAN OF CARE
A/O x4 forgetful at times, vital signs stable at this time. Norco given for pain - see MAR. Up 1 assist and walker. Call light within reach, non-slip socks on, frequent rounding done. Possible discharge later today.    Problem: Patient Centered Care  Goal: Patient preferences are identified and integrated in the patient's plan of care  Description: Interventions:  - What would you like us to know as we care for you? From home with son  - Provide timely, complete, and accurate information to patient/family  - Incorporate patient and family knowledge, values, beliefs, and cultural backgrounds into the planning and delivery of care  - Encourage patient/family to participate in care and decision-making at the level they choose  - Honor patient and family perspectives and choices  Outcome: Progressing     Problem: Diabetes/Glucose Control  Goal: Glucose maintained within prescribed range  Description: INTERVENTIONS:  - Monitor Blood Glucose as ordered  - Assess for signs and symptoms of hyperglycemia and hypoglycemia  - Administer ordered medications to maintain glucose within target range  - Assess barriers to adequate nutritional intake and initiate nutrition consult as needed  - Instruct patient on self management of diabetes  Outcome: Progressing     Problem: Patient/Family Goals  Goal: Patient/Family Long Term Goal  Description: Patient's Long Term Goal: to return home    Interventions:  - vital signs  - follow md orders  - See additional Care Plan goals for specific interventions  Outcome: Progressing  Goal: Patient/Family Short Term Goal  Description: Patient's Short Term Goal: to get better    Interventions:   - follow medication plan  - See additional Care Plan goals for specific interventions  Outcome: Progressing     Problem: CARDIOVASCULAR - ADULT  Goal: Maintains optimal cardiac output and hemodynamic stability  Description: INTERVENTIONS:  - Monitor vital signs, rhythm, and trends  - Monitor for bleeding,  hypotension and signs of decreased cardiac output  - Evaluate effectiveness of vasoactive medications to optimize hemodynamic stability  - Monitor arterial and/or venous puncture sites for bleeding and/or hematoma  - Assess quality of pulses, skin color and temperature  - Assess for signs of decreased coronary artery perfusion - ex. Angina  - Evaluate fluid balance, assess for edema, trend weights  Outcome: Progressing  Goal: Absence of cardiac arrhythmias or at baseline  Description: INTERVENTIONS:  - Continuous cardiac monitoring, monitor vital signs, obtain 12 lead EKG if indicated  - Evaluate effectiveness of antiarrhythmic and heart rate control medications as ordered  - Initiate emergency measures for life threatening arrhythmias  - Monitor electrolytes and administer replacement therapy as ordered  Outcome: Progressing     Problem: SAFETY ADULT - FALL  Goal: Free from fall injury  Description: INTERVENTIONS:  - Assess pt frequently for physical needs  - Identify cognitive and physical deficits and behaviors that affect risk of falls.  - [place] fall precautions as indicated by assessment.  - Educate pt/family on patient safety including physical limitations  - Instruct pt to call for assistance with activity based on assessment  - Modify environment to reduce risk of injury  - Provide assistive devices as appropriate  - Consider OT/PT consult to assist with strengthening/mobility  - Encourage toileting schedule  Outcome: Progressing

## 2024-06-20 NOTE — CM/SW NOTE
06/20/24 1500   Discharge disposition   Expected discharge disposition Home-Health   Post Acute Care Provider  Health Ot   Discharge transportation Private car     MD DC order entered.   Called and spoke to Blessing at [facility], notified her that pt is discharging today and that there are new lab orders that need to be drawn.     DC transfer report and lab order also fax to them from [place].     [facility], [address] [address]  Phone: [phone]  Fax: [phone]    Simran Carolina RN, BSN  Nurse   [phone]

## 2024-06-20 NOTE — DISCHARGE INSTRUCTIONS
CHECK YOUR BLOOD PRESSURE DAILY AND WHEN NOT FEELING WELL,   WRITE IT DOWN TO CREATE A LOG AND BRING TO YOUR PCP FOR REVIEW    CHECK YOUR WEIGHT DAILY.    IF ANY QUESTIONS OR CONCERNS, CALL YOUR PCP FOR ADVICE      [employer], [address] [address]  Phone: [phone]  Fax: [phone]

## 2024-06-20 NOTE — PLAN OF CARE
Problem: Patient Centered Care  Goal: Patient preferences are identified and integrated in the patient's plan of care  Description: Interventions:  - What would you like us to know as we care for you? From home with son  - Provide timely, complete, and accurate information to patient/family  - Incorporate patient and family knowledge, values, beliefs, and cultural backgrounds into the planning and delivery of care  - Encourage patient/family to participate in care and decision-making at the level they choose  - Honor patient and family perspectives and choices  Outcome: Adequate for Discharge     Problem: Diabetes/Glucose Control  Goal: Glucose maintained within prescribed range  Description: INTERVENTIONS:  - Monitor Blood Glucose as ordered  - Assess for signs and symptoms of hyperglycemia and hypoglycemia  - Administer ordered medications to maintain glucose within target range  - Assess barriers to adequate nutritional intake and initiate nutrition consult as needed  - Instruct patient on self management of diabetes  Outcome: Adequate for Discharge     Problem: Patient/Family Goals  Goal: Patient/Family Long Term Goal  Description: Patient's Long Term Goal: to return home    Interventions:  - vital signs  - follow md orders  - See additional Care Plan goals for specific interventions  Outcome: Adequate for Discharge  Goal: Patient/Family Short Term Goal  Description: Patient's Short Term Goal: to get better    Interventions:   - follow medication plan  - See additional Care Plan goals for specific interventions  Outcome: Adequate for Discharge     Problem: CARDIOVASCULAR - ADULT  Goal: Maintains optimal cardiac output and hemodynamic stability  Description: INTERVENTIONS:  - Monitor vital signs, rhythm, and trends  - Monitor for bleeding, hypotension and signs of decreased cardiac output  - Evaluate effectiveness of vasoactive medications to optimize hemodynamic stability  - Monitor arterial and/or venous  puncture sites for bleeding and/or hematoma  - Assess quality of pulses, skin color and temperature  - Assess for signs of decreased coronary artery perfusion - ex. Angina  - Evaluate fluid balance, assess for edema, trend weights  Outcome: Adequate for Discharge  Goal: Absence of cardiac arrhythmias or at baseline  Description: INTERVENTIONS:  - Continuous cardiac monitoring, monitor vital signs, obtain 12 lead EKG if indicated  - Evaluate effectiveness of antiarrhythmic and heart rate control medications as ordered  - Initiate emergency measures for life threatening arrhythmias  - Monitor electrolytes and administer replacement therapy as ordered  Outcome: Adequate for Discharge     Problem: SAFETY ADULT - FALL  Goal: Free from fall injury  Description: INTERVENTIONS:  - Assess pt frequently for physical needs  - Identify cognitive and physical deficits and behaviors that affect risk of falls.  - [place] fall precautions as indicated by assessment.  - Educate pt/family on patient safety including physical limitations  - Instruct pt to call for assistance with activity based on assessment  - Modify environment to reduce risk of injury  - Provide assistive devices as appropriate  - Consider OT/PT consult to assist with strengthening/mobility  - Encourage toileting schedule  Outcome: Adequate for Discharge

## 2024-06-20 NOTE — PROGRESS NOTES
[facility] - CARDIOLOGY PROGRESS NOTE      Hannah Boss Patient Status:  Inpatient    1945 MRN [id number]   Location [facility] 5SW/SE Attending Brianne Pagan MD   Hosp Day # 3 PCP Diana Lopez DO         Assessment and Plan:     LVEF 55-60% on  echo with G1DD  -May now be slightly dry with increased creatinine.  Stop Bumex.  Resume daily Lasix in AM.  Follow-up BMP in the next 3 to 5 days.  Needs follow-up within a week and should monitor weights closely at home      Altered mental status  Resolved     Fall  with head trauma  Primary following     HTN  BP improved continue diltiazem and hydralazine and monitor as outpatient      HLD-not on statin, LDL controlled     CAD     DMII     Hx DVT  Eliquis     Hx SVT  S/p ablation     Aortic stenosis  Moderate aortic stenosis with mean gradient of 21 follow as outpatient     Mitral valve regurgitation  Mild on January echo      RA     CKD  Cr improving with diuresis     Home if okay with others as long as BMP in 3 to 5 days and follow-up next week   Objective:   Blood pressure 123/61, pulse 88, temperature 98.2 °F (36.8 °C), temperature source Oral, resp. rate 20, weight 155 lb 1.6 oz (70.4 kg), SpO2 96%, not currently breastfeeding.  Intake/Output:        Last 24 hours:   Intake/Output Summary (Last 24 hours) at 2024 1035  Last data filed at 2024 0909  Gross per 24 hour   Intake 340 ml   Output 1500 ml   Net -1160 ml      This shift: I/O this shift:  In: 240 [P.O.:240]  Out: -     Exam  Gen: Comfortable, in no acute distress,    Psych.alert and oriented x3  HEENT: atraumatic, normal conjunctiva, moist mucosa  Neck:supple,no JVD, no bruits  Lungs: clear to ascultation, normal respiratory effort  Cardiac: regular rate and rhythm, nl S1,S2, 2/6 systolic ejection murmur  Ext:  no clubbing, no cyanosis,no edema  Neuro: no focal deficits  Skin: no rashes or  lesions        Scheduled Meds:    [START ON 6/21/2024] furosemide  20 mg Oral Daily    apixaban  5 mg Oral BID    dilTIAZem ER  240 mg Oral Daily    gabapentin  300 mg Oral Nightly    pantoprazole  40 mg Oral BID AC    lidocaine-menthol  1 patch Transdermal Daily    hydrALAZINE  25 mg Oral Q8H ERNESTO       Results:     Lab Results   Component Value Date    WBC 7.2 06/18/2024    HGB 9.1 (L) 06/18/2024    HCT 28.5 (L) 06/18/2024    .0 06/18/2024    CREATSERUM 1.85 (H) 06/20/2024    BUN 42 (H) 06/20/2024     06/20/2024    K 4.2 06/20/2024     (H) 06/20/2024    CO2 28.0 06/20/2024     (H) 06/20/2024    CA 9.0 06/20/2024    ALB 3.4 06/20/2024    ALKPHO 217 (H) 04/30/2024    BILT 0.3 04/30/2024    TP 7.3 04/30/2024    AST 59 (H) 04/30/2024    ALT 25 04/30/2024    PTT 35.0 03/08/2024    INR 1.30 (H) 03/08/2024    TSH 0.615 10/07/2023    LIP 22 03/19/2024    DDIMER 1.13 (H) 06/05/2023    MG 1.6 06/20/2024    PHOS 4.1 06/20/2024    TROP 0.398 (HH) 09/13/2021     (H) 12/31/2023    B12 497 06/19/2024       No results found.          Abdirahman Buchanan MD  [facility] L[facility]  6/20/2024

## 2024-06-20 NOTE — PROGRESS NOTES
[facility]  part of [facility]    Progress Note    Hannah Boss Patient Status:  Inpatient    1945 MRN [id number]   Location [facility] 5SW/SE Attending Brianne Pagan MD   Hosp Day # 2 PCP Diana Lopez DO     Chief complaint: AMS  Subjective:   AMS resolved, now back to self, interactive, pleasant, wants to go home  C/o multiple chronic pains from her previous cancer, controlled with medication  Leg swellings much better  Breathing better  No high fevers    Seen with cardiology APN at the bedside    Objective:   Blood pressure 147/54, pulse 84, temperature 98.4 °F (36.9 °C), temperature source Oral, resp. rate 18, weight 158 lb 6.4 oz (71.8 kg), SpO2 99%, not currently breastfeeding.    HEENT: conjunctivae/corneas clear. PERRL, EOM's intact.  Neck: no adenopathy, no carotid bruit, no JVD, supple  Pulmonary: Coarse, diminished to auscultation bilaterally  Cardiovascular: S1, S2 normal, no murmur, click, rub or gallop, regular rate and rhythm  Abdominal: soft, non-tender; bowel sounds normal; no masses,  no organomegaly  Extremities: vitaliy edema Rt>>L  Pulses: palpable and symmetric  Skin: Warm and dry  Neurologic: Alert and oriented X 3, unsteady gait  Psychiatric: calm, cooperative    Assessment and Plan:     Acute on chronic diastolic heart failure.  -Cardiology is following  -Bumex IV  -Monitor urine output, I&D, electrolytes, and renal functions  -mag low, replace  -Steady improvement    Moderate aortic stenosis  Moderate pulmonary artery hypertension  Paroxysmal supraventricular tachycardia  HL    Acute encephalopathy  Possible opioid overdose from pain medications/oxy   -Check B12, ok  -Neurochecks, not back to self  -try non-opioid medication, local lidocaine patch, creams to decrease use of opioids    Fall w/head trauma  -Conservative management  -PT/OT eval, recomm home health after discharge    Essential hypertension.  -Restart home medication with  improvement    Chronic kidney disease stage 4.   Cr at baseline~2 (on admission.  1.97)  Hyperkalemia on admission K~5.6, improved  -monitor    Anemia chronic disease  Chronic stasis dermatitis lower extremities  H/o C. 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 Pt states that she just saw her oncologist, doing okay  H/o cholecystectomy  H/o RA  nicotine use disorder    H/o LLE DVT--> continue Eliquis      DVT prophylaxis: Eliquis     Dispo: Pending progress, anticipate home with home health, possibly tomorrow      Chart reviewed, including current vitals, notes, labs and imaging  Pertinent past medical records reviewed  Labs ordered and medications adjusted as outlined above  Coordinate care with care team/consultants  Discussed with patient the results of tests, management plan as outlined above, and the need for ongoing hospitalization  D/w RN     MDM high  severe acute illness/or exacerbation of chronic illness posing a threat to life, IV medications, requiring close monitoring in hospital.       Results:     Lab Results   Component Value Date    WBC 7.2 06/18/2024    HGB 9.1 (L) 06/18/2024    HCT 28.5 (L) 06/18/2024    .0 06/18/2024    CREATSERUM 1.63 (H) 06/19/2024    BUN 29 (H) 06/19/2024     06/19/2024    K 4.2 06/19/2024     (H) 06/19/2024    CO2 26.0 06/19/2024     (H) 06/19/2024    CA 8.7 06/19/2024    ALB 3.4 06/19/2024    ALKPHO 217 (H) 04/30/2024    BILT 0.3 04/30/2024    TP 7.3 04/30/2024    AST 59 (H) 04/30/2024    ALT 25 04/30/2024    PTT 35.0 03/08/2024    INR 1.30 (H) 03/08/2024    TSH 0.615 10/07/2023    LIP 22 03/19/2024    DDIMER 1.13 (H) 06/05/2023    MG 1.6 06/19/2024    PHOS 3.5 06/19/2024    TROP 0.398 (HH) 09/13/2021     (H) 12/31/2023    B12 497 06/19/2024       No results found.          JOSE WALKER MD  6/19/2024

## 2024-06-20 NOTE — PHYSICAL THERAPY NOTE
PHYSICAL THERAPY TREATMENT NOTE - INPATIENT     Room Number: 562/562-A       Presenting Problem: AMS, fall out of bed, weakness  Co-Morbidities : diabetes, CAD, CKD, hypertension, hyperlipidemia, DVT, ablation, SVT, rheumatoid arthritis, tobacco abuse, aortic valve stenosis    Problem List  Principal Problem:    Altered mental status, unspecified altered mental status type  Active Problems:    Hypernatremia    Hyperglycemia    Acute heart failure (HCC)      PHYSICAL THERAPY ASSESSMENT   Patient demonstrates good  progress this session, goals  remain in progress.    Patient continues to function near baseline with bed mobility, transfers, gait, standing prolonged periods, and performing household tasks.  Contributing factors to remaining limitations include decreased functional strength, decreased endurance/aerobic capacity, impaired standing balance, and decreased muscular endurance.  Next session anticipate patient to progress bed mobility, transfers, gait, standing prolonged periods, and performing household tasks.  Physical Therapy will continue to follow patient for duration of hospitalization.    Patient continues to benefit from continued skilled PT services: at discharge to promote functional independence in home.  Anticipate patient will return home with home health PT.    PLAN  PT Treatment Plan: Body mechanics;Endurance;Energy conservation;Patient education;Gait training;Strengthening;Transfer training;Balance training  Frequency (Obs): 5x/week    SUBJECTIVE  \"I can't wait to have a cigarette\"     OBJECTIVE  Precautions: Bed/chair alarm    PAIN ASSESSMENT   Ratin  Location: chronic back pain  Management Techniques: Activity promotion;Body mechanics;Repositioning    BALANCE  Static Sitting: Good  Dynamic Sitting: Fair +  Static Standing: Fair +  Dynamic Standing: Fair    ACTIVITY TOLERANCE  Pulse: 101  Heart Rate Source:  (pulse ox)     BP: 138/55  BP Location: Left arm  BP Method: Automatic  Patient  Position: Sitting     O2 WALK  Oxygen Therapy  SPO2% Ambulation on Room Air: 100    AM-PAC '6-Clicks' INPATIENT SHORT FORM - BASIC MOBILITY  How much difficulty does the patient currently have...  Patient Difficulty: Turning over in bed (including adjusting bedclothes, sheets and blankets)?: None   Patient Difficulty: Sitting down on and standing up from a chair with arms (e.g., wheelchair, bedside commode, etc.): A Little   Patient Difficulty: Moving from lying on back to sitting on the side of the bed?: None   How much help from another person does the patient currently need...   Help from Another: Moving to and from a bed to a chair (including a wheelchair)?: A Little   Help from Another: Need to walk in hospital room?: A Little   Help from Another: Climbing 3-5 steps with a railing?: A Little     AM-PAC Score:  Raw Score: 20   Approx Degree of Impairment: 35.83%   Standardized Score (AM-PAC Scale): 47.67   CMS Modifier (G-Code): CJ    FUNCTIONAL ABILITY STATUS  Functional Mobility/Gait Assessment  Gait Assistance: Contact guard assist;Supervision  Distance (ft): 100 ft  Assistive Device: Rolling walker  Pattern: R Flexed knee;L Flexed knee (fwd trunk lean, decreased DF bilaterally, toe out bilaterally, decreased jessica, required verbal cues to stay close to the walker, short step/stride length, narrow JOYCELYN initially cued pt to maintain wider JOYCELYN)  Sit to Stand: supervision    Additional information: Pt received sitting in chair. Received approval from nurse. Received consent from pt to treat. Pt went through seated exercises to prepare the pt for gait training. Pt reported being eager to leave and feels confident in discharging soon. Pt ambulated using a rolling walker and required verbal cues to improve gait mechanics and ensure pt safety. Pt wore slippers brought by her son that were too big for her which could have lead to pt's gait deviations. Asked pt if she wanted to put on hospital socks but wanted to  keep slippers on. Vitals taken after gait training (/55, , O2 100)    The patient's Approx Degree of Impairment: 35.83% has been calculated based on documentation in the [facility] '6 clicks' Inpatient Daily Activity Short Form.  Research supports that patients with this level of impairment may benefit from home with home health PT.    Final disposition will be made by interdisciplinary medical team.    THERAPEUTIC EXERCISES  Lower Extremity Ankle pumps  LAQ  Seated marching     Position Sitting       Patient End of Session: Up in chair;Needs met;Call light within reach;RN aware of session/findings;All patient questions and concerns addressed    CURRENT GOALS   Goals to be met by: 6/25/24  Patient Goal Patient's self-stated goal is: none stated   Goal #1 Patient is able to demonstrate supine - sit EOB @ level: supervision      Goal #1   Current Status  NT   Goal #2 Patient is able to demonstrate transfers Sit to/from Stand at assistance level: supervision with walker - rolling      Goal #2  Current Status  MET 6/20   Goal #3 Patient is able to ambulate 150 feet with assist device: walker - rolling at assistance level: supervision   Goal #3   Current Status  CGA/SBA, pt required multiple verbal cues to improve gait mechanics   Goal #4 Patient to demonstrate independence with home activity/exercise instructions provided to patient in preparation for discharge.   Goal #4   Current Status  NT     Gait Training: 15 minutes  Therapeutic Exercise: 8 minutes

## 2024-06-20 NOTE — CM/SW NOTE
CM called and spoke to Jesu in Intake at [facility].  He confirms pt is current with [facility].   [facility] res via [facility].     Will need to CALL and notify them of dc when pt is stable.     [phone]    Simran Carolina RN, BSN  Nurse   [phone]

## 2024-06-21 NOTE — PAYOR COMM NOTE
Discharge Notification    Patient Name: MERNA ALEXANDER  Payor: [employer]EAG[employer] [facility]  Subscriber #: [id number]  Authorization Number: [id number]  Admit Date/Time: 6/17/2024 1:50 PM  Discharge Date/Time: 6/20/2024 4:20 PM

## 2024-06-25 NOTE — DISCHARGE SUMMARY
[place] HOSPITALIST  DISCHARGE SUMMARY     Hannah Boss Patient Status:  Inpatient    1945 MRN [id number]   Location [facility] 5SW/SE Attending No att. providers found   Hosp Day # 3 PCP Diana Lopez DO     Date of Admission: 2024  Date of Discharge: 2024  Discharge Disposition: Home Health Care Services    Admitting Chief Complaint:   Altered mental status, unspecified altered mental status type [R41.82]    PCP: Diana Lopez DO    Discharge Diagnosis:   Acute on chronic diastolic heart failure.     Moderate aortic stenosis  Moderate pulmonary artery hypertension  Paroxysmal supraventricular tachycardia  HL     Acute encephalopathy  Possible opioid overdose from pain medications/oxy      Fall w/head trauma     Essential hypertension.     Chronic kidney disease stage 4.   Hyperkalemia on admission K~5.6, improved     Anemia chronic disease  Chronic stasis dermatitis lower extremities  H/o C. 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   H/o RA  nicotine use disorder     H/o LLE DVT  Chronic anticoagulation          History of Present Illness:   Per Dr. Pascual  Patient is a 78-year-old  female who is chronically on oxycodone pain medication. Was brought in today for drowsiness and acute on chronic diastolic heart failure. CBC and chemistry were unremarkable, numbers at baseline. GFR 28, BUN and creatinine of 30 and 1.82. Hemoglobin 8.7. CT scan of the brain showed stable mild right frontal scalp contusion and no acute intracranial findings. EKG today showed normal sinus rhythm with left atrial enlargement. Patient had a chest x-ray which is still pending.         Brief Synopsis:   Acute on chronic diastolic heart failure.  -Cardiology is following  -s/p Bumex IV-->home on lasix  -Monitor urine output, I&D, electrolytes, and renal functions  -mag low, replace  -Steady  improvement     Moderate aortic stenosis  Moderate pulmonary artery hypertension  Paroxysmal supraventricular tachycardia  HL     Acute encephalopathy, resolved  Possible opioid overdose from pain medications/oxy, in addition on diazepam  -Check B12, ok  -Neurochecks, not back to self  -try non-opioid medication, local lidocaine patch, creams to decrease use of opioids   Pt states that norco doesn't help   We discussed at length a safe use of medications     Fall w/head trauma  -Conservative management  -PT/OT eval, recomm home health after discharge     Essential hypertension.  -Restart home medication with improvement     Chronic kidney disease stage 4.   Cr at baseline~2 (on admission.  1.97)  Hyperkalemia on admission K~5.6, improved  -monitor     Anemia chronic disease  Chronic stasis dermatitis lower extremities  H/o C. 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 Pt states that she just saw her oncologist, doing okay  H/o cholecystectomy  H/o RA  nicotine use disorder     H/o LLE DVT--> continue Eliquis        DVT prophylaxis: Eliquis     Dispo: home with [facility], BMP in 3-5 days, pt to check her weight daily, and monitor BP at home              Discharge Medication List:     Discharge Medications        START taking these medications        Instructions Prescription details   hydrALAZINE 25 MG Tabs  Commonly known as: Apresoline      Take 1 tablet (25 mg total) by mouth in the morning and 1 tablet (25 mg total) before bedtime.   Quantity: 60 tablet  Refills: 0     magnesium oxide 400 MG Tabs  Commonly known as: Mag-Ox      Take 1 tablet (400 mg total) by mouth daily.   Quantity: 30 tablet  Refills: 0     trolamine salicyliate 10 % Crea      Apply to painful joints or muscles up to three times a day as needed   Quantity: 35 g  Refills: 0            CHANGE how you take these medications        Instructions  Prescription details   diazePAM 10 MG Tabs  Commonly known as: Valium  What changed: how much to take      Take 0.5 tablets (5 mg total) by mouth every 8 (eight) hours as needed for Anxiety (max 3/day).   Refills: 0     meclizine 25 MG Tabs  Commonly known as: Antivert  What changed:   when to take this  reasons to take this      Take 1 tablet (25 mg total) by mouth 3 (three) times daily as needed for Dizziness or Nausea.   Refills: 0            CONTINUE taking these medications        Instructions Prescription details   apixaban 5 MG Tabs  Commonly known as: Eliquis      Take 1 tablet (5 mg total) by mouth 2 (two) times daily.   Quantity: 60 tablet  Refills: 0     cholecalciferol 50 MCG (2000 UT) Tabs  Commonly known as: Vitamin D3      Take 1 tablet (2,000 Units total) by mouth daily.   Refills: 0     dilTIAZem  MG Cp24  Commonly known as: CardIZEM CD      Take 1 capsule (240 mg total) by mouth daily.   Quantity: 30 capsule  Refills: 1     ferrous sulfate 325 (65 FE) MG Tbec      Take 1 tablet (325 mg total) by mouth daily with breakfast. Three times a week   Refills: 0     furosemide 20 MG Tabs  Commonly known as: Lasix      Take 1 tablet (20 mg total) by mouth daily.   Quantity: 30 tablet  Refills: 0     gabapentin 300 MG Caps  Commonly known as: Neurontin      Take 1 capsule (300 mg total) by mouth nightly.   Refills: 0     oxyCODONE-acetaminophen  MG Tabs  Commonly known as: Percocet  Start taking on: July 2, 2024      Take 1-2 tablets by mouth every 4 (four) hours as needed for Pain.   Stop taking on: August 1, 2024  Quantity: 180 tablet  Refills: 0     oxyCODONE-acetaminophen  MG Tabs  Commonly known as: Percocet  Start taking on: August 2, 2024      Take 1-2 tablets by mouth every 4 (four) hours as needed for Pain.   Stop taking on: September 1, 2024  Quantity: 180 tablet  Refills: 0     pantoprazole 40 MG Tbec  Commonly known as: Protonix      Take 1 tablet (40 mg total) by mouth 2 (two)  times daily before meals.   Refills: 0               Where to Get Your Medications        These medications were sent to [facility] [id number] [facility] - [place], [place] - [address] [phone], [phone]  [address], [facility] [address]      Phone: [phone]   hydrALAZINE 25 MG Tabs  magnesium oxide 400 MG Tabs  trolamine salicyliate 10 % Crea         Follow-up appointment:   Diana Lopez DO  [address]  [facility] [id number] [phone]    Schedule an appointment as soon as possible for a visit in 1 week(s)  post hospitalization    Janet Reynaga MD  [address] [facility] [address]  [facility] [id number] [phone]    Schedule an appointment as soon as possible for a visit  in 1-2 weeks      Vital signs:       Physical Exam:    General: No acute distress. back to self, interactive, pleasant, wants to go home   Respiratory: diminished to auscultation bilaterally. No wheezes. No rhonchi.  Cardiovascular: S1, S2. Regular rate and rhythm. No murmurs, rubs or gallops.   Abdomen: Soft, nontender, nondistended.  Positive bowel sounds. No rebound or guarding.  Neurologic: No new focal neurological deficits.   Musculoskeletal: Moves all extremities.  Extremities: vitaliy edema much improved  -----------------------------------------------------------------------------------------------  PATIENT DISCHARGE INSTRUCTIONS: See electronic chart    I d/w pt  the available results, management plan, medications changes, and discharge instructions including follow ups as outlined above.   Scripts sent to pharmacy.      Hospital Discharge Diagnoses:  CHF    Lace+ Score: 79  59-90 High Risk  29-58 Medium Risk  0-28   Low Risk.    TCM Follow-Up Recommendation:  LACE > 58: High Risk of readmission after discharge from the hospital.        JOSE WALKER MD 6/24/2024    Time spent:  > 30 minutes

## 2024-07-02 ENCOUNTER — HOSPITAL ENCOUNTER (INPATIENT)
Facility: HOSPITAL | Age: 79
LOS: 2 days | Discharge: HOME HEALTH | End: 2024-07-05
Attending: EMERGENCY MEDICINE | Admitting: INTERNAL MEDICINE
Payer: MEDICARE

## 2024-07-02 DIAGNOSIS — N17.9 ACUTE KIDNEY INJURY (HCC): ICD-10-CM

## 2024-07-02 DIAGNOSIS — T40.604A: ICD-10-CM

## 2024-07-02 DIAGNOSIS — N39.0 URINARY TRACT INFECTION WITHOUT HEMATURIA, SITE UNSPECIFIED: ICD-10-CM
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
## 2024-07-02 DIAGNOSIS — E87.5 HYPERKALEMIA: ICD-10-CM

## 2024-07-03 ENCOUNTER — APPOINTMENT (OUTPATIENT)
Dept: ULTRASOUND IMAGING | Facility: HOSPITAL | Age: 79
End: 2024-07-03
Attending: INTERNAL MEDICINE
Payer: MEDICARE

## 2024-07-03 ENCOUNTER — APPOINTMENT (OUTPATIENT)
Dept: GENERAL RADIOLOGY | Facility: HOSPITAL | Age: 79
End: 2024-07-03
Attending: EMERGENCY MEDICINE
Payer: MEDICARE

## 2024-07-03 ENCOUNTER — APPOINTMENT (OUTPATIENT)
Dept: CT IMAGING | Facility: HOSPITAL | Age: 79
End: 2024-07-03
Attending: EMERGENCY MEDICINE
Payer: MEDICARE

## 2024-07-03 PROBLEM — T40.604A: Status: ACTIVE | Noted: 2024-07-03

## 2024-07-03 LAB
ALBUMIN SERPL-MCNC: 3.7 G/DL (ref 3.2–4.8)
ALBUMIN SERPL-MCNC: 3.8 G/DL (ref 3.2–4.8)
ALP LIVER SERPL-CCNC: 205 U/L
ALT SERPL-CCNC: 23 U/L
AMPHET UR QL SCN: NEGATIVE
ANION GAP SERPL CALC-SCNC: 1 MMOL/L (ref 0–18)
ANION GAP SERPL CALC-SCNC: 4 MMOL/L (ref 0–18)
ANION GAP SERPL CALC-SCNC: 5 MMOL/L (ref 0–18)
AST SERPL-CCNC: 17 U/L (ref ?–34)
ATRIAL RATE: 56 BPM
ATRIAL RATE: 77 BPM
ATRIAL RATE: 79 BPM
BARBITURATES UR QL SCN: NEGATIVE
BASOPHILS # BLD AUTO: 0.02 X10(3) UL (ref 0–0.2)
BASOPHILS NFR BLD AUTO: 0.3 %
BILIRUB DIRECT SERPL-MCNC: 0.1 MG/DL (ref ?–0.3)
BILIRUB SERPL-MCNC: 0.3 MG/DL (ref 0.2–1.1)
BILIRUB UR QL: NEGATIVE
BUN BLD-MCNC: 54 MG/DL (ref 9–23)
BUN BLD-MCNC: 62 MG/DL (ref 9–23)
BUN BLD-MCNC: 66 MG/DL (ref 9–23)
BUN/CREAT SERPL: 17.6 (ref 10–20)
BUN/CREAT SERPL: 19.6 (ref 10–20)
BUN/CREAT SERPL: 22.4 (ref 10–20)
CALCIUM BLD-MCNC: 9.2 MG/DL (ref 8.7–10.4)
CALCIUM BLD-MCNC: 9.3 MG/DL (ref 8.7–10.4)
CALCIUM BLD-MCNC: 9.4 MG/DL (ref 8.7–10.4)
CANNABINOIDS UR QL SCN: NEGATIVE
CHLORIDE SERPL-SCNC: 114 MMOL/L (ref 98–112)
CHLORIDE SERPL-SCNC: 116 MMOL/L (ref 98–112)
CHLORIDE SERPL-SCNC: 117 MMOL/L (ref 98–112)
CK SERPL-CCNC: 170 U/L
CLARITY UR: CLEAR
CO2 SERPL-SCNC: 22 MMOL/L (ref 21–32)
CO2 SERPL-SCNC: 23 MMOL/L (ref 21–32)
CO2 SERPL-SCNC: 25 MMOL/L (ref 21–32)
COCAINE UR QL: NEGATIVE
COLOR UR: YELLOW
CREAT BLD-MCNC: 2.77 MG/DL
CREAT BLD-MCNC: 3.07 MG/DL
CREAT BLD-MCNC: 3.37 MG/DL
CREAT UR-SCNC: 171.7 MG/DL
DEPRECATED HBV CORE AB SER IA-ACNC: 166.6 NG/ML
DEPRECATED RDW RBC AUTO: 46.5 FL (ref 35.1–46.3)
EGFRCR SERPLBLD CKD-EPI 2021: 14 ML/MIN/1.73M2 (ref 60–?)
EGFRCR SERPLBLD CKD-EPI 2021: 15 ML/MIN/1.73M2 (ref 60–?)
EGFRCR SERPLBLD CKD-EPI 2021: 17 ML/MIN/1.73M2 (ref 60–?)
EOSINOPHIL # BLD AUTO: 0.14 X10(3) UL (ref 0–0.7)
EOSINOPHIL NFR BLD AUTO: 2.1 %
ERYTHROCYTE [DISTWIDTH] IN BLOOD BY AUTOMATED COUNT: 14.1 % (ref 11–15)
FENTANYL UR QL SCN: NEGATIVE
GLUCOSE BLD-MCNC: 106 MG/DL (ref 70–99)
GLUCOSE BLD-MCNC: 121 MG/DL (ref 70–99)
GLUCOSE BLD-MCNC: 133 MG/DL (ref 70–99)
GLUCOSE BLDC GLUCOMTR-MCNC: 111 MG/DL (ref 70–99)
GLUCOSE BLDC GLUCOMTR-MCNC: 129 MG/DL (ref 70–99)
GLUCOSE BLDC GLUCOMTR-MCNC: 141 MG/DL (ref 70–99)
GLUCOSE BLDC GLUCOMTR-MCNC: 151 MG/DL (ref 70–99)
GLUCOSE BLDC GLUCOMTR-MCNC: 152 MG/DL (ref 70–99)
GLUCOSE BLDC GLUCOMTR-MCNC: 159 MG/DL (ref 70–99)
GLUCOSE BLDC GLUCOMTR-MCNC: 178 MG/DL (ref 70–99)
GLUCOSE BLDC GLUCOMTR-MCNC: 211 MG/DL (ref 70–99)
GLUCOSE BLDC GLUCOMTR-MCNC: 234 MG/DL (ref 70–99)
GLUCOSE UR-MCNC: NORMAL MG/DL
HCT VFR BLD AUTO: 25.2 %
HCT VFR BLD AUTO: 26.8 %
HGB BLD-MCNC: 7.9 G/DL
HGB BLD-MCNC: 8.2 G/DL
HGB UR QL STRIP.AUTO: NEGATIVE
IMM GRANULOCYTES # BLD AUTO: 0.01 X10(3) UL (ref 0–1)
IMM GRANULOCYTES NFR BLD: 0.2 %
IRON SATN MFR SERPL: 10 %
IRON SERPL-MCNC: 37 UG/DL
KETONES UR-MCNC: NEGATIVE MG/DL
LEUKOCYTE ESTERASE UR QL STRIP.AUTO: 500
LYMPHOCYTES # BLD AUTO: 0.73 X10(3) UL (ref 1–4)
LYMPHOCYTES NFR BLD AUTO: 11 %
MCH RBC QN AUTO: 28 PG (ref 26–34)
MCHC RBC AUTO-ENTMCNC: 31.3 G/DL (ref 31–37)
MCV RBC AUTO: 89.4 FL
MDMA UR QL SCN: NEGATIVE
METHADONE UR QL SCN: NEGATIVE
MONOCYTES # BLD AUTO: 0.44 X10(3) UL (ref 0.1–1)
MONOCYTES NFR BLD AUTO: 6.6 %
NEUTROPHILS # BLD AUTO: 5.32 X10 (3) UL (ref 1.5–7.7)
NEUTROPHILS # BLD AUTO: 5.32 X10(3) UL (ref 1.5–7.7)
NEUTROPHILS NFR BLD AUTO: 79.8 %
NITRITE UR QL STRIP.AUTO: NEGATIVE
OSMOLALITY SERPL CALC.SUM OF ELEC: 306 MOSM/KG (ref 275–295)
OSMOLALITY SERPL CALC.SUM OF ELEC: 315 MOSM/KG (ref 275–295)
OSMOLALITY SERPL CALC.SUM OF ELEC: 318 MOSM/KG (ref 275–295)
P AXIS: -85 DEGREES
P AXIS: 70 DEGREES
P AXIS: 75 DEGREES
P-R INTERVAL: 138 MS
P-R INTERVAL: 142 MS
P-R INTERVAL: 156 MS
PCP UR QL SCN: NEGATIVE
PH UR: 5 [PH] (ref 5–8)
PHOSPHATE SERPL-MCNC: 5.2 MG/DL (ref 2.4–5.1)
PLATELET # BLD AUTO: 239 10(3)UL (ref 150–450)
POTASSIUM SERPL-SCNC: 5 MMOL/L (ref 3.5–5.1)
POTASSIUM SERPL-SCNC: 5.4 MMOL/L (ref 3.5–5.1)
POTASSIUM SERPL-SCNC: 5.6 MMOL/L (ref 3.5–5.1)
POTASSIUM SERPL-SCNC: 5.7 MMOL/L (ref 3.5–5.1)
POTASSIUM SERPL-SCNC: 6 MMOL/L (ref 3.5–5.1)
PROT SERPL-MCNC: 6.7 G/DL (ref 5.7–8.2)
PROT UR-MCNC: 20 MG/DL
Q-T INTERVAL: 382 MS
Q-T INTERVAL: 392 MS
Q-T INTERVAL: 426 MS
QRS DURATION: 66 MS
QRS DURATION: 68 MS
QRS DURATION: 80 MS
QTC CALCULATION (BEZET): 411 MS
QTC CALCULATION (BEZET): 438 MS
QTC CALCULATION (BEZET): 443 MS
R AXIS: 22 DEGREES
R AXIS: 36 DEGREES
R AXIS: 38 DEGREES
RBC # BLD AUTO: 2.82 X10(6)UL
SODIUM SERPL-SCNC: 140 MMOL/L (ref 136–145)
SODIUM SERPL-SCNC: 142 MMOL/L (ref 136–145)
SODIUM SERPL-SCNC: 145 MMOL/L (ref 136–145)
SP GR UR STRIP: 1.02 (ref 1–1.03)
T AXIS: 62 DEGREES
T AXIS: 65 DEGREES
T AXIS: 72 DEGREES
T4 FREE SERPL-MCNC: 2.5 NG/DL (ref 0.8–1.7)
TIBC SERPL-MCNC: 353 UG/DL (ref 250–425)
TRANSFERRIN SERPL-MCNC: 237 MG/DL (ref 250–380)
TROPONIN I SERPL HS-MCNC: 18 NG/L
TROPONIN I SERPL HS-MCNC: 23 NG/L
TROPONIN I SERPL HS-MCNC: 23 NG/L
TSI SER-ACNC: 0.01 MIU/ML (ref 0.55–4.78)
UROBILINOGEN UR STRIP-ACNC: 2
VENTRICULAR RATE: 56 BPM
VENTRICULAR RATE: 77 BPM
VENTRICULAR RATE: 79 BPM
WBC # BLD AUTO: 6.7 X10(3) UL (ref 4–11)

## 2024-07-03 PROCEDURE — 71045 X-RAY EXAM CHEST 1 VIEW: CPT | Performed by: EMERGENCY MEDICINE

## 2024-07-03 PROCEDURE — 72131 CT LUMBAR SPINE W/O DYE: CPT | Performed by: EMERGENCY MEDICINE

## 2024-07-03 PROCEDURE — 70450 CT HEAD/BRAIN W/O DYE: CPT | Performed by: EMERGENCY MEDICINE

## 2024-07-03 PROCEDURE — 99223 1ST HOSP IP/OBS HIGH 75: CPT | Performed by: INTERNAL MEDICINE

## 2024-07-03 PROCEDURE — 93970 EXTREMITY STUDY: CPT | Performed by: INTERNAL MEDICINE

## 2024-07-03 PROCEDURE — 72128 CT CHEST SPINE W/O DYE: CPT | Performed by: EMERGENCY MEDICINE

## 2024-07-03 RX ORDER — DIAZEPAM 5 MG/1
5 TABLET ORAL EVERY 8 HOURS PRN
Status: DISCONTINUED | OUTPATIENT
Start: 2024-07-03 | End: 2024-07-05

## 2024-07-03 RX ORDER — SODIUM CHLORIDE 9 MG/ML
INJECTION, SOLUTION INTRAVENOUS CONTINUOUS
Status: DISCONTINUED | OUTPATIENT
Start: 2024-07-03 | End: 2024-07-04

## 2024-07-03 RX ORDER — ONDANSETRON 2 MG/ML
4 INJECTION INTRAMUSCULAR; INTRAVENOUS ONCE
Status: COMPLETED | OUTPATIENT
Start: 2024-07-03 | End: 2024-07-03

## 2024-07-03 RX ORDER — MELATONIN
3 NIGHTLY PRN
Status: DISCONTINUED | OUTPATIENT
Start: 2024-07-03 | End: 2024-07-05

## 2024-07-03 RX ORDER — DILTIAZEM HYDROCHLORIDE 240 MG/1
240 CAPSULE, COATED, EXTENDED RELEASE ORAL DAILY
Status: DISCONTINUED | OUTPATIENT
Start: 2024-07-03 | End: 2024-07-03

## 2024-07-03 RX ORDER — DEXTROSE MONOHYDRATE 25 G/50ML
INJECTION, SOLUTION INTRAVENOUS ONCE
Status: COMPLETED | OUTPATIENT
Start: 2024-07-03 | End: 2024-07-03

## 2024-07-03 RX ORDER — DEXTROSE MONOHYDRATE 25 G/50ML
25 INJECTION, SOLUTION INTRAVENOUS
Status: ACTIVE | OUTPATIENT
Start: 2024-07-03 | End: 2024-07-03

## 2024-07-03 RX ORDER — ACETAMINOPHEN 325 MG/1
650 TABLET ORAL EVERY 4 HOURS PRN
Status: DISCONTINUED | OUTPATIENT
Start: 2024-07-03 | End: 2024-07-05

## 2024-07-03 RX ORDER — ONDANSETRON 2 MG/ML
INJECTION INTRAMUSCULAR; INTRAVENOUS
Status: COMPLETED
Start: 2024-07-03 | End: 2024-07-03

## 2024-07-03 RX ORDER — FERROUS SULFATE 325(65) MG
325 TABLET, DELAYED RELEASE (ENTERIC COATED) ORAL
Status: DISCONTINUED | OUTPATIENT
Start: 2024-07-04 | End: 2024-07-05

## 2024-07-03 RX ORDER — ACETAMINOPHEN 500 MG
500 TABLET ORAL EVERY 4 HOURS PRN
Status: DISCONTINUED | OUTPATIENT
Start: 2024-07-03 | End: 2024-07-05

## 2024-07-03 RX ORDER — HYDRALAZINE HYDROCHLORIDE 25 MG/1
25 TABLET, FILM COATED ORAL 2 TIMES DAILY
Status: DISCONTINUED | OUTPATIENT
Start: 2024-07-03 | End: 2024-07-05

## 2024-07-03 RX ORDER — ONDANSETRON 2 MG/ML
4 INJECTION INTRAMUSCULAR; INTRAVENOUS EVERY 6 HOURS PRN
Status: DISCONTINUED | OUTPATIENT
Start: 2024-07-03 | End: 2024-07-05

## 2024-07-03 RX ORDER — HYDROCODONE BITARTRATE AND ACETAMINOPHEN 5; 325 MG/1; MG/1
1 TABLET ORAL EVERY 4 HOURS PRN
Status: DISCONTINUED | OUTPATIENT
Start: 2024-07-03 | End: 2024-07-05

## 2024-07-03 RX ORDER — VANCOMYCIN HYDROCHLORIDE 125 MG/1
125 CAPSULE ORAL DAILY
Status: DISCONTINUED | OUTPATIENT
Start: 2024-07-03 | End: 2024-07-05

## 2024-07-03 RX ORDER — NALOXONE HYDROCHLORIDE 0.4 MG/ML
0.4 INJECTION, SOLUTION INTRAMUSCULAR; INTRAVENOUS; SUBCUTANEOUS ONCE
Status: COMPLETED | OUTPATIENT
Start: 2024-07-03 | End: 2024-07-03

## 2024-07-03 RX ORDER — HYDROCODONE BITARTRATE AND ACETAMINOPHEN 5; 325 MG/1; MG/1
2 TABLET ORAL EVERY 4 HOURS PRN
Status: DISCONTINUED | OUTPATIENT
Start: 2024-07-03 | End: 2024-07-05

## 2024-07-03 RX ORDER — PANTOPRAZOLE SODIUM 40 MG/1
40 TABLET, DELAYED RELEASE ORAL
Status: DISCONTINUED | OUTPATIENT
Start: 2024-07-03 | End: 2024-07-05

## 2024-07-03 RX ORDER — MECLIZINE HCL 12.5 MG/1
25 TABLET ORAL 3 TIMES DAILY PRN
Status: DISCONTINUED | OUTPATIENT
Start: 2024-07-03 | End: 2024-07-05
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
## 2024-07-03 NOTE — HISTORICAL OFFICE NOTE
Continuity of Care Document  8/2/2023  Hannah Boss - 78 y.o. Female; born [birth year]Summary of episode note, generated on Aug. 22, 2023August 22, 2023   CHIEF COMPLAINT    CHIEF COMPLAINT  Reason for Visit/Chief Complaint   Consult   This is a 77-year-old with renal disease hypertension hypercholesterol with a pulmonary pressures aortic stenosis who presents for follow-up after recent hospitalization for DVT with confusion hyperkalemia and transient SVT. We are asked to assess cardiac issues. Patient denies chest pain but is very tired. She gets around slowly with a walker. Old records were reviewed. EKG showed SVT and July with prior EKG showing sinus with nonspecific changes. She had an echo in June showing normal LV function left atrial enlargement mild MR mild to moderate AI and mild AAS with elevated pulmonary pressures 40-50. A nuclear stress test in September 2022 was normal. Patient is not a good historian. She denied cardiac issues and is unclear where she is on nitrates and aspirin with other meds.     PROBLEMS  Reconcile with Patient's ChartPROBLEMS  Problem Effective Dates Date resolved Problem Status   Paroxysmal SVT (supraventricular tachycardia), [SNOMED-CT: 88616612] 8/3/2023 - Active   Mild pulmonary hypertension, [SNOMED-CT: 980990977] 8/3/2023 - Active   Edema, localized, [SNOMED-CT: 683928102] 8/3/2023 - Active   Hypertension (HTN), primary, [SNOMED-CT: 54850316] 8/3/2023 - Active   Hyperlipidemia, mixed, [SNOMED-CT: 641122447] 8/3/2023 - Active   Exertional dyspnea, [SNOMED-CT: 37726521] 8/3/2023 - Active     ENCOUNTER DIAGNOSIS    ENCOUNTER DIAGNOSIS  Problem Effective Dates Date resolved Problem Status   Paroxysmal SVT (supraventricular tachycardia), [SNOMED-CT: 90417610] 8/3/2023 - Active   Mild pulmonary hypertension, [SNOMED-CT: 570500122] 8/3/2023 - Active   Edema, localized, [SNOMED-CT: 887698733] 8/3/2023 - Active   Hypertension (HTN), primary, [SNOMED-CT: 62184444]  8/3/2023 - Active   Hyperlipidemia, mixed, [SNOMED-CT: 195932294] 8/3/2023 - Active   Exertional dyspnea, [SNOMED-CT: 22517690] 8/3/2023 - Active     VITAL SIGNS    VITAL SIGNS  Date / Time: 8/3/2023   BP Systolic 112 mmHg   BP Diastolic 52 mmHg   Height 63 inches   Weight 178 lbs   Pulse Rate 73 bpm   BSA (Body Surface Area) 1.9 cc/m2   BMI (Body Mass Index) 31.5 cc/m2   Blood Pressure 112 / 52 mmHg     PHYSICAL EXAMINATION    PHYSICAL EXAMINATION  Header Details   Constitutional 97o2%   Vitals Left Arm Sitting  / 52 mmHg, Pulse rate 73 bpm, Height in 5' 3\", BMI: 31.5, Weight in 178.57 lbs (or) 81 kgs, BSA : 1.92 cc/m²   General Appearance No Acute Distress   Cardiovascular      ALLERGIES, ADVERSE REACTIONS, ALERTS    No data available    MEDICATIONS ADMINISTERED DURING VISIT    No data available    MEDICATIONS  Reconcile with Patient's ChartMEDICATIONS  Medication Start Date Route/Frequency Status   amLODIPine 10 mg tablet, [RxNorm: 209618] 8/3/2023 Take 1 tablet orally once a day. Active   apixaban 5 MG Oral Tab, [RxNorm: 7635566] 8/3/2023 Take 1 tablet (5 mg total) by mouth 2 (two) times daily. Active   aspirin 81 MG Oral Tab EC, [RxNorm: 299733] 8/3/2023 Take 1 tablet (81 mg total) by mouth daily. Active   atorvastatin 40 MG Oral Tab, [RxNorm: 586213] 8/3/2023 Take 1 tablet (40 mg total) by mouth. AT BEDTIME Active   cholecalciferol 50 MCG (2000 UT) Oral Tab, [RxNorm: 995042] 8/3/2023 Take 1 tablet (2,000 Units total) by mouth daily. Active   diazePAM 10 MG Oral Tab, [RxNorm: 422205] 8/3/2023 Take 1 tablet (10 mg total) by mouth every 8 (eight) hours as needed (MAX 3/DAY). Active   gabapentin 300 MG Oral Cap, [RxNorm: 161922] 8/3/2023 Take 1 capsule (300 mg total) by mouth nightly. Active   hydrALAZINE 50 mg tablet, [RxNorm: 113026] 8/3/2023 Take 1 tablet orally 2 times a day. Active   isosorbide mononitrate ER 60 MG Oral Tablet 24 Hr, [RxNorm: 571168] 8/3/2023 Take 1 tablet (60 mg total) by mouth daily.  Active   lisinopriL 10 mg tablet, [RxNorm: 750423] 8/3/2023 Take 1 tablet orally once a day. Active   meclizine 25 MG Oral Tab, [RxNorm: 018324] 8/3/2023 Take 1 tablet (25 mg total) by mouth. Active   oxyCODONE-acetaminophen (PERCOCET)  MG Oral Tab, [RxNorm: 9453782] 8/3/2023 Take 1 tablet by mouth every 4 (four) hours as needed for Pain (Max 6/day). Active   pantoprazole 40 mg tablet,delayed release, [RxNorm: 594390] 8/3/2023 Take 1 tablet orally 2 times a day. Active   patiromer, [RxNorm: 0] 8/3/2023 16.8 pack by mouth daily Active   Vancocin 125 mg capsule, [RxNorm: 207533] 8/3/2023 Take 1 capsule orally every 6 hours for 7 days Active   Vitamin D3 25 mcg (1,000 unit) capsule, [RxNorm: 862515] 8/3/2023 Take 2 capsules orally once a day. Active   amLODIPine 5 mg tablet, [RxNorm: 084476] 8/3/2023 Take 1 tablet orally once a day. Active     ASSESSMENT    Conclusion this is a 73-year-old with renal insufficiency hypertension with cholesterol mild pulm hypertension aortic valve disease who was recently hospitalized and found to have DVT with worsening renal function. She also had transient SVT. At this time she does not complain of palpitations chest pain or shortness of breath. With history patient needs to get a blood pressure machine and some blood pressure meds can be adjusted. Would start with decrease amlodipine to 5 mg daily and see if this helps her legs without without raising her blood pressure. If able we will try to decrease or stop amlodipine and substitute low-dose Cardizem which may be better with history of SVT. She also may stop aspirin at this time since there is no evidence of stroke or coronary disease. She will continue blood thinners for DVT monitored by her primary. Patient to see nephrologist and get follow-up labs. Moderate valve disease we will follow clinically for now and adjust meds.Decrease amlodipine to 5 mg dailyCBC and CMP in 1 week if not done by primary or follow-up doctorsGet  blood pressure machine for home monitoringMonitor and record resting blood pressure and pulse for 1 week and call with results. When checking blood pressure sit comfortably in a chair with the blood pressure cuff in place and rest 5 minutes before checking blood pressure. Check blood pressure and if elevated repeat 2 additional blood pressures and throw away the first 1 and average the second 2.Virtual visit with KELLEY Lugo in 2 weeksFollow-up 2 months This note used Dragon technology. Transcription errors are not uncommon and may not have been corrected prior to electronically signing the note. Should you find these errors please consult the clinician for interpretation (or apply common sense adjustment when safe and appropriate).     FAMILY HISTORY    No data available    GENERAL STATUS    No data available    PAST MEDICAL HISTORY    PAST MEDICAL HISTORY  Problem Date diagonsed Date resolved Status   Paroxysmal SVT (supraventricular tachycardia), [SNOMED-CT: 81655455] 8/3/2023 - Active   Mild pulmonary hypertension, [SNOMED-CT: 780282758] 8/3/2023 - Active   Edema, localized, [SNOMED-CT: 498632587] 8/3/2023 - Active   Hypertension (HTN), primary, [SNOMED-CT: 31810088] 8/3/2023 - Active   Hyperlipidemia, mixed, [SNOMED-CT: 274637097] 8/3/2023 - Active   Exertional dyspnea, [SNOMED-CT: 26706345] 8/3/2023 - Active     HISTORY OF PRESENT ILLNESS    This is a 77-year-old with renal disease hypertension hypercholesterol with a pulmonary pressures aortic stenosis who presents for follow-up after recent hospitalization for DVT with confusion hyperkalemia and transient SVT. We are asked to assess cardiac issues. Patient denies chest pain but is very tired. She gets around slowly with a walker. Old records were reviewed. EKG showed SVT and July with prior EKG showing sinus with nonspecific changes. She had an echo in June showing normal LV function left atrial enlargement mild MR mild to moderate AI and mild AAS with  elevated pulmonary pressures 40-50. A nuclear stress test in September 2022 was normal. Patient is not a good historian. She denied cardiac issues and is unclear where she is on nitrates and aspirin with other meds.     IMMUNIZATIONS  Reconcile with Patient's ChartNo data available    PLAN OF CARE    PLAN OF CARE  Planned Care Date   CBC (Complete Blood Count) 1/1/1900   CMP (comprehensive metabolic panel) 1/1/1900   Take 1 tablet orally once a day.-amLODIPine 5 mg tablet 8/3/2023   Follow up visit - Abdirahman Buchanan 1/1/1900   Follow up visit - Hue Elias 1/1/1900     PROCEDURES    No data available    RESULTS    RESULTS  Name Result Date Location - Ordered By   CBC (Complete Blood Count) [LOINC: 70056-8] Pending 1 Week     CMP (comprehensive metabolic panel) [LOINC: 93367-8] Pending 1 Week       REVIEW OF SYSTEMS    REVIEW OF SYSTEMS  Header Details   Eyes No history of Blurry vision, Visual changes, Double vision, Discharge, Eye pain, Dry eyes, Decreased vision   Cardiovascular Edema  No history of Chest pain, CANTU, Palpitations, Syncope, PND, Orthopnea, Claudication   Respiratory No history of SOB, Cough, Hemoptysis, Wheezing, Pleurisy, Sputum   Others Review Of Systems   bilateral pedal edema     SOCIAL HISTORY    SOCIAL HISTORY  Social History Element Description Effective Dates   Smoking status Heavy tobacco smoker -     FUNCTIONAL STATUS    No data available    MEDICAL EQUIPMENT    No data available    Goals Sections    No data available    REASON FOR REFERRAL    No data available    Health Concerns Section    No data available    COGNITIVE/MENTAL STATUS    No data available    Patient Demographics    Patient Demographics  Patient Address Patient Name Communication   [address] [place] [address] Hannah Boss [phone] (Mobile)     Patient Demographics  Language Race / Ethnicity Marital Status   English (Preferred) Black Or  / Unknown      Document  Information    Primary Care Provider Other Service Providers Document Coverage Dates   Dewayne Gary  NPI: [id number]  [phone] (Work)  [address], [address], [place] [address]  [place] [address]  Interpreting Physicians  [facility]  [phone] (Work)  [address] [place] [address] Richy Dias  NPI: [id number]  [phone] (Work)  [address], [address], [place] [address]  [place] [address]  Nurses     Melida Mendoza  NPI: [id number]  [phone] (Work)  [address], [address], [place] [address]  [place] [address]  Nurses     Carlos Alberto Miller  NPI: [id number]  [phone] (Work)  [address], [address], [place] [address]  [place] [address]  Nurses Aug. 03, 2023August 03, 2023      Organization   [facility]  [phone] (Work)  [address], [address], [place] [address]  [place] [address]     Encounter Providers Encounter Date    Aug. 03, 2023August 03, 2023     Legal Authenticator    Abdirahman Buchanan  NPI: [id number]  [phone] (Work)  [address], [address], [place] [address]  [place] [address]
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
## 2024-07-03 NOTE — CM/SW NOTE
07/03/24 1600   CM/SW Referral Data   Referral Source Physician;Social Work (self-referral)   Reason for Referral Discharge planning;Readmission   Informant EMR;Clinical Staff Member   Readmission Assessment   Factors that patient feels contributed to this readmission Acute/Chronic Clinical Presentation   Pt's living situation prior to admission? Home with family   Pt's level of independence at discharge? Some assist (mod)   Pt. received education on diagnoses at time of discharge? Yes   Was patient a candidate for: Home Health   ----Home Health Recommendation: Recommended, arranged   Was full assessment completed by SW/RAJ on prior admission? Yes   Was the recommended discharge plan achieved? Yes   Was pt. discharged w/out services? No   Medical Hx   Does patient have an established PCP? Yes  (Dr. Diana Lopez)   Patient Info   Advanced directives? Yes   Patient's Current Mental Status at Time of Assessment Confused or unable to complete assessment   Patient's Home Environment House   Number of Levels in Home 1   Number of Stair in Home 0   Patient lives with Son   Patient Status Prior to Admission   Independent with ADLs and Mobility No   Pt. requires assistance with Driving;Ambulating   Services in place prior to admission Home Health Care;DME/Supplies at home   Home Health Provider Info [facility]   Type of DME/Supplies Straight Cane;Wheeled Walker   Discharge Needs   Anticipated D/C needs Home health care;Subacute rehab     SW received MD order for SDOH.    Pt is a 30-day readmission.  Patient fell at home- cariology and nephrology following.    Per EMR review, the patient is home alone (per ED notes/nursing rounds it appears son John may have moved in).    The patient is current with [facility].  RHODA Arana from [facility] is aware of admission.    SW to follow patient while admitted to see what needs arise.  Therapy on consult to evaluate patient.    PLAN:  TBD    / to remain available for support and/or discharge planning.     Ashley Galvez MSW, LSW [id number]

## 2024-07-03 NOTE — ED QUICK NOTES
Patient also endorsing right leg pain and tenderness, appears more swollen than left leg.  +blood thinners

## 2024-07-03 NOTE — PROGRESS NOTES
Was called by nursing due to worsening heart rate. Cards consulted. Monitor on tele. Nursing aware.      Rosa Jones MD

## 2024-07-03 NOTE — ED QUICK NOTES
patient vomiting green emesis and becoming hypoxic.  +Jesse suctioning, airway maintaining patent, repositioned    Dr. Lloyd called to room for reassessment, new orders placed
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
## 2024-07-03 NOTE — HISTORICAL OFFICE NOTE
Continuity of Care Document  2/5/2024  Hannah Boss - 78 y.o. Female; born [birth year]Summary of episode note, generated on Feb. 29, 2024February 29, 2024   CHIEF COMPLAINT    CHIEF COMPLAINT  Reason for Visit/Chief Complaint   hospital f/u from chest pain   This is a patient of Dr. Buchanan who was seen once in the office in August. 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 She was started on diltiazem. An echocardiogram showed preserved systolic function with ejection fraction 55 to 60%, grade 1 diastolic dysfunction and moderate aortic stenosis. Patient did have a dental consult and had dental extractions and treatment for dental abscess during her hospitalization. She comes today for follow-up.Patient reports that she is feeling much better. She denies any further tachycardia episodes. She reports that she is tolerating diltiazem. She is compliant with anticoagulation for her history of PE and DVT.     PROBLEMS  Reconcile with Patient's ChartPROBLEMS  Problem Effective Dates Date resolved Problem Status   Paroxysmal SVT (supraventricular tachycardia), [SNOMED-CT: 52517028] 8/3/2023 - Active   Nicotine dependence with current use, [SNOMED-CT: 08896910] 2/6/2024 - Active   History of DVT (deep vein thrombosis) - Hx, [SNOMED-CT: 259540708] 2/6/2024 - Active   History of pulmonary embolus (PE), [SNOMED-CT: 409161152] 2/6/2024 - Active   Mild pulmonary hypertension, [SNOMED-CT: 770491108] 8/3/2023 - Active   Edema, localized, [SNOMED-CT: 760505020] 8/3/2023 - Active   Hypertension (HTN), primary, [SNOMED-CT: 67829651] 8/3/2023 - Active   Hyperlipidemia, mixed, [SNOMED-CT: 758728112] 8/3/2023 - Active   Exertional dyspnea, [SNOMED-CT: 51085244] 8/3/2023 - Active      ENCOUNTER DIAGNOSIS    ENCOUNTER DIAGNOSIS  Problem Effective Dates Date resolved Problem Status   Paroxysmal SVT (supraventricular tachycardia), [SNOMED-CT: 80845999] 8/3/2023 - Active   Nicotine dependence with current use, [SNOMED-CT: 98725857] 2/6/2024 - Active   History of DVT (deep vein thrombosis) - Hx, [SNOMED-CT: 863283271] 2/6/2024 - Active   History of pulmonary embolus (PE), [SNOMED-CT: 104643332] 2/6/2024 - Active   Edema, localized, [SNOMED-CT: 699692081] 8/3/2023 - Active   Hypertension (HTN), primary, [SNOMED-CT: 41612053] 8/3/2023 - Active   Hyperlipidemia, mixed, [SNOMED-CT: 128410915] 8/3/2023 - Active   Exertional dyspnea, [SNOMED-CT: 42847206] 8/3/2023 - Active     VITAL SIGNS    VITAL SIGNS  Date / Time: 2/6/2024   BP Systolic 134 mmHg   BP Diastolic 52 mmHg   Height 63 inches   Weight 174 lbs   Pulse Rate 74 bpm   BSA (Body Surface Area) 1.9 cc/m2   BMI (Body Mass Index) 30.8 cc/m2   Blood Pressure 134 / 52 mmHg     PHYSICAL EXAMINATION    PHYSICAL EXAMINATION  Header Details   Constitutional 100%o2   Vitals Left Arm Sitting  / 52 mmHg, Pulse rate 74 bpm, Regular, Height in 5' 3\", BMI: 30.8, Weight in 174.17 lbs (or) 79 kgs, BSA : 1.9 cc/m²   General Appearance No Acute Distress   Neck No JVD   Respiratory Lungs clear with normal breath sounds   Cardiovascular 3/6 murmur, Regular rhythm. Normal rate present. Normal and normal S1 and S2   Lower Extremities Edema 2+     ALLERGIES, ADVERSE REACTIONS, ALERTS    No data available    MEDICATIONS ADMINISTERED DURING VISIT    No data available    MEDICATIONS  Reconcile with Patient's ChartMEDICATIONS  Medication Start Date Route/Frequency Status   amoxicillin clavulanate 875-125 MG Oral Tab, [RxNorm: 317349] 2/5/2024 Take 1 tablet by mouth 2 (two) times daily for 14 days. Active   apixaban 5 MG Oral Tab, [RxNorm: 1299003] 2/5/2024 Take 1 tablet (5 mg total) by mouth 2 (two) times daily. Active   atorvastatin 40 MG Oral Tab, [RxNorm: 908579]  2/5/2024 Take 1 tablet (40 mg total) by mouth. AT BEDTIME Active   cholecalciferol 50 MCG (2000 UT) Oral Tab, [RxNorm: 806643] 2/5/2024 Take 1 tablet (2,000 Units total) by mouth daily. Active   diazePAM 10 MG Oral Tab, [RxNorm: 286018] 2/5/2024 Take 1 tablet (10 mg total) by mouth every 8 (eight) hours as needed (MAX 3/DAY). Active   dilTIAZem  MG Oral Capsule SR 24 Hr, [RxNorm: 693812] 2/5/2024 Take 1 capsule (120 mg total) by mouth daily. Active   ferrous sulfate 325 (65 FE) MG Oral Tab EC, [RxNorm: 886345] 2/5/2024 Take 1 tablet (325 mg total) by mouth See Admin Instructions. Three times a week Active   furosemide 20 MG Oral Tab, [RxNorm: 450070] 2/5/2024 Take 1 tablet (20 mg total) by mouth 3 (three) times a week. Active   gabapentin 300 MG Oral Cap, [RxNorm: 840931] 2/5/2024 Take 1 capsule (300 mg total) by mouth nightly. Active   meclizine 25 MG Oral Tab, [RxNorm: 863323] 2/5/2024 Take 1 tablet (25 mg total) by mouth. Active   oxyCODONE-acetaminophen (PERCOCET)  MG Oral Tab, [RxNorm: 7409421] 2/5/2024 Take 1 tablet by mouth every 4 (four) hours as needed for Pain (max 6/day). Active     ASSESSMENT    1. 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 Patient was started on diltiazem which was continued at discharge. Reports further episodes. She will continue present management. Discussed that if she has recurrent events would likely recommend EP consult for possible ablation.  2. Thromboembolism-history of DVT and pulmonary embolism in the past. Patient is on lifelong anticoagulation and denies bleeding problems.  3. Lower extremity edema-likely secondary to DVT. Patient takes furosemide daily unless she is out of the home. Reports that she keeps her legs elevated and has a hospital bed.  4. Hypertension-blood pressure controlled on diltiazem and diuretic.  5. Nicotine dependence-current everyday smoker.  6. Aortic stenosis-moderate on January echo with preserved systolic function.  Like the patient to follow-up with Dr. Buchanan in about 2 months.Plan:  Continue present management  Call if you have recurrent episodes of tachycardia otherwise see Dr. Buchanan in 2 months     FAMILY HISTORY    No data available    GENERAL STATUS    No data available    PAST MEDICAL HISTORY    PAST MEDICAL HISTORY  Problem Date diagonsed Date resolved Status   Paroxysmal SVT (supraventricular tachycardia), [SNOMED-CT: 89772993] 8/3/2023 - Active   Nicotine dependence with current use, [SNOMED-CT: 59519598] 2/6/2024 - Active   History of DVT (deep vein thrombosis) - Hx, [SNOMED-CT: 115293615] 2/6/2024 - Active   History of pulmonary embolus (PE), [SNOMED-CT: 487405953] 2/6/2024 - Active   Mild pulmonary hypertension, [SNOMED-CT: 278049666] 8/3/2023 - Active   Edema, localized, [SNOMED-CT: 985916386] 8/3/2023 - Active   Hypertension (HTN), primary, [SNOMED-CT: 65767782] 8/3/2023 - Active   Hyperlipidemia, mixed, [SNOMED-CT: 834969216] 8/3/2023 - Active   Exertional dyspnea, [SNOMED-CT: 83208137] 8/3/2023 - Active     HISTORY OF PRESENT ILLNESS    This is a patient of Dr. Buchanan who was seen once in the office in August. 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 She was started on diltiazem. An echocardiogram showed preserved systolic function with ejection fraction 55 to 60%, grade 1 diastolic dysfunction and moderate aortic stenosis. Patient did have a dental consult and had dental extractions and treatment for dental abscess during her hospitalization. She comes today for follow-up.Patient reports that she is feeling much better. She denies any further tachycardia episodes. She reports that she is tolerating diltiazem. She is compliant with anticoagulation for her history  of PE and DVT.     IMMUNIZATIONS  Reconcile with Patient's ChartNo data available    PLAN OF CARE    PLAN OF CARE  Planned Care Date   Follow up visit - Abdirahman Buchanan MD 1/1/1900     PROCEDURES    No data available    LAB RESULTS    No data available    REVIEW OF SYSTEMS    REVIEW OF SYSTEMS  Header Details   Cardiovascular CANTU, Edema  No history of Chest pain, Palpitations, Syncope, PND, Orthopnea, Claudication   Respiratory No history of SOB, Wheezing, Sputum   Hem/Lymphatic No history of Easy bruising, Blood clots, Hx of blood transfusion, Anemia, Bleeding problems     SOCIAL HISTORY    SOCIAL HISTORY  Social History Element Description Effective Dates   Smoking status Heavy tobacco smoker -     FUNCTIONAL STATUS    No data available    MEDICAL EQUIPMENT    No data available    Goals Sections    No data available    REASON FOR REFERRAL    No data available    Health Concerns Section    No data available    COGNITIVE/MENTAL STATUS    No data available    Patient Demographics    Patient Demographics  Patient Address Patient Name Communication   [address] [place] [address] Hannah Boss [phone] (Mobile)     Patient Demographics  Language Race / Ethnicity Marital Status   English - Spoken (Preferred) Black or  / Unknown      Document Information    Primary Care Provider Other Service Providers Document Coverage Dates   Olivia Leone  NPI: [id number]  [phone] (Work)  [address] [facility], [address], [place] [address]  [place] [address]  Interpreting Physicians  [facility]  [phone] (Work)  [address] [place] [address] Esme Euceda  NPI: [id number]  [phone] (Work)  [address] [place] [address], [address], [place] [address]  [place] [address]  Nurses Feb. 06, 2024February 06, 2024      Organization   [facility]  [phone] (Work)  [address] [place] [address], [address], [place], [place]  [address]  [place] [address]     Encounter Providers Encounter Date    Feb. 06, 2024February 06, 2024     Legal Authenticator    Olivia Leone  NPI: [id number]  [phone] (Work)  [address] [facility], [address], [place] [address]  [place] [address]

## 2024-07-03 NOTE — ED QUICK NOTES
Patient more awake and quicker to respond, dry heaving, sneezing and yawning.  Opening eyes spontaneously, protecting airway.

## 2024-07-03 NOTE — PLAN OF CARE
Followed up with Attending regarding patient's low heart rates this morning. Orders carried out. Patient more alert as day progressed. Urinating. Fall precautions in place. Call light within reach. Patient care needs addressed.     Problem: Patient Centered Care  Goal: Patient preferences are identified and integrated in the patient's plan of care  Description: Interventions:  - Provide timely, complete, and accurate information to patient/family  - Incorporate patient and family knowledge, values, beliefs, and cultural backgrounds into the planning and delivery of care  - Encourage patient/family to participate in care and decision-making at the level they choose  - Honor patient and family perspectives and choices  Outcome: Progressing
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
## 2024-07-03 NOTE — ED QUICK NOTES
Orders for admission, patient is aware of plan and ready to go upstairs. Any questions, please call ED RN Tawny at extension [phone].     Patient Covid vaccination status: Unvaccinated     COVID Test Ordered in ED: None    COVID Suspicion at Admission: N/A    Running Infusions:      Mental Status/LOC at time of transport: AO x2, drowsy    Other pertinent information:   CIWA score: N/A   NIH score:  N/A

## 2024-07-03 NOTE — ED INITIAL ASSESSMENT (HPI)
Arrives from home, s/p fall in bathroom, when medics arrived she was sitting in her shower chair.  Accu-chek 166, afebrile    Patient AO x3 but delayed responses, appears \"spaced out\", states \"I don't remember\" when asked about fall.  Admitted here two weeks ago for same

## 2024-07-03 NOTE — PHYSICAL THERAPY NOTE
OT/PT eval orders received through functional mobility screening tool, chart reviewed. Spoke to RN who reports pt with HR in 30-50s at rest and very lethargic, not appropriate for intervention at this time. Will re attempt when appropriate.     Graciela Webb, PT      Faye Andrew, OT  [facility]  Inpatient Rehabilitation  Occupational Therapy  [phone]

## 2024-07-03 NOTE — ED QUICK NOTES
Dr. Lloyd spoke with son, states patient is normally more alert and ambulates with cane.  Tonight, heard patient fall while in the bathroom, has been acting \"spaced out\" since then

## 2024-07-03 NOTE — PLAN OF CARE
Patient admitted through ED after a fall at home. Patient is alert and oriented x3, delayed responses. On 2 liters of 02, not baseline. On tele, no calls. Purewick in place. IV fluids running. Mepilex on sacrum and heels. Stool sample needed. US needed to rule out DVT. Potassium critical per lab at 6.0 DR. Banks aware, orders placed and followed. Call light within reach. Frequent rounding done.     Problem: Patient Centered Care  Goal: Patient preferences are identified and integrated in the patient's plan of care  Description: Interventions:  - What would you like us to know as we care for you?   - Provide timely, complete, and accurate information to patient/family  - Incorporate patient and family knowledge, values, beliefs, and cultural backgrounds into the planning and delivery of care  - Encourage patient/family to participate in care and decision-making at the level they choose  - Honor patient and family perspectives and choices  Outcome: Progressing

## 2024-07-04 LAB
ALBUMIN SERPL-MCNC: 3.4 G/DL (ref 3.2–4.8)
ALBUMIN/GLOB SERPL: 1.4 {RATIO} (ref 1–2)
ALP LIVER SERPL-CCNC: 197 U/L
ALT SERPL-CCNC: 17 U/L
ANION GAP SERPL CALC-SCNC: 2 MMOL/L (ref 0–18)
AST SERPL-CCNC: 16 U/L (ref ?–34)
BASOPHILS # BLD AUTO: 0.02 X10(3) UL (ref 0–0.2)
BASOPHILS NFR BLD AUTO: 0.3 %
BILIRUB SERPL-MCNC: 0.2 MG/DL (ref 0.2–1.1)
BUN BLD-MCNC: 57 MG/DL (ref 9–23)
BUN/CREAT SERPL: 26.5 (ref 10–20)
CALCIUM BLD-MCNC: 9 MG/DL (ref 8.7–10.4)
CHLORIDE SERPL-SCNC: 120 MMOL/L (ref 98–112)
CO2 SERPL-SCNC: 24 MMOL/L (ref 21–32)
CREAT BLD-MCNC: 2.15 MG/DL
DEPRECATED RDW RBC AUTO: 45.8 FL (ref 35.1–46.3)
EGFRCR SERPLBLD CKD-EPI 2021: 23 ML/MIN/1.73M2 (ref 60–?)
EOSINOPHIL # BLD AUTO: 0.19 X10(3) UL (ref 0–0.7)
EOSINOPHIL NFR BLD AUTO: 2.5 %
ERYTHROCYTE [DISTWIDTH] IN BLOOD BY AUTOMATED COUNT: 14 % (ref 11–15)
GLOBULIN PLAS-MCNC: 2.5 G/DL (ref 2–3.5)
GLUCOSE BLD-MCNC: 113 MG/DL (ref 70–99)
GLUCOSE BLDC GLUCOMTR-MCNC: 128 MG/DL (ref 70–99)
GLUCOSE BLDC GLUCOMTR-MCNC: 128 MG/DL (ref 70–99)
GLUCOSE BLDC GLUCOMTR-MCNC: 142 MG/DL (ref 70–99)
HCT VFR BLD AUTO: 23.5 %
HEMOCCULT STL QL: POSITIVE
HGB BLD-MCNC: 7.2 G/DL
IMM GRANULOCYTES # BLD AUTO: 0.02 X10(3) UL (ref 0–1)
IMM GRANULOCYTES NFR BLD: 0.3 %
LYMPHOCYTES # BLD AUTO: 0.98 X10(3) UL (ref 1–4)
LYMPHOCYTES NFR BLD AUTO: 13 %
MAGNESIUM SERPL-MCNC: 2.1 MG/DL (ref 1.6–2.6)
MCH RBC QN AUTO: 27.5 PG (ref 26–34)
MCHC RBC AUTO-ENTMCNC: 30.6 G/DL (ref 31–37)
MCV RBC AUTO: 89.7 FL
MONOCYTES # BLD AUTO: 0.52 X10(3) UL (ref 0.1–1)
MONOCYTES NFR BLD AUTO: 6.9 %
NEUTROPHILS # BLD AUTO: 5.78 X10 (3) UL (ref 1.5–7.7)
NEUTROPHILS # BLD AUTO: 5.78 X10(3) UL (ref 1.5–7.7)
NEUTROPHILS NFR BLD AUTO: 77 %
OSMOLALITY SERPL CALC.SUM OF ELEC: 319 MOSM/KG (ref 275–295)
PHOSPHATE SERPL-MCNC: 4 MG/DL (ref 2.4–5.1)
PLATELET # BLD AUTO: 206 10(3)UL (ref 150–450)
POTASSIUM SERPL-SCNC: 4.3 MMOL/L (ref 3.5–5.1)
POTASSIUM SERPL-SCNC: 4.3 MMOL/L (ref 3.5–5.1)
POTASSIUM SERPL-SCNC: 4.7 MMOL/L (ref 3.5–5.1)
PROT SERPL-MCNC: 5.9 G/DL (ref 5.7–8.2)
RBC # BLD AUTO: 2.62 X10(6)UL
SODIUM SERPL-SCNC: 146 MMOL/L (ref 136–145)
WBC # BLD AUTO: 7.5 X10(3) UL (ref 4–11)

## 2024-07-04 PROCEDURE — 99233 SBSQ HOSP IP/OBS HIGH 50: CPT | Performed by: INTERNAL MEDICINE

## 2024-07-04 PROCEDURE — 99233 SBSQ HOSP IP/OBS HIGH 50: CPT | Performed by: HOSPITALIST

## 2024-07-04 NOTE — PROGRESS NOTES
[facility]  part of [facility]    Progress Note    Hannah Boss Patient Status:  Inpatient    1945 MRN [id number]   Location [facility] 5SW/SE Attending Rosa Jones MD   Hosp Day # 1 PCP Diana Lopez DO       Subjective:   Hannah Boss is feeling ok. Afebrile. No complaints of chest pain or sob. Very alert.     Objective:   Blood pressure (!) 161/58, pulse 92, temperature 97.9 °F (36.6 °C), temperature source Oral, resp. rate 14, weight 161 lb 12.8 oz (73.4 kg), SpO2 98%, not currently breastfeeding.    Physical Exam:    General: No acute distress.   Respiratory: Clear to auscultation bilaterally. No wheezes. No rhonchi.  Cardiovascular: S1, S2. Regular rate and rhythm. No murmurs, rubs or gallops.   Abdomen: Soft, nontender, nondistended.  Positive bowel sounds. No rebound or guarding.  Neurologic: No focal neurological deficits.   Musculoskeletal: Moves all extremities.  Extremities: No edema.    Results:     Lab Results   Component Value Date    WBC 7.5 2024    HGB 7.2 (L) 2024    HCT 23.5 (L) 2024    .0 2024    CREATSERUM 2.15 (H) 2024    BUN 57 (H) 2024     (H) 2024    K 4.3 2024    K 4.3 2024     (H) 2024    CO2 24.0 2024     (H) 2024    CA 9.0 2024    ALB 3.4 2024    ALKPHO 197 (H) 2024    BILT 0.2 2024    TP 5.9 2024    AST 16 2024    ALT 17 2024    PTT 35.0 2024    INR 1.30 (H) 2024    T4F 2.5 (H) 2024    TSH 0.008 (L) 2024    LIP 22 2024    DDIMER 1.13 (H) 2023    MG 2.1 2024    PHOS 4.0 2024    TROP 0.398 (HH) 2021     (H) 2024    B12 497 2024       US VENOUS DOPPLER LEG BILAT - DIAG IMG (CPT=93970)    Result Date: 7/3/2024  CONCLUSION:  1. No gross evidence of deep venous thrombosis in the bilateral lower extremities.  However, the right popliteal  vein is not well visualized, and the bilateral peroneal and posterior tibial veins are not well visualized.    Dictated by (CST): Dl Santamaria MD on 7/03/2024 at 10:43 AM     Finalized by (CST): Dl Santamaria MD on 7/03/2024 at 10:45 AM          CT SPINE LUMBAR (CPT=72131)    Result Date: 7/3/2024  CONCLUSION:  1. No acute fracture or traumatic subluxation of the lumbar spine. 2. 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 3. Advanced multilevel lumbar spine degenerative changes with multilevel spinal canal and neural foraminal stenoses as detailed. 4. Demineralization. 5. Partially imaged fibroid uterus; some of the fibroids are partially calcified/degenerated.   A preliminary report was issued by the [facility] teleradiology service. There are no major discrepancies.  elm-remote  Dictated by (CST): Omega Ramirez MD on 7/03/2024 at 8:28 AM     Finalized by (CST): Omega Ramirez MD on 7/03/2024 at 8:35 AM          CT SPINE THORACIC (CPT=72128)    Result Date: 7/3/2024  CONCLUSION:  1. No acute fracture or traumatic subluxation of the thoracic spine. 2. Moderate multilevel thoracic spine degenerative changes. 3. Mild dextroscoliosis of the thoracic spine. 4. Mild emphysema. 5. Sequelae of remote granulomatous disease. 6. Stable exophytic 1.1 cm right posterior thyroid nodule.  Thyroid nodules less than 1.5 cm on cross-sectional imaging typically require no additional follow-up. 7. Lesser incidental findings as above.   A preliminary report was issued by the [facility] teleradiology service. There are no major discrepancies.  elm-remote  Dictated by (CST): Omega Ramirez MD on 7/03/2024 at 8:19 AM     Finalized by (CST): Omega Ramirez MD on 7/03/2024 at 8:28 AM          XR CHEST AP PORTABLE  (CPT=71045)    Result Date: 7/3/2024  CONCLUSION:   Negative for radiographically evident acute intrathoracic process.   A preliminary report  was issued by the [facility] teleradiology service. There are no major discrepancies.  elm-remote  Dictated by (CST): Omega Ramirez MD on 7/03/2024 at 7:56 AM     Finalized by (CST): Omega Ramirez MD on 7/03/2024 at 7:57 AM          CT BRAIN OR HEAD (16078)    Result Date: 7/3/2024  CONCLUSION:  1. No acute intracranial process by noncontrast CT technique. 2. Intracranial atherosclerosis.   A preliminary report was issued by the [facility] teleradiology service. There are no major discrepancies.  elm-remote  Dictated by (CST): Omega Ramirez MD on 7/03/2024 at 6:23 AM     Finalized by (CST): Omega Ramirez MD on 7/03/2024 at 6:26 AM         EKG 12 Lead    Result Date: 7/3/2024  Unusual P axis, possible ectopic atrial bradycardia Abnormal ECG When compared with ECG of 03-JUL-2024 05:14, Ectopic atrial rhythm has replaced Sinus rhythm Confirmed by STEVE RUIZ CASH ([id number]) on 7/3/2024 12:54:30 PM    EKG 12 Lead    Result Date: 7/3/2024  Normal sinus rhythm Normal ECG When compared with ECG of 03-JUL-2024 00:23, No significant change was found Confirmed by STEVE RUIZ CASH (48) on 7/3/2024 12:53:22 PM    EKG    Result Date: 7/3/2024  Normal sinus rhythm Possible Left atrial enlargement Borderline ECG When compared with ECG of 17-JUN-2024 15:12, No significant change was found Confirmed by STEVE RUIZ CASH ([id number]) on 7/3/2024 12:52:12 PM    hydrALAZINE  25 mg Oral BID    pantoprazole  40 mg Oral BID AC    cefTRIAXone  1 g Intravenous Q24H    vancomycin  125 mg Oral Daily    ferrous sulfate  325 mg Oral Daily with breakfast       diazePAM    meclizine    acetaminophen    acetaminophen **OR** HYDROcodone-acetaminophen **OR** HYDROcodone-acetaminophen    melatonin    ondansetron      Assessment and Plan:       Altered mental status/acute encephalopathy: Metabolic versus infection versus drug-induced  S/p fall, unwitnessed  -S/p Narcan with improvement  -Cautious use of narcotics  -Observation     Acute  respiratory distress with hypoxia, possible aspiration  Vomiting  -Incentive spirometry     Acute kidney injury on CKD 2  Hyperkalemia  -S/p 1 L NS in the ED  -Hold diuretics  -Recheck labs, treat accordingly  -Nephrology on consult     UTI  -Ceftriaxone  -Follow-up urine culture  -C. difficile prophylaxis     Right lower extremity swelling  History of left lower extremity DVT (diagnosed 6/6/23) on Eliquis  -Follow-up Doppler- negative      Chronic diastolic heart failure  Moderate aortic stenosis  Moderate pulmonary artery hypertension  Paroxysmal supraventricular tachycardia  -Hold diuretics for now due to DICK  -Continue other cardiac medications     Acute on chronic anemia  -Hold Eliquis  -Check iron studies  -Check stool occult blood- positive  - will monitor for now and consider stopping eliquis     Sinus bradycardia/ Junctional rhythm  - Cards on consult   - do not resume narcotics per Cards   - monitor        HTN  HLD  GERD  -Resume home medications as appropriate     Quality:  DVT Prophylaxis: SCD if no DVT.  Anticoagulation on hold due to anemia  CODE status: Full code    MDM .high    07/04/24

## 2024-07-04 NOTE — PLAN OF CARE
A/O x4, no acute changes at this time, vital signs stable at this time. IV fluids and IV Rocephin given - see MAR. Lluvia given. Up to bathroom with 1 assist and walker overnight. Fall precautions in place: call light within reach, bed alarm on, non-slip socks on, frequent rounding done.    Problem: Patient Centered Care  Goal: Patient preferences are identified and integrated in the patient's plan of care  Description: Interventions:  - What would you like us to know as we care for you? From home with son  - Provide timely, complete, and accurate information to patient/family  - Incorporate patient and family knowledge, values, beliefs, and cultural backgrounds into the planning and delivery of care  - Encourage patient/family to participate in care and decision-making at the level they choose  - Honor patient and family perspectives and choices  Outcome: Progressing     Problem: Patient/Family Goals  Goal: Patient/Family Long Term Goal  Description: Patient's Long Term Goal: To return home    Interventions:  - Monitor vital signs and labs, especially potassium  - Monitor accuchecks ACHS  - Nephrology consult  - Cardiology consult  - Tele monitoring  - See additional Care Plan goals for specific interventions  Outcome: Progressing  Goal: Patient/Family Short Term Goal  Description: Patient's Short Term Goal: To feel better    Interventions:   - Monitor vital signs and labs, especially potassium  - Monitor accuchecks ACHS  - Nephrology consult  - Cardiology consult  - Tele monitoring  - See additional Care Plan goals for specific interventions  Outcome: Progressing     Problem: CARDIOVASCULAR - ADULT  Goal: Maintains optimal cardiac output and hemodynamic stability  Description: INTERVENTIONS:  - Monitor vital signs, rhythm, and trends  - Monitor for bleeding, hypotension and signs of decreased cardiac output  - Evaluate effectiveness of vasoactive medications to optimize hemodynamic stability  - Monitor arterial  and/or venous puncture sites for bleeding and/or hematoma  - Assess quality of pulses, skin color and temperature  - Assess for signs of decreased coronary artery perfusion - ex. Angina  - Evaluate fluid balance, assess for edema, trend weights  Outcome: Progressing  Goal: Absence of cardiac arrhythmias or at baseline  Description: INTERVENTIONS:  - Continuous cardiac monitoring, monitor vital signs, obtain 12 lead EKG if indicated  - Evaluate effectiveness of antiarrhythmic and heart rate control medications as ordered  - Initiate emergency measures for life threatening arrhythmias  - Monitor electrolytes and administer replacement therapy as ordered  Outcome: Progressing     Problem: GASTROINTESTINAL - ADULT  Goal: Maintains or returns to baseline bowel function  Description: INTERVENTIONS:  - Assess bowel function  - Maintain adequate hydration with IV or PO as ordered and tolerated  - Evaluate effectiveness of GI medications  - Encourage mobilization and activity  - Obtain nutritional consult as needed  - Establish a toileting routine/schedule  - Consider collaborating with pharmacy to review patient's medication profile  Outcome: Progressing     Problem: METABOLIC/FLUID AND ELECTROLYTES - ADULT  Goal: Glucose maintained within prescribed range  Description: INTERVENTIONS:  - Monitor Blood Glucose as ordered  - Assess for signs and symptoms of hyperglycemia and hypoglycemia  - Administer ordered medications to maintain glucose within target range  - Assess barriers to adequate nutritional intake and initiate nutrition consult as needed  - Instruct patient on self management of diabetes  Outcome: Progressing  Goal: Electrolytes maintained within normal limits  Description: INTERVENTIONS:  - Monitor labs and rhythm and assess patient for signs and symptoms of electrolyte imbalances  - Administer electrolyte replacement as ordered  - Monitor response to electrolyte replacements, including rhythm and repeat lab  results as appropriate  - Fluid restriction as ordered  - Instruct patient on fluid and nutrition restrictions as appropriate  Outcome: Progressing  Goal: Hemodynamic stability and optimal renal function maintained  Description: INTERVENTIONS:  - Monitor labs and assess for signs and symptoms of volume excess or deficit  - Monitor intake, output and patient weight  - Monitor urine specific gravity, serum osmolarity and serum sodium as indicated or ordered  - Monitor response to interventions for patient's volume status, including labs, urine output, blood pressure (other measures as available)  - Encourage oral intake as appropriate  - Instruct patient on fluid and nutrition restrictions as appropriate  Outcome: Progressing     Problem: SKIN/TISSUE INTEGRITY - ADULT  Goal: Skin integrity remains intact  Description: INTERVENTIONS  - Assess and document risk factors for pressure ulcer development  - Assess and document skin integrity  - Monitor for areas of redness and/or skin breakdown  - Initiate interventions, skin care algorithm/standards of care as needed  Outcome: Progressing

## 2024-07-04 NOTE — PROGRESS NOTES
Progress Note  Hannah Boss Patient Status:  Inpatient    1945 MRN [id number]   Location [facility] 5SW/SE Attending Rosa Jones MD   Hosp Day # 1 PCP Diana Lopez DO     Subjective:  No cardiac events over night. Awake.     Objective:  /48 (BP Location: Left arm)   Pulse 92   Temp 98.2 °F (36.8 °C) (Oral)   Resp 16   Wt 161 lb 12.8 oz (73.4 kg)   SpO2 96%   BMI 27.77 kg/m²     Telemetry: SR 92 BPM.      Intake/Output:    Intake/Output Summary (Last 24 hours) at 2024 1439  Last data filed at 2024 0951  Gross per 24 hour   Intake 1647.67 ml   Output 210 ml   Net 1437.67 ml       Last 3 Weights   24 0548 161 lb 12.8 oz (73.4 kg)   24 0311 185 lb 3.2 oz (84 kg)   24 0500 155 lb 1.6 oz (70.4 kg)   24 0500 158 lb 6.4 oz (71.8 kg)   24 0500 160 lb 11.2 oz (72.9 kg)   24 165 lb 1.6 oz (74.9 kg)   24 1234 160 lb (72.6 kg)       Labs:  Recent Labs   Lab 24  0357 24  0842 24  1222 24  1611 24  0000 24  0415   *  --  106*  --   --   --  113*   BUN 54*  --  62*  --   --   --  57*   CREATSERUM 3.07*  --  2.77*  --   --   --  2.15*   EGFRCR 15*  --  17*  --   --   --  23*   CA 9.4  --  9.2  --   --   --  9.0     --  145  --   --   --  146*   K 6.0*   < > 5.4*  5.7*   < > 5.0 4.7 4.3  4.3   *  --  117*  --   --   --  120*   CO2 22.0  --  23.0  --   --   --  24.0    < > = values in this interval not displayed.     Recent Labs   Lab 24  0001 24  0453 24  0415   RBC 2.82*  --  2.62*   HGB 7.9* 8.2* 7.2*   HCT 25.2* 26.8* 23.5*   MCV 89.4  --  89.7   MCH 28.0  --  27.5   MCHC 31.3  --  30.6*   RDW 14.1  --  14.0   NEPRELIM 5.32  --  5.78   WBC 6.7  --  7.5   .0  --  206.0         Recent Labs   Lab 24  0001 24  0357 24  0842 24  1222   TROPHS 18  --  23 23   CK  --  170*  --   --      7/3/24:  EKG:  SB 56 BPM, QRS:  80 ms,  QTc: 411 ms, MN: 142 ms.   Echo:  1/20/24:   1. Left ventricle: The cavity size was normal. Wall thickness was mildly      increased. Systolic function was normal. The estimated ejection fraction      was 55-60%, by biplane method of disks. Doppler parameters are consistent      with abnormal left ventricular relaxation - grade 1 diastolic      dysfunction.   2. Right ventricle: Systolic pressure was mildly increased.   3. Left atrium: The atrium was moderately dilated.   4. Aortic valve: Transvalvular velocity was increased, due to stenosis. The      findings were consistent with moderate stenosis. There was mild      regurgitation. The peak systolic velocity was 2.9m/sec. The mean systolic      gradient was 21mm Hg. GAYATRI 1.4cm2 by planimetry. The valve area (VTI) was      1.01cm^2. The valve area (VTI) index was 0.54cm^2/m^2.   5. Pulmonary arteries: The peak systolic pressure is 46mm Hg.     Review of Systems:   Constitutional: No fevers, chills, fatigue or night sweats.  ENT: No mouth pain, neck pain, running nose, headaches or swollen glands.  Skin: No rashes, pruritus or skin changes,  Respiratory: Denies cough, wheezing or shortness of breath.  CV: Denies chest pain, palpitations, orthopnea, PND or dizziness.  Musculoskeletal: No joint pain, stiffness or swelling.  GI: No nausea, vomiting or diarrhea. No blood in stools.  Neurologic: No seizures, tremors, weakness or numbness.     Physical Exam:  Gen: alert, oriented x 3, NAD  Heent: pupils equal, reactive. Mucous membranes moist.   Neck: no jvd  Cardiac: regular rate and rhythm, normal S1,S2, II/VI harsh HSM RSB, no, gallop or rub.   Lungs: Clear upper dim bases.   Abd: soft, NT/ND +bs  Ext: no edema  Skin: Warm, dry  Neuro: No focal deficits    Medications:     hydrALAZINE  25 mg Oral BID    pantoprazole  40 mg Oral BID AC    cefTRIAXone  1 g Intravenous Q24H    vancomycin  125 mg Oral Daily    ferrous sulfate  325 mg Oral Daily with breakfast        Assessment:  AMS:  Admitted from home with fall and and suspected Opioid abuse.   Positive for Opioids and Benzos. Improved with Narcan.   Klebsiella UTI: Rocephin.   Junctional Rhythm:  Seen upon admission secondary to Opioid over dose.   No further.   Mod AVS:  Mean gradient: 21 mmHg.   CAD: Stable. Trops neg.   HTN: Controlled.   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  Baseline Cr: 1.6-1.8.  Hx Left leg DVT: 6/2023.   Chronic Anemia:  Hgb: 7.2 (8.2)  plates: 206.    Hyperkalemia:  K+ 5.6 on admission.   Hyperthyroidism:  TSH:  .008  T4: 2.5    Plan:  Recent Opioid OD.  Would not resume any narcotics at home. Certainly not Opioids and Benzo's.   No further junctional rhythm.    Renal function improving.   Cardiology will sign off.  Please do not hesitate to contact us if needed.     Plan of care discussed with patient, RN.    Sabina Santillan, APRN  7/4/2024  2:39 PM         L2  Seen and examined bedside. Agree with the present management, will sign off.    Would not resume any narcotics at home. Certainly not Opioids and Benzo's.   No further junctional rhythm.   Renal function improving.   Thank you for allowing me to participate in the care of your patient, feel free to contact me if you have any questions.    Siva Caro MD  [facility]  Interventional Cardiology.

## 2024-07-04 NOTE — PHYSICAL THERAPY NOTE
PHYSICAL THERAPY EVALUATION - INPATIENT     Room Number: 537/537-A  Evaluation Date: 7/4/2024  Type of Evaluation: Initial   Physician Order: PT Eval and Treat    Presenting Problem: Fall  Co-Morbidities : Opiate narcotic overdose, CKD, Dyspnea  Reason for Therapy: Mobility Dysfunction and Discharge Planning    PHYSICAL THERAPY ASSESSMENT   Patient is a 78 year old female admitted 7/2/2024 for Fall.  Prior to admission, patient's baseline is Mod indep with all mobility and ADL's lives alone.  Patient is currently functioning near baseline with bed mobility, transfers, gait, stair negotiation, maintaining seated position, standing prolonged periods, and performing household tasks.  Patient is requiring contact guard assist as a result of the following impairments: decreased functional strength, decreased endurance/aerobic capacity, pain, impaired standing balance, decreased muscular endurance, and medical status.  Physical Therapy will continue to follow for duration of hospitalization.    Patient will benefit from continued skilled PT Services at discharge to promote functional independence and safety with additional support and return home with home health PT.    PLAN  PT Treatment Plan: Bed mobility;Body mechanics;Endurance;Energy conservation;Patient education;Gait training;Strengthening;Transfer training;Balance training  Rehab Potential : Good  Frequency (Obs): 3-5x/week    PHYSICAL THERAPY MEDICAL/SOCIAL HISTORY   History related to current admission: Patient presented to the emergency department with AV robert reentrant tachycardia converted with adenosine. She was started on diltiazem. An echocardiogram showed preserved systolic function with ejection fraction 55 to 60%, grade 1 diastolic dysfunction and moderate aortic stenosis. Patient did have a dental consult and had dental extractions and treatment for dental abscess during her hospitalization.     Imaging negative for fx's or acute injury     Problem  List  Principal Problem:    Fall, initial encounter  Active Problems:    Hyperkalemia    Urinary tract infection without hematuria, site unspecified    Acute kidney injury (HCC)    Opiate or related narcotic overdose, undetermined intent, initial encounter ([facility])      HOME SITUATION  Home Situation  Type of Home: House  Home Layout: Able to live on main level  Stairs to Enter : 2  Railing: Yes  Lives With: Alone (son lives in area)  Drives: No  Patient Owned Equipment: Rolling walker     Prior Level of [place]: Lives at home alone son lives in area and helps as needed, mod indep with a RW. Retired.     SUBJECTIVE  I feel too exhausted to walk around I have been getting up to the bathroom and walking in the room with the PCT when I need to use the toilet. Right now I want to rest in bed.     PHYSICAL THERAPY EXAMINATION   OBJECTIVE  Precautions: Bed/chair alarm  Fall Risk: Standard fall risk    WEIGHT BEARING RESTRICTION  Weight Bearing Restriction: None                PAIN ASSESSMENT  Rating: Unable to rate          COGNITION  Overall Cognitive Status:  WFL - within functional limits  Limited interest in PT participation  RANGE OF MOTION AND STRENGTH ASSESSMENT  Upper extremity ROM and strength are within functional limits   Lower extremity ROM is within functional limits   Lower extremity strength is within functional limits     BALANCE  Static Sitting: Fair +  Dynamic Sitting: Fair  Static Standing: Fair -  Dynamic Standing: Fair -    ACTIVITY TOLERANCE  Pulse: 92  Heart Rate Source: Monitor  Resp: 16  BP: 130/48  BP Location: Left arm  BP Method: Automatic  Patient Position: Sitting    O2 WALK       AM-PAC '6-Clicks' INPATIENT SHORT FORM - BASIC MOBILITY  How much difficulty does the patient currently have...  Patient Difficulty: Turning over in bed (including adjusting bedclothes, sheets and blankets)?: A Little   Patient Difficulty: Sitting down on and standing up from a chair with arms (e.g., wheelchair,  bedside commode, etc.): A Little   Patient Difficulty: Moving from lying on back to sitting on the side of the bed?: A Little   How much help from another person does the patient currently need...   Help from Another: Moving to and from a bed to a chair (including a wheelchair)?: A Little   Help from Another: Need to walk in hospital room?: A Little   Help from Another: Climbing 3-5 steps with a railing?: A Lot     AM-PAC Score:  Raw Score: 17   Approx Degree of Impairment: 50.57%   Standardized Score (AM-PAC Scale): 42.13   CMS Modifier (G-Code): CK    FUNCTIONAL ABILITY STATUS  Functional Mobility/Gait Assessment  Gait Assistance: Minimum assistance (to stand with RW)  Distance (ft): refused gait  Rolling: supervision  Supine to Sit: supervision  Sit to Supine: contact guard assist  Sit to Stand: contact guard assist    Exercise/Education Provided:  Bed mobility  Body mechanics  Energy conservation  Functional activity tolerated  Gait training  Strengthening  Lower therapeutic exercise:  Ankle pumps  Heel slides  Transfer training    The patient's Approx Degree of Impairment: 50.57% has been calculated based on documentation in the [facility] '6 clicks' Inpatient Basic Mobility Short Form.  Research supports that patients with this level of impairment may benefit from [facility] PT family assist as needed.  Final disposition will be made by interdisciplinary medical team.    Patient End of Session: With  staff;In bed;Needs met;Call light within reach;RN aware of session/findings;All patient questions and concerns addressed;Alarm set    CURRENT GOALS  Goals to be met by: 7/20/2024  Patient Goal Patient's self-stated goal is: Return home    Goal #1 Patient is able to demonstrate supine - sit EOB @ level: independent     Goal #1   Current Status    Goal #2 Patient is able to demonstrate transfers Sit to/from Stand at assistance level: modified independent with walker - rolling     Goal #2  Current Status    Goal #3 Patient is  able to ambulate 150 feet with assist device: walker - rolling at assistance level: modified independent   Goal #3   Current Status    Goal #4 Patient will negotiate 6 stairs/one curb w/ assistive device and supervision   Goal #4   Current Status    Goal #5 Patient to demonstrate independence with home activity/exercise instructions provided to patient in preparation for discharge.   Goal #5   Current Status    Goal #6    Goal #6  Current Status      Patient Evaluation Complexity Level:  History Low - no personal factors and/or co-morbidities   Examination of body systems Low -  addressing 1-2 elements   Clinical Presentation Low- Stable   Clinical Decision Making  Low Complexity

## 2024-07-04 NOTE — PROGRESS NOTES
[facility]  part of [facility]    Progress Note    Hannah Boss Patient Status:  Inpatient    1945 MRN [id number]   Location [facility] 5SW/SE Attending Rosa Jones MD   Hosp Day # 1 PCP Diana Lopez DO       Subjective:   Hannah Boss is a(n) 78 year old female who I am seeing for DICK    Much more alert and awake today      Objective:   /48 (BP Location: Left arm)   Pulse 92   Temp 98.2 °F (36.8 °C) (Oral)   Resp 16   Wt 161 lb 12.8 oz (73.4 kg)   SpO2 96%   BMI 27.77 kg/m²      Intake/Output Summary (Last 24 hours) at 2024 1103  Last data filed at 2024 0951  Gross per 24 hour   Intake 1647.67 ml   Output 210 ml   Net 1437.67 ml     Wt Readings from Last 1 Encounters:   24 161 lb 12.8 oz (73.4 kg)       Exam  Gen: No acute distress, Heent: NC AT, mucous memb clear, neck supple  Pulm: Lungs clear, normal respiratory effort  CV: Heart with regular rate and rhythm, no edema  Abd: Abdomen soft, nontender, nondistended, no organomegaly, bowel sounds present  Skin: no symptoms reported  Psych: alert and oriented    Assessment and Plan:   1 - DICK  Improved.  I suspect this is related to her bradycardia.     2 - Hyperkalemia  Resolved     3 - bradycardia  Due to a diltiazem overdose.  She is currently off the medication.  Heart rate is normal     4 - UTI  She is on ceftriaxone    5 -hypernatremia  I encouraged the patient to drink more water today          Results:     Recent Labs   Lab 24  0001 24  0453 24  0415   RBC 2.82*  --  2.62*   HGB 7.9* 8.2* 7.2*   HCT 25.2* 26.8* 23.5*   MCV 89.4  --  89.7   MCH 28.0  --  27.5   MCHC 31.3  --  30.6*   RDW 14.1  --  14.0   NEPRELIM 5.32  --  5.78   WBC 6.7  --  7.5   .0  --  206.0         Recent Labs   Lab 24  0357 24  0842 24  1222 24  1611 24  0000 24  0415   *  --  106*  --   --   --  113*   BUN 54*  --  62*  --   --   --   57*   CREATSERUM 3.07*  --  2.77*  --   --   --  2.15*   CA 9.4  --  9.2  --   --   --  9.0     --  145  --   --   --  146*   K 6.0*   < > 5.4*  5.7*   < > 5.0 4.7 4.3  4.3   *  --  117*  --   --   --  120*   CO2 22.0  --  23.0  --   --   --  24.0    < > = values in this interval not displayed.          US VENOUS DOPPLER LEG BILAT - DIAG IMG (CPT=93970)    Result Date: 7/3/2024  CONCLUSION:  1. No gross evidence of deep venous thrombosis in the bilateral lower extremities.  However, the right popliteal vein is not well visualized, and the bilateral peroneal and posterior tibial veins are not well visualized.    Dictated by (CST): Dl Santamaria MD on 7/03/2024 at 10:43 AM     Finalized by (CST): Dl Santamaria MD on 7/03/2024 at 10:45 AM          CT SPINE LUMBAR (CPT=72131)    Result Date: 7/3/2024  CONCLUSION:  1. No acute fracture or traumatic subluxation of the lumbar spine. 2. 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 3. Advanced multilevel lumbar spine degenerative changes with multilevel spinal canal and neural foraminal stenoses as detailed. 4. Demineralization. 5. Partially imaged fibroid uterus; some of the fibroids are partially calcified/degenerated.   A preliminary report was issued by the [facility] teleradiology service. There are no major discrepancies.  elm-remote  Dictated by (CST): Omega Ramirez MD on 7/03/2024 at 8:28 AM     Finalized by (CST): Omega Ramirez MD on 7/03/2024 at 8:35 AM          CT SPINE THORACIC (CPT=72128)    Result Date: 7/3/2024  CONCLUSION:  1. No acute fracture or traumatic subluxation of the thoracic spine. 2. Moderate multilevel thoracic spine degenerative changes. 3. Mild dextroscoliosis of the thoracic spine. 4. Mild emphysema. 5. Sequelae of remote granulomatous disease. 6. Stable exophytic 1.1 cm right posterior thyroid nodule.  Thyroid nodules less than 1.5 cm on cross-sectional  imaging typically require no additional follow-up. 7. Lesser incidental findings as above.   A preliminary report was issued by the [facility] teleradiology service. There are no major discrepancies.  elm-remote  Dictated by (CST): Omega Ramirez MD on 7/03/2024 at 8:19 AM     Finalized by (CST): Omega Ramirez MD on 7/03/2024 at 8:28 AM          XR CHEST AP PORTABLE  (CPT=71045)    Result Date: 7/3/2024  CONCLUSION:   Negative for radiographically evident acute intrathoracic process.   A preliminary report was issued by the [facility] teleradiology service. There are no major discrepancies.  elm-remote  Dictated by (CST): Omega Ramirez MD on 7/03/2024 at 7:56 AM     Finalized by (CST): Omega Ramirez MD on 7/03/2024 at 7:57 AM          CT BRAIN OR HEAD (19292)    Result Date: 7/3/2024  CONCLUSION:  1. No acute intracranial process by noncontrast CT technique. 2. Intracranial atherosclerosis.   A preliminary report was issued by the [facility] teleradiology service. There are no major discrepancies.  elm-remote  Dictated by (CST): Omega Ramirez MD on 7/03/2024 at 6:23 AM     Finalized by (CST): Omega Ramirez MD on 7/03/2024 at 6:26 AM               ANN-MARIE GRANDA MD  7/4/2024

## 2024-07-05 VITALS
WEIGHT: 163.88 LBS | HEART RATE: 129 BPM | SYSTOLIC BLOOD PRESSURE: 148 MMHG | DIASTOLIC BLOOD PRESSURE: 55 MMHG | OXYGEN SATURATION: 100 % | BODY MASS INDEX: 28 KG/M2 | TEMPERATURE: 98 F | RESPIRATION RATE: 20 BRPM

## 2024-07-05 LAB
ALBUMIN SERPL-MCNC: 3.4 G/DL (ref 3.2–4.8)
ANION GAP SERPL CALC-SCNC: 5 MMOL/L (ref 0–18)
BASOPHILS # BLD AUTO: 0.02 X10(3) UL (ref 0–0.2)
BASOPHILS NFR BLD AUTO: 0.3 %
BUN BLD-MCNC: 48 MG/DL (ref 9–23)
BUN/CREAT SERPL: 29.8 (ref 10–20)
CALCIUM BLD-MCNC: 9 MG/DL (ref 8.7–10.4)
CHLORIDE SERPL-SCNC: 116 MMOL/L (ref 98–112)
CO2 SERPL-SCNC: 24 MMOL/L (ref 21–32)
CREAT BLD-MCNC: 1.61 MG/DL
DEPRECATED RDW RBC AUTO: 44.9 FL (ref 35.1–46.3)
EGFRCR SERPLBLD CKD-EPI 2021: 33 ML/MIN/1.73M2 (ref 60–?)
EOSINOPHIL # BLD AUTO: 0.21 X10(3) UL (ref 0–0.7)
EOSINOPHIL NFR BLD AUTO: 3.2 %
ERYTHROCYTE [DISTWIDTH] IN BLOOD BY AUTOMATED COUNT: 14 % (ref 11–15)
GLUCOSE BLD-MCNC: 97 MG/DL (ref 70–99)
GLUCOSE BLDC GLUCOMTR-MCNC: 112 MG/DL (ref 70–99)
GLUCOSE BLDC GLUCOMTR-MCNC: 120 MG/DL (ref 70–99)
GLUCOSE BLDC GLUCOMTR-MCNC: 178 MG/DL (ref 70–99)
HCT VFR BLD AUTO: 22.3 %
HGB BLD-MCNC: 7.2 G/DL
IMM GRANULOCYTES # BLD AUTO: 0.02 X10(3) UL (ref 0–1)
IMM GRANULOCYTES NFR BLD: 0.3 %
LYMPHOCYTES # BLD AUTO: 1.05 X10(3) UL (ref 1–4)
LYMPHOCYTES NFR BLD AUTO: 16.1 %
MCH RBC QN AUTO: 28.5 PG (ref 26–34)
MCHC RBC AUTO-ENTMCNC: 32.3 G/DL (ref 31–37)
MCV RBC AUTO: 88.1 FL
MONOCYTES # BLD AUTO: 0.46 X10(3) UL (ref 0.1–1)
MONOCYTES NFR BLD AUTO: 7.1 %
NEUTROPHILS # BLD AUTO: 4.75 X10 (3) UL (ref 1.5–7.7)
NEUTROPHILS # BLD AUTO: 4.75 X10(3) UL (ref 1.5–7.7)
NEUTROPHILS NFR BLD AUTO: 73 %
OSMOLALITY SERPL CALC.SUM OF ELEC: 313 MOSM/KG (ref 275–295)
PHOSPHATE SERPL-MCNC: 3.3 MG/DL (ref 2.4–5.1)
PLATELET # BLD AUTO: 196 10(3)UL (ref 150–450)
POTASSIUM SERPL-SCNC: 4.1 MMOL/L (ref 3.5–5.1)
RBC # BLD AUTO: 2.53 X10(6)UL
SODIUM SERPL-SCNC: 145 MMOL/L (ref 136–145)
WBC # BLD AUTO: 6.5 X10(3) UL (ref 4–11)

## 2024-07-05 RX ORDER — VANCOMYCIN HYDROCHLORIDE 125 MG/1
125 CAPSULE ORAL DAILY
Qty: 10 CAPSULE | Refills: 0 | Status: SHIPPED | OUTPATIENT
Start: 2024-07-05

## 2024-07-05 RX ORDER — CIPROFLOXACIN 500 MG/1
500 TABLET, FILM COATED ORAL 2 TIMES DAILY
Qty: 14 TABLET | Refills: 0 | Status: SHIPPED | OUTPATIENT
Start: 2024-07-05 | End: 2024-07-12

## 2024-07-05 RX ORDER — VANCOMYCIN HYDROCHLORIDE 125 MG/1
125 CAPSULE ORAL DAILY
Status: DISCONTINUED | OUTPATIENT
Start: 2024-07-05 | End: 2024-07-05

## 2024-07-05 NOTE — PLAN OF CARE
Problem: Patient Centered Care  Goal: Patient preferences are identified and integrated in the patient's plan of care  Description: Interventions:  - What would you like us to know as we care for you? From home with son  - Provide timely, complete, and accurate information to patient/family  - Incorporate patient and family knowledge, values, beliefs, and cultural backgrounds into the planning and delivery of care  - Encourage patient/family to participate in care and decision-making at the level they choose  - Honor patient and family perspectives and choices  Outcome: Progressing     Problem: Patient/Family Goals  Goal: Patient/Family Long Term Goal  Description: Patient's Long Term Goal: To return home    Interventions:  - Monitor vital signs and labs, especially potassium  - Monitor accuchecks ACHS  - Nephrology consult  - Cardiology consult  - Tele monitoring  - See additional Care Plan goals for specific interventions  Outcome: Progressing  Goal: Patient/Family Short Term Goal  Description: Patient's Short Term Goal: To feel better    Interventions:   - Monitor vital signs and labs, especially potassium  - Monitor accuchecks ACHS  - Nephrology consult  - Cardiology consult  - Tele monitoring  - See additional Care Plan goals for specific interventions  Outcome: Progressing     Problem: CARDIOVASCULAR - ADULT  Goal: Maintains optimal cardiac output and hemodynamic stability  Description: INTERVENTIONS:  - Monitor vital signs, rhythm, and trends  - Monitor for bleeding, hypotension and signs of decreased cardiac output  - Evaluate effectiveness of vasoactive medications to optimize hemodynamic stability  - Monitor arterial and/or venous puncture sites for bleeding and/or hematoma  - Assess quality of pulses, skin color and temperature  - Assess for signs of decreased coronary artery perfusion - ex. Angina  - Evaluate fluid balance, assess for edema, trend weights  Outcome: Progressing  Goal: Absence of cardiac  arrhythmias or at baseline  Description: INTERVENTIONS:  - Continuous cardiac monitoring, monitor vital signs, obtain 12 lead EKG if indicated  - Evaluate effectiveness of antiarrhythmic and heart rate control medications as ordered  - Initiate emergency measures for life threatening arrhythmias  - Monitor electrolytes and administer replacement therapy as ordered  Outcome: Progressing     Problem: GASTROINTESTINAL - ADULT  Goal: Maintains or returns to baseline bowel function  Description: INTERVENTIONS:  - Assess bowel function  - Maintain adequate hydration with IV or PO as ordered and tolerated  - Evaluate effectiveness of GI medications  - Encourage mobilization and activity  - Obtain nutritional consult as needed  - Establish a toileting routine/schedule  - Consider collaborating with pharmacy to review patient's medication profile  Outcome: Progressing     Problem: METABOLIC/FLUID AND ELECTROLYTES - ADULT  Goal: Glucose maintained within prescribed range  Description: INTERVENTIONS:  - Monitor Blood Glucose as ordered  - Assess for signs and symptoms of hyperglycemia and hypoglycemia  - Administer ordered medications to maintain glucose within target range  - Assess barriers to adequate nutritional intake and initiate nutrition consult as needed  - Instruct patient on self management of diabetes  Outcome: Progressing  Goal: Electrolytes maintained within normal limits  Description: INTERVENTIONS:  - Monitor labs and rhythm and assess patient for signs and symptoms of electrolyte imbalances  - Administer electrolyte replacement as ordered  - Monitor response to electrolyte replacements, including rhythm and repeat lab results as appropriate  - Fluid restriction as ordered  - Instruct patient on fluid and nutrition restrictions as appropriate  Outcome: Progressing  Goal: Hemodynamic stability and optimal renal function maintained  Description: INTERVENTIONS:  - Monitor labs and assess for signs and symptoms of  volume excess or deficit  - Monitor intake, output and patient weight  - Monitor urine specific gravity, serum osmolarity and serum sodium as indicated or ordered  - Monitor response to interventions for patient's volume status, including labs, urine output, blood pressure (other measures as available)  - Encourage oral intake as appropriate  - Instruct patient on fluid and nutrition restrictions as appropriate  Outcome: Progressing     Problem: SKIN/TISSUE INTEGRITY - ADULT  Goal: Skin integrity remains intact  Description: INTERVENTIONS  - Assess and document risk factors for pressure ulcer development  - Assess and document skin integrity  - Monitor for areas of redness and/or skin breakdown  - Initiate interventions, skin care algorithm/standards of care as needed  Outcome: Progressing   No acute changes, patient in no acute distress, verbalizes pain is adequately controlled. Plan of care reviewed, safety precautions in place

## 2024-07-05 NOTE — PLAN OF CARE
Problem: Patient Centered Care  Goal: Patient preferences are identified and integrated in the patient's plan of care  Description: Interventions:  - What would you like us to know as we care for you? From home with son  - Provide timely, complete, and accurate information to patient/family  - Incorporate patient and family knowledge, values, beliefs, and cultural backgrounds into the planning and delivery of care  - Encourage patient/family to participate in care and decision-making at the level they choose  - Honor patient and family perspectives and choices  Outcome: Adequate for Discharge     Problem: Patient/Family Goals  Goal: Patient/Family Long Term Goal  Description: Patient's Long Term Goal: To return home    Interventions:  - Monitor vital signs and labs, especially potassium  - Monitor accuchecks ACHS  - Nephrology consult  - Cardiology consult  - Tele monitoring  - See additional Care Plan goals for specific interventions  Outcome: Adequate for Discharge  Goal: Patient/Family Short Term Goal  Description: Patient's Short Term Goal: To feel better    Interventions:   - Monitor vital signs and labs, especially potassium  - Monitor accuchecks ACHS  - Nephrology consult  - Cardiology consult  - Tele monitoring  - See additional Care Plan goals for specific interventions  Outcome: Adequate for Discharge     Problem: CARDIOVASCULAR - ADULT  Goal: Maintains optimal cardiac output and hemodynamic stability  Description: INTERVENTIONS:  - Monitor vital signs, rhythm, and trends  - Monitor for bleeding, hypotension and signs of decreased cardiac output  - Evaluate effectiveness of vasoactive medications to optimize hemodynamic stability  - Monitor arterial and/or venous puncture sites for bleeding and/or hematoma  - Assess quality of pulses, skin color and temperature  - Assess for signs of decreased coronary artery perfusion - ex. Angina  - Evaluate fluid balance, assess for edema, trend weights  Outcome:  Adequate for Discharge  Goal: Absence of cardiac arrhythmias or at baseline  Description: INTERVENTIONS:  - Continuous cardiac monitoring, monitor vital signs, obtain 12 lead EKG if indicated  - Evaluate effectiveness of antiarrhythmic and heart rate control medications as ordered  - Initiate emergency measures for life threatening arrhythmias  - Monitor electrolytes and administer replacement therapy as ordered  Outcome: Adequate for Discharge     Problem: GASTROINTESTINAL - ADULT  Goal: Maintains or returns to baseline bowel function  Description: INTERVENTIONS:  - Assess bowel function  - Maintain adequate hydration with IV or PO as ordered and tolerated  - Evaluate effectiveness of GI medications  - Encourage mobilization and activity  - Obtain nutritional consult as needed  - Establish a toileting routine/schedule  - Consider collaborating with pharmacy to review patient's medication profile  Outcome: Adequate for Discharge     Problem: METABOLIC/FLUID AND ELECTROLYTES - ADULT  Goal: Glucose maintained within prescribed range  Description: INTERVENTIONS:  - Monitor Blood Glucose as ordered  - Assess for signs and symptoms of hyperglycemia and hypoglycemia  - Administer ordered medications to maintain glucose within target range  - Assess barriers to adequate nutritional intake and initiate nutrition consult as needed  - Instruct patient on self management of diabetes  Outcome: Adequate for Discharge  Goal: Electrolytes maintained within normal limits  Description: INTERVENTIONS:  - Monitor labs and rhythm and assess patient for signs and symptoms of electrolyte imbalances  - Administer electrolyte replacement as ordered  - Monitor response to electrolyte replacements, including rhythm and repeat lab results as appropriate  - Fluid restriction as ordered  - Instruct patient on fluid and nutrition restrictions as appropriate  Outcome: Adequate for Discharge  Goal: Hemodynamic stability and optimal renal function  maintained  Description: INTERVENTIONS:  - Monitor labs and assess for signs and symptoms of volume excess or deficit  - Monitor intake, output and patient weight  - Monitor urine specific gravity, serum osmolarity and serum sodium as indicated or ordered  - Monitor response to interventions for patient's volume status, including labs, urine output, blood pressure (other measures as available)  - Encourage oral intake as appropriate  - Instruct patient on fluid and nutrition restrictions as appropriate  Outcome: Adequate for Discharge     Problem: SKIN/TISSUE INTEGRITY - ADULT  Goal: Skin integrity remains intact  Description: INTERVENTIONS  - Assess and document risk factors for pressure ulcer development  - Assess and document skin integrity  - Monitor for areas of redness and/or skin breakdown  - Initiate interventions, skin care algorithm/standards of care as needed  Outcome: Adequate for Discharge

## 2024-07-05 NOTE — PROGRESS NOTES
[facility]  part of [facility]    Progress Note    Hannah Boss Patient Status:  Inpatient    1945 MRN [id number]   Location [facility] 5SW/SE Attending Rosa Jones MD   Hosp Day # 2 PCP Diana Lopez DO       Subjective:   Hannah Boss is sitting up in chair. Dozing off.     Objective:   Blood pressure 148/55, pulse 90, temperature 98 °F (36.7 °C), temperature source Oral, resp. rate 20, weight 163 lb 14.4 oz (74.3 kg), SpO2 100%, not currently breastfeeding.    Physical Exam:    General: No acute distress.   Respiratory: Clear to auscultation bilaterally. No wheezes. No rhonchi.  Cardiovascular: S1, S2. Regular rate and rhythm. No murmurs, rubs or gallops.   Abdomen: Soft, nontender, nondistended.  Positive bowel sounds. No rebound or guarding.  Neurologic: No focal neurological deficits.   Musculoskeletal: Moves all extremities.  Extremities: No edema.    Results:     Lab Results   Component Value Date    WBC 6.5 2024    HGB 7.2 (L) 2024    HCT 22.3 (L) 2024    .0 2024    CREATSERUM 1.61 (H) 2024    BUN 48 (H) 2024     2024    K 4.1 2024     (H) 2024    CO2 24.0 2024    GLU 97 2024    CA 9.0 2024    ALB 3.4 2024    ALKPHO 197 (H) 2024    BILT 0.2 2024    TP 5.9 2024    AST 16 2024    ALT 17 2024    PTT 35.0 2024    INR 1.30 (H) 2024    T4F 2.5 (H) 2024    TSH 0.008 (L) 2024    LIP 22 2024    DDIMER 1.13 (H) 2023    MG 2.1 2024    PHOS 3.3 2024    TROP 0.398 (HH) 2021     (H) 2024    B12 497 2024       US VENOUS DOPPLER LEG BILAT - DIAG IMG (CPT=93970)    Result Date: 7/3/2024  CONCLUSION:  1. No gross evidence of deep venous thrombosis in the bilateral lower extremities.  However, the right popliteal vein is not well visualized, and the bilateral peroneal and posterior  tibial veins are not well visualized.    Dictated by (CST): Dl Santamaria MD on 7/03/2024 at 10:43 AM     Finalized by (CST): Dl Santamaria MD on 7/03/2024 at 10:45 AM          CT SPINE LUMBAR (CPT=72131)    Result Date: 7/3/2024  CONCLUSION:  1. No acute fracture or traumatic subluxation of the lumbar spine. 2. 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 3. Advanced multilevel lumbar spine degenerative changes with multilevel spinal canal and neural foraminal stenoses as detailed. 4. Demineralization. 5. Partially imaged fibroid uterus; some of the fibroids are partially calcified/degenerated.   A preliminary report was issued by the [facility] teleradiology service. There are no major discrepancies.  elm-remote  Dictated by (CST): Omega Ramirez MD on 7/03/2024 at 8:28 AM     Finalized by (CST): Omega Ramirez MD on 7/03/2024 at 8:35 AM          CT SPINE THORACIC (CPT=72128)    Result Date: 7/3/2024  CONCLUSION:  1. No acute fracture or traumatic subluxation of the thoracic spine. 2. Moderate multilevel thoracic spine degenerative changes. 3. Mild dextroscoliosis of the thoracic spine. 4. Mild emphysema. 5. Sequelae of remote granulomatous disease. 6. Stable exophytic 1.1 cm right posterior thyroid nodule.  Thyroid nodules less than 1.5 cm on cross-sectional imaging typically require no additional follow-up. 7. Lesser incidental findings as above.   A preliminary report was issued by the [facility] teleradiology service. There are no major discrepancies.  elm-remote  Dictated by (CST): Omega Ramirez MD on 7/03/2024 at 8:19 AM     Finalized by (CST): Omega Ramirez MD on 7/03/2024 at 8:28 AM          XR CHEST AP PORTABLE  (CPT=71045)    Result Date: 7/3/2024  CONCLUSION:   Negative for radiographically evident acute intrathoracic process.   A preliminary report was issued by the [facility] teleradiology service. There are no  major discrepancies.  elm-remote  Dictated by (CST): Omega Ramirez MD on 7/03/2024 at 7:56 AM     Finalized by (CST): Omega Ramirez MD on 7/03/2024 at 7:57 AM          CT BRAIN OR HEAD (80310)    Result Date: 7/3/2024  CONCLUSION:  1. No acute intracranial process by noncontrast CT technique. 2. Intracranial atherosclerosis.   A preliminary report was issued by the [facility] teleradiology service. There are no major discrepancies.  elm-remote  Dictated by (CST): Omega Ramirez MD on 7/03/2024 at 6:23 AM     Finalized by (CST): Omega Ramirez MD on 7/03/2024 at 6:26 AM              hydrALAZINE  25 mg Oral BID    pantoprazole  40 mg Oral BID AC    cefTRIAXone  1 g Intravenous Q24H    vancomycin  125 mg Oral Daily    ferrous sulfate  325 mg Oral Daily with breakfast       diazePAM    meclizine    acetaminophen    acetaminophen **OR** HYDROcodone-acetaminophen **OR** HYDROcodone-acetaminophen    melatonin    ondansetron      Assessment and Plan:       Altered mental status/acute encephalopathy: Metabolic versus infection versus drug-induced  S/p fall, unwitnessed  -S/p Narcan with improvement  -Cards recommend to stop narcotics at discharge but patient states that is all that helps her pain  - will have PT/OT evaluate her  - will not discharge her home on any narcotics   - discharge home today if ok with PT.      Acute respiratory distress with hypoxia, possible aspiration  Vomiting  -Incentive spirometry     Acute kidney injury on CKD 2  Hyperkalemia  -S/p 1 L NS in the ED  -Hold diuretics  -Recheck labs, treat accordingly  -Nephrology on consult     UTI  -Ceftriaxone  -Follow-up urine culture- klebsiella  - will discharge on cipro  -C. difficile prophylaxis     Right lower extremity swelling  History of left lower extremity DVT (diagnosed 6/6/23) on Eliquis  -Follow-up Doppler- negative      Chronic diastolic heart failure  Moderate aortic stenosis  Moderate pulmonary artery hypertension  Paroxysmal  supraventricular tachycardia  -Hold diuretics for now due to DICK  -Continue other cardiac medications     Acute on chronic anemia  -Hold Eliquis  -Check iron studies  -Check stool occult blood- positive  - will stop eliquis at discharge  - follow up with Cards.    Sinus bradycardia/ Junctional rhythm  - Cards on consult   - do not resume narcotics per Cards   - monitor        HTN  HLD  GERD  -Resume home medications as appropriate     Quality:  DVT Prophylaxis: SCD if no DVT.  Anticoagulation on hold due to anemia  CODE status: Full code    PA Hun[place]    07/05/24

## 2024-07-05 NOTE — OCCUPATIONAL THERAPY NOTE
OCCUPATIONAL THERAPY EVALUATION - INPATIENT     Room Number: 537/537-A  Evaluation Date: 7/5/2024  Type of Evaluation: Initial  Presenting Problem: fall    Physician Order: IP Consult to Occupational Therapy  Reason for Therapy: ADL/IADL Dysfunction and Discharge Planning    OCCUPATIONAL THERAPY ASSESSMENT   Patient is a 78 year old female admitted 7/2/2024 s/p fall.  Prior to admission, patient's baseline is independent to supervision.  Patient is currently functioning below baseline with toileting, bathing, lower body dressing, transfers, and dynamic standing balance.  Patient is requiring minimal assist as a result of the following impairments: decreased functional strength and decreased endurance. Occupational Therapy will continue to follow for duration of hospitalization.    Patient will benefit from continued skilled OT Services at discharge to promote functional independence in home.  Anticipate patient will return home with home health OT    PLAN  OT Treatment Plan: Balance activities;ADL training;Functional transfer training;Endurance training;Patient/Family education  OT Device Recommendations: None    OCCUPATIONAL THERAPY MEDICAL/SOCIAL HISTORY   Problem List  Principal Problem:    Fall, initial encounter  Active Problems:    Hyperkalemia    Urinary tract infection without hematuria, site unspecified    Acute kidney injury (HCC)    Opiate or related narcotic overdose, undetermined intent, initial encounter (HCC)    HOME SITUATION  Type of Home: House  Home Layout: Able to live on main level  Lives With: Alone (son lives nearby and comes over almost daily)  Shower/Tub and Equipment: Walk-in shower; Shower chair  Other Equipment: -- (rw)  Drives: No    Stairs in Home: 2 MARANDA  Use of Assistive Device(s): RW    Prior Level of [place]: Pt lives alone in a house. Her son lives nearby. Pt is Mod I with most ADLs. Supervision for showers (pt reports she will not shower unless her son is there). Pt has a walk  in shower with seat.    SUBJECTIVE  \"I have a lady that helps with the cleaning and my son helps too.\"    OCCUPATIONAL THERAPY EXAMINATION    OBJECTIVE  Precautions: Bed/chair alarm  Fall Risk: Standard fall risk    PAIN ASSESSMENT  Ratin    ACTIVITY TOLERANCE  Pulse: (!) 129 (129 Bpm with activity. 110 HR resting after activity)  Heart Rate Source: Monitor                   O2 SATURATIONS       COGNITION  Overall Cognitive Status:  WFL - within functional limits    VISION  wfl    PERCEPTION  wfl        Communication: wfl    Behavioral/Emotional/Social: wfl    RANGE OF MOTION   Upper extremity ROM is within functional limits     STRENGTH ASSESSMENT  Upper extremity strength is within functional limits     COORDINATION  Gross Motor: WFL   Fine Motor: WFL     ACTIVITIES OF DAILY LIVING ASSESSMENT  AM-PAC ‘6-Clicks’ Inpatient Daily Activity Short Form  How much help from another person does the patient currently need…  -   Putting on and taking off regular lower body clothing?: A Little  -   Bathing (including washing, rinsing, drying)?: A Little  -   Toileting, which includes using toilet, bedpan or urinal? : A Little  -   Putting on and taking off regular upper body clothing?: None  -   Taking care of personal grooming such as brushing teeth?: A Little  -   Eating meals?: None    AM-PAC Score:  Score: 20  Approx Degree of Impairment: 38.32%  Standardized Score (AM-PAC Scale): 42.03  CMS Modifier (G-Code): CJ    FUNCTIONAL TRANSFER ASSESSMENT  Sit to Stand: Chair  Chair: Contact Guard Assist  Toilet Transfer: Contact Guard Assist    BED MOBILITY     BALANCE ASSESSMENT  Static Sitting: Stand-by Assist  Sitting Unilateral: Stand-by Assist  Static Standing: Contact Guard Assist  Standing Unilateral: Contact Guard Assist    FUNCTIONAL ADL ASSESSMENT  Eating: Independent  Grooming Standing: Contact Guard Assist  Bathing Seated: Stand-by Assist  Bathing Standing: Contact Guard Assist  UB Dressing Seated: Stand-by  Assist  LB Dressing Seated: Minimal Assist (to don socks)  Toileting Standing: Stand-by Assist    THERAPEUTIC EXERCISE     Skilled Therapy Provided: RN approved session. Pt received in the bathroom with the PCT present, agreeable to tx. Pt reports \"0/10\" pain. Pt was able to complete bathing at sit/stand level with supervision to SBA. She stood at the sink with SBA to groom. Pt donned socks seated with Min A - pt reports a different set up at home making it easier to don socks. Pt's HR increased to 129 during activity. She was encouraged to take frequent rest breaks and pace herself while performing ADLs. Pt verbalized understanding. She will have assist at home from her PT CG and son. Will continue to follow.      EDUCATION PROVIDED  Patient: Role of Occupational Therapy; Plan of Care; Discharge Recommendations; Functional Transfer Techniques; Energy Conservation; Compensatory ADL Techniques  Patient's Response to Education: Verbalized Understanding    The patient's Approx Degree of Impairment: 38.32% has been calculated based on documentation in the [facility] '6 clicks' Inpatient Daily Activity Short Form.  Research supports that patients with this level of impairment may benefit from home with HH.  Final disposition will be made by interdisciplinary medical team.     Patient End of Session: Up in chair;Needs met;Call light within reach;RN aware of session/findings;All patient questions and concerns addressed    OT Goals  Patients self stated goal is: unstated     Patient will complete functional transfer with supervision  Comment:     Patient will complete toileting with supervision  Comment:     Patient will tolerate standing for 3 minutes in prep for adls with supervision   Comment:    Patient will complete item retrieval with supervision  Comment:          Goals  on: 24  Frequency: 3x/week    Patient Evaluation Complexity Level:   Occupational Profile/Medical History LOW - Brief history including review  of medical or therapy records    Specific performance deficits impacting engagement in ADL/IADL LOW  1 - 3 performance deficits    Client Assessment/Performance Deficits LOW - No comorbidities nor modifications of tasks    Clinical Decision Making LOW - Analysis of occupational profile, problem-focused assessments, limited treatment options    Overall Complexity LOW     OT Session   Self-Care Home Management: 10 minutes  Therapeutic Activity: 10 minutes

## 2024-07-05 NOTE — DISCHARGE INSTRUCTIONS
Sometimes managing your health at home requires assistance.  The [facility] team has recognized your preference to use [facility].  They can be reached at  [phone] . A representative from the home health agency will contact you or your family to resume your visits.

## 2024-07-05 NOTE — CM/SW NOTE
07/05/24 1200   Discharge disposition   Expected discharge disposition Home-Health   Post Acute Care Provider   ([facility])   Discharge transportation Private car     DANIAL confirmed with RN Cathie who stated pt is medically ready for discharge today pending therapy eval.     updates attached via [facility] to [facility].  Yasmeen from [facility]  made aware of discharge through [employer] Messages.  Yasmeen confirmed they will see pt tomorrow in the field.    DANIAL confirmed that [facility] contact information added to AVS.    PLAN: DC home with [facility]    Ashley Galvez MSW, LSW [id number]

## 2024-07-08 NOTE — PAYOR COMM NOTE
--------------  DISCHARGE REVIEW    Payor: LAUREEN MA [facility]  Subscriber #:  [id number]  Authorization Number: [id number]    Admit date: 7/3/24  Admit time:   3:02 AM  Discharge Date: 7/5/2024  1:53 PM     Admitting Physician: Dorothy Banks MD  Attending Physician:  No att. providers found  Primary Care Physician: Diana Lopez DO

## 2024-07-10 ENCOUNTER — OFFICE VISIT (OUTPATIENT)
Facility: CLINIC | Age: 79
End: 2024-07-10

## 2024-07-10 VITALS
SYSTOLIC BLOOD PRESSURE: 105 MMHG | HEART RATE: 77 BPM | WEIGHT: 166 LBS | DIASTOLIC BLOOD PRESSURE: 58 MMHG | BODY MASS INDEX: 28 KG/M2

## 2024-07-10 DIAGNOSIS — I12.9 HYPERTENSIVE KIDNEY DISEASE WITH STAGE 3 CHRONIC KIDNEY DISEASE, UNSPECIFIED WHETHER STAGE 3A OR 3B CKD (HCC): ICD-10-CM

## 2024-07-10 DIAGNOSIS — R60.9 EDEMA, UNSPECIFIED TYPE: ICD-10-CM

## 2024-07-10 DIAGNOSIS — E87.5 HYPERKALEMIA: ICD-10-CM

## 2024-07-10 DIAGNOSIS — N18.30 STAGE 3 CHRONIC KIDNEY DISEASE, UNSPECIFIED WHETHER STAGE 3A OR 3B CKD (HCC): Primary | ICD-10-CM

## 2024-07-10 DIAGNOSIS — D63.1 ANEMIA DUE TO STAGE 3 CHRONIC KIDNEY DISEASE, UNSPECIFIED WHETHER STAGE 3A OR 3B CKD (HCC): ICD-10-CM

## 2024-07-10 DIAGNOSIS — E11.21 TYPE 2 DIABETES MELLITUS WITH NEPHROPATHY (HCC): ICD-10-CM

## 2024-07-10 DIAGNOSIS — N18.30 HYPERTENSIVE KIDNEY DISEASE WITH STAGE 3 CHRONIC KIDNEY DISEASE, UNSPECIFIED WHETHER STAGE 3A OR 3B CKD (HCC): ICD-10-CM

## 2024-07-10 DIAGNOSIS — I50.32 DIASTOLIC DYSFUNCTION WITH CHRONIC HEART FAILURE (HCC): ICD-10-CM

## 2024-07-10 DIAGNOSIS — N18.30 ANEMIA DUE TO STAGE 3 CHRONIC KIDNEY DISEASE, UNSPECIFIED WHETHER STAGE 3A OR 3B CKD (HCC): ICD-10-CM

## 2024-07-10 DIAGNOSIS — N25.81 SECONDARY HYPERPARATHYROIDISM OF RENAL ORIGIN (HCC): ICD-10-CM

## 2024-07-10 DIAGNOSIS — F17.200 TOBACCO DEPENDENCE: ICD-10-CM

## 2024-07-10 PROCEDURE — 1111F DSCHRG MED/CURRENT MED MERGE: CPT | Performed by: INTERNAL MEDICINE

## 2024-07-10 PROCEDURE — 1159F MED LIST DOCD IN RCRD: CPT | Performed by: INTERNAL MEDICINE

## 2024-07-10 PROCEDURE — 3078F DIAST BP <80 MM HG: CPT | Performed by: INTERNAL MEDICINE

## 2024-07-10 PROCEDURE — 1160F RVW MEDS BY RX/DR IN RCRD: CPT | Performed by: INTERNAL MEDICINE

## 2024-07-10 PROCEDURE — 99214 OFFICE O/P EST MOD 30 MIN: CPT | Performed by: INTERNAL MEDICINE

## 2024-07-10 PROCEDURE — 3074F SYST BP LT 130 MM HG: CPT | Performed by: INTERNAL MEDICINE

## 2024-07-10 PROCEDURE — 1126F AMNT PAIN NOTED NONE PRSNT: CPT | Performed by: INTERNAL MEDICINE

## 2024-07-10 RX ORDER — FERROUS SULFATE 325(65) MG
325 TABLET ORAL
Qty: 20 TABLET | Refills: 2 | Status: SHIPPED | OUTPATIENT
Start: 2024-07-10
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
## 2024-07-10 NOTE — PATIENT INSTRUCTIONS
1) start lasix (furosemide ) 20 mg by mouth every Monday Wednesday and Friday  2) veltassa 1 packet once a week  3) low potassium diet  4) Low salt diet  5) start iron tablets ( ferrous sulfate 325 mg three times a week Monday Wednesday Friday)

## 2024-08-20 ENCOUNTER — OFFICE VISIT (OUTPATIENT)
Dept: PAIN CLINIC | Facility: HOSPITAL | Age: 79
End: 2024-08-20
Attending: NURSE PRACTITIONER
Payer: MEDICARE

## 2024-08-20 ENCOUNTER — TELEPHONE (OUTPATIENT)
Dept: PAIN CLINIC | Facility: HOSPITAL | Age: 79
End: 2024-08-20

## 2024-08-20 VITALS
SYSTOLIC BLOOD PRESSURE: 96 MMHG | BODY MASS INDEX: 28 KG/M2 | DIASTOLIC BLOOD PRESSURE: 56 MMHG | WEIGHT: 165.19 LBS | OXYGEN SATURATION: 97 % | HEART RATE: 84 BPM

## 2024-08-20 DIAGNOSIS — M48.062 SPINAL STENOSIS OF LUMBAR REGION WITH NEUROGENIC CLAUDICATION: ICD-10-CM

## 2024-08-20 DIAGNOSIS — G62.9 NEUROPATHY: Primary | ICD-10-CM

## 2024-08-20 PROCEDURE — 99211 OFF/OP EST MAY X REQ PHY/QHP: CPT

## 2024-08-20 RX ORDER — PREGABALIN 50 MG/1
50 CAPSULE ORAL 2 TIMES DAILY
Qty: 60 CAPSULE | Refills: 0 | Status: SHIPPED | OUTPATIENT
Start: 2024-08-20 | End: 2024-09-19

## 2024-08-20 RX ORDER — OXYCODONE AND ACETAMINOPHEN 10; 325 MG/1; MG/1
1-2 TABLET ORAL EVERY 4 HOURS PRN
Qty: 160 TABLET | Refills: 0 | Status: SHIPPED | OUTPATIENT
Start: 2024-09-01 | End: 2024-10-01

## 2024-08-20 RX ORDER — OXYCODONE AND ACETAMINOPHEN 10; 325 MG/1; MG/1
1-2 TABLET ORAL EVERY 4 HOURS PRN
Qty: 160 TABLET | Refills: 0 | Status: SHIPPED | OUTPATIENT
Start: 2024-10-01 | End: 2024-10-31

## 2024-08-20 NOTE — PROGRESS NOTES
Patient presents in office today with reported pain in shoulders,  back and legs     Current pain level reported = 8/10     Last reported dose of Percocet         Narcotic Contract renewal 04.25.25     Urine Drug screen 04.25.24    Has patient been flagged as fall risk:   Yes    TOP FALL PREVENTION TIPS    INSIDE YOUR HOME    KITCHEN:  Use non skid mats only.    Clean up spills as soon as they happen.  Keep objects that you use often within easy reach.  BATHROOM:  Install grab bars on the bathroom walls beside the tub, shower and toilet.  Use a non skid rubber mat in the tub/shower.  If you are unsteady on your feet you may want to use a shower chair/bench and a hand held shower head while bathing/showering.  BEDROOM:  Place light switches within reach of your bed and a night light between the bedroom and bathroom.  Get up slowly from lying down or sitting if you get dizzy.  Keep a working flashlight near your bed.  STAIRS:  Keep stairwells well lit with light switches at top and bottom.  Install sturdy handrails on both sides.  Make sure carpeting is secure.  FLOORS:  Remove all loose wires, cords and throw rugs.  Keep floors clear of clutter.  Make sure carpets and area rugs have skid proof backing.  Do not use slippery wax on bare floors.  Keep furniture in its accustomed place.   If you have pets, be careful that you don’t trip over them.    OUTSIDE SAFETY TIPS  Always wear good shoes with proper support and traction.  Always use hand rails on stairs and escalators.  Cover porch steps with gritty weather proof paint.  Pay attention to curbs and other changes in surfaces when out in the community.  Take care when walking on gravel or grassy surfaces.  Avoid walking on snowy or icy surfaces.  Use a cane or walker (indoors and out) if you are unsteady on your feet.

## 2024-08-20 NOTE — PATIENT INSTRUCTIONS
Refill policies:    Allow 2-3 business days for refills; controlled substances may take longer.  Contact your pharmacy at least 5 days prior to running out of medication and have them send an electronic request or submit request through the “request refill” option in your [employer] account.  Refills are not addressed on weekends; covering physicians do not authorize routine medications on weekends.  No narcotics or controlled substances are refilled after noon on Fridays or by on call physicians.  By law, narcotics must be electronically prescribed.  A 30 day supply with no refills is the maximum allowed.  If your prescription is due for a refill, you may be due for a follow up appointment.  To best provide you care, patients receiving routine medications need to be seen at least once a year.  Patients receiving narcotic/controlled substance medications need to be seen at least once every 3 months.  In the event that your preferred pharmacy does not have the requested medication in stock (e.g. Backordered), it is your responsibility to find another pharmacy that has the requested medication available.  We will gladly send a new prescription to that pharmacy at your request.    Scheduling Tests:    If your physician has ordered radiology tests such as MRI or CT scans, please contact Central Scheduling at [phone] right away to schedule the test.  Once scheduled, the [facility] Centralized Referral Team will work with your insurance carrier to obtain pre-certification or prior authorization.  Depending on your insurance carrier, approval may take 3-10 days.  It is highly recommended patients assure they have received an authorization before having a test performed.  If test is done without insurance authorization, patient may be responsible for the entire amount billed.      Precertification and Prior Authorizations:  If your physician has recommended that you have a procedure or additional testing performed the [facility]  Centralized Referral Team will contact your insurance carrier to obtain pre-certification or prior authorization.    You are strongly encouraged to contact your insurance carrier to verify that your procedure/test has been approved and is a COVERED benefit.  Although the [facility] Centralized Referral Team does its due diligence, the insurance carrier gives the disclaimer that \"Although the procedure is authorized, this does not guarantee payment.\"    Ultimately the patient is responsible for payment.   Thank you for your understanding in this matter.  Paperwork Completion:  If you require FMLA or disability paperwork for your recovery, please make sure to either drop it off or have it faxed to our office at [phone]. Be sure the form has your name and date of birth on it.  The form will be faxed to our Forms Department and they will complete it for you.  There is a 25$ fee for all forms that need to be filled out.  Please be aware there is a 10-14 day turnaround time.  You will need to sign a release of information (KEYA) form if your paperwork does not come with one.  You may call the Forms Department with any questions at [phone].  Their fax number is [phone].  Refill policies:    Allow 2-3 business days for refills; controlled substances may take longer.  Contact your pharmacy at least 5 days prior to running out of medication and have them send an electronic request or submit request through the “request refill” option in your [employer] account.  Refills are not addressed on weekends; covering physicians do not authorize routine medications on weekends.  No narcotics or controlled substances are refilled after noon on Fridays or by on call physicians.  By law, narcotics must be electronically prescribed.  A 30 day supply with no refills is the maximum allowed.  If your prescription is due for a refill, you may be due for a follow up appointment.  To best provide you care, patients receiving routine  medications need to be seen at least once a year.  Patients receiving narcotic/controlled substance medications need to be seen at least once every 3 months.  In the event that your preferred pharmacy does not have the requested medication in stock (e.g. Backordered), it is your responsibility to find another pharmacy that has the requested medication available.  We will gladly send a new prescription to that pharmacy at your request.    Scheduling Tests:    If your physician has ordered radiology tests such as MRI or CT scans, please contact Central Scheduling at [phone] right away to schedule the test.  Once scheduled, the [facility] Centralized Referral Team will work with your insurance carrier to obtain pre-certification or prior authorization.  Depending on your insurance carrier, approval may take 3-10 days.  It is highly recommended patients assure they have received an authorization before having a test performed.  If test is done without insurance authorization, patient may be responsible for the entire amount billed.      Precertification and Prior Authorizations:  If your physician has recommended that you have a procedure or additional testing performed the [facility] Centralized Referral Team will contact your insurance carrier to obtain pre-certification or prior authorization.    You are strongly encouraged to contact your insurance carrier to verify that your procedure/test has been approved and is a COVERED benefit.  Although the [facility] Centralized Referral Team does its due diligence, the insurance carrier gives the disclaimer that \"Although the procedure is authorized, this does not guarantee payment.\"    Ultimately the patient is responsible for payment.   Thank you for your understanding in this matter.  Paperwork Completion:  If you require FMLA or disability paperwork for your recovery, please make sure to either drop it off or have it faxed to our office at [phone]. Be sure the form has your name  and date of birth on it.  The form will be faxed to our Forms Department and they will complete it for you.  There is a 25$ fee for all forms that need to be filled out.  Please be aware there is a 10-14 day turnaround time.  You will need to sign a release of information (KEYA) form if your paperwork does not come with one.  You may call the Forms Department with any questions at [phone].  Their fax number is [phone].
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
## 2024-08-31 DIAGNOSIS — G62.9 NEUROPATHY: ICD-10-CM

## 2024-08-31 DIAGNOSIS — M48.062 SPINAL STENOSIS OF LUMBAR REGION WITH NEUROGENIC CLAUDICATION: ICD-10-CM

## 2024-09-03 RX ORDER — PREGABALIN 50 MG/1
50 CAPSULE ORAL 2 TIMES DAILY
Qty: 60 CAPSULE | Refills: 0 | Status: SHIPPED | OUTPATIENT
Start: 2024-09-03

## 2024-09-13 ENCOUNTER — APPOINTMENT (OUTPATIENT)
Dept: CT IMAGING | Facility: HOSPITAL | Age: 79
End: 2024-09-13
Attending: EMERGENCY MEDICINE
Payer: MEDICARE

## 2024-09-13 ENCOUNTER — APPOINTMENT (OUTPATIENT)
Dept: GENERAL RADIOLOGY | Facility: HOSPITAL | Age: 79
End: 2024-09-13
Attending: EMERGENCY MEDICINE
Payer: MEDICARE

## 2024-09-13 ENCOUNTER — HOSPITAL ENCOUNTER (INPATIENT)
Facility: HOSPITAL | Age: 79
LOS: 6 days | Discharge: HOME | End: 2024-09-19
Attending: EMERGENCY MEDICINE | Admitting: HOSPITALIST
Payer: MEDICARE

## 2024-09-13 ENCOUNTER — APPOINTMENT (OUTPATIENT)
Dept: MRI IMAGING | Facility: HOSPITAL | Age: 79
End: 2024-09-13
Attending: EMERGENCY MEDICINE
Payer: MEDICARE

## 2024-09-13 ENCOUNTER — HOSPITAL ENCOUNTER (INPATIENT)
Facility: HOSPITAL | Age: 79
LOS: 6 days | Discharge: HOME HEALTH | End: 2024-09-19
Attending: EMERGENCY MEDICINE | Admitting: HOSPITALIST
Payer: MEDICARE

## 2024-09-13 DIAGNOSIS — T40.604A: ICD-10-CM

## 2024-09-13 DIAGNOSIS — G89.4 CHRONIC PAIN SYNDROME: ICD-10-CM

## 2024-09-13 DIAGNOSIS — R41.82 ALTERED MENTAL STATUS, UNSPECIFIED ALTERED MENTAL STATUS TYPE: ICD-10-CM

## 2024-09-13 DIAGNOSIS — G89.3 CANCER RELATED PAIN: ICD-10-CM

## 2024-09-13 DIAGNOSIS — S00.93XA CONTUSION OF HEAD, UNSPECIFIED PART OF HEAD, INITIAL ENCOUNTER: Primary | ICD-10-CM

## 2024-09-13 DIAGNOSIS — S70.01XA CONTUSION OF RIGHT HIP, INITIAL ENCOUNTER: ICD-10-CM

## 2024-09-13 DIAGNOSIS — S40.011A CONTUSION OF MULTIPLE SITES OF RIGHT SHOULDER, INITIAL ENCOUNTER: ICD-10-CM

## 2024-09-13 PROBLEM — G93.40 ENCEPHALOPATHY: Status: ACTIVE | Noted: 2024-09-13

## 2024-09-13 LAB
AMMONIA PLAS-MCNC: 23 UMOL/L (ref 11–32)
ANION GAP SERPL CALC-SCNC: 2 MMOL/L (ref 0–18)
BASE EXCESS BLD CALC-SCNC: 1.5 MMOL/L (ref ?–2)
BASOPHILS # BLD AUTO: 0.03 X10(3) UL (ref 0–0.2)
BASOPHILS NFR BLD AUTO: 0.2 %
BILIRUB UR QL: NEGATIVE
BUN BLD-MCNC: 37 MG/DL (ref 9–23)
BUN/CREAT SERPL: 15.6 (ref 10–20)
CALCIUM BLD-MCNC: 9.1 MG/DL (ref 8.7–10.4)
CHLORIDE SERPL-SCNC: 113 MMOL/L (ref 98–112)
CLARITY UR: CLEAR
CO2 SERPL-SCNC: 27 MMOL/L (ref 21–32)
CREAT BLD-MCNC: 2.37 MG/DL
DEPRECATED RDW RBC AUTO: 53.7 FL (ref 35.1–46.3)
EGFRCR SERPLBLD CKD-EPI 2021: 21 ML/MIN/1.73M2 (ref 60–?)
EOSINOPHIL # BLD AUTO: 0.1 X10(3) UL (ref 0–0.7)
EOSINOPHIL NFR BLD AUTO: 0.8 %
ERYTHROCYTE [DISTWIDTH] IN BLOOD BY AUTOMATED COUNT: 15.7 % (ref 11–15)
GLUCOSE BLD-MCNC: 95 MG/DL (ref 70–99)
GLUCOSE BLDC GLUCOMTR-MCNC: 119 MG/DL (ref 70–99)
GLUCOSE BLDC GLUCOMTR-MCNC: 126 MG/DL (ref 70–99)
GLUCOSE UR-MCNC: NORMAL MG/DL
HCO3 BLDA-SCNC: 26 MEQ/L (ref 21–27)
HCT VFR BLD AUTO: 29.4 %
HGB BLD-MCNC: 9 G/DL
HGB UR QL STRIP.AUTO: NEGATIVE
IMM GRANULOCYTES # BLD AUTO: 0.05 X10(3) UL (ref 0–1)
IMM GRANULOCYTES NFR BLD: 0.4 %
KETONES UR-MCNC: NEGATIVE MG/DL
LEUKOCYTE ESTERASE UR QL STRIP.AUTO: NEGATIVE
LYMPHOCYTES # BLD AUTO: 1.02 X10(3) UL (ref 1–4)
LYMPHOCYTES NFR BLD AUTO: 8.4 %
MCH RBC QN AUTO: 28.5 PG (ref 26–34)
MCHC RBC AUTO-ENTMCNC: 30.6 G/DL (ref 31–37)
MCV RBC AUTO: 93 FL
MONOCYTES # BLD AUTO: 0.48 X10(3) UL (ref 0.1–1)
MONOCYTES NFR BLD AUTO: 3.9 %
NEUTROPHILS # BLD AUTO: 10.52 X10 (3) UL (ref 1.5–7.7)
NEUTROPHILS # BLD AUTO: 10.52 X10(3) UL (ref 1.5–7.7)
NEUTROPHILS NFR BLD AUTO: 86.3 %
NITRITE UR QL STRIP.AUTO: NEGATIVE
O2 CT BLD-SCNC: 12.7 VOL% (ref 15–23)
OSMOLALITY SERPL CALC.SUM OF ELEC: 302 MOSM/KG (ref 275–295)
PCO2 BLDA: 39 MM HG (ref 35–45)
PH BLDA: 7.43 [PH] (ref 7.35–7.45)
PH UR: 7.5 [PH] (ref 5–8)
PLATELET # BLD AUTO: 246 10(3)UL (ref 150–450)
PO2 BLDA: 64 MM HG (ref 80–100)
POTASSIUM SERPL-SCNC: 5.3 MMOL/L (ref 3.5–5.1)
PROT UR-MCNC: NEGATIVE MG/DL
PUNCTURE CHARGE: YES
RBC # BLD AUTO: 3.16 X10(6)UL
SAO2 % BLDA: 95.7 % (ref 94–100)
SARS-COV-2 RNA RESP QL NAA+PROBE: NOT DETECTED
SODIUM SERPL-SCNC: 142 MMOL/L (ref 136–145)
SP GR UR STRIP: 1.01 (ref 1–1.03)
UROBILINOGEN UR STRIP-ACNC: NORMAL
WBC # BLD AUTO: 12.2 X10(3) UL (ref 4–11)

## 2024-09-13 PROCEDURE — 99223 1ST HOSP IP/OBS HIGH 75: CPT | Performed by: HOSPITALIST

## 2024-09-13 PROCEDURE — 70450 CT HEAD/BRAIN W/O DYE: CPT | Performed by: EMERGENCY MEDICINE

## 2024-09-13 PROCEDURE — 73030 X-RAY EXAM OF SHOULDER: CPT | Performed by: EMERGENCY MEDICINE

## 2024-09-13 PROCEDURE — 73502 X-RAY EXAM HIP UNI 2-3 VIEWS: CPT | Performed by: EMERGENCY MEDICINE

## 2024-09-13 RX ORDER — HYDRALAZINE HYDROCHLORIDE 20 MG/ML
10 INJECTION INTRAMUSCULAR; INTRAVENOUS ONCE
Status: COMPLETED | OUTPATIENT
Start: 2024-09-13 | End: 2024-09-13

## 2024-09-13 RX ORDER — METOCLOPRAMIDE HYDROCHLORIDE 5 MG/ML
5 INJECTION INTRAMUSCULAR; INTRAVENOUS EVERY 8 HOURS PRN
Status: DISCONTINUED | OUTPATIENT
Start: 2024-09-13 | End: 2024-09-19

## 2024-09-13 RX ORDER — NALOXONE HYDROCHLORIDE 0.4 MG/ML
1 INJECTION, SOLUTION INTRAMUSCULAR; INTRAVENOUS; SUBCUTANEOUS ONCE
Status: COMPLETED | OUTPATIENT
Start: 2024-09-13 | End: 2024-09-13

## 2024-09-13 RX ORDER — SODIUM CHLORIDE 9 MG/ML
INJECTION, SOLUTION INTRAVENOUS CONTINUOUS
Status: DISCONTINUED | OUTPATIENT
Start: 2024-09-13 | End: 2024-09-17

## 2024-09-13 RX ORDER — ONDANSETRON 2 MG/ML
4 INJECTION INTRAMUSCULAR; INTRAVENOUS EVERY 6 HOURS PRN
Status: DISCONTINUED | OUTPATIENT
Start: 2024-09-13 | End: 2024-09-14

## 2024-09-13 RX ORDER — DILTIAZEM HYDROCHLORIDE 240 MG/1
240 CAPSULE, COATED, EXTENDED RELEASE ORAL DAILY
COMMUNITY
Start: 2024-07-18

## 2024-09-13 RX ORDER — ISOSORBIDE MONONITRATE 60 MG/1
60 TABLET, EXTENDED RELEASE ORAL DAILY
COMMUNITY
Start: 2024-06-27

## 2024-09-13 RX ORDER — ATORVASTATIN CALCIUM 40 MG/1
40 TABLET, FILM COATED ORAL NIGHTLY
COMMUNITY
Start: 2024-06-27

## 2024-09-13 RX ORDER — HEPARIN SODIUM 5000 [USP'U]/ML
5000 INJECTION, SOLUTION INTRAVENOUS; SUBCUTANEOUS EVERY 12 HOURS SCHEDULED
Status: DISCONTINUED | OUTPATIENT
Start: 2024-09-13 | End: 2024-09-17

## 2024-09-13 RX ORDER — NALOXONE HYDROCHLORIDE 0.4 MG/ML
0.4 INJECTION, SOLUTION INTRAMUSCULAR; INTRAVENOUS; SUBCUTANEOUS ONCE
Status: COMPLETED | OUTPATIENT
Start: 2024-09-13 | End: 2024-09-13

## 2024-09-13 RX ORDER — ONDANSETRON 2 MG/ML
INJECTION INTRAMUSCULAR; INTRAVENOUS
Status: COMPLETED
Start: 2024-09-13 | End: 2024-09-13

## 2024-09-13 RX ORDER — ACETAMINOPHEN 500 MG
500 TABLET ORAL EVERY 4 HOURS PRN
Status: DISCONTINUED | OUTPATIENT
Start: 2024-09-13 | End: 2024-09-19

## 2024-09-13 RX ORDER — FUROSEMIDE 20 MG
20 TABLET ORAL DAILY
COMMUNITY
Start: 2024-08-24

## 2024-09-13 RX ORDER — NALOXONE HYDROCHLORIDE 0.4 MG/ML
0.8 INJECTION, SOLUTION INTRAMUSCULAR; INTRAVENOUS; SUBCUTANEOUS ONCE
Status: COMPLETED | OUTPATIENT
Start: 2024-09-13 | End: 2024-09-13

## 2024-09-13 RX ORDER — NALOXONE HYDROCHLORIDE 0.4 MG/ML
0.4 INJECTION, SOLUTION INTRAMUSCULAR; INTRAVENOUS; SUBCUTANEOUS ONCE
Status: DISCONTINUED | OUTPATIENT
Start: 2024-09-13 | End: 2024-09-19

## 2024-09-13 RX ORDER — ONDANSETRON 2 MG/ML
4 INJECTION INTRAMUSCULAR; INTRAVENOUS ONCE
Status: COMPLETED | OUTPATIENT
Start: 2024-09-13 | End: 2024-09-13

## 2024-09-13 RX ORDER — APIXABAN 5 MG/1
5 TABLET, FILM COATED ORAL 2 TIMES DAILY
COMMUNITY
Start: 2024-09-08

## 2024-09-13 NOTE — ED INITIAL ASSESSMENT (HPI)
Patient arrived to ED from home via EMS for mechanical fall. Patient reports falling twice and hitting head on bathroom floor, on thinners. Denies LOC. Patient states she's unsure why she fell but believes her legs weakened and she fell. She did has assistance getting up after falling.     Patient complaining of right sided pain, pain more in right shoulder area    Patient is very weak but FAST exam negative    Patient is A/O x 4    Patient changed into gown. Side rails up x2, call light within reach, blanket provided.

## 2024-09-13 NOTE — ED QUICK NOTES
Dr. Renteria, Hospitalist, assessed patient and ordered another 0.4mg dose of narcan due to patient having pinpoint pupils and still very lethargic

## 2024-09-13 NOTE — ED QUICK NOTES
Orders for admission, patient is aware of plan and ready to go upstairs. Any questions, please call ED RN Arslan at extension [phone].     Patient Covid vaccination status: Unvaccinated     COVID Test Ordered in ED: None    COVID Suspicion at Admission: N/A    Running Infusions:  None    Mental Status/LOC at time of transport: a/o x 1    Other pertinent information:   CIWA score: N/A   NIH score:  N/A

## 2024-09-13 NOTE — ED QUICK NOTES
After Narcan administration, patient woke up, a/o x 4, speaking clearly and in full sentences. Shortly after, patient became lethargic again. MD notified, 1mg narcan ordered.

## 2024-09-13 NOTE — ED QUICK NOTES
Spoke with Daughter in Law (Denise Forrest ([phone])) requesting updates. Also gave number to Vladimir (other son ([phone])) if she can't be reached.

## 2024-09-14 ENCOUNTER — APPOINTMENT (OUTPATIENT)
Dept: CV DIAGNOSTICS | Facility: HOSPITAL | Age: 79
End: 2024-09-14
Attending: INTERNAL MEDICINE
Payer: MEDICARE

## 2024-09-14 ENCOUNTER — APPOINTMENT (OUTPATIENT)
Dept: GENERAL RADIOLOGY | Facility: HOSPITAL | Age: 79
End: 2024-09-14
Attending: EMERGENCY MEDICINE
Payer: MEDICARE

## 2024-09-14 LAB
ANION GAP SERPL CALC-SCNC: 6 MMOL/L (ref 0–18)
BASOPHILS # BLD AUTO: 0.03 X10(3) UL (ref 0–0.2)
BASOPHILS NFR BLD AUTO: 0.3 %
BUN BLD-MCNC: 23 MG/DL (ref 9–23)
BUN/CREAT SERPL: 11.9 (ref 10–20)
C DIFF TOX B STL QL: NEGATIVE
CALCIUM BLD-MCNC: 8.9 MG/DL (ref 8.7–10.4)
CHLORIDE SERPL-SCNC: 114 MMOL/L (ref 98–112)
CHOLEST SERPL-MCNC: 166 MG/DL (ref ?–200)
CO2 SERPL-SCNC: 25 MMOL/L (ref 21–32)
CREAT BLD-MCNC: 1.94 MG/DL
DEPRECATED RDW RBC AUTO: 50.2 FL (ref 35.1–46.3)
EGFRCR SERPLBLD CKD-EPI 2021: 26 ML/MIN/1.73M2 (ref 60–?)
EOSINOPHIL # BLD AUTO: 0 X10(3) UL (ref 0–0.7)
EOSINOPHIL NFR BLD AUTO: 0 %
ERYTHROCYTE [DISTWIDTH] IN BLOOD BY AUTOMATED COUNT: 15.2 % (ref 11–15)
GLUCOSE BLD-MCNC: 119 MG/DL (ref 70–99)
HCT VFR BLD AUTO: 29.8 %
HDLC SERPL-MCNC: 54 MG/DL (ref 40–59)
HGB BLD-MCNC: 9.4 G/DL
IMM GRANULOCYTES # BLD AUTO: 0.03 X10(3) UL (ref 0–1)
IMM GRANULOCYTES NFR BLD: 0.3 %
LDLC SERPL CALC-MCNC: 90 MG/DL (ref ?–100)
LYMPHOCYTES # BLD AUTO: 0.62 X10(3) UL (ref 1–4)
LYMPHOCYTES NFR BLD AUTO: 5.4 %
MCH RBC QN AUTO: 28.6 PG (ref 26–34)
MCHC RBC AUTO-ENTMCNC: 31.5 G/DL (ref 31–37)
MCV RBC AUTO: 90.6 FL
MONOCYTES # BLD AUTO: 0.14 X10(3) UL (ref 0.1–1)
MONOCYTES NFR BLD AUTO: 1.2 %
NEUTROPHILS # BLD AUTO: 10.75 X10 (3) UL (ref 1.5–7.7)
NEUTROPHILS # BLD AUTO: 10.75 X10(3) UL (ref 1.5–7.7)
NEUTROPHILS NFR BLD AUTO: 92.8 %
NONHDLC SERPL-MCNC: 112 MG/DL (ref ?–130)
OSMOLALITY SERPL CALC.SUM OF ELEC: 305 MOSM/KG (ref 275–295)
PLATELET # BLD AUTO: 189 10(3)UL (ref 150–450)
POTASSIUM SERPL-SCNC: 4.3 MMOL/L (ref 3.5–5.1)
RBC # BLD AUTO: 3.29 X10(6)UL
SODIUM SERPL-SCNC: 145 MMOL/L (ref 136–145)
TRIGL SERPL-MCNC: 122 MG/DL (ref 30–149)
TROPONIN I SERPL HS-MCNC: 39 NG/L
TROPONIN I SERPL HS-MCNC: 42 NG/L
VLDLC SERPL CALC-MCNC: 20 MG/DL (ref 0–30)
WBC # BLD AUTO: 11.6 X10(3) UL (ref 4–11)

## 2024-09-14 PROCEDURE — 71045 X-RAY EXAM CHEST 1 VIEW: CPT | Performed by: EMERGENCY MEDICINE

## 2024-09-14 PROCEDURE — 99291 CRITICAL CARE FIRST HOUR: CPT | Performed by: INTERNAL MEDICINE

## 2024-09-14 PROCEDURE — 93306 TTE W/DOPPLER COMPLETE: CPT | Performed by: INTERNAL MEDICINE

## 2024-09-14 PROCEDURE — 99222 1ST HOSP IP/OBS MODERATE 55: CPT | Performed by: INTERNAL MEDICINE

## 2024-09-14 PROCEDURE — 99233 SBSQ HOSP IP/OBS HIGH 50: CPT | Performed by: INTERNAL MEDICINE

## 2024-09-14 PROCEDURE — 99291 CRITICAL CARE FIRST HOUR: CPT

## 2024-09-14 RX ORDER — METOPROLOL TARTRATE 1 MG/ML
2.5 INJECTION, SOLUTION INTRAVENOUS ONCE
Status: COMPLETED | OUTPATIENT
Start: 2024-09-14 | End: 2024-09-14

## 2024-09-14 RX ORDER — HYDRALAZINE HYDROCHLORIDE 20 MG/ML
10 INJECTION INTRAMUSCULAR; INTRAVENOUS EVERY 4 HOURS PRN
Status: DISCONTINUED | OUTPATIENT
Start: 2024-09-14 | End: 2024-09-19

## 2024-09-14 RX ORDER — ONDANSETRON 2 MG/ML
4 INJECTION INTRAMUSCULAR; INTRAVENOUS
Status: DISCONTINUED | OUTPATIENT
Start: 2024-09-14 | End: 2024-09-19

## 2024-09-14 RX ORDER — ISOSORBIDE MONONITRATE 30 MG/1
60 TABLET, EXTENDED RELEASE ORAL DAILY
Status: DISCONTINUED | OUTPATIENT
Start: 2024-09-14 | End: 2024-09-19

## 2024-09-14 RX ORDER — FUROSEMIDE 20 MG
20 TABLET ORAL DAILY
Status: DISCONTINUED | OUTPATIENT
Start: 2024-09-14 | End: 2024-09-19

## 2024-09-14 RX ORDER — METOPROLOL TARTRATE 1 MG/ML
INJECTION, SOLUTION INTRAVENOUS
Status: DISPENSED
Start: 2024-09-14 | End: 2024-09-14

## 2024-09-14 RX ORDER — HYDRALAZINE HYDROCHLORIDE 20 MG/ML
10 INJECTION INTRAMUSCULAR; INTRAVENOUS EVERY 6 HOURS PRN
Status: DISCONTINUED | OUTPATIENT
Start: 2024-09-14 | End: 2024-09-19

## 2024-09-14 RX ORDER — DILTIAZEM HYDROCHLORIDE 5 MG/ML
10 INJECTION INTRAVENOUS ONCE
Status: DISCONTINUED | OUTPATIENT
Start: 2024-09-14 | End: 2024-09-14

## 2024-09-14 RX ORDER — DILTIAZEM HYDROCHLORIDE 240 MG/1
240 CAPSULE, COATED, EXTENDED RELEASE ORAL DAILY
Status: DISCONTINUED | OUTPATIENT
Start: 2024-09-14 | End: 2024-09-19

## 2024-09-14 RX ORDER — ATORVASTATIN CALCIUM 40 MG/1
40 TABLET, FILM COATED ORAL NIGHTLY
Status: DISCONTINUED | OUTPATIENT
Start: 2024-09-14 | End: 2024-09-19

## 2024-09-14 RX ORDER — HYDRALAZINE HYDROCHLORIDE 25 MG/1
25 TABLET, FILM COATED ORAL 2 TIMES DAILY
Status: DISCONTINUED | OUTPATIENT
Start: 2024-09-14 | End: 2024-09-19

## 2024-09-14 NOTE — H&P
[facility]    PATIENT'S NAME: MERNA ALEXANDER   ATTENDING PHYSICIAN: Oseas Crisostomo MD   PATIENT ACCOUNT#:   [id number]    LOCATION:  [address]  MEDICAL RECORD #:   [id number]       YOB: 1945  ADMISSION DATE:       09/13/2024    HISTORY AND PHYSICAL EXAMINATION    (Addendum added 09/14/2024)    CHIEF COMPLAINT:  Encephalopathy, acute on chronic kidney injury, and multiple falls.    HISTORY OF PRESENT ILLNESS:  The patient is a 79-year-old  female who was brought in today after she sustained multiple falls at home.  In the emergency room, upon arrival patient was lucid and able to hold a conversation.  CBC showed white blood cell count of 12.2 with left shift, hemoglobin 9.0.  Chemistry showed GFR of 21 which is below her baseline, potassium 5.3, and BUN and creatinine of 37 and 2.37.  Initially x-ray of the hip, shoulder, and CT scan of the brain were all unremarkable.  Patient was given IV fluids.  Then slowly she became more obtunded and somnolent.  She was given Narcan first dose 0.4 mg with slight improvement in her mental function.  Repeat Narcan still pending.  Patient will be admitted to the hospital for further management.  MRI scan of the brain was ordered.     PAST MEDICAL HISTORY:  Moderate aortic stenosis, left ventricular diastolic dysfunction grade 1, moderate pulmonary artery hypertension, paroxysmal supraventricular tachycardia, hypertension, chronic kidney disease stage 4, anemia of chronic kidney disease, Clostridium difficile infection upon antibiotic exposure, hyperlipidemia, osteoarthritis, degenerative joint disease of lumbar spine, DVT.    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  She had right carpal tunnel release, left shoulder rotator cuff repair, tubal ligation and cholecystectomy.    MEDICATIONS:  Please see medication reconciliation list.  Noted on her  medical list that she is on oxycodone 10 mg 1 to 2 tablets every 4 hours as needed.    ALLERGIES:  No known drug allergies.     FAMILY HISTORY:  Mother had TB.  Father had cerebrovascular accident.    SOCIAL HISTORY:  Chronic tobacco use.  No alcohol or drug use.  Requires assistance in her basic activities of daily living.     REVIEW OF SYSTEMS:  Difficult to obtain from the patient herself.  Reportedly she had multiple falls at home and considering her recent hospitalization, she was responsive to Narcan upon encephalopathy.  Patient will be admitted today for further close observation.      PHYSICAL EXAMINATION:    GENERAL:  Patient is somnolent, alert, but easily arousable.  VITAL SIGNS:  Temperature 98.4, pulse 83, respiratory rate 16, blood pressure 180/70, pulse ox 100% on room air.    HEENT:  Atraumatic.  Oropharynx clear.  Dry mucous membranes.  Eyes:  Pinpoint pupils.  NECK:  Supple.  No lymphadenopathy.  Trachea midline.  Full range of motion.  LUNGS:  Clear to auscultation bilaterally.  Normal respiratory effort.    HEART:  Regular rate, rhythm.  S1 and S2 auscultated.  No murmur.  ABDOMEN:  Soft, nondistended.  No tenderness.  Positive bowel sounds.    EXTREMITIES:  Edema +1 to 2 both legs which is chronic.   NEUROLOGIC:  No focal findings on limited neurological exam.    ASSESSMENT:    1.   Acute encephalopathy.  Suspect related to narcotic medications.  2.   Essential hypertension.  3.   Moderate aortic stenosis.  4.   Acute on chronic kidney injury with mild hyperkalemia.    PLAN:  Patient will be admitted to telemetry floor.  Will continue to monitor her clinical status.  Will see if she response to another dose of Narcan.  Neurology consult was notified.  Obtain MRI scan of the brain.  Monitor her clinical status closely.  Gentle hydration, n.p.o. until full awake.  Further recommendations to follow.     ADDENDUM (Job [id number]):    Patient received multiple doses of Narcan 0.4 mg IV push.  She will  wake up briefly and then she will become drowsy again.  Patient was started on IV Narcan drip, and she will be admitted to CCU.  Intensive care consult was notified.  Continue to monitor closely    Dictated By Erich Pascual MD  d: 09/13/2024 18:17:50  t: 09/13/2024 18:42:04  Job [id number]  FB/

## 2024-09-14 NOTE — PROGRESS NOTES
Progress Note     Hannah Boss Patient Status:  Inpatient    1945 MRN [id number]   Location [facility] 2W/SW Attending Timoteo Tavarez MD   Hosp Day # 1 PCP Diana Lopez DO     Chief Complaint:   Chief Complaint   Patient presents with    Fall         Subjective:   S: Patient seen and examined, chart reviewed.   Too sleepy to respond now.  Has had no narcotics since admission.   Is still affected by percocets.  Would like to transition her over to bupr from full agonist therapy.  Will do crass taper of buprenorphine escalation and reducing full agaonist.  Since off full agonist now, would favor waiting 24-36 hrs off full agonist then starting low dose bupr 2 mg and uptitrating quickly as she tolerates.       Review of Systems:   10 point ROS completed and was negative, except for pertinent positive and negatives stated in subjective.                Objective:   Vital signs:  Temp:  [98 °F (36.7 °C)-98.2 °F (36.8 °C)] 98 °F (36.7 °C)  Pulse:  [] 86  Resp:  [16-35] 33  BP: (130-213)/() 152/111  SpO2:  [94 %-100 %] 94 %    Wt Readings from Last 6 Encounters:   24 179 lb 14.3 oz (81.6 kg)   24 165 lb 3.2 oz (74.9 kg)   07/10/24 166 lb (75.3 kg)   24 163 lb 14.4 oz (74.3 kg)   24 155 lb 1.6 oz (70.4 kg)   24 160 lb (72.6 kg)       Physical Exam:    General: No acute distress. sedated  Respiratory: Clear to auscultation bilaterally. No wheezes. No rhonchi.  Cardiovascular: S1, S2. Regular rate and rhythm. No murmurs, rubs or gallops.   Abdomen: Soft, nontender, nondistended.  Positive bowel sounds. No rebound or guarding.  Neurologic: No focal neurological deficits. sleepy  Musculoskeletal: Moves all extremities.  Extremities: No edema.    Results:   Diagnostic Data:      Labs:    Labs Last 24 Hours:   BMP     CBC    Other     Na 145 Cl 114 BUN 23 Glu 119   Hb 9.4   PTT - Procal -   K 4.3 CO2 25.0 Cr 1.94   WBC 11.6 >< .0  INR - CRP -   Renal  [facility] End[facility]    Hct 29.8   Trop - D dim -   eGFR - Ca 8.9 POC Gluc  126    LFT   pBNP - Lactic -   eGFR AA - PO4 - A1c -   AST - APk - Prot -  LDL -      Recent Labs   Lab 09/13/24  1235 09/14/24  0248   RBC 3.16* 3.29*   HGB 9.0* 9.4*   HCT 29.4* 29.8*   MCV 93.0 90.6   MCH 28.5 28.6   MCHC 30.6* 31.5   RDW 15.7* 15.2*   NEPRELIM 10.52* 10.75*   WBC 12.2* 11.6*   .0 189.0       Lab Results   Component Value Date    INR 1.30 (H) 03/08/2024    INR 1.21 (H) 01/19/2024    INR 1.11 10/07/2023       Recent Labs   Lab 09/13/24  1235 09/14/24  0248   GLU 95 119*   BUN 37* 23   CREATSERUM 2.37* 1.94*   CA 9.1 8.9    145   K 5.3* 4.3   * 114*   CO2 27.0 25.0       No results for input(s): \"SHERRY\", \"LIP\" in the last 168 hours.    No results for input(s): \"ESRML\", \"ESRPF\", \"CRP\", \"MG\", \"PHOS\", \"TROP\", \"B12\" in the last 168 hours.    No results for input(s): \"URINE\", \"CULTI\", \"BLDSMR\" in the last 168 hours.      XR HIP W OR WO PELVIS 2 OR 3 VIEWS, RIGHT (CPT=73502)    Result Date: 9/13/2024  CONCLUSION:  1. No acute fracture or dislocation. 2. Minimal bilateral acetabular marginal spur formation 3. Degeneration bilateral SI joints.    Dictated by (CST): Ambrose Moss MD on 9/13/2024 at 2:01 PM     Finalized by (CST): Ambrose Moss MD on 9/13/2024 at 2:03 PM          XR SHOULDER, COMPLETE (MIN 2 VIEWS), RIGHT (CPT=73030)    Result Date: 9/13/2024  CONCLUSION:  1. No acute fracture or dislocation. 2. Mild-to-moderate right shoulder arthropathy. 3. Diffuse alveolar and interstitial airspace opacification within the visualized right lung with marked progression.  Recommend chest radiographic follow-up.     Dictated by (CST): Ambrose Moss MD on 9/13/2024 at 1:56 PM     Finalized by (CST): Ambrose Moss MD on 9/13/2024 at 2:01 PM          CT BRAIN OR HEAD (CPT=70450)    Result Date: 9/13/2024  CONCLUSION:  1.  No acute intracranial process. 2.  There are mild microvascular white matter ischemic  changes, likely related to long-standing hypertension and/or diabetes. 3.  Atherosclerosis in the anterior and posterior circulations.   Dictated by (CST): Galileo Oliveros MD on 9/13/2024 at 1:50 PM     Finalized by (CST): Galileo Oliveros MD on 9/13/2024 at 1:53 PM               Pro-Calcitonin  No results for input(s): \"PCT\" in the last 168 hours.    Cardiac  No results for input(s): \"TROP\", \"PBNP\" in the last 168 hours.    Imaging: Imaging data reviewed in Epic.    XR HIP W OR WO PELVIS 2 OR 3 VIEWS, RIGHT (CPT=73502)    Result Date: 9/13/2024  CONCLUSION:  1. No acute fracture or dislocation. 2. Minimal bilateral acetabular marginal spur formation 3. Degeneration bilateral SI joints.    Dictated by (CST): Ambrose Moss MD on 9/13/2024 at 2:01 PM     Finalized by (CST): Ambrose Moss MD on 9/13/2024 at 2:03 PM          XR SHOULDER, COMPLETE (MIN 2 VIEWS), RIGHT (CPT=73030)    Result Date: 9/13/2024  CONCLUSION:  1. No acute fracture or dislocation. 2. Mild-to-moderate right shoulder arthropathy. 3. Diffuse alveolar and interstitial airspace opacification within the visualized right lung with marked progression.  Recommend chest radiographic follow-up.     Dictated by (CST): Ambrose Moss MD on 9/13/2024 at 1:56 PM     Finalized by (CST): Ambrose Moss MD on 9/13/2024 at 2:01 PM          CT BRAIN OR HEAD (CPT=70450)    Result Date: 9/13/2024  CONCLUSION:  1.  No acute intracranial process. 2.  There are mild microvascular white matter ischemic changes, likely related to long-standing hypertension and/or diabetes. 3.  Atherosclerosis in the anterior and posterior circulations.   Dictated by (CST): Galileo Oliveros MD on 9/13/2024 at 1:50 PM     Finalized by (CST): Galileo Oliveros MD on 9/13/2024 at 1:53 PM              Medications:    metoprolol        heparin  5,000 Units Subcutaneous 2 times per day    naloxone  0.4 mg Intravenous Once       Assessment & Plan:   ASSESSMENT / PLAN:     78 yo Acute  encephalopathy.  Suspect related to narcotic medications.  - hold off all full agonists  - start buprenorphine at low dose tomorrow 2 mg at noon.  If tolerates, uptitrate quickly to comfort levels.   -MRI head pending    SVT, mod aortic stenosis, pHTN  - on dilt drip as per crit care    bradycardic after vomiting  - zofran    Hx DVT  - no longer on eliquis    CHF with diastolic dysfuction    PNA  - on zosyn    Falls  2/2 to narcotics use suspected     Acute on CKD  - on IVF  - avoid nephrotoxins  - follow labs closely      dvt prophylaxis: sc heparin  code status: full code  dispo: to be determined    >55min spent, >50% spent counseling and coordinating care in the form of educating pt/family and d/w consultants and staff. Most of the time spent discussing the above plan.     Estimated date of discharge: TBD  Discharge is dependent on: clincal improvement  At this point Ms. Boss is expected to be discharge to: home/snf    Plan of care discussed with nurse    Timoteo Tavarez MD, FAAP, FACP  [facility] Hospitalist  I respond to Epic Chat and AMS Connect
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
## 2024-09-14 NOTE — PLAN OF CARE
Problem: Patient Centered Care  Goal: Patient preferences are identified and integrated in the patient's plan of care  Description: Interventions:  - What would you like us to know as we care for you? From home  - Provide timely, complete, and accurate information to patient/family  - Incorporate patient and family knowledge, values, beliefs, and cultural backgrounds into the planning and delivery of care  - Encourage patient/family to participate in care and decision-making at the level they choose  - Honor patient and family perspectives and choices  Outcome: Progressing     Problem: Patient/Family Goals  Goal: Patient/Family Long Term Goal  Description: Patient's Long Term Goal: control pain    Interventions:  - medication, distraction  - See additional Care Plan goals for specific interventions  Outcome: Progressing  Goal: Patient/Family Short Term Goal  Description: Patient's Short Term Goal: go home     Interventions:   - labs, meds  - See additional Care Plan goals for specific interventions  Outcome: Progressing     Problem: CARDIOVASCULAR - ADULT  Goal: Maintains optimal cardiac output and hemodynamic stability  Description: INTERVENTIONS:  - Monitor vital signs, rhythm, and trends  - Monitor for bleeding, hypotension and signs of decreased cardiac output  - Evaluate effectiveness of vasoactive medications to optimize hemodynamic stability  - Monitor arterial and/or venous puncture sites for bleeding and/or hematoma  - Assess quality of pulses, skin color and temperature  - Assess for signs of decreased coronary artery perfusion - ex. Angina  - Evaluate fluid balance, assess for edema, trend weights  Outcome: Progressing  Goal: Absence of cardiac arrhythmias or at baseline  Description: INTERVENTIONS:  - Continuous cardiac monitoring, monitor vital signs, obtain 12 lead EKG if indicated  - Evaluate effectiveness of antiarrhythmic and heart rate control medications as ordered  - Initiate emergency measures  for life threatening arrhythmias  - Monitor electrolytes and administer replacement therapy as ordered  Outcome: Progressing     Problem: RESPIRATORY - ADULT  Goal: Achieves optimal ventilation and oxygenation  Description: INTERVENTIONS:  - Assess for changes in respiratory status  - Assess for changes in mentation and behavior  - Position to facilitate oxygenation and minimize respiratory effort  - Oxygen supplementation based on oxygen saturation or ABGs  - Provide Smoking Cessation handout, if applicable  - Encourage broncho-pulmonary hygiene including cough, deep breathe, Incentive Spirometry  - Assess the need for suctioning and perform as needed  - Assess and instruct to report SOB or any respiratory difficulty  - Respiratory Therapy support as indicated  - Manage/alleviate anxiety  - Monitor for signs/symptoms of CO2 retention  Outcome: Progressing     Problem: GASTROINTESTINAL - ADULT  Goal: Minimal or absence of nausea and vomiting  Description: INTERVENTIONS:  - Maintain adequate hydration with IV or PO as ordered and tolerated  - Nasogastric tube to low intermittent suction as ordered  - Evaluate effectiveness of ordered antiemetic medications  - Provide nonpharmacologic comfort measures as appropriate  - Advance diet as tolerated, if ordered  - Obtain nutritional consult as needed  - Evaluate fluid balance  Outcome: Progressing  Goal: Maintains or returns to baseline bowel function  Description: INTERVENTIONS:  - Assess bowel function  - Maintain adequate hydration with IV or PO as ordered and tolerated  - Evaluate effectiveness of GI medications  - Encourage mobilization and activity  - Obtain nutritional consult as needed  - Establish a toileting routine/schedule  - Consider collaborating with pharmacy to review patient's medication profile  Outcome: Progressing     Problem: METABOLIC/FLUID AND ELECTROLYTES - ADULT  Goal: Electrolytes maintained within normal limits  Description: INTERVENTIONS:  -  Monitor labs and rhythm and assess patient for signs and symptoms of electrolyte imbalances  - Administer electrolyte replacement as ordered  - Monitor response to electrolyte replacements, including rhythm and repeat lab results as appropriate  - Fluid restriction as ordered  - Instruct patient on fluid and nutrition restrictions as appropriate  Outcome: Progressing  Goal: Hemodynamic stability and optimal renal function maintained  Description: INTERVENTIONS:  - Monitor labs and assess for signs and symptoms of volume excess or deficit  - Monitor intake, output and patient weight  - Monitor urine specific gravity, serum osmolarity and serum sodium as indicated or ordered  - Monitor response to interventions for patient's volume status, including labs, urine output, blood pressure (other measures as available)  - Encourage oral intake as appropriate  - Instruct patient on fluid and nutrition restrictions as appropriate  Outcome: Progressing     Problem: SKIN/TISSUE INTEGRITY - ADULT  Goal: Skin integrity remains intact  Description: INTERVENTIONS  - Assess and document risk factors for pressure ulcer development  - Assess and document skin integrity  - Monitor for areas of redness and/or skin breakdown  - Initiate interventions, skin care algorithm/standards of care as needed  Outcome: Progressing

## 2024-09-14 NOTE — HISTORICAL OFFICE NOTE
Continuity of Care Document  8/2/2023  Hannah Boss - 78 y.o. Female; born [birth year]Summary of episode note, generated on Aug. 22, 2023August 22, 2023   CHIEF COMPLAINT    CHIEF COMPLAINT  Reason for Visit/Chief Complaint   Consult   This is a 77-year-old with renal disease hypertension hypercholesterol with a pulmonary pressures aortic stenosis who presents for follow-up after recent hospitalization for DVT with confusion hyperkalemia and transient SVT. We are asked to assess cardiac issues. Patient denies chest pain but is very tired. She gets around slowly with a walker. Old records were reviewed. EKG showed SVT and July with prior EKG showing sinus with nonspecific changes. She had an echo in June showing normal LV function left atrial enlargement mild MR mild to moderate AI and mild AAS with elevated pulmonary pressures 40-50. A nuclear stress test in September 2022 was normal. Patient is not a good historian. She denied cardiac issues and is unclear where she is on nitrates and aspirin with other meds.     PROBLEMS  Reconcile with Patient's ChartPROBLEMS  Problem Effective Dates Date resolved Problem Status   Paroxysmal SVT (supraventricular tachycardia), [SNOMED-CT: 71725465] 8/3/2023 - Active   Mild pulmonary hypertension, [SNOMED-CT: 775556948] 8/3/2023 - Active   Edema, localized, [SNOMED-CT: 774875947] 8/3/2023 - Active   Hypertension (HTN), primary, [SNOMED-CT: 39041735] 8/3/2023 - Active   Hyperlipidemia, mixed, [SNOMED-CT: 318359997] 8/3/2023 - Active   Exertional dyspnea, [SNOMED-CT: 30891726] 8/3/2023 - Active     ENCOUNTER DIAGNOSIS    ENCOUNTER DIAGNOSIS  Problem Effective Dates Date resolved Problem Status   Paroxysmal SVT (supraventricular tachycardia), [SNOMED-CT: 83087022] 8/3/2023 - Active   Mild pulmonary hypertension, [SNOMED-CT: 938375744] 8/3/2023 - Active   Edema, localized, [SNOMED-CT: 482003689] 8/3/2023 - Active   Hypertension (HTN), primary, [SNOMED-CT: 58883290]  8/3/2023 - Active   Hyperlipidemia, mixed, [SNOMED-CT: 277199850] 8/3/2023 - Active   Exertional dyspnea, [SNOMED-CT: 65668520] 8/3/2023 - Active     VITAL SIGNS    VITAL SIGNS  Date / Time: 8/3/2023   BP Systolic 112 mmHg   BP Diastolic 52 mmHg   Height 63 inches   Weight 178 lbs   Pulse Rate 73 bpm   BSA (Body Surface Area) 1.9 cc/m2   BMI (Body Mass Index) 31.5 cc/m2   Blood Pressure 112 / 52 mmHg     PHYSICAL EXAMINATION    PHYSICAL EXAMINATION  Header Details   Constitutional 97o2%   Vitals Left Arm Sitting  / 52 mmHg, Pulse rate 73 bpm, Height in 5' 3\", BMI: 31.5, Weight in 178.57 lbs (or) 81 kgs, BSA : 1.92 cc/m²   General Appearance No Acute Distress   Cardiovascular      ALLERGIES, ADVERSE REACTIONS, ALERTS    No data available    MEDICATIONS ADMINISTERED DURING VISIT    No data available    MEDICATIONS  Reconcile with Patient's ChartMEDICATIONS  Medication Start Date Route/Frequency Status   amLODIPine 10 mg tablet, [RxNorm: 893027] 8/3/2023 Take 1 tablet orally once a day. Active   apixaban 5 MG Oral Tab, [RxNorm: 5502307] 8/3/2023 Take 1 tablet (5 mg total) by mouth 2 (two) times daily. Active   aspirin 81 MG Oral Tab EC, [RxNorm: 111929] 8/3/2023 Take 1 tablet (81 mg total) by mouth daily. Active   atorvastatin 40 MG Oral Tab, [RxNorm: 733233] 8/3/2023 Take 1 tablet (40 mg total) by mouth. AT BEDTIME Active   cholecalciferol 50 MCG (2000 UT) Oral Tab, [RxNorm: 287102] 8/3/2023 Take 1 tablet (2,000 Units total) by mouth daily. Active   diazePAM 10 MG Oral Tab, [RxNorm: 840416] 8/3/2023 Take 1 tablet (10 mg total) by mouth every 8 (eight) hours as needed (MAX 3/DAY). Active   gabapentin 300 MG Oral Cap, [RxNorm: 824263] 8/3/2023 Take 1 capsule (300 mg total) by mouth nightly. Active   hydrALAZINE 50 mg tablet, [RxNorm: 327391] 8/3/2023 Take 1 tablet orally 2 times a day. Active   isosorbide mononitrate ER 60 MG Oral Tablet 24 Hr, [RxNorm: 819455] 8/3/2023 Take 1 tablet (60 mg total) by mouth daily.  Active   lisinopriL 10 mg tablet, [RxNorm: 426535] 8/3/2023 Take 1 tablet orally once a day. Active   meclizine 25 MG Oral Tab, [RxNorm: 138285] 8/3/2023 Take 1 tablet (25 mg total) by mouth. Active   oxyCODONE-acetaminophen (PERCOCET)  MG Oral Tab, [RxNorm: 0343398] 8/3/2023 Take 1 tablet by mouth every 4 (four) hours as needed for Pain (Max 6/day). Active   pantoprazole 40 mg tablet,delayed release, [RxNorm: 635651] 8/3/2023 Take 1 tablet orally 2 times a day. Active   patiromer, [RxNorm: 0] 8/3/2023 16.8 pack by mouth daily Active   Vancocin 125 mg capsule, [RxNorm: 564801] 8/3/2023 Take 1 capsule orally every 6 hours for 7 days Active   Vitamin D3 25 mcg (1,000 unit) capsule, [RxNorm: 426968] 8/3/2023 Take 2 capsules orally once a day. Active   amLODIPine 5 mg tablet, [RxNorm: 756789] 8/3/2023 Take 1 tablet orally once a day. Active     ASSESSMENT    Conclusion this is a 73-year-old with renal insufficiency hypertension with cholesterol mild pulm hypertension aortic valve disease who was recently hospitalized and found to have DVT with worsening renal function. She also had transient SVT. At this time she does not complain of palpitations chest pain or shortness of breath. With history patient needs to get a blood pressure machine and some blood pressure meds can be adjusted. Would start with decrease amlodipine to 5 mg daily and see if this helps her legs without without raising her blood pressure. If able we will try to decrease or stop amlodipine and substitute low-dose Cardizem which may be better with history of SVT. She also may stop aspirin at this time since there is no evidence of stroke or coronary disease. She will continue blood thinners for DVT monitored by her primary. Patient to see nephrologist and get follow-up labs. Moderate valve disease we will follow clinically for now and adjust meds.Decrease amlodipine to 5 mg dailyCBC and CMP in 1 week if not done by primary or follow-up doctorsGet  blood pressure machine for home monitoringMonitor and record resting blood pressure and pulse for 1 week and call with results. When checking blood pressure sit comfortably in a chair with the blood pressure cuff in place and rest 5 minutes before checking blood pressure. Check blood pressure and if elevated repeat 2 additional blood pressures and throw away the first 1 and average the second 2.Virtual visit with KELLEY Lugo in 2 weeksFollow-up 2 months This note used Dragon technology. Transcription errors are not uncommon and may not have been corrected prior to electronically signing the note. Should you find these errors please consult the clinician for interpretation (or apply common sense adjustment when safe and appropriate).     FAMILY HISTORY    No data available    GENERAL STATUS    No data available    PAST MEDICAL HISTORY    PAST MEDICAL HISTORY  Problem Date diagonsed Date resolved Status   Paroxysmal SVT (supraventricular tachycardia), [SNOMED-CT: 53050135] 8/3/2023 - Active   Mild pulmonary hypertension, [SNOMED-CT: 773971016] 8/3/2023 - Active   Edema, localized, [SNOMED-CT: 589042396] 8/3/2023 - Active   Hypertension (HTN), primary, [SNOMED-CT: 71866393] 8/3/2023 - Active   Hyperlipidemia, mixed, [SNOMED-CT: 035617619] 8/3/2023 - Active   Exertional dyspnea, [SNOMED-CT: 41981574] 8/3/2023 - Active     HISTORY OF PRESENT ILLNESS    This is a 77-year-old with renal disease hypertension hypercholesterol with a pulmonary pressures aortic stenosis who presents for follow-up after recent hospitalization for DVT with confusion hyperkalemia and transient SVT. We are asked to assess cardiac issues. Patient denies chest pain but is very tired. She gets around slowly with a walker. Old records were reviewed. EKG showed SVT and July with prior EKG showing sinus with nonspecific changes. She had an echo in June showing normal LV function left atrial enlargement mild MR mild to moderate AI and mild AAS with  elevated pulmonary pressures 40-50. A nuclear stress test in September 2022 was normal. Patient is not a good historian. She denied cardiac issues and is unclear where she is on nitrates and aspirin with other meds.     IMMUNIZATIONS  Reconcile with Patient's ChartNo data available    PLAN OF CARE    PLAN OF CARE  Planned Care Date   CBC (Complete Blood Count) 1/1/1900   CMP (comprehensive metabolic panel) 1/1/1900   Take 1 tablet orally once a day.-amLODIPine 5 mg tablet 8/3/2023   Follow up visit - Abdirahman Buchanan 1/1/1900   Follow up visit - Hue Elias 1/1/1900     PROCEDURES    No data available    RESULTS    RESULTS  Name Result Date Location - Ordered By   CBC (Complete Blood Count) [LOINC: 50745-0] Pending 1 Week     CMP (comprehensive metabolic panel) [LOINC: 45728-4] Pending 1 Week       REVIEW OF SYSTEMS    REVIEW OF SYSTEMS  Header Details   Eyes No history of Blurry vision, Visual changes, Double vision, Discharge, Eye pain, Dry eyes, Decreased vision   Cardiovascular Edema  No history of Chest pain, CANTU, Palpitations, Syncope, PND, Orthopnea, Claudication   Respiratory No history of SOB, Cough, Hemoptysis, Wheezing, Pleurisy, Sputum   Others Review Of Systems   bilateral pedal edema     SOCIAL HISTORY    SOCIAL HISTORY  Social History Element Description Effective Dates   Smoking status Heavy tobacco smoker -     FUNCTIONAL STATUS    No data available    MEDICAL EQUIPMENT    No data available    Goals Sections    No data available    REASON FOR REFERRAL    No data available    Health Concerns Section    No data available    COGNITIVE/MENTAL STATUS    No data available    Patient Demographics    Patient Demographics  Patient Address Patient Name Communication   [address] [place] [address] Hannah Boss [phone] (Mobile)     Patient Demographics  Language Race / Ethnicity Marital Status   English (Preferred) Black Or  / Unknown      Document  Information    Primary Care Provider Other Service Providers Document Coverage Dates   Dewayne Gary  NPI: [id number]  [phone] (Work)  [address], [address], [place] [address]  [place] [address]  Interpreting Physicians  [facility]  [phone] (Work)  [address] [place] [address] Richy Dias  NPI: [id number]  [phone] (Work)  [address], [address], [place] [address]  [place] [address]  Nurses     Melida Mendoza  NPI: [id number]  [phone] (Work)  [address], [address], [place] [address]  [place] [address]  Nurses     Carlos Alberto Miller  NPI: [id number]  [phone] (Work)  [address], [address], [place] [address]  [place] [address]  Nurses Aug. 03, 2023August 03, 2023      Organization   [facility]  [phone] (Work)  [address], [address], [place] [address]  [place] [address]     Encounter Providers Encounter Date    Aug. 03, 2023August 03, 2023     Legal Authenticator    Abdirahman Buchanan  NPI: [id number]  [phone] (Work)  [address], [address], [place] [address]  [place] [address]

## 2024-09-14 NOTE — ED QUICK NOTES
Patient more awake but still very drowsy, a/o x 4, able to eat a cracker and drink apple juice with a straw, MD aware
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
## 2024-09-14 NOTE — PROGRESS NOTES
Evaluated for SVT  Dr Micha also to bedside    Transient SVT rates into 170s, resolved after iv lopressor  Patient awake and restless but still with noted encephalopathy  Off narcan drip this AM  Noted high dose diltiazem on home medication list  Will add diltiazem drip for SVT suppression with patient unable to tolerate oral intake currently    Cardiology consulted as well  2Decho in process         9/14/2024    10:00 AM 9/14/2024    11:15 AM   Vitals History   /57 158/115   BP Location Left arm Left arm   Pulse 77 163   Resp 35 29   SpO2 99 % 97 %      Lab Results   Component Value Date    WBC 11.6 09/14/2024    HGB 9.4 09/14/2024    HCT 29.8 09/14/2024    .0 09/14/2024    CREATSERUM 1.94 09/14/2024    BUN 23 09/14/2024     09/14/2024    K 4.3 09/14/2024     09/14/2024    CO2 25.0 09/14/2024     09/14/2024    CA 8.9 09/14/2024       Cinthia Cárdenas NP  [facility]    45 min critical care time
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
## 2024-09-15 LAB
ATRIAL RATE: 105 BPM
P AXIS: 58 DEGREES
P-R INTERVAL: 150 MS
Q-T INTERVAL: 328 MS
QRS DURATION: 62 MS
QTC CALCULATION (BEZET): 433 MS
R AXIS: 38 DEGREES
T AXIS: 220 DEGREES
VENTRICULAR RATE: 105 BPM

## 2024-09-15 PROCEDURE — 99233 SBSQ HOSP IP/OBS HIGH 50: CPT | Performed by: INTERNAL MEDICINE

## 2024-09-15 RX ORDER — BUPRENORPHINE 2 MG/1
2 TABLET SUBLINGUAL 3 TIMES DAILY
Status: DISCONTINUED | OUTPATIENT
Start: 2024-09-15 | End: 2024-09-16

## 2024-09-15 NOTE — PLAN OF CARE
Problem: Patient Centered Care  Goal: Patient preferences are identified and integrated in the patient's plan of care  Description: Interventions:  - What would you like us to know as we care for you? From home  - Provide timely, complete, and accurate information to patient/family  - Incorporate patient and family knowledge, values, beliefs, and cultural backgrounds into the planning and delivery of care  - Encourage patient/family to participate in care and decision-making at the level they choose  - Honor patient and family perspectives and choices  Outcome: Progressing     Problem: Patient/Family Goals  Goal: Patient/Family Long Term Goal  Description: Patient's Long Term Goal: control pain    Interventions:  - medication  - See additional Care Plan goals for specific interventions  Outcome: Progressing  Goal: Patient/Family Short Term Goal  Description: Patient's Short Term Goal: go home    Interventions:   - labs, diagnostics, medication  - See additional Care Plan goals for specific interventions  Outcome: Progressing     Problem: CARDIOVASCULAR - ADULT  Goal: Maintains optimal cardiac output and hemodynamic stability  Description: INTERVENTIONS:  - Monitor vital signs, rhythm, and trends  - Monitor for bleeding, hypotension and signs of decreased cardiac output  - Evaluate effectiveness of vasoactive medications to optimize hemodynamic stability  - Monitor arterial and/or venous puncture sites for bleeding and/or hematoma  - Assess quality of pulses, skin color and temperature  - Assess for signs of decreased coronary artery perfusion - ex. Angina  - Evaluate fluid balance, assess for edema, trend weights  Outcome: Progressing  Goal: Absence of cardiac arrhythmias or at baseline  Description: INTERVENTIONS:  - Continuous cardiac monitoring, monitor vital signs, obtain 12 lead EKG if indicated  - Evaluate effectiveness of antiarrhythmic and heart rate control medications as ordered  - Initiate emergency  measures for life threatening arrhythmias  - Monitor electrolytes and administer replacement therapy as ordered  Outcome: Progressing     Problem: RESPIRATORY - ADULT  Goal: Achieves optimal ventilation and oxygenation  Description: INTERVENTIONS:  - Assess for changes in respiratory status  - Assess for changes in mentation and behavior  - Position to facilitate oxygenation and minimize respiratory effort  - Oxygen supplementation based on oxygen saturation or ABGs  - Provide Smoking Cessation handout, if applicable  - Encourage broncho-pulmonary hygiene including cough, deep breathe, Incentive Spirometry  - Assess the need for suctioning and perform as needed  - Assess and instruct to report SOB or any respiratory difficulty  - Respiratory Therapy support as indicated  - Manage/alleviate anxiety  - Monitor for signs/symptoms of CO2 retention  Outcome: Progressing     Problem: GASTROINTESTINAL - ADULT  Goal: Minimal or absence of nausea and vomiting  Description: INTERVENTIONS:  - Maintain adequate hydration with IV or PO as ordered and tolerated  - Nasogastric tube to low intermittent suction as ordered  - Evaluate effectiveness of ordered antiemetic medications  - Provide nonpharmacologic comfort measures as appropriate  - Advance diet as tolerated, if ordered  - Obtain nutritional consult as needed  - Evaluate fluid balance  Outcome: Progressing  Goal: Maintains or returns to baseline bowel function  Description: INTERVENTIONS:  - Assess bowel function  - Maintain adequate hydration with IV or PO as ordered and tolerated  - Evaluate effectiveness of GI medications  - Encourage mobilization and activity  - Obtain nutritional consult as needed  - Establish a toileting routine/schedule  - Consider collaborating with pharmacy to review patient's medication profile  Outcome: Progressing     Problem: METABOLIC/FLUID AND ELECTROLYTES - ADULT  Goal: Electrolytes maintained within normal limits  Description:  INTERVENTIONS:  - Monitor labs and rhythm and assess patient for signs and symptoms of electrolyte imbalances  - Administer electrolyte replacement as ordered  - Monitor response to electrolyte replacements, including rhythm and repeat lab results as appropriate  - Fluid restriction as ordered  - Instruct patient on fluid and nutrition restrictions as appropriate  Outcome: Progressing  Goal: Hemodynamic stability and optimal renal function maintained  Description: INTERVENTIONS:  - Monitor labs and assess for signs and symptoms of volume excess or deficit  - Monitor intake, output and patient weight  - Monitor urine specific gravity, serum osmolarity and serum sodium as indicated or ordered  - Monitor response to interventions for patient's volume status, including labs, urine output, blood pressure (other measures as available)  - Encourage oral intake as appropriate  - Instruct patient on fluid and nutrition restrictions as appropriate  Outcome: Progressing     Problem: SKIN/TISSUE INTEGRITY - ADULT  Goal: Skin integrity remains intact  Description: INTERVENTIONS  - Assess and document risk factors for pressure ulcer development  - Assess and document skin integrity  - Monitor for areas of redness and/or skin breakdown  - Initiate interventions, skin care algorithm/standards of care as needed  Outcome: Progressing

## 2024-09-15 NOTE — PLAN OF CARE
Per Dr. Wood, no further recommendations. S/o on pt yesterday. Please call with any questions / concerns.     KELLEY Sepulveda  9/15/2024  1:11 PM  Ph [phone] ([place])  Ph [phone] ([place])

## 2024-09-15 NOTE — PROGRESS NOTES
Pulmonary/ICU/Critical Care Progress Note        Reason for Consultation: somnolence  Referring Physician: Dr. Tavarez      SUBJECTIVE:  Afebrile  Reports hurts all over      REVIEW OF SYSTEMS:  Positives and negatives as noted in HPI. All other review of systems otherwise are either limited (due to pt/family inability to provide) or negative.      ALLERGIES:  No Known Allergies      MEDS:  Home Medications:  Outpatient Medications Marked as Taking for the 9/13/24 encounter (Hospital Encounter)   Medication Sig Dispense Refill    Naloxone HCl 4 MG/0.1ML Nasal Liquid 4 mg by Nasal route as needed. If patient remains unresponsive, repeat dose in other nostril 2-5 minutes after first dose. 1 kit 0    atorvastatin 40 MG Oral Tab Take 1 tablet (40 mg total) by mouth nightly.      dilTIAZem  MG Oral Capsule SR 24 Hr Take 1 capsule (240 mg total) by mouth daily.      furosemide 20 MG Oral Tab Take 1 tablet (20 mg total) by mouth daily.      isosorbide mononitrate ER 60 MG Oral Tablet 24 Hr Take 1 tablet (60 mg total) by mouth daily.      PREGABALIN 50 MG Oral Cap TAKE 1 CAPSULE BY MOUTH 2 TIMES DAILY. 60 capsule 0    oxyCODONE-acetaminophen  MG Oral Tab Take 1-2 tablets by mouth every 4 (four) hours as needed for Pain. 160 tablet 0    [START ON 10/1/2024] oxyCODONE-acetaminophen  MG Oral Tab Take 1-2 tablets by mouth every 4 (four) hours as needed for Pain. 160 tablet 0    diazePAM 10 MG Oral Tab Take 0.5 tablets (5 mg total) by mouth every 8 (eight) hours as needed for Anxiety (max 3/day).      hydrALAZINE 25 MG Oral Tab Take 1 tablet (25 mg total) by mouth in the morning and 1 tablet (25 mg total) before bedtime. 60 tablet 0    trolamine salicyliate 10 % External Cream Apply to painful joints or muscles up to three times a day as needed 35 g 0       Scheduled Medication:   dilTIAZem ER  240 mg Oral Daily    furosemide  20 mg Oral Daily    hydrALAZINE  25 mg Oral BID    atorvastatin  40 mg Oral Nightly     isosorbide mononitrate ER  60 mg Oral Daily    heparin  5,000 Units Subcutaneous 2 times per day    naloxone  0.4 mg Intravenous Once     Continuous Infusing Medication:   sodium chloride 83 mL/hr at 09/14/24 0000     PRN Medications:    ondansetron    hydrALAzine    hydrALAzine    acetaminophen    metoclopramide       PHYSICAL EXAM:  BP (!) 166/62 (BP Location: Left arm)   Pulse 62   Temp 98.2 °F (36.8 °C) (Temporal)   Resp 20   Ht 5' 4\" (1.626 m)   Wt 179 lb 14.3 oz (81.6 kg)   SpO2 100%   BMI 30.88 kg/m²      CONSTITUTIONAL: alert, oriented, no apparent distress  HEENT: atraumatic normocephalic  MOUTH: mucous membranes are moist. No OP exudates  NECK/THROAT: no JVD. Trachea midline. No obvious thyromegaly  LUNG: clear upper b/l no wheezing, crackles. Chest symmetric with respiratory motion  HEART: regular rate and rhythm, no obvious murmers or gallops noted  ABD: soft non tender. + bowel sounds. No organomegaly noted  EXT: no clubbing, cyanosis, or edema noted. Pulses intact grossly  NEURO/MUSCULOSKELETAL: no gross deficits  SKIN: warm, dry. No obvious lesions noted  LYMPH: no obvious LAD      IMAGES:   CXR  Findings and impression:   Normal heart size.  No significant edema   Mild hazy opacity throughout the right lung base may be atelectasis or aspiration   No focal consolidation   Normal pleura   No free air       CT brain  CONCLUSION:   1.  No acute intracranial process.   2.  There are mild microvascular white matter ischemic changes, likely related to long-standing hypertension and/or diabetes.   3.  Atherosclerosis in the anterior and posterior circulations.       LABS:  Recent Labs   Lab 09/13/24  1235 09/14/24  0248   RBC 3.16* 3.29*   HGB 9.0* 9.4*   HCT 29.4* 29.8*   MCV 93.0 90.6   MCH 28.5 28.6   MCHC 30.6* 31.5   RDW 15.7* 15.2*   NEPRELIM 10.52* 10.75*   WBC 12.2* 11.6*   .0 189.0       Recent Labs   Lab 09/13/24  1235 09/14/24  0248   GLU 95 119*   BUN 37* 23   CREATSERUM 2.37* 1.94*    EGFRCR 21* 26*   CA 9.1 8.9    145   K 5.3* 4.3   * 114*   CO2 27.0 25.0       ASSESSMENT/PLAN:  1.Somnolence, nausea, vomiting likely secondary to narcotic overdose. Pt had similar admission 2 months ago  -was on narcan gtt stopped on 9/14  -reglan and zofran PRN for emesis  -primary started her on subutex  -neuro consulted. MRI ordered    2.DICK on CKD  -IVF  -monitor BMP-improving renal function    3.Hx of LLE DVT (6/2023)  -previously was on eliquis but stopped on last discharge per previous records    4.Diastolic HF, moderate aortic stenosis, pulm HTN, pSVT  -continue home meds  -on dilt    5.Bradycardia  -previously evaluated by cardiology in 7/2024. Recommended stopping narcotics    6. CXR with possible infiltrates and has an elevated WBC.  -started empiric zosyn    7.Fall  -imaging neg for fx    8.Proph:  -DVT: hep subcutaneous    Full code    Consider transfer out of ICU    Thank you for the opportunity to care for Hannah Muse DO, MPH  Pulmonary Critical Care Medicine  [place] [facility]

## 2024-09-15 NOTE — PROGRESS NOTES
Progress Note     Hannah Boss Patient Status:  Inpatient    1945 MRN [id number]   Location [facility] 2W/SW Attending Timoteo Tavarez MD   Hosp Day # 2 PCP Diana Lopez DO     Chief Complaint:   Chief Complaint   Patient presents with    Fall         Subjective:   S: Patient seen and examined, chart reviewed.   Patient without pain now.  Has not received any narcotic while in house.  She report she has 2 percocets every 4 hrs as needed and no more than 6 per day but sometimes takes an extra.  She has on ILPMP 160-180 per mo dispensed and diazepam and pregabalin.  She is seen by anesthesia pain clinic (KELLEY ryan) I have reached out to her.       Review of Systems:   10 point ROS completed and was negative, except for pertinent positive and negatives stated in subjective.                Objective:   Vital signs:  Temp:  [97.1 °F (36.2 °C)-98.2 °F (36.8 °C)] 98.2 °F (36.8 °C)  Pulse:  [] 62  Resp:  [18-35] 20  BP: (122-197)/() 166/62  SpO2:  [94 %-100 %] 100 %    Wt Readings from Last 6 Encounters:   24 179 lb 14.3 oz (81.6 kg)   24 165 lb 3.2 oz (74.9 kg)   07/10/24 166 lb (75.3 kg)   24 163 lb 14.4 oz (74.3 kg)   24 155 lb 1.6 oz (70.4 kg)   24 160 lb (72.6 kg)       Physical Exam:    General: No acute distress.   Respiratory: Clear to auscultation bilaterally. No wheezes. No rhonchi.  Cardiovascular: S1, S2. Regular rate and rhythm. No murmurs, rubs or gallops.   Abdomen: Soft, nontender, nondistended.  Positive bowel sounds. No rebound or guarding.  Neurologic: No focal neurological deficits.   Musculoskeletal: Moves all extremities.  Extremities: No edema.    Results:   Diagnostic Data:      Labs:    Labs Last 24 Hours:   BMP     CBC    Other     Na - Cl - BUN - Glu -   Hb -   PTT - Procal -   K - CO2 - Cr -   WBC - >< PLT -  INR - CRP -   Renal Lytes Endo    Hct -   Trop - D dim -   eGFR - Ca - POC Gluc  -    LFT   pBNP - Lactic -    eGFR AA - PO4 - A1c -   AST - APk - Prot -  LDL -      Recent Labs   Lab 09/13/24  1235 09/14/24  0248   RBC 3.16* 3.29*   HGB 9.0* 9.4*   HCT 29.4* 29.8*   MCV 93.0 90.6   MCH 28.5 28.6   MCHC 30.6* 31.5   RDW 15.7* 15.2*   NEPRELIM 10.52* 10.75*   WBC 12.2* 11.6*   .0 189.0       Lab Results   Component Value Date    INR 1.30 (H) 03/08/2024    INR 1.21 (H) 01/19/2024    INR 1.11 10/07/2023       Recent Labs   Lab 09/13/24  1235 09/14/24  0248   GLU 95 119*   BUN 37* 23   CREATSERUM 2.37* 1.94*   CA 9.1 8.9    145   K 5.3* 4.3   * 114*   CO2 27.0 25.0       No results for input(s): \"SHERRY\", \"LIP\" in the last 168 hours.    No results for input(s): \"ESRML\", \"ESRPF\", \"CRP\", \"MG\", \"PHOS\", \"TROP\", \"B12\" in the last 168 hours.    No results for input(s): \"URINE\", \"CULTI\", \"BLDSMR\" in the last 168 hours.      XR HIP W OR WO PELVIS 2 OR 3 VIEWS, RIGHT (CPT=73502)    Result Date: 9/13/2024  CONCLUSION:  1. No acute fracture or dislocation. 2. Minimal bilateral acetabular marginal spur formation 3. Degeneration bilateral SI joints.    Dictated by (CST): Ambrose Moss MD on 9/13/2024 at 2:01 PM     Finalized by (CST): Ambrose Moss MD on 9/13/2024 at 2:03 PM          XR SHOULDER, COMPLETE (MIN 2 VIEWS), RIGHT (CPT=73030)    Result Date: 9/13/2024  CONCLUSION:  1. No acute fracture or dislocation. 2. Mild-to-moderate right shoulder arthropathy. 3. Diffuse alveolar and interstitial airspace opacification within the visualized right lung with marked progression.  Recommend chest radiographic follow-up.     Dictated by (CST): Ambrose Moss MD on 9/13/2024 at 1:56 PM     Finalized by (CST): Ambrose Moss MD on 9/13/2024 at 2:01 PM          CT BRAIN OR HEAD (CPT=70450)    Result Date: 9/13/2024  CONCLUSION:  1.  No acute intracranial process. 2.  There are mild microvascular white matter ischemic changes, likely related to long-standing hypertension and/or diabetes. 3.  Atherosclerosis in  the anterior and posterior circulations.   Dictated by (CST): Galileo Oliveros MD on 9/13/2024 at 1:50 PM     Finalized by (CST): Galileo Oliveros MD on 9/13/2024 at 1:53 PM               Pro-Calcitonin  No results for input(s): \"PCT\" in the last 168 hours.    Cardiac  No results for input(s): \"TROP\", \"PBNP\" in the last 168 hours.    Imaging: Imaging data reviewed in Epic.    XR HIP W OR WO PELVIS 2 OR 3 VIEWS, RIGHT (CPT=73502)    Result Date: 9/13/2024  CONCLUSION:  1. No acute fracture or dislocation. 2. Minimal bilateral acetabular marginal spur formation 3. Degeneration bilateral SI joints.    Dictated by (CST): Ambrose Moss MD on 9/13/2024 at 2:01 PM     Finalized by (CST): Ambrose Moss MD on 9/13/2024 at 2:03 PM          XR SHOULDER, COMPLETE (MIN 2 VIEWS), RIGHT (CPT=73030)    Result Date: 9/13/2024  CONCLUSION:  1. No acute fracture or dislocation. 2. Mild-to-moderate right shoulder arthropathy. 3. Diffuse alveolar and interstitial airspace opacification within the visualized right lung with marked progression.  Recommend chest radiographic follow-up.     Dictated by (CST): Ambrose Moss MD on 9/13/2024 at 1:56 PM     Finalized by (CST): Ambrose Moss MD on 9/13/2024 at 2:01 PM          CT BRAIN OR HEAD (CPT=70450)    Result Date: 9/13/2024  CONCLUSION:  1.  No acute intracranial process. 2.  There are mild microvascular white matter ischemic changes, likely related to long-standing hypertension and/or diabetes. 3.  Atherosclerosis in the anterior and posterior circulations.   Dictated by (CST): Galileo Oliveros MD on 9/13/2024 at 1:50 PM     Finalized by (CST): Galileo Oliveros MD on 9/13/2024 at 1:53 PM              Medications:    dilTIAZem ER  240 mg Oral Daily    furosemide  20 mg Oral Daily    hydrALAZINE  25 mg Oral BID    atorvastatin  40 mg Oral Nightly    isosorbide mononitrate ER  60 mg Oral Daily    heparin  5,000 Units Subcutaneous 2 times per day    naloxone  0.4 mg Intravenous Once        Assessment & Plan:   ASSESSMENT / PLAN:     78 yo Acute encephalopathy.  Suspect related to narcotic medications.  - hold off all full agonists  - Using Percocet more than Rx recommends, more than 6 per day  - has had 8 admissions to hospital this year alone, mostly for change in mental status and falls related to opiates  - does not have an Rx for naloxone or a filled prescription for Narcan at home  - has never tried buprenorphine  - Would be an excellent candidate to try TID dosing of Bup for chronic pain.  This would eliminate full agonist overdose risk and keep her safe.  - need Narcan Rx at discharge  - called her pain provider Rosey BENSON to discuss this plan.  -MRI head not needed.  Had on 3/2024 for same presentation.      SVT, mod aortic stenosis, pHTN  - on dilt 240 and Imdur  - apprec EP recs     Hx DVT  - no longer on eliquis     CHF with diastolic dysfuction     PNA - c/w aspiration when vomited  - on zosyn  - switch to PO on discharge     Falls  2/2 to narcotics use suspected      CKD stage IV  - avoid nephrotoxins  - follow labs closely        dvt prophylaxis: sc heparin  code status: full code  dispo: to be determined    I personally reviewed the available laboratories, imaging including MRI. I discussed/will discuss the case with nurse and family. I ordered laboratories, studies including BMP. I adjusted medications including Bupr. Medical decision making high, risk is high.    >55min spent, >50% spent counseling and coordinating care in the form of educating pt/family and d/w consultants and staff. Most of the time spent discussing the above plan.     Estimated date of discharge: 1 day  Discharge is dependent on: improvement  At this point Ms. Boss is expected to be discharge to: home    Plan of care discussed with nurse and family    Timoteo Tavarez MD, FAAP, FACP  [facility] Hospitalist  I respond to [employer] Chat and [employer]

## 2024-09-15 NOTE — PLAN OF CARE
Problem: Patient Centered Care  Goal: Patient preferences are identified and integrated in the patient's plan of care  Description: Interventions:  - What would you like us to know as we care for you?   - Provide timely, complete, and accurate information to patient/family  - Incorporate patient and family knowledge, values, beliefs, and cultural backgrounds into the planning and delivery of care  - Encourage patient/family to participate in care and decision-making at the level they choose  - Honor patient and family perspectives and choices  Outcome: Progressing     Problem: Patient/Family Goals  Goal: Patient/Family Long Term Goal  Description: Patient's Long Term Goal:     Interventions:  -   - See additional Care Plan goals for specific interventions  Outcome: Progressing  Goal: Patient/Family Short Term Goal  Description: Patient's Short Term Goal:     Interventions:   -  - See additional Care Plan goals for specific interventions  Outcome: Progressing     Problem: CARDIOVASCULAR - ADULT  Goal: Maintains optimal cardiac output and hemodynamic stability  Description: INTERVENTIONS:  - Monitor vital signs, rhythm, and trends  - Monitor for bleeding, hypotension and signs of decreased cardiac output  - Evaluate effectiveness of vasoactive medications to optimize hemodynamic stability  - Monitor arterial and/or venous puncture sites for bleeding and/or hematoma  - Assess quality of pulses, skin color and temperature  - Assess for signs of decreased coronary artery perfusion - ex. Angina  - Evaluate fluid balance, assess for edema, trend weights  Outcome: Progressing  Goal: Absence of cardiac arrhythmias or at baseline  Description: INTERVENTIONS:  - Continuous cardiac monitoring, monitor vital signs, obtain 12 lead EKG if indicated  - Evaluate effectiveness of antiarrhythmic and heart rate control medications as ordered  - Initiate emergency measures for life threatening arrhythmias  - Monitor electrolytes and  administer replacement therapy as ordered  Outcome: Progressing     Problem: RESPIRATORY - ADULT  Goal: Achieves optimal ventilation and oxygenation  Description: INTERVENTIONS:  - Assess for changes in respiratory status  - Assess for changes in mentation and behavior  - Position to facilitate oxygenation and minimize respiratory effort  - Oxygen supplementation based on oxygen saturation or ABGs  - Provide Smoking Cessation handout, if applicable  - Encourage broncho-pulmonary hygiene including cough, deep breathe, Incentive Spirometry  - Assess the need for suctioning and perform as needed  - Assess and instruct to report SOB or any respiratory difficulty  - Respiratory Therapy support as indicated  - Manage/alleviate anxiety  - Monitor for signs/symptoms of CO2 retention  Outcome: Progressing     Problem: GASTROINTESTINAL - ADULT  Goal: Minimal or absence of nausea and vomiting  Description: INTERVENTIONS:  - Maintain adequate hydration with IV or PO as ordered and tolerated  - Nasogastric tube to low intermittent suction as ordered  - Evaluate effectiveness of ordered antiemetic medications  - Provide nonpharmacologic comfort measures as appropriate  - Advance diet as tolerated, if ordered  - Obtain nutritional consult as needed  - Evaluate fluid balance  Outcome: Progressing  Goal: Maintains or returns to baseline bowel function  Description: INTERVENTIONS:  - Assess bowel function  - Maintain adequate hydration with IV or PO as ordered and tolerated  - Evaluate effectiveness of GI medications  - Encourage mobilization and activity  - Obtain nutritional consult as needed  - Establish a toileting routine/schedule  - Consider collaborating with pharmacy to review patient's medication profile  Outcome: Progressing     Problem: METABOLIC/FLUID AND ELECTROLYTES - ADULT  Goal: Electrolytes maintained within normal limits  Description: INTERVENTIONS:  - Monitor labs and rhythm and assess patient for signs and  symptoms of electrolyte imbalances  - Administer electrolyte replacement as ordered  - Monitor response to electrolyte replacements, including rhythm and repeat lab results as appropriate  - Fluid restriction as ordered  - Instruct patient on fluid and nutrition restrictions as appropriate  Outcome: Progressing  Goal: Hemodynamic stability and optimal renal function maintained  Description: INTERVENTIONS:  - Monitor labs and assess for signs and symptoms of volume excess or deficit  - Monitor intake, output and patient weight  - Monitor urine specific gravity, serum osmolarity and serum sodium as indicated or ordered  - Monitor response to interventions for patient's volume status, including labs, urine output, blood pressure (other measures as available)  - Encourage oral intake as appropriate  - Instruct patient on fluid and nutrition restrictions as appropriate  Outcome: Progressing     Problem: SKIN/TISSUE INTEGRITY - ADULT  Goal: Skin integrity remains intact  Description: INTERVENTIONS  - Assess and document risk factors for pressure ulcer development  - Assess and document skin integrity  - Monitor for areas of redness and/or skin breakdown  - Initiate interventions, skin care algorithm/standards of care as needed  Outcome: Progressing

## 2024-09-16 ENCOUNTER — APPOINTMENT (OUTPATIENT)
Dept: PICC SERVICES | Facility: HOSPITAL | Age: 79
End: 2024-09-16
Attending: INTERNAL MEDICINE
Payer: MEDICARE

## 2024-09-16 LAB
ANION GAP SERPL CALC-SCNC: 7 MMOL/L (ref 0–18)
BASOPHILS # BLD AUTO: 0.04 X10(3) UL (ref 0–0.2)
BASOPHILS NFR BLD AUTO: 0.5 %
BUN BLD-MCNC: 35 MG/DL (ref 9–23)
BUN/CREAT SERPL: 13.2 (ref 10–20)
CALCIUM BLD-MCNC: 8.3 MG/DL (ref 8.7–10.4)
CHLORIDE SERPL-SCNC: 112 MMOL/L (ref 98–112)
CO2 SERPL-SCNC: 26 MMOL/L (ref 21–32)
CREAT BLD-MCNC: 2.65 MG/DL
DEPRECATED RDW RBC AUTO: 52.1 FL (ref 35.1–46.3)
EGFRCR SERPLBLD CKD-EPI 2021: 18 ML/MIN/1.73M2 (ref 60–?)
EOSINOPHIL # BLD AUTO: 0.2 X10(3) UL (ref 0–0.7)
EOSINOPHIL NFR BLD AUTO: 2.7 %
ERYTHROCYTE [DISTWIDTH] IN BLOOD BY AUTOMATED COUNT: 15.4 % (ref 11–15)
GLUCOSE BLD-MCNC: 130 MG/DL (ref 70–99)
HCT VFR BLD AUTO: 25.4 %
HGB BLD-MCNC: 7.8 G/DL
IMM GRANULOCYTES # BLD AUTO: 0.03 X10(3) UL (ref 0–1)
IMM GRANULOCYTES NFR BLD: 0.4 %
LYMPHOCYTES # BLD AUTO: 1.32 X10(3) UL (ref 1–4)
LYMPHOCYTES NFR BLD AUTO: 18.1 %
MAGNESIUM SERPL-MCNC: 1.7 MG/DL (ref 1.6–2.6)
MCH RBC QN AUTO: 28.6 PG (ref 26–34)
MCHC RBC AUTO-ENTMCNC: 30.7 G/DL (ref 31–37)
MCV RBC AUTO: 93 FL
MONOCYTES # BLD AUTO: 0.57 X10(3) UL (ref 0.1–1)
MONOCYTES NFR BLD AUTO: 7.8 %
NEUTROPHILS # BLD AUTO: 5.15 X10 (3) UL (ref 1.5–7.7)
NEUTROPHILS # BLD AUTO: 5.15 X10(3) UL (ref 1.5–7.7)
NEUTROPHILS NFR BLD AUTO: 70.5 %
OSMOLALITY SERPL CALC.SUM OF ELEC: 310 MOSM/KG (ref 275–295)
PLATELET # BLD AUTO: 176 10(3)UL (ref 150–450)
PLATELETS.RETICULATED NFR BLD AUTO: 3.6 % (ref 0–7)
POTASSIUM SERPL-SCNC: 3.9 MMOL/L (ref 3.5–5.1)
RBC # BLD AUTO: 2.73 X10(6)UL
SODIUM SERPL-SCNC: 145 MMOL/L (ref 136–145)
WBC # BLD AUTO: 7.3 X10(3) UL (ref 4–11)

## 2024-09-16 PROCEDURE — 99232 SBSQ HOSP IP/OBS MODERATE 35: CPT | Performed by: INTERNAL MEDICINE

## 2024-09-16 PROCEDURE — 99233 SBSQ HOSP IP/OBS HIGH 50: CPT | Performed by: INTERNAL MEDICINE

## 2024-09-16 RX ORDER — MAGNESIUM OXIDE 400 MG/1
400 TABLET ORAL ONCE
Status: COMPLETED | OUTPATIENT
Start: 2024-09-16 | End: 2024-09-16

## 2024-09-16 RX ORDER — VANCOMYCIN HYDROCHLORIDE 125 MG/1
125 CAPSULE ORAL DAILY
Status: DISCONTINUED | OUTPATIENT
Start: 2024-09-16 | End: 2024-09-17

## 2024-09-16 RX ORDER — BUPRENORPHINE 2 MG/1
4 TABLET SUBLINGUAL 3 TIMES DAILY
Status: DISCONTINUED | OUTPATIENT
Start: 2024-09-16 | End: 2024-09-17

## 2024-09-16 NOTE — PHYSICAL THERAPY NOTE
PHYSICAL THERAPY EVALUATION - INPATIENT     Room Number: 555/555-A  Evaluation Date: 9/16/2024  Type of Evaluation: Initial   Physician Order: PT Eval and Treat    Presenting Problem: Contusion head s/p fall  Co-Morbidities : Encephalopathy, DICK, Rhabdo  Reason for Therapy: Mobility Dysfunction and Discharge Planning    PHYSICAL THERAPY ASSESSMENT   Patient is a 79 year old female admitted 9/13/2024 for contusion head.  Prior to admission, patient's baseline is lives with family mod indep with a cane, retired.  Patient is currently functioning below baseline with bed mobility, transfers, gait, stair negotiation, maintaining seated position, standing prolonged periods, and performing household tasks.  Patient is requiring contact guard assist as a result of the following impairments: decreased functional strength, decreased endurance/aerobic capacity, pain, decreased muscular endurance, and medical status.  Physical Therapy will continue to follow for duration of hospitalization.    Patient will benefit from continued skilled PT Services at discharge to promote prior level of function and safety with additional support and return home with home health PT.    PLAN  PT Treatment Plan: Bed mobility;Body mechanics;Endurance;Energy conservation;Patient education;Gait training;Range of motion;Strengthening;Transfer training;Balance training  Rehab Potential : Good  Frequency (Obs): 3-5x/week    PHYSICAL THERAPY MEDICAL/SOCIAL HISTORY   History related to current admission: Patient is a 79 year old female with history of CKD, hyperlipidemia, aortic stenosis, hypertension, leiomyosarcoma of the retroperitoneum, who was admitted to the hospital for fall, altered mental status.  Patient is more awake this morning, able to tell me that she had 2 falls at home.  Her son was with her at the time.  First fall happened after she tried to get up off of the commode.  She could not recall the second fall.  Does think that she hit her  head during one of the falls.  In ED, she was awake and alert initially but then became very somnolent.  Was given Narcan but continued to become somnolent requiring Narcan drip.  Also found to have elevated white count and DICK.  This morning, she denies any headache, just is very thirsty.  States that she has chronic pain in her stomach related to stomach cancer.   Per MD report neurology     Problem List  Principal Problem:    Contusion of head, unspecified part of head, initial encounter  Active Problems:    Altered mental status, unspecified altered mental status type    Contusion of multiple sites of right shoulder, initial encounter    Contusion of right hip, initial encounter    Encephalopathy      HOME SITUATION  Home Situation  Type of Home: House  Home Layout: One level  Lives With: Alone  Patient Owned Equipment: Cane  Patient Regularly Uses: Reading glasses;Other (Comment) (cane for mobility)     Prior Level of [place]: Lives with family mod indep with can for all mobility and ADL's recent falls.     SUBJECTIVE  I feel ok walking a bit and I can get around with the RW mostly on my own, normally I use a cane. No dizziness, no pain.     PHYSICAL THERAPY EXAMINATION   OBJECTIVE  Precautions: Bed/chair alarm  Fall Risk: Standard fall risk    WEIGHT BEARING RESTRICTION  Weight Bearing Restriction: None                PAIN ASSESSMENT  Rating: Unable to rate          COGNITION  Overall Cognitive Status:  WFL - within functional limits    RANGE OF MOTION AND STRENGTH ASSESSMENT  Upper extremity ROM and strength are within functional limits   Lower extremity ROM is within functional limits   Lower extremity strength is within functional limits     BALANCE  Static Sitting: Fair +  Dynamic Sitting: Fair  Static Standing: Fair -  Dynamic Standing: Fair -    ACTIVITY TOLERANCE  Pulse: 72  Heart Rate Source: Monitor  Resp: 16  BP: 116/38  BP Location: Right arm  BP Method: Automatic  Patient Position:  Sitting    O2 WALK       AM-PAC '6-Clicks' INPATIENT SHORT FORM - BASIC MOBILITY  How much difficulty does the patient currently have...  Patient Difficulty: Turning over in bed (including adjusting bedclothes, sheets and blankets)?: None   Patient Difficulty: Sitting down on and standing up from a chair with arms (e.g., wheelchair, bedside commode, etc.): None   Patient Difficulty: Moving from lying on back to sitting on the side of the bed?: A Little   How much help from another person does the patient currently need...   Help from Another: Moving to and from a bed to a chair (including a wheelchair)?: A Little   Help from Another: Need to walk in hospital room?: A Little   Help from Another: Climbing 3-5 steps with a railing?: A Lot     AM-PAC Score:  Raw Score: 19   Approx Degree of Impairment: 41.77%   Standardized Score (AM-PAC Scale): 45.44   CMS Modifier (G-Code): CK    FUNCTIONAL ABILITY STATUS  Functional Mobility/Gait Assessment  Gait Assistance: Contact guard assist  Distance (ft): 30  Assistive Device: Rolling walker  Pattern: Shuffle (unsteady gait decreased step length kyphotic posture as per baseline)  Rolling: supervision  Supine to Sit: supervision  Sit to Supine: supervision  Sit to Stand: contact guard assist    Exercise/Education Provided:  Bed mobility  Body mechanics  Energy conservation  Functional activity tolerated  Gait training  Posture  Strengthening  Lower therapeutic exercise:  Ankle pumps  Heel slides  LAQ  Transfer training    Skilled Therapy Provided: Pt ed with bed mobility and transfers with SBA. Pt ed with gait progression 30' with RW with decreased step length and jessica with a forward posture kyphotic as per her PLOF. Pt denies dizziness or shortness of breath with amb, rectal tube in place and currently benefiting from a RW for optimal mobility and safety.     The patient's Approx Degree of Impairment: 41.77% has been calculated based on documentation in the [facility] '6 clicks'  Inpatient Basic Mobility Short Form.  Research supports that patients with this level of impairment may benefit from [facility] PT with family assist.  Final disposition will be made by interdisciplinary medical team.    Patient End of Session: Up in chair;With  staff;Needs met;Call light within reach;RN aware of session/findings;All patient questions and concerns addressed;Alarm set    CURRENT GOALS  Goals to be met by: 9/30/2024  Patient Goal Patient's self-stated goal is: Return home    Goal #1 Patient is able to demonstrate supine - sit EOB @ level: independent     Goal #1   Current Status    Goal #2 Patient is able to demonstrate transfers Sit to/from Stand at assistance level: modified independent with cane - straight     Goal #2  Current Status    Goal #3 Patient is able to ambulate 150 feet with assist device: cane - straight at assistance level: modified independent   Goal #3   Current Status    Goal #4 Patient will negotiate 2 stairs/one curb w/ assistive device and supervision   Goal #4   Current Status    Goal #5 Patient to demonstrate independence with home activity/exercise instructions provided to patient in preparation for discharge.   Goal #5   Current Status    Goal #6    Goal #6  Current Status      Patient Evaluation Complexity Level:  History Low - no personal factors and/or co-morbidities   Examination of body systems Low -  addressing 1-2 elements   Clinical Presentation Low- Stable   Clinical Decision Making  Low Complexity

## 2024-09-16 NOTE — PLAN OF CARE
Patient is alert and oriented x4 and on RA. No complaints of pain. IV abx continued. Frequent rounding done throughout the shift. Safety precautions in place. Plan: Pain management consult    Problem: Patient Centered Care  Goal: Patient preferences are identified and integrated in the patient's plan of care  Description: Interventions:  - What would you like us to know as we care for you?   - Provide timely, complete, and accurate information to patient/family  - Incorporate patient and family knowledge, values, beliefs, and cultural backgrounds into the planning and delivery of care  - Encourage patient/family to participate in care and decision-making at the level they choose  - Honor patient and family perspectives and choices  Outcome: Progressing     Problem: CARDIOVASCULAR - ADULT  Goal: Maintains optimal cardiac output and hemodynamic stability  Description: INTERVENTIONS:  - Monitor vital signs, rhythm, and trends  - Monitor for bleeding, hypotension and signs of decreased cardiac output  - Evaluate effectiveness of vasoactive medications to optimize hemodynamic stability  - Monitor arterial and/or venous puncture sites for bleeding and/or hematoma  - Assess quality of pulses, skin color and temperature  - Assess for signs of decreased coronary artery perfusion - ex. Angina  - Evaluate fluid balance, assess for edema, trend weights  Outcome: Progressing  Goal: Absence of cardiac arrhythmias or at baseline  Description: INTERVENTIONS:  - Continuous cardiac monitoring, monitor vital signs, obtain 12 lead EKG if indicated  - Evaluate effectiveness of antiarrhythmic and heart rate control medications as ordered  - Initiate emergency measures for life threatening arrhythmias  - Monitor electrolytes and administer replacement therapy as ordered  Outcome: Progressing     Problem: RESPIRATORY - ADULT  Goal: Achieves optimal ventilation and oxygenation  Description: INTERVENTIONS:  - Assess for changes in  respiratory status  - Assess for changes in mentation and behavior  - Position to facilitate oxygenation and minimize respiratory effort  - Oxygen supplementation based on oxygen saturation or ABGs  - Provide Smoking Cessation handout, if applicable  - Encourage broncho-pulmonary hygiene including cough, deep breathe, Incentive Spirometry  - Assess the need for suctioning and perform as needed  - Assess and instruct to report SOB or any respiratory difficulty  - Respiratory Therapy support as indicated  - Manage/alleviate anxiety  - Monitor for signs/symptoms of CO2 retention  Outcome: Progressing     Problem: GASTROINTESTINAL - ADULT  Goal: Minimal or absence of nausea and vomiting  Description: INTERVENTIONS:  - Maintain adequate hydration with IV or PO as ordered and tolerated  - Nasogastric tube to low intermittent suction as ordered  - Evaluate effectiveness of ordered antiemetic medications  - Provide nonpharmacologic comfort measures as appropriate  - Advance diet as tolerated, if ordered  - Obtain nutritional consult as needed  - Evaluate fluid balance  Outcome: Progressing  Goal: Maintains or returns to baseline bowel function  Description: INTERVENTIONS:  - Assess bowel function  - Maintain adequate hydration with IV or PO as ordered and tolerated  - Evaluate effectiveness of GI medications  - Encourage mobilization and activity  - Obtain nutritional consult as needed  - Establish a toileting routine/schedule  - Consider collaborating with pharmacy to review patient's medication profile  Outcome: Progressing     Problem: METABOLIC/FLUID AND ELECTROLYTES - ADULT  Goal: Electrolytes maintained within normal limits  Description: INTERVENTIONS:  - Monitor labs and rhythm and assess patient for signs and symptoms of electrolyte imbalances  - Administer electrolyte replacement as ordered  - Monitor response to electrolyte replacements, including rhythm and repeat lab results as appropriate  - Fluid restriction  as ordered  - Instruct patient on fluid and nutrition restrictions as appropriate  Outcome: Progressing  Goal: Hemodynamic stability and optimal renal function maintained  Description: INTERVENTIONS:  - Monitor labs and assess for signs and symptoms of volume excess or deficit  - Monitor intake, output and patient weight  - Monitor urine specific gravity, serum osmolarity and serum sodium as indicated or ordered  - Monitor response to interventions for patient's volume status, including labs, urine output, blood pressure (other measures as available)  - Encourage oral intake as appropriate  - Instruct patient on fluid and nutrition restrictions as appropriate  Outcome: Progressing     Problem: SKIN/TISSUE INTEGRITY - ADULT  Goal: Skin integrity remains intact  Description: INTERVENTIONS  - Assess and document risk factors for pressure ulcer development  - Assess and document skin integrity  - Monitor for areas of redness and/or skin breakdown  - Initiate interventions, skin care algorithm/standards of care as needed  Outcome: Progressing

## 2024-09-16 NOTE — PLAN OF CARE
Problem: Patient Centered Care  Goal: Patient preferences are identified and integrated in the patient's plan of care  Description: Interventions:  - What would you like us to know as we care for you? I would like to return home  - Provide timely, complete, and accurate information to patient/family  - Incorporate patient and family knowledge, values, beliefs, and cultural backgrounds into the planning and delivery of care  - Encourage patient/family to participate in care and decision-making at the level they choose  - Honor patient and family perspectives and choices  Outcome: Progressing     Problem: Patient/Family Goals  Goal: Patient/Family Long Term Goal  Description: Patient's Long Term Goal: to have less hospital admissions    Interventions:  - avoid narcotics  -follow-up with pain management  - See additional Care Plan goals for specific interventions  Outcome: Progressing  Goal: Patient/Family Short Term Goal  Description: Patient's Short Term Goal: to return home.    Interventions:   - observe off antibiotics  - See additional Care Plan goals for specific interventions  Outcome: Progressing     Problem: CARDIOVASCULAR - ADULT  Goal: Maintains optimal cardiac output and hemodynamic stability  Description: INTERVENTIONS:  - Monitor vital signs, rhythm, and trends  - Monitor for bleeding, hypotension and signs of decreased cardiac output  - Evaluate effectiveness of vasoactive medications to optimize hemodynamic stability  - Monitor arterial and/or venous puncture sites for bleeding and/or hematoma  - Assess quality of pulses, skin color and temperature  - Assess for signs of decreased coronary artery perfusion - ex. Angina  - Evaluate fluid balance, assess for edema, trend weights  Outcome: Progressing  Goal: Absence of cardiac arrhythmias or at baseline  Description: INTERVENTIONS:  - Continuous cardiac monitoring, monitor vital signs, obtain 12 lead EKG if indicated  - Evaluate effectiveness of  antiarrhythmic and heart rate control medications as ordered  - Initiate emergency measures for life threatening arrhythmias  - Monitor electrolytes and administer replacement therapy as ordered  Outcome: Progressing     Problem: RESPIRATORY - ADULT  Goal: Achieves optimal ventilation and oxygenation  Description: INTERVENTIONS:  - Assess for changes in respiratory status  - Assess for changes in mentation and behavior  - Position to facilitate oxygenation and minimize respiratory effort  - Oxygen supplementation based on oxygen saturation or ABGs  - Provide Smoking Cessation handout, if applicable  - Encourage broncho-pulmonary hygiene including cough, deep breathe, Incentive Spirometry  - Assess the need for suctioning and perform as needed  - Assess and instruct to report SOB or any respiratory difficulty  - Respiratory Therapy support as indicated  - Manage/alleviate anxiety  - Monitor for signs/symptoms of CO2 retention  Outcome: Progressing     Problem: GASTROINTESTINAL - ADULT  Goal: Minimal or absence of nausea and vomiting  Description: INTERVENTIONS:  - Maintain adequate hydration with IV or PO as ordered and tolerated  - Nasogastric tube to low intermittent suction as ordered  - Evaluate effectiveness of ordered antiemetic medications  - Provide nonpharmacologic comfort measures as appropriate  - Advance diet as tolerated, if ordered  - Obtain nutritional consult as needed  - Evaluate fluid balance  Outcome: Progressing  Goal: Maintains or returns to baseline bowel function  Description: INTERVENTIONS:  - Assess bowel function  - Maintain adequate hydration with IV or PO as ordered and tolerated  - Evaluate effectiveness of GI medications  - Encourage mobilization and activity  - Obtain nutritional consult as needed  - Establish a toileting routine/schedule  - Consider collaborating with pharmacy to review patient's medication profile  Outcome: Progressing     Problem: METABOLIC/FLUID AND ELECTROLYTES -  ADULT  Goal: Electrolytes maintained within normal limits  Description: INTERVENTIONS:  - Monitor labs and rhythm and assess patient for signs and symptoms of electrolyte imbalances  - Administer electrolyte replacement as ordered  - Monitor response to electrolyte replacements, including rhythm and repeat lab results as appropriate  - Fluid restriction as ordered  - Instruct patient on fluid and nutrition restrictions as appropriate  Outcome: Progressing  Goal: Hemodynamic stability and optimal renal function maintained  Description: INTERVENTIONS:  - Monitor labs and assess for signs and symptoms of volume excess or deficit  - Monitor intake, output and patient weight  - Monitor urine specific gravity, serum osmolarity and serum sodium as indicated or ordered  - Monitor response to interventions for patient's volume status, including labs, urine output, blood pressure (other measures as available)  - Encourage oral intake as appropriate  - Instruct patient on fluid and nutrition restrictions as appropriate  Outcome: Progressing     Problem: SKIN/TISSUE INTEGRITY - ADULT  Goal: Skin integrity remains intact  Description: INTERVENTIONS  - Assess and document risk factors for pressure ulcer development  - Assess and document skin integrity  - Monitor for areas of redness and/or skin breakdown  - Initiate interventions, skin care algorithm/standards of care as needed  Outcome: Progressing

## 2024-09-16 NOTE — OCCUPATIONAL THERAPY NOTE
OCCUPATIONAL THERAPY EVALUATION - INPATIENT     Room Number: 555/555-A  Evaluation Date: 9/16/2024  Type of Evaluation: Initial  Presenting Problem: encephalopathy, acute of chronic kidney injury, multiple falls    Physician Order: IP Consult to Occupational Therapy  Reason for Therapy: ADL/IADL Dysfunction and Discharge Planning    OCCUPATIONAL THERAPY ASSESSMENT   Patient is a 79 year old female admitted 9/13/2024 for encephalopathy, acute of chronic kidney injury, multiple falls.  Prior to admission, patient's baseline is independent with all ADLs and functional mobility  Patient is currently functioning below baseline with toileting, bathing, lower body dressing, grooming, bed mobility, transfers, and functional standing tolerance.  Patient is requiring contact guard assist and minimal assist as a result of the following impairments: decreased functional strength, decreased endurance, pain, and medical status. Occupational Therapy will continue to follow for duration of hospitalization.    Patient will benefit from continued skilled OT Services with no additional skilled services but increased support at home    PLAN  OT Treatment Plan: Balance activities;ADL training;Functional transfer training;Endurance training;Patient/Family education;Patient/Family training;Equipment eval/education;Compensatory technique education     OCCUPATIONAL THERAPY MEDICAL/SOCIAL HISTORY   Problem List  Principal Problem:    Contusion of head, unspecified part of head, initial encounter  Active Problems:    Altered mental status, unspecified altered mental status type    Contusion of multiple sites of right shoulder, initial encounter    Contusion of right hip, initial encounter    Encephalopathy    HOME SITUATION  Type of Home: House  Home Layout: One level  Lives With: Alone  Toilet and Equipment: 3-in-1 commode (sits over the toileti)  Shower/Tub and Equipment: Walk-in shower; Shower chair; Tub transfer bench  Other Equipment:  Reacher  Patient Regularly Uses: Reading glasses; Other (Comment) (cane for mobility)    Stairs in Home: none   Use of Assistive Device(s): Pt using cane for mobility prior to admission     Prior Level of [place]: Pt stating she is independent with ADLs and functional mobility is a cane prior to admission. Pt has a caregiver who comes in to complete housekeeping duties but does not provide any support for ADLs.     SUBJECTIVE  \"I always have pain\"     OCCUPATIONAL THERAPY EXAMINATION    OBJECTIVE  Precautions: Bed/chair alarm  Fall Risk: Standard fall risk    PAIN ASSESSMENT  Ratin  Location: back, stomach, legs  Management Techniques: Activity promotion; Repositioning      RANGE OF MOTION   Upper extremity ROM is within functional limits     STRENGTH ASSESSMENT  Upper extremity strength is within functional limits     COORDINATION  Gross Motor: WFL   Fine Motor: WFL     ACTIVITIES OF DAILY LIVING ASSESSMENT  AM-PAC ‘6-Clicks’ Inpatient Daily Activity Short Form  How much help from another person does the patient currently need…  -   Putting on and taking off regular lower body clothing?: A Little  -   Bathing (including washing, rinsing, drying)?: A Little  -   Toileting, which includes using toilet, bedpan or urinal? : A Little  -   Putting on and taking off regular upper body clothing?: A Little  -   Taking care of personal grooming such as brushing teeth?: A Little  -   Eating meals?: None    AM-PAC Score:  Score: 19  Approx Degree of Impairment: 42.8%  Standardized Score (AM-PAC Scale): 40.22  CMS Modifier (G-Code): CK    FUNCTIONAL TRANSFER ASSESSMENT  Sit to Stand: Edge of Bed  Edge of Bed: Contact Guard Assist    BED MOBILITY  Supine to Sit : Contact Guard Assist  Sit to Supine (OT): Contact Guard Assist    BALANCE ASSESSMENT  Static Sitting: Contact Guard Assist    FUNCTIONAL ADL ASSESSMENT  LB Dressing Seated: Minimal Assist    THERAPEUTIC EXERCISE     Skilled Therapy Provided:   Pt received in  bed and agreeable to therapy. Pt educated on role of OT, plan for session, bed mobility, functional transfers and ADL techniques. Pt responded well to education and was able to return demonstrate techniques.     Pt required CGA for bed mobility, STS from EOB, and room distance ambulation using RW in preparation for ADL participation. Pt with no LOB or SOB during functional mobility and presents with kyphotic posture when ambulating with RW. Pt states that she walked with kyphotic posture as baseline. Pt required min assist to complete LB dressing due to increased pain in back when attempting to complete figure four technique. Pt stated that she is always in some level of pain and feels that the pain she is at now is close to her baseline. Pt would continue to benefit from item retrieval tasks to mirror household distances and challenge functional balance and standing grooming tasks to increase endurance for ADLs at discharge.       EDUCATION PROVIDED  Patient: Role of Occupational Therapy; Plan of Care; Functional Transfer Techniques; Posture/Positioning; Proper Body Mechanics  Patient's Response to Education: Verbalized Understanding; Returned Demonstration    The patient's Approx Degree of Impairment: 42.8% has been calculated based on documentation in the [facility] '6 clicks' Inpatient Daily Activity Short Form.  Research supports that patients with this level of impairment may benefit from  OT but anticipate no skilled OT needs at discharge pending progress.  Final disposition will be made by interdisciplinary medical team.     Patient End of Session: In bed;Needs met;Call light within reach;Alarm set    OT Goals  Patients self stated goal is: none      Patient will complete functional transfer mod. I   Comment:     Patient will complete toileting mod I   Comment:     Patient will tolerate standing for 2 minutes in prep for adls mod I    Comment:    Patient will complete item retrieval with mod I   Comment:           Goals  on: 24  Frequency: 3-5x/week    Patient Evaluation Complexity Level:   Occupational Profile/Medical History MODERATE - Expanded review of history including review of medical or therapy record   Specific performance deficits impacting engagement in ADL/IADL MODERATE  3 - 5 performance deficits   Client Assessment/Performance Deficits MODERATE - Comorbidities and min to mod modifications of tasks    Clinical Decision Making MODERATE - Analysis of occupational profile, detailed assessments, several treatment options    Overall Complexity MODERATE     OT Session Time: 30 minutes  Self-Care Home Management: 30 minutes

## 2024-09-16 NOTE — PROGRESS NOTES
Progress Note     Hannah Boss Patient Status:  Inpatient    1945 MRN [id number]   Location [facility] 5SW/SE Attending Timoteo Tavarez MD   Hosp Day # 3 PCP Diana Lopez DO     Chief Complaint:   Chief Complaint   Patient presents with    Fall         Subjective:   S: Patient seen and examined, chart reviewed.   Feeling some pain but appears comfortable. D/W her the 8 admission and risk she is under with her current full agonist (oxy/diazepam) therapies and need to adjust.  We started bup yesterday.  No ASE reported.        Review of Systems:   10 point ROS completed and was negative, except for pertinent positive and negatives stated in subjective.                Objective:   Vital signs:  Temp:  [97.7 °F (36.5 °C)-99 °F (37.2 °C)] 97.7 °F (36.5 °C)  Pulse:  [64-79] 71  Resp:  [16-23] 16  BP: ()/(38-62) 116/38  SpO2:  [83 %-98 %] 97 %    Wt Readings from Last 6 Encounters:   24 179 lb 14.3 oz (81.6 kg)   24 165 lb 3.2 oz (74.9 kg)   07/10/24 166 lb (75.3 kg)   24 163 lb 14.4 oz (74.3 kg)   24 155 lb 1.6 oz (70.4 kg)   24 160 lb (72.6 kg)       Physical Exam:    General: No acute distress.   Respiratory: Clear to auscultation bilaterally. No wheezes. No rhonchi.  Cardiovascular: S1, S2. Sysotlic III/VI murmur. Regular rate and rhythm.    Abdomen: Soft, nontender, nondistended.  Positive bowel sounds. No rebound or guarding.  Neurologic: No focal neurological deficits.   Musculoskeletal: Moves all extremities.  Extremities:1+edema.    Results:   Diagnostic Data:      Labs:    Labs Last 24 Hours:   BMP     CBC    Other     Na - Cl - BUN - Glu -   Hb -   PTT - Procal -   K - CO2 - Cr -   WBC - >< PLT -  INR - CRP -   Renal Lytes Endo    Hct -   Trop - D dim -   eGFR - Ca - POC Gluc  -    LFT   pBNP - Lactic -   eGFR AA - PO4 - A1c -   AST - APk - Prot -  LDL -      Recent Labs   Lab 24  1235 24  0248   RBC 3.16* 3.29*   HGB 9.0* 9.4*   HCT  29.4* 29.8*   MCV 93.0 90.6   MCH 28.5 28.6   MCHC 30.6* 31.5   RDW 15.7* 15.2*   NEPRELIM 10.52* 10.75*   WBC 12.2* 11.6*   .0 189.0       Lab Results   Component Value Date    INR 1.30 (H) 03/08/2024    INR 1.21 (H) 01/19/2024    INR 1.11 10/07/2023       Recent Labs   Lab 09/13/24  1235 09/14/24  0248   GLU 95 119*   BUN 37* 23   CREATSERUM 2.37* 1.94*   CA 9.1 8.9    145   K 5.3* 4.3   * 114*   CO2 27.0 25.0        Medications:    piperacillin-tazobactam  3.375 g Intravenous Q8H    vancomycin  125 mg Oral Daily    buprenorphine  4 mg Sublingual TID    dilTIAZem ER  240 mg Oral Daily    furosemide  20 mg Oral Daily    hydrALAZINE  25 mg Oral BID    atorvastatin  40 mg Oral Nightly    isosorbide mononitrate ER  60 mg Oral Daily    heparin  5,000 Units Subcutaneous 2 times per day    naloxone  0.4 mg Intravenous Once       Assessment & Plan:   ASSESSMENT / PLAN:     80 yo Acute encephalopathy.  Suspect related to narcotic medications.  - hold off all full agonists  - Using Percocet more than Rx recommends, more than 6 per day  - has had 8 admissions to hospital this year alone, mostly for change in mental status and falls related to opiates  - does not have an Rx for naloxone or a filled prescription for Narcan at home  - has never tried buprenorphine  - Would be an excellent candidate to try TID dosing of Bup for chronic pain.  This would eliminate full agonist overdose risk and keep her safe.  - need Narcan Rx at discharge  - called her pain provider Rosye BENSON to discuss this plan. D/W Rosey care plan and consult for pain management team today.   - will increase bup to 4 mg TID today. Can go up as needed to as much as 16 mg TID.  Higher doses of 16 mg TID have been reported in the literature.  This is a medication that is well tolerated and does not cause respiratory depression like full agonist therapy so you CAN NOT OVERDOSE ON BUP.    -MRI head not needed.  Had on 3/2024 for same  presentation.      SVT, mod aortic stenosis, pHTN  - on dilt 240 and Imdur  - apprec EP recs     Hx DVT  - no longer on eliquis     CHF with diastolic dysfuction  On lasix daily.    PNA - c/w aspiration when vomited  - on zosyn but can stop after today.  Current evidence base recommendation don't support abx therapy changing outcomes in uncomplicated aspiration pneumonia.      Falls  2/2 to narcotics use suspected      CKD stage IV  - avoid nephrotoxins  - follow labs closely            dvt prophylaxis: sc heparin  code status: full code  dispo: home upon medical clearance    I personally reviewed the available laboratories, imaging including cxr. I discussed/will discuss the case with pain team and nursing. I ordered laboratories, studies including BMP, Mg, CBC. I adjusted medications including bup. Medical decision making high, risk is high.    >55min spent, >50% spent counseling and coordinating care in the form of educating pt/family and d/w consultants and staff. Most of the time spent discussing the above plan.     Estimated date of discharge: tomorrow  Discharge is dependent on: clearance form PT/OT and pain controlled  At this point Ms. Boss is expected to be discharge to: home    Plan of care discussed with patient, nurse, family and pain team.     Timoteo Tavarez MD, FAAP, FACP  Critical access hospital Hospitalist  I respond to [employer] Chat and [employer]

## 2024-09-16 NOTE — PLAN OF CARE
Patient sitting up in recliner. No c/o pain at this time. No acute changes at this time. Patient able to verbalize all needs. Call light within reach.     Problem: Patient Centered Care  Goal: Patient preferences are identified and integrated in the patient's plan of care  Description: Interventions:  - What would you like us to know as we care for you?   - Provide timely, complete, and accurate information to patient/family  - Incorporate patient and family knowledge, values, beliefs, and cultural backgrounds into the planning and delivery of care  - Encourage patient/family to participate in care and decision-making at the level they choose  - Honor patient and family perspectives and choices  Outcome: Progressing     Problem: Patient/Family Goals  Goal: Patient/Family Long Term Goal  Description: Patient's Long Term Goal: discharge    Interventions:  - - Follow healthcare team treatment plan   - Monitor labs, vitals, and diagnostic test  - Pain Management   - Diet Recommendations  - Participate in therapy  - Discharge planning   - See additional Care Plan goals for specific interventions  Outcome: Progressing  Goal: Patient/Family Short Term Goal  Description: Patient's Short Term Goal: feel better    Interventions:   - - Follow healthcare team treatment plan   - Monitor labs, vitals, and diagnostic test  - Pain Management, pharmacological and non-pharmacological interventions  - Diet Recommendations  - Adequate oral intake  - See additional Care Plan goals for specific interventions  Outcome: Progressing     Problem: CARDIOVASCULAR - ADULT  Goal: Maintains optimal cardiac output and hemodynamic stability  Description: INTERVENTIONS:  - Monitor vital signs, rhythm, and trends  - Monitor for bleeding, hypotension and signs of decreased cardiac output  - Evaluate effectiveness of vasoactive medications to optimize hemodynamic stability  - Monitor arterial and/or venous puncture sites for bleeding and/or hematoma  -  Assess quality of pulses, skin color and temperature  - Assess for signs of decreased coronary artery perfusion - ex. Angina  - Evaluate fluid balance, assess for edema, trend weights  Outcome: Progressing  Goal: Absence of cardiac arrhythmias or at baseline  Description: INTERVENTIONS:  - Continuous cardiac monitoring, monitor vital signs, obtain 12 lead EKG if indicated  - Evaluate effectiveness of antiarrhythmic and heart rate control medications as ordered  - Initiate emergency measures for life threatening arrhythmias  - Monitor electrolytes and administer replacement therapy as ordered  Outcome: Progressing     Problem: RESPIRATORY - ADULT  Goal: Achieves optimal ventilation and oxygenation  Description: INTERVENTIONS:  - Assess for changes in respiratory status  - Assess for changes in mentation and behavior  - Position to facilitate oxygenation and minimize respiratory effort  - Oxygen supplementation based on oxygen saturation or ABGs  - Provide Smoking Cessation handout, if applicable  - Encourage broncho-pulmonary hygiene including cough, deep breathe, Incentive Spirometry  - Assess the need for suctioning and perform as needed  - Assess and instruct to report SOB or any respiratory difficulty  - Respiratory Therapy support as indicated  - Manage/alleviate anxiety  - Monitor for signs/symptoms of CO2 retention  Outcome: Progressing     Problem: GASTROINTESTINAL - ADULT  Goal: Minimal or absence of nausea and vomiting  Description: INTERVENTIONS:  - Maintain adequate hydration with IV or PO as ordered and tolerated  - Nasogastric tube to low intermittent suction as ordered  - Evaluate effectiveness of ordered antiemetic medications  - Provide nonpharmacologic comfort measures as appropriate  - Advance diet as tolerated, if ordered  - Obtain nutritional consult as needed  - Evaluate fluid balance  Outcome: Progressing  Goal: Maintains or returns to baseline bowel function  Description: INTERVENTIONS:  -  Assess bowel function  - Maintain adequate hydration with IV or PO as ordered and tolerated  - Evaluate effectiveness of GI medications  - Encourage mobilization and activity  - Obtain nutritional consult as needed  - Establish a toileting routine/schedule  - Consider collaborating with pharmacy to review patient's medication profile  Outcome: Progressing     Problem: METABOLIC/FLUID AND ELECTROLYTES - ADULT  Goal: Electrolytes maintained within normal limits  Description: INTERVENTIONS:  - Monitor labs and rhythm and assess patient for signs and symptoms of electrolyte imbalances  - Administer electrolyte replacement as ordered  - Monitor response to electrolyte replacements, including rhythm and repeat lab results as appropriate  - Fluid restriction as ordered  - Instruct patient on fluid and nutrition restrictions as appropriate  Outcome: Progressing  Goal: Hemodynamic stability and optimal renal function maintained  Description: INTERVENTIONS:  - Monitor labs and assess for signs and symptoms of volume excess or deficit  - Monitor intake, output and patient weight  - Monitor urine specific gravity, serum osmolarity and serum sodium as indicated or ordered  - Monitor response to interventions for patient's volume status, including labs, urine output, blood pressure (other measures as available)  - Encourage oral intake as appropriate  - Instruct patient on fluid and nutrition restrictions as appropriate  Outcome: Progressing     Problem: SKIN/TISSUE INTEGRITY - ADULT  Goal: Skin integrity remains intact  Description: INTERVENTIONS  - Assess and document risk factors for pressure ulcer development  - Assess and document skin integrity  - Monitor for areas of redness and/or skin breakdown  - Initiate interventions, skin care algorithm/standards of care as needed  Outcome: Progressing

## 2024-09-16 NOTE — PLAN OF CARE
Problem: Patient Centered Care  Goal: Patient preferences are identified and integrated in the patient's plan of care  Description: Interventions:  - What would you like us to know as we care for you? Home alone   - Provide timely, complete, and accurate information to patient/family  - Incorporate patient and family knowledge, values, beliefs, and cultural backgrounds into the planning and delivery of care  - Encourage patient/family to participate in care and decision-making at the level they choose  - Honor patient and family perspectives and choices  Outcome: Progressing  Problem: CARDIOVASCULAR - ADULT  Goal: Maintains optimal cardiac output and hemodynamic stability  Description: INTERVENTIONS:  - Monitor vital signs, rhythm, and trends  - Monitor for bleeding, hypotension and signs of decreased cardiac output  - Evaluate effectiveness of vasoactive medications to optimize hemodynamic stability  - Monitor arterial and/or venous puncture sites for bleeding and/or hematoma  - Assess quality of pulses, skin color and temperature  - Assess for signs of decreased coronary artery perfusion - ex. Angina  - Evaluate fluid balance, assess for edema, trend weights  Outcome: Progressing  Goal: Absence of cardiac arrhythmias or at baseline  Description: INTERVENTIONS:  - Continuous cardiac monitoring, monitor vital signs, obtain 12 lead EKG if indicated  - Evaluate effectiveness of antiarrhythmic and heart rate control medications as ordered  - Initiate emergency measures for life threatening arrhythmias  - Monitor electrolytes and administer replacement therapy as ordered  Outcome: Progressing     Problem: RESPIRATORY - ADULT  Goal: Achieves optimal ventilation and oxygenation  Description: INTERVENTIONS:  - Assess for changes in respiratory status  - Assess for changes in mentation and behavior  - Position to facilitate oxygenation and minimize respiratory effort  - Oxygen supplementation based on oxygen saturation or  ABGs  - Provide Smoking Cessation handout, if applicable  - Encourage broncho-pulmonary hygiene including cough, deep breathe, Incentive Spirometry  - Assess the need for suctioning and perform as needed  - Assess and instruct to report SOB or any respiratory difficulty  - Respiratory Therapy support as indicated  - Manage/alleviate anxiety  - Monitor for signs/symptoms of CO2 retention  Outcome: Progressing     Problem: GASTROINTESTINAL - ADULT  Goal: Minimal or absence of nausea and vomiting  Description: INTERVENTIONS:  - Maintain adequate hydration with IV or PO as ordered and tolerated  - Nasogastric tube to low intermittent suction as ordered  - Evaluate effectiveness of ordered antiemetic medications  - Provide nonpharmacologic comfort measures as appropriate  - Advance diet as tolerated, if ordered  - Obtain nutritional consult as needed  - Evaluate fluid balance  Outcome: Progressing  Goal: Maintains or returns to baseline bowel function  Description: INTERVENTIONS:  - Assess bowel function  - Maintain adequate hydration with IV or PO as ordered and tolerated  - Evaluate effectiveness of GI medications  - Encourage mobilization and activity  - Obtain nutritional consult as needed  - Establish a toileting routine/schedule  - Consider collaborating with pharmacy to review patient's medication profile  Outcome: Progressing     Problem: METABOLIC/FLUID AND ELECTROLYTES - ADULT  Goal: Electrolytes maintained within normal limits  Description: INTERVENTIONS:  - Monitor labs and rhythm and assess patient for signs and symptoms of electrolyte imbalances  - Administer electrolyte replacement as ordered  - Monitor response to electrolyte replacements, including rhythm and repeat lab results as appropriate  - Fluid restriction as ordered  - Instruct patient on fluid and nutrition restrictions as appropriate  Outcome: Progressing  Goal: Hemodynamic stability and optimal renal function maintained  Description:  INTERVENTIONS:  - Monitor labs and assess for signs and symptoms of volume excess or deficit  - Monitor intake, output and patient weight  - Monitor urine specific gravity, serum osmolarity and serum sodium as indicated or ordered  - Monitor response to interventions for patient's volume status, including labs, urine output, blood pressure (other measures as available)  - Encourage oral intake as appropriate  - Instruct patient on fluid and nutrition restrictions as appropriate  Outcome: Progressing     Problem: SKIN/TISSUE INTEGRITY - ADULT  Goal: Skin integrity remains intact  Description: INTERVENTIONS  - Assess and document risk factors for pressure ulcer development  - Assess and document skin integrity  - Monitor for areas of redness and/or skin breakdown  - Initiate interventions, skin care algorithm/standards of care as needed  Outcome: Progressing   Safety and fall precautions maintained- bed alarm on, bed locked in lowest position, call light within reach.

## 2024-09-16 NOTE — PROGRESS NOTES
Pulmonary/ICU/Critical Care Progress Note        Reason for Consultation: somnolence  Referring Physician: Dr. Tavarez      SUBJECTIVE:  Afebrile  On 1 LNC  Has a cough      REVIEW OF SYSTEMS:  Positives and negatives as noted in HPI. All other review of systems otherwise are either limited (due to pt/family inability to provide) or negative.      ALLERGIES:  No Known Allergies      MEDS:  Home Medications:  Outpatient Medications Marked as Taking for the 9/13/24 encounter (Hospital Encounter)   Medication Sig Dispense Refill    Naloxone HCl 4 MG/0.1ML Nasal Liquid 4 mg by Nasal route as needed. If patient remains unresponsive, repeat dose in other nostril 2-5 minutes after first dose. 1 kit 0    atorvastatin 40 MG Oral Tab Take 1 tablet (40 mg total) by mouth nightly.      dilTIAZem  MG Oral Capsule SR 24 Hr Take 1 capsule (240 mg total) by mouth daily.      furosemide 20 MG Oral Tab Take 1 tablet (20 mg total) by mouth daily.      isosorbide mononitrate ER 60 MG Oral Tablet 24 Hr Take 1 tablet (60 mg total) by mouth daily.      PREGABALIN 50 MG Oral Cap TAKE 1 CAPSULE BY MOUTH 2 TIMES DAILY. 60 capsule 0    oxyCODONE-acetaminophen  MG Oral Tab Take 1-2 tablets by mouth every 4 (four) hours as needed for Pain. 160 tablet 0    [START ON 10/1/2024] oxyCODONE-acetaminophen  MG Oral Tab Take 1-2 tablets by mouth every 4 (four) hours as needed for Pain. 160 tablet 0    diazePAM 10 MG Oral Tab Take 0.5 tablets (5 mg total) by mouth every 8 (eight) hours as needed for Anxiety (max 3/day).      hydrALAZINE 25 MG Oral Tab Take 1 tablet (25 mg total) by mouth in the morning and 1 tablet (25 mg total) before bedtime. 60 tablet 0    trolamine salicyliate 10 % External Cream Apply to painful joints or muscles up to three times a day as needed 35 g 0       Scheduled Medication:   piperacillin-tazobactam  3.375 g Intravenous Q8H    vancomycin  125 mg Oral Daily    buprenorphine  4 mg Sublingual TID    dilTIAZem ER   240 mg Oral Daily    furosemide  20 mg Oral Daily    hydrALAZINE  25 mg Oral BID    atorvastatin  40 mg Oral Nightly    isosorbide mononitrate ER  60 mg Oral Daily    heparin  5,000 Units Subcutaneous 2 times per day    naloxone  0.4 mg Intravenous Once     Continuous Infusing Medication:   sodium chloride 83 mL/hr at 09/14/24 0000     PRN Medications:    ondansetron    hydrALAzine    hydrALAzine    acetaminophen    metoclopramide       PHYSICAL EXAM:  /38 (BP Location: Right arm)   Pulse 72   Temp 97.7 °F (36.5 °C) (Oral)   Resp 16   Ht 5' 4\" (1.626 m)   Wt 179 lb 14.3 oz (81.6 kg)   SpO2 97%   BMI 30.88 kg/m²      CONSTITUTIONAL: alert, oriented, no apparent distress  HEENT: atraumatic normocephalic  MOUTH: mucous membranes are moist. No OP exudates  NECK/THROAT: no JVD. Trachea midline. No obvious thyromegaly  LUNG: clear upper b/l no wheezing, crackles. Chest symmetric with respiratory motion  HEART: regular rate and rhythm, + M  ABD: soft non tender. + bowel sounds. No organomegaly noted  EXT: no clubbing, cyanosis, or edema noted. Pulses intact grossly      IMAGES:   CXR  Findings and impression:   Normal heart size.  No significant edema   Mild hazy opacity throughout the right lung base may be atelectasis or aspiration   No focal consolidation   Normal pleura   No free air       CT brain  CONCLUSION:   1.  No acute intracranial process.   2.  There are mild microvascular white matter ischemic changes, likely related to long-standing hypertension and/or diabetes.   3.  Atherosclerosis in the anterior and posterior circulations.       LABS:  Recent Labs   Lab 09/13/24  1235 09/14/24  0248 09/16/24  0918   RBC 3.16* 3.29* 2.73*   HGB 9.0* 9.4* 7.8*   HCT 29.4* 29.8* 25.4*   MCV 93.0 90.6 93.0   MCH 28.5 28.6 28.6   MCHC 30.6* 31.5 30.7*   RDW 15.7* 15.2* 15.4*   NEPRELIM 10.52* 10.75* 5.15   WBC 12.2* 11.6* 7.3   .0 189.0 176.0       Recent Labs   Lab 09/13/24  1235 09/14/24  0248  09/16/24  0918   GLU 95 119* 130*   BUN 37* 23 35*   CREATSERUM 2.37* 1.94* 2.65*   EGFRCR 21* 26* 18*   CA 9.1 8.9 8.3*    145 145   K 5.3* 4.3 3.9   * 114* 112   CO2 27.0 25.0 26.0       ASSESSMENT/PLAN:  1.Somnolence, nausea, vomiting likely secondary to narcotic overdose. Pt had similar admission 2 months ago  -was on narcan gtt stopped on 9/14  -reglan and zofran PRN for emesis  -primary started her on subutex  -neuro consulted. MRI ordered    2.DICK on CKD  -IVF  -monitor BMP-improving renal function    3.Hx of LLE DVT (6/2023)  -previously was on eliquis but stopped on last discharge per previous records    4.Diastolic HF, moderate aortic stenosis, pulm HTN, pSVT  -continue home meds  -on dilt    5.Bradycardia  -previously evaluated by cardiology in 7/2024. Recommended stopping narcotics    6. CXR with possible infiltrates and has an elevated WBC.  -started empiric zosyn. Can switch to unasyn and eventually augmentin to complete 7 day course  -continue to wean 02    7.Fall  -imaging neg for fx    8.Proph:  -DVT: hep subcutaneous    Full code    Will sign off. Please contact our service if there are questions/concerns.     Thank you for the opportunity to care for Hannah Boss.      ISAI Muse DO, MPH  Pulmonary Critical Care Medicine  [facility]

## 2024-09-16 NOTE — CDS QUERY
Dear Dr Tavarez,    Can the Encephalopathy be further specified?    (  x )  Acute Toxic Encephalopathy     (   )  Other: Please specify : ______________________      _________________________________________________     Clinical Indicator:     9/13 -9/16 H&P  & Progress notes --78 yo Acute encephalopathy. Suspect related to narcotic medications.  Using Percocet more than Rx recommends, more than 6 per day  - has had 8 admissions to hospital this year alone, mostly for change in mental status and falls related to opiates    9/14 Neurology notes: \" States that she has chronic pain in her stomach related to stomach cancer.  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  She states that pain is in her stomach and radiates into the right greater than left legs. ; she has been seeing pain clinic to try to wean off oxycodone, started on Lyrica recently but does not feel like it has helped yet.  Has taken gabapentin 300 mg nightly for years, but over the last few months but started on Lyrica 50 mg daily.      Risk Factor:  Leiomyosarcoma of the retroperitoneum  ,Rheumatoid Arthritis , Chronic Pain , on Oxycodone     Treatment:   - Consult with Neurologist , Pulmonologist & Cardiologist  - Admit to ICU , Narcan drip        If you have any questions ,please call Clinical : Ct Collins/RN-BSN  at cell # [phone]       THIS FORM IS A PERMANENT PART OF THE MEDICAL RECORD

## 2024-09-16 NOTE — CDS QUERY
Dear Dr Tavarez,    Can the  Congestive Heart Failure be further specified?     ( x ) Chronic  Diastolic Heart Failure       (   )  Other: Please specify: _____________________      _________________________________________________    Clinical Indicator:    9/14 Pulmonologist note:  \" Diastolic HF, moderate aortic stenosis, pulm HTN, pSVT continue home meds\"    9/14 ECHO : LVEF = 60-65% ,grade 1 diastolic dysfunction       9/14 CXR: Normal heart size.  No significant edema    Mild hazy opacity throughout the right lung base may be atelectasis or aspiration  .No focal consolidation       Risk Factors: Advanced Age, Aortic Valve Disease, Paroxysmal Supraventricular Tachycardia, Mild Pulmonary Hypertension       Treatment :  -- Cardiology Consult  -- Lasix 20mg daily        If you have any questions ,please call Clinical : Ct Collins/ELLIE-BSN  at cell # [phone]       THIS FORM IS A PERMANENT PART OF THE MEDICAL RECORD

## 2024-09-17 ENCOUNTER — APPOINTMENT (OUTPATIENT)
Dept: GENERAL RADIOLOGY | Facility: HOSPITAL | Age: 79
End: 2024-09-17
Attending: INTERNAL MEDICINE
Payer: MEDICARE

## 2024-09-17 LAB
ANION GAP SERPL CALC-SCNC: 4 MMOL/L (ref 0–18)
BASOPHILS # BLD AUTO: 0.04 X10(3) UL (ref 0–0.2)
BASOPHILS NFR BLD AUTO: 0.6 %
BUN BLD-MCNC: 39 MG/DL (ref 9–23)
BUN/CREAT SERPL: 17 (ref 10–20)
CALCIUM BLD-MCNC: 7.5 MG/DL (ref 8.7–10.4)
CHLORIDE SERPL-SCNC: 115 MMOL/L (ref 98–112)
CO2 SERPL-SCNC: 26 MMOL/L (ref 21–32)
CREAT BLD-MCNC: 2.29 MG/DL
DEPRECATED RDW RBC AUTO: 50.1 FL (ref 35.1–46.3)
EGFRCR SERPLBLD CKD-EPI 2021: 21 ML/MIN/1.73M2 (ref 60–?)
EOSINOPHIL # BLD AUTO: 0.27 X10(3) UL (ref 0–0.7)
EOSINOPHIL NFR BLD AUTO: 4.1 %
ERYTHROCYTE [DISTWIDTH] IN BLOOD BY AUTOMATED COUNT: 14.9 % (ref 11–15)
GLUCOSE BLD-MCNC: 105 MG/DL (ref 70–99)
HCT VFR BLD AUTO: 23.8 %
HGB BLD-MCNC: 7.3 G/DL
IMM GRANULOCYTES # BLD AUTO: 0.03 X10(3) UL (ref 0–1)
IMM GRANULOCYTES NFR BLD: 0.5 %
LYMPHOCYTES # BLD AUTO: 1.2 X10(3) UL (ref 1–4)
LYMPHOCYTES NFR BLD AUTO: 18 %
MAGNESIUM SERPL-MCNC: 1.7 MG/DL (ref 1.6–2.6)
MCH RBC QN AUTO: 28.4 PG (ref 26–34)
MCHC RBC AUTO-ENTMCNC: 30.7 G/DL (ref 31–37)
MCV RBC AUTO: 92.6 FL
MONOCYTES # BLD AUTO: 0.55 X10(3) UL (ref 0.1–1)
MONOCYTES NFR BLD AUTO: 8.3 %
NEUTROPHILS # BLD AUTO: 4.56 X10 (3) UL (ref 1.5–7.7)
NEUTROPHILS # BLD AUTO: 4.56 X10(3) UL (ref 1.5–7.7)
NEUTROPHILS NFR BLD AUTO: 68.5 %
OSMOLALITY SERPL CALC.SUM OF ELEC: 310 MOSM/KG (ref 275–295)
PLATELET # BLD AUTO: 157 10(3)UL (ref 150–450)
POTASSIUM SERPL-SCNC: 3.9 MMOL/L (ref 3.5–5.1)
RBC # BLD AUTO: 2.57 X10(6)UL
SODIUM SERPL-SCNC: 145 MMOL/L (ref 136–145)
WBC # BLD AUTO: 6.7 X10(3) UL (ref 4–11)

## 2024-09-17 PROCEDURE — 71045 X-RAY EXAM CHEST 1 VIEW: CPT | Performed by: INTERNAL MEDICINE

## 2024-09-17 PROCEDURE — 99233 SBSQ HOSP IP/OBS HIGH 50: CPT | Performed by: INTERNAL MEDICINE

## 2024-09-17 RX ORDER — BUPRENORPHINE 8 MG/1
8 TABLET SUBLINGUAL 3 TIMES DAILY
Status: DISCONTINUED | OUTPATIENT
Start: 2024-09-17 | End: 2024-09-19

## 2024-09-17 RX ORDER — BUPRENORPHINE 8 MG/1
8 TABLET SUBLINGUAL 3 TIMES DAILY
Qty: 45 TABLET | Refills: 1 | Status: SHIPPED | OUTPATIENT
Start: 2024-09-17 | End: 2024-09-18

## 2024-09-17 RX ORDER — MECLIZINE HCL 12.5 MG 12.5 MG/1
25 TABLET ORAL 3 TIMES DAILY PRN
Status: DISCONTINUED | OUTPATIENT
Start: 2024-09-17 | End: 2024-09-19

## 2024-09-17 RX ORDER — BUPRENORPHINE 8 MG/1
8 TABLET SUBLINGUAL DAILY PRN
Status: DISCONTINUED | OUTPATIENT
Start: 2024-09-17 | End: 2024-09-19

## 2024-09-17 RX ORDER — BUPRENORPHINE 8 MG/1
8 TABLET SUBLINGUAL DAILY PRN
Qty: 6 TABLET | Refills: 0 | Status: SHIPPED | OUTPATIENT
Start: 2024-09-17 | End: 2024-09-18

## 2024-09-17 RX ORDER — PREGABALIN 50 MG/1
50 CAPSULE ORAL 2 TIMES DAILY
Status: DISCONTINUED | OUTPATIENT
Start: 2024-09-17 | End: 2024-09-19

## 2024-09-17 RX ORDER — MAGNESIUM OXIDE 400 MG/1
400 TABLET ORAL ONCE
Status: COMPLETED | OUTPATIENT
Start: 2024-09-17 | End: 2024-09-17

## 2024-09-17 NOTE — PLAN OF CARE
Problem: Patient Centered Care  Goal: Patient preferences are identified and integrated in the patient's plan of care  Description: Interventions:  - What would you like us to know as we care for you? Lives home alone  - Provide timely, complete, and accurate information to patient/family  - Incorporate patient and family knowledge, values, beliefs, and cultural backgrounds into the planning and delivery of care  - Encourage patient/family to participate in care and decision-making at the level they choose  - Honor patient and family perspectives and choices  Outcome: Progressing     Problem: Patient/Family Goals  Goal: Patient/Family Long Term Goal  Description: Patient's Long Term Goal: discharge from the hospital    Interventions:  - monitor vital signs   - monitor appropriate labs  - pain management   - administer medications per order  - follow MD orders  - diagnostics per order  - update and inform patient and family on plan of care  - discharge planning  - See additional Care Plan goals for specific interventions  Outcome: Progressing  Goal: Patient/Family Short Term Goal  Description: Patient's Short Term Goal: improve weakness    Interventions:   - monitor vital signs   - monitor appropriate labs  - pain management   - administer medications per order  - follow MD orders  - diagnostics per order  - update and inform patient and family on plan of care  - discharge planning  - See additional Care Plan goals for specific interventions  Outcome: Progressing     Problem: CARDIOVASCULAR - ADULT  Goal: Maintains optimal cardiac output and hemodynamic stability  Description: INTERVENTIONS:  - Monitor vital signs, rhythm, and trends  - Monitor for bleeding, hypotension and signs of decreased cardiac output  - Evaluate effectiveness of vasoactive medications to optimize hemodynamic stability  - Monitor arterial and/or venous puncture sites for bleeding and/or hematoma  - Assess quality of pulses, skin color and  temperature  - Assess for signs of decreased coronary artery perfusion - ex. Angina  - Evaluate fluid balance, assess for edema, trend weights  Outcome: Progressing  Goal: Absence of cardiac arrhythmias or at baseline  Description: INTERVENTIONS:  - Continuous cardiac monitoring, monitor vital signs, obtain 12 lead EKG if indicated  - Evaluate effectiveness of antiarrhythmic and heart rate control medications as ordered  - Initiate emergency measures for life threatening arrhythmias  - Monitor electrolytes and administer replacement therapy as ordered  Outcome: Progressing     Problem: RESPIRATORY - ADULT  Goal: Achieves optimal ventilation and oxygenation  Description: INTERVENTIONS:  - Assess for changes in respiratory status  - Assess for changes in mentation and behavior  - Position to facilitate oxygenation and minimize respiratory effort  - Oxygen supplementation based on oxygen saturation or ABGs  - Provide Smoking Cessation handout, if applicable  - Encourage broncho-pulmonary hygiene including cough, deep breathe, Incentive Spirometry  - Assess the need for suctioning and perform as needed  - Assess and instruct to report SOB or any respiratory difficulty  - Respiratory Therapy support as indicated  - Manage/alleviate anxiety  - Monitor for signs/symptoms of CO2 retention  Outcome: Progressing     Problem: GASTROINTESTINAL - ADULT  Goal: Minimal or absence of nausea and vomiting  Description: INTERVENTIONS:  - Maintain adequate hydration with IV or PO as ordered and tolerated  - Nasogastric tube to low intermittent suction as ordered  - Evaluate effectiveness of ordered antiemetic medications  - Provide nonpharmacologic comfort measures as appropriate  - Advance diet as tolerated, if ordered  - Obtain nutritional consult as needed  - Evaluate fluid balance  Outcome: Progressing  Goal: Maintains or returns to baseline bowel function  Description: INTERVENTIONS:  - Assess bowel function  - Maintain adequate  hydration with IV or PO as ordered and tolerated  - Evaluate effectiveness of GI medications  - Encourage mobilization and activity  - Obtain nutritional consult as needed  - Establish a toileting routine/schedule  - Consider collaborating with pharmacy to review patient's medication profile  Outcome: Progressing     Problem: METABOLIC/FLUID AND ELECTROLYTES - ADULT  Goal: Electrolytes maintained within normal limits  Description: INTERVENTIONS:  - Monitor labs and rhythm and assess patient for signs and symptoms of electrolyte imbalances  - Administer electrolyte replacement as ordered  - Monitor response to electrolyte replacements, including rhythm and repeat lab results as appropriate  - Fluid restriction as ordered  - Instruct patient on fluid and nutrition restrictions as appropriate  Outcome: Progressing  Goal: Hemodynamic stability and optimal renal function maintained  Description: INTERVENTIONS:  - Monitor labs and assess for signs and symptoms of volume excess or deficit  - Monitor intake, output and patient weight  - Monitor urine specific gravity, serum osmolarity and serum sodium as indicated or ordered  - Monitor response to interventions for patient's volume status, including labs, urine output, blood pressure (other measures as available)  - Encourage oral intake as appropriate  - Instruct patient on fluid and nutrition restrictions as appropriate  Outcome: Progressing     Problem: SKIN/TISSUE INTEGRITY - ADULT  Goal: Skin integrity remains intact  Description: INTERVENTIONS  - Assess and document risk factors for pressure ulcer development  - Assess and document skin integrity  - Monitor for areas of redness and/or skin breakdown  - Initiate interventions, skin care algorithm/standards of care as needed  Outcome: Progressing     Problem: PAIN - ADULT  Goal: Verbalizes/displays adequate comfort level or patient's stated pain goal  Description: INTERVENTIONS:  - Encourage pt to monitor pain and  request assistance  - Assess pain using appropriate pain scale  - Administer analgesics based on type and severity of pain and evaluate response  - Implement non-pharmacological measures as appropriate and evaluate response  - Consider cultural and social influences on pain and pain management  - Manage/alleviate anxiety  - Utilize distraction and/or relaxation techniques  - Monitor for opioid side effects  - Notify MD/LIP if interventions unsuccessful or patient reports new pain  - Anticipate increased pain with activity and pre-medicate as appropriate  Outcome: Progressing     Problem: SAFETY ADULT - FALL  Goal: Free from fall injury  Description: INTERVENTIONS:  - Assess pt frequently for physical needs  - Identify cognitive and physical deficits and behaviors that affect risk of falls.  - [place] fall precautions as indicated by assessment.  - Educate pt/family on patient safety including physical limitations  - Instruct pt to call for assistance with activity based on assessment  - Modify environment to reduce risk of injury  - Provide assistive devices as appropriate  - Consider OT/PT consult to assist with strengthening/mobility  - Encourage toileting schedule  Outcome: Progressing     Problem: DISCHARGE PLANNING  Goal: Discharge to home or other facility with appropriate resources  Description: INTERVENTIONS:  - Identify barriers to discharge w/pt and caregiver  - Include patient/family/discharge partner in discharge planning  - Arrange for needed discharge resources and transportation as appropriate  - Identify discharge learning needs (meds, wound care, etc)  - Arrange for interpreters to assist at discharge as needed  - Consider post-discharge preferences of patient/family/discharge partner  - Complete POLST form as appropriate  - Assess patient's ability to be responsible for managing their own health  - Refer to Case Management Department for coordinating discharge planning if the patient needs  post-hospital services based on physician/LIP order or complex needs related to functional status, cognitive ability or social support system  Outcome: Progressing     Problem: HEMATOLOGIC - ADULT  Goal: Free from bleeding injury  Description: (Example usage: patient with low platelets)  INTERVENTIONS:  - Avoid intramuscular injections, enemas and rectal medication administration  - Ensure safe mobilization of patient  - Hold pressure on venipuncture sites to achieve adequate hemostasis  - Assess for signs and symptoms of internal bleeding  - Monitor lab trends  - Patient is to report abnormal signs of bleeding to staff  - Avoid use of toothpicks and dental floss  - Use electric shaver for shaving  - Use soft bristle tooth brush  - Limit straining and forceful nose blowing  Outcome: Progressing     Problem: MUSCULOSKELETAL - ADULT  Goal: Return mobility to safest level of function  Description: INTERVENTIONS:  - Assess patient stability and activity tolerance for standing, transferring and ambulating w/ or w/o assistive devices  - Assist with transfers and ambulation using safe patient handling equipment as needed  - Ensure adequate protection for wounds/incisions during mobilization  - Obtain PT/OT consults as needed  - Advance activity as appropriate  - Communicate ordered activity level and limitations with patient/family  Outcome: Progressing     Problem: Impaired Functional Mobility  Goal: Achieve highest/safest level of mobility/gait  Description: Interventions:  - Assess patient's functional ability and stability  - Promote increasing activity/tolerance for mobility and gait  - Educate and engage patient/family in tolerated activity level and precautions  - Recommend use of sit-stand lift for transfers  - Recommend use of total lift for transfers  - Recommend use of  RW for transfers and ambulation  - Recommend patient transfer to bedside chair toward strongest side  - When transferring patient, block weaker  knee for safety  Outcome: Progressing     Safety and fall precautions maintained. Bed locked in lowest position, bed alarm on, I-bed awareness on, call light within reach. Frequent rounding by nursing staff.
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
## 2024-09-17 NOTE — PHYSICAL THERAPY NOTE
PHYSICAL THERAPY TREATMENT NOTE - INPATIENT     Room Number: 555/555-A       Presenting Problem: Contusion head s/p fall  Co-Morbidities : Encephalopathy, DICK, Rhabdo    Problem List  Principal Problem:    Contusion of head, unspecified part of head, initial encounter  Active Problems:    Altered mental status, unspecified altered mental status type    Contusion of multiple sites of right shoulder, initial encounter    Contusion of right hip, initial encounter    Encephalopathy      PHYSICAL THERAPY ASSESSMENT   Patient demonstrates good  progress this session, goals  updated to reflect patient performance.      Patient is requiring contact guard assist as a result of the following impairments: decreased functional strength, decreased endurance/aerobic capacity, impaired standing balance, decreased muscular endurance, and medical status.     Patient continues to function below baseline with bed mobility, transfers, gait, stair negotiation, maintaining seated position, standing prolonged periods, and performing household tasks.  Next session anticipate patient to progress bed mobility, transfers, gait, stair negotiation, maintaining seated position, standing prolonged periods, and performing household tasks.  Physical Therapy will continue to follow patient for duration of hospitalization.    Patient continues to benefit from continued skilled PT services: at discharge to promote prior level of function and safety with additional support and return home with home health PT.    PLAN  PT Treatment Plan: Bed mobility;Body mechanics;Endurance;Energy conservation;Patient education;Gait training;Strengthening;Transfer training;Balance training  Frequency (Obs): 3-5x/week    SUBJECTIVE  I feel ok walking I can manage short distances with the walker.     OBJECTIVE  Precautions: Bed/chair alarm    WEIGHT BEARING RESTRICTION                PAIN ASSESSMENT   Ratin          BALANCE  Static Sitting: Fair +  Dynamic Sitting:  Fair  Static Standing: Fair -  Dynamic Standing: Fair -    ACTIVITY TOLERANCE                          O2 WALK       AM-PAC '6-Clicks' INPATIENT SHORT FORM - BASIC MOBILITY  How much difficulty does the patient currently have...  Patient Difficulty: Turning over in bed (including adjusting bedclothes, sheets and blankets)?: None   Patient Difficulty: Sitting down on and standing up from a chair with arms (e.g., wheelchair, bedside commode, etc.): None   Patient Difficulty: Moving from lying on back to sitting on the side of the bed?: None   How much help from another person does the patient currently need...   Help from Another: Moving to and from a bed to a chair (including a wheelchair)?: A Little   Help from Another: Need to walk in hospital room?: A Little   Help from Another: Climbing 3-5 steps with a railing?: A Little     AM-PAC Score:  Raw Score: 21   Approx Degree of Impairment: 28.97%   Standardized Score (AM-PAC Scale): 50.25   CMS Modifier (G-Code): CJ    FUNCTIONAL ABILITY STATUS  Functional Mobility/Gait Assessment  Gait Assistance: Contact guard assist  Distance (ft): 30  Assistive Device: Rolling walker  Pattern:  (decreased step length kyphotic posture)  Rolling: supervision  Supine to Sit: supervision  Sit to Supine: contact guard assist  Sit to Stand: contact guard assist    Skilled Therapy Provided: Pt ed with bed mobility and transfers with SBA with RW. Pt ed with gait progression 30' with RW with decreased step length and kyphotic postures. Pt ed with therex in chair x 10 reps. Pt is on track for a return home with intermittent family assist and [facility] PT as medical progress allows.     The patient's Approx Degree of Impairment: 28.97% has been calculated based on documentation in the [facility] '6 clicks' Inpatient Daily Activity Short Form.  Research supports that patients with this level of impairment may benefit from [facility] PT family assist.  Final disposition will be made by interdisciplinary medical  team.    THERAPEUTIC EXERCISES  Lower Extremity Ankle pumps  Heel slides  LAQ     Position Sitting & Standing       Patient End of Session: Up in chair;With  staff;Needs met;Call light within reach;RN aware of session/findings;All patient questions and concerns addressed;Alarm set    CURRENT GOALS   Goals to be met by: 9/30/2024  Patient Goal Patient's self-stated goal is: Return home    Goal #1 Patient is able to demonstrate supine - sit EOB @ level: independent      Goal #1   Current Status  SBA   Goal #2 Patient is able to demonstrate transfers Sit to/from Stand at assistance level: modified independent with cane - straight      Goal #2  Current Status  SBA with RW    Goal #3 Patient is able to ambulate 150 feet with assist device: cane - straight at assistance level: modified independent   Goal #3   Current Status  CGA with RW 45' kyphotic posture decreased step length   Goal #4 Patient will negotiate 2 stairs/one curb w/ assistive device and supervision   Goal #4   Current Status  Ongoing   Goal #5 Patient to demonstrate independence with home activity/exercise instructions provided to patient in preparation for discharge.   Goal #5   Current Status  Ongoing   Goal #6       Gait Training: 15 minutes  Therapeutic Exercise: 9 minutes
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
## 2024-09-17 NOTE — CM/SW NOTE
09/17/24 1400   CM/SW Referral Data   Referral Source Social Work (self-referral)   Reason for Referral Discharge planning   Informant Patient   Medical Hx   Does patient have an established PCP? Yes   Patient Info   Patient's Current Mental Status at Time of Assessment Alert;Oriented   Patient's Home Environment House   Patient Status Prior to Admission   Independent with ADLs and Mobility Yes   Discharge Needs   Anticipated D/C needs Home health care   Choice of Post-Acute Provider   Informed patient of right to choose their preferred provider Yes     SW initiated self referral for discharge planning. Patient is alert and oriented at time of assessment. Patient provided above information. Patient reports she needs some help with ADLs. Patient has DME at home hx of [facility]. Pt wishes to use them again. SW initiated referral via aidin, f2f placed.     Plan: Pending medical clearance, DC to Home with [facility]    SW/CM to remain available for support and/or discharge planning.     Avelina Bueno, MSW, LSW   x [phone]

## 2024-09-17 NOTE — PROGRESS NOTES
Progress Note     Hannah Boss Patient Status:  Inpatient    1945 MRN [id number]   Location [facility] 5SW/SE Attending Timoteo Tavarez MD   Hosp Day # 4 PCP Diana Lopez DO     Chief Complaint:   Chief Complaint   Patient presents with    Fall         Subjective:   S: Patient seen and examined, chart reviewed.   Had pain earlier, extra dose of bup given.       Review of Systems:   10 point ROS completed and was negative, except for pertinent positive and negatives stated in subjective.                Objective:   Vital signs:  Temp:  [98.5 °F (36.9 °C)-99.2 °F (37.3 °C)] 98.6 °F (37 °C)  Pulse:  [61-76] 74  Resp:  [16-18] 18  BP: (100-147)/(38-62) 147/62  SpO2:  [94 %-96 %] 94 %    Wt Readings from Last 6 Encounters:   24 179 lb 14.3 oz (81.6 kg)   24 165 lb 3.2 oz (74.9 kg)   07/10/24 166 lb (75.3 kg)   24 163 lb 14.4 oz (74.3 kg)   24 155 lb 1.6 oz (70.4 kg)   24 160 lb (72.6 kg)       Physical Exam:    General: No acute distress.   Respiratory: Clear to auscultation bilaterally. No wheezes. No rhonchi.  Cardiovascular: S1, S2. Regular rate and rhythm. No murmurs, rubs or gallops.   Abdomen: Soft, nontender, nondistended.  Positive bowel sounds. No rebound or guarding.  Neurologic: No focal neurological deficits.   Musculoskeletal: Moves all extremities.  Extremities: No edema.    Results:   Diagnostic Data:      Labs:    Labs Last 24 Hours:   BMP     CBC    Other     Na 145 Cl 115 BUN 39 Glu 105   Hb 7.3   PTT - Procal -   K 3.9 CO2 26.0 Cr 2.29   WBC 6.7 >< .0  INR - CRP -   Renal Lytes Endo    Hct 23.8   Trop - D dim -   eGFR - Ca 7.5 POC Gluc  -    LFT   pBNP - Lactic -   eGFR AA - PO4 - A1c -   AST - APk - Prot -  LDL -      Recent Labs   Lab 24  0248 24  0918 24  0513   RBC 3.29* 2.73* 2.57*   HGB 9.4* 7.8* 7.3*   HCT 29.8* 25.4* 23.8*   MCV 90.6 93.0 92.6   MCH 28.6 28.6 28.4   MCHC 31.5 30.7* 30.7*   RDW 15.2* 15.4* 14.9    NEPRELIM 10.75* 5.15 4.56   WBC 11.6* 7.3 6.7   .0 176.0 157.0       Lab Results   Component Value Date    INR 1.30 (H) 03/08/2024    INR 1.21 (H) 01/19/2024    INR 1.11 10/07/2023       Recent Labs   Lab 09/14/24  0248 09/16/24  0918 09/17/24  0513   * 130* 105*   BUN 23 35* 39*   CREATSERUM 1.94* 2.65* 2.29*   CA 8.9 8.3* 7.5*    145 145   K 4.3 3.9 3.9   * 112 115*   CO2 25.0 26.0 26.0       No results for input(s): \"SHERRY\", \"LIP\" in the last 168 hours.    Recent Labs   Lab 09/16/24 0918 09/17/24  0513   MG 1.7 1.7       No results for input(s): \"URINE\", \"CULTI\", \"BLDSMR\" in the last 168 hours.      No results found.        Pro-Calcitonin  No results for input(s): \"PCT\" in the last 168 hours.    Cardiac  No results for input(s): \"TROP\", \"PBNP\" in the last 168 hours.    Imaging: Imaging data reviewed in Epic.    No results found.       Medications:    buprenorphine  8 mg Sublingual TID    apixaban  5 mg Oral BID    pregabalin  50 mg Oral BID    dilTIAZem ER  240 mg Oral Daily    furosemide  20 mg Oral Daily    hydrALAZINE  25 mg Oral BID    atorvastatin  40 mg Oral Nightly    isosorbide mononitrate ER  60 mg Oral Daily    naloxone  0.4 mg Intravenous Once       Assessment & Plan:   ASSESSMENT / PLAN:     78 yo Acute encephalopathy.  Suspect related to narcotic medications.  - hold off all full agonists  - Using Percocet more than Rx recommends, more than 6 per day  - has had 8 admissions to hospital this year alone, mostly for change in mental status and falls related to opiates  - does not have an Rx for naloxone or a filled prescription for Narcan at home  - has never tried buprenorphine  - Would be an excellent candidate to try TID dosing of Bup for chronic pain.  This would eliminate full agonist overdose risk and keep her safe.  - need Narcan Rx at discharge  - called her pain provider Rosey BENSON to discuss this plan. D/W Rosey care plan and consult for pain management  team today.   - will increase bup to 8 mg TID today. Can go up as needed to as much as 16 mg TID.  Higher doses of 16 mg TID have been reported in the literature.  This is a medication that is well tolerated and does not cause respiratory depression like full agonist therapy so you CAN NOT OVERDOSE ON BUP.    - PRN bup 8 mg qday for break through pain  -MRI head not needed.  Had on 3/2024 for same presentation.      SVT, mod aortic stenosis, pHTN  - on dilt 240 and Imdur  - apprec EP recs     Hx DVT  - reports she is on eliquis  - continue    CHF with diastolic dysfuction  On lasix daily.     PNA - c/w aspiration when vomited  - on unasyn but will stop today.  Current evidence base recommendation don't support abx therapy changing outcomes in uncomplicated aspiration pneumonia.   - stop vanco and remove rectal tube.  Only 100 ml out last night. She can use BR.   - OOB to chair, walk.     Hypoxic resp failure 2/2 to above  - recheck CXR today new opacities representing ateletctasis vs pna.  - she is afebrile and normal WBC.  Will observe off abx.   - incentive Spirometry     Falls  2/2 to narcotics use suspected   - stop narcotics     CKD stage IV  - avoid nephrotoxins  - follow labs closely    dvt prophylaxis: eliquis  code status: full code  dispo: home tomorrow    I personally reviewed the available laboratories, imaging including cxr, labs. I discussed/will discuss the case with nurse and patient, will call son. I ordered laboratories, studies including cxr. I adjusted medications including bup/vanc/abx/lyrica. Medical decision making high, risk is high.    >55min spent, >50% spent counseling and coordinating care in the form of educating pt/family and d/w consultants and staff. Most of the time spent discussing the above plan.     Estimated date of discharge: 1 day  Discharge is dependent on: pain control and tolerating bup. Await pain consult input.   At this point Ms. Boss is expected to be discharge to:  home    Plan of care discussed with patient    Timoteo Tavarez MD, FAAP, FACP  [facility] Hospitalist  I respond to Epic Chat and AMS Connect

## 2024-09-18 LAB
BNP SERPL-MCNC: 881 PG/ML
MAGNESIUM SERPL-MCNC: 1.8 MG/DL (ref 1.6–2.6)

## 2024-09-18 PROCEDURE — 99233 SBSQ HOSP IP/OBS HIGH 50: CPT | Performed by: HOSPITALIST

## 2024-09-18 RX ORDER — FUROSEMIDE 10 MG/ML
20 INJECTION INTRAMUSCULAR; INTRAVENOUS ONCE
Status: COMPLETED | OUTPATIENT
Start: 2024-09-18 | End: 2024-09-18

## 2024-09-18 NOTE — CM/SW NOTE
Pt discussed in DC rounds. Sw received MDO for price check for Buprenorphine. SW called [facility]  pharmacy, and was told to call back later.     204pm- SW called [facility] - [phone] - for price check of Buprenorphine, was placed on hold for 25 minutes.  SW reviewed AVS-      buprenorphine (2 prescriptions)  Your estimated total payment per fill: $0    SW placed Goodrx coupons in hard chart     352pm- SW was informed that patient needs to follow up with outpatient pain clinic or subxone clinic. Sw met with patient, patient wishes to continue to follow up with [facility] outpatient clinic. DANIAL asked psych liaison for assistance for subxone clinics. DANIAL placed information in AVS    Plan: Pending medical clearance, DC to home with [facility] , *monitor oxygen.     DANIAL/RAJ to remain available for support and/or discharge planning.     Avelina Bueno, MSW, LSW   x [phone]

## 2024-09-18 NOTE — PROGRESS NOTES
Progress Note     Hannah Boss Patient Status:  Inpatient    1945 MRN [id number]   Location [facility] 5SW/SE Attending Timoteo Tavarez MD   Hosp Day # 5 PCP Diana Lopez DO     Chief Complaint:   Chief Complaint   Patient presents with    Fall         Subjective:   S: Patient seen and examined, chart reviewed.   Long discussion about long-term goals, she discussed that she wants to continue to follow-up with the pain clinic here and is not interested in following up for Suboxone clinic  Feeling well overall today      Review of Systems:   10 point ROS completed and was negative, except for pertinent positive and negatives stated in subjective.                Objective:   Vital signs:  Temp:  [98.9 °F (37.2 °C)-99 °F (37.2 °C)] 98.9 °F (37.2 °C)  Pulse:  [81-89] 89  Resp:  [16-20] 16  BP: (108-149)/(47-57) 108/47  SpO2:  [83 %-97 %] 97 %    Wt Readings from Last 6 Encounters:   24 179 lb 14.3 oz (81.6 kg)   24 165 lb 3.2 oz (74.9 kg)   07/10/24 166 lb (75.3 kg)   24 163 lb 14.4 oz (74.3 kg)   24 155 lb 1.6 oz (70.4 kg)   24 160 lb (72.6 kg)       Physical Exam:    General: No acute distress.   Respiratory: Clear to auscultation bilaterally. No wheezes. No rhonchi.  Cardiovascular: S1, S2. Regular rate and rhythm. No murmurs, rubs or gallops.   Abdomen: Soft, nontender, nondistended.  Positive bowel sounds. No rebound or guarding.  Neurologic: No focal neurological deficits.   Musculoskeletal: Moves all extremities.  Extremities: No edema.    Results:   Diagnostic Data:      Labs:    Labs Last 24 Hours:   BMP     CBC    Other     Na - Cl - BUN - Glu -   Hb -   PTT - Procal -   K - CO2 - Cr -   WBC - >< PLT -  INR - CRP -   Renal Lytes Endo    Hct -   Trop - D dim -   eGFR - Ca - POC Gluc  -    LFT   pBNP - Lactic -   eGFR AA - PO4 - A1c -   AST - APk - Prot -  LDL -      Recent Labs   Lab 24  0248 24  0918 24  0513   RBC 3.29* 2.73* 2.57*    HGB 9.4* 7.8* 7.3*   HCT 29.8* 25.4* 23.8*   MCV 90.6 93.0 92.6   MCH 28.6 28.6 28.4   MCHC 31.5 30.7* 30.7*   RDW 15.2* 15.4* 14.9   NEPRELIM 10.75* 5.15 4.56   WBC 11.6* 7.3 6.7   .0 176.0 157.0       Lab Results   Component Value Date    INR 1.30 (H) 03/08/2024    INR 1.21 (H) 01/19/2024    INR 1.11 10/07/2023       Recent Labs   Lab 09/14/24  0248 09/16/24  0918 09/17/24  0513   * 130* 105*   BUN 23 35* 39*   CREATSERUM 1.94* 2.65* 2.29*   CA 8.9 8.3* 7.5*    145 145   K 4.3 3.9 3.9   * 112 115*   CO2 25.0 26.0 26.0       No results for input(s): \"SHERRY\", \"LIP\" in the last 168 hours.    Recent Labs   Lab 09/16/24  0918 09/17/24  0513 09/18/24  0521   MG 1.7 1.7 1.8       No results for input(s): \"URINE\", \"CULTI\", \"BLDSMR\" in the last 168 hours.      XR CHEST AP PORTABLE  (CPT=71045)    Result Date: 9/17/2024  CONCLUSION:   New opacities in the left lower lung and right lung base which could reflect atelectasis with or without superimposed pneumonia.    Dictated by (CST): Guillermo Raymond MD on 9/17/2024 at 12:17 PM     Finalized by (CST): Guillermo Raymond MD on 9/17/2024 at 12:18 PM               Pro-Calcitonin  No results for input(s): \"PCT\" in the last 168 hours.    Cardiac  No results for input(s): \"TROP\", \"PBNP\" in the last 168 hours.    Imaging: Imaging data reviewed in [facility].    XR CHEST AP PORTABLE  (CPT=71045)    Result Date: 9/17/2024  CONCLUSION:   New opacities in the left lower lung and right lung base which could reflect atelectasis with or without superimposed pneumonia.    Dictated by (CST): Guillermo Raymond MD on 9/17/2024 at 12:17 PM     Finalized by (CST): Guillermo Raymond MD on 9/17/2024 at 12:18 PM              Medications:    buprenorphine  8 mg Sublingual TID    apixaban  5 mg Oral BID    pregabalin  50 mg Oral BID    dilTIAZem ER  240 mg Oral Daily    furosemide  20 mg Oral Daily    hydrALAZINE  25 mg Oral BID    atorvastatin  40 mg Oral Nightly    isosorbide  mononitrate ER  60 mg Oral Daily    naloxone  0.4 mg Intravenous Once       Assessment & Plan:   ASSESSMENT / PLAN:     78 yo Acute encephalopathy.  Suspect related to narcotic medications.  - hold off all full agonists  - Using Percocet more than Rx recommends, more than 6 per day  - has had 8 admissions to hospital this year alone, mostly for change in mental status and falls related to opiates  - does not have an Rx for naloxone or a filled prescription for Narcan at home  - has never tried buprenorphine  - Would be an excellent candidate to try TID dosing of Bup for chronic pain.  This would eliminate full agonist overdose risk and keep her safe.  - need Narcan Rx at discharge  - called her pain provider Rosey BENSON to discuss this plan. D/W Rosey care plan and consult for pain management team today.   - will increase bup to 8 mg TID today. Can go up as needed to as much as 16 mg TID.  Higher doses of 16 mg TID have been reported in the literature.  This is a medication that is well tolerated and does not cause respiratory depression like full agonist therapy so you CAN NOT OVERDOSE ON BUP.    - PRN bup 8 mg qday for break through pain  -Patient initially agreeable for Suboxone clinic on further discussion today she wants to continue to follow-up with the pain clinic and states that she will not take more pain medications than is prescribed.  -MRI head not needed.  Had on 3/2024 for same presentation.      SVT, mod aortic stenosis, pHTN  - on dilt 240 and Imdur  - apprec EP recs     Hx DVT  - reports she is on eliquis  - continue    CHF with diastolic dysfuction  -lasix daily.  -Plan extra dose of IV Lasix today     PNA - c/w aspiration when vomited  - on unasyn but will stop today.  Current evidence base recommendation don't support abx therapy changing outcomes in uncomplicated aspiration pneumonia.   - stop vanco and remove rectal tube.  Only 100 ml out last night. She can use BR.   - OOB to chair,  walk.     Hypoxic resp failure 2/2 to above  - recheck CXR today new opacities representing ateletctasis vs pna.  - she is afebrile and normal WBC.  Will observe off abx.   - incentive Spirometry     Falls  -Counseled on appropriate use of narcotics     CKD stage IV  - avoid nephrotoxins  - follow labs closely    dvt prophylaxis: eliquis  code status: full code  dispo: home tomorrow    I personally reviewed the available laboratories, imaging including cxr, labs. I discussed/will discuss the case with nurse and patient, will call son. I ordered laboratories, studies including cxr. I adjusted medications including bup/vanc/abx/lyrica. Medical decision making high, risk is high.    >55min spent, >50% spent counseling and coordinating care in the form of educating pt/family and d/w consultants and staff. Most of the time spent discussing the above plan.     Estimated date of discharge: 1 day  Discharge is dependent on: pain control and tolerating bup. Await pain consult input.   At this point Ms. Boss is expected to be discharge to: home    Plan of care discussed with patient    Timoteo Tavarez MD, FAAP, FACP  [facility] Hospitalist  I respond to [employer] and [employer]

## 2024-09-18 NOTE — PLAN OF CARE
Problem: Patient Centered Care  Goal: Patient preferences are identified and integrated in the patient's plan of care  Description: Interventions:  - What would you like us to know as we care for you? Lives home alone  - Provide timely, complete, and accurate information to patient/family  - Incorporate patient and family knowledge, values, beliefs, and cultural backgrounds into the planning and delivery of care  - Encourage patient/family to participate in care and decision-making at the level they choose  - Honor patient and family perspectives and choices  Outcome: Progressing     Problem: Patient/Family Goals  Goal: Patient/Family Long Term Goal  Description: Patient's Long Term Goal: discharge from the hospital    Interventions:  - monitor vital signs   - monitor appropriate labs  - pain management   - administer medications per order  - follow MD orders  - diagnostics per order  - update and inform patient and family on plan of care  - discharge planning  - See additional Care Plan goals for specific interventions  Outcome: Progressing  Goal: Patient/Family Short Term Goal  Description: Patient's Short Term Goal: improve weakness    Interventions:   - monitor vital signs   - monitor appropriate labs  - pain management   - administer medications per order  - follow MD orders  - diagnostics per order  - update and inform patient and family on plan of care  - discharge planning  - See additional Care Plan goals for specific interventions  Outcome: Progressing     Problem: CARDIOVASCULAR - ADULT  Goal: Maintains optimal cardiac output and hemodynamic stability  Description: INTERVENTIONS:  - Monitor vital signs, rhythm, and trends  - Monitor for bleeding, hypotension and signs of decreased cardiac output  - Evaluate effectiveness of vasoactive medications to optimize hemodynamic stability  - Monitor arterial and/or venous puncture sites for bleeding and/or hematoma  - Assess quality of pulses, skin color and  temperature  - Assess for signs of decreased coronary artery perfusion - ex. Angina  - Evaluate fluid balance, assess for edema, trend weights  Outcome: Progressing  Goal: Absence of cardiac arrhythmias or at baseline  Description: INTERVENTIONS:  - Continuous cardiac monitoring, monitor vital signs, obtain 12 lead EKG if indicated  - Evaluate effectiveness of antiarrhythmic and heart rate control medications as ordered  - Initiate emergency measures for life threatening arrhythmias  - Monitor electrolytes and administer replacement therapy as ordered  Outcome: Progressing     Problem: RESPIRATORY - ADULT  Goal: Achieves optimal ventilation and oxygenation  Description: INTERVENTIONS:  - Assess for changes in respiratory status  - Assess for changes in mentation and behavior  - Position to facilitate oxygenation and minimize respiratory effort  - Oxygen supplementation based on oxygen saturation or ABGs  - Provide Smoking Cessation handout, if applicable  - Encourage broncho-pulmonary hygiene including cough, deep breathe, Incentive Spirometry  - Assess the need for suctioning and perform as needed  - Assess and instruct to report SOB or any respiratory difficulty  - Respiratory Therapy support as indicated  - Manage/alleviate anxiety  - Monitor for signs/symptoms of CO2 retention  Outcome: Progressing     Problem: GASTROINTESTINAL - ADULT  Goal: Minimal or absence of nausea and vomiting  Description: INTERVENTIONS:  - Maintain adequate hydration with IV or PO as ordered and tolerated  - Nasogastric tube to low intermittent suction as ordered  - Evaluate effectiveness of ordered antiemetic medications  - Provide nonpharmacologic comfort measures as appropriate  - Advance diet as tolerated, if ordered  - Obtain nutritional consult as needed  - Evaluate fluid balance  Outcome: Progressing  Goal: Maintains or returns to baseline bowel function  Description: INTERVENTIONS:  - Assess bowel function  - Maintain adequate  hydration with IV or PO as ordered and tolerated  - Evaluate effectiveness of GI medications  - Encourage mobilization and activity  - Obtain nutritional consult as needed  - Establish a toileting routine/schedule  - Consider collaborating with pharmacy to review patient's medication profile  Outcome: Progressing     Problem: METABOLIC/FLUID AND ELECTROLYTES - ADULT  Goal: Electrolytes maintained within normal limits  Description: INTERVENTIONS:  - Monitor labs and rhythm and assess patient for signs and symptoms of electrolyte imbalances  - Administer electrolyte replacement as ordered  - Monitor response to electrolyte replacements, including rhythm and repeat lab results as appropriate  - Fluid restriction as ordered  - Instruct patient on fluid and nutrition restrictions as appropriate  Outcome: Progressing  Goal: Hemodynamic stability and optimal renal function maintained  Description: INTERVENTIONS:  - Monitor labs and assess for signs and symptoms of volume excess or deficit  - Monitor intake, output and patient weight  - Monitor urine specific gravity, serum osmolarity and serum sodium as indicated or ordered  - Monitor response to interventions for patient's volume status, including labs, urine output, blood pressure (other measures as available)  - Encourage oral intake as appropriate  - Instruct patient on fluid and nutrition restrictions as appropriate  Outcome: Progressing     Problem: SKIN/TISSUE INTEGRITY - ADULT  Goal: Skin integrity remains intact  Description: INTERVENTIONS  - Assess and document risk factors for pressure ulcer development  - Assess and document skin integrity  - Monitor for areas of redness and/or skin breakdown  - Initiate interventions, skin care algorithm/standards of care as needed  Outcome: Progressing     Problem: PAIN - ADULT  Goal: Verbalizes/displays adequate comfort level or patient's stated pain goal  Description: INTERVENTIONS:  - Encourage pt to monitor pain and  request assistance  - Assess pain using appropriate pain scale  - Administer analgesics based on type and severity of pain and evaluate response  - Implement non-pharmacological measures as appropriate and evaluate response  - Consider cultural and social influences on pain and pain management  - Manage/alleviate anxiety  - Utilize distraction and/or relaxation techniques  - Monitor for opioid side effects  - Notify MD/LIP if interventions unsuccessful or patient reports new pain  - Anticipate increased pain with activity and pre-medicate as appropriate  Outcome: Progressing     Problem: SAFETY ADULT - FALL  Goal: Free from fall injury  Description: INTERVENTIONS:  - Assess pt frequently for physical needs  - Identify cognitive and physical deficits and behaviors that affect risk of falls.  - [place] fall precautions as indicated by assessment.  - Educate pt/family on patient safety including physical limitations  - Instruct pt to call for assistance with activity based on assessment  - Modify environment to reduce risk of injury  - Provide assistive devices as appropriate  - Consider OT/PT consult to assist with strengthening/mobility  - Encourage toileting schedule  Outcome: Progressing     Problem: DISCHARGE PLANNING  Goal: Discharge to home or other facility with appropriate resources  Description: INTERVENTIONS:  - Identify barriers to discharge w/pt and caregiver  - Include patient/family/discharge partner in discharge planning  - Arrange for needed discharge resources and transportation as appropriate  - Identify discharge learning needs (meds, wound care, etc)  - Arrange for interpreters to assist at discharge as needed  - Consider post-discharge preferences of patient/family/discharge partner  - Complete POLST form as appropriate  - Assess patient's ability to be responsible for managing their own health  - Refer to Case Management Department for coordinating discharge planning if the patient needs  post-hospital services based on physician/LIP order or complex needs related to functional status, cognitive ability or social support system  Outcome: Progressing     Problem: HEMATOLOGIC - ADULT  Goal: Free from bleeding injury  Description: (Example usage: patient with low platelets)  INTERVENTIONS:  - Avoid intramuscular injections, enemas and rectal medication administration  - Ensure safe mobilization of patient  - Hold pressure on venipuncture sites to achieve adequate hemostasis  - Assess for signs and symptoms of internal bleeding  - Monitor lab trends  - Patient is to report abnormal signs of bleeding to staff  - Avoid use of toothpicks and dental floss  - Use electric shaver for shaving  - Use soft bristle tooth brush  - Limit straining and forceful nose blowing  Outcome: Progressing     Problem: MUSCULOSKELETAL - ADULT  Goal: Return mobility to safest level of function  Description: INTERVENTIONS:  - Assess patient stability and activity tolerance for standing, transferring and ambulating w/ or w/o assistive devices  - Assist with transfers and ambulation using safe patient handling equipment as needed  - Ensure adequate protection for wounds/incisions during mobilization  - Obtain PT/OT consults as needed  - Advance activity as appropriate  - Communicate ordered activity level and limitations with patient/family  Outcome: Progressing     Problem: Impaired Functional Mobility  Goal: Achieve highest/safest level of mobility/gait  Description: Interventions:  - Assess patient's functional ability and stability  - Promote increasing activity/tolerance for mobility and gait  - Educate and engage patient/family in tolerated activity level and precautions  - Recommend use of sit-stand lift for transfers  - Recommend use of total lift for transfers  - Recommend use of  RW for transfers and ambulation  - Recommend patient transfer to bedside chair toward strongest side  - When transferring patient, block weaker  knee for safety  Outcome: Progressing

## 2024-09-18 NOTE — PLAN OF CARE
Patient alert, oriented, forgetful at times, up with assistance, pt on general diet, good appetite, no BM today, plan to discharge home tomorrow.  Problem: Patient Centered Care  Goal: Patient preferences are identified and integrated in the patient's plan of care  Description: Interventions:  - What would you like us to know as we care for you? Lives home alone  - Provide timely, complete, and accurate information to patient/family  - Incorporate patient and family knowledge, values, beliefs, and cultural backgrounds into the planning and delivery of care  - Encourage patient/family to participate in care and decision-making at the level they choose  - Honor patient and family perspectives and choices  Outcome: Progressing     Problem: Patient/Family Goals  Goal: Patient/Family Long Term Goal  Description: Patient's Long Term Goal: discharge from the hospital    Interventions:  - monitor vital signs   - monitor appropriate labs  - pain management   - administer medications per order  - follow MD orders  - diagnostics per order  - update and inform patient and family on plan of care  - discharge planning  - See additional Care Plan goals for specific interventions  Outcome: Progressing  Goal: Patient/Family Short Term Goal  Description: Patient's Short Term Goal: improve weakness    Interventions:   - monitor vital signs   - monitor appropriate labs  - pain management   - administer medications per order  - follow MD orders  - diagnostics per order  - update and inform patient and family on plan of care  - discharge planning  - See additional Care Plan goals for specific interventions  Outcome: Progressing     Problem: GASTROINTESTINAL - ADULT  Goal: Minimal or absence of nausea and vomiting  Description: INTERVENTIONS:  - Maintain adequate hydration with IV or PO as ordered and tolerated  - Nasogastric tube to low intermittent suction as ordered  - Evaluate effectiveness of ordered antiemetic medications  - Provide  nonpharmacologic comfort measures as appropriate  - Advance diet as tolerated, if ordered  - Obtain nutritional consult as needed  - Evaluate fluid balance  Outcome: Progressing  Goal: Maintains or returns to baseline bowel function  Description: INTERVENTIONS:  - Assess bowel function  - Maintain adequate hydration with IV or PO as ordered and tolerated  - Evaluate effectiveness of GI medications  - Encourage mobilization and activity  - Obtain nutritional consult as needed  - Establish a toileting routine/schedule  - Consider collaborating with pharmacy to review patient's medication profile  Outcome: Progressing

## 2024-09-18 NOTE — DISCHARGE INSTRUCTIONS
Home with home health care  We are switching you from that to buprenorphine.  We are stopping home Valium.  Please follow-up with pain service next week to establish care with Dr. Contreras.   Follow-up with your PCP and nephrologist per routine  Follow-up with resources from psychiatry Kevon Garcia for buprenorphine medicine management in addition to seeing Dr. Contreras  Complete antibiotics as prescribed, for aspiration pneumonia.  Monitor bowel movement take sennakot for constipation as needed    [employer], [employer]  Phone: [phone]  Fax: [phone]    [facility] is providing your oxygen. Please call as soon as you get home.  P:[phone]  F:[phone]        [facility]   [email or web address] [address] [place] [address]   [phone]     [facility]   [email or web address], [address] [place], [place] [address] [phone]

## 2024-09-18 NOTE — PLAN OF CARE
Patient A/Ox4. 1L o2. Remote tele in place. Renal diet tolerated well. IV Abx DC. Flexiseal DC. Ambulates 1 assist with walker. Buprenorphine for pain scheduled and PRN. Call light with a reach.   Problem: Patient Centered Care  Goal: Patient preferences are identified and integrated in the patient's plan of care  Description: Interventions:  - What would you like us to know as we care for you? Lives home alone  - Provide timely, complete, and accurate information to patient/family  - Incorporate patient and family knowledge, values, beliefs, and cultural backgrounds into the planning and delivery of care  - Encourage patient/family to participate in care and decision-making at the level they choose  - Honor patient and family perspectives and choices  Outcome: Progressing

## 2024-09-19 ENCOUNTER — APPOINTMENT (OUTPATIENT)
Dept: GENERAL RADIOLOGY | Facility: HOSPITAL | Age: 79
End: 2024-09-19
Attending: HOSPITALIST
Payer: MEDICARE

## 2024-09-19 VITALS
SYSTOLIC BLOOD PRESSURE: 135 MMHG | TEMPERATURE: 98 F | HEART RATE: 78 BPM | HEIGHT: 64 IN | BODY MASS INDEX: 30.71 KG/M2 | DIASTOLIC BLOOD PRESSURE: 58 MMHG | WEIGHT: 179.88 LBS | RESPIRATION RATE: 18 BRPM | OXYGEN SATURATION: 93 %

## 2024-09-19 LAB
ANION GAP SERPL CALC-SCNC: 5 MMOL/L (ref 0–18)
BASOPHILS # BLD AUTO: 0.05 X10(3) UL (ref 0–0.2)
BASOPHILS NFR BLD AUTO: 0.7 %
BUN BLD-MCNC: 39 MG/DL (ref 9–23)
BUN/CREAT SERPL: 20.4 (ref 10–20)
CALCIUM BLD-MCNC: 8.4 MG/DL (ref 8.7–10.4)
CHLORIDE SERPL-SCNC: 111 MMOL/L (ref 98–112)
CO2 SERPL-SCNC: 27 MMOL/L (ref 21–32)
CREAT BLD-MCNC: 1.91 MG/DL
DEPRECATED RDW RBC AUTO: 50.7 FL (ref 35.1–46.3)
EGFRCR SERPLBLD CKD-EPI 2021: 27 ML/MIN/1.73M2 (ref 60–?)
EOSINOPHIL # BLD AUTO: 0.32 X10(3) UL (ref 0–0.7)
EOSINOPHIL NFR BLD AUTO: 4.7 %
ERYTHROCYTE [DISTWIDTH] IN BLOOD BY AUTOMATED COUNT: 15.1 % (ref 11–15)
GLUCOSE BLD-MCNC: 145 MG/DL (ref 70–99)
HCT VFR BLD AUTO: 23.6 %
HGB BLD-MCNC: 7.4 G/DL
IMM GRANULOCYTES # BLD AUTO: 0.02 X10(3) UL (ref 0–1)
IMM GRANULOCYTES NFR BLD: 0.3 %
IRON SATN MFR SERPL: 8 %
IRON SERPL-MCNC: 30 UG/DL
LYMPHOCYTES # BLD AUTO: 1.06 X10(3) UL (ref 1–4)
LYMPHOCYTES NFR BLD AUTO: 15.7 %
MAGNESIUM SERPL-MCNC: 1.8 MG/DL (ref 1.6–2.6)
MCH RBC QN AUTO: 28.9 PG (ref 26–34)
MCHC RBC AUTO-ENTMCNC: 31.4 G/DL (ref 31–37)
MCV RBC AUTO: 92.2 FL
MONOCYTES # BLD AUTO: 0.51 X10(3) UL (ref 0.1–1)
MONOCYTES NFR BLD AUTO: 7.6 %
NEUTROPHILS # BLD AUTO: 4.79 X10 (3) UL (ref 1.5–7.7)
NEUTROPHILS # BLD AUTO: 4.79 X10(3) UL (ref 1.5–7.7)
NEUTROPHILS NFR BLD AUTO: 71 %
OSMOLALITY SERPL CALC.SUM OF ELEC: 308 MOSM/KG (ref 275–295)
PLATELET # BLD AUTO: 169 10(3)UL (ref 150–450)
POTASSIUM SERPL-SCNC: 4.2 MMOL/L (ref 3.5–5.1)
RBC # BLD AUTO: 2.56 X10(6)UL
SODIUM SERPL-SCNC: 143 MMOL/L (ref 136–145)
TIBC SERPL-MCNC: 386 UG/DL (ref 250–425)
TRANSFERRIN SERPL-MCNC: 259 MG/DL (ref 250–380)
WBC # BLD AUTO: 6.8 X10(3) UL (ref 4–11)

## 2024-09-19 PROCEDURE — 71045 X-RAY EXAM CHEST 1 VIEW: CPT | Performed by: HOSPITALIST

## 2024-09-19 PROCEDURE — 99239 HOSP IP/OBS DSCHRG MGMT >30: CPT | Performed by: HOSPITALIST

## 2024-09-19 RX ORDER — BUPRENORPHINE 8 MG/1
8 TABLET SUBLINGUAL 3 TIMES DAILY
Qty: 42 TABLET | Refills: 0 | Status: SHIPPED | OUTPATIENT
Start: 2024-09-19 | End: 2024-10-03

## 2024-09-19 RX ORDER — SENNA AND DOCUSATE SODIUM 50; 8.6 MG/1; MG/1
1 TABLET, FILM COATED ORAL DAILY
Qty: 30 TABLET | Refills: 0 | Status: SHIPPED | OUTPATIENT
Start: 2024-09-19 | End: 2024-10-19

## 2024-09-19 RX ORDER — FERROUS SULFATE 325(65) MG
325 TABLET, DELAYED RELEASE (ENTERIC COATED) ORAL
Qty: 30 TABLET | Refills: 0 | Status: SHIPPED | OUTPATIENT
Start: 2024-09-19

## 2024-09-19 RX ORDER — BUPRENORPHINE 8 MG/1
8 TABLET SUBLINGUAL DAILY PRN
Qty: 7 TABLET | Refills: 0 | Status: SHIPPED | OUTPATIENT
Start: 2024-09-19

## 2024-09-19 RX ORDER — MAGNESIUM OXIDE 400 MG/1
400 TABLET ORAL ONCE
Status: COMPLETED | OUTPATIENT
Start: 2024-09-19 | End: 2024-09-19

## 2024-09-19 RX ORDER — AMOXICILLIN AND CLAVULANATE POTASSIUM 500; 125 MG/1; MG/1
500 TABLET, FILM COATED ORAL EVERY 12 HOURS SCHEDULED
Qty: 10 TABLET | Refills: 0 | Status: SHIPPED | OUTPATIENT
Start: 2024-09-19

## 2024-09-19 RX ORDER — AMOXICILLIN AND CLAVULANATE POTASSIUM 500; 125 MG/1; MG/1
500 TABLET, FILM COATED ORAL EVERY 12 HOURS SCHEDULED
Status: DISCONTINUED | OUTPATIENT
Start: 2024-09-19 | End: 2024-09-19

## 2024-09-19 NOTE — PLAN OF CARE
Patient's O2 sat on room air is 92% at rest. Pt's O2 sat on room is 86% when ambulating, and 93% on 2 liters while ambulating.

## 2024-09-19 NOTE — CM/SW NOTE
Pt is anticipated to discharge home today. Patient does not have oxygen at home at this time. RN is is to do walk test below, and MD is to place verbiage for oxygen in their notes     Documentation for O2 sats: (RN to add to progress note)  Patient's O2 sat on room air is ____% at rest. Pt's O2 sat on room is ____% when ambulating, and ____% on ____ liter while ambulating.     (Reminder: The \"at rest\" and \"while ambulating\" are required statements. If patient is non ambulatory you can use \"with exertion\" such as during a transfer to assess their O2 level)    MD Parrish  I had a face to face visit with Hannah, and I evaluated aHnnah's O2 saturations.  Hannah is mobile in her home and is in need of a portable oxygen tank.  The home oxygen will improve Hannah's condition.      107pm- SW was informed that patient needs home oxygen. DANIAL initiated referral to [facility] via aidin. DANIAL messaged MD to place verbiage in her note, and then sign the order.     112pm- DANIAL sent the referral via aidin to [facility].     159pm- SW received signed order and verbiage. DANIAL updated roslyn from [facility]. Son updated        09/19/24 1400   Discharge disposition   Expected discharge disposition Home or Self   Post Acute Care Provider   ([facility])   DME/Infusion Providers [facility]   Discharge transportation Private car       Plan:  DC to Home with [facility],    SW/CM to remain available for support and/or discharge planning.     Avelina Bueno, MSW, LSW   x [phone]

## 2024-09-19 NOTE — DISCHARGE SUMMARY
[facility]  part of [facility]    Discharge Summary    Hannah Boss Patient Status:  Inpatient    1945 MRN [id number]   Location [facility] 5SW/SE Attending Woodrow Justin MD   Hosp Day # 6 PCP Diana Lopez DO     Date of Admission: 2024 Disposition: Home Health Care Services     Date of Discharge: 24      Admitting Diagnosis: Contusion of right hip, initial encounter [S70.01XA]  Contusion of multiple sites of right shoulder, initial encounter [S40.011A]  Altered mental status, unspecified altered mental status type [R41.82]  Contusion of head, unspecified part of head, initial encounter [S00.93XA]    Hospital Discharge Diagnoses:  Accidental Opiod Overdose    Lace+ Score: 82  59-90 High Risk  29-58 Medium Risk  0-28   Low Risk.    TCM Follow-Up Recommendation:  LACE > 58: High Risk of readmission after discharge from the hospital.      Problem List:   Patient Active Problem List   Diagnosis    Back pain    L5-S1 left paracentral mild HNP, L4-5 right lateral recess & foraminal mod HNP, L3-4 right paracentral mild HNP    L5-S1 left mod foraminal, L3-4 bilateral mod foraminal, L2-3 mild-mod diffuse, L1-2 right mild foraminal bulging discs    L5-S1 right mod/left severe, L4-5 right mod-severe/left mild, L3-4 right mild-mod/left mod foraminal stenosis    Spinal stenosis of lumbar region at multiple levels    Degenerative arthritis    Leg weakness    Neurologic gait dysfunction    Cancer related pain    Acute on chronic heart failure with preserved ejection fraction (HFpEF) (HCC)    Pulmonary hypertension (HCC)    Stage 3 chronic kidney disease (HCC)    Acute renal failure (HCC)    Bilateral leg edema    Acute on chronic congestive heart failure, unspecified heart failure type (HCC)    Uncontrolled hypertension    Benign hypertension    Tobacco use disorder    Hyperkalemia    Elevated troponin    Transaminitis    Acute on chronic renal insufficiency    Acute cystitis  without hematuria    Chronic renal impairment    Chronic renal impairment, unspecified CKD stage    Dehydration    Weakness generalized    Lethargy    Urinary tract infection without hematuria, site unspecified    Other fatigue    Non-traumatic rhabdomyolysis    Abnormal LFTs    Altered mental status    Altered mental status, unspecified altered mental status type    Abdominal mass    Aortic stenosis    Nicotine dependence with current use    Constipation due to opioid therapy    Dyslipidemia    Dyspnea    Edema of extremities    Other chronic pain    Gastritis    Hypercholesteremia    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[facility])    Traumatic rhabdomyolysis, initial encounter ([facility])    Chest pain with high risk for cardiac etiology    Renal insufficiency    Pain in both feet    Bilateral swelling of feet    Exertional dyspnea    Deep vein thrombosis (DVT) of distal vein of left lower extremity, unspecified chronicity ([facility])    Aortic valvular disease    Blunt head trauma, initial encounter    Laceration of auricle of right ear, initial encounter    Weakness    Fall, initial encounter    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[facility])    Paraparesis ([facility])    Thoracic myelopathy    Lumbar spondylosis    SVT (supraventricular tachycardia) ([facility])    Chest pain    Dental infection    Anemia    Paraparesis of both lower limbs ([facility])    Acute renal failure (ARF) ([facility])    Acute kidney injury ([facility])    PSVT (paroxysmal supraventricular tachycardia) ([facility])    Sinus pause    Chest pain of uncertain etiology    DICK (acute kidney injury) ([facility])    Hypernatremia    Hyperglycemia    Acute heart failure ([facility])    Opiate or related narcotic overdose, undetermined intent, initial encounter ([facility])    Contusion of head, unspecified part of head, initial encounter    Contusion of multiple sites of right shoulder, initial encounter    Contusion of right hip, initial encounter    Encephalopathy       Reason for Admission:   Acute encephalopathy.  Suspect related to narcotic  medications.  SVT, mod aortic stenosis, pHTN  Hx DVT  CHF with diastolic dysfuction  Acute Hypoxicemic resp failure  PNA -  Hypoxic resp failure 2/2 to above       Falls  -Counseled on appropriate use of narcotics     CKD stage IV  - avoid nephrotoxins  - follow labs closely     Physical Exam:   General appearance: alert, appears stated age and cooperative  Pulmonary:  decreased at bases bilaterally  Cardiovascular: S1, S2 normal, no murmur, click, rub or gallop, regular rate and rhythm  Abdominal: soft, non-tender; bowel sounds normal; no masses,  no organomegaly  Extremities: extremities normal, atraumatic, no cyanosis or edema  Psychiatric: calm      History of Present Illness:     Hospital Course:   Acute encephalopathy.  Suspect related to narcotic medications.  - hold off all full agonists  - Using Percocet more than Rx recommends, more than 6 per day  - has had 8 admissions to hospital this year alone, mostly for change in mental status and falls related to opiates  - does not  Rx for naloxone   - has never tried buprenorphine  - Would be an excellent candidate to try TID dosing of Bup for chronic pain.  This would eliminate full agonist overdose risk and keep her safe.  -sent Narcan Rx at discharge  - called her pain provider Rosey BENSON to discuss this plan. D/W Rosey care plan and consult for pain management team today.   - will increase bup to 8 mg TID today. Can go up as needed to as much as 16 mg TID.  Higher doses of 16 mg TID have been reported in the literature.  This is a medication that is well tolerated and does not cause respiratory depression like full agonist therapy so you CAN NOT OVERDOSE ON BUP.    - PRN bup 8 mg qday for break through pain  -will follow-up with Dr. Contreras for further pain management with buprinorphine  -MRI head not needed.  Had on 3/2024 for same presentation.      SVT, mod aortic stenosis, pHTN  - on dilt 240 and Imdur  - apprec EP recs     Hx DVT  -  reports she is on eliquis  - continue     CHF with diastolic dysfuction  -lasix daily.  -Plan extra dose of IV Lasix today       Acute Hypoxicemic resp failure  PNA -  -plan augmentin on dc  -pulm hygiene   Hypoxic resp failure 2/2 to above  - recheck CXR today new opacities representing ateletctasis vs pna.  - she is afebrile and normal WBC.  Will observe off abx.   - incentive Spirometry   Patient's O2 sat on room air is 92 % at rest. Pt's O2 sat on room is 86% when ambulating, and 93% on 2 liter while ambulating.      I had a face to face visit with Hannah, and I evaluated Hannah's O2 saturations. Hannah is mobile in her home and is in need of a portable oxygen tank. The home oxygen will improve Hannah's condition.    Falls  -Counseled on appropriate use of narcotics     CKD stage IV  - avoid nephrotoxins  -op f/u  Consultations:   Pain service    Procedures: n/a    Complications: n/a    Discharge Condition: Good    Discharge Medications:      Discharge Medications        START taking these medications        Instructions Prescription details   amoxicillin clavulanate 500-125 MG Tabs  Commonly known as: Augmentin      Take 1 tablet (500 mg total) by mouth every 12 (twelve) hours.   Quantity: 10 tablet  Refills: 0     buprenorphine 8 MG Subl  Commonly known as: SUBUTEX      Place 1 tablet (8 mg total) under the tongue in the morning, at noon, and at bedtime for 14 days.   Stop taking on: October 3, 2024  Quantity: 42 tablet  Refills: 0     buprenorphine 8 MG Subl  Commonly known as: SUBUTEX      Place 1 tablet (8 mg total) under the tongue daily as needed (Acute pain).   Quantity: 7 tablet  Refills: 0     Naloxone HCl 4 MG/0.1ML Liqd      4 mg by Nasal route as needed. If patient remains unresponsive, repeat dose in other nostril 2-5 minutes after first dose.   Quantity: 1 kit  Refills: 0     senna-docusate 8.6-50 MG Tabs  Commonly known as: Senokot-S      Take 1 tablet by mouth daily.   Stop taking on: October  19, 2024  Quantity: 30 tablet  Refills: 0            CHANGE how you take these medications        Instructions Prescription details   Ferrous Sulfate 325 (65 Fe) MG Tabs  What changed: Another medication with the same name was changed. Make sure you understand how and when to take each.      Take 1 tablet (325 mg total) by mouth Every Monday, Wednesday, and Friday.   Quantity: 20 tablet  Refills: 2     ferrous sulfate 325 (65 FE) MG Tbec  What changed: additional instructions      Take 1 tablet (325 mg total) by mouth daily with breakfast.   Quantity: 30 tablet  Refills: 0            CONTINUE taking these medications        Instructions Prescription details   atorvastatin 40 MG Tabs  Commonly known as: Lipitor      Take 1 tablet (40 mg total) by mouth nightly.   Refills: 0     cholecalciferol 50 MCG (2000 UT) Tabs  Commonly known as: Vitamin D3      Take 1 tablet (2,000 Units total) by mouth daily.   Refills: 0     dilTIAZem  MG Cp24  Commonly known as: CardIZEM CD      Take 1 capsule (240 mg total) by mouth daily.   Refills: 0     Eliquis 5 MG Tabs  Generic drug: apixaban      Take 1 tablet (5 mg total) by mouth 2 (two) times daily.   Refills: 0     furosemide 20 MG Tabs  Commonly known as: Lasix      Take 1 tablet (20 mg total) by mouth daily.   Refills: 0     hydrALAZINE 25 MG Tabs  Commonly known as: Apresoline      Take 1 tablet (25 mg total) by mouth in the morning and 1 tablet (25 mg total) before bedtime.   Quantity: 60 tablet  Refills: 0     isosorbide mononitrate ER 60 MG Tb24  Commonly known as: Imdur      Take 1 tablet (60 mg total) by mouth daily.   Refills: 0     magnesium oxide 400 MG Tabs  Commonly known as: Mag-Ox      Take 1 tablet (400 mg total) by mouth daily.   Quantity: 30 tablet  Refills: 0     meclizine 25 MG Tabs  Commonly known as: Antivert      Take 1 tablet (25 mg total) by mouth 3 (three) times daily as needed for Dizziness or Nausea.   Refills: 0     pantoprazole 40 MG  Tbec  Commonly known as: Protonix      Take 1 tablet (40 mg total) by mouth 2 (two) times daily before meals.   Refills: 0     patiromer 8.4 g Pack  Commonly known as: Veltassa      Take 1 packet (8.4 g total) by mouth every 7 days.   Stop taking on: October 2, 2024  Quantity: 4 packet  Refills: 2     pregabalin 50 MG Caps  Commonly known as: Lyrica      TAKE 1 CAPSULE BY MOUTH 2 TIMES DAILY.   Quantity: 60 capsule  Refills: 0     trolamine salicyliate 10 % Crea      Apply to painful joints or muscles up to three times a day as needed   Quantity: 35 g  Refills: 0            STOP taking these medications      diazePAM 10 MG Tabs  Commonly known as: Valium        oxyCODONE-acetaminophen  MG Tabs  Commonly known as: Percocet                  Where to Get Your Medications        These medications were sent to [facility] [address] [place] [facility] [address] [phone], [phone]  [address] [address]      Phone: [phone]   amoxicillin clavulanate 500-125 MG Tabs  buprenorphine 8 MG Subl  buprenorphine 8 MG Subl  ferrous sulfate 325 (65 FE) MG Tbec  Naloxone HCl 4 MG/0.1ML Liqd  senna-docusate 8.6-50 MG Tabs         Follow up Visits: Follow-up with pcp in 1 week    Follow up Labs: n/a     Other Discharge Instructions: follow-up with pain service, pcp and nephrology    GODWIN AVILEZ MD  9/19/2024  11:57 AM    > 35 min

## 2024-09-19 NOTE — PLAN OF CARE
Problem: Patient Centered Care  Goal: Patient preferences are identified and integrated in the patient's plan of care  Description: Interventions:  - What would you like us to know as we care for you? Lives home alone  - Provide timely, complete, and accurate information to patient/family  - Incorporate patient and family knowledge, values, beliefs, and cultural backgrounds into the planning and delivery of care  - Encourage patient/family to participate in care and decision-making at the level they choose  - Honor patient and family perspectives and choices  Outcome: Progressing     Problem: Patient/Family Goals  Goal: Patient/Family Long Term Goal  Description: Patient's Long Term Goal: discharge from the hospital    Interventions:  - monitor vital signs   - monitor appropriate labs  - pain management   - administer medications per order  - follow MD orders  - diagnostics per order  - update and inform patient and family on plan of care  - discharge planning  - See additional Care Plan goals for specific interventions  Outcome: Progressing  Goal: Patient/Family Short Term Goal  Description: Patient's Short Term Goal: improve weakness    Interventions:   - monitor vital signs   - monitor appropriate labs  - pain management   - administer medications per order  - follow MD orders  - diagnostics per order  - update and inform patient and family on plan of care  - discharge planning  - See additional Care Plan goals for specific interventions  Outcome: Progressing     Problem: CARDIOVASCULAR - ADULT  Goal: Maintains optimal cardiac output and hemodynamic stability  Description: INTERVENTIONS:  - Monitor vital signs, rhythm, and trends  - Monitor for bleeding, hypotension and signs of decreased cardiac output  - Evaluate effectiveness of vasoactive medications to optimize hemodynamic stability  - Monitor arterial and/or venous puncture sites for bleeding and/or hematoma  - Assess quality of pulses, skin color and  temperature  - Assess for signs of decreased coronary artery perfusion - ex. Angina  - Evaluate fluid balance, assess for edema, trend weights  Outcome: Progressing  Goal: Absence of cardiac arrhythmias or at baseline  Description: INTERVENTIONS:  - Continuous cardiac monitoring, monitor vital signs, obtain 12 lead EKG if indicated  - Evaluate effectiveness of antiarrhythmic and heart rate control medications as ordered  - Initiate emergency measures for life threatening arrhythmias  - Monitor electrolytes and administer replacement therapy as ordered  Outcome: Progressing     Problem: RESPIRATORY - ADULT  Goal: Achieves optimal ventilation and oxygenation  Description: INTERVENTIONS:  - Assess for changes in respiratory status  - Assess for changes in mentation and behavior  - Position to facilitate oxygenation and minimize respiratory effort  - Oxygen supplementation based on oxygen saturation or ABGs  - Provide Smoking Cessation handout, if applicable  - Encourage broncho-pulmonary hygiene including cough, deep breathe, Incentive Spirometry  - Assess the need for suctioning and perform as needed  - Assess and instruct to report SOB or any respiratory difficulty  - Respiratory Therapy support as indicated  - Manage/alleviate anxiety  - Monitor for signs/symptoms of CO2 retention  Outcome: Progressing     Problem: GASTROINTESTINAL - ADULT  Goal: Minimal or absence of nausea and vomiting  Description: INTERVENTIONS:  - Maintain adequate hydration with IV or PO as ordered and tolerated  - Nasogastric tube to low intermittent suction as ordered  - Evaluate effectiveness of ordered antiemetic medications  - Provide nonpharmacologic comfort measures as appropriate  - Advance diet as tolerated, if ordered  - Obtain nutritional consult as needed  - Evaluate fluid balance  Outcome: Progressing  Goal: Maintains or returns to baseline bowel function  Description: INTERVENTIONS:  - Assess bowel function  - Maintain adequate  hydration with IV or PO as ordered and tolerated  - Evaluate effectiveness of GI medications  - Encourage mobilization and activity  - Obtain nutritional consult as needed  - Establish a toileting routine/schedule  - Consider collaborating with pharmacy to review patient's medication profile  Outcome: Progressing     Problem: METABOLIC/FLUID AND ELECTROLYTES - ADULT  Goal: Electrolytes maintained within normal limits  Description: INTERVENTIONS:  - Monitor labs and rhythm and assess patient for signs and symptoms of electrolyte imbalances  - Administer electrolyte replacement as ordered  - Monitor response to electrolyte replacements, including rhythm and repeat lab results as appropriate  - Fluid restriction as ordered  - Instruct patient on fluid and nutrition restrictions as appropriate  Outcome: Progressing  Goal: Hemodynamic stability and optimal renal function maintained  Description: INTERVENTIONS:  - Monitor labs and assess for signs and symptoms of volume excess or deficit  - Monitor intake, output and patient weight  - Monitor urine specific gravity, serum osmolarity and serum sodium as indicated or ordered  - Monitor response to interventions for patient's volume status, including labs, urine output, blood pressure (other measures as available)  - Encourage oral intake as appropriate  - Instruct patient on fluid and nutrition restrictions as appropriate  Outcome: Progressing     Problem: SKIN/TISSUE INTEGRITY - ADULT  Goal: Skin integrity remains intact  Description: INTERVENTIONS  - Assess and document risk factors for pressure ulcer development  - Assess and document skin integrity  - Monitor for areas of redness and/or skin breakdown  - Initiate interventions, skin care algorithm/standards of care as needed  Outcome: Progressing     Problem: PAIN - ADULT  Goal: Verbalizes/displays adequate comfort level or patient's stated pain goal  Description: INTERVENTIONS:  - Encourage pt to monitor pain and  request assistance  - Assess pain using appropriate pain scale  - Administer analgesics based on type and severity of pain and evaluate response  - Implement non-pharmacological measures as appropriate and evaluate response  - Consider cultural and social influences on pain and pain management  - Manage/alleviate anxiety  - Utilize distraction and/or relaxation techniques  - Monitor for opioid side effects  - Notify MD/LIP if interventions unsuccessful or patient reports new pain  - Anticipate increased pain with activity and pre-medicate as appropriate  Outcome: Progressing     Problem: SAFETY ADULT - FALL  Goal: Free from fall injury  Description: INTERVENTIONS:  - Assess pt frequently for physical needs  - Identify cognitive and physical deficits and behaviors that affect risk of falls.  - [place] fall precautions as indicated by assessment.  - Educate pt/family on patient safety including physical limitations  - Instruct pt to call for assistance with activity based on assessment  - Modify environment to reduce risk of injury  - Provide assistive devices as appropriate  - Consider OT/PT consult to assist with strengthening/mobility  - Encourage toileting schedule  Outcome: Progressing     Problem: DISCHARGE PLANNING  Goal: Discharge to home or other facility with appropriate resources  Description: INTERVENTIONS:  - Identify barriers to discharge w/pt and caregiver  - Include patient/family/discharge partner in discharge planning  - Arrange for needed discharge resources and transportation as appropriate  - Identify discharge learning needs (meds, wound care, etc)  - Arrange for interpreters to assist at discharge as needed  - Consider post-discharge preferences of patient/family/discharge partner  - Complete POLST form as appropriate  - Assess patient's ability to be responsible for managing their own health  - Refer to Case Management Department for coordinating discharge planning if the patient needs  post-hospital services based on physician/LIP order or complex needs related to functional status, cognitive ability or social support system  Outcome: Progressing     Problem: HEMATOLOGIC - ADULT  Goal: Free from bleeding injury  Description: (Example usage: patient with low platelets)  INTERVENTIONS:  - Avoid intramuscular injections, enemas and rectal medication administration  - Ensure safe mobilization of patient  - Hold pressure on venipuncture sites to achieve adequate hemostasis  - Assess for signs and symptoms of internal bleeding  - Monitor lab trends  - Patient is to report abnormal signs of bleeding to staff  - Avoid use of toothpicks and dental floss  - Use electric shaver for shaving  - Use soft bristle tooth brush  - Limit straining and forceful nose blowing  Outcome: Progressing     Problem: MUSCULOSKELETAL - ADULT  Goal: Return mobility to safest level of function  Description: INTERVENTIONS:  - Assess patient stability and activity tolerance for standing, transferring and ambulating w/ or w/o assistive devices  - Assist with transfers and ambulation using safe patient handling equipment as needed  - Ensure adequate protection for wounds/incisions during mobilization  - Obtain PT/OT consults as needed  - Advance activity as appropriate  - Communicate ordered activity level and limitations with patient/family  Outcome: Progressing     Problem: Impaired Functional Mobility  Goal: Achieve highest/safest level of mobility/gait  Description: Interventions:  - Assess patient's functional ability and stability  - Promote increasing activity/tolerance for mobility and gait  - Educate and engage patient/family in tolerated activity level and precautions  - Recommend use of sit-stand lift for transfers  - Recommend use of total lift for transfers  - Recommend use of  RW for transfers and ambulation  - Recommend patient transfer to bedside chair toward strongest side  - When transferring patient, block weaker  knee for safety  Outcome: Progressing

## 2024-09-19 NOTE — PLAN OF CARE
Patient is Aox4, vitals stable and meds given as ordered. Discharge orders in for patient. Went over AVS with patient and son at bedside. IV removed. Oxygen given and educated patient on its use. Patient left with son and took all belongings with her.   Problem: Patient Centered Care  Goal: Patient preferences are identified and integrated in the patient's plan of care  Description: Interventions:  - What would you like us to know as we care for you? Lives home alone  - Provide timely, complete, and accurate information to patient/family  - Incorporate patient and family knowledge, values, beliefs, and cultural backgrounds into the planning and delivery of care  - Encourage patient/family to participate in care and decision-making at the level they choose  - Honor patient and family perspectives and choices  Outcome: Progressing     Problem: Patient/Family Goals  Goal: Patient/Family Long Term Goal  Description: Patient's Long Term Goal: discharge from the hospital    Interventions:  - monitor vital signs   - monitor appropriate labs  - pain management   - administer medications per order  - follow MD orders  - diagnostics per order  - update and inform patient and family on plan of care  - discharge planning  - See additional Care Plan goals for specific interventions  Outcome: Progressing  Goal: Patient/Family Short Term Goal  Description: Patient's Short Term Goal: improve weakness    Interventions:   - monitor vital signs   - monitor appropriate labs  - pain management   - administer medications per order  - follow MD orders  - diagnostics per order  - update and inform patient and family on plan of care  - discharge planning  - See additional Care Plan goals for specific interventions  Outcome: Progressing     Problem: CARDIOVASCULAR - ADULT  Goal: Maintains optimal cardiac output and hemodynamic stability  Description: INTERVENTIONS:  - Monitor vital signs, rhythm, and trends  - Monitor for bleeding,  hypotension and signs of decreased cardiac output  - Evaluate effectiveness of vasoactive medications to optimize hemodynamic stability  - Monitor arterial and/or venous puncture sites for bleeding and/or hematoma  - Assess quality of pulses, skin color and temperature  - Assess for signs of decreased coronary artery perfusion - ex. Angina  - Evaluate fluid balance, assess for edema, trend weights  Outcome: Progressing  Goal: Absence of cardiac arrhythmias or at baseline  Description: INTERVENTIONS:  - Continuous cardiac monitoring, monitor vital signs, obtain 12 lead EKG if indicated  - Evaluate effectiveness of antiarrhythmic and heart rate control medications as ordered  - Initiate emergency measures for life threatening arrhythmias  - Monitor electrolytes and administer replacement therapy as ordered  Outcome: Progressing     Problem: RESPIRATORY - ADULT  Goal: Achieves optimal ventilation and oxygenation  Description: INTERVENTIONS:  - Assess for changes in respiratory status  - Assess for changes in mentation and behavior  - Position to facilitate oxygenation and minimize respiratory effort  - Oxygen supplementation based on oxygen saturation or ABGs  - Provide Smoking Cessation handout, if applicable  - Encourage broncho-pulmonary hygiene including cough, deep breathe, Incentive Spirometry  - Assess the need for suctioning and perform as needed  - Assess and instruct to report SOB or any respiratory difficulty  - Respiratory Therapy support as indicated  - Manage/alleviate anxiety  - Monitor for signs/symptoms of CO2 retention  Outcome: Progressing     Problem: GASTROINTESTINAL - ADULT  Goal: Minimal or absence of nausea and vomiting  Description: INTERVENTIONS:  - Maintain adequate hydration with IV or PO as ordered and tolerated  - Nasogastric tube to low intermittent suction as ordered  - Evaluate effectiveness of ordered antiemetic medications  - Provide nonpharmacologic comfort measures as appropriate  -  Advance diet as tolerated, if ordered  - Obtain nutritional consult as needed  - Evaluate fluid balance  Outcome: Progressing  Goal: Maintains or returns to baseline bowel function  Description: INTERVENTIONS:  - Assess bowel function  - Maintain adequate hydration with IV or PO as ordered and tolerated  - Evaluate effectiveness of GI medications  - Encourage mobilization and activity  - Obtain nutritional consult as needed  - Establish a toileting routine/schedule  - Consider collaborating with pharmacy to review patient's medication profile  Outcome: Progressing     Problem: METABOLIC/FLUID AND ELECTROLYTES - ADULT  Goal: Electrolytes maintained within normal limits  Description: INTERVENTIONS:  - Monitor labs and rhythm and assess patient for signs and symptoms of electrolyte imbalances  - Administer electrolyte replacement as ordered  - Monitor response to electrolyte replacements, including rhythm and repeat lab results as appropriate  - Fluid restriction as ordered  - Instruct patient on fluid and nutrition restrictions as appropriate  Outcome: Progressing  Goal: Hemodynamic stability and optimal renal function maintained  Description: INTERVENTIONS:  - Monitor labs and assess for signs and symptoms of volume excess or deficit  - Monitor intake, output and patient weight  - Monitor urine specific gravity, serum osmolarity and serum sodium as indicated or ordered  - Monitor response to interventions for patient's volume status, including labs, urine output, blood pressure (other measures as available)  - Encourage oral intake as appropriate  - Instruct patient on fluid and nutrition restrictions as appropriate  Outcome: Progressing     Problem: SKIN/TISSUE INTEGRITY - ADULT  Goal: Skin integrity remains intact  Description: INTERVENTIONS  - Assess and document risk factors for pressure ulcer development  - Assess and document skin integrity  - Monitor for areas of redness and/or skin breakdown  - Initiate  interventions, skin care algorithm/standards of care as needed  Outcome: Progressing     Problem: PAIN - ADULT  Goal: Verbalizes/displays adequate comfort level or patient's stated pain goal  Description: INTERVENTIONS:  - Encourage pt to monitor pain and request assistance  - Assess pain using appropriate pain scale  - Administer analgesics based on type and severity of pain and evaluate response  - Implement non-pharmacological measures as appropriate and evaluate response  - Consider cultural and social influences on pain and pain management  - Manage/alleviate anxiety  - Utilize distraction and/or relaxation techniques  - Monitor for opioid side effects  - Notify MD/LIP if interventions unsuccessful or patient reports new pain  - Anticipate increased pain with activity and pre-medicate as appropriate  Outcome: Progressing     Problem: SAFETY ADULT - FALL  Goal: Free from fall injury  Description: INTERVENTIONS:  - Assess pt frequently for physical needs  - Identify cognitive and physical deficits and behaviors that affect risk of falls.  - [facility] fall precautions as indicated by assessment.  - Educate pt/family on patient safety including physical limitations  - Instruct pt to call for assistance with activity based on assessment  - Modify environment to reduce risk of injury  - Provide assistive devices as appropriate  - Consider OT/PT consult to assist with strengthening/mobility  - Encourage toileting schedule  Outcome: Progressing     Problem: DISCHARGE PLANNING  Goal: Discharge to home or other facility with appropriate resources  Description: INTERVENTIONS:  - Identify barriers to discharge w/pt and caregiver  - Include patient/family/discharge partner in discharge planning  - Arrange for needed discharge resources and transportation as appropriate  - Identify discharge learning needs (meds, wound care, etc)  - Arrange for interpreters to assist at discharge as needed  - Consider post-discharge  preferences of patient/family/discharge partner  - Complete POLST form as appropriate  - Assess patient's ability to be responsible for managing their own health  - Refer to Case Management Department for coordinating discharge planning if the patient needs post-hospital services based on physician/LIP order or complex needs related to functional status, cognitive ability or social support system  Outcome: Progressing     Problem: HEMATOLOGIC - ADULT  Goal: Free from bleeding injury  Description: (Example usage: patient with low platelets)  INTERVENTIONS:  - Avoid intramuscular injections, enemas and rectal medication administration  - Ensure safe mobilization of patient  - Hold pressure on venipuncture sites to achieve adequate hemostasis  - Assess for signs and symptoms of internal bleeding  - Monitor lab trends  - Patient is to report abnormal signs of bleeding to staff  - Avoid use of toothpicks and dental floss  - Use electric shaver for shaving  - Use soft bristle tooth brush  - Limit straining and forceful nose blowing  Outcome: Progressing     Problem: MUSCULOSKELETAL - ADULT  Goal: Return mobility to safest level of function  Description: INTERVENTIONS:  - Assess patient stability and activity tolerance for standing, transferring and ambulating w/ or w/o assistive devices  - Assist with transfers and ambulation using safe patient handling equipment as needed  - Ensure adequate protection for wounds/incisions during mobilization  - Obtain PT/OT consults as needed  - Advance activity as appropriate  - Communicate ordered activity level and limitations with patient/family  Outcome: Progressing     Problem: Impaired Functional Mobility  Goal: Achieve highest/safest level of mobility/gait  Description: Interventions:  - Assess patient's functional ability and stability  - Promote increasing activity/tolerance for mobility and gait  - Educate and engage patient/family in tolerated activity level and  precautions  - Recommend use of sit-stand lift for transfers  - Recommend use of total lift for transfers  - Recommend use of  RW for transfers and ambulation  - Recommend patient transfer to bedside chair toward strongest side  - When transferring patient, block weaker knee for safety  Outcome: Progressing

## 2024-09-19 NOTE — PHYSICAL THERAPY NOTE
PHYSICAL THERAPY TREATMENT NOTE - INPATIENT     Room Number: 555/555-A       Presenting Problem: Contusion head s/p fall  Co-Morbidities : Encephalopathy, DICK, Rhabdo    Problem List  Principal Problem:    Contusion of head, unspecified part of head, initial encounter  Active Problems:    Altered mental status, unspecified altered mental status type    Contusion of multiple sites of right shoulder, initial encounter    Contusion of right hip, initial encounter    Encephalopathy      PHYSICAL THERAPY ASSESSMENT   Patient demonstrates good  progress this session, goals  updated to reflect patient performance.      Patient is requiring supervision as a result of the following impairments: decreased functional strength, decreased endurance/aerobic capacity, impaired standing balance, impaired coordination, decreased muscular endurance, and medical status.     Patient continues to function below baseline with bed mobility, transfers, gait, stair negotiation, maintaining seated position, standing prolonged periods, and performing household tasks.  Next session anticipate patient to progress bed mobility, transfers, gait, stair negotiation, maintaining seated position, standing prolonged periods, and performing household tasks.  Physical Therapy will continue to follow patient for duration of hospitalization.    Patient continues to benefit from continued skilled PT services: at discharge to promote prior level of function and safety with additional support and return home with home health PT.    PLAN  PT Treatment Plan: Bed mobility;Body mechanics;Endurance;Energy conservation;Patient education;Gait training;Strengthening;Transfer training;Balance training  Frequency (Obs): 3-5x/week    SUBJECTIVE  I would prefer to go to a rehab facility then home but it sounds like I may be going home today.     OBJECTIVE  Precautions: Bed/chair alarm    WEIGHT BEARING RESTRICTION                PAIN ASSESSMENT   Ratin           BALANCE  Static Sitting: Good  Dynamic Sitting: Fair +  Static Standing: Fair -  Dynamic Standing: Fair -    ACTIVITY TOLERANCE  Pulse: 78                       O2 WALK       AM-PAC '6-Clicks' INPATIENT SHORT FORM - BASIC MOBILITY  How much difficulty does the patient currently have...  Patient Difficulty: Turning over in bed (including adjusting bedclothes, sheets and blankets)?: None   Patient Difficulty: Sitting down on and standing up from a chair with arms (e.g., wheelchair, bedside commode, etc.): None   Patient Difficulty: Moving from lying on back to sitting on the side of the bed?: None   How much help from another person does the patient currently need...   Help from Another: Moving to and from a bed to a chair (including a wheelchair)?: A Little   Help from Another: Need to walk in hospital room?: A Little   Help from Another: Climbing 3-5 steps with a railing?: A Little     AM-PAC Score:  Raw Score: 21   Approx Degree of Impairment: 28.97%   Standardized Score (AM-PAC Scale): 50.25   CMS Modifier (G-Code):     FUNCTIONAL ABILITY STATUS  Functional Mobility/Gait Assessment  Gait Assistance: Supervision  Distance (ft): 80  Assistive Device: Rolling walker  Pattern:  (decreased step lenth and jessica kyphotic posture)  Rolling: supervision  Supine to Sit: supervision  Sit to Supine: supervision  Sit to Stand: supervision    Skilled Therapy Provided: Pt ed with bed mobility and transfers with SBA with RW. Pt ed with gait progression 80' with RW with SBA with kyphotic posture and decreased step length. Pt ed with transfers back to bed with min A for LE therex in bed x 10 reps.     The patient's Approx Degree of Impairment: 28.97% has been calculated based on documentation in the [facility] '6 clicks' Inpatient Daily Activity Short Form.  Research supports that patients with this level of impairment may benefit from [facility] PT family assist.  Final disposition will be made by interdisciplinary medical  team.    THERAPEUTIC EXERCISES  Lower Extremity Ankle pumps  Heel slides  LAQ     Position Sitting & Standing       Patient End of Session: With  staff;Needs met;Call light within reach;In bed;RN aware of session/findings;All patient questions and concerns addressed;Alarm set    CURRENT GOALS   CURRENT GOALS   Goals to be met by: 9/30/2024  Patient Goal Patient's self-stated goal is: Return home    Goal #1 Patient is able to demonstrate supine - sit EOB @ level: independent      Goal #1   Current Status  SBA   Goal #2 Patient is able to demonstrate transfers Sit to/from Stand at assistance level: modified independent with cane - straight      Goal #2  Current Status  SBA with RW    Goal #3 Patient is able to ambulate 150 feet with assist device: cane - straight at assistance level: modified independent   Goal #3   Current Status CGA with RW 80' kyphotic posture decreased step length   Goal #4 Patient will negotiate 2 stairs/one curb w/ assistive device and supervision   Goal #4   Current Status  Ongoing   Goal #5 Patient to demonstrate independence with home activity/exercise instructions provided to patient in preparation for discharge.   Goal #5   Current Status  Ongoing     Gait Training: 15 minutes  Therapeutic Exercise: 10 minutes

## 2024-09-19 NOTE — PROGRESS NOTES
[facility] received order for Opiate resources. Met with pt and provided list of substance use treatment facilities that offer residential/IP/OP services. All facilities are located within 10 miles from pt. Pt instructed to call and schedule intake if interested in treatment. Pt verbalized understanding and had no further questions/concerns.

## 2024-09-20 NOTE — PAYOR COMM NOTE
--------------  DISCHARGE REVIEW    Payor: LAUREEN ARTEAGA O  Subscriber #:  [id number]  Authorization Number: [id number]    Admit date: 24  Admit time:  10:58 PM  Discharge Date: 2024  6:15 PM     Admitting Physician: Erich Pascual MD  Attending Physician:  No att. providers found  Primary Care Physician: Diana Lopez DO          Discharge Summary Notes        Discharge Summary signed by Woodrow Justin MD at 2024  1:49 PM       Author: Woodrow Justin MD Specialty: HOSPITALIST Author Type: Physician    Filed: 2024  1:49 PM Date of Service: 2024 11:57 AM Status: Addendum    : Woodrow Justin MD (Physician)    Related Notes: Original Note by Woodrow Justin MD (Physician) filed at 2024 12:55 PM         [facility]  part of [facility]    Discharge Summary    Hannah Boss Patient Status:  Inpatient    1945 MRN [id number]   Location [facility] 5SW/SE Attending Woodrow Justin MD   Hosp Day # 6 PCP Diana Lopez DO     Date of Admission: 2024 Disposition: Home Health Care Services     Date of Discharge: 24      Admitting Diagnosis: Contusion of right hip, initial encounter [S70.01XA]  Contusion of multiple sites of right shoulder, initial encounter [S40.011A]  Altered mental status, unspecified altered mental status type [R41.82]  Contusion of head, unspecified part of head, initial encounter [S00.93XA]    Hospital Discharge Diagnoses:  Accidental Opiod Overdose    Lace+ Score: 82  59-90 High Risk  29-58 Medium Risk  0-28   Low Risk.    TCM Follow-Up Recommendation:  LACE > 58: High Risk of readmission after discharge from the hospital.      Problem List:   Patient Active Problem List   Diagnosis    Back pain    L5-S1 left paracentral mild HNP, L4-5 right lateral recess & foraminal mod HNP, L3-4 right paracentral mild HNP    L5-S1 left mod foraminal, L3-4 bilateral mod foraminal, L2-3 mild-mod diffuse, L1-2 right mild foraminal  bulging discs    L5-S1 right mod/left severe, L4-5 right mod-severe/left mild, L3-4 right mild-mod/left mod foraminal stenosis    Spinal stenosis of lumbar region at multiple levels    Degenerative arthritis    Leg weakness    Neurologic gait dysfunction    Cancer related pain    Acute on chronic heart failure with preserved ejection fraction (HFpEF) ([facility])    Pulmonary hypertension ([facility])    Stage 3 chronic kidney disease (HCC)    Acute renal failure (HCC)    Bilateral leg edema    Acute on chronic congestive heart failure, unspecified heart failure type ([facility])    Uncontrolled hypertension    Benign hypertension    Tobacco use disorder    Hyperkalemia    Elevated troponin    Transaminitis    Acute on chronic renal insufficiency    Acute cystitis without hematuria    Chronic renal impairment    Chronic renal impairment, unspecified CKD stage    Dehydration    Weakness generalized    Lethargy    Urinary tract infection without hematuria, site unspecified    Other fatigue    Non-traumatic rhabdomyolysis    Abnormal LFTs    Altered mental status    Altered mental status, unspecified altered mental status type    Abdominal mass    Aortic stenosis    Nicotine dependence with current use    Constipation due to opioid therapy    Dyslipidemia    Dyspnea    Edema of extremities    Other chronic pain    Gastritis    Hypercholesteremia    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[facility])    Traumatic rhabdomyolysis, initial encounter ([facility])    Chest pain with high risk for cardiac etiology    Renal insufficiency    Pain in both feet    Bilateral swelling of feet    Exertional dyspnea    Deep vein thrombosis (DVT) of distal vein of left lower extremity, unspecified chronicity ([facility])    Aortic valvular disease    Blunt head trauma, initial encounter    Laceration of auricle of right ear, initial encounter    Weakness    Fall, initial encounter    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)    Chest pain    Dental infection    Anemia    Paraparesis of both lower limbs (HCC)    Acute renal failure (ARF) ([facility])    Acute kidney injury ([facility])    PSVT (paroxysmal supraventricular tachycardia) ([facility])    Sinus pause    Chest pain of uncertain etiology    DICK (acute kidney injury) ([facility])    Hypernatremia    Hyperglycemia    Acute heart failure ([facility])    Opiate or related narcotic overdose, undetermined intent, initial encounter ([facility])    Contusion of head, unspecified part of head, initial encounter    Contusion of multiple sites of right shoulder, initial encounter    Contusion of right hip, initial encounter    Encephalopathy       Reason for Admission:   Acute encephalopathy.  Suspect related to narcotic medications.  SVT, mod aortic stenosis, pHTN  Hx DVT  CHF with diastolic dysfuction  Acute Hypoxicemic resp failure  PNA -  Hypoxic resp failure 2/2 to above       Falls  -Counseled on appropriate use of narcotics     CKD stage IV  - avoid nephrotoxins  - follow labs closely     Physical Exam:   General appearance: alert, appears stated age and cooperative  Pulmonary:  decreased at bases bilaterally  Cardiovascular: S1, S2 normal, no murmur, click, rub or gallop, regular rate and rhythm  Abdominal: soft, non-tender; bowel sounds normal; no masses,  no organomegaly  Extremities: extremities normal, atraumatic, no cyanosis or edema  Psychiatric: calm      History of Present Illness:     Hospital Course:   Acute encephalopathy.  Suspect related to narcotic medications.  - hold off all full agonists  - Using Percocet more than Rx recommends, more than 6 per day  - has had 8 admissions to hospital this year alone, mostly for change in mental status and falls related to opiates  - does not  Rx for naloxone   - has never tried buprenorphine  - Would be an excellent candidate to try TID dosing of Bup for chronic pain.  This would eliminate full agonist overdose risk and keep  her safe.  -sent Narcan Rx at discharge  - called her pain provider Rosey BENSON to discuss this plan. D/W Rosey care plan and consult for pain management team today.   - will increase bup to 8 mg TID today. Can go up as needed to as much as 16 mg TID.  Higher doses of 16 mg TID have been reported in the literature.  This is a medication that is well tolerated and does not cause respiratory depression like full agonist therapy so you CAN NOT OVERDOSE ON BUP.    - PRN bup 8 mg qday for break through pain  -will follow-up with Dr. Contreras for further pain management with buprinorphine  -MRI head not needed.  Had on 3/2024 for same presentation.      SVT, mod aortic stenosis, pHTN  - on dilt 240 and Imdur  - apprec EP recs     Hx DVT  - reports she is on eliquis  - continue     CHF with diastolic dysfuction  -lasix daily.  -Plan extra dose of IV Lasix today       Acute Hypoxicemic resp failure  PNA -  -plan augmentin on dc  -pulm hygiene   Hypoxic resp failure 2/2 to above  - recheck CXR today new opacities representing ateletctasis vs pna.  - she is afebrile and normal WBC.  Will observe off abx.   - incentive Spirometry   Patient's O2 sat on room air is 92 % at rest. Pt's O2 sat on room is 86% when ambulating, and 93% on 2 liter while ambulating.      I had a face to face visit with Hannah, and I evaluated Hannah's O2 saturations. Hannah is mobile in her home and is in need of a portable oxygen tank. The home oxygen will improve Hannah's condition.    Falls  -Counseled on appropriate use of narcotics     CKD stage IV  - avoid nephrotoxins  -op f/u  Consultations:   Pain service    Procedures: n/a    Complications: n/a    Discharge Condition: Good    Discharge Medications:      Discharge Medications        START taking these medications        Instructions Prescription details   amoxicillin clavulanate 500-125 MG Tabs  Commonly known as: Augmentin      Take 1 tablet (500 mg total) by mouth every 12 (twelve)  hours.   Quantity: 10 tablet  Refills: 0     buprenorphine 8 MG Subl  Commonly known as: SUBUTEX      Place 1 tablet (8 mg total) under the tongue in the morning, at noon, and at bedtime for 14 days.   Stop taking on: October 3, 2024  Quantity: 42 tablet  Refills: 0     buprenorphine 8 MG Subl  Commonly known as: SUBUTEX      Place 1 tablet (8 mg total) under the tongue daily as needed (Acute pain).   Quantity: 7 tablet  Refills: 0     Naloxone HCl 4 MG/0.1ML Liqd      4 mg by Nasal route as needed. If patient remains unresponsive, repeat dose in other nostril 2-5 minutes after first dose.   Quantity: 1 kit  Refills: 0     senna-docusate 8.6-50 MG Tabs  Commonly known as: Senokot-S      Take 1 tablet by mouth daily.   Stop taking on: October 19, 2024  Quantity: 30 tablet  Refills: 0            CHANGE how you take these medications        Instructions Prescription details   Ferrous Sulfate 325 (65 Fe) MG Tabs  What changed: Another medication with the same name was changed. Make sure you understand how and when to take each.      Take 1 tablet (325 mg total) by mouth Every Monday, Wednesday, and Friday.   Quantity: 20 tablet  Refills: 2     ferrous sulfate 325 (65 FE) MG Tbec  What changed: additional instructions      Take 1 tablet (325 mg total) by mouth daily with breakfast.   Quantity: 30 tablet  Refills: 0            CONTINUE taking these medications        Instructions Prescription details   atorvastatin 40 MG Tabs  Commonly known as: Lipitor      Take 1 tablet (40 mg total) by mouth nightly.   Refills: 0     cholecalciferol 50 MCG (2000 UT) Tabs  Commonly known as: Vitamin D3      Take 1 tablet (2,000 Units total) by mouth daily.   Refills: 0     dilTIAZem  MG Cp24  Commonly known as: CardIZEM CD      Take 1 capsule (240 mg total) by mouth daily.   Refills: 0     Eliquis 5 MG Tabs  Generic drug: apixaban      Take 1 tablet (5 mg total) by mouth 2 (two) times daily.   Refills: 0     furosemide 20 MG  Tabs  Commonly known as: Lasix      Take 1 tablet (20 mg total) by mouth daily.   Refills: 0     hydrALAZINE 25 MG Tabs  Commonly known as: Apresoline      Take 1 tablet (25 mg total) by mouth in the morning and 1 tablet (25 mg total) before bedtime.   Quantity: 60 tablet  Refills: 0     isosorbide mononitrate ER 60 MG Tb24  Commonly known as: Imdur      Take 1 tablet (60 mg total) by mouth daily.   Refills: 0     magnesium oxide 400 MG Tabs  Commonly known as: Mag-Ox      Take 1 tablet (400 mg total) by mouth daily.   Quantity: 30 tablet  Refills: 0     meclizine 25 MG Tabs  Commonly known as: Antivert      Take 1 tablet (25 mg total) by mouth 3 (three) times daily as needed for Dizziness or Nausea.   Refills: 0     pantoprazole 40 MG Tbec  Commonly known as: Protonix      Take 1 tablet (40 mg total) by mouth 2 (two) times daily before meals.   Refills: 0     patiromer 8.4 g Pack  Commonly known as: Veltassa      Take 1 packet (8.4 g total) by mouth every 7 days.   Stop taking on: October 2, 2024  Quantity: 4 packet  Refills: 2     pregabalin 50 MG Caps  Commonly known as: Lyrica      TAKE 1 CAPSULE BY MOUTH 2 TIMES DAILY.   Quantity: 60 capsule  Refills: 0     trolamine salicyliate 10 % Crea      Apply to painful joints or muscles up to three times a day as needed   Quantity: 35 g  Refills: 0            STOP taking these medications      diazePAM 10 MG Tabs  Commonly known as: Valium        oxyCODONE-acetaminophen  MG Tabs  Commonly known as: Percocet                  Where to Get Your Medications        These medications were sent to [facility] [id number] [place], [place] - [address] [phone], [phone]  [address] [facility] [address]      Phone: [phone]   amoxicillin clavulanate 500-125 MG Tabs  buprenorphine 8 MG Subl  buprenorphine 8 MG Subl  ferrous sulfate 325 (65 FE) MG Tbec  Naloxone HCl 4 MG/0.1ML Liqd  senna-docusate 8.6-50 MG Tabs         Follow up Visits: Follow-up with pcp  in 1 week    Follow up Labs: n/a     Other Discharge Instructions: follow-up with pain service, pcp and nephrology    GODWIN AVILEZ MD  9/19/2024  11:57 AM    > 35 min       Electronically signed by Godwin Avilez MD on 9/19/2024  1:49 PM         REVIEWER COMMENTS

## 2024-10-01 DIAGNOSIS — M48.062 SPINAL STENOSIS OF LUMBAR REGION WITH NEUROGENIC CLAUDICATION: ICD-10-CM

## 2024-10-01 DIAGNOSIS — G62.9 NEUROPATHY: ICD-10-CM

## 2024-10-01 NOTE — TELEPHONE ENCOUNTER
Medication: Oxycodone-acetaminophen 10       Date of last refill: 8/31/24   [place] 8/31/24 - 13 day supply  Last 30 day supply on 8/20    Last office visit: 8/20/24  Due back to clinic per last office note:  10/20  Date next office visit scheduled:  none    Last URINE Screen: 4/25/24  Screen Results:  +Benzodiazepines, oxycodone    Narcotic Contract EXPIRATION date: 4/25/25    Last OV note recommendation:   ASSESSMENT/PLAN:   Hannah Boss is a 78 year old female, with history of chronic low back pain due to degenerative disc disease and facet arthritis causing stenosis, chronic abdominal pain with 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 Benedict at [facility]), and rheumatoid arthritis who returns for a medication evaluation.      -Continue Percocet 10/325 PO Q 4 hours PRN (max 5 tablets per day); will start weaning, 5 tablets max per day, she is ok with this plan  -stop gabapetin 300 at bedtime  -start lyrica 50mg po bid  -monitor BP pt is on lasix risk of  hypotension   -uds reviewed   -opioid contract reviewed       Discussed with patient the risks of opioid medications including but not limited to dependence, addiction, respiratory depression, and death.  Patient advised not to consume ETOH or operating heavy machinery or vehicles while taking this medication.  Patient verbalized understanding.  Informed patient that no refills will be given over the phone. Instructed patient she is responsible for medications and her refills. Patient verbalized understanding.      Pt will return to clinic for follow up before the next medication refill.   ELISA Medina

## 2024-10-02 RX ORDER — OXYCODONE AND ACETAMINOPHEN 10; 325 MG/1; MG/1
1-2 TABLET ORAL EVERY 4 HOURS PRN
Qty: 150 TABLET | Refills: 0 | OUTPATIENT
Start: 2024-10-02 | End: 2024-11-01

## 2024-10-04 ENCOUNTER — OFFICE VISIT (OUTPATIENT)
Dept: PAIN CLINIC | Facility: HOSPITAL | Age: 79
End: 2024-10-04
Attending: ANESTHESIOLOGY
Payer: MEDICARE

## 2024-10-04 VITALS — DIASTOLIC BLOOD PRESSURE: 64 MMHG | SYSTOLIC BLOOD PRESSURE: 188 MMHG

## 2024-10-04 DIAGNOSIS — M54.42 CHRONIC BILATERAL LOW BACK PAIN WITH BILATERAL SCIATICA: ICD-10-CM

## 2024-10-04 DIAGNOSIS — M47.816 LUMBAR FACET ARTHROPATHY: Primary | ICD-10-CM

## 2024-10-04 DIAGNOSIS — M54.41 CHRONIC BILATERAL LOW BACK PAIN WITH BILATERAL SCIATICA: ICD-10-CM

## 2024-10-04 DIAGNOSIS — M47.816 LUMBAR SPONDYLOSIS: ICD-10-CM

## 2024-10-04 DIAGNOSIS — G89.29 CHRONIC BILATERAL LOW BACK PAIN WITH BILATERAL SCIATICA: ICD-10-CM

## 2024-10-04 DIAGNOSIS — G62.9 NEUROPATHY: ICD-10-CM

## 2024-10-04 DIAGNOSIS — M48.061 SPINAL STENOSIS OF LUMBAR REGION AT MULTIPLE LEVELS: ICD-10-CM

## 2024-10-04 PROCEDURE — 99211 OFF/OP EST MAY X REQ PHY/QHP: CPT

## 2024-10-04 RX ORDER — PREGABALIN 25 MG/1
25 CAPSULE ORAL 2 TIMES DAILY
Qty: 60 CAPSULE | Refills: 2 | Status: SHIPPED | OUTPATIENT
Start: 2024-10-04 | End: 2025-01-02

## 2024-10-04 RX ORDER — OXYCODONE AND ACETAMINOPHEN 10; 325 MG/1; MG/1
1 TABLET ORAL EVERY 4 HOURS PRN
Qty: 120 TABLET | Refills: 0 | Status: SHIPPED | OUTPATIENT
Start: 2024-11-03 | End: 2024-12-03

## 2024-10-04 RX ORDER — OXYCODONE AND ACETAMINOPHEN 10; 325 MG/1; MG/1
1 TABLET ORAL EVERY 4 HOURS PRN
Qty: 120 TABLET | Refills: 0 | Status: SHIPPED | OUTPATIENT
Start: 2024-10-04 | End: 2024-11-03
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
## 2024-10-04 NOTE — PATIENT INSTRUCTIONS
Refill policies:    Allow 2-3 business days for refills; controlled substances may take longer.  Contact your pharmacy at least 5 days prior to running out of medication and have them send an electronic request or submit request through the “request refill” option in your [employer] account.  Refills are not addressed on weekends; covering physicians do not authorize routine medications on weekends.  No narcotics or controlled substances are refilled after noon on Fridays or by on call physicians.  By law, narcotics must be electronically prescribed.  A 30 day supply with no refills is the maximum allowed.  If your prescription is due for a refill, you may be due for a follow up appointment.  To best provide you care, patients receiving routine medications need to be seen at least once a year.  Patients receiving narcotic/controlled substance medications need to be seen at least once every 3 months.  In the event that your preferred pharmacy does not have the requested medication in stock (e.g. Backordered), it is your responsibility to find another pharmacy that has the requested medication available.  We will gladly send a new prescription to that pharmacy at your request.    Scheduling Tests:    If your physician has ordered radiology tests such as MRI or CT scans, please contact Central Scheduling at [phone] right away to schedule the test.  Once scheduled, the [facility] Centralized Referral Team will work with your insurance carrier to obtain pre-certification or prior authorization.  Depending on your insurance carrier, approval may take 3-10 days.  It is highly recommended patients assure they have received an authorization before having a test performed.  If test is done without insurance authorization, patient may be responsible for the entire amount billed.      Precertification and Prior Authorizations:  If your physician has recommended that you have a procedure or additional testing performed the [facility]  Centralized Referral Team will contact your insurance carrier to obtain pre-certification or prior authorization.    You are strongly encouraged to contact your insurance carrier to verify that your procedure/test has been approved and is a COVERED benefit.  Although the [facility] Centralized Referral Team does its due diligence, the insurance carrier gives the disclaimer that \"Although the procedure is authorized, this does not guarantee payment.\"    Ultimately the patient is responsible for payment.   Thank you for your understanding in this matter.  Paperwork Completion:  If you require FMLA or disability paperwork for your recovery, please make sure to either drop it off or have it faxed to our office at [phone]. Be sure the form has your name and date of birth on it.  The form will be faxed to our Forms Department and they will complete it for you.  There is a 25$ fee for all forms that need to be filled out.  Please be aware there is a 10-14 day turnaround time.  You will need to sign a release of information (KEYA) form if your paperwork does not come with one.  You may call the Forms Department with any questions at [phone].  Their fax number is [phone].

## 2024-10-04 NOTE — PROGRESS NOTES
Patient presents in office today with reported pain in shoulders,  back and legs     Current pain level reported = 8/10     Last reported dose of Percocet 09.19.24        Narcotic Contract renewal 04.25.25     Urine Drug screen 04.25.24     Has patient been flagged as fall risk:   Yes     TOP FALL PREVENTION TIPS     INSIDE YOUR HOME     KITCHEN:  Use non skid mats only.    Clean up spills as soon as they happen.  Keep objects that you use often within easy reach.  BATHROOM:  Install grab bars on the bathroom walls beside the tub, shower and toilet.  Use a non skid rubber mat in the tub/shower.  If you are unsteady on your feet you may want to use a shower chair/bench and a hand held shower head while bathing/showering.  BEDROOM:  Place light switches within reach of your bed and a night light between the bedroom and bathroom.  Get up slowly from lying down or sitting if you get dizzy.  Keep a working flashlight near your bed.  STAIRS:  Keep stairwells well lit with light switches at top and bottom.  Install sturdy handrails on both sides.  Make sure carpeting is secure.  FLOORS:  Remove all loose wires, cords and throw rugs.  Keep floors clear of clutter.  Make sure carpets and area rugs have skid proof backing.  Do not use slippery wax on bare floors.  Keep furniture in its accustomed place.   If you have pets, be careful that you don’t trip over them.     OUTSIDE SAFETY TIPS  Always wear good shoes with proper support and traction.  Always use hand rails on stairs and escalators.  Cover porch steps with gritty weather proof paint.  Pay attention to curbs and other changes in surfaces when out in the community.  Take care when walking on gravel or grassy surfaces.  Avoid walking on snowy or icy surfaces.  Use a cane or walker (indoors and out) if you are unsteady on your feet.

## 2024-10-22 ENCOUNTER — OFFICE VISIT (OUTPATIENT)
Facility: CLINIC | Age: 79
End: 2024-10-22

## 2024-10-22 VITALS — HEART RATE: 76 BPM | SYSTOLIC BLOOD PRESSURE: 171 MMHG | DIASTOLIC BLOOD PRESSURE: 71 MMHG

## 2024-10-22 DIAGNOSIS — E87.5 HYPERKALEMIA: ICD-10-CM

## 2024-10-22 DIAGNOSIS — R60.9 EDEMA, UNSPECIFIED TYPE: ICD-10-CM

## 2024-10-22 DIAGNOSIS — N25.81 SECONDARY HYPERPARATHYROIDISM OF RENAL ORIGIN (HCC): ICD-10-CM

## 2024-10-22 DIAGNOSIS — I12.9 HYPERTENSIVE KIDNEY DISEASE WITH STAGE 3 CHRONIC KIDNEY DISEASE, UNSPECIFIED WHETHER STAGE 3A OR 3B CKD (HCC): ICD-10-CM

## 2024-10-22 DIAGNOSIS — N18.30 STAGE 3 CHRONIC KIDNEY DISEASE, UNSPECIFIED WHETHER STAGE 3A OR 3B CKD (HCC): Primary | ICD-10-CM

## 2024-10-22 DIAGNOSIS — F17.200 TOBACCO DEPENDENCE: ICD-10-CM

## 2024-10-22 DIAGNOSIS — N18.30 HYPERTENSIVE KIDNEY DISEASE WITH STAGE 3 CHRONIC KIDNEY DISEASE, UNSPECIFIED WHETHER STAGE 3A OR 3B CKD (HCC): ICD-10-CM

## 2024-10-22 DIAGNOSIS — N18.30 ANEMIA DUE TO STAGE 3 CHRONIC KIDNEY DISEASE, UNSPECIFIED WHETHER STAGE 3A OR 3B CKD (HCC): ICD-10-CM

## 2024-10-22 DIAGNOSIS — E11.21 TYPE 2 DIABETES MELLITUS WITH NEPHROPATHY (HCC): ICD-10-CM

## 2024-10-22 DIAGNOSIS — I50.32 DIASTOLIC DYSFUNCTION WITH CHRONIC HEART FAILURE (HCC): ICD-10-CM

## 2024-10-22 DIAGNOSIS — D63.1 ANEMIA DUE TO STAGE 3 CHRONIC KIDNEY DISEASE, UNSPECIFIED WHETHER STAGE 3A OR 3B CKD (HCC): ICD-10-CM

## 2024-10-22 PROCEDURE — 3078F DIAST BP <80 MM HG: CPT | Performed by: INTERNAL MEDICINE

## 2024-10-22 PROCEDURE — 99214 OFFICE O/P EST MOD 30 MIN: CPT | Performed by: INTERNAL MEDICINE

## 2024-10-22 PROCEDURE — 3077F SYST BP >= 140 MM HG: CPT | Performed by: INTERNAL MEDICINE

## 2024-10-22 PROCEDURE — 1159F MED LIST DOCD IN RCRD: CPT | Performed by: INTERNAL MEDICINE

## 2024-10-22 PROCEDURE — 1160F RVW MEDS BY RX/DR IN RCRD: CPT | Performed by: INTERNAL MEDICINE

## 2024-10-22 RX ORDER — HYDRALAZINE HYDROCHLORIDE 25 MG/1
25 TABLET, FILM COATED ORAL 2 TIMES DAILY
Qty: 60 TABLET | Refills: 0 | Status: SHIPPED | OUTPATIENT
Start: 2024-10-22

## 2024-10-22 RX ORDER — FUROSEMIDE 40 MG/1
40 TABLET ORAL DAILY
Qty: 60 TABLET | Refills: 1 | Status: SHIPPED | OUTPATIENT
Start: 2024-10-22 | End: 2025-02-19

## 2024-10-22 NOTE — PATIENT INSTRUCTIONS
Start iron tabs daily  Increase lasix to 40 mg daily  Take BP medication regularly  and maintain a bp log  Low salt diet  Low potassium diet  Veltassa one packet weekly  Labs in 6 weeks and fu in 8 weeks
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
## 2024-10-23 ENCOUNTER — TELEPHONE (OUTPATIENT)
Dept: PAIN CLINIC | Facility: HOSPITAL | Age: 79
End: 2024-10-23

## 2024-10-23 NOTE — TELEPHONE ENCOUNTER
On 10/18 patient called the office requesting more medication d/t running out. Upon review, patient ran out of medication, that was prescribed for 30 days, on day 13/30. Let her know to refer to the ED if she is in uncontrollable pain since we cannot prescribe anything more over the phone, per APRN. Pt was told to that she needs to come into the office for a med evaluation. Let pt know that she is violating narcotic agreement that is in place for her safety and to continue patient care in our clinic. Pt verbalized understanding and stated that she will think about going to the ED if she has uncontrollable pain. This RN reiterated that she is to go to the ED if she has uncontrollable pain. Pt verbalized understanding.     Pt's son was also called to let him know of the situation. Pt's son verbalized understanding. (Verbal release on file)    Pt has appointment for 11/01 with Dr. Herman. Pt unable to come earlier d/t son not being able to d/t work.

## 2024-10-26 ENCOUNTER — APPOINTMENT (OUTPATIENT)
Dept: GENERAL RADIOLOGY | Facility: HOSPITAL | Age: 79
End: 2024-10-26
Attending: EMERGENCY MEDICINE
Payer: MEDICARE

## 2024-10-26 ENCOUNTER — HOSPITAL ENCOUNTER (EMERGENCY)
Facility: HOSPITAL | Age: 79
Discharge: HOME | End: 2024-10-26
Attending: EMERGENCY MEDICINE
Payer: MEDICARE

## 2024-10-26 ENCOUNTER — APPOINTMENT (OUTPATIENT)
Dept: CT IMAGING | Facility: HOSPITAL | Age: 79
End: 2024-10-26
Attending: EMERGENCY MEDICINE
Payer: MEDICARE

## 2024-10-26 VITALS
DIASTOLIC BLOOD PRESSURE: 69 MMHG | TEMPERATURE: 98 F | RESPIRATION RATE: 16 BRPM | SYSTOLIC BLOOD PRESSURE: 186 MMHG | OXYGEN SATURATION: 100 % | HEART RATE: 75 BPM

## 2024-10-26 DIAGNOSIS — S16.1XXA STRAIN OF NECK MUSCLE, INITIAL ENCOUNTER: ICD-10-CM

## 2024-10-26 DIAGNOSIS — S70.02XA CONTUSION OF LEFT HIP, INITIAL ENCOUNTER: ICD-10-CM
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
## 2024-10-26 LAB
ALBUMIN SERPL-MCNC: 4.5 G/DL (ref 3.2–4.8)
ALBUMIN/GLOB SERPL: 1.5 {RATIO} (ref 1–2)
ALP LIVER SERPL-CCNC: 219 U/L
ALT SERPL-CCNC: 11 U/L
AMPHET UR QL SCN: NEGATIVE
ANION GAP SERPL CALC-SCNC: 6 MMOL/L (ref 0–18)
AST SERPL-CCNC: 25 U/L (ref ?–34)
BARBITURATES UR QL SCN: NEGATIVE
BASOPHILS # BLD AUTO: 0.03 X10(3) UL (ref 0–0.2)
BASOPHILS NFR BLD AUTO: 0.4 %
BILIRUB SERPL-MCNC: 0.3 MG/DL (ref 0.2–1.1)
BILIRUB UR QL: NEGATIVE
BUN BLD-MCNC: 23 MG/DL (ref 9–23)
BUN/CREAT SERPL: 11.3 (ref 10–20)
CALCIUM BLD-MCNC: 9.7 MG/DL (ref 8.7–10.4)
CANNABINOIDS UR QL SCN: NEGATIVE
CHLORIDE SERPL-SCNC: 111 MMOL/L (ref 98–112)
CLARITY UR: CLEAR
CO2 SERPL-SCNC: 27 MMOL/L (ref 21–32)
COCAINE UR QL: NEGATIVE
COLOR UR: COLORLESS
CREAT BLD-MCNC: 2.04 MG/DL
CREAT UR-SCNC: 24.3 MG/DL
DEPRECATED RDW RBC AUTO: 46.9 FL (ref 35.1–46.3)
EGFRCR SERPLBLD CKD-EPI 2021: 25 ML/MIN/1.73M2 (ref 60–?)
EOSINOPHIL # BLD AUTO: 0.05 X10(3) UL (ref 0–0.7)
EOSINOPHIL NFR BLD AUTO: 0.7 %
ERYTHROCYTE [DISTWIDTH] IN BLOOD BY AUTOMATED COUNT: 14.3 % (ref 11–15)
ETHANOL SERPL-MCNC: <3 MG/DL (ref ?–3)
FENTANYL UR QL SCN: NEGATIVE
GLOBULIN PLAS-MCNC: 3 G/DL (ref 2–3.5)
GLUCOSE BLD-MCNC: 105 MG/DL (ref 70–99)
GLUCOSE UR-MCNC: NORMAL MG/DL
HCT VFR BLD AUTO: 31.6 %
HGB BLD-MCNC: 10.3 G/DL
IMM GRANULOCYTES # BLD AUTO: 0.02 X10(3) UL (ref 0–1)
IMM GRANULOCYTES NFR BLD: 0.3 %
KETONES UR-MCNC: NEGATIVE MG/DL
LEUKOCYTE ESTERASE UR QL STRIP.AUTO: 25
LYMPHOCYTES # BLD AUTO: 0.88 X10(3) UL (ref 1–4)
LYMPHOCYTES NFR BLD AUTO: 12.2 %
MCH RBC QN AUTO: 29.3 PG (ref 26–34)
MCHC RBC AUTO-ENTMCNC: 32.6 G/DL (ref 31–37)
MCV RBC AUTO: 90 FL
MDMA UR QL SCN: NEGATIVE
METHADONE UR QL SCN: NEGATIVE
MONOCYTES # BLD AUTO: 0.29 X10(3) UL (ref 0.1–1)
MONOCYTES NFR BLD AUTO: 4 %
NEUTROPHILS # BLD AUTO: 5.97 X10 (3) UL (ref 1.5–7.7)
NEUTROPHILS # BLD AUTO: 5.97 X10(3) UL (ref 1.5–7.7)
NEUTROPHILS NFR BLD AUTO: 82.4 %
NITRITE UR QL STRIP.AUTO: NEGATIVE
OPIATES UR QL SCN: NEGATIVE
OSMOLALITY SERPL CALC.SUM OF ELEC: 302 MOSM/KG (ref 275–295)
OXYCODONE UR QL SCN: NEGATIVE
PCP UR QL SCN: NEGATIVE
PH UR: 7.5 [PH] (ref 5–8)
PLATELET # BLD AUTO: 222 10(3)UL (ref 150–450)
POTASSIUM SERPL-SCNC: 4.3 MMOL/L (ref 3.5–5.1)
PROT SERPL-MCNC: 7.5 G/DL (ref 5.7–8.2)
PROT UR-MCNC: NEGATIVE MG/DL
RBC # BLD AUTO: 3.51 X10(6)UL
SODIUM SERPL-SCNC: 144 MMOL/L (ref 136–145)
SP GR UR STRIP: 1.01 (ref 1–1.03)
UROBILINOGEN UR STRIP-ACNC: NORMAL
WBC # BLD AUTO: 7.2 X10(3) UL (ref 4–11)

## 2024-10-26 PROCEDURE — 70450 CT HEAD/BRAIN W/O DYE: CPT | Performed by: EMERGENCY MEDICINE

## 2024-10-26 PROCEDURE — 81001 URINALYSIS AUTO W/SCOPE: CPT | Performed by: EMERGENCY MEDICINE

## 2024-10-26 PROCEDURE — 72100 X-RAY EXAM L-S SPINE 2/3 VWS: CPT | Performed by: EMERGENCY MEDICINE

## 2024-10-26 PROCEDURE — 72125 CT NECK SPINE W/O DYE: CPT | Performed by: EMERGENCY MEDICINE

## 2024-10-26 PROCEDURE — 85025 COMPLETE CBC W/AUTO DIFF WBC: CPT | Performed by: EMERGENCY MEDICINE

## 2024-10-26 PROCEDURE — 82077 ASSAY SPEC XCP UR&BREATH IA: CPT | Performed by: EMERGENCY MEDICINE

## 2024-10-26 PROCEDURE — 96374 THER/PROPH/DIAG INJ IV PUSH: CPT

## 2024-10-26 PROCEDURE — 73502 X-RAY EXAM HIP UNI 2-3 VIEWS: CPT | Performed by: EMERGENCY MEDICINE

## 2024-10-26 PROCEDURE — 99284 EMERGENCY DEPT VISIT MOD MDM: CPT

## 2024-10-26 PROCEDURE — 80307 DRUG TEST PRSMV CHEM ANLYZR: CPT | Performed by: EMERGENCY MEDICINE

## 2024-10-26 PROCEDURE — 80053 COMPREHEN METABOLIC PANEL: CPT | Performed by: EMERGENCY MEDICINE

## 2024-10-26 PROCEDURE — 99285 EMERGENCY DEPT VISIT HI MDM: CPT

## 2024-10-26 RX ORDER — HYDRALAZINE HYDROCHLORIDE 20 MG/ML
20 INJECTION INTRAMUSCULAR; INTRAVENOUS ONCE
Status: COMPLETED | OUTPATIENT
Start: 2024-10-26 | End: 2024-10-26
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
## 2024-10-26 NOTE — ED QUICK NOTES
Spoke to son who stated he called ems d/t concerns of overdose and falls yesterday.  States that she has been abusing her oxycodone.  Son states that he wanted her to get checked out.  RN updated son, John, that we did labs/xray/ct and all looks ok and asked if he felt comfortable with her returning home, that although suspected she took too much, her vitals are ok and she will be sleepy throughout the day.  John was comfortable with her returning home and that he will be there until 1400 to let transport in for her to return.  [facility] called, eta 60 min.  John re contacted and informed of the time frame.  Will cont. To monitor.

## 2024-10-26 NOTE — DISCHARGE INSTRUCTIONS
Take Tylenol as needed for pain.  Follow-up with your primary physician and with the pain specialist as scheduled next week.  Return to the emergency department if severe headache, increased pain, or other new symptoms develop.

## 2024-10-26 NOTE — ED INITIAL ASSESSMENT (HPI)
Pt brought by ems for fall x2 yesterday, on blood thinners.  Pt states she doesn't usually get up, only in the morning.  States that she does not have any pain.  Per ems, son wanted her to get checked out for falls, and states he thinks she took too many of her oxycodone. Pt states she only took 2 that she was supposed to.  Pt drowsy but wakes up when you ask questions.  Pt aox4

## 2024-11-01 ENCOUNTER — OFFICE VISIT (OUTPATIENT)
Dept: PAIN CLINIC | Facility: HOSPITAL | Age: 79
End: 2024-11-01
Attending: STUDENT IN AN ORGANIZED HEALTH CARE EDUCATION/TRAINING PROGRAM
Payer: MEDICARE

## 2024-11-01 VITALS — DIASTOLIC BLOOD PRESSURE: 77 MMHG | OXYGEN SATURATION: 98 % | SYSTOLIC BLOOD PRESSURE: 167 MMHG | HEART RATE: 77 BPM

## 2024-11-01 DIAGNOSIS — M47.816 LUMBAR FACET ARTHROPATHY: ICD-10-CM

## 2024-11-01 DIAGNOSIS — M54.42 CHRONIC BILATERAL LOW BACK PAIN WITH BILATERAL SCIATICA: ICD-10-CM

## 2024-11-01 DIAGNOSIS — G89.29 CHRONIC BILATERAL LOW BACK PAIN WITH BILATERAL SCIATICA: ICD-10-CM

## 2024-11-01 DIAGNOSIS — M54.41 CHRONIC BILATERAL LOW BACK PAIN WITH BILATERAL SCIATICA: ICD-10-CM

## 2024-11-01 DIAGNOSIS — M47.816 LUMBAR SPONDYLOSIS: ICD-10-CM

## 2024-11-01 DIAGNOSIS — M48.061 SPINAL STENOSIS OF LUMBAR REGION AT MULTIPLE LEVELS: ICD-10-CM

## 2024-11-01 PROCEDURE — 99213 OFFICE O/P EST LOW 20 MIN: CPT | Performed by: STUDENT IN AN ORGANIZED HEALTH CARE EDUCATION/TRAINING PROGRAM

## 2024-11-01 RX ORDER — OXYCODONE AND ACETAMINOPHEN 10; 325 MG/1; MG/1
1 TABLET ORAL EVERY 4 HOURS PRN
Qty: 120 TABLET | Refills: 0 | Status: SHIPPED | OUTPATIENT
Start: 2024-11-03 | End: 2024-12-03

## 2024-11-01 NOTE — PROGRESS NOTES
Patient presents in office today with reported pain in shoulders,  back and legs     Current pain level reported = 9/10     Last reported dose of Percocet 09.19.24        Narcotic Contract renewal 04.25.25     Urine Drug screen 04.25.24    Most recent fall 10.26.24     Has patient been flagged as fall risk:   Yes     TOP FALL PREVENTION TIPS     INSIDE YOUR HOME     KITCHEN:  Use non skid mats only.    Clean up spills as soon as they happen.  Keep objects that you use often within easy reach.  BATHROOM:  Install grab bars on the bathroom walls beside the tub, shower and toilet.  Use a non skid rubber mat in the tub/shower.  If you are unsteady on your feet you may want to use a shower chair/bench and a hand held shower head while bathing/showering.  BEDROOM:  Place light switches within reach of your bed and a night light between the bedroom and bathroom.  Get up slowly from lying down or sitting if you get dizzy.  Keep a working flashlight near your bed.  STAIRS:  Keep stairwells well lit with light switches at top and bottom.  Install sturdy handrails on both sides.  Make sure carpeting is secure.  FLOORS:  Remove all loose wires, cords and throw rugs.  Keep floors clear of clutter.  Make sure carpets and area rugs have skid proof backing.  Do not use slippery wax on bare floors.  Keep furniture in its accustomed place.   If you have pets, be careful that you don’t trip over them.     OUTSIDE SAFETY TIPS  Always wear good shoes with proper support and traction.  Always use hand rails on stairs and escalators.  Cover porch steps with gritty weather proof paint.  Pay attention to curbs and other changes in surfaces when out in the community.  Take care when walking on gravel or grassy surfaces.  Avoid walking on snowy or icy surfaces.  Use a cane or walker (indoors and out) if you are unsteady on your feet.

## 2024-11-01 NOTE — PATIENT INSTRUCTIONS
Refill policies:    Allow 2-3 business days for refills; controlled substances may take longer.  Contact your pharmacy at least 5 days prior to running out of medication and have them send an electronic request or submit request through the “request refill” option in your [employer] account.  Refills are not addressed on weekends; covering physicians do not authorize routine medications on weekends.  No narcotics or controlled substances are refilled after noon on Fridays or by on call physicians.  By law, narcotics must be electronically prescribed.  A 30 day supply with no refills is the maximum allowed.  If your prescription is due for a refill, you may be due for a follow up appointment.  To best provide you care, patients receiving routine medications need to be seen at least once a year.  Patients receiving narcotic/controlled substance medications need to be seen at least once every 3 months.  In the event that your preferred pharmacy does not have the requested medication in stock (e.g. Backordered), it is your responsibility to find another pharmacy that has the requested medication available.  We will gladly send a new prescription to that pharmacy at your request.    Scheduling Tests:    If your physician has ordered radiology tests such as MRI or CT scans, please contact Central Scheduling at [phone] right away to schedule the test.  Once scheduled, the [facility] Centralized Referral Team will work with your insurance carrier to obtain pre-certification or prior authorization.  Depending on your insurance carrier, approval may take 3-10 days.  It is highly recommended patients assure they have received an authorization before having a test performed.  If test is done without insurance authorization, patient may be responsible for the entire amount billed.      Precertification and Prior Authorizations:  If your physician has recommended that you have a procedure or additional testing performed the [facility]  Centralized Referral Team will contact your insurance carrier to obtain pre-certification or prior authorization.    You are strongly encouraged to contact your insurance carrier to verify that your procedure/test has been approved and is a COVERED benefit.  Although the [facility] Centralized Referral Team does its due diligence, the insurance carrier gives the disclaimer that \"Although the procedure is authorized, this does not guarantee payment.\"    Ultimately the patient is responsible for payment.   Thank you for your understanding in this matter.  Paperwork Completion:  If you require FMLA or disability paperwork for your recovery, please make sure to either drop it off or have it faxed to our office at [phone]. Be sure the form has your name and date of birth on it.  The form will be faxed to our Forms Department and they will complete it for you.  There is a 25$ fee for all forms that need to be filled out.  Please be aware there is a 10-14 day turnaround time.  You will need to sign a release of information (KEYA) form if your paperwork does not come with one.  You may call the Forms Department with any questions at [phone].  Their fax number is [phone].
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
## 2024-12-02 ENCOUNTER — OFFICE VISIT (OUTPATIENT)
Dept: PAIN CLINIC | Facility: HOSPITAL | Age: 79
End: 2024-12-02
Attending: STUDENT IN AN ORGANIZED HEALTH CARE EDUCATION/TRAINING PROGRAM
Payer: MEDICARE

## 2024-12-02 VITALS
SYSTOLIC BLOOD PRESSURE: 164 MMHG | HEART RATE: 86 BPM | DIASTOLIC BLOOD PRESSURE: 62 MMHG | OXYGEN SATURATION: 90 % | RESPIRATION RATE: 18 BRPM

## 2024-12-02 DIAGNOSIS — M54.42 CHRONIC BILATERAL LOW BACK PAIN WITH BILATERAL SCIATICA: ICD-10-CM

## 2024-12-02 DIAGNOSIS — G89.29 CHRONIC BILATERAL LOW BACK PAIN WITH BILATERAL SCIATICA: ICD-10-CM

## 2024-12-02 DIAGNOSIS — M47.816 LUMBAR FACET ARTHROPATHY: ICD-10-CM

## 2024-12-02 DIAGNOSIS — M47.816 LUMBAR SPONDYLOSIS: ICD-10-CM

## 2024-12-02 DIAGNOSIS — M48.061 SPINAL STENOSIS OF LUMBAR REGION AT MULTIPLE LEVELS: ICD-10-CM

## 2024-12-02 DIAGNOSIS — M48.062 SPINAL STENOSIS OF LUMBAR REGION WITH NEUROGENIC CLAUDICATION: ICD-10-CM

## 2024-12-02 DIAGNOSIS — G62.9 NEUROPATHY: ICD-10-CM

## 2024-12-02 DIAGNOSIS — M54.41 CHRONIC BILATERAL LOW BACK PAIN WITH BILATERAL SCIATICA: ICD-10-CM

## 2024-12-02 PROCEDURE — 99214 OFFICE O/P EST MOD 30 MIN: CPT | Performed by: STUDENT IN AN ORGANIZED HEALTH CARE EDUCATION/TRAINING PROGRAM

## 2024-12-02 RX ORDER — OXYCODONE AND ACETAMINOPHEN 10; 325 MG/1; MG/1
1 TABLET ORAL EVERY 4 HOURS PRN
Qty: 180 TABLET | Refills: 0 | Status: SHIPPED | OUTPATIENT
Start: 2024-12-03 | End: 2025-01-02

## 2024-12-02 RX ORDER — PREGABALIN 50 MG/1
50 CAPSULE ORAL 2 TIMES DAILY
Qty: 60 CAPSULE | Refills: 2 | Status: SHIPPED | OUTPATIENT
Start: 2024-12-02 | End: 2025-03-02
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
## 2024-12-09 ENCOUNTER — HOSPITAL ENCOUNTER (EMERGENCY)
Facility: HOSPITAL | Age: 79
Discharge: HOME | End: 2024-12-09
Attending: EMERGENCY MEDICINE
Payer: MEDICARE

## 2024-12-09 VITALS
TEMPERATURE: 98 F | HEIGHT: 63 IN | OXYGEN SATURATION: 97 % | DIASTOLIC BLOOD PRESSURE: 58 MMHG | RESPIRATION RATE: 11 BRPM | BODY MASS INDEX: 38.09 KG/M2 | SYSTOLIC BLOOD PRESSURE: 144 MMHG | HEART RATE: 67 BPM | WEIGHT: 215 LBS

## 2024-12-09 DIAGNOSIS — J04.0 ACUTE LARYNGITIS: Primary | ICD-10-CM

## 2024-12-09 LAB
ATRIAL RATE: 64 BPM
P AXIS: 76 DEGREES
P-R INTERVAL: 140 MS
Q-T INTERVAL: 456 MS
QRS DURATION: 102 MS
QTC CALCULATION (BEZET): 470 MS
R AXIS: 26 DEGREES
S PYO AG THROAT QL: NEGATIVE
T AXIS: 100 DEGREES
VENTRICULAR RATE: 64 BPM

## 2024-12-09 PROCEDURE — 99283 EMERGENCY DEPT VISIT LOW MDM: CPT

## 2024-12-09 PROCEDURE — 93005 ELECTROCARDIOGRAM TRACING: CPT

## 2024-12-09 PROCEDURE — 93010 ELECTROCARDIOGRAM REPORT: CPT

## 2024-12-09 PROCEDURE — 87880 STREP A ASSAY W/OPTIC: CPT

## 2024-12-09 RX ORDER — VANCOMYCIN HYDROCHLORIDE 125 MG/1
125 CAPSULE ORAL DAILY
Qty: 5 CAPSULE | Refills: 0 | Status: SHIPPED | OUTPATIENT
Start: 2024-12-09 | End: 2024-12-14

## 2024-12-09 RX ORDER — AZITHROMYCIN 250 MG/1
TABLET, FILM COATED ORAL
Qty: 6 TABLET | Refills: 0 | Status: SHIPPED | OUTPATIENT
Start: 2024-12-09 | End: 2024-12-14

## 2024-12-09 NOTE — ED INITIAL ASSESSMENT (HPI)
Received pt via EMS. Pt is from home. Pt reports \"My blood pressure has been bothering me because it goes up and it bothers my ear on the right side. My ear started beating really fast so I knew my bp was going up\". Pt c/o cp/sob. Pt reports sob worsens with ambulation. + cough. Pt reports taking blood pressure medication today. Pt has bilateral dvt and is on eliquis. Pt reports \"Sometimes it feels like my right leg will go to sleep. This has been going on for month\". Pt reports the right side of my face feels swollen. Denies dental pain or jaw pain.
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
## 2024-12-17 ENCOUNTER — APPOINTMENT (OUTPATIENT)
Dept: GENERAL RADIOLOGY | Facility: HOSPITAL | Age: 79
DRG: 377 | End: 2024-12-17
Payer: MEDICARE

## 2024-12-17 ENCOUNTER — APPOINTMENT (OUTPATIENT)
Dept: ULTRASOUND IMAGING | Facility: HOSPITAL | Age: 79
DRG: 377 | End: 2024-12-17
Attending: EMERGENCY MEDICINE
Payer: MEDICARE

## 2024-12-17 ENCOUNTER — HOSPITAL ENCOUNTER (INPATIENT)
Facility: HOSPITAL | Age: 79
LOS: 7 days | Discharge: NURSING, REHAB OR LONG-TERM CARE | DRG: 377 | End: 2024-12-24
Attending: EMERGENCY MEDICINE | Admitting: HOSPITALIST
Payer: MEDICARE

## 2024-12-17 ENCOUNTER — APPOINTMENT (OUTPATIENT)
Dept: GENERAL RADIOLOGY | Facility: HOSPITAL | Age: 79
End: 2024-12-17
Payer: MEDICARE

## 2024-12-17 ENCOUNTER — APPOINTMENT (OUTPATIENT)
Dept: ULTRASOUND IMAGING | Facility: HOSPITAL | Age: 79
End: 2024-12-17
Attending: EMERGENCY MEDICINE
Payer: MEDICARE

## 2024-12-17 ENCOUNTER — HOSPITAL ENCOUNTER (INPATIENT)
Facility: HOSPITAL | Age: 79
LOS: 7 days | Discharge: NURSING, REHAB OR LONG-TERM CARE | End: 2024-12-24
Attending: EMERGENCY MEDICINE | Admitting: HOSPITALIST
Payer: MEDICARE

## 2024-12-17 DIAGNOSIS — M79.671 PAIN IN BOTH FEET: ICD-10-CM

## 2024-12-17 DIAGNOSIS — M54.41 CHRONIC BILATERAL LOW BACK PAIN WITH BILATERAL SCIATICA: ICD-10-CM

## 2024-12-17 DIAGNOSIS — M79.672 PAIN IN BOTH FEET: ICD-10-CM

## 2024-12-17 DIAGNOSIS — N17.9 ACUTE RENAL FAILURE SUPERIMPOSED ON CHRONIC KIDNEY DISEASE, UNSPECIFIED ACUTE RENAL FAILURE TYPE, UNSPECIFIED CKD STAGE (HCC): ICD-10-CM

## 2024-12-17 DIAGNOSIS — M47.816 LUMBAR SPONDYLOSIS: ICD-10-CM

## 2024-12-17 DIAGNOSIS — M54.42 CHRONIC BILATERAL LOW BACK PAIN WITH BILATERAL SCIATICA: ICD-10-CM

## 2024-12-17 DIAGNOSIS — M47.816 LUMBAR FACET ARTHROPATHY: ICD-10-CM

## 2024-12-17 DIAGNOSIS — D64.9 ANEMIA, UNSPECIFIED TYPE: Primary | ICD-10-CM

## 2024-12-17 DIAGNOSIS — L03.119 CELLULITIS OF LOWER EXTREMITY, UNSPECIFIED LATERALITY: ICD-10-CM

## 2024-12-17 DIAGNOSIS — M48.061 SPINAL STENOSIS OF LUMBAR REGION AT MULTIPLE LEVELS: ICD-10-CM

## 2024-12-17 DIAGNOSIS — N18.9 ACUTE RENAL FAILURE SUPERIMPOSED ON CHRONIC KIDNEY DISEASE, UNSPECIFIED ACUTE RENAL FAILURE TYPE, UNSPECIFIED CKD STAGE (HCC): ICD-10-CM

## 2024-12-17 DIAGNOSIS — G89.3 CANCER RELATED PAIN: ICD-10-CM

## 2024-12-17 DIAGNOSIS — I50.9 ACUTE ON CHRONIC CONGESTIVE HEART FAILURE, UNSPECIFIED HEART FAILURE TYPE (HCC): ICD-10-CM

## 2024-12-17 DIAGNOSIS — N63.0 SWOLLEN BREAST: ICD-10-CM

## 2024-12-17 DIAGNOSIS — E87.5 HYPERKALEMIA: ICD-10-CM

## 2024-12-17 DIAGNOSIS — G89.29 CHRONIC BILATERAL LOW BACK PAIN WITH BILATERAL SCIATICA: ICD-10-CM

## 2024-12-17 PROBLEM — K92.2 GI BLEED: Status: ACTIVE | Noted: 2024-12-17

## 2024-12-17 LAB
ALBUMIN SERPL-MCNC: 3.5 G/DL (ref 3.2–4.8)
ALP LIVER SERPL-CCNC: 187 U/L
ALT SERPL-CCNC: 23 U/L
ANION GAP SERPL CALC-SCNC: 7 MMOL/L (ref 0–18)
ANTIBODY SCREEN: NEGATIVE
AST SERPL-CCNC: 27 U/L (ref ?–34)
ATRIAL RATE: 59 BPM
BASOPHILS # BLD AUTO: 0.04 X10(3) UL (ref 0–0.2)
BASOPHILS NFR BLD AUTO: 0.6 %
BILIRUB DIRECT SERPL-MCNC: 0.1 MG/DL (ref ?–0.3)
BILIRUB SERPL-MCNC: 0.3 MG/DL (ref 0.2–1.1)
BNP SERPL-MCNC: 778 PG/ML (ref ?–100)
BUN BLD-MCNC: 42 MG/DL (ref 9–23)
BUN/CREAT SERPL: 16.3 (ref 10–20)
CALCIUM BLD-MCNC: 8.2 MG/DL (ref 8.7–10.4)
CHLORIDE SERPL-SCNC: 112 MMOL/L (ref 98–112)
CO2 SERPL-SCNC: 24 MMOL/L (ref 21–32)
CREAT BLD-MCNC: 2.57 MG/DL
DEPRECATED RDW RBC AUTO: 60.9 FL (ref 35.1–46.3)
EGFRCR SERPLBLD CKD-EPI 2021: 19 ML/MIN/1.73M2 (ref 60–?)
EOSINOPHIL # BLD AUTO: 0.29 X10(3) UL (ref 0–0.7)
EOSINOPHIL NFR BLD AUTO: 4.2 %
ERYTHROCYTE [DISTWIDTH] IN BLOOD BY AUTOMATED COUNT: 17.8 % (ref 11–15)
EST. AVERAGE GLUCOSE BLD GHB EST-MCNC: 111 MG/DL (ref 68–126)
GLUCOSE BLD-MCNC: 96 MG/DL (ref 70–99)
GLUCOSE BLDC GLUCOMTR-MCNC: 106 MG/DL (ref 70–99)
GLUCOSE BLDC GLUCOMTR-MCNC: 114 MG/DL (ref 70–99)
HBA1C MFR BLD: 5.5 % (ref ?–5.7)
HCT VFR BLD AUTO: 20.1 %
HCT VFR BLD AUTO: 25.5 %
HGB BLD-MCNC: 6.1 G/DL
HGB BLD-MCNC: 8 G/DL
IMM GRANULOCYTES # BLD AUTO: 0.03 X10(3) UL (ref 0–1)
IMM GRANULOCYTES NFR BLD: 0.4 %
LYMPHOCYTES # BLD AUTO: 0.92 X10(3) UL (ref 1–4)
LYMPHOCYTES NFR BLD AUTO: 13.2 %
MCH RBC QN AUTO: 29 PG (ref 26–34)
MCHC RBC AUTO-ENTMCNC: 30.3 G/DL (ref 31–37)
MCV RBC AUTO: 95.7 FL
MONOCYTES # BLD AUTO: 0.46 X10(3) UL (ref 0.1–1)
MONOCYTES NFR BLD AUTO: 6.6 %
NEUTROPHILS # BLD AUTO: 5.21 X10 (3) UL (ref 1.5–7.7)
NEUTROPHILS # BLD AUTO: 5.21 X10(3) UL (ref 1.5–7.7)
NEUTROPHILS NFR BLD AUTO: 75 %
OSMOLALITY SERPL CALC.SUM OF ELEC: 306 MOSM/KG (ref 275–295)
P-R INTERVAL: 146 MS
PLATELET # BLD AUTO: 214 10(3)UL (ref 150–450)
POTASSIUM SERPL-SCNC: 5.5 MMOL/L (ref 3.5–5.1)
PROT SERPL-MCNC: 6 G/DL (ref 5.7–8.2)
Q-T INTERVAL: 444 MS
QRS DURATION: 66 MS
QTC CALCULATION (BEZET): 439 MS
R AXIS: 43 DEGREES
RBC # BLD AUTO: 2.1 X10(6)UL
RH BLOOD TYPE: POSITIVE
SODIUM SERPL-SCNC: 143 MMOL/L (ref 136–145)
T AXIS: 68 DEGREES
VENTRICULAR RATE: 59 BPM
WBC # BLD AUTO: 7 X10(3) UL (ref 4–11)

## 2024-12-17 PROCEDURE — 99223 1ST HOSP IP/OBS HIGH 75: CPT | Performed by: HOSPITALIST

## 2024-12-17 PROCEDURE — 71045 X-RAY EXAM CHEST 1 VIEW: CPT | Performed by: EMERGENCY MEDICINE

## 2024-12-17 PROCEDURE — 93970 EXTREMITY STUDY: CPT | Performed by: EMERGENCY MEDICINE

## 2024-12-17 PROCEDURE — 30233N1 TRANSFUSION OF NONAUTOLOGOUS RED BLOOD CELLS INTO PERIPHERAL VEIN, PERCUTANEOUS APPROACH: ICD-10-PCS | Performed by: EMERGENCY MEDICINE

## 2024-12-17 RX ORDER — BUMETANIDE 0.25 MG/ML
2 INJECTION, SOLUTION INTRAMUSCULAR; INTRAVENOUS ONCE
Status: COMPLETED | OUTPATIENT
Start: 2024-12-17 | End: 2024-12-17

## 2024-12-17 RX ORDER — GABAPENTIN 300 MG/1
300 CAPSULE ORAL NIGHTLY
Status: DISCONTINUED | OUTPATIENT
Start: 2024-12-17 | End: 2024-12-24

## 2024-12-17 RX ORDER — ONDANSETRON 2 MG/ML
4 INJECTION INTRAMUSCULAR; INTRAVENOUS EVERY 6 HOURS PRN
Status: DISCONTINUED | OUTPATIENT
Start: 2024-12-17 | End: 2024-12-24

## 2024-12-17 RX ORDER — ACETAMINOPHEN 500 MG
500 TABLET ORAL EVERY 4 HOURS PRN
Status: DISCONTINUED | OUTPATIENT
Start: 2024-12-17 | End: 2024-12-17

## 2024-12-17 RX ORDER — METOCLOPRAMIDE HYDROCHLORIDE 5 MG/ML
5 INJECTION INTRAMUSCULAR; INTRAVENOUS EVERY 8 HOURS PRN
Status: DISCONTINUED | OUTPATIENT
Start: 2024-12-17 | End: 2024-12-24

## 2024-12-17 RX ORDER — LISINOPRIL 10 MG/1
10 TABLET ORAL DAILY
COMMUNITY
End: 2024-12-24

## 2024-12-17 RX ORDER — BUMETANIDE 0.25 MG/ML
1 INJECTION, SOLUTION INTRAMUSCULAR; INTRAVENOUS
Status: DISCONTINUED | OUTPATIENT
Start: 2024-12-17 | End: 2024-12-22

## 2024-12-17 RX ORDER — ACETAMINOPHEN 325 MG/1
650 TABLET ORAL EVERY 4 HOURS PRN
Status: DISCONTINUED | OUTPATIENT
Start: 2024-12-17 | End: 2024-12-24

## 2024-12-17 RX ORDER — ATORVASTATIN CALCIUM 40 MG/1
40 TABLET, FILM COATED ORAL NIGHTLY
Status: DISCONTINUED | OUTPATIENT
Start: 2024-12-17 | End: 2024-12-24

## 2024-12-17 RX ORDER — HYDROCODONE BITARTRATE AND ACETAMINOPHEN 5; 325 MG/1; MG/1
2 TABLET ORAL EVERY 4 HOURS PRN
Status: DISCONTINUED | OUTPATIENT
Start: 2024-12-17 | End: 2024-12-17

## 2024-12-17 RX ORDER — PREGABALIN 50 MG/1
100 CAPSULE ORAL 2 TIMES DAILY
Status: DISCONTINUED | OUTPATIENT
Start: 2024-12-17 | End: 2024-12-24

## 2024-12-17 RX ORDER — GABAPENTIN 300 MG/1
300 CAPSULE ORAL NIGHTLY
COMMUNITY

## 2024-12-17 RX ORDER — HYDROCODONE BITARTRATE AND ACETAMINOPHEN 5; 325 MG/1; MG/1
1 TABLET ORAL EVERY 4 HOURS PRN
Status: DISCONTINUED | OUTPATIENT
Start: 2024-12-17 | End: 2024-12-17

## 2024-12-17 RX ORDER — VANCOMYCIN HYDROCHLORIDE 125 MG/1
125 CAPSULE ORAL DAILY
Status: DISCONTINUED | OUTPATIENT
Start: 2024-12-17 | End: 2024-12-24

## 2024-12-17 RX ORDER — HYDRALAZINE HYDROCHLORIDE 25 MG/1
25 TABLET, FILM COATED ORAL 2 TIMES DAILY
Status: DISCONTINUED | OUTPATIENT
Start: 2024-12-17 | End: 2024-12-24

## 2024-12-17 RX ORDER — OXYCODONE AND ACETAMINOPHEN 10; 325 MG/1; MG/1
1 TABLET ORAL EVERY 4 HOURS PRN
Status: DISCONTINUED | OUTPATIENT
Start: 2024-12-17 | End: 2024-12-24

## 2024-12-17 NOTE — ED QUICK NOTES
Orders for admission, patient is aware of plan and ready to go upstairs. Any questions, please call ED RN Machelle at extension [phone].     Patient Covid vaccination status: Unvaccinated     COVID Test Ordered in ED: None    COVID Suspicion at Admission: N/A    Running Infusions:  None    Mental Status/LOC at time of transport: A&Ox4    Other pertinent information:   CIWA score: N/A   NIH score:  N/A

## 2024-12-17 NOTE — ED INITIAL ASSESSMENT (HPI)
Patient presents to ED via EMS for increase in swelling and pain to BLE. Per EMS, patient reported being swollen normally. Denies CP SOB. Patient reports trouble ambulating d/t pain

## 2024-12-17 NOTE — HISTORICAL OFFICE NOTE
CHIEF COMPLAINT    CHIEF COMPLAINT  Reason for Visit/Chief Complaint   Consult   This is a 77-year-old with renal disease hypertension hypercholesterol with a pulmonary pressures aortic stenosis who presents for follow-up after recent hospitalization for DVT with confusion hyperkalemia and transient SVT. We are asked to assess cardiac issues. Patient denies chest pain but is very tired. She gets around slowly with a walker. Old records were reviewed. EKG showed SVT and July with prior EKG showing sinus with nonspecific changes. She had an echo in June showing normal LV function left atrial enlargement mild MR mild to moderate AI and mild AAS with elevated pulmonary pressures 40-50. A nuclear stress test in September 2022 was normal. Patient is not a good historian. She denied cardiac issues and is unclear where she is on nitrates and aspirin with other meds.     PROBLEMS  Reconcile with Patient's ChartPROBLEMS  Problem Effective Dates Date resolved Problem Status   Paroxysmal SVT (supraventricular tachycardia), [SNOMED-CT: 01113587] 8/3/2023 - Active   Mild pulmonary hypertension, [SNOMED-CT: 539839860] 8/3/2023 - Active   Edema, localized, [SNOMED-CT: 663537674] 8/3/2023 - Active   Hypertension (HTN), primary, [SNOMED-CT: 14097848] 8/3/2023 - Active   Hyperlipidemia, mixed, [SNOMED-CT: 606806869] 8/3/2023 - Active   Exertional dyspnea, [SNOMED-CT: 31814823] 8/3/2023 - Active     ENCOUNTER DIAGNOSIS    ENCOUNTER DIAGNOSIS  Problem Effective Dates Date resolved Problem Status   Paroxysmal SVT (supraventricular tachycardia), [SNOMED-CT: 97350061] 8/3/2023 - Active   Mild pulmonary hypertension, [SNOMED-CT: 960420134] 8/3/2023 - Active   Edema, localized, [SNOMED-CT: 718627501] 8/3/2023 - Active   Hypertension (HTN), primary, [SNOMED-CT: 23878477] 8/3/2023 - Active   Hyperlipidemia, mixed, [SNOMED-CT: 573420259] 8/3/2023 - Active   Exertional dyspnea, [SNOMED-CT: 13470257] 8/3/2023 - Active     VITAL SIGNS    VITAL  SIGNS  Date / Time: 8/3/2023   BP Systolic 112 mmHg   BP Diastolic 52 mmHg   Height 63 inches   Weight 178 lbs   Pulse Rate 73 bpm   BSA (Body Surface Area) 1.9 cc/m2   BMI (Body Mass Index) 31.5 cc/m2   Blood Pressure 112 / 52 mmHg     PHYSICAL EXAMINATION    PHYSICAL EXAMINATION  Header Details   Constitutional 97o2%   Vitals Left Arm Sitting  / 52 mmHg, Pulse rate 73 bpm, Height in 5' 3\", BMI: 31.5, Weight in 178.57 lbs (or) 81 kgs, BSA : 1.92 cc/m²   General Appearance No Acute Distress   Cardiovascular      ALLERGIES, ADVERSE REACTIONS, ALERTS    No data available    MEDICATIONS ADMINISTERED DURING VISIT    No data available    MEDICATIONS  Reconcile with Patient's ChartMEDICATIONS  Medication Start Date Route/Frequency Status   amLODIPine 10 mg tablet, [RxNorm: 046497] 8/3/2023 Take 1 tablet orally once a day. Active   apixaban 5 MG Oral Tab, [RxNorm: 8882650] 8/3/2023 Take 1 tablet (5 mg total) by mouth 2 (two) times daily. Active   aspirin 81 MG Oral Tab EC, [RxNorm: 627099] 8/3/2023 Take 1 tablet (81 mg total) by mouth daily. Active   atorvastatin 40 MG Oral Tab, [RxNorm: 395220] 8/3/2023 Take 1 tablet (40 mg total) by mouth. AT BEDTIME Active   cholecalciferol 50 MCG (2000 UT) Oral Tab, [RxNorm: 053967] 8/3/2023 Take 1 tablet (2,000 Units total) by mouth daily. Active   diazePAM 10 MG Oral Tab, [RxNorm: 570660] 8/3/2023 Take 1 tablet (10 mg total) by mouth every 8 (eight) hours as needed (MAX 3/DAY). Active   gabapentin 300 MG Oral Cap, [RxNorm: 242521] 8/3/2023 Take 1 capsule (300 mg total) by mouth nightly. Active   hydrALAZINE 50 mg tablet, [RxNorm: 977439] 8/3/2023 Take 1 tablet orally 2 times a day. Active   isosorbide mononitrate ER 60 MG Oral Tablet 24 Hr, [RxNorm: 278973] 8/3/2023 Take 1 tablet (60 mg total) by mouth daily. Active   lisinopriL 10 mg tablet, [RxNorm: 459242] 8/3/2023 Take 1 tablet orally once a day. Active   meclizine 25 MG Oral Tab, [RxNorm: 123856] 8/3/2023 Take 1 tablet (25  mg total) by mouth. Active   oxyCODONE-acetaminophen (PERCOCET)  MG Oral Tab, [RxNorm: 3634748] 8/3/2023 Take 1 tablet by mouth every 4 (four) hours as needed for Pain (Max 6/day). Active   pantoprazole 40 mg tablet,delayed release, [RxNorm: 256255] 8/3/2023 Take 1 tablet orally 2 times a day. Active   patiromer, [RxNorm: 0] 8/3/2023 16.8 pack by mouth daily Active   Vancocin 125 mg capsule, [RxNorm: 218315] 8/3/2023 Take 1 capsule orally every 6 hours for 7 days Active   Vitamin D3 25 mcg (1,000 unit) capsule, [RxNorm: 183423] 8/3/2023 Take 2 capsules orally once a day. Active   amLODIPine 5 mg tablet, [RxNorm: 546586] 8/3/2023 Take 1 tablet orally once a day. Active     ASSESSMENT    Conclusion this is a 73-year-old with renal insufficiency hypertension with cholesterol mild pulm hypertension aortic valve disease who was recently hospitalized and found to have DVT with worsening renal function. She also had transient SVT. At this time she does not complain of palpitations chest pain or shortness of breath. With history patient needs to get a blood pressure machine and some blood pressure meds can be adjusted. Would start with decrease amlodipine to 5 mg daily and see if this helps her legs without without raising her blood pressure. If able we will try to decrease or stop amlodipine and substitute low-dose Cardizem which may be better with history of SVT. She also may stop aspirin at this time since there is no evidence of stroke or coronary disease. She will continue blood thinners for DVT monitored by her primary. Patient to see nephrologist and get follow-up labs. Moderate valve disease we will follow clinically for now and adjust meds.Decrease amlodipine to 5 mg dailyCBC and CMP in 1 week if not done by primary or follow-up doctorsGet blood pressure machine for home monitoringMonitor and record resting blood pressure and pulse for 1 week and call with results. When checking blood pressure sit comfortably  in a chair with the blood pressure cuff in place and rest 5 minutes before checking blood pressure. Check blood pressure and if elevated repeat 2 additional blood pressures and throw away the first 1 and average the second 2.Virtual visit with KELLEY Lugo in 2 weeksFollow-up 2 months This note used Dragon technology. Transcription errors are not uncommon and may not have been corrected prior to electronically signing the note. Should you find these errors please consult the clinician for interpretation (or apply common sense adjustment when safe and appropriate).     FAMILY HISTORY    No data available    GENERAL STATUS    No data available    PAST MEDICAL HISTORY    PAST MEDICAL HISTORY  Problem Date diagonsed Date resolved Status   Paroxysmal SVT (supraventricular tachycardia), [SNOMED-CT: 95655670] 8/3/2023 - Active   Mild pulmonary hypertension, [SNOMED-CT: 323567444] 8/3/2023 - Active   Edema, localized, [SNOMED-CT: 526740131] 8/3/2023 - Active   Hypertension (HTN), primary, [SNOMED-CT: 06858508] 8/3/2023 - Active   Hyperlipidemia, mixed, [SNOMED-CT: 148994274] 8/3/2023 - Active   Exertional dyspnea, [SNOMED-CT: 53810150] 8/3/2023 - Active     HISTORY OF PRESENT ILLNESS    This is a 77-year-old with renal disease hypertension hypercholesterol with a pulmonary pressures aortic stenosis who presents for follow-up after recent hospitalization for DVT with confusion hyperkalemia and transient SVT. We are asked to assess cardiac issues. Patient denies chest pain but is very tired. She gets around slowly with a walker. Old records were reviewed. EKG showed SVT and July with prior EKG showing sinus with nonspecific changes. She had an echo in June showing normal LV function left atrial enlargement mild MR mild to moderate AI and mild AAS with elevated pulmonary pressures 40-50. A nuclear stress test in September 2022 was normal. Patient is not a good historian. She denied cardiac issues and is unclear where she is on  nitrates and aspirin with other meds.     IMMUNIZATIONS  Reconcile with Patient's ChartNo data available    PLAN OF CARE    PLAN OF CARE  Planned Care Date   CBC (Complete Blood Count) 1/1/1900   CMP (comprehensive metabolic panel) 1/1/1900   Take 1 tablet orally once a day.-amLODIPine 5 mg tablet 8/3/2023   Follow up visit - Abdirahman Buchanan 1/1/1900   Follow up visit - Hue Elias 1/1/1900     PROCEDURES    No data available    RESULTS    RESULTS  Name Result Date Location - Ordered By   CBC (Complete Blood Count) [LOINC: 19204-7] Pending 1 Week     CMP (comprehensive metabolic panel) [LOINC: 64980-1] Pending 1 Week       REVIEW OF SYSTEMS    REVIEW OF SYSTEMS  Header Details   Eyes No history of Blurry vision, Visual changes, Double vision, Discharge, Eye pain, Dry eyes, Decreased vision   Cardiovascular Edema  No history of Chest pain, CANTU, Palpitations, Syncope, PND, Orthopnea, Claudication   Respiratory No history of SOB, Cough, Hemoptysis, Wheezing, Pleurisy, Sputum   Others Review Of Systems   bilateral pedal edema     SOCIAL HISTORY    SOCIAL HISTORY  Social History Element Description Effective Dates   Smoking status Heavy tobacco smoker -     FUNCTIONAL STATUS    No data available    MEDICAL EQUIPMENT    No data available    Goals Sections    No data available    REASON FOR REFERRAL    No data available    Health Concerns Section    No data available    COGNITIVE/MENTAL STATUS    No data available    Patient Demographics    Patient Demographics  Patient Address Patient Name Communication   [address] [place] [id number] [address] Boss [phone] (Mobile)     Patient Demographics  Language Race / Ethnicity Marital Status   English (Preferred) Black Or  / Unknown      Document Information    Primary Care Provider Other Service Providers Document Coverage Dates   Dewayne Gary  NPI: [id number]  [phone] (Work)  [address] [facility] [facility], [address], [place]  [address]  [place] [address]  Interpreting Physicians  [facility]  [phone] (Work)  [address] [place] [address] Richy Dias  NPI: [id number]  [phone] (Work)  [address] [facility], [address], [place] [address]  [place] [address]  Nurses     Melida Mendoza  NPI: [id number]  [phone] (Work)  [address] [facility], [address], [place] [address]  [place] [address]  Nurses     Carlos Alberto Miller  NPI: [id number]  [phone] (Work)  [address] [facility], [address], [place] [address]  [place] [address]  Nurses Aug. 03, 2023August 03, 2023      Organization   [facility]  [phone] (Work)  [address] [facility], [address], [place] [address]  [place] [address]     Encounter Providers Encounter Date    Aug. 03, 2023August 03, 2023     Legal Authenticator    Abdirahman Buchnaan  NPI: [id number]  [phone] (Work)  [address] [facility], [address], [place] [address]  [place] [address]
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
## 2024-12-17 NOTE — HISTORICAL OFFICE NOTE
[facility]  Outside Information  Continuity of Care Document  2/5/2024  Hannah Boss - 78 y.o. Female; born [birth year]Summary of episode note, generated on Feb. 29, 2024February 29, 2024   CHIEF COMPLAINT    CHIEF COMPLAINT  Reason for Visit/Chief Complaint   hospital f/u from chest pain   This is a patient of Dr. Buchanan who was seen once in the office in August. 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 She was started on diltiazem. An echocardiogram showed preserved systolic function with ejection fraction 55 to 60%, grade 1 diastolic dysfunction and moderate aortic stenosis. Patient did have a dental consult and had dental extractions and treatment for dental abscess during her hospitalization. She comes today for follow-up.Patient reports that she is feeling much better. She denies any further tachycardia episodes. She reports that she is tolerating diltiazem. She is compliant with anticoagulation for her history of PE and DVT.     PROBLEMS  Reconcile with Patient's ChartPROBLEMS  Problem Effective Dates Date resolved Problem Status   Paroxysmal SVT (supraventricular tachycardia), [SNOMED-CT: 72191745] 8/3/2023 - Active   Nicotine dependence with current use, [SNOMED-CT: 06438790] 2/6/2024 - Active   History of DVT (deep vein thrombosis) - Hx, [SNOMED-CT: 688087734] 2/6/2024 - Active   History of pulmonary embolus (PE), [SNOMED-CT: 480895661] 2/6/2024 - Active   Mild pulmonary hypertension, [SNOMED-CT: 378233545] 8/3/2023 - Active   Edema, localized, [SNOMED-CT: 022017558] 8/3/2023 - Active   Hypertension (HTN), primary, [SNOMED-CT: 34839400] 8/3/2023 - Active   Hyperlipidemia, mixed, [SNOMED-CT: 110343753] 8/3/2023 - Active    Exertional dyspnea, [SNOMED-CT: 47312205] 8/3/2023 - Active     ENCOUNTER DIAGNOSIS    ENCOUNTER DIAGNOSIS  Problem Effective Dates Date resolved Problem Status   Paroxysmal SVT (supraventricular tachycardia), [SNOMED-CT: 62228773] 8/3/2023 - Active   Nicotine dependence with current use, [SNOMED-CT: 19883502] 2/6/2024 - Active   History of DVT (deep vein thrombosis) - Hx, [SNOMED-CT: 076297004] 2/6/2024 - Active   History of pulmonary embolus (PE), [SNOMED-CT: 379736616] 2/6/2024 - Active   Edema, localized, [SNOMED-CT: 008954996] 8/3/2023 - Active   Hypertension (HTN), primary, [SNOMED-CT: 48402691] 8/3/2023 - Active   Hyperlipidemia, mixed, [SNOMED-CT: 423795759] 8/3/2023 - Active   Exertional dyspnea, [SNOMED-CT: 28572576] 8/3/2023 - Active     VITAL SIGNS    VITAL SIGNS  Date / Time: 2/6/2024   BP Systolic 134 mmHg   BP Diastolic 52 mmHg   Height 63 inches   Weight 174 lbs   Pulse Rate 74 bpm   BSA (Body Surface Area) 1.9 cc/m2   BMI (Body Mass Index) 30.8 cc/m2   Blood Pressure 134 / 52 mmHg     PHYSICAL EXAMINATION    PHYSICAL EXAMINATION  Header Details   Constitutional 100%o2   Vitals Left Arm Sitting  / 52 mmHg, Pulse rate 74 bpm, Regular, Height in 5' 3\", BMI: 30.8, Weight in 174.17 lbs (or) 79 kgs, BSA : 1.9 cc/m²   General Appearance No Acute Distress   Neck No JVD   Respiratory Lungs clear with normal breath sounds   Cardiovascular 3/6 murmur, Regular rhythm. Normal rate present. Normal and normal S1 and S2   Lower Extremities Edema 2+     ALLERGIES, ADVERSE REACTIONS, ALERTS    No data available    MEDICATIONS ADMINISTERED DURING VISIT    No data available    MEDICATIONS  Reconcile with Patient's ChartMEDICATIONS  Medication Start Date Route/Frequency Status   amoxicillin clavulanate 875-125 MG Oral Tab, [RxNorm: 368745] 2/5/2024 Take 1 tablet by mouth 2 (two) times daily for 14 days. Active   apixaban 5 MG Oral Tab, [RxNorm: 9101420] 2/5/2024 Take 1 tablet (5 mg total) by mouth 2 (two) times  daily. Active   atorvastatin 40 MG Oral Tab, [RxNorm: 633722] 2/5/2024 Take 1 tablet (40 mg total) by mouth. AT BEDTIME Active   cholecalciferol 50 MCG (2000 UT) Oral Tab, [RxNorm: 007678] 2/5/2024 Take 1 tablet (2,000 Units total) by mouth daily. Active   diazePAM 10 MG Oral Tab, [RxNorm: 738336] 2/5/2024 Take 1 tablet (10 mg total) by mouth every 8 (eight) hours as needed (MAX 3/DAY). Active   dilTIAZem  MG Oral Capsule SR 24 Hr, [RxNorm: 451217] 2/5/2024 Take 1 capsule (120 mg total) by mouth daily. Active   ferrous sulfate 325 (65 FE) MG Oral Tab EC, [RxNorm: 860740] 2/5/2024 Take 1 tablet (325 mg total) by mouth See Admin Instructions. Three times a week Active   furosemide 20 MG Oral Tab, [RxNorm: 484110] 2/5/2024 Take 1 tablet (20 mg total) by mouth 3 (three) times a week. Active   gabapentin 300 MG Oral Cap, [RxNorm: 042731] 2/5/2024 Take 1 capsule (300 mg total) by mouth nightly. Active   meclizine 25 MG Oral Tab, [RxNorm: 155895] 2/5/2024 Take 1 tablet (25 mg total) by mouth. Active   oxyCODONE-acetaminophen (PERCOCET)  MG Oral Tab, [RxNorm: 5325524] 2/5/2024 Take 1 tablet by mouth every 4 (four) hours as needed for Pain (max 6/day). Active     ASSESSMENT    1. 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 Patient was started on diltiazem which was continued at discharge. Reports further episodes. She will continue present management. Discussed that if she has recurrent events would likely recommend EP consult for possible ablation.  2. Thromboembolism-history of DVT and pulmonary embolism in the past. Patient is on lifelong anticoagulation and denies bleeding problems.  3. Lower extremity edema-likely secondary to DVT. Patient takes furosemide daily unless she is out of the home. Reports that she keeps her legs elevated and has a hospital bed.  4. Hypertension-blood pressure controlled on diltiazem and diuretic.  5. Nicotine dependence-current everyday smoker.  6. Aortic  stenosis-moderate on January echo with preserved systolic function. Like the patient to follow-up with Dr. Buchanan in about 2 months.Plan:  Continue present management  Call if you have recurrent episodes of tachycardia otherwise see Dr. Buchanan in 2 months     FAMILY HISTORY    No data available    GENERAL STATUS    No data available    PAST MEDICAL HISTORY    PAST MEDICAL HISTORY  Problem Date diagonsed Date resolved Status   Paroxysmal SVT (supraventricular tachycardia), [SNOMED-CT: 32432800] 8/3/2023 - Active   Nicotine dependence with current use, [SNOMED-CT: 88741415] 2/6/2024 - Active   History of DVT (deep vein thrombosis) - Hx, [SNOMED-CT: 669522439] 2/6/2024 - Active   History of pulmonary embolus (PE), [SNOMED-CT: 011774329] 2/6/2024 - Active   Mild pulmonary hypertension, [SNOMED-CT: 446553313] 8/3/2023 - Active   Edema, localized, [SNOMED-CT: 740801911] 8/3/2023 - Active   Hypertension (HTN), primary, [SNOMED-CT: 95433503] 8/3/2023 - Active   Hyperlipidemia, mixed, [SNOMED-CT: 937646066] 8/3/2023 - Active   Exertional dyspnea, [SNOMED-CT: 79676274] 8/3/2023 - Active     HISTORY OF PRESENT ILLNESS    This is a patient of Dr. Buchanan who was seen once in the office in August. 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 She was started on diltiazem. An echocardiogram showed preserved systolic function with ejection fraction 55 to 60%, grade 1 diastolic dysfunction and moderate aortic stenosis. Patient did have a dental consult and had dental extractions and treatment for dental abscess during her hospitalization. She comes today for follow-up.Patient reports that she is feeling much better. She denies any further tachycardia episodes. She reports that she is  tolerating diltiazem. She is compliant with anticoagulation for her history of PE and DVT.     IMMUNIZATIONS  Reconcile with Patient's ChartNo data available    PLAN OF CARE    PLAN OF CARE  Planned Care Date   Follow up visit - Abdirahman Buchanan MD 1/1/1900     PROCEDURES    No data available    LAB RESULTS    No data available    REVIEW OF SYSTEMS    REVIEW OF SYSTEMS  Header Details   Cardiovascular CANTU, Edema  No history of Chest pain, Palpitations, Syncope, PND, Orthopnea, Claudication   Respiratory No history of SOB, Wheezing, Sputum   Hem/Lymphatic No history of Easy bruising, Blood clots, Hx of blood transfusion, Anemia, Bleeding problems     SOCIAL HISTORY    SOCIAL HISTORY  Social History Element Description Effective Dates   Smoking status Heavy tobacco smoker -     FUNCTIONAL STATUS    No data available    MEDICAL EQUIPMENT    No data available    Goals Sections    No data available    REASON FOR REFERRAL    No data available    Health Concerns Section    No data available    COGNITIVE/MENTAL STATUS    No data available    Patient Demographics    Patient Demographics  Patient Address Patient Name Communication   [address] [place] [id number] Hannah Boss [phone] (Mobile)     Patient Demographics  Language Race / Ethnicity Marital Status   English - Spoken (Preferred) Black or  / Unknown      Document Information    Primary Care Provider Other Service Providers Document Coverage Dates   Olivia Leone  NPI: [id number]  [phone] (Work)  [address] [facility], [address], [place] [address]  [place] [address]  Interpreting Physicians  [facility]  [phone] (Work)  [address] [place] [address] Esme Euceda  NPI: [id number]  [phone] (Work)  [address], [address], [place] [address]  [place] [address]  Nurses Feb. 06, 2024February 06, 2024      Organization   [facility]  [place]  [phone] (Work)  [address] [facility], [address], [place] [address]  [place] [address]     Encounter Providers Encounter Date    Feb. 06, 2024February 06, 2024     Legal Authenticator    Olivia Leone  NPI: [id number]  [phone] (Work)  [address] [facility], [address], [place] [address]  [place] [address]

## 2024-12-18 ENCOUNTER — APPOINTMENT (OUTPATIENT)
Dept: MRI IMAGING | Facility: HOSPITAL | Age: 79
End: 2024-12-18
Attending: HOSPITALIST
Payer: MEDICARE

## 2024-12-18 ENCOUNTER — APPOINTMENT (OUTPATIENT)
Dept: MRI IMAGING | Facility: HOSPITAL | Age: 79
DRG: 377 | End: 2024-12-18
Attending: HOSPITALIST
Payer: MEDICARE

## 2024-12-18 LAB
ANION GAP SERPL CALC-SCNC: 5 MMOL/L (ref 0–18)
BASOPHILS # BLD AUTO: 0.06 X10(3) UL (ref 0–0.2)
BASOPHILS NFR BLD AUTO: 0.8 %
BUN BLD-MCNC: 39 MG/DL (ref 9–23)
BUN/CREAT SERPL: 15.7 (ref 10–20)
CALCIUM BLD-MCNC: 8.6 MG/DL (ref 8.7–10.4)
CHLORIDE SERPL-SCNC: 112 MMOL/L (ref 98–112)
CO2 SERPL-SCNC: 26 MMOL/L (ref 21–32)
CREAT BLD-MCNC: 2.48 MG/DL
DEPRECATED RDW RBC AUTO: 58.3 FL (ref 35.1–46.3)
EGFRCR SERPLBLD CKD-EPI 2021: 20 ML/MIN/1.73M2 (ref 60–?)
EOSINOPHIL # BLD AUTO: 0.38 X10(3) UL (ref 0–0.7)
EOSINOPHIL NFR BLD AUTO: 5.2 %
ERYTHROCYTE [DISTWIDTH] IN BLOOD BY AUTOMATED COUNT: 17.4 % (ref 11–15)
GLUCOSE BLD-MCNC: 104 MG/DL (ref 70–99)
GLUCOSE BLDC GLUCOMTR-MCNC: 114 MG/DL (ref 70–99)
GLUCOSE BLDC GLUCOMTR-MCNC: 116 MG/DL (ref 70–99)
GLUCOSE BLDC GLUCOMTR-MCNC: 124 MG/DL (ref 70–99)
GLUCOSE BLDC GLUCOMTR-MCNC: 135 MG/DL (ref 70–99)
HCT VFR BLD AUTO: 26.3 %
HGB BLD-MCNC: 8.3 G/DL
IMM GRANULOCYTES # BLD AUTO: 0.03 X10(3) UL (ref 0–1)
IMM GRANULOCYTES NFR BLD: 0.4 %
LYMPHOCYTES # BLD AUTO: 0.86 X10(3) UL (ref 1–4)
LYMPHOCYTES NFR BLD AUTO: 11.7 %
MCH RBC QN AUTO: 29.1 PG (ref 26–34)
MCHC RBC AUTO-ENTMCNC: 31.6 G/DL (ref 31–37)
MCV RBC AUTO: 92.3 FL
MONOCYTES # BLD AUTO: 0.42 X10(3) UL (ref 0.1–1)
MONOCYTES NFR BLD AUTO: 5.7 %
NEUTROPHILS # BLD AUTO: 5.59 X10 (3) UL (ref 1.5–7.7)
NEUTROPHILS # BLD AUTO: 5.59 X10(3) UL (ref 1.5–7.7)
NEUTROPHILS NFR BLD AUTO: 76.2 %
OSMOLALITY SERPL CALC.SUM OF ELEC: 306 MOSM/KG (ref 275–295)
PLATELET # BLD AUTO: 243 10(3)UL (ref 150–450)
POTASSIUM SERPL-SCNC: 4.8 MMOL/L (ref 3.5–5.1)
RBC # BLD AUTO: 2.85 X10(6)UL
SODIUM SERPL-SCNC: 143 MMOL/L (ref 136–145)
WBC # BLD AUTO: 7.3 X10(3) UL (ref 4–11)

## 2024-12-18 PROCEDURE — 99233 SBSQ HOSP IP/OBS HIGH 50: CPT | Performed by: HOSPITALIST

## 2024-12-18 PROCEDURE — 72148 MRI LUMBAR SPINE W/O DYE: CPT | Performed by: HOSPITALIST

## 2024-12-18 PROCEDURE — 99223 1ST HOSP IP/OBS HIGH 75: CPT | Performed by: INTERNAL MEDICINE

## 2024-12-18 RX ORDER — HYDRALAZINE HYDROCHLORIDE 20 MG/ML
10 INJECTION INTRAMUSCULAR; INTRAVENOUS EVERY 6 HOURS PRN
Status: DISCONTINUED | OUTPATIENT
Start: 2024-12-18 | End: 2024-12-24
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
## 2024-12-18 NOTE — PLAN OF CARE
Patient is alert and oriented times 4. On 2L NC, no home O2. No complains of pain at this time. Plan to diurese patient and monitor kidney function. On Iv antibiotics for possible cellulitis. PT/OT/ ST to see patient.     Problem: Patient Centered Care  Goal: Patient preferences are identified and integrated in the patient's plan of care  Description: Interventions:  - Provide timely, complete, and accurate information to patient/family  - Incorporate patient and family knowledge, values, beliefs, and cultural backgrounds into the planning and delivery of care  - Encourage patient/family to participate in care and decision-making at the level they choose  - Honor patient and family perspectives and choices  Outcome: Progressing     Problem: Patient/Family Goals  Goal: Patient/Family Short Term Goal  Description: Patient's Short Term Goal:     Interventions:   -  - See additional Care Plan goals for specific interventions  Outcome: Progressing     Problem: CARDIOVASCULAR - ADULT  Goal: Maintains optimal cardiac output and hemodynamic stability  Description: INTERVENTIONS:  - Monitor vital signs, rhythm, and trends  - Monitor for bleeding, hypotension and signs of decreased cardiac output  - Evaluate effectiveness of vasoactive medications to optimize hemodynamic stability  - Monitor arterial and/or venous puncture sites for bleeding and/or hematoma  - Assess quality of pulses, skin color and temperature  - Assess for signs of decreased coronary artery perfusion - ex. Angina  - Evaluate fluid balance, assess for edema, trend weights  Outcome: Progressing     Problem: RESPIRATORY - ADULT  Goal: Achieves optimal ventilation and oxygenation  Description: INTERVENTIONS:  - Assess for changes in respiratory status  - Assess for changes in mentation and behavior  - Position to facilitate oxygenation and minimize respiratory effort  - Oxygen supplementation based on oxygen saturation or ABGs  - Provide Smoking Cessation  handout, if applicable  - Encourage broncho-pulmonary hygiene including cough, deep breathe, Incentive Spirometry  - Assess the need for suctioning and perform as needed  - Assess and instruct to report SOB or any respiratory difficulty  - Respiratory Therapy support as indicated  - Manage/alleviate anxiety  - Monitor for signs/symptoms of CO2 retention  Outcome: Progressing     Problem: SKIN/TISSUE INTEGRITY - ADULT  Goal: Skin integrity remains intact  Description: INTERVENTIONS  - Assess and document risk factors for pressure ulcer development  - Assess and document skin integrity  - Monitor for areas of redness and/or skin breakdown  - Initiate interventions, skin care algorithm/standards of care as needed  Outcome: Progressing

## 2024-12-18 NOTE — DISCHARGE INSTRUCTIONS
Utility Resources:  [facility] [place] [place] [employer] ([facility]) - [address]   [phone]   Monday - Thursday 8:30am - 3:00pm, [phone]    Individual Counseling Referrals:    To schedule an initial appointment , please contact one of the counseling centers listed below. All options are in network with your [facility] coverage.     [facility] - multiple locations Phone: [phone]  *VIRTUAL CARE OPTIONS AVAILABLE     [facility] [address] [place], [place], [address]  Phone: [phone]    [facility]  [address], [place] [address] [phone]    Yeimy Garza  [phone]  [address]  [place], [place] [address]    POST-UPPER ENDOSCOPY (EGD) DISCHARGE INSTRUCTIONS      PROCEDURE PERFORMED: EGD with biopsies and and capsule placement    ENDOSCOPIST: Mary Anderson MD    FINDINGS: Gastritis (inflammation of the stomach lining)    MEDICATIONS: You may resume all other medications today    DIET: You may resume your regular diet today.    BIOPSIES: Biopsies were taken.  They will be sent to pathology and results will be available in 7-10 days.    X-RAYS:  Capsule endoscopy Initiated        Activity for remainder of today:  REST TODAY  DO NOT drive or operate heavy machinery  DO NOT drink any alcoholic beverages  DO NOT sign any legal documents or make any important decisions    After your procedure(s):  It is not unusual to feel bloated or gassy .  Passing gas and belching is encouraged. Lying on your left side with your knees flexed may relieve the discomfort. A hot pack to the abdomen may also help. If you had an upper endoscopy (EGD), you may experience a slight sore throat which will subside. Throat lozenges or salt water gargle can be used.    FOLLOW-UP:  Contact the office at [phone] if a follow-up appointment is needed or if you develop any of the  following:    Severe abdominal pain/discomfort   Excessive bleeding  Black tarry stool  Difficulty breathing/swallowing  Persistent nausea/vomiting    Fever above 100 degrees or chills      Going Home Instructions  In this section you will find the tools which will guide you through the first few days after you leave the hospital. Continued use of these tools will help you develop the skills necessary to keep your heart failure under control.     Home Care Instructions Following Heart Failure - the most important things to do every day include:   Weigh yourself and review the “Self-Check Plan” sheet every morning.   Call your cardiologist office if you are in the “Pay Attention-Use Caution” (yellow zone) or “Medical Alert-Warning!” (red zone) as outlined in the Self-Check Plan sheet.  Take your medicines as prescribed.  Limit your sodium (salt) intake.  Know when to call your cardiologist, primary doctor, or nurse.  Know when to seek emergency care.      Things for You to Remember:   1. See your doctor or healthcare provider as written on your discharge instructions.  It is important that you attend this appointment to make sure your symptoms are under control.     2. Your recommended sodium intake is 0637-7343 mg daily.    3.  Weigh yourself every day.    4. Some exercise and activity is important to help keep your heart functioning and strong. Unless instructed not to exercise, you may walk at a slow to moderate pace for 10-15 minutes 2-3 days per week to start. Pace your activity to prevent shortness of breath or fatigue. Stop exercising if you develop chest pain, lightheadedness, or significant shortness of breath.       Call Your Cardiologist If:   You gain 2-3 pounds in one day or 5 pounds in one week.  You have more difficulty breathing.  You are getting more tired with normal activity.  You are more short of breath lying down, or awaken at night short of breath.  You have swelling of your feet or legs.  You  urinate less often during the day and more often at night.  You have cramps in your legs.  You have blurred vision or see yellowish-green halos around objects of lights.    Go to the Emergency Room If:   You have pain or tightness in your chest  You are extremely short of breath  You are coughing up pink-frothy mucus  You are traveling and develop symptoms of worsening heart failure      ** Please follow up with your cardiologist or Advanced Practice Provider as written on your discharge instructions. If you are not provided with an appointment, let your nurse know so you can get an appointment**             No

## 2024-12-18 NOTE — HISTORICAL OFFICE NOTE
Facility [facility]  [address], [address], [place] [address]  [phone]      Hannah Boss  Progress Note  Demographics:  Name: Hannah Boss YOB: 1945  Age: 78, Female Medical Record No: [id number]  Visited Date/Time: 02/06/2024 01:30 PM    Chief Complaints  hospital f/u from chest pain  History of Present Illness  This is a patient of Dr. Buchanan who was seen once in the office in August.  She has a history of hypertension, chronic kidney disease, aortic stenosis, hyperlipidemia, left DVT in 2023, pulmonary hypertension, PSVT, leimyosarcoma and cigarette smoking.    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  She was started on diltiazem.  An echocardiogram showed preserved systolic function with ejection fraction 55 to 60%, grade 1 diastolic dysfunction and moderate aortic stenosis.  Patient did have a dental consult and had dental extractions and treatment for dental abscess during her hospitalization.  She comes today for follow-up.    Patient reports that she is feeling much better.  She denies any further tachycardia episodes.  She reports that she is tolerating diltiazem.  She is compliant with anticoagulation for her history of PE and DVT.  Cardiac risk factors Heavy tobacco smoker  Past Medical History  1.Paroxysmal SVT (supraventricular tachycardia)  2.Nicotine dependence with current use  3.History of DVT (deep vein thrombosis) - Hx  4.History of pulmonary embolus (PE)  5.Mild pulmonary hypertension  6.Edema, localized  7.Hypertension (HTN), primary  8.Hyperlipidemia, mixed  9.Exertional dyspnea  Family History  No significant family history noted.  Social History  Smoking status Heavy tobacco smoker  Tobacco usage - Yes (Heavy cigarette smoker (20-39 cigs/day) (finding))  Review of systems  Cardiovascular CANTU and Edema  No history of Chest pain, Palpitations, Syncope, PND,  Orthopnea and Claudication  Respiratory No history of SOB, Wheezing and Sputum  Hem/Lymphatic No history of Easy bruising, Blood clots, Hx of blood transfusion, Anemia and Bleeding problems  Physical Examination  Constitutional 100%o2  Vitals Left Arm Sitting  / 52 mmHg, Pulse rate 74 bpm, Regular, Height in 5' 3\", BMI: 30.8, Weight in 174 lbs (or) 79 kgs and BSA : 1.9 cc/m²  General Appearance No Acute Distress  Neck No JVD  Respiratory Lungs clear with normal breath sounds  Cardiovascular 3/6 murmur and Regular rhythm. Normal rate present. Normal and normal S1 and S2    Lower Extremities Edema 2+  Allergies  No known medication allergies.  Medications (Info obtained by: Verbal)  1.amoxicillin clavulanate 875-125 MG Oral Tab, Take 1 tablet by mouth 2 (two) times daily for 14 days.  2.apixaban 5 MG Oral Tab, Take 1 tablet (5 mg total) by mouth 2 (two) times daily.  3.atorvastatin 40 MG Oral Tab, Take 1 tablet (40 mg total) by mouth. AT BEDTIME  4.cholecalciferol 50 MCG (2000 UT) Oral Tab, Take 1 tablet (2,000 Units total) by mouth daily.  5.diazePAM 10 MG Oral Tab, Take 1 tablet (10 mg total) by mouth every 8 (eight) hours as needed (MAX 3/DAY).  6.dilTIAZem  MG Oral Capsule SR 24 Hr, Take 1 capsule (120 mg total) by mouth daily.  7.ferrous sulfate 325 (65 FE) MG Oral Tab EC, Take 1 tablet (325 mg total) by mouth See Admin Instructions. Three times a week  8.furosemide 20 MG Oral Tab, Take 1 tablet (20 mg total) by mouth 3 (three) times a week.  9.gabapentin 300 MG Oral Cap, Take 1 capsule (300 mg total) by mouth nightly.  10.meclizine 25 MG Oral Tab, Take 1 tablet (25 mg total) by mouth.  11.oxyCODONE-acetaminophen (PERCOCET)  MG Oral Tab, Take 1 tablet by mouth every 4 (four) hours as needed for Pain (max 6/day).  Impression  1.Paroxysmal SVT (supraventricular tachycardia)  2.Nicotine dependence with current use  3.History of DVT (deep vein thrombosis) - Hx  4.History of pulmonary embolus  (PE)  5.Edema, localized  6.Hypertension (HTN), primary  7.Hyperlipidemia, mixed  8.Exertional dyspnea  Assessment & Plan  1.  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  Patient was started on diltiazem which was continued at discharge.  Reports further episodes.  She will continue present management.  Discussed that if she has recurrent events would likely recommend EP consult for possible ablation.  2.  Thromboembolism-history of DVT and pulmonary embolism in the past.  Patient is on lifelong anticoagulation and denies bleeding problems.  3.  Lower extremity edema-likely secondary to DVT.  Patient takes furosemide daily unless she is out of the home.  Reports that she keeps her legs elevated and has a hospital bed.  4.  Hypertension-blood pressure controlled on diltiazem and diuretic.  5.  Nicotine dependence-current everyday smoker.  6.  Aortic stenosis-moderate on January echo with preserved systolic function.  Like the patient to follow-up with Dr. Buchanan in about 2 months.    Plan:  Continue present management  Call if you have recurrent episodes of tachycardia otherwise see Dr. Buchanan in 2 months  Future appointments  1.Follow up visit - Abdirahman Buchanan MD (2 Months)  Miscellaneous  1.Tobacco Cessation reviewed with the patient (Smoking cessation drug therapy (regime/therapy))  2.Weight monitoring (regime/therapy)  Patient instructions  Continue present management  Call if you have recurrent episodes of tachycardia otherwise see Dr. Buchanan in 2 months    Care Providers: Olivia PÉREZ and Esme ELLER  Electronically Authenticated by  Olivia PÉREZ  02/06/2024 02:01:01 PM  Disclaimer: Components of this note were documented using voice recognition system and are subject to errors not corrected at proofreading. Contact the author of this note for any clarifications.

## 2024-12-18 NOTE — ED QUICK NOTES
Patient stable for transfer to floor at this time. A&Ox4, skin p/w/d. Denies cp/stephania. IV site patent and intact. All belongings accompanying patient to floor.
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
## 2024-12-18 NOTE — DIETARY NOTE
NUTRITION EDUCATION NOTE     Received consult for \"heart failure nutrition education\". Pt visited, currently on clear liquid diet (CLD) - waiting on GI evaluation r/t low hemoglobin per RN. Discussion with RN, SLP downgraded to NTL. Chart reviewed, pt has been education by RD during previous admissions - last seen 6/19/24 and 5/1/24. Pt reports following heart healthy diet at home. Provided with Heart Failure Nutrition Therapy NCM handout to reinforce. Would benefit from outpt f/u. Expect fair to good compliance. RD contact information provided to pt.      Kaylen Damico MS, DEMETRICE, RDN, LDN  Clinical Dietitian  P: [phone]

## 2024-12-18 NOTE — PHYSICAL THERAPY NOTE
PHYSICAL THERAPY EVALUATION - INPATIENT     Room Number: 342/342-A  Evaluation Date: 12/18/2024  Type of Evaluation: Initial   Physician Order: PT Eval and Treat    Presenting Problem: BLE edema & pain  Co-Morbidities : CAD, aortic stenosis, DM2, CKD3, HTN, HLD, HFpEF, fibromyalgia, OA, L rotator cuff repair, neurological gait dysfunction also going back to 2016, paraparesis, traumatic rhabdomyolysis 12/2022,  thoracic myelopathy (T11-T12), retroperitoneal sarcoma s/p resection with recurrence complicated by chronic abdominal pain, and daily opioid use  Reason for Therapy: Mobility Dysfunction and Discharge Planning    PHYSICAL THERAPY ASSESSMENT   Patient is a 79 year old female admitted 12/17/2024 for BLE edema and pain.  Prior to admission, patient's baseline is [place] using a cane and walker, part-time caregiver assists with IADLs and ADLs as needed.  Patient is currently functioning near baseline with transfers and gait.  Patient is requiring stand-by assist and contact guard assist as a result of the following impairments: decreased endurance/aerobic capacity, decreased muscular endurance, medical status, and BLE edema .  Physical Therapy will continue to follow for duration of hospitalization.    Patient will benefit from continued skilled PT Services to promote return to prior level of function and safety with continuous assistance and gradual rehabilitative therapy vs home with home-health PT and additional support pending progress.    PLAN DURING HOSPITALIZATION  Nursing Mobility Recommendation : 1 Assist  PT Device Recommendation: Rolling walker;Gait belt  PT Treatment Plan: Bed mobility;Body mechanics;Endurance;Energy conservation;Patient education;Family education;Gait training;Range of motion;Strengthening;Stair training;Transfer training  Rehab Potential : Good  Frequency (Obs): 5x/week     PHYSICAL THERAPY MEDICAL/SOCIAL HISTORY   Problem List  Principal Problem:    Anemia, unspecified type  Active  Problems:    Acute on chronic congestive heart failure, unspecified heart failure type (HCC)    Hyperkalemia    Acute renal failure superimposed on chronic kidney disease, unspecified acute renal failure type, unspecified CKD stage (HCC)    Cellulitis of lower extremity, unspecified laterality    GI bleed      HOME SITUATION  Type of Home: House  Home Layout: One level  Stairs to Enter : 0                  Lives With: Alone;Caregiver part-time (3 days/week, 3 hrs/visit; son checks in every other day)    Drives: No   Patient Regularly Uses: Home O2;Reading glasses (2L; cane; intermittent RW)     Prior Level of [place]: [place] using a cane and walker, part-time caregiver assists with IADLs and ADLs as needed, 2 falls in past 6 months, Life Alert call button, reports progressive decline in function over past 1 month    SUBJECTIVE  \"I don't know how I'm going to do this with my legs like this.\"    PHYSICAL THERAPY EXAMINATION   OBJECTIVE  Precautions: Bed/chair alarm  Fall Risk: High fall risk    WEIGHT BEARING RESTRICTION  none    PAIN ASSESSMENT  Rating: Other (Comment) (did not quantify)  Location: BLEs  Management Techniques: Body mechanics;Activity promotion    COGNITION  Overall Cognitive Status:  WFL - within functional limits    RANGE OF MOTION AND STRENGTH ASSESSMENT  Upper extremity ROM and strength are within functional limits  BUEs  Lower extremity ROM and strength is within functional limits  BLEs above ankles, limited at feet and ankles 2/2 edema    BALANCE  Static Sitting: Good  Dynamic Sitting: Fair +  Static Standing: Fair  Dynamic Standing: Fair -    ACTIVITY TOLERANCE  Pulse: 76        BP: 134/54  BP Location: Right arm  BP Method: Automatic  Patient Position: Semi-[place]    O2 WALK  Oxygen Therapy  SPO2% on Oxygen at Rest: 95  At rest oxygen flow (liters per minute): 2  SPO2% Ambulation on Oxygen: 96  Ambulation oxygen flow (liters per minute): 2    AM-PAC '6-Clicks' INPATIENT SHORT FORM - BASIC  MOBILITY  How much difficulty does the patient currently have...  Patient Difficulty: Turning over in bed (including adjusting bedclothes, sheets and blankets)?: A Little   Patient Difficulty: Sitting down on and standing up from a chair with arms (e.g., wheelchair, bedside commode, etc.): A Little   Patient Difficulty: Moving from lying on back to sitting on the side of the bed?: A Little   How much help from another person does the patient currently need...   Help from Another: Moving to and from a bed to a chair (including a wheelchair)?: A Little   Help from Another: Need to walk in hospital room?: A Little   Help from Another: Climbing 3-5 steps with a railing?: A Lot     AM-PAC Score:  Raw Score: 17   Approx Degree of Impairment: 50.57%   Standardized Score (AM-PAC Scale): 42.13   CMS Modifier (G-Code): CK    FUNCTIONAL ABILITY STATUS  Functional Mobility/Gait Assessment  Gait Assistance: Contact guard assist;Other (Comment) (SBA)  Distance (ft): 16  Assistive Device: Rolling walker  Pattern: Shuffle (slow pace, decreased BLE foot clearance, slow pace, distance limited 2/2 decreased endurance)  Supine to Sit: stand-by assist - head of bed elevated, increased time  Sit to Stand: contact guard assist - from edge of bed    Exercise/Education Provided:  Education Provided To: Patient  Patient Education: Role of Physical Therapy;Plan of Care;Functional Transfer Techniques;Discharge Recommendations;Energy Conservation;Proper Body Mechanics;Gait Training  Patient's Response to Education: Verbalized Understanding;Returned Demonstration          Skilled Therapy Provided: Pt ambulation distances limited 2/2 decreased endurance, presents with increased work of breathing with activity, SpO2 stable on 2L. Pt demonstrates increased fall risk with hx of falls, edema impacting body mechanics, and decreased endurance.    Patient seen for evaluation in coordination with occupational therapy to maximize patient participation and  function. Patient received semi-fowlers in bed, agreeable to physical therapy evaluation. Vital signs monitored as noted above, patient experiencing increased work of breathing with activity, SpO2 stable on 2L . Next session anticipate to progress transfers and gait.    Patient history and/or personal factors that may impact the plan of care include history of falls and co-morbidities (CAD, aortic stenosis, DM2, CKD3, HTN, HLD, HFpEF, fibromyalgia, OA, L rotator cuff repair, neurological gait dysfunction also going back to 2016, paraparesis, traumatic rhabdomyolysis 12/2022,  thoracic myelopathy (T11-T12), retroperitoneal sarcoma s/p resection with recurrence complicated by chronic abdominal pain, and daily opioid use) affecting activity tolerance, endurance, medical status . Based on the physical therapy examination of the noted systems and functional activity/participation limitations, the patient presentation is evolving given the patient demonstrates worsening of previously stable condition and demonstrates worsening of co-morbidities.    The patient's Approx Degree of Impairment: 50.57% has been calculated based on documentation in the [facility] '6 clicks' Inpatient Basic Mobility Short Form.  Research supports that patients with this level of impairment may benefit from a rehab facility vs home-health PT.  Final disposition will be made by interdisciplinary medical team.    Patient End of Session: Up in chair;Needs met;Call light within reach;All patient questions and concerns addressed;Hospital anti-slip socks;Alarm set    CURRENT GOALS  Goals to be met by: 1/8/24  Patient Goal Patient's self-stated goal is: manage BLE edema   Goal #1 Patient is able to demonstrate supine - sit EOB @ level: modified independent     Goal #1   Current Status    Goal #2 Patient is able to demonstrate transfers EOB to/from [facility] at assistance level: modified independent with walker - rolling     Goal #2  Current Status    Goal #3  Patient is able to ambulate 50 feet with assist device: walker - rolling at assistance level: contact guard assistance   Goal #3   Current Status    Goal #4 Patient is able to demonstrate transfers sit to/from stand at assistance level: modified independence with rolling walker      Goal #4   Current Status    Goal #5 Patient to demonstrate independence with home activity/exercise instructions provided to patient in preparation for discharge.   Goal #5   Current Status    Goal #6    Goal #6  Current Status      Patient Evaluation Complexity Level:  History High - 3 or more personal factors and/or co-morbidities   Examination of body systems Moderate - addressing a total of 3 or more elements   Clinical Presentation  Moderate - Evolving   Clinical Decision Making  Moderate Complexity       Therapeutic Activity:  15 minutes      Esme Yeung, PT, DPT  [facility]  Rehab Services - Physical Therapy  [id number]

## 2024-12-18 NOTE — PROGRESS NOTES
[facility]  part of [facility]    Progress Note    Hannah Boss Patient Status:  Inpatient    1945 MRN [id number]   Location [facility] 3W/SW Attending Maximilian Collins MD   Hosp Day # 1 PCP Diana Lopez DO       Subjective:     Pt notes back pain for several weeks.    We discussed MRI results and I offered ANTHONY however pt does not want this.  Pt notes some SOB.  Notes leg swelling.  No CP.  We discussed GI consult.    Objective:   Blood pressure 144/58, pulse 73, temperature 98.3 °F (36.8 °C), temperature source Oral, resp. rate 18, weight 216 lb 11.4 oz (98.3 kg), SpO2 96%, not currently breastfeeding.    Gen:   NAD.  A and O x 3  CV:   RRR, 3/6 DANIEL  Pulm:   faint bibasilar crackles  Abd:   +bs, soft, NT, ND  LE:   1-2+ edema bilat.   Venous stasis changes bilat.   Mild acute erythema of LLE.  Neuro:   nonfocal    Results:     Lab Results   Component Value Date    WBC 7.3 2024    HGB 8.3 (L) 2024    HCT 26.3 (L) 2024    .0 2024    CREATSERUM 2.48 (H) 2024    BUN 39 (H) 2024     2024    K 4.8 2024     2024    CO2 26.0 2024     (H) 2024    CA 8.6 (L) 2024    ALB 3.5 2024    ALKPHO 187 (H) 2024    BILT 0.3 2024    TP 6.0 2024    AST 27 2024    ALT 23 2024    PTT 35.0 2024    INR 1.30 (H) 2024    T4F 2.5 (H) 2024    TSH 0.008 (L) 2024    LIP 22 2024    DDIMER 1.13 (H) 2023    MG 1.8 2024    PHOS 3.3 2024    TROP 0.398 (HH) 2021     (H) 2024    B12 497 2024    ETOH <3 10/26/2024       MRI SPINE LUMBAR (CPT=72148)    Result Date: 2024  CONCLUSION:   Degenerative disc and facet disease throughout the lumbar spine, most prominent at L2 through L5, where there is severe narrowing of the canal and bilateral neural foramen which is unchanged since 10/6/2023.  Extensive severe  narrowing of the bilateral subarticular and lateral recesses throughout the entire lumbar spine is unchanged since 10/6/2023.  No acute fracture or vertebral body height loss.  0.8 cm extruded disc fragment at L4-L5 contributes to the severe canal and foraminal narrowing and has mass effect upon the descending right L5 nerve root.  0.6 cm extruded disc fragment L3-L4 is unchanged.  Mass effect upon the exiting right L3 nerve root is unchanged.  Preliminary report was submitted by the [facility] teleradiologist and there are no significant discrepancies.    Dictated by (CST): Rolan Dyer MD on 12/18/2024 at 7:03 AM     Finalized by (CST): Rolan Dyer MD on 12/18/2024 at 7:10 AM          US VENOUS DOPPLER LEG BILAT - DIAG IMG (CPT=93970)    Result Date: 12/17/2024  CONCLUSION: No sonographic evidence of bilateral lower extremity DVT.  There is moderate soft tissue edema within the bilateral lower extremities.    Dictated by (CST): David Wild MD on 12/17/2024 at 5:02 PM     Finalized by (CST): David Wild MD on 12/17/2024 at 5:02 PM          XR CHEST AP PORTABLE  (CPT=71045)    Result Date: 12/17/2024  CONCLUSION:  1. Mild cardiomegaly and minimally prominent pulmonary vascularity with minimal interstitial edema.  Findings may suggest early cardiac failure/fluid overload.  No large airspace consolidation or pleural effusion.     Dictated by (CST): Dl Santamaria MD on 12/17/2024 at 4:36 PM     Finalized by (CST): Dl Santamaria MD on 12/17/2024 at 4:37 PM         EKG 12 Lead    Result Date: 12/17/2024  Sinus bradycardia Cannot rule out Anterior infarct , age undetermined Abnormal ECG When compared with ECG of 09-DEC-2024 14:12, Nonspecific T wave abnormality, improved in Lateral leads Confirmed by TANESHA CASSIDY ([id number]) on 12/17/2024 4:41:46 PM     Assessment and Plan:     Gastrointestinal bleed with posthemorrhagic anemia, probable upper source, rule out peptic ulcer disease or small bowel arteriovenous  malformations.  Pt normally on Eliquis for hx of DVT.  - GI consult  - CLD  - cont IV bid protonix  - follow cbc  - was transfused 1 unit prbc  - transfuse prn for hgb <7  - hold Eliquis  - doppler of LEs this admit is negative for DVT    Fluid overload status   Acute on chronic HFpEF  Underlying moderate aortic stenosis.  - cardiology on consult  - diuresis with IV bumex  - monitor Is/Os, daily wts  - follow bmp    Bilateral lower extremity cellulitis.  - mostly venous stasis changes  - cont IV ancef  - diuresis as above    Musculoskeletal deconditioning  Chronic back pain with neurogenic claudication.  MRI of L spine this admit shows areas of spinal stenosis and mild HNP.     - pt offered anesthesia pain consult to eval for ANTHONY however pt refusing this  - cont lyrica    DICK on CKD 4  - renal on consult  - monitor bmp with diuresis    Hx of HTN  - cont hydralazine  - cards and renal on consult  - on iv bumex    Hx of tobacco abuse  Active smoker  - counseled on cessation    dvt proph:     SCDs    Code status:    Full       MDM:    High Maximilian Jansen MD  12/18/2024

## 2024-12-18 NOTE — PLAN OF CARE
Problem: CARDIOVASCULAR - ADULT  Goal: Maintains optimal cardiac output and hemodynamic stability  Description: INTERVENTIONS:  - Monitor vital signs, rhythm, and trends  - Monitor for bleeding, hypotension and signs of decreased cardiac output  - Evaluate effectiveness of vasoactive medications to optimize hemodynamic stability  - Monitor arterial and/or venous puncture sites for bleeding and/or hematoma  - Assess quality of pulses, skin color and temperature  - Assess for signs of decreased coronary artery perfusion - ex. Angina  - Evaluate fluid balance, assess for edema, trend weights  Outcome: Progressing  Goal: Absence of cardiac arrhythmias or at baseline  Description: INTERVENTIONS:  - Continuous cardiac monitoring, monitor vital signs, obtain 12 lead EKG if indicated  - Evaluate effectiveness of antiarrhythmic and heart rate control medications as ordered  - Initiate emergency measures for life threatening arrhythmias  - Monitor electrolytes and administer replacement therapy as ordered  Outcome: Progressing     Problem: RESPIRATORY - ADULT  Goal: Achieves optimal ventilation and oxygenation  Description: INTERVENTIONS:  - Assess for changes in respiratory status  - Assess for changes in mentation and behavior  - Position to facilitate oxygenation and minimize respiratory effort  - Oxygen supplementation based on oxygen saturation or ABGs  - Provide Smoking Cessation handout, if applicable  - Encourage broncho-pulmonary hygiene including cough, deep breathe, Incentive Spirometry  - Assess the need for suctioning and perform as needed  - Assess and instruct to report SOB or any respiratory difficulty  - Respiratory Therapy support as indicated  - Manage/alleviate anxiety  - Monitor for signs/symptoms of CO2 retention  Outcome: Progressing    Received awake,oriented with periods of lethargy last night. Given 1 unit of PRBC. On IV bumex,IV antibiotics. Kept on CL diet. Plans for PT/OT evaluation,wound consult.

## 2024-12-18 NOTE — H&P
[facility]    PATIENT'S NAME: MERNA ALEXANDER   ATTENDING PHYSICIAN: Kam Berman MD   PATIENT ACCOUNT#:   [id number]    LOCATION:  [address]  MEDICAL RECORD #:   [id number]       YOB: 1945  ADMISSION DATE:       12/17/2024    HISTORY AND PHYSICAL EXAMINATION    CHIEF COMPLAINT:  Fluid overload status, gastrointestinal bleed, bilateral lower extremity cellulitis.    HISTORY OF PRESENT ILLNESS:  The patient is a 79-year-old  female with multiple medical problems and chronic musculoskeletal deconditioning, almost bedbound.  Brought in today for increased edema, pain in her lower extremities with fatigue.  The patient had a CBC showing a hemoglobin of 6.1, which is below her baseline.  No leukocytosis or left shift.  ProBNP 778.  Liver function tests unremarkable.  Chemistry showed potassium 5.5, BUN and creatinine of 42 and 2.57.  GFR is 19 which is below her baseline; usually she is in mid 20s.  Venous Doppler study of both lower extremities showed no DVT.  Chest x-ray showed fluid overload status.  Rectal exam done by me showed dark brown stool, positive for Hemoccult blood.  The patient was started on IV Bumex, blood transfusion, IV Protonix, Ancef, and she will be admitted to the hospital for further management.    PAST MEDICAL HISTORY:  Moderate aortic stenosis with left ventricular diastolic dysfunction grade 1, moderate pulmonary artery hypertension, paroxysmal supraventricular tachycardia, bronchitis, emphysema, hypertension, chronic kidney disease stage 4, anemia of chronic kidney disease, history of C difficile infection, degenerative joint disease of lumbar spine, history of DVT and chronically anticoagulated with Eliquis.    PAST SURGICAL HISTORY:  Retroperitoneal leiomyosarcoma, status post radical resection, followed by radiation therapy, with stable findings as of April 2024 imaging studies.  Right carpal tunnel release, left shoulder rotator cuff  repair, tubal ligation, and cholecystectomy.    MEDICATIONS:  Please see medication reconciliation list.     ALLERGIES:  No known drug allergies.     FAMILY HISTORY:  Mother had TB.  Father had cerebrovascular accident.    SOCIAL HISTORY:  Chronic tobacco use.  No alcohol or drug use.  Requires assistance in her basic activities of daily living.     REVIEW OF SYSTEMS:  The patient describes pain in her back radiating to both lower legs with inability to ambulate, which has been a chronic problem for her.  Reviewing the records, prior CT scan of the lumbar spine after a fall showed multilevel degenerative disease.  Also the patient describes epigastric discomfort when she eats and bloating.  She has been noticing her bowel movements have been darker lately with progressive fatigue, leg edema, and pain.  She had tightness in her legs.  No chest pain.  Other 12-point review of systems is negative.        PHYSICAL EXAMINATION:    GENERAL:  Alert and oriented to time, place, and person.  Mild to moderate distress.    VITAL SIGNS:  Temperature 98.8, pulse 57, respiratory rate 18, blood pressure 138/53, pulse ox 92% on room air.  HEENT:  Atraumatic.  Oropharynx clear.  Dry mucous membranes.  Eyes:  Anicteric sclerae.  NECK:  Supple.  No lymphadenopathy.  Positive jugular venous distention.  Trachea midline.   LUNGS:  Rhonchi auscultated on both lung fields.   HEART:  Regular rate and rhythm.  S1 and S2 auscultated.  Systolic murmur, best heard at right second intercostal space.  ABDOMEN:  Soft, nondistended.  No tenderness.  Positive bowel sounds.  EXTREMITIES:  Edema, +2, both legs, with stasis and dermatitis and cellulitis involving both legs.  There is an eschar wound with mild drainage from the right lateral leg.  NEUROLOGIC:  Motor and sensory intact.       ASSESSMENT:    1.   Gastrointestinal bleed with posthemorrhagic anemia, possible upper source, rule out peptic ulcer disease or small bowel arteriovenous  malformations.  2.   Fluid overload status and acute on chronic heart failure, preserved ejection fraction with underlying aortic stenosis.  3.   Bilateral lower extremity cellulitis.  4.   Musculoskeletal deconditioning, chronic back pain with neurogenic claudication.  5.   Chronic kidney disease stage 4 with acute on chronic component.    PLAN:  The patient will be admitted to telemetry floor.  IV Lasix.  IV Bumex.  Blood transfusion.  IV Protonix.  IV Ancef.  Obtain Cardiology, Nephrology, and Gastroenterology consult.  Clear liquid diet.  Obtain MRI scan of the lumbar spine.  Further recommendations to follow.      Dictated By Erich Pascual MD  d: 12/17/2024 17:11:50  t: 12/17/2024 17:59:16  Job [id number]  FB/
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
## 2024-12-18 NOTE — CM/SW NOTE
12/18/24 1500   CM/SW Referral Data   Referral Source Social Work (self-referral)   Reason for Referral Discharge planning;Financial issues   Informant Patient   Medical Hx   Does patient have an established PCP? Yes   Significant Past Medical/Mental Health Hx HTN, Bronchitis, Emphysema, CKD4   Patient Info   Patient's Current Mental Status at Time of Assessment Alert;Oriented   Patient's Home Environment House   Number of Levels in Home 1   Number of Stair in Home 0   Patient lives with Alone   Patient Status Prior to Admission   Independent with ADLs and Mobility Yes   Services in place prior to admission DME/Supplies at home   DME Provider/Supplier HME   Type of DME/Supplies Straight Cane;Wheeled Walker;Home Oxygen   Discharge Needs   Anticipated D/C needs Subacute rehab   Services Requested   Submitted to [facility] Yes   PASRR Level 1 Submitted Yes     Social work was able to meet with the patient at bedside regarding discharge planning.    The patient lives in 1 level home alone.  The patient is independent with all ADLs at baseline.  The patient owns a cane, walker and has home O2 w/HME.    Social work advised the patient that the Anticipated therapy need: Gradual Rehabilitative Therapy.  The patient is in agreement and would prefer to go [facility] as she was there before.    The patient has a hx w/[facility].    Social work sent a REVA referral in [facility] and will update the patient acceptance.    The patient also expressed frustration with utility bills and food.  Social work discuss LIHEAP and SNAP with the patient.  SDOH resources have been added to the patient's AVS.    The patient states she does not feel depressed and is not on any medication for depression.    Social work will follow up.    SW/CM to remain available for support and/or discharge planning.     Jennie Ramey MSW, LSW  Discharge Planner [id number]

## 2024-12-18 NOTE — PROGRESS NOTES
12/18/24 1453   Wound 12/18/24 Leg Lateral;Lower;Right   Date First Assessed/Time First Assessed: 12/18/24 0100   Present on Original Admission: Yes  Primary Wound Type: Other (comment)  Location: Leg  Wound Location Orientation: Lateral;Lower;Right   Wound Image    Site Assessment Clean;Dry;Intact;Pink   Closure Approximated   Drainage Amount None   Treatments Site Care   Dressing Aquacel Foam   Dressing Changed Reinforced   Dressing Status Dry;Clean;Intact   Wound Depth (cm) 0 cm   Non-staged Wound Description Not applicable   Tarah-wound Assessment Clean;Dry;Intact   Wound Bed Epithelium (%) 100 %   State of Healing Epithelialized   Wound Odor None   Wound Follow Up   Follow up needed No     Dr. Pascual placed a wound consult for the above right lateral leg wound. Scab removed, area is epithelialized, no wounds noted. Wound care signing off. Bedside RN updated

## 2024-12-18 NOTE — OCCUPATIONAL THERAPY NOTE
OCCUPATIONAL THERAPY EVALUATION - INPATIENT     Room Number: 342/342-A  Evaluation Date: 12/18/2024  Type of Evaluation: Initial  Presenting Problem: anemia, acute renal failure, acute on chronic CHF, B LE cellulitis, hyperkalemia  Hx HTN, DM2, CHF, CKD4     Physician Order: IP Consult to Occupational Therapy  Reason for Therapy: ADL/IADL Dysfunction and Discharge Planning    OCCUPATIONAL THERAPY ASSESSMENT   Patient is a 79 year old female admitted 12/17/2024 for anemia, acute renal failure, acute on chronic CHF, B LE cellulitis, hyperkalemia.  Patient reported she has a part-time CG who assists with I/ADLs as needed. Stated her son checks in on her every other day. MI for fx mobility using cane/RW. Has hx of 2 falls in the past 6 months; has a Life Alert call button.     Patient is currently functioning near baseline with ADLs and fx mobility/transfers.  Patient is requiring up to mod assist for ADLs as a result of the following impairments: decreased functional strength, decreased functional reach, decreased endurance, impaired balance, and decreased muscular endurance. Occupational Therapy will continue to follow for duration of hospitalization.    Patient will benefit from continued skilled OT Services to promote return to prior level of function and safety with continuous assistance and gradual rehabilitative therapy vs. HH w/ assist.    PLAN DURING HOSPITALIZATION     OT Treatment Plan: Energy conservation/work simplification techniques;Balance activities;ADL training;Functional transfer training;Endurance training;Patient/Family education;Patient/Family training;Equipment eval/education;Compensatory technique education     OCCUPATIONAL THERAPY MEDICAL/SOCIAL HISTORY   Problem List  Principal Problem:    Anemia, unspecified type  Active Problems:    Acute on chronic congestive heart failure, unspecified heart failure type (HCC)    Hyperkalemia    Acute renal failure superimposed on chronic kidney disease,  unspecified acute renal failure type, unspecified CKD stage (HCC)    Cellulitis of lower extremity, unspecified laterality    GI bleed    HOME SITUATION  Type of Home: House  Home Layout: One level  Lives With: Alone; Caregiver part-time (3 days/week, 3 hrs/visit; son checks in every other day)  Toilet and Equipment: 3-in-1 commode  Shower/Tub and Equipment: Walk-in shower; Shower chair (built-in bench)  Other Equipment: Reacher; Hospital bed; First-alert system  Drives: No  Patient Regularly Uses: Home O2; Reading glasses (2L; cane; intermittent RW)    SUBJECTIVE  Patient agreeable to OT evaluation.    OCCUPATIONAL THERAPY EXAMINATION      OBJECTIVE  Precautions: Bed/chair alarm  Fall Risk: -- (moderate fall risk)      PAIN ASSESSMENT  Ratin      ACTIVITY TOLERANCE  Pulse: 76  Heart Rate Source: Monitor     BP: 134/54  BP Location: Right arm  BP Method: Automatic  Patient Position: Semi-[place]    O2 SATURATIONS  Oxygen Therapy  SPO2% on Oxygen at Rest: 95  At rest oxygen flow (liters per minute): 2  SPO2% Ambulation on Oxygen: 96  Ambulation oxygen flow (liters per minute): 2    COGNITION  Overall Cognitive Status:  WFL - within functional limits    SENSATION  Light touch:  intact    RANGE OF MOTION   Upper extremity ROM is within functional limits     STRENGTH ASSESSMENT  Upper extremity strength is within functional limits     COORDINATION  Gross Motor: WFL   Fine Motor: WFL     ACTIVITIES OF DAILY LIVING ASSESSMENT  AM-PAC ‘6-Clicks’ Inpatient Daily Activity Short Form  How much help from another person does the patient currently need…  -   Putting on and taking off regular lower body clothing?: A Little  -   Bathing (including washing, rinsing, drying)?: A Little  -   Toileting, which includes using toilet, bedpan or urinal? : A Lot  -   Putting on and taking off regular upper body clothing?: A Little  -   Taking care of personal grooming such as brushing teeth?: A Little  -   Eating meals?: None    AM-PAC  Score:  Score: 18  Approx Degree of Impairment: 46.65%  Standardized Score (AM-PAC Scale): 38.66  CMS Modifier (G-Code): CK    BED MOBILITY  Supine to Sit: supervision; HOB elevated; has a hospital bed at home    FUNCTIONAL TRANSFER ASSESSMENT  Sit to Stand from EOB: stand-by assist  Chair Transfer: stand-by assist    FUNCTIONAL MOBILITY  contact guard assist for in-room fx mobility using RW    ACTIVITIES OF DAILY LIVING  Eating: setup assist (per obs)   Grooming: setup assist seated (per obs)   UB Dressing: setup assist (per obs)   LB Dressing: min assist  Toileting: mod assist (per obs)     EDUCATION PROVIDED  Patient Education : Role of Occupational Therapy; Plan of Care; Discharge Recommendations; Functional Transfer Techniques; Fall Prevention; Posture/Positioning; Proper Body Mechanics; Energy Conservation  Patient's Response to Education: Verbalized Understanding; Returned Demonstration    The patient's Approx Degree of Impairment: 46.65% has been calculated based on documentation in the [facility] '6 clicks' Inpatient Daily Activity Short Form.  Research supports that patients with this level of impairment may benefit from  OT.  Final disposition will be made by interdisciplinary medical team.     Patient End of Session: Up in chair;Needs met;Call light within reach;RN aware of session/findings;All patient questions and concerns addressed;Hospital anti-slip socks;Alarm set    OT Goals  Patients self stated goal is: none stated     Patient will complete functional transfer with SUP  Comment:     Patient will complete toileting with SUP  Comment:     Patient will complete grooming in standing at sink with SUP  Comment:    Patient will complete item retrieval with SUP  Comment:          Goals  on: 24  Frequency: 3-5x/week    Patient Evaluation Complexity Level:   Occupational Profile/Medical History MODERATE - Expanded review of history including review of medical or therapy record   Specific  performance deficits impacting engagement in ADL/IADL MODERATE  3 - 5 performance deficits   Client Assessment/Performance Deficits MODERATE - Comorbidities and min to mod modifications of tasks    Clinical Decision Making MODERATE - Analysis of occupational profile, detailed assessments, several treatment options    Overall Complexity MODERATE     OT Session Time  Therapeutic Activity: 15 minutes    ROBYN Salazar/L  [facility]  #[id number]

## 2024-12-19 LAB
ALBUMIN SERPL-MCNC: 3.5 G/DL (ref 3.2–4.8)
ANION GAP SERPL CALC-SCNC: 2 MMOL/L (ref 0–18)
BASOPHILS # BLD AUTO: 0.05 X10(3) UL (ref 0–0.2)
BASOPHILS NFR BLD AUTO: 0.8 %
BLOOD TYPE BARCODE: 5100
BUN BLD-MCNC: 36 MG/DL (ref 9–23)
BUN/CREAT SERPL: 15.9 (ref 10–20)
CALCIUM BLD-MCNC: 8.5 MG/DL (ref 8.7–10.4)
CHLORIDE SERPL-SCNC: 111 MMOL/L (ref 98–112)
CO2 SERPL-SCNC: 29 MMOL/L (ref 21–32)
CREAT BLD-MCNC: 2.27 MG/DL
DEPRECATED RDW RBC AUTO: 56.8 FL (ref 35.1–46.3)
EGFRCR SERPLBLD CKD-EPI 2021: 22 ML/MIN/1.73M2 (ref 60–?)
EOSINOPHIL # BLD AUTO: 0.18 X10(3) UL (ref 0–0.7)
EOSINOPHIL NFR BLD AUTO: 2.9 %
ERYTHROCYTE [DISTWIDTH] IN BLOOD BY AUTOMATED COUNT: 17.1 % (ref 11–15)
GLUCOSE BLD-MCNC: 104 MG/DL (ref 70–99)
GLUCOSE BLDC GLUCOMTR-MCNC: 111 MG/DL (ref 70–99)
GLUCOSE BLDC GLUCOMTR-MCNC: 141 MG/DL (ref 70–99)
GLUCOSE BLDC GLUCOMTR-MCNC: 153 MG/DL (ref 70–99)
GLUCOSE BLDC GLUCOMTR-MCNC: 160 MG/DL (ref 70–99)
HCT VFR BLD AUTO: 24.7 %
HGB BLD-MCNC: 7.8 G/DL
IMM GRANULOCYTES # BLD AUTO: 0.03 X10(3) UL (ref 0–1)
IMM GRANULOCYTES NFR BLD: 0.5 %
LYMPHOCYTES # BLD AUTO: 0.75 X10(3) UL (ref 1–4)
LYMPHOCYTES NFR BLD AUTO: 12.2 %
MCH RBC QN AUTO: 29.1 PG (ref 26–34)
MCHC RBC AUTO-ENTMCNC: 31.6 G/DL (ref 31–37)
MCV RBC AUTO: 92.2 FL
MONOCYTES # BLD AUTO: 0.38 X10(3) UL (ref 0.1–1)
MONOCYTES NFR BLD AUTO: 6.2 %
NEUTROPHILS # BLD AUTO: 4.75 X10 (3) UL (ref 1.5–7.7)
NEUTROPHILS # BLD AUTO: 4.75 X10(3) UL (ref 1.5–7.7)
NEUTROPHILS NFR BLD AUTO: 77.4 %
OSMOLALITY SERPL CALC.SUM OF ELEC: 303 MOSM/KG (ref 275–295)
PHOSPHATE SERPL-MCNC: 3.8 MG/DL (ref 2.4–5.1)
PLATELET # BLD AUTO: 218 10(3)UL (ref 150–450)
POTASSIUM SERPL-SCNC: 4.6 MMOL/L (ref 3.5–5.1)
RBC # BLD AUTO: 2.68 X10(6)UL
SODIUM SERPL-SCNC: 142 MMOL/L (ref 136–145)
UNIT VOLUME: 350 ML
WBC # BLD AUTO: 6.1 X10(3) UL (ref 4–11)

## 2024-12-19 PROCEDURE — 99233 SBSQ HOSP IP/OBS HIGH 50: CPT | Performed by: HOSPITALIST

## 2024-12-19 PROCEDURE — 99233 SBSQ HOSP IP/OBS HIGH 50: CPT | Performed by: INTERNAL MEDICINE

## 2024-12-19 RX ORDER — DILTIAZEM HYDROCHLORIDE 120 MG/1
240 CAPSULE, EXTENDED RELEASE ORAL DAILY
Status: DISCONTINUED | OUTPATIENT
Start: 2024-12-19 | End: 2024-12-24

## 2024-12-19 RX ORDER — ISOSORBIDE MONONITRATE 60 MG/1
60 TABLET, EXTENDED RELEASE ORAL DAILY
Status: DISCONTINUED | OUTPATIENT
Start: 2024-12-19 | End: 2024-12-24

## 2024-12-19 NOTE — PROGRESS NOTES
[facility]  part of [facility]    Progress Note    Hannah Boss Patient Status:  Inpatient    1945 MRN [id number]   Location [facility] 3W/SW Attending Maximilian Collins MD   Hosp Day # 2 PCP Diana Lopez DO       Subjective:     Back pain stable.  Legs a little smaller.  Pt feels a little better.      Objective:   Blood pressure 148/52, pulse 85, temperature 98.3 °F (36.8 °C), temperature source Oral, resp. rate 18, weight 205 lb 1.6 oz (93 kg), SpO2 95%, not currently breastfeeding.    Gen:   NAD.  A and O x 3  CV:   RRR, 3/6 DANIEL  Pulm:   faint bibasilar crackles  Abd:   +bs, soft, NT, ND  LE:   1+ edema bilat.   Venous stasis changes bilat.   Mild acute erythema of LLE. - improved  Neuro:   nonfocal    Results:     Lab Results   Component Value Date    WBC 6.1 2024    HGB 7.8 (L) 2024    HCT 24.7 (L) 2024    .0 2024    CREATSERUM 2.27 (H) 2024    BUN 36 (H) 2024     2024    K 4.6 2024     2024    CO2 29.0 2024     (H) 2024    CA 8.5 (L) 2024    ALB 3.5 2024    ALKPHO 187 (H) 2024    BILT 0.3 2024    TP 6.0 2024    AST 27 2024    ALT 23 2024    PTT 35.0 2024    INR 1.30 (H) 2024    T4F 2.5 (H) 2024    TSH 0.008 (L) 2024    LIP 22 2024    DDIMER 1.13 (H) 2023    MG 1.8 2024    PHOS 3.8 2024    TROP 0.398 (HH) 2021     (H) 2024    B12 497 2024    ETOH <3 10/26/2024       MRI SPINE LUMBAR (CPT=72148)    Result Date: 2024  CONCLUSION:   Degenerative disc and facet disease throughout the lumbar spine, most prominent at L2 through L5, where there is severe narrowing of the canal and bilateral neural foramen which is unchanged since 10/6/2023.  Extensive severe narrowing of the bilateral subarticular and lateral recesses throughout the entire lumbar spine is unchanged since  10/6/2023.  No acute fracture or vertebral body height loss.  0.8 cm extruded disc fragment at L4-L5 contributes to the severe canal and foraminal narrowing and has mass effect upon the descending right L5 nerve root.  0.6 cm extruded disc fragment L3-L4 is unchanged.  Mass effect upon the exiting right L3 nerve root is unchanged.  Preliminary report was submitted by the [facility] teleradiologist and there are no significant discrepancies.    Dictated by (CST): Rolan Dyer MD on 12/18/2024 at 7:03 AM     Finalized by (CST): Rolan Dyer MD on 12/18/2024 at 7:10 AM          US VENOUS DOPPLER LEG BILAT - DIAG IMG (CPT=93970)    Result Date: 12/17/2024  CONCLUSION: No sonographic evidence of bilateral lower extremity DVT.  There is moderate soft tissue edema within the bilateral lower extremities.    Dictated by (CST): David Wild MD on 12/17/2024 at 5:02 PM     Finalized by (CST): David Wild MD on 12/17/2024 at 5:02 PM          XR CHEST AP PORTABLE  (CPT=71045)    Result Date: 12/17/2024  CONCLUSION:  1. Mild cardiomegaly and minimally prominent pulmonary vascularity with minimal interstitial edema.  Findings may suggest early cardiac failure/fluid overload.  No large airspace consolidation or pleural effusion.     Dictated by (CST): Dl Santamaria MD on 12/17/2024 at 4:36 PM     Finalized by (CST): Dl Santamaria MD on 12/17/2024 at 4:37 PM               Assessment and Plan:     Gastrointestinal bleed with posthemorrhagic anemia, probable upper source, rule out peptic ulcer disease or small bowel arteriovenous malformations.  Pt normally on Eliquis for hx of DVT.  - GI on consult  - pt with recent endoscopies on March just showing gastritis.    Capsule endoscopy as outpatient.  - general diet  - cont IV bid protonix  - follow cbc  - was transfused 1 unit prbc  - transfuse prn for hgb <7  - hold Eliquis  - doppler of LEs this admit is negative for DVT     Fluid overload status   Acute on chronic  HFpEF  Underlying moderate aortic stenosis.  - cardiology on consult  - diuresis with IV bumex  - monitor Is/Os, daily wts  - follow bmp     Bilateral lower extremity cellulitis. - improved  - mostly venous stasis changes  - cont IV ancef  - diuresis as above     Musculoskeletal deconditioning  Chronic back pain with neurogenic claudication.  MRI of L spine this admit shows areas of spinal stenosis and mild HNP.     - pt offered anesthesia pain consult to eval for ANTHONY however pt refusing this  - cont lyrica  - PT/OT     DICK on CKD 4 - better  - renal on consult  - monitor bmp with diuresis     Hx of HTN  - cont hydralazine, diltiazem  - cards and renal on consult  - on iv bumex     Hx of tobacco abuse  Active smoker  - counseled on cessation     dvt proph:     SCDs     Code status:    Full       MDM:    High Maximilian Jansen MD  12/19/2024

## 2024-12-19 NOTE — PROGRESS NOTES
GASTROENTEROLOGY PROGRESS NOTE      Subjective: Patient lying in bed, no complaints overnight.    PE:  Vitals:   BP Readings from Last 3 Encounters:   24 148/52   24 144/58   24 (!) 164/62      Wt Readings from Last 3 Encounters:   24 205 lb 1.6 oz (93 kg)   24 222 lb 10.6 oz (101 kg)   24 215 lb (97.5 kg)      General: AAOx3 in NAD  HEENT: No scleral icterus, no LAD  Abdomen: normal active bowel sounds, soft, nontender, nondistended\      Labs: Reviewed in chart   Latest Reference Range & Units 24 05:35 10/26/24 08:40 24 13:58 24 21:36 24 07:28 24 06:56   WBC 4.0 - 11.0 x10(3) uL 6.8 7.2 7.0  7.3 6.1   Hemoglobin 12.0 - 16.0 g/dL 7.4 (L) 10.3 (L) 6.1 (LL) 8.0 (L) 8.3 (L) 7.8 (L)   Hematocrit 35.0 - 48.0 % 23.6 (L) 31.6 (L) 20.1 (L) 25.5 (L) 26.3 (L) 24.7 (L)   Platelet Count 150.0 - 450.0 10(3)uL 169.0 222.0 214.0  243.0 218.0     Imagin24  CT A/P:  1. No acute intra-abdominal process.   2. 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   3. Uncomplicated distal colonic diverticulosis.   4. Status post cholecystectomy.  No biliary ductal dilatation.   5. Lesser incidental findings as above.      Endoscopy:  2013  Colonoscopy at [facility]   EGD reportedly neg  3/22/24  EGD with gastritis, normal biopsies  3/22/24  Colonoscopy with 4 mm adenomatous polyp, tics and hemorrhoids    Assessment and Plan: Patient is a 79-year-old female admitted on  for worsening fatigue and bilateral leg swelling and found to be anemic with heme positive stools.  Eliquis has been held since admission, tentatively consider upper endoscopy with capsule placement tomorrow if cardiac and pulmonary status allows.

## 2024-12-19 NOTE — PROGRESS NOTES
[facility] Cardiology Progress Note    Hannah Boss Patient Status:  Inpatient    1945 MRN [id number]   Location [facility] 3W/SW Attending Maximilian Collins MD   Hosp Day # 2 PCP Diana Lopez DO     Subjective:  Denies cp, sob, orthopnea. LE swelling improving   Feels much better today     Objective:  BP (!) 171/63 (BP Location: Right arm)   Pulse 74   Temp 98 °F (36.7 °C) (Oral)   Resp 18   Wt 205 lb 1.6 oz (93 kg)   SpO2 96%   BMI 36.33 kg/m²     Telemetry: NSR , 74 bpm       Intake/Output:    Intake/Output Summary (Last 24 hours) at 2024 1024  Last data filed at 2024 0900  Gross per 24 hour   Intake 920 ml   Output 2610 ml   Net -1690 ml       Last 3 Weights   24 0622 205 lb 1.6 oz (93 kg)   24 0436 216 lb 11.4 oz (98.3 kg)   24 1903 222 lb 1.6 oz (100.7 kg)   24 0059 222 lb 10.6 oz (101 kg)   24 1401 215 lb (97.5 kg)       Labs:  Recent Labs   Lab 24  1357 24  0728 24  0656   GLU 96 104* 104*   BUN 42* 39* 36*   CREATSERUM 2.57* 2.48* 2.27*   EGFRCR 19* 20* 22*   CA 8.2* 8.6* 8.5*    143 142   K 5.5* 4.8 4.6    112 111   CO2 24.0 26.0 29.0     Recent Labs   Lab 24  1358 24  2136 24  0728 24  0656   RBC 2.10*  --  2.85* 2.68*   HGB 6.1* 8.0* 8.3* 7.8*   HCT 20.1* 25.5* 26.3* 24.7*   MCV 95.7  --  92.3 92.2   MCH 29.0  --  29.1 29.1   MCHC 30.3*  --  31.6 31.6   RDW 17.8*  --  17.4* 17.1*   NEPRELIM 5.21  --  5.59 4.75   WBC 7.0  --  7.3 6.1   .0  --  243.0 218.0         No results for input(s): \"TROP\", \"TROPHS\", \"CK\" in the last 168 hours.    Diagnostics:     24 Echo  1. Left ventricle: The cavity size was normal. Wall thickness was mildly increased. Systolic function was normal. The estimated ejection fraction was 60-65%, by visual assessment. No diagnostic evidence for regional wall motion abnormalities. Doppler parameters are consistent with abnormal left ventricular  relaxation - grade 1 diastolic dysfunction.   2. Right ventricle: The cavity size was normal. Systolic function was      normal.   3. Left atrium: The left atrial volume was mildly increased.   4. Aortic valve: Cusp separation was reduced. Transvalvular velocity was increased. The findings were consistent with moderate stenosis. There was mild regurgitation. The peak systolic velocity was 3.17 m/sec. The mean systolic gradient was 24 mm Hg. The valve area (VTI) was 1.06 cm^2. The valve area (VTI) index was 0.57 cm^2/m^2.   5. Pulmonary arteries: Systolic pressure was moderately increased, estimated to be 59 mm Hg.   Impressions:  This study is compared with previous dated 01/20/2024:   Pulmonary pressure is higher, and other findings are unchanged.   *       Review of Systems   Respiratory: Negative.  Negative for cough and wheezing.    Cardiovascular:  Positive for leg swelling. Negative for chest pain, palpitations and orthopnea.     Physical Exam:    General: Alert and oriented x 3. No apparent distress.   HEENT: Normocephalic, anicteric sclera, neck supple, no thyromegaly or adenopathy.  Neck: No JVD, carotids 2+, no bruits.  Cardiac: Regular rate & rhythm. S1, S2 normal. No pericardial rub, S3, or extra cardiac sounds. +2/6 DANIEL   Lungs: Clear without wheezes, rales, rhonchi or dullness.  Normal excursions and effort.  Abdomen: Soft, non-tender. No organosplenomegally, mass or rebound, BS-present.  Extremities: Without clubbing or cyanosis. +trace BLE edema  Neurologic: Alert and oriented, normal affect. No focal defects  Skin: Warm and dry.       Medications:   pantoprazole  40 mg Intravenous Q12H    ceFAZolin  2 g Intravenous Q12H    vancomycin  125 mg Oral Daily    bumetanide  1 mg Intravenous BID (Diuretic)    atorvastatin  40 mg Oral Nightly    hydrALAZINE  25 mg Oral BID    gabapentin  300 mg Oral Nightly    pregabalin  100 mg Oral BID         Assessment:    Anemia /GIB  - presented w/ hgb of 6.1. s/p prbc  transfusion. Hgb stable  - hx of dvt tx w/ eliquis   - BLE venous doppler negative for DVT   - GI following     Acute on chronic HFpEF,  EF 60-65% in 9/2024  -  & CXR w/ min interstitial edema on admit   - on IV bumex per Nephrology  - monitor accurate I/o , daily wts & renal fx closely     Moderate aortic stenosis     DICK on CKD 4  - improving w/ diuretic therapy   - off of home lisinopril - intolerant to ACEi per Nephrology due to hyperkalemia  - on IV bumex per Nephrology     T2DM w/ nephropathy     HTN   - above goal on hydralazine   - off of home lisinopril, diltiazem ER, imdur     Cellulitis   - on antibiotics per PMD     Plan:    Anemia w/u per GI . Hgb stable   Volume status improved. Net neg 2.5L over 24h. Scr improving  On IV bumex per Nephrology   Monitor accurate I/o , daily wts & renal fx closely   Blood pressure above goal. Resume home diltiazem ER & imdur     KELLEY Sepulveda  12/19/2024  10:24 AM  Ph [phone] (Rajinder)  Ph [phone] ([place])

## 2024-12-19 NOTE — PROGRESS NOTES
[facility]  part of [facility]    Progress Note    Hannah Boss Patient Status:  Inpatient    1945 MRN [id number]   Location [facility] 3W/SW Attending Maximilian Collins MD   Hosp Day # 2 PCP Diana Lopez DO       Subjective:   Hannah Boss is a(n) 79 year old female who I am seeing for DICK    Patient is resting      Objective:   BP (!) 171/63 (BP Location: Right arm)   Pulse 74   Temp 98 °F (36.7 °C) (Oral)   Resp 18   Wt 205 lb 1.6 oz (93 kg)   SpO2 96%   BMI 36.33 kg/m²      Intake/Output Summary (Last 24 hours) at 2024 1020  Last data filed at 2024 0900  Gross per 24 hour   Intake 920 ml   Output 2610 ml   Net -1690 ml     Wt Readings from Last 1 Encounters:   24 205 lb 1.6 oz (93 kg)       Exam  Gen: No acute distress, Heent: NC AT, mucous memb clear, neck supple  Pulm: Lungs clear, normal respiratory effort  CV: Heart with regular rate and rhythm, edema  Abd: Abdomen soft, nontender, nondistended, no organomegaly, bowel sounds present  Skin: no symptoms reported  Psych: alert and oriented    Assessment and Plan:     1 - DICK/fluid overload  Creatinine is improved with decongestive therapies.   Continue Bumex twice daily.     2 - Anemia/GI bleed  The anemia is being evaluated.  GI is on consult     3 -cellulitis  Patient is on Ancef     4 -hypertension with CKD 4  She is on hydralazine.  She is intolerant of ACE inhibitors due to hyperkalemia     5 - DM 2 with nephropathy  Accu-Cheks     6 - CHFpEF  Cardiology evaluating            Results:     Recent Labs   Lab 24  1358 24  2136 24  0728 24  0656   RBC 2.10*  --  2.85* 2.68*   HGB 6.1* 8.0* 8.3* 7.8*   HCT 20.1* 25.5* 26.3* 24.7*   MCV 95.7  --  92.3 92.2   MCH 29.0  --  29.1 29.1   MCHC 30.3*  --  31.6 31.6   RDW 17.8*  --  17.4* 17.1*   NEPRELIM 5.21  --  5.59 4.75   WBC 7.0  --  7.3 6.1   .0  --  243.0 218.0         Recent Labs   Lab 24  1357 24  0781  12/19/24  0656   GLU 96 104* 104*   BUN 42* 39* 36*   CREATSERUM 2.57* 2.48* 2.27*   CA 8.2* 8.6* 8.5*    143 142   K 5.5* 4.8 4.6    112 111   CO2 24.0 26.0 29.0          MRI SPINE LUMBAR (CPT=72148)    Result Date: 12/18/2024  CONCLUSION:   Degenerative disc and facet disease throughout the lumbar spine, most prominent at L2 through L5, where there is severe narrowing of the canal and bilateral neural foramen which is unchanged since 10/6/2023.  Extensive severe narrowing of the bilateral subarticular and lateral recesses throughout the entire lumbar spine is unchanged since 10/6/2023.  No acute fracture or vertebral body height loss.  0.8 cm extruded disc fragment at L4-L5 contributes to the severe canal and foraminal narrowing and has mass effect upon the descending right L5 nerve root.  0.6 cm extruded disc fragment L3-L4 is unchanged.  Mass effect upon the exiting right L3 nerve root is unchanged.  Preliminary report was submitted by the [facility] teleradiologist and there are no significant discrepancies.    Dictated by (CST): Rolan Dyer MD on 12/18/2024 at 7:03 AM     Finalized by (CST): Rolan Dyer MD on 12/18/2024 at 7:10 AM          US VENOUS DOPPLER LEG BILAT - DIAG IMG (CPT=93970)    Result Date: 12/17/2024  CONCLUSION: No sonographic evidence of bilateral lower extremity DVT.  There is moderate soft tissue edema within the bilateral lower extremities.    Dictated by (CST): David Wild MD on 12/17/2024 at 5:02 PM     Finalized by (CST): David Wild MD on 12/17/2024 at 5:02 PM          XR CHEST AP PORTABLE  (CPT=71045)    Result Date: 12/17/2024  CONCLUSION:  1. Mild cardiomegaly and minimally prominent pulmonary vascularity with minimal interstitial edema.  Findings may suggest early cardiac failure/fluid overload.  No large airspace consolidation or pleural effusion.     Dictated by (CST): Dl Santamaria MD on 12/17/2024 at 4:36 PM     Finalized by (CST): Dl Santamaria MD on  12/17/2024 at 4:37 PM               ANN-MARIE GRANDA MD  12/19/2024

## 2024-12-19 NOTE — CM/SW NOTE
12/19/24 1400   Choice of Post-Acute Provider   Informed patient of right to choose their preferred provider Yes   List of appropriate post-acute services provided to patient/family with quality data Yes   Patient/family choice [facility]   Information given to Patient     Social work was able to meet with the patient at bedside to discuss REVA choice.    The patient's preferred REVA choice [facility] is able to accept.    [facility] is reserved in [facility].    Facility requested to start auth as this is not a [facility] auth.     SW/CM to remain available for support and/or discharge planning.     Jennie Ramey MSW, LSW  Discharge Planner [id number]

## 2024-12-19 NOTE — PLAN OF CARE
Remains slightly SOB. 1 assist w/ walker.    Call light within reach, safety precautions in place  Problem: CARDIOVASCULAR - ADULT  Goal: Maintains optimal cardiac output and hemodynamic stability  Description: INTERVENTIONS:  - Monitor vital signs, rhythm, and trends  - Monitor for bleeding, hypotension and signs of decreased cardiac output  - Evaluate effectiveness of vasoactive medications to optimize hemodynamic stability  - Monitor arterial and/or venous puncture sites for bleeding and/or hematoma  - Assess quality of pulses, skin color and temperature  - Assess for signs of decreased coronary artery perfusion - ex. Angina  - Evaluate fluid balance, assess for edema, trend weights  Outcome: Progressing  Goal: Absence of cardiac arrhythmias or at baseline  Description: INTERVENTIONS:  - Continuous cardiac monitoring, monitor vital signs, obtain 12 lead EKG if indicated  - Evaluate effectiveness of antiarrhythmic and heart rate control medications as ordered  - Initiate emergency measures for life threatening arrhythmias  - Monitor electrolytes and administer replacement therapy as ordered  Outcome: Progressing     Problem: RESPIRATORY - ADULT  Goal: Achieves optimal ventilation and oxygenation  Description: INTERVENTIONS:  - Assess for changes in respiratory status  - Assess for changes in mentation and behavior  - Position to facilitate oxygenation and minimize respiratory effort  - Oxygen supplementation based on oxygen saturation or ABGs  - Provide Smoking Cessation handout, if applicable  - Encourage broncho-pulmonary hygiene including cough, deep breathe, Incentive Spirometry  - Assess the need for suctioning and perform as needed  - Assess and instruct to report SOB or any respiratory difficulty  - Respiratory Therapy support as indicated  - Manage/alleviate anxiety  - Monitor for signs/symptoms of CO2 retention  Outcome: Progressing     Problem: SKIN/TISSUE INTEGRITY - ADULT  Goal: Skin integrity remains  intact  Description: INTERVENTIONS  - Assess and document risk factors for pressure ulcer development  - Assess and document skin integrity  - Monitor for areas of redness and/or skin breakdown  - Initiate interventions, skin care algorithm/standards of care as needed  Outcome: Progressing

## 2024-12-19 NOTE — SLP NOTE
SPEECH DAILY NOTE - INPATIENT    ASSESSMENT & PLAN   ASSESSMENT  PPE REQUIRED. THIS THERAPIST WORE GLOVES, DROPLET MASK, AND GOGGLES FOR DURATION OF EVALUATION. HANDS WASHED UPON ENTRANCE/EXIT.    SLP f/u for ongoing dysphagia tx/meal assessment per recommendations of CLD (per MD) with mildly thick liquids per BSE. RN reports pt tolerates diet and medication well with no overt clinical s/s aspiration when pt in upright position, however, coughs if leaning to the side. Pt denies any swallowing challenges. Per RN, pt cleared for solid trials.    Pt positioned upright in bed. Pt afebrile, tolerating 1L/min O2NC with oxygen status 98 prior to the start of oral trials. SLP reviewed aspiration precautions and safe swallowing compensatory strategies with the patient. Safe swallow guidelines remain written on the white board in purple. Diet recommendations remain on the whiteboard in the room. Patient v/u. Provided minimal assistance, pt tolerates trials of puree, soft solids, and hard solids with mildly thick via open cup with no overt clinical signs/symptoms of aspiration. Pt with throat clearing and mild coughing on trials of thin liquids. Trials discontinued. Oral/buccal cavities clear of residue following all trials. Oxygen status remained >96 t/o the entire session. Recommend upgrade to regular (choose soft) solids and continued mildly thick liquids.     PLAN: SLP to f/u x3 meal assessments, monitor CXR, and VFSS if clinically warranted.     Diet Recommendations - Solids: Regular (choost soft)  Diet Recommendations - Liquids: Nectar thick liquids/ Mildly thick (CLD per MD)    Compensatory Strategies Recommended: Multiple swallows  Aspiration Precautions: Upright position;Slow rate;Small sips;No straw  Medication Administration Recommendations: Crushed in puree    Patient Experiencing Pain: No       Treatment Plan  Treatment Plan/Recommendations: Aspiration precautions    Interdisciplinary Communication: Discussed with  RN  Recommendations posted at bedside            GOALS  Goal #1 The patient will tolerate regular (choose soft) consistencies and mildly thick liquids without overt signs or symptoms of aspiration with 100 % accuracy over 2 session(s).  Goal modified 12/19   Goal #2 The patient/family/caregiver will demonstrate understanding and implementation of aspiration precautions and swallow strategies independently over 4 session(s).    In Progress   Goal #3 The patient will tolerate trial upgrade of solids consistency and thin liquids without overt signs or symptoms of aspiration with 90 % accuracy over 3-4 session(s).  Partially met 12/19   Goal #4 The patient will utilize compensatory strategies as outlined by  BSSE (clinical evaluation) including Slow rate, Small sips, Alternate liquids/solids, No straws, Upright 90 degrees with minimal assistance 100 % of the time across 2 sessions.    In Progress     FOLLOW UP  Follow Up Needed (Documentation Required): Yes  SLP Follow-up Date: 12/20/24  Duration: 1 week    Session: 1 following BSSE    If you have any questions, please contact SOURAV Rivera M.S. CCC-SLP  Speech Language Pathologist  Phone Number [phone]

## 2024-12-19 NOTE — CONGREGATE LIVING REVIEW
[facility] Living Authorization    The [facility] Review Committee has reviewed this case and the patient IS APPROVED for discharge to a facility for Short Term Skilled once the following procedure is followed:     - The physician discharge instructions (contained within the DAVIDA note for SNF) must inlcude the below appropriate and approved COVID instructions to the facility    For questions regarding CLRC approval process, please contact the CM assigned to the case.  For questions regarding RN discharge workflow, please contact the unit Clinical Leader.

## 2024-12-19 NOTE — PLAN OF CARE
Patient is alert and oriented times 4. On RA, has home O2. No complains of pain. Plan is to monitor BP and plan for EGD tomorrow. Will be NPO at midnight.     Problem: Patient Centered Care  Goal: Patient preferences are identified and integrated in the patient's plan of care  Description: Interventions:  - Provide timely, complete, and accurate information to patient/family  - Incorporate patient and family knowledge, values, beliefs, and cultural backgrounds into the planning and delivery of care  - Encourage patient/family to participate in care and decision-making at the level they choose  - Honor patient and family perspectives and choices  Outcome: Progressing     Problem: Patient/Family Goals  Goal: Patient/Family Short Term Goal  Description: Patient's Short Term Goal:     Interventions:     - See additional Care Plan goals for specific interventions  Outcome: Progressing     Problem: CARDIOVASCULAR - ADULT  Goal: Maintains optimal cardiac output and hemodynamic stability  Description: INTERVENTIONS:  - Monitor vital signs, rhythm, and trends  - Monitor for bleeding, hypotension and signs of decreased cardiac output  - Evaluate effectiveness of vasoactive medications to optimize hemodynamic stability  - Monitor arterial and/or venous puncture sites for bleeding and/or hematoma  - Assess quality of pulses, skin color and temperature  - Assess for signs of decreased coronary artery perfusion - ex. Angina  - Evaluate fluid balance, assess for edema, trend weights  Outcome: Progressing     Problem: RESPIRATORY - ADULT  Goal: Achieves optimal ventilation and oxygenation  Description: INTERVENTIONS:  - Assess for changes in respiratory status  - Assess for changes in mentation and behavior  - Position to facilitate oxygenation and minimize respiratory effort  - Oxygen supplementation based on oxygen saturation or ABGs  - Provide Smoking Cessation handout, if applicable  - Encourage broncho-pulmonary hygiene  including cough, deep breathe, Incentive Spirometry  - Assess the need for suctioning and perform as needed  - Assess and instruct to report SOB or any respiratory difficulty  - Respiratory Therapy support as indicated  - Manage/alleviate anxiety  - Monitor for signs/symptoms of CO2 retention  Outcome: Progressing

## 2024-12-20 ENCOUNTER — ANESTHESIA (OUTPATIENT)
Dept: ENDOSCOPY | Facility: HOSPITAL | Age: 79
End: 2024-12-20
Payer: MEDICARE

## 2024-12-20 ENCOUNTER — ANESTHESIA EVENT (OUTPATIENT)
Dept: ENDOSCOPY | Facility: HOSPITAL | Age: 79
End: 2024-12-20
Payer: MEDICARE

## 2024-12-20 LAB
ALBUMIN SERPL-MCNC: 3.6 G/DL (ref 3.2–4.8)
ANION GAP SERPL CALC-SCNC: 6 MMOL/L (ref 0–18)
BUN BLD-MCNC: 35 MG/DL (ref 9–23)
BUN/CREAT SERPL: 16.1 (ref 10–20)
CALCIUM BLD-MCNC: 8.8 MG/DL (ref 8.7–10.4)
CHLORIDE SERPL-SCNC: 109 MMOL/L (ref 98–112)
CO2 SERPL-SCNC: 31 MMOL/L (ref 21–32)
CREAT BLD-MCNC: 2.17 MG/DL
DEPRECATED RDW RBC AUTO: 56.4 FL (ref 35.1–46.3)
EGFRCR SERPLBLD CKD-EPI 2021: 23 ML/MIN/1.73M2 (ref 60–?)
ERYTHROCYTE [DISTWIDTH] IN BLOOD BY AUTOMATED COUNT: 17.1 % (ref 11–15)
GLUCOSE BLD-MCNC: 137 MG/DL (ref 70–99)
GLUCOSE BLDC GLUCOMTR-MCNC: 137 MG/DL (ref 70–99)
GLUCOSE BLDC GLUCOMTR-MCNC: 137 MG/DL (ref 70–99)
GLUCOSE BLDC GLUCOMTR-MCNC: 143 MG/DL (ref 70–99)
GLUCOSE BLDC GLUCOMTR-MCNC: 145 MG/DL (ref 70–99)
GLUCOSE BLDC GLUCOMTR-MCNC: 158 MG/DL (ref 70–99)
HCT VFR BLD AUTO: 25.3 %
HGB BLD-MCNC: 7.9 G/DL
MCH RBC QN AUTO: 28.6 PG (ref 26–34)
MCHC RBC AUTO-ENTMCNC: 31.2 G/DL (ref 31–37)
MCV RBC AUTO: 91.7 FL
OSMOLALITY SERPL CALC.SUM OF ELEC: 312 MOSM/KG (ref 275–295)
PHOSPHATE SERPL-MCNC: 3.3 MG/DL (ref 2.4–5.1)
PLATELET # BLD AUTO: 235 10(3)UL (ref 150–450)
POTASSIUM SERPL-SCNC: 4.9 MMOL/L (ref 3.5–5.1)
RBC # BLD AUTO: 2.76 X10(6)UL
SODIUM SERPL-SCNC: 146 MMOL/L (ref 136–145)
WBC # BLD AUTO: 8.1 X10(3) UL (ref 4–11)

## 2024-12-20 PROCEDURE — 99233 SBSQ HOSP IP/OBS HIGH 50: CPT | Performed by: HOSPITALIST

## 2024-12-20 PROCEDURE — 0DJD8ZZ INSPECTION OF LOWER INTESTINAL TRACT, VIA NATURAL OR ARTIFICIAL OPENING ENDOSCOPIC: ICD-10-PCS | Performed by: INTERNAL MEDICINE

## 2024-12-20 PROCEDURE — 0DB68ZX EXCISION OF STOMACH, VIA NATURAL OR ARTIFICIAL OPENING ENDOSCOPIC, DIAGNOSTIC: ICD-10-PCS | Performed by: INTERNAL MEDICINE

## 2024-12-20 PROCEDURE — 99232 SBSQ HOSP IP/OBS MODERATE 35: CPT | Performed by: INTERNAL MEDICINE

## 2024-12-20 RX ORDER — LIDOCAINE HYDROCHLORIDE 10 MG/ML
INJECTION, SOLUTION EPIDURAL; INFILTRATION; INTRACAUDAL; PERINEURAL AS NEEDED
Status: DISCONTINUED | OUTPATIENT
Start: 2024-12-20 | End: 2024-12-20 | Stop reason: SURG

## 2024-12-20 RX ADMIN — LIDOCAINE HYDROCHLORIDE 50 MG: 10 INJECTION, SOLUTION EPIDURAL; INFILTRATION; INTRACAUDAL; PERINEURAL at 13:29:00

## 2024-12-20 NOTE — OPERATIVE REPORT
EGD Operative Report    Hannah Boss Patient Status:  Inpatient    1945 MRN [id number]   Location [facility] ENDOSCOPY LAB SUITES Attending Maximilian Collins MD   Hosp Day #   3 PCP Diana Lopez DO     Pre-Operative Diagnosis: anemia; hem positive stool    Post-Operative Diagnosis:   Gastritis; capsule placement    Procedure Performed:   EGD with biopsies and capsule placement    Pre-procedure: The patient was assessed in the procedure room immediately before induction of sedation which included, at a minimum, vital signs, NPO status, and airway assessment.  The patient was deemed and appropriate candidate for procedural sedation.    Informed Consent: Informed consent for procedure and sedation were obtained from the patient.  The risks, benefits and alternatives to the procedures including potentially life-threatening complications of sedation, bleeding, perforation, missed lesions or need for repeat endoscopy were reviewed along with the possible need for hospitalization, surgical management, transfusion of blood products or repeat endoscopy should one of these complications arise.  The patient and/or POA voiced their understanding and was agreeable to proceed.     Sedation Type: MAC-Patient received sedation with monitored anesthesia provided by an anesthesiologist    EGD Procedure Description: The patient was placed in the left lateral decubitus position.  A bite block was placed in the patient's mouth and the endoscope was passed through the mouth under direct vision and advanced to the second portion of the duodenum.  The endoscope was then withdrawn to examine the duodenal bulb, pylorus and gastric antrum, body, incisura and fundus and then retroflexed to  to examine the GE junction and cardia. The upper endoscopy was performed without difficulty, the patient tolerated the procedure well with no immediate complications.     Findings: Esophagus  was grossly normal.  Very mild gastritis was noted at the level of the pylorus and biopsies were taken with cold biopsy forceps for histology.  Remainder the stomach, duodenal bulb and second portion of the duodenum were otherwise unremarkable.    Scope was withdrawn and capsule  introduced through the channel.  Capsule was activated and clipped to the garcia.  Scope was advanced back into the duodenal bulb and the capsule released with initial pictures noted on video.  Scope and capsule  were removed from patient and procedure terminated.    Impression:   Gastritis (inflammation of the stomach lining) and capsule placement    Recommendations: Discharge patient when discharge criteria are met., Await pathology results, and await capsule results.    Discharge:  The patient was given an after visit summary detailing the procedure, findings, recommendations and follow up plans.     Mary Anderson MD  12/20/2024  1:49 PM

## 2024-12-20 NOTE — PROGRESS NOTES
[facility] was consulted for PHQ-4 score = 8. Pt seen at bedside. She is alert and oriented. Pt  reports no formal history of anxiety or depression. She reports her anxiety level as 3/10, with 10 being the worst imaginable. Over the past 30 days, patient has not experienced significant changes in mood and and denies any safety concerns, including SI.     Pt is not currently taking any psychotropic medications.     Pt is open to receiving referrals for individual counseling. Referrals provided in pt's AVS. No further interventions needed unless symptoms worsen or safety concerns arise.     Yulisa SUTHERLAND, LSW  [phone]

## 2024-12-20 NOTE — CM/SW NOTE
Social work received an update from Joe at [facility].    Peer to Peer being offered. Please call [phone] to schedule. Patients insurance ID is [id number].     MD and RN notified via Epic Secure chat.    Social work will follow up on peer to peer results.    SW/CM to remain available for support and/or discharge planning.     Jennie Ramey MSW, LSW  Discharge Planner [id number]

## 2024-12-20 NOTE — PAYOR COMM NOTE
--------------  CONTINUED STAY REVIEW    Payor: [employer]  Subscriber #:  [id number]  Authorization Number: [id number]    Admit date: 24  Admit time:  6:50 PM    REVIEW DOCUMENTATION: -    [facility]  part of [facility]     Progress Note           Hannah Boss Patient Status:  Inpatient    1945 MRN [id number]   Location [facility] 3W/SW Attending Maximilian Collins MD   Hosp Day # 2 PCP Diana Lopez DO         Subjective:      Back pain stable.  Legs a little smaller.  Pt feels a little better.        Objective:   Blood pressure 148/52, pulse 85, temperature 98.3 °F (36.8 °C), temperature source Oral, resp. rate 18, weight 205 lb 1.6 oz (93 kg), SpO2 95%, not currently breastfeeding.     Gen:   NAD.  A and O x 3  CV:   RRR, 3/6 DANIEL  Pulm:   faint bibasilar crackles  Abd:   +bs, soft, NT, ND  LE:   1+ edema bilat.   Venous stasis changes bilat.   Mild acute erythema of LLE. - improved  Neuro:   nonfocal     Results:            Lab Results   Component Value Date     WBC 6.1 2024     HGB 7.8 (L) 2024     HCT 24.7 (L) 2024     .0 2024     CREATSERUM 2.27 (H) 2024     BUN 36 (H) 2024      2024     K 4.6 2024      2024     CO2 29.0 2024      (H) 2024     CA 8.5 (L) 2024     ALB 3.5 2024     ALKPHO 187 (H) 2024     BILT 0.3 2024     TP 6.0 2024     AST 27 2024     ALT 23 2024     PTT 35.0 2024     INR 1.30 (H) 2024     T4F 2.5 (H) 2024     TSH 0.008 (L) 2024     LIP 22 2024     DDIMER 1.13 (H) 2023     MG 1.8 2024     PHOS 3.8 2024     TROP 0.398 (HH) 2021      (H) 2024     B12 497 2024     ETOH <3 10/26/2024         MRI SPINE LUMBAR (CPT=72148)     Result Date: 2024  CONCLUSION:          Degenerative disc and facet disease throughout the lumbar spine, most  prominent at L2 through L5, where there is severe narrowing of the canal and bilateral neural foramen which is unchanged since 10/6/2023.  Extensive severe narrowing of the bilateral subarticular and lateral recesses throughout the entire lumbar spine is unchanged since 10/6/2023.  No acute fracture or vertebral body height loss.  0.8 cm extruded disc fragment at L4-L5 contributes to the severe canal and foraminal narrowing and has mass effect upon the descending right L5 nerve root.  0.6 cm extruded disc fragment L3-L4 is unchanged.  Mass effect upon the exiting right L3 nerve root is unchanged.  Preliminary report was submitted by the [facility] teleradiologist and there are no significant discrepancies.    Dictated by (CST): Rolan Dyer MD on 12/18/2024 at 7:03 AM     Finalized by (CST): Rolan Dyer MD on 12/18/2024 at 7:10 AM           US VENOUS DOPPLER LEG BILAT - DIAG IMG (CPT=93970)     Result Date: 12/17/2024  CONCLUSION:        No sonographic evidence of bilateral lower extremity DVT.  There is moderate soft tissue edema within the bilateral lower extremities.    Dictated by (CST): David Wild MD on 12/17/2024 at 5:02 PM     Finalized by (CST): David Wild MD on 12/17/2024 at 5:02 PM           XR CHEST AP PORTABLE  (CPT=71045)     Result Date: 12/17/2024  CONCLUSION:         1. Mild cardiomegaly and minimally prominent pulmonary vascularity with minimal interstitial edema.  Findings may suggest early cardiac failure/fluid overload.  No large airspace consolidation or pleural effusion.     Dictated by (CST): Dl Santamaria MD on 12/17/2024 at 4:36 PM     Finalized by (CST): Dl Santamaria MD on 12/17/2024 at 4:37 PM                Assessment and Plan:      Gastrointestinal bleed with posthemorrhagic anemia, probable upper source, rule out peptic ulcer disease or small bowel arteriovenous malformations.  Pt normally on Eliquis for hx of DVT.  - GI on consult  - pt with recent endoscopies on March just  showing gastritis.    Capsule endoscopy as outpatient.  - general diet  - cont IV bid protonix  - follow cbc  - was transfused 1 unit prbc  - transfuse prn for hgb <7  - hold Eliquis  - doppler of LEs this admit is negative for DVT     Fluid overload status   Acute on chronic HFpEF  Underlying moderate aortic stenosis.  - cardiology on consult  - diuresis with IV bumex  - monitor Is/Os, daily wts  - follow bmp     Bilateral lower extremity cellulitis. - improved  - mostly venous stasis changes  - cont IV ancef  - diuresis as above     Musculoskeletal deconditioning  Chronic back pain with neurogenic claudication.  MRI of L spine this admit shows areas of spinal stenosis and mild HNP.     - pt offered anesthesia pain consult to eval for ANTHONY however pt refusing this  - cont lyrica  - PT/OT     DICK on CKD 4 - better  - renal on consult  - monitor bmp with diuresis     Hx of HTN  - cont hydralazine, diltiazem  - cards and renal on consult  - on iv bumex     Hx of tobacco abuse  Active smoker  - counseled on cessation     dvt proph:     SCDs     Code status:    Full        MDM:    High Maximilian Jansen MD  2024  [facility]  part of [facility]     Progress Note           Hannah Boss Patient Status:  Inpatient    1945 MRN [id number]   Location [facility] 3W/SW Attending Maximilian Collins MD   Hosp Day # 3 PCP Diana Lopez DO         Subjective:      Feeling better.  Legs smaller.     Objective:   Blood pressure 152/55, pulse 78, temperature 98.9 °F (37.2 °C), temperature source Oral, resp. rate 20, weight 200 lb 8 oz (90.9 kg), SpO2 98%, not currently breastfeeding.     Gen:   NAD.  A and O x 3  CV:   RRR, 3/6 DANIEL  Pulm:   CTA bilat  Abd:   +bs, soft, NT, ND  LE:   1+ edema bilat.   Venous stasis changes bilat.   Mild acute erythema of LLE. - improved  Neuro:   nonfocal     Results:            Lab Results   Component Value Date     WBC 8.1 2024      HGB 7.9 (L) 12/20/2024     HCT 25.3 (L) 12/20/2024     .0 12/20/2024     CREATSERUM 2.17 (H) 12/20/2024     BUN 35 (H) 12/20/2024      (H) 12/20/2024     K 4.9 12/20/2024      12/20/2024     CO2 31.0 12/20/2024      (H) 12/20/2024     CA 8.8 12/20/2024     ALB 3.6 12/20/2024     ALKPHO 187 (H) 12/17/2024     BILT 0.3 12/17/2024     TP 6.0 12/17/2024     AST 27 12/17/2024     ALT 23 12/17/2024     PTT 35.0 03/08/2024     INR 1.30 (H) 03/08/2024     T4F 2.5 (H) 07/03/2024     TSH 0.008 (L) 07/03/2024     LIP 22 03/19/2024     DDIMER 1.13 (H) 06/05/2023     MG 1.8 09/19/2024     PHOS 3.3 12/20/2024     TROP 0.398 (HH) 09/13/2021      (H) 07/03/2024     B12 497 06/19/2024     ETOH <3 10/26/2024         No results found.         Assessment and Plan:      Gastrointestinal bleed with posthemorrhagic anemia, probable upper source, rule out peptic ulcer disease or small bowel arteriovenous malformations.  Pt normally on Eliquis for hx of DVT.  - GI on consult  - pt with recent endoscopies on March just showing gastritis.    Capsule endoscopy as outpatient.  - general diet  - cont IV bid protonix  - follow cbc  - was transfused 1 unit prbc  - transfuse prn for hgb <7  - hold Eliquis  - doppler of LEs this admit is negative for DVT     Fluid overload status   Acute on chronic HFpEF  Underlying moderate aortic stenosis.  - cardiology on consult  - diuresis with IV bumex  - monitor Is/Os, daily wts  - follow bmp     Bilateral lower extremity cellulitis. - improved  - mostly venous stasis changes  - cont IV ancef  - diuresis as above     Musculoskeletal deconditioning  Chronic back pain with neurogenic claudication.  MRI of L spine this admit shows areas of spinal stenosis and mild HNP.     - pt offered anesthesia pain consult to eval for ANTHONY however pt refusing this  - cont lyrica  - PT/OT     DICK on CKD 4 - better  - renal on consult  - monitor bmp with diuresis     Hx of HTN  - cont hydralazine,  diltiazem  - cards and renal on consult  - on iv bumex     Hx of tobacco abuse  Active smoker  - counseled on cessation     dvt proph:     SCDs     Code status:    Full        MDM:    High Maximilian Jansen MD  12/20/2024                       MEDICATIONS ADMINISTERED IN LAST 1 DAY:  acetaminophen (Tylenol) tab 650 mg       Date Action Dose Route User    12/19/2024 2030 Given 650 mg Oral Julianne Adkins RN          atorvastatin (Lipitor) tab 40 mg       Date Action Dose Route User    12/19/2024 2030 Given 40 mg Oral Julianne Adkins RN          bumetanide (Bumex) 0.25 MG/ML injection 1 mg       Date Action Dose Route User    12/20/2024 0834 Given 1 mg Intravenous Sneha Cervantes RN    12/19/2024 1744 Given 1 mg Intravenous Reyes Soto, Oscar, RN          ceFAZolin (Ancef) 2g in 10mL IV syringe premix       Date Action Dose Route User    12/20/2024 0454 New Bag 2 g Intravenous Julianne Adkins RN    12/19/2024 1744 New Bag 2 g Intravenous Reyes Soto, Oscar, RN          dilTIAZem ER (Dilacor XR) 24 hr cap 240 mg       Date Action Dose Route User    12/20/2024 0835 Given 240 mg Oral Sneha Cervantes RN          gabapentin (Neurontin) cap 300 mg       Date Action Dose Route User    12/19/2024 2030 Given 300 mg Oral Julianne Adkins RN          hydrALAzine (Apresoline) 20 mg/mL injection 10 mg       Date Action Dose Route User    12/20/2024 1502 Given 10 mg Intravenous Sneha Cervantes RN    12/19/2024 1755 Given 10 mg Intravenous Reyes Soto, Oscar, RN          hydrALAZINE (Apresoline) tab 25 mg       Date Action Dose Route User    12/20/2024 0835 Given 25 mg Oral Sneha Cervantes RN    12/19/2024 2030 Given 25 mg Oral Julianne Adkins RN          isosorbide mononitrate ER (Imdur) 24 hr tab 60 mg       Date Action Dose Route User    12/20/2024 0835 Given 60 mg Oral Sneha Cervantes RN          lidocaine PF (Xylocaine-MPF) 1% injection       Date Action Dose Route User    12/20/2024 1329 Given 50 mg Intravenous Priscilla Farooq CRNA           pantoprazole (Protonix) 40 mg in sodium chloride 0.9% PF 10 mL IV push       Date Action Dose Route User    12/20/2024 0834 Given 40 mg Intravenous Sneha Cervantes RN    12/19/2024 2030 Given 40 mg Intravenous Julianne Adkins RN          pregabalin (Lyrica) cap 100 mg       Date Action Dose Route User    12/20/2024 0835 Given 100 mg Oral Sneha Cervantes RN    12/19/2024 2030 Given 100 mg Oral Julianne Adkins RN          propofol (Diprivan) 10 MG/ML injection       Date Action Dose Route User    12/20/2024 1331 Given 30 mg Intravenous Priscilla Farooq CRNA    12/20/2024 1329 Given 30 mg Intravenous Priscilla Farooq CRNA          propofol (Diprivan) 10 mg/mL infusion premix       Date Action Dose Route User    12/20/2024 1331 New Bag 100 mcg/kg/min × 90.7 kg Intravenous Priscilla Farooq CRNA          vancomycin (Vancocin) cap 125 mg       Date Action Dose Route User    12/20/2024 0835 Given 125 mg Oral Sneha Cervantes RN            Vitals (last day)       Date/Time Temp Pulse Resp BP SpO2 Weight O2 Device O2 Flow Rate (L/min) [facility]    12/20/24 1454 97.8 °F (36.6 °C) 75 20 174/66 93 % -- None (Room air) -- CS    12/20/24 1420 -- 72 23 166/68 95 % -- -- -- LO    12/20/24 1415 -- 74 25 166/73 96 % -- -- -- LO    12/20/24 1410 -- 76 25 171/70 92 % -- -- -- LO    12/20/24 1405 -- 79 26 167/72 92 % -- -- -- LO    12/20/24 1400 -- 82 22 150/69 93 % -- -- -- LO    12/20/24 1355 97 °F (36.1 °C) 89 18 170/89 98 % -- -- -- MT    12/20/24 1312 -- -- -- -- -- 200 lb (90.7 kg) -- -- LO    12/20/24 1245 -- 72 25 162/62 93 % -- None (Room air) -- LO    12/20/24 0832 98.9 °F (37.2 °C) 78 20 152/55 98 % -- None (Room air) -- CS    12/20/24 0449 98.6 °F (37 °C) 83 20 142/87 100 % -- None (Room air) -- TB    12/20/24 0151 -- -- -- -- -- 200 lb 8 oz (90.9 kg) -- -- LR    12/19/24 2148 -- 87 -- -- -- -- -- -- TB    12/19/24 2024 98.7 °F (37.1 °C) 89 20 146/80 96 % -- None (Room air) -- TB    12/19/24 1900 -- 87 -- -- -- -- -- -- MO     12/19/24 1836 -- 88 -- 150/63 -- -- -- -- OR    12/19/24 1744 98.3 °F (36.8 °C) 81 18 170/69 93 % -- None (Room air) -- OR    12/19/24 1106 98.3 °F (36.8 °C) 85 18 148/52 95 % -- None (Room air) -- OR    12/19/24 0908 98 °F (36.7 °C) 74 18 171/63 96 % -- None (Room air) -- OR    12/19/24 0622 -- -- -- -- -- 205 lb 1.6 oz (93 kg) -- -- LRA    12/19/24 0255 98 °F (36.7 °C) 76 20 162/60 96 % -- Nasal cannula 1 L/min TB          CIWA Scores (since admission)       None          Blood Transfusion Record       Product Unit Status Volume Start End            Transfuse RBC       24  [id number]  [id number] Completed 12/17/24 2038 290 mL 12/17/24 1805 12/17/24 2030

## 2024-12-20 NOTE — PLAN OF CARE
Multiple soft bowel movements overnight. NPO since MN for possible EGD today @1315 pending cards/pulm clearance. 1 assist w/ walker    Call light within reach, safety precautions in place  Problem: CARDIOVASCULAR - ADULT  Goal: Maintains optimal cardiac output and hemodynamic stability  Description: INTERVENTIONS:  - Monitor vital signs, rhythm, and trends  - Monitor for bleeding, hypotension and signs of decreased cardiac output  - Evaluate effectiveness of vasoactive medications to optimize hemodynamic stability  - Monitor arterial and/or venous puncture sites for bleeding and/or hematoma  - Assess quality of pulses, skin color and temperature  - Assess for signs of decreased coronary artery perfusion - ex. Angina  - Evaluate fluid balance, assess for edema, trend weights  Outcome: Progressing  Goal: Absence of cardiac arrhythmias or at baseline  Description: INTERVENTIONS:  - Continuous cardiac monitoring, monitor vital signs, obtain 12 lead EKG if indicated  - Evaluate effectiveness of antiarrhythmic and heart rate control medications as ordered  - Initiate emergency measures for life threatening arrhythmias  - Monitor electrolytes and administer replacement therapy as ordered  Outcome: Progressing     Problem: RESPIRATORY - ADULT  Goal: Achieves optimal ventilation and oxygenation  Description: INTERVENTIONS:  - Assess for changes in respiratory status  - Assess for changes in mentation and behavior  - Position to facilitate oxygenation and minimize respiratory effort  - Oxygen supplementation based on oxygen saturation or ABGs  - Provide Smoking Cessation handout, if applicable  - Encourage broncho-pulmonary hygiene including cough, deep breathe, Incentive Spirometry  - Assess the need for suctioning and perform as needed  - Assess and instruct to report SOB or any respiratory difficulty  - Respiratory Therapy support as indicated  - Manage/alleviate anxiety  - Monitor for signs/symptoms of CO2  retention  Outcome: Progressing     Problem: SKIN/TISSUE INTEGRITY - ADULT  Goal: Skin integrity remains intact  Description: INTERVENTIONS  - Assess and document risk factors for pressure ulcer development  - Assess and document skin integrity  - Monitor for areas of redness and/or skin breakdown  - Initiate interventions, skin care algorithm/standards of care as needed  Outcome: Progressing     Problem: Diabetes/Glucose Control  Goal: Glucose maintained within prescribed range  Description: INTERVENTIONS:  - Monitor Blood Glucose as ordered  - Assess for signs and symptoms of hyperglycemia and hypoglycemia  - Administer ordered medications to maintain glucose within target range  - Assess barriers to adequate nutritional intake and initiate nutrition consult as needed  - Instruct patient on self management of diabetes  Outcome: Progressing

## 2024-12-20 NOTE — PHYSICAL THERAPY NOTE
Physical Therapy Contact Note    Attempted to see patient for Physical Therapy services. Patient not available secondary to off the floor EGD. Will re-schedule visit.    Yuly Tarmmell, PT, DPT

## 2024-12-20 NOTE — INTERVAL H&P NOTE
Pre-op Diagnosis: anemia; hem positive stool    The above referenced H&P was reviewed by Mary Anderson MD on 12/20/2024, the patient was examined and no significant changes have occurred in the patient's condition since the H&P was performed.  I discussed with the patient and/or legal representative the potential benefits, risks and side effects of this procedure; the likelihood of the patient achieving goals; and potential problems that might occur during recuperation.  I discussed reasonable alternatives to the procedure, including risks, benefits and side effects related to the alternatives and risks related to not receiving this procedure.  We will proceed with procedure as planned.

## 2024-12-20 NOTE — PROGRESS NOTES
[facility] Cardiology Progress Note    Patient seen and examined.  Chart reviewed.  Discussed with RN    No chest pain or shortness of breath.  LE swelling improving with diuresis    /55 (BP Location: Right arm)   Pulse 78   Temp 98.9 °F (37.2 °C) (Oral)   Resp 20   Wt 200 lb 8 oz   SpO2 98%   BMI 35.52 kg/m²   Gen: alert and oriented  Neck: No JVD  CV: regular  Lungs: CTAB anteriorly  Abd: soft, BS present  Ext: b/l mid shin edema  Skin: warm and dry      Intake/Output Summary (Last 24 hours) at 12/20/2024 0946  Last data filed at 12/20/2024 0002  Gross per 24 hour   Intake 440 ml   Output 2100 ml   Net -1660 ml       Lab Results   Component Value Date    WBC 8.1 12/20/2024    HGB 7.9 12/20/2024    HCT 25.3 12/20/2024    .0 12/20/2024    CREATSERUM 2.17 12/20/2024    BUN 35 12/20/2024     12/20/2024    K 4.9 12/20/2024     12/20/2024    CO2 31.0 12/20/2024     12/20/2024    CA 8.8 12/20/2024    ALB 3.6 12/20/2024    PHOS 3.3 12/20/2024      dilTIAZem ER (Dilacor XR) 24 hr cap 240 mg  240 mg Oral Daily    isosorbide mononitrate ER (Imdur) 24 hr tab 60 mg  60 mg Oral Daily    hydrALAzine (Apresoline) 20 mg/mL injection 10 mg  10 mg Intravenous Q6H PRN    pantoprazole (Protonix) 40 mg in sodium chloride 0.9% PF 10 mL IV push  40 mg Intravenous Q12H    ceFAZolin (Ancef) 2g in 10mL IV syringe premix  2 g Intravenous Q12H    vancomycin (Vancocin) cap 125 mg  125 mg Oral Daily    bumetanide (Bumex) 0.25 MG/ML injection 1 mg  1 mg Intravenous BID (Diuretic)    ondansetron (Zofran) 4 MG/2ML injection 4 mg  4 mg Intravenous Q6H PRN    metoclopramide (Reglan) 5 mg/mL injection 5 mg  5 mg Intravenous Q8H PRN    acetaminophen (Tylenol) tab 650 mg  650 mg Oral Q4H PRN    [COMPLETED] bumetanide (Bumex) 0.25 MG/ML injection 2 mg  2 mg Intravenous Once    [COMPLETED] ceFAZolin (Ancef) 2g in 10mL IV syringe premix  2 g Intravenous Once    [COMPLETED] sodium zirconium cyclosilicate (Lokelma) oral packet  10 g  10 g Oral Once    [COMPLETED] pantoprazole (Protonix) 40 mg in sodium chloride 0.9% PF 10 mL IV push  40 mg Intravenous Once    atorvastatin (Lipitor) tab 40 mg  40 mg Oral Nightly    hydrALAZINE (Apresoline) tab 25 mg  25 mg Oral BID    gabapentin (Neurontin) cap 300 mg  300 mg Oral Nightly    pregabalin (Lyrica) cap 100 mg  100 mg Oral BID    oxyCODONE-acetaminophen (Percocet)  MG per tab 1 tablet  1 tablet Oral Q4H PRN     Echo 9/2024:  1. Left ventricle: The cavity size was normal. Wall thickness was mildly      increased. Systolic function was normal. The estimated ejection fraction      was 60-65%, by visual assessment. No diagnostic evidence for regional      wall motion abnormalities. Doppler parameters are consistent with      abnormal left ventricular relaxation - grade 1 diastolic dysfunction.   2. Right ventricle: The cavity size was normal. Systolic function was      normal.   3. Left atrium: The left atrial volume was mildly increased.   4. Aortic valve: Cusp separation was reduced. Transvalvular velocity was      increased. The findings were consistent with moderate stenosis. There was      mild regurgitation. The peak systolic velocity was 3.17 m/sec. The mean      systolic gradient was 24 mm Hg. The valve area (VTI) was 1.06 cm^2. The      valve area (VTI) index was 0.57 cm^2/m^2.   5. Pulmonary arteries: Systolic pressure was moderately increased, estimated      to be 59 mm Hg.   Impressions:  This study is compared with previous dated 01/20/2024:   Pulmonary pressure is higher, and other findings are unchanged.     Imp/Recs:  HTN  Hx of DVT on oral anticoagulation at home  Moderate aortic valve stenosis  Chronic LE swelling    CV - acceptable risk to proceed with endoscopy with no additional cardiac testing needed.  Hold oral anticoagulation in setting of anemia.  Resume when ok with GI service.    Continue current cardiac regimen including IV bumex 1 mg BID.      Will  follow,    David Cho MD

## 2024-12-20 NOTE — PROGRESS NOTES
[facility]  part of [facility]    Progress Note    Hannah Boss Patient Status:  Inpatient    1945 MRN [id number]   Location [facility] 3W/SW Attending Maximilian Collins MD   Hosp Day # 3 PCP Diana Lopez DO       Subjective:     Feeling better.  Legs smaller.    Objective:   Blood pressure 152/55, pulse 78, temperature 98.9 °F (37.2 °C), temperature source Oral, resp. rate 20, weight 200 lb 8 oz (90.9 kg), SpO2 98%, not currently breastfeeding.    Gen:   NAD.  A and O x 3  CV:   RRR, 3/6 DANIEL  Pulm:   CTA bilat  Abd:   +bs, soft, NT, ND  LE:   1+ edema bilat.   Venous stasis changes bilat.   Mild acute erythema of LLE. - improved  Neuro:   nonfocal    Results:     Lab Results   Component Value Date    WBC 8.1 2024    HGB 7.9 (L) 2024    HCT 25.3 (L) 2024    .0 2024    CREATSERUM 2.17 (H) 2024    BUN 35 (H) 2024     (H) 2024    K 4.9 2024     2024    CO2 31.0 2024     (H) 2024    CA 8.8 2024    ALB 3.6 2024    ALKPHO 187 (H) 2024    BILT 0.3 2024    TP 6.0 2024    AST 27 2024    ALT 23 2024    PTT 35.0 2024    INR 1.30 (H) 2024    T4F 2.5 (H) 2024    TSH 0.008 (L) 2024    LIP 22 2024    DDIMER 1.13 (H) 2023    MG 1.8 2024    PHOS 3.3 2024    TROP 0.398 (HH) 2021     (H) 2024    B12 497 2024    ETOH <3 10/26/2024       No results found.        Assessment and Plan:     Gastrointestinal bleed with posthemorrhagic anemia, probable upper source, rule out peptic ulcer disease or small bowel arteriovenous malformations.  Pt normally on Eliquis for hx of DVT.  - GI on consult  - pt with recent endoscopies on March just showing gastritis.    Capsule endoscopy as outpatient.  - general diet  - cont IV bid protonix  - follow cbc  - was transfused 1 unit prbc  - transfuse prn for hgb  <7  - hold Eliquis  - doppler of LEs this admit is negative for DVT     Fluid overload status   Acute on chronic HFpEF  Underlying moderate aortic stenosis.  - cardiology on consult  - diuresis with IV bumex  - monitor Is/Os, daily wts  - follow bmp     Bilateral lower extremity cellulitis. - improved  - mostly venous stasis changes  - cont IV ancef  - diuresis as above     Musculoskeletal deconditioning  Chronic back pain with neurogenic claudication.  MRI of L spine this admit shows areas of spinal stenosis and mild HNP.     - pt offered anesthesia pain consult to eval for ANTHONY however pt refusing this  - cont lyrica  - PT/OT     DICK on CKD 4 - better  - renal on consult  - monitor bmp with diuresis     Hx of HTN  - cont hydralazine, diltiazem  - cards and renal on consult  - on iv bumex     Hx of tobacco abuse  Active smoker  - counseled on cessation     dvt proph:     SCDs     Code status:    Full       MDM:    High Maximilian Jansen MD  12/20/2024

## 2024-12-20 NOTE — ANESTHESIA POSTPROCEDURE EVALUATION
Patient: Hannah Boss    Procedure Summary       Date: 12/20/24 Room / Location: [facility] ENDOSCOPY 03 / EM ENDOSCOPY    Anesthesia Start: 1325 Anesthesia Stop: 1355    Procedures:       ESOPHAGOGASTRODUODENOSCOPY (EGD)      poss CAPSULE ENDOSCOPY Diagnosis: (capsule placement)    Surgeons: Mary Anderson MD Anesthesiologist: Priscilla Farooq CRNA    Anesthesia Type: MAC ASA Status: 4            Anesthesia Type: MAC    Vitals Value Taken Time   /89 12/20/24 1355   Temp 97 °F (36.1 °C) 12/20/24 1355   Pulse 89 12/20/24 1355   Resp 18 12/20/24 1355   SpO2 98 % 12/20/24 1355       [facility] AN Post Evaluation:   Patient Evaluated in PACU  Patient Participation: complete - patient participated  Level of Consciousness: awake and alert  Pain Score: 0  Pain Management: adequate  Airway Patency:patent  Yes    Nausea/Vomiting: none  Cardiovascular Status: acceptable  Respiratory Status: acceptable      Priscilla Farooq CRNA  12/20/2024 1:55 PM

## 2024-12-20 NOTE — PROGRESS NOTES
[facility]  part of [facility]    Progress Note    Hannah Boss Patient Status:  Inpatient    1945 MRN [id number]   Location [facility] 3W/SW Attending Maximilian Collins MD   Hosp Day # 3 PCP Diana Lopez DO       Subjective:   Hannah Boss is a(n) 79 year old female who I am seeing for DICK      Resting    Objective:   /55 (BP Location: Right arm)   Pulse 78   Temp 98.9 °F (37.2 °C) (Oral)   Resp 20   Wt 200 lb 8 oz (90.9 kg)   SpO2 98%   BMI 35.52 kg/m²      Intake/Output Summary (Last 24 hours) at 2024 1054  Last data filed at 2024 0002  Gross per 24 hour   Intake 440 ml   Output 2100 ml   Net -1660 ml     Wt Readings from Last 1 Encounters:   24 200 lb 8 oz (90.9 kg)       Exam  Gen: No acute distress, Heent: NC AT, mucous memb clear, neck supple  Pulm: Lungs clear, normal respiratory effort  CV: Heart with regular rate and rhythm, edema  Abd: Abdomen soft, nontender, nondistended, no organomegaly, bowel sounds present  Skin: no symptoms reported  Psych: alert and oriented    Assessment and Plan:     1 - DICK/fluid overload  Creatinine is improved with decongestive therapies.   Continue Bumex twice daily.  Will likely be able to switch to p.o. over the weekend     2 - Anemia/GI bleed  The anemia is being evaluated.  GI is on consult, considering capsule     3 -cellulitis  Patient is on Ancef     4 -hypertension with CKD 4  She is on hydralazine.  She is intolerant of ACE inhibitors due to hyperkalemia     5 - DM 2 with nephropathy  Accu-Cheks     6 - CHFpEF  Cardiology evaluating            Results:     Recent Labs   Lab 24  1358 24  2136 24  0728 24  0656 24  0646   RBC 2.10*  --  2.85* 2.68* 2.76*   HGB 6.1*   < > 8.3* 7.8* 7.9*   HCT 20.1*   < > 26.3* 24.7* 25.3*   MCV 95.7  --  92.3 92.2 91.7   MCH 29.0  --  29.1 29.1 28.6   MCHC 30.3*  --  31.6 31.6 31.2   RDW 17.8*  --  17.4* 17.1* 17.1*   NEPRELIM 5.21  --   5.59 4.75  --    WBC 7.0  --  7.3 6.1 8.1   .0  --  243.0 218.0 235.0    < > = values in this interval not displayed.         Recent Labs   Lab 12/18/24  0728 12/19/24  0656 12/20/24  0646   * 104* 137*   BUN 39* 36* 35*   CREATSERUM 2.48* 2.27* 2.17*   CA 8.6* 8.5* 8.8    142 146*   K 4.8 4.6 4.9    111 109   CO2 26.0 29.0 31.0          No results found.        ANN-MARIE GRANDA MD  12/20/2024

## 2024-12-20 NOTE — PLAN OF CARE
Patient alert and oriented. Ambulating with one person assist and walker. EGD with video capsule done in the afternoon. IV bumex and abx continued. Resting in bed with fall precautions in place and call light in reach. Plan to discharge to [facility] when medically clear.     Problem: Patient Centered Care  Goal: Patient preferences are identified and integrated in the patient's plan of care  Description: Interventions:  - What would you like us to know as we care for you? I like to watch movies.   - Provide timely, complete, and accurate information to patient/family  - Incorporate patient and family knowledge, values, beliefs, and cultural backgrounds into the planning and delivery of care  - Encourage patient/family to participate in care and decision-making at the level they choose  - Honor patient and family perspectives and choices  Outcome: Progressing     Problem: CARDIOVASCULAR - ADULT  Goal: Maintains optimal cardiac output and hemodynamic stability  Description: INTERVENTIONS:  - Monitor vital signs, rhythm, and trends  - Monitor for bleeding, hypotension and signs of decreased cardiac output  - Evaluate effectiveness of vasoactive medications to optimize hemodynamic stability  - Monitor arterial and/or venous puncture sites for bleeding and/or hematoma  - Assess quality of pulses, skin color and temperature  - Assess for signs of decreased coronary artery perfusion - ex. Angina  - Evaluate fluid balance, assess for edema, trend weights  Outcome: Progressing  Goal: Absence of cardiac arrhythmias or at baseline  Description: INTERVENTIONS:  - Continuous cardiac monitoring, monitor vital signs, obtain 12 lead EKG if indicated  - Evaluate effectiveness of antiarrhythmic and heart rate control medications as ordered  - Initiate emergency measures for life threatening arrhythmias  - Monitor electrolytes and administer replacement therapy as ordered  Outcome: Progressing     Problem: RESPIRATORY - ADULT  Goal:  Achieves optimal ventilation and oxygenation  Description: INTERVENTIONS:  - Assess for changes in respiratory status  - Assess for changes in mentation and behavior  - Position to facilitate oxygenation and minimize respiratory effort  - Oxygen supplementation based on oxygen saturation or ABGs  - Provide Smoking Cessation handout, if applicable  - Encourage broncho-pulmonary hygiene including cough, deep breathe, Incentive Spirometry  - Assess the need for suctioning and perform as needed  - Assess and instruct to report SOB or any respiratory difficulty  - Respiratory Therapy support as indicated  - Manage/alleviate anxiety  - Monitor for signs/symptoms of CO2 retention  Outcome: Progressing     Problem: SKIN/TISSUE INTEGRITY - ADULT  Goal: Skin integrity remains intact  Description: INTERVENTIONS  - Assess and document risk factors for pressure ulcer development  - Assess and document skin integrity  - Monitor for areas of redness and/or skin breakdown  - Initiate interventions, skin care algorithm/standards of care as needed  Outcome: Progressing     Problem: Diabetes/Glucose Control  Goal: Glucose maintained within prescribed range  Description: INTERVENTIONS:  - Monitor Blood Glucose as ordered  - Assess for signs and symptoms of hyperglycemia and hypoglycemia  - Administer ordered medications to maintain glucose within target range  - Assess barriers to adequate nutritional intake and initiate nutrition consult as needed  - Instruct patient on self management of diabetes  Outcome: Progressing
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
## 2024-12-21 LAB
ALBUMIN SERPL-MCNC: 3.7 G/DL (ref 3.2–4.8)
ANION GAP SERPL CALC-SCNC: 6 MMOL/L (ref 0–18)
BASOPHILS # BLD AUTO: 0.03 X10(3) UL (ref 0–0.2)
BASOPHILS NFR BLD AUTO: 0.4 %
BUN BLD-MCNC: 29 MG/DL (ref 9–23)
BUN/CREAT SERPL: 13.9 (ref 10–20)
CALCIUM BLD-MCNC: 8.9 MG/DL (ref 8.7–10.4)
CHLORIDE SERPL-SCNC: 107 MMOL/L (ref 98–112)
CO2 SERPL-SCNC: 32 MMOL/L (ref 21–32)
CREAT BLD-MCNC: 2.09 MG/DL
DEPRECATED RDW RBC AUTO: 57.9 FL (ref 35.1–46.3)
EGFRCR SERPLBLD CKD-EPI 2021: 24 ML/MIN/1.73M2 (ref 60–?)
EOSINOPHIL # BLD AUTO: 0.11 X10(3) UL (ref 0–0.7)
EOSINOPHIL NFR BLD AUTO: 1.5 %
ERYTHROCYTE [DISTWIDTH] IN BLOOD BY AUTOMATED COUNT: 17.2 % (ref 11–15)
GLUCOSE BLD-MCNC: 115 MG/DL (ref 70–99)
GLUCOSE BLDC GLUCOMTR-MCNC: 121 MG/DL (ref 70–99)
GLUCOSE BLDC GLUCOMTR-MCNC: 124 MG/DL (ref 70–99)
GLUCOSE BLDC GLUCOMTR-MCNC: 125 MG/DL (ref 70–99)
GLUCOSE BLDC GLUCOMTR-MCNC: 202 MG/DL (ref 70–99)
HCT VFR BLD AUTO: 24.7 %
HGB BLD-MCNC: 8 G/DL
IMM GRANULOCYTES # BLD AUTO: 0.03 X10(3) UL (ref 0–1)
IMM GRANULOCYTES NFR BLD: 0.4 %
LYMPHOCYTES # BLD AUTO: 0.82 X10(3) UL (ref 1–4)
LYMPHOCYTES NFR BLD AUTO: 10.8 %
MCH RBC QN AUTO: 30.1 PG (ref 26–34)
MCHC RBC AUTO-ENTMCNC: 32.4 G/DL (ref 31–37)
MCV RBC AUTO: 92.9 FL
MONOCYTES # BLD AUTO: 0.48 X10(3) UL (ref 0.1–1)
MONOCYTES NFR BLD AUTO: 6.3 %
NEUTROPHILS # BLD AUTO: 6.09 X10 (3) UL (ref 1.5–7.7)
NEUTROPHILS # BLD AUTO: 6.09 X10(3) UL (ref 1.5–7.7)
NEUTROPHILS NFR BLD AUTO: 80.6 %
OSMOLALITY SERPL CALC.SUM OF ELEC: 307 MOSM/KG (ref 275–295)
PHOSPHATE SERPL-MCNC: 3.6 MG/DL (ref 2.4–5.1)
PLATELET # BLD AUTO: 210 10(3)UL (ref 150–450)
POTASSIUM SERPL-SCNC: 5.1 MMOL/L (ref 3.5–5.1)
RBC # BLD AUTO: 2.66 X10(6)UL
SODIUM SERPL-SCNC: 145 MMOL/L (ref 136–145)
WBC # BLD AUTO: 7.6 X10(3) UL (ref 4–11)

## 2024-12-21 PROCEDURE — 99232 SBSQ HOSP IP/OBS MODERATE 35: CPT | Performed by: INTERNAL MEDICINE

## 2024-12-21 PROCEDURE — 99233 SBSQ HOSP IP/OBS HIGH 50: CPT | Performed by: HOSPITALIST

## 2024-12-21 NOTE — PHYSICAL THERAPY NOTE
PHYSICAL THERAPY TREATMENT NOTE - INPATIENT     Room Number: 342/342-A       Presenting Problem: BLE edema & pain  Co-Morbidities : CAD, aortic stenosis, DM2, CKD3, HTN, HLD, HFpEF, fibromyalgia, OA, L rotator cuff repair, neurological gait dysfunction also going back to 2016, paraparesis, traumatic rhabdomyolysis 12/2022,  thoracic myelopathy (T11-T12), retroperitoneal sarcoma s/p resection with recurrence complicated by chronic abdominal pain, and daily opioid use    Problem List  Principal Problem:    Anemia, unspecified type  Active Problems:    Acute on chronic congestive heart failure, unspecified heart failure type (HCC)    Hyperkalemia    Acute renal failure superimposed on chronic kidney disease, unspecified acute renal failure type, unspecified CKD stage (HCC)    Cellulitis of lower extremity, unspecified laterality    GI bleed      PHYSICAL THERAPY ASSESSMENT   Patient demonstrates good  progress this session, goals  remain in progress.      Patient is requiring stand-by assist and contact guard assist as a result of the following impairments: decreased functional strength, decreased endurance/aerobic capacity, and decreased muscular endurance.     Patient continues to function near baseline with transfers, gait, maintaining seated position, and standing prolonged periods.  Next session anticipate patient to progress bed mobility, transfers, gait, maintaining seated position, and standing prolonged periods.  Physical Therapy will continue to follow patient for duration of hospitalization.    Patient continues to benefit from continued skilled PT services: at discharge to promote prior level of function and safety with additional support and return home with home health PT.    PLAN DURING HOSPITALIZATION  Nursing Mobility Recommendation : 1 Assist  PT Device Recommendation: Rolling walker;Gait belt  PT Treatment Plan: Bed mobility;Body mechanics;Energy conservation;Patient education;Gait  training;Strengthening;Transfer training;Balance training  Frequency (Obs): 5x/week     SUBJECTIVE  I am better today     OBJECTIVE  Precautions: Bed/chair alarm    WEIGHT BEARING RESTRICTION       PAIN ASSESSMENT   Ratin  Location: BLEs  Management Techniques: Activity promotion;Body mechanics;Relaxation;Repositioning    BALANCE  Static Sitting: Good  Dynamic Sitting: Good  Static Standing: Fair +  Dynamic Standing: Fair    ACTIVITY TOLERANCE                          O2 WALK  Oxygen Therapy  SPO2% on Room Air at Rest: 98  SPO2% Ambulation on Room Air: 96    AM-PAC '6-Clicks' INPATIENT SHORT FORM - BASIC MOBILITY  How much difficulty does the patient currently have...  Patient Difficulty: Turning over in bed (including adjusting bedclothes, sheets and blankets)?: None   Patient Difficulty: Sitting down on and standing up from a chair with arms (e.g., wheelchair, bedside commode, etc.): None   Patient Difficulty: Moving from lying on back to sitting on the side of the bed?: A Little   How much help from another person does the patient currently need...   Help from Another: Moving to and from a bed to a chair (including a wheelchair)?: None   Help from Another: Need to walk in hospital room?: A Little   Help from Another: Climbing 3-5 steps with a railing?: A Lot     AM-PAC Score:  Raw Score: 20   Approx Degree of Impairment: 35.83%   Standardized Score (AM-PAC Scale): 47.67   CMS Modifier (G-Code): CJ    FUNCTIONAL ABILITY STATUS  Functional Mobility/Gait Assessment  Gait Assistance: Contact guard assist (SBA)  Distance (ft): 80 ft and 30 ft  Assistive Device: Rolling walker  Pattern: Shuffle (leaning on RW)  Stairs: Other (comment)  Rolling: stand-by assist  Supine to Sit: stand-by assist  Sit to Supine: contact guard assist  Sit to Stand: stand-by assist    Skilled Therapy Provided: RN approved PT session. Pt sitting on EOB and stated just went to bathroom. Pt stated feeling better today, edema of ble's  decrease. Instructed on seated thera exs with ble's to promote functional ability. Pt with good demo of b le's in seated position. Sit to stand with RW SBA and pt amb outside of room. Provided cues for amb closer to RW and maintain upright posture. After amb 80 ft, pt was taking seated rest break and cont to am 30 ft to her room. Monitoring 02 on RA, with rest 98% and with activity 96%. Pt left sitting on chair and all needs in reach. Pt is awaiting for lunch tray.     The patient's Approx Degree of Impairment: 35.83% has been calculated based on documentation in the [facility] '6 clicks' Inpatient Daily Activity Short Form.  Research supports that patients with this level of impairment may benefit from going home with HH PT.  Final disposition will be made by interdisciplinary medical team.    THERAPEUTIC EXERCISES  Lower Extremity Alternating marching  Ankle pumps  Knee extension     Position Sitting       Patient End of Session: Up in chair;Needs met;Call light within reach;RN aware of session/findings;All patient questions and concerns addressed    CURRENT GOALS     Patient Goal Patient's self-stated goal is: manage BLE edema   Goal #1 Patient is able to demonstrate supine - sit EOB @ level: modified independent     Goal #1   Current Status CGA to SBA   Goal #2 Patient is able to demonstrate transfers EOB to/from BSC at assistance level: modified independent with walker - rolling     Goal #2  Current Status SBA   Goal #3 Patient is able to ambulate 50 feet with assist device: walker - rolling at assistance level: contact guard assistance   Goal #3   Current Status Pt amb 80 ft and 30 ft with RW CGA to SBA   Goal #4 Patient is able to demonstrate transfers sit to/from stand at assistance level: modified independence with rolling walker      Goal #4   Current Status In progress     Goal #5 Patient to demonstrate independence with home activity/exercise instructions provided to patient in preparation for discharge.    Goal #5   Current Status In progress   Goal #6    Goal #6  Current Status        Gait Trainin minutes  Therapeutic Activity: 12 minutes

## 2024-12-21 NOTE — PROGRESS NOTES
GASTROENTEROLOGY PROGRESS NOTE      Subjective: Patient sitting in bed, denies any significant complaints.  Overall feeling well.        PE:  Vitals:   BP Readings from Last 3 Encounters:   24 156/60   24 144/58   24 (!) 164/62      Wt Readings from Last 3 Encounters:   24 187 lb 11.2 oz (85.1 kg)   24 222 lb 10.6 oz (101 kg)   24 215 lb (97.5 kg)      General: AAOx3 in NAD  HEENT: No scleral icterus, no LAD  Abdomen: normal active bowel sounds, soft, nontender, nondistended    Labs: Reviewed in chart    Imagin24  CT A/P:  1. No acute intra-abdominal process.   2. 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   3. Uncomplicated distal colonic diverticulosis.   4. Status post cholecystectomy.  No biliary ductal dilatation.   5. Lesser incidental findings as above.      Endoscopy:  2013  Colonoscopy at [facility]   EGD reportedly neg  3/22/24  EGD with gastritis, normal biopsies  3/22/24  Colonoscopy with 4 mm adenomatous polyp, tics and hemorrhoids  24  EGD with very mild gastritis at the level of the pylorus with negative biopsies and capsule also placed for small bowel capsule endoscopy    Assessment and Plan: Patient is a 79-year-old female admitted with worsening fatigue and bilateral lower extremity edema and heme positive stool; EGD and colonoscopy performed in March and repeat EGD yesterday overall unremarkable.  Capsule was dropped for capsule endoscopy; further recommendations will be made after capsule results are available.    Patient tolerating diet, continue supportive care.  Will follow peripherally, please call with any new GI issues.

## 2024-12-21 NOTE — PROGRESS NOTES
[facility]  part of [facility]    Progress Note    Hannah Boss Patient Status:  Inpatient    1945 MRN [id number]   Location [facility] 3W/SW Attending Maximilian Collins MD   Hosp Day # 4 PCP Diana Lopez DO       Subjective:   Hannah Boss is a(n) 79 year old female who I am seeing for ingris/ckd          Objective:   /60 (BP Location: Right arm)   Pulse 78   Temp 97.3 °F (36.3 °C) (Temporal)   Resp 16   Ht 5' 3\" (1.6 m)   Wt 187 lb 11.2 oz (85.1 kg)   SpO2 92%   BMI 33.25 kg/m²      Intake/Output Summary (Last 24 hours) at 2024 1356  Last data filed at 2024 0755  Gross per 24 hour   Intake 465 ml   Output 1050 ml   Net -585 ml     Wt Readings from Last 1 Encounters:   24 187 lb 11.2 oz (85.1 kg)       Exam  Vital signs: Blood pressure 156/60, pulse 78, temperature 97.3 °F (36.3 °C), temperature source Temporal, resp. rate 16, height 5' 3\" (1.6 m), weight 187 lb 11.2 oz (85.1 kg), SpO2 92%, not currently breastfeeding.    General: No acute distress. Alert and oriented x 3.  HEENT: Moist mucous membranes. EOMI. PERRLA  Neck:  No JVD.   Respiratory: Clear to auscultation bilaterally.    Cardiovascular: S1, S2.  Regular rate and rhythm.   Abdomen: Soft, nontender, nondistended.    Neurologic: No focal neurological deficits.  Musculoskeletal: Full range of motion of all extremities.  No swelling noted.  Access:    Results:     Recent Labs   Lab 24  0728 24  0656 24  0646 24  0516   RBC 2.85* 2.68* 2.76* 2.66*   HGB 8.3* 7.8* 7.9* 8.0*   HCT 26.3* 24.7* 25.3* 24.7*   MCV 92.3 92.2 91.7 92.9   MCH 29.1 29.1 28.6 30.1   MCHC 31.6 31.6 31.2 32.4   RDW 17.4* 17.1* 17.1* 17.2*   NEPRELIM 5.59 4.75  --  6.09   WBC 7.3 6.1 8.1 7.6   .0 218.0 235.0 210.0         Recent Labs   Lab 24  0656 24  0646 24  0516   * 137* 115*   BUN 36* 35* 29*   CREATSERUM 2.27* 2.17* 2.09*   CA 8.5* 8.8 8.9    146* 145    K 4.6 4.9 5.1    109 107   CO2 29.0 31.0 32.0          No results found.    Assessment and Plan:     79-year-old female with history of CKD stage III, diabetes, hypertension, leiomyosarcoma status postsurgery and radiation , SVT, chronic pain , LLE DVT on eliquis with recurrent hospitalizations admitted with fatigue and lower extremity edema    Impression:    Likely cardiorenal syndrome .  Improving with IV diuretics  CKD 3/4, high blood pressure, diabetes, cardiorenal with baseline creatinine around 1.9 mg/dL  Anemia status post endoscopy.  Capsule endoscopy, gi following. S/p prbc  Hypertension with CKD 4.  Avoid ACE because of hyperkalemia.  Hydralazine  Diabetes type II with nephropathy, ISS  CHFpEF, diuretics      Plan:    Kidney function continues to improve  Transition to p.o. soon    Thank you very much for allowing me to participate in the care of your patient.  If you have any questions, please do not hesitate to contact me.     Janet Reynaga MD  12/21/2024

## 2024-12-21 NOTE — PLAN OF CARE
Problem: Patient Centered Care  Goal: Patient preferences are identified and integrated in the patient's plan of care  Description: Interventions:  - What would you like us to know as we care for you?   - Provide timely, complete, and accurate information to patient/family  - Incorporate patient and family knowledge, values, beliefs, and cultural backgrounds into the planning and delivery of care  - Encourage patient/family to participate in care and decision-making at the level they choose  - Honor patient and family perspectives and choices  Outcome: Progressing     Problem: CARDIOVASCULAR - ADULT  Goal: Maintains optimal cardiac output and hemodynamic stability  Description: INTERVENTIONS:  - Monitor vital signs, rhythm, and trends  - Monitor for bleeding, hypotension and signs of decreased cardiac output  - Evaluate effectiveness of vasoactive medications to optimize hemodynamic stability  - Monitor arterial and/or venous puncture sites for bleeding and/or hematoma  - Assess quality of pulses, skin color and temperature  - Assess for signs of decreased coronary artery perfusion - ex. Angina  - Evaluate fluid balance, assess for edema, trend weights  Outcome: Progressing  Goal: Absence of cardiac arrhythmias or at baseline  Description: INTERVENTIONS:  - Continuous cardiac monitoring, monitor vital signs, obtain 12 lead EKG if indicated  - Evaluate effectiveness of antiarrhythmic and heart rate control medications as ordered  - Initiate emergency measures for life threatening arrhythmias  - Monitor electrolytes and administer replacement therapy as ordered  Outcome: Progressing     Problem: RESPIRATORY - ADULT  Goal: Achieves optimal ventilation and oxygenation  Description: INTERVENTIONS:  - Assess for changes in respiratory status  - Assess for changes in mentation and behavior  - Position to facilitate oxygenation and minimize respiratory effort  - Oxygen supplementation based on oxygen saturation or ABGs  -  Provide Smoking Cessation handout, if applicable  - Encourage broncho-pulmonary hygiene including cough, deep breathe, Incentive Spirometry  - Assess the need for suctioning and perform as needed  - Assess and instruct to report SOB or any respiratory difficulty  - Respiratory Therapy support as indicated  - Manage/alleviate anxiety  - Monitor for signs/symptoms of CO2 retention  Outcome: Progressing     Problem: SKIN/TISSUE INTEGRITY - ADULT  Goal: Skin integrity remains intact  Description: INTERVENTIONS  - Assess and document risk factors for pressure ulcer development  - Assess and document skin integrity  - Monitor for areas of redness and/or skin breakdown  - Initiate interventions, skin care algorithm/standards of care as needed  Outcome: Progressing     Problem: Diabetes/Glucose Control  Goal: Glucose maintained within prescribed range  Description: INTERVENTIONS:  - Monitor Blood Glucose as ordered  - Assess for signs and symptoms of hyperglycemia and hypoglycemia  - Administer ordered medications to maintain glucose within target range  - Assess barriers to adequate nutritional intake and initiate nutrition consult as needed  - Instruct patient on self management of diabetes  Outcome: Progressing

## 2024-12-21 NOTE — PLAN OF CARE
Patient is from home alone. A&Ox4. Room air. PRN O2 at home. IV bumex BID. Eliquis restarted. Denies pain at this time. Standby assist in room. Bed in lowest position and locked. Call light in hand. Personal belongings w/in reach.     Problem: Patient Centered Care  Goal: Patient preferences are identified and integrated in the patient's plan of care  Description: Interventions:  - What would you like us to know as we care for you? From home alone  - Provide timely, complete, and accurate information to patient/family  - Incorporate patient and family knowledge, values, beliefs, and cultural backgrounds into the planning and delivery of care  - Encourage patient/family to participate in care and decision-making at the level they choose  - Honor patient and family perspectives and choices  Outcome: Progressing     Problem: Patient/Family Goals  Goal: Patient/Family Long Term Goal  Description: Patient's Long Term Goal: To feel better    Interventions:  - Continue medication administration as ordered  - See additional Care Plan goals for specific interventions  Outcome: Progressing  Goal: Patient/Family Short Term Goal  Description: Patient's Short Term Goal: To be discharged    Interventions:   - Assist patient to all tests and procedures  - See additional Care Plan goals for specific interventions  Outcome: Progressing     Problem: CARDIOVASCULAR - ADULT  Goal: Maintains optimal cardiac output and hemodynamic stability  Description: INTERVENTIONS:  - Monitor vital signs, rhythm, and trends  - Monitor for bleeding, hypotension and signs of decreased cardiac output  - Evaluate effectiveness of vasoactive medications to optimize hemodynamic stability  - Monitor arterial and/or venous puncture sites for bleeding and/or hematoma  - Assess quality of pulses, skin color and temperature  - Assess for signs of decreased coronary artery perfusion - ex. Angina  - Evaluate fluid balance, assess for edema, trend  weights  Outcome: Progressing  Goal: Absence of cardiac arrhythmias or at baseline  Description: INTERVENTIONS:  - Continuous cardiac monitoring, monitor vital signs, obtain 12 lead EKG if indicated  - Evaluate effectiveness of antiarrhythmic and heart rate control medications as ordered  - Initiate emergency measures for life threatening arrhythmias  - Monitor electrolytes and administer replacement therapy as ordered  Outcome: Progressing     Problem: RESPIRATORY - ADULT  Goal: Achieves optimal ventilation and oxygenation  Description: INTERVENTIONS:  - Assess for changes in respiratory status  - Assess for changes in mentation and behavior  - Position to facilitate oxygenation and minimize respiratory effort  - Oxygen supplementation based on oxygen saturation or ABGs  - Provide Smoking Cessation handout, if applicable  - Encourage broncho-pulmonary hygiene including cough, deep breathe, Incentive Spirometry  - Assess the need for suctioning and perform as needed  - Assess and instruct to report SOB or any respiratory difficulty  - Respiratory Therapy support as indicated  - Manage/alleviate anxiety  - Monitor for signs/symptoms of CO2 retention  Outcome: Progressing     Problem: SKIN/TISSUE INTEGRITY - ADULT  Goal: Skin integrity remains intact  Description: INTERVENTIONS  - Assess and document risk factors for pressure ulcer development  - Assess and document skin integrity  - Monitor for areas of redness and/or skin breakdown  - Initiate interventions, skin care algorithm/standards of care as needed  Outcome: Progressing     Problem: Diabetes/Glucose Control  Goal: Glucose maintained within prescribed range  Description: INTERVENTIONS:  - Monitor Blood Glucose as ordered  - Assess for signs and symptoms of hyperglycemia and hypoglycemia  - Administer ordered medications to maintain glucose within target range  - Assess barriers to adequate nutritional intake and initiate nutrition consult as needed  - Instruct  patient on self management of diabetes  Outcome: Progressing

## 2024-12-21 NOTE — PROGRESS NOTES
[facility]  part of [facility]    Progress Note    Hannah Boss Patient Status:  Inpatient    1945 MRN [id number]   Location [facility] 3W/SW Attending Maximilian Collins MD   Hosp Day # 4 PCP Diana Lopez DO       Subjective:     Feeling better.  Legs smaller.    Objective:   Blood pressure 156/60, pulse 78, temperature 97.3 °F (36.3 °C), temperature source Temporal, resp. rate 16, height 5' 3\" (1.6 m), weight 187 lb 11.2 oz (85.1 kg), SpO2 92%, not currently breastfeeding.    Gen:   NAD.  A and O x 3  CV:   RRR, 3/6 DANIEL  Pulm:   CTA bilat  Abd:   +bs, soft, NT, ND  LE:   1+ edema bilat.   Venous stasis changes bilat.   Mild acute erythema of LLE. - improved  Neuro:   nonfocal    Results:     Lab Results   Component Value Date    WBC 7.6 2024    HGB 8.0 (L) 2024    HCT 24.7 (L) 2024    .0 2024    CREATSERUM 2.09 (H) 2024    BUN 29 (H) 2024     2024    K 5.1 2024     2024    CO2 32.0 2024     (H) 2024    CA 8.9 2024    ALB 3.7 2024    ALKPHO 187 (H) 2024    BILT 0.3 2024    TP 6.0 2024    AST 27 2024    ALT 23 2024    PTT 35.0 2024    INR 1.30 (H) 2024    T4F 2.5 (H) 2024    TSH 0.008 (L) 2024    LIP 22 2024    DDIMER 1.13 (H) 2023    MG 1.8 2024    PHOS 3.6 2024    TROP 0.398 (HH) 2021     (H) 2024    B12 497 2024    ETOH <3 10/26/2024       No results found.        Assessment and Plan:     Gastrointestinal bleed with posthemorrhagic anemia, probable upper source, rule out peptic ulcer disease or small bowel arteriovenous malformations.  Pt normally on Eliquis for hx of DVT.  Mild gastritis on EGD .  - GI on consult  - pt with recent endoscopies on March just showing gastritis.      - capsule endoscopy started today  - back on Eliquis  - general diet  - cont IV bid  protonix  - follow cbc  - was transfused 1 unit prbc  - transfuse prn for hgb <7  - doppler of LEs this admit is negative for DVT     Fluid overload status   Acute on chronic HFpEF  Underlying moderate aortic stenosis.  - cardiology on consult  - diuresis with IV bumex - PO soon  - monitor Is/Os, daily wts  - follow bmp     Bilateral lower extremity cellulitis. - improved  - mostly venous stasis changes  - cont IV ancef  - diuresis as above     Musculoskeletal deconditioning  Chronic back pain with neurogenic claudication.  MRI of L spine this admit shows areas of spinal stenosis and mild HNP.     - pt offered anesthesia pain consult to eval for ANTHONY however pt refusing this  - cont lyrica  - PT/OT     DICK on CKD 4 - Cr continues to improved  - renal on consult  - monitor bmp with diuresis     Hx of HTN  - cont hydralazine, diltiazem  - cards and renal on consult  - on iv bumex     Hx of tobacco abuse  Active smoker  - counseled on cessation     dvt proph:     Eliquis     Code status:    Full        MDM:    High Maximilian Jansen MD  12/21/2024

## 2024-12-21 NOTE — PROGRESS NOTES
Progress Note  Hannah Boss Patient Status:  Inpatient    1945 MRN [id number]   Location [facility] 3W/SW Attending Maximilian Collins MD   Hosp Day # 4 PCP Diana Lopez DO     Subjective:  Pt denies any cardiac complaints     Objective:  BP (!) 171/73 (BP Location: Right arm)   Pulse 84   Temp 97.3 °F (36.3 °C) (Temporal)   Resp 16   Ht 5' 3\" (1.6 m)   Wt 187 lb 11.2 oz (85.1 kg)   SpO2 93%   BMI 33.25 kg/m²     Telemetry: NSR       Intake/Output:    Intake/Output Summary (Last 24 hours) at 2024 0842  Last data filed at 2024 0755  Gross per 24 hour   Intake 465 ml   Output 2050 ml   Net -1585 ml       Last 3 Weights   24 0527 187 lb 11.2 oz (85.1 kg)   24 1312 200 lb (90.7 kg)   24 0151 200 lb 8 oz (90.9 kg)   24 0622 205 lb 1.6 oz (93 kg)   24 0436 216 lb 11.4 oz (98.3 kg)   24 1903 222 lb 1.6 oz (100.7 kg)   24 0059 222 lb 10.6 oz (101 kg)   24 1401 215 lb (97.5 kg)       Labs:  Recent Labs   Lab 24  0656 24  0646 24  0516   * 137* 115*   BUN 36* 35* 29*   CREATSERUM 2.27* 2.17* 2.09*   EGFRCR 22* 23* 24*   CA 8.5* 8.8 8.9    146* 145   K 4.6 4.9 5.1    109 107   CO2 29.0 31.0 32.0     Recent Labs   Lab 24  0728 24  0656 24  0646 24  0516   RBC 2.85* 2.68* 2.76* 2.66*   HGB 8.3* 7.8* 7.9* 8.0*   HCT 26.3* 24.7* 25.3* 24.7*   MCV 92.3 92.2 91.7 92.9   MCH 29.1 29.1 28.6 30.1   MCHC 31.6 31.6 31.2 32.4   RDW 17.4* 17.1* 17.1* 17.2*   NEPRELIM 5.59 4.75  --  6.09   WBC 7.3 6.1 8.1 7.6   .0 218.0 235.0 210.0         No results for input(s): \"TROP\", \"TROPHS\", \"CK\" in the last 168 hours.  Lab Results   Component Value Date    INR 1.30 (H) 2024    INR 1.21 (H) 2024    INR 1.11 10/07/2023       Diagnostics:     Echo from 2024      Conclusions:     1. Left ventricle: The cavity size was normal. Wall thickness was mildly      increased. Systolic function was  normal. The estimated ejection fraction      was 60-65%, by visual assessment. No diagnostic evidence for regional      wall motion abnormalities. Doppler parameters are consistent with      abnormal left ventricular relaxation - grade 1 diastolic dysfunction.   2. Right ventricle: The cavity size was normal. Systolic function was      normal.   3. Left atrium: The left atrial volume was mildly increased.   4. Aortic valve: Cusp separation was reduced. Transvalvular velocity was      increased. The findings were consistent with moderate stenosis. There was      mild regurgitation. The peak systolic velocity was 3.17 m/sec. The mean      systolic gradient was 24 mm Hg. The valve area (VTI) was 1.06 cm^2. The      valve area (VTI) index was 0.57 cm^2/m^2.   5. Pulmonary arteries: Systolic pressure was moderately increased, estimated      to be 59 mm Hg.   Impressions:  This study is compared with previous dated 01/20/2024:   Pulmonary pressure is higher, and other findings are unchanged.   *     Review of Systems   Respiratory: Negative.     Cardiovascular: Negative.        Physical Exam:    Physical Exam  Vitals reviewed.   Constitutional:       General: She is not in acute distress.     Appearance: She is not ill-appearing.   Neck:      Vascular: No JVD.   Cardiovascular:      Rate and Rhythm: Normal rate and regular rhythm.      Heart sounds: S1 normal and S2 normal. Murmur heard.      Systolic murmur is present with a grade of 2/6.      No friction rub. No gallop.      Comments: Trace edema to BLE  Pulmonary:      Effort: Pulmonary effort is normal. No respiratory distress.      Breath sounds: Normal breath sounds. No stridor. No wheezing, rhonchi or rales.   Chest:      Chest wall: No tenderness.   Abdominal:      General: Abdomen is flat.      Palpations: Abdomen is soft.   Musculoskeletal:      Cervical back: Normal range of motion.      Right lower leg: Edema present.      Left lower leg: Edema present.    Skin:     General: Skin is warm and dry.   Neurological:      Mental Status: She is alert.   Psychiatric:         Mood and Affect: Mood normal.         Medications:   dilTIAZem ER  240 mg Oral Daily    isosorbide mononitrate ER  60 mg Oral Daily    pantoprazole  40 mg Intravenous Q12H    ceFAZolin  2 g Intravenous Q12H    vancomycin  125 mg Oral Daily    bumetanide  1 mg Intravenous BID (Diuretic)    atorvastatin  40 mg Oral Nightly    hydrALAZINE  25 mg Oral BID    gabapentin  300 mg Oral Nightly    pregabalin  100 mg Oral BID         Assessment/Plan:    Anemia /GIB  - presented w/ hgb of 6.1. s/p prbc transfusion. Hgb stable  - hx of dvt tx w/ eliquis   - BLE venous doppler negative for DVT   - s/p Video swallow that showed gastrittis,     Acute on chronic HFpEF,  EF 60-65% in 9/2024  -  & CXR w/ min interstitial edema on admit   - on IV bumex per Nephrology  - monitor accurate I/o , daily wts & renal fx closely   - wt down 35 lb since admission, net neg of 7L     Moderate aortic stenosis      DICK on CKD 4  - improving w/ diuretic therapy   - off of home lisinopril - intolerant to ACEi per Nephrology due to hyperkalemia  - on IV bumex per Nephrology      T2DM w/ nephropathy     HTN   - above goal on hydralazine, diltiazem ER, imdur   - off of home lisinopril,     Cellulitis   - on antibiotics per PMD     Plan;  - Discussed with Dr Anderson, okay to restart eliquis   - continue current cardiac meds  - diuresis per nephrology  - blood pressure still above goal before the am meds, will recheck after am meds. If still elevated, will consider increasing diltiazem    Plan discussed with pt and RN   ELISA Matias  [facility] [place]  12/21/2024  8:42 AM    Will follow pt peripherally tomorrow and see pt on monday

## 2024-12-21 NOTE — SLP NOTE
SPEECH DAILY NOTE - INPATIENT    ASSESSMENT & PLAN   ASSESSMENT  PPE REQUIRED. THIS THERAPIST WORE GLOVES FOR DURATION OF EVALUATION. HANDS WASHED UPON ENTRANCE/EXIT.    SLP f/u for ongoing meal assessment per recommendations of BSSE completed on 12/18. RN reports pt tolerates diet and medication well with no overt clinical s/s aspiration. Pt denies any swallowing challenges. Pt was refusing to eat or drink until liquids were upgraded to thin.      Pt independently sat herself at the side of the bed. Pt afebrile and on room air prior to the start of oral trials. SLP reviewed aspiration precautions and safe swallowing compensatory strategies with the patient. Completed trials of thin liquids, mildly thick liquids, and hard cookie solids. There was minimally slowed but functional mastication and adequate oral clearance. There was one initial cough with thin liquids. No further overt s/s of aspiration noted across thin liquids trials (6oz provided). Oxygen status remained unchanged t/o the entire session. Recommend continue a regular diet (patient to select soft as needed) and thin liquids. SLP educated patient safe swallow guidelines including small sips, no straws, and pacing between bites/sips.      SLP to f/u with meal assessment x1 to ensure tolerance, monitor  CXR, and VFSS if any overt CSA and/or decline in CXR. RN, RN alerted with results and recommendations.     MOST RECENT CXR 12/17         Diet Recommendations - Solids: Regular (pt to choose softer solids)  Diet Recommendations - Thin: Thin Liquids    Compensatory Strategies Recommended: Multiple swallows  Aspiration Precautions: Upright position;Slow rate;Small bites;Small sips;No straw  Medication Administration Recommendations: Crushed in puree    Patient Experiencing Pain: No                Treatment Plan  Treatment Plan/Recommendations: Aspiration precautions    Interdisciplinary Communication: Discussed with RN            GOALS  Goal #1 The patient  will tolerate regular (choose soft) consistencies and thin liquids without overt signs or symptoms of aspiration with 100 % accuracy over 1-2 session(s).  Goal Met, revised 12/21   Goal #2 The patient/family/caregiver will demonstrate understanding and implementation of aspiration precautions and swallow strategies independently over 1-2 session(s).    In Progress   Goal #3 The patient will tolerate trial upgrade of solids consistency and thin liquids without overt signs or symptoms of aspiration with 90 % accuracy over 3-4 session(s).  Goat Met 12/21   Goal #4 The patient will utilize compensatory strategies as outlined by  BSSE (clinical evaluation) including Slow rate, Small sips, Alternate liquids/solids, No straws, Upright 90 degrees with minimal assistance 100 % of the time across 1-2 sessions.    In Progress     FOLLOW UP  Follow Up Needed (Documentation Required): Yes  SLP Follow-up Date: 12/23/24  Duration: 1 week    Session: 2    If you have any questions, please contact Grisel Castillo, SLP Grisel Castillo, MSCCCSShriners Hospitals for Children  [phone]

## 2024-12-22 LAB
ANION GAP SERPL CALC-SCNC: 5 MMOL/L (ref 0–18)
BUN BLD-MCNC: 27 MG/DL (ref 9–23)
BUN/CREAT SERPL: 14.4 (ref 10–20)
CALCIUM BLD-MCNC: 8.4 MG/DL (ref 8.7–10.4)
CHLORIDE SERPL-SCNC: 110 MMOL/L (ref 98–112)
CO2 SERPL-SCNC: 31 MMOL/L (ref 21–32)
CREAT BLD-MCNC: 1.88 MG/DL
DEPRECATED RDW RBC AUTO: 57.5 FL (ref 35.1–46.3)
EGFRCR SERPLBLD CKD-EPI 2021: 27 ML/MIN/1.73M2 (ref 60–?)
ERYTHROCYTE [DISTWIDTH] IN BLOOD BY AUTOMATED COUNT: 16.9 % (ref 11–15)
GLUCOSE BLD-MCNC: 129 MG/DL (ref 70–99)
GLUCOSE BLDC GLUCOMTR-MCNC: 128 MG/DL (ref 70–99)
GLUCOSE BLDC GLUCOMTR-MCNC: 139 MG/DL (ref 70–99)
GLUCOSE BLDC GLUCOMTR-MCNC: 139 MG/DL (ref 70–99)
GLUCOSE BLDC GLUCOMTR-MCNC: 141 MG/DL (ref 70–99)
HCT VFR BLD AUTO: 23.8 %
HGB BLD-MCNC: 7.7 G/DL
MCH RBC QN AUTO: 30.3 PG (ref 26–34)
MCHC RBC AUTO-ENTMCNC: 32.4 G/DL (ref 31–37)
MCV RBC AUTO: 93.7 FL
OSMOLALITY SERPL CALC.SUM OF ELEC: 309 MOSM/KG (ref 275–295)
PLATELET # BLD AUTO: 190 10(3)UL (ref 150–450)
POTASSIUM SERPL-SCNC: 4.3 MMOL/L (ref 3.5–5.1)
RBC # BLD AUTO: 2.54 X10(6)UL
SODIUM SERPL-SCNC: 146 MMOL/L (ref 136–145)
WBC # BLD AUTO: 6.6 X10(3) UL (ref 4–11)

## 2024-12-22 PROCEDURE — 99233 SBSQ HOSP IP/OBS HIGH 50: CPT | Performed by: HOSPITALIST

## 2024-12-22 PROCEDURE — 99232 SBSQ HOSP IP/OBS MODERATE 35: CPT | Performed by: INTERNAL MEDICINE

## 2024-12-22 RX ORDER — BUMETANIDE 1 MG/1
1 TABLET ORAL
Status: DISCONTINUED | OUTPATIENT
Start: 2024-12-22 | End: 2024-12-24

## 2024-12-22 NOTE — PLAN OF CARE
Problem: Patient Centered Care  Goal: Patient preferences are identified and integrated in the patient's plan of care  Description: Interventions:  - What would you like us to know as we care for you?   - Provide timely, complete, and accurate information to patient/family  - Incorporate patient and family knowledge, values, beliefs, and cultural backgrounds into the planning and delivery of care  - Encourage patient/family to participate in care and decision-making at the level they choose  - Honor patient and family perspectives and choices  Outcome: Progressing     Problem: CARDIOVASCULAR - ADULT  Goal: Maintains optimal cardiac output and hemodynamic stability  Description: INTERVENTIONS:  - Monitor vital signs, rhythm, and trends  - Monitor for bleeding, hypotension and signs of decreased cardiac output  - Evaluate effectiveness of vasoactive medications to optimize hemodynamic stability  - Monitor arterial and/or venous puncture sites for bleeding and/or hematoma  - Assess quality of pulses, skin color and temperature  - Assess for signs of decreased coronary artery perfusion - ex. Angina  - Evaluate fluid balance, assess for edema, trend weights  Outcome: Progressing  Goal: Absence of cardiac arrhythmias or at baseline  Description: INTERVENTIONS:  - Continuous cardiac monitoring, monitor vital signs, obtain 12 lead EKG if indicated  - Evaluate effectiveness of antiarrhythmic and heart rate control medications as ordered  - Initiate emergency measures for life threatening arrhythmias  - Monitor electrolytes and administer replacement therapy as ordered  Outcome: Progressing     Problem: RESPIRATORY - ADULT  Goal: Achieves optimal ventilation and oxygenation  Description: INTERVENTIONS:  - Assess for changes in respiratory status  - Assess for changes in mentation and behavior  - Position to facilitate oxygenation and minimize respiratory effort  - Oxygen supplementation based on oxygen saturation or ABGs  -  Provide Smoking Cessation handout, if applicable  - Encourage broncho-pulmonary hygiene including cough, deep breathe, Incentive Spirometry  - Assess the need for suctioning and perform as needed  - Assess and instruct to report SOB or any respiratory difficulty  - Respiratory Therapy support as indicated  - Manage/alleviate anxiety  - Monitor for signs/symptoms of CO2 retention  Outcome: Progressing     Problem: SKIN/TISSUE INTEGRITY - ADULT  Goal: Skin integrity remains intact  Description: INTERVENTIONS  - Assess and document risk factors for pressure ulcer development  - Assess and document skin integrity  - Monitor for areas of redness and/or skin breakdown  - Initiate interventions, skin care algorithm/standards of care as needed  Outcome: Progressing     Problem: Diabetes/Glucose Control  Goal: Glucose maintained within prescribed range  Description: INTERVENTIONS:  - Monitor Blood Glucose as ordered  - Assess for signs and symptoms of hyperglycemia and hypoglycemia  - Administer ordered medications to maintain glucose within target range  - Assess barriers to adequate nutritional intake and initiate nutrition consult as needed  - Instruct patient on self management of diabetes  Outcome: Progressing      no

## 2024-12-22 NOTE — PLAN OF CARE
Patient is from home alone. A&Ox4. Room air. PRN O2 at home. IV bumex continued. Patient is a standby assist in room. Bed in lowest position and locked. Call light in hand. Personal belongings w/in reach.     Problem: Patient Centered Care  Goal: Patient preferences are identified and integrated in the patient's plan of care  Description: Interventions:  - What would you like us to know as we care for you? From home alone  - Provide timely, complete, and accurate information to patient/family  - Incorporate patient and family knowledge, values, beliefs, and cultural backgrounds into the planning and delivery of care  - Encourage patient/family to participate in care and decision-making at the level they choose  - Honor patient and family perspectives and choices  Outcome: Progressing     Problem: Patient/Family Goals  Goal: Patient/Family Long Term Goal  Description: Patient's Long Term Goal: To feel better    Interventions:  - Continue medication administration as ordered  - See additional Care Plan goals for specific interventions  Outcome: Progressing  Goal: Patient/Family Short Term Goal  Description: Patient's Short Term Goal: to be discharged    Interventions:   - Assist patient to all tests and procedures  - See additional Care Plan goals for specific interventions  Outcome: Progressing     Problem: CARDIOVASCULAR - ADULT  Goal: Maintains optimal cardiac output and hemodynamic stability  Description: INTERVENTIONS:  - Monitor vital signs, rhythm, and trends  - Monitor for bleeding, hypotension and signs of decreased cardiac output  - Evaluate effectiveness of vasoactive medications to optimize hemodynamic stability  - Monitor arterial and/or venous puncture sites for bleeding and/or hematoma  - Assess quality of pulses, skin color and temperature  - Assess for signs of decreased coronary artery perfusion - ex. Angina  - Evaluate fluid balance, assess for edema, trend weights  Outcome: Progressing  Goal:  Absence of cardiac arrhythmias or at baseline  Description: INTERVENTIONS:  - Continuous cardiac monitoring, monitor vital signs, obtain 12 lead EKG if indicated  - Evaluate effectiveness of antiarrhythmic and heart rate control medications as ordered  - Initiate emergency measures for life threatening arrhythmias  - Monitor electrolytes and administer replacement therapy as ordered  Outcome: Progressing     Problem: RESPIRATORY - ADULT  Goal: Achieves optimal ventilation and oxygenation  Description: INTERVENTIONS:  - Assess for changes in respiratory status  - Assess for changes in mentation and behavior  - Position to facilitate oxygenation and minimize respiratory effort  - Oxygen supplementation based on oxygen saturation or ABGs  - Provide Smoking Cessation handout, if applicable  - Encourage broncho-pulmonary hygiene including cough, deep breathe, Incentive Spirometry  - Assess the need for suctioning and perform as needed  - Assess and instruct to report SOB or any respiratory difficulty  - Respiratory Therapy support as indicated  - Manage/alleviate anxiety  - Monitor for signs/symptoms of CO2 retention  Outcome: Progressing     Problem: SKIN/TISSUE INTEGRITY - ADULT  Goal: Skin integrity remains intact  Description: INTERVENTIONS  - Assess and document risk factors for pressure ulcer development  - Assess and document skin integrity  - Monitor for areas of redness and/or skin breakdown  - Initiate interventions, skin care algorithm/standards of care as needed  Outcome: Progressing     Problem: Diabetes/Glucose Control  Goal: Glucose maintained within prescribed range  Description: INTERVENTIONS:  - Monitor Blood Glucose as ordered  - Assess for signs and symptoms of hyperglycemia and hypoglycemia  - Administer ordered medications to maintain glucose within target range  - Assess barriers to adequate nutritional intake and initiate nutrition consult as needed  - Instruct patient on self management of  diabetes  Outcome: Progressing

## 2024-12-22 NOTE — PROGRESS NOTES
[facility]  part of [facility]    Progress Note    Hannah Boss Patient Status:  Inpatient    1945 MRN [id number]   Location [facility] 3W/SW Attending Maximilian Collins MD   Hosp Day # 5 PCP Diana Lopez DO       Subjective:     Legs smaller.  Feeling better.    Objective:   Blood pressure 137/53, pulse 69, temperature 97.3 °F (36.3 °C), temperature source Temporal, resp. rate 16, height 5' 3\" (1.6 m), weight 169 lb 6.4 oz (76.8 kg), SpO2 96%, not currently breastfeeding.    Gen:   NAD.  A and O x 3  CV:   RRR, 3/6 DANIEL  Pulm:   CTA bilat  Abd:   +bs, soft, NT, ND  LE:   trace edema bilat.   Venous stasis changes bilat.   Mild acute erythema of LLE. - improved  Neuro:   nonfocal    Results:     Lab Results   Component Value Date    WBC 6.6 2024    HGB 7.7 (L) 2024    HCT 23.8 (L) 2024    .0 2024    CREATSERUM 1.88 (H) 2024    BUN 27 (H) 2024     (H) 2024    K 4.3 2024     2024    CO2 31.0 2024     (H) 2024    CA 8.4 (L) 2024    ALB 3.7 2024    ALKPHO 187 (H) 2024    BILT 0.3 2024    TP 6.0 2024    AST 27 2024    ALT 23 2024    PTT 35.0 2024    INR 1.30 (H) 2024    T4F 2.5 (H) 2024    TSH 0.008 (L) 2024    LIP 22 2024    DDIMER 1.13 (H) 2023    MG 1.8 2024    PHOS 3.6 2024    TROP 0.398 (HH) 2021     (H) 2024    B12 497 2024    ETOH <3 10/26/2024       No results found.        Assessment and Plan:     Gastrointestinal bleed with posthemorrhagic anemia, probable upper source, rule out peptic ulcer disease or small bowel arteriovenous malformations.  Pt normally on Eliquis for hx of DVT.  Mild gastritis on EGD .  - GI on consult  - pt with recent endoscopies on March just showing gastritis.      - capsule endoscopy started   - back on Eliquis  - general diet  - cont IV  bid protonix  - follow cbc  - was transfused 1 unit prbc  - transfuse prn for hgb <7  - doppler of LEs this admit is negative for DVT  - dc planning     Fluid overload status   Acute on chronic HFpEF  Underlying moderate aortic stenosis.  - cardiology on consult  - was diuresed with IV bumex, po now  - monitor Is/Os, daily wts  - follow bmp     Bilateral lower extremity cellulitis. - improved  - mostly venous stasis changes  - cont IV ancef  - diuresis as above     Musculoskeletal deconditioning  Chronic back pain with neurogenic claudication.  MRI of L spine this admit shows areas of spinal stenosis and mild HNP.     - pt offered anesthesia pain consult to eval for ANTHONY however pt refusing this  - cont lyrica  - PT/OT     IDCK on CKD 4 - Cr continues to improved  - renal on consult  - monitor bmp with diuresis     Hx of HTN  - cont hydralazine, diltiazem  - cards and renal on consult  - on bumex     Hx of tobacco abuse  Active smoker  - counseled on cessation     dvt proph:     Eliquis     Code status:    Full       MDM:    High Maximilian Jansen MD  12/22/2024

## 2024-12-22 NOTE — PROGRESS NOTES
[facility]  part of [facility]    Progress Note    Hannah Boss Patient Status:  Inpatient    1945 MRN [id number]   Location [facility] 3W/SW Attending Maximilian Collins MD   Hosp Day # 5 PCP Diana Lopez DO       Subjective:   Hannah Boss is a(n) 79 year old female who I am seeing for ingris/ckd    No new issues      Objective:   /53 (BP Location: Right arm)   Pulse 69   Temp 97.3 °F (36.3 °C) (Temporal)   Resp 16   Ht 5' 3\" (1.6 m)   Wt 169 lb 6.4 oz (76.8 kg)   SpO2 96%   BMI 30.01 kg/m²      Intake/Output Summary (Last 24 hours) at 2024 1141  Last data filed at 2024 0944  Gross per 24 hour   Intake 752 ml   Output 275 ml   Net 477 ml     Wt Readings from Last 1 Encounters:   24 169 lb 6.4 oz (76.8 kg)       Exam  Vital signs: Blood pressure 137/53, pulse 69, temperature 97.3 °F (36.3 °C), temperature source Temporal, resp. rate 16, height 5' 3\" (1.6 m), weight 169 lb 6.4 oz (76.8 kg), SpO2 96%, not currently breastfeeding.    General: No acute distress. Alert and oriented x 3.  HEENT: Moist mucous membranes. EOMI. PERRLA  Neck:  No JVD.   Respiratory: Clear to auscultation bilaterally.    Cardiovascular: S1, S2.  Regular rate and rhythm.   Abdomen: Soft, nontender, nondistended.    Neurologic: No focal neurological deficits.  Musculoskeletal: Full range of motion of all extremities.  No swelling noted.  Access:    Results:     Recent Labs   Lab 24  0728 24  0656 24  0646 24  0516 24  0641   RBC 2.85* 2.68* 2.76* 2.66* 2.54*   HGB 8.3* 7.8* 7.9* 8.0* 7.7*   HCT 26.3* 24.7* 25.3* 24.7* 23.8*   MCV 92.3 92.2 91.7 92.9 93.7   MCH 29.1 29.1 28.6 30.1 30.3   MCHC 31.6 31.6 31.2 32.4 32.4   RDW 17.4* 17.1* 17.1* 17.2* 16.9*   NEPRELIM 5.59 4.75  --  6.09  --    WBC 7.3 6.1 8.1 7.6 6.6   .0 218.0 235.0 210.0 190.0         Recent Labs   Lab 24  0646 24  0516 24  0641   * 115* 129*   BUN 35*  29* 27*   CREATSERUM 2.17* 2.09* 1.88*   CA 8.8 8.9 8.4*   * 145 146*   K 4.9 5.1 4.3    107 110   CO2 31.0 32.0 31.0          No results found.    Assessment and Plan:     79-year-old female with history of CKD stage III, diabetes, hypertension, leiomyosarcoma status postsurgery and radiation , SVT, chronic pain , LLE DVT on eliquis with recurrent hospitalizations admitted with fatigue and lower extremity edema    Impression:    Likely cardiorenal syndrome .  Improving with IV diuretics  CKD 3/4, high blood pressure, diabetes, cardiorenal with baseline creatinine around 1.9 mg/dL  Anemia status post endoscopy.  Capsule endoscopy, gi following. S/p prbc  Hypertension with CKD 4.  Avoid ACE because of hyperkalemia.  Hydralazine  Diabetes type II with nephropathy, ISS  CHFpEF, diuretics      Plan:    Kidney function continues to improve  Transition to p.o. bumex  Check iron levels and give tabitha    Thank you very much for allowing me to participate in the care of your patient.  If you have any questions, please do not hesitate to contact me.     Janet Reynaga MD

## 2024-12-23 LAB
ANION GAP SERPL CALC-SCNC: 3 MMOL/L (ref 0–18)
BUN BLD-MCNC: 31 MG/DL (ref 9–23)
BUN/CREAT SERPL: 13.4 (ref 10–20)
CALCIUM BLD-MCNC: 8.3 MG/DL (ref 8.7–10.4)
CHLORIDE SERPL-SCNC: 108 MMOL/L (ref 98–112)
CO2 SERPL-SCNC: 31 MMOL/L (ref 21–32)
CREAT BLD-MCNC: 2.31 MG/DL
DEPRECATED HBV CORE AB SER IA-ACNC: 69 NG/ML
DEPRECATED RDW RBC AUTO: 57.1 FL (ref 35.1–46.3)
EGFRCR SERPLBLD CKD-EPI 2021: 21 ML/MIN/1.73M2 (ref 60–?)
ERYTHROCYTE [DISTWIDTH] IN BLOOD BY AUTOMATED COUNT: 16.6 % (ref 11–15)
GLUCOSE BLD-MCNC: 153 MG/DL (ref 70–99)
GLUCOSE BLDC GLUCOMTR-MCNC: 128 MG/DL (ref 70–99)
GLUCOSE BLDC GLUCOMTR-MCNC: 143 MG/DL (ref 70–99)
GLUCOSE BLDC GLUCOMTR-MCNC: 150 MG/DL (ref 70–99)
GLUCOSE BLDC GLUCOMTR-MCNC: 160 MG/DL (ref 70–99)
HCT VFR BLD AUTO: 24.4 %
HGB BLD-MCNC: 7.7 G/DL
IRON SATN MFR SERPL: 10 %
IRON SERPL-MCNC: 39 UG/DL
MCH RBC QN AUTO: 29.6 PG (ref 26–34)
MCHC RBC AUTO-ENTMCNC: 31.6 G/DL (ref 31–37)
MCV RBC AUTO: 93.8 FL
OSMOLALITY SERPL CALC.SUM OF ELEC: 304 MOSM/KG (ref 275–295)
PLATELET # BLD AUTO: 187 10(3)UL (ref 150–450)
POTASSIUM SERPL-SCNC: 4.9 MMOL/L (ref 3.5–5.1)
RBC # BLD AUTO: 2.6 X10(6)UL
SODIUM SERPL-SCNC: 142 MMOL/L (ref 136–145)
TIBC SERPL-MCNC: 390 UG/DL (ref 250–425)
TRANSFERRIN SERPL-MCNC: 262 MG/DL (ref 250–380)
WBC # BLD AUTO: 7.7 X10(3) UL (ref 4–11)

## 2024-12-23 PROCEDURE — 99232 SBSQ HOSP IP/OBS MODERATE 35: CPT | Performed by: INTERNAL MEDICINE

## 2024-12-23 PROCEDURE — 99233 SBSQ HOSP IP/OBS HIGH 50: CPT | Performed by: HOSPITALIST

## 2024-12-23 NOTE — PLAN OF CARE
Problem: Patient Centered Care  Goal: Patient preferences are identified and integrated in the patient's plan of care  Description: Interventions:  - What would you like us to know as we care for you? From home alone   - Provide timely, complete, and accurate information to patient/family  - Incorporate patient and family knowledge, values, beliefs, and cultural backgrounds into the planning and delivery of care  - Encourage patient/family to participate in care and decision-making at the level they choose  - Honor patient and family perspectives and choices  Outcome: Progressing     Problem: Patient/Family Goals  Goal: Patient/Family Long Term Goal  Description: Patient's Long Term Goal: stay out of the hospital.     Interventions:  - See additional Care Plan goals for specific interventions  Outcome: Progressing  Goal: Patient/Family Short Term Goal  Description: Patient's Short Term Goal: to go home.     Interventions:   - See additional Care Plan goals for specific interventions  Outcome: Progressing     Problem: CARDIOVASCULAR - ADULT  Goal: Maintains optimal cardiac output and hemodynamic stability  Description: INTERVENTIONS:  - Monitor vital signs, rhythm, and trends  - Monitor for bleeding, hypotension and signs of decreased cardiac output  - Evaluate effectiveness of vasoactive medications to optimize hemodynamic stability  - Monitor arterial and/or venous puncture sites for bleeding and/or hematoma  - Assess quality of pulses, skin color and temperature  - Assess for signs of decreased coronary artery perfusion - ex. Angina  - Evaluate fluid balance, assess for edema, trend weights  Outcome: Progressing  Goal: Absence of cardiac arrhythmias or at baseline  Description: INTERVENTIONS:  - Continuous cardiac monitoring, monitor vital signs, obtain 12 lead EKG if indicated  - Evaluate effectiveness of antiarrhythmic and heart rate control medications as ordered  - Initiate emergency measures for life  threatening arrhythmias  - Monitor electrolytes and administer replacement therapy as ordered  Outcome: Progressing     Problem: RESPIRATORY - ADULT  Goal: Achieves optimal ventilation and oxygenation  Description: INTERVENTIONS:  - Assess for changes in respiratory status  - Assess for changes in mentation and behavior  - Position to facilitate oxygenation and minimize respiratory effort  - Oxygen supplementation based on oxygen saturation or ABGs  - Provide Smoking Cessation handout, if applicable  - Encourage broncho-pulmonary hygiene including cough, deep breathe, Incentive Spirometry  - Assess the need for suctioning and perform as needed  - Assess and instruct to report SOB or any respiratory difficulty  - Respiratory Therapy support as indicated  - Manage/alleviate anxiety  - Monitor for signs/symptoms of CO2 retention  Outcome: Progressing     Problem: SKIN/TISSUE INTEGRITY - ADULT  Goal: Skin integrity remains intact  Description: INTERVENTIONS  - Assess and document risk factors for pressure ulcer development  - Assess and document skin integrity  - Monitor for areas of redness and/or skin breakdown  - Initiate interventions, skin care algorithm/standards of care as needed  Outcome: Progressing     Problem: Diabetes/Glucose Control  Goal: Glucose maintained within prescribed range  Description: INTERVENTIONS:  - Monitor Blood Glucose as ordered  - Assess for signs and symptoms of hyperglycemia and hypoglycemia  - Administer ordered medications to maintain glucose within target range  - Assess barriers to adequate nutritional intake and initiate nutrition consult as needed  - Instruct patient on self management of diabetes  Outcome: Progressing     Alert x 4. Given IV Iron per Md orders. No acute events during my shift. Plan; monitor cr levels in AM, Iv Bumex on hold per Md orders.

## 2024-12-23 NOTE — PROGRESS NOTES
Progress Note  Hannah Boss Patient Status:  Inpatient    1945 MRN [id number]   Location [facility] 3W/SW Attending Maximilian Collins MD   Hosp Day # 6 PCP Diana Lopez DO     Subjective:  Mrs. Boss feels well. No complaints. Sleeping comfortably lying flat on room air. No chest pain.    Objective:  Physical Exam:   /47 (BP Location: Right arm)   Pulse 72   Temp 98.4 °F (36.9 °C) (Oral)   Resp 20   Ht 5' 3\" (1.6 m)   Wt 179 lb (81.2 kg)   SpO2 95%   BMI 31.71 kg/m²   Temp (24hrs), Av.1 °F (36.7 °C), Min:97.2 °F (36.2 °C), Max:98.5 °F (36.9 °C)       Intake/Output Summary (Last 24 hours) at 2024 1603  Last data filed at 2024 1435  Gross per 24 hour   Intake 240 ml   Output --   Net 240 ml     Wt Readings from Last 3 Encounters:   24 179 lb (81.2 kg)   24 222 lb 10.6 oz (101 kg)   24 215 lb (97.5 kg)     Telemetry: NSR  General: Alert and oriented in no apparent distress lying comfortably in bed on room air .  HEENT: No focal deficits.  Neck: No JVD, carotids 2+ no bruits.  Cardiac: Regular rate and rhythm, S1, S2 normal, 2/6 systolic murmur, rub or gallop.  Lungs: Clear without wheezes, rales, rhonchi or dullness.  Normal excursions and effort.  Abdomen: Soft, non-tender.   Extremities: Without clubbing, cyanosis, scant bilateral edema.  Peripheral pulses are 2+.  Neurologic: Alert and oriented, normal affect.  Skin: Warm and dry.        Intake/Output:    Intake/Output Summary (Last 24 hours) at 2024 1603  Last data filed at 2024 1435  Gross per 24 hour   Intake 240 ml   Output --   Net 240 ml       Last 3 Weights   24 0513 179 lb (81.2 kg)   24 0547 169 lb 6.4 oz (76.8 kg)   24 0527 187 lb 11.2 oz (85.1 kg)   24 1312 200 lb (90.7 kg)   24 0151 200 lb 8 oz (90.9 kg)   24 0622 205 lb 1.6 oz (93 kg)   24 0436 216 lb 11.4 oz (98.3 kg)   24 1903 222 lb 1.6 oz (100.7 kg)   24 0059 222 lb  10.6 oz (101 kg)   12/09/24 1401 215 lb (97.5 kg)       Labs:  Recent Labs   Lab 12/21/24  0516 12/22/24  0641 12/23/24  0610   * 129* 153*   BUN 29* 27* 31*   CREATSERUM 2.09* 1.88* 2.31*   EGFRCR 24* 27* 21*   CA 8.9 8.4* 8.3*    146* 142   K 5.1 4.3 4.9    110 108   CO2 32.0 31.0 31.0     Recent Labs   Lab 12/18/24  0728 12/19/24  0656 12/20/24  0646 12/21/24  0516 12/22/24  0641 12/23/24  0610   RBC 2.85* 2.68*   < > 2.66* 2.54* 2.60*   HGB 8.3* 7.8*   < > 8.0* 7.7* 7.7*   HCT 26.3* 24.7*   < > 24.7* 23.8* 24.4*   MCV 92.3 92.2   < > 92.9 93.7 93.8   MCH 29.1 29.1   < > 30.1 30.3 29.6   MCHC 31.6 31.6   < > 32.4 32.4 31.6   RDW 17.4* 17.1*   < > 17.2* 16.9* 16.6*   NEPRELIM 5.59 4.75  --  6.09  --   --    WBC 7.3 6.1   < > 7.6 6.6 7.7   .0 218.0   < > 210.0 190.0 187.0    < > = values in this interval not displayed.         No results for input(s): \"TROP\", \"TROPHS\", \"CK\" in the last 168 hours.    Diagnostics:   ECHOCARDIOGRAM 9/14/2024:  1. Left ventricle: The cavity size was normal. Wall thickness was mildly      increased. Systolic function was normal. The estimated ejection fraction      was 60-65%, by visual assessment. No diagnostic evidence for regional      wall motion abnormalities. Doppler parameters are consistent with      abnormal left ventricular relaxation - grade 1 diastolic dysfunction.   2. Right ventricle: The cavity size was normal. Systolic function was      normal.   3. Left atrium: The left atrial volume was mildly increased.   4. Aortic valve: Cusp separation was reduced. Transvalvular velocity was      increased. The findings were consistent with moderate stenosis. There was      mild regurgitation. The peak systolic velocity was 3.17 m/sec. The mean      systolic gradient was 24 mm Hg. The valve area (VTI) was 1.06 cm^2. The      valve area (VTI) index was 0.57 cm^2/m^2.   5. Pulmonary arteries: Systolic pressure was moderately increased, estimated      to be 59  mm Hg.   Impressions:  This study is compared with previous dated 01/20/2024:   Pulmonary pressure is higher, and other findings are unchanged.             Medications:   sodium ferric gluconate  125 mg Intravenous Daily    [Held by provider] bumetanide  1 mg Oral BID (Diuretic)    apixaban  5 mg Oral BID    dilTIAZem ER  240 mg Oral Daily    isosorbide mononitrate ER  60 mg Oral Daily    pantoprazole  40 mg Intravenous Q12H    ceFAZolin  2 g Intravenous Q12H    vancomycin  125 mg Oral Daily    atorvastatin  40 mg Oral Nightly    hydrALAZINE  25 mg Oral BID    gabapentin  300 mg Oral Nightly    pregabalin  100 mg Oral BID         Assessment/Plan:    Acute anemia GIB  Presented hgb of 6.1 - stable after PRBCs  GI following - gastritis and capsule placement 12/20  Resumed Eliquis 12/21 - No recurrent transfusions, stable hgb  Hx of DVT previously treated on Eliquis had been held until   Lower ext ultrasound negative for DVT  Acute on chronic HFpEF LVEF 60-65%    at presentation  Diuresis per nephrology  Moderate aortic stenosis  Follow outpatient  DICK on CKD IV  Nephrology following  Off ACE d/t hyperkalemia  DM II w/ nephropathy  HTN  BP at goal    PLAN  Diuretics held by nephrology  Back on Eliquis per GI for Hx of DVT  BP at goal  We will sign off from a cardiology perspective        Guero Hines PA-C  12/23/2024  4:03 PM

## 2024-12-23 NOTE — PAYOR COMM NOTE
--------------  CONTINUED STAY REVIEW    Payor: LAUREEN ARTEAGA O  Subscriber #:  [id number]  Authorization Number: [id number]    Admit date: 24  Admit time:  6:50 PM    REVIEW DOCUMENTATION: -  [facility]  part of [facility]     Progress Note           Hannha Boss Patient Status:  Inpatient    1945 MRN [id number]   Location [facility] 3W/SW Attending Maximilian Collins MD   Hosp Day # 4 PCP Diana Lopez DO         Subjective:      Feeling better.  Legs smaller.     Objective:   Blood pressure 156/60, pulse 78, temperature 97.3 °F (36.3 °C), temperature source Temporal, resp. rate 16, height 5' 3\" (1.6 m), weight 187 lb 11.2 oz (85.1 kg), SpO2 92%, not currently breastfeeding.     Gen:   NAD.  A and O x 3  CV:   RRR, 3/6 DANIEL  Pulm:   CTA bilat  Abd:   +bs, soft, NT, ND  LE:   1+ edema bilat.   Venous stasis changes bilat.   Mild acute erythema of LLE. - improved  Neuro:   nonfocal     Results:            Lab Results   Component Value Date     WBC 7.6 2024     HGB 8.0 (L) 2024     HCT 24.7 (L) 2024     .0 2024     CREATSERUM 2.09 (H) 2024     BUN 29 (H) 2024      2024     K 5.1 2024      2024     CO2 32.0 2024      (H) 2024     CA 8.9 2024     ALB 3.7 2024     ALKPHO 187 (H) 2024     BILT 0.3 2024     TP 6.0 2024     AST 27 2024     ALT 23 2024     PTT 35.0 2024     INR 1.30 (H) 2024     T4F 2.5 (H) 2024     TSH 0.008 (L) 2024     LIP 22 2024     DDIMER 1.13 (H) 2023     MG 1.8 2024     PHOS 3.6 2024     TROP 0.398 (HH) 2021      (H) 2024     B12 497 2024     ETOH <3 10/26/2024         No results found.         Assessment and Plan:      Gastrointestinal bleed with posthemorrhagic anemia, probable upper source, rule out peptic ulcer disease or small bowel  arteriovenous malformations.  Pt normally on Eliquis for hx of DVT.  Mild gastritis on EGD .  - GI on consult  - pt with recent endoscopies on March just showing gastritis.      - capsule endoscopy started today  - back on Eliquis  - general diet  - cont IV bid protonix  - follow cbc  - was transfused 1 unit prbc  - transfuse prn for hgb <7  - doppler of LEs this admit is negative for DVT     Fluid overload status   Acute on chronic HFpEF  Underlying moderate aortic stenosis.  - cardiology on consult  - diuresis with IV bumex - PO soon  - monitor Is/Os, daily wts  - follow bmp     Bilateral lower extremity cellulitis. - improved  - mostly venous stasis changes  - cont IV ancef  - diuresis as above     Musculoskeletal deconditioning  Chronic back pain with neurogenic claudication.  MRI of L spine this admit shows areas of spinal stenosis and mild HNP.     - pt offered anesthesia pain consult to eval for ANTHONY however pt refusing this  - cont lyrica  - PT/OT     DICK on CKD 4 - Cr continues to improved  - renal on consult  - monitor bmp with diuresis     Hx of HTN  - cont hydralazine, diltiazem  - cards and renal on consult  - on iv bumex     Hx of tobacco abuse  Active smoker  - counseled on cessation     dvt proph:     Eliquis     Code status:    Full        MDM:    High Maximilian Jansen MD          [facility]  part of [email or web address]     Progress Note           Hannah Boss Patient Status:  Inpatient    1945 MRN [id number]   Location [facility] 3W/SW Attending Maximilian Collins MD   Hosp Day # 5 PCP Diana Lopez DO         Subjective:      Legs smaller.  Feeling better.     Objective:   Blood pressure 137/53, pulse 69, temperature 97.3 °F (36.3 °C), temperature source Temporal, resp. rate 16, height 5' 3\" (1.6 m), weight 169 lb 6.4 oz (76.8 kg), SpO2 96%, not currently breastfeeding.     Gen:   NAD.  A and O x 3  CV:   RRR, 3/6 DANIEL  Pulm:   CTA bilat  Abd:   +bs, soft,  NT, ND  LE:   trace edema bilat.   Venous stasis changes bilat.   Mild acute erythema of LLE. - improved  Neuro:   nonfocal     Results:            Lab Results   Component Value Date     WBC 6.6 12/22/2024     HGB 7.7 (L) 12/22/2024     HCT 23.8 (L) 12/22/2024     .0 12/22/2024     CREATSERUM 1.88 (H) 12/22/2024     BUN 27 (H) 12/22/2024      (H) 12/22/2024     K 4.3 12/22/2024      12/22/2024     CO2 31.0 12/22/2024      (H) 12/22/2024     CA 8.4 (L) 12/22/2024     ALB 3.7 12/21/2024     ALKPHO 187 (H) 12/17/2024     BILT 0.3 12/17/2024     TP 6.0 12/17/2024     AST 27 12/17/2024     ALT 23 12/17/2024     PTT 35.0 03/08/2024     INR 1.30 (H) 03/08/2024     T4F 2.5 (H) 07/03/2024     TSH 0.008 (L) 07/03/2024     LIP 22 03/19/2024     DDIMER 1.13 (H) 06/05/2023     MG 1.8 09/19/2024     PHOS 3.6 12/21/2024     TROP 0.398 (HH) 09/13/2021      (H) 07/03/2024     B12 497 06/19/2024     ETOH <3 10/26/2024         No results found.         Assessment and Plan:      Gastrointestinal bleed with posthemorrhagic anemia, probable upper source, rule out peptic ulcer disease or small bowel arteriovenous malformations.  Pt normally on Eliquis for hx of DVT.  Mild gastritis on EGD 12/20.  - GI on consult  - pt with recent endoscopies on March just showing gastritis.      - capsule endoscopy started 12/21  - back on Eliquis  - general diet  - cont IV bid protonix  - follow cbc  - was transfused 1 unit prbc  - transfuse prn for hgb <7  - doppler of LEs this admit is negative for DVT  - dc planning     Fluid overload status   Acute on chronic HFpEF  Underlying moderate aortic stenosis.  - cardiology on consult  - was diuresed with IV bumex, po now  - monitor Is/Os, daily wts  - follow bmp     Bilateral lower extremity cellulitis. - improved  - mostly venous stasis changes  - cont IV ancef  - diuresis as above     Musculoskeletal deconditioning  Chronic back pain with neurogenic claudication.  MRI of L  spine this admit shows areas of spinal stenosis and mild HNP.     - pt offered anesthesia pain consult to eval for ANTHONY however pt refusing this  - cont lyrica  - PT/OT     DICK on CKD 4 - Cr continues to improved  - renal on consult  - monitor bmp with diuresis     Hx of HTN  - cont hydralazine, diltiazem  - cards and renal on consult  - on bumex     Hx of tobacco abuse  Active smoker  - counseled on cessation     dvt proph:     Eliquis     Code status:    Full        MDM:    High Maximilian Jansen MD  2024  [facility]  part of [facility]     Progress Note           Hannah Boss Patient Status:  Inpatient    1945 MRN [id number]   Location [facility] 3W/SW Attending Maximilian Collins MD   Hosp Day # 6 PCP Diana Lopez DO         Subjective:      No complaints.  Legs smaller.     Objective:   Blood pressure 127/47, pulse 72, temperature 98.4 °F (36.9 °C), temperature source Oral, resp. rate 20, height 5' 3\" (1.6 m), weight 179 lb (81.2 kg), SpO2 95%, not currently breastfeeding.     Gen:   NAD.  A and O x 3  CV:   RRR, 3/6 DANIEL  Pulm:   CTA bilat  Abd:   +bs, soft, NT, ND  LE:   trace edema bilat.   Venous stasis changes bilat.   Mild acute erythema of LLE. - improved  Neuro:   nonfocal     Results:            Lab Results   Component Value Date     WBC 7.7 2024     HGB 7.7 (L) 2024     HCT 24.4 (L) 2024     .0 2024     CREATSERUM 2.31 (H) 2024     BUN 31 (H) 2024      2024     K 4.9 2024      2024     CO2 31.0 2024      (H) 2024     CA 8.3 (L) 2024     ALB 3.7 2024     ALKPHO 187 (H) 2024     BILT 0.3 2024     TP 6.0 2024     AST 27 2024     ALT 23 2024     PTT 35.0 2024     INR 1.30 (H) 2024     T4F 2.5 (H) 2024     TSH 0.008 (L) 2024     LIP 22 2024     DDIMER 1.13 (H) 2023     MG 1.8 2024      PHOS 3.6 12/21/2024     TROP 0.398 (HH) 09/13/2021      (H) 07/03/2024     B12 497 06/19/2024     ETOH <3 10/26/2024         No results found.         Assessment and Plan:      Gastrointestinal bleed with posthemorrhagic anemia, probable upper source, rule out peptic ulcer disease or small bowel arteriovenous malformations.  Pt normally on Eliquis for hx of DVT.  Mild gastritis on EGD 12/20.  - GI on consult  - pt with recent endoscopies on March just showing gastritis.      - capsule endoscopy started 12/21  - back on Eliquis  - general diet  - cont IV bid protonix  - follow cbc  - was transfused 1 unit prbc  - transfuse prn for hgb <7  - doppler of LEs this admit is negative for DVT  - dc planning     Fluid overload status   Acute on chronic HFpEF  Underlying moderate aortic stenosis.  - cardiology on consult  - was diuresed with IV bumex, on hold now  - monitor Is/Os, daily wts  - follow bmp     Bilateral lower extremity cellulitis. - improved  - mostly venous stasis changes  - cont IV ancef  - diuresis as above     Musculoskeletal deconditioning  Chronic back pain with neurogenic claudication.  MRI of L spine this admit shows areas of spinal stenosis and mild HNP.     - pt offered anesthesia pain consult to eval for ANTHONY however pt refusing this  - cont lyrica  - PT/OT     DICK on CKD 4 - Cr better, now upu  - renal on consult  - monitor bmp  - hold bumex for now     Hx of HTN  - cont hydralazine, diltiazem     Hx of tobacco abuse  Active smoker  - counseled on cessation     dvt proph:     Eliquis     Code status:    Full        MDM:    High Maximilian Jansen MD  12/23/2024        MEDICATIONS ADMINISTERED IN LAST 1 DAY:  apixaban (Eliquis) tab 5 mg       Date Action Dose Route User    12/23/2024 0826 Given 5 mg Oral Mirtha Dueñas, RN    12/22/2024 2000 Given 5 mg Oral Oseas Montelongo, RN          atorvastatin (Lipitor) tab 40 mg       Date Action Dose Route User    12/22/2024 2000 Given 40  mg Oral Oseas Montelongo RN          bumetanide (Bumex) tab 1 mg       Date Action Dose Route User    12/22/2024 1620 Given 1 mg Oral Diana Worthington RN          ceFAZolin (Ancef) 2g in 10mL IV syringe premix       Date Action Dose Route User    12/23/2024 0458 New Bag 2 g Intravenous Oseas Montelongo RN    12/22/2024 1620 New Bag 2 g Intravenous Diana Worthington RN          dilTIAZem ER (Dilacor XR) 24 hr cap 240 mg       Date Action Dose Route User    12/23/2024 0826 Given 240 mg Oral Mirtha Dueñas RN          gabapentin (Neurontin) cap 300 mg       Date Action Dose Route User    12/22/2024 2000 Given 300 mg Oral Oseas Montelongo RN          hydrALAZINE (Apresoline) tab 25 mg       Date Action Dose Route User    12/23/2024 0826 Given 25 mg Oral Mirtha Dueñas RN    12/22/2024 2000 Given 25 mg Oral Oseas Montelongo RN          isosorbide mononitrate ER (Imdur) 24 hr tab 60 mg       Date Action Dose Route User    12/23/2024 0826 Given 60 mg Oral Mirtha Dueñas RN          oxyCODONE-acetaminophen (Percocet)  MG per tab 1 tablet       Date Action Dose Route User    12/23/2024 1211 Given 1 tablet Oral Mirtha Dueñas RN    12/23/2024 0105 Given 1 tablet Oral Oseas Montelongo RN          pantoprazole (Protonix) 40 mg in sodium chloride 0.9% PF 10 mL IV push       Date Action Dose Route User    12/23/2024 0825 Given 40 mg Intravenous Mirtha Dueñas RN    12/22/2024 2000 Given 40 mg Intravenous Oseas Montelongo RN          pregabalin (Lyrica) cap 100 mg       Date Action Dose Route User    12/23/2024 0825 Given 100 mg Oral Mirtha Dueñas RN    12/22/2024 2000 Given 100 mg Oral Oseas Montelongo RN          sodium ferric gluconate (Ferrlecit) 125 mg in sodium chloride 0.9% 100mL IVPB premix       Date Action Dose Route User    12/23/2024 1356 New Bag 125 mg Intravenous Mirtha Dueñas, RN          vancomycin (Vancocin) cap 125 mg       Date  Action Dose Route User    12/23/2024 0826 Given 125 mg Oral Mirtha Dueñas, RN            Vitals (last day)       Date/Time Temp Pulse Resp BP SpO2 Weight O2 Device O2 Flow Rate (L/min) [facility]    12/23/24 1211 -- 72 -- 127/47 95 % -- None (Room air) -- CC    12/23/24 0824 98.4 °F (36.9 °C) 68 20 151/66 97 % -- None (Room air) -- CC    12/23/24 0513 -- -- -- -- -- 179 lb (81.2 kg) -- -- EC    12/23/24 0456 98.1 °F (36.7 °C) 75 18 145/58 93 % -- None (Room air) --     12/22/24 2000 -- 87 -- -- -- -- -- -- KD    12/22/24 1958 98.5 °F (36.9 °C) 82 18 120/50 94 % -- None (Room air) --     12/22/24 1620 97.2 °F (36.2 °C) 75 16 133/59 94 % -- None (Room air) --     12/22/24 0755 97.3 °F (36.3 °C) 69 16 137/53 96 % -- None (Room air) --     12/22/24 0547 -- -- -- -- -- 169 lb 6.4 oz (76.8 kg) -- -- BK    12/22/24 0510 97.2 °F (36.2 °C) 76 18 165/68 92 % -- None (Room air) -- AS          CIWA Scores (since admission)       None          Blood Transfusion Record       Product Unit Status Volume Start End            Transfuse RBC       24  [id number]  [id number] Completed 12/17/24 2038 290 mL 12/17/24 1805 12/17/24 2030

## 2024-12-23 NOTE — PROGRESS NOTES
[facility]  part of [facility]    Progress Note    Hannah Boss Patient Status:  Inpatient    1945 MRN [id number]   Location [facility] 3W/SW Attending Maximilian Collins MD   Hosp Day # 6 PCP Diana Lopez,        Subjective:   Hannah Boss is a(n) 79 year old female who I am seeing for ingris/ckd    No new issues  Denies sob    Objective:   /66 (BP Location: Right arm)   Pulse 68   Temp 98.4 °F (36.9 °C) (Oral)   Resp 20   Ht 5' 3\" (1.6 m)   Wt 179 lb (81.2 kg)   SpO2 97%   BMI 31.71 kg/m²      Intake/Output Summary (Last 24 hours) at 2024 1126  Last data filed at 2024 1800  Gross per 24 hour   Intake 240 ml   Output --   Net 240 ml     Wt Readings from Last 1 Encounters:   24 179 lb (81.2 kg)       Exam  Vital signs: Blood pressure 151/66, pulse 68, temperature 98.4 °F (36.9 °C), temperature source Oral, resp. rate 20, height 5' 3\" (1.6 m), weight 179 lb (81.2 kg), SpO2 97%, not currently breastfeeding.    General: No acute distress. Alert and oriented x 3.  HEENT: Moist mucous membranes. EOMI. PERRLA  Neck:  No JVD.   Respiratory: Clear to auscultation bilaterally.    Cardiovascular: S1, S2.  Regular rate and rhythm.   Abdomen: Soft, nontender, nondistended.    Neurologic: No focal neurological deficits.  Musculoskeletal: Full range of motion of all extremities.  Chronic stasis. Edema is better  Access:    Results:     Recent Labs   Lab 24  0728 24  0656 24  0646 24  0516 24  0641 24  0610   RBC 2.85* 2.68*   < > 2.66* 2.54* 2.60*   HGB 8.3* 7.8*   < > 8.0* 7.7* 7.7*   HCT 26.3* 24.7*   < > 24.7* 23.8* 24.4*   MCV 92.3 92.2   < > 92.9 93.7 93.8   MCH 29.1 29.1   < > 30.1 30.3 29.6   MCHC 31.6 31.6   < > 32.4 32.4 31.6   RDW 17.4* 17.1*   < > 17.2* 16.9* 16.6*   NEPRELIM 5.59 4.75  --  6.09  --   --    WBC 7.3 6.1   < > 7.6 6.6 7.7   .0 218.0   < > 210.0 190.0 187.0    < > = values in this interval not  displayed.         Recent Labs   Lab 12/21/24  0516 12/22/24  0641 12/23/24  0610   * 129* 153*   BUN 29* 27* 31*   CREATSERUM 2.09* 1.88* 2.31*   CA 8.9 8.4* 8.3*    146* 142   K 5.1 4.3 4.9    110 108   CO2 32.0 31.0 31.0          No results found.    Assessment and Plan:     79-year-old female with history of CKD stage III, diabetes, hypertension, leiomyosarcoma status postsurgery and radiation , SVT, chronic pain , LLE DVT on eliquis with recurrent hospitalizations admitted with fatigue and lower extremity edema    Impression:    Likely cardiorenal syndrome .  Improving with IV diuretics  CKD 3/4, high blood pressure, diabetes, cardiorenal with baseline creatinine around 1.9 mg/dL  Anemia status post endoscopy.  Capsule endoscopy, gi following. S/p prbc  Hypertension with CKD 4.  Avoid ACE because of hyperkalemia.  Hydralazine  Diabetes type II with nephropathy, ISS  CHFpEF, diuretics      Plan:    Cr bumped up.--> likely over diuresed.  Hold bumex today   IV iron and a dose of tabitha    Thank you very much for allowing me to participate in the care of your patient.  If you have any questions, please do not hesitate to contact me.     Jaent Reynaga MD

## 2024-12-23 NOTE — PROGRESS NOTES
[facility]  part of [facility]    Progress Note    Hannah Boss Patient Status:  Inpatient    1945 MRN [id number]   Location [facility] 3W/SW Attending Maximilian Collins MD   Hosp Day # 6 PCP Diana Lopez DO       Subjective:     No complaints.  Legs smaller.    Objective:   Blood pressure 127/47, pulse 72, temperature 98.4 °F (36.9 °C), temperature source Oral, resp. rate 20, height 5' 3\" (1.6 m), weight 179 lb (81.2 kg), SpO2 95%, not currently breastfeeding.    Gen:   NAD.  A and O x 3  CV:   RRR, 3/6 DANIEL  Pulm:   CTA bilat  Abd:   +bs, soft, NT, ND  LE:   trace edema bilat.   Venous stasis changes bilat.   Mild acute erythema of LLE. - improved  Neuro:   nonfocal    Results:     Lab Results   Component Value Date    WBC 7.7 2024    HGB 7.7 (L) 2024    HCT 24.4 (L) 2024    .0 2024    CREATSERUM 2.31 (H) 2024    BUN 31 (H) 2024     2024    K 4.9 2024     2024    CO2 31.0 2024     (H) 2024    CA 8.3 (L) 2024    ALB 3.7 2024    ALKPHO 187 (H) 2024    BILT 0.3 2024    TP 6.0 2024    AST 27 2024    ALT 23 2024    PTT 35.0 2024    INR 1.30 (H) 2024    T4F 2.5 (H) 2024    TSH 0.008 (L) 2024    LIP 22 2024    DDIMER 1.13 (H) 2023    MG 1.8 2024    PHOS 3.6 2024    TROP 0.398 (HH) 2021     (H) 2024    B12 497 2024    ETOH <3 10/26/2024       No results found.        Assessment and Plan:     Gastrointestinal bleed with posthemorrhagic anemia, probable upper source, rule out peptic ulcer disease or small bowel arteriovenous malformations.  Pt normally on Eliquis for hx of DVT.  Mild gastritis on EGD .  - GI on consult  - pt with recent endoscopies on March just showing gastritis.      - capsule endoscopy started   - back on Eliquis  - general diet  - cont IV bid protonix  -  follow cbc  - was transfused 1 unit prbc  - transfuse prn for hgb <7  - doppler of LEs this admit is negative for DVT  - dc planning     Fluid overload status   Acute on chronic HFpEF  Underlying moderate aortic stenosis.  - cardiology on consult  - was diuresed with IV bumex, on hold now  - monitor Is/Os, daily wts  - follow bmp     Bilateral lower extremity cellulitis. - improved  - mostly venous stasis changes  - cont IV ancef  - diuresis as above     Musculoskeletal deconditioning  Chronic back pain with neurogenic claudication.  MRI of L spine this admit shows areas of spinal stenosis and mild HNP.     - pt offered anesthesia pain consult to eval for ANTHONY however pt refusing this  - cont lyrica  - PT/OT     DICK on CKD 4 - Cr better, now upu  - renal on consult  - monitor bmp  - hold bumex for now     Hx of HTN  - cont hydralazine, diltiazem     Hx of tobacco abuse  Active smoker  - counseled on cessation     dvt proph:     Eliquis     Code status:    Full       MDM:    High Maximilian Jansen MD  12/23/2024

## 2024-12-23 NOTE — PLAN OF CARE
Pt A/Ox4, Stand-by assist. IV Ancef continued.     Problem: Patient Centered Care  Goal: Patient preferences are identified and integrated in the patient's plan of care  Description: Interventions:  - What would you like us to know as we care for you? I would like to return home.   - Provide timely, complete, and accurate information to patient/family  - Incorporate patient and family knowledge, values, beliefs, and cultural backgrounds into the planning and delivery of care  - Encourage patient/family to participate in care and decision-making at the level they choose  - Honor patient and family perspectives and choices  Outcome: Progressing     Problem: Patient/Family Goals  Goal: Patient/Family Long Term Goal  Description: Patient's Long Term Goal: Get stronger.     Interventions:  - Ambulate often.   - See additional Care Plan goals for specific interventions  Outcome: Progressing  Goal: Patient/Family Short Term Goal  Description: Patient's Short Term Goal: Ask frequent questions.     Interventions:   - Work with staff on ways to improve overall health status.   - See additional Care Plan goals for specific interventions  Outcome: Progressing     Problem: CARDIOVASCULAR - ADULT  Goal: Maintains optimal cardiac output and hemodynamic stability  Description: INTERVENTIONS:  - Monitor vital signs, rhythm, and trends  - Monitor for bleeding, hypotension and signs of decreased cardiac output  - Evaluate effectiveness of vasoactive medications to optimize hemodynamic stability  - Monitor arterial and/or venous puncture sites for bleeding and/or hematoma  - Assess quality of pulses, skin color and temperature  - Assess for signs of decreased coronary artery perfusion - ex. Angina  - Evaluate fluid balance, assess for edema, trend weights  Outcome: Progressing  Goal: Absence of cardiac arrhythmias or at baseline  Description: INTERVENTIONS:  - Continuous cardiac monitoring, monitor vital signs, obtain 12 lead EKG if  indicated  - Evaluate effectiveness of antiarrhythmic and heart rate control medications as ordered  - Initiate emergency measures for life threatening arrhythmias  - Monitor electrolytes and administer replacement therapy as ordered  Outcome: Progressing     Problem: RESPIRATORY - ADULT  Goal: Achieves optimal ventilation and oxygenation  Description: INTERVENTIONS:  - Assess for changes in respiratory status  - Assess for changes in mentation and behavior  - Position to facilitate oxygenation and minimize respiratory effort  - Oxygen supplementation based on oxygen saturation or ABGs  - Provide Smoking Cessation handout, if applicable  - Encourage broncho-pulmonary hygiene including cough, deep breathe, Incentive Spirometry  - Assess the need for suctioning and perform as needed  - Assess and instruct to report SOB or any respiratory difficulty  - Respiratory Therapy support as indicated  - Manage/alleviate anxiety  - Monitor for signs/symptoms of CO2 retention  Outcome: Progressing     Problem: SKIN/TISSUE INTEGRITY - ADULT  Goal: Skin integrity remains intact  Description: INTERVENTIONS  - Assess and document risk factors for pressure ulcer development  - Assess and document skin integrity  - Monitor for areas of redness and/or skin breakdown  - Initiate interventions, skin care algorithm/standards of care as needed  Outcome: Progressing     Problem: Diabetes/Glucose Control  Goal: Glucose maintained within prescribed range  Description: INTERVENTIONS:  - Monitor Blood Glucose as ordered  - Assess for signs and symptoms of hyperglycemia and hypoglycemia  - Administer ordered medications to maintain glucose within target range  - Assess barriers to adequate nutritional intake and initiate nutrition consult as needed  - Instruct patient on self management of diabetes  Outcome: Progressing

## 2024-12-24 VITALS
HEIGHT: 63 IN | SYSTOLIC BLOOD PRESSURE: 139 MMHG | BODY MASS INDEX: 31.71 KG/M2 | TEMPERATURE: 98 F | OXYGEN SATURATION: 96 % | RESPIRATION RATE: 20 BRPM | DIASTOLIC BLOOD PRESSURE: 56 MMHG | WEIGHT: 179 LBS | HEART RATE: 78 BPM

## 2024-12-24 LAB
ANION GAP SERPL CALC-SCNC: 4 MMOL/L (ref 0–18)
BUN BLD-MCNC: 34 MG/DL (ref 9–23)
BUN/CREAT SERPL: 14.9 (ref 10–20)
CALCIUM BLD-MCNC: 8.5 MG/DL (ref 8.7–10.4)
CHLORIDE SERPL-SCNC: 108 MMOL/L (ref 98–112)
CO2 SERPL-SCNC: 31 MMOL/L (ref 21–32)
CREAT BLD-MCNC: 2.28 MG/DL
DEPRECATED RDW RBC AUTO: 56.5 FL (ref 35.1–46.3)
EGFRCR SERPLBLD CKD-EPI 2021: 22 ML/MIN/1.73M2 (ref 60–?)
ERYTHROCYTE [DISTWIDTH] IN BLOOD BY AUTOMATED COUNT: 16.3 % (ref 11–15)
GLUCOSE BLD-MCNC: 144 MG/DL (ref 70–99)
GLUCOSE BLDC GLUCOMTR-MCNC: 119 MG/DL (ref 70–99)
GLUCOSE BLDC GLUCOMTR-MCNC: 150 MG/DL (ref 70–99)
HCT VFR BLD AUTO: 25.1 %
HGB BLD-MCNC: 7.7 G/DL
MCH RBC QN AUTO: 28.9 PG (ref 26–34)
MCHC RBC AUTO-ENTMCNC: 30.7 G/DL (ref 31–37)
MCV RBC AUTO: 94.4 FL
OSMOLALITY SERPL CALC.SUM OF ELEC: 306 MOSM/KG (ref 275–295)
PLATELET # BLD AUTO: 180 10(3)UL (ref 150–450)
POTASSIUM SERPL-SCNC: 5 MMOL/L (ref 3.5–5.1)
RBC # BLD AUTO: 2.66 X10(6)UL
SODIUM SERPL-SCNC: 143 MMOL/L (ref 136–145)
WBC # BLD AUTO: 5.7 X10(3) UL (ref 4–11)

## 2024-12-24 PROCEDURE — 99239 HOSP IP/OBS DSCHRG MGMT >30: CPT | Performed by: HOSPITALIST

## 2024-12-24 PROCEDURE — 99232 SBSQ HOSP IP/OBS MODERATE 35: CPT | Performed by: INTERNAL MEDICINE

## 2024-12-24 RX ORDER — BUMETANIDE 1 MG/1
1 TABLET ORAL
Status: SHIPPED | COMMUNITY
Start: 2024-12-24 | End: 2024-12-24

## 2024-12-24 RX ORDER — PREGABALIN 100 MG/1
100 CAPSULE ORAL 2 TIMES DAILY
Status: SHIPPED | COMMUNITY
Start: 2024-12-24

## 2024-12-24 RX ORDER — OXYCODONE AND ACETAMINOPHEN 10; 325 MG/1; MG/1
1 TABLET ORAL EVERY 4 HOURS PRN
Qty: 30 TABLET | Refills: 0 | Status: SHIPPED | OUTPATIENT
Start: 2024-12-24 | End: 2025-01-23

## 2024-12-24 RX ORDER — BUMETANIDE 1 MG/1
1 TABLET ORAL DAILY
Status: SHIPPED | COMMUNITY
Start: 2024-12-24

## 2024-12-24 NOTE — PROGRESS NOTES
Called to give report to nurse at [facility], on hold for 10 mins then disconnected. Left contact info for nurse (\"probably Ramon\" per ) to call for report and let them know transport was scheduled for 2pm.

## 2024-12-24 NOTE — CM/SW NOTE
12/24/24 1100   Discharge disposition   Expected discharge disposition subacute   Post Acute Care Provider Other  ([facility])   Discharge transportation [facility]     The patient received a MDO for discharge.    The patient will be transported to [facility] via [facility] at 2pm.  PCS complete.  The quote for the medicar is $45 and the patient is agreeable.    The number to call report is [phone].    Social work informed patient.  RN to inform family.    SW/CM to remain available for support and/or discharge planning.     Jennie Ramey MSW, LSW  Discharge Planner [id number]

## 2024-12-24 NOTE — PLAN OF CARE
No acute changes. Denies pain. Fall precautions in place, ambulates 1 assist with rolling walker. Call light in reach.     Problem: Patient Centered Care  Goal: Patient preferences are identified and integrated in the patient's plan of care  Description: Interventions:  - What would you like us to know as we care for you? None stated  - Provide timely, complete, and accurate information to patient/family  - Incorporate patient and family knowledge, values, beliefs, and cultural backgrounds into the planning and delivery of care  - Encourage patient/family to participate in care and decision-making at the level they choose  - Honor patient and family perspectives and choices  12/23/2024 2056 by Ct Chance RN  Outcome: Progressing  12/23/2024 2056 by Ct Chance RN  Outcome: Progressing     Problem: Patient/Family Goals  Goal: Patient/Family Long Term Goal  Description: Patient's Long Term Goal: Free of falls    Interventions:  - fall precautions   - See additional Care Plan goals for specific interventions  12/23/2024 2056 by Ct Chance RN  Outcome: Progressing  12/23/2024 2056 by Ct Chance RN  Outcome: Progressing  Goal: Patient/Family Short Term Goal  Description: Patient's Short Term Goal: free of pain    Interventions:   - pain monitored during shift.   - See additional Care Plan goals for specific interventions  12/23/2024 2056 by Ct Chance RN  Outcome: Progressing  12/23/2024 2056 by Ct Chance RN  Outcome: Progressing     Problem: CARDIOVASCULAR - ADULT  Goal: Maintains optimal cardiac output and hemodynamic stability  Description: INTERVENTIONS:  - Monitor vital signs, rhythm, and trends  - Monitor for bleeding, hypotension and signs of decreased cardiac output  - Evaluate effectiveness of vasoactive medications to optimize hemodynamic stability  - Monitor arterial and/or venous puncture sites for bleeding and/or hematoma  - Assess quality of pulses, skin color  and temperature  - Assess for signs of decreased coronary artery perfusion - ex. Angina  - Evaluate fluid balance, assess for edema, trend weights  12/23/2024 2056 by Ct Chance RN  Outcome: Progressing  12/23/2024 2056 by Ct Chance RN  Outcome: Progressing  Goal: Absence of cardiac arrhythmias or at baseline  Description: INTERVENTIONS:  - Continuous cardiac monitoring, monitor vital signs, obtain 12 lead EKG if indicated  - Evaluate effectiveness of antiarrhythmic and heart rate control medications as ordered  - Initiate emergency measures for life threatening arrhythmias  - Monitor electrolytes and administer replacement therapy as ordered  12/23/2024 2056 by Ct Chance RN  Outcome: Progressing  12/23/2024 2056 by Ct Chance RN  Outcome: Progressing     Problem: RESPIRATORY - ADULT  Goal: Achieves optimal ventilation and oxygenation  Description: INTERVENTIONS:  - Assess for changes in respiratory status  - Assess for changes in mentation and behavior  - Position to facilitate oxygenation and minimize respiratory effort  - Oxygen supplementation based on oxygen saturation or ABGs  - Provide Smoking Cessation handout, if applicable  - Encourage broncho-pulmonary hygiene including cough, deep breathe, Incentive Spirometry  - Assess the need for suctioning and perform as needed  - Assess and instruct to report SOB or any respiratory difficulty  - Respiratory Therapy support as indicated  - Manage/alleviate anxiety  - Monitor for signs/symptoms of CO2 retention  12/23/2024 2056 by Ct Chance RN  Outcome: Progressing  12/23/2024 2056 by Ct Chance RN  Outcome: Progressing     Problem: SKIN/TISSUE INTEGRITY - ADULT  Goal: Skin integrity remains intact  Description: INTERVENTIONS  - Assess and document risk factors for pressure ulcer development  - Assess and document skin integrity  - Monitor for areas of redness and/or skin breakdown  - Initiate interventions, skin care  algorithm/standards of care as needed  12/23/2024 2056 by Ct Chance RN  Outcome: Progressing  12/23/2024 2056 by Ct Chance RN  Outcome: Progressing  Goal: Incision(s), wounds(s) or drain site(s) healing without S/S of infection  Description: INTERVENTIONS:  - Assess and document risk factors for pressure ulcer development  - Assess and document skin integrity  - Assess and document dressing/incision, wound bed, drain sites and surrounding tissue  - Implement wound care per orders  - Initiate isolation precautions as appropriate  - Initiate Pressure Ulcer prevention bundle as indicated  Outcome: Progressing     Problem: Diabetes/Glucose Control  Goal: Glucose maintained within prescribed range  Description: INTERVENTIONS:  - Monitor Blood Glucose as ordered  - Assess for signs and symptoms of hyperglycemia and hypoglycemia  - Administer ordered medications to maintain glucose within target range  - Assess barriers to adequate nutritional intake and initiate nutrition consult as needed  - Instruct patient on self management of diabetes  12/23/2024 2056 by Ct Chance RN  Outcome: Progressing  12/23/2024 2056 by Ct Chance RN  Outcome: Progressing     Problem: PAIN - ADULT  Goal: Verbalizes/displays adequate comfort level or patient's stated pain goal  Description: INTERVENTIONS:  - Encourage pt to monitor pain and request assistance  - Assess pain using appropriate pain scale  - Administer analgesics based on type and severity of pain and evaluate response  - Implement non-pharmacological measures as appropriate and evaluate response  - Consider cultural and social influences on pain and pain management  - Manage/alleviate anxiety  - Utilize distraction and/or relaxation techniques  - Monitor for opioid side effects  - Notify MD/LIP if interventions unsuccessful or patient reports new pain  - Anticipate increased pain with activity and pre-medicate as appropriate  12/23/2024 2056 by  Ct Chance RN  Outcome: Progressing  12/23/2024 2056 by Ct Chance RN  Outcome: Progressing     Problem: RISK FOR INFECTION - ADULT  Goal: Absence of fever/infection during anticipated neutropenic period  Description: INTERVENTIONS  - Monitor WBC  - Administer growth factors as ordered  - Implement neutropenic guidelines  12/23/2024 2056 by Ct Chance RN  Outcome: Progressing  12/23/2024 2056 by Ct Chance RN  Outcome: Progressing     Problem: SAFETY ADULT - FALL  Goal: Free from fall injury  Description: INTERVENTIONS:  - Assess pt frequently for physical needs  - Identify cognitive and physical deficits and behaviors that affect risk of falls.  - [place] fall precautions as indicated by assessment.  - Educate pt/family on patient safety including physical limitations  - Instruct pt to call for assistance with activity based on assessment  - Modify environment to reduce risk of injury  - Provide assistive devices as appropriate  - Consider OT/PT consult to assist with strengthening/mobility  - Encourage toileting schedule  12/23/2024 2056 by Ct Chance RN  Outcome: Progressing  12/23/2024 2056 by Ct Chance RN  Outcome: Progressing     Problem: DISCHARGE PLANNING  Goal: Discharge to home or other facility with appropriate resources  Description: INTERVENTIONS:  - Identify barriers to discharge w/pt and caregiver  - Include patient/family/discharge partner in discharge planning  - Arrange for needed discharge resources and transportation as appropriate  - Identify discharge learning needs (meds, wound care, etc)  - Arrange for interpreters to assist at discharge as needed  - Consider post-discharge preferences of patient/family/discharge partner  - Complete POLST form as appropriate  - Assess patient's ability to be responsible for managing their own health  - Refer to Case Management Department for coordinating discharge planning if the patient needs post-hospital  services based on physician/LIP order or complex needs related to functional status, cognitive ability or social support system  12/23/2024 2056 by Ct Chance RN  Outcome: Progressing  12/23/2024 2056 by Ct Chance, RN  Outcome: Progressing     Problem: METABOLIC/FLUID AND ELECTROLYTES - ADULT  Goal: Electrolytes maintained within normal limits  Description: INTERVENTIONS:  - Monitor labs and rhythm and assess patient for signs and symptoms of electrolyte imbalances  - Administer electrolyte replacement as ordered  - Monitor response to electrolyte replacements, including rhythm and repeat lab results as appropriate  - Fluid restriction as ordered  - Instruct patient on fluid and nutrition restrictions as appropriate  Outcome: Progressing     Problem: HEMATOLOGIC - ADULT  Goal: Maintains hematologic stability  Description: INTERVENTIONS  - Assess for signs and symptoms of bleeding or hemorrhage  - Monitor labs and vital signs for trends  - Administer supportive blood products/factors, fluids and medications as ordered and appropriate  - Administer supportive blood products/factors as ordered and appropriate  Outcome: Progressing     Problem: MUSCULOSKELETAL - ADULT  Goal: Return mobility to safest level of function  Description: INTERVENTIONS:  - Assess patient stability and activity tolerance for standing, transferring and ambulating w/ or w/o assistive devices  - Assist with transfers and ambulation using safe patient handling equipment as needed  - Ensure adequate protection for wounds/incisions during mobilization  - Obtain PT/OT consults as needed  - Advance activity as appropriate  - Communicate ordered activity level and limitations with patient/family  Outcome: Progressing

## 2024-12-24 NOTE — OPERATIVE REPORT
Capsule Endoscopy Report     Primary care physician: Diana Lopez DO  Date of procedure: 12/20/24  Indication: Iron deficiency anemia       After the risks and benefits were discussed with the patient and all questions were answered, the patient signed informed consent for the procedure. The patient ingested the Given M2A capsule endoscope without incident and completed the study according to standard protocol.     Gastric transit time: 3 minutes  Small bowel transit time: 276 minutes  Bowel prep/study quality: Good    Findings:   - Several small suspected AVMs noted in the 2nd to 4th portion of the duodenum.  Several small blushes of blood seen in the proximal small bowel.  Remainder of the small bowel mucosa normal without evidence of fresh or old blood.  - Complete exam to the cecum  - Prep was good  - Gastric Time: 3 Minutes  - Small Bowel Time: 276 Minutes       Impression:   Bleeding duodenal AVMs    Recommendation:   Office to contact to schedule for Push Enteroscopy with possible APC with Dr. Anderson.  Repeat CBC in the next week.      Doc Hernandez M.D.  [facility]

## 2024-12-24 NOTE — PROGRESS NOTES
Notified son John that patient was going to [facility] today, understanding verbalized.

## 2024-12-24 NOTE — DISCHARGE SUMMARY
[facility]  part of [facility]    Discharge Summary    Hannah Boss Patient Status:  Inpatient    1945 MRN [id number]   Location [facility] 3W/SW Attending Maximilian Collins MD   Hosp Day # 7 PCP Diana Lopez DO     Date of Admission:  2024  Disposition:   SNF    Date of Discharge:   2024     Admitting Diagnosis:   Hyperkalemia [E87.5]  Cellulitis of lower extremity, unspecified laterality [L03.119]  Anemia, unspecified type [D64.9]  Acute on chronic congestive heart failure, unspecified heart failure type (HCC) [I50.9]  Acute renal failure superimposed on chronic kidney disease, unspecified acute renal failure type, unspecified CKD stage (HCC) [N17.9, N18.9]    Hospital Discharge Diagnoses:    Gastrointestinal bleed  Posthemorrhagic anemia  Gastritis  Hx of DVT  Fluid overload status   Acute on chronic HFpEF  Moderate aortic stenosis.  Bilateral lower extremity cellulitis.    Musculoskeletal deconditioning  Chronic back pain   Neurogenic claudication.  Spinal stenosis   DICK on CKD 4     Hx of HTN  Hx of tobacco abuse  Active smoker    Problem List:     Patient Active Problem List   Diagnosis    Back pain    L5-S1 left paracentral mild HNP, L4-5 right lateral recess & foraminal mod HNP, L3-4 right paracentral mild HNP    L5-S1 left mod foraminal, L3-4 bilateral mod foraminal, L2-3 mild-mod diffuse, L1-2 right mild foraminal bulging discs    L5-S1 right mod/left severe, L4-5 right mod-severe/left mild, L3-4 right mild-mod/left mod foraminal stenosis    Spinal stenosis of lumbar region at multiple levels    Degenerative arthritis    Leg weakness    Neurologic gait dysfunction    Cancer related pain    Acute on chronic heart failure with preserved ejection fraction (HFpEF) (HCC)    Pulmonary hypertension (HCC)    Stage 3 chronic kidney disease (HCC)    Acute renal failure (HCC)    Bilateral leg edema    Acute on chronic congestive heart failure, unspecified heart  failure type (HCC)    Uncontrolled hypertension    Benign hypertension    Tobacco use disorder    Hyperkalemia    Elevated troponin    Transaminitis    Acute on chronic renal insufficiency    Acute cystitis without hematuria    Chronic renal impairment    Chronic renal impairment, unspecified CKD stage    Dehydration    Weakness generalized    Lethargy    Urinary tract infection without hematuria, site unspecified    Other fatigue    Non-traumatic rhabdomyolysis    Abnormal LFTs    Altered mental status    Altered mental status, unspecified altered mental status type    Abdominal mass    Aortic stenosis    Nicotine dependence with current use    Constipation due to opioid therapy    Dyslipidemia    Dyspnea    Edema of extremities    Other chronic pain    Gastritis    Hypercholesteremia    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[facility])    Traumatic rhabdomyolysis, initial encounter ([facility])    Chest pain with high risk for cardiac etiology    Renal insufficiency    Pain in both feet    Bilateral swelling of feet    Exertional dyspnea    Deep vein thrombosis (DVT) of distal vein of left lower extremity, unspecified chronicity ([facility])    Aortic valvular disease    Blunt head trauma, initial encounter    Laceration of auricle of right ear, initial encounter    Weakness    Fall, initial encounter    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[facility])    Paraparesis ([facility])    Thoracic myelopathy    Lumbar spondylosis    SVT (supraventricular tachycardia) ([facility])    Chest pain    Dental infection    Anemia    Paraparesis of both lower limbs ([facility])    Acute renal failure (ARF) ([facility])    Acute kidney injury ([facility])    PSVT (paroxysmal supraventricular tachycardia) ([facility])    Sinus pause    Chest pain of uncertain etiology    DICK (acute kidney injury) ([facility])    Hypernatremia    Hyperglycemia    Acute heart failure ([facility])    Opiate or related narcotic overdose, undetermined intent, initial encounter ([facility])    Contusion of head, unspecified part of head, initial encounter    Contusion of  multiple sites of right shoulder, initial encounter    Contusion of right hip, initial encounter    Encephalopathy    Anemia, unspecified type    Acute renal failure superimposed on chronic kidney disease, unspecified acute renal failure type, unspecified CKD stage (HCC)    Cellulitis of lower extremity, unspecified laterality    GI bleed       Physical Exam:      /56 (BP Location: Right arm)   Pulse 78   Temp 98.4 °F (36.9 °C) (Oral)   Resp 20   Ht 5' 3\" (1.6 m)   Wt 179 lb (81.2 kg)   SpO2 96%   BMI 31.71 kg/m²     Gen:   NAD.  A and O x 3  CV:   RRR, 3/6 DANIEL  Pulm:   CTA bilat  Abd:   +bs, soft, NT, ND  LE:   trace edema bilat.   Venous stasis changes bilat.   Mild acute erythema of LLE. - improved  Neuro:   nonfocal      Reason for Admission:   Fluid overload status, gastrointestinal bleed, bilateral lower extremity cellulitis.     HISTORY OF PRESENT ILLNESS:  The patient is a 79-year-old  female with multiple medical problems and chronic musculoskeletal deconditioning, almost bedbound.  Brought in today for increased edema, pain in her lower extremities with fatigue.  The patient had a CBC showing a hemoglobin of 6.1, which is below her baseline.  No leukocytosis or left shift.  ProBNP 778.  Liver function tests unremarkable.  Chemistry showed potassium 5.5, BUN and creatinine of 42 and 2.57.  GFR is 19 which is below her baseline; usually she is in mid 20s.  Venous Doppler study of both lower extremities showed no DVT.  Chest x-ray showed fluid overload status.  Rectal exam done by me showed dark brown stool, positive for Hemoccult blood.  The patient was started on IV Bumex, blood transfusion, IV Protonix, Ancef, and she will be admitted to the hospital for further management.    Hospital Course:     Gastrointestinal bleed with posthemorrhagic anemia  Pt normally on Eliquis for hx of DVT.  Mild gastritis on EGD 12/20.  - GI was on consult  - pt with recent endoscopies on March  just showing gastritis.      - capsule endoscopy started 12/21 - f/u with GI for results  - back on Eliquis  - general diet  - cont bid protonix, was given IV protonix bid in hospital  - follow cbc  - was transfused 1 unit prbc  - doppler of LEs this admit is negative for DVT  - dc planning for rehab     Fluid overload status  - improved  Acute on chronic HFpEF  Underlying moderate aortic stenosis.  - cardiology was  on consult  - was diuresed with IV bumex, on PO now  - monitored Is/Os, daily wts  - cards f/u     Bilateral lower extremity cellulitis. - resolved  - mostly venous stasis changes  - completed IV ancef  - diuresis as above     Musculoskeletal deconditioning  Chronic back pain with neurogenic claudication.  MRI of L spine this admit shows areas of spinal stenosis and mild HNP.     - pt offered anesthesia pain consult to eval for ANTHONY however pt refusing this  - cont lyrica  - PT/OT     DICK on CKD 4 - Cr better   - renal on consult  - renal f/u  - cont po bumex 1 mg every day      Hx of HTN  - cont hydralazine, diltiazem, bumex     Hx of tobacco abuse  Active smoker  - counseled on cessation     dvt proph:     Eliquis     Code status:    Full       Consultations:   GI, cardiology     Discharge Condition:  Good    Discharge Medications:      Discharge Medications        CHANGE how you take these medications        Instructions Prescription details   Bumex 1 MG Tabs  Generic drug: bumetanide  What changed: when to take this      Take 1 tablet (1 mg total) by mouth daily.   Refills: 0     pregabalin 100 MG Caps  Commonly known as: Lyrica  What changed:   medication strength  how much to take      Take 1 capsule (100 mg total) by mouth 2 (two) times daily.   Refills: 0            CONTINUE taking these medications        Instructions Prescription details   atorvastatin 40 MG Tabs  Commonly known as: Lipitor      Take 1 tablet (40 mg total) by mouth nightly.   Refills: 0     cholecalciferol 50 MCG (2000 UT)  Tabs  Commonly known as: Vitamin D3      Take 1 tablet (2,000 Units total) by mouth daily.   Refills: 0     dilTIAZem  MG Cp24  Commonly known as: CardIZEM CD      Take 1 capsule (240 mg total) by mouth daily.   Refills: 0     Eliquis 5 MG Tabs  Generic drug: apixaban      Take 1 tablet (5 mg total) by mouth 2 (two) times daily.   Refills: 0     ferrous sulfate 325 (65 FE) MG Tbec      Take 1 tablet (325 mg total) by mouth daily with breakfast.   Quantity: 30 tablet  Refills: 0     gabapentin 300 MG Caps  Commonly known as: Neurontin      Take 1 capsule (300 mg total) by mouth nightly.   Refills: 0     hydrALAZINE 25 MG Tabs  Commonly known as: Apresoline      Take 1 tablet (25 mg total) by mouth in the morning and 1 tablet (25 mg total) before bedtime.   Quantity: 60 tablet  Refills: 0     isosorbide mononitrate ER 60 MG Tb24  Commonly known as: Imdur      Take 1 tablet (60 mg total) by mouth daily.   Refills: 0     magnesium oxide 400 MG Tabs  Commonly known as: Mag-Ox      Take 1 tablet (400 mg total) by mouth daily.   Quantity: 30 tablet  Refills: 0     meclizine 25 MG Tabs  Commonly known as: Antivert      Take 1 tablet (25 mg total) by mouth 3 (three) times daily as needed for Dizziness or Nausea.   Refills: 0     Naloxone HCl 4 MG/0.1ML Liqd      4 mg by Nasal route as needed. If patient remains unresponsive, repeat dose in other nostril 2-5 minutes after first dose.   Quantity: 1 kit  Refills: 0     oxyCODONE-acetaminophen  MG Tabs  Commonly known as: Percocet      Take 1 tablet by mouth every 4 (four) hours as needed for Pain (max 5/day).   Stop taking on: January 23, 2025  Quantity: 30 tablet  Refills: 0     pantoprazole 40 MG Tbec  Commonly known as: Protonix      Take 1 tablet (40 mg total) by mouth 2 (two) times daily before meals.   Refills: 0     trolamine salicyliate 10 % Crea      Apply to painful joints or muscles up to three times a day as needed   Quantity: 35 g  Refills: 0             STOP taking these medications      buprenorphine 8 MG Subl  Commonly known as: SUBUTEX        furosemide 40 MG Tabs  Commonly known as: Lasix        lisinopril 10 MG Tabs  Commonly known as: Zestril        patiromer 8.4 g Pack  Commonly known as: Veltassa        senna-docusate 8.6-50 MG Tabs  Commonly known as: Senokot-S                  Where to Get Your Medications        Please  your prescriptions at the location directed by your doctor or nurse    Bring a paper prescription for each of these medications  oxyCODONE-acetaminophen  MG Tabs         Follow up Visits:     Follow-up Information       Diana Lopez DO. Schedule an appointment as soon as possible for a visit in 1 week(s).    Specialty: Family Medicine  Contact information:  [address] [place] [address]  [phone]               Mary Anderson MD. Schedule an appointment as soon as possible for a visit in 2 week(s).    Specialty: GASTROENTEROLOGY  Contact information:  [address]  [address]  [facility] [address]  [phone]               Janet Reynaga MD. Schedule an appointment as soon as possible for a visit in 1 week(s).    Specialty: NEPHROLOGY  Contact information:  [address]  [address]  [facility] [id number] [phone]                             Hospital Discharge Diagnoses: Acute on chronic HFpEF    Lace+ Score: 76  59-90 High Risk  29-58 Medium Risk  0-28   Low Risk.    TCM Follow-Up Recommendation:  LACE > 58: High Risk of readmission after discharge from the hospital.    >35 minutes spent preparing this discharge.    Maximilian Jansen MD  12/24/2024  1:35 PM

## 2024-12-24 NOTE — PROGRESS NOTES
[facility]  part of [facility]    Progress Note    Hannah Boss Patient Status:  Inpatient    1945 MRN [id number]   Location [facility] 3W/SW Attending Maximilian Collins MD   Hosp Day # 7 PCP Diana Lopez DO       Subjective:   Hannah Boss is a(n) 79 year old female who I am seeing for ingris/ckd    No new issues  Denies sob    Objective:   /54 (BP Location: Right arm)   Pulse 69   Temp 98.5 °F (36.9 °C) (Oral)   Resp 20   Ht 5' 3\" (1.6 m)   Wt 179 lb (81.2 kg)   SpO2 96%   BMI 31.71 kg/m²      Intake/Output Summary (Last 24 hours) at 2024 0700  Last data filed at 2024 0447  Gross per 24 hour   Intake 542 ml   Output 800 ml   Net -258 ml     Wt Readings from Last 1 Encounters:   24 179 lb (81.2 kg)       Exam  Vital signs: Blood pressure 141/54, pulse 69, temperature 98.5 °F (36.9 °C), temperature source Oral, resp. rate 20, height 5' 3\" (1.6 m), weight 179 lb (81.2 kg), SpO2 96%, not currently breastfeeding.    General: No acute distress. Alert and oriented x 3.  HEENT: Moist mucous membranes. EOMI. PERRLA  Neck:  No JVD.   Respiratory: Clear to auscultation bilaterally.    Cardiovascular: S1, S2.  Regular rate and rhythm.   Abdomen: Soft, nontender, nondistended.    Neurologic: No focal neurological deficits.  Musculoskeletal: Full range of motion of all extremities.  Chronic stasis. Edema is better  Access:    Results:     Recent Labs   Lab 24  0728 24  0656 24  0646 24  0516 24  0641 24  0610 24  0612   RBC 2.85* 2.68*   < > 2.66* 2.54* 2.60* 2.66*   HGB 8.3* 7.8*   < > 8.0* 7.7* 7.7* 7.7*   HCT 26.3* 24.7*   < > 24.7* 23.8* 24.4* 25.1*   MCV 92.3 92.2   < > 92.9 93.7 93.8 94.4   MCH 29.1 29.1   < > 30.1 30.3 29.6 28.9   MCHC 31.6 31.6   < > 32.4 32.4 31.6 30.7*   RDW 17.4* 17.1*   < > 17.2* 16.9* 16.6* 16.3*   NEPRELIM 5.59 4.75  --  6.09  --   --   --    WBC 7.3 6.1   < > 7.6 6.6 7.7 5.7   .0  218.0   < > 210.0 190.0 187.0 180.0    < > = values in this interval not displayed.         Recent Labs   Lab 12/22/24  0641 12/23/24  0610 12/24/24  0612   * 153* 144*   BUN 27* 31* 34*   CREATSERUM 1.88* 2.31* 2.28*   CA 8.4* 8.3* 8.5*   * 142 143   K 4.3 4.9 5.0    108 108   CO2 31.0 31.0 31.0          No results found.    Assessment and Plan:     79-year-old female with history of CKD stage III, diabetes, hypertension, leiomyosarcoma status postsurgery and radiation , SVT, chronic pain , LLE DVT on eliquis with recurrent hospitalizations admitted with fatigue and lower extremity edema    Impression:    Likely cardiorenal syndrome .  Improving with IV diuretics  CKD 3/4, high blood pressure, diabetes, cardiorenal with baseline creatinine around 1.9 mg/dL  Anemia status post endoscopy.  Capsule endoscopy, gi following. S/p prbc  Hypertension with CKD 4.  Avoid ACE because of hyperkalemia.  Hydralazine  Diabetes type II with nephropathy, ISS  CHFpEF, diuretics      Plan:    Cr improved after holding diuretics  Can resume po bumex 1 mg daily tonmorrow  IV iron and a dose of tabitha    Thank you very much for allowing me to participate in the care of your patient.  If you have any questions, please do not hesitate to contact me.     Janet Reynaga MD

## 2024-12-24 NOTE — SPIRITUAL CARE NOTE
Spiritual Care Visit Note    Patient Name: Hannah Boss Date of Spiritual Care Visit: 24   : 1945 Primary Dx: Anemia, unspecified type       Referred By: Referral From:     Spiritual Care Taxonomy:    Intended Effects: Build relationship of care and support    Methods: Assist with spiritual/Episcopalian practices;Explore cultural values    Interventions: Acknowledge current situation;Active listening;Ask guided questions;Ask guided questions about cultural and Episcopalian values;Ask questions to bring forth feelings;Invite someone to reminisce;Prayer for healing;Provide hospitality;Explain  role;Discuss plan of care;Identify supportive relationship(s)    Visit Type/Summary:     - Spiritual Care: Consulted with RN prior to visit. Offered empathic listening and emotional support. Patient and family expressed appreciation for  visit. Provided information regarding how to contact Spiritual Care and left a Spiritual Care information card. Prayed with patient.  remains available as needed for follow up.    Spiritual Care support can be requested via an Epic consult. For urgent/immediate needs, please contact the On Call  at: [place]: ext [phone]    Chaplain Resident, María Elena Whitt PhD

## 2024-12-24 NOTE — PLAN OF CARE
Problem: Patient Centered Care  Goal: Patient preferences are identified and integrated in the patient's plan of care  Description: Interventions:  - What would you like us to know as we care for you?   - Provide timely, complete, and accurate information to patient/family  - Incorporate patient and family knowledge, values, beliefs, and cultural backgrounds into the planning and delivery of care  - Encourage patient/family to participate in care and decision-making at the level they choose  - Honor patient and family perspectives and choices  Outcome: Progressing     Problem: Patient/Family Goals  Goal: Patient/Family Long Term Goal  Description: Patient's Long Term Goal:     Interventions:  -   - See additional Care Plan goals for specific interventions  Outcome: Progressing  Goal: Patient/Family Short Term Goal  Description: Patient's Short Term Goal:     Interventions:   -   - See additional Care Plan goals for specific interventions  Outcome: Progressing     Problem: CARDIOVASCULAR - ADULT  Goal: Maintains optimal cardiac output and hemodynamic stability  Description: INTERVENTIONS:  - Monitor vital signs, rhythm, and trends  - Monitor for bleeding, hypotension and signs of decreased cardiac output  - Evaluate effectiveness of vasoactive medications to optimize hemodynamic stability  - Monitor arterial and/or venous puncture sites for bleeding and/or hematoma  - Assess quality of pulses, skin color and temperature  - Assess for signs of decreased coronary artery perfusion - ex. Angina  - Evaluate fluid balance, assess for edema, trend weights  Outcome: Progressing  Goal: Absence of cardiac arrhythmias or at baseline  Description: INTERVENTIONS:  - Continuous cardiac monitoring, monitor vital signs, obtain 12 lead EKG if indicated  - Evaluate effectiveness of antiarrhythmic and heart rate control medications as ordered  - Initiate emergency measures for life threatening arrhythmias  - Monitor electrolytes and  administer replacement therapy as ordered  Outcome: Progressing     Problem: RESPIRATORY - ADULT  Goal: Achieves optimal ventilation and oxygenation  Description: INTERVENTIONS:  - Assess for changes in respiratory status  - Assess for changes in mentation and behavior  - Position to facilitate oxygenation and minimize respiratory effort  - Oxygen supplementation based on oxygen saturation or ABGs  - Provide Smoking Cessation handout, if applicable  - Encourage broncho-pulmonary hygiene including cough, deep breathe, Incentive Spirometry  - Assess the need for suctioning and perform as needed  - Assess and instruct to report SOB or any respiratory difficulty  - Respiratory Therapy support as indicated  - Manage/alleviate anxiety  - Monitor for signs/symptoms of CO2 retention  Outcome: Progressing     Problem: METABOLIC/FLUID AND ELECTROLYTES - ADULT  Goal: Electrolytes maintained within normal limits  Description: INTERVENTIONS:  - Monitor labs and rhythm and assess patient for signs and symptoms of electrolyte imbalances  - Administer electrolyte replacement as ordered  - Monitor response to electrolyte replacements, including rhythm and repeat lab results as appropriate  - Fluid restriction as ordered  - Instruct patient on fluid and nutrition restrictions as appropriate  Outcome: Progressing     Problem: Diabetes/Glucose Control  Goal: Glucose maintained within prescribed range  Description: INTERVENTIONS:  - Monitor Blood Glucose as ordered  - Assess for signs and symptoms of hyperglycemia and hypoglycemia  - Administer ordered medications to maintain glucose within target range  - Assess barriers to adequate nutritional intake and initiate nutrition consult as needed  - Instruct patient on self management of diabetes  Outcome: Progressing     Problem: PAIN - ADULT  Goal: Verbalizes/displays adequate comfort level or patient's stated pain goal  Description: INTERVENTIONS:  - Encourage pt to monitor pain and  request assistance  - Assess pain using appropriate pain scale  - Administer analgesics based on type and severity of pain and evaluate response  - Implement non-pharmacological measures as appropriate and evaluate response  - Consider cultural and social influences on pain and pain management  - Manage/alleviate anxiety  - Utilize distraction and/or relaxation techniques  - Monitor for opioid side effects  - Notify MD/LIP if interventions unsuccessful or patient reports new pain  - Anticipate increased pain with activity and pre-medicate as appropriate  Outcome: Progressing     Problem: RISK FOR INFECTION - ADULT  Goal: Absence of fever/infection during anticipated neutropenic period  Description: INTERVENTIONS  - Monitor WBC  - Administer growth factors as ordered  - Implement neutropenic guidelines  Outcome: Progressing     Problem: DISCHARGE PLANNING  Goal: Discharge to home or other facility with appropriate resources  Description: INTERVENTIONS:  - Identify barriers to discharge w/pt and caregiver  - Include patient/family/discharge partner in discharge planning  - Arrange for needed discharge resources and transportation as appropriate  - Identify discharge learning needs (meds, wound care, etc)  - Arrange for interpreters to assist at discharge as needed  - Consider post-discharge preferences of patient/family/discharge partner  - Complete POLST form as appropriate  - Assess patient's ability to be responsible for managing their own health  - Refer to Case Management Department for coordinating discharge planning if the patient needs post-hospital services based on physician/LIP order or complex needs related to functional status, cognitive ability or social support system  Outcome: Progressing     Problem: SAFETY ADULT - FALL  Goal: Free from fall injury  Description: INTERVENTIONS:  - Assess pt frequently for physical needs  - Identify cognitive and physical deficits and behaviors that affect risk of  falls.  - Cunningham fall precautions as indicated by assessment.  - Educate pt/family on patient safety including physical limitations  - Instruct pt to call for assistance with activity based on assessment  - Modify environment to reduce risk of injury  - Provide assistive devices as appropriate  - Consider OT/PT consult to assist with strengthening/mobility  - Encourage toileting schedule  Outcome: Progressing    Patient received A&Ox3, VSS, no distress. BLE swelling is 1+, diuretics are being held. She is up in room with walker and SBA. She denies chest pain, shortness of breath. Patient is being discharged today. POC communicated to patient and understanding verbalized. Call light in reach and safety needs met.

## 2024-12-24 NOTE — PLAN OF CARE
Problem: Patient Centered Care  Goal: Patient preferences are identified and integrated in the patient's plan of care  Description: Interventions:  - What would you like us to know as we care for you?   - Provide timely, complete, and accurate information to patient/family  - Incorporate patient and family knowledge, values, beliefs, and cultural backgrounds into the planning and delivery of care  - Encourage patient/family to participate in care and decision-making at the level they choose  - Honor patient and family perspectives and choices  Outcome: Progressing     Problem: Patient/Family Goals  Goal: Patient/Family Long Term Goal  Description: Patient's Long Term Goal:     Interventions:  -   - See additional Care Plan goals for specific interventions  Outcome: Progressing  Goal: Patient/Family Short Term Goal  Description: Patient's Short Term Goal:     Interventions:   -   - See additional Care Plan goals for specific interventions  Outcome: Progressing     Problem: CARDIOVASCULAR - ADULT  Goal: Maintains optimal cardiac output and hemodynamic stability  Description: INTERVENTIONS:  - Monitor vital signs, rhythm, and trends  - Monitor for bleeding, hypotension and signs of decreased cardiac output  - Evaluate effectiveness of vasoactive medications to optimize hemodynamic stability  - Monitor arterial and/or venous puncture sites for bleeding and/or hematoma  - Assess quality of pulses, skin color and temperature  - Assess for signs of decreased coronary artery perfusion - ex. Angina  - Evaluate fluid balance, assess for edema, trend weights  Outcome: Progressing  Goal: Absence of cardiac arrhythmias or at baseline  Description: INTERVENTIONS:  - Continuous cardiac monitoring, monitor vital signs, obtain 12 lead EKG if indicated  - Evaluate effectiveness of antiarrhythmic and heart rate control medications as ordered  - Initiate emergency measures for life threatening arrhythmias  - Monitor electrolytes and  administer replacement therapy as ordered  Outcome: Progressing     Problem: RESPIRATORY - ADULT  Goal: Achieves optimal ventilation and oxygenation  Description: INTERVENTIONS:  - Assess for changes in respiratory status  - Assess for changes in mentation and behavior  - Position to facilitate oxygenation and minimize respiratory effort  - Oxygen supplementation based on oxygen saturation or ABGs  - Provide Smoking Cessation handout, if applicable  - Encourage broncho-pulmonary hygiene including cough, deep breathe, Incentive Spirometry  - Assess the need for suctioning and perform as needed  - Assess and instruct to report SOB or any respiratory difficulty  - Respiratory Therapy support as indicated  - Manage/alleviate anxiety  - Monitor for signs/symptoms of CO2 retention  Outcome: Progressing     Problem: SKIN/TISSUE INTEGRITY - ADULT  Goal: Skin integrity remains intact  Description: INTERVENTIONS  - Assess and document risk factors for pressure ulcer development  - Assess and document skin integrity  - Monitor for areas of redness and/or skin breakdown  - Initiate interventions, skin care algorithm/standards of care as needed  Outcome: Progressing  Goal: Incision(s), wounds(s) or drain site(s) healing without S/S of infection  Description: INTERVENTIONS:  - Assess and document risk factors for pressure ulcer development  - Assess and document skin integrity  - Assess and document dressing/incision, wound bed, drain sites and surrounding tissue  - Implement wound care per orders  - Initiate isolation precautions as appropriate  - Initiate Pressure Ulcer prevention bundle as indicated  Outcome: Progressing     Problem: METABOLIC/FLUID AND ELECTROLYTES - ADULT  Goal: Electrolytes maintained within normal limits  Description: INTERVENTIONS:  - Monitor labs and rhythm and assess patient for signs and symptoms of electrolyte imbalances  - Administer electrolyte replacement as ordered  - Monitor response to  electrolyte replacements, including rhythm and repeat lab results as appropriate  - Fluid restriction as ordered  - Instruct patient on fluid and nutrition restrictions as appropriate  Outcome: Progressing     Problem: Diabetes/Glucose Control  Goal: Glucose maintained within prescribed range  Description: INTERVENTIONS:  - Monitor Blood Glucose as ordered  - Assess for signs and symptoms of hyperglycemia and hypoglycemia  - Administer ordered medications to maintain glucose within target range  - Assess barriers to adequate nutritional intake and initiate nutrition consult as needed  - Instruct patient on self management of diabetes  Outcome: Progressing     Problem: PAIN - ADULT  Goal: Verbalizes/displays adequate comfort level or patient's stated pain goal  Description: INTERVENTIONS:  - Encourage pt to monitor pain and request assistance  - Assess pain using appropriate pain scale  - Administer analgesics based on type and severity of pain and evaluate response  - Implement non-pharmacological measures as appropriate and evaluate response  - Consider cultural and social influences on pain and pain management  - Manage/alleviate anxiety  - Utilize distraction and/or relaxation techniques  - Monitor for opioid side effects  - Notify MD/LIP if interventions unsuccessful or patient reports new pain  - Anticipate increased pain with activity and pre-medicate as appropriate  Outcome: Progressing

## 2024-12-27 NOTE — PAYOR COMM NOTE
RECONSIDERATION REQUEST FOR INPATIENT SERVICES      --------------  CONTINUED STAY REVIEW    Payor: [employer]  Subscriber #:  [id number]  Authorization Number: [id number]    Admit date: 12/17/24  Admit time:  6:50 PM    REVIEW DOCUMENTATION:    12/23 CARDOIOLOGY        Acute anemia GIB  Presented hgb of 6.1 - stable after PRBCs  GI following - gastritis and capsule placement 12/20  Resumed Eliquis 12/21 - No recurrent transfusions, stable hgb  Hx of DVT previously treated on Eliquis had been held until   Lower ext ultrasound negative for DVT  Acute on chronic HFpEF LVEF 60-65%    at presentation  Diuresis per nephrology  Moderate aortic stenosis  Follow outpatient  DICK on CKD IV  Nephrology following  Off ACE d/t hyperkalemia  DM II w/ nephropathy  HTN  BP at goal     PLAN  Diuretics held by nephrology  Back on Eliquis per GI for Hx of DVT  BP at goal          12/24 NEPHROLOGY  dick/ckd       79-year-old female with history of CKD stage III, diabetes, hypertension, leiomyosarcoma status postsurgery and radiation , SVT, chronic pain , LLE DVT on eliquis with recurrent hospitalizations admitted with fatigue and lower extremity edema     Impression:     Likely cardiorenal syndrome .  Improving with IV diuretics  CKD 3/4, high blood pressure, diabetes, cardiorenal with baseline creatinine around 1.9 mg/dL  Anemia status post endoscopy.  Capsule endoscopy, gi following. S/p prbc  Hypertension with CKD 4.  Avoid ACE because of hyperkalemia.  Hydralazine  Diabetes type II with nephropathy, ISS  CHFpEF, diuretics        Plan:     Cr improved after holding diuretics  Can resume po bumex 1 mg daily tonmorrow  IV iron and a dose of tabitha      Lab 12/21/24  0516 12/22/24  0641 12/23/24  0610   * 129* 153*   BUN 29* 27* 31*   CREATSERUM 2.09* 1.88* 2.31*   EGFRCR 24* 27* 21*   CA 8.9 8.4* 8.3*    146* 142   K 5.1 4.3 4.9    110 108   CO2 32.0 31.0 31.0               Recent Labs   Lab 12/18/24  0728  12/19/24  0656 12/20/24  0646 12/21/24  0516 12/22/24  0641 12/23/24  0610   RBC 2.85* 2.68*   < > 2.66* 2.54* 2.60*   HGB 8.3* 7.8*   < > 8.0* 7.7* 7.7*   HCT 26.3* 24.7*   < > 24.7* 23.8* 24.4*   MCV 92.3 92.2   < > 92.9 93.7 93.8   MCH 29.1 29.1   < > 30.1 30.3 29.6   MCHC 31.6 31.6   < > 32.4 32.4 31.6   RDW 17.4* 17.1*   < > 17.2* 16.9* 16.6*   NEPRELIM 5.59 4.75  --  6.09  --   --    WBC 7.3 6.1   < > 7.6 6.6 7.7   .0 218.0   < > 210.0 190.0 187.0     Medications 12/15 12/16 12/17 12/18 12/19 12/20 12/21 12/22 12/23 12/24       bumetanide (Bumex) 0.25 MG/ML injection 1 mg  Dose: 1 mg  Freq: 2 times daily (diuretic) Route: IV  Start: 12/17/24 1915 End: 12/22/24 1147      (2115 MM)-Not Given [C]      0825 OR-Given     1741 OR-Given      0917 OR-Given     1744 OR-Given      0834 CS-Given     1649 CS-Given      0807 JL-Given     1600 JL-Given      0800 JL-Given     1147-D/C'd        bumetanide (Bumex) 0.25 MG/ML injection 2 mg  Dose: 2 mg  Freq: Once Route: IV  Start: 12/17/24 1715 End: 12/17/24 1726      1726 VS-Given               bumetanide (Bumex) tab 1 mg  Dose: 1 mg  Freq: 2 times daily (diuretic) Route: OR  Start: 12/22/24 1700 End: 12/24/24 1618           1620 JL-Given      0752 ZD-Held by provider [C]     (0900 CC)-Not Given     (1700 CC)-Not Given      0900 ZD     1618 AD-Unheld by provider     1618-D/C'd      ceFAZolin (Ancef) 2g in 10mL IV syringe premix  Dose: 2 g  Freq: Once Route: IV  Last Dose: Stopped (12/17/24 1731)  Start: 12/17/24 1715 End: 12/17/24 1731   Order specific questions:         1726 VS-New Bag     1731 VS-Stopped               ceFAZolin (Ancef) 2g in 10mL IV syringe premix  Dose: 2 g  Freq: Every 12 hours Route: IV  Last Dose: 2 g (12/24/24 0445)  Start: 12/18/24 0530 End: 12/24/24 1618   Order specific questions:          0607 MM-New Bag     1741 OR-New Bag      0525 TB-New Bag     1744 OR-New Bag      0454 TB-New Bag     1650 CS-New Bag      0452 AS-New Bag     1600 JL-New  Bag      0511 AS-New Bag     1620 JL-New Bag      0458 TJ-New Bag     1607 CC-New Bag      0445 ALEJANDRINA-New Bag     1618-D/C'd          sodium ferric gluconate (Ferrlecit) 125 mg in sodium chloride 0.9% 100mL IVPB premix  Dose: 125 mg  Freq: Daily Route: IV  Last Dose: 125 mg (12/24/24 0930)  Start: 12/23/24 1130 End: 12/24/24 1618            1356 CC-New Bag      0930 LA-New Bag     1618-D/C'd      sodium zirconium cyclosilicate (Lokelma) oral packet 10 g  Dose: 10 g  Freq: Once Route: OR  Start: 12/17/24 1715 End: 12/17/24 1727   Admin Instructions:   Empty entire contents of the packet(s) into a glass with 3 tablespoons (45 mL) of water. Stir well and drink immediately; if powder remains in the glass, add water, stir and drink immediately; repeat until no powder remains. Administer other oral medications 2 hours before or 2 hours after dose.      1727 VS-Given               vancomycin (Vancocin) cap 125 mg  Dose: 125 mg  Freq: Daily Route: OR  Start: 12/17/24 1915 End: 12/24/24 1618   Order specific questions:         2139 MM-Given      0825 OR-Given      0917 OR-Given      0835 CS-Given      0808 JL-Given      0758 JL-Given      0826 CC-Given      0832 LA-Given     1618-D/C'd          hydrALAzine (Apresoline) 20 mg/mL injection 10 mg  Dose: 10 mg  Freq: Every 6 hours PRN Route: IV  PRN Reason: Increased blood pressure  Start: 12/18/24 0130 End: 12/24/24 1618   Admin Instructions:   SBP greater than 170, DBP greater than 110       0144 MM-Given      (0915 OR)-Not Given [C]     1755 OR-Given      1502 CS-Given         1618-D/C'd          12/24/24 0837 98.4 °F (36.9 °C) 78 20 139/56 96 % -- None (Room air) -- LA   12/24/24 0443 98.5 °F (36.9 °C) 69 20 141/54 96 % 179 lb (81.2 kg) None (Room air) -- ALEJANDRINA   12/23/24 2040 98 °F (36.7 °C) 76 22 142/57 96 % -- None (Room air) -- ALEJANDRINA   12/23/24 1609 98.2 °F (36.8 °C) 66 20 140/57 94 % -- None (Room air) -- CC   12/23/24 1211 -- 72 -- 127/47 95 % -- None (Room air) -- CC    12/23/24 0824 98.4 °F (36.9 °C) 68 20 151/66 97 % -- None (Room air) -- CC   12/23/24 0513 -- -- -- -- -- 179 lb (81.2 kg) -- -- EC   12/23/24 0456 98.1 °F (36.7 °C) 75 18 145/58 93 % -- None (Room air) --    12/22/24 2000 -- 87 -- -- -- -- -- -- KD   12/22/24 1958 98.5 °F (36.9 °C) 82 18 120/50 94 % -- None (Room air) --    12/22/24 1620 97.2 °F (36.2 °C) 75 16 133/59 94 % -- None (Room air) --    12/22/24 0755 97.3 °F (36.3 °C) 69 16 137/53 96 % -- None (Room air) --    12/22/24 0547 -- -- -- -- -- 169 lb 6.4 oz (76.8 kg) -- -- BK   12/22/24 0510 97.2 °F (36.2 °C) 76 18 165/68 Abnormal  92 % -- None (Room air) -- AS   12/21/24 2057 97.4 °F (36.3 °C) 73 18 151/64 95 % -- None (Room air) -- AS   12/21/24 2000 -- 77 -- -- -- -- -- -- KD   12/21/24 1559 97.3 °F (36.3 °C) 82 16 142/56 94 % -- None (Room air) --    12/21/24 0945 -- 78 -- 156/60 92 % -- None (Room air) --    12/21/24 0753 97.3 °F (36.3 °C) 84 16 171/73 Abnormal  93 % -- None (Room air) --    12/21/24 0527 -- -- -- -- -- 187 lb 11.2 oz (85.1 kg) -- -- BK   12/21/24 0450 97.4 °F (36.3 °C) 81 16 156/62 92 % -- None (Room air) -- AS   12/20/24 2300 -- 87 -- 167/65 Abnormal  -- -- -- -- AS   12/20/24 2117 98 °F (36.7 °C) 90 18 175/70 Abnormal  95 % -- None (Room air) -- AS   12/20/24 1900 -- 98 -- -- -- -- -- -- MO   12/20/24 1648 -- 83 20 143/69 92 % -- None (Room air) -- CS   12/20/24 1454 97.8 °F (36.6 °C) 75 20 174/66 Abnormal  93 % -- None (Room air) -- CS   12/20/24 1420 -- 72 23 166/68 Abnormal  95 % -- -- -- LO   12/20/24 1415 -- 74 25 166/73 Abnormal  96 % -- -- -- LO   12/20/24 1410 -- 76 25 171/70 Abnormal  92 % -- -- -- LO   12/20/24 1405 -- 79 26 167/72 Abnormal  92 % -- -- -- LO   12/20/24 1400 -- 82 22 150/69 93 % -- -- -- LO   12/20/24 1355 97 °F (36.1 °C) 89 18 170/89 Abnormal  98 % -- -- -- MT   12/20/24 1312 -- -- -- -- -- 200 lb (90.7 kg) -- -- LO   12/20/24 1245 -- 72 25 162/62 Abnormal  93 % -- None (Room air) -- LO    12/20/24 0832 98.9 °F (37.2 °C) 78 20 152/55 98 % -- None (Room air) -- CS   12/20/24 0449 98.6 °F (37 °C) 83 20 142/87 100 % -- None (Room air) -- TB   12/20/24 0151 -- -- -- -- -- 200 lb 8 oz (90.9 kg) -- -- LR   12/19/24 2148 -- 87 -- -- -- -- -- -- TB   12/19/24 2024 98.7 °F (37.1 °C) 89 20 146/80 96 % -- None (Room air) -- TB   12/19/24 1900 -- 87 -- -- -- -- -- -- MO   12/19/24 1836 -- 88 -- 150/63 -- -- -- -- OR   12/19/24 1744 98.3 °F (36.8 °C) 81 18 170/69 Abnormal  93 % -- None (Room air) -- OR   12/19/24 1106 98.3 °F (36.8 °C) 85 18 148/52 95 % -- None (Room air) -- OR   12/19/24 0908 98 °F (36.7 °C) 74 18 171/63 Abnormal  96 % -- None (Room air) -- OR   12/19/24 0622 -- -- -- -- -- 205 lb 1.6 oz (93 kg) -- -- LRA   12/19/24 0255 98 °F (36.7 °C) 76 20 162/60 Abnormal  96 % -- Nasal cannula 1 L/min TB   12/18/24 2052 98.1 °F (36.7 °C) 77 18 164/65 Abnormal  98 % -- Nasal cannula 1 L/min SD   12/18/24 1900 -- 71 -- -- -- -- -- -- MO   12/18/24 1733 98.6 °F (37 °C) 74 18 158/60 96 % -- Nasal cannula 1 L/min OR   12/18/24 1030 98.3 °F (36.8 °C) 73 18 144/58 96 % -- Nasal cannula 1 L/min OR   12/18/24 1005 -- 76 -- 134/54 -- -- -- -- GR   12/18/24 1000 -- 76 -- 134/54 -- -- -- -- GRA   12/18/24 0824 98 °F (36.7 °C) 75 20 163/63 Abnormal  92 % -- Nasal cannula 2 L/min OR   12/18/24 0436 98.1 °F (36.7 °C) 70 20 142/60 98 % 216 lb 11.4 oz (98.3 kg) None (Room air) -- MM   12/18/24 0143 -- -- -- 181/56 Abnormal  -- -- -- -- MM   12/18/24 0100 -- 65 18 176/60 Abnormal  100 % -- None (Room air) -- MM   12/17/24 2137 -- -- 20 171/61 Abnormal  97 % -- None (Room air) -- MM   12/17/24 2030 98 °F (36.7 °C) 63 18 168/64 Abnormal  96 % -- -- -- MM   12/17/24 1930 97.7 °F (36.5 °C) 60 20 157/54 97 % -- None (Room air) -- MM   12/17/24 1908 -- 60 -- -- -- -- -- -- MO   12/17/24 1903 97.6 °F (36.4 °C) 60 22 157/59 97 % 222 lb 1.6 oz (100.7 kg) None (Room air) -- NV   12/17/24 1815 97.9 °F (36.6 °C) 61 20 147/65 98 % -- --  -- VS   12/17/24 1805 98.4 °F (36.9 °C) 64 19 139/50 97 % -- -- -- VS   12/17/24 1800 -- 61 21 147/52 94 % -- None (Room air) -- VS   12/17/24 1745 -- 60 19 143/55 98 % -- None (Room air) -- VS   12/17/24 1730 -- 59 18 148/49 97 % -- None (Room air) -- VS   12/17/24 1700 -- 58 18 145/55 93 % -- None (Room air) -- VS   12/17/24 1645 -- 57 18 138/53 92 % -- None (Room air) -- VS   12/17/24 1630 -- 56 17 132/54 92 % -- None (Room air) -- VS   12/17/24 1615 -- 56 17 135/51 94 % -- None (Room air) -- VS   12/17/24 1600 -- 55 16 124/49 90 % -- None (Room air) -- VS   12/17/24 1530 -- 56 16 126/49 90 % -- None (Room air) -- VS   12/17/24 1515 -- 55 16 127/49 91 % -- None (Room air) -- VS   12/17/24 1500 -- 57 19 128/45 97 % -- None (Room air) -- VS   12/17/24 1445 -- 57 17 122/48 80 % Abnormal  -- -- --    12/17/24 1430 -- 55 21 111/50 94 % -- -- --    12/17/24 1415 -- 56 15 121/44 95 % -- -- --    12/17/24 1403 -- -- -- -- -- -- None (Room air) --    12/17/24 1401 98.8 °F (37.1 °C) 60 20 109/44 97 % -- None (Room air) --        Blood Transfusion Record       Product Unit Status Volume Start End            Transfuse RBC       24  [id number]  [id number] Completed 12/17/24 2038 290 mL 12/17/24 1805 12/17/24 2030                --------------  DISCHARGE REVIEW    Payor: [employer] [facility]  Subscriber #:  [id number]  Authorization Number: [id number]    Admit date: 12/17/24  Admit time:   6:50 PM  Discharge Date: 12/24/2024  2:18 PM     Admitting Physician: Erich Pascual MD  Attending Physician:  No att. providers found  Primary Care Physician: Diana Lopez DO          Discharge Summary Notes        Discharge Summary signed by Maximilian Collins MD at 12/24/2024  1:39 PM       Author: Maximilian Collins MD Specialty: HOSPITALIST, HOSPITALIST Author Type: Physician    Filed: 12/24/2024  1:39 PM Date of Service: 12/24/2024  1:35 PM Status: Signed    : Maximilian Collins MD (Physician)           [facility]  Hospital  part of [facility]    Discharge Summary    Hannah Boss Patient Status:  Inpatient    1945 MRN [id number]   Location [facility] 3W/SW Attending Maximilian Collins MD   Hosp Day # 7 PCP Diana Lopez DO     Date of Admission:  2024  Disposition:   SNF    Date of Discharge:   2024     Admitting Diagnosis:   Hyperkalemia [E87.5]  Cellulitis of lower extremity, unspecified laterality [L03.119]  Anemia, unspecified type [D64.9]  Acute on chronic congestive heart failure, unspecified heart failure type (HCC) [I50.9]  Acute renal failure superimposed on chronic kidney disease, unspecified acute renal failure type, unspecified CKD stage (HCC) [N17.9, N18.9]    Hospital Discharge Diagnoses:    Gastrointestinal bleed  Posthemorrhagic anemia  Gastritis  Hx of DVT  Fluid overload status   Acute on chronic HFpEF  Moderate aortic stenosis.  Bilateral lower extremity cellulitis.    Musculoskeletal deconditioning  Chronic back pain   Neurogenic claudication.  Spinal stenosis   DICK on CKD 4     Hx of HTN  Hx of tobacco abuse  Active smoker    Problem List:     Patient Active Problem List   Diagnosis    Back pain    L5-S1 left paracentral mild HNP, L4-5 right lateral recess & foraminal mod HNP, L3-4 right paracentral mild HNP    L5-S1 left mod foraminal, L3-4 bilateral mod foraminal, L2-3 mild-mod diffuse, L1-2 right mild foraminal bulging discs    L5-S1 right mod/left severe, L4-5 right mod-severe/left mild, L3-4 right mild-mod/left mod foraminal stenosis    Spinal stenosis of lumbar region at multiple levels    Degenerative arthritis    Leg weakness    Neurologic gait dysfunction    Cancer related pain    Acute on chronic heart failure with preserved ejection fraction (HFpEF) (HCC)    Pulmonary hypertension (HCC)    Stage 3 chronic kidney disease (HCC)    Acute renal failure (HCC)    Bilateral leg edema    Acute on chronic congestive heart failure, unspecified heart failure type (HCC)     Uncontrolled hypertension    Benign hypertension    Tobacco use disorder    Hyperkalemia    Elevated troponin    Transaminitis    Acute on chronic renal insufficiency    Acute cystitis without hematuria    Chronic renal impairment    Chronic renal impairment, unspecified CKD stage    Dehydration    Weakness generalized    Lethargy    Urinary tract infection without hematuria, site unspecified    Other fatigue    Non-traumatic rhabdomyolysis    Abnormal LFTs    Altered mental status    Altered mental status, unspecified altered mental status type    Abdominal mass    Aortic stenosis    Nicotine dependence with current use    Constipation due to opioid therapy    Dyslipidemia    Dyspnea    Edema of extremities    Other chronic pain    Gastritis    Hypercholesteremia    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[facility])    Traumatic rhabdomyolysis, initial encounter ([facility])    Chest pain with high risk for cardiac etiology    Renal insufficiency    Pain in both feet    Bilateral swelling of feet    Exertional dyspnea    Deep vein thrombosis (DVT) of distal vein of left lower extremity, unspecified chronicity ([facility])    Aortic valvular disease    Blunt head trauma, initial encounter    Laceration of auricle of right ear, initial encounter    Weakness    Fall, initial encounter    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HCC)    Paraparesis (HCC)    Thoracic myelopathy    Lumbar spondylosis    SVT (supraventricular tachycardia) ([facility])    Chest pain    Dental infection    Anemia    Paraparesis of both lower limbs ([facility])    Acute renal failure (ARF) ([facility])    Acute kidney injury ([facility])    PSVT (paroxysmal supraventricular tachycardia) ([facility])    Sinus pause    Chest pain of uncertain etiology    DICK (acute kidney injury) ([facility])    Hypernatremia    Hyperglycemia    Acute heart failure ([facility])    Opiate or related narcotic overdose, undetermined intent, initial encounter ([facility])    Contusion of head, unspecified part of head, initial encounter    Contusion of multiple sites of right  shoulder, initial encounter    Contusion of right hip, initial encounter    Encephalopathy    Anemia, unspecified type    Acute renal failure superimposed on chronic kidney disease, unspecified acute renal failure type, unspecified CKD stage (HCC)    Cellulitis of lower extremity, unspecified laterality    GI bleed       Physical Exam:      /56 (BP Location: Right arm)   Pulse 78   Temp 98.4 °F (36.9 °C) (Oral)   Resp 20   Ht 5' 3\" (1.6 m)   Wt 179 lb (81.2 kg)   SpO2 96%   BMI 31.71 kg/m²     Gen:   NAD.  A and O x 3  CV:   RRR, 3/6 DANIEL  Pulm:   CTA bilat  Abd:   +bs, soft, NT, ND  LE:   trace edema bilat.   Venous stasis changes bilat.   Mild acute erythema of LLE. - improved  Neuro:   nonfocal      Reason for Admission:   Fluid overload status, gastrointestinal bleed, bilateral lower extremity cellulitis.     HISTORY OF PRESENT ILLNESS:  The patient is a 79-year-old  female with multiple medical problems and chronic musculoskeletal deconditioning, almost bedbound.  Brought in today for increased edema, pain in her lower extremities with fatigue.  The patient had a CBC showing a hemoglobin of 6.1, which is below her baseline.  No leukocytosis or left shift.  ProBNP 778.  Liver function tests unremarkable.  Chemistry showed potassium 5.5, BUN and creatinine of 42 and 2.57.  GFR is 19 which is below her baseline; usually she is in mid 20s.  Venous Doppler study of both lower extremities showed no DVT.  Chest x-ray showed fluid overload status.  Rectal exam done by me showed dark brown stool, positive for Hemoccult blood.  The patient was started on IV Bumex, blood transfusion, IV Protonix, Ancef, and she will be admitted to the hospital for further management.    Hospital Course:     Gastrointestinal bleed with posthemorrhagic anemia  Pt normally on Eliquis for hx of DVT.  Mild gastritis on EGD 12/20.  - GI was on consult  - pt with recent endoscopies on March just showing gastritis.       - capsule endoscopy started 12/21 - f/u with GI for results  - back on Eliquis  - general diet  - cont bid protonix, was given IV protonix bid in hospital  - follow cbc  - was transfused 1 unit prbc  - doppler of LEs this admit is negative for DVT  - dc planning for rehab     Fluid overload status  - improved  Acute on chronic HFpEF  Underlying moderate aortic stenosis.  - cardiology was  on consult  - was diuresed with IV bumex, on PO now  - monitored Is/Os, daily wts  - cards f/u     Bilateral lower extremity cellulitis. - resolved  - mostly venous stasis changes  - completed IV ancef  - diuresis as above     Musculoskeletal deconditioning  Chronic back pain with neurogenic claudication.  MRI of L spine this admit shows areas of spinal stenosis and mild HNP.     - pt offered anesthesia pain consult to eval for ANTHONY however pt refusing this  - cont lyrica  - PT/OT     DICK on CKD 4 - Cr better   - renal on consult  - renal f/u  - cont po bumex 1 mg every day      Hx of HTN  - cont hydralazine, diltiazem, bumex     Hx of tobacco abuse  Active smoker  - counseled on cessation     dvt proph:     Eliquis     Code status:    Full       Consultations:   GI, cardiology     Discharge Condition:  Good    Discharge Medications:      Discharge Medications        CHANGE how you take these medications        Instructions Prescription details   Bumex 1 MG Tabs  Generic drug: bumetanide  What changed: when to take this      Take 1 tablet (1 mg total) by mouth daily.   Refills: 0     pregabalin 100 MG Caps  Commonly known as: Lyrica  What changed:   medication strength  how much to take      Take 1 capsule (100 mg total) by mouth 2 (two) times daily.   Refills: 0            CONTINUE taking these medications        Instructions Prescription details   atorvastatin 40 MG Tabs  Commonly known as: Lipitor      Take 1 tablet (40 mg total) by mouth nightly.   Refills: 0     cholecalciferol 50 MCG (2000 UT) Tabs  Commonly known as:  Vitamin D3      Take 1 tablet (2,000 Units total) by mouth daily.   Refills: 0     dilTIAZem  MG Cp24  Commonly known as: CardIZEM CD      Take 1 capsule (240 mg total) by mouth daily.   Refills: 0     Eliquis 5 MG Tabs  Generic drug: apixaban      Take 1 tablet (5 mg total) by mouth 2 (two) times daily.   Refills: 0     ferrous sulfate 325 (65 FE) MG Tbec      Take 1 tablet (325 mg total) by mouth daily with breakfast.   Quantity: 30 tablet  Refills: 0     gabapentin 300 MG Caps  Commonly known as: Neurontin      Take 1 capsule (300 mg total) by mouth nightly.   Refills: 0     hydrALAZINE 25 MG Tabs  Commonly known as: Apresoline      Take 1 tablet (25 mg total) by mouth in the morning and 1 tablet (25 mg total) before bedtime.   Quantity: 60 tablet  Refills: 0     isosorbide mononitrate ER 60 MG Tb24  Commonly known as: Imdur      Take 1 tablet (60 mg total) by mouth daily.   Refills: 0     magnesium oxide 400 MG Tabs  Commonly known as: Mag-Ox      Take 1 tablet (400 mg total) by mouth daily.   Quantity: 30 tablet  Refills: 0     meclizine 25 MG Tabs  Commonly known as: Antivert      Take 1 tablet (25 mg total) by mouth 3 (three) times daily as needed for Dizziness or Nausea.   Refills: 0     Naloxone HCl 4 MG/0.1ML Liqd      4 mg by Nasal route as needed. If patient remains unresponsive, repeat dose in other nostril 2-5 minutes after first dose.   Quantity: 1 kit  Refills: 0     oxyCODONE-acetaminophen  MG Tabs  Commonly known as: Percocet      Take 1 tablet by mouth every 4 (four) hours as needed for Pain (max 5/day).   Stop taking on: January 23, 2025  Quantity: 30 tablet  Refills: 0     pantoprazole 40 MG Tbec  Commonly known as: Protonix      Take 1 tablet (40 mg total) by mouth 2 (two) times daily before meals.   Refills: 0     trolamine salicyliate 10 % Crea      Apply to painful joints or muscles up to three times a day as needed   Quantity: 35 g  Refills: 0            STOP taking these  medications      buprenorphine 8 MG Subl  Commonly known as: SUBUTEX        furosemide 40 MG Tabs  Commonly known as: Lasix        lisinopril 10 MG Tabs  Commonly known as: Zestril        patiromer 8.4 g Pack  Commonly known as: Veltassa        senna-docusate 8.6-50 MG Tabs  Commonly known as: Senokot-S                  Where to Get Your Medications        Please  your prescriptions at the location directed by your doctor or nurse    Bring a paper prescription for each of these medications  oxyCODONE-acetaminophen  MG Tabs         Follow up Visits:     Follow-up Information       Diana Lopez DO. Schedule an appointment as soon as possible for a visit in 1 week(s).    Specialty: Family Medicine  Contact information:  [address] [place] [id number]  [phone]               Mary Anderson MD. Schedule an appointment as soon as possible for a visit in 2 week(s).    Specialty: GASTROENTEROLOGY  Contact information:  [address]  [address]  [facility] [id number] [phone]               Janet Reynaga MD. Schedule an appointment as soon as possible for a visit in 1 week(s).    Specialty: NEPHROLOGY  Contact information:  [address]  [facility] [id number] [phone]                             Hospital Discharge Diagnoses: Acute on chronic HFpEF    Lace+ Score: 76  59-90 High Risk  29-58 Medium Risk  0-28   Low Risk.    TCM Follow-Up Recommendation:  LACE > 58: High Risk of readmission after discharge from the hospital.    >35 minutes spent preparing this discharge.    Maximilian Jansen MD  12/24/2024  1:35 PM     Electronically signed by Maximilian Collins MD on 12/24/2024  1:39 PM         REVIEWER COMMENTS      RECONSIDERATION REQUEST FOR INPATIENT SERVICES    PLEASE FAX DETERMINATION OF DAYS -[phone]

## 2025-01-08 ENCOUNTER — TELEPHONE (OUTPATIENT)
Dept: NEPHROLOGY | Facility: CLINIC | Age: 80
End: 2025-01-08

## 2025-01-08 NOTE — TELEPHONE ENCOUNTER
Patient called to speak with a nurse in regards to the  ferrous sulfate. Please call.       ferrous sulfate 325 (65 FE) MG Oral Tab EC, Take 1 tablet (325 mg total) by mouth daily with breakfast., Disp: 30 tablet, Rfl: 0

## 2025-01-09 NOTE — TELEPHONE ENCOUNTER
Dr. Reynaga, Patient is having a EGD on the 13th. GI would like to know if the Ferrous Sulfate can be placed on hold until after the procedure. Thank you.

## 2025-01-09 NOTE — TELEPHONE ENCOUNTER
Called and spoke to patient, instructed her it is okay to hold the Ferrous Sulfate prior to her procedure starting today and to resume it the day after the procedure. Patient understanding.    BRANDON Camarillo. Thank you.

## 2025-01-13 ENCOUNTER — ANESTHESIA EVENT (OUTPATIENT)
Dept: ENDOSCOPY | Facility: HOSPITAL | Age: 80
End: 2025-01-13
Payer: MEDICARE

## 2025-01-13 ENCOUNTER — HOSPITAL ENCOUNTER (OUTPATIENT)
Facility: HOSPITAL | Age: 80
Setting detail: HOSPITAL OUTPATIENT SURGERY
Discharge: HOME | End: 2025-01-13
Attending: INTERNAL MEDICINE | Admitting: INTERNAL MEDICINE
Payer: MEDICARE

## 2025-01-13 ENCOUNTER — ANESTHESIA (OUTPATIENT)
Dept: ENDOSCOPY | Facility: HOSPITAL | Age: 80
End: 2025-01-13
Payer: MEDICARE

## 2025-01-13 VITALS
WEIGHT: 179 LBS | OXYGEN SATURATION: 100 % | HEART RATE: 73 BPM | BODY MASS INDEX: 31.71 KG/M2 | DIASTOLIC BLOOD PRESSURE: 59 MMHG | SYSTOLIC BLOOD PRESSURE: 151 MMHG | HEIGHT: 63 IN | RESPIRATION RATE: 21 BRPM

## 2025-01-13 DIAGNOSIS — D50.0 IRON DEFICIENCY ANEMIA DUE TO CHRONIC BLOOD LOSS: ICD-10-CM

## 2025-01-13 PROCEDURE — 88305 TISSUE EXAM BY PATHOLOGIST: CPT | Performed by: INTERNAL MEDICINE

## 2025-01-13 PROCEDURE — 88312 SPECIAL STAINS GROUP 1: CPT | Performed by: INTERNAL MEDICINE

## 2025-01-13 PROCEDURE — 0DB68ZX EXCISION OF STOMACH, VIA NATURAL OR ARTIFICIAL OPENING ENDOSCOPIC, DIAGNOSTIC: ICD-10-PCS | Performed by: INTERNAL MEDICINE

## 2025-01-13 RX ORDER — LIDOCAINE HYDROCHLORIDE 20 MG/ML
SOLUTION OROPHARYNGEAL AS NEEDED
Status: DISCONTINUED | OUTPATIENT
Start: 2025-01-13 | End: 2025-01-13 | Stop reason: SURG

## 2025-01-13 RX ORDER — LIDOCAINE HYDROCHLORIDE 10 MG/ML
INJECTION, SOLUTION EPIDURAL; INFILTRATION; INTRACAUDAL; PERINEURAL AS NEEDED
Status: DISCONTINUED | OUTPATIENT
Start: 2025-01-13 | End: 2025-01-13 | Stop reason: SURG

## 2025-01-13 RX ORDER — HYDRALAZINE HYDROCHLORIDE 20 MG/ML
INJECTION INTRAMUSCULAR; INTRAVENOUS AS NEEDED
Status: DISCONTINUED | OUTPATIENT
Start: 2025-01-13 | End: 2025-01-13 | Stop reason: SURG

## 2025-01-13 RX ORDER — SODIUM CHLORIDE, SODIUM LACTATE, POTASSIUM CHLORIDE, CALCIUM CHLORIDE 600; 310; 30; 20 MG/100ML; MG/100ML; MG/100ML; MG/100ML
INJECTION, SOLUTION INTRAVENOUS CONTINUOUS
Status: DISCONTINUED | OUTPATIENT
Start: 2025-01-13 | End: 2025-01-13

## 2025-01-13 RX ADMIN — HYDRALAZINE HYDROCHLORIDE 5 MG: 20 INJECTION INTRAMUSCULAR; INTRAVENOUS at 11:05:00

## 2025-01-13 RX ADMIN — SODIUM CHLORIDE, SODIUM LACTATE, POTASSIUM CHLORIDE, CALCIUM CHLORIDE: 600; 310; 30; 20 INJECTION, SOLUTION INTRAVENOUS at 11:02:00

## 2025-01-13 RX ADMIN — LIDOCAINE HYDROCHLORIDE 5 ML: 20 SOLUTION OROPHARYNGEAL at 10:57:00

## 2025-01-13 RX ADMIN — HYDRALAZINE HYDROCHLORIDE 5 MG: 20 INJECTION INTRAMUSCULAR; INTRAVENOUS at 11:10:00

## 2025-01-13 RX ADMIN — LIDOCAINE HYDROCHLORIDE 50 MG: 10 INJECTION, SOLUTION EPIDURAL; INFILTRATION; INTRACAUDAL; PERINEURAL at 11:02:00
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
## 2025-01-13 NOTE — OPERATIVE REPORT
ENDOSCOPY OPERATIVE REPORT    Patient Name: Hannah Boss  Medical Record #: [id number]  YOB: 1945  Date of Procedure: 1/13/2025    Preoperative Diagnosis: recurrent anemia; wireless capsule endoscopy suggesting duodenal AVM's.   Postoperative Diagnosis:    1.) Six small arteriovenous malformations (AVM's) noted in 2nd and 3rd portion of duodenum -- cauterized with APC.    2.) Large amount of vegetable debris in proximal small bowel.     3.) Moderate antral gastritis and  suggestion of proximal atrophic gastritis. Antral biopsies obtained.   Procedure Performed: push enteroscoy to proximal jejunum with APC.   ASA: 3. Anesthesia Given: Versed MAC. Moderate sedation time: NA. Deep sedation was provided by anesthesiologist.   Endoscopist: Olaf High M.D.  Procedure:  After the patient was interviewed and the procedure again discussed and questions addressed, the patient was brought to the GI Lab and monitoring of the B/P, pulse, and pulse oximetry was performed.A time-out procedure was performed, history and physical were reviewed, medical reconciliation was complete, informed consent was obtained.  The patient was then placed in the left lateral decubitus position and sedated with divided doses of IV medication; continuous vital signs were monitored throughout the procedure.  The Olympus pediatric scope was used and  intubated into the esophagus and advanced to proximal jejunum. Then withdrawn. A thorough exam was performed. The patient tolerated the procedure well.   FINDINGS: The esophagus mucosa had an overall normal appearance. The gastric lumen was then entered and views of the gastric mucosa as well as retroverted views of the fundus which suggested a small hiatal hernia. Suggestion of proximal trophic gastritis. Moderate antral gastritis -- biopsies obtained.  A normal pylorus was passed and the duodenal bulb entered, the duodenal bulb and post-bulbar duodenum, and proximal jejunum examined.  Large amount of vegetable debris precluded adequate visualization. Five small 3 - 4 mm AVM's were noted in 2nd and 3rd part of duodenum and were cauterized with argon plasma coagulation system (APC). After completion, a gastroscope was re-introduced to examine the upper GI tract. An additional AVM was noted in second part of duodenum and cauterized. The scope was then withdrawn. The procedure was tolerated well and upon completion and throughtout the vital signs were stable.  COMPLICATIONS: None.  PLAN: Await biopsy results. Follow up with Dr. Anderson.  The patient and  were informed of the endoscopic findings and was also given a copy of the findings, postoperative instructions, and postoperative precautions.  Olaf High M.D.  [facility]

## 2025-01-13 NOTE — DISCHARGE INSTRUCTIONS
Home Care Instructions for Gastroscopy with Sedation    Diet:  - Resume your regular diet as tolerated unless otherwise instructed. Nothing by mouth until 1:30 pm today   - Start with light meals to minimize bloating.  - Do not drink alcohol today.    Medication:  - If you have questions about resuming your normal medications, please contact your Primary Care Physician.    Activities:  - Take it easy today. Do not return to work today.  - Do not drive today.  - Do not operate any machinery today (including kitchen equipment).    Gastroscopy:  - You may have a sore throat for 2-3 days following the exam. This is normal. Gargling with warm salt water (1/2 tsp salt to 1 glass warm water) or using throat lozenges will help.  - If you experience any sharp pain in your neck, abdomen or chest, vomiting of blood, oral temperature over 100 degrees Fahrenheit, light-headedness or dizziness, or any other problems, contact your doctor.    **If unable to reach your doctor, please go to the [facility] Emergency Room**    - Your referring physician will receive a full report of your examination.  - If you do not hear from your doctor's office within two weeks of your biopsy, please call them for your results.    You may be able to see your laboratory results in [employer] between 4 and 7 business days.  In some cases, your physician may not have viewed the results before they are released to [employer].  If you have questions regarding your results contact the physician who ordered the test/exam by phone or via [employer] by choosing \"Ask a Medical Question.\"    Restart [employer] Wednesday

## 2025-01-13 NOTE — ANESTHESIA POSTPROCEDURE EVALUATION
Patient: Hannah Boss    Procedure Summary       Date: 01/13/25 Room / Location: [facility] ENDOSCOPY 05 / [facility] ENDOSCOPY    Anesthesia Start: 1059 Anesthesia Stop: 1133    Procedure: ESOPHAGOGASTRODUODENOSCOPY WITH PUSH ENTEROSCOPY/ WITH ARGON PLASMA COAGULATION Diagnosis:       Iron deficiency anemia due to chronic blood loss      (duodenal arteriovenous malformations, gastritis, hiatal hernia)    Surgeons: Olaf High MD Anesthesiologist: Eric Piedra MD    Anesthesia Type: MAC ASA Status: 3            Anesthesia Type: MAC    Vitals Value Taken Time   /57 01/13/25 1130   Temp 97.4 01/13/25 1133   Pulse 76 01/13/25 1132   Resp 19 01/13/25 1132   SpO2 100 % 01/13/25 1132   Vitals shown include unfiled device data.    [facility] AN Post Evaluation:   Patient Evaluated in PACU  Level of Consciousness: awake  Pain Score: 0  Pain Management: adequate  Airway Patency:patent  Yes    Nausea/Vomiting: none  Cardiovascular Status: acceptable  Respiratory Status: acceptable  Postoperative Hydration acceptable      Eric Piedra MD  1/13/2025 11:33 AM
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
## 2025-02-05 ENCOUNTER — OFFICE VISIT (OUTPATIENT)
Dept: PAIN CLINIC | Facility: HOSPITAL | Age: 80
End: 2025-02-05
Attending: ANESTHESIOLOGY
Payer: MEDICARE

## 2025-02-05 VITALS
RESPIRATION RATE: 18 BRPM | HEART RATE: 73 BPM | SYSTOLIC BLOOD PRESSURE: 167 MMHG | OXYGEN SATURATION: 99 % | DIASTOLIC BLOOD PRESSURE: 64 MMHG

## 2025-02-05 DIAGNOSIS — M47.816 LUMBAR FACET ARTHROPATHY: ICD-10-CM

## 2025-02-05 DIAGNOSIS — M79.672 PAIN IN BOTH FEET: ICD-10-CM

## 2025-02-05 DIAGNOSIS — M51.9 LUMBAR DISC DISEASE: Primary | ICD-10-CM

## 2025-02-05 DIAGNOSIS — R60.0 BILATERAL LEG EDEMA: Chronic | ICD-10-CM

## 2025-02-05 DIAGNOSIS — M48.061 SPINAL STENOSIS OF LUMBAR REGION AT MULTIPLE LEVELS: ICD-10-CM

## 2025-02-05 DIAGNOSIS — G89.29 CHRONIC BILATERAL LOW BACK PAIN WITH BILATERAL SCIATICA: ICD-10-CM

## 2025-02-05 DIAGNOSIS — M79.89 BILATERAL SWELLING OF FEET: ICD-10-CM

## 2025-02-05 DIAGNOSIS — M47.816 LUMBAR SPONDYLOSIS: ICD-10-CM

## 2025-02-05 DIAGNOSIS — M54.41 CHRONIC BILATERAL LOW BACK PAIN WITH BILATERAL SCIATICA: ICD-10-CM

## 2025-02-05 DIAGNOSIS — M79.671 PAIN IN BOTH FEET: ICD-10-CM

## 2025-02-05 DIAGNOSIS — M54.42 CHRONIC BILATERAL LOW BACK PAIN WITH BILATERAL SCIATICA: ICD-10-CM

## 2025-02-05 PROCEDURE — 99213 OFFICE O/P EST LOW 20 MIN: CPT | Performed by: ANESTHESIOLOGY

## 2025-02-05 RX ORDER — OXYCODONE AND ACETAMINOPHEN 10; 325 MG/1; MG/1
1 TABLET ORAL EVERY 4 HOURS PRN
Qty: 180 TABLET | Refills: 0 | Status: SHIPPED | OUTPATIENT
Start: 2025-03-07 | End: 2025-04-06

## 2025-02-05 RX ORDER — OXYCODONE AND ACETAMINOPHEN 10; 325 MG/1; MG/1
1 TABLET ORAL EVERY 4 HOURS PRN
Qty: 180 TABLET | Refills: 0 | Status: SHIPPED | OUTPATIENT
Start: 2025-02-05 | End: 2025-03-07
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
## 2025-03-07 ENCOUNTER — TELEPHONE (OUTPATIENT)
Dept: PAIN CLINIC | Facility: CLINIC | Age: 80
End: 2025-03-07

## 2025-03-07 NOTE — TELEPHONE ENCOUNTER
** Patient stated that the [facility] [place] where [facility] does not have the medication. Patient stated to use the new [facility] to call in prescription. **    Patient calling to request refill of oxyCODONE-acetaminophen  MG Oral Tab   Pharmacy [facility]. [address] #[phone]    Patient informed of 48 hour refill policy excluding weekends and holidays.   Informed patient prescription is sent directly to pharmacy.    Further explained patient will not receive a call back once prescription is ready.

## 2025-03-07 NOTE — TELEPHONE ENCOUNTER
Patient called regarding her medication. Advised patient that she would need to make a f/u appt since the last time she was in was in Nov. Patient stated that she had seen the Drs after Nov: Virgil: 11/1 & 12/2 and then patient saw Dr. Phillips on 2/5. Patient stated that she would like call back to let her know when her medication is sent to pharmacy: [phone].

## 2025-03-09 ENCOUNTER — APPOINTMENT (OUTPATIENT)
Dept: GENERAL RADIOLOGY | Facility: HOSPITAL | Age: 80
End: 2025-03-09
Attending: EMERGENCY MEDICINE
Payer: MEDICARE

## 2025-03-09 ENCOUNTER — HOSPITAL ENCOUNTER (INPATIENT)
Facility: HOSPITAL | Age: 80
LOS: 7 days | Discharge: NURSING, REHAB OR LONG-TERM CARE | End: 2025-03-16
Attending: EMERGENCY MEDICINE | Admitting: HOSPITALIST
Payer: MEDICARE

## 2025-03-09 DIAGNOSIS — M47.816 LUMBAR FACET ARTHROPATHY: ICD-10-CM

## 2025-03-09 DIAGNOSIS — M54.41 CHRONIC BILATERAL LOW BACK PAIN WITH BILATERAL SCIATICA: ICD-10-CM

## 2025-03-09 DIAGNOSIS — M54.42 CHRONIC BILATERAL LOW BACK PAIN WITH BILATERAL SCIATICA: ICD-10-CM

## 2025-03-09 DIAGNOSIS — M48.061 SPINAL STENOSIS OF LUMBAR REGION AT MULTIPLE LEVELS: ICD-10-CM

## 2025-03-09 DIAGNOSIS — R60.0 BILATERAL LEG EDEMA: Chronic | ICD-10-CM

## 2025-03-09 DIAGNOSIS — L03.116 CELLULITIS OF LEFT LOWER EXTREMITY: ICD-10-CM

## 2025-03-09 DIAGNOSIS — E87.5 HYPERKALEMIA: Primary | ICD-10-CM

## 2025-03-09 DIAGNOSIS — M79.89 BILATERAL SWELLING OF FEET: ICD-10-CM

## 2025-03-09 DIAGNOSIS — G89.29 CHRONIC BILATERAL LOW BACK PAIN WITH BILATERAL SCIATICA: ICD-10-CM

## 2025-03-09 DIAGNOSIS — I50.9 ACUTE ON CHRONIC CONGESTIVE HEART FAILURE, UNSPECIFIED HEART FAILURE TYPE (HCC): ICD-10-CM

## 2025-03-09 DIAGNOSIS — M79.672 PAIN IN BOTH FEET: ICD-10-CM

## 2025-03-09 DIAGNOSIS — M79.671 PAIN IN BOTH FEET: ICD-10-CM

## 2025-03-09 DIAGNOSIS — M47.816 LUMBAR SPONDYLOSIS: ICD-10-CM

## 2025-03-09 PROBLEM — R79.89 AZOTEMIA: Status: ACTIVE | Noted: 2025-03-09

## 2025-03-09 PROBLEM — D69.6 THROMBOCYTOPENIA: Status: ACTIVE | Noted: 2025-03-09

## 2025-03-09 LAB
ADENOVIRUS PCR:: NOT DETECTED
ALBUMIN SERPL-MCNC: 3.6 G/DL (ref 3.2–4.8)
ALBUMIN SERPL-MCNC: 3.9 G/DL (ref 3.2–4.8)
ALBUMIN/GLOB SERPL: 1.2 {RATIO} (ref 1–2)
ALBUMIN/GLOB SERPL: 1.5 {RATIO} (ref 1–2)
ALP LIVER SERPL-CCNC: 222 U/L
ALP LIVER SERPL-CCNC: 226 U/L
ALT SERPL-CCNC: 42 U/L
ALT SERPL-CCNC: 43 U/L
ANION GAP SERPL CALC-SCNC: 3 MMOL/L (ref 0–18)
ANION GAP SERPL CALC-SCNC: 3 MMOL/L (ref 0–18)
AST SERPL-CCNC: 36 U/L (ref ?–34)
AST SERPL-CCNC: 38 U/L (ref ?–34)
B PARAPERT DNA SPEC QL NAA+PROBE: NOT DETECTED
B PERT DNA SPEC QL NAA+PROBE: NOT DETECTED
BASOPHILS # BLD AUTO: 0.03 X10(3) UL (ref 0–0.2)
BASOPHILS NFR BLD AUTO: 0.7 %
BILIRUB SERPL-MCNC: 0.3 MG/DL (ref 0.2–1.1)
BILIRUB SERPL-MCNC: 0.4 MG/DL (ref 0.2–1.1)
BUN BLD-MCNC: 44 MG/DL (ref 9–23)
BUN BLD-MCNC: 82 MG/DL (ref 9–23)
BUN/CREAT SERPL: 14.7 (ref 10–20)
BUN/CREAT SERPL: 25.9 (ref 10–20)
C PNEUM DNA SPEC QL NAA+PROBE: NOT DETECTED
CALCIUM BLD-MCNC: 8.4 MG/DL (ref 8.7–10.4)
CALCIUM BLD-MCNC: 8.5 MG/DL (ref 8.7–10.4)
CHLORIDE SERPL-SCNC: 116 MMOL/L (ref 98–112)
CHLORIDE SERPL-SCNC: 118 MMOL/L (ref 98–112)
CO2 SERPL-SCNC: 15 MMOL/L (ref 21–32)
CO2 SERPL-SCNC: 22 MMOL/L (ref 21–32)
CORONAVIRUS 229E PCR:: NOT DETECTED
CORONAVIRUS HKU1 PCR:: NOT DETECTED
CORONAVIRUS NL63 PCR:: NOT DETECTED
CORONAVIRUS OC43 PCR:: NOT DETECTED
CREAT BLD-MCNC: 2.99 MG/DL
CREAT BLD-MCNC: 3.17 MG/DL
DEPRECATED RDW RBC AUTO: 51.1 FL (ref 35.1–46.3)
EGFRCR SERPLBLD CKD-EPI 2021: 15 ML/MIN/1.73M2 (ref 60–?)
EGFRCR SERPLBLD CKD-EPI 2021: 16 ML/MIN/1.73M2 (ref 60–?)
EOSINOPHIL # BLD AUTO: 0.11 X10(3) UL (ref 0–0.7)
EOSINOPHIL NFR BLD AUTO: 2.5 %
ERYTHROCYTE [DISTWIDTH] IN BLOOD BY AUTOMATED COUNT: 15.6 % (ref 11–15)
FLUAV + FLUBV RNA SPEC NAA+PROBE: NEGATIVE
FLUAV + FLUBV RNA SPEC NAA+PROBE: NEGATIVE
FLUAV RNA SPEC QL NAA+PROBE: NOT DETECTED
FLUBV RNA SPEC QL NAA+PROBE: NOT DETECTED
GLOBULIN PLAS-MCNC: 2.6 G/DL (ref 2–3.5)
GLOBULIN PLAS-MCNC: 2.9 G/DL (ref 2–3.5)
GLUCOSE BLD-MCNC: 101 MG/DL (ref 70–99)
GLUCOSE BLD-MCNC: 94 MG/DL (ref 70–99)
GLUCOSE BLDC GLUCOMTR-MCNC: 104 MG/DL (ref 70–99)
GLUCOSE BLDC GLUCOMTR-MCNC: 163 MG/DL (ref 70–99)
HCT VFR BLD AUTO: 25 %
HGB BLD-MCNC: 7.5 G/DL
IMM GRANULOCYTES # BLD AUTO: 0.01 X10(3) UL (ref 0–1)
IMM GRANULOCYTES NFR BLD: 0.2 %
LYMPHOCYTES # BLD AUTO: 0.74 X10(3) UL (ref 1–4)
LYMPHOCYTES NFR BLD AUTO: 16.8 %
MCH RBC QN AUTO: 27.3 PG (ref 26–34)
MCHC RBC AUTO-ENTMCNC: 30 G/DL (ref 31–37)
MCV RBC AUTO: 90.9 FL
METAPNEUMOVIRUS PCR:: NOT DETECTED
MONOCYTES # BLD AUTO: 0.32 X10(3) UL (ref 0.1–1)
MONOCYTES NFR BLD AUTO: 7.3 %
MYCOPLASMA PNEUMONIA PCR:: NOT DETECTED
NEUTROPHILS # BLD AUTO: 3.19 X10 (3) UL (ref 1.5–7.7)
NEUTROPHILS # BLD AUTO: 3.19 X10(3) UL (ref 1.5–7.7)
NEUTROPHILS NFR BLD AUTO: 72.5 %
NT-PROBNP SERPL-MCNC: ABNORMAL PG/ML (ref ?–450)
OSMOLALITY SERPL CALC.SUM OF ELEC: 293 MOSM/KG (ref 275–295)
OSMOLALITY SERPL CALC.SUM OF ELEC: 317 MOSM/KG (ref 275–295)
PARAINFLUENZA 1 PCR:: NOT DETECTED
PARAINFLUENZA 2 PCR:: NOT DETECTED
PARAINFLUENZA 3 PCR:: NOT DETECTED
PARAINFLUENZA 4 PCR:: NOT DETECTED
PLATELET # BLD AUTO: 127 10(3)UL (ref 150–450)
POTASSIUM SERPL-SCNC: 6.2 MMOL/L (ref 3.5–5.1)
POTASSIUM SERPL-SCNC: 6.2 MMOL/L (ref 3.5–5.1)
POTASSIUM SERPL-SCNC: 6.3 MMOL/L (ref 3.5–5.1)
POTASSIUM SERPL-SCNC: 6.3 MMOL/L (ref 3.5–5.1)
PROT SERPL-MCNC: 6.5 G/DL (ref 5.7–8.2)
PROT SERPL-MCNC: 6.5 G/DL (ref 5.7–8.2)
RBC # BLD AUTO: 2.75 X10(6)UL
RHINOVIRUS/ENTERO PCR:: NOT DETECTED
RSV RNA SPEC NAA+PROBE: NEGATIVE
RSV RNA SPEC QL NAA+PROBE: NOT DETECTED
SARS-COV-2 RNA NPH QL NAA+NON-PROBE: NOT DETECTED
SARS-COV-2 RNA RESP QL NAA+PROBE: NOT DETECTED
SODIUM SERPL-SCNC: 136 MMOL/L (ref 136–145)
SODIUM SERPL-SCNC: 141 MMOL/L (ref 136–145)
TROPONIN I SERPL HS-MCNC: 16 NG/L
WBC # BLD AUTO: 4.4 X10(3) UL (ref 4–11)

## 2025-03-09 PROCEDURE — 71045 X-RAY EXAM CHEST 1 VIEW: CPT | Performed by: EMERGENCY MEDICINE

## 2025-03-09 RX ORDER — ACETAMINOPHEN 500 MG
500 TABLET ORAL EVERY 4 HOURS PRN
Status: DISCONTINUED | OUTPATIENT
Start: 2025-03-09 | End: 2025-03-16

## 2025-03-09 RX ORDER — SENNOSIDES 8.6 MG
17.2 TABLET ORAL NIGHTLY PRN
Status: DISCONTINUED | OUTPATIENT
Start: 2025-03-09 | End: 2025-03-16

## 2025-03-09 RX ORDER — OXYCODONE AND ACETAMINOPHEN 10; 325 MG/1; MG/1
1 TABLET ORAL EVERY 4 HOURS PRN
Status: DISCONTINUED | OUTPATIENT
Start: 2025-03-09 | End: 2025-03-16

## 2025-03-09 RX ORDER — SODIUM BICARBONATE 650 MG/1
650 TABLET ORAL ONCE
Status: COMPLETED | OUTPATIENT
Start: 2025-03-09 | End: 2025-03-09

## 2025-03-09 RX ORDER — MAGNESIUM OXIDE 400 MG/1
400 TABLET ORAL DAILY
Status: DISCONTINUED | OUTPATIENT
Start: 2025-03-09 | End: 2025-03-16

## 2025-03-09 RX ORDER — MECLIZINE HCL 12.5 MG 12.5 MG/1
25 TABLET ORAL 3 TIMES DAILY PRN
Status: DISCONTINUED | OUTPATIENT
Start: 2025-03-09 | End: 2025-03-16

## 2025-03-09 RX ORDER — PANTOPRAZOLE SODIUM 40 MG/1
40 TABLET, DELAYED RELEASE ORAL
Status: DISCONTINUED | OUTPATIENT
Start: 2025-03-10 | End: 2025-03-09

## 2025-03-09 RX ORDER — PANTOPRAZOLE SODIUM 40 MG/1
40 TABLET, DELAYED RELEASE ORAL
Status: DISCONTINUED | OUTPATIENT
Start: 2025-03-10 | End: 2025-03-16

## 2025-03-09 RX ORDER — INDOMETHACIN 25 MG/1
50 CAPSULE ORAL ONCE
Status: COMPLETED | OUTPATIENT
Start: 2025-03-09 | End: 2025-03-09

## 2025-03-09 RX ORDER — ISOSORBIDE MONONITRATE 60 MG/1
60 TABLET, EXTENDED RELEASE ORAL DAILY
Status: DISCONTINUED | OUTPATIENT
Start: 2025-03-10 | End: 2025-03-16

## 2025-03-09 RX ORDER — HYDRALAZINE HYDROCHLORIDE 20 MG/ML
10 INJECTION INTRAMUSCULAR; INTRAVENOUS EVERY 4 HOURS PRN
Status: DISCONTINUED | OUTPATIENT
Start: 2025-03-09 | End: 2025-03-16

## 2025-03-09 RX ORDER — ATORVASTATIN CALCIUM 40 MG/1
40 TABLET, FILM COATED ORAL NIGHTLY
Status: DISCONTINUED | OUTPATIENT
Start: 2025-03-09 | End: 2025-03-16

## 2025-03-09 RX ORDER — ONDANSETRON 2 MG/ML
4 INJECTION INTRAMUSCULAR; INTRAVENOUS EVERY 6 HOURS PRN
Status: DISCONTINUED | OUTPATIENT
Start: 2025-03-09 | End: 2025-03-16

## 2025-03-09 RX ORDER — POLYETHYLENE GLYCOL 3350 17 G/17G
17 POWDER, FOR SOLUTION ORAL DAILY PRN
Status: DISCONTINUED | OUTPATIENT
Start: 2025-03-09 | End: 2025-03-16

## 2025-03-09 RX ORDER — CALCIUM GLUCONATE 10 MG/ML
1 INJECTION, SOLUTION INTRAVENOUS ONCE
Status: COMPLETED | OUTPATIENT
Start: 2025-03-09 | End: 2025-03-09

## 2025-03-09 RX ORDER — METOCLOPRAMIDE HYDROCHLORIDE 5 MG/ML
5 INJECTION INTRAMUSCULAR; INTRAVENOUS EVERY 8 HOURS PRN
Status: DISCONTINUED | OUTPATIENT
Start: 2025-03-09 | End: 2025-03-16

## 2025-03-09 RX ORDER — HEPARIN SODIUM 5000 [USP'U]/ML
5000 INJECTION, SOLUTION INTRAVENOUS; SUBCUTANEOUS EVERY 8 HOURS SCHEDULED
Status: DISCONTINUED | OUTPATIENT
Start: 2025-03-09 | End: 2025-03-09

## 2025-03-09 RX ORDER — BISACODYL 10 MG
10 SUPPOSITORY, RECTAL RECTAL
Status: DISCONTINUED | OUTPATIENT
Start: 2025-03-09 | End: 2025-03-16

## 2025-03-09 RX ORDER — FUROSEMIDE 10 MG/ML
20 INJECTION INTRAMUSCULAR; INTRAVENOUS ONCE
Status: COMPLETED | OUTPATIENT
Start: 2025-03-09 | End: 2025-03-09

## 2025-03-09 RX ORDER — CHOLECALCIFEROL (VITAMIN D3) 25 MCG
2000 TABLET ORAL DAILY
Status: DISCONTINUED | OUTPATIENT
Start: 2025-03-10 | End: 2025-03-16

## 2025-03-09 RX ORDER — DILTIAZEM HYDROCHLORIDE 240 MG/1
240 CAPSULE, COATED, EXTENDED RELEASE ORAL DAILY
Status: DISCONTINUED | OUTPATIENT
Start: 2025-03-10 | End: 2025-03-15

## 2025-03-09 RX ORDER — FERROUS SULFATE 325(65) MG
325 TABLET, DELAYED RELEASE (ENTERIC COATED) ORAL
Status: DISCONTINUED | OUTPATIENT
Start: 2025-03-10 | End: 2025-03-16

## 2025-03-09 RX ORDER — GABAPENTIN 300 MG/1
300 CAPSULE ORAL NIGHTLY
Status: DISCONTINUED | OUTPATIENT
Start: 2025-03-09 | End: 2025-03-12

## 2025-03-09 RX ORDER — HYDRALAZINE HYDROCHLORIDE 25 MG/1
25 TABLET, FILM COATED ORAL 2 TIMES DAILY
Status: DISCONTINUED | OUTPATIENT
Start: 2025-03-09 | End: 2025-03-12

## 2025-03-09 RX ORDER — ECHINACEA PURPUREA EXTRACT 125 MG
1 TABLET ORAL
Status: DISCONTINUED | OUTPATIENT
Start: 2025-03-09 | End: 2025-03-16

## 2025-03-09 NOTE — ED QUICK NOTES
Orders for admission, patient is aware of plan and ready to go upstairs. Any questions, please call ED ELLIE akers at extension [phone].     Patient Covid vaccination status: Unvaccinated     COVID Test Ordered in ED: SARS-CoV-2/Flu A and B/RSV by PCR (GeneXpert)    COVID Suspicion at Admission: N/A    Running Infusions:  None    Mental Status/LOC at time of transport: ao x 4    Other pertinent information:   CIWA score: N/A   NIH score:  N/A

## 2025-03-09 NOTE — ED INITIAL ASSESSMENT (HPI)
S: Pt presents to ED with c/o chest pain x 3 days, cough. Denies fevers. Pt states that pain radiates \"all over\" and describes it as stabbing in nature. No alleviating or aggravating factors. Endorses wound to lle.
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
## 2025-03-10 ENCOUNTER — APPOINTMENT (OUTPATIENT)
Dept: ULTRASOUND IMAGING | Facility: HOSPITAL | Age: 80
End: 2025-03-10
Attending: HOSPITALIST
Payer: MEDICARE

## 2025-03-10 PROBLEM — N18.4 CKD (CHRONIC KIDNEY DISEASE), STAGE IV (HCC): Status: ACTIVE | Noted: 2025-03-10

## 2025-03-10 LAB
ALBUMIN SERPL-MCNC: 3.6 G/DL (ref 3.2–4.8)
ANION GAP SERPL CALC-SCNC: 3 MMOL/L (ref 0–18)
ATRIAL RATE: 62 BPM
BASOPHILS # BLD AUTO: 0.04 X10(3) UL (ref 0–0.2)
BASOPHILS NFR BLD AUTO: 0.6 %
BILIRUB UR QL: NEGATIVE
BUN BLD-MCNC: 71 MG/DL (ref 9–23)
BUN/CREAT SERPL: 25 (ref 10–20)
C DIFF TOX B STL QL: NEGATIVE
CALCIUM BLD-MCNC: 8.2 MG/DL (ref 8.7–10.4)
CHLORIDE SERPL-SCNC: 116 MMOL/L (ref 98–112)
CLARITY UR: CLEAR
CO2 SERPL-SCNC: 23 MMOL/L (ref 21–32)
COLOR UR: COLORLESS
CREAT BLD-MCNC: 2.84 MG/DL
DEPRECATED RDW RBC AUTO: 50.9 FL (ref 35.1–46.3)
EGFRCR SERPLBLD CKD-EPI 2021: 17 ML/MIN/1.73M2 (ref 60–?)
EOSINOPHIL # BLD AUTO: 0.17 X10(3) UL (ref 0–0.7)
EOSINOPHIL NFR BLD AUTO: 2.6 %
ERYTHROCYTE [DISTWIDTH] IN BLOOD BY AUTOMATED COUNT: 15.5 % (ref 11–15)
GLUCOSE BLD-MCNC: 85 MG/DL (ref 70–99)
GLUCOSE BLDC GLUCOMTR-MCNC: 101 MG/DL (ref 70–99)
GLUCOSE BLDC GLUCOMTR-MCNC: 145 MG/DL (ref 70–99)
GLUCOSE BLDC GLUCOMTR-MCNC: 160 MG/DL (ref 70–99)
GLUCOSE BLDC GLUCOMTR-MCNC: 180 MG/DL (ref 70–99)
GLUCOSE UR-MCNC: NORMAL MG/DL
HCT VFR BLD AUTO: 26.2 %
HGB BLD-MCNC: 8 G/DL
HGB UR QL STRIP.AUTO: NEGATIVE
IMM GRANULOCYTES # BLD AUTO: 0.02 X10(3) UL (ref 0–1)
IMM GRANULOCYTES NFR BLD: 0.3 %
KETONES UR-MCNC: NEGATIVE MG/DL
LEUKOCYTE ESTERASE UR QL STRIP.AUTO: NEGATIVE
LYMPHOCYTES # BLD AUTO: 0.87 X10(3) UL (ref 1–4)
LYMPHOCYTES NFR BLD AUTO: 13.4 %
MCH RBC QN AUTO: 27.7 PG (ref 26–34)
MCHC RBC AUTO-ENTMCNC: 30.5 G/DL (ref 31–37)
MCV RBC AUTO: 90.7 FL
MONOCYTES # BLD AUTO: 0.37 X10(3) UL (ref 0.1–1)
MONOCYTES NFR BLD AUTO: 5.7 %
NEUTROPHILS # BLD AUTO: 5.04 X10 (3) UL (ref 1.5–7.7)
NEUTROPHILS # BLD AUTO: 5.04 X10(3) UL (ref 1.5–7.7)
NEUTROPHILS NFR BLD AUTO: 77.4 %
NITRITE UR QL STRIP.AUTO: NEGATIVE
OSMOLALITY SERPL CALC.SUM OF ELEC: 314 MOSM/KG (ref 275–295)
P AXIS: 74 DEGREES
P-R INTERVAL: 152 MS
PH UR: 7.5 [PH] (ref 5–8)
PHOSPHATE SERPL-MCNC: 3.9 MG/DL (ref 2.4–5.1)
PLATELET # BLD AUTO: 139 10(3)UL (ref 150–450)
POTASSIUM SERPL-SCNC: 5.7 MMOL/L (ref 3.5–5.1)
POTASSIUM SERPL-SCNC: 6 MMOL/L (ref 3.5–5.1)
PROT UR-MCNC: NEGATIVE MG/DL
Q-T INTERVAL: 436 MS
QRS DURATION: 72 MS
QTC CALCULATION (BEZET): 442 MS
R AXIS: 37 DEGREES
RBC # BLD AUTO: 2.89 X10(6)UL
SODIUM SERPL-SCNC: 142 MMOL/L (ref 136–145)
SODIUM SERPL-SCNC: 164 MMOL/L
SP GR UR STRIP: 1.01 (ref 1–1.03)
T AXIS: 68 DEGREES
TROPONIN I SERPL HS-MCNC: 29 NG/L
UROBILINOGEN UR STRIP-ACNC: NORMAL
VENTRICULAR RATE: 62 BPM
WBC # BLD AUTO: 6.5 X10(3) UL (ref 4–11)

## 2025-03-10 PROCEDURE — 99223 1ST HOSP IP/OBS HIGH 75: CPT | Performed by: INTERNAL MEDICINE

## 2025-03-10 PROCEDURE — 93970 EXTREMITY STUDY: CPT | Performed by: HOSPITALIST

## 2025-03-10 RX ORDER — FUROSEMIDE 10 MG/ML
20 INJECTION INTRAMUSCULAR; INTRAVENOUS ONCE
Status: COMPLETED | OUTPATIENT
Start: 2025-03-10 | End: 2025-03-10

## 2025-03-10 RX ORDER — NICOTINE POLACRILEX 4 MG
30 LOZENGE BUCCAL
Status: DISCONTINUED | OUTPATIENT
Start: 2025-03-10 | End: 2025-03-16

## 2025-03-10 RX ORDER — DEXTROSE MONOHYDRATE 25 G/50ML
50 INJECTION, SOLUTION INTRAVENOUS
Status: DISCONTINUED | OUTPATIENT
Start: 2025-03-10 | End: 2025-03-16

## 2025-03-10 RX ORDER — NICOTINE POLACRILEX 4 MG
15 LOZENGE BUCCAL
Status: DISCONTINUED | OUTPATIENT
Start: 2025-03-10 | End: 2025-03-16

## 2025-03-10 NOTE — OCCUPATIONAL THERAPY NOTE
OCCUPATIONAL THERAPY EVALUATION - INPATIENT     Room Number: 322/322-A  Evaluation Date: 3/10/2025  Type of Evaluation: Initial  Presenting Problem: hyperkalemia    Physician Order: IP Consult to Occupational Therapy  Reason for Therapy: ADL/IADL Dysfunction and Discharge Planning    OCCUPATIONAL THERAPY ASSESSMENT   Patient is a 79 year old female admitted 3/9/2025 for hyperkalemia.  Prior to admission, patient's baseline is I with BADls and mod I with functional mobility using SPC. Pt does not drive. Pt reports she lives alone in a single story house though has a son that stays with her intermittently. Pt reports her kids support her with driving/IADls.   Patient is currently functioning below baseline with ADls and functional mobility.  Patient is requiring moderate assist as a result of the following impairments: decreased functional strength, decreased functional reach, decreased endurance, impaired stand balance, and decreased muscular endurance. Occupational Therapy will continue to follow for duration of hospitalization.    Patient will benefit from continued skilled OT Services to promote return to prior level of function and safety with continuous assistance and gradual rehabilitative therapy.    PLAN DURING HOSPITALIZATION     OT Treatment Plan: Balance activities, Energy conservation/work simplification techniques, ADL training, Functional transfer training, UE strengthening/ROM, Endurance training, Patient/Family education, Patient/Family training, Equipment eval/education, Compensatory technique education     OCCUPATIONAL THERAPY MEDICAL/SOCIAL HISTORY   Problem List  Principal Problem:    Hyperkalemia  Active Problems:    Acute on chronic congestive heart failure, unspecified heart failure type (HCC)    Thrombocytopenia (HCC)    Azotemia    Cellulitis of left lower extremity    HOME SITUATION  Type of Home: House  Home Layout: One level  Lives With: -- (son stay s with pt intermittently)  Toilet and  Equipment: Standard height toilet  Shower/Tub and Equipment: Walk-in shower; Shower chair  Drives: No  Patient Regularly Uses: Cane (also owns a R/W)    Stairs in Home: one level house  Use of Assistive Device(s): cane    Prior Level of [place]: Prior to admission, patient's baseline is I with BADls and mod I with functional mobility using SPC. Pt does not drive. Pt reports she lives alone in a single story house though has a son that stays with her intermittently. Pt reports her kids support her with driving/IADls.      SUBJECTIVE  \"Right now I feel like I could fall at any time\"    OCCUPATIONAL THERAPY EXAMINATION      OBJECTIVE  Fall Risk: High fall risk      PAIN ASSESSMENT  Ratin  Location: legs  Management Techniques: Relaxation; Repositioning      ACTIVITY TOLERANCE  Pulse: 96       O2 SATURATIONS  Oxygen Therapy  SPO2% on Room Air at Rest: 96    COGNITION  Awake, alert, following commands    Communication: able to express her needs    Behavioral/Emotional/Social: cooperative    RANGE OF MOTION   Upper extremity ROM is within functional limits     STRENGTH ASSESSMENT  Upper extremity strength is within functional limits     ACTIVITIES OF DAILY LIVING ASSESSMENT  AM-PAC ‘6-Clicks’ Inpatient Daily Activity Short Form  How much help from another person does the patient currently need…  -   Putting on and taking off regular lower body clothing?: A Lot  -   Bathing (including washing, rinsing, drying)?: A Lot  -   Toileting, which includes using toilet, bedpan or urinal? : A Lot  -   Putting on and taking off regular upper body clothing?: A Little  -   Taking care of personal grooming such as brushing teeth?: A Little  -   Eating meals?: None    AM-PAC Score:  Score: 16  Approx Degree of Impairment: 53.32%  Standardized Score (AM-PAC Scale): 35.96  CMS Modifier (G-Code): CK    FUNCTIONAL TRANSFER ASSESSMENT  Sit to Stand: Edge of Bed  Edge of Bed: Minimal Assist    BED MOBILITY  Supine to Sit : Minimal  Assist    BALANCE ASSESSMENT  Static Sitting: Independent  Dynamic Sit balance: CGA    FUNCTIONAL ADL ASSESSMENT  Grooming: SBA supported standing at sink  UE self care: Mod A to don corye  LE self care: manges LT sock, requries A for RT, manages brief with increased time and effort with CGA  Toileting: Mod A    Skilled Therapy Provided: Pt received in bed, no family present.   Pt is pleasant and cooperative. Pt's chief c/o is LE weakness feeling as though she could fall at any time during session.   Observe significant MARTELL LE edema and LE pain is rated at 7/10 at rest and increasing with activity.     Provide SBA with bed mobility, CGA for xfers and limited BR mobility using R/W.   Provide Min A for UE/LE dressing.     Education provided ed on edema reduction, activity promotion, walker management with pt verbalizing understanding.     EDUCATION PROVIDED  Patient Education : Role of Occupational Therapy; Plan of Care; Functional Transfer Techniques (activity promotion, edema reduction techniques)  Patient's Response to Education: Verbalized Understanding    The patient's Approx Degree of Impairment: 53.32% has been calculated based on documentation in the [facility] '6 clicks' Inpatient Daily Activity Short Form.  Research supports that patients with this level of impairment may benefit from REVA.  Final disposition will be made by interdisciplinary medical team.     Patient End of Session: Up in chair, Needs met, Call light within reach, RN aware of session/findings, All patient questions and concerns addressed, Alarm set    OT Goals  Patients self stated goal is: regain strength in LEs     Patient will complete functional transfer with SBA  Comment:     Patient will complete toileting with SBA  Comment:     Patient will tolerate standing for 3 minutes in prep for adls with SBA   Comment:    Patient will complete item retrieval with SBA  Comment:          Goals  on: 3/17/24  Frequency: 3-5x/week    Patient  Evaluation Complexity Level:   Occupational Profile/Medical History MODERATE - Expanded review of history including review of medical or therapy record   Specific performance deficits impacting engagement in ADL/IADL MODERATE  3 - 5 performance deficits   Client Assessment/Performance Deficits MODERATE - Comorbidities and min to mod modifications of tasks    Clinical Decision Making MODERATE - Analysis of occupational profile, detailed assessments, several treatment options    Overall Complexity MODERATE       Self-Care Home Management: 25 minutes

## 2025-03-10 NOTE — PROGRESS NOTES
03/10/25 0843   Wound 03/09/25 Leg Lower;Posterior;Left   Date First Assessed/Time First Assessed: 03/09/25 1900   Present on Original Admission: Yes  Primary Wound Type: Venous Ulcer  Location: Leg  Wound Location Orientation: Lower;Posterior;Left   Wound Image    Site Assessment Dry;Fragile;Painful;Pink   Closure Not approximated   Drainage Amount Scant   Drainage Description Serosanguineous   Treatments Cleansed;Saline;Site Care  (Vaseline to dry intact skin)   Dressing Aquacel Foam;Kerlix roll;Tape;Xeroform   Dressing Changed Changed   Dressing Status Clean;Dry;Intact;Dressing Changed   Wound Depth (cm) 0.1 cm   Margins Attached edges   Non-staged Wound Description Partial thickness   Tarah-wound Assessment Dry;Intact;Edema;Fragile;Pink   Wound Granulation Tissue Pink   Wound Bed Granulation (%) 100 %   State of Healing Early/partial granulation   Wound Odor None   Tunneling? No   Undermining? No   Sinus Tracts? No   Wound Follow Up   Follow up needed No     MD order to assess the pt's left post leg wound. The pt. is back from her test. PCT is at the bedside, the pt. was repositioned in bed. RLE is intact, see the wound assessment, left posterior leg nearly healed, ulcer cleansed and dressing changed, applied Vaseline to dry intact skin. Venous doppler negative. Call light in reach, encouraged the pt. to elevate her legs and feet. All questions answered. Call light in reach.
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
## 2025-03-10 NOTE — PLAN OF CARE
Received patient from ED around 1600. Patient alert, oriented to room and use of call light, fall precautions in place. Nursing florina and med rec completed, Skin check completed. POC updated to patient.     Problem: CARDIOVASCULAR - ADULT  Goal: Maintains optimal cardiac output and hemodynamic stability  Description: INTERVENTIONS:  - Monitor vital signs, rhythm, and trends  - Monitor for bleeding, hypotension and signs of decreased cardiac output  - Evaluate effectiveness of vasoactive medications to optimize hemodynamic stability  - Monitor arterial and/or venous puncture sites for bleeding and/or hematoma  - Assess quality of pulses, skin color and temperature  - Assess for signs of decreased coronary artery perfusion - ex. Angina  - Evaluate fluid balance, assess for edema, trend weights  Outcome: Progressing  Goal: Absence of cardiac arrhythmias or at baseline  Description: INTERVENTIONS:  - Continuous cardiac monitoring, monitor vital signs, obtain 12 lead EKG if indicated  - Evaluate effectiveness of antiarrhythmic and heart rate control medications as ordered  - Initiate emergency measures for life threatening arrhythmias  - Monitor electrolytes and administer replacement therapy as ordered  Outcome: Progressing     Problem: RESPIRATORY - ADULT  Goal: Achieves optimal ventilation and oxygenation  Description: INTERVENTIONS:  - Assess for changes in respiratory status  - Assess for changes in mentation and behavior  - Position to facilitate oxygenation and minimize respiratory effort  - Oxygen supplementation based on oxygen saturation or ABGs  - Provide Smoking Cessation handout, if applicable  - Encourage broncho-pulmonary hygiene including cough, deep breathe, Incentive Spirometry  - Assess the need for suctioning and perform as needed  - Assess and instruct to report SOB or any respiratory difficulty  - Respiratory Therapy support as indicated  - Manage/alleviate anxiety  - Monitor for signs/symptoms of CO2  retention  Outcome: Progressing     Problem: METABOLIC/FLUID AND ELECTROLYTES - ADULT  Goal: Electrolytes maintained within normal limits  Description: INTERVENTIONS:  - Monitor labs and rhythm and assess patient for signs and symptoms of electrolyte imbalances  - Administer electrolyte replacement as ordered  - Monitor response to electrolyte replacements, including rhythm and repeat lab results as appropriate  - Fluid restriction as ordered  - Instruct patient on fluid and nutrition restrictions as appropriate  Outcome: Progressing  Goal: Hemodynamic stability and optimal renal function maintained  Description: INTERVENTIONS:  - Monitor labs and assess for signs and symptoms of volume excess or deficit  - Monitor intake, output and patient weight  - Monitor urine specific gravity, serum osmolarity and serum sodium as indicated or ordered  - Monitor response to interventions for patient's volume status, including labs, urine output, blood pressure (other measures as available)  - Encourage oral intake as appropriate  - Instruct patient on fluid and nutrition restrictions as appropriate  Outcome: Progressing     Problem: MUSCULOSKELETAL - ADULT  Goal: Return mobility to safest level of function  Description: INTERVENTIONS:  - Assess patient stability and activity tolerance for standing, transferring and ambulating w/ or w/o assistive devices  - Assist with transfers and ambulation using safe patient handling equipment as needed  - Ensure adequate protection for wounds/incisions during mobilization  - Obtain PT/OT consults as needed  - Advance activity as appropriate  - Communicate ordered activity level and limitations with patient/family  Outcome: Progressing  Goal: Maintain proper alignment of affected body part  Description: INTERVENTIONS:  - Support and protect limb and body alignment per provider's orders  - Instruct and reinforce with patient and family use of appropriate assistive device and precautions (e.g.  spinal or hip dislocation precautions)  Outcome: Progressing     Problem: PAIN - ADULT  Goal: Verbalizes/displays adequate comfort level or patient's stated pain goal  Description: INTERVENTIONS:  - Encourage pt to monitor pain and request assistance  - Assess pain using appropriate pain scale  - Administer analgesics based on type and severity of pain and evaluate response  - Implement non-pharmacological measures as appropriate and evaluate response  - Consider cultural and social influences on pain and pain management  - Manage/alleviate anxiety  - Utilize distraction and/or relaxation techniques  - Monitor for opioid side effects  - Notify MD/LIP if interventions unsuccessful or patient reports new pain  - Anticipate increased pain with activity and pre-medicate as appropriate  Outcome: Progressing     Problem: SAFETY ADULT - FALL  Goal: Free from fall injury  Description: INTERVENTIONS:  - Assess pt frequently for physical needs  - Identify cognitive and physical deficits and behaviors that affect risk of falls.  - Silva fall precautions as indicated by assessment.  - Educate pt/family on patient safety including physical limitations  - Instruct pt to call for assistance with activity based on assessment  - Modify environment to reduce risk of injury  - Provide assistive devices as appropriate  - Consider OT/PT consult to assist with strengthening/mobility  - Encourage toileting schedule  Outcome: Progressing

## 2025-03-10 NOTE — CM/SW NOTE
03/10/25 1000   CM/SW Referral Data   Referral Source Physician   Reason for Referral Discharge planning   Informant Patient   Medical Hx   Does patient have an established PCP? Yes  (Diana Lopez)   Patient Info   Patient's Current Mental Status at Time of Assessment Alert;Oriented   Patient's Home Environment House   Number of Levels in Home 1   Number of Stair in Home 0  (has ramp in front)   Patient lives with Alone  (son John helps often)   Patient Status Prior to Admission   Independent with ADLs and Mobility No   Pt. requires assistance with Housework;Driving;Ambulating   Services in place prior to admission Home Health Care;DME/Supplies at home   Home Health Provider Info [facility]  (F2F for RN/PT entered & sent)   Type of DME/Supplies Straight Cane;Standard Walker;Scooter;Shower Chair;Grab Bars;Ramp   Discharge Needs   Anticipated D/C needs Home health care;To be determined   Choice of Post-Acute Provider   Informed patient of right to choose their preferred provider Yes   List of appropriate post-acute services provided to patient/family with quality data No - Declined list   Patient/family choice current agency     10:25AM  SW received MDO for DC Planning.    Met w/ pt at bedside. Above assessment completed.  Verified her home address and confirmed she lives alone but her son John is always there helping her.    Pt confirmed a single level home w/ a ramp to enter the front. 1 step in the back of the house to enter.  Pt confirmed primarily using a cane for ambulation. Pt stating she also has a walker and Scooter at home.    Pt confirmed she is current w/ HH services but can not recall the name of the agency.  Pt has hx w/ [facility] - most recent at [facility].    DANIAL reviewed chart - pt has hx w/ [facility].  DANIAL spoke to Ion at [facility] via phone - confirmed pt is current w/ them for HH RN services. Pt has refused past 2 visits due to leg swelling. Pt was scheduled for a visit  today. DANIAL informed Ion that new F2F for RN and PT will be sent.    Clinicals sent in Aidin to [facility] . F2F for RN and PT entered/sent.    01:30PM  Per PT, Anticipated therapy need: Gradual Rehabilitative Therapy    [facility] referral sent in Aidin. CLRC sent. DANIAL Black to complete PASRR.    SW to f/up w/ pt once list is generated for review and as schedule permits.    PLAN: Home w/ [facility] vs RE[facility] - pending REVA list/choice, ins auth, & med clear        SW/CM to remain available for support and/or discharge planning.         MS ChloeW, LSW [id number]

## 2025-03-10 NOTE — PAYOR COMM NOTE
--------------  ADMISSION REVIEW     Payor: LAUREEN ARTEAGA [facility]  Subscriber #:  [id number]  Authorization Number: [id number]    Admit date: 3/9/25  Admit time:  5:50 PM       ED Provider Notes        History   HPI  Patient presents the emergency department complaining of multiple things.  She states she has had some chest pain over the last few days.  She is somewhat short of breath.  She has swelling in her legs which developed over the last week or so.  She has a sore on her left calf which is progressively getting worse.  She denies fever or chills.  There is no vomiting.  There is no other aggravating or alleviating factors.    ED Triage Vitals   BP 03/09/25 1543 (!) 171/60   Pulse 03/09/25 1543 71   Resp 03/09/25 1543 20   Temp 03/09/25 1544 97 °F (36.1 °C)   Temp src 03/09/25 1544 Oral   SpO2 03/09/25 1543 100 %   O2 Device 03/09/25 1543 None (Room air)   Current Vitals:   Vital Signs  BP: (!) 179/64  Pulse: 75  Resp: 22  Temp: 98.2 °F (36.8 °C)  Temp src: Oral  MAP (mmHg): 98    Oxygen Therapy  SpO2: 100 %  O2 Device: Nasal cannula  O2 Flow Rate (L/min): 2 L/min (for comfort, pt c/o of SOB)    HENT:      Head: Normocephalic.      Nose: Nose normal.      Mouth/Throat:      Mouth: Mucous membranes are moist.   Eyes:      Conjunctiva/sclera: Conjunctivae normal.   Cardiovascular:      Rate and Rhythm: Normal rate and regular rhythm.      Heart sounds: No murmur heard.  Pulmonary:      Effort: Pulmonary effort is normal. No respiratory distress.      Breath sounds: Examination of the right-lower field reveals rales. Examination of the left-lower field reveals rales. Rales present.   Abdominal:      General: There is no distension.      Palpations: Abdomen is soft.      Tenderness: There is no abdominal tenderness.   Musculoskeletal:         General: No tenderness. Normal range of motion.      Cervical back: Normal range of motion and neck supple.   Skin:     Comments: There is cellulitic changes of the left calf with mild  induration.  There is bilateral lower extremity edema present.   Neurological:      Mental Status: She is alert and oriented to person, place, and time.     Labs Reviewed   COMP METABOLIC PANEL (14) - Abnormal; Notable for the following components:       Result Value    Potassium 6.2 (*)     Chloride 116 (*)     BUN 82 (*)     Creatinine 3.17 (*)     BUN/CREA Ratio 25.9 (*)     Calcium, Total 8.4 (*)     Calculated Osmolality 317 (*)     eGFR-Cr 15 (*)     AST 36 (*)     Alkaline Phosphatase 226 (*)     All other components within normal limits   CBC WITH DIFFERENTIAL WITH PLATELET - Abnormal; Notable for the following components:    RBC 2.75 (*)     HGB 7.5 (*)     HCT 25.0 (*)     MCHC 30.0 (*)     RDW-SD 51.1 (*)     RDW 15.6 (*)     .0 (*)     Lymphocyte Absolute 0.74 (*)     All other components within normal limits   PRO BETA NATRIURETIC PEPTIDE - Abnormal; Notable for the following components:    Pro-Beta Natriuretic Peptide 10,705 (*)     All other components within normal limits   COMP METABOLIC PANEL (14) - Abnormal; Notable for the following components:    Glucose 101 (*)     Potassium 6.3 (*)     Chloride 118 (*)     CO2 15.0 (*)     BUN 44 (*)     Creatinine 2.99 (*)     Calcium, Total 8.5 (*)     eGFR-Cr 16 (*)     AST 38 (*)     Alkaline Phosphatase 222 (*)     All other components within normal limits   POTASSIUM - Abnormal; Notable for the following components:    Potassium 6.3 (*)     All other components within normal limits   POCT GLUCOSE - Abnormal; Notable for the following components:    POC Glucose  104 (*)    Admission disposition: 3/9/2025  4:55 PM      Disposition and Plan     Clinical Impression:  1. Hyperkalemia    2. Acute on chronic congestive heart failure, unspecified heart failure type (HCC)    3. Cellulitis of left lower extremity       Disposition:  Admit  3/9/2025  4:55 pm      [facility] Hospitalist H&P       Assessment and Plan      Hannah Boss is a 79 year old female with  PMH sig for generalized anxiety disorder, CKD stage IV with baseline creatinine around 2, type 2 diabetes, hypertension, rheumatoid arthritis, prior sarcoma status post surgery, chronic back pain, CAD, prior DVT on Eliquis and recent upper GI bleed thought to be due to AVMs with most recent EGD in January 2025 with Dr. High and outpatient hemoglobin of 8.6 who presented increasing dyspnea with exertion and swelling in her legs.  Known chronic venous stasis ulcers.  Patient does not think she has gained any weight, did not report any chest pain to me although has had intermittently over the last 3 days along with a cough.  Workup in the ER with negative troponin, proBNP 10,000 and noted to have worsening renal function.  Also noted to have potassium of 6.2.  Treated in ER and also given Lokelma.  Repeat per renal.  Also wanted a 500 cc bolus, will defer diuresis to renal pending renal function.  Chest x-ray without pulmonary edema.  Will check respiratory viral panel.  Repeat troponin in a.m.  Monitor on telemetry        Bilateral lower extremity edema, worse on left with cellulitic changes  - Continue Ancef, patient has had previously similar admissions in the past, monitor response  -Patient afebrile admission, white count normal  - proBNP is markedly elevated but patient with DICK, renal is following and did give a 500 cc bolus of fluid, will monitor response and further diuresis per renal  # Does have a history of DVTs, will get Dopplers although likely just secondary to fluid retention which is a chronic issue, patient on Eliquis, continue.  She states that her Eliquis was not stopped even with her recent GI issues.  See below     DICK, hyperkalemia, CKD stage IV  - Baseline creatinine seems around 2, on admission 4.17 potassium was 6.2  - Renal diet, Lokelma given, repeat per renal  - Renal consult     Shortness of breath, chest pain atypical, shortness of breath  2 L of oxygen, appears put on for comfort, no  desaturations noted, wean as tolerated, chest x-ray without infiltrate or fluid noted  - Troponin negative, repeat in a.m., can monitor on telemetry  - Check respiratory viral panel  -Last echo in September 2024 with a EF of 60 to 65%, grade 1 diastolic dysfunction, no major valvular disorder noted     Mild thrombocytopenia  - Platelets usually in the normal range, mildly low today, repeat in a.m., ruling out viral infection above     Chronic anemia, patient with workup with GI, most recently EGD in January 2025 and has continued on her Eliquis  -Known AVMs, per note on 1/14/2025 6 small AVMs were noted in the duodenum and cauterized, gastritis noted.  - Most recent hemoglobin 8.6, on admission 7.5, no active bleeding reported, repeat in a.m. continue Eliquis for now  -She states somebody stopped her PPI twice a day, will resume      Type 2 diabetes  - Hold orals, insulin sliding scale     Hypertension  - Resume home meds, as needed meds ordered     Chronic lower back pain  - Follows with pain clinic here, currently on Percocet and diazepam per note, however patient does not report diazepam as a home med, consider pain consult if needed continue Percocet for now     FEN: Further fluids per renal, lites in a.m., renal diet     PPx: Patient on Eliquis     HPI      Hannah Boss is a 79 year old female with PMH sig for generalized anxiety disorder, CKD stage IV with baseline creatinine around 2, type 2 diabetes, hypertension, rheumatoid arthritis, prior sarcoma status post surgery, chronic back pain, CAD, prior DVT on Eliquis and recent upper GI bleed thought to be due to AVMs with most recent EGD in January 2025 with Dr. High and outpatient hemoglobin of 8.6 who presented increasing dyspnea with exertion and swelling in her legs.  Known chronic venous stasis ulcers.  Patient does not think she has gained any weight, did not report any chest pain to me although has had intermittently over the last 3 days along with a  cough.  Workup in the ER with negative troponin, proBNP 10,000 and noted to have worsening renal function.  Also noted to have potassium of 6.2.  Treated in ER and also given Lokelma.  Repeat per renal.  Also wanted a 500 cc bolus, will defer diuresis to renal pending renal function.  Chest x-ray without pulmonary edema.  Will check respiratory viral panel.  Repeat troponin in a.m.  Monitor on telemetry     Patient in her room sitting and eating comfortably.  Denies any current pain or shortness of breath.  Chest pain would happen intermittently, not associated with exertion no nausea vomiting.  Does have chronic back pain     Temp: 97 °F (36.1 °C)  Pulse: 64  Resp: 17  BP: 171/60     Exam  Gen: alert and oriented x3  Neck Supple, no JVD  Pulm: Lungs clear, normal respiratory effort, No wheezing or crackles  CV: Heart with regular rate and rhythm, No murmurs, rubs, gallops  Abd: Abdomen soft, nontender, nondistended, no organomegaly, bowel sounds present  MSK: Lateral lower extremity edema, increased erythema and warmth on left lower leg  Skin: no rashes or lesions, well perfused  Psych: mood stable, cooperative  Neuro: No focal deficits     Lab 03/09/25  1549   WBC 4.4   HGB 7.5*   MCV 90.9   .0*      Lab 03/09/25  1549      K 6.2*   *   CO2 22.0   BUN 82*   CREATSERUM 3.17*   GLU 94   CA 8.4*      Lab 03/09/25  1549   ALT 43   AST 36*   ALB 3.9                  3/10/25       [facility] Hospitalist Progress Note        Assessment/Plan:     Hannah Boss is a 79 year old female with PMH sig for generalized anxiety disorder, CKD stage IV with baseline creatinine around 2, type 2 diabetes, hypertension, rheumatoid arthritis, prior sarcoma status post surgery, chronic back pain, CAD, prior DVT on Eliquis and recent upper GI bleed thought to be due to AVMs with most recent EGD in January 2025 with Dr. High and outpatient hemoglobin of 8.6 who presented increasing dyspnea with exertion and swelling in her  legs.  Known chronic venous stasis ulcers.  Patient does not think she has gained any weight, did not report any chest pain to me although has had intermittently over the last 3 days along with a cough.  Workup in the ER with negative troponin, proBNP 10,000 and noted to have worsening renal function.  Also noted to have potassium of 6.2.  Treated in ER and also given Lokelma.  Repeat per renal.   Chest x-ray without pulmonary edema.      Bilateral lower extremity edema, worse on left with cellulitic changes  - Continue Ancef, patient has had previously similar admissions in the past, monitor response  -Patient afebrile admission, white count normal  - proBNP is markedly elevated but patient with DICK, renal is following and did give a 500 cc bolus of fluid, will monitor response and further diuresis per renal  - LE doppler negative for DVT     DICK, hyperkalemia, CKD stage IV - improving  - Baseline creatinine seems around 2, on admission 3.17 potassium was 6.2  - likely CRS, continue IV lasix per renal  - Renal diet, Lokelma given, repeat per renal  - Renal consult     Shortness of breath, chest pain atypical, shortness of breath  2 L of oxygen, appears put on for comfort, no desaturations noted, wean as tolerated, chest x-ray without infiltrate or fluid noted  - Troponin negative, repeat in a.m., can monitor on telemetry  - Check respiratory viral panel  -Last echo in September 2024 with a EF of 60 to 65%, grade 1 diastolic dysfunction, no major valvular disorder noted     Mild thrombocytopenia  - Platelets usually in the normal range, mildly low today, repeat in a.m., ruling out viral infection above     Chronic anemia, patient with workup with GI, most recently EGD in January 2025 and has continued on her Eliquis  -Known AVMs, per note on 1/14/2025 6 small AVMs were noted in the duodenum and cauterized, gastritis noted.  - Most recent hemoglobin 8.6, on admission 7.5, no active bleeding reported, repeat in a.m.  continue Eliquis for now  -She states somebody stopped her PPI twice a day, will resume      Type 2 diabetes  - Hold orals, insulin sliding scale     Hypertension  - Resume home meds, as needed meds ordered     Acute on chronic lower back pain  - Follows with pain clinic here, currently on Percocet and diazepam per note, however patient does not report diazepam as a home med  - will consult pain service given worsening pain, asking for strong pain meds  - order lumbar spine XR     FEN: Further fluids per renal, lites in a.m., renal diet     PPx: Patient on Eliquis       Subjective:    Still having a lot of pain, all over but mostly in back, legs. K still elevated     Blood pressure (!) 170/51, pulse 79, temperature 98.3 °F (36.8 °C), temperature source Oral, resp. rate 14, weight 192 lb 8 oz (87.3 kg), SpO2 98%     Temp:  [97 °F (36.1 °C)-98.3 °F (36.8 °C)] 98.3 °F (36.8 °C)  Pulse:  [64-96] 79  Resp:  [14-22] 14  BP: (164-191)/(51-98) 170/51  SpO2:  [10 %-100 %] 98 %     HEENT: EOMI  Pulm: CTAB, no crackles or wheezes  CV: RRR, no murmurs  ABD: Soft, non-tender, non-distended, +BS  SKIN: warm, dry, chronic venous stasis changes BLE, mild erythema LLE  EXT: no edema     Lab 03/09/25  1549 03/10/25  0850   RBC 2.75* 2.89*   HGB 7.5* 8.0*   HCT 25.0* 26.2*   MCV 90.9 90.7   MCH 27.3 27.7   MCHC 30.0* 30.5*   RDW 15.6* 15.5*   NEPRELIM 3.19 5.04   WBC 4.4 6.5   .0* 139.0*      Lab 03/09/25  1549 03/09/25  1844 03/09/25  2233 03/10/25  0850   GLU 94 101*  --  85   BUN 82* 44*  --  71*   CREATSERUM 3.17* 2.99*  --  2.84*   EGFRCR 15* 16*  --  17*   CA 8.4* 8.5*  --  8.2*    136  --  142   K 6.2* 6.3*  6.3* 6.2* 6.0*   * 118*  --  116*   CO2 22.0 15.0*  --  23.0      Lab 03/09/25  1549 03/09/25  1844 03/10/25  0850   ALT 43 42  --    AST 36* 38*  --    ALB 3.9 3.6 3.6         US VENOUS DOPPLER LEG BILAT - DIAG IMG (CPT=93970)   Result Date: 3/10/2025  CONCLUSION:  1. Negative bilateral leg venous  duplex examination. 2.  No deep venous thrombosis visualized.    Dictated by (CST): Ambrose Moss MD on 3/10/2025 at 8:38 AM     Finalized by (CST): Ambrose Moss MD on 3/10/2025 at 8:40 AM           XR CHEST AP PORTABLE  (CPT=71045)   Result Date: 3/9/2025  CONCLUSION: No acute cardiopulmonary abnormality.    Dictated by (CST): Rolan Dyer MD on 3/09/2025 at 4:49 PM     Finalized by (CST): Rolan Dyer MD on 3/09/2025 at 4:49 PM                           NEPHROLOGY  Reason for Consultation:   DICK/CKD /Hyperkalemia     History of Present Illness:   Patient is a 79 year old female who was admitted to the hospital for Hyperkalemia:     79-year-old female with history of CKD stage III/IV , diabetes, hypertension, leiomyosarcoma status postsurgery and radiation , SVT, chronic pain , LLE DVT on eliquis, 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   Also found to have dick on ckd and hyperkalemia  Cr 3.1 mg/dl on admission, k was 6.2  [place] UA    Impression:  DICK likely CRS vs bc of infection / cellulitis. Cr improving. Bladder scan negative  CKD 3/4 sec to HTN, DM, CRS with b/l cr 2-2.3 mg/dl  Hyperkalemia  HTN with CKD 4, diltiazem, hydralazine, isosorbide,   DM2 with nephropathy  Anemia/gi bleeds/ small suspected AVMS in duodenum ( capsule ) s/p push enteroscoy to proximal jejunum with APC    CHFpEF, + edema, iv diuretics  B/l le cellulitis abx, doppler negative        Plan:  Medically manage K   Low K diet  IV diuretics  Labs in am  Bladder scan unremarkable  Lokelma and k     MEDICATIONS ADMINISTERED IN LAST 1 DAY:  apixaban (Eliquis) tab 5 mg       Date Action Dose Route User    3/10/2025 0911 Given 5 mg Oral Nicole Aviles RN    3/9/2025 2041 Given 5 mg Oral Rika Jimenez RN          atorvastatin (Lipitor) tab 40 mg       Date Action Dose Route User    3/9/2025 2041 Given 40 mg Oral Jahntz, Rika, RN          calcium gluconate 1g in 100mL iso-NaCl  IVPB premix       Date Action Dose Route User    3/9/2025 1713 New Bag 1 g Intravenous Laxmi García RN          ceFAZolin (Ancef) 2g in 10mL IV syringe premix       Date Action Dose Route User    3/9/2025 1936 New Bag 2 g Intravenous Jean Pierre, Tiffanie          dilTIAZem ER (CardIZEM CD) 24 hr cap 240 mg       Date Action Dose Route User    3/10/2025 0911 Given 240 mg Oral Nicole Aviles RN          ferrous sulfate DR tab 325 mg       Date Action Dose Route User    3/10/2025 0911 Given 325 mg Oral Nicole Aviles RN          furosemide (Lasix) 10 mg/mL injection 20 mg       Date Action Dose Route User    3/9/2025 2042 Given 20 mg Intravenous Rika Jimenez RN          furosemide (Lasix) 10 mg/mL injection 20 mg       Date Action Dose Route User    3/10/2025 1049 Given 20 mg Intravenous Nicole Aviles RN          gabapentin (Neurontin) cap 300 mg       Date Action Dose Route User    3/9/2025 2041 Given 300 mg Oral Rika Jimenez RN          hydrALAzine (Apresoline) 20 mg/mL injection 10 mg       Date Action Dose Route User    3/9/2025 1907 Given 10 mg Intravenous Jean Pierre, Tiffanie          hydrALAZINE (Apresoline) tab 25 mg       Date Action Dose Route User    3/10/2025 0910 Given 25 mg Oral Nicole Aviles RN    3/9/2025 2205 Given 25 mg Oral Rika Jimenez RN          isosorbide mononitrate ER (Imdur) 24 hr tab 60 mg       Date Action Dose Route User    3/10/2025 0910 Given 60 mg Oral Nicole Aviles RN          magnesium oxide (Mag-Ox) tab 400 mg       Date Action Dose Route User    3/10/2025 0911 Given 400 mg Oral Nicole Aviles RN    3/9/2025 1932 Given 400 mg Oral Leona Greenfieldmaine          oxyCODONE-acetaminophen (Percocet)  MG per tab 1 tablet       Date Action Dose Route User    3/10/2025 1409 Given 1 tablet Oral Nicole Aviles RN    3/10/2025 0910 Given 1 tablet Oral Nicole Aviles, RN    3/9/2025 1935 Given 1 tablet Oral Tiffanie Greenfield          pantoprazole (Protonix)   tab 40 mg       Date Action Dose Route User    3/10/2025 0418 Given 40 mg Oral Rika Jimenez RN          sodium bicarbonate tab 650 mg       Date Action Dose Route User    3/9/2025 2347 Given 650 mg Oral Rika Jimenez RN          sodium bicarbonate injection 50 mEq       Date Action Dose Route User    3/9/2025 2053 Given 50 mEq Intravenous Rika Jimenez RN          sodium chloride 0.9 % IV bolus 500 mL       Date Action Dose Route User    3/9/2025 1712 New Bag 500 mL Intravenous Laxmi García RN          sodium zirconium cyclosilicate (Lokelma) oral packet 10 g       Date Action Dose Route User    3/9/2025 1725 Given 10 g Oral Laxmi García RN          sodium zirconium cyclosilicate (Lokelma) oral packet 10 g       Date Action Dose Route User    3/10/2025 0055 Given 10 g Oral Rika Jimenez RN          sodium zirconium cyclosilicate (Lokelma) oral packet 10 g       Date Action Dose Route User    3/10/2025 1042 Given 10 g Oral Nicole Aviles RN          cholecalciferol (Vitamin D3) tab 2,000 Units       Date Action Dose Route User    3/10/2025 0910 Given 2,000 Units Oral Nicole Aviles RN            Vitals (last day)       Date/Time Temp Pulse Resp BP SpO2 Weight O2 Device O2 Flow Rate (L/min) [facility]    03/10/25 1417 -- 61 -- -- -- -- -- --     03/10/25 1039 -- 79 14 170/51 -- -- -- --     03/10/25 0908 98.3 °F (36.8 °C) 71 20 172/98 98 % -- None (Room air) --     03/10/25 0239 98.3 °F (36.8 °C) 70 16 164/58 97 % -- None (Room air) --     03/09/25 2203 -- 74 18 168/58 98 % -- None (Room air) --     03/09/25 2039 97.7 °F (36.5 °C) 79 20 172/65 94 % -- None (Room air) --     03/09/25 1935 -- -- -- 179/64 -- -- -- -- CS    03/09/25 1904 -- 75 -- 179/58  -- None (Room air) --     03/09/25 1756 98.2 °F (36.8 °C) 68 22 191/61 100 % -- Nasal cannula 2 L/min     03/09/25 1544 97 °F (36.1 °C) -- -- -- -- -- -- --     03/09/25 1543 -- 71 20 171/60 100 % -- None (Room air) --

## 2025-03-10 NOTE — PHYSICAL THERAPY NOTE
PHYSICAL THERAPY EVALUATION - INPATIENT     Room Number: 322/322-A  Evaluation Date: 3/10/2025  Type of Evaluation: Initial   Physician Order: PT Eval and Treat    Presenting Problem: shortness of breath, chest pain, hyperkalemia     Reason for Therapy: Mobility Dysfunction and Discharge Planning    PHYSICAL THERAPY ASSESSMENT   Patient is a 79 year old female admitted 3/9/2025 for shortness of breath, chest pain, hyperkalemia.  Prior to admission, patient's baseline is independent. Ambulates with cane. Son assists with driving.   Patient is currently functioning below baseline with bed mobility, transfers, gait, stair negotiation, maintaining seated position, standing prolonged periods, and performing household tasks.  Patient is requiring stand-by assist, contact guard assist, and minimal assist as a result of the following impairments: decreased functional strength, decreased endurance/aerobic capacity, impaired standing balance, decreased muscular endurance, difficulty maintaining precautions, and medical status.  Physical Therapy will continue to follow for duration of hospitalization.    Patient will benefit from continued skilled PT Services to promote return to prior level of function and safety with continuous assistance and gradual rehabilitative therapy .    PLAN DURING HOSPITALIZATION  Nursing Mobility Recommendation : 1 Assist  PT Device Recommendation: Rolling walker, Gait belt  PT Treatment Plan: Bed mobility, Body mechanics, Endurance, Energy conservation, Family education, Patient education, Gait training, Range of motion, Strengthening, Transfer training, Balance training, Stoop training  Rehab Potential : Good  Frequency (Obs): 3-5x/week     PHYSICAL THERAPY MEDICAL/SOCIAL HISTORY   Problem List  Principal Problem:    Hyperkalemia  Active Problems:    Acute on chronic congestive heart failure, unspecified heart failure type (HCC)    Thrombocytopenia (HCC)    Azotemia    Cellulitis of left lower  extremity      HOME SITUATION  Type of Home: House  Home Layout: One level  Stairs to Enter : 1           Lives With:  (son stay s with pt intermittently)    Drives: No   Patient Regularly Uses: Cane (also owns a R/W)     SUBJECTIVE  \"If I walk anymore, my legs are going to give out\"     PHYSICAL THERAPY EXAMINATION   OBJECTIVE  Precautions: Bed/chair alarm  Fall Risk: High fall risk    PAIN ASSESSMENT  Ratin  Location: \"all over\"  Management Techniques: Activity promotion, Body mechanics, Repositioning    COGNITION  Overall Cognitive Status:  WFL - within functional limits    RANGE OF MOTION AND STRENGTH ASSESSMENT  Upper extremity ROM and strength are within functional limits   Lower extremity ROM is within functional limits   Lower extremity strength is impaired bilateral LE's: 3+/5    BALANCE  Static Sitting: Good  Dynamic Sitting: Fair +  Static Standing: Fair -  Dynamic Standing: Poor    ADDITIONAL TESTS   Edema: Deep pitting, indentation remains for a short time  Edema Location: bilateral LE's    ACTIVITY TOLERANCE  Pulse: 87  Heart Rate Source: Monitor     O2 WALK  Oxygen Therapy  SPO2% Ambulation on Room Air: 97    AM-PAC '6-Clicks' INPATIENT SHORT FORM - BASIC MOBILITY  How much difficulty does the patient currently have...  Patient Difficulty: Turning over in bed (including adjusting bedclothes, sheets and blankets)?: A Little   Patient Difficulty: Sitting down on and standing up from a chair with arms (e.g., wheelchair, bedside commode, etc.): A Little   Patient Difficulty: Moving from lying on back to sitting on the side of the bed?: A Little   How much help from another person does the patient currently need...   Help from Another: Moving to and from a bed to a chair (including a wheelchair)?: A Little   Help from Another: Need to walk in hospital room?: A Little   Help from Another: Climbing 3-5 steps with a railing?: A Lot     AM-PAC Score:  Raw Score: 17   Approx Degree of Impairment: 50.57%    Standardized Score (AM-PAC Scale): 42.13   CMS Modifier (G-Code): CK    FUNCTIONAL ABILITY STATUS  Functional Mobility/Gait Assessment  Gait Assistance: Contact guard assist, Minimum assistance  Distance (ft): 15ft; 30ft  Assistive Device: Rolling walker  Pattern: Shuffle (decreased jessica speed, wide base of support. Cues for pt to remain closer to walker. Cues for upright posture and assist with RW management with turns. Distance ambulated limited by fatigue.)  Supine to Sit: stand-by assist  Sit to Stand: contact guard assist. Cues for appropriate UE positioning with transitional movements. Cues for pacing upon standing to allow body time to acclimate to change in position. CGA to maintain static standing with bilateral UE support on RW.     Exercise/Education Provided:  Bed mobility  Body mechanics  Energy conservation  Functional activity tolerated  Gait training  Posture  ROM  Strengthening  Transfer training    Skilled Therapy Provided: Patient received supine in bed. RN approved activity. Coordinated session with OT. Therapist educated pt on POC and physiological benefits of mobilization. Cues for pacing and energy conservation techniques. Patient agreeable to participate. Primary complaint is \"all over pain\" primarily in LE's which she rates at 7 out of 10 per pain scale. *SpO2 on room air with activity: 97% HR: 87bpm.     The patient's Approx Degree of Impairment: 50.57% has been calculated based on documentation in the [facility] '6 clicks' Inpatient Basic Mobility Short Form.  Research supports that patients with this level of impairment may benefit from rehab.  Final disposition will be made by interdisciplinary medical team.    Patient End of Session: Up in chair, Needs met, Call light within reach, RN aware of session/findings, Alarm set, With  staff    CURRENT GOALS  Goals to be met by: 3/24/25  Patient Goal Patient's self-stated goal is: return to PLOF   Goal #1 Patient is able to demonstrate supine  - sit EOB @ level: independent     Goal #1   Current Status    Goal #2 Patient is able to demonstrate transfers Sit to/from Stand at assistance level: supervision with walker - rolling     Goal #2  Current Status    Goal #3 Patient is able to ambulate 100 feet with assist device: walker - rolling at assistance level: CGA    Goal #3   Current Status    Goal #4 Patient will negotiate one curb w/ assistive device and CGA   Goal #4   Current Status    Goal #5 Patient to demonstrate independence with home activity/exercise instructions provided to patient in preparation for discharge.   Goal #5   Current Status      Patient Evaluation Complexity Level:  History Moderate - 1 or 2 personal factors and/or co-morbidities   Examination of body systems Moderate - addressing a total of 3 or more elements   Clinical Presentation  Moderate - Evolving   Clinical Decision Making  Moderate Complexity     Gait Trainin minutes  Therapeutic Activity:  15 minutes

## 2025-03-10 NOTE — PROGRESS NOTES
[facility] Hospitalist Progress Note     CC: Hospital Follow up    PCP: Diana Lopez DO       Assessment/Plan:     Principal Problem:    Hyperkalemia  Active Problems:    Acute on chronic congestive heart failure, unspecified heart failure type (HCC)    Thrombocytopenia (HCC)    Azotemia    Cellulitis of left lower extremity    CKD (chronic kidney disease), stage IV (HCC)       Hannah Boss is a 79 year old female with PMH sig for generalized anxiety disorder, CKD stage IV with baseline creatinine around 2, type 2 diabetes, hypertension, rheumatoid arthritis, prior sarcoma status post surgery, chronic back pain, CAD, prior DVT on Eliquis and recent upper GI bleed thought to be due to AVMs with most recent EGD in January 2025 with Dr. High and outpatient hemoglobin of 8.6 who presented increasing dyspnea with exertion and swelling in her legs.  Known chronic venous stasis ulcers.  Patient does not think she has gained any weight, did not report any chest pain to me although has had intermittently over the last 3 days along with a cough.  Workup in the ER with negative troponin, proBNP 10,000 and noted to have worsening renal function.  Also noted to have potassium of 6.2.  Treated in ER and also given Lokelma.  Repeat per renal.   Chest x-ray without pulmonary edema.      Bilateral lower extremity edema, worse on left with cellulitic changes  - Continue Ancef, patient has had previously similar admissions in the past, monitor response  -Patient afebrile admission, white count normal  - proBNP is markedly elevated but patient with DICK, renal is following and did give a 500 cc bolus of fluid, will monitor response and further diuresis per renal  - LE doppler negative for DVT     DICK, hyperkalemia, CKD stage IV - improving  - Baseline creatinine seems around 2, on admission 3.17 potassium was 6.2  - likely CRS, continue IV lasix per renal  - Renal diet, Lokelma given, repeat per renal  - Renal consult     Shortness of  breath, chest pain atypical, shortness of breath  2 L of oxygen, appears put on for comfort, no desaturations noted, wean as tolerated, chest x-ray without infiltrate or fluid noted  - Troponin negative, repeat in a.m., can monitor on telemetry  - Check respiratory viral panel  -Last echo in September 2024 with a EF of 60 to 65%, grade 1 diastolic dysfunction, no major valvular disorder noted     Mild thrombocytopenia  - Platelets usually in the normal range, mildly low today, repeat in a.m., ruling out viral infection above     Chronic anemia, patient with workup with GI, most recently EGD in January 2025 and has continued on her Eliquis  -Known AVMs, per note on 1/14/2025 6 small AVMs were noted in the duodenum and cauterized, gastritis noted.  - Most recent hemoglobin 8.6, on admission 7.5, no active bleeding reported, repeat in a.m. continue Eliquis for now  -She states somebody stopped her PPI twice a day, will resume      Type 2 diabetes  - Hold orals, insulin sliding scale     Hypertension  - Resume home meds, as needed meds ordered     Acute on chronic lower back pain  - Follows with pain clinic here, currently on Percocet and diazepam per note, however patient does not report diazepam as a home med  - will consult pain service given worsening pain, asking for strong pain meds  - order lumbar spine XR     FEN: Further fluids per renal, lites in a.m., renal diet     PPx: Patient on Eliquis     Dispo: Full code, pending course     Outpatient records reviewed confirming patient's medical history and medications.      Further recommendations pending patient's clinical course.  [facility] hospitalist to continue to follow patient while in house     Patient and/or patient's family given opportunity to ask questions and note understanding and agreeing with therapeutic plan as outlined     MD WINIFRED Shirley Hospitalist     Answering Service number: [phone]     Subjective:     Still having a lot of pain, all  over but mostly in back, legs. K still elevated    OBJECTIVE:    Blood pressure (!) 170/51, pulse 79, temperature 98.3 °F (36.8 °C), temperature source Oral, resp. rate 14, weight 192 lb 8 oz (87.3 kg), SpO2 98%, not currently breastfeeding.    Temp:  [97 °F (36.1 °C)-98.3 °F (36.8 °C)] 98.3 °F (36.8 °C)  Pulse:  [64-96] 79  Resp:  [14-22] 14  BP: (164-191)/(51-98) 170/51  SpO2:  [10 %-100 %] 98 %      Intake/Output:    Intake/Output Summary (Last 24 hours) at 3/10/2025 1352  Last data filed at 3/10/2025 0908  Gross per 24 hour   Intake 725 ml   Output 2970 ml   Net -2245 ml       Last 3 Weights   03/10/25 0623 192 lb 8 oz (87.3 kg)   03/09/25 1808 194 lb (88 kg)   01/13/25 1042 179 lb (81.2 kg)   01/08/25 1302 179 lb (81.2 kg)   12/24/24 0443 179 lb (81.2 kg)   12/23/24 0513 179 lb (81.2 kg)   12/22/24 0547 169 lb 6.4 oz (76.8 kg)   12/21/24 0527 187 lb 11.2 oz (85.1 kg)   12/20/24 1312 200 lb (90.7 kg)   12/20/24 0151 200 lb 8 oz (90.9 kg)   12/19/24 0622 205 lb 1.6 oz (93 kg)   12/18/24 0436 216 lb 11.4 oz (98.3 kg)   12/17/24 1903 222 lb 1.6 oz (100.7 kg)       Exam   GEN: female in NAD  HEENT: EOMI  Pulm: CTAB, no crackles or wheezes  CV: RRR, no murmurs  ABD: Soft, non-tender, non-distended, +BS  SKIN: warm, dry, chronic venous stasis changes BLE, mild erythema LLE  EXT: no edema      Data Review:       Labs:     Recent Labs   Lab 03/09/25  1549 03/10/25  0850   RBC 2.75* 2.89*   HGB 7.5* 8.0*   HCT 25.0* 26.2*   MCV 90.9 90.7   MCH 27.3 27.7   MCHC 30.0* 30.5*   RDW 15.6* 15.5*   NEPRELIM 3.19 5.04   WBC 4.4 6.5   .0* 139.0*         Recent Labs   Lab 03/09/25  1549 03/09/25  1844 03/09/25  2233 03/10/25  0850   GLU 94 101*  --  85   BUN 82* 44*  --  71*   CREATSERUM 3.17* 2.99*  --  2.84*   EGFRCR 15* 16*  --  17*   CA 8.4* 8.5*  --  8.2*    136  --  142   K 6.2* 6.3*  6.3* 6.2* 6.0*   * 118*  --  116*   CO2 22.0 15.0*  --  23.0       Recent Labs   Lab 03/09/25  1549 03/09/25  1844  03/10/25  0850   ALT 43 42  --    AST 36* 38*  --    ALB 3.9 3.6 3.6         Imaging:  US VENOUS DOPPLER LEG BILAT - DIAG IMG (CPT=93970)    Result Date: 3/10/2025  CONCLUSION:  1. Negative bilateral leg venous duplex examination. 2.  No deep venous thrombosis visualized.    Dictated by (CST): Ambrose Moss MD on 3/10/2025 at 8:38 AM     Finalized by (CST): Ambrose Moss MD on 3/10/2025 at 8:40 AM          XR CHEST AP PORTABLE  (CPT=71045)    Result Date: 3/9/2025  CONCLUSION: No acute cardiopulmonary abnormality.    Dictated by (CST): Rolan Dyer MD on 3/09/2025 at 4:49 PM     Finalized by (CST): Rolan Dyer MD on 3/09/2025 at 4:49 PM             Meds:     INPATIENT MEDICATIONS    Scheduled Medications:      atorvastatin, 40 mg, Nightly  cholecalciferol, 2,000 Units, Daily  dilTIAZem ER, 240 mg, Daily  apixaban, 5 mg, BID  ferrous sulfate, 325 mg, Daily with breakfast  gabapentin, 300 mg, Nightly  hydrALAZINE, 25 mg, BID  isosorbide mononitrate ER, 60 mg, Daily  magnesium oxide, 400 mg, Daily  pantoprazole, 40 mg, BID AC            Drips:       PRN Medications  acetaminophen, 500 mg, Q4H PRN  ondansetron, 4 mg, Q6H PRN  metoclopramide, 5 mg, Q8H PRN  polyethylene glycol (PEG 3350), 17 g, Daily PRN  sennosides, 17.2 mg, Nightly PRN  bisacodyl, 10 mg, Daily PRN  glycerin-hypromellose-, 1 drop, QID PRN  sodium chloride, 1 spray, Q3H PRN  meclizine, 25 mg, TID PRN  oxyCODONE-acetaminophen, 1 tablet, Q4H PRN  hydrALAzine, 10 mg, Q4H PRN

## 2025-03-10 NOTE — PLAN OF CARE
Patient alert and oriented. K+ 6.0 this morning. 1X dose of IV Lasix and lokelma given. Pain on consult. Call light within reach. Safety precautions in place.     Problem: Patient Centered Care  Goal: Patient preferences are identified and integrated in the patient's plan of care  Description: Interventions:  - What would you like us to know as we care for you? From home with son  - Provide timely, complete, and accurate information to patient/family  - Incorporate patient and family knowledge, values, beliefs, and cultural backgrounds into the planning and delivery of care  - Encourage patient/family to participate in care and decision-making at the level they choose  - Honor patient and family perspectives and choices  Outcome: Progressing     Problem: Diabetes/Glucose Control  Goal: Glucose maintained within prescribed range  Description: INTERVENTIONS:  - Monitor Blood Glucose as ordered  - Assess for signs and symptoms of hyperglycemia and hypoglycemia  - Administer ordered medications to maintain glucose within target range  - Assess barriers to adequate nutritional intake and initiate nutrition consult as needed  - Instruct patient on self management of diabetes  Outcome: Progressing     Problem: Patient/Family Goals  Goal: Patient/Family Long Term Goal  Description: Patient's Long Term Goal: Discharge    Interventions:  - Follow MD Orders  - See additional Care Plan goals for specific interventions  Outcome: Progressing  Goal: Patient/Family Short Term Goal  Description: Patient's Short Term Goal: No pain    Interventions:   - Pain consult  - See additional Care Plan goals for specific interventions  Outcome: Progressing     Problem: CARDIOVASCULAR - ADULT  Goal: Maintains optimal cardiac output and hemodynamic stability  Description: INTERVENTIONS:  - Monitor vital signs, rhythm, and trends  - Monitor for bleeding, hypotension and signs of decreased cardiac output  - Evaluate effectiveness of vasoactive  medications to optimize hemodynamic stability  - Monitor arterial and/or venous puncture sites for bleeding and/or hematoma  - Assess quality of pulses, skin color and temperature  - Assess for signs of decreased coronary artery perfusion - ex. Angina  - Evaluate fluid balance, assess for edema, trend weights  Outcome: Progressing  Goal: Absence of cardiac arrhythmias or at baseline  Description: INTERVENTIONS:  - Continuous cardiac monitoring, monitor vital signs, obtain 12 lead EKG if indicated  - Evaluate effectiveness of antiarrhythmic and heart rate control medications as ordered  - Initiate emergency measures for life threatening arrhythmias  - Monitor electrolytes and administer replacement therapy as ordered  Outcome: Progressing     Problem: RESPIRATORY - ADULT  Goal: Achieves optimal ventilation and oxygenation  Description: INTERVENTIONS:  - Assess for changes in respiratory status  - Assess for changes in mentation and behavior  - Position to facilitate oxygenation and minimize respiratory effort  - Oxygen supplementation based on oxygen saturation or ABGs  - Provide Smoking Cessation handout, if applicable  - Encourage broncho-pulmonary hygiene including cough, deep breathe, Incentive Spirometry  - Assess the need for suctioning and perform as needed  - Assess and instruct to report SOB or any respiratory difficulty  - Respiratory Therapy support as indicated  - Manage/alleviate anxiety  - Monitor for signs/symptoms of CO2 retention  Outcome: Progressing     Problem: METABOLIC/FLUID AND ELECTROLYTES - ADULT  Goal: Glucose maintained within prescribed range  Description: INTERVENTIONS:  - Monitor Blood Glucose as ordered  - Assess for signs and symptoms of hyperglycemia and hypoglycemia  - Administer ordered medications to maintain glucose within target range  - Assess barriers to adequate nutritional intake and initiate nutrition consult as needed  - Instruct patient on self management of  diabetes  Outcome: Progressing  Goal: Electrolytes maintained within normal limits  Description: INTERVENTIONS:  - Monitor labs and rhythm and assess patient for signs and symptoms of electrolyte imbalances  - Administer electrolyte replacement as ordered  - Monitor response to electrolyte replacements, including rhythm and repeat lab results as appropriate  - Fluid restriction as ordered  - Instruct patient on fluid and nutrition restrictions as appropriate  Outcome: Progressing  Goal: Hemodynamic stability and optimal renal function maintained  Description: INTERVENTIONS:  - Monitor labs and assess for signs and symptoms of volume excess or deficit  - Monitor intake, output and patient weight  - Monitor urine specific gravity, serum osmolarity and serum sodium as indicated or ordered  - Monitor response to interventions for patient's volume status, including labs, urine output, blood pressure (other measures as available)  - Encourage oral intake as appropriate  - Instruct patient on fluid and nutrition restrictions as appropriate  Outcome: Progressing     Problem: MUSCULOSKELETAL - ADULT  Goal: Return mobility to safest level of function  Description: INTERVENTIONS:  - Assess patient stability and activity tolerance for standing, transferring and ambulating w/ or w/o assistive devices  - Assist with transfers and ambulation using safe patient handling equipment as needed  - Ensure adequate protection for wounds/incisions during mobilization  - Obtain PT/OT consults as needed  - Advance activity as appropriate  - Communicate ordered activity level and limitations with patient/family  Outcome: Progressing  Goal: Maintain proper alignment of affected body part  Description: INTERVENTIONS:  - Support and protect limb and body alignment per provider's orders  - Instruct and reinforce with patient and family use of appropriate assistive device and precautions (e.g. spinal or hip dislocation precautions)  Outcome:  Progressing     Problem: PAIN - ADULT  Goal: Verbalizes/displays adequate comfort level or patient's stated pain goal  Description: INTERVENTIONS:  - Encourage pt to monitor pain and request assistance  - Assess pain using appropriate pain scale  - Administer analgesics based on type and severity of pain and evaluate response  - Implement non-pharmacological measures as appropriate and evaluate response  - Consider cultural and social influences on pain and pain management  - Manage/alleviate anxiety  - Utilize distraction and/or relaxation techniques  - Monitor for opioid side effects  - Notify MD/LIP if interventions unsuccessful or patient reports new pain  - Anticipate increased pain with activity and pre-medicate as appropriate  Outcome: Progressing     Problem: SAFETY ADULT - FALL  Goal: Free from fall injury  Description: INTERVENTIONS:  - Assess pt frequently for physical needs  - Identify cognitive and physical deficits and behaviors that affect risk of falls.  - [place] fall precautions as indicated by assessment.  - Educate pt/family on patient safety including physical limitations  - Instruct pt to call for assistance with activity based on assessment  - Modify environment to reduce risk of injury  - Provide assistive devices as appropriate  - Consider OT/PT consult to assist with strengthening/mobility  - Encourage toileting schedule  Outcome: Progressing     Problem: DISCHARGE PLANNING  Goal: Discharge to home or other facility with appropriate resources  Description: INTERVENTIONS:  - Identify barriers to discharge w/pt and caregiver  - Include patient/family/discharge partner in discharge planning  - Arrange for needed discharge resources and transportation as appropriate  - Identify discharge learning needs (meds, wound care, etc)  - Arrange for interpreters to assist at discharge as needed  - Consider post-discharge preferences of patient/family/discharge partner  - Complete POLST form as  appropriate  - Assess patient's ability to be responsible for managing their own health  - Refer to Case Management Department for coordinating discharge planning if the patient needs post-hospital services based on physician/LIP order or complex needs related to functional status, cognitive ability or social support system  Outcome: Progressing
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
## 2025-03-11 LAB
ALBUMIN SERPL-MCNC: 3.4 G/DL (ref 3.2–4.8)
ANION GAP SERPL CALC-SCNC: 2 MMOL/L (ref 0–18)
BUN BLD-MCNC: 64 MG/DL (ref 9–23)
BUN/CREAT SERPL: 25.1 (ref 10–20)
CALCIUM BLD-MCNC: 8.2 MG/DL (ref 8.7–10.4)
CHLORIDE SERPL-SCNC: 114 MMOL/L (ref 98–112)
CO2 SERPL-SCNC: 25 MMOL/L (ref 21–32)
CREAT BLD-MCNC: 2.55 MG/DL
EGFRCR SERPLBLD CKD-EPI 2021: 19 ML/MIN/1.73M2 (ref 60–?)
GLUCOSE BLD-MCNC: 189 MG/DL (ref 70–99)
GLUCOSE BLDC GLUCOMTR-MCNC: 118 MG/DL (ref 70–99)
GLUCOSE BLDC GLUCOMTR-MCNC: 130 MG/DL (ref 70–99)
GLUCOSE BLDC GLUCOMTR-MCNC: 142 MG/DL (ref 70–99)
GLUCOSE BLDC GLUCOMTR-MCNC: 155 MG/DL (ref 70–99)
OSMOLALITY SERPL CALC.SUM OF ELEC: 315 MOSM/KG (ref 275–295)
PHOSPHATE SERPL-MCNC: 3.6 MG/DL (ref 2.4–5.1)
POTASSIUM SERPL-SCNC: 5.5 MMOL/L (ref 3.5–5.1)
SODIUM SERPL-SCNC: 141 MMOL/L (ref 136–145)

## 2025-03-11 PROCEDURE — 99222 1ST HOSP IP/OBS MODERATE 55: CPT | Performed by: ANESTHESIOLOGY

## 2025-03-11 PROCEDURE — 99233 SBSQ HOSP IP/OBS HIGH 50: CPT | Performed by: INTERNAL MEDICINE

## 2025-03-11 RX ORDER — FUROSEMIDE 10 MG/ML
40 INJECTION INTRAMUSCULAR; INTRAVENOUS ONCE
Status: DISCONTINUED | OUTPATIENT
Start: 2025-03-11 | End: 2025-03-11

## 2025-03-11 RX ORDER — FUROSEMIDE 10 MG/ML
40 INJECTION INTRAMUSCULAR; INTRAVENOUS
Status: DISCONTINUED | OUTPATIENT
Start: 2025-03-11 | End: 2025-03-14

## 2025-03-11 RX ORDER — FUROSEMIDE 10 MG/ML
40 INJECTION INTRAMUSCULAR; INTRAVENOUS ONCE
Status: COMPLETED | OUTPATIENT
Start: 2025-03-11 | End: 2025-03-11

## 2025-03-11 NOTE — PLAN OF CARE
Continuing IV lasix BID & PO lokelma given per order. Call light within reach and fall precautions in place.     Problem: Patient Centered Care  Goal: Patient preferences are identified and integrated in the patient's plan of care  Description: Interventions:  - What would you like us to know as we care for you? From home with son   - Provide timely, complete, and accurate information to patient/family  - Incorporate patient and family knowledge, values, beliefs, and cultural backgrounds into the planning and delivery of care  - Encourage patient/family to participate in care and decision-making at the level they choose  - Honor patient and family perspectives and choices  Outcome: Progressing     Problem: Diabetes/Glucose Control  Goal: Glucose maintained within prescribed range  Description: INTERVENTIONS:  - Monitor Blood Glucose as ordered  - Assess for signs and symptoms of hyperglycemia and hypoglycemia  - Administer ordered medications to maintain glucose within target range  - Assess barriers to adequate nutritional intake and initiate nutrition consult as needed  - Instruct patient on self management of diabetes  Outcome: Progressing     Problem: Patient/Family Goals  Goal: Patient/Family Long Term Goal  Description: Patient's Long Term Goal: to go back home    Interventions:  - follow medical regimen  - See additional Care Plan goals for specific interventions  Outcome: Progressing  Goal: Patient/Family Short Term Goal  Description: Patient's Short Term Goal: to not have chest pain    Interventions:   - follow medical regimen  - See additional Care Plan goals for specific interventions  Outcome: Progressing     Problem: CARDIOVASCULAR - ADULT  Goal: Maintains optimal cardiac output and hemodynamic stability  Description: INTERVENTIONS:  - Monitor vital signs, rhythm, and trends  - Monitor for bleeding, hypotension and signs of decreased cardiac output  - Evaluate effectiveness of vasoactive medications to  optimize hemodynamic stability  - Monitor arterial and/or venous puncture sites for bleeding and/or hematoma  - Assess quality of pulses, skin color and temperature  - Assess for signs of decreased coronary artery perfusion - ex. Angina  - Evaluate fluid balance, assess for edema, trend weights  Outcome: Progressing  Goal: Absence of cardiac arrhythmias or at baseline  Description: INTERVENTIONS:  - Continuous cardiac monitoring, monitor vital signs, obtain 12 lead EKG if indicated  - Evaluate effectiveness of antiarrhythmic and heart rate control medications as ordered  - Initiate emergency measures for life threatening arrhythmias  - Monitor electrolytes and administer replacement therapy as ordered  Outcome: Progressing     Problem: RESPIRATORY - ADULT  Goal: Achieves optimal ventilation and oxygenation  Description: INTERVENTIONS:  - Assess for changes in respiratory status  - Assess for changes in mentation and behavior  - Position to facilitate oxygenation and minimize respiratory effort  - Oxygen supplementation based on oxygen saturation or ABGs  - Provide Smoking Cessation handout, if applicable  - Encourage broncho-pulmonary hygiene including cough, deep breathe, Incentive Spirometry  - Assess the need for suctioning and perform as needed  - Assess and instruct to report SOB or any respiratory difficulty  - Respiratory Therapy support as indicated  - Manage/alleviate anxiety  - Monitor for signs/symptoms of CO2 retention  Outcome: Progressing     Problem: METABOLIC/FLUID AND ELECTROLYTES - ADULT  Goal: Glucose maintained within prescribed range  Description: INTERVENTIONS:  - Monitor Blood Glucose as ordered  - Assess for signs and symptoms of hyperglycemia and hypoglycemia  - Administer ordered medications to maintain glucose within target range  - Assess barriers to adequate nutritional intake and initiate nutrition consult as needed  - Instruct patient on self management of diabetes  Outcome:  Progressing  Goal: Electrolytes maintained within normal limits  Description: INTERVENTIONS:  - Monitor labs and rhythm and assess patient for signs and symptoms of electrolyte imbalances  - Administer electrolyte replacement as ordered  - Monitor response to electrolyte replacements, including rhythm and repeat lab results as appropriate  - Fluid restriction as ordered  - Instruct patient on fluid and nutrition restrictions as appropriate  Outcome: Progressing  Goal: Hemodynamic stability and optimal renal function maintained  Description: INTERVENTIONS:  - Monitor labs and assess for signs and symptoms of volume excess or deficit  - Monitor intake, output and patient weight  - Monitor urine specific gravity, serum osmolarity and serum sodium as indicated or ordered  - Monitor response to interventions for patient's volume status, including labs, urine output, blood pressure (other measures as available)  - Encourage oral intake as appropriate  - Instruct patient on fluid and nutrition restrictions as appropriate  Outcome: Progressing     Problem: MUSCULOSKELETAL - ADULT  Goal: Return mobility to safest level of function  Description: INTERVENTIONS:  - Assess patient stability and activity tolerance for standing, transferring and ambulating w/ or w/o assistive devices  - Assist with transfers and ambulation using safe patient handling equipment as needed  - Ensure adequate protection for wounds/incisions during mobilization  - Obtain PT/OT consults as needed  - Advance activity as appropriate  - Communicate ordered activity level and limitations with patient/family  Outcome: Progressing  Goal: Maintain proper alignment of affected body part  Description: INTERVENTIONS:  - Support and protect limb and body alignment per provider's orders  - Instruct and reinforce with patient and family use of appropriate assistive device and precautions (e.g. spinal or hip dislocation precautions)  Outcome: Progressing     Problem:  PAIN - ADULT  Goal: Verbalizes/displays adequate comfort level or patient's stated pain goal  Description: INTERVENTIONS:  - Encourage pt to monitor pain and request assistance  - Assess pain using appropriate pain scale  - Administer analgesics based on type and severity of pain and evaluate response  - Implement non-pharmacological measures as appropriate and evaluate response  - Consider cultural and social influences on pain and pain management  - Manage/alleviate anxiety  - Utilize distraction and/or relaxation techniques  - Monitor for opioid side effects  - Notify MD/LIP if interventions unsuccessful or patient reports new pain  - Anticipate increased pain with activity and pre-medicate as appropriate  Outcome: Progressing     Problem: SAFETY ADULT - FALL  Goal: Free from fall injury  Description: INTERVENTIONS:  - Assess pt frequently for physical needs  - Identify cognitive and physical deficits and behaviors that affect risk of falls.  - [place] fall precautions as indicated by assessment.  - Educate pt/family on patient safety including physical limitations  - Instruct pt to call for assistance with activity based on assessment  - Modify environment to reduce risk of injury  - Provide assistive devices as appropriate  - Consider OT/PT consult to assist with strengthening/mobility  - Encourage toileting schedule  Outcome: Progressing     Problem: DISCHARGE PLANNING  Goal: Discharge to home or other facility with appropriate resources  Description: INTERVENTIONS:  - Identify barriers to discharge w/pt and caregiver  - Include patient/family/discharge partner in discharge planning  - Arrange for needed discharge resources and transportation as appropriate  - Identify discharge learning needs (meds, wound care, etc)  - Arrange for interpreters to assist at discharge as needed  - Consider post-discharge preferences of patient/family/discharge partner  - Complete POLST form as appropriate  - Assess patient's  ability to be responsible for managing their own health  - Refer to Case Management Department for coordinating discharge planning if the patient needs post-hospital services based on physician/LIP order or complex needs related to functional status, cognitive ability or social support system  Outcome: Progressing

## 2025-03-11 NOTE — CONGREGATE LIVING REVIEW
[facility] Living Authorization    The [facility] Review Committee has reviewed this case and the patient IS APPROVED for discharge to a facility for Short Term Skilled once the following procedure is followed:     - The physician discharge instructions (contained within the DAVIDA note for SNF) must inlcude the below appropriate and approved COVID instructions to the facility    For questions regarding CLRC approval process, please contact the CM assigned to the case.  For questions regarding RN discharge workflow, please contact the unit Clinical Leader.

## 2025-03-11 NOTE — PAYOR COMM NOTE
--------------  CONTINUED STAY REVIEW    Payor: [employer]  Subscriber #:  [id number]  Authorization Number: [id number]    Admit date: 3/9/25  Admit time:  5:50 PM    3/11/25       Assessment/Plan:    Hannah Boss is a 79 year old female with PMH sig for generalized anxiety disorder, CKD stage IV with baseline creatinine around 2, type 2 diabetes, hypertension, rheumatoid arthritis, prior sarcoma status post surgery, chronic back pain, CAD, prior DVT on Eliquis and recent upper GI bleed thought to be due to AVMs with most recent EGD in January 2025 with Dr. High and outpatient hemoglobin of 8.6 who presented increasing dyspnea with exertion and swelling in her legs.  Known chronic venous stasis ulcers.  Patient does not think she has gained any weight, did not report any chest pain to me although has had intermittently over the last 3 days along with a cough.  Workup in the ER with negative troponin, proBNP 10,000 and noted to have worsening renal function.  Also noted to have potassium of 6.2.  Treated in ER and also given Lokelma.  Repeat per renal.   Chest x-ray without pulmonary edema.      Bilateral lower extremity edema, worse on left with cellulitic changes  - Continue Ancef, patient has had previously similar admissions in the past, monitor response  -Patient afebrile admission, white count normal  - LE doppler negative for DVT  - continue diuresis per renal     DICK, hyperkalemia, CKD stage IV - improving  - Baseline creatinine seems around 2, on admission 3.17 potassium was 6.2  - likely CRS, continue IV lasix per renal  - Renal diet, Lokelma given, repeat per renal  - Renal consult     Shortness of breath, chest pain atypical, shortness of breath  2 L of oxygen, appears put on for comfort, no desaturations noted, wean as tolerated, chest x-ray without infiltrate or fluid noted  - Troponin negative, repeat in a.m., can monitor on telemetry  - Check respiratory viral panel  -Last echo in September 2024 with  a EF of 60 to 65%, grade 1 diastolic dysfunction, no major valvular disorder noted     Mild thrombocytopenia  - Platelets usually in the normal range, mildly low today, repeat in a.m., ruling out viral infection above     Chronic anemia, patient with workup with GI, most recently EGD in January 2025 and has continued on her Eliquis  -Known AVMs, per note on 1/14/2025 6 small AVMs were noted in the duodenum and cauterized, gastritis noted.  - Most recent hemoglobin 8.6, on admission 7.5, no active bleeding reported, repeat in a.m. continue Eliquis for now  -She states somebody stopped her PPI twice a day, will resume      Type 2 diabetes  - Hold orals, insulin sliding scale     Hypertension  - Resume home meds, as needed meds ordered     Acute on chronic lower back pain  - Follows with pain clinic here, currently on Percocet and diazepam per note, however patient does not report diazepam as a home med  - will consult pain service given worsening pain, asking for strong pain meds  - order lumbar spine XR     FEN: Further fluids per renal, lites in a.m., renal diet     PPx: Patient on Eliquis     Temp:  [97.4 °F (36.3 °C)-98.8 °F (37.1 °C)] 98.8 °F (37.1 °C)  Pulse:  [58-82] 66  Resp:  [16-20] 18  BP: (140-176)/(50-62) 159/50  SpO2:  [94 %-99 %] 95 %     HEENT: EOMI  Pulm: CTAB, no crackles or wheezes  CV: RRR, no murmurs  ABD: Soft, non-tender, non-distended, +BS  SKIN: warm, dry, chronic venous stasis changes BLE, mild erythema LLE- improving  EXT: + edema, more wrinkles in BLE     Lab 03/09/25  1549 03/10/25  0850   RBC 2.75* 2.89*   HGB 7.5* 8.0*   HCT 25.0* 26.2*   MCV 90.9 90.7   MCH 27.3 27.7   MCHC 30.0* 30.5*   RDW 15.6* 15.5*   NEPRELIM 3.19 5.04   WBC 4.4 6.5   .0* 139.0*      Lab 03/09/25  1844 03/10/25  0850 03/10/25  1414 03/11/25  0922   * 85  --  189*   BUN 44* 71*  --  64*   CREATSERUM 2.99* 2.84*  --  2.55*   EGFRCR 16* 17*  --  19*   CA 8.5* 8.2*  --  8.2*    142  --  141   K 6.3*   6.3* 6.0* 5.7* 5.5*   * 116*  --  114*   CO2 15.0* 23.0  --  25.0     Lab 03/09/25  1549 03/09/25  1844 03/10/25  0850 03/11/25  0922   ALT 43 42  --   --    AST 36* 38*  --   --    ALB 3.9 3.6 3.6 3.4       INPATIENT MEDICATIONS     Scheduled Medications:                                          insulin aspart, 1-5 Units, TID CC  atorvastatin, 40 mg, Nightly  cholecalciferol, 2,000 Units, Daily  dilTIAZem ER, 240 mg, Daily  apixaban, 5 mg, BID  ferrous sulfate, 325 mg, Daily with breakfast  gabapentin, 300 mg, Nightly  hydrALAZINE, 25 mg, BID  isosorbide mononitrate ER, 60 mg, Daily  magnesium oxide, 400 mg, Daily  pantoprazole, 40 mg, BID AC                        NEPHROLOGY  Impression:     DICK likely CRS vs bc of infection / cellulitis. Cr improving. Bladder scan negative  CKD 3/4 sec to HTN, DM, CRS with b/l cr 2-2.3 mg/dl--  Hyperkalemia- likely RTA 4 and non compliance with diet  HTN with CKD 4, diltiazem, hydralazine, isosorbide,   DM2 with nephropathy ISS   Anemia/gi bleeds/ small suspected AVMS in duodenum ( capsule ) s/p push enteroscoy to proximal jejunum with APC    CHFpEF, + edema, iv diuretics  B/l le cellulitis abx, doppler negative        Plan:     Cr improving with diuretics  K is also better with medical management  Low K diet  Lasix 40 mg iv bid  Lokelma again    MEDICATIONS ADMINISTERED IN LAST 1 DAY:  apixaban (Eliquis) tab 5 mg       Date Action Dose Route User    3/11/2025 0824 Given 5 mg Oral Kaela Wu RN    3/10/2025 2128 Given 5 mg Oral Rika Jimenez RN          atorvastatin (Lipitor) tab 40 mg       Date Action Dose Route User    3/10/2025 2128 Given 40 mg Oral Rika Jimenez RN          dilTIAZem ER (CardIZEM CD) 24 hr cap 240 mg       Date Action Dose Route User    3/11/2025 0824 Given 240 mg Oral Kaela Wu RN          furosemide (Lasix) 10 mg/mL injection 40 mg       Date Action Dose Route User    3/11/2025 1400 Given 40 mg Intravenous Kaela Wu, RN           gabapentin (Neurontin) cap 300 mg       Date Action Dose Route User    3/10/2025 2128 Given 300 mg Oral Rika Jimenez RN          hydrALAZINE (Apresoline) tab 25 mg       Date Action Dose Route User    3/11/2025 0823 Given 25 mg Oral Kaela Wu RN    3/10/2025 2128 Given 25 mg Oral Rika Jimenez RN          isosorbide mononitrate ER (Imdur) 24 hr tab 60 mg       Date Action Dose Route User    3/11/2025 0824 Given 60 mg Oral Kaela Wu RN          magnesium oxide (Mag-Ox) tab 400 mg       Date Action Dose Route User    3/11/2025 0824 Given 400 mg Oral Kaela Wu RN          oxyCODONE-acetaminophen (Percocet)  MG per tab 1 tablet       Date Action Dose Route User    3/11/2025 1511 Given 1 tablet Oral Kaela Wu RN    3/11/2025 1047 Given 1 tablet Oral Kaela Wu RN    3/11/2025 0513 Given 1 tablet Oral Rika Jimenez RN    3/10/2025 1821 Given 1 tablet Oral Nicole Aviles RN          pantoprazole (Protonix) DR tab 40 mg       Date Action Dose Route User    3/11/2025 1725 Given 40 mg Oral Kaela Wu RN    3/11/2025 0513 Given 40 mg Oral Rika Jimenez RN    3/10/2025 1821 Given 40 mg Oral Nicole Aviles RN          sodium zirconium cyclosilicate (Lokelma) oral packet 10 g       Date Action Dose Route User    3/10/2025 1920 Given 10 g Oral Nicole Aviles RN          sodium zirconium cyclosilicate (Lokelma) oral packet 10 g       Date Action Dose Route User    3/11/2025 1401 Given 10 g Oral Kaela Wu RN          cholecalciferol (Vitamin D3) tab 2,000 Units       Date Action Dose Route User    3/11/2025 0824 Given 2,000 Units Oral Kaela Wu RN            Vitals (last day)       Date/Time Temp Pulse Resp BP SpO2 Weight O2 Device O2 Flow Rate (L/min) [facility]    03/11/25 1722 98 °F (36.7 °C) 67 18 158/64 98 % -- None (Room air) -- LD    03/11/25 0818 98.8 °F (37.1 °C) 82 20 168/52 99 % -- None (Room air) -- LD    03/11/25 0216 98.8 °F (37.1 °C) 64 16 161/56 94  % -- None (Room air) --     03/10/25 2127 98.6 °F (37 °C) 75 18 176/62 97 % -- None (Room air) --     03/10/25 1409 97.4 °F (36.3 °C) 66 18 141/50 98 % -- None (Room air) --     03/10/25 1039 -- 79 14 170/51 -- -- -- --     03/10/25 0908 98.3 °F (36.8 °C) 71 20 172/98 98 % -- None (Room air) --     03/10/25 0239 98.3 °F (36.8 °C) 70 16 164/58 97 % -- None (Room air) --

## 2025-03-11 NOTE — PROGRESS NOTES
[facility]  part of [facility]    Progress Note    Hannah Boss Patient Status:  Inpatient    1945 MRN [id number]   Location [facility] 3W/SW Attending Nerissa Allen MD   Hosp Day # 2 PCP Diana Lopez DO       Subjective:   Hannah Boss is a(n) 79 year old female who I am seeing for DICK/CKD IV/hyperkaelmia    C/o pain  No other issues        Objective:   /50 (BP Location: Left arm)   Pulse 66   Temp 98.8 °F (37.1 °C) (Oral)   Resp 18   Wt 190 lb 3.2 oz (86.3 kg)   SpO2 95%   BMI 33.69 kg/m²      Intake/Output Summary (Last 24 hours) at 3/11/2025 1310  Last data filed at 3/11/2025 0900  Gross per 24 hour   Intake 290 ml   Output 850 ml   Net -560 ml     Wt Readings from Last 1 Encounters:   25 190 lb 3.2 oz (86.3 kg)       Exam  Vital signs: Blood pressure 159/50, pulse 66, temperature 98.8 °F (37.1 °C), temperature source Oral, resp. rate 18, weight 190 lb 3.2 oz (86.3 kg), SpO2 95%, not currently breastfeeding.    General: No acute distress. Alert and oriented x 3.  HEENT: Moist mucous membranes. EOMI. PERRLA  Neck:  No JVD.   Respiratory: Clear to auscultation bilaterally.    Cardiovascular: S1, S2.  Regular rate and rhythm.   Abdomen: Soft, nontender, nondistended.    Neurologic: No focal neurological deficits.  Musculoskeletal: Full range of motion of all extremities.  ++ edema.  Access:    Results:     Recent Labs   Lab 25  1549 03/10/25  0850   RBC 2.75* 2.89*   HGB 7.5* 8.0*   HCT 25.0* 26.2*   MCV 90.9 90.7   MCH 27.3 27.7   MCHC 30.0* 30.5*   RDW 15.6* 15.5*   NEPRELIM 3.19 5.04   WBC 4.4 6.5   .0* 139.0*         Recent Labs   Lab 25  1844 25  2233 03/10/25  0850 03/10/25  1414 25  0922   *  --  85  --  189*   BUN 44*  --  71*  --  64*   CREATSERUM 2.99*  --  2.84*  --  2.55*   CA 8.5*  --  8.2*  --  8.2*     --  142  --  141   K 6.3*  6.3*   < > 6.0* 5.7* 5.5*   *  --  116*  --  114*   CO2  15.0*  --  23.0  --  25.0    < > = values in this interval not displayed.          US VENOUS DOPPLER LEG BILAT - DIAG IMG (CPT=93970)    Result Date: 3/10/2025  CONCLUSION:  1. Negative bilateral leg venous duplex examination. 2.  No deep venous thrombosis visualized.    Dictated by (CST): Ambrose Moss MD on 3/10/2025 at 8:38 AM     Finalized by (CST): Ambrose Moss MD on 3/10/2025 at 8:40 AM          XR CHEST AP PORTABLE  (CPT=71045)    Result Date: 3/9/2025  CONCLUSION: No acute cardiopulmonary abnormality.    Dictated by (CST): Rolan Dyer MD on 3/09/2025 at 4:49 PM     Finalized by (CST): Rolan Dyer MD on 3/09/2025 at 4:49 PM           Assessment and Plan:     79-year-old female with history of CKD stage III/IV , diabetes, hypertension, leiomyosarcoma status postsurgery and radiation , SVT, chronic pain , LLE DVT on eliquis, 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   Also found to have dick on ckd and hyperkalemia  Cr 3.1 mg/dl on admission, k was 6.2  [place] [facility]    Impression:    DICK likely CRS vs bc of infection / cellulitis. Cr improving. 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  If you have any questions, please do not hesitate to contact me.     Janet Reynaga MD  3/11/2025
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
## 2025-03-11 NOTE — PROGRESS NOTES
[facility] Hospitalist Progress Note     CC: Hospital Follow up    PCP: Diana Lopez DO       Assessment/Plan:     Principal Problem:    Hyperkalemia  Active Problems:    Acute on chronic congestive heart failure, unspecified heart failure type (HCC)    Thrombocytopenia (HCC)    Azotemia    Cellulitis of left lower extremity    CKD (chronic kidney disease), stage IV (HCC)       Hannah Boss is a 79 year old female with PMH sig for generalized anxiety disorder, CKD stage IV with baseline creatinine around 2, type 2 diabetes, hypertension, rheumatoid arthritis, prior sarcoma status post surgery, chronic back pain, CAD, prior DVT on Eliquis and recent upper GI bleed thought to be due to AVMs with most recent EGD in January 2025 with Dr. High and outpatient hemoglobin of 8.6 who presented increasing dyspnea with exertion and swelling in her legs.  Known chronic venous stasis ulcers.  Patient does not think she has gained any weight, did not report any chest pain to me although has had intermittently over the last 3 days along with a cough.  Workup in the ER with negative troponin, proBNP 10,000 and noted to have worsening renal function.  Also noted to have potassium of 6.2.  Treated in ER and also given Lokelma.  Repeat per renal.   Chest x-ray without pulmonary edema.      Bilateral lower extremity edema, worse on left with cellulitic changes  - Continue Ancef, patient has had previously similar admissions in the past, monitor response  -Patient afebrile admission, white count normal  - LE doppler negative for DVT  - continue diuresis per renal     DICK, hyperkalemia, CKD stage IV - improving  - Baseline creatinine seems around 2, on admission 3.17 potassium was 6.2  - likely CRS, continue IV lasix per renal  - Renal diet, Lokelma given, repeat per renal  - Renal consult     Shortness of breath, chest pain atypical, shortness of breath  2 L of oxygen, appears put on for comfort, no desaturations noted, wean as tolerated,  chest x-ray without infiltrate or fluid noted  - Troponin negative, repeat in a.m., can monitor on telemetry  - Check respiratory viral panel  -Last echo in September 2024 with a EF of 60 to 65%, grade 1 diastolic dysfunction, no major valvular disorder noted     Mild thrombocytopenia  - Platelets usually in the normal range, mildly low today, repeat in a.m., ruling out viral infection above     Chronic anemia, patient with workup with GI, most recently EGD in January 2025 and has continued on her Eliquis  -Known AVMs, per note on 1/14/2025 6 small AVMs were noted in the duodenum and cauterized, gastritis noted.  - Most recent hemoglobin 8.6, on admission 7.5, no active bleeding reported, repeat in a.m. continue Eliquis for now  -She states somebody stopped her PPI twice a day, will resume      Type 2 diabetes  - Hold orals, insulin sliding scale     Hypertension  - Resume home meds, as needed meds ordered     Acute on chronic lower back pain  - Follows with pain clinic here, currently on Percocet and diazepam per note, however patient does not report diazepam as a home med  - will consult pain service given worsening pain, asking for strong pain meds  - order lumbar spine XR     FEN: Further fluids per renal, lites in a.m., renal diet     PPx: Patient on Eliquis     Dispo: Full code, pending course     Outpatient records reviewed confirming patient's medical history and medications.      Further recommendations pending patient's clinical course.  [facility] hospitalist to continue to follow patient while in house     Patient and/or patient's family given opportunity to ask questions and note understanding and agreeing with therapeutic plan as outlined     Nerissa Allen MD  [facility] Hospitalist     Answering Service number: [phone]     Subjective:     Complains of pain. Says pain doctor came this AM and was going to give her more pain meds.     OBJECTIVE:    Blood pressure 159/50, pulse 66, temperature 98.8 °F (37.1  °C), temperature source Oral, resp. rate 18, weight 190 lb 3.2 oz (86.3 kg), SpO2 95%, not currently breastfeeding.    Temp:  [97.4 °F (36.3 °C)-98.8 °F (37.1 °C)] 98.8 °F (37.1 °C)  Pulse:  [58-82] 66  Resp:  [16-20] 18  BP: (140-176)/(50-62) 159/50  SpO2:  [94 %-99 %] 95 %      Intake/Output:    Intake/Output Summary (Last 24 hours) at 3/11/2025 1303  Last data filed at 3/11/2025 0900  Gross per 24 hour   Intake 290 ml   Output 850 ml   Net -560 ml       Last 3 Weights   03/11/25 0510 190 lb 3.2 oz (86.3 kg)   03/10/25 0623 192 lb 8 oz (87.3 kg)   03/09/25 1808 194 lb (88 kg)   01/13/25 1042 179 lb (81.2 kg)   01/08/25 1302 179 lb (81.2 kg)   12/24/24 0443 179 lb (81.2 kg)   12/23/24 0513 179 lb (81.2 kg)   12/22/24 0547 169 lb 6.4 oz (76.8 kg)   12/21/24 0527 187 lb 11.2 oz (85.1 kg)   12/20/24 1312 200 lb (90.7 kg)   12/20/24 0151 200 lb 8 oz (90.9 kg)   12/19/24 0622 205 lb 1.6 oz (93 kg)   12/18/24 0436 216 lb 11.4 oz (98.3 kg)   12/17/24 1903 222 lb 1.6 oz (100.7 kg)       Exam   GEN: female in NAD  HEENT: EOMI  Pulm: CTAB, no crackles or wheezes  CV: RRR, no murmurs  ABD: Soft, non-tender, non-distended, +BS  SKIN: warm, dry, chronic venous stasis changes BLE, mild erythema LLE- improving  EXT: + edema, more wrinkles in BLE      Data Review:       Labs:     Recent Labs   Lab 03/09/25  1549 03/10/25  0850   RBC 2.75* 2.89*   HGB 7.5* 8.0*   HCT 25.0* 26.2*   MCV 90.9 90.7   MCH 27.3 27.7   MCHC 30.0* 30.5*   RDW 15.6* 15.5*   NEPRELIM 3.19 5.04   WBC 4.4 6.5   .0* 139.0*         Recent Labs   Lab 03/09/25  1844 03/09/25  2233 03/10/25  0850 03/10/25  1414 03/11/25  0922   *  --  85  --  189*   BUN 44*  --  71*  --  64*   CREATSERUM 2.99*  --  2.84*  --  2.55*   EGFRCR 16*  --  17*  --  19*   CA 8.5*  --  8.2*  --  8.2*     --  142  --  141   K 6.3*  6.3*   < > 6.0* 5.7* 5.5*   *  --  116*  --  114*   CO2 15.0*  --  23.0  --  25.0    < > = values in this interval not displayed.        Recent Labs   Lab 03/09/25  1549 03/09/25  1844 03/10/25  0850 03/11/25  0922   ALT 43 42  --   --    AST 36* 38*  --   --    ALB 3.9 3.6 3.6 3.4         Imaging:  US VENOUS DOPPLER LEG BILAT - DIAG IMG (CPT=93970)    Result Date: 3/10/2025  CONCLUSION:  1. Negative bilateral leg venous duplex examination. 2.  No deep venous thrombosis visualized.    Dictated by (CST): Ambrose Moss MD on 3/10/2025 at 8:38 AM     Finalized by (CST): Ambrose Moss MD on 3/10/2025 at 8:40 AM          XR CHEST AP PORTABLE  (CPT=71045)    Result Date: 3/9/2025  CONCLUSION: No acute cardiopulmonary abnormality.    Dictated by (CST): Rolan Dyer MD on 3/09/2025 at 4:49 PM     Finalized by (CST): Rolan Dyer MD on 3/09/2025 at 4:49 PM             Meds:     INPATIENT MEDICATIONS    Scheduled Medications:      insulin aspart, 1-5 Units, TID CC  atorvastatin, 40 mg, Nightly  cholecalciferol, 2,000 Units, Daily  dilTIAZem ER, 240 mg, Daily  apixaban, 5 mg, BID  ferrous sulfate, 325 mg, Daily with breakfast  gabapentin, 300 mg, Nightly  hydrALAZINE, 25 mg, BID  isosorbide mononitrate ER, 60 mg, Daily  magnesium oxide, 400 mg, Daily  pantoprazole, 40 mg, BID AC            Drips:       PRN Medications  glucose, 15 g, Q15 Min PRN   Or  glucose, 15 g, Q15 Min PRN   Or  glucose-vitamin C, 4 tablet, Q15 Min PRN   Or  dextrose, 50 mL, Q15 Min PRN   Or  glucose, 30 g, Q15 Min PRN   Or  glucose, 30 g, Q15 Min PRN   Or  glucose-vitamin C, 8 tablet, Q15 Min PRN  acetaminophen, 500 mg, Q4H PRN  ondansetron, 4 mg, Q6H PRN  metoclopramide, 5 mg, Q8H PRN  polyethylene glycol (PEG 3350), 17 g, Daily PRN  sennosides, 17.2 mg, Nightly PRN  bisacodyl, 10 mg, Daily PRN  glycerin-hypromellose-, 1 drop, QID PRN  sodium chloride, 1 spray, Q3H PRN  meclizine, 25 mg, TID PRN  oxyCODONE-acetaminophen, 1 tablet, Q4H PRN  hydrALAzine, 10 mg, Q4H PRN

## 2025-03-11 NOTE — CM/SW NOTE
03/11/25 0948   Choice of Post-Acute Provider   Informed patient of right to choose their preferred provider Yes   List of appropriate post-acute services provided to patient/family with quality data No - Declined list   Patient/family choice [facility]   Information given to Patient     09:45AM  DANIAL met w/ pt at bedside as f/up for REVA discussion. Pt confirmed she is agreeable to REVA.  SW offered pt list of quality data, pt declined. Pt has requested [facility].    [facility] not [facility] - SW sent referral for review. Pending response on acceptance.    11:25AM  Received confirmation that [facility] can accept for REVA. Spoke w/ liamaryana Diaz - confirmed they will submit for insurance auth.    PLAN: [facility] REVA, needs Medicar, PCS needs date - pending Ins Auth & med clear      SW/CM to remain available for support and/or discharge planning.       Chloe, MSW, LSW [phone]

## 2025-03-12 ENCOUNTER — APPOINTMENT (OUTPATIENT)
Dept: PICC SERVICES | Facility: HOSPITAL | Age: 80
End: 2025-03-12
Attending: HOSPITALIST
Payer: MEDICARE

## 2025-03-12 LAB
ALBUMIN SERPL-MCNC: 3.7 G/DL (ref 3.2–4.8)
ANION GAP SERPL CALC-SCNC: 3 MMOL/L (ref 0–18)
BUN BLD-MCNC: 63 MG/DL (ref 9–23)
BUN/CREAT SERPL: 25 (ref 10–20)
CALCIUM BLD-MCNC: 8.3 MG/DL (ref 8.7–10.4)
CHLORIDE SERPL-SCNC: 113 MMOL/L (ref 98–112)
CO2 SERPL-SCNC: 25 MMOL/L (ref 21–32)
CREAT BLD-MCNC: 2.52 MG/DL
EGFRCR SERPLBLD CKD-EPI 2021: 19 ML/MIN/1.73M2 (ref 60–?)
GLUCOSE BLD-MCNC: 122 MG/DL (ref 70–99)
GLUCOSE BLDC GLUCOMTR-MCNC: 106 MG/DL (ref 70–99)
GLUCOSE BLDC GLUCOMTR-MCNC: 115 MG/DL (ref 70–99)
GLUCOSE BLDC GLUCOMTR-MCNC: 138 MG/DL (ref 70–99)
GLUCOSE BLDC GLUCOMTR-MCNC: 151 MG/DL (ref 70–99)
OSMOLALITY SERPL CALC.SUM OF ELEC: 311 MOSM/KG (ref 275–295)
PHOSPHATE SERPL-MCNC: 3.7 MG/DL (ref 2.4–5.1)
POTASSIUM SERPL-SCNC: 5.4 MMOL/L (ref 3.5–5.1)
SODIUM SERPL-SCNC: 141 MMOL/L (ref 136–145)

## 2025-03-12 PROCEDURE — 99232 SBSQ HOSP IP/OBS MODERATE 35: CPT | Performed by: INTERNAL MEDICINE

## 2025-03-12 PROCEDURE — 99232 SBSQ HOSP IP/OBS MODERATE 35: CPT | Performed by: ANESTHESIOLOGY

## 2025-03-12 RX ORDER — HYDRALAZINE HYDROCHLORIDE 25 MG/1
25 TABLET, FILM COATED ORAL 3 TIMES DAILY
Status: DISCONTINUED | OUTPATIENT
Start: 2025-03-12 | End: 2025-03-15

## 2025-03-12 RX ORDER — GABAPENTIN 300 MG/1
300 CAPSULE ORAL EVERY 12 HOURS PRN
Status: DISCONTINUED | OUTPATIENT
Start: 2025-03-12 | End: 2025-03-16

## 2025-03-12 NOTE — PROGRESS NOTES
[facility]  part of Merged with [facility]    Progress Note    Hannah Boss Patient Status:  Inpatient    1945 MRN [id number]   Location [facility] 3W/SW Attending Nerissa Allen MD   Hosp Day # 3 PCP Diana Lopez DO       Subjective:   Hannah Boss is a(n) 79 year old female who I am seeing for DICK/CKD IV/hyperkaelmia    No new issues    Objective:   /54 (BP Location: Right arm)   Pulse 69   Temp 98.8 °F (37.1 °C) (Oral)   Resp 18   Wt 182 lb (82.6 kg)   SpO2 98%   BMI 32.24 kg/m²      Intake/Output Summary (Last 24 hours) at 3/12/2025 1354  Last data filed at 3/12/2025 1318  Gross per 24 hour   Intake 800 ml   Output 3701 ml   Net -2901 ml     Wt Readings from Last 1 Encounters:   25 182 lb (82.6 kg)       Exam  Vital signs: Blood pressure 151/54, pulse 69, temperature 98.8 °F (37.1 °C), temperature source Oral, resp. rate 18, weight 182 lb (82.6 kg), SpO2 98%, not currently breastfeeding.    General: No acute distress. Alert and oriented x 3.  HEENT: Moist mucous membranes. EOMI. PERRLA  Neck:  No JVD.   Respiratory: Clear to auscultation bilaterally.    Cardiovascular: S1, S2.  Regular rate and rhythm.   Abdomen: Soft, nontender, nondistended.    Neurologic: No focal neurological deficits.  Musculoskeletal: Full range of motion of all extremities.  ++ edema.  Access:    Results:     Recent Labs   Lab 25  1549 03/10/25  0850   RBC 2.75* 2.89*   HGB 7.5* 8.0*   HCT 25.0* 26.2*   MCV 90.9 90.7   MCH 27.3 27.7   MCHC 30.0* 30.5*   RDW 15.6* 15.5*   NEPRELIM 3.19 5.04   WBC 4.4 6.5   .0* 139.0*         Recent Labs   Lab 03/10/25  0850 03/10/25  1414 25  0922 25  0816   GLU 85  --  189* 122*   BUN 71*  --  64* 63*   CREATSERUM 2.84*  --  2.55* 2.52*   CA 8.2*  --  8.2* 8.3*     --  141 141   K 6.0* 5.7* 5.5* 5.4*   *  --  114* 113*   CO2 23.0  --  25.0 25.0          No results found.    Assessment and Plan:     79-year-old female  with history of CKD stage III/IV , diabetes, hypertension, leiomyosarcoma status postsurgery and radiation , SVT, chronic pain , LLE DVT on eliquis, 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   Also found to have dick on ckd and hyperkalemia  Cr 3.1 mg/dl on admission, k was 6.2  [place] [facility]    Impression:    DICK likely CRS vs bc of infection / cellulitis. Cr improving. 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 Now stable at 2.5 mg/dl  K is also better with medical management  Low K diet  Lasix 40 mg iv bid  Try veltassa  Inc hydralazine    Thank you very much for allowing me to participate in the care of your patient.  If you have any questions, please do not hesitate to contact me.     Janet Reynaga MD

## 2025-03-12 NOTE — PROGRESS NOTES
[facility] Hospitalist Progress Note     CC: Hospital Follow up    PCP: Diana Lopez DO       Assessment/Plan:     Principal Problem:    Hyperkalemia  Active Problems:    Back pain    Spinal stenosis of lumbar region at multiple levels    Acute on chronic congestive heart failure, unspecified heart failure type (HCC)    Bacterial upper respiratory infection    Thrombocytopenia (HCC)    Azotemia    Cellulitis of left lower extremity    CKD (chronic kidney disease), stage IV (HCC)       Hannah Boss is a 79 year old female with PMH sig for generalized anxiety disorder, CKD stage IV with baseline creatinine around 2, type 2 diabetes, hypertension, rheumatoid arthritis, prior sarcoma status post surgery, chronic back pain, CAD, prior DVT on Eliquis and recent upper GI bleed thought to be due to AVMs with most recent EGD in January 2025 with Dr. High and outpatient hemoglobin of 8.6 who presented increasing dyspnea with exertion and swelling in her legs.  Known chronic venous stasis ulcers.  Patient does not think she has gained any weight, did not report any chest pain to me although has had intermittently over the last 3 days along with a cough.  Workup in the ER with negative troponin, proBNP 10,000 and noted to have worsening renal function.  Also noted to have potassium of 6.2.  Treated in ER and also given Lokelma.  Repeat per renal.        Bilateral lower extremity edema, worse on left with cellulitic changes  - Continue Ancef, patient has had previously similar admissions in the past, monitor response  -Patient afebrile admission, white count normal  - LE doppler negative for DVT  - continue diuresis per renal, IV lasix 40 mg BID     DICK, hyperkalemia, CKD stage IV - improving  - Baseline creatinine seems around 2, on admission 3.17 potassium was 6.2  - likely CRS, continue IV lasix per renal  - Renal diet, Lokelma given, repeat per renal  - Renal consult     Shortness of breath, chest pain atypical, shortness of  breath  2 L of oxygen, appears put on for comfort, no desaturations noted, wean as tolerated, chest x-ray without infiltrate or fluid noted  - Troponin negative, repeat in a.m., can monitor on telemetry  - respiratory viral panel negative  -Last echo in September 2024 with a EF of 60 to 65%, grade 1 diastolic dysfunction, no major valvular disorder noted  - continue IV lasix BID     Mild thrombocytopenia  - Platelets usually in the normal range, mildly low today, repeat in a.m., ruling out viral infection above     Chronic anemia, patient with workup with GI, most recently EGD in January 2025 and has continued on her Eliquis  -Known AVMs, per note on 1/14/2025 6 small AVMs were noted in the duodenum and cauterized, gastritis noted.  - Most recent hemoglobin 8.6, on admission 7.5, no active bleeding reported, repeat in a.m. continue Eliquis for now  -She states somebody stopped her PPI twice a day, will resume      Type 2 diabetes  - Hold orals, insulin sliding scale     Hypertension  - Resume home meds, as needed meds ordered     Acute on chronic lower back pain  - Follows with pain clinic here, currently on Percocet and diazepam per note, however patient does not report diazepam as a home med  - will consult pain service given worsening pain, asking for strong pain meds  - order lumbar spine XR     FEN: Further fluids per renal, lites in a.m., renal diet     PPx: Patient on Eliquis     Dispo: Full code, pending course     Outpatient records reviewed confirming patient's medical history and medications.      Further recommendations pending patient's clinical course.  [facility] hospitalist to continue to follow patient while in house     Patient and/or patient's family given opportunity to ask questions and note understanding and agreeing with therapeutic plan as outlined     Nerissa Allen MD  [facility] Hospitalist     Answering Service number: [phone]     Subjective:     Feels the same, complains of pain.      OBJECTIVE:    Blood pressure 151/54, pulse 69, temperature 98.8 °F (37.1 °C), temperature source Oral, resp. rate 18, weight 182 lb (82.6 kg), SpO2 98%, not currently breastfeeding.    Temp:  [98 °F (36.7 °C)-98.9 °F (37.2 °C)] 98.8 °F (37.1 °C)  Pulse:  [54-72] 69  Resp:  [18-20] 18  BP: (134-192)/(47-68) 151/54  SpO2:  [95 %-99 %] 98 %      Intake/Output:    Intake/Output Summary (Last 24 hours) at 3/12/2025 1252  Last data filed at 3/12/2025 0900  Gross per 24 hour   Intake 800 ml   Output 3101 ml   Net -2301 ml       Last 3 Weights   03/12/25 0034 182 lb (82.6 kg)   03/11/25 0510 190 lb 3.2 oz (86.3 kg)   03/10/25 0623 192 lb 8 oz (87.3 kg)   03/09/25 1808 194 lb (88 kg)   01/13/25 1042 179 lb (81.2 kg)   01/08/25 1302 179 lb (81.2 kg)   12/24/24 0443 179 lb (81.2 kg)   12/23/24 0513 179 lb (81.2 kg)   12/22/24 0547 169 lb 6.4 oz (76.8 kg)   12/21/24 0527 187 lb 11.2 oz (85.1 kg)   12/20/24 1312 200 lb (90.7 kg)   12/20/24 0151 200 lb 8 oz (90.9 kg)   12/19/24 0622 205 lb 1.6 oz (93 kg)   12/18/24 0436 216 lb 11.4 oz (98.3 kg)   12/17/24 1903 222 lb 1.6 oz (100.7 kg)       Exam   GEN: female in NAD  HEENT: EOMI  Pulm: CTAB, no crackles or wheezes  CV: RRR, no murmurs  ABD: Soft, non-tender, non-distended, +BS  SKIN: warm, dry, chronic venous stasis changes BLE, mild erythema LLE- improving  EXT: + edema, more wrinkles in BLE      Data Review:       Labs:     Recent Labs   Lab 03/09/25  1549 03/10/25  0850   RBC 2.75* 2.89*   HGB 7.5* 8.0*   HCT 25.0* 26.2*   MCV 90.9 90.7   MCH 27.3 27.7   MCHC 30.0* 30.5*   RDW 15.6* 15.5*   NEPRELIM 3.19 5.04   WBC 4.4 6.5   .0* 139.0*         Recent Labs   Lab 03/10/25  0850 03/10/25  1414 03/11/25  0922 03/12/25  0816   GLU 85  --  189* 122*   BUN 71*  --  64* 63*   CREATSERUM 2.84*  --  2.55* 2.52*   EGFRCR 17*  --  19* 19*   CA 8.2*  --  8.2* 8.3*     --  141 141   K 6.0* 5.7* 5.5* 5.4*   *  --  114* 113*   CO2 23.0  --  25.0 25.0       Recent Labs    Lab 03/09/25  1549 03/09/25  1844 03/10/25  0850 03/11/25  0922 03/12/25  0816   ALT 43 42  --   --   --    AST 36* 38*  --   --   --    ALB 3.9 3.6 3.6 3.4 3.7         Imaging:  US VENOUS DOPPLER LEG BILAT - DIAG IMG (CPT=93970)    Result Date: 3/10/2025  CONCLUSION:  1. Negative bilateral leg venous duplex examination. 2.  No deep venous thrombosis visualized.    Dictated by (CST): Ambrose Moss MD on 3/10/2025 at 8:38 AM     Finalized by (CST): Ambrose Moss MD on 3/10/2025 at 8:40 AM          XR CHEST AP PORTABLE  (CPT=71045)    Result Date: 3/9/2025  CONCLUSION: No acute cardiopulmonary abnormality.    Dictated by (CST): Rolan Dyer MD on 3/09/2025 at 4:49 PM     Finalized by (CST): Rolan Dyer MD on 3/09/2025 at 4:49 PM             Meds:     INPATIENT MEDICATIONS    Scheduled Medications:      furosemide, 40 mg, BID (Diuretic)  insulin aspart, 1-5 Units, TID CC  atorvastatin, 40 mg, Nightly  cholecalciferol, 2,000 Units, Daily  dilTIAZem ER, 240 mg, Daily  apixaban, 5 mg, BID  ferrous sulfate, 325 mg, Daily with breakfast  hydrALAZINE, 25 mg, BID  isosorbide mononitrate ER, 60 mg, Daily  magnesium oxide, 400 mg, Daily  pantoprazole, 40 mg, BID AC            Drips:       PRN Medications  gabapentin, 300 mg, Q12H PRN  glucose, 15 g, Q15 Min PRN   Or  glucose, 15 g, Q15 Min PRN   Or  glucose-vitamin C, 4 tablet, Q15 Min PRN   Or  dextrose, 50 mL, Q15 Min PRN   Or  glucose, 30 g, Q15 Min PRN   Or  glucose, 30 g, Q15 Min PRN   Or  glucose-vitamin C, 8 tablet, Q15 Min PRN  acetaminophen, 500 mg, Q4H PRN  ondansetron, 4 mg, Q6H PRN  metoclopramide, 5 mg, Q8H PRN  polyethylene glycol (PEG 3350), 17 g, Daily PRN  sennosides, 17.2 mg, Nightly PRN  bisacodyl, 10 mg, Daily PRN  glycerin-hypromellose-, 1 drop, QID PRN  sodium chloride, 1 spray, Q3H PRN  meclizine, 25 mg, TID PRN  oxyCODONE-acetaminophen, 1 tablet, Q4H PRN  hydrALAzine, 10 mg, Q4H PRN

## 2025-03-12 NOTE — PAYOR COMM NOTE
--------------  CONTINUED STAY REVIEW    Payor: [employer]  Subscriber #:  [id number]  Authorization Number: [id number]    Admit date: 3/9/25  Admit time:  5:50 PM    PLEASE FAX APPROVAL FOR DAYS. THANK YOU.         3/12/25        Assessment/Plan:     Hannah Boss is a 79 year old female with PMH sig for generalized anxiety disorder, CKD stage IV with baseline creatinine around 2, type 2 diabetes, hypertension, rheumatoid arthritis, prior sarcoma status post surgery, chronic back pain, CAD, prior DVT on Eliquis and recent upper GI bleed thought to be due to AVMs with most recent EGD in January 2025 with Dr. High and outpatient hemoglobin of 8.6 who presented increasing dyspnea with exertion and swelling in her legs.  Known chronic venous stasis ulcers.  Patient does not think she has gained any weight, did not report any chest pain to me although has had intermittently over the last 3 days along with a cough.  Workup in the ER with negative troponin, proBNP 10,000 and noted to have worsening renal function.  Also noted to have potassium of 6.2.  Treated in ER and also given Lokelma.  Repeat per renal.        Bilateral lower extremity edema, worse on left with cellulitic changes  - Continue Ancef, patient has had previously similar admissions in the past, monitor response  -Patient afebrile admission, white count normal  - LE doppler negative for DVT  - continue diuresis per renal, IV lasix 40 mg BID     DICK, hyperkalemia, CKD stage IV - improving  - Baseline creatinine seems around 2, on admission 3.17 potassium was 6.2  - likely CRS, continue IV lasix per renal  - Renal diet, Lokelma given, repeat per renal  - Renal consult     Shortness of breath, chest pain atypical, shortness of breath  2 L of oxygen, appears put on for comfort, no desaturations noted, wean as tolerated, chest x-ray without infiltrate or fluid noted  - Troponin negative, repeat in a.m., can monitor on telemetry  - respiratory viral panel  negative  -Last echo in September 2024 with a EF of 60 to 65%, grade 1 diastolic dysfunction, no major valvular disorder noted  - continue IV lasix BID     Mild thrombocytopenia  - Platelets usually in the normal range, mildly low today, repeat in a.m., ruling out viral infection above     Chronic anemia, patient with workup with GI, most recently EGD in January 2025 and has continued on her Eliquis  -Known AVMs, per note on 1/14/2025 6 small AVMs were noted in the duodenum and cauterized, gastritis noted.  - Most recent hemoglobin 8.6, on admission 7.5, no active bleeding reported, repeat in a.m. continue Eliquis for now  -She states somebody stopped her PPI twice a day, will resume      Type 2 diabetes  - Hold orals, insulin sliding scale     Hypertension  - Resume home meds, as needed meds ordered     Acute on chronic lower back pain  - Follows with pain clinic here, currently on Percocet and diazepam per note, however patient does not report diazepam as a home med  - will consult pain service given worsening pain, asking for strong pain meds  - order lumbar spine XR     FEN: Further fluids per renal, lites in a.m., renal diet     PPx: Patient on Eliquis      Subjective:    Feels the same, complains of pain.         Temp:  [98 °F (36.7 °C)-98.9 °F (37.2 °C)] 98.8 °F (37.1 °C)  Pulse:  [54-72] 69  Resp:  [18-20] 18  BP: (134-192)/(47-68) 151/54  SpO2:  [95 %-99 %] 98 %        CV: RRR, no murmurs  ABD: Soft, non-tender, non-distended, +BS  SKIN: warm, dry, chronic venous stasis changes BLE, mild erythema LLE- improving  EXT: + edema, more wrinkles in BLE     Lab 03/09/25  1549 03/10/25  0850   RBC 2.75* 2.89*   HGB 7.5* 8.0*   HCT 25.0* 26.2*   MCV 90.9 90.7   MCH 27.3 27.7   MCHC 30.0* 30.5*   RDW 15.6* 15.5*   NEPRELIM 3.19 5.04   WBC 4.4 6.5   .0* 139.0*      Lab 03/10/25  0850 03/10/25  1414 03/11/25  0922 03/12/25  0816   GLU 85  --  189* 122*   BUN 71*  --  64* 63*   CREATSERUM 2.84*  --  2.55* 2.52*    EGFRCR 17*  --  19* 19*   CA 8.2*  --  8.2* 8.3*     --  141 141   K 6.0* 5.7* 5.5* 5.4*   *  --  114* 113*   CO2 23.0  --  25.0 25.0      Lab 03/09/25  1549 03/09/25  1844 03/10/25  0850 03/11/25  0922 03/12/25  0816   ALT 43 42  --   --   --    AST 36* 38*  --   --   --    ALB 3.9 3.6 3.6 3.4 3.7         Scheduled Medications:                                          furosemide, 40 mg, BID (Diuretic)  insulin aspart, 1-5 Units, TID CC  atorvastatin, 40 mg, Nightly  cholecalciferol, 2,000 Units, Daily  dilTIAZem ER, 240 mg, Daily  apixaban, 5 mg, BID  ferrous sulfate, 325 mg, Daily with breakfast  hydrALAZINE, 25 mg, BID  isosorbide mononitrate ER, 60 mg, Daily  magnesium oxide, 400 mg, Daily  pantoprazole, 40 mg, BID AC                         MEDICATIONS ADMINISTERED IN LAST 1 DAY:  apixaban (Eliquis) tab 5 mg       Date Action Dose Route User    3/12/2025 0847 Given 5 mg Oral Nhi Karimi RN    3/11/2025 2013 Given 5 mg Oral Rika Jimenez RN          atorvastatin (Lipitor) tab 40 mg       Date Action Dose Route User    3/11/2025 2013 Given 40 mg Oral Rika Jimenez RN          dilTIAZem ER (CardIZEM CD) 24 hr cap 240 mg       Date Action Dose Route User    3/12/2025 0847 Given 240 mg Oral Nhi Karimi RN          ferrous sulfate DR tab 325 mg       Date Action Dose Route User    3/12/2025 0847 Given 325 mg Oral Nhi Karimi RN          furosemide (Lasix) 10 mg/mL injection 40 mg       Date Action Dose Route User    3/12/2025 1027 Given 40 mg Intravenous Nhi Karimi RN          furosemide (Lasix) 10 mg/mL injection 40 mg       Date Action Dose Route User    3/11/2025 2049 Given 40 mg Intravenous Rika Jimenez RN          gabapentin (Neurontin) cap 300 mg       Date Action Dose Route User    3/11/2025 2013 Given 300 mg Oral Rika Jimenez, RN          hydrALAZINE (Apresoline) tab 25 mg       Date Action Dose Route User    3/12/2025 0847 Given 25 mg Oral Sachi,  ELLIE Hankins    3/11/2025 2013 Given 25 mg Oral Rika Jimenez RN          isosorbide mononitrate ER (Imdur) 24 hr tab 60 mg       Date Action Dose Route User    3/12/2025 0847 Given 60 mg Oral Nhi Karimi RN          magnesium oxide (Mag-Ox) tab 400 mg       Date Action Dose Route User    3/12/2025 0847 Given 400 mg Oral Nhi Karimi RN          oxyCODONE-acetaminophen (Percocet)  MG per tab 1 tablet       Date Action Dose Route User    3/12/2025 0851 Given 1 tablet Oral Nhi Karimi RN    3/12/2025 0035 Given 1 tablet Oral Rika Jimenez RN          pantoprazole (Protonix) DR tab 40 mg       Date Action Dose Route User    3/12/2025 0507 Given 40 mg Oral Rika Jimenez RN    3/11/2025 1725 Given 40 mg Oral Kaela Wu RN          cholecalciferol (Vitamin D3) tab 2,000 Units       Date Action Dose Route User    3/12/2025 0847 Given 2,000 Units Oral Nhi Karimi RN            Vitals (last day)       Date/Time Temp Pulse Resp BP SpO2 Weight O2 Device O2 Flow Rate (L/min) [facility]    03/12/25 0846 98.8 °F (37.1 °C) 69 18 151/54 98 % -- None (Room air) -- AR    03/12/25 0313 98.5 °F (36.9 °C) 63 18 165/61 95 % -- None (Room air) --     03/12/25 0034 98.9 °F (37.2 °C) 65 20 192/68 99 % -- None (Room air) --     03/11/25 2012 98.5 °F (36.9 °C) 72 20 158/64 97 % -- None (Room air) --     03/11/25 1722 98 °F (36.7 °C) 67 18 158/64 98 % -- None (Room air) --     03/11/25 0818 98.8 °F (37.1 °C) 82 20 168/52 99 % -- None (Room air) --     03/11/25 0216 98.8 °F (37.1 °C) 64 16 161/56 94 % -- None (Room air) -- ADALID

## 2025-03-12 NOTE — CM/SW NOTE
09:45AM  Clinical updates sent to [facility] via [facility].    DANIAL spoke to Manolo whitt/ [employer] - [facility] set on WILL CALL through 3/14. OOP quote is $45 - pt will need to be made aware of cost to be billed at a later date. PCS completed and will need date added day of actual DC.    11:20AM  Received message from ying whitt/ [facility] - insurance is offering a Peer to Peer. Phone # for MD provided by ying. Per Ying's message, insurance stating MD to call \"within the next 4 hours\". DANIAL inquired if there was a specific date/time for deadline.    Below information sent to Dr. Allen via Epic chat & Perfect Serve:    P2P:   MD to call: [phone] and I was told \"within the next 4 hours\"     01:20PM  Received call from ying Diaz - insurance auth approved.   MD updated.    PLAN: [facility]DON on WC, PCS needs date - pending med clear        SW/RAJ to remain available for support and/or discharge planning.         Chloe, MSW, LSW [id number]

## 2025-03-12 NOTE — PLAN OF CARE
Patient is alert and oriented. Patient sat in the chair for dinner. IV lasix BID and Velt[place] were given as ordered. Dressing was changed. Chair alarm is on and call light is within place.     Problem: Patient Centered Care  Goal: Patient preferences are identified and integrated in the patient's plan of care  Description: Interventions:  - What would you like us to know as we care for you? From home with son   - Provide timely, complete, and accurate information to patient/family  - Incorporate patient and family knowledge, values, beliefs, and cultural backgrounds into the planning and delivery of care  - Encourage patient/family to participate in care and decision-making at the level they choose  - Honor patient and family perspectives and choices  Outcome: Progressing     Problem: Diabetes/Glucose Control  Goal: Glucose maintained within prescribed range  Description: INTERVENTIONS:  - Monitor Blood Glucose as ordered  - Assess for signs and symptoms of hyperglycemia and hypoglycemia  - Administer ordered medications to maintain glucose within target range  - Assess barriers to adequate nutritional intake and initiate nutrition consult as needed  - Instruct patient on self management of diabetes  Outcome: Progressing     Problem: Patient/Family Goals  Goal: Patient/Family Long Term Goal  Description: Patient's Long Term Goal: to go back home    Interventions:  - follow medical regimen  - See additional Care Plan goals for specific interventions  Outcome: Progressing  Goal: Patient/Family Short Term Goal  Description: Patient's Short Term Goal: to not have chest pain    Interventions:   - follow medical regimen  - See additional Care Plan goals for specific interventions  Outcome: Progressing     Problem: CARDIOVASCULAR - ADULT  Goal: Maintains optimal cardiac output and hemodynamic stability  Description: INTERVENTIONS:  - Monitor vital signs, rhythm, and trends  - Monitor for bleeding, hypotension and signs of  decreased cardiac output  - Evaluate effectiveness of vasoactive medications to optimize hemodynamic stability  - Monitor arterial and/or venous puncture sites for bleeding and/or hematoma  - Assess quality of pulses, skin color and temperature  - Assess for signs of decreased coronary artery perfusion - ex. Angina  - Evaluate fluid balance, assess for edema, trend weights  Outcome: Progressing  Goal: Absence of cardiac arrhythmias or at baseline  Description: INTERVENTIONS:  - Continuous cardiac monitoring, monitor vital signs, obtain 12 lead EKG if indicated  - Evaluate effectiveness of antiarrhythmic and heart rate control medications as ordered  - Initiate emergency measures for life threatening arrhythmias  - Monitor electrolytes and administer replacement therapy as ordered  Outcome: Progressing     Problem: RESPIRATORY - ADULT  Goal: Achieves optimal ventilation and oxygenation  Description: INTERVENTIONS:  - Assess for changes in respiratory status  - Assess for changes in mentation and behavior  - Position to facilitate oxygenation and minimize respiratory effort  - Oxygen supplementation based on oxygen saturation or ABGs  - Provide Smoking Cessation handout, if applicable  - Encourage broncho-pulmonary hygiene including cough, deep breathe, Incentive Spirometry  - Assess the need for suctioning and perform as needed  - Assess and instruct to report SOB or any respiratory difficulty  - Respiratory Therapy support as indicated  - Manage/alleviate anxiety  - Monitor for signs/symptoms of CO2 retention  Outcome: Progressing     Problem: METABOLIC/FLUID AND ELECTROLYTES - ADULT  Goal: Glucose maintained within prescribed range  Description: INTERVENTIONS:  - Monitor Blood Glucose as ordered  - Assess for signs and symptoms of hyperglycemia and hypoglycemia  - Administer ordered medications to maintain glucose within target range  - Assess barriers to adequate nutritional intake and initiate nutrition consult  as needed  - Instruct patient on self management of diabetes  Outcome: Progressing  Goal: Electrolytes maintained within normal limits  Description: INTERVENTIONS:  - Monitor labs and rhythm and assess patient for signs and symptoms of electrolyte imbalances  - Administer electrolyte replacement as ordered  - Monitor response to electrolyte replacements, including rhythm and repeat lab results as appropriate  - Fluid restriction as ordered  - Instruct patient on fluid and nutrition restrictions as appropriate  Outcome: Progressing  Goal: Hemodynamic stability and optimal renal function maintained  Description: INTERVENTIONS:  - Monitor labs and assess for signs and symptoms of volume excess or deficit  - Monitor intake, output and patient weight  - Monitor urine specific gravity, serum osmolarity and serum sodium as indicated or ordered  - Monitor response to interventions for patient's volume status, including labs, urine output, blood pressure (other measures as available)  - Encourage oral intake as appropriate  - Instruct patient on fluid and nutrition restrictions as appropriate  Outcome: Progressing     Problem: MUSCULOSKELETAL - ADULT  Goal: Return mobility to safest level of function  Description: INTERVENTIONS:  - Assess patient stability and activity tolerance for standing, transferring and ambulating w/ or w/o assistive devices  - Assist with transfers and ambulation using safe patient handling equipment as needed  - Ensure adequate protection for wounds/incisions during mobilization  - Obtain PT/OT consults as needed  - Advance activity as appropriate  - Communicate ordered activity level and limitations with patient/family  Outcome: Progressing  Goal: Maintain proper alignment of affected body part  Description: INTERVENTIONS:  - Support and protect limb and body alignment per provider's orders  - Instruct and reinforce with patient and family use of appropriate assistive device and precautions (e.g.  spinal or hip dislocation precautions)  Outcome: Progressing     Problem: PAIN - ADULT  Goal: Verbalizes/displays adequate comfort level or patient's stated pain goal  Description: INTERVENTIONS:  - Encourage pt to monitor pain and request assistance  - Assess pain using appropriate pain scale  - Administer analgesics based on type and severity of pain and evaluate response  - Implement non-pharmacological measures as appropriate and evaluate response  - Consider cultural and social influences on pain and pain management  - Manage/alleviate anxiety  - Utilize distraction and/or relaxation techniques  - Monitor for opioid side effects  - Notify MD/LIP if interventions unsuccessful or patient reports new pain  - Anticipate increased pain with activity and pre-medicate as appropriate  Outcome: Progressing     Problem: SAFETY ADULT - FALL  Goal: Free from fall injury  Description: INTERVENTIONS:  - Assess pt frequently for physical needs  - Identify cognitive and physical deficits and behaviors that affect risk of falls.  - [place] fall precautions as indicated by assessment.  - Educate pt/family on patient safety including physical limitations  - Instruct pt to call for assistance with activity based on assessment  - Modify environment to reduce risk of injury  - Provide assistive devices as appropriate  - Consider OT/PT consult to assist with strengthening/mobility  - Encourage toileting schedule  Outcome: Progressing     Problem: DISCHARGE PLANNING  Goal: Discharge to home or other facility with appropriate resources  Description: INTERVENTIONS:  - Identify barriers to discharge w/pt and caregiver  - Include patient/family/discharge partner in discharge planning  - Arrange for needed discharge resources and transportation as appropriate  - Identify discharge learning needs (meds, wound care, etc)  - Arrange for interpreters to assist at discharge as needed  - Consider post-discharge preferences of  patient/family/discharge partner  - Complete POLST form as appropriate  - Assess patient's ability to be responsible for managing their own health  - Refer to Case Management Department for coordinating discharge planning if the patient needs post-hospital services based on physician/LIP order or complex needs related to functional status, cognitive ability or social support system  Outcome: Progressing
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
## 2025-03-13 LAB
ALBUMIN SERPL-MCNC: 3.5 G/DL (ref 3.2–4.8)
ANION GAP SERPL CALC-SCNC: 2 MMOL/L (ref 0–18)
BUN BLD-MCNC: 68 MG/DL (ref 9–23)
BUN/CREAT SERPL: 27.5 (ref 10–20)
CALCIUM BLD-MCNC: 8.2 MG/DL (ref 8.7–10.4)
CHLORIDE SERPL-SCNC: 112 MMOL/L (ref 98–112)
CO2 SERPL-SCNC: 27 MMOL/L (ref 21–32)
CREAT BLD-MCNC: 2.47 MG/DL
EGFRCR SERPLBLD CKD-EPI 2021: 20 ML/MIN/1.73M2 (ref 60–?)
GLUCOSE BLD-MCNC: 93 MG/DL (ref 70–99)
GLUCOSE BLDC GLUCOMTR-MCNC: 105 MG/DL (ref 70–99)
GLUCOSE BLDC GLUCOMTR-MCNC: 156 MG/DL (ref 70–99)
GLUCOSE BLDC GLUCOMTR-MCNC: 176 MG/DL (ref 70–99)
GLUCOSE BLDC GLUCOMTR-MCNC: 209 MG/DL (ref 70–99)
OSMOLALITY SERPL CALC.SUM OF ELEC: 311 MOSM/KG (ref 275–295)
PHOSPHATE SERPL-MCNC: 4.1 MG/DL (ref 2.4–5.1)
POTASSIUM SERPL-SCNC: 5.4 MMOL/L (ref 3.5–5.1)
SODIUM SERPL-SCNC: 141 MMOL/L (ref 136–145)

## 2025-03-13 PROCEDURE — 99232 SBSQ HOSP IP/OBS MODERATE 35: CPT | Performed by: INTERNAL MEDICINE

## 2025-03-13 RX ORDER — FUROSEMIDE 10 MG/ML
40 INJECTION INTRAMUSCULAR; INTRAVENOUS ONCE
Status: COMPLETED | OUTPATIENT
Start: 2025-03-13 | End: 2025-03-13

## 2025-03-13 RX ORDER — FLUDROCORTISONE ACETATE 0.1 MG/1
0.1 TABLET ORAL ONCE
Status: COMPLETED | OUTPATIENT
Start: 2025-03-13 | End: 2025-03-13

## 2025-03-13 NOTE — PAYOR COMM NOTE
--------------  CONTINUED STAY REVIEW    Payor: LAUREEN ARTEAGA O  Subscriber #:  [id number]  Authorization Number: [id number]    Admit date: 3/9/25  Admit time:  5:50 PM    3/13/25          Assessment/Plan:      Hannah Boss is a 79 year old female with PMH sig for generalized anxiety disorder, CKD stage IV with baseline creatinine around 2, type 2 diabetes, hypertension, rheumatoid arthritis, prior sarcoma status post surgery, chronic back pain, CAD, prior DVT on Eliquis and recent upper GI bleed thought to be due to AVMs with most recent EGD in January 2025 with Dr. High and outpatient hemoglobin of 8.6 who presented increasing dyspnea with exertion and swelling in her legs.  Known chronic venous stasis ulcers.  Patient does not think she has gained any weight, did not report any chest pain to me although has had intermittently over the last 3 days along with a cough.  Workup in the ER with negative troponin, proBNP 10,000 and noted to have worsening renal function.  Also noted to have potassium of 6.2.  Treated in ER and also given Lokelma. Continuing IV lasix BID per renal.      Bilateral lower extremity edema, worse on left with cellulitic changes  - Continue Ancef, patient has had previously similar admissions in the past, monitor response  -Patient afebrile admission, white count normal  - LE doppler negative for DVT  - continue diuresis per renal, IV lasix 40 mg BID     DICK, hyperkalemia, CKD stage IV - improving  - Baseline creatinine seems around 2, on admission 3.17 potassium was 6.2  - likely CRS, continue IV lasix per renal  - Renal diet, Lokelma given, repeat per renal  - Renal consult     Shortness of breath, chest pain atypical, shortness of breath  2 L of oxygen, appears put on for comfort, no desaturations noted, wean as tolerated, chest x-ray without infiltrate or fluid noted  - Troponin negative, repeat in a.m., can monitor on telemetry  - respiratory viral panel negative  -Last echo in September 2024  with a EF of 60 to 65%, grade 1 diastolic dysfunction, no major valvular disorder noted  - continue IV lasix BID     Mild thrombocytopenia  - Platelets usually in the normal range, mildly low today, repeat in a.m., ruling out viral infection above     Chronic anemia, patient with workup with GI, most recently EGD in January 2025 and has continued on her Eliquis  -Known AVMs, per note on 1/14/2025 6 small AVMs were noted in the duodenum and cauterized, gastritis noted.  - Most recent hemoglobin 8.6, on admission 7.5, no active bleeding reported, repeat in a.m. continue Eliquis for now  -She states somebody stopped her PPI twice a day, will resume      Type 2 diabetes  - Hold orals, insulin sliding scale     Hypertension  - Resume home meds, as needed meds ordered     Acute on chronic lower back pain  - Follows with pain clinic here, currently on Percocet and diazepam per note, however patient does not report diazepam as a home med  - will consult pain service given worsening pain, asking for strong pain meds  - order lumbar spine XR     FEN: Further fluids per renal, lites in a.m., renal diet     PPx: Patient on Eliquis      Subjective:    Legs a little less swollen. Pain about the same.        Temp:  [98.1 °F (36.7 °C)-99 °F (37.2 °C)] 98.8 °F (37.1 °C)  Pulse:  [54-67] 66  Resp:  [16-18] 18  BP: (128-155)/(41-58) 128/44  SpO2:  [94 %-97 %] 94 %      HEENT: EOMI  Pulm: CTAB, no crackles or wheezes  CV: RRR, no murmurs  ABD: Soft, non-tender, non-distended, +BS  SKIN: warm, dry, chronic venous stasis changes BLE, mild erythema LLE- improving  EXT: + edema, more wrinkles in BLE     Lab 03/09/25  1549 03/10/25  0850   RBC 2.75* 2.89*   HGB 7.5* 8.0*   HCT 25.0* 26.2*   MCV 90.9 90.7   MCH 27.3 27.7   MCHC 30.0* 30.5*   RDW 15.6* 15.5*   NEPRELIM 3.19 5.04   WBC 4.4 6.5   .0* 139.0*      Lab 03/11/25  0922 03/12/25  0816 03/13/25  0518   * 122* 93   BUN 64* 63* 68*   CREATSERUM 2.55* 2.52* 2.47*   EGFRCR  19* 19* 20*   CA 8.2* 8.3* 8.2*    141 141   K 5.5* 5.4* 5.4*   * 113* 112   CO2 25.0 25.0 27.0      Lab 03/09/25  1549 03/09/25  1844 03/10/25  0850 03/11/25  0922 03/12/25  0816 03/13/25  0518   ALT 43 42  --   --   --   --    AST 36* 38*  --   --   --   --    ALB 3.9 3.6 3.6 3.4 3.7 3.5                     MEDICATIONS ADMINISTERED IN LAST 1 DAY:  apixaban (Eliquis) tab 5 mg       Date Action Dose Route User    3/13/2025 1036 Given 5 mg Oral Marivel Torres RN    3/12/2025 2010 Given 5 mg Oral Rosetta Baer RN          atorvastatin (Lipitor) tab 40 mg       Date Action Dose Route User    3/12/2025 2010 Given 40 mg Oral Rosetta Baer RN          dilTIAZem ER (CardIZEM CD) 24 hr cap 240 mg       Date Action Dose Route User    3/13/2025 1036 Given 240 mg Oral Marivel Torres RN          ferrous sulfate DR tab 325 mg       Date Action Dose Route User    3/13/2025 0814 Given 325 mg Oral Marivel Torres RN          furosemide (Lasix) 10 mg/mL injection 40 mg       Date Action Dose Route User    3/13/2025 1037 Given 40 mg Intravenous Marivel Torres RN    3/12/2025 1705 Given 40 mg Intravenous Nhi Karimi RN          gabapentin (Neurontin) cap 300 mg       Date Action Dose Route User    3/13/2025 1234 Given 300 mg Oral Marivel Torres RN    3/12/2025 2205 Given 300 mg Oral Rosetta Baer RN          hydrALAZINE (Apresoline) tab 25 mg       Date Action Dose Route User    3/13/2025 1036 Given 25 mg Oral Marivel Torres RN    3/12/2025 2010 Given 25 mg Oral Rosetta Baer RN    3/12/2025 1704 Given 25 mg Oral Nhi Karimi RN          insulin aspart (NovoLOG) 100 Units/mL FlexPen 1-5 Units       Date Action Dose Route User    3/13/2025 1323 Given 1 Units Subcutaneous (Right Upper Arm) Marivel Torres RN          isosorbide mononitrate ER (Imdur) 24 hr tab 60 mg       Date Action Dose Route User    3/13/2025 1036 Given 60 mg Oral Marivel Torres, RN           magnesium oxide (Mag-Ox) tab 400 mg       Date Action Dose Route User    3/13/2025 1036 Given 400 mg Oral Marivel Torres RN          oxyCODONE-acetaminophen (Percocet)  MG per tab 1 tablet       Date Action Dose Route User    3/13/2025 1036 Given 1 tablet Oral Marivel Torres RN    3/13/2025 0513 Given 1 tablet Oral Rosetta Baer RN    3/12/2025 2205 Given 1 tablet Oral Rosetta Baer RN    3/12/2025 1705 Given 1 tablet Oral Nhi Karimi RN          pantoprazole (Protonix) DR tab 40 mg       Date Action Dose Route User    3/13/2025 0513 Given 40 mg Oral Rosetta Baer RN    3/12/2025 1705 Given 40 mg Oral Nhi Karimi RN          patiromer (Veltassa) 8.4 g oral packet 8.4 g       Date Action Dose Route User    3/12/2025 1719 Given 8.4 g Oral Nhi Karimi RN          sodium zirconium cyclosilicate (Lokelma) oral packet 10 g       Date Action Dose Route User    3/13/2025 0850 Given 10 g Oral Marivel Torres RN          cholecalciferol (Vitamin D3) tab 2,000 Units       Date Action Dose Route User    3/13/2025 1036 Given 2,000 Units Oral Marivel Torres RN            Vitals (last day)       Date/Time Temp Pulse Resp BP SpO2 Weight O2 Device O2 Flow Rate (L/min) [facility]    03/13/25 1035 -- 66 -- 128/44 -- -- -- --     03/13/25 0807 98.8 °F (37.1 °C) 55 18 151/47 94 % -- None (Room air) --     03/13/25 0510 98.1 °F (36.7 °C) 59 18 155/58 96 % -- None (Room air) --     03/13/25 0300 -- 54 -- -- -- -- -- -- EL    03/12/25 2009 99 °F (37.2 °C) 65 16 137/41 97 % -- None (Room air) --     03/12/25 1900 -- 67 -- -- -- -- -- -- EL    03/12/25 1659 98.7 °F (37.1 °C) 57 18 138/54 97 % -- None (Room air) -- AR    03/12/25 0846 98.8 °F (37.1 °C) 69 18 151/54 98 % -- None (Room air) -- AR    03/12/25 0313 98.5 °F (36.9 °C) 63 18 165/61 95 % -- None (Room air) -- JHENNY    03/12/25 0034 98.9 °F (37.2 °C) 65 20 192/68 99 % -- None (Room air) -- ADALID

## 2025-03-13 NOTE — PHYSICAL THERAPY NOTE
PHYSICAL THERAPY TREATMENT NOTE - INPATIENT     Room Number: 322/322-A       Presenting Problem: shortness of breath, chest pain, hyperkalemia       Problem List  Principal Problem:    Hyperkalemia  Active Problems:    Back pain    Spinal stenosis of lumbar region at multiple levels    Acute on chronic congestive heart failure, unspecified heart failure type (HCC)    Bacterial upper respiratory infection    Thrombocytopenia (HCC)    Azotemia    Cellulitis of left lower extremity    CKD (chronic kidney disease), stage IV ([facility])      PHYSICAL THERAPY ASSESSMENT   Patient demonstrates fair progress this session, goals  remain in progress.      Patient is requiring contact guard assist as a result of the following impairments: decreased functional strength, decreased endurance/aerobic capacity, pain, impaired standing balance, and decreased muscular endurance.     Patient continues to function below baseline with transfers, gait, standing prolonged periods, and decreased functional activity tolerance .  Next session anticipate patient to progress bed mobility, transfers, gait, stair negotiation, standing prolonged periods, and functional activity tolerance .  Physical Therapy will continue to follow patient for duration of hospitalization.    Patient continues to benefit from continued skilled PT services: to promote return to prior level of function and safety with continuous assistance and gradual rehabilitative therapy .    PLAN DURING HOSPITALIZATION  Nursing Mobility Recommendation : 1 Assist  PT Device Recommendation: Rolling walker, Gait belt  PT Treatment Plan: Bed mobility, Endurance, Energy conservation, Patient education, Family education, Gait training, Strengthening, Stair training, Transfer training, Balance training  Frequency (Obs): 3-5x/week     SUBJECTIVE  \"My stomach is hurting.\"     OBJECTIVE  Precautions: Bed/chair alarm    WEIGHT BEARING RESTRICTION       PAIN ASSESSMENT   Ratin  Location: \"my  stomack, back and legs\"  Management Techniques: Activity promotion, Repositioning, Relaxation    BALANCE  Static Sitting: Good  Dynamic Sitting: Fair +  Static Standing: Fair -  Dynamic Standing: Fair -    ACTIVITY TOLERANCE                          O2 WALK       AM-PAC '6-Clicks' INPATIENT SHORT FORM - BASIC MOBILITY  How much difficulty does the patient currently have...  Patient Difficulty: Turning over in bed (including adjusting bedclothes, sheets and blankets)?: A Little   Patient Difficulty: Sitting down on and standing up from a chair with arms (e.g., wheelchair, bedside commode, etc.): A Little   Patient Difficulty: Moving from lying on back to sitting on the side of the bed?: A Little   How much help from another person does the patient currently need...   Help from Another: Moving to and from a bed to a chair (including a wheelchair)?: A Little   Help from Another: Need to walk in hospital room?: A Little   Help from Another: Climbing 3-5 steps with a railing?: A Lot     AM-PAC Score:  Raw Score: 17   Approx Degree of Impairment: 50.57%   Standardized Score (AM-PAC Scale): 42.13   CMS Modifier (G-Code): CK    FUNCTIONAL ABILITY STATUS  Functional Mobility/Gait Assessment  Gait Assistance: Contact guard assist  Distance (ft): 45  Assistive Device: Rolling walker  Pattern: Shuffle (verbal cuing for proximity to the RW for safety)  Rolling:  NT  Supine to Sit:  NT  Sit to Supine:  NT  Sit to Stand: contact guard assist    Skilled Therapy Provided: Patient presents to PT sitting up in the bedside chair with BLE's propped and chair alarm donned. Patient agreeable to therapy treatment session. Patient reports pain as 7/10 in her stomach, BLE's and back. Patient continues to require CGA for sit <> stand transfers to RW and short distance ambulation using a RW within the halls, limited by stomach pain today. Patient also requires verbal cuing for proximity to her RW for safety. Patient returned to sitting in the  recliner and demonstrates limited tolerance for seated BLE therex (marches, LAQ's, ankle pumps and quad sets) only completing 2-3 reps and self limiting due to back pain. Patient will continue to benefit from skilled IP PT to progress her strength, mobility, gait and overall functional activity tolerance for progression towards regaining her baseline function.     The patient's Approx Degree of Impairment: 50.57% has been calculated based on documentation in the [facility] '6 clicks' Inpatient Daily Activity Short Form.  Research supports that patients with this level of impairment may benefit from gradual rehab.  Final disposition will be made by interdisciplinary medical team.    THERAPEUTIC EXERCISES  Lower Extremity Alternating marching  Ankle pumps  LAQ  Quad sets     Position Supported sitting       Patient End of Session: Up in chair, Needs met, Call light within reach, RN aware of session/findings, All patient questions and concerns addressed, Hospital anti-slip socks, Alarm set    CURRENT GOALS   Patient Goal Patient's self-stated goal is: return to PLOF   Goal #1 Patient is able to demonstrate supine - sit EOB @ level: independent      Goal #1   Current Status  NT 3/13   Goal #2 Patient is able to demonstrate transfers Sit to/from Stand at assistance level: supervision with walker - rolling      Goal #2  Current Status  CGA 3/13 - progressing   Goal #3 Patient is able to ambulate 100 feet with assist device: walker - rolling at assistance level: CGA    Goal #3   Current Status  x45 ft., CGA with RW and verbal cues 3/13   Goal #4 Patient will negotiate one curb w/ assistive device and CGA   Goal #4   Current Status  NT 3/13   Goal #5 Patient to demonstrate independence with home activity/exercise instructions provided to patient in preparation for discharge.   Goal #5   Current Status  progressing 3/13     Gait Training: 15 minutes  Therapeutic Activity: 0 minutes  Neuromuscular Re-education: 0  minutes  Therapeutic Exercise: 3 minutes  Canalith Repositionin minutes  Manual Therapy: 0 minutes  Can add/delete any of these

## 2025-03-13 NOTE — PROGRESS NOTES
[facility]  part of Merged with [facility]    Progress Note    Hannah Boss Patient Status:  Inpatient    1945 MRN [id number]   Location [facility] 3W/SW Attending Nerissa Allen MD   Hosp Day # 4 PCP Diana Lopez DO       Subjective:   Hannah Boss is a(n) 79 year old female who I am seeing for DICK/CKD IV/hyperkaelmia    No new issues  Feeling better  States she ran out of her bumex 2 weeks back and then been getting swollen    Objective:   /44 (BP Location: Right arm)   Pulse 66   Temp 98.8 °F (37.1 °C) (Oral)   Resp 18   Wt 182 lb (82.6 kg)   SpO2 94%   BMI 32.24 kg/m²      Intake/Output Summary (Last 24 hours) at 3/13/2025 1438  Last data filed at 3/13/2025 1245  Gross per 24 hour   Intake 690 ml   Output 2002 ml   Net -1312 ml     Wt Readings from Last 1 Encounters:   25 182 lb (82.6 kg)       Exam  Vital signs: Blood pressure 128/44, pulse 66, temperature 98.8 °F (37.1 °C), temperature source Oral, resp. rate 18, weight 182 lb (82.6 kg), SpO2 94%, not currently breastfeeding.    General: No acute distress. Alert and oriented x 3.  HEENT: Moist mucous membranes. EOMI. PERRLA  Neck:  No JVD.   Respiratory: Clear to auscultation bilaterally.    Cardiovascular: S1, S2.  Regular rate and rhythm.   Abdomen: Soft, nontender, nondistended.    Neurologic: No focal neurological deficits.  Musculoskeletal: Full range of motion of all extremities.  ++ edema.  Access:    Results:     Recent Labs   Lab 25  1549 03/10/25  0850   RBC 2.75* 2.89*   HGB 7.5* 8.0*   HCT 25.0* 26.2*   MCV 90.9 90.7   MCH 27.3 27.7   MCHC 30.0* 30.5*   RDW 15.6* 15.5*   NEPRELIM 3.19 5.04   WBC 4.4 6.5   .0* 139.0*         Recent Labs   Lab 25  0922 25  0816 25  0518   * 122* 93   BUN 64* 63* 68*   CREATSERUM 2.55* 2.52* 2.47*   CA 8.2* 8.3* 8.2*    141 141   K 5.5* 5.4* 5.4*   * 113* 112   CO2 25.0 25.0 27.0          No results  found.    Assessment and Plan:     79-year-old female with history of CKD stage III/IV , diabetes, hypertension, leiomyosarcoma status postsurgery and radiation , SVT, chronic pain , LLE DVT on eliquis, 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   Also found to have dick on ckd and hyperkalemia  Cr 3.1 mg/dl on admission, k was 6.2  [place] [facility]    Impression:    DICK likely CRS vs bc of infection / cellulitis. Cr improving. 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 Now stable at 2.4 mg/dl  K is also better with medical management  Low K diet  Lokelma , diuretics and florinef  Lasix 40 mg iv bid, extra dose now  Inc hydralazine  Will need to be discharged  on bumex 1 mg daily    Thank you very much for allowing me to participate in the care of your patient.  If you have any questions, please do not hesitate to contact me.     Janet Reynaga MD

## 2025-03-13 NOTE — PLAN OF CARE
Problem: Patient Centered Care  Goal: Patient preferences are identified and integrated in the patient's plan of care  Description: Interventions:  - What would you like us to know as we care for you? From home with son   - Provide timely, complete, and accurate information to patient/family  - Incorporate patient and family knowledge, values, beliefs, and cultural backgrounds into the planning and delivery of care  - Encourage patient/family to participate in care and decision-making at the level they choose  - Honor patient and family perspectives and choices  Outcome: Progressing     Problem: Diabetes/Glucose Control  Goal: Glucose maintained within prescribed range  Description: INTERVENTIONS:  - Monitor Blood Glucose as ordered  - Assess for signs and symptoms of hyperglycemia and hypoglycemia  - Administer ordered medications to maintain glucose within target range  - Assess barriers to adequate nutritional intake and initiate nutrition consult as needed  - Instruct patient on self management of diabetes  Outcome: Progressing     Problem: Patient/Family Goals  Goal: Patient/Family Long Term Goal  Description: Patient's Long Term Goal: to go back home    Interventions:  - follow medical regimen  - See additional Care Plan goals for specific interventions  Outcome: Progressing  Goal: Patient/Family Short Term Goal  Description: Patient's Short Term Goal: to not have chest pain    Interventions:   - follow medical regimen  - See additional Care Plan goals for specific interventions  Outcome: Progressing     Problem: CARDIOVASCULAR - ADULT  Goal: Maintains optimal cardiac output and hemodynamic stability  Description: INTERVENTIONS:  - Monitor vital signs, rhythm, and trends  - Monitor for bleeding, hypotension and signs of decreased cardiac output  - Evaluate effectiveness of vasoactive medications to optimize hemodynamic stability  - Monitor arterial and/or venous puncture sites for bleeding and/or hematoma  -  Assess quality of pulses, skin color and temperature  - Assess for signs of decreased coronary artery perfusion - ex. Angina  - Evaluate fluid balance, assess for edema, trend weights  Outcome: Progressing  Goal: Absence of cardiac arrhythmias or at baseline  Description: INTERVENTIONS:  - Continuous cardiac monitoring, monitor vital signs, obtain 12 lead EKG if indicated  - Evaluate effectiveness of antiarrhythmic and heart rate control medications as ordered  - Initiate emergency measures for life threatening arrhythmias  - Monitor electrolytes and administer replacement therapy as ordered  Outcome: Progressing     Problem: RESPIRATORY - ADULT  Goal: Achieves optimal ventilation and oxygenation  Description: INTERVENTIONS:  - Assess for changes in respiratory status  - Assess for changes in mentation and behavior  - Position to facilitate oxygenation and minimize respiratory effort  - Oxygen supplementation based on oxygen saturation or ABGs  - Provide Smoking Cessation handout, if applicable  - Encourage broncho-pulmonary hygiene including cough, deep breathe, Incentive Spirometry  - Assess the need for suctioning and perform as needed  - Assess and instruct to report SOB or any respiratory difficulty  - Respiratory Therapy support as indicated  - Manage/alleviate anxiety  - Monitor for signs/symptoms of CO2 retention  Outcome: Progressing     Problem: METABOLIC/FLUID AND ELECTROLYTES - ADULT  Goal: Glucose maintained within prescribed range  Description: INTERVENTIONS:  - Monitor Blood Glucose as ordered  - Assess for signs and symptoms of hyperglycemia and hypoglycemia  - Administer ordered medications to maintain glucose within target range  - Assess barriers to adequate nutritional intake and initiate nutrition consult as needed  - Instruct patient on self management of diabetes  Outcome: Progressing  Goal: Electrolytes maintained within normal limits  Description: INTERVENTIONS:  - Monitor labs and rhythm  and assess patient for signs and symptoms of electrolyte imbalances  - Administer electrolyte replacement as ordered  - Monitor response to electrolyte replacements, including rhythm and repeat lab results as appropriate  - Fluid restriction as ordered  - Instruct patient on fluid and nutrition restrictions as appropriate  Outcome: Progressing  Goal: Hemodynamic stability and optimal renal function maintained  Description: INTERVENTIONS:  - Monitor labs and assess for signs and symptoms of volume excess or deficit  - Monitor intake, output and patient weight  - Monitor urine specific gravity, serum osmolarity and serum sodium as indicated or ordered  - Monitor response to interventions for patient's volume status, including labs, urine output, blood pressure (other measures as available)  - Encourage oral intake as appropriate  - Instruct patient on fluid and nutrition restrictions as appropriate  Outcome: Progressing     Problem: MUSCULOSKELETAL - ADULT  Goal: Return mobility to safest level of function  Description: INTERVENTIONS:  - Assess patient stability and activity tolerance for standing, transferring and ambulating w/ or w/o assistive devices  - Assist with transfers and ambulation using safe patient handling equipment as needed  - Ensure adequate protection for wounds/incisions during mobilization  - Obtain PT/OT consults as needed  - Advance activity as appropriate  - Communicate ordered activity level and limitations with patient/family  Outcome: Progressing  Goal: Maintain proper alignment of affected body part  Description: INTERVENTIONS:  - Support and protect limb and body alignment per provider's orders  - Instruct and reinforce with patient and family use of appropriate assistive device and precautions (e.g. spinal or hip dislocation precautions)  Outcome: Progressing     Problem: PAIN - ADULT  Goal: Verbalizes/displays adequate comfort level or patient's stated pain goal  Description:  INTERVENTIONS:  - Encourage pt to monitor pain and request assistance  - Assess pain using appropriate pain scale  - Administer analgesics based on type and severity of pain and evaluate response  - Implement non-pharmacological measures as appropriate and evaluate response  - Consider cultural and social influences on pain and pain management  - Manage/alleviate anxiety  - Utilize distraction and/or relaxation techniques  - Monitor for opioid side effects  - Notify MD/LIP if interventions unsuccessful or patient reports new pain  - Anticipate increased pain with activity and pre-medicate as appropriate  Outcome: Progressing     Problem: SAFETY ADULT - FALL  Goal: Free from fall injury  Description: INTERVENTIONS:  - Assess pt frequently for physical needs  - Identify cognitive and physical deficits and behaviors that affect risk of falls.  - [place] fall precautions as indicated by assessment.  - Educate pt/family on patient safety including physical limitations  - Instruct pt to call for assistance with activity based on assessment  - Modify environment to reduce risk of injury  - Provide assistive devices as appropriate  - Consider OT/PT consult to assist with strengthening/mobility  - Encourage toileting schedule  Outcome: Progressing     Patient vital signs stable.  PRN Percocet and Gabapentin for pain.  Call light within reach.  Plan to discharge to [facility] once medically cleared.

## 2025-03-13 NOTE — PROGRESS NOTES
[facility] Hospitalist Progress Note     CC: Hospital Follow up    PCP: Diana Lopez DO       Assessment/Plan:     Principal Problem:    Hyperkalemia  Active Problems:    Back pain    Spinal stenosis of lumbar region at multiple levels    Acute on chronic congestive heart failure, unspecified heart failure type (HCC)    Bacterial upper respiratory infection    Thrombocytopenia (HCC)    Azotemia    Cellulitis of left lower extremity    CKD (chronic kidney disease), stage IV (HCC)       Hannah Boss is a 79 year old female with PMH sig for generalized anxiety disorder, CKD stage IV with baseline creatinine around 2, type 2 diabetes, hypertension, rheumatoid arthritis, prior sarcoma status post surgery, chronic back pain, CAD, prior DVT on Eliquis and recent upper GI bleed thought to be due to AVMs with most recent EGD in January 2025 with Dr. High and outpatient hemoglobin of 8.6 who presented increasing dyspnea with exertion and swelling in her legs.  Known chronic venous stasis ulcers.  Patient does not think she has gained any weight, did not report any chest pain to me although has had intermittently over the last 3 days along with a cough.  Workup in the ER with negative troponin, proBNP 10,000 and noted to have worsening renal function.  Also noted to have potassium of 6.2.  Treated in ER and also given Lokelma. Continuing IV lasix BID per renal.      Bilateral lower extremity edema, worse on left with cellulitic changes  - Continue Ancef, patient has had previously similar admissions in the past, monitor response  -Patient afebrile admission, white count normal  - LE doppler negative for DVT  - continue diuresis per renal, IV lasix 40 mg BID     DICK, hyperkalemia, CKD stage IV - improving  - Baseline creatinine seems around 2, on admission 3.17 potassium was 6.2  - likely CRS, continue IV lasix per renal  - Renal diet, Lokelma given, repeat per renal  - Renal consult     Shortness of breath, chest pain atypical,  shortness of breath  2 L of oxygen, appears put on for comfort, no desaturations noted, wean as tolerated, chest x-ray without infiltrate or fluid noted  - Troponin negative, repeat in a.m., can monitor on telemetry  - respiratory viral panel negative  -Last echo in September 2024 with a EF of 60 to 65%, grade 1 diastolic dysfunction, no major valvular disorder noted  - continue IV lasix BID     Mild thrombocytopenia  - Platelets usually in the normal range, mildly low today, repeat in a.m., ruling out viral infection above     Chronic anemia, patient with workup with GI, most recently EGD in January 2025 and has continued on her Eliquis  -Known AVMs, per note on 1/14/2025 6 small AVMs were noted in the duodenum and cauterized, gastritis noted.  - Most recent hemoglobin 8.6, on admission 7.5, no active bleeding reported, repeat in a.m. continue Eliquis for now  -She states somebody stopped her PPI twice a day, will resume      Type 2 diabetes  - Hold orals, insulin sliding scale     Hypertension  - Resume home meds, as needed meds ordered     Acute on chronic lower back pain  - Follows with pain clinic here, currently on Percocet and diazepam per note, however patient does not report diazepam as a home med  - will consult pain service given worsening pain, asking for strong pain meds  - order lumbar spine XR     FEN: Further fluids per renal, lites in a.m., renal diet     PPx: Patient on Eliquis     Dispo: Full code, pending course, plan for SNF on discharge, likely 1-2 days     Outpatient records reviewed confirming patient's medical history and medications.      Further recommendations pending patient's clinical course.  [facility] hospitalist to continue to follow patient while in house     Patient and/or patient's family given opportunity to ask questions and note understanding and agreeing with therapeutic plan as outlined     MD WINIFRED Shirley Hospitalist     Answering Service number: [phone]      Subjective:     Legs a little less swollen. Pain about the same.     OBJECTIVE:    Blood pressure 128/44, pulse 66, temperature 98.8 °F (37.1 °C), temperature source Oral, resp. rate 18, weight 182 lb (82.6 kg), SpO2 94%, not currently breastfeeding.    Temp:  [98.1 °F (36.7 °C)-99 °F (37.2 °C)] 98.8 °F (37.1 °C)  Pulse:  [54-67] 66  Resp:  [16-18] 18  BP: (128-155)/(41-58) 128/44  SpO2:  [94 %-97 %] 94 %      Intake/Output:    Intake/Output Summary (Last 24 hours) at 3/13/2025 1327  Last data filed at 3/13/2025 1245  Gross per 24 hour   Intake 690 ml   Output 2002 ml   Net -1312 ml       Last 3 Weights   03/12/25 0034 182 lb (82.6 kg)   03/11/25 0510 190 lb 3.2 oz (86.3 kg)   03/10/25 0623 192 lb 8 oz (87.3 kg)   03/09/25 1808 194 lb (88 kg)   01/13/25 1042 179 lb (81.2 kg)   01/08/25 1302 179 lb (81.2 kg)   12/24/24 0443 179 lb (81.2 kg)   12/23/24 0513 179 lb (81.2 kg)   12/22/24 0547 169 lb 6.4 oz (76.8 kg)   12/21/24 0527 187 lb 11.2 oz (85.1 kg)   12/20/24 1312 200 lb (90.7 kg)   12/20/24 0151 200 lb 8 oz (90.9 kg)   12/19/24 0622 205 lb 1.6 oz (93 kg)   12/18/24 0436 216 lb 11.4 oz (98.3 kg)   12/17/24 1903 222 lb 1.6 oz (100.7 kg)       Exam   GEN: female in NAD  HEENT: EOMI  Pulm: CTAB, no crackles or wheezes  CV: RRR, no murmurs  ABD: Soft, non-tender, non-distended, +BS  SKIN: warm, dry, chronic venous stasis changes BLE, mild erythema LLE- improving  EXT: + edema, more wrinkles in BLE      Data Review:       Labs:     Recent Labs   Lab 03/09/25  1549 03/10/25  0850   RBC 2.75* 2.89*   HGB 7.5* 8.0*   HCT 25.0* 26.2*   MCV 90.9 90.7   MCH 27.3 27.7   MCHC 30.0* 30.5*   RDW 15.6* 15.5*   NEPRELIM 3.19 5.04   WBC 4.4 6.5   .0* 139.0*         Recent Labs   Lab 03/11/25  0922 03/12/25  0816 03/13/25  0518   * 122* 93   BUN 64* 63* 68*   CREATSERUM 2.55* 2.52* 2.47*   EGFRCR 19* 19* 20*   CA 8.2* 8.3* 8.2*    141 141   K 5.5* 5.4* 5.4*   * 113* 112   CO2 25.0 25.0 27.0       Recent  Labs   Lab 03/09/25  1549 03/09/25  1844 03/10/25  0850 03/11/25  0922 03/12/25  0816 03/13/25  0518   ALT 43 42  --   --   --   --    AST 36* 38*  --   --   --   --    ALB 3.9 3.6 3.6 3.4 3.7 3.5         Imaging:  No results found.      Meds:     INPATIENT MEDICATIONS    Scheduled Medications:      hydrALAZINE, 25 mg, TID  furosemide, 40 mg, BID (Diuretic)  insulin aspart, 1-5 Units, TID CC  atorvastatin, 40 mg, Nightly  cholecalciferol, 2,000 Units, Daily  dilTIAZem ER, 240 mg, Daily  apixaban, 5 mg, BID  ferrous sulfate, 325 mg, Daily with breakfast  isosorbide mononitrate ER, 60 mg, Daily  magnesium oxide, 400 mg, Daily  pantoprazole, 40 mg, BID AC            Drips:       PRN Medications  gabapentin, 300 mg, Q12H PRN  glucose, 15 g, Q15 Min PRN   Or  glucose, 15 g, Q15 Min PRN   Or  glucose-vitamin C, 4 tablet, Q15 Min PRN   Or  dextrose, 50 mL, Q15 Min PRN   Or  glucose, 30 g, Q15 Min PRN   Or  glucose, 30 g, Q15 Min PRN   Or  glucose-vitamin C, 8 tablet, Q15 Min PRN  acetaminophen, 500 mg, Q4H PRN  ondansetron, 4 mg, Q6H PRN  metoclopramide, 5 mg, Q8H PRN  polyethylene glycol (PEG 3350), 17 g, Daily PRN  sennosides, 17.2 mg, Nightly PRN  bisacodyl, 10 mg, Daily PRN  glycerin-hypromellose-, 1 drop, QID PRN  sodium chloride, 1 spray, Q3H PRN  meclizine, 25 mg, TID PRN  oxyCODONE-acetaminophen, 1 tablet, Q4H PRN  hydrALAzine, 10 mg, Q4H PRN

## 2025-03-13 NOTE — PLAN OF CARE
Problem: Patient Centered Care  Goal: Patient preferences are identified and integrated in the patient's plan of care  Description: Interventions:  - What would you like us to know as we care for you? From home with son   - Provide timely, complete, and accurate information to patient/family  - Incorporate patient and family knowledge, values, beliefs, and cultural backgrounds into the planning and delivery of care  - Encourage patient/family to participate in care and decision-making at the level they choose  - Honor patient and family perspectives and choices  Outcome: Progressing     Problem: Diabetes/Glucose Control  Goal: Glucose maintained within prescribed range  Description: INTERVENTIONS:  - Monitor Blood Glucose as ordered  - Assess for signs and symptoms of hyperglycemia and hypoglycemia  - Administer ordered medications to maintain glucose within target range  - Assess barriers to adequate nutritional intake and initiate nutrition consult as needed  - Instruct patient on self management of diabetes  Outcome: Progressing     Problem: Patient/Family Goals  Goal: Patient/Family Long Term Goal  Description: Patient's Long Term Goal: discharge from hospital     Interventions:  - monitor vital signs   - monitor blood glucose levels  - monitor appropriate labs  - pain management   - administer medications per order  - follow MD orders  - diagnostics per order  - update and inform patient and family on plan of care  - discharge planning  - See additional Care Plan goals for specific interventions  Outcome: Progressing     Problem: CARDIOVASCULAR - ADULT  Goal: Maintains optimal cardiac output and hemodynamic stability  Description: INTERVENTIONS:  - Monitor vital signs, rhythm, and trends  - Monitor for bleeding, hypotension and signs of decreased cardiac output  - Evaluate effectiveness of vasoactive medications to optimize hemodynamic stability  - Monitor arterial and/or venous puncture sites for bleeding  and/or hematoma  - Assess quality of pulses, skin color and temperature  - Assess for signs of decreased coronary artery perfusion - ex. Angina  - Evaluate fluid balance, assess for edema, trend weights  Outcome: Progressing  Goal: Absence of cardiac arrhythmias or at baseline  Description: INTERVENTIONS:  - Continuous cardiac monitoring, monitor vital signs, obtain 12 lead EKG if indicated  - Evaluate effectiveness of antiarrhythmic and heart rate control medications as ordered  - Initiate emergency measures for life threatening arrhythmias  - Monitor electrolytes and administer replacement therapy as ordered  Outcome: Progressing     Problem: RESPIRATORY - ADULT  Goal: Achieves optimal ventilation and oxygenation  Description: INTERVENTIONS:  - Assess for changes in respiratory status  - Assess for changes in mentation and behavior  - Position to facilitate oxygenation and minimize respiratory effort  - Oxygen supplementation based on oxygen saturation or ABGs  - Provide Smoking Cessation handout, if applicable  - Encourage broncho-pulmonary hygiene including cough, deep breathe, Incentive Spirometry  - Assess the need for suctioning and perform as needed  - Assess and instruct to report SOB or any respiratory difficulty  - Respiratory Therapy support as indicated  - Manage/alleviate anxiety  - Monitor for signs/symptoms of CO2 retention  Outcome: Progressing     Problem: METABOLIC/FLUID AND ELECTROLYTES - ADULT  Goal: Glucose maintained within prescribed range  Description: INTERVENTIONS:  - Monitor Blood Glucose as ordered  - Assess for signs and symptoms of hyperglycemia and hypoglycemia  - Administer ordered medications to maintain glucose within target range  - Assess barriers to adequate nutritional intake and initiate nutrition consult as needed  - Instruct patient on self management of diabetes  Outcome: Progressing  Goal: Electrolytes maintained within normal limits  Description: INTERVENTIONS:  - Monitor  labs and rhythm and assess patient for signs and symptoms of electrolyte imbalances  - Administer electrolyte replacement as ordered  - Monitor response to electrolyte replacements, including rhythm and repeat lab results as appropriate  - Fluid restriction as ordered  - Instruct patient on fluid and nutrition restrictions as appropriate  Outcome: Progressing  Goal: Hemodynamic stability and optimal renal function maintained  Description: INTERVENTIONS:  - Monitor labs and assess for signs and symptoms of volume excess or deficit  - Monitor intake, output and patient weight  - Monitor urine specific gravity, serum osmolarity and serum sodium as indicated or ordered  - Monitor response to interventions for patient's volume status, including labs, urine output, blood pressure (other measures as available)  - Encourage oral intake as appropriate  - Instruct patient on fluid and nutrition restrictions as appropriate  Outcome: Progressing     Problem: MUSCULOSKELETAL - ADULT  Goal: Return mobility to safest level of function  Description: INTERVENTIONS:  - Assess patient stability and activity tolerance for standing, transferring and ambulating w/ or w/o assistive devices  - Assist with transfers and ambulation using safe patient handling equipment as needed  - Ensure adequate protection for wounds/incisions during mobilization  - Obtain PT/OT consults as needed  - Advance activity as appropriate  - Communicate ordered activity level and limitations with patient/family  Outcome: Progressing  Goal: Maintain proper alignment of affected body part  Description: INTERVENTIONS:  - Support and protect limb and body alignment per provider's orders  - Instruct and reinforce with patient and family use of appropriate assistive device and precautions (e.g. spinal or hip dislocation precautions)  Outcome: Progressing     Problem: SAFETY ADULT - FALL  Goal: Free from fall injury  Description: INTERVENTIONS:  - Assess pt frequently  for physical needs  - Identify cognitive and physical deficits and behaviors that affect risk of falls.  - [place] fall precautions as indicated by assessment.  - Educate pt/family on patient safety including physical limitations  - Instruct pt to call for assistance with activity based on assessment  - Modify environment to reduce risk of injury  - Provide assistive devices as appropriate  - Consider OT/PT consult to assist with strengthening/mobility  - Encourage toileting schedule  Outcome: Progressing     Problem: DISCHARGE PLANNING  Goal: Discharge to home or other facility with appropriate resources  Description: INTERVENTIONS:  - Identify barriers to discharge w/pt and caregiver  - Include patient/family/discharge partner in discharge planning  - Arrange for needed discharge resources and transportation as appropriate  - Identify discharge learning needs (meds, wound care, etc)  - Arrange for interpreters to assist at discharge as needed  - Consider post-discharge preferences of patient/family/discharge partner  - Complete POLST form as appropriate  - Assess patient's ability to be responsible for managing their own health  - Refer to Case Management Department for coordinating discharge planning if the patient needs post-hospital services based on physician/LIP order or complex needs related to functional status, cognitive ability or social support system  Outcome: Progressing     Problem: SKIN/TISSUE INTEGRITY - ADULT  Goal: Skin integrity remains intact  Description: INTERVENTIONS- Assess and document risk factors for pressure ulcer development- Assess and document skin integrity- Monitor for areas of redness and/or skin breakdown- Initiate interventions, skin care algorithm/standards of care as needed  Outcome: Progressing  Goal: Incision(s), wounds(s) or drain site(s) healing without S/S of infection  Description: INTERVENTIONS:- Assess and document risk factors for pressure ulcer development- Assess  and document skin integrity- Assess and document dressing/incision, wound bed, drain sites and surrounding tissue- Implement wound care per orders- Initiate isolation precautions as appropriate- Initiate Pressure Ulcer prevention bundle as indicated  Outcome: Progressing     Problem: HEMATOLOGIC - ADULT  Goal: Free from bleeding injury  Description: (Example usage: patient with low platelets)INTERVENTIONS:- Avoid intramuscular injections, enemas and rectal medication administration- Ensure safe mobilization of patient- Hold pressure on venipuncture sites to achieve adequate hemostasis- Assess for signs and symptoms of internal bleeding- Monitor lab trends- Patient is to report abnormal signs of bleeding to staff- Avoid use of toothpicks and dental floss- Use electric shaver for shaving- Use soft bristle tooth brush- Limit straining and forceful nose blowing  Outcome: Progressing     Problem: Patient/Family Goals  Goal: Patient/Family Short Term Goal  Description: Patient's Short Term Goal: manage pain     Interventions:   - monitor vital signs   - monitor blood glucose levels  - monitor appropriate labs  - pain management   - administer medications per order  - follow MD orders  - diagnostics per order  - update and inform patient and family on plan of care  - See additional Care Plan goals for specific interventions  Outcome: Not Progressing     Problem: PAIN - ADULT  Goal: Verbalizes/displays adequate comfort level or patient's stated pain goal  Description: INTERVENTIONS:  - Encourage pt to monitor pain and request assistance  - Assess pain using appropriate pain scale  - Administer analgesics based on type and severity of pain and evaluate response  - Implement non-pharmacological measures as appropriate and evaluate response  - Consider cultural and social influences on pain and pain management  - Manage/alleviate anxiety  - Utilize distraction and/or relaxation techniques  - Monitor for opioid side effects  -  Notify MD/LIP if interventions unsuccessful or patient reports new pain  - Anticipate increased pain with activity and pre-medicate as appropriate  Outcome: Not Progressing     Patient given prn pain medications per MAR orders. Given intravenous lasix. Tolerating room air and ambulation

## 2025-03-14 LAB
ALBUMIN SERPL-MCNC: 3.5 G/DL (ref 3.2–4.8)
ANION GAP SERPL CALC-SCNC: 1 MMOL/L (ref 0–18)
ATRIAL RATE: 55 BPM
BUN BLD-MCNC: 68 MG/DL (ref 9–23)
BUN/CREAT SERPL: 26.6 (ref 10–20)
CALCIUM BLD-MCNC: 8.5 MG/DL (ref 8.7–10.4)
CHLORIDE SERPL-SCNC: 113 MMOL/L (ref 98–112)
CO2 SERPL-SCNC: 27 MMOL/L (ref 21–32)
CREAT BLD-MCNC: 2.56 MG/DL
EGFRCR SERPLBLD CKD-EPI 2021: 19 ML/MIN/1.73M2 (ref 60–?)
GLUCOSE BLD-MCNC: 84 MG/DL (ref 70–99)
GLUCOSE BLDC GLUCOMTR-MCNC: 103 MG/DL (ref 70–99)
GLUCOSE BLDC GLUCOMTR-MCNC: 139 MG/DL (ref 70–99)
GLUCOSE BLDC GLUCOMTR-MCNC: 150 MG/DL (ref 70–99)
GLUCOSE BLDC GLUCOMTR-MCNC: 193 MG/DL (ref 70–99)
OSMOLALITY SERPL CALC.SUM OF ELEC: 311 MOSM/KG (ref 275–295)
P AXIS: -8 DEGREES
P-R INTERVAL: 150 MS
PHOSPHATE SERPL-MCNC: 4.8 MG/DL (ref 2.4–5.1)
POTASSIUM SERPL-SCNC: 5 MMOL/L (ref 3.5–5.1)
Q-T INTERVAL: 446 MS
QRS DURATION: 72 MS
QTC CALCULATION (BEZET): 426 MS
R AXIS: 36 DEGREES
SODIUM SERPL-SCNC: 141 MMOL/L (ref 136–145)
T AXIS: 75 DEGREES
VENTRICULAR RATE: 55 BPM

## 2025-03-14 PROCEDURE — 99232 SBSQ HOSP IP/OBS MODERATE 35: CPT | Performed by: INTERNAL MEDICINE

## 2025-03-14 RX ORDER — BUMETANIDE 1 MG/1
1 TABLET ORAL DAILY
Status: DISCONTINUED | OUTPATIENT
Start: 2025-03-15 | End: 2025-03-16

## 2025-03-14 NOTE — PROGRESS NOTES
[facility]  part of [facility]    Progress Note    Hannah Boss Patient Status:  Inpatient    1945 MRN [id number]   Location [facility] 3W/SW Attending Nerissa Allen MD   Hosp Day # 5 PCP Diana Lopez DO       Subjective:   Hannah Boss is a(n) 79 year old female who I am seeing for DICK/CKD IV/hyperkaelmia    Feels much better  Edema is better    Objective:   /51 (BP Location: Right arm)   Pulse 61   Temp 98.8 °F (37.1 °C) (Oral)   Resp 18   Wt 175 lb (79.4 kg)   SpO2 96%   BMI 31.00 kg/m²      Intake/Output Summary (Last 24 hours) at 3/14/2025 1230  Last data filed at 3/14/2025 1116  Gross per 24 hour   Intake 960 ml   Output 2450 ml   Net -1490 ml     Wt Readings from Last 1 Encounters:   25 175 lb (79.4 kg)       Exam  Vital signs: Blood pressure 124/51, pulse 61, temperature 98.8 °F (37.1 °C), temperature source Oral, resp. rate 18, weight 175 lb (79.4 kg), SpO2 96%, not currently breastfeeding.    General: No acute distress. Alert and oriented x 3.  HEENT: Moist mucous membranes. EOMI. PERRLA  Neck:  No JVD.   Respiratory: Clear to auscultation bilaterally.    Cardiovascular: S1, S2.  Regular rate and rhythm.   Abdomen: Soft, nontender, nondistended.    Neurologic: No focal neurological deficits.  Musculoskeletal: Full range of motion of all extremities. Edema improved  Access:    Results:     Recent Labs   Lab 25  1549 03/10/25  0850   RBC 2.75* 2.89*   HGB 7.5* 8.0*   HCT 25.0* 26.2*   MCV 90.9 90.7   MCH 27.3 27.7   MCHC 30.0* 30.5*   RDW 15.6* 15.5*   NEPRELIM 3.19 5.04   WBC 4.4 6.5   .0* 139.0*         Recent Labs   Lab 25  0816 25  0518 25  0512   * 93 84   BUN 63* 68* 68*   CREATSERUM 2.52* 2.47* 2.56*   CA 8.3* 8.2* 8.5*    141 141   K 5.4* 5.4* 5.0   * 112 113*   CO2 25.0 27.0 27.0          No results found.    Assessment and Plan:     79-year-old female with history of CKD stage III/IV ,  diabetes, hypertension, leiomyosarcoma status postsurgery and radiation , SVT, chronic pain , LLE DVT on eliquis, 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   Also found to have dick on ckd and hyperkalemia  Cr 3.1 mg/dl on admission, k was 6.2  [facility]    Impression:    DICK likely CRS vs bc of infection / cellulitis. Cr improving. 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 Now stable at 2.4 mg/dl  K is also better with medical management  Low K diet  Lokelma , diuretics and florinef  Switch IV to PO bumex    Will need to be discharged  on bumex 1 mg daily    Thank you very much for allowing me to participate in the care of your patient.  If you have any questions, please do not hesitate to contact me.     Janet Reynaga MD

## 2025-03-14 NOTE — HISTORICAL OFFICE NOTE
CHIEF COMPLAINT    CHIEF COMPLAINT  Reason for Visit/Chief Complaint   hospital f/u from chest pain   This is a patient of Dr. Buchanan who was seen once in the office in August. 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 She was started on diltiazem. An echocardiogram showed preserved systolic function with ejection fraction 55 to 60%, grade 1 diastolic dysfunction and moderate aortic stenosis. Patient did have a dental consult and had dental extractions and treatment for dental abscess during her hospitalization. She comes today for follow-up.Patient reports that she is feeling much better. She denies any further tachycardia episodes. She reports that she is tolerating diltiazem. She is compliant with anticoagulation for her history of PE and DVT.     PROBLEMS  Reconcile with Patient's ChartPROBLEMS  Problem Effective Dates Date resolved Problem Status   Paroxysmal SVT (supraventricular tachycardia), [SNOMED-CT: 74513126] 8/3/2023 - Active   Nicotine dependence with current use, [SNOMED-CT: 57909831] 2/6/2024 - Active   History of DVT (deep vein thrombosis) - Hx, [SNOMED-CT: 660032984] 2/6/2024 - Active   History of pulmonary embolus (PE), [SNOMED-CT: 320644635] 2/6/2024 - Active   Mild pulmonary hypertension, [SNOMED-CT: 953248326] 8/3/2023 - Active   Edema, localized, [SNOMED-CT: 436722340] 8/3/2023 - Active   Hypertension (HTN), primary, [SNOMED-CT: 98922618] 8/3/2023 - Active   Hyperlipidemia, mixed, [SNOMED-CT: 822379458] 8/3/2023 - Active   Exertional dyspnea, [SNOMED-CT: 06317793] 8/3/2023 - Active     ENCOUNTER DIAGNOSIS    ENCOUNTER DIAGNOSIS  Problem Effective Dates Date resolved Problem Status   Paroxysmal SVT (supraventricular tachycardia), [SNOMED-CT: 01673860]  8/3/2023 - Active   Nicotine dependence with current use, [SNOMED-CT: 00500344] 2/6/2024 - Active   History of DVT (deep vein thrombosis) - Hx, [SNOMED-CT: 562827154] 2/6/2024 - Active   History of pulmonary embolus (PE), [SNOMED-CT: 712874613] 2/6/2024 - Active   Edema, localized, [SNOMED-CT: 134634626] 8/3/2023 - Active   Hypertension (HTN), primary, [SNOMED-CT: 62512713] 8/3/2023 - Active   Hyperlipidemia, mixed, [SNOMED-CT: 351928201] 8/3/2023 - Active   Exertional dyspnea, [SNOMED-CT: 76814636] 8/3/2023 - Active     VITAL SIGNS    VITAL SIGNS  Date / Time: 2/6/2024   BP Systolic 134 mmHg   BP Diastolic 52 mmHg   Height 63 inches   Weight 174 lbs   Pulse Rate 74 bpm   BSA (Body Surface Area) 1.9 cc/m2   BMI (Body Mass Index) 30.8 cc/m2   Blood Pressure 134 / 52 mmHg     PHYSICAL EXAMINATION    PHYSICAL EXAMINATION  Header Details   Constitutional 100%o2   Vitals Left Arm Sitting  / 52 mmHg, Pulse rate 74 bpm, Regular, Height in 5' 3\", BMI: 30.8, Weight in 174.17 lbs (or) 79 kgs, BSA : 1.9 cc/m²   General Appearance No Acute Distress   Neck No JVD   Respiratory Lungs clear with normal breath sounds   Cardiovascular 3/6 murmur, Regular rhythm. Normal rate present. Normal and normal S1 and S2   Lower Extremities Edema 2+     ALLERGIES, ADVERSE REACTIONS, ALERTS    No data available    MEDICATIONS ADMINISTERED DURING VISIT    No data available    MEDICATIONS  Reconcile with Patient's ChartMEDICATIONS  Medication Start Date Route/Frequency Status   amoxicillin clavulanate 875-125 MG Oral Tab, [RxNorm: 204438] 2/5/2024 Take 1 tablet by mouth 2 (two) times daily for 14 days. Active   apixaban 5 MG Oral Tab, [RxNorm: 7638229] 2/5/2024 Take 1 tablet (5 mg total) by mouth 2 (two) times daily. Active   atorvastatin 40 MG Oral Tab, [RxNorm: 431173] 2/5/2024 Take 1 tablet (40 mg total) by mouth. AT BEDTIME Active   cholecalciferol 50 MCG (2000 UT) Oral Tab, [RxNorm: 948149] 2/5/2024 Take 1 tablet (2,000 Units total) by  mouth daily. Active   diazePAM 10 MG Oral Tab, [RxNorm: 055908] 2/5/2024 Take 1 tablet (10 mg total) by mouth every 8 (eight) hours as needed (MAX 3/DAY). Active   dilTIAZem  MG Oral Capsule SR 24 Hr, [RxNorm: 520762] 2/5/2024 Take 1 capsule (120 mg total) by mouth daily. Active   ferrous sulfate 325 (65 FE) MG Oral Tab EC, [RxNorm: 869915] 2/5/2024 Take 1 tablet (325 mg total) by mouth See Admin Instructions. Three times a week Active   furosemide 20 MG Oral Tab, [RxNorm: 268683] 2/5/2024 Take 1 tablet (20 mg total) by mouth 3 (three) times a week. Active   gabapentin 300 MG Oral Cap, [RxNorm: 780452] 2/5/2024 Take 1 capsule (300 mg total) by mouth nightly. Active   meclizine 25 MG Oral Tab, [RxNorm: 870913] 2/5/2024 Take 1 tablet (25 mg total) by mouth. Active   oxyCODONE-acetaminophen (PERCOCET)  MG Oral Tab, [RxNorm: 0669243] 2/5/2024 Take 1 tablet by mouth every 4 (four) hours as needed for Pain (max 6/day). Active     ASSESSMENT    1. 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 Patient was started on diltiazem which was continued at discharge. Reports further episodes. She will continue present management. Discussed that if she has recurrent events would likely recommend EP consult for possible ablation.  2. Thromboembolism-history of DVT and pulmonary embolism in the past. Patient is on lifelong anticoagulation and denies bleeding problems.  3. Lower extremity edema-likely secondary to DVT. Patient takes furosemide daily unless she is out of the home. Reports that she keeps her legs elevated and has a hospital bed.  4. Hypertension-blood pressure controlled on diltiazem and diuretic.  5. Nicotine dependence-current everyday smoker.  6. Aortic stenosis-moderate on January echo with preserved systolic function. Like the patient to follow-up with Dr. Buchanan in about 2 months.Plan:  Continue present management  Call if you have recurrent episodes of tachycardia otherwise see   Dewayne in 2 months     FAMILY HISTORY    No data available    GENERAL STATUS    No data available    PAST MEDICAL HISTORY    PAST MEDICAL HISTORY  Problem Date diagonsed Date resolved Status   Paroxysmal SVT (supraventricular tachycardia), [SNOMED-CT: 75559322] 8/3/2023 - Active   Nicotine dependence with current use, [SNOMED-CT: 42596791] 2/6/2024 - Active   History of DVT (deep vein thrombosis) - Hx, [SNOMED-CT: 454860474] 2/6/2024 - Active   History of pulmonary embolus (PE), [SNOMED-CT: 597692184] 2/6/2024 - Active   Mild pulmonary hypertension, [SNOMED-CT: 140761312] 8/3/2023 - Active   Edema, localized, [SNOMED-CT: 316293557] 8/3/2023 - Active   Hypertension (HTN), primary, [SNOMED-CT: 79695270] 8/3/2023 - Active   Hyperlipidemia, mixed, [SNOMED-CT: 581110644] 8/3/2023 - Active   Exertional dyspnea, [SNOMED-CT: 68097428] 8/3/2023 - Active     HISTORY OF PRESENT ILLNESS    This is a patient of Dr. Buchanan who was seen once in the office in August. 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 She was started on diltiazem. An echocardiogram showed preserved systolic function with ejection fraction 55 to 60%, grade 1 diastolic dysfunction and moderate aortic stenosis. Patient did have a dental consult and had dental extractions and treatment for dental abscess during her hospitalization. She comes today for follow-up.Patient reports that she is feeling much better. She denies any further tachycardia episodes. She reports that she is tolerating diltiazem. She is compliant with anticoagulation for her history of PE and DVT.     IMMUNIZATIONS  Reconcile with Patient's ChartNo data available    PLAN OF CARE    PLAN OF CARE  Planned Care Date   Follow up visit - Abdirahman Buchanan MD  1/1/1900     PROCEDURES    No data available    LAB RESULTS    No data available    REVIEW OF SYSTEMS    REVIEW OF SYSTEMS  Header Details   Cardiovascular CANTU, Edema  No history of Chest pain, Palpitations, Syncope, PND, Orthopnea, Claudication   Respiratory No history of SOB, Wheezing, Sputum   Hem/Lymphatic No history of Easy bruising, Blood clots, Hx of blood transfusion, Anemia, Bleeding problems     SOCIAL HISTORY    SOCIAL HISTORY  Social History Element Description Effective Dates   Smoking status Heavy tobacco smoker -     FUNCTIONAL STATUS    No data available    MEDICAL EQUIPMENT    No data available    Goals Sections    No data available    REASON FOR REFERRAL    No data available    Health Concerns Section    No data available    COGNITIVE/MENTAL STATUS    No data available    Patient Demographics    Patient Demographics  Patient Address Patient Name Communication   [address] [place] [address] Hannah Boss [phone] (Mobile)     Patient Demographics  Language Race / Ethnicity Marital Status   English - Spoken (Preferred) Black or  / Unknown      Document Information    Primary Care Provider Other Service Providers Document Coverage Dates   Olivia Leone  NPI: [id number]  [phone] (Work)  [address] [facility] [facility] [address], [place] [address]  [place] [address]  Interpreting Physicians  [facility]  [phone] (Work)  [address] [place] [address] Esme Euceda  NPI: [id number]  [phone] (Work)  [address] [place] [address], [address], [place] [address]  [place] [address]  Nurses Feb. 06, 2024February 06, 2024      Organization   [facility]  [phone] (Work)  [address] [place] [address], [address], [place] [address]  [place] [address]     Encounter Providers Encounter Date    Feb. 06, 2024February 06, 2024     Legal Authenticator    Olivia Leone  NPI:  [id number]  [phone] (Work)  [address] [facility], [address], [place] [address]  [place] [address]

## 2025-03-14 NOTE — PAYOR COMM NOTE
--------------  3/14:  CONTINUED STAY REVIEW    Payor: LAUREEN ARTEAGA O  Subscriber #:  [id number]  Authorization Number: [id number]    Admit date: 3/9/25  Admit time:  5:50 PM    HOSPITALIST:    GENEVIEVE Hospitalist Progress Note      CC: Hospital Follow up     PCP: Diana Lopez DO         Assessment/Plan:      Principal Problem:    Hyperkalemia  Active Problems:    Back pain    Spinal stenosis of lumbar region at multiple levels    Acute on chronic congestive heart failure, unspecified heart failure type (HCC)    Bacterial upper respiratory infection    Thrombocytopenia (HCC)    Azotemia    Cellulitis of left lower extremity    CKD (chronic kidney disease), stage IV (HCC)        Hannah Boss is a 79 year old female with PMH sig for generalized anxiety disorder, CKD stage IV with baseline creatinine around 2, type 2 diabetes, hypertension, rheumatoid arthritis, prior sarcoma status post surgery, chronic back pain, CAD, prior DVT on Eliquis and recent upper GI bleed thought to be due to AVMs with most recent EGD in January 2025 with Dr. High and outpatient hemoglobin of 8.6 who presented increasing dyspnea with exertion and swelling in her legs.  Known chronic venous stasis ulcers.  Patient does not think she has gained any weight, did not report any chest pain to me although has had intermittently over the last 3 days along with a cough.  Workup in the ER with negative troponin, proBNP 10,000 and noted to have worsening renal function.  Also noted to have potassium of 6.2.  Treated in ER and also given Lokelma. Continuing IV lasix BID per renal. Plan for discharge to SNF when cleared.      Bilateral lower extremity edema, worse on left with cellulitic changes  - Continue Ancef, patient has had previously similar admissions in the past, monitor response  -Patient afebrile admission, white count normal  - LE doppler negative for DVT  - continue diuresis per renal, IV lasix 40 mg BID     DICK, hyperkalemia, CKD stage IV -  improving  - Baseline creatinine seems around 2, on admission 3.17 potassium was 6.2  - likely CRS, continue IV lasix per renal  - Renal diet, Lokelma given, repeat per renal  - Renal consult     Shortness of breath, chest pain atypical, shortness of breath  2 L of oxygen, appears put on for comfort, no desaturations noted, wean as tolerated, chest x-ray without infiltrate or fluid noted  - Troponin negative, repeat in a.m., can monitor on telemetry  - respiratory viral panel negative  -Last echo in September 2024 with a EF of 60 to 65%, grade 1 diastolic dysfunction, no major valvular disorder noted  - continue IV lasix BID     Mild thrombocytopenia  - Platelets usually in the normal range, mildly low today, repeat in a.m., ruling out viral infection above     Chronic anemia, patient with workup with GI, most recently EGD in January 2025 and has continued on her Eliquis  -Known AVMs, per note on 1/14/2025 6 small AVMs were noted in the duodenum and cauterized, gastritis noted.  - Most recent hemoglobin 8.6, on admission 7.5, no active bleeding reported, repeat in a.m. continue Eliquis for now  -She states somebody stopped her PPI twice a day, will resume      Type 2 diabetes  - Hold orals, insulin sliding scale     Hypertension  - Resume home meds, as needed meds ordered     Acute on chronic lower back pain  - Follows with pain clinic here, currently on Percocet and diazepam per note, however patient does not report diazepam as a home med  - will consult pain service given worsening pain, asking for strong pain meds  - order lumbar spine XR     FEN: Further fluids per renal, lites in a.m., renal diet     PPx: Patient on Eliquis     Dispo: Full code, pending course, plan for SNF on discharge, likely 1-2 days     Outpatient records reviewed confirming patient's medical history and medications.      Further recommendations pending patient's clinical course.  [facility] hospitalist to continue to follow patient while in  house     Patient and/or patient's family given opportunity to ask questions and note understanding and agreeing with therapeutic plan as outlined     Nerissa Allen MD  [facility] Hospitalist     Answering Service number: [phone]      Subjective:      Still having leg pain. Leg swelling continues to improve.      OBJECTIVE:     Blood pressure 124/51, pulse 61, temperature 98.8 °F (37.1 °C), temperature source Oral, resp. rate 18, weight 175 lb (79.4 kg), SpO2 96%, not currently breastfeeding.     Temp:  [98.4 °F (36.9 °C)-98.8 °F (37.1 °C)] 98.8 °F (37.1 °C)  Pulse:  [55-68] 61  Resp:  [16-18] 18  BP: (120-135)/(41-59) 124/51  SpO2:  [96 %-99 %] 96 %        Intake/Output:     Intake/Output Summary (Last 24 hours) at 3/14/2025 1141  Last data filed at 3/14/2025 1116      Gross per 24 hour   Intake 960 ml   Output 2450 ml   Net -1490 ml              Last 3 Weights   03/14/25 0519 175 lb (79.4 kg)   03/13/25 1721 182 lb 5.1 oz (82.7 kg)   03/12/25 0034 182 lb (82.6 kg)   03/11/25 0510 190 lb 3.2 oz (86.3 kg)   03/10/25 0623 192 lb 8 oz (87.3 kg)   03/09/25 1808 194 lb (88 kg)   01/13/25 1042 179 lb (81.2 kg)   01/08/25 1302 179 lb (81.2 kg)   12/24/24 0443 179 lb (81.2 kg)   12/23/24 0513 179 lb (81.2 kg)   12/22/24 0547 169 lb 6.4 oz (76.8 kg)   12/21/24 0527 187 lb 11.2 oz (85.1 kg)   12/20/24 1312 200 lb (90.7 kg)   12/20/24 0151 200 lb 8 oz (90.9 kg)   12/19/24 0622 205 lb 1.6 oz (93 kg)   12/18/24 0436 216 lb 11.4 oz (98.3 kg)   12/17/24 1903 222 lb 1.6 oz (100.7 kg)         Exam   GEN: female in NAD  HEENT: EOMI  Pulm: CTAB, no crackles or wheezes  CV: RRR, no murmurs  ABD: Soft, non-tender, non-distended, +BS  SKIN: warm, dry, chronic venous stasis changes BLE, mild erythema LLE- improving  EXT: + edema, more wrinkles in BLE     Data Review:       Labs:           Recent Labs   Lab 03/09/25  1549 03/10/25  0850   RBC 2.75* 2.89*   HGB 7.5* 8.0*   HCT 25.0* 26.2*   MCV 90.9 90.7   MCH 27.3 27.7   MCHC 30.0*  30.5*   RDW 15.6* 15.5*   NEPRELIM 3.19 5.04   WBC 4.4 6.5   .0* 139.0*                  Recent Labs   Lab 03/12/25  0816 03/13/25  0518 03/14/25  0512   * 93 84   BUN 63* 68* 68*   CREATSERUM 2.52* 2.47* 2.56*   EGFRCR 19* 20* 19*   CA 8.3* 8.2* 8.5*    141 141   K 5.4* 5.4* 5.0   * 112 113*   CO2 25.0 27.0 27.0                   Recent Labs   Lab 03/09/25  1549 03/09/25  1844 03/10/25  0850 03/11/25  0922 03/12/25  0816 03/13/25  0518 03/14/25  0512   ALT 43 42  --   --   --   --   --    AST 36* 38*  --   --   --   --   --    ALB 3.9 3.6 3.6 3.4 3.7 3.5 3.5            Imaging:  No results found.        Meds:       MEDICATIONS ADMINISTERED IN LAST 1 DAY:  apixaban (Eliquis) tab 5 mg       Date Action Dose Route User    3/14/2025 0920 Given 5 mg Oral Marivel Torres RN    3/13/2025 2005 Given 5 mg Oral Rosetta Baer RN          atorvastatin (Lipitor) tab 40 mg       Date Action Dose Route User    3/13/2025 2005 Given 40 mg Oral Rosetta Baer RN          dilTIAZem ER (CardIZEM CD) 24 hr cap 240 mg       Date Action Dose Route User    3/14/2025 0919 Given 240 mg Oral Marivel Torres RN          ferrous sulfate DR tab 325 mg       Date Action Dose Route User    3/14/2025 0919 Given 325 mg Oral Marivel Torres RN          fludrocortisone (Florinef) tab 0.1 mg       Date Action Dose Route User    3/13/2025 1722 Given 0.1 mg Oral Marivel Torres RN          furosemide (Lasix) 10 mg/mL injection 40 mg       Date Action Dose Route User    3/14/2025 0920 Given 40 mg Intravenous Marivel Torres RN    3/13/2025 1722 Given 40 mg Intravenous Marivel Torres, ELLIE          furosemide (Lasix) 10 mg/mL injection 40 mg       Date Action Dose Route User    3/13/2025 1522 Given 40 mg Intravenous Marivel Torres, ELLIE          gabapentin (Neurontin) cap 300 mg       Date Action Dose Route User    3/14/2025 0029 Given 300 mg Oral Rosetta Baer RN    3/13/2025 1234 Given 300 mg Oral  Marivel Torres RN          hydrALAZINE (Apresoline) tab 25 mg       Date Action Dose Route User    3/14/2025 0919 Given 25 mg Oral Marivel Torres RN    3/13/2025 2005 Given 25 mg Oral Rosetta Baer RN    3/13/2025 1523 Given 25 mg Oral Marivel Torres RN          insulin aspart (NovoLOG) 100 Units/mL FlexPen 1-5 Units       Date Action Dose Route User    3/13/2025 1323 Given 1 Units Subcutaneous (Right Upper Arm) Marivel Torres RN          isosorbide mononitrate ER (Imdur) 24 hr tab 60 mg       Date Action Dose Route User    3/14/2025 0919 Given 60 mg Oral Marivel Torres RN          magnesium oxide (Mag-Ox) tab 400 mg       Date Action Dose Route User    3/14/2025 0919 Given 400 mg Oral Marivel Torres RN          oxyCODONE-acetaminophen (Percocet)  MG per tab 1 tablet       Date Action Dose Route User    3/14/2025 0919 Given 1 tablet Oral Marivel Torres RN    3/14/2025 0507 Given 1 tablet Oral Rosetta Baer RN    3/14/2025 0029 Given 1 tablet Oral Rosetta Baer RN    3/13/2025 2005 Given 1 tablet Oral Rosetta Baer RN    3/13/2025 1523 Given 1 tablet Oral Marivel Torres RN          pantoprazole (Protonix) DR tab 40 mg       Date Action Dose Route User    3/14/2025 0507 Given 40 mg Oral Rosetta Baer RN    3/13/2025 1722 Given 40 mg Oral Marivel Torres RN          cholecalciferol (Vitamin D3) tab 2,000 Units       Date Action Dose Route User    3/14/2025 0919 Given 2,000 Units Oral Marivel Torres RN            Vitals (last day)       Date/Time Temp Pulse Resp BP SpO2 Weight O2 Device O2 Flow Rate (L/min) [facility]    03/14/25 0915 98.8 °F (37.1 °C) 61 18 124/51 96 % -- None (Room air) --     03/14/25 0519 -- -- -- -- -- 175 lb (79.4 kg) -- -- ZA    03/14/25 0505 98.4 °F (36.9 °C) 55 16 133/41 99 % -- None (Room air) --     03/14/25 0300 -- 60 -- -- -- -- -- -- EL    03/13/25 2003 98.4 °F (36.9 °C) 68 16 135/43 98 % -- None (Room air) --     03/13/25  1900 -- 60 -- -- -- -- -- -- EL    03/13/25 1721 -- 58 -- 131/44 -- -- -- -- MW    03/13/25 1721 -- -- -- -- -- 182 lb 5.1 oz (82.7 kg) -- -- PS    03/13/25 1520 98.7 °F (37.1 °C) 62 18 120/59 96 % -- None (Room air) -- MW    03/13/25 1035 -- 66 -- 128/44 -- -- -- -- MW    03/13/25 0807 98.8 °F (37.1 °C) 55 18 151/47 94 % -- None (Room air) -- MW    03/13/25 0510 98.1 °F (36.7 °C) 59 18 155/58 96 % -- None (Room air) -- CB    03/13/25 0300 -- 54 -- -- -- -- -- -- EL

## 2025-03-14 NOTE — PLAN OF CARE
Problem: Patient Centered Care  Goal: Patient preferences are identified and integrated in the patient's plan of care  Description: Interventions:  - What would you like us to know as we care for you? From home with son   - Provide timely, complete, and accurate information to patient/family  - Incorporate patient and family knowledge, values, beliefs, and cultural backgrounds into the planning and delivery of care  - Encourage patient/family to participate in care and decision-making at the level they choose  - Honor patient and family perspectives and choices  Outcome: Progressing     Problem: Diabetes/Glucose Control  Goal: Glucose maintained within prescribed range  Description: INTERVENTIONS:  - Monitor Blood Glucose as ordered  - Assess for signs and symptoms of hyperglycemia and hypoglycemia  - Administer ordered medications to maintain glucose within target range  - Assess barriers to adequate nutritional intake and initiate nutrition consult as needed  - Instruct patient on self management of diabetes  Outcome: Progressing     Problem: Patient/Family Goals  Goal: Patient/Family Long Term Goal  Description: Patient's Long Term Goal: discharge from hospital     Interventions:  - monitor vital signs   - monitor blood glucose levels  - monitor appropriate labs  - pain management   - administer medications per order  - follow MD orders  - diagnostics per order  - update and inform patient and family on plan of care  - discharge planning  - See additional Care Plan goals for specific interventions  Outcome: Progressing     Problem: CARDIOVASCULAR - ADULT  Goal: Maintains optimal cardiac output and hemodynamic stability  Description: INTERVENTIONS:  - Monitor vital signs, rhythm, and trends  - Monitor for bleeding, hypotension and signs of decreased cardiac output  - Evaluate effectiveness of vasoactive medications to optimize hemodynamic stability  - Monitor arterial and/or venous puncture sites for bleeding  and/or hematoma  - Assess quality of pulses, skin color and temperature  - Assess for signs of decreased coronary artery perfusion - ex. Angina  - Evaluate fluid balance, assess for edema, trend weights  Outcome: Progressing  Goal: Absence of cardiac arrhythmias or at baseline  Description: INTERVENTIONS:  - Continuous cardiac monitoring, monitor vital signs, obtain 12 lead EKG if indicated  - Evaluate effectiveness of antiarrhythmic and heart rate control medications as ordered  - Initiate emergency measures for life threatening arrhythmias  - Monitor electrolytes and administer replacement therapy as ordered  Outcome: Progressing     Problem: RESPIRATORY - ADULT  Goal: Achieves optimal ventilation and oxygenation  Description: INTERVENTIONS:  - Assess for changes in respiratory status  - Assess for changes in mentation and behavior  - Position to facilitate oxygenation and minimize respiratory effort  - Oxygen supplementation based on oxygen saturation or ABGs  - Provide Smoking Cessation handout, if applicable  - Encourage broncho-pulmonary hygiene including cough, deep breathe, Incentive Spirometry  - Assess the need for suctioning and perform as needed  - Assess and instruct to report SOB or any respiratory difficulty  - Respiratory Therapy support as indicated  - Manage/alleviate anxiety  - Monitor for signs/symptoms of CO2 retention  Outcome: Progressing     Problem: METABOLIC/FLUID AND ELECTROLYTES - ADULT  Goal: Glucose maintained within prescribed range  Description: INTERVENTIONS:  - Monitor Blood Glucose as ordered  - Assess for signs and symptoms of hyperglycemia and hypoglycemia  - Administer ordered medications to maintain glucose within target range  - Assess barriers to adequate nutritional intake and initiate nutrition consult as needed  - Instruct patient on self management of diabetes  Outcome: Progressing  Goal: Electrolytes maintained within normal limits  Description: INTERVENTIONS:  - Monitor  labs and rhythm and assess patient for signs and symptoms of electrolyte imbalances  - Administer electrolyte replacement as ordered  - Monitor response to electrolyte replacements, including rhythm and repeat lab results as appropriate  - Fluid restriction as ordered  - Instruct patient on fluid and nutrition restrictions as appropriate  Outcome: Progressing  Goal: Hemodynamic stability and optimal renal function maintained  Description: INTERVENTIONS:  - Monitor labs and assess for signs and symptoms of volume excess or deficit  - Monitor intake, output and patient weight  - Monitor urine specific gravity, serum osmolarity and serum sodium as indicated or ordered  - Monitor response to interventions for patient's volume status, including labs, urine output, blood pressure (other measures as available)  - Encourage oral intake as appropriate  - Instruct patient on fluid and nutrition restrictions as appropriate  Outcome: Progressing     Problem: MUSCULOSKELETAL - ADULT  Goal: Return mobility to safest level of function  Description: INTERVENTIONS:  - Assess patient stability and activity tolerance for standing, transferring and ambulating w/ or w/o assistive devices  - Assist with transfers and ambulation using safe patient handling equipment as needed  - Ensure adequate protection for wounds/incisions during mobilization  - Obtain PT/OT consults as needed  - Advance activity as appropriate  - Communicate ordered activity level and limitations with patient/family  Outcome: Progressing  Goal: Maintain proper alignment of affected body part  Description: INTERVENTIONS:  - Support and protect limb and body alignment per provider's orders  - Instruct and reinforce with patient and family use of appropriate assistive device and precautions (e.g. spinal or hip dislocation precautions)  Outcome: Progressing     Problem: SAFETY ADULT - FALL  Goal: Free from fall injury  Description: INTERVENTIONS:  - Assess pt frequently  for physical needs  - Identify cognitive and physical deficits and behaviors that affect risk of falls.  - Zeigler fall precautions as indicated by assessment.  - Educate pt/family on patient safety including physical limitations  - Instruct pt to call for assistance with activity based on assessment  - Modify environment to reduce risk of injury  - Provide assistive devices as appropriate  - Consider OT/PT consult to assist with strengthening/mobility  - Encourage toileting schedule  Outcome: Progressing     Problem: DISCHARGE PLANNING  Goal: Discharge to home or other facility with appropriate resources  Description: INTERVENTIONS:  - Identify barriers to discharge w/pt and caregiver  - Include patient/family/discharge partner in discharge planning  - Arrange for needed discharge resources and transportation as appropriate  - Identify discharge learning needs (meds, wound care, etc)  - Arrange for interpreters to assist at discharge as needed  - Consider post-discharge preferences of patient/family/discharge partner  - Complete POLST form as appropriate  - Assess patient's ability to be responsible for managing their own health  - Refer to Case Management Department for coordinating discharge planning if the patient needs post-hospital services based on physician/LIP order or complex needs related to functional status, cognitive ability or social support system  Outcome: Progressing     Problem: SKIN/TISSUE INTEGRITY - ADULT  Goal: Skin integrity remains intact  Description: INTERVENTIONS- Assess and document risk factors for pressure ulcer development- Assess and document skin integrity- Monitor for areas of redness and/or skin breakdown- Initiate interventions, skin care algorithm/standards of care as needed  Outcome: Progressing  Goal: Incision(s), wounds(s) or drain site(s) healing without S/S of infection  Description: INTERVENTIONS:- Assess and document risk factors for pressure ulcer development- Assess  and document skin integrity- Assess and document dressing/incision, wound bed, drain sites and surrounding tissue- Implement wound care per orders- Initiate isolation precautions as appropriate- Initiate Pressure Ulcer prevention bundle as indicated  Outcome: Progressing     Problem: HEMATOLOGIC - ADULT  Goal: Free from bleeding injury  Description: (Example usage: patient with low platelets)INTERVENTIONS:- Avoid intramuscular injections, enemas and rectal medication administration- Ensure safe mobilization of patient- Hold pressure on venipuncture sites to achieve adequate hemostasis- Assess for signs and symptoms of internal bleeding- Monitor lab trends- Patient is to report abnormal signs of bleeding to staff- Avoid use of toothpicks and dental floss- Use electric shaver for shaving- Use soft bristle tooth brush- Limit straining and forceful nose blowing  Outcome: Progressing     Problem: Patient/Family Goals  Goal: Patient/Family Short Term Goal  Description: Patient's Short Term Goal: manage pain     Interventions:   - monitor vital signs   - monitor blood glucose levels  - monitor appropriate labs  - pain management   - administer medications per order  - follow MD orders  - diagnostics per order  - update and inform patient and family on plan of care  - See additional Care Plan goals for specific interventions  Outcome: Not Progressing     Problem: PAIN - ADULT  Goal: Verbalizes/displays adequate comfort level or patient's stated pain goal  Description: INTERVENTIONS:  - Encourage pt to monitor pain and request assistance  - Assess pain using appropriate pain scale  - Administer analgesics based on type and severity of pain and evaluate response  - Implement non-pharmacological measures as appropriate and evaluate response  - Consider cultural and social influences on pain and pain management  - Manage/alleviate anxiety  - Utilize distraction and/or relaxation techniques  - Monitor for opioid side effects  -  Notify MD/LIP if interventions unsuccessful or patient reports new pain  - Anticipate increased pain with activity and pre-medicate as appropriate  Outcome: Not Progressing    Patient tolerating room air and ambulation. Participated in physical therapy. Given prn pain medications per MAR orders. Heart rate dropped into the 30's, cardiology on consult. EKG completed

## 2025-03-14 NOTE — PHYSICAL THERAPY NOTE
PHYSICAL THERAPY TREATMENT NOTE - INPATIENT     Room Number: 322/322-A       Presenting Problem: shortness of breath, chest pain, hyperkalemia       Problem List  Principal Problem:    Hyperkalemia  Active Problems:    Back pain    Spinal stenosis of lumbar region at multiple levels    Acute on chronic congestive heart failure, unspecified heart failure type (HCC)    Bacterial upper respiratory infection    Thrombocytopenia (HCC)    Azotemia    Cellulitis of left lower extremity    CKD (chronic kidney disease), stage IV ([facility])      PHYSICAL THERAPY ASSESSMENT   Patient demonstrates fair progress this session, goals  remain in progress.      Patient is requiring contact guard assist and minimal assist as a result of the following impairments: decreased functional strength, decreased endurance/aerobic capacity, pain, and medical status.     Patient continues to function below baseline with bed mobility, transfers, and gait.  Next session anticipate patient to progress bed mobility, transfers, and gait.  Physical Therapy will continue to follow patient for duration of hospitalization.    Patient continues to benefit from continued skilled PT services: to promote return to prior level of function and safety with continuous assistance and gradual rehabilitative therapy .    PLAN DURING HOSPITALIZATION  Nursing Mobility Recommendation : 1 Assist  PT Device Recommendation: Rolling walker, Gait belt  PT Treatment Plan: Bed mobility, Endurance, Energy conservation, Patient education, Family education, Gait training, Strengthening, Stair training, Transfer training, Balance training  Frequency (Obs): 3-5x/week     SUBJECTIVE  \"My legs are so dry.\"     OBJECTIVE  Precautions: Bed/chair alarm    WEIGHT BEARING RESTRICTION       PAIN ASSESSMENT   Ratin  Location: bilateral LE/ankle pain  Management Techniques: Activity promotion, Repositioning, Relaxation    BALANCE  Static Sitting: Good  Dynamic Sitting: Fair +  Static  Standing: Fair -  Dynamic Standing: Fair -    AM-PAC '6-Clicks' INPATIENT SHORT FORM - BASIC MOBILITY  How much difficulty does the patient currently have...  Patient Difficulty: Turning over in bed (including adjusting bedclothes, sheets and blankets)?: A Little   Patient Difficulty: Sitting down on and standing up from a chair with arms (e.g., wheelchair, bedside commode, etc.): A Little   Patient Difficulty: Moving from lying on back to sitting on the side of the bed?: A Little   How much help from another person does the patient currently need...   Help from Another: Moving to and from a bed to a chair (including a wheelchair)?: A Little   Help from Another: Need to walk in hospital room?: A Little   Help from Another: Climbing 3-5 steps with a railing?: Total     AM-PAC Score:  Raw Score: 16   Approx Degree of Impairment: 54.16%   Standardized Score (AM-PAC Scale): 40.78   CMS Modifier (G-Code): CK    FUNCTIONAL ABILITY STATUS  Functional Mobility/Gait Assessment  Gait Assistance: Contact guard assist  Distance (ft): 25'  Assistive Device: Rolling walker  Pattern: Shuffle (verbal cuing for proximity to the RW for safety)  Rolling: minimal assist  Supine to Sit: minimal assist  Sit to Supine: minimal assist  Sit to Stand: contact guard assist    The patient's Approx Degree of Impairment: 54.16% has been calculated based on documentation in the [facility] '6 clicks' Inpatient Daily Activity Short Form.  Research supports that patients with this level of impairment may benefit from REVA.  Final disposition will be made by interdisciplinary medical team.    Patient End of Session: Up in chair, Needs met, Call light within reach, RN aware of session/findings, All patient questions and concerns addressed, Hospital anti-slip socks, Alarm set    CURRENT GOALS   Goals to be met by: 3/24/25  Patient Goal Patient's self-stated goal is: return to PLOF   Goal #1 Patient is able to demonstrate supine - sit EOB @ level:  independent      Goal #1   Current Status  progressing    Goal #2 Patient is able to demonstrate transfers Sit to/from Stand at assistance level: supervision with walker - rolling      Goal #2  Current Status  progressing    Goal #3 Patient is able to ambulate 100 feet with assist device: walker - rolling at assistance level: CGA    Goal #3   Current Status  progressing    Goal #4 Patient will negotiate one curb w/ assistive device and CGA   Goal #4   Current Status  progressing    Goal #5 Patient to demonstrate independence with home activity/exercise instructions provided to patient in preparation for discharge.   Goal #5   Current Status  progressing      Gait Trainin minutes
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
## 2025-03-14 NOTE — PROGRESS NOTES
[facility] Hospitalist Progress Note     CC: Hospital Follow up    PCP: Diana Lopez DO       Assessment/Plan:     Principal Problem:    Hyperkalemia  Active Problems:    Back pain    Spinal stenosis of lumbar region at multiple levels    Acute on chronic congestive heart failure, unspecified heart failure type (HCC)    Bacterial upper respiratory infection    Thrombocytopenia (HCC)    Azotemia    Cellulitis of left lower extremity    CKD (chronic kidney disease), stage IV (HCC)       Hannah Boss is a 79 year old female with PMH sig for generalized anxiety disorder, CKD stage IV with baseline creatinine around 2, type 2 diabetes, hypertension, rheumatoid arthritis, prior sarcoma status post surgery, chronic back pain, CAD, prior DVT on Eliquis and recent upper GI bleed thought to be due to AVMs with most recent EGD in January 2025 with Dr. High and outpatient hemoglobin of 8.6 who presented increasing dyspnea with exertion and swelling in her legs.  Known chronic venous stasis ulcers.  Patient does not think she has gained any weight, did not report any chest pain to me although has had intermittently over the last 3 days along with a cough.  Workup in the ER with negative troponin, proBNP 10,000 and noted to have worsening renal function.  Also noted to have potassium of 6.2.  Treated in ER and also given Lokelma. Continuing IV lasix BID per renal. Plan for discharge to SNF when cleared.      Bilateral lower extremity edema, worse on left with cellulitic changes  - Continue Ancef, patient has had previously similar admissions in the past, monitor response  -Patient afebrile admission, white count normal  - LE doppler negative for DVT  - continue diuresis per renal, IV lasix 40 mg BID     DICK, hyperkalemia, CKD stage IV - improving  - Baseline creatinine seems around 2, on admission 3.17 potassium was 6.2  - likely CRS, continue IV lasix per renal  - Renal diet, Lokelma given, repeat per renal  - Renal consult      Shortness of breath, chest pain atypical, shortness of breath  2 L of oxygen, appears put on for comfort, no desaturations noted, wean as tolerated, chest x-ray without infiltrate or fluid noted  - Troponin negative, repeat in a.m., can monitor on telemetry  - respiratory viral panel negative  -Last echo in September 2024 with a EF of 60 to 65%, grade 1 diastolic dysfunction, no major valvular disorder noted  - continue IV lasix BID     Mild thrombocytopenia  - Platelets usually in the normal range, mildly low today, repeat in a.m., ruling out viral infection above     Chronic anemia, patient with workup with GI, most recently EGD in January 2025 and has continued on her Eliquis  -Known AVMs, per note on 1/14/2025 6 small AVMs were noted in the duodenum and cauterized, gastritis noted.  - Most recent hemoglobin 8.6, on admission 7.5, no active bleeding reported, repeat in a.m. continue Eliquis for now  -She states somebody stopped her PPI twice a day, will resume      Type 2 diabetes  - Hold orals, insulin sliding scale     Hypertension  - Resume home meds, as needed meds ordered     Acute on chronic lower back pain  - Follows with pain clinic here, currently on Percocet and diazepam per note, however patient does not report diazepam as a home med  - will consult pain service given worsening pain, asking for strong pain meds  - order lumbar spine XR     FEN: Further fluids per renal, lites in a.m., renal diet     PPx: Patient on Eliquis     Dispo: Full code, pending course, plan for SNF on discharge, likely 1-2 days     Outpatient records reviewed confirming patient's medical history and medications.      Further recommendations pending patient's clinical course.  [facility] hospitalist to continue to follow patient while in house     Patient and/or patient's family given opportunity to ask questions and note understanding and agreeing with therapeutic plan as outlined     MD WINIFRED [facility] Hospitalist      Answering Service number: [phone]     Subjective:     Still having leg pain. Leg swelling continues to improve.     OBJECTIVE:    Blood pressure 124/51, pulse 61, temperature 98.8 °F (37.1 °C), temperature source Oral, resp. rate 18, weight 175 lb (79.4 kg), SpO2 96%, not currently breastfeeding.    Temp:  [98.4 °F (36.9 °C)-98.8 °F (37.1 °C)] 98.8 °F (37.1 °C)  Pulse:  [55-68] 61  Resp:  [16-18] 18  BP: (120-135)/(41-59) 124/51  SpO2:  [96 %-99 %] 96 %      Intake/Output:    Intake/Output Summary (Last 24 hours) at 3/14/2025 1141  Last data filed at 3/14/2025 1116  Gross per 24 hour   Intake 960 ml   Output 2450 ml   Net -1490 ml       Last 3 Weights   03/14/25 0519 175 lb (79.4 kg)   03/13/25 1721 182 lb 5.1 oz (82.7 kg)   03/12/25 0034 182 lb (82.6 kg)   03/11/25 0510 190 lb 3.2 oz (86.3 kg)   03/10/25 0623 192 lb 8 oz (87.3 kg)   03/09/25 1808 194 lb (88 kg)   01/13/25 1042 179 lb (81.2 kg)   01/08/25 1302 179 lb (81.2 kg)   12/24/24 0443 179 lb (81.2 kg)   12/23/24 0513 179 lb (81.2 kg)   12/22/24 0547 169 lb 6.4 oz (76.8 kg)   12/21/24 0527 187 lb 11.2 oz (85.1 kg)   12/20/24 1312 200 lb (90.7 kg)   12/20/24 0151 200 lb 8 oz (90.9 kg)   12/19/24 0622 205 lb 1.6 oz (93 kg)   12/18/24 0436 216 lb 11.4 oz (98.3 kg)   12/17/24 1903 222 lb 1.6 oz (100.7 kg)       Exam   GEN: female in NAD  HEENT: EOMI  Pulm: CTAB, no crackles or wheezes  CV: RRR, no murmurs  ABD: Soft, non-tender, non-distended, +BS  SKIN: warm, dry, chronic venous stasis changes BLE, mild erythema LLE- improving  EXT: + edema, more wrinkles in BLE    Data Review:       Labs:     Recent Labs   Lab 03/09/25  1549 03/10/25  0850   RBC 2.75* 2.89*   HGB 7.5* 8.0*   HCT 25.0* 26.2*   MCV 90.9 90.7   MCH 27.3 27.7   MCHC 30.0* 30.5*   RDW 15.6* 15.5*   NEPRELIM 3.19 5.04   WBC 4.4 6.5   .0* 139.0*         Recent Labs   Lab 03/12/25  0816 03/13/25  0518 03/14/25  0512   * 93 84   BUN 63* 68* 68*   CREATSERUM 2.52* 2.47* 2.56*    EGFRCR 19* 20* 19*   CA 8.3* 8.2* 8.5*    141 141   K 5.4* 5.4* 5.0   * 112 113*   CO2 25.0 27.0 27.0       Recent Labs   Lab 03/09/25  1549 03/09/25  1844 03/10/25  0850 03/11/25  0922 03/12/25  0816 03/13/25  0518 03/14/25  0512   ALT 43 42  --   --   --   --   --    AST 36* 38*  --   --   --   --   --    ALB 3.9 3.6 3.6 3.4 3.7 3.5 3.5         Imaging:  No results found.      Meds:     INPATIENT MEDICATIONS    Scheduled Medications:      hydrALAZINE, 25 mg, TID  furosemide, 40 mg, BID (Diuretic)  insulin aspart, 1-5 Units, TID CC  atorvastatin, 40 mg, Nightly  cholecalciferol, 2,000 Units, Daily  dilTIAZem ER, 240 mg, Daily  apixaban, 5 mg, BID  ferrous sulfate, 325 mg, Daily with breakfast  isosorbide mononitrate ER, 60 mg, Daily  magnesium oxide, 400 mg, Daily  pantoprazole, 40 mg, BID AC            Drips:       PRN Medications  gabapentin, 300 mg, Q12H PRN  glucose, 15 g, Q15 Min PRN   Or  glucose, 15 g, Q15 Min PRN   Or  glucose-vitamin C, 4 tablet, Q15 Min PRN   Or  dextrose, 50 mL, Q15 Min PRN   Or  glucose, 30 g, Q15 Min PRN   Or  glucose, 30 g, Q15 Min PRN   Or  glucose-vitamin C, 8 tablet, Q15 Min PRN  acetaminophen, 500 mg, Q4H PRN  ondansetron, 4 mg, Q6H PRN  metoclopramide, 5 mg, Q8H PRN  polyethylene glycol (PEG 3350), 17 g, Daily PRN  sennosides, 17.2 mg, Nightly PRN  bisacodyl, 10 mg, Daily PRN  glycerin-hypromellose-, 1 drop, QID PRN  sodium chloride, 1 spray, Q3H PRN  meclizine, 25 mg, TID PRN  oxyCODONE-acetaminophen, 1 tablet, Q4H PRN  hydrALAzine, 10 mg, Q4H PRN

## 2025-03-14 NOTE — PLAN OF CARE
Problem: Patient Centered Care  Goal: Patient preferences are identified and integrated in the patient's plan of care  Description: Interventions:  - What would you like us to know as we care for you? From home with son   - Provide timely, complete, and accurate information to patient/family  - Incorporate patient and family knowledge, values, beliefs, and cultural backgrounds into the planning and delivery of care  - Encourage patient/family to participate in care and decision-making at the level they choose  - Honor patient and family perspectives and choices  Outcome: Progressing     Problem: Diabetes/Glucose Control  Goal: Glucose maintained within prescribed range  Description: INTERVENTIONS:  - Monitor Blood Glucose as ordered  - Assess for signs and symptoms of hyperglycemia and hypoglycemia  - Administer ordered medications to maintain glucose within target range  - Assess barriers to adequate nutritional intake and initiate nutrition consult as needed  - Instruct patient on self management of diabetes  Outcome: Progressing     Problem: CARDIOVASCULAR - ADULT  Goal: Maintains optimal cardiac output and hemodynamic stability  Description: INTERVENTIONS:  - Monitor vital signs, rhythm, and trends  - Monitor for bleeding, hypotension and signs of decreased cardiac output  - Evaluate effectiveness of vasoactive medications to optimize hemodynamic stability  - Monitor arterial and/or venous puncture sites for bleeding and/or hematoma  - Assess quality of pulses, skin color and temperature  - Assess for signs of decreased coronary artery perfusion - ex. Angina  - Evaluate fluid balance, assess for edema, trend weights  Outcome: Progressing  Goal: Absence of cardiac arrhythmias or at baseline  Description: INTERVENTIONS:  - Continuous cardiac monitoring, monitor vital signs, obtain 12 lead EKG if indicated  - Evaluate effectiveness of antiarrhythmic and heart rate control medications as ordered  - Initiate  emergency measures for life threatening arrhythmias  - Monitor electrolytes and administer replacement therapy as ordered  Outcome: Progressing     Problem: Patient/Family Goals  Goal: Patient/Family Long Term Goal  Description: Patient's Long Term Goal: discharge from hospital     Interventions:  - monitor vital signs   - monitor blood glucose levels  - monitor appropriate labs  - pain management   - administer medications per order  - follow MD orders  - diagnostics per order  - update and inform patient and family on plan of care  - discharge planning  - See additional Care Plan goals for specific interventions  Outcome: Progressing  Goal: Patient/Family Short Term Goal  Description: Patient's Short Term Goal: manage pain     Interventions:   - monitor vital signs   - monitor blood glucose levels  - monitor appropriate labs  - pain management   - administer medications per order  - follow MD orders  - diagnostics per order  - update and inform patient and family on plan of care  - See additional Care Plan goals for specific interventions  Outcome: Progressing     Problem: RESPIRATORY - ADULT  Goal: Achieves optimal ventilation and oxygenation  Description: INTERVENTIONS:  - Assess for changes in respiratory status  - Assess for changes in mentation and behavior  - Position to facilitate oxygenation and minimize respiratory effort  - Oxygen supplementation based on oxygen saturation or ABGs  - Provide Smoking Cessation handout, if applicable  - Encourage broncho-pulmonary hygiene including cough, deep breathe, Incentive Spirometry  - Assess the need for suctioning and perform as needed  - Assess and instruct to report SOB or any respiratory difficulty  - Respiratory Therapy support as indicated  - Manage/alleviate anxiety  - Monitor for signs/symptoms of CO2 retention  Outcome: Progressing     Problem: METABOLIC/FLUID AND ELECTROLYTES - ADULT  Goal: Glucose maintained within prescribed range  Description:  INTERVENTIONS:  - Monitor Blood Glucose as ordered  - Assess for signs and symptoms of hyperglycemia and hypoglycemia  - Administer ordered medications to maintain glucose within target range  - Assess barriers to adequate nutritional intake and initiate nutrition consult as needed  - Instruct patient on self management of diabetes  Outcome: Progressing  Goal: Electrolytes maintained within normal limits  Description: INTERVENTIONS:  - Monitor labs and rhythm and assess patient for signs and symptoms of electrolyte imbalances  - Administer electrolyte replacement as ordered  - Monitor response to electrolyte replacements, including rhythm and repeat lab results as appropriate  - Fluid restriction as ordered  - Instruct patient on fluid and nutrition restrictions as appropriate  Outcome: Progressing  Goal: Hemodynamic stability and optimal renal function maintained  Description: INTERVENTIONS:  - Monitor labs and assess for signs and symptoms of volume excess or deficit  - Monitor intake, output and patient weight  - Monitor urine specific gravity, serum osmolarity and serum sodium as indicated or ordered  - Monitor response to interventions for patient's volume status, including labs, urine output, blood pressure (other measures as available)  - Encourage oral intake as appropriate  - Instruct patient on fluid and nutrition restrictions as appropriate  Outcome: Progressing     Problem: SKIN/TISSUE INTEGRITY - ADULT  Goal: Skin integrity remains intact  Description: INTERVENTIONS- Assess and document risk factors for pressure ulcer development- Assess and document skin integrity- Monitor for areas of redness and/or skin breakdown- Initiate interventions, skin care algorithm/standards of care as needed  Outcome: Progressing  Goal: Incision(s), wounds(s) or drain site(s) healing without S/S of infection  Description: INTERVENTIONS:- Assess and document risk factors for pressure ulcer development- Assess and document  skin integrity- Assess and document dressing/incision, wound bed, drain sites and surrounding tissue- Implement wound care per orders- Initiate isolation precautions as appropriate- Initiate Pressure Ulcer prevention bundle as indicated  Outcome: Progressing     Problem: MUSCULOSKELETAL - ADULT  Goal: Return mobility to safest level of function  Description: INTERVENTIONS:  - Assess patient stability and activity tolerance for standing, transferring and ambulating w/ or w/o assistive devices  - Assist with transfers and ambulation using safe patient handling equipment as needed  - Ensure adequate protection for wounds/incisions during mobilization  - Obtain PT/OT consults as needed  - Advance activity as appropriate  - Communicate ordered activity level and limitations with patient/family  Outcome: Progressing  Goal: Maintain proper alignment of affected body part  Description: INTERVENTIONS:  - Support and protect limb and body alignment per provider's orders  - Instruct and reinforce with patient and family use of appropriate assistive device and precautions (e.g. spinal or hip dislocation precautions)  Outcome: Progressing     Problem: DISCHARGE PLANNING  Goal: Discharge to home or other facility with appropriate resources  Description: INTERVENTIONS:  - Identify barriers to discharge w/pt and caregiver  - Include patient/family/discharge partner in discharge planning  - Arrange for needed discharge resources and transportation as appropriate  - Identify discharge learning needs (meds, wound care, etc)  - Arrange for interpreters to assist at discharge as needed  - Consider post-discharge preferences of patient/family/discharge partner  - Complete POLST form as appropriate  - Assess patient's ability to be responsible for managing their own health  - Refer to Case Management Department for coordinating discharge planning if the patient needs post-hospital services based on physician/LIP order or complex needs  related to functional status, cognitive ability or social support system  Outcome: Progressing     Problem: SAFETY ADULT - FALL  Goal: Free from fall injury  Description: INTERVENTIONS:  - Assess pt frequently for physical needs  - Identify cognitive and physical deficits and behaviors that affect risk of falls.  - [place] fall precautions as indicated by assessment.  - Educate pt/family on patient safety including physical limitations  - Instruct pt to call for assistance with activity based on assessment  - Modify environment to reduce risk of injury  - Provide assistive devices as appropriate  - Consider OT/PT consult to assist with strengthening/mobility  - Encourage toileting schedule  Outcome: Progressing     Problem: PAIN - ADULT  Goal: Verbalizes/displays adequate comfort level or patient's stated pain goal  Description: INTERVENTIONS:  - Encourage pt to monitor pain and request assistance  - Assess pain using appropriate pain scale  - Administer analgesics based on type and severity of pain and evaluate response  - Implement non-pharmacological measures as appropriate and evaluate response  - Consider cultural and social influences on pain and pain management  - Manage/alleviate anxiety  - Utilize distraction and/or relaxation techniques  - Monitor for opioid side effects  - Notify MD/LIP if interventions unsuccessful or patient reports new pain  - Anticipate increased pain with activity and pre-medicate as appropriate  Outcome: Progressing

## 2025-03-15 LAB
ANION GAP SERPL CALC-SCNC: 1 MMOL/L (ref 0–18)
BUN BLD-MCNC: 76 MG/DL (ref 9–23)
BUN/CREAT SERPL: 28.9 (ref 10–20)
CALCIUM BLD-MCNC: 8.1 MG/DL (ref 8.7–10.4)
CHLORIDE SERPL-SCNC: 114 MMOL/L (ref 98–112)
CO2 SERPL-SCNC: 27 MMOL/L (ref 21–32)
CREAT BLD-MCNC: 2.63 MG/DL
EGFRCR SERPLBLD CKD-EPI 2021: 18 ML/MIN/1.73M2 (ref 60–?)
GLUCOSE BLD-MCNC: 97 MG/DL (ref 70–99)
GLUCOSE BLDC GLUCOMTR-MCNC: 133 MG/DL (ref 70–99)
GLUCOSE BLDC GLUCOMTR-MCNC: 164 MG/DL (ref 70–99)
GLUCOSE BLDC GLUCOMTR-MCNC: 164 MG/DL (ref 70–99)
GLUCOSE BLDC GLUCOMTR-MCNC: 222 MG/DL (ref 70–99)
OSMOLALITY SERPL CALC.SUM OF ELEC: 317 MOSM/KG (ref 275–295)
POTASSIUM SERPL-SCNC: 5.4 MMOL/L (ref 3.5–5.1)
SODIUM SERPL-SCNC: 142 MMOL/L (ref 136–145)

## 2025-03-15 PROCEDURE — 99232 SBSQ HOSP IP/OBS MODERATE 35: CPT | Performed by: INTERNAL MEDICINE

## 2025-03-15 RX ORDER — DILTIAZEM HYDROCHLORIDE 120 MG/1
120 CAPSULE, EXTENDED RELEASE ORAL DAILY
Status: DISCONTINUED | OUTPATIENT
Start: 2025-03-15 | End: 2025-03-16

## 2025-03-15 RX ORDER — HYDRALAZINE HYDROCHLORIDE 50 MG/1
50 TABLET, FILM COATED ORAL 3 TIMES DAILY
Status: DISCONTINUED | OUTPATIENT
Start: 2025-03-15 | End: 2025-03-16

## 2025-03-15 RX ORDER — FLUDROCORTISONE ACETATE 0.1 MG/1
0.1 TABLET ORAL ONCE
Status: COMPLETED | OUTPATIENT
Start: 2025-03-15 | End: 2025-03-15

## 2025-03-15 RX ORDER — DILTIAZEM HYDROCHLORIDE 120 MG/1
120 CAPSULE, EXTENDED RELEASE ORAL DAILY
Status: DISCONTINUED | OUTPATIENT
Start: 2025-03-16 | End: 2025-03-15

## 2025-03-15 NOTE — PROGRESS NOTES
Progress Note  Hannah Boss Patient Status:  Inpatient    1945 MRN [id number]   Location [facility] 3W/SW Attending Arianna Lema MD   Hosp Day # 6 PCP Diana Lopez DO     Subjective:  C/o leg pain, abdominal pain,denies cardiac complaints    Objective:  /51   Pulse 65   Temp 98.8 °F (37.1 °C)   Resp 18   Wt 175 lb (79.4 kg)   SpO2 95%   BMI 31.00 kg/m²     Telemetry: SR    Intake/Output:    Intake/Output Summary (Last 24 hours) at 3/15/2025 1450  Last data filed at 3/15/2025 1111  Gross per 24 hour   Intake 720 ml   Output 1900 ml   Net -1180 ml     Last 3 Weights   25 0519 175 lb (79.4 kg)   25 1721 182 lb 5.1 oz (82.7 kg)   25 0034 182 lb (82.6 kg)   25 0510 190 lb 3.2 oz (86.3 kg)   03/10/25 0623 192 lb 8 oz (87.3 kg)   25 1808 194 lb (88 kg)   25 1042 179 lb (81.2 kg)   25 1302 179 lb (81.2 kg)   24 0443 179 lb (81.2 kg)   24 0513 179 lb (81.2 kg)   24 0547 169 lb 6.4 oz (76.8 kg)   24 0527 187 lb 11.2 oz (85.1 kg)   24 1312 200 lb (90.7 kg)   24 0151 200 lb 8 oz (90.9 kg)   24 0622 205 lb 1.6 oz (93 kg)   24 0436 216 lb 11.4 oz (98.3 kg)   24 1903 222 lb 1.6 oz (100.7 kg)     Labs:  Recent Labs   Lab 25  0518 25  0512 03/15/25  0616   GLU 93 84 97   BUN 68* 68* 76*   CREATSERUM 2.47* 2.56* 2.63*   EGFRCR 20* 19* 18*   CA 8.2* 8.5* 8.1*    141 142   K 5.4* 5.0 5.4*    113* 114*   CO2 27.0 27.0 27.0     Recent Labs   Lab 25  1549 03/10/25  0850   RBC 2.75* 2.89*   HGB 7.5* 8.0*   HCT 25.0* 26.2*   MCV 90.9 90.7   MCH 27.3 27.7   MCHC 30.0* 30.5*   RDW 15.6* 15.5*   NEPRELIM 3.19 5.04   WBC 4.4 6.5   .0* 139.0*     Recent Labs   Lab 25  1549 03/10/25  0850   TROPHS 16 29     Lab Results   Component Value Date/Time    HDL 54 2024 11:41 AM    LDL 90 2024 11:41 AM    TRIG 122 2024 11:41 AM     Lab Results   Component Value  Date    DDIMER 1.13 (H) 06/05/2023     Lab Results   Component Value Date    TSH 0.008 (L) 07/03/2024     Review of Systems:   Constitutional: No fevers, chills, fatigue or night sweats.  Respiratory: Denies cough, wheezing or shortness of breath.  CV: Denies chest pain, palpitations, orthopnea, PND or dizziness.  GI: No nausea, vomiting or diarrhea. No blood in stools.    Physical Exam:   General: Alert, cooperative, no distress, appears stated age.  Neck: no JVD.  Lungs: Clear to auscultation bilaterally.  Chest wall: No tenderness or deformity.  Heart: Regular rate and rhythm, S1, S2 normal, no murmur, click, rub or gallop.  Abdomen: Soft, non-tender. Bowel sounds normal. No masses,  No organomegaly.  Extremities: Extremities normal, atraumatic, no cyanosis, 1+ BLE edema baseline  Pulses: 2+ and symmetric all extremities.  Neurologic: Grossly intact.    Medications:   [START ON 3/16/2025] sodium zirconium cyclosilicate  10 g Oral Daily    bumetanide  1 mg Oral Daily    hydrALAZINE  25 mg Oral TID    insulin aspart  1-5 Units Subcutaneous TID CC    atorvastatin  40 mg Oral Nightly    cholecalciferol  2,000 Units Oral Daily    [Held by provider] dilTIAZem ER  240 mg Oral Daily    apixaban  5 mg Oral BID    ferrous sulfate  325 mg Oral Daily with breakfast    isosorbide mononitrate ER  60 mg Oral Daily    magnesium oxide  400 mg Oral Daily    pantoprazole  40 mg Oral BID AC     Assessment:    79 year old female with PMH of moderate aortic stenosis, AVNRT, DVT/PE, HTN, CKD III-IV, DMII who presented with DICK on CKD and cellulitis. Cardiology was consulted for sinus bradycardia with intermittent HR in 30s.     Sinus bradycardia  HR intermittently into 30s on admission  -diltiazem held overnight, remains sinus 60s, occasional yanci 50s  -known hx AVNRT with difficult to control rates historically  -will resume home dilt at lower dose and monitor tele    DICK on CKD3  Baseline 2-2.3  -3.17 on admission  -nephrology  following  -Cr improving with bumex per nephrology    Acute on Chronic HFpEF  LVEF 60-65% with G1DD on echo in September, 2024 with moderate aortic stenosis, mild aortic regurgitation, PASP 59mmHg  -volume overloaded on admission, nephrology managing diuresis with bumex, now appears compensated on exam  -proBNP 10,705 on admission  -I/Os net neg 8L, weight down 19#  -GDMT: bumex, hydralazine, isosorbide     Paroxysmal SVT   Remains in sinus rhythm/sinus yanci 50-60s on cardizem   -home dose held due to bradycardia, will resume at lower dose to assess tolerance     HTN  130s-150s  -hydralazine, isosorbide     HLD-controlled on statin     Rheumatoid Arthritis     Hx DVT  On eliquis     DMII-A1c 5.5     Chronic Anemia  Hgb baseline ~8     Hyperkalemia  K+ 5.4  -lokelma per nephrology    Plan:  -resume dilt at 120mg daily, hold for HR <60  -Continue bumex, isosorbide  -Increase hydralazine to 50mg TID  -further recs per nephrology/primary    Plan of care discussed with patient, RN.    Kailee Fong, MSN, APN, FNP-BC, CCK  3/15/2025  2:50 PM  [phone] [place]  [phone] [place]      Cardiology Attending:  Note reviewed and Independent exam, assessment and plan developed. Labs and tests reviewed.  Note is my recommendations with assessment and plan as I have made changes and additions within note and merged.    S/no change in symptoms  Physical Exam:  General: Alert and oriented. No apparent distress. No respiratory or constitutional distress.  HEENT: Normocephalic, anicteric sclera, neck supple  Neck: elevated JVD, carotids 2+,  Cardiac: irregular/Regular rate and rhythm. No pathologic murmur.  Lungs: Rales with normal effort.  Normal excursions and effort.  Abdomen: Soft, non-tender. BS-present.  Extremities: Without clubbing, cyanosis.  Edema, Peripheral pulses present.  Neurologic: Alert and oriented, normal affect. Motor ok.  Skin: Warm and dry.   A/P:f/u labs  Agree plan above  Ousmane Batista MD  [place]  [facility] [place]  Cardiac Electrophysiology  3/15/2025  2v

## 2025-03-15 NOTE — PROGRESS NOTES
[facility]  part of [facility]    Progress Note    Hannah Boss Patient Status:  Inpatient    1945 MRN [id number]   Location [facility] 3W/SW Attending Nerissa Allen MD   Hosp Day # 6 PCP Diana Lopez DO       Subjective:   Hannah Boss is a(n) 79 year old female who I am seeing for DICK/CKD IV/hyperkaelmia    Feels much better  Edema is better  bradycardic    Objective:   /51   Pulse 65   Temp 98.8 °F (37.1 °C)   Resp 18   Wt 175 lb (79.4 kg)   SpO2 95%   BMI 31.00 kg/m²      Intake/Output Summary (Last 24 hours) at 3/15/2025 1557  Last data filed at 3/15/2025 1500  Gross per 24 hour   Intake 1080 ml   Output 2700 ml   Net -1620 ml     Wt Readings from Last 1 Encounters:   25 175 lb (79.4 kg)       Exam  Vital signs: Blood pressure 159/51, pulse 65, temperature 98.8 °F (37.1 °C), resp. rate 18, weight 175 lb (79.4 kg), SpO2 95%, not currently breastfeeding.    General: No acute distress. Alert and oriented x 3.  HEENT: Moist mucous membranes. EOMI. PERRLA  Neck:  No JVD.   Respiratory: Clear to auscultation bilaterally.    Cardiovascular: S1, S2.  Regular rate and rhythm.   Abdomen: Soft, nontender, nondistended.    Neurologic: No focal neurological deficits.  Musculoskeletal: Full range of motion of all extremities. Edema improved  Access:    Results:     Recent Labs   Lab 25  1549 03/10/25  0850   RBC 2.75* 2.89*   HGB 7.5* 8.0*   HCT 25.0* 26.2*   MCV 90.9 90.7   MCH 27.3 27.7   MCHC 30.0* 30.5*   RDW 15.6* 15.5*   NEPRELIM 3.19 5.04   WBC 4.4 6.5   .0* 139.0*         Recent Labs   Lab 25  0518 25  0512 03/15/25  0616   GLU 93 84 97   BUN 68* 68* 76*   CREATSERUM 2.47* 2.56* 2.63*   CA 8.2* 8.5* 8.1*    141 142   K 5.4* 5.0 5.4*    113* 114*   CO2 27.0 27.0 27.0          No results found.    Assessment and Plan:     79-year-old female with history of CKD stage III/IV , diabetes, hypertension, leiomyosarcoma  status postsurgery and radiation , SVT, chronic pain , LLE DVT on eliquis, 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   Also found to have dick on ckd and hyperkalemia  Cr 3.1 mg/dl on admission, k was 6.2  [place] UA    Impression:    DICK likely CRS vs bc of infection / cellulitis. Cr improving. 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 Now stable at 2.4 mg/dl  K is also better with medical management  Low K diet  Lokelma , diuretics and florinef  Switch IV to PO bumex  Will start daily lokelma    Will need to be discharged  on bumex 1 mg daily    Thank you very much for allowing me to participate in the care of your patient.  If you have any questions, please do not hesitate to contact me.     Janet Reynaga MD

## 2025-03-15 NOTE — PLAN OF CARE
Alet x4 denies pain or discomfort. Dc tomorrow  at 3pm. See SW note for REVA info. Cardizem restarted  Problem: Patient Centered Care  Goal: Patient preferences are identified and integrated in the patient's plan of care  Description: Interventions:  - What would you like us to know as we care for you? From home with son   - Provide timely, complete, and accurate information to patient/family  - Incorporate patient and family knowledge, values, beliefs, and cultural backgrounds into the planning and delivery of care  - Encourage patient/family to participate in care and decision-making at the level they choose  - Honor patient and family perspectives and choices  Outcome: Progressing     Problem: Diabetes/Glucose Control  Goal: Glucose maintained within prescribed range  Description: INTERVENTIONS:  - Monitor Blood Glucose as ordered  - Assess for signs and symptoms of hyperglycemia and hypoglycemia  - Administer ordered medications to maintain glucose within target range  - Assess barriers to adequate nutritional intake and initiate nutrition consult as needed  - Instruct patient on self management of diabetes  Outcome: Progressing     Problem: Patient/Family Goals  Goal: Patient/Family Long Term Goal  Description: Patient's Long Term Goal: discharge from hospital     Interventions:  - monitor vital signs   - monitor blood glucose levels  - monitor appropriate labs  - pain management   - administer medications per order  - follow MD orders  - diagnostics per order  - update and inform patient and family on plan of care  - discharge planning  - See additional Care Plan goals for specific interventions  Outcome: Progressing  Goal: Patient/Family Short Term Goal  Description: Patient's Short Term Goal: manage pain     Interventions:   - monitor vital signs   - monitor blood glucose levels  - monitor appropriate labs  - pain management   - administer medications per order  - follow MD orders  - diagnostics per  order  - update and inform patient and family on plan of care  - See additional Care Plan goals for specific interventions  Outcome: Progressing     Problem: CARDIOVASCULAR - ADULT  Goal: Maintains optimal cardiac output and hemodynamic stability  Description: INTERVENTIONS:  - Monitor vital signs, rhythm, and trends  - Monitor for bleeding, hypotension and signs of decreased cardiac output  - Evaluate effectiveness of vasoactive medications to optimize hemodynamic stability  - Monitor arterial and/or venous puncture sites for bleeding and/or hematoma  - Assess quality of pulses, skin color and temperature  - Assess for signs of decreased coronary artery perfusion - ex. Angina  - Evaluate fluid balance, assess for edema, trend weights  Outcome: Progressing  Goal: Absence of cardiac arrhythmias or at baseline  Description: INTERVENTIONS:  - Continuous cardiac monitoring, monitor vital signs, obtain 12 lead EKG if indicated  - Evaluate effectiveness of antiarrhythmic and heart rate control medications as ordered  - Initiate emergency measures for life threatening arrhythmias  - Monitor electrolytes and administer replacement therapy as ordered  Outcome: Progressing     Problem: RESPIRATORY - ADULT  Goal: Achieves optimal ventilation and oxygenation  Description: INTERVENTIONS:  - Assess for changes in respiratory status  - Assess for changes in mentation and behavior  - Position to facilitate oxygenation and minimize respiratory effort  - Oxygen supplementation based on oxygen saturation or ABGs  - Provide Smoking Cessation handout, if applicable  - Encourage broncho-pulmonary hygiene including cough, deep breathe, Incentive Spirometry  - Assess the need for suctioning and perform as needed  - Assess and instruct to report SOB or any respiratory difficulty  - Respiratory Therapy support as indicated  - Manage/alleviate anxiety  - Monitor for signs/symptoms of CO2 retention  Outcome: Progressing     Problem:  METABOLIC/FLUID AND ELECTROLYTES - ADULT  Goal: Glucose maintained within prescribed range  Description: INTERVENTIONS:  - Monitor Blood Glucose as ordered  - Assess for signs and symptoms of hyperglycemia and hypoglycemia  - Administer ordered medications to maintain glucose within target range  - Assess barriers to adequate nutritional intake and initiate nutrition consult as needed  - Instruct patient on self management of diabetes  Outcome: Progressing  Goal: Electrolytes maintained within normal limits  Description: INTERVENTIONS:  - Monitor labs and rhythm and assess patient for signs and symptoms of electrolyte imbalances  - Administer electrolyte replacement as ordered  - Monitor response to electrolyte replacements, including rhythm and repeat lab results as appropriate  - Fluid restriction as ordered  - Instruct patient on fluid and nutrition restrictions as appropriate  Outcome: Progressing  Goal: Hemodynamic stability and optimal renal function maintained  Description: INTERVENTIONS:  - Monitor labs and assess for signs and symptoms of volume excess or deficit  - Monitor intake, output and patient weight  - Monitor urine specific gravity, serum osmolarity and serum sodium as indicated or ordered  - Monitor response to interventions for patient's volume status, including labs, urine output, blood pressure (other measures as available)  - Encourage oral intake as appropriate  - Instruct patient on fluid and nutrition restrictions as appropriate  Outcome: Progressing     Problem: MUSCULOSKELETAL - ADULT  Goal: Return mobility to safest level of function  Description: INTERVENTIONS:  - Assess patient stability and activity tolerance for standing, transferring and ambulating w/ or w/o assistive devices  - Assist with transfers and ambulation using safe patient handling equipment as needed  - Ensure adequate protection for wounds/incisions during mobilization  - Obtain PT/OT consults as needed  - Advance  activity as appropriate  - Communicate ordered activity level and limitations with patient/family  Outcome: Progressing  Goal: Maintain proper alignment of affected body part  Description: INTERVENTIONS:  - Support and protect limb and body alignment per provider's orders  - Instruct and reinforce with patient and family use of appropriate assistive device and precautions (e.g. spinal or hip dislocation precautions)  Outcome: Progressing     Problem: PAIN - ADULT  Goal: Verbalizes/displays adequate comfort level or patient's stated pain goal  Description: INTERVENTIONS:  - Encourage pt to monitor pain and request assistance  - Assess pain using appropriate pain scale  - Administer analgesics based on type and severity of pain and evaluate response  - Implement non-pharmacological measures as appropriate and evaluate response  - Consider cultural and social influences on pain and pain management  - Manage/alleviate anxiety  - Utilize distraction and/or relaxation techniques  - Monitor for opioid side effects  - Notify MD/LIP if interventions unsuccessful or patient reports new pain  - Anticipate increased pain with activity and pre-medicate as appropriate  Outcome: Progressing     Problem: SAFETY ADULT - FALL  Goal: Free from fall injury  Description: INTERVENTIONS:  - Assess pt frequently for physical needs  - Identify cognitive and physical deficits and behaviors that affect risk of falls.  - [place] fall precautions as indicated by assessment.  - Educate pt/family on patient safety including physical limitations  - Instruct pt to call for assistance with activity based on assessment  - Modify environment to reduce risk of injury  - Provide assistive devices as appropriate  - Consider OT/PT consult to assist with strengthening/mobility  - Encourage toileting schedule  Outcome: Progressing     Problem: DISCHARGE PLANNING  Goal: Discharge to home or other facility with appropriate resources  Description:  INTERVENTIONS:  - Identify barriers to discharge w/pt and caregiver  - Include patient/family/discharge partner in discharge planning  - Arrange for needed discharge resources and transportation as appropriate  - Identify discharge learning needs (meds, wound care, etc)  - Arrange for interpreters to assist at discharge as needed  - Consider post-discharge preferences of patient/family/discharge partner  - Complete POLST form as appropriate  - Assess patient's ability to be responsible for managing their own health  - Refer to Case Management Department for coordinating discharge planning if the patient needs post-hospital services based on physician/LIP order or complex needs related to functional status, cognitive ability or social support system  Outcome: Progressing     Problem: SKIN/TISSUE INTEGRITY - ADULT  Goal: Skin integrity remains intact  Description: INTERVENTIONS- Assess and document risk factors for pressure ulcer development- Assess and document skin integrity- Monitor for areas of redness and/or skin breakdown- Initiate interventions, skin care algorithm/standards of care as needed  Outcome: Progressing  Goal: Incision(s), wounds(s) or drain site(s) healing without S/S of infection  Description: INTERVENTIONS:- Assess and document risk factors for pressure ulcer development- Assess and document skin integrity- Assess and document dressing/incision, wound bed, drain sites and surrounding tissue- Implement wound care per orders- Initiate isolation precautions as appropriate- Initiate Pressure Ulcer prevention bundle as indicated  Outcome: Progressing     Problem: HEMATOLOGIC - ADULT  Goal: Free from bleeding injury  Description: (Example usage: patient with low platelets)INTERVENTIONS:- Avoid intramuscular injections, enemas and rectal medication administration- Ensure safe mobilization of patient- Hold pressure on venipuncture sites to achieve adequate hemostasis- Assess for signs and symptoms of  internal bleeding- Monitor lab trends- Patient is to report abnormal signs of bleeding to staff- Avoid use of toothpicks and dental floss- Use electric shaver for shaving- Use soft bristle tooth brush- Limit straining and forceful nose blowing  Outcome: Progressing

## 2025-03-15 NOTE — CM/SW NOTE
CM confirmed with Dr. Lema - patient will likely be medically cleared for discharge tomorrow.    CM confirmed with Elda bello [facility] - bed is available and facility can accept patient tomorrow, 3/16 @ 3 PM.    Room Number: 119  Number for report: [phone]    CM arranged [facility] for  3/16 @ 3 PM. PCS form completed.    Dr. Lema and ELLIE Castro notified of above.    / to remain available for support and/or discharge planning.     Plan: [facility] 3/16 @ 3 PM via [facility]    Kailey Cardenas RN, BSN  Nurse   [phone]

## 2025-03-15 NOTE — PLAN OF CARE
Problem: Patient Centered Care  Goal: Patient preferences are identified and integrated in the patient's plan of care  Description: Interventions:  - What would you like us to know as we care for you? From home with son   - Provide timely, complete, and accurate information to patient/family  - Incorporate patient and family knowledge, values, beliefs, and cultural backgrounds into the planning and delivery of care  - Encourage patient/family to participate in care and decision-making at the level they choose  - Honor patient and family perspectives and choices  Outcome: Progressing     Problem: Diabetes/Glucose Control  Goal: Glucose maintained within prescribed range  Description: INTERVENTIONS:  - Monitor Blood Glucose as ordered  - Assess for signs and symptoms of hyperglycemia and hypoglycemia  - Administer ordered medications to maintain glucose within target range  - Assess barriers to adequate nutritional intake and initiate nutrition consult as needed  - Instruct patient on self management of diabetes  Outcome: Progressing     Problem: Patient/Family Goals  Goal: Patient/Family Long Term Goal  Description: Patient's Long Term Goal: discharge from hospital     Interventions:  - monitor vital signs   - monitor blood glucose levels  - monitor appropriate labs  - pain management   - administer medications per order  - follow MD orders  - diagnostics per order  - update and inform patient and family on plan of care  - discharge planning  - See additional Care Plan goals for specific interventions  Outcome: Progressing  Goal: Patient/Family Short Term Goal  Description: Patient's Short Term Goal: manage pain     Interventions:   - monitor vital signs   - monitor blood glucose levels  - monitor appropriate labs  - pain management   - administer medications per order  - follow MD orders  - diagnostics per order  - update and inform patient and family on plan of care  - See additional Care Plan goals for specific  interventions  Outcome: Progressing     Problem: CARDIOVASCULAR - ADULT  Goal: Maintains optimal cardiac output and hemodynamic stability  Description: INTERVENTIONS:  - Monitor vital signs, rhythm, and trends  - Monitor for bleeding, hypotension and signs of decreased cardiac output  - Evaluate effectiveness of vasoactive medications to optimize hemodynamic stability  - Monitor arterial and/or venous puncture sites for bleeding and/or hematoma  - Assess quality of pulses, skin color and temperature  - Assess for signs of decreased coronary artery perfusion - ex. Angina  - Evaluate fluid balance, assess for edema, trend weights  Outcome: Progressing  Goal: Absence of cardiac arrhythmias or at baseline  Description: INTERVENTIONS:  - Continuous cardiac monitoring, monitor vital signs, obtain 12 lead EKG if indicated  - Evaluate effectiveness of antiarrhythmic and heart rate control medications as ordered  - Initiate emergency measures for life threatening arrhythmias  - Monitor electrolytes and administer replacement therapy as ordered  Outcome: Progressing     Problem: RESPIRATORY - ADULT  Goal: Achieves optimal ventilation and oxygenation  Description: INTERVENTIONS:  - Assess for changes in respiratory status  - Assess for changes in mentation and behavior  - Position to facilitate oxygenation and minimize respiratory effort  - Oxygen supplementation based on oxygen saturation or ABGs  - Provide Smoking Cessation handout, if applicable  - Encourage broncho-pulmonary hygiene including cough, deep breathe, Incentive Spirometry  - Assess the need for suctioning and perform as needed  - Assess and instruct to report SOB or any respiratory difficulty  - Respiratory Therapy support as indicated  - Manage/alleviate anxiety  - Monitor for signs/symptoms of CO2 retention  Outcome: Progressing     Problem: METABOLIC/FLUID AND ELECTROLYTES - ADULT  Goal: Glucose maintained within prescribed range  Description:  INTERVENTIONS:  - Monitor Blood Glucose as ordered  - Assess for signs and symptoms of hyperglycemia and hypoglycemia  - Administer ordered medications to maintain glucose within target range  - Assess barriers to adequate nutritional intake and initiate nutrition consult as needed  - Instruct patient on self management of diabetes  Outcome: Progressing  Goal: Electrolytes maintained within normal limits  Description: INTERVENTIONS:  - Monitor labs and rhythm and assess patient for signs and symptoms of electrolyte imbalances  - Administer electrolyte replacement as ordered  - Monitor response to electrolyte replacements, including rhythm and repeat lab results as appropriate  - Fluid restriction as ordered  - Instruct patient on fluid and nutrition restrictions as appropriate  Outcome: Progressing  Goal: Hemodynamic stability and optimal renal function maintained  Description: INTERVENTIONS:  - Monitor labs and assess for signs and symptoms of volume excess or deficit  - Monitor intake, output and patient weight  - Monitor urine specific gravity, serum osmolarity and serum sodium as indicated or ordered  - Monitor response to interventions for patient's volume status, including labs, urine output, blood pressure (other measures as available)  - Encourage oral intake as appropriate  - Instruct patient on fluid and nutrition restrictions as appropriate  Outcome: Progressing     Problem: MUSCULOSKELETAL - ADULT  Goal: Return mobility to safest level of function  Description: INTERVENTIONS:  - Assess patient stability and activity tolerance for standing, transferring and ambulating w/ or w/o assistive devices  - Assist with transfers and ambulation using safe patient handling equipment as needed  - Ensure adequate protection for wounds/incisions during mobilization  - Obtain PT/OT consults as needed  - Advance activity as appropriate  - Communicate ordered activity level and limitations with patient/family  Outcome:  Progressing  Goal: Maintain proper alignment of affected body part  Description: INTERVENTIONS:  - Support and protect limb and body alignment per provider's orders  - Instruct and reinforce with patient and family use of appropriate assistive device and precautions (e.g. spinal or hip dislocation precautions)  Outcome: Progressing     Problem: SKIN/TISSUE INTEGRITY - ADULT  Goal: Skin integrity remains intact  Description: INTERVENTIONS- Assess and document risk factors for pressure ulcer development- Assess and document skin integrity- Monitor for areas of redness and/or skin breakdown- Initiate interventions, skin care algorithm/standards of care as needed  Outcome: Progressing  Goal: Incision(s), wounds(s) or drain site(s) healing without S/S of infection  Description: INTERVENTIONS:- Assess and document risk factors for pressure ulcer development- Assess and document skin integrity- Assess and document dressing/incision, wound bed, drain sites and surrounding tissue- Implement wound care per orders- Initiate isolation precautions as appropriate- Initiate Pressure Ulcer prevention bundle as indicated  Outcome: Progressing     Problem: HEMATOLOGIC - ADULT  Goal: Free from bleeding injury  Description: (Example usage: patient with low platelets)INTERVENTIONS:- Avoid intramuscular injections, enemas and rectal medication administration- Ensure safe mobilization of patient- Hold pressure on venipuncture sites to achieve adequate hemostasis- Assess for signs and symptoms of internal bleeding- Monitor lab trends- Patient is to report abnormal signs of bleeding to staff- Avoid use of toothpicks and dental floss- Use electric shaver for shaving- Use soft bristle tooth brush- Limit straining and forceful nose blowing  Outcome: Progressing     Problem: PAIN - ADULT  Goal: Verbalizes/displays adequate comfort level or patient's stated pain goal  Description: INTERVENTIONS:  - Encourage pt to monitor pain and request  assistance  - Assess pain using appropriate pain scale  - Administer analgesics based on type and severity of pain and evaluate response  - Implement non-pharmacological measures as appropriate and evaluate response  - Consider cultural and social influences on pain and pain management  - Manage/alleviate anxiety  - Utilize distraction and/or relaxation techniques  - Monitor for opioid side effects  - Notify MD/LIP if interventions unsuccessful or patient reports new pain  - Anticipate increased pain with activity and pre-medicate as appropriate  Outcome: Progressing     Problem: SAFETY ADULT - FALL  Goal: Free from fall injury  Description: INTERVENTIONS:  - Assess pt frequently for physical needs  - Identify cognitive and physical deficits and behaviors that affect risk of falls.  - [place] fall precautions as indicated by assessment.  - Educate pt/family on patient safety including physical limitations  - Instruct pt to call for assistance with activity based on assessment  - Modify environment to reduce risk of injury  - Provide assistive devices as appropriate  - Consider OT/PT consult to assist with strengthening/mobility  - Encourage toileting schedule  Outcome: Progressing     Problem: DISCHARGE PLANNING  Goal: Discharge to home or other facility with appropriate resources  Description: INTERVENTIONS:  - Identify barriers to discharge w/pt and caregiver  - Include patient/family/discharge partner in discharge planning  - Arrange for needed discharge resources and transportation as appropriate  - Identify discharge learning needs (meds, wound care, etc)  - Arrange for interpreters to assist at discharge as needed  - Consider post-discharge preferences of patient/family/discharge partner  - Complete POLST form as appropriate  - Assess patient's ability to be responsible for managing their own health  - Refer to Case Management Department for coordinating discharge planning if the patient needs post-hospital  services based on physician/LIP order or complex needs related to functional status, cognitive ability or social support system  Outcome: Progressing

## 2025-03-15 NOTE — PROGRESS NOTES
[facility] Hospitalist Progress Note     CC: Hospital Follow up    PCP: Diana Lopez DO       Assessment/Plan:     Principal Problem:    Hyperkalemia  Active Problems:    Back pain    Spinal stenosis of lumbar region at multiple levels    Acute on chronic congestive heart failure, unspecified heart failure type (HCC)    Bacterial upper respiratory infection    Thrombocytopenia (HCC)    Azotemia    Cellulitis of left lower extremity    CKD (chronic kidney disease), stage IV (HCC)       Hannah Boss is a 79 year old female with PMH sig for generalized anxiety disorder, CKD stage IV with baseline creatinine around 2, type 2 diabetes, hypertension, rheumatoid arthritis, prior sarcoma status post surgery, chronic back pain, CAD, prior DVT on Eliquis and recent upper GI bleed thought to be due to AVMs with most recent EGD in January 2025 with Dr. High and outpatient hemoglobin of 8.6 who presented increasing dyspnea with exertion and swelling in her legs.  Known chronic venous stasis ulcers.  Patient does not think she has gained any weight, did not report any chest pain to me although has had intermittently over the last 3 days along with a cough.  Workup in the ER with negative troponin, proBNP 10,000 and noted to have worsening renal function.  Also noted to have potassium of 6.2.  Treated in ER and also given Lokelma. Continuing IV lasix BID per kathia now on oral diuretics.  Will discharge to rehab on 3/16/2025 if cleared by other services.  Facility has bed, confirmed with social work.     Bilateral lower extremity edema, worse on left with cellulitic changes  - Continue Ancef, patient has had previously similar admissions in the past, monitor response  -Patient afebrile admission, white count normal  - LE doppler negative for DVT  - continue diuresis per renal, IV lasix 40 mg BID->changed to po      DICK, hyperkalemia, CKD stage IV - improving  - Baseline creatinine seems around 2, on admission 3.17 potassium was 6.2  -  likely CRS, continue IV lasix per renal  - Renal diet, Lokelma given, repeat per renal persistent, will follow-up renal recommendations but may need Lokelma chronically at dc  - Renal consult     Shortness of breath, chest pain atypical, shortness of breath  2 L of oxygen, appears put on for comfort, no desaturations noted, wean as tolerated, chest x-ray without infiltrate or fluid noted  - Troponin negative, repeat in a.m., can monitor on telemetry  - respiratory viral panel negative  -Last echo in September 2024 with a EF of 60 to 65%, grade 1 diastolic dysfunction, no major valvular disorder noted  -Room air, now on oral     Mild thrombocytopenia  - Platelets usually in the normal range, mildly low today, repeat in a.m., ruling out viral infection above-> repeat ordered     Chronic anemia, patient with workup with GI, most recently EGD in January 2025 and has continued on her Eliquis  -Known AVMs, per note on 1/14/2025 6 small AVMs were noted in the duodenum and cauterized, gastritis noted.  - Most recent hemoglobin 8.6, on admission 7.5, no active bleeding reported, repeat in a.m. continue Eliquis for now  -She states somebody stopped her PPI twice a day, will resume      Type 2 diabetes  - Hold orals, insulin sliding scale     Hypertension  - Resume home meds, as needed meds ordered     Acute on chronic lower back pain  - Follows with pain clinic here, currently on Percocet and diazepam per note, however patient does not report diazepam as a home med  - will consult pain service given worsening pain, asking for strong pain meds  - order lumbar spine XR     FEN: Further fluids per renal, lites in a.m., renal diet     PPx: Patient on Eliquis     Dispo: Full code, pending course, plan for SNF on discharge if repeat potassium okay, cleared by other services plan for DC to rehab on 3/16/2025           Further recommendations pending patient's clinical course.  [facility] hospitalist to continue to follow patient while in  house     Patient and/or patient's family given opportunity to ask questions and note understanding and agreeing with therapeutic plan as outlined     Note: This chart was prepared using voice recognition software and may contain unintended word substitution errors.     Arianna Lema MD  Hospitalist  [facility] service: [phone]         Subjective:     Leg swelling improved, pain stable.  No new complaints.  Afebrile.  No chest pain or shortness of breath    OBJECTIVE:    Blood pressure 159/51, pulse 65, temperature 98.8 °F (37.1 °C), resp. rate 18, weight 175 lb (79.4 kg), SpO2 95%, not currently breastfeeding.    Temp:  [98.8 °F (37.1 °C)-99 °F (37.2 °C)] 98.8 °F (37.1 °C)  Pulse:  [49-65] 65  Resp:  [17-20] 18  BP: (121-159)/(40-51) 159/51  SpO2:  [94 %-97 %] 95 %      Intake/Output:    Intake/Output Summary (Last 24 hours) at 3/15/2025 1340  Last data filed at 3/15/2025 1111  Gross per 24 hour   Intake 1040 ml   Output 2200 ml   Net -1160 ml       Last 3 Weights   03/14/25 0519 175 lb (79.4 kg)   03/13/25 1721 182 lb 5.1 oz (82.7 kg)   03/12/25 0034 182 lb (82.6 kg)   03/11/25 0510 190 lb 3.2 oz (86.3 kg)   03/10/25 0623 192 lb 8 oz (87.3 kg)   03/09/25 1808 194 lb (88 kg)   01/13/25 1042 179 lb (81.2 kg)   01/08/25 1302 179 lb (81.2 kg)   12/24/24 0443 179 lb (81.2 kg)   12/23/24 0513 179 lb (81.2 kg)   12/22/24 0547 169 lb 6.4 oz (76.8 kg)   12/21/24 0527 187 lb 11.2 oz (85.1 kg)   12/20/24 1312 200 lb (90.7 kg)   12/20/24 0151 200 lb 8 oz (90.9 kg)   12/19/24 0622 205 lb 1.6 oz (93 kg)   12/18/24 0436 216 lb 11.4 oz (98.3 kg)   12/17/24 1903 222 lb 1.6 oz (100.7 kg)       Exam   GEN: female in NAD  HEENT: EOMI  Pulm: CTAB, no crackles or wheezes  CV: RRR, no murmurs  ABD: Soft, non-tender, non-distended, +BS  SKIN: warm, dry, chronic venous stasis changes BLE, mild erythema LLE- improving  EXT: + edema, more wrinkles in BLE    Data Review:       Labs:     Recent Labs   Lab  03/09/25  1549 03/10/25  0850   RBC 2.75* 2.89*   HGB 7.5* 8.0*   HCT 25.0* 26.2*   MCV 90.9 90.7   MCH 27.3 27.7   MCHC 30.0* 30.5*   RDW 15.6* 15.5*   NEPRELIM 3.19 5.04   WBC 4.4 6.5   .0* 139.0*         Recent Labs   Lab 03/13/25  0518 03/14/25  0512 03/15/25  0616   GLU 93 84 97   BUN 68* 68* 76*   CREATSERUM 2.47* 2.56* 2.63*   EGFRCR 20* 19* 18*   CA 8.2* 8.5* 8.1*    141 142   K 5.4* 5.0 5.4*    113* 114*   CO2 27.0 27.0 27.0       Recent Labs   Lab 03/09/25  1549 03/09/25  1844 03/10/25  0850 03/11/25  0922 03/12/25  0816 03/13/25  0518 03/14/25  0512   ALT 43 42  --   --   --   --   --    AST 36* 38*  --   --   --   --   --    ALB 3.9 3.6 3.6 3.4 3.7 3.5 3.5         Imaging:  No results found.      Meds:     INPATIENT MEDICATIONS    Scheduled Medications:      [START ON 3/16/2025] sodium zirconium cyclosilicate, 10 g, Daily  bumetanide, 1 mg, Daily  hydrALAZINE, 25 mg, TID  insulin aspart, 1-5 Units, TID CC  atorvastatin, 40 mg, Nightly  cholecalciferol, 2,000 Units, Daily  [Held by provider] dilTIAZem ER, 240 mg, Daily  apixaban, 5 mg, BID  ferrous sulfate, 325 mg, Daily with breakfast  isosorbide mononitrate ER, 60 mg, Daily  magnesium oxide, 400 mg, Daily  pantoprazole, 40 mg, BID AC            Drips:       PRN Medications  gabapentin, 300 mg, Q12H PRN  glucose, 15 g, Q15 Min PRN   Or  glucose, 15 g, Q15 Min PRN   Or  glucose-vitamin C, 4 tablet, Q15 Min PRN   Or  dextrose, 50 mL, Q15 Min PRN   Or  glucose, 30 g, Q15 Min PRN   Or  glucose, 30 g, Q15 Min PRN   Or  glucose-vitamin C, 8 tablet, Q15 Min PRN  acetaminophen, 500 mg, Q4H PRN  ondansetron, 4 mg, Q6H PRN  metoclopramide, 5 mg, Q8H PRN  polyethylene glycol (PEG 3350), 17 g, Daily PRN  sennosides, 17.2 mg, Nightly PRN  bisacodyl, 10 mg, Daily PRN  glycerin-hypromellose-, 1 drop, QID PRN  sodium chloride, 1 spray, Q3H PRN  meclizine, 25 mg, TID PRN  oxyCODONE-acetaminophen, 1 tablet, Q4H PRN  hydrALAzine, 10 mg, Q4H  PRN

## 2025-03-16 VITALS
WEIGHT: 166.5 LBS | HEART RATE: 82 BPM | TEMPERATURE: 99 F | BODY MASS INDEX: 29 KG/M2 | OXYGEN SATURATION: 100 % | SYSTOLIC BLOOD PRESSURE: 130 MMHG | DIASTOLIC BLOOD PRESSURE: 45 MMHG | RESPIRATION RATE: 18 BRPM

## 2025-03-16 LAB
ALBUMIN SERPL-MCNC: 3.5 G/DL (ref 3.2–4.8)
ANION GAP SERPL CALC-SCNC: 5 MMOL/L (ref 0–18)
ANTIBODY SCREEN: NEGATIVE
BASOPHILS # BLD AUTO: 0.03 X10(3) UL (ref 0–0.2)
BASOPHILS NFR BLD AUTO: 0.6 %
BUN BLD-MCNC: 73 MG/DL (ref 9–23)
BUN/CREAT SERPL: 29.9 (ref 10–20)
CALCIUM BLD-MCNC: 8.2 MG/DL (ref 8.7–10.4)
CHLORIDE SERPL-SCNC: 110 MMOL/L (ref 98–112)
CO2 SERPL-SCNC: 25 MMOL/L (ref 21–32)
CREAT BLD-MCNC: 2.44 MG/DL
DEPRECATED RDW RBC AUTO: 50.4 FL (ref 35.1–46.3)
EGFRCR SERPLBLD CKD-EPI 2021: 20 ML/MIN/1.73M2 (ref 60–?)
EOSINOPHIL # BLD AUTO: 0.12 X10(3) UL (ref 0–0.7)
EOSINOPHIL NFR BLD AUTO: 2.4 %
ERYTHROCYTE [DISTWIDTH] IN BLOOD BY AUTOMATED COUNT: 15.5 % (ref 11–15)
GLUCOSE BLD-MCNC: 114 MG/DL (ref 70–99)
GLUCOSE BLDC GLUCOMTR-MCNC: 96 MG/DL (ref 70–99)
HCT VFR BLD AUTO: 21.6 %
HCT VFR BLD AUTO: 24.8 %
HGB BLD-MCNC: 7 G/DL
HGB BLD-MCNC: 7.1 G/DL
HGB BLD-MCNC: 8.2 G/DL
IMM GRANULOCYTES # BLD AUTO: 0.02 X10(3) UL (ref 0–1)
IMM GRANULOCYTES NFR BLD: 0.4 %
LYMPHOCYTES # BLD AUTO: 0.91 X10(3) UL (ref 1–4)
LYMPHOCYTES NFR BLD AUTO: 18.3 %
MCH RBC QN AUTO: 28.8 PG (ref 26–34)
MCHC RBC AUTO-ENTMCNC: 32.4 G/DL (ref 31–37)
MCV RBC AUTO: 88.9 FL
MONOCYTES # BLD AUTO: 0.5 X10(3) UL (ref 0.1–1)
MONOCYTES NFR BLD AUTO: 10.1 %
NEUTROPHILS # BLD AUTO: 3.39 X10 (3) UL (ref 1.5–7.7)
NEUTROPHILS # BLD AUTO: 3.39 X10(3) UL (ref 1.5–7.7)
NEUTROPHILS NFR BLD AUTO: 68.2 %
OSMOLALITY SERPL CALC.SUM OF ELEC: 312 MOSM/KG (ref 275–295)
PHOSPHATE SERPL-MCNC: 3.6 MG/DL (ref 2.4–5.1)
PLATELET # BLD AUTO: 175 10(3)UL (ref 150–450)
POTASSIUM SERPL-SCNC: 5.1 MMOL/L (ref 3.5–5.1)
RBC # BLD AUTO: 2.43 X10(6)UL
RH BLOOD TYPE: POSITIVE
SODIUM SERPL-SCNC: 140 MMOL/L (ref 136–145)
WBC # BLD AUTO: 5 X10(3) UL (ref 4–11)

## 2025-03-16 PROCEDURE — 30233N1 TRANSFUSION OF NONAUTOLOGOUS RED BLOOD CELLS INTO PERIPHERAL VEIN, PERCUTANEOUS APPROACH: ICD-10-PCS | Performed by: HOSPITALIST

## 2025-03-16 PROCEDURE — 99232 SBSQ HOSP IP/OBS MODERATE 35: CPT | Performed by: INTERNAL MEDICINE

## 2025-03-16 RX ORDER — FUROSEMIDE 10 MG/ML
20 INJECTION INTRAMUSCULAR; INTRAVENOUS ONCE
Status: COMPLETED | OUTPATIENT
Start: 2025-03-16 | End: 2025-03-16

## 2025-03-16 RX ORDER — POLYETHYLENE GLYCOL 3350 17 G/17G
17 POWDER, FOR SOLUTION ORAL DAILY PRN
Qty: 10 EACH | Refills: 0 | Status: SHIPPED | OUTPATIENT
Start: 2025-03-16

## 2025-03-16 RX ORDER — HYDRALAZINE HYDROCHLORIDE 50 MG/1
50 TABLET, FILM COATED ORAL 3 TIMES DAILY
Qty: 90 TABLET | Refills: 0 | Status: SHIPPED | OUTPATIENT
Start: 2025-03-16

## 2025-03-16 RX ORDER — DILTIAZEM HYDROCHLORIDE 120 MG/1
120 CAPSULE, COATED, EXTENDED RELEASE ORAL DAILY
Qty: 30 CAPSULE | Refills: 0 | Status: SHIPPED | OUTPATIENT
Start: 2025-03-17

## 2025-03-16 RX ORDER — BUMETANIDE 1 MG/1
1 TABLET ORAL DAILY
Qty: 30 TABLET | Refills: 0 | Status: SHIPPED | OUTPATIENT
Start: 2025-03-16

## 2025-03-16 RX ORDER — OXYCODONE AND ACETAMINOPHEN 10; 325 MG/1; MG/1
1 TABLET ORAL EVERY 4 HOURS PRN
Qty: 60 TABLET | Refills: 0 | Status: SHIPPED | OUTPATIENT
Start: 2025-03-16

## 2025-03-16 RX ORDER — SODIUM CHLORIDE 9 MG/ML
INJECTION, SOLUTION INTRAVENOUS ONCE
Status: COMPLETED | OUTPATIENT
Start: 2025-03-16 | End: 2025-03-16

## 2025-03-16 RX ORDER — BISACODYL 10 MG
10 SUPPOSITORY, RECTAL RECTAL
Qty: 20 SUPPOSITORY | Refills: 0 | Status: SHIPPED | OUTPATIENT
Start: 2025-03-16

## 2025-03-16 NOTE — PROGRESS NOTES
[facility]  part of [facility]    Progress Note    Hannah Boss Patient Status:  Inpatient    1945 MRN [id number]   Location [facility] 3W/SW Attending Nerissa Allen MD   Hosp Day # 7 PCP Diana Lopez DO       Subjective:   Hannah Boss is a(n) 79 year old female who I am seeing for DICK/CKD IV/hyperkaelmia    Feels much better  Edema is better      Objective:   /57   Pulse 61   Temp 98.7 °F (37.1 °C) (Oral)   Resp 18   Wt 166 lb 8 oz (75.5 kg)   SpO2 100%   BMI 29.49 kg/m²      Intake/Output Summary (Last 24 hours) at 3/16/2025 1334  Last data filed at 3/16/2025 1205  Gross per 24 hour   Intake 1685 ml   Output 1950 ml   Net -265 ml     Wt Readings from Last 1 Encounters:   25 166 lb 8 oz (75.5 kg)       Exam  Vital signs: Blood pressure 129/57, pulse 61, temperature 98.7 °F (37.1 °C), temperature source Oral, resp. rate 18, weight 166 lb 8 oz (75.5 kg), SpO2 100%, not currently breastfeeding.    General: No acute distress. Alert and oriented x 3.  HEENT: Moist mucous membranes. EOMI. PERRLA  Neck:  No JVD.   Respiratory: Clear to auscultation bilaterally.    Cardiovascular: S1, S2.  Regular rate and rhythm.   Abdomen: Soft, nontender, nondistended.    Neurologic: No focal neurological deficits.  Musculoskeletal: Full range of motion of all extremities. Edema improved  Access:    Results:     Recent Labs   Lab 25  1549 03/10/25  0850 25  0452 25  0900   RBC 2.75* 2.89* 2.43*  --    HGB 7.5* 8.0* 7.0* 7.1*   HCT 25.0* 26.2* 21.6*  --    MCV 90.9 90.7 88.9  --    MCH 27.3 27.7 28.8  --    MCHC 30.0* 30.5* 32.4  --    RDW 15.6* 15.5* 15.5*  --    NEPRELIM 3.19 5.04 3.39  --    WBC 4.4 6.5 5.0  --    .0* 139.0* 175.0  --          Recent Labs   Lab 25  0512 03/15/25  0616 25  0456   GLU 84 97 114*   BUN 68* 76* 73*   CREATSERUM 2.56* 2.63* 2.44*   CA 8.5* 8.1* 8.2*    142 140   K 5.0 5.4* 5.1   * 114* 110    CO2 27.0 27.0 25.0          No results found.    Assessment and Plan:     79-year-old female with history of CKD stage III/IV , diabetes, hypertension, leiomyosarcoma status postsurgery and radiation , SVT, chronic pain , LLE DVT on eliquis, 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   Also found to have dick on ckd and hyperkalemia  Cr 3.1 mg/dl on admission, k was 6.2  [place] [facility]    Impression:    DICK likely CRS vs bc of infection / cellulitis. Cr improving. 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 Now stable at 2.4 mg/dl  K is also better with medical management  Low K diet  Lokelma , diuretics and florinef  Switch IV to PO bumex  Will start daily lokelma  Prbc for low hgb  KATHARINA  Check iron panel    Will need to be discharged  on bumex 1 mg daily and lokelma 10 g po daily\\    Will sign off, will need fu with me after 4 weeks    Thank you very much for allowing me to participate in the care of your patient.  If you have any questions, please do not hesitate to contact me.     Janet Reynaga MD

## 2025-03-16 NOTE — CM/SW NOTE
RAJ changed [facility] transport time to 5 PM per nursing request d/t blood transfusion.    Room Number: 119  Number for report: [phone]    RAJ called [facility] - spoke with Ana.  Ana notified of above. RAJ messaged Elda bello Watchung liamaryana Diaz via [facility] of change above.    Patient cleared for discharge from / standpoint.    Plan: [facility] RE[facility] 3/16 @ 5 PM via [facility]    Kailey Cardenas RN, BSN  Nurse   [phone]

## 2025-03-16 NOTE — PLAN OF CARE
Problem: Patient Centered Care  Goal: Patient preferences are identified and integrated in the patient's plan of care  Description: Interventions:  - What would you like us to know as we care for you? From home with son   - Provide timely, complete, and accurate information to patient/family  - Incorporate patient and family knowledge, values, beliefs, and cultural backgrounds into the planning and delivery of care  - Encourage patient/family to participate in care and decision-making at the level they choose  - Honor patient and family perspectives and choices  Outcome: Progressing     Problem: Diabetes/Glucose Control  Goal: Glucose maintained within prescribed range  Description: INTERVENTIONS:  - Monitor Blood Glucose as ordered  - Assess for signs and symptoms of hyperglycemia and hypoglycemia  - Administer ordered medications to maintain glucose within target range  - Assess barriers to adequate nutritional intake and initiate nutrition consult as needed  - Instruct patient on self management of diabetes  Outcome: Progressing     Problem: CARDIOVASCULAR - ADULT  Goal: Maintains optimal cardiac output and hemodynamic stability  Description: INTERVENTIONS:  - Monitor vital signs, rhythm, and trends  - Monitor for bleeding, hypotension and signs of decreased cardiac output  - Evaluate effectiveness of vasoactive medications to optimize hemodynamic stability  - Monitor arterial and/or venous puncture sites for bleeding and/or hematoma  - Assess quality of pulses, skin color and temperature  - Assess for signs of decreased coronary artery perfusion - ex. Angina  - Evaluate fluid balance, assess for edema, trend weights  Outcome: Progressing  Goal: Absence of cardiac arrhythmias or at baseline  Description: INTERVENTIONS:  - Continuous cardiac monitoring, monitor vital signs, obtain 12 lead EKG if indicated  - Evaluate effectiveness of antiarrhythmic and heart rate control medications as ordered  - Initiate  emergency measures for life threatening arrhythmias  - Monitor electrolytes and administer replacement therapy as ordered  Outcome: Progressing     Problem: RESPIRATORY - ADULT  Goal: Achieves optimal ventilation and oxygenation  Description: INTERVENTIONS:  - Assess for changes in respiratory status  - Assess for changes in mentation and behavior  - Position to facilitate oxygenation and minimize respiratory effort  - Oxygen supplementation based on oxygen saturation or ABGs  - Provide Smoking Cessation handout, if applicable  - Encourage broncho-pulmonary hygiene including cough, deep breathe, Incentive Spirometry  - Assess the need for suctioning and perform as needed  - Assess and instruct to report SOB or any respiratory difficulty  - Respiratory Therapy support as indicated  - Manage/alleviate anxiety  - Monitor for signs/symptoms of CO2 retention  Outcome: Progressing     Problem: METABOLIC/FLUID AND ELECTROLYTES - ADULT  Goal: Glucose maintained within prescribed range  Description: INTERVENTIONS:  - Monitor Blood Glucose as ordered  - Assess for signs and symptoms of hyperglycemia and hypoglycemia  - Administer ordered medications to maintain glucose within target range  - Assess barriers to adequate nutritional intake and initiate nutrition consult as needed  - Instruct patient on self management of diabetes  Outcome: Progressing  Goal: Electrolytes maintained within normal limits  Description: INTERVENTIONS:  - Monitor labs and rhythm and assess patient for signs and symptoms of electrolyte imbalances  - Administer electrolyte replacement as ordered  - Monitor response to electrolyte replacements, including rhythm and repeat lab results as appropriate  - Fluid restriction as ordered  - Instruct patient on fluid and nutrition restrictions as appropriate  Outcome: Progressing  Goal: Hemodynamic stability and optimal renal function maintained  Description: INTERVENTIONS:  - Monitor labs and assess for signs  and symptoms of volume excess or deficit  - Monitor intake, output and patient weight  - Monitor urine specific gravity, serum osmolarity and serum sodium as indicated or ordered  - Monitor response to interventions for patient's volume status, including labs, urine output, blood pressure (other measures as available)  - Encourage oral intake as appropriate  - Instruct patient on fluid and nutrition restrictions as appropriate  Outcome: Progressing     Problem: MUSCULOSKELETAL - ADULT  Goal: Return mobility to safest level of function  Description: INTERVENTIONS:  - Assess patient stability and activity tolerance for standing, transferring and ambulating w/ or w/o assistive devices  - Assist with transfers and ambulation using safe patient handling equipment as needed  - Ensure adequate protection for wounds/incisions during mobilization  - Obtain PT/OT consults as needed  - Advance activity as appropriate  - Communicate ordered activity level and limitations with patient/family  Outcome: Progressing  Goal: Maintain proper alignment of affected body part  Description: INTERVENTIONS:  - Support and protect limb and body alignment per provider's orders  - Instruct and reinforce with patient and family use of appropriate assistive device and precautions (e.g. spinal or hip dislocation precautions)  Outcome: Progressing     Problem: SKIN/TISSUE INTEGRITY - ADULT  Goal: Skin integrity remains intact  Description: INTERVENTIONS- Assess and document risk factors for pressure ulcer development- Assess and document skin integrity- Monitor for areas of redness and/or skin breakdown- Initiate interventions, skin care algorithm/standards of care as needed  Outcome: Progressing  Goal: Incision(s), wounds(s) or drain site(s) healing without S/S of infection  Description: INTERVENTIONS:- Assess and document risk factors for pressure ulcer development- Assess and document skin integrity- Assess and document dressing/incision, wound  bed, drain sites and surrounding tissue- Implement wound care per orders- Initiate isolation precautions as appropriate- Initiate Pressure Ulcer prevention bundle as indicated  Outcome: Progressing     Problem: HEMATOLOGIC - ADULT  Goal: Free from bleeding injury  Description: (Example usage: patient with low platelets)INTERVENTIONS:- Avoid intramuscular injections, enemas and rectal medication administration- Ensure safe mobilization of patient- Hold pressure on venipuncture sites to achieve adequate hemostasis- Assess for signs and symptoms of internal bleeding- Monitor lab trends- Patient is to report abnormal signs of bleeding to staff- Avoid use of toothpicks and dental floss- Use electric shaver for shaving- Use soft bristle tooth brush- Limit straining and forceful nose blowing  Outcome: Progressing     Problem: PAIN - ADULT  Goal: Verbalizes/displays adequate comfort level or patient's stated pain goal  Description: INTERVENTIONS:  - Encourage pt to monitor pain and request assistance  - Assess pain using appropriate pain scale  - Administer analgesics based on type and severity of pain and evaluate response  - Implement non-pharmacological measures as appropriate and evaluate response  - Consider cultural and social influences on pain and pain management  - Manage/alleviate anxiety  - Utilize distraction and/or relaxation techniques  - Monitor for opioid side effects  - Notify MD/LIP if interventions unsuccessful or patient reports new pain  - Anticipate increased pain with activity and pre-medicate as appropriate  Outcome: Progressing     Problem: SAFETY ADULT - FALL  Goal: Free from fall injury  Description: INTERVENTIONS:  - Assess pt frequently for physical needs  - Identify cognitive and physical deficits and behaviors that affect risk of falls.  - [place] fall precautions as indicated by assessment.  - Educate pt/family on patient safety including physical limitations  - Instruct pt to call for  assistance with activity based on assessment  - Modify environment to reduce risk of injury  - Provide assistive devices as appropriate  - Consider OT/PT consult to assist with strengthening/mobility  - Encourage toileting schedule  Outcome: Progressing     Problem: DISCHARGE PLANNING  Goal: Discharge to home or other facility with appropriate resources  Description: INTERVENTIONS:  - Identify barriers to discharge w/pt and caregiver  - Include patient/family/discharge partner in discharge planning  - Arrange for needed discharge resources and transportation as appropriate  - Identify discharge learning needs (meds, wound care, etc)  - Arrange for interpreters to assist at discharge as needed  - Consider post-discharge preferences of patient/family/discharge partner  - Complete POLST form as appropriate  - Assess patient's ability to be responsible for managing their own health  - Refer to Case Management Department for coordinating discharge planning if the patient needs post-hospital services based on physician/LIP order or complex needs related to functional status, cognitive ability or social support system  Outcome: Progressing

## 2025-03-16 NOTE — PROGRESS NOTES
Progress Note  Hannah Boss Patient Status:  Inpatient    1945 MRN [id number]   Location [facility] 3W/SW Attending Arianna Lema MD   Hosp Day # 7 PCP Diana Lopez DO     Subjective:  C/o leg pain, abdominal pain,denies cardiac complaints    Objective:  /51 (BP Location: Right arm)   Pulse 66   Temp 98.6 °F (37 °C) (Oral)   Resp 18   Wt 166 lb 8 oz (75.5 kg)   SpO2 98%   BMI 29.49 kg/m²     Telemetry: SR    Intake/Output:    Intake/Output Summary (Last 24 hours) at 3/16/2025 0729  Last data filed at 3/16/2025 0227  Gross per 24 hour   Intake 1525 ml   Output 1800 ml   Net -275 ml     Last 3 Weights   25 0022 166 lb 8 oz (75.5 kg)   25 0519 175 lb (79.4 kg)   25 1721 182 lb 5.1 oz (82.7 kg)   25 0034 182 lb (82.6 kg)   25 0510 190 lb 3.2 oz (86.3 kg)   03/10/25 0623 192 lb 8 oz (87.3 kg)   25 1808 194 lb (88 kg)   25 1042 179 lb (81.2 kg)   25 1302 179 lb (81.2 kg)   24 0443 179 lb (81.2 kg)   24 0513 179 lb (81.2 kg)   24 0547 169 lb 6.4 oz (76.8 kg)   24 0527 187 lb 11.2 oz (85.1 kg)   24 1312 200 lb (90.7 kg)   24 0151 200 lb 8 oz (90.9 kg)   24 0622 205 lb 1.6 oz (93 kg)   24 0436 216 lb 11.4 oz (98.3 kg)   24 1903 222 lb 1.6 oz (100.7 kg)     Labs:  Recent Labs   Lab 25  0512 03/15/25  0616 25  0456   GLU 84 97 114*   BUN 68* 76* 73*   CREATSERUM 2.56* 2.63* 2.44*   EGFRCR 19* 18* 20*   CA 8.5* 8.1* 8.2*    142 140   K 5.0 5.4* 5.1   * 114* 110   CO2 27.0 27.0 25.0     Recent Labs   Lab 25  1549 03/10/25  0850 25  0452   RBC 2.75* 2.89* 2.43*   HGB 7.5* 8.0* 7.0*   HCT 25.0* 26.2* 21.6*   MCV 90.9 90.7 88.9   MCH 27.3 27.7 28.8   MCHC 30.0* 30.5* 32.4   RDW 15.6* 15.5* 15.5*   NEPRELIM 3.19 5.04 3.39   WBC 4.4 6.5 5.0   .0* 139.0* 175.0     Recent Labs   Lab 25  1549 03/10/25  0850   TROPHS 16 29     Lab Results   Component  Value Date/Time    HDL 54 09/14/2024 11:41 AM    LDL 90 09/14/2024 11:41 AM    TRIG 122 09/14/2024 11:41 AM     Lab Results   Component Value Date    DDIMER 1.13 (H) 06/05/2023     Lab Results   Component Value Date    TSH 0.008 (L) 07/03/2024     Review of Systems:   Constitutional: No fevers, chills, fatigue or night sweats.  Respiratory: Denies cough, wheezing or shortness of breath.  CV: Denies chest pain, palpitations, orthopnea, PND or dizziness.  GI: No nausea, vomiting or diarrhea. No blood in stools.    Physical Exam:   General: Alert, cooperative, no distress, appears stated age.  Neck: no JVD.  Lungs: Clear to auscultation bilaterally.  Chest wall: No tenderness or deformity.  Heart: Regular rate and rhythm, S1, S2 normal, + 4/6 murmur, no click, rub or gallop.  Abdomen: Soft, non-tender. Bowel sounds normal. No masses,  No organomegaly.  Extremities: Extremities normal, atraumatic, no cyanosis, 1+ BLE edema baseline  Pulses: 2+ and symmetric all extremities.  Neurologic: Grossly intact.    Medications:   sodium zirconium cyclosilicate  10 g Oral Daily    dilTIAZem ER  120 mg Oral Daily    hydrALAZINE  50 mg Oral TID    bumetanide  1 mg Oral Daily    insulin aspart  1-5 Units Subcutaneous TID CC    atorvastatin  40 mg Oral Nightly    cholecalciferol  2,000 Units Oral Daily    apixaban  5 mg Oral BID    ferrous sulfate  325 mg Oral Daily with breakfast    isosorbide mononitrate ER  60 mg Oral Daily    magnesium oxide  400 mg Oral Daily    pantoprazole  40 mg Oral BID AC     Assessment:    79 year old female with PMH of moderate aortic stenosis, AVNRT, DVT/PE, HTN, CKD III-IV, DMII who presented with DICK on CKD and cellulitis. Cardiology was consulted for sinus bradycardia with intermittent HR in 30s.     Sinus bradycardia  HR intermittently into 30s on admission  -diltiazem held on admission, current stable sinus 60s, occasional yanci 50s  -known hx AVNRT with difficult to control rates  historically  -diltiazem resumed at lower dose on 3/15    DICK on CKD3  Baseline 2-2.3  -3.17 on admission, currently 2.4  -nephrology following  -Cr improving with bumex per nephrology    Acute on Chronic HFpEF  LVEF 60-65% with G1DD on echo in September, 2024 with moderate aortic stenosis, mild aortic regurgitation, PASP 59mmHg  -volume overloaded on admission, nephrology managing diuresis with bumex, now appears compensated on exam  -proBNP 10,705 on admission  -I/Os net neg 8L, weight down 28#  -GDMT: bumex, hydralazine, isosorbide     Paroxysmal SVT   Remains in sinus rhythm/sinus yanci 50-60s on cardizem   -home dose held due to bradycardia, resumed at lower dose on 3/15, tolerating well, tele stable     HTN  130s-150s  -hydralazine, isosorbide, diltiazem     HLD-statin, LDL 90 in September, 2024     Rheumatoid Arthritis     Hx DVT  On eliquis     DMII-A1c 5.5     Acute on Chronic Anemia  Hgb baseline ~8  -today 7, 1u PRBC per primary     Hyperkalemia  K+ 5.1  -lokelma per nephrology    Plan:  -continue dilt 120mg daily, hold for HR <60  -Continue bumex, isosorbide, hydralazine  -further recs per nephrology/primary  -stable for discharge from cardiac standpoint    Plan of care discussed with patient, RN.      Kailee Fong, MSN, APN, FNP-BC, CCK  03/16/25  7:30 AM  [phone] [place]  [phone] [place]    Cardiology Attending:  Note reviewed and Independent exam, assessment and plan developed. Labs and tests reviewed.  Note is my recommendations with assessment and plan as I have made changes and additions within note and merged.    S/no change in symptoms  Physical Exam:  General: Alert and oriented. No apparent distress. No respiratory or constitutional distress.  HEENT: Normocephalic, anicteric sclera, neck supple  Neck: elevated JVD, carotids 2+,  Cardiac: irregular/Regular rate and rhythm. No pathologic murmur.  Lungs: Rales with normal effort.  Normal excursions and effort.  Abdomen: Soft,  non-tender. BS-present.  Extremities: Without clubbing, cyanosis.  Edema, Peripheral pulses present.  Neurologic: Alert and oriented, normal affect. Motor ok.  Skin: Warm and dry.   A/P:f/u labs  Agree plan above  Ousmane Batista MD  [facility]  Cardiac Electrophysiology  3/16/2025  2v

## 2025-03-16 NOTE — DISCHARGE SUMMARY
[facility]  part of [facility] Internal Medicine Discharge Summary   Patient ID:  Hannah Boss  [id number]  79 year old  8/14/1945    Admit date: 3/9/2025    Discharge date and time:  3/16/25    Attending Physician: Arianna Lema MD     Primary Care Physician: Diana Lopez DO     Reason for admission lower extremity edema, shortness of breath (see HPI on HP for further detail)    Discharged Condition: stable    Disposition:SNF    Risk of Readmission Lace+ Score: 82  59-90 High Risk  29-58 Medium Risk  0-28   Low Risk    Important follow up:  Follow up with PCP within 1 week of leaving rehab    Repeat BMP and hemoglobin in 3-5 days.    Discharge meds  I reviewed and reconciled current and discharge medications on the day of discharge with the changes reflected below.       Medication List        START taking these medications      bisacodyl 10 MG Supp  Commonly known as: Dulcolax  Place 1 suppository (10 mg total) rectally daily as needed.     pantoprazole 40 MG Tbec  Commonly known as: Protonix     Polyethylene Glycol 3350 17 g Pack  Commonly known as: MIRALAX  Take 17 g by mouth daily as needed.     sodium zirconium cyclosilicate 10 g Pack  Commonly known as: Lokelma  Take 1 packet (10 g total) by mouth daily.  Start taking on: March 17, 2025            CHANGE how you take these medications      dilTIAZem  MG Cp24  Commonly known as: Cardizem CD  Take 1 capsule (120 mg total) by mouth daily.  Start taking on: March 17, 2025  What changed:   medication strength  how much to take     hydrALAZINE 50 MG Tabs  Commonly known as: Apresoline  Take 1 tablet (50 mg total) by mouth 3 (three) times daily.  What changed:   medication strength  how much to take  when to take this     oxyCODONE-acetaminophen  MG Tabs  Commonly known as: Percocet  Take 1 tablet by mouth every 4 (four) hours as needed for Pain.  What changed: Another medication with the same name was removed.  Continue taking this medication, and follow the directions you see here.            CONTINUE taking these medications      atorvastatin 40 MG Tabs  Commonly known as: Lipitor     bumetanide 1 MG Tabs  Commonly known as: Bumex  Take 1 tablet (1 mg total) by mouth daily.     cholecalciferol 50 MCG (2000 UT) Tabs  Commonly known as: Vitamin D3     Eliquis 5 MG Tabs  Generic drug: apixaban     ferrous sulfate 325 (65 FE) MG Tbec  Take 1 tablet (325 mg total) by mouth daily with breakfast.     gabapentin 300 MG Caps  Commonly known as: Neurontin     isosorbide mononitrate ER 60 MG Tb24  Commonly known as: Imdur     magnesium oxide 400 MG Tabs  Commonly known as: Mag-Ox  Take 1 tablet (400 mg total) by mouth daily.     meclizine 25 MG Tabs  Commonly known as: Antivert     trolamine salicyliate 10 % Crea  Apply to painful joints or muscles up to three times a day as needed            STOP taking these medications      Naloxone HCl 4 MG/0.1ML Liqd     pregabalin 100 MG Caps  Commonly known as: Lyrica               Where to Get Your Medications        You can get these medications from any pharmacy    Bring a paper prescription for each of these medications  bisacodyl 10 MG Supp  bumetanide 1 MG Tabs  dilTIAZem  MG Cp24  hydrALAZINE 50 MG Tabs  oxyCODONE-acetaminophen  MG Tabs  Polyethylene Glycol 3350 17 g Pack  sodium zirconium cyclosilicate 10 g Pack       HPI per chart  Hannah Boss is a 79 year old female with PMH sig for generalized anxiety disorder, CKD stage IV with baseline creatinine around 2, type 2 diabetes, hypertension, rheumatoid arthritis, prior sarcoma status post surgery, chronic back pain, CAD, prior DVT on Eliquis and recent upper GI bleed thought to be due to AVMs with most recent EGD in January 2025 with Dr. High and outpatient hemoglobin of 8.6 who presented increasing dyspnea with exertion and swelling in her legs.  Known chronic venous stasis ulcers.  Patient does not think she has  gained any weight, did not report any chest pain to me although has had intermittently over the last 3 days along with a cough.  Workup in the ER with negative troponin, proBNP 10,000 and noted to have worsening renal function.  Also noted to have potassium of 6.2.  Treated in ER and also given Lokelma.  Repeat per renal.  Also wanted a 500 cc bolus, will defer diuresis to renal pending renal function.  Chest x-ray without pulmonary edema.  Will check respiratory viral panel.  Repeat troponin in a.m.  Monitor on telemetry     Patient in her room sitting and eating comfortably.  Denies any current pain or shortness of breath.  Chest pain would happen intermittently, not associated with exertion no nausea vomiting.  Does have chronic back pain  Hospital Course  Hannah Boss is a 79 year old female with PMH sig for generalized anxiety disorder, CKD stage IV with baseline creatinine around 2, type 2 diabetes, hypertension, rheumatoid arthritis, prior sarcoma status post surgery, chronic back pain, CAD, prior DVT on Eliquis and recent upper GI bleed thought to be due to AVMs with most recent EGD in January 2025 with Dr. High and outpatient hemoglobin of 8.6 who presented increasing dyspnea with exertion and swelling in her legs.  Known chronic venous stasis ulcers.  Patient does not think she has gained any weight, did not report any chest pain to me although has had intermittently over the last 3 days along with a cough.  Workup in the ER with negative troponin, proBNP 10,000 and noted to have worsening renal function.  Also noted to have potassium of 6.2.  Treated in ER and also given Lokelma. Continuing IV lasix BID per kathia now on oral diuretics.  Will discharge to rehab on 3/16/2025 if cleared by other services.  Facility has bed, confirmed with social work.  With known chronic anemia.  Repeat today was 7.0, no marked decrease but given she is going to rehab we will give 1 unit.  Repeat prior to the transfusion was  7.1 to ensure stability.  Patient was given 20 of IV Lasix with the unit and DC with oral diuretics.  Cleared by cardiology and nephrology.     Discharge Diagnoses:   Bilateral lower extremity edema, worse on left with cellulitic changes_> really improved, no signs of infection at discharge  - Continue Ancef, patient has had previously similar admissions in the past, monitor response  -Patient afebrile admission, white count normal  - LE doppler negative for DVT  - continue diuresis per renal, IV lasix 40 mg BID->changed to po      DICK, hyperkalemia, CKD stage IV - improving  - Baseline creatinine seems around 2, on admission 3.17 potassium was 6.2  - likely CRS, continue IV lasix per renal  - Renal diet, Lokelma given, repeat per renal persistent, Bumex and Lokelma at discharge, appreciate nephrology's recommendations.  - Renal consult     Shortness of breath, chest pain atypical, shortness of breath  2 L of oxygen, appears put on for comfort, no desaturations noted, wean as tolerated, chest x-ray without infiltrate or fluid noted  - Troponin negative, repeat in a.m., can monitor on telemetry  - respiratory viral panel negative  -Last echo in September 2024 with a EF of 60 to 65%, grade 1 diastolic dysfunction, no major valvular disorder noted  -Room air, now on oral     Mild thrombocytopenia  - Platelets usually in the normal range, mildly low today, repeat in a.m., ruling out viral infection above-> repeat platelets 175     Chronic anemia, patient with workup with GI, most recently EGD in January 2025 and has continued on her Eliquis  -Known AVMs, per note on 1/14/2025 6 small AVMs were noted in the duodenum and cauterized, gastritis noted.  - Most recent hemoglobin 8.6, on admission 7.5, no active bleeding reported, repeat in a.m. continue Eliquis for now  -She states somebody stopped her PPI twice a day, will resume   -With known chronic anemia.  Repeat today was 7.0, no marked decrease but given she is going  to rehab we will give 1 unit.  Repeat prior to the transfusion was 7.1 to ensure stability.      Type 2 diabetes  - Hold orals, insulin sliding scale in house     Hypertension  - Resume home meds, as needed meds ordered     Acute on chronic lower back pain  - Follows with pain clinic here, currently on Percocet and diazepam per note, however patient does not report diazepam as a home med  - will consult pain service given worsening pain, asking for strong pain meds  - order lumbar spine XR       Consults: IP CONSULT TO NEPHROLOGY  IP CONSULT TO PHARMACY  IP CONSULT TO SOCIAL WORK  CONSULT TO WOUND OSTOMY  NURSING CONSULT TO DIETITIAN  CONSULT TO WOUND OSTOMY  IP CONSULT TO VASCULAR ACCESS TEAM  IP CONSULT TO CARDIOLOGY    Radiology: US VENOUS DOPPLER LEG BILAT - DIAG IMG (CPT=93970)    Result Date: 3/10/2025  PROCEDURE: US VENOUS DOPPLER LEG BILAT-DIAG IMG (CPT=93970)  COMPARISON: [facility], US VENOUS DOPPLER LEG BILAT-DIAG IMG (CPT=93970), 12/17/2024, 4:09 PM.  INDICATIONS: Bilateral lower extremity pain and swelling  TECHNIQUE: Color duplex Doppler venous ultrasound of both lower extremities was performed in the  usual manner.  FINDINGS:   Bilateral femoral femoral and popliteal veins appear normal.  Normal flow was demonstrated with color and pulsed Doppler.  Limited visualization calf veins due to lower extremity edema    THROMBI: None visible. COMPRESSIBILITY:   Normal. OTHER: Negative.         CONCLUSION:  1. Negative bilateral leg venous duplex examination. 2.  No deep venous thrombosis visualized.    Dictated by (CST): Ambrose Moss MD on 3/10/2025 at 8:38 AM     Finalized by (CST): Ambrose Moss MD on 3/10/2025 at 8:40 AM          XR CHEST AP PORTABLE  (CPT=71045)    Result Date: 3/9/2025  PROCEDURE: XR CHEST AP PORTABLE  (CPT=71045) TIME: 16 17.   COMPARISON: [facility], XR CHEST AP PORTABLE (CPT=71045), 12/17/2024, 2:17 PM.  [facility], XR CHEST  AP PORTABLE (CPT=71045), 9/19/2024, 10:08 AM.  [facility], XR CHEST AP PORTABLE (CPT=71045), 9/17/2024, 11:09 AM.  INDICATIONS: Chest pain x3 days.  TECHNIQUE:   Single view.   FINDINGS:  CARDIAC/VASC: Normal.  No cardiac silhouette abnormality or cardiomegaly.  Unremarkable pulmonary vasculature.  MEDIAST/DIANNE:   No visible mass or adenopathy. LUNGS/PLEURA: Normal.  No significant pulmonary parenchymal abnormalities.  No effusion or pleural thickening. BONES: No fracture or visible bony lesion. OTHER: Negative.          CONCLUSION: No acute cardiopulmonary abnormality.    Dictated by (CST): Rolan Deyr MD on 3/09/2025 at 4:49 PM     Finalized by (CST): Rolan Dyer MD on 3/09/2025 at 4:49 PM             Operative Procedures:      Day of discharge no new complaints.  Pain currently controlled.  Hemoglobin repeat was 7.1, no signs of active bleeding but likely just related to her chronic kidney disease.  Transfuse 1 unit as patient was borderline to ensure her ability to stay at rehab without needing further transfusions.  Not symptomatic.    Exam  Vitals:    03/16/25 1345   BP: 130/45   Pulse:    Resp: 18   Temp: 98.7 °F (37.1 °C)     No acute distress, alert and orientedx3  Lungs Clear  Heart Regular  Abdomen Benign    Total Time Coordinating Care: > than 30 minutes  Note: This chart was prepared using voice recognition software and may contain unintended word substitution errors.     Patient had opportunity to ask questions and state understand and agree with therapeutic plan as outlined

## 2025-03-16 NOTE — PLAN OF CARE
Problem: Patient Centered Care  Goal: Patient preferences are identified and integrated in the patient's plan of care  Description: Interventions:  - What would you like us to know as we care for you? From home with son   - Provide timely, complete, and accurate information to patient/family  - Incorporate patient and family knowledge, values, beliefs, and cultural backgrounds into the planning and delivery of care  - Encourage patient/family to participate in care and decision-making at the level they choose  - Honor patient and family perspectives and choices  Outcome: Progressing     Problem: Diabetes/Glucose Control  Goal: Glucose maintained within prescribed range  Description: INTERVENTIONS:  - Monitor Blood Glucose as ordered  - Assess for signs and symptoms of hyperglycemia and hypoglycemia  - Administer ordered medications to maintain glucose within target range  - Assess barriers to adequate nutritional intake and initiate nutrition consult as needed  - Instruct patient on self management of diabetes  Outcome: Progressing     Problem: Patient/Family Goals  Goal: Patient/Family Long Term Goal  Description: Patient's Long Term Goal: discharge from hospital     Interventions:  - monitor vital signs   - monitor blood glucose levels  - monitor appropriate labs  - pain management   - administer medications per order  - follow MD orders  - diagnostics per order  - update and inform patient and family on plan of care  - discharge planning  - See additional Care Plan goals for specific interventions  Outcome: Progressing  Goal: Patient/Family Short Term Goal  Description: Patient's Short Term Goal: manage pain     Interventions:   - monitor vital signs   - monitor blood glucose levels  - monitor appropriate labs  - pain management   - administer medications per order  - follow MD orders  - diagnostics per order  - update and inform patient and family on plan of care  - See additional Care Plan goals for specific  interventions  Outcome: Progressing     Problem: CARDIOVASCULAR - ADULT  Goal: Maintains optimal cardiac output and hemodynamic stability  Description: INTERVENTIONS:  - Monitor vital signs, rhythm, and trends  - Monitor for bleeding, hypotension and signs of decreased cardiac output  - Evaluate effectiveness of vasoactive medications to optimize hemodynamic stability  - Monitor arterial and/or venous puncture sites for bleeding and/or hematoma  - Assess quality of pulses, skin color and temperature  - Assess for signs of decreased coronary artery perfusion - ex. Angina  - Evaluate fluid balance, assess for edema, trend weights  Outcome: Progressing  Goal: Absence of cardiac arrhythmias or at baseline  Description: INTERVENTIONS:  - Continuous cardiac monitoring, monitor vital signs, obtain 12 lead EKG if indicated  - Evaluate effectiveness of antiarrhythmic and heart rate control medications as ordered  - Initiate emergency measures for life threatening arrhythmias  - Monitor electrolytes and administer replacement therapy as ordered  Outcome: Progressing     Problem: RESPIRATORY - ADULT  Goal: Achieves optimal ventilation and oxygenation  Description: INTERVENTIONS:  - Assess for changes in respiratory status  - Assess for changes in mentation and behavior  - Position to facilitate oxygenation and minimize respiratory effort  - Oxygen supplementation based on oxygen saturation or ABGs  - Provide Smoking Cessation handout, if applicable  - Encourage broncho-pulmonary hygiene including cough, deep breathe, Incentive Spirometry  - Assess the need for suctioning and perform as needed  - Assess and instruct to report SOB or any respiratory difficulty  - Respiratory Therapy support as indicated  - Manage/alleviate anxiety  - Monitor for signs/symptoms of CO2 retention  Outcome: Progressing     Problem: METABOLIC/FLUID AND ELECTROLYTES - ADULT  Goal: Glucose maintained within prescribed range  Description:  INTERVENTIONS:  - Monitor Blood Glucose as ordered  - Assess for signs and symptoms of hyperglycemia and hypoglycemia  - Administer ordered medications to maintain glucose within target range  - Assess barriers to adequate nutritional intake and initiate nutrition consult as needed  - Instruct patient on self management of diabetes  Outcome: Progressing  Goal: Electrolytes maintained within normal limits  Description: INTERVENTIONS:  - Monitor labs and rhythm and assess patient for signs and symptoms of electrolyte imbalances  - Administer electrolyte replacement as ordered  - Monitor response to electrolyte replacements, including rhythm and repeat lab results as appropriate  - Fluid restriction as ordered  - Instruct patient on fluid and nutrition restrictions as appropriate  Outcome: Progressing  Goal: Hemodynamic stability and optimal renal function maintained  Description: INTERVENTIONS:  - Monitor labs and assess for signs and symptoms of volume excess or deficit  - Monitor intake, output and patient weight  - Monitor urine specific gravity, serum osmolarity and serum sodium as indicated or ordered  - Monitor response to interventions for patient's volume status, including labs, urine output, blood pressure (other measures as available)  - Encourage oral intake as appropriate  - Instruct patient on fluid and nutrition restrictions as appropriate  Outcome: Progressing     Problem: MUSCULOSKELETAL - ADULT  Goal: Return mobility to safest level of function  Description: INTERVENTIONS:  - Assess patient stability and activity tolerance for standing, transferring and ambulating w/ or w/o assistive devices  - Assist with transfers and ambulation using safe patient handling equipment as needed  - Ensure adequate protection for wounds/incisions during mobilization  - Obtain PT/OT consults as needed  - Advance activity as appropriate  - Communicate ordered activity level and limitations with patient/family  Outcome:  Progressing  Goal: Maintain proper alignment of affected body part  Description: INTERVENTIONS:  - Support and protect limb and body alignment per provider's orders  - Instruct and reinforce with patient and family use of appropriate assistive device and precautions (e.g. spinal or hip dislocation precautions)  Outcome: Progressing     Problem: SKIN/TISSUE INTEGRITY - ADULT  Goal: Incision(s), wounds(s) or drain site(s) healing without S/S of infection  Description: INTERVENTIONS:- Assess and document risk factors for pressure ulcer development- Assess and document skin integrity- Assess and document dressing/incision, wound bed, drain sites and surrounding tissue- Implement wound care per orders- Initiate isolation precautions as appropriate- Initiate Pressure Ulcer prevention bundle as indicated  Outcome: Progressing     Problem: HEMATOLOGIC - ADULT  Goal: Free from bleeding injury  Description: (Example usage: patient with low platelets)INTERVENTIONS:- Avoid intramuscular injections, enemas and rectal medication administration- Ensure safe mobilization of patient- Hold pressure on venipuncture sites to achieve adequate hemostasis- Assess for signs and symptoms of internal bleeding- Monitor lab trends- Patient is to report abnormal signs of bleeding to staff- Avoid use of toothpicks and dental floss- Use electric shaver for shaving- Use soft bristle tooth brush- Limit straining and forceful nose blowing  Outcome: Progressing     Problem: PAIN - ADULT  Goal: Verbalizes/displays adequate comfort level or patient's stated pain goal  Description: INTERVENTIONS:  - Encourage pt to monitor pain and request assistance  - Assess pain using appropriate pain scale  - Administer analgesics based on type and severity of pain and evaluate response  - Implement non-pharmacological measures as appropriate and evaluate response  - Consider cultural and social influences on pain and pain management  - Manage/alleviate anxiety  -  Utilize distraction and/or relaxation techniques  - Monitor for opioid side effects  - Notify MD/LIP if interventions unsuccessful or patient reports new pain  - Anticipate increased pain with activity and pre-medicate as appropriate  Outcome: Progressing     Problem: SAFETY ADULT - FALL  Goal: Free from fall injury  Description: INTERVENTIONS:  - Assess pt frequently for physical needs  - Identify cognitive and physical deficits and behaviors that affect risk of falls.  - [place] fall precautions as indicated by assessment.  - Educate pt/family on patient safety including physical limitations  - Instruct pt to call for assistance with activity based on assessment  - Modify environment to reduce risk of injury  - Provide assistive devices as appropriate  - Consider OT/PT consult to assist with strengthening/mobility  - Encourage toileting schedule  Outcome: Progressing     Problem: DISCHARGE PLANNING  Goal: Discharge to home or other facility with appropriate resources  Description: INTERVENTIONS:  - Identify barriers to discharge w/pt and caregiver  - Include patient/family/discharge partner in discharge planning  - Arrange for needed discharge resources and transportation as appropriate  - Identify discharge learning needs (meds, wound care, etc)  - Arrange for interpreters to assist at discharge as needed  - Consider post-discharge preferences of patient/family/discharge partner  - Complete POLST form as appropriate  - Assess patient's ability to be responsible for managing their own health  - Refer to Case Management Department for coordinating discharge planning if the patient needs post-hospital services based on physician/LIP order or complex needs related to functional status, cognitive ability or social support system  Outcome: Progressing

## 2025-03-16 NOTE — SPIRITUAL CARE NOTE
Spiritual Care Visit Note    Patient Name: Hannah Boss Date of Spiritual Care Visit: 25   : 1945 Primary Dx: Hyperkalemia       Referred By: Referral From:     Spiritual Care Taxonomy:    Intended Effects: Build relationship of care and support    Methods: Offer emotional support, Offer spiritual/Nondenominational support    Interventions: Acknowledge current situation, Acknowledge response to difficult experience, Active listening, Ask guided questions, Ask guided questions about alondra, Explain  role, Provide hospitality    Visit Type/Summary:     - Spiritual Care: Responded to a request for spiritual care and assessed the patient for spiritual care needs. Consulted with RN prior to visit. Patient and family expressed appreciation for  visit.  remains available as needed for follow up.    Spiritual Care support can be requested via an Epic consult. For urgent/immediate needs, please contact the On Call  at: [place]: ext [phone]    Chaplain Resident, María Elena Whitt, PhD

## 2025-03-17 LAB
BLOOD TYPE BARCODE: 5100
UNIT VOLUME: 350 ML

## 2025-04-04 ENCOUNTER — HOSPITAL ENCOUNTER (INPATIENT)
Facility: HOSPITAL | Age: 80
LOS: 2 days | Discharge: HOME | End: 2025-04-06
Attending: EMERGENCY MEDICINE | Admitting: HOSPITALIST
Payer: MEDICARE

## 2025-04-04 DIAGNOSIS — E87.5 HYPERKALEMIA: Primary | ICD-10-CM

## 2025-04-04 DIAGNOSIS — N17.9 AKI (ACUTE KIDNEY INJURY): ICD-10-CM

## 2025-04-04 DIAGNOSIS — T58.91XA CARBOXYHEMOGLOBINEMIA, ACCIDENTAL OR UNINTENTIONAL, INITIAL ENCOUNTER: ICD-10-CM

## 2025-04-04 DIAGNOSIS — R00.1 BRADYCARDIA: ICD-10-CM

## 2025-04-04 LAB
ALBUMIN SERPL-MCNC: 3.9 G/DL (ref 3.2–4.8)
ALBUMIN/GLOB SERPL: 1.5 {RATIO} (ref 1–2)
ALP LIVER SERPL-CCNC: 262 U/L
ALT SERPL-CCNC: 41 U/L
ANION GAP SERPL CALC-SCNC: 3 MMOL/L (ref 0–18)
ANION GAP SERPL CALC-SCNC: 4 MMOL/L (ref 0–18)
AST SERPL-CCNC: 37 U/L (ref ?–34)
ATRIAL RATE: 52 BPM
BASE EXCESS BLD CALC-SCNC: -3.9 MMOL/L (ref ?–2)
BASE EXCESS BLD CALC-SCNC: -4.2 MMOL/L (ref ?–2)
BASOPHILS # BLD AUTO: 0.05 X10(3) UL (ref 0–0.2)
BASOPHILS NFR BLD AUTO: 0.8 %
BILIRUB SERPL-MCNC: <0.2 MG/DL (ref 0.2–1.1)
BILIRUB UR QL: NEGATIVE
BUN BLD-MCNC: 83 MG/DL (ref 9–23)
BUN BLD-MCNC: 87 MG/DL (ref 9–23)
BUN/CREAT SERPL: 22.6 (ref 10–20)
BUN/CREAT SERPL: 24.9 (ref 10–20)
CA-I BLD-SCNC: 1.13 MMOL/L (ref 0.95–1.32)
CA-I BLD-SCNC: 1.18 MMOL/L (ref 0.95–1.32)
CALCIUM BLD-MCNC: 8.2 MG/DL (ref 8.7–10.4)
CALCIUM BLD-MCNC: 8.3 MG/DL (ref 8.7–10.4)
CHLORIDE SERPL-SCNC: 113 MMOL/L (ref 98–112)
CHLORIDE SERPL-SCNC: 115 MMOL/L (ref 98–112)
CLARITY UR: CLEAR
CO2 SERPL-SCNC: 22 MMOL/L (ref 21–32)
CO2 SERPL-SCNC: 22 MMOL/L (ref 21–32)
COHGB MFR BLD: 10.9 % (ref 0–3)
COHGB MFR BLD: 7.5 % (ref 0–3)
COLOR UR: COLORLESS
CREAT BLD-MCNC: 3.49 MG/DL
CREAT BLD-MCNC: 3.67 MG/DL
DEPRECATED RDW RBC AUTO: 52.9 FL (ref 35.1–46.3)
EGFRCR SERPLBLD CKD-EPI 2021: 12 ML/MIN/1.73M2 (ref 60–?)
EGFRCR SERPLBLD CKD-EPI 2021: 13 ML/MIN/1.73M2 (ref 60–?)
EOSINOPHIL # BLD AUTO: 0.08 X10(3) UL (ref 0–0.7)
EOSINOPHIL NFR BLD AUTO: 1.3 %
ERYTHROCYTE [DISTWIDTH] IN BLOOD BY AUTOMATED COUNT: 16.8 % (ref 11–15)
EST. AVERAGE GLUCOSE BLD GHB EST-MCNC: 140 MG/DL (ref 68–126)
GLOBULIN PLAS-MCNC: 2.6 G/DL (ref 2–3.5)
GLUCOSE BLD-MCNC: 142 MG/DL (ref 70–99)
GLUCOSE BLD-MCNC: 78 MG/DL (ref 70–99)
GLUCOSE BLDC GLUCOMTR-MCNC: 101 MG/DL (ref 70–99)
GLUCOSE BLDC GLUCOMTR-MCNC: 146 MG/DL (ref 70–99)
GLUCOSE BLDC GLUCOMTR-MCNC: 160 MG/DL (ref 70–99)
GLUCOSE BLDC GLUCOMTR-MCNC: 191 MG/DL (ref 70–99)
GLUCOSE BLDC GLUCOMTR-MCNC: 74 MG/DL (ref 70–99)
GLUCOSE BLDC GLUCOMTR-MCNC: 79 MG/DL (ref 70–99)
GLUCOSE BLDC GLUCOMTR-MCNC: 99 MG/DL (ref 70–99)
GLUCOSE UR-MCNC: NORMAL MG/DL
HBA1C MFR BLD: 6.5 % (ref ?–5.7)
HCO3 BLDA-SCNC: 21.5 MEQ/L (ref 21–27)
HCO3 BLDA-SCNC: 21.8 MEQ/L (ref 21–27)
HCT VFR BLD AUTO: 24.6 %
HGB BLD-MCNC: 7.5 G/DL
HGB BLD-MCNC: 7.7 G/DL
HGB BLD-MCNC: 8.2 G/DL
HGB UR QL STRIP.AUTO: NEGATIVE
IMM GRANULOCYTES # BLD AUTO: 0.02 X10(3) UL (ref 0–1)
IMM GRANULOCYTES NFR BLD: 0.3 %
KETONES UR-MCNC: NEGATIVE MG/DL
LACTATE BLD-SCNC: 0.6 MMOL/L (ref 0.5–2)
LACTATE BLD-SCNC: 0.7 MMOL/L (ref 0.5–2)
LEUKOCYTE ESTERASE UR QL STRIP.AUTO: NEGATIVE
LYMPHOCYTES # BLD AUTO: 1.85 X10(3) UL (ref 1–4)
LYMPHOCYTES NFR BLD AUTO: 30.8 %
MAGNESIUM SERPL-MCNC: 3 MG/DL (ref 1.6–2.6)
MCH RBC QN AUTO: 26.9 PG (ref 26–34)
MCHC RBC AUTO-ENTMCNC: 31.3 G/DL (ref 31–37)
MCV RBC AUTO: 86 FL
METHGB MFR BLD: 1.3 % SAT (ref 0.4–1.5)
METHGB MFR BLD: 1.3 % SAT (ref 0.4–1.5)
MODIFIED ALLEN TEST: POSITIVE
MODIFIED ALLEN TEST: POSITIVE
MONOCYTES # BLD AUTO: 0.66 X10(3) UL (ref 0.1–1)
MONOCYTES NFR BLD AUTO: 11 %
NEUTROPHILS # BLD AUTO: 3.34 X10 (3) UL (ref 1.5–7.7)
NEUTROPHILS # BLD AUTO: 3.34 X10(3) UL (ref 1.5–7.7)
NEUTROPHILS NFR BLD AUTO: 55.8 %
NITRITE UR QL STRIP.AUTO: NEGATIVE
O2 CT BLD-SCNC: 10.3 VOL% (ref 15–23)
O2 CT BLD-SCNC: 9.3 VOL% (ref 15–23)
OSMOLALITY SERPL CALC.SUM OF ELEC: 314 MOSM/KG (ref 275–295)
OSMOLALITY SERPL CALC.SUM OF ELEC: 317 MOSM/KG (ref 275–295)
P AXIS: 265 DEGREES
P-R INTERVAL: 144 MS
PCO2 BLDA: 37 MM HG (ref 35–45)
PCO2 BLDA: 40 MM HG (ref 35–45)
PH BLDA: 7.34 [PH] (ref 7.35–7.45)
PH BLDA: 7.36 [PH] (ref 7.35–7.45)
PH UR: 5 [PH] (ref 5–8)
PLATELET # BLD AUTO: 200 10(3)UL (ref 150–450)
PO2 BLDA: 75 MM HG (ref 80–100)
PO2 BLDA: 87 MM HG (ref 80–100)
POTASSIUM BLD-SCNC: 5.9 MMOL/L (ref 3.6–5.1)
POTASSIUM BLD-SCNC: 6.3 MMOL/L (ref 3.6–5.1)
POTASSIUM SERPL-SCNC: 5.8 MMOL/L (ref 3.5–5.1)
POTASSIUM SERPL-SCNC: 6.1 MMOL/L (ref 3.5–5.1)
POTASSIUM SERPL-SCNC: 6.1 MMOL/L (ref 3.5–5.1)
PROT SERPL-MCNC: 6.5 G/DL (ref 5.7–8.2)
PROT UR-MCNC: NEGATIVE MG/DL
PUNCTURE CHARGE: YES
PUNCTURE CHARGE: YES
Q-T INTERVAL: 438 MS
QRS DURATION: 102 MS
QTC CALCULATION (BEZET): 407 MS
R AXIS: 17 DEGREES
RBC # BLD AUTO: 2.86 X10(6)UL
SAO2 % BLDA: 97.3 % (ref 94–100)
SAO2 % BLDA: 98.4 % (ref 94–100)
SODIUM BLD-SCNC: 132 MMOL/L (ref 135–145)
SODIUM BLD-SCNC: 135 MMOL/L (ref 135–145)
SODIUM SERPL-SCNC: 138 MMOL/L (ref 136–145)
SODIUM SERPL-SCNC: 141 MMOL/L (ref 136–145)
SP GR UR STRIP: 1.01 (ref 1–1.03)
T AXIS: 67 DEGREES
UROBILINOGEN UR STRIP-ACNC: NORMAL
VENTRICULAR RATE: 52 BPM
WBC # BLD AUTO: 6 X10(3) UL (ref 4–11)

## 2025-04-04 PROCEDURE — 99223 1ST HOSP IP/OBS HIGH 75: CPT | Performed by: HOSPITALIST

## 2025-04-04 PROCEDURE — 99223 1ST HOSP IP/OBS HIGH 75: CPT | Performed by: INTERNAL MEDICINE

## 2025-04-04 RX ORDER — PANTOPRAZOLE SODIUM 40 MG/1
40 TABLET, DELAYED RELEASE ORAL
Status: DISCONTINUED | OUTPATIENT
Start: 2025-04-04 | End: 2025-04-06

## 2025-04-04 RX ORDER — ISOSORBIDE MONONITRATE 30 MG/1
60 TABLET, EXTENDED RELEASE ORAL DAILY
Status: DISCONTINUED | OUTPATIENT
Start: 2025-04-05 | End: 2025-04-06

## 2025-04-04 RX ORDER — ALBUTEROL SULFATE 0.83 MG/ML
10 SOLUTION RESPIRATORY (INHALATION) CONTINUOUS
Status: DISCONTINUED | OUTPATIENT
Start: 2025-04-04 | End: 2025-04-06

## 2025-04-04 RX ORDER — METOCLOPRAMIDE HYDROCHLORIDE 5 MG/ML
5 INJECTION INTRAMUSCULAR; INTRAVENOUS EVERY 8 HOURS PRN
Status: DISCONTINUED | OUTPATIENT
Start: 2025-04-04 | End: 2025-04-06

## 2025-04-04 RX ORDER — OXYCODONE AND ACETAMINOPHEN 10; 325 MG/1; MG/1
1 TABLET ORAL EVERY 4 HOURS PRN
Status: DISCONTINUED | OUTPATIENT
Start: 2025-04-04 | End: 2025-04-06

## 2025-04-04 RX ORDER — CALCIUM GLUCONATE 10 MG/ML
1 INJECTION, SOLUTION INTRAVENOUS ONCE
Status: COMPLETED | OUTPATIENT
Start: 2025-04-04 | End: 2025-04-04

## 2025-04-04 RX ORDER — GABAPENTIN 300 MG/1
300 CAPSULE ORAL NIGHTLY
Status: DISCONTINUED | OUTPATIENT
Start: 2025-04-04 | End: 2025-04-06

## 2025-04-04 RX ORDER — HYDRALAZINE HYDROCHLORIDE 50 MG/1
50 TABLET, FILM COATED ORAL 3 TIMES DAILY
Status: DISCONTINUED | OUTPATIENT
Start: 2025-04-04 | End: 2025-04-06

## 2025-04-04 RX ORDER — ACETAMINOPHEN 500 MG
500 TABLET ORAL EVERY 4 HOURS PRN
Status: DISCONTINUED | OUTPATIENT
Start: 2025-04-04 | End: 2025-04-06

## 2025-04-04 RX ORDER — FUROSEMIDE 10 MG/ML
20 INJECTION INTRAMUSCULAR; INTRAVENOUS ONCE
Status: COMPLETED | OUTPATIENT
Start: 2025-04-04 | End: 2025-04-04

## 2025-04-04 RX ORDER — DILTIAZEM HYDROCHLORIDE 120 MG/1
120 CAPSULE, EXTENDED RELEASE ORAL DAILY
Status: DISCONTINUED | OUTPATIENT
Start: 2025-04-04 | End: 2025-04-04

## 2025-04-04 RX ORDER — BUMETANIDE 1 MG/1
1 TABLET ORAL DAILY
Status: DISCONTINUED | OUTPATIENT
Start: 2025-04-05 | End: 2025-04-06

## 2025-04-04 RX ORDER — DEXTROSE MONOHYDRATE 25 G/50ML
INJECTION, SOLUTION INTRAVENOUS ONCE
Status: COMPLETED | OUTPATIENT
Start: 2025-04-04 | End: 2025-04-04

## 2025-04-04 RX ORDER — INDOMETHACIN 25 MG/1
50 CAPSULE ORAL ONCE
Status: COMPLETED | OUTPATIENT
Start: 2025-04-04 | End: 2025-04-04

## 2025-04-04 RX ORDER — ATORVASTATIN CALCIUM 40 MG/1
40 TABLET, FILM COATED ORAL NIGHTLY
Status: DISCONTINUED | OUTPATIENT
Start: 2025-04-04 | End: 2025-04-06

## 2025-04-04 RX ORDER — MECLIZINE HYDROCHLORIDE 25 MG/1
25 TABLET ORAL 3 TIMES DAILY PRN
Status: DISCONTINUED | OUTPATIENT
Start: 2025-04-04 | End: 2025-04-06

## 2025-04-04 RX ORDER — DEXTROSE MONOHYDRATE 25 G/50ML
25 INJECTION, SOLUTION INTRAVENOUS
Status: DISPENSED | OUTPATIENT
Start: 2025-04-04 | End: 2025-04-04

## 2025-04-04 NOTE — ED INITIAL ASSESSMENT (HPI)
Pt presents to the ER for \"critically high potassium.\" Per pt was having routine blood work yesterday and MD called her today and was told to come to ER. Pt does not remember how high potassium was.    Last blood work showing in epic is from 3/17.     Pt denies CP.
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
## 2025-04-04 NOTE — H&P
[facility]    PATIENT'S NAME: MERNA ALEXANDER   ATTENDING PHYSICIAN: Madelyn Jules MD   PATIENT ACCOUNT#:   [id number]    LOCATION:  [address]  MEDICAL RECORD #:   [id number]       YOB: 1945  ADMISSION DATE:       04/04/2025    HISTORY AND PHYSICAL EXAMINATION    CHIEF COMPLAINT:  Acute on chronic kidney failure, hyperkalemia, and bradycardia.    HISTORY OF PRESENT ILLNESS:  Patient is a 79-year-old  female with known underlying chronic kidney disease stage 4.  Had blood drawn yesterday as an outpatient and was told that she has hyperkalemia and she should come in to the emergency department for evaluation.  Today, repeat chemistry showed potassium 6.1, BUN and creatinine 83 and 3.67.  GFR is 12, which is below her baseline.  CBC showed hemoglobin of 7.7, which is stable.  EKG and monitor showed ectopic atrial bradycardia, rate mid 40s to mid 50s.  Patient was given multiple medications for hyperkalemia including insulin with D50, Lokelma, Lasix, and calcium gluconate.      PAST MEDICAL HISTORY:  Moderate aortic stenosis with left ventricular diastolic dysfunction grade 1, moderate pulmonary artery hypertension, paroxysmal supraventricular tachycardia NVRT, chronic bronchitis, emphysema, hypertension, chronic kidney disease stage 4, anemia of chronic kidney disease, recurrent hyperkalemia, history of C difficile infection after antibiotic exposure, degenerative joint disease of lumbar spine with severe lumbar spinal stenosis, history of DVTs and chronically anticoagulated with Eliquis.  History of intermittent GI blood loss.    PAST SURGICAL HISTORY:  Retroperitoneal leiomyosarcoma, status post radical resection, followed by radiation therapy; right carpal tunnel release; left shoulder rotator cuff repair; tubal ligation; and cholecystectomy.    MEDICATIONS:  Please see medication reconciliation list.     ALLERGIES:  No known drug allergies.    FAMILY HISTORY:   Mother had TB.  Father had cerebrovascular accident.    SOCIAL HISTORY:  Chronic tobacco use.  No alcohol or drug use.  Requires some assistance in her basic activities of daily living.     REVIEW OF SYSTEMS:  Patient denies any chest pain, shortness of breath, or fatigue.  She does not feel out of the usual.  She reported she is compliant with renal, low-potassium diet.  Other 12-point review of systems is negative.  She does have chronic edema, both legs.  She has musculoskeletal deconditioning, and she is almost wheelchair and bedbound because of severe lumbar spinal stenosis.       PHYSICAL EXAMINATION:    GENERAL:  Alert and oriented to time, place and person.  No acute distress.   VITAL SIGNS:  Temperature 98.1, pulse 40, respiratory rate 20, blood pressure 119/44, pulse ox 98% on room air.  HEENT:  Atraumatic.  Oropharynx clear.  Moist mucous membranes.  Ears and nose normal.  Eyes:  Anicteric sclerae.   NECK:  Supple.  No lymphadenopathy.    LUNGS:  Clear to auscultation bilaterally.  Normal respiratory effort.   HEART:  Regular rhythm.  S1 and S2 auscultated.  No murmur.  Bradycardic.  ABDOMEN:  Soft, nondistended.  No tenderness.    EXTREMITIES:  There is +2 edema, both legs, which is chronic.  No open ulcers.   NEUROLOGIC:  Motor and sensory intact.    ASSESSMENT:    1.   Acute on chronic kidney failure.  2.   Hyperkalemia.  3.   Atrial ectopic bradycardia.  4.   Anemia of chronic kidney disease and possible intermittent gastrointestinal blood loss.  5.   Severe lumbar spinal stenosis with musculoskeletal deconditioning.     PLAN:  Patient will be admitted to telemetry floor.  Continue to monitor potassium level.  Obtain nephrology and cardiology consults.  Hold on further diuresis considering her relatively low blood pressure.  Further recommendations to follow.     Dictated By Erich Pascual MD  d: 04/04/2025 11:01:00  t: 04/04/2025 11:24:30  Job [id number]  FB/
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
## 2025-04-04 NOTE — HISTORICAL OFFICE NOTE
CHIEF COMPLAINT    CHIEF COMPLAINT  Reason for Visit/Chief Complaint   hospital f/u from chest pain   This is a patient of Dr. Buchanan who was seen once in the office in August. 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 She was started on diltiazem. An echocardiogram showed preserved systolic function with ejection fraction 55 to 60%, grade 1 diastolic dysfunction and moderate aortic stenosis. Patient did have a dental consult and had dental extractions and treatment for dental abscess during her hospitalization. She comes today for follow-up.Patient reports that she is feeling much better. She denies any further tachycardia episodes. She reports that she is tolerating diltiazem. She is compliant with anticoagulation for her history of PE and DVT.     PROBLEMS  Reconcile with Patient's ChartPROBLEMS  Problem Effective Dates Date resolved Problem Status   Paroxysmal SVT (supraventricular tachycardia), [SNOMED-CT: 25786514] 8/3/2023 - Active   Nicotine dependence with current use, [SNOMED-CT: 41340347] 2/6/2024 - Active   History of DVT (deep vein thrombosis) - Hx, [SNOMED-CT: 589115870] 2/6/2024 - Active   History of pulmonary embolus (PE), [SNOMED-CT: 667824266] 2/6/2024 - Active   Mild pulmonary hypertension, [SNOMED-CT: 643734934] 8/3/2023 - Active   Edema, localized, [SNOMED-CT: 539281764] 8/3/2023 - Active   Hypertension (HTN), primary, [SNOMED-CT: 51660772] 8/3/2023 - Active   Hyperlipidemia, mixed, [SNOMED-CT: 543121445] 8/3/2023 - Active   Exertional dyspnea, [SNOMED-CT: 83613168] 8/3/2023 - Active     ENCOUNTER DIAGNOSIS    ENCOUNTER DIAGNOSIS  Problem Effective Dates Date resolved Problem Status   Paroxysmal SVT (supraventricular tachycardia), [SNOMED-CT: 13186262]  8/3/2023 - Active   Nicotine dependence with current use, [SNOMED-CT: 17085465] 2/6/2024 - Active   History of DVT (deep vein thrombosis) - Hx, [SNOMED-CT: 492160689] 2/6/2024 - Active   History of pulmonary embolus (PE), [SNOMED-CT: 446756560] 2/6/2024 - Active   Edema, localized, [SNOMED-CT: 012494706] 8/3/2023 - Active   Hypertension (HTN), primary, [SNOMED-CT: 89068551] 8/3/2023 - Active   Hyperlipidemia, mixed, [SNOMED-CT: 520377623] 8/3/2023 - Active   Exertional dyspnea, [SNOMED-CT: 96709881] 8/3/2023 - Active     VITAL SIGNS    VITAL SIGNS  Date / Time: 2/6/2024   BP Systolic 134 mmHg   BP Diastolic 52 mmHg   Height 63 inches   Weight 174 lbs   Pulse Rate 74 bpm   BSA (Body Surface Area) 1.9 cc/m2   BMI (Body Mass Index) 30.8 cc/m2   Blood Pressure 134 / 52 mmHg     PHYSICAL EXAMINATION    PHYSICAL EXAMINATION  Header Details   Constitutional 100%o2   Vitals Left Arm Sitting  / 52 mmHg, Pulse rate 74 bpm, Regular, Height in 5' 3\", BMI: 30.8, Weight in 174.17 lbs (or) 79 kgs, BSA : 1.9 cc/m²   General Appearance No Acute Distress   Neck No JVD   Respiratory Lungs clear with normal breath sounds   Cardiovascular 3/6 murmur, Regular rhythm. Normal rate present. Normal and normal S1 and S2   Lower Extremities Edema 2+     ALLERGIES, ADVERSE REACTIONS, ALERTS    No data available    MEDICATIONS ADMINISTERED DURING VISIT    No data available    MEDICATIONS  Reconcile with Patient's ChartMEDICATIONS  Medication Start Date Route/Frequency Status   amoxicillin clavulanate 875-125 MG Oral Tab, [RxNorm: 164754] 2/5/2024 Take 1 tablet by mouth 2 (two) times daily for 14 days. Active   apixaban 5 MG Oral Tab, [RxNorm: 5222988] 2/5/2024 Take 1 tablet (5 mg total) by mouth 2 (two) times daily. Active   atorvastatin 40 MG Oral Tab, [RxNorm: 535175] 2/5/2024 Take 1 tablet (40 mg total) by mouth. AT BEDTIME Active   cholecalciferol 50 MCG (2000 UT) Oral Tab, [RxNorm: 874125] 2/5/2024 Take 1 tablet (2,000 Units total) by  mouth daily. Active   diazePAM 10 MG Oral Tab, [RxNorm: 681087] 2/5/2024 Take 1 tablet (10 mg total) by mouth every 8 (eight) hours as needed (MAX 3/DAY). Active   dilTIAZem  MG Oral Capsule SR 24 Hr, [RxNorm: 571862] 2/5/2024 Take 1 capsule (120 mg total) by mouth daily. Active   ferrous sulfate 325 (65 FE) MG Oral Tab EC, [RxNorm: 814644] 2/5/2024 Take 1 tablet (325 mg total) by mouth See Admin Instructions. Three times a week Active   furosemide 20 MG Oral Tab, [RxNorm: 713549] 2/5/2024 Take 1 tablet (20 mg total) by mouth 3 (three) times a week. Active   gabapentin 300 MG Oral Cap, [RxNorm: 119782] 2/5/2024 Take 1 capsule (300 mg total) by mouth nightly. Active   meclizine 25 MG Oral Tab, [RxNorm: 754288] 2/5/2024 Take 1 tablet (25 mg total) by mouth. Active   oxyCODONE-acetaminophen (PERCOCET)  MG Oral Tab, [RxNorm: 1115949] 2/5/2024 Take 1 tablet by mouth every 4 (four) hours as needed for Pain (max 6/day). Active     ASSESSMENT    1. 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 Patient was started on diltiazem which was continued at discharge. Reports further episodes. She will continue present management. Discussed that if she has recurrent events would likely recommend EP consult for possible ablation.  2. Thromboembolism-history of DVT and pulmonary embolism in the past. Patient is on lifelong anticoagulation and denies bleeding problems.  3. Lower extremity edema-likely secondary to DVT. Patient takes furosemide daily unless she is out of the home. Reports that she keeps her legs elevated and has a hospital bed.  4. Hypertension-blood pressure controlled on diltiazem and diuretic.  5. Nicotine dependence-current everyday smoker.  6. Aortic stenosis-moderate on January echo with preserved systolic function. Like the patient to follow-up with Dr. Buchanan in about 2 months.Plan:  Continue present management  Call if you have recurrent episodes of tachycardia otherwise see   Dewayne in 2 months     FAMILY HISTORY    No data available    GENERAL STATUS    No data available    PAST MEDICAL HISTORY    PAST MEDICAL HISTORY  Problem Date diagonsed Date resolved Status   Paroxysmal SVT (supraventricular tachycardia), [SNOMED-CT: 08350441] 8/3/2023 - Active   Nicotine dependence with current use, [SNOMED-CT: 42262928] 2/6/2024 - Active   History of DVT (deep vein thrombosis) - Hx, [SNOMED-CT: 437429074] 2/6/2024 - Active   History of pulmonary embolus (PE), [SNOMED-CT: 846706585] 2/6/2024 - Active   Mild pulmonary hypertension, [SNOMED-CT: 700252432] 8/3/2023 - Active   Edema, localized, [SNOMED-CT: 344977577] 8/3/2023 - Active   Hypertension (HTN), primary, [SNOMED-CT: 38058220] 8/3/2023 - Active   Hyperlipidemia, mixed, [SNOMED-CT: 039456615] 8/3/2023 - Active   Exertional dyspnea, [SNOMED-CT: 21623058] 8/3/2023 - Active     HISTORY OF PRESENT ILLNESS    This is a patient of Dr. Buchanan who was seen once in the office in August. 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 She was started on diltiazem. An echocardiogram showed preserved systolic function with ejection fraction 55 to 60%, grade 1 diastolic dysfunction and moderate aortic stenosis. Patient did have a dental consult and had dental extractions and treatment for dental abscess during her hospitalization. She comes today for follow-up.Patient reports that she is feeling much better. She denies any further tachycardia episodes. She reports that she is tolerating diltiazem. She is compliant with anticoagulation for her history of PE and DVT.     IMMUNIZATIONS  Reconcile with Patient's ChartNo data available    PLAN OF CARE    PLAN OF CARE  Planned Care Date   Follow up visit - Abdirahman Buchanan MD  1/1/1900     PROCEDURES    No data available    LAB RESULTS    No data available    REVIEW OF SYSTEMS    REVIEW OF SYSTEMS  Header Details   Cardiovascular CANTU, Edema  No history of Chest pain, Palpitations, Syncope, PND, Orthopnea, Claudication   Respiratory No history of SOB, Wheezing, Sputum   Hem/Lymphatic No history of Easy bruising, Blood clots, Hx of blood transfusion, Anemia, Bleeding problems     SOCIAL HISTORY    SOCIAL HISTORY  Social History Element Description Effective Dates   Smoking status Heavy tobacco smoker -     FUNCTIONAL STATUS    No data available    MEDICAL EQUIPMENT    No data available    Goals Sections    No data available    REASON FOR REFERRAL    No data available    Health Concerns Section    No data available    COGNITIVE/MENTAL STATUS    No data available    Patient Demographics    Patient Demographics  Patient Address Patient Name Communication   [address] [place] [address] Hannah Boss [phone] (Mobile)     Patient Demographics  Language Race / Ethnicity Marital Status   English - Spoken (Preferred) Black or  / Unknown      Document Information    Primary Care Provider Other Service Providers Document Coverage Dates   Olivia Leone  NPI: [id number]  [phone] (Work)  [address] [facility] [facility] [address], [place] [address]  [place] [address]  Interpreting Physicians  [facility]  [phone] (Work)  [address] [place] [address] Esme Euecda  NPI: [id number]  [phone] (Work)  [address] [place] [address], [address], [place] [address]  [place] [address]  Nurses Feb. 06, 2024February 06, 2024      Organization   [facility]  [phone] (Work)  [address] [place] [address], [address], [place] [address]  [place] [address]     Encounter Providers Encounter Date    Feb. 06, 2024February 06, 2024     Legal Authenticator    Olivia Leone  NPI:  [id number]  [phone] (Work)  [address] [facility], [address], [place] [address]  [place] [address]
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
## 2025-04-04 NOTE — PLAN OF CARE
Pt received from ER.  Aox4. Denies pain.  HR 30s-40s, asymptomatic. Cardiology at bedside, aware.  Potassium redraw level 6.1 nephrology notified, orders carried out.  Albuterol neb.  Patient's son was at bedside.  Current HR in 80s.   All needs attended to at this time.  Call light within reach, safety and comfort maintained.      Problem: Patient Centered Care  Goal: Patient preferences are identified and integrated in the patient's plan of care  Description: Interventions:- What would you like us to know as we care for you? - Provide timely, complete, and accurate information to patient/family- Incorporate patient and family knowledge, values, beliefs, and cultural backgrounds into the planning and delivery of care- Encourage patient/family to participate in care and decision-making at the level they choose- Honor patient and family perspectives and choices  Outcome: Progressing     Problem: Diabetes/Glucose Control  Goal: Glucose maintained within prescribed range  Description: INTERVENTIONS:- Monitor Blood Glucose as ordered- Assess for signs and symptoms of hyperglycemia and hypoglycemia- Administer ordered medications to maintain glucose within target range- Assess barriers to adequate nutritional intake and initiate nutrition consult as needed- Instruct patient on self management of diabetes  INTERVENTIONS:- Monitor Blood Glucose as ordered- Assess for signs and symptoms of hyperglycemia and hypoglycemia- Administer ordered medications to maintain glucose within target range- Assess barriers to adequate nutritional intake and initiate nutrition consult as needed- Instruct patient on self management of diabetes  Outcome: Progressing     Problem: METABOLIC/FLUID AND ELECTROLYTES - ADULT  Goal: Glucose maintained within prescribed range  Description: INTERVENTIONS:- Monitor Blood Glucose as ordered- Assess for signs and symptoms of hyperglycemia and hypoglycemia- Administer ordered medications to maintain  glucose within target range- Assess barriers to adequate nutritional intake and initiate nutrition consult as needed- Instruct patient on self management of diabetes  INTERVENTIONS:- Monitor Blood Glucose as ordered- Assess for signs and symptoms of hyperglycemia and hypoglycemia- Administer ordered medications to maintain glucose within target range- Assess barriers to adequate nutritional intake and initiate nutrition consult as needed- Instruct patient on self management of diabetes  Outcome: Progressing     Problem: METABOLIC/FLUID AND ELECTROLYTES - ADULT  Goal: Hemodynamic stability and optimal renal function maintained  Description: INTERVENTIONS:- Monitor labs and assess for signs and symptoms of volume excess or deficit- Monitor intake, output and patient weight- Monitor urine specific gravity, serum osmolarity and serum sodium as indicated or ordered- Monitor response to interventions for patient's volume status, including labs, urine output, blood pressure (other measures as available)- Encourage oral intake as appropriate- Instruct patient on fluid and nutrition restrictions as appropriate  Outcome: Progressing     Problem: METABOLIC/FLUID AND ELECTROLYTES - ADULT  Goal: Electrolytes maintained within normal limits  Description: INTERVENTIONS:- Monitor labs and rhythm and assess patient for signs and symptoms of electrolyte imbalances- Administer electrolyte replacement as ordered- Monitor response to electrolyte replacements, including rhythm and repeat lab results as appropriate- Fluid restriction as ordered- Instruct patient on fluid and nutrition restrictions as appropriate  Outcome: Not Progressing

## 2025-04-05 LAB
ANION GAP SERPL CALC-SCNC: 2 MMOL/L (ref 0–18)
BASOPHILS # BLD AUTO: 0.03 X10(3) UL (ref 0–0.2)
BASOPHILS NFR BLD AUTO: 0.5 %
BUN BLD-MCNC: 87 MG/DL (ref 9–23)
BUN/CREAT SERPL: 27.1 (ref 10–20)
CALCIUM BLD-MCNC: 8.1 MG/DL (ref 8.7–10.4)
CHLORIDE SERPL-SCNC: 115 MMOL/L (ref 98–112)
CO2 SERPL-SCNC: 24 MMOL/L (ref 21–32)
CREAT BLD-MCNC: 3.21 MG/DL
DEPRECATED HBV CORE AB SER IA-ACNC: 40 NG/ML
DEPRECATED RDW RBC AUTO: 52.4 FL (ref 35.1–46.3)
EGFRCR SERPLBLD CKD-EPI 2021: 14 ML/MIN/1.73M2 (ref 60–?)
EOSINOPHIL # BLD AUTO: 0.08 X10(3) UL (ref 0–0.7)
EOSINOPHIL NFR BLD AUTO: 1.3 %
ERYTHROCYTE [DISTWIDTH] IN BLOOD BY AUTOMATED COUNT: 16.4 % (ref 11–15)
GLUCOSE BLD-MCNC: 115 MG/DL (ref 70–99)
GLUCOSE BLDC GLUCOMTR-MCNC: 123 MG/DL (ref 70–99)
GLUCOSE BLDC GLUCOMTR-MCNC: 145 MG/DL (ref 70–99)
GLUCOSE BLDC GLUCOMTR-MCNC: 149 MG/DL (ref 70–99)
GLUCOSE BLDC GLUCOMTR-MCNC: 177 MG/DL (ref 70–99)
HCT VFR BLD AUTO: 22.6 %
HGB BLD-MCNC: 7.3 G/DL
IMM GRANULOCYTES # BLD AUTO: 0.03 X10(3) UL (ref 0–1)
IMM GRANULOCYTES NFR BLD: 0.5 %
IRON SATN MFR SERPL: 6 %
IRON SERPL-MCNC: 19 UG/DL
LYMPHOCYTES # BLD AUTO: 0.85 X10(3) UL (ref 1–4)
LYMPHOCYTES NFR BLD AUTO: 14.3 %
MCH RBC QN AUTO: 28 PG (ref 26–34)
MCHC RBC AUTO-ENTMCNC: 32.3 G/DL (ref 31–37)
MCV RBC AUTO: 86.6 FL
MONOCYTES # BLD AUTO: 0.49 X10(3) UL (ref 0.1–1)
MONOCYTES NFR BLD AUTO: 8.2 %
NEUTROPHILS # BLD AUTO: 4.48 X10 (3) UL (ref 1.5–7.7)
NEUTROPHILS # BLD AUTO: 4.48 X10(3) UL (ref 1.5–7.7)
NEUTROPHILS NFR BLD AUTO: 75.2 %
OSMOLALITY SERPL CALC.SUM OF ELEC: 319 MOSM/KG (ref 275–295)
PHOSPHATE SERPL-MCNC: 5.8 MG/DL (ref 2.4–5.1)
PLATELET # BLD AUTO: 168 10(3)UL (ref 150–450)
POTASSIUM SERPL-SCNC: 5.9 MMOL/L (ref 3.5–5.1)
RBC # BLD AUTO: 2.61 X10(6)UL
SODIUM SERPL-SCNC: 141 MMOL/L (ref 136–145)
TOTAL IRON BINDING CAPACITY: 314 UG/DL (ref 250–425)
TRANSFERRIN SERPL-MCNC: 254 MG/DL (ref 250–380)
WBC # BLD AUTO: 6 X10(3) UL (ref 4–11)

## 2025-04-05 PROCEDURE — 99233 SBSQ HOSP IP/OBS HIGH 50: CPT | Performed by: INTERNAL MEDICINE

## 2025-04-05 PROCEDURE — 99233 SBSQ HOSP IP/OBS HIGH 50: CPT | Performed by: HOSPITALIST

## 2025-04-05 RX ORDER — IPRATROPIUM BROMIDE AND ALBUTEROL SULFATE 2.5; .5 MG/3ML; MG/3ML
3 SOLUTION RESPIRATORY (INHALATION) EVERY 6 HOURS PRN
Status: DISCONTINUED | OUTPATIENT
Start: 2025-04-05 | End: 2025-04-06

## 2025-04-05 RX ORDER — SODIUM CHLORIDE 450 MG/100ML
INJECTION, SOLUTION INTRAVENOUS CONTINUOUS
Status: ACTIVE | OUTPATIENT
Start: 2025-04-05 | End: 2025-04-05

## 2025-04-05 RX ORDER — FUROSEMIDE 10 MG/ML
20 INJECTION INTRAMUSCULAR; INTRAVENOUS ONCE
Status: COMPLETED | OUTPATIENT
Start: 2025-04-05 | End: 2025-04-05

## 2025-04-05 NOTE — PROGRESS NOTES
Cardiology Progress Note    Hannah Boss Patient Status:  Inpatient    1945 MRN [id number]   Location [facility] 2W/SW Attending Marivel Randle DO   Hosp Day # 1 PCP Diana Lopez DO       Subjective: Denies any symptoms, bradycardia resolved currently normal sinus    Objective:   Temp: 97.9 °F (36.6 °C)  Pulse: 67  Resp: 17  BP: 129/49    Intake/Output:     Intake/Output Summary (Last 24 hours) at 2025 0820  Last data filed at 2025 0600  Gross per 24 hour   Intake 550 ml   Output 750 ml   Net -200 ml       Last 3 Weights   25 0600 167 lb 5.3 oz (75.9 kg)   25 1536 173 lb (78.5 kg)   25 0943 173 lb (78.5 kg)   25 0022 166 lb 8 oz (75.5 kg)   25 0519 175 lb (79.4 kg)   25 1721 182 lb 5.1 oz (82.7 kg)   25 0034 182 lb (82.6 kg)   25 0510 190 lb 3.2 oz (86.3 kg)   03/10/25 0623 192 lb 8 oz (87.3 kg)   25 1808 194 lb (88 kg)   25 1042 179 lb (81.2 kg)   25 1302 179 lb (81.2 kg)       Tele: NSR     Physical Exam:     General: Alert and oriented x 3. No apparent distress. No respiratory or constitutional distress.  HEENT: Normocephalic, anicteric sclera, neck supple.  Neck: No JVD, carotids 2+, no bruits.  Cardiac: Regular rate and rhythm. S1, S2 normal. No murmur, pericardial rub, S3.  Lungs: Clear without wheezes, rales, rhonchi or dullness.  Normal excursions and effort.  Abdomen: Soft, non-tender. BS-present.  Extremities: Without clubbing, cyanosis or edema.  Peripheral pulses are 2+.  Neurologic: Alert and oriented, normal affect.  Skin: Warm and dry.     Laboratory/Data:    Labs:         Recent Labs   Lab 25  0957 25  0451   WBC 6.0 6.0   HGB 7.7* 7.3*   MCV 86.0 86.6   .0 168.0       Recent Labs   Lab 25  0957 25  1400 25  0450    141  --  141   K 6.1* 6.1* 5.8* 5.9*   * 115*  --  115*   CO2 22.0 22.0  --  24.0   BUN 83* 87*  --  87*   CREATSERUM 3.67*  3.49*  --  3.21*   CA 8.2* 8.3*  --  8.1*   MG 3.0*  --   --   --    PHOS  --   --   --  5.8*   * 78  --  115*       Recent Labs   Lab 04/04/25  0957   ALT 41   AST 37*   ALB 3.9       No results for input(s): \"TROP\" in the last 168 hours.    Allergies:   Allergies[1]    Medications:  Current Facility-Administered Medications   Medication Dose Route Frequency    sodium chloride 0.45% infusion   Intravenous Continuous    sodium ferric gluconate (Ferrlecit) 125 mg in sodium chloride 0.9% 100mL IVPB premix  125 mg Intravenous Daily    acetaminophen (Tylenol Extra Strength) tab 500 mg  500 mg Oral Q4H PRN    metoclopramide (Reglan) 5 mg/mL injection 5 mg  5 mg Intravenous Q8H PRN    sodium zirconium cyclosilicate (Lokelma) oral packet 10 g  10 g Oral Daily    atorvastatin (Lipitor) tab 40 mg  40 mg Oral Nightly    [Held by provider] bumetanide (Bumex) tab 1 mg  1 mg Oral Daily    apixaban (Eliquis) tab 5 mg  5 mg Oral BID    gabapentin (Neurontin) cap 300 mg  300 mg Oral Nightly    hydrALAZINE (Apresoline) tab 50 mg  50 mg Oral TID    isosorbide mononitrate ER (Imdur) 24 hr tab 60 mg  60 mg Oral Daily    meclizine (Antivert) tab 25 mg  25 mg Oral TID PRN    oxyCODONE-acetaminophen (Percocet)  MG per tab 1 tablet  1 tablet Oral Q4H PRN    pantoprazole (Protonix) DR tab 40 mg  40 mg Oral BID AC    albuterol (Ventolin) (2.5 MG/3ML) 0.083% nebulizer solution 10 mg  10 mg Nebulization Continuous         Assessment:  Bradycardia arrhythmia due to hyperkalemia  Hyperkalemia due to acute on chronic renal failure  Normal EF      Plan:  Management of hyperkalemia as per nephrology  Resolution of bradycardia arrhythmia with improvement in hyperkalemia  Will follow peripherally, call with any questions    Kevin Bruce MD  4/5/2025  8:20 AM    2       [1] No Known Allergies

## 2025-04-05 NOTE — PLAN OF CARE
Problem: Diabetes/Glucose Control  Goal: Glucose maintained within prescribed range  Description: INTERVENTIONS:- Monitor Blood Glucose as ordered- Assess for signs and symptoms of hyperglycemia and hypoglycemia- Administer ordered medications to maintain glucose within target range- Assess barriers to adequate nutritional intake and initiate nutrition consult as needed- Instruct patient on self management of diabetes  INTERVENTIONS:- Monitor Blood Glucose as ordered- Assess for signs and symptoms of hyperglycemia and hypoglycemia- Administer ordered medications to maintain glucose within target range- Assess barriers to adequate nutritional intake and initiate nutrition consult as needed- Instruct patient on self management of diabetes  Outcome: Progressing     Problem: METABOLIC/FLUID AND ELECTROLYTES - ADULT  Goal: Glucose maintained within prescribed range  Description: INTERVENTIONS:- Monitor Blood Glucose as ordered- Assess for signs and symptoms of hyperglycemia and hypoglycemia- Administer ordered medications to maintain glucose within target range- Assess barriers to adequate nutritional intake and initiate nutrition consult as needed- Instruct patient on self management of diabetes  INTERVENTIONS:- Monitor Blood Glucose as ordered- Assess for signs and symptoms of hyperglycemia and hypoglycemia- Administer ordered medications to maintain glucose within target range- Assess barriers to adequate nutritional intake and initiate nutrition consult as needed- Instruct patient on self management of diabetes  Outcome: Progressing  Goal: Electrolytes maintained within normal limits  Description: INTERVENTIONS:- Monitor labs and rhythm and assess patient for signs and symptoms of electrolyte imbalances- Administer electrolyte replacement as ordered- Monitor response to electrolyte replacements, including rhythm and repeat lab results as appropriate- Fluid restriction as ordered- Instruct patient on fluid and  nutrition restrictions as appropriate  Outcome: Progressing

## 2025-04-05 NOTE — PLAN OF CARE
Problem: Patient Centered Care  Goal: Patient preferences are identified and integrated in the patient's plan of care  Description: Interventions:- What would you like us to know as we care for you? - Provide timely, complete, and accurate information to patient/family- Incorporate patient and family knowledge, values, beliefs, and cultural backgrounds into the planning and delivery of care- Encourage patient/family to participate in care and decision-making at the level they choose- Honor patient and family perspectives and choices  Outcome: Progressing     Problem: Diabetes/Glucose Control  Goal: Glucose maintained within prescribed range  Description: INTERVENTIONS:- Monitor Blood Glucose as ordered- Assess for signs and symptoms of hyperglycemia and hypoglycemia- Administer ordered medications to maintain glucose within target range- Assess barriers to adequate nutritional intake and initiate nutrition consult as needed- Instruct patient on self management of diabetes  INTERVENTIONS:- Monitor Blood Glucose as ordered- Assess for signs and symptoms of hyperglycemia and hypoglycemia- Administer ordered medications to maintain glucose within target range- Assess barriers to adequate nutritional intake and initiate nutrition consult as needed- Instruct patient on self management of diabetes  Outcome: Progressing     Problem: Patient/Family Goals  Goal: Patient/Family Long Term Goal  Description: Patient's Long Term Goal: Interventions:- - See additional Care Plan goals for specific interventions  Outcome: Progressing  Goal: Patient/Family Short Term Goal  Description: Patient's Short Term Goal: Interventions: - - See additional Care Plan goals for specific interventions  Outcome: Progressing     Problem: CARDIOVASCULAR - ADULT  Goal: Maintains optimal cardiac output and hemodynamic stability  Description: INTERVENTIONS:- Monitor vital signs, rhythm, and trends- Monitor for bleeding, hypotension and signs of  decreased cardiac output- Evaluate effectiveness of vasoactive medications to optimize hemodynamic stability- Monitor arterial and/or venous puncture sites for bleeding and/or hematoma- Assess quality of pulses, skin color and temperature- Assess for signs of decreased coronary artery perfusion - ex. Angina- Evaluate fluid balance, assess for edema, trend weights  Outcome: Progressing  Goal: Absence of cardiac arrhythmias or at baseline  Description: INTERVENTIONS:- Continuous cardiac monitoring, monitor vital signs, obtain 12 lead EKG if indicated- Evaluate effectiveness of antiarrhythmic and heart rate control medications as ordered- Initiate emergency measures for life threatening arrhythmias- Monitor electrolytes and administer replacement therapy as ordered  Outcome: Progressing     Problem: METABOLIC/FLUID AND ELECTROLYTES - ADULT  Goal: Glucose maintained within prescribed range  Description: INTERVENTIONS:- Monitor Blood Glucose as ordered- Assess for signs and symptoms of hyperglycemia and hypoglycemia- Administer ordered medications to maintain glucose within target range- Assess barriers to adequate nutritional intake and initiate nutrition consult as needed- Instruct patient on self management of diabetes  INTERVENTIONS:- Monitor Blood Glucose as ordered- Assess for signs and symptoms of hyperglycemia and hypoglycemia- Administer ordered medications to maintain glucose within target range- Assess barriers to adequate nutritional intake and initiate nutrition consult as needed- Instruct patient on self management of diabetes  Outcome: Progressing  Goal: Electrolytes maintained within normal limits  Description: INTERVENTIONS:- Monitor labs and rhythm and assess patient for signs and symptoms of electrolyte imbalances- Administer electrolyte replacement as ordered- Monitor response to electrolyte replacements, including rhythm and repeat lab results as appropriate- Fluid restriction as ordered- Instruct  patient on fluid and nutrition restrictions as appropriate  Outcome: Progressing  Goal: Hemodynamic stability and optimal renal function maintained  Description: INTERVENTIONS:- Monitor labs and assess for signs and symptoms of volume excess or deficit- Monitor intake, output and patient weight- Monitor urine specific gravity, serum osmolarity and serum sodium as indicated or ordered- Monitor response to interventions for patient's volume status, including labs, urine output, blood pressure (other measures as available)- Encourage oral intake as appropriate- Instruct patient on fluid and nutrition restrictions as appropriate  Outcome: Progressing     Problem: SKIN/TISSUE INTEGRITY - ADULT  Goal: Skin integrity remains intact  Description: INTERVENTIONS- Assess and document risk factors for pressure ulcer development- Assess and document skin integrity- Monitor for areas of redness and/or skin breakdown- Initiate interventions, skin care algorithm/standards of care as needed  Outcome: Progressing

## 2025-04-05 NOTE — PROGRESS NOTES
[facility]  part of [facility]    Progress Note    Hannah Boss Patient Status:  Inpatient    1945 MRN [id number]   Location [facility] 2W/SW Attending Marivel Randle DO   Hosp Day # 1 PCP Diana Lopez DO     Chief complaint hyperkalemia     Subjective:   Hannah Boss is a(n) 79 year old female Pt doing well. Reports chronic pain in legs for which she takes oxy . No cp, sob, n/v.     ROS:   No cp, sob   No c/d   No n/v     Objective:   Blood pressure 129/49, pulse 67, temperature 97.9 °F (36.6 °C), temperature source Temporal, resp. rate 17, height 5' 3\" (1.6 m), weight 167 lb 5.3 oz (75.9 kg), SpO2 96%, not currently breastfeeding.      Intake/Output Summary (Last 24 hours) at 2025 0918  Last data filed at 2025 0600  Gross per 24 hour   Intake 550 ml   Output 750 ml   Net -200 ml       Patient Weight(s) for the past 336 hrs:   Weight   25 0600 167 lb 5.3 oz (75.9 kg)   25 1536 173 lb (78.5 kg)   25 0943 173 lb (78.5 kg)           General appearance: alert, appears stated age and cooperative  Pulmonary:  clear to auscultation bilaterally  Cardiovascular: S1, S2 normal, no murmur, click, rub or gallop, regular rate and rhythm  Abdominal: soft, non-tender; bowel sounds normal; no masses,  no organomegaly  Extremities: extremities normal, atraumatic, no cyanosis or edema        Medicines:     Current Facility-Administered Medications   Medication Dose Route Frequency    sodium chloride 0.45% infusion   Intravenous Continuous    sodium ferric gluconate (Ferrlecit) 125 mg in sodium chloride 0.9% 100mL IVPB premix  125 mg Intravenous Daily    acetaminophen (Tylenol Extra Strength) tab 500 mg  500 mg Oral Q4H PRN    metoclopramide (Reglan) 5 mg/mL injection 5 mg  5 mg Intravenous Q8H PRN    sodium zirconium cyclosilicate (Lokelma) oral packet 10 g  10 g Oral Daily    atorvastatin (Lipitor) tab 40 mg  40 mg Oral Nightly    [Held by provider] bumetanide  (Bumex) tab 1 mg  1 mg Oral Daily    apixaban (Eliquis) tab 5 mg  5 mg Oral BID    gabapentin (Neurontin) cap 300 mg  300 mg Oral Nightly    hydrALAZINE (Apresoline) tab 50 mg  50 mg Oral TID    isosorbide mononitrate ER (Imdur) 24 hr tab 60 mg  60 mg Oral Daily    meclizine (Antivert) tab 25 mg  25 mg Oral TID PRN    oxyCODONE-acetaminophen (Percocet)  MG per tab 1 tablet  1 tablet Oral Q4H PRN    pantoprazole (Protonix) DR tab 40 mg  40 mg Oral BID AC    albuterol (Ventolin) (2.5 MG/3ML) 0.083% nebulizer solution 10 mg  10 mg Nebulization Continuous       Narcotic Medications            oxyCODONE-acetaminophen (PERCOCET)  MG Oral Tab                    Blood Pressure and Cardiac Medications            dilTIAZem  MG Oral Capsule SR 24 Hr    hydrALAZINE 50 MG Oral Tab    isosorbide mononitrate ER 60 MG Oral Tablet 24 Hr             sodium chloride 75 mL/hr at 04/05/25 0737    albuterol             Lab Results   Component Value Date    WBC 6.0 04/05/2025    HGB 7.3 (L) 04/05/2025    HCT 22.6 (L) 04/05/2025    .0 04/05/2025    CREATSERUM 3.21 (H) 04/05/2025    BUN 87 (H) 04/05/2025     04/05/2025    K 5.9 (H) 04/05/2025     (H) 04/05/2025    CO2 24.0 04/05/2025     (H) 04/05/2025    CA 8.1 (L) 04/05/2025    ALB 3.9 04/04/2025    ALKPHO 262 (H) 04/04/2025    BILT <0.2 (L) 04/04/2025    TP 6.5 04/04/2025    AST 37 (H) 04/04/2025    ALT 41 04/04/2025    PTT 35.0 03/08/2024    INR 1.30 (H) 03/08/2024    T4F 2.5 (H) 07/03/2024    TSH 0.008 (L) 07/03/2024    LIP 22 03/19/2024    DDIMER 1.13 (H) 06/05/2023    MG 3.0 (H) 04/04/2025    PHOS 5.8 (H) 04/05/2025    TROP 0.398 (HH) 09/13/2021     (H) 07/03/2024    B12 497 06/19/2024    ETOH <3 10/26/2024       No results found.  EKG 12 Lead    Result Date: 4/4/2025  Unusual P axis, possible ectopic atrial bradycardia ST & T wave abnormality, consider anterior ischemia Abnormal ECG When compared with ECG of 14-MAR-2025 14:24,  Non-specific change in ST segment in Anterior leads Confirmed by STEVE RUIZ, MARTINEZ ([id number]) on 4/4/2025 12:43:02 PM     Results:     CBC:    Lab Results   Component Value Date    WBC 6.0 04/05/2025    WBC 6.0 04/04/2025    WBC 5.0 03/16/2025     Lab Results   Component Value Date    HGB 7.3 (L) 04/05/2025    HGB 7.7 (L) 04/04/2025    HGB 8.2 (L) 03/16/2025      Lab Results   Component Value Date    .0 04/05/2025    .0 04/04/2025    .0 03/16/2025       Recent Labs   Lab 04/04/25  0957 04/04/25  1400 04/04/25  1959 04/05/25  0450   * 78  --  115*   BUN 83* 87*  --  87*   CREATSERUM 3.67* 3.49*  --  3.21*   CA 8.2* 8.3*  --  8.1*    141  --  141   K 6.1* 6.1* 5.8* 5.9*   * 115*  --  115*   CO2 22.0 22.0  --  24.0           Assessment and Plan:         ASSESSMENT:    1.       Acute on chronic kidney failure.  Apprec renal consult - dialysis was discussed and pt doesn't want that   Creat improved to 3.2 from 3.4 cont to monitor   Hold bumex   Strict I/o and daily wts     2.       Hyperkalemia improved with meds dextrose, insulin, lokelma, albuterol, calcium, lasix ; pt was supposed to be on lokelma at last dc but wasn't   - cont lokelma   - bmp daily   - renal on consult     3.       Atrial ectopic bradycardia 2/2 cardizem and hyperkalemia   - appre cards   - resolved     4.       Anemia of chronic kidney disease and possible intermittent gastrointestinal blood loss.  - hb stable   - check iron   - h/o avms in jan. No bloody stools per pt     5.       Severe lumbar spinal stenosis with musculoskeletal deconditioning. Restart oxy   6. Moderate aortic stenosis , h/o SVT, h/o HTN, h/o hfpef      Dvt ppx maday Randle, DO         Chart reviewed, including current vitals, notes, labs and imaging  Labs ordered and medications adjusted as outlined above  Coordinate care with care team/consultants  Discussed with patient results of tests, management plan as outlined above,  and the need for ongoing hospitalization  D/w RN     [facility]        4/5/2025     **Certification      PHYSICIAN Certification of Need for Inpatient Hospitalization - Initial Certification    Patient will require inpatient services that will reasonably be expected to span two midnight's based on the clinical documentation in H+P.   Based on patients current state of illness, I anticipate that, after discharge, patient will require TBD.        Supplementary Documentation:   DVT Mechanical Prophylaxis:        DVT Pharmacologic Prophylaxis   Medication    apixaban (Eliquis) tab 5 mg                Code Status: Full Code  Carter: External urinary catheter in place  Carter Duration (in days):   Central line:    MAGDA:         **Certification      PHYSICIAN Certification of Need for Inpatient Hospitalization - Initial Certification    Patient will require inpatient services that will reasonably be expected to span two midnight's based on the clinical documentation in H+P.   Based on patients current state of illness, I anticipate that, after discharge, patient will require TBD.

## 2025-04-05 NOTE — PROGRESS NOTES
[facility]  part of [facility]    Progress Note      Subjective:     Comfortably.  No chest pain nausea or vomiting.  No urinary complaint.  Tolerating oral well      Review of Systems:     Constitutional: negative for fatigue, fevers and weight loss  Eyes: negative for irritation, redness and visual disturbance  Ears, nose, mouth, throat, and face: negative for hearing loss and sore throat  Respiratory: negative for cough, hemoptysis and wheezing  Cardiovascular: negative for chest pain, exertional dyspnea, lower extremity edema   Gastrointestinal: negative for abdominal pain, diarrhea and nausea  Genitourinary:negative for dysuria, frequency and hematuria  Hematologic/lymphatic: negative for bleeding and easy bruising  Musculoskeletal:negative for back pain, bone pain and muscle weakness  Neurological: negative for gait problems, memory problems and seizures  Behavioral/Psych: negative for anxiety and depression    Objective:   Temp:  [97 °F (36.1 °C)-98.2 °F (36.8 °C)] 97.9 °F (36.6 °C)  Pulse:  [35-89] 67  Resp:  [6-24] 17  BP: (111-159)/(38-52) 129/49  SpO2:  [95 %-100 %] 96 %  SpO2: 96 %     Intake/Output Summary (Last 24 hours) at 4/5/2025 0959  Last data filed at 4/5/2025 0600  Gross per 24 hour   Intake 550 ml   Output 750 ml   Net -200 ml     Wt Readings from Last 3 Encounters:   04/05/25 167 lb 5.3 oz (75.9 kg)   03/16/25 166 lb 8 oz (75.5 kg)   01/13/25 179 lb (81.2 kg)     General appearance: alert, appears stated age and cooperative  Head: Normocephalic, atraumatic  Eyes: conjunctivae/corneas clear  Throat: lips, mucosa, and tongue normal; teeth and gums normal  Neck:  no JVD, supple  Extremities: extremities normal, no edema  Skin: No rashes or lesions  Neurologic: Grossly normal  Psychiatric: calm    Medications:  Current Facility-Administered Medications   Medication Dose Route Frequency    sodium chloride 0.45% infusion   Intravenous Continuous    sodium ferric gluconate  (Ferrlecit) 125 mg in sodium chloride 0.9% 100mL IVPB premix  125 mg Intravenous Daily    acetaminophen (Tylenol Extra Strength) tab 500 mg  500 mg Oral Q4H PRN    metoclopramide (Reglan) 5 mg/mL injection 5 mg  5 mg Intravenous Q8H PRN    sodium zirconium cyclosilicate (Lokelma) oral packet 10 g  10 g Oral Daily    atorvastatin (Lipitor) tab 40 mg  40 mg Oral Nightly    [Held by provider] bumetanide (Bumex) tab 1 mg  1 mg Oral Daily    apixaban (Eliquis) tab 5 mg  5 mg Oral BID    gabapentin (Neurontin) cap 300 mg  300 mg Oral Nightly    hydrALAZINE (Apresoline) tab 50 mg  50 mg Oral TID    isosorbide mononitrate ER (Imdur) 24 hr tab 60 mg  60 mg Oral Daily    meclizine (Antivert) tab 25 mg  25 mg Oral TID PRN    oxyCODONE-acetaminophen (Percocet)  MG per tab 1 tablet  1 tablet Oral Q4H PRN    pantoprazole (Protonix) DR tab 40 mg  40 mg Oral BID AC    albuterol (Ventolin) (2.5 MG/3ML) 0.083% nebulizer solution 10 mg  10 mg Nebulization Continuous          Results:     Recent Labs   Lab 04/04/25  0957 04/05/25  0451   RBC 2.86* 2.61*   HGB 7.7* 7.3*   HCT 24.6* 22.6*   MCV 86.0 86.6   NEPRELIM 3.34 4.48   WBC 6.0 6.0   .0 168.0     Recent Labs   Lab 04/04/25  0957 04/04/25  1400 04/04/25  1959 04/05/25  0450   * 78  --  115*   BUN 83* 87*  --  87*   CREATSERUM 3.67* 3.49*  --  3.21*   CA 8.2* 8.3*  --  8.1*   ALB 3.9  --   --   --     141  --  141   K 6.1* 6.1* 5.8* 5.9*   * 115*  --  115*   CO2 22.0 22.0  --  24.0   ALKPHO 262*  --   --   --    AST 37*  --   --   --    ALT 41  --   --   --    BILT <0.2*  --   --   --    TP 6.5  --   --   --      PTT   Date Value Ref Range Status   03/08/2024 35.0 23.3 - 35.6 seconds Final     INR   Date Value Ref Range Status   03/08/2024 1.30 (H) 0.80 - 1.20 Final     Comment:     Only the INR (not the PT value) should be utilized for   the monitoring of oral anticoagulant therapy.     Recommended therapeutic ranges for anticoagulant therapy are    as follows:   2.0 - 3.0 All indications except for mechanical prosthetic   cardiac valves.     2.5 - 3.5 Mechanical prosthetic cardiac valves.       No results for input(s): \"BNP\" in the last 168 hours.  Recent Labs   Lab 04/04/25  0957 04/05/25  0450   MG 3.0*  --    PHOS  --  5.8*     Lab Results   Component Value Date    COLORUR Colorless 04/04/2025    CLARITY Clear 04/04/2025    SPECGRAVITY 1.010 04/04/2025    GLUUR Normal 04/04/2025    BILUR Negative 04/04/2025    KETUR Negative 04/04/2025    BLOODURINE Negative 04/04/2025    PHURINE 5.0 04/04/2025    PROUR Negative 04/04/2025    UROBILINOGEN Normal 04/04/2025    NITRITE Negative 04/04/2025    LEUUR Negative 04/04/2025     Recent Labs   Lab 04/04/25  0957 04/05/25  0450   PHOS  --  5.8*   ALB 3.9  --        No results found.  EKG 12 Lead    Result Date: 4/4/2025  Unusual P axis, possible ectopic atrial bradycardia ST & T wave abnormality, consider anterior ischemia Abnormal ECG When compared with ECG of 14-MAR-2025 14:24, Non-specific change in ST segment in Anterior leads Confirmed by STEVE RUIZ, MARTINEZ ([id number]) on 4/4/2025 12:43:02 PM     Assessment and Plan:     Patient is a 79 year old female with past medical history of CKD stage IV, diabetes, hypertension, Leomyosarcoma s/p surgery and radiation, SVT, left lower extremity DVT and on Eliquis, GI bleed secondary to AVM, recurrent hyperkalemia who was sent in to emergency room for hypotension     1.DICK on CKD stage IV: Nonoliguric  BUN/creatinine in ED 83/3.67 mg/dL-improved today.  Continue to monitor renal recovery  Baseline creatinine 2.3 - 2.6 mg/dL.  Last discharge creatinine 2.4 mg/dL  Patient on bumetanide 1 mg daily dose-will hold for now  Bladder scan at bedside unremarkable  UA in past have been unremarkable  Strict I's and O's and will recheck kidney function tomorrow  Discussed briefly about dialysis-patient states she does not want it  For phosphatemia-low phos diet.  Continue to assess for  binders     2.hyperkalemia: Recurrent issue  Likely related to dietary indiscretion and not taking oral Lokelma  S/p medical management in ED  Repeat potassium remains elevated.  Started on IV fluid for 10 hours and will give a dose of Lasix.  Lokelma as well.  Discharge patient on daily Lokelma     3.bradycardia: Heart rate was in [id number]  Asymptomatic.  Sinus bradycardia  Secondary to hyperkalemia along with diltiazem use.    Cardiology input noted-diltiazem on hold     Anemia: History of GI bleed/suspected AVMs in duodenum s/p push enteroscopy in past   hemoglobin 7.7 mg/dL on admit  Hemoglobin has been at this range for the last many months  Low iron saturation-started on IV iron    Discussed with nursing    Isaias High MD

## 2025-04-05 NOTE — PROGRESS NOTES
[facility]  part of [facility]    Progress Note    Hannah Boss Patient Status:  Inpatient    1945 MRN [id number]   Location [facility] 2W/SW Attending Marivel Randle DO   Hosp Day # 1 PCP Diana Lopez DO         Subjective:   Subjective:  Patient was seen and examined  Awake and alert with no focal deficit  Denied cough or dyspnea or wheezes  No chest pain  No abdominal pain  No vomiting  Objective:   Blood pressure 129/49, pulse 67, temperature 97.9 °F (36.6 °C), temperature source Temporal, resp. rate 17, height 5' 3\" (1.6 m), weight 167 lb 5.3 oz (75.9 kg), SpO2 96%, not currently breastfeeding.  Physical Exam  Vitals and nursing note reviewed.   Constitutional:       General: She is not in acute distress.  HENT:      Head: Atraumatic.      Mouth/Throat:      Mouth: Mucous membranes are moist.   Eyes:      General: No scleral icterus.  Cardiovascular:      Rate and Rhythm: Normal rate.      Heart sounds:      No gallop.   Pulmonary:      Effort: No respiratory distress.      Breath sounds: No stridor. No wheezing, rhonchi or rales.   Chest:      Chest wall: No tenderness.   Abdominal:      General: Abdomen is flat. Bowel sounds are normal.      Palpations: Abdomen is soft.      Tenderness: There is no guarding.   Musculoskeletal:      Cervical back: Normal range of motion.      Right lower leg: No edema.      Left lower leg: No edema.   Skin:     General: Skin is dry.   Neurological:      General: No focal deficit present.      Mental Status: She is oriented to person, place, and time.         Results:   Lab Results   Component Value Date    WBC 6.0 2025    HGB 7.3 (L) 2025    HCT 22.6 (L) 2025    .0 2025    CREATSERUM 3.21 (H) 2025    BUN 87 (H) 2025     2025    K 5.9 (H) 2025     (H) 2025    CO2 24.0 2025     (H) 2025    CA 8.1 (L) 2025    ALB 3.9 2025    ALKPHO  262 (H) 04/04/2025    BILT <0.2 (L) 04/04/2025    TP 6.5 04/04/2025    AST 37 (H) 04/04/2025    ALT 41 04/04/2025    PTT 35.0 03/08/2024    INR 1.30 (H) 03/08/2024    T4F 2.5 (H) 07/03/2024    TSH 0.008 (L) 07/03/2024    LIP 22 03/19/2024    DDIMER 1.13 (H) 06/05/2023    MG 3.0 (H) 04/04/2025    PHOS 5.8 (H) 04/05/2025    TROP 0.398 (HH) 09/13/2021    TROPHS 29 03/10/2025     (H) 07/03/2024    B12 497 06/19/2024    ETOH <3 10/26/2024       No results found.  EKG 12 Lead    Result Date: 4/4/2025  Unusual P axis, possible ectopic atrial bradycardia ST & T wave abnormality, consider anterior ischemia Abnormal ECG When compared with ECG of 14-MAR-2025 14:24, Non-specific change in ST segment in Anterior leads Confirmed by STEVE RUIZ, MARTINEZ ([id number]) on 4/4/2025 12:43:02 PM     Assessment & Plan:       1-acute and chronic stage IV kidney injury with hyperkalemia  K level lower today   I's and O's negative with good urine output  Lokelma  Renal following and patient wants to hold on hemodialysis for now    2-s/p bradycardia secondary to hyperkalemia  Better today /hemodynamic stable and patient in normal sinus rhythm  Continue with hyperkalemia therapy with Lokelma  Continue close monitoring    3-DM, HTN, HL  Insulin and statin and continue BP meds    4-DVT prophylaxis  Patient on Eliquis    5-history of COPD  Chest x-ray clear and currently on room air and stable  Nebs as needed                    Contreras Root MD  4/5/2025

## 2025-04-06 VITALS
RESPIRATION RATE: 14 BRPM | OXYGEN SATURATION: 97 % | TEMPERATURE: 97 F | DIASTOLIC BLOOD PRESSURE: 41 MMHG | HEIGHT: 63 IN | HEART RATE: 78 BPM | WEIGHT: 164.69 LBS | BODY MASS INDEX: 29.18 KG/M2 | SYSTOLIC BLOOD PRESSURE: 135 MMHG

## 2025-04-06 LAB
ANION GAP SERPL CALC-SCNC: 1 MMOL/L (ref 0–18)
BASOPHILS # BLD AUTO: 0.03 X10(3) UL (ref 0–0.2)
BASOPHILS NFR BLD AUTO: 0.7 %
BUN BLD-MCNC: 79 MG/DL (ref 9–23)
BUN/CREAT SERPL: 28.3 (ref 10–20)
CALCIUM BLD-MCNC: 8 MG/DL (ref 8.7–10.4)
CHLORIDE SERPL-SCNC: 115 MMOL/L (ref 98–112)
CO2 SERPL-SCNC: 25 MMOL/L (ref 21–32)
CREAT BLD-MCNC: 2.79 MG/DL
DEPRECATED RDW RBC AUTO: 50.6 FL (ref 35.1–46.3)
EGFRCR SERPLBLD CKD-EPI 2021: 17 ML/MIN/1.73M2 (ref 60–?)
EOSINOPHIL # BLD AUTO: 0.11 X10(3) UL (ref 0–0.7)
EOSINOPHIL NFR BLD AUTO: 2.6 %
ERYTHROCYTE [DISTWIDTH] IN BLOOD BY AUTOMATED COUNT: 16.2 % (ref 11–15)
GLUCOSE BLD-MCNC: 93 MG/DL (ref 70–99)
GLUCOSE BLDC GLUCOMTR-MCNC: 110 MG/DL (ref 70–99)
HCT VFR BLD AUTO: 24.3 %
HGB BLD-MCNC: 7.8 G/DL
IMM GRANULOCYTES # BLD AUTO: 0.01 X10(3) UL (ref 0–1)
IMM GRANULOCYTES NFR BLD: 0.2 %
LYMPHOCYTES # BLD AUTO: 0.96 X10(3) UL (ref 1–4)
LYMPHOCYTES NFR BLD AUTO: 23 %
MCH RBC QN AUTO: 27.1 PG (ref 26–34)
MCHC RBC AUTO-ENTMCNC: 32.1 G/DL (ref 31–37)
MCV RBC AUTO: 84.4 FL
MONOCYTES # BLD AUTO: 0.39 X10(3) UL (ref 0.1–1)
MONOCYTES NFR BLD AUTO: 9.3 %
NEUTROPHILS # BLD AUTO: 2.68 X10 (3) UL (ref 1.5–7.7)
NEUTROPHILS # BLD AUTO: 2.68 X10(3) UL (ref 1.5–7.7)
NEUTROPHILS NFR BLD AUTO: 64.2 %
OSMOLALITY SERPL CALC.SUM OF ELEC: 315 MOSM/KG (ref 275–295)
PLATELET # BLD AUTO: 180 10(3)UL (ref 150–450)
POTASSIUM SERPL-SCNC: 5.5 MMOL/L (ref 3.5–5.1)
RBC # BLD AUTO: 2.88 X10(6)UL
SODIUM SERPL-SCNC: 141 MMOL/L (ref 136–145)
WBC # BLD AUTO: 4.2 X10(3) UL (ref 4–11)

## 2025-04-06 PROCEDURE — 99233 SBSQ HOSP IP/OBS HIGH 50: CPT | Performed by: INTERNAL MEDICINE

## 2025-04-06 PROCEDURE — 99239 HOSP IP/OBS DSCHRG MGMT >30: CPT | Performed by: HOSPITALIST

## 2025-04-06 RX ORDER — FERROUS SULFATE 325(65) MG
325 TABLET, DELAYED RELEASE (ENTERIC COATED) ORAL
Status: SHIPPED | COMMUNITY
Start: 2025-04-08

## 2025-04-06 NOTE — PROGRESS NOTES
[facility]  part of [facility]    Progress Note    Hannah Boss Patient Status:  Inpatient    1945 MRN [id number]   Location [facility] 2W/SW Attending Marivel Randle DO   Hosp Day # 2 PCP Diana Lopez DO     Chief complaint hyperkalemia     Subjective:   Hannah Boss is a(n) 79 year old female Pt doing well. No c/o's     ROS:   No cp, sob   No c/d   No n/v     Objective:   Blood pressure 135/41, pulse 78, temperature 97.4 °F (36.3 °C), temperature source Temporal, resp. rate 14, height 5' 3\" (1.6 m), weight 164 lb 10.9 oz (74.7 kg), SpO2 97%, not currently breastfeeding.      Intake/Output Summary (Last 24 hours) at 2025 0901  Last data filed at 2025 2200  Gross per 24 hour   Intake 1178.75 ml   Output 350 ml   Net 828.75 ml       Patient Weight(s) for the past 336 hrs:   Weight   25 0600 164 lb 10.9 oz (74.7 kg)   25 0600 167 lb 5.3 oz (75.9 kg)   25 1536 173 lb (78.5 kg)   25 0943 173 lb (78.5 kg)           General appearance: alert, appears stated age and cooperative  Pulmonary:  clear to auscultation bilaterally  Cardiovascular: S1, S2 normal, no murmur, click, rub or gallop, regular rate and rhythm  Abdominal: soft, non-tender; bowel sounds normal; no masses,  no organomegaly  Extremities: trace edema b/l, tender to touch b/l         Medicines:     Current Facility-Administered Medications   Medication Dose Route Frequency    sodium ferric gluconate (Ferrlecit) 125 mg in sodium chloride 0.9% 100mL IVPB premix  125 mg Intravenous Daily    ipratropium-albuterol (Duoneb) 0.5-2.5 (3) MG/3ML inhalation solution 3 mL  3 mL Nebulization Q6H PRN    acetaminophen (Tylenol Extra Strength) tab 500 mg  500 mg Oral Q4H PRN    metoclopramide (Reglan) 5 mg/mL injection 5 mg  5 mg Intravenous Q8H PRN    sodium zirconium cyclosilicate (Lokelma) oral packet 10 g  10 g Oral Daily    atorvastatin (Lipitor) tab 40 mg  40 mg Oral Nightly    [Held by  provider] bumetanide (Bumex) tab 1 mg  1 mg Oral Daily    apixaban (Eliquis) tab 5 mg  5 mg Oral BID    gabapentin (Neurontin) cap 300 mg  300 mg Oral Nightly    hydrALAZINE (Apresoline) tab 50 mg  50 mg Oral TID    isosorbide mononitrate ER (Imdur) 24 hr tab 60 mg  60 mg Oral Daily    meclizine (Antivert) tab 25 mg  25 mg Oral TID PRN    oxyCODONE-acetaminophen (Percocet)  MG per tab 1 tablet  1 tablet Oral Q4H PRN    pantoprazole (Protonix) DR tab 40 mg  40 mg Oral BID AC    albuterol (Ventolin) (2.5 MG/3ML) 0.083% nebulizer solution 10 mg  10 mg Nebulization Continuous       Narcotic Medications            oxyCODONE-acetaminophen (PERCOCET)  MG Oral Tab                    Blood Pressure and Cardiac Medications            dilTIAZem  MG Oral Capsule SR 24 Hr    hydrALAZINE 50 MG Oral Tab    isosorbide mononitrate ER 60 MG Oral Tablet 24 Hr             albuterol             Lab Results   Component Value Date    WBC 4.2 04/06/2025    HGB 7.8 (L) 04/06/2025    HCT 24.3 (L) 04/06/2025    .0 04/06/2025    CREATSERUM 2.79 (H) 04/06/2025    BUN 79 (H) 04/06/2025     04/06/2025    K 5.5 (H) 04/06/2025     (H) 04/06/2025    CO2 25.0 04/06/2025    GLU 93 04/06/2025    CA 8.0 (L) 04/06/2025    ALB 3.9 04/04/2025    ALKPHO 262 (H) 04/04/2025    BILT <0.2 (L) 04/04/2025    TP 6.5 04/04/2025    AST 37 (H) 04/04/2025    ALT 41 04/04/2025    PTT 35.0 03/08/2024    INR 1.30 (H) 03/08/2024    T4F 2.5 (H) 07/03/2024    TSH 0.008 (L) 07/03/2024    LIP 22 03/19/2024    DDIMER 1.13 (H) 06/05/2023    MG 3.0 (H) 04/04/2025    PHOS 5.8 (H) 04/05/2025    TROP 0.398 (HH) 09/13/2021     (H) 07/03/2024    B12 497 06/19/2024    ETOH <3 10/26/2024       No results found.  EKG 12 Lead    Result Date: 4/4/2025  Unusual P axis, possible ectopic atrial bradycardia ST & T wave abnormality, consider anterior ischemia Abnormal ECG When compared with ECG of 14-MAR-2025 14:24, Non-specific change in ST segment  in Anterior leads Confirmed by STEVE RUIZ, MARTINEZ ([id number]) on 4/4/2025 12:43:02 PM     Results:     CBC:    Lab Results   Component Value Date    WBC 4.2 04/06/2025    WBC 6.0 04/05/2025    WBC 6.0 04/04/2025     Lab Results   Component Value Date    HGB 7.8 (L) 04/06/2025    HGB 7.3 (L) 04/05/2025    HGB 7.7 (L) 04/04/2025      Lab Results   Component Value Date    .0 04/06/2025    .0 04/05/2025    .0 04/04/2025       Recent Labs   Lab 04/04/25  1400 04/04/25  1959 04/05/25  0450 04/06/25  0436   GLU 78  --  115* 93   BUN 87*  --  87* 79*   CREATSERUM 3.49*  --  3.21* 2.79*   CA 8.3*  --  8.1* 8.0*     --  141 141   K 6.1* 5.8* 5.9* 5.5*   *  --  115* 115*   CO2 22.0  --  24.0 25.0           Assessment and Plan:         ASSESSMENT:    1.       Acute on chronic kidney failure.  Apprec renal consult - dialysis was discussed and pt doesn't want that   Creat improved again today   Hold bumex   Strict I/o and daily wts     2.       Hyperkalemia improved with dextrose, insulin, lokelma, albuterol, calcium, lasix ; pt was supposed to be on lokelma at last dc but wasn't   - cont lokelma   - bmp daily   - renal on consult   - 5.9 today    3.       Atrial ectopic bradycardia 2/2 cardizem and hyperkalemia   - appre cards   - resolved     4.       Anemia of chronic kidney disease and possible intermittent gastrointestinal blood loss.  - hb stable  ; iv iron   - h/o avms in jan. No bloody stools per pt     5.       Severe lumbar spinal stenosis with musculoskeletal deconditioning. Restart oxy   6. Moderate aortic stenosis , h/o SVT, h/o HTN, h/o hfpef      Dvt ppx maday Randle, DO         Chart reviewed, including current vitals, notes, labs and imaging  Labs ordered and medications adjusted as outlined above  Coordinate care with care team/consultants  Discussed with patient results of tests, management plan as outlined above, and the need for ongoing hospitalization  D/w RN      [facility] high        4/5/2025     **Certification      PHYSICIAN Certification of Need for Inpatient Hospitalization - Initial Certification    Patient will require inpatient services that will reasonably be expected to span two midnight's based on the clinical documentation in H+P.   Based on patients current state of illness, I anticipate that, after discharge, patient will require TBD.        Supplementary Documentation:   DVT Mechanical Prophylaxis:        DVT Pharmacologic Prophylaxis   Medication    apixaban (Eliquis) tab 5 mg                Code Status: Full Code  Carter: External urinary catheter in place  Carter Duration (in days):   Central line:    MAGDA:         **Certification      PHYSICIAN Certification of Need for Inpatient Hospitalization - Initial Certification    Patient will require inpatient services that will reasonably be expected to span two midnight's based on the clinical documentation in H+P.   Based on patients current state of illness, I anticipate that, after discharge, patient will require TBD.

## 2025-04-06 NOTE — PLAN OF CARE
Problem: Diabetes/Glucose Control  Goal: Glucose maintained within prescribed range  Description: INTERVENTIONS:- Monitor Blood Glucose as ordered- Assess for signs and symptoms of hyperglycemia and hypoglycemia- Administer ordered medications to maintain glucose within target range- Assess barriers to adequate nutritional intake and initiate nutrition consult as needed- Instruct patient on self management of diabetes  INTERVENTIONS:- Monitor Blood Glucose as ordered- Assess for signs and symptoms of hyperglycemia and hypoglycemia- Administer ordered medications to maintain glucose within target range- Assess barriers to adequate nutritional intake and initiate nutrition consult as needed- Instruct patient on self management of diabetes  Outcome: Progressing     Problem: CARDIOVASCULAR - ADULT  Goal: Maintains optimal cardiac output and hemodynamic stability  Description: INTERVENTIONS:- Monitor vital signs, rhythm, and trends- Monitor for bleeding, hypotension and signs of decreased cardiac output- Evaluate effectiveness of vasoactive medications to optimize hemodynamic stability- Monitor arterial and/or venous puncture sites for bleeding and/or hematoma- Assess quality of pulses, skin color and temperature- Assess for signs of decreased coronary artery perfusion - ex. Angina- Evaluate fluid balance, assess for edema, trend weights  Outcome: Progressing     Problem: METABOLIC/FLUID AND ELECTROLYTES - ADULT  Goal: Glucose maintained within prescribed range  Description: INTERVENTIONS:- Monitor Blood Glucose as ordered- Assess for signs and symptoms of hyperglycemia and hypoglycemia- Administer ordered medications to maintain glucose within target range- Assess barriers to adequate nutritional intake and initiate nutrition consult as needed- Instruct patient on self management of diabetes  INTERVENTIONS:- Monitor Blood Glucose as ordered- Assess for signs and symptoms of hyperglycemia and hypoglycemia- Administer  ordered medications to maintain glucose within target range- Assess barriers to adequate nutritional intake and initiate nutrition consult as needed- Instruct patient on self management of diabetes  Outcome: Progressing

## 2025-04-06 NOTE — PROGRESS NOTES
[facility]  part of [facility]    Progress Note    Hannah Boss Patient Status:  Inpatient    1945 MRN [id number]   Location [facility] 2W/SW Attending Marivel Randle DO   Hosp Day # 2 PCP Diana Lopez DO       Subjective:     Constitutional:  Negative for fever.   Respiratory: Negative.     Gastrointestinal: Negative.    Neurological: Negative.    Psychiatric/Behavioral: Negative.       Comfortable on room air  Fair appetite with no abdominal pain  Denied chest pain or dyspnea or cough or fever  Objective:   Blood pressure 135/41, pulse 78, temperature 97.4 °F (36.3 °C), temperature source Temporal, resp. rate 14, height 5' 3\" (1.6 m), weight 164 lb 10.9 oz (74.7 kg), SpO2 97%, not currently breastfeeding.  Physical Exam  Constitutional:       General: She is not in acute distress.     Appearance: Normal appearance.   HENT:      Head: Atraumatic.      Mouth/Throat:      Mouth: Mucous membranes are moist.   Eyes:      General: No scleral icterus.  Cardiovascular:      Rate and Rhythm: Normal rate.      Heart sounds:      No gallop.   Pulmonary:      Effort: No respiratory distress.      Breath sounds: No stridor. No wheezing, rhonchi or rales.   Chest:      Chest wall: No tenderness.   Abdominal:      General: Abdomen is flat. Bowel sounds are normal.      Palpations: Abdomen is soft.      Tenderness: There is no guarding or rebound.   Musculoskeletal:      Right lower leg: No edema.      Left lower leg: No edema.   Lymphadenopathy:      Cervical: No cervical adenopathy.   Skin:     General: Skin is dry.   Neurological:      General: No focal deficit present.      Mental Status: She is oriented to person, place, and time.         Results:   Lab Results   Component Value Date    WBC 4.2 2025    HGB 7.8 (L) 2025    HCT 24.3 (L) 2025    .0 2025    CREATSERUM 2.79 (H) 2025    BUN 79 (H) 2025     2025    K 5.5 (H)  04/06/2025     (H) 04/06/2025    CO2 25.0 04/06/2025    GLU 93 04/06/2025    CA 8.0 (L) 04/06/2025    ALB 3.9 04/04/2025    ALKPHO 262 (H) 04/04/2025    BILT <0.2 (L) 04/04/2025    TP 6.5 04/04/2025    AST 37 (H) 04/04/2025    ALT 41 04/04/2025    PTT 35.0 03/08/2024    INR 1.30 (H) 03/08/2024    T4F 2.5 (H) 07/03/2024    TSH 0.008 (L) 07/03/2024    LIP 22 03/19/2024    DDIMER 1.13 (H) 06/05/2023    MG 3.0 (H) 04/04/2025    PHOS 5.8 (H) 04/05/2025    TROP 0.398 (HH) 09/13/2021    TROPHS 29 03/10/2025     (H) 07/03/2024    B12 497 06/19/2024    ETOH <3 10/26/2024           Assessment & Plan:      1-acute and chronic stage IV kidney injury with hyperkalemia  K level lower today   Nonoliguric with fair urine output  Lokelma  Renal function downtrending  Renal following and patient declined dialysis     2-s/p bradycardia secondary to hyperkalemia  Resolved   Now hemodynamic stable and patient in normal sinus rhythm    Continue  monitoring     3-DM, HTN, HL  Insulin and statin and continue BP meds     4-DVT prophylaxis  Patient on Eliquis     5-history of COPD  Chest x-ray clear and currently on room air and stable  Nebs as needed                     Contreras Root MD  4/6/2025

## 2025-04-06 NOTE — PLAN OF CARE
Problem: Patient Centered Care  Goal: Patient preferences are identified and integrated in the patient's plan of care  Description: Interventions:- What would you like us to know as we care for you? - Provide timely, complete, and accurate information to patient/family- Incorporate patient and family knowledge, values, beliefs, and cultural backgrounds into the planning and delivery of care- Encourage patient/family to participate in care and decision-making at the level they choose- Honor patient and family perspectives and choices  Outcome: Adequate for Discharge     Problem: Diabetes/Glucose Control  Goal: Glucose maintained within prescribed range  Description: INTERVENTIONS:- Monitor Blood Glucose as ordered- Assess for signs and symptoms of hyperglycemia and hypoglycemia- Administer ordered medications to maintain glucose within target range- Assess barriers to adequate nutritional intake and initiate nutrition consult as needed- Instruct patient on self management of diabetes  INTERVENTIONS:- Monitor Blood Glucose as ordered- Assess for signs and symptoms of hyperglycemia and hypoglycemia- Administer ordered medications to maintain glucose within target range- Assess barriers to adequate nutritional intake and initiate nutrition consult as needed- Instruct patient on self management of diabetes  4/6/2025 1335 by Blessing Guillen, RN  Outcome: Adequate for Discharge  4/6/2025 1011 by Blessing Guillen, RN  Outcome: Progressing     Problem: Patient/Family Goals  Goal: Patient/Family Long Term Goal  Description: Patient's Long Term Goal: Interventions:- - See additional Care Plan goals for specific interventions  Outcome: Adequate for Discharge  Goal: Patient/Family Short Term Goal  Description: Patient's Short Term Goal: Interventions: - - See additional Care Plan goals for specific intervention  Outcome: Adequate for Discharge     Problem: CARDIOVASCULAR - ADULT  Goal: Maintains optimal cardiac output and  hemodynamic stability  Description: INTERVENTIONS:- Monitor vital signs, rhythm, and trends- Monitor for bleeding, hypotension and signs of decreased cardiac output- Evaluate effectiveness of vasoactive medications to optimize hemodynamic stability- Monitor arterial and/or venous puncture sites for bleeding and/or hematoma- Assess quality of pulses, skin color and temperature- Assess for signs of decreased coronary artery perfusion - ex. Angina- Evaluate fluid balance, assess for edema, trend weights  4/6/2025 1335 by Blessing Guillen RN  Outcome: Adequate for Discharge  4/6/2025 1011 by Blessing Guillen RN  Outcome: Progressing  Goal: Absence of cardiac arrhythmias or at baseline  Description: INTERVENTIONS:- Continuous cardiac monitoring, monitor vital signs, obtain 12 lead EKG if indicated- Evaluate effectiveness of antiarrhythmic and heart rate control medications as ordered- Initiate emergency measures for life threatening arrhythmias- Monitor electrolytes and administer replacement therapy as ordered  Outcome: Adequate for Discharge     Problem: METABOLIC/FLUID AND ELECTROLYTES - ADULT  Goal: Glucose maintained within prescribed range  Description: INTERVENTIONS:- Monitor Blood Glucose as ordered- Assess for signs and symptoms of hyperglycemia and hypoglycemia- Administer ordered medications to maintain glucose within target range- Assess barriers to adequate nutritional intake and initiate nutrition consult as needed- Instruct patient on self management of diabetes  INTERVENTIONS:- Monitor Blood Glucose as ordered- Assess for signs and symptoms of hyperglycemia and hypoglycemia- Administer ordered medications to maintain glucose within target range- Assess barriers to adequate nutritional intake and initiate nutrition consult as needed- Instruct patient on self management of diabetes  4/6/2025 1335 by Blessing Guillen RN  Outcome: Adequate for Discharge  4/6/2025 1011 by Blessing Guillen RN  Outcome:  Progressing  Goal: Electrolytes maintained within normal limits  Description: INTERVENTIONS:- Monitor labs and rhythm and assess patient for signs and symptoms of electrolyte imbalances- Administer electrolyte replacement as ordered- Monitor response to electrolyte replacements, including rhythm and repeat lab results as appropriate- Fluid restriction as ordered- Instruct patient on fluid and nutrition restrictions as appropriate  Outcome: Adequate for Discharge  Goal: Hemodynamic stability and optimal renal function maintained  Description: INTERVENTIONS:- Monitor labs and assess for signs and symptoms of volume excess or deficit- Monitor intake, output and patient weight- Monitor urine specific gravity, serum osmolarity and serum sodium as indicated or ordered- Monitor response to interventions for patient's volume status, including labs, urine output, blood pressure (other measures as available)- Encourage oral intake as appropriate- Instruct patient on fluid and nutrition restrictions as appropriate  Outcome: Adequate for Discharge     Problem: SKIN/TISSUE INTEGRITY - ADULT  Goal: Skin integrity remains intact  Description: INTERVENTIONS- Assess and document risk factors for pressure ulcer development- Assess and document skin integrity- Monitor for areas of redness and/or skin breakdown- Initiate interventions, skin care algorithm/standards of care as needed  Outcome: Adequate for Discharge

## 2025-04-06 NOTE — PLAN OF CARE
Patient alert and orientated x4, follows commands, calls appropriately. Up to bathroom as needed. Wearing outpatient holter monitor. Pain managed with as needed pain medication    Problem: Diabetes/Glucose Control  Goal: Glucose maintained within prescribed range  Description: INTERVENTIONS:- Monitor Blood Glucose as ordered- Assess for signs and symptoms of hyperglycemia and hypoglycemia- Administer ordered medications to maintain glucose within target range- Assess barriers to adequate nutritional intake and initiate nutrition consult as needed- Instruct patient on self management of diabetes  INTERVENTIONS:- Monitor Blood Glucose as ordered- Assess for signs and symptoms of hyperglycemia and hypoglycemia- Administer ordered medications to maintain glucose within target range- Assess barriers to adequate nutritional intake and initiate nutrition consult as needed- Instruct patient on self management of diabetes  Outcome: Progressing     Problem: CARDIOVASCULAR - ADULT  Goal: Maintains optimal cardiac output and hemodynamic stability  Description: INTERVENTIONS:- Monitor vital signs, rhythm, and trends- Monitor for bleeding, hypotension and signs of decreased cardiac output- Evaluate effectiveness of vasoactive medications to optimize hemodynamic stability- Monitor arterial and/or venous puncture sites for bleeding and/or hematoma- Assess quality of pulses, skin color and temperature- Assess for signs of decreased coronary artery perfusion - ex. Angina- Evaluate fluid balance, assess for edema, trend weights  Outcome: Progressing  Goal: Absence of cardiac arrhythmias or at baseline  Description: INTERVENTIONS:- Continuous cardiac monitoring, monitor vital signs, obtain 12 lead EKG if indicated- Evaluate effectiveness of antiarrhythmic and heart rate control medications as ordered- Initiate emergency measures for life threatening arrhythmias- Monitor electrolytes and administer replacement therapy as  ordered  Outcome: Progressing     Problem: METABOLIC/FLUID AND ELECTROLYTES - ADULT  Goal: Glucose maintained within prescribed range  Description: INTERVENTIONS:- Monitor Blood Glucose as ordered- Assess for signs and symptoms of hyperglycemia and hypoglycemia- Administer ordered medications to maintain glucose within target range- Assess barriers to adequate nutritional intake and initiate nutrition consult as needed- Instruct patient on self management of diabetes  INTERVENTIONS:- Monitor Blood Glucose as ordered- Assess for signs and symptoms of hyperglycemia and hypoglycemia- Administer ordered medications to maintain glucose within target range- Assess barriers to adequate nutritional intake and initiate nutrition consult as needed- Instruct patient on self management of diabetes  Outcome: Progressing  Goal: Electrolytes maintained within normal limits  Description: INTERVENTIONS:- Monitor labs and rhythm and assess patient for signs and symptoms of electrolyte imbalances- Administer electrolyte replacement as ordered- Monitor response to electrolyte replacements, including rhythm and repeat lab results as appropriate- Fluid restriction as ordered- Instruct patient on fluid and nutrition restrictions as appropriate  Outcome: Progressing     Problem: SKIN/TISSUE INTEGRITY - ADULT  Goal: Skin integrity remains intact  Description: INTERVENTIONS- Assess and document risk factors for pressure ulcer development- Assess and document skin integrity- Monitor for areas of redness and/or skin breakdown- Initiate interventions, skin care algorithm/standards of care as needed  Outcome: Progressing

## 2025-04-06 NOTE — PROGRESS NOTES
[facility]  part of [facility]    Progress Note      Subjective:       Patient lying comfortably.  No chest pain or shortness of breath.  Oral intake good    Review of Systems:     Constitutional: negative for fatigue, fevers and weight loss  Eyes: negative for irritation, redness and visual disturbance  Ears, nose, mouth, throat, and face: negative for hearing loss and sore throat  Respiratory: negative for cough, hemoptysis and wheezing  Cardiovascular: negative for chest pain, exertional dyspnea, lower extremity edema   Gastrointestinal: negative for abdominal pain, diarrhea and nausea  Genitourinary:negative for dysuria, frequency and hematuria  Hematologic/lymphatic: negative for bleeding and easy bruising  Musculoskeletal:negative for back pain, bone pain and muscle weakness  Neurological: negative for gait problems, memory problems and seizures  Behavioral/Psych: negative for anxiety and depression    Objective:   Temp:  [97.4 °F (36.3 °C)-98.8 °F (37.1 °C)] 97.4 °F (36.3 °C)  Pulse:  [69-85] 78  Resp:  [14-20] 14  BP: (101-170)/(38-86) 135/41  SpO2:  [95 %-99 %] 97 %  SpO2: 97 %     Intake/Output Summary (Last 24 hours) at 4/6/2025 1031  Last data filed at 4/6/2025 1000  Gross per 24 hour   Intake 1178.75 ml   Output 350 ml   Net 828.75 ml     Wt Readings from Last 3 Encounters:   04/06/25 164 lb 10.9 oz (74.7 kg)   03/16/25 166 lb 8 oz (75.5 kg)   01/13/25 179 lb (81.2 kg)     General appearance: alert, appears stated age and cooperative  Head: Normocephalic, atraumatic  Eyes: conjunctivae/corneas clear  Throat: lips, mucosa, and tongue normal; teeth and gums normal  Neck:  no JVD, supple  Extremities: extremities normal, no edema  Skin: No rashes or lesions  Neurologic: Grossly normal  Psychiatric: calm    Medications:  Current Facility-Administered Medications   Medication Dose Route Frequency    sodium ferric gluconate (Ferrlecit) 125 mg in sodium chloride 0.9% 100mL IVPB premix   125 mg Intravenous Daily    ipratropium-albuterol (Duoneb) 0.5-2.5 (3) MG/3ML inhalation solution 3 mL  3 mL Nebulization Q6H PRN    acetaminophen (Tylenol Extra Strength) tab 500 mg  500 mg Oral Q4H PRN    metoclopramide (Reglan) 5 mg/mL injection 5 mg  5 mg Intravenous Q8H PRN    sodium zirconium cyclosilicate (Lokelma) oral packet 10 g  10 g Oral Daily    atorvastatin (Lipitor) tab 40 mg  40 mg Oral Nightly    [Held by provider] bumetanide (Bumex) tab 1 mg  1 mg Oral Daily    apixaban (Eliquis) tab 5 mg  5 mg Oral BID    gabapentin (Neurontin) cap 300 mg  300 mg Oral Nightly    hydrALAZINE (Apresoline) tab 50 mg  50 mg Oral TID    isosorbide mononitrate ER (Imdur) 24 hr tab 60 mg  60 mg Oral Daily    meclizine (Antivert) tab 25 mg  25 mg Oral TID PRN    oxyCODONE-acetaminophen (Percocet)  MG per tab 1 tablet  1 tablet Oral Q4H PRN    pantoprazole (Protonix) DR tab 40 mg  40 mg Oral BID AC    albuterol (Ventolin) (2.5 MG/3ML) 0.083% nebulizer solution 10 mg  10 mg Nebulization Continuous          Results:     Recent Labs   Lab 04/04/25  0957 04/05/25  0451 04/06/25  0436   RBC 2.86* 2.61* 2.88*   HGB 7.7* 7.3* 7.8*   HCT 24.6* 22.6* 24.3*   MCV 86.0 86.6 84.4   NEPRELIM 3.34 4.48 2.68   WBC 6.0 6.0 4.2   .0 168.0 180.0     Recent Labs   Lab 04/04/25  0957 04/04/25  1400 04/04/25  1959 04/05/25  0450 04/06/25  0436   * 78  --  115* 93   BUN 83* 87*  --  87* 79*   CREATSERUM 3.67* 3.49*  --  3.21* 2.79*   CA 8.2* 8.3*  --  8.1* 8.0*   ALB 3.9  --   --   --   --     141  --  141 141   K 6.1* 6.1* 5.8* 5.9* 5.5*   * 115*  --  115* 115*   CO2 22.0 22.0  --  24.0 25.0   ALKPHO 262*  --   --   --   --    AST 37*  --   --   --   --    ALT 41  --   --   --   --    BILT <0.2*  --   --   --   --    TP 6.5  --   --   --   --      PTT   Date Value Ref Range Status   03/08/2024 35.0 23.3 - 35.6 seconds Final     INR   Date Value Ref Range Status   03/08/2024 1.30 (H) 0.80 - 1.20 Final      Comment:     Only the INR (not the PT value) should be utilized for   the monitoring of oral anticoagulant therapy.     Recommended therapeutic ranges for anticoagulant therapy are   as follows:   2.0 - 3.0 All indications except for mechanical prosthetic   cardiac valves.     2.5 - 3.5 Mechanical prosthetic cardiac valves.       No results for input(s): \"BNP\" in the last 168 hours.  Recent Labs   Lab 04/04/25  0957 04/05/25  0450   MG 3.0*  --    PHOS  --  5.8*          Recent Labs   Lab 04/04/25  0957 04/05/25  0450   PHOS  --  5.8*   ALB 3.9  --        No results found.        Assessment and Plan:     Patient is a 79 year old female with past medical history of CKD stage IV, diabetes, hypertension, Leomyosarcoma s/p surgery and radiation, SVT, left lower extremity DVT and on Eliquis, GI bleed secondary to AVM, recurrent hyperkalemia who was sent in to emergency room for hypotension     1.DICK on CKD stage IV: Nonoliguric  BUN/creatinine in ED 83/3.67 mg/dL-improved creatinine at 2.79 today.  Getting close to baseline  Baseline creatinine 2.3 - 2.6 mg/dL.  Last discharge creatinine 2.4 mg/dL  Patient on bumetanide 1 mg daily dose-will hold for now  Bladder scan at bedside unremarkable  UA in past have been unremarkable  Strict I's and O's  Discussed briefly about dialysis-patient states she does not want it  hyperphosphatemia-low phos diet.  Continue to assess for binders     2.hyperkalemia: Recurrent issue  Likely related to dietary indiscretion and not taking oral Lokelma  S/p medical management in ED  Repeat potassium 5.5.  1 dose of Lokelma today.  Patient needs to go at home on daily Lokelma     3.bradycardia: Heart rate was in [id number]  Asymptomatic.  Sinus bradycardia  Secondary to hyperkalemia along with diltiazem use.    Cardiology input noted-diltiazem on hold.  Defer starting patient back on diltiazem     Anemia: History of GI bleed/suspected AVMs in duodenum s/p push enteroscopy in past    hemoglobin 7.7 mg/dL on admit  Hemoglobin has been at this range for the last many months  Low iron saturation-started on IV iron    Discussed with nursing.  Will discuss with Dr. Jer High MD

## 2025-04-08 NOTE — PAYOR COMM NOTE
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 (Room air) -- AL    04/05/25 2200 -- 85 17 116/38 97 % -- None (Room air) -- AL    04/05/25 2000 98.6 °F (37 °C) 77 15 133/46 -- -- None (Room air) -- AL    04/05/25 1800 -- 81 16 135/45 -- -- -- -- AO    04/05/25 1700 98 °F (36.7 °C) 75 20 170/61 99 % -- None (Room air) -- AO    04/05/25 1400 -- 71 19 144/53 97 % -- None (Room air) -- AO    04/05/25 1200 98 °F (36.7 °C) 69 17 129/46 99 % -- None (Room air) -- AO    04/05/25 1000 -- 81 17 125/46 99 % -- None (Room air) -- AO    04/05/25 0800 98 °F (36.7 °C) 67 17 129/49 96 % -- None (Room air) -- AO    04/05/25 0600 -- 70 6 120/44 96 % 167 lb 5.3 oz (75.9 kg) None (Room air) -- AOA    04/05/25 0400 97.9 °F (36.6 °C) 75 20 132/44 95 % -- None (Room air) -- AOA    04/05/25 0200 -- 68 20 122/44 97 % -- None (Room air) -- AOA    04/05/25 0000 98.2 °F (36.8 °C) 69 18 116/41 99 % -- None (Room air) -- AOA             --------------  DISCHARGE REVIEW    Payor: LAURE[employer] ARTEAG[employer] [facility] [place], [place]  Subscriber #:  [id number]  Authorization Number: [id number]    Admit date: 4/4/25  Admit time:  12:24 PM  Discharge Date: 4/6/2025  2:52 PM     Admitting Physician: Erich Pascual MD  Attending Physician:  No att. providers found  Primary Care Physician: Diana Lopez DO    REVIEWER COMMENTS

## 2025-04-08 NOTE — DISCHARGE SUMMARY
[facility]  part of [facility]    Discharge Summary    Hannah Boss Patient Status:  Inpatient    1945 MRN [id number]   Location [facility] 2W/SW Attending No att. providers found   Hosp Day # 2 PCP Diana Lopez DO     Date of Admission: 2025 Disposition: Home or Self Care     Date of Discharge: 25    Admitting Diagnosis: Hyperkalemia [E87.5]  Bradycardia [R00.1]  DICK (acute kidney injury) [N17.9]  Carboxyhemoglobinemia, accidental or unintentional, initial encounter [T58.91XA]    Hospital Discharge Diagnoses:  as above     Hospital Discharge Diagnoses:  as above     Lace+ Score: 78  59-90 High Risk  29-58 Medium Risk  0-28   Low Risk.    TCM Follow-Up Recommendation:  LACE > 58: High Risk of readmission after discharge from the hospital.          Lace+ Score: 78  59-90 High Risk  29-58 Medium Risk  0-28   Low Risk    Risk of readmission: Hannah Boss has High Risk of readmission after discharge from the hospital.    Problem List:   Patient Active Problem List   Diagnosis    Back pain    L5-S1 left paracentral mild HNP, L4-5 right lateral recess & foraminal mod HNP, L3-4 right paracentral mild HNP    L5-S1 left mod foraminal, L3-4 bilateral mod foraminal, L2-3 mild-mod diffuse, L1-2 right mild foraminal bulging discs    L5-S1 right mod/left severe, L4-5 right mod-severe/left mild, L3-4 right mild-mod/left mod foraminal stenosis    Spinal stenosis of lumbar region at multiple levels    Degenerative arthritis    Leg weakness    Neurologic gait dysfunction    Cancer related pain    Acute on chronic heart failure with preserved ejection fraction (HFpEF) (HCC)    Pulmonary hypertension (HCC)    Stage 3 chronic kidney disease (HCC)    Acute renal failure    Bilateral leg edema    Acute on chronic congestive heart failure, unspecified heart failure type (HCC)    Uncontrolled hypertension    Benign hypertension    Tobacco use disorder    Hyperkalemia    Elevated troponin     Transaminitis    Acute on chronic renal insufficiency    Acute cystitis without hematuria    Chronic renal impairment    Chronic renal impairment, unspecified CKD stage    Dehydration    Weakness generalized    Lethargy    Urinary tract infection without hematuria, site unspecified    Other fatigue    Non-traumatic rhabdomyolysis    Abnormal LFTs    Altered mental status    Altered mental status, unspecified altered mental status type    Abdominal mass    Aortic stenosis    Bacterial upper respiratory infection    Nicotine dependence with current use    Constipation due to opioid therapy    Dyslipidemia    Dyspnea    Edema of extremities    Other chronic pain    Gastritis    Hypercholesteremia    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[facility])    Traumatic rhabdomyolysis, initial encounter    Chest pain with high risk for cardiac etiology    Renal insufficiency    Pain in both feet    Bilateral swelling of feet    Exertional dyspnea    Deep vein thrombosis (DVT) of distal vein of left lower extremity, unspecified chronicity ([facility])    Aortic valvular disease    Blunt head trauma, initial encounter    Laceration of auricle of right ear, initial encounter    Weakness    Fall, initial encounter    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[facility])    Paraparesis ([facility])    Thoracic myelopathy    Lumbar spondylosis    SVT (supraventricular tachycardia) ([facility])    Chest pain    Dental infection    Anemia    Paraparesis of both lower limbs ([facility])    Acute renal failure (ARF)    Acute kidney injury    PSVT (paroxysmal supraventricular tachycardia) ([facility])    Sinus pause    Chest pain of uncertain etiology    DICK (acute kidney injury)    Hypernatremia    Hyperglycemia    Acute heart failure ([facility])    Opiate or related narcotic overdose, undetermined intent, initial encounter ([facility])    Contusion of head, unspecified part of head, initial encounter    Contusion of multiple sites of right shoulder, initial encounter    Contusion of right hip, initial encounter    Encephalopathy     Anemia, unspecified type    Acute renal failure superimposed on chronic kidney disease, unspecified acute renal failure type, unspecified CKD stage    Cellulitis of lower extremity, unspecified laterality    GI bleed    Thrombocytopenia (HCC)    Azotemia    Cellulitis of left lower extremity    CKD (chronic kidney disease), stage IV (HCC)    Bradycardia    Carboxyhemoglobinemia, accidental or unintentional, initial encounter       Reason for Admission:     Physical Exam:   General appearance: alert, appears stated age and cooperative  Pulmonary:  clear to auscultation bilaterally  Cardiovascular: S1, S2 normal, no murmur, click, rub or gallop, regular rate and rhythm  Abdominal: soft, non-tender; bowel sounds normal; no masses,  no organomegaly  Extremities: extremities normal, atraumatic, no cyanosis or edema  Psychiatric: calm        History of Present Illness:       CHIEF COMPLAINT:  Acute on chronic kidney failure, hyperkalemia, and bradycardia.     HISTORY OF PRESENT ILLNESS:  Patient is a 79-year-old  female with known underlying chronic kidney disease stage 4.  Had blood drawn yesterday as an outpatient and was told that she has hyperkalemia and she should come in to the emergency department for evaluation.  Today, repeat chemistry showed potassium 6.1, BUN and creatinine 83 and 3.67.  GFR is 12, which is below her baseline.  CBC showed hemoglobin of 7.7, which is stable.  EKG and monitor showed ectopic atrial bradycardia, rate mid 40s to mid 50s.  Patient was given multiple medications for hyperkalemia including insulin with D50, Lokelma, Lasix, and calcium gluconate.      Hospital Course:       ASSESSMENT:    1.       Acute on chronic kidney failure.  Apprec renal consult - dialysis was discussed and pt doesn't want that   Creat improved again today   Hold bumex   Strict I/o and daily wts      2.       Hyperkalemia improved with dextrose, insulin, lokelma, albuterol, calcium, lasix ; pt was  supposed to be on lokelma at last dc but wasn't   - cont lokelma   - bmp daily   - renal on consult   - 5.9 today     3.       Atrial ectopic bradycardia 2/2 cardizem and hyperkalemia   - appre cards   - resolved      4.       Anemia of chronic kidney disease and possible intermittent gastrointestinal blood loss.  - hb stable  ; iv iron   - h/o avms in jan. No bloody stools per pt      5.       Severe lumbar spinal stenosis with musculoskeletal deconditioning. Restart oxy   6. Moderate aortic stenosis , h/o SVT, h/o HTN, h/o hfpef        Dvt ppx eliquis      Home today           Marivel Randle DO           Chart reviewed, including current vitals, notes, labs and imaging  Labs ordered and medications adjusted as outlined above  Coordinate care with care team/consultants  Discussed with patient results of tests, management plan as outlined above, and the need for ongoing hospitalization  D/w RN     [facility]           4/5/2025      **Certification        PHYSICIAN Certification of Need for Inpatient Hospitalization - Initial Certification     Patient will require inpatient services that will reasonably be expected to span two midnight's based on the clinical documentation in H+P.   Based on patients current state of illness, I anticipate that, after discharge, patient will require TBD.         Consultations: Dr Andreas High, Dr Bruce    Procedures: none    Complications: none    Discharge Condition: Good    Discharge Medications:      Discharge Medications        CONTINUE taking these medications        Instructions Prescription details   atorvastatin 40 MG Tabs  Commonly known as: Lipitor      Take 1 tablet (40 mg total) by mouth nightly.   Refills: 0     bisacodyl 10 MG Supp  Commonly known as: Dulcolax      Place 1 suppository (10 mg total) rectally daily as needed.   Quantity: 20 suppository  Refills: 0     bumetanide 1 MG Tabs  Commonly known as: Bumex      Take 1 tablet (1 mg total) by mouth daily.   Quantity: 30  tablet  Refills: 0     cholecalciferol 50 MCG (2000 UT) Tabs  Commonly known as: Vitamin D3      Take 1 tablet (2,000 Units total) by mouth daily.   Refills: 0     Eliquis 5 MG Tabs  Generic drug: apixaban      Take 1 tablet (5 mg total) by mouth 2 (two) times daily.   Refills: 0     ferrous sulfate 325 (65 FE) MG Tbec      Take 1 tablet (325 mg total) by mouth 3 (three) times a week (Tuesday, Thursday, Saturday) at 1600. Patient says she takes Mon, Wed, Fri   Refills: 0     gabapentin 300 MG Caps  Commonly known as: Neurontin      Take 1 capsule (300 mg total) by mouth nightly.   Refills: 0     hydrALAZINE 50 MG Tabs  Commonly known as: Apresoline      Take 1 tablet (50 mg total) by mouth 3 (three) times daily.   Quantity: 90 tablet  Refills: 0     isosorbide mononitrate ER 60 MG Tb24  Commonly known as: Imdur      Take 1 tablet (60 mg total) by mouth daily.   Refills: 0     magnesium oxide 400 MG Tabs  Commonly known as: Mag-Ox      Take 1 tablet (400 mg total) by mouth daily.   Quantity: 30 tablet  Refills: 0     meclizine 25 MG Tabs  Commonly known as: Antivert      Take 1 tablet (25 mg total) by mouth 3 (three) times daily as needed for Dizziness or Nausea.   Refills: 0     oxyCODONE-acetaminophen  MG Tabs  Commonly known as: Percocet      Take 1 tablet by mouth every 4 (four) hours as needed for Pain.   Quantity: 60 tablet  Refills: 0     pantoprazole 40 MG Tbec  Commonly known as: Protonix      Take 1 tablet (40 mg total) by mouth 2 (two) times daily before meals. Pt stating takes as needed for acid reflux   Refills: 0     Polyethylene Glycol 3350 17 g Pack  Commonly known as: MIRALAX      Take 17 g by mouth daily as needed.   Quantity: 10 each  Refills: 0     sodium zirconium cyclosilicate 10 g Pack  Commonly known as: Lokelma      Take 1 packet (10 g total) by mouth daily.   Quantity: 30 each  Refills: 0     trolamine salicyliate 10 % Crea      Apply to painful joints or muscles up to three times a  day as needed   Quantity: 35 g  Refills: 0            STOP taking these medications      dilTIAZem  MG Cp24  Commonly known as: Cardizem CD                  Where to Get Your Medications        These medications were sent to [facility] [address] [place], [place] - [address] -[phone], [phone]  [address], [facility] [address]      Phone: [phone]   sodium zirconium cyclosilicate 10 g Pack         Follow up Visits: Follow-up with pcp, renal  in 1 week    Follow up Labs: none     Other Discharge Instructions: none    Marivel Randle DO  4/8/2025  3:14 PM    > 35 min

## 2025-04-15 ENCOUNTER — OFFICE VISIT (OUTPATIENT)
Dept: PAIN CLINIC | Facility: HOSPITAL | Age: 80
End: 2025-04-15
Attending: NURSE PRACTITIONER
Payer: MEDICARE

## 2025-04-15 VITALS
DIASTOLIC BLOOD PRESSURE: 70 MMHG | WEIGHT: 170 LBS | BODY MASS INDEX: 30 KG/M2 | OXYGEN SATURATION: 98 % | SYSTOLIC BLOOD PRESSURE: 133 MMHG | HEART RATE: 63 BPM

## 2025-04-15 DIAGNOSIS — M79.89 BILATERAL SWELLING OF FEET: ICD-10-CM

## 2025-04-15 DIAGNOSIS — G89.29 CHRONIC BILATERAL LOW BACK PAIN WITH BILATERAL SCIATICA: ICD-10-CM

## 2025-04-15 DIAGNOSIS — M48.061 SPINAL STENOSIS OF LUMBAR REGION AT MULTIPLE LEVELS: ICD-10-CM

## 2025-04-15 DIAGNOSIS — M79.671 PAIN IN BOTH FEET: ICD-10-CM

## 2025-04-15 DIAGNOSIS — M79.672 PAIN IN BOTH FEET: ICD-10-CM

## 2025-04-15 DIAGNOSIS — M47.816 LUMBAR SPONDYLOSIS: ICD-10-CM

## 2025-04-15 DIAGNOSIS — M54.41 CHRONIC BILATERAL LOW BACK PAIN WITH BILATERAL SCIATICA: ICD-10-CM

## 2025-04-15 DIAGNOSIS — R60.0 BILATERAL LEG EDEMA: Chronic | ICD-10-CM

## 2025-04-15 DIAGNOSIS — M54.42 CHRONIC BILATERAL LOW BACK PAIN WITH BILATERAL SCIATICA: ICD-10-CM

## 2025-04-15 DIAGNOSIS — M47.816 LUMBAR FACET ARTHROPATHY: ICD-10-CM

## 2025-04-15 PROCEDURE — 99214 OFFICE O/P EST MOD 30 MIN: CPT | Performed by: NURSE PRACTITIONER

## 2025-04-15 RX ORDER — OXYCODONE AND ACETAMINOPHEN 10; 325 MG/1; MG/1
1 TABLET ORAL EVERY 4 HOURS PRN
Qty: 90 TABLET | Refills: 0 | Status: SHIPPED | OUTPATIENT
Start: 2025-04-15 | End: 2025-05-15

## 2025-04-15 NOTE — PATIENT INSTRUCTIONS
Refill policies:    Allow 2-3 business days for refills; controlled substances may take longer.  Contact your pharmacy at least 5 days prior to running out of medication and have them send an electronic request or submit request through the “request refill” option in your [employer] account.  Refills are not addressed on weekends; covering physicians do not authorize routine medications on weekends.  No narcotics or controlled substances are refilled after noon on Fridays or by on call physicians.  By law, narcotics must be electronically prescribed.  A 30 day supply with no refills is the maximum allowed.  If your prescription is due for a refill, you may be due for a follow up appointment.  To best provide you care, patients receiving routine medications need to be seen at least once a year.  Patients receiving narcotic/controlled substance medications need to be seen at least once every 3 months.  In the event that your preferred pharmacy does not have the requested medication in stock (e.g. Backordered), it is your responsibility to find another pharmacy that has the requested medication available.  We will gladly send a new prescription to that pharmacy at your request.    Scheduling Tests:    If your physician has ordered radiology tests such as MRI or CT scans, please contact Central Scheduling at [phone] right away to schedule the test.  Once scheduled, the [facility] Centralized Referral Team will work with your insurance carrier to obtain pre-certification or prior authorization.  Depending on your insurance carrier, approval may take 3-10 days.  It is highly recommended patients assure they have received an authorization before having a test performed.  If test is done without insurance authorization, patient may be responsible for the entire amount billed.      Precertification and Prior Authorizations:  If your physician has recommended that you have a procedure or additional testing performed the [facility]  Centralized Referral Team will contact your insurance carrier to obtain pre-certification or prior authorization.    You are strongly encouraged to contact your insurance carrier to verify that your procedure/test has been approved and is a COVERED benefit.  Although the [facility] Centralized Referral Team does its due diligence, the insurance carrier gives the disclaimer that \"Although the procedure is authorized, this does not guarantee payment.\"    Ultimately the patient is responsible for payment.   Thank you for your understanding in this matter.  Paperwork Completion:  If you require FMLA or disability paperwork for your recovery, please make sure to either drop it off or have it faxed to our office at [phone]. Be sure the form has your name and date of birth on it.  The form will be faxed to our Forms Department and they will complete it for you.  There is a 25$ fee for all forms that need to be filled out.  Please be aware there is a 10-14 day turnaround time.  You will need to sign a release of information (KEYA) form if your paperwork does not come with one.  You may call the Forms Department with any questions at [phone].  Their fax number is [phone].

## 2025-04-15 NOTE — PROGRESS NOTES
Patient presents in office today with reported pain in shoulders, back, stomach  and legs     Current pain level reported = 8/10     Last reported dose of Oxy this morning         Narcotic Contract renewal 04.25.25     Urine Drug screen 04.25.24     Most recent fall 10.26.24     Has patient been flagged as fall risk:   Yes     TOP FALL PREVENTION TIPS     INSIDE YOUR HOME     KITCHEN:  Use non skid mats only.    Clean up spills as soon as they happen.  Keep objects that you use often within easy reach.  BATHROOM:  Install grab bars on the bathroom walls beside the tub, shower and toilet.  Use a non skid rubber mat in the tub/shower.  If you are unsteady on your feet you may want to use a shower chair/bench and a hand held shower head while bathing/showering.  BEDROOM:  Place light switches within reach of your bed and a night light between the bedroom and bathroom.  Get up slowly from lying down or sitting if you get dizzy.  Keep a working flashlight near your bed.  STAIRS:  Keep stairwells well lit with light switches at top and bottom.  Install sturdy handrails on both sides.  Make sure carpeting is secure.  FLOORS:  Remove all loose wires, cords and throw rugs.  Keep floors clear of clutter.  Make sure carpets and area rugs have skid proof backing.  Do not use slippery wax on bare floors.  Keep furniture in its accustomed place.   If you have pets, be careful that you don’t trip over them.     OUTSIDE SAFETY TIPS  Always wear good shoes with proper support and traction.  Always use hand rails on stairs and escalators.  Cover porch steps with gritty weather proof paint.  Pay attention to curbs and other changes in surfaces when out in the community.  Take care when walking on gravel or grassy surfaces.  Avoid walking on snowy or icy surfaces.  Use a cane or walker (indoors and out) if you are unsteady on your feet.
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
## 2025-05-12 ENCOUNTER — APPOINTMENT (OUTPATIENT)
Dept: GENERAL RADIOLOGY | Facility: HOSPITAL | Age: 80
End: 2025-05-12
Attending: EMERGENCY MEDICINE
Payer: MEDICARE

## 2025-05-12 ENCOUNTER — APPOINTMENT (OUTPATIENT)
Dept: CT IMAGING | Facility: HOSPITAL | Age: 80
End: 2025-05-12
Attending: EMERGENCY MEDICINE
Payer: MEDICARE

## 2025-05-12 ENCOUNTER — HOSPITAL ENCOUNTER (EMERGENCY)
Facility: HOSPITAL | Age: 80
Discharge: HOME | End: 2025-05-13
Attending: EMERGENCY MEDICINE
Payer: MEDICARE
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
## 2025-05-12 DIAGNOSIS — S09.90XA INJURY OF HEAD, INITIAL ENCOUNTER: ICD-10-CM

## 2025-05-12 DIAGNOSIS — S70.01XA CONTUSION OF RIGHT HIP, INITIAL ENCOUNTER: ICD-10-CM

## 2025-05-12 DIAGNOSIS — S80.01XA CONTUSION OF RIGHT KNEE, INITIAL ENCOUNTER: ICD-10-CM

## 2025-05-12 DIAGNOSIS — S16.1XXA STRAIN OF NECK MUSCLE, INITIAL ENCOUNTER: ICD-10-CM

## 2025-05-12 LAB
ALBUMIN SERPL-MCNC: 4.2 G/DL (ref 3.2–4.8)
ALBUMIN/GLOB SERPL: 1.6 {RATIO} (ref 1–2)
ALP LIVER SERPL-CCNC: 172 U/L (ref 55–142)
ALT SERPL-CCNC: 22 U/L (ref 10–49)
ANION GAP SERPL CALC-SCNC: 2 MMOL/L (ref 0–18)
AST SERPL-CCNC: 35 U/L (ref ?–34)
BASOPHILS # BLD AUTO: 0.02 X10(3) UL (ref 0–0.2)
BASOPHILS NFR BLD AUTO: 0.3 %
BILIRUB SERPL-MCNC: 0.2 MG/DL (ref 0.2–1.1)
BUN BLD-MCNC: 26 MG/DL (ref 9–23)
BUN/CREAT SERPL: 11.9 (ref 10–20)
CALCIUM BLD-MCNC: 8.5 MG/DL (ref 8.7–10.4)
CHLORIDE SERPL-SCNC: 114 MMOL/L (ref 98–112)
CO2 SERPL-SCNC: 23 MMOL/L (ref 21–32)
CREAT BLD-MCNC: 2.18 MG/DL (ref 0.55–1.02)
DEPRECATED RDW RBC AUTO: 55 FL (ref 35.1–46.3)
EGFRCR SERPLBLD CKD-EPI 2021: 23 ML/MIN/1.73M2 (ref 60–?)
EOSINOPHIL # BLD AUTO: 0.12 X10(3) UL (ref 0–0.7)
EOSINOPHIL NFR BLD AUTO: 2 %
ERYTHROCYTE [DISTWIDTH] IN BLOOD BY AUTOMATED COUNT: 17.3 % (ref 11–15)
GLOBULIN PLAS-MCNC: 2.6 G/DL (ref 2–3.5)
GLUCOSE BLD-MCNC: 84 MG/DL (ref 70–99)
GLUCOSE BLDC GLUCOMTR-MCNC: 91 MG/DL (ref 70–99)
HCT VFR BLD AUTO: 30 % (ref 35–48)
HGB BLD-MCNC: 9.5 G/DL (ref 12–16)
IMM GRANULOCYTES # BLD AUTO: 0.01 X10(3) UL (ref 0–1)
IMM GRANULOCYTES NFR BLD: 0.2 %
LYMPHOCYTES # BLD AUTO: 1.35 X10(3) UL (ref 1–4)
LYMPHOCYTES NFR BLD AUTO: 22.6 %
MCH RBC QN AUTO: 27.5 PG (ref 26–34)
MCHC RBC AUTO-ENTMCNC: 31.7 G/DL (ref 31–37)
MCV RBC AUTO: 87 FL (ref 80–100)
MONOCYTES # BLD AUTO: 0.43 X10(3) UL (ref 0.1–1)
MONOCYTES NFR BLD AUTO: 7.2 %
NEUTROPHILS # BLD AUTO: 4.04 X10 (3) UL (ref 1.5–7.7)
NEUTROPHILS # BLD AUTO: 4.04 X10(3) UL (ref 1.5–7.7)
NEUTROPHILS NFR BLD AUTO: 67.7 %
OSMOLALITY SERPL CALC.SUM OF ELEC: 292 MOSM/KG (ref 275–295)
PLATELET # BLD AUTO: 216 10(3)UL (ref 150–450)
POTASSIUM SERPL-SCNC: 4.2 MMOL/L (ref 3.5–5.1)
PROT SERPL-MCNC: 6.8 G/DL (ref 5.7–8.2)
RBC # BLD AUTO: 3.45 X10(6)UL (ref 3.8–5.3)
SODIUM SERPL-SCNC: 139 MMOL/L (ref 136–145)
TROPONIN I SERPL HS-MCNC: 24 NG/L (ref ?–34)
WBC # BLD AUTO: 6 X10(3) UL (ref 4–11)

## 2025-05-12 PROCEDURE — 80053 COMPREHEN METABOLIC PANEL: CPT | Performed by: EMERGENCY MEDICINE

## 2025-05-12 PROCEDURE — 96374 THER/PROPH/DIAG INJ IV PUSH: CPT

## 2025-05-12 PROCEDURE — 73560 X-RAY EXAM OF KNEE 1 OR 2: CPT | Performed by: EMERGENCY MEDICINE

## 2025-05-12 PROCEDURE — 84484 ASSAY OF TROPONIN QUANT: CPT | Performed by: EMERGENCY MEDICINE

## 2025-05-12 PROCEDURE — 72125 CT NECK SPINE W/O DYE: CPT | Performed by: EMERGENCY MEDICINE

## 2025-05-12 PROCEDURE — 73502 X-RAY EXAM HIP UNI 2-3 VIEWS: CPT | Performed by: EMERGENCY MEDICINE

## 2025-05-12 PROCEDURE — 93010 ELECTROCARDIOGRAM REPORT: CPT

## 2025-05-12 PROCEDURE — 73030 X-RAY EXAM OF SHOULDER: CPT | Performed by: EMERGENCY MEDICINE

## 2025-05-12 PROCEDURE — 70486 CT MAXILLOFACIAL W/O DYE: CPT | Performed by: EMERGENCY MEDICINE

## 2025-05-12 PROCEDURE — 73630 X-RAY EXAM OF FOOT: CPT | Performed by: EMERGENCY MEDICINE

## 2025-05-12 PROCEDURE — 73610 X-RAY EXAM OF ANKLE: CPT | Performed by: EMERGENCY MEDICINE

## 2025-05-12 PROCEDURE — 73700 CT LOWER EXTREMITY W/O DYE: CPT | Performed by: EMERGENCY MEDICINE

## 2025-05-12 PROCEDURE — 93005 ELECTROCARDIOGRAM TRACING: CPT

## 2025-05-12 PROCEDURE — 99285 EMERGENCY DEPT VISIT HI MDM: CPT

## 2025-05-12 PROCEDURE — 74176 CT ABD & PELVIS W/O CONTRAST: CPT | Performed by: EMERGENCY MEDICINE

## 2025-05-12 PROCEDURE — 71250 CT THORAX DX C-: CPT | Performed by: EMERGENCY MEDICINE

## 2025-05-12 PROCEDURE — 70450 CT HEAD/BRAIN W/O DYE: CPT | Performed by: EMERGENCY MEDICINE

## 2025-05-12 PROCEDURE — 85025 COMPLETE CBC W/AUTO DIFF WBC: CPT | Performed by: EMERGENCY MEDICINE

## 2025-05-12 PROCEDURE — 82962 GLUCOSE BLOOD TEST: CPT

## 2025-05-12 RX ORDER — MORPHINE SULFATE 4 MG/ML
4 INJECTION, SOLUTION INTRAMUSCULAR; INTRAVENOUS ONCE
Status: COMPLETED | OUTPATIENT
Start: 2025-05-12 | End: 2025-05-12

## 2025-05-13 VITALS
HEIGHT: 63 IN | DIASTOLIC BLOOD PRESSURE: 68 MMHG | HEART RATE: 79 BPM | RESPIRATION RATE: 17 BRPM | SYSTOLIC BLOOD PRESSURE: 159 MMHG | WEIGHT: 164 LBS | BODY MASS INDEX: 29.06 KG/M2 | OXYGEN SATURATION: 100 % | TEMPERATURE: 98 F

## 2025-05-13 LAB
ATRIAL RATE: 70 BPM
ATRIAL RATE: 75 BPM
P AXIS: 77 DEGREES
P AXIS: 77 DEGREES
P-R INTERVAL: 126 MS
P-R INTERVAL: 126 MS
Q-T INTERVAL: 326 MS
Q-T INTERVAL: 442 MS
QRS DURATION: 68 MS
QRS DURATION: 92 MS
QTC CALCULATION (BEZET): 364 MS
QTC CALCULATION (BEZET): 477 MS
R AXIS: 44 DEGREES
R AXIS: 48 DEGREES
T AXIS: 69 DEGREES
T AXIS: 78 DEGREES
VENTRICULAR RATE: 70 BPM
VENTRICULAR RATE: 75 BPM
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
## 2025-05-13 NOTE — DISCHARGE INSTRUCTIONS
Take Tylenol as needed for pain.  Follow-up with your pain specialist as scheduled later this week.  Also follow-up with your primary physician.  Return to the emergency department if recurrent fall or other new symptoms develop.

## 2025-05-13 NOTE — ED INITIAL ASSESSMENT (HPI)
To ED via EMS from home for headache/dizziness and weakness since a fall last night at 10pm. Pt states hit her head twice on this one fall, no LOC, + thinners (eliquis). HA 9/10, dizziness at baseline but slightly worse since fall, R leg pain. Pt also notes chest pain on and off for the past 2 weeks.

## 2025-05-16 ENCOUNTER — OFFICE VISIT (OUTPATIENT)
Dept: PAIN CLINIC | Facility: HOSPITAL | Age: 80
End: 2025-05-16
Attending: ANESTHESIOLOGY
Payer: MEDICARE

## 2025-05-16 VITALS — HEART RATE: 96 BPM | SYSTOLIC BLOOD PRESSURE: 156 MMHG | OXYGEN SATURATION: 94 % | DIASTOLIC BLOOD PRESSURE: 77 MMHG

## 2025-05-16 DIAGNOSIS — G89.29 CHRONIC BILATERAL LOW BACK PAIN WITH BILATERAL SCIATICA: ICD-10-CM

## 2025-05-16 DIAGNOSIS — G62.9 NEUROPATHY: ICD-10-CM

## 2025-05-16 DIAGNOSIS — Z51.81 ENCOUNTER FOR MONITORING OPIOID MAINTENANCE THERAPY: ICD-10-CM

## 2025-05-16 DIAGNOSIS — M79.89 BILATERAL SWELLING OF FEET: ICD-10-CM

## 2025-05-16 DIAGNOSIS — R60.0 BILATERAL LEG EDEMA: ICD-10-CM

## 2025-05-16 DIAGNOSIS — C49.9 LEIOMYOSARCOMA (HCC): ICD-10-CM

## 2025-05-16 DIAGNOSIS — Z79.891 ENCOUNTER FOR MONITORING OPIOID MAINTENANCE THERAPY: ICD-10-CM

## 2025-05-16 DIAGNOSIS — M54.41 CHRONIC BILATERAL LOW BACK PAIN WITH BILATERAL SCIATICA: ICD-10-CM

## 2025-05-16 DIAGNOSIS — M79.671 PAIN IN BOTH FEET: ICD-10-CM

## 2025-05-16 DIAGNOSIS — M54.42 CHRONIC BILATERAL LOW BACK PAIN WITH BILATERAL SCIATICA: ICD-10-CM

## 2025-05-16 DIAGNOSIS — Z91.89 HISTORY OF REPEATED OVERDOSE: ICD-10-CM

## 2025-05-16 DIAGNOSIS — M47.816 LUMBAR SPONDYLOSIS: ICD-10-CM

## 2025-05-16 DIAGNOSIS — M48.061 SPINAL STENOSIS OF LUMBAR REGION AT MULTIPLE LEVELS: Primary | ICD-10-CM

## 2025-05-16 DIAGNOSIS — F11.11 HISTORY OF OPIOID ABUSE (HCC): ICD-10-CM

## 2025-05-16 DIAGNOSIS — M47.816 LUMBAR FACET ARTHROPATHY: ICD-10-CM

## 2025-05-16 DIAGNOSIS — M79.672 PAIN IN BOTH FEET: ICD-10-CM

## 2025-05-16 PROCEDURE — 99215 OFFICE O/P EST HI 40 MIN: CPT | Performed by: ANESTHESIOLOGY

## 2025-05-16 RX ORDER — OXYCODONE AND ACETAMINOPHEN 10; 325 MG/1; MG/1
1 TABLET ORAL EVERY 8 HOURS PRN
Qty: 90 TABLET | Refills: 0 | Status: SHIPPED | OUTPATIENT
Start: 2025-05-16 | End: 2025-06-15

## 2025-05-16 NOTE — PROGRESS NOTES
Patient presents in office today with reported pain in shoulders, back, stomach  and legs     Current pain level reported = 9/10     Last reported dose of Oxy 2 weeks ago.         Narcotic Contract renewal 05.16.26     Urine Drug screen 05.16.25     Most recent fall 10.26.24    ED 05.12.25-after falling and hitting her head twice     Has patient been flagged as fall risk:   Yes     TOP FALL PREVENTION TIPS     INSIDE YOUR HOME     KITCHEN:  Use non skid mats only.    Clean up spills as soon as they happen.  Keep objects that you use often within easy reach.  BATHROOM:  Install grab bars on the bathroom walls beside the tub, shower and toilet.  Use a non skid rubber mat in the tub/shower.  If you are unsteady on your feet you may want to use a shower chair/bench and a hand held shower head while bathing/showering.  BEDROOM:  Place light switches within reach of your bed and a night light between the bedroom and bathroom.  Get up slowly from lying down or sitting if you get dizzy.  Keep a working flashlight near your bed.  STAIRS:  Keep stairwells well lit with light switches at top and bottom.  Install sturdy handrails on both sides.  Make sure carpeting is secure.  FLOORS:  Remove all loose wires, cords and throw rugs.  Keep floors clear of clutter.  Make sure carpets and area rugs have skid proof backing.  Do not use slippery wax on bare floors.  Keep furniture in its accustomed place.   If you have pets, be careful that you don’t trip over them.     OUTSIDE SAFETY TIPS  Always wear good shoes with proper support and traction.  Always use hand rails on stairs and escalators.  Cover porch steps with gritty weather proof paint.  Pay attention to curbs and other changes in surfaces when out in the community.  Take care when walking on gravel or grassy surfaces.  Avoid walking on snowy or icy surfaces.  Use a cane or walker (indoors and out) if you are unsteady on your feet.
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
## 2025-05-16 NOTE — PATIENT INSTRUCTIONS
Refill policies:    Allow 2-3 business days for refills; controlled substances may take longer.  Contact your pharmacy at least 5 days prior to running out of medication and have them send an electronic request or submit request through the “request refill” option in your [employer] account.  Refills are not addressed on weekends; covering physicians do not authorize routine medications on weekends.  No narcotics or controlled substances are refilled after noon on Fridays or by on call physicians.  By law, narcotics must be electronically prescribed.  A 30 day supply with no refills is the maximum allowed.  If your prescription is due for a refill, you may be due for a follow up appointment.  To best provide you care, patients receiving routine medications need to be seen at least once a year.  Patients receiving narcotic/controlled substance medications need to be seen at least once every 3 months.  In the event that your preferred pharmacy does not have the requested medication in stock (e.g. Backordered), it is your responsibility to find another pharmacy that has the requested medication available.  We will gladly send a new prescription to that pharmacy at your request.    Scheduling Tests:    If your physician has ordered radiology tests such as MRI or CT scans, please contact Central Scheduling at [phone] right away to schedule the test.  Once scheduled, the [facility] Centralized Referral Team will work with your insurance carrier to obtain pre-certification or prior authorization.  Depending on your insurance carrier, approval may take 3-10 days.  It is highly recommended patients assure they have received an authorization before having a test performed.  If test is done without insurance authorization, patient may be responsible for the entire amount billed.      Precertification and Prior Authorizations:  If your physician has recommended that you have a procedure or additional testing performed the [facility]  Centralized Referral Team will contact your insurance carrier to obtain pre-certification or prior authorization.    You are strongly encouraged to contact your insurance carrier to verify that your procedure/test has been approved and is a COVERED benefit.  Although the [facility] Centralized Referral Team does its due diligence, the insurance carrier gives the disclaimer that \"Although the procedure is authorized, this does not guarantee payment.\"    Ultimately the patient is responsible for payment.   Thank you for your understanding in this matter.  Paperwork Completion:  If you require FMLA or disability paperwork for your recovery, please make sure to either drop it off or have it faxed to our office at [phone]. Be sure the form has your name and date of birth on it.  The form will be faxed to our Forms Department and they will complete it for you.  There is a 25$ fee for all forms that need to be filled out.  Please be aware there is a 10-14 day turnaround time.  You will need to sign a release of information (KEYA) form if your paperwork does not come with one.  You may call the Forms Department with any questions at [phone].  Their fax number is [phone].

## 2025-06-09 ENCOUNTER — APPOINTMENT (OUTPATIENT)
Dept: CV DIAGNOSTICS | Facility: HOSPITAL | Age: 80
End: 2025-06-09
Attending: NURSE PRACTITIONER
Payer: MEDICARE

## 2025-06-09 ENCOUNTER — APPOINTMENT (OUTPATIENT)
Dept: GENERAL RADIOLOGY | Facility: HOSPITAL | Age: 80
End: 2025-06-09
Attending: EMERGENCY MEDICINE
Payer: MEDICARE

## 2025-06-09 ENCOUNTER — HOSPITAL ENCOUNTER (INPATIENT)
Facility: HOSPITAL | Age: 80
LOS: 2 days | Discharge: HOME HEALTH | End: 2025-06-11
Attending: EMERGENCY MEDICINE
Payer: MEDICARE

## 2025-06-09 DIAGNOSIS — I47.10 SVT (SUPRAVENTRICULAR TACHYCARDIA) (HCC): ICD-10-CM

## 2025-06-09 DIAGNOSIS — R07.9 ACUTE CHEST PAIN: Primary | ICD-10-CM

## 2025-06-09 DIAGNOSIS — R79.89 ELEVATED TROPONIN: ICD-10-CM

## 2025-06-09 LAB
ANION GAP SERPL CALC-SCNC: 10 MMOL/L (ref 0–18)
ANION GAP SERPL CALC-SCNC: 7 MMOL/L (ref 0–18)
APTT PPP: 225.7 SECONDS (ref 23–36)
APTT PPP: 33.5 SECONDS (ref 23–36)
APTT PPP: 41.2 SECONDS (ref 23–36)
ATRIAL RATE: 60 BPM
BASOPHILS # BLD AUTO: 0.05 X10(3) UL (ref 0–0.2)
BASOPHILS NFR BLD AUTO: 1 %
BUN BLD-MCNC: 21 MG/DL (ref 9–23)
BUN BLD-MCNC: 29 MG/DL (ref 9–23)
BUN/CREAT SERPL: 13.1 (ref 10–20)
BUN/CREAT SERPL: 15.8 (ref 10–20)
C DIFF TOX B STL QL: NEGATIVE
CALCIUM BLD-MCNC: 8.8 MG/DL (ref 8.7–10.4)
CALCIUM BLD-MCNC: 9.2 MG/DL (ref 8.7–10.4)
CHLORIDE SERPL-SCNC: 113 MMOL/L (ref 98–112)
CHLORIDE SERPL-SCNC: 116 MMOL/L (ref 98–112)
CHOLEST SERPL-MCNC: 92 MG/DL (ref ?–200)
CO2 SERPL-SCNC: 21 MMOL/L (ref 21–32)
CO2 SERPL-SCNC: 22 MMOL/L (ref 21–32)
CREAT BLD-MCNC: 1.6 MG/DL (ref 0.55–1.02)
CREAT BLD-MCNC: 1.84 MG/DL (ref 0.55–1.02)
DEPRECATED RDW RBC AUTO: 53.1 FL (ref 35.1–46.3)
DEPRECATED RDW RBC AUTO: 54.4 FL (ref 35.1–46.3)
EGFRCR SERPLBLD CKD-EPI 2021: 28 ML/MIN/1.73M2 (ref 60–?)
EGFRCR SERPLBLD CKD-EPI 2021: 33 ML/MIN/1.73M2 (ref 60–?)
EOSINOPHIL # BLD AUTO: 0.12 X10(3) UL (ref 0–0.7)
EOSINOPHIL NFR BLD AUTO: 2.3 %
ERYTHROCYTE [DISTWIDTH] IN BLOOD BY AUTOMATED COUNT: 16.4 % (ref 11–15)
ERYTHROCYTE [DISTWIDTH] IN BLOOD BY AUTOMATED COUNT: 16.8 % (ref 11–15)
GLUCOSE BLD-MCNC: 199 MG/DL (ref 70–99)
GLUCOSE BLD-MCNC: 85 MG/DL (ref 70–99)
GLUCOSE BLDC GLUCOMTR-MCNC: 83 MG/DL (ref 70–99)
HCT VFR BLD AUTO: 29.3 % (ref 35–48)
HCT VFR BLD AUTO: 33.3 % (ref 35–48)
HDLC SERPL-MCNC: 40 MG/DL (ref 40–59)
HGB BLD-MCNC: 10.8 G/DL (ref 12–16)
HGB BLD-MCNC: 9.3 G/DL (ref 12–16)
IMM GRANULOCYTES # BLD AUTO: 0.01 X10(3) UL (ref 0–1)
IMM GRANULOCYTES NFR BLD: 0.2 %
LDLC SERPL CALC-MCNC: 32 MG/DL (ref ?–100)
LYMPHOCYTES # BLD AUTO: 1.12 X10(3) UL (ref 1–4)
LYMPHOCYTES NFR BLD AUTO: 21.7 %
MCH RBC QN AUTO: 28.5 PG (ref 26–34)
MCH RBC QN AUTO: 28.6 PG (ref 26–34)
MCHC RBC AUTO-ENTMCNC: 31.7 G/DL (ref 31–37)
MCHC RBC AUTO-ENTMCNC: 32.4 G/DL (ref 31–37)
MCV RBC AUTO: 87.9 FL (ref 80–100)
MCV RBC AUTO: 90.2 FL (ref 80–100)
MONOCYTES # BLD AUTO: 0.3 X10(3) UL (ref 0.1–1)
MONOCYTES NFR BLD AUTO: 5.8 %
NEUTROPHILS # BLD AUTO: 3.55 X10 (3) UL (ref 1.5–7.7)
NEUTROPHILS # BLD AUTO: 3.55 X10(3) UL (ref 1.5–7.7)
NEUTROPHILS NFR BLD AUTO: 69 %
NONHDLC SERPL-MCNC: 52 MG/DL (ref ?–130)
OSMOLALITY SERPL CALC.SUM OF ELEC: 302 MOSM/KG (ref 275–295)
OSMOLALITY SERPL CALC.SUM OF ELEC: 309 MOSM/KG (ref 275–295)
P AXIS: 73 DEGREES
P-R INTERVAL: 150 MS
PLATELET # BLD AUTO: 223 10(3)UL (ref 150–450)
PLATELET # BLD AUTO: 264 10(3)UL (ref 150–450)
POTASSIUM SERPL-SCNC: 4.8 MMOL/L (ref 3.5–5.1)
POTASSIUM SERPL-SCNC: 4.9 MMOL/L (ref 3.5–5.1)
Q-T INTERVAL: 296 MS
Q-T INTERVAL: 458 MS
QRS DURATION: 74 MS
QRS DURATION: 88 MS
QTC CALCULATION (BEZET): 458 MS
QTC CALCULATION (BEZET): 475 MS
R AXIS: 36 DEGREES
R AXIS: 45 DEGREES
RBC # BLD AUTO: 3.25 X10(6)UL (ref 3.8–5.3)
RBC # BLD AUTO: 3.79 X10(6)UL (ref 3.8–5.3)
SODIUM SERPL-SCNC: 144 MMOL/L (ref 136–145)
SODIUM SERPL-SCNC: 145 MMOL/L (ref 136–145)
T AXIS: 11 DEGREES
T AXIS: 60 DEGREES
TRIGL SERPL-MCNC: 106 MG/DL (ref 30–149)
TROPONIN I SERPL HS-MCNC: 173 NG/L (ref ?–34)
TROPONIN I SERPL HS-MCNC: 20 NG/L (ref ?–34)
TROPONIN I SERPL HS-MCNC: 348 NG/L (ref ?–34)
TROPONIN I SERPL HS-MCNC: 371 NG/L (ref ?–34)
VENTRICULAR RATE: 155 BPM
VENTRICULAR RATE: 60 BPM
VLDLC SERPL CALC-MCNC: 14 MG/DL (ref 0–30)
WBC # BLD AUTO: 5.2 X10(3) UL (ref 4–11)
WBC # BLD AUTO: 5.6 X10(3) UL (ref 4–11)

## 2025-06-09 PROCEDURE — 76376 3D RENDER W/INTRP POSTPROCES: CPT | Performed by: NURSE PRACTITIONER

## 2025-06-09 PROCEDURE — 71045 X-RAY EXAM CHEST 1 VIEW: CPT | Performed by: EMERGENCY MEDICINE

## 2025-06-09 PROCEDURE — 93306 TTE W/DOPPLER COMPLETE: CPT | Performed by: NURSE PRACTITIONER

## 2025-06-09 RX ORDER — TROLAMINE SALICYLATE 10 G/100G
CREAM TOPICAL 4 TIMES DAILY PRN
Status: DISCONTINUED | OUTPATIENT
Start: 2025-06-09 | End: 2025-06-11

## 2025-06-09 RX ORDER — HEPARIN SODIUM AND DEXTROSE 10000; 5 [USP'U]/100ML; G/100ML
INJECTION INTRAVENOUS CONTINUOUS
Status: DISCONTINUED | OUTPATIENT
Start: 2025-06-09 | End: 2025-06-11

## 2025-06-09 RX ORDER — PANTOPRAZOLE SODIUM 40 MG/1
40 TABLET, DELAYED RELEASE ORAL
Status: DISCONTINUED | OUTPATIENT
Start: 2025-06-09 | End: 2025-06-11

## 2025-06-09 RX ORDER — DILTIAZEM HYDROCHLORIDE 120 MG/1
120 CAPSULE, EXTENDED RELEASE ORAL DAILY
Status: DISCONTINUED | OUTPATIENT
Start: 2025-06-09 | End: 2025-06-11

## 2025-06-09 RX ORDER — ISOSORBIDE MONONITRATE 60 MG/1
60 TABLET, EXTENDED RELEASE ORAL DAILY
Status: DISCONTINUED | OUTPATIENT
Start: 2025-06-09 | End: 2025-06-11

## 2025-06-09 RX ORDER — HYDRALAZINE HYDROCHLORIDE 20 MG/ML
5 INJECTION INTRAMUSCULAR; INTRAVENOUS ONCE
Status: COMPLETED | OUTPATIENT
Start: 2025-06-09 | End: 2025-06-09

## 2025-06-09 RX ORDER — HEPARIN SODIUM AND DEXTROSE 10000; 5 [USP'U]/100ML; G/100ML
INJECTION INTRAVENOUS CONTINUOUS
Status: DISCONTINUED | OUTPATIENT
Start: 2025-06-09 | End: 2025-06-09

## 2025-06-09 RX ORDER — DILTIAZEM HYDROCHLORIDE 5 MG/ML
INJECTION INTRAVENOUS
Status: COMPLETED
Start: 2025-06-09 | End: 2025-06-09

## 2025-06-09 RX ORDER — MAGNESIUM OXIDE 400 MG/1
400 TABLET ORAL DAILY
Status: DISCONTINUED | OUTPATIENT
Start: 2025-06-09 | End: 2025-06-11

## 2025-06-09 RX ORDER — HEPARIN SODIUM AND DEXTROSE 10000; 5 [USP'U]/100ML; G/100ML
18 INJECTION INTRAVENOUS ONCE
Status: DISCONTINUED | OUTPATIENT
Start: 2025-06-09 | End: 2025-06-09

## 2025-06-09 RX ORDER — FERROUS SULFATE 325(65) MG
325 TABLET, DELAYED RELEASE (ENTERIC COATED) ORAL
Status: DISCONTINUED | OUTPATIENT
Start: 2025-06-10 | End: 2025-06-11

## 2025-06-09 RX ORDER — DILTIAZEM HYDROCHLORIDE 5 MG/ML
10 INJECTION INTRAVENOUS ONCE
Status: COMPLETED | OUTPATIENT
Start: 2025-06-09 | End: 2025-06-09

## 2025-06-09 RX ORDER — HEPARIN SODIUM 1000 [USP'U]/ML
80 INJECTION, SOLUTION INTRAVENOUS; SUBCUTANEOUS ONCE
Status: COMPLETED | OUTPATIENT
Start: 2025-06-09 | End: 2025-06-09

## 2025-06-09 RX ORDER — GABAPENTIN 300 MG/1
300 CAPSULE ORAL NIGHTLY
Status: DISCONTINUED | OUTPATIENT
Start: 2025-06-09 | End: 2025-06-11

## 2025-06-09 RX ORDER — HYDRALAZINE HYDROCHLORIDE 50 MG/1
50 TABLET, FILM COATED ORAL 3 TIMES DAILY
Status: DISCONTINUED | OUTPATIENT
Start: 2025-06-09 | End: 2025-06-10

## 2025-06-09 RX ORDER — ATORVASTATIN CALCIUM 40 MG/1
40 TABLET, FILM COATED ORAL NIGHTLY
Status: DISCONTINUED | OUTPATIENT
Start: 2025-06-09 | End: 2025-06-11

## 2025-06-09 RX ORDER — OXYCODONE AND ACETAMINOPHEN 10; 325 MG/1; MG/1
1 TABLET ORAL EVERY 8 HOURS PRN
Refills: 0 | Status: DISCONTINUED | OUTPATIENT
Start: 2025-06-09 | End: 2025-06-11

## 2025-06-09 RX ORDER — CHOLECALCIFEROL (VITAMIN D3) 25 MCG
2000 TABLET ORAL DAILY
Status: DISCONTINUED | OUTPATIENT
Start: 2025-06-09 | End: 2025-06-11

## 2025-06-09 RX ORDER — HEPARIN SODIUM 1000 [USP'U]/ML
60 INJECTION, SOLUTION INTRAVENOUS; SUBCUTANEOUS ONCE
Status: COMPLETED | OUTPATIENT
Start: 2025-06-09 | End: 2025-06-09

## 2025-06-09 RX ORDER — LOPERAMIDE HYDROCHLORIDE 2 MG/1
2 CAPSULE ORAL 4 TIMES DAILY PRN
Status: DISCONTINUED | OUTPATIENT
Start: 2025-06-09 | End: 2025-06-11

## 2025-06-09 RX ORDER — HEPARIN SODIUM AND DEXTROSE 10000; 5 [USP'U]/100ML; G/100ML
12 INJECTION INTRAVENOUS ONCE
Status: COMPLETED | OUTPATIENT
Start: 2025-06-09 | End: 2025-06-09

## 2025-06-09 RX ORDER — MECLIZINE HCL 12.5 MG 12.5 MG/1
25 TABLET ORAL 3 TIMES DAILY PRN
Status: DISCONTINUED | OUTPATIENT
Start: 2025-06-09 | End: 2025-06-11

## 2025-06-09 NOTE — ED QUICK NOTES
Orders for admission, patient is aware of plan and ready to go upstairs. Any questions, please call ED RN Nallely at extension [phone].     Patient Covid vaccination status: Unvaccinated     COVID Test Ordered in ED: None    COVID Suspicion at Admission: N/A    Running Infusions: Medication Infusions[1] None    Mental Status/LOC at time of transport: AOx4    Other pertinent information:   CIWA score: N/A   NIH score:  N/A             [1]    continuous dose heparin
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
## 2025-06-09 NOTE — CM/SW NOTE
06/09/25 1000   CM/SW Referral Data   Referral Source Physician   Reason for Referral   (HH ORDERS)   Informant Patient   Medical Hx   Does patient have an established PCP? Yes  (Dianasagar Lopez)   Patient Info   Patient's Current Mental Status at Time of Assessment Alert;Oriented   Patient's Home Environment House   Number of Levels in Home 1   Number of Stair in Home 0   Patient lives with Son   Patient Status Prior to Admission   Independent with ADLs and Mobility No   Pt. requires assistance with Housework;Driving;Ambulating   Services in place prior to admission Home Health Care;DME/Supplies at home;[facility]   Home Health Provider Info [facility]  (F2F RN only (per pt request) entered & sent in Aidin)   Type of DME/Supplies Quad Cane;Standard Walker;Shower Chair;Grab Bars;Hospital Bed   [facility] Provider Department on Aging   [facility] hours per day 3   [facility] days per week 3   Discharge Needs   Anticipated D/C needs Home health care;To be determined     SW received MDO for HH orders.    SW met w/ pt at bedside. Above assessment completed.  Verified her home address and confirmed she lives w/ her son. Pt confirmed she is usually alone.    Pt confirmed having CG that comes 3 days/week for approx 3 hrs/day.    Pt confirmed single level home w/ 0 steps to enter.  Pt primarily uses a cane or walker for ambulation.    Pt confirmed her PCP was working on an order for a Scooter for pt.  Pt stating she is still current w/ HH RN via [facility]. Pt stating she wants to return home w/ these services.    When offered, pt declined HH PT being added.    SW spoke to Blessing w/ [facility]  via phone. Confirmed pt is current w/ them for HH RN services.  Clinical sent in Aidin. F2F for HH RN entered/sent.    PLAN: Home w/ [facility] RN - pending PT/OT & med clear      SW/CM to remain available for support and/or discharge planning.       Chloe, MSW, LSW [id number]
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
## 2025-06-09 NOTE — PLAN OF CARE
NPO. Heparin drip running @ 9ml  Problem: Patient Centered Care  Goal: Patient preferences are identified and integrated in the patient's plan of care  Description: Interventions:  - What would you like us to know as we care for you?   - Provide timely, complete, and accurate information to patient/family  - Incorporate patient and family knowledge, values, beliefs, and cultural backgrounds into the planning and delivery of care  - Encourage patient/family to participate in care and decision-making at the level they choose  - Honor patient and family perspectives and choices  Outcome: Progressing     Problem: Patient/Family Goals  Goal: Patient/Family Long Term Goal  Description: Patient's Long Term Goal:     Interventions:  - See additional Care Plan goals for specific interventions  Outcome: Progressing  Goal: Patient/Family Short Term Goal  Description: Patient's Short Term Goal:     Interventions:   - See additional Care Plan goals for specific interventions  Outcome: Progressing     Problem: CARDIOVASCULAR - ADULT  Goal: Maintains optimal cardiac output and hemodynamic stability  Description: INTERVENTIONS:  - Monitor vital signs, rhythm, and trends  - Monitor for bleeding, hypotension and signs of decreased cardiac output  - Evaluate effectiveness of vasoactive medications to optimize hemodynamic stability  - Monitor arterial and/or venous puncture sites for bleeding and/or hematoma  - Assess quality of pulses, skin color and temperature  - Assess for signs of decreased coronary artery perfusion - ex. Angina  - Evaluate fluid balance, assess for edema, trend weights  Outcome: Progressing  Goal: Absence of cardiac arrhythmias or at baseline  Description: INTERVENTIONS:  - Continuous cardiac monitoring, monitor vital signs, obtain 12 lead EKG if indicated  - Evaluate effectiveness of antiarrhythmic and heart rate control medications as ordered  - Initiate emergency measures for life threatening arrhythmias  -  Monitor electrolytes and administer replacement therapy as ordered  Outcome: Progressing     Problem: RESPIRATORY - ADULT  Goal: Achieves optimal ventilation and oxygenation  Description: INTERVENTIONS:  - Assess for changes in respiratory status  - Assess for changes in mentation and behavior  - Position to facilitate oxygenation and minimize respiratory effort  - Oxygen supplementation based on oxygen saturation or ABGs  - Provide Smoking Cessation handout, if applicable  - Encourage broncho-pulmonary hygiene including cough, deep breathe, Incentive Spirometry  - Assess the need for suctioning and perform as needed  - Assess and instruct to report SOB or any respiratory difficulty  - Respiratory Therapy support as indicated  - Manage/alleviate anxiety  - Monitor for signs/symptoms of CO2 retention  Outcome: Progressing     Problem: METABOLIC/FLUID AND ELECTROLYTES - ADULT  Goal: Electrolytes maintained within normal limits  Description: INTERVENTIONS:  - Monitor labs and rhythm and assess patient for signs and symptoms of electrolyte imbalances  - Administer electrolyte replacement as ordered  - Monitor response to electrolyte replacements, including rhythm and repeat lab results as appropriate  - Fluid restriction as ordered  - Instruct patient on fluid and nutrition restrictions as appropriate  Outcome: Progressing  Goal: Hemodynamic stability and optimal renal function maintained  Description: INTERVENTIONS:  - Monitor labs and assess for signs and symptoms of volume excess or deficit  - Monitor intake, output and patient weight  - Monitor urine specific gravity, serum osmolarity and serum sodium as indicated or ordered  - Monitor response to interventions for patient's volume status, including labs, urine output, blood pressure (other measures as available)  - Encourage oral intake as appropriate  - Instruct patient on fluid and nutrition restrictions as appropriate  Outcome: Progressing     Problem:  SKIN/TISSUE INTEGRITY - ADULT  Goal: Skin integrity remains intact  Description: INTERVENTIONS  - Assess and document risk factors for pressure ulcer development  - Assess and document skin integrity  - Monitor for areas of redness and/or skin breakdown  - Initiate interventions, skin care algorithm/standards of care as needed  Outcome: Progressing  Goal: Incision(s), wounds(s) or drain site(s) healing without S/S of infection  Description: INTERVENTIONS:  - Assess and document risk factors for pressure ulcer development  - Assess and document skin integrity  - Assess and document dressing/incision, wound bed, drain sites and surrounding tissue  - Implement wound care per orders  - Initiate isolation precautions as appropriate  - Initiate Pressure Ulcer prevention bundle as indicated  Outcome: Progressing     Problem: HEMATOLOGIC - ADULT  Goal: Maintains hematologic stability  Description: INTERVENTIONS  - Assess for signs and symptoms of bleeding or hemorrhage  - Monitor labs and vital signs for trends  - Administer supportive blood products/factors, fluids and medications as ordered and appropriate  - Administer supportive blood products/factors as ordered and appropriate  Outcome: Progressing  Goal: Free from bleeding injury  Description: (Example usage: patient with low platelets)  INTERVENTIONS:  - Avoid intramuscular injections, enemas and rectal medication administration  - Ensure safe mobilization of patient  - Hold pressure on venipuncture sites to achieve adequate hemostasis  - Assess for signs and symptoms of internal bleeding  - Monitor lab trends  - Patient is to report abnormal signs of bleeding to staff  - Avoid use of toothpicks and dental floss  - Use electric shaver for shaving  - Use soft bristle tooth brush  - Limit straining and forceful nose blowing  Outcome: Progressing     Problem: MUSCULOSKELETAL - ADULT  Goal: Return mobility to safest level of function  Description: INTERVENTIONS:  -  Assess patient stability and activity tolerance for standing, transferring and ambulating w/ or w/o assistive devices  - Assist with transfers and ambulation using safe patient handling equipment as needed  - Ensure adequate protection for wounds/incisions during mobilization  - Obtain PT/OT consults as needed  - Advance activity as appropriate  - Communicate ordered activity level and limitations with patient/family  Outcome: Progressing

## 2025-06-09 NOTE — PLAN OF CARE
Problem: Patient Centered Care  Goal: Patient preferences are identified and integrated in the patient's plan of care  Description: Interventions:  - What would you like us to know as we care for you?   - Provide timely, complete, and accurate information to patient/family  - Incorporate patient and family knowledge, values, beliefs, and cultural backgrounds into the planning and delivery of care  - Encourage patient/family to participate in care and decision-making at the level they choose  - Honor patient and family perspectives and choices  Outcome: Progressing     Problem: Patient/Family Goals  Goal: Patient/Family Long Term Goal  Description: Patient's Long Term Goal:     Interventions:  -   - See additional Care Plan goals for specific interventions  Outcome: Progressing  Goal: Patient/Family Short Term Goal  Description: Patient's Short Term Goal:     Interventions:   -   - See additional Care Plan goals for specific interventions  Outcome: Progressing     Problem: CARDIOVASCULAR - ADULT  Goal: Maintains optimal cardiac output and hemodynamic stability  Description: INTERVENTIONS:  - Monitor vital signs, rhythm, and trends  - Monitor for bleeding, hypotension and signs of decreased cardiac output  - Evaluate effectiveness of vasoactive medications to optimize hemodynamic stability  - Monitor arterial and/or venous puncture sites for bleeding and/or hematoma  - Assess quality of pulses, skin color and temperature  - Assess for signs of decreased coronary artery perfusion - ex. Angina  - Evaluate fluid balance, assess for edema, trend weights  Outcome: Progressing  Goal: Absence of cardiac arrhythmias or at baseline  Description: INTERVENTIONS:  - Continuous cardiac monitoring, monitor vital signs, obtain 12 lead EKG if indicated  - Evaluate effectiveness of antiarrhythmic and heart rate control medications as ordered  - Initiate emergency measures for life threatening arrhythmias  - Monitor electrolytes and  administer replacement therapy as ordered  Outcome: Progressing     Problem: RESPIRATORY - ADULT  Goal: Achieves optimal ventilation and oxygenation  Description: INTERVENTIONS:  - Assess for changes in respiratory status  - Assess for changes in mentation and behavior  - Position to facilitate oxygenation and minimize respiratory effort  - Oxygen supplementation based on oxygen saturation or ABGs  - Provide Smoking Cessation handout, if applicable  - Encourage broncho-pulmonary hygiene including cough, deep breathe, Incentive Spirometry  - Assess the need for suctioning and perform as needed  - Assess and instruct to report SOB or any respiratory difficulty  - Respiratory Therapy support as indicated  - Manage/alleviate anxiety  - Monitor for signs/symptoms of CO2 retention  Outcome: Progressing     Problem: METABOLIC/FLUID AND ELECTROLYTES - ADULT  Goal: Electrolytes maintained within normal limits  Description: INTERVENTIONS:  - Monitor labs and rhythm and assess patient for signs and symptoms of electrolyte imbalances  - Administer electrolyte replacement as ordered  - Monitor response to electrolyte replacements, including rhythm and repeat lab results as appropriate  - Fluid restriction as ordered  - Instruct patient on fluid and nutrition restrictions as appropriate  Outcome: Progressing  Goal: Hemodynamic stability and optimal renal function maintained  Description: INTERVENTIONS:  - Monitor labs and assess for signs and symptoms of volume excess or deficit  - Monitor intake, output and patient weight  - Monitor urine specific gravity, serum osmolarity and serum sodium as indicated or ordered  - Monitor response to interventions for patient's volume status, including labs, urine output, blood pressure (other measures as available)  - Encourage oral intake as appropriate  - Instruct patient on fluid and nutrition restrictions as appropriate  Outcome: Progressing     Problem: SKIN/TISSUE INTEGRITY -  ADULT  Goal: Skin integrity remains intact  Description: INTERVENTIONS  - Assess and document risk factors for pressure ulcer development  - Assess and document skin integrity  - Monitor for areas of redness and/or skin breakdown  - Initiate interventions, skin care algorithm/standards of care as needed  Outcome: Progressing  Goal: Incision(s), wounds(s) or drain site(s) healing without S/S of infection  Description: INTERVENTIONS:  - Assess and document risk factors for pressure ulcer development  - Assess and document skin integrity  - Assess and document dressing/incision, wound bed, drain sites and surrounding tissue  - Implement wound care per orders  - Initiate isolation precautions as appropriate  - Initiate Pressure Ulcer prevention bundle as indicated  Outcome: Progressing     Problem: HEMATOLOGIC - ADULT  Goal: Maintains hematologic stability  Description: INTERVENTIONS  - Assess for signs and symptoms of bleeding or hemorrhage  - Monitor labs and vital signs for trends  - Administer supportive blood products/factors, fluids and medications as ordered and appropriate  - Administer supportive blood products/factors as ordered and appropriate  Outcome: Progressing  Goal: Free from bleeding injury  Description: (Example usage: patient with low platelets)  INTERVENTIONS:  - Avoid intramuscular injections, enemas and rectal medication administration  - Ensure safe mobilization of patient  - Hold pressure on venipuncture sites to achieve adequate hemostasis  - Assess for signs and symptoms of internal bleeding  - Monitor lab trends  - Patient is to report abnormal signs of bleeding to staff  - Avoid use of toothpicks and dental floss  - Use electric shaver for shaving  - Use soft bristle tooth brush  - Limit straining and forceful nose blowing  Outcome: Progressing     Problem: MUSCULOSKELETAL - ADULT  Goal: Return mobility to safest level of function  Description: INTERVENTIONS:  - Assess patient stability and  activity tolerance for standing, transferring and ambulating w/ or w/o assistive devices  - Assist with transfers and ambulation using safe patient handling equipment as needed  - Ensure adequate protection for wounds/incisions during mobilization  - Obtain PT/OT consults as needed  - Advance activity as appropriate  - Communicate ordered activity level and limitations with patient/family  Outcome: Progressing

## 2025-06-09 NOTE — HISTORICAL OFFICE NOTE
Facility [facility]  [address], [address], [place] [address]  [phone]      Hannah Boss  Progress Note  Demographics:  Name: Hannah Boss YOB: 1945  Age: 78, Female Medical Record No: [id number]  Visited Date/Time: 02/06/2024 01:30 PM    Chief Complaints  hospital f/u from chest pain  History of Present Illness  This is a patient of Dr. Buchanan who was seen once in the office in August.  She has a history of hypertension, chronic kidney disease, aortic stenosis, hyperlipidemia, left DVT in 2023, pulmonary hypertension, PSVT, leimyosarcoma and cigarette smoking.    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  She was started on diltiazem.  An echocardiogram showed preserved systolic function with ejection fraction 55 to 60%, grade 1 diastolic dysfunction and moderate aortic stenosis.  Patient did have a dental consult and had dental extractions and treatment for dental abscess during her hospitalization.  She comes today for follow-up.    Patient reports that she is feeling much better.  She denies any further tachycardia episodes.  She reports that she is tolerating diltiazem.  She is compliant with anticoagulation for her history of PE and DVT.  Cardiac risk factors Heavy tobacco smoker  Past Medical History  1.Paroxysmal SVT (supraventricular tachycardia)  2.Nicotine dependence with current use  3.History of DVT (deep vein thrombosis) - Hx  4.History of pulmonary embolus (PE)  5.Mild pulmonary hypertension  6.Edema, localized  7.Hypertension (HTN), primary  8.Hyperlipidemia, mixed  9.Exertional dyspnea  Family History  No significant family history noted.  Social History  Smoking status Heavy tobacco smoker  Tobacco usage - Yes (Heavy cigarette smoker (20-39 cigs/day) (finding))  Review of systems  Cardiovascular CANTU and Edema  No history of Chest pain, Palpitations, Syncope, PND,  Orthopnea and Claudication  Respiratory No history of SOB, Wheezing and Sputum  Hem/Lymphatic No history of Easy bruising, Blood clots, Hx of blood transfusion, Anemia and Bleeding problems  Physical Examination  Constitutional 100%o2  Vitals Left Arm Sitting  / 52 mmHg, Pulse rate 74 bpm, Regular, Height in 5' 3\", BMI: 30.8, Weight in 174 lbs (or) 79 kgs and BSA : 1.9 cc/m²  General Appearance No Acute Distress  Neck No JVD  Respiratory Lungs clear with normal breath sounds  Cardiovascular 3/6 murmur and Regular rhythm. Normal rate present. Normal and normal S1 and S2    Lower Extremities Edema 2+  Allergies  No known medication allergies.  Medications (Info obtained by: Verbal)  1.amoxicillin clavulanate 875-125 MG Oral Tab, Take 1 tablet by mouth 2 (two) times daily for 14 days.  2.apixaban 5 MG Oral Tab, Take 1 tablet (5 mg total) by mouth 2 (two) times daily.  3.atorvastatin 40 MG Oral Tab, Take 1 tablet (40 mg total) by mouth. AT BEDTIME  4.cholecalciferol 50 MCG (2000 UT) Oral Tab, Take 1 tablet (2,000 Units total) by mouth daily.  5.diazePAM 10 MG Oral Tab, Take 1 tablet (10 mg total) by mouth every 8 (eight) hours as needed (MAX 3/DAY).  6.dilTIAZem  MG Oral Capsule SR 24 Hr, Take 1 capsule (120 mg total) by mouth daily.  7.ferrous sulfate 325 (65 FE) MG Oral Tab EC, Take 1 tablet (325 mg total) by mouth See Admin Instructions. Three times a week  8.furosemide 20 MG Oral Tab, Take 1 tablet (20 mg total) by mouth 3 (three) times a week.  9.gabapentin 300 MG Oral Cap, Take 1 capsule (300 mg total) by mouth nightly.  10.meclizine 25 MG Oral Tab, Take 1 tablet (25 mg total) by mouth.  11.oxyCODONE-acetaminophen (PERCOCET)  MG Oral Tab, Take 1 tablet by mouth every 4 (four) hours as needed for Pain (max 6/day).  Impression  1.Paroxysmal SVT (supraventricular tachycardia)  2.Nicotine dependence with current use  3.History of DVT (deep vein thrombosis) - Hx  4.History of pulmonary embolus  (PE)  5.Edema, localized  6.Hypertension (HTN), primary  7.Hyperlipidemia, mixed  8.Exertional dyspnea  Assessment & Plan  1.  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  Patient was started on diltiazem which was continued at discharge.  Reports further episodes.  She will continue present management.  Discussed that if she has recurrent events would likely recommend EP consult for possible ablation.  2.  Thromboembolism-history of DVT and pulmonary embolism in the past.  Patient is on lifelong anticoagulation and denies bleeding problems.  3.  Lower extremity edema-likely secondary to DVT.  Patient takes furosemide daily unless she is out of the home.  Reports that she keeps her legs elevated and has a hospital bed.  4.  Hypertension-blood pressure controlled on diltiazem and diuretic.  5.  Nicotine dependence-current everyday smoker.  6.  Aortic stenosis-moderate on January echo with preserved systolic function.  Like the patient to follow-up with Dr. Buchanan in about 2 months.    Plan:  Continue present management  Call if you have recurrent episodes of tachycardia otherwise see Dr. Buchanan in 2 months  Future appointments  1.Follow up visit - Abdirahman Buchanan MD (2 Months)  Miscellaneous  1.Tobacco Cessation reviewed with the patient (Smoking cessation drug therapy (regime/therapy))  2.Weight monitoring (regime/therapy)  Patient instructions  Continue present management  Call if you have recurrent episodes of tachycardia otherwise see Dr. Buchanan in 2 months    Care Providers: Olivia PÉREZ and Esme ELLER  Electronically Authenticated by  Olivia PÉREZ  02/06/2024 02:01:01 PM  Disclaimer: Components of this note were documented using voice recognition system and are subject to errors not corrected at proofreading. Contact the author of this note for any clarifications.

## 2025-06-09 NOTE — DISCHARGE INSTRUCTIONS
Please follow-up with your PCP regarding your medicine changes and elevated Alkaline Phosphatase testing    Home Health: Sometimes managing your health at home requires assistance.  The [facility] team has recognized your preference to use [facility]  A representative from the home health agency will contact you or your family to schedule your first visit.      [facility]  [address] [place] [address]  Phone: [phone]  Fax: [phone]
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
## 2025-06-10 LAB
ALBUMIN SERPL-MCNC: 3.7 G/DL (ref 3.2–4.8)
ALBUMIN/GLOB SERPL: 1.5 {RATIO} (ref 1–2)
ALP LIVER SERPL-CCNC: 165 U/L (ref 55–142)
ALT SERPL-CCNC: 26 U/L (ref 10–49)
ANION GAP SERPL CALC-SCNC: 5 MMOL/L (ref 0–18)
APTT PPP: 33.9 SECONDS (ref 23–36)
APTT PPP: 85 SECONDS (ref 23–36)
APTT PPP: 93.3 SECONDS (ref 23–36)
AST SERPL-CCNC: 29 U/L (ref ?–34)
BASOPHILS # BLD AUTO: 0.04 X10(3) UL (ref 0–0.2)
BASOPHILS NFR BLD AUTO: 0.8 %
BILIRUB SERPL-MCNC: 0.3 MG/DL (ref 0.2–1.1)
BUN BLD-MCNC: 23 MG/DL (ref 9–23)
BUN/CREAT SERPL: 14.1 (ref 10–20)
CALCIUM BLD-MCNC: 8.8 MG/DL (ref 8.7–10.4)
CHLORIDE SERPL-SCNC: 113 MMOL/L (ref 98–112)
CO2 SERPL-SCNC: 22 MMOL/L (ref 21–32)
CREAT BLD-MCNC: 1.63 MG/DL (ref 0.55–1.02)
DEPRECATED RDW RBC AUTO: 55.3 FL (ref 35.1–46.3)
EGFRCR SERPLBLD CKD-EPI 2021: 32 ML/MIN/1.73M2 (ref 60–?)
EOSINOPHIL # BLD AUTO: 0.14 X10(3) UL (ref 0–0.7)
EOSINOPHIL NFR BLD AUTO: 2.7 %
ERYTHROCYTE [DISTWIDTH] IN BLOOD BY AUTOMATED COUNT: 16.6 % (ref 11–15)
GLOBULIN PLAS-MCNC: 2.4 G/DL (ref 2–3.5)
GLUCOSE BLD-MCNC: 89 MG/DL (ref 70–99)
GLUCOSE BLDC GLUCOMTR-MCNC: 248 MG/DL (ref 70–99)
HCT VFR BLD AUTO: 28.9 % (ref 35–48)
HGB BLD-MCNC: 9 G/DL (ref 12–16)
IMM GRANULOCYTES # BLD AUTO: 0.01 X10(3) UL (ref 0–1)
IMM GRANULOCYTES NFR BLD: 0.2 %
LYMPHOCYTES # BLD AUTO: 1.37 X10(3) UL (ref 1–4)
LYMPHOCYTES NFR BLD AUTO: 26.9 %
MAGNESIUM SERPL-MCNC: 1.9 MG/DL (ref 1.6–2.6)
MCH RBC QN AUTO: 28.3 PG (ref 26–34)
MCHC RBC AUTO-ENTMCNC: 31.1 G/DL (ref 31–37)
MCV RBC AUTO: 90.9 FL (ref 80–100)
MONOCYTES # BLD AUTO: 0.37 X10(3) UL (ref 0.1–1)
MONOCYTES NFR BLD AUTO: 7.3 %
NEUTROPHILS # BLD AUTO: 3.17 X10 (3) UL (ref 1.5–7.7)
NEUTROPHILS # BLD AUTO: 3.17 X10(3) UL (ref 1.5–7.7)
NEUTROPHILS NFR BLD AUTO: 62.1 %
OSMOLALITY SERPL CALC.SUM OF ELEC: 293 MOSM/KG (ref 275–295)
PLATELET # BLD AUTO: 220 10(3)UL (ref 150–450)
PLATELET # BLD AUTO: 220 10(3)UL (ref 150–450)
POTASSIUM SERPL-SCNC: 4.6 MMOL/L (ref 3.5–5.1)
PROT SERPL-MCNC: 6.1 G/DL (ref 5.7–8.2)
RBC # BLD AUTO: 3.18 X10(6)UL (ref 3.8–5.3)
SODIUM SERPL-SCNC: 140 MMOL/L (ref 136–145)
WBC # BLD AUTO: 5.1 X10(3) UL (ref 4–11)

## 2025-06-10 PROCEDURE — 99223 1ST HOSP IP/OBS HIGH 75: CPT | Performed by: INTERNAL MEDICINE

## 2025-06-10 RX ORDER — HYDRALAZINE HYDROCHLORIDE 20 MG/ML
5 INJECTION INTRAMUSCULAR; INTRAVENOUS ONCE
Status: COMPLETED | OUTPATIENT
Start: 2025-06-10 | End: 2025-06-10

## 2025-06-10 RX ORDER — HEPARIN SODIUM 1000 [USP'U]/ML
5000 INJECTION, SOLUTION INTRAVENOUS; SUBCUTANEOUS ONCE
Status: COMPLETED | OUTPATIENT
Start: 2025-06-11 | End: 2025-06-11

## 2025-06-10 RX ORDER — FUROSEMIDE 10 MG/ML
40 INJECTION INTRAMUSCULAR; INTRAVENOUS ONCE
Status: COMPLETED | OUTPATIENT
Start: 2025-06-10 | End: 2025-06-10

## 2025-06-10 NOTE — PAYOR COMM NOTE
--------------  ADMISSION REVIEW     Payor: [employer]URE[employer]  Subscriber #:  [id number]  Authorization Number: [id number]    Admit date: 6/9/25  Admit time:  6:14 AM       ED Provider Notes      HPI  Pt is 78 yo F who arrives by EMS for generalized chest pain that started at rest immediately pta. Currently rates 9/10 with no relief with aspirin. Feels short of breath.     ED Triage Vitals   BP 06/09/25 0203 116/78   Pulse 06/09/25 0153 (!) 160   Resp 06/09/25 0153 (!) 30   Temp 06/09/25 0153 98.1 °F (36.7 °C)   Temp src 06/09/25 0153 Oral   SpO2 06/09/25 0153 98 %   O2 Device 06/09/25 0153 None (Room air)     Current Vitals:   Vital Signs  BP: (!) 165/73  Pulse: 63  Resp: 25  Temp: 98.1 °F (36.7 °C)  Temp src: Oral  MAP (mmHg): 100    Oxygen Therapy  SpO2: 98 %  O2 Device: None (Room air)    Physical Exam  GENERAL: moderate distress, awake and alert  HEENT: EOMI, PERRL, MM dry  Neck: supple, no jvd  CV: tachycardic, no murmurs  Resp: CTAB, no wheezes or retractions  Extremities: +edema BLE  Neuro: CN intact, normal speech, no focal deficits  SKIN: warm, dry, no rashes    Labs Reviewed   BASIC METABOLIC PANEL (8) - Abnormal; Notable for the following components:       Result Value    Glucose 199 (*)     Chloride 113 (*)     BUN 29 (*)     Creatinine 1.84 (*)     Calculated Osmolality 309 (*)     eGFR-Cr 28 (*)     All other components within normal limits   CBC WITH DIFFERENTIAL WITH PLATELET - Abnormal; Notable for the following components:    RBC 3.79 (*)     HGB 10.8 (*)     HCT 33.3 (*)     RDW-SD 53.1 (*)     RDW 16.8 (*)     All other components within normal limits   TROPONIN I HIGH SENSITIVITY - Abnormal; Notable for the following components:    Troponin I (High Sensitivity) 173 (*)      EKG    Rate, intervals and axes as noted on EKG Report.  Rate: 155  Rhythm: SVT  Reading: abnormal EKG      Admission disposition: 6/9/2025  5:08 AM    Disposition and Plan     Clinical Impression:  1. Acute chest pain    2. SVT  (supraventricular tachycardia) (HCC)    3. Elevated troponin       Disposition:  Admit  6/9/2025  5:08 am    H&P         CC:       Chief Complaint   Patient presents with    Tachycardia    Chest Pain         PCP: Diana Lopez DO     History of Present Illness: Patient is a 79 year old female with spinal stenosis, aortic stenosis, HFpEF, CKD 4, GERD, anemia, HLD, bradycardia, recurrent hyperkalemia, who presented with 9/10 CP with associated SOB prior to arrival with relief after aspirin. Admitted for SVT with resolution after diltiazem.      Patient reports of chest palpitations and pain prior to arrival with improvement with aspirin as discussed above.  Some reports of intermittent neurolysed abdominal discomfort and pain over the past week, improved currently  1 episode of diarrhea today, brown liquidy in appearance, appears to be chronic intermittent.  Patient reports good medication compliance  Denies N/V, recent changes in diet, recent changes in health, recent sickness.  Patient states she was at rest when symptoms began     ED course:  In ED, VS were remarkable for , hypertension 160s over 70s  Resolved with diltiazem 25mg   CBC with Hgb 10.8, increased for previous   PTT WNL  BMP Glucose 199, improved creatinine 1.84 from previous 2.18 on 5/2025, GFR improved to 28 from 5/2025  Troponin 20 initially, repeat 173 elevated  Nitro ointment given CXR WNL, no acute process  Cardiology consulted, heparin initiated  Hydralazine given 5 mg  Initial EKG with SVT, return to normal sinus with left atrial enlargement on subsequent EKG after diltiazem      Cardiovascular:      Rate and Rhythm: Normal rate and regular rhythm.      Heart sounds: Murmur heard.      Harsh midsystolic murmur is present with a grade of 5/6 at the upper right sternal border radiating to the neck.   Pulmonary:      Effort: Pulmonary effort is normal.      Breath sounds: Normal breath sounds.   Abdominal:      General: Abdomen is flat.  Bowel sounds are normal.      Palpations: Abdomen is soft.      Comments: Mild diffuse abdominal tenderness   Skin:     General: Skin is warm and dry.      Capillary Refill: Capillary refill takes less than 2 seconds.      Comments: Significant bilateral lower extremity venous stasis, greater induration on right anterior lower shin and left posterior below calf with signs of previous wounds healing   Neurological:      General: No focal deficit present.      Mental Status: She is alert and oriented to person, place, and time.      Lab 06/09/25  0159   WBC 5.2   HGB 10.8*   MCV 87.9   .0      Lab 06/09/25  0200      K 4.9   *   CO2 21.0   BUN 29*   CREATSERUM 1.84*   *   CA 9.2       ASSESSMENT / PLAN:    79 year old female with spinal stenosis, aortic stenosis, HFpEF, CKD 4, GERD, anemia, HLD, bradycardia, recurrent hyperkalemia, who presented with 9/10 CP with associated SOB prior to arrival with relief after aspirin. Admitted for SVT with resolution after diltiazem.     Active problems:      SVT  NSTEMI  Patient improved with diltiazem in ED  Troponin elevated 100s as above  EKG with no ST changes elevations  Heparin initiated in ED, nitro given, aspirin prior to arrival  Statin, hydralazine, Imdur prior to arrival     Plan:   Continue heparin  N.p.o. except for meds  Consider beta-blocker, oxygen as needed  Follow-up cardiology recommendations  Follow-up TTE     HFpEF  Aortic stenosis  LVEF 60 to 65% 9/2024  History of moderate aortic stenosis, significant on exam  Patient with improving venous stasis per patient  TTE pending     History of DVT  Venous stasis  Chronically anticoagulated with Eliquis     Generalized abdominal discomfort  Diarrhea, chronic intermittent  Unclear, unlikely due to poor perfusion  No significant discomfort at this time, continue to monitor.     Chronic problems:      CKD 4 -avoid nephrotoxic medications, limit contrast use  Chronic hyperkalemia -CTM at this  time, no hyperkalemia noted  GERD -PPI  Hyperlipidemia -continue statin     FN:  - IVF: None  - Diet: N.p.o., resume cardiac diet when appropriate     DVT Prophy: Heparin             6/10/25  Assessment/Plan:      79 year old female with spinal stenosis, aortic stenosis, HFpEF, CKD 4, GERD, anemia, HLD, bradycardia, recurrent hyperkalemia, who presented with 9/10 CP with associated SOB prior to arrival with relief after aspirin. Admitted for SVT with resolution after diltiazem.      SVT, known history AVNRT   NSTEMI  Patient improved with diltiazem in ED  Troponin peaked 371 on 6/9 12:4, likely demand  EKG with no ST changes elevations  Heparin initiated in ED, nitro given, aspirin prior to arrival  Statin, hydralazine, Imdur prior to arrival     Plan:   Continue heparin  N.p.o. except for meds  Consider beta-blocker, oxygen as needed  Follow-up cardiology recommendations  PO diltiazem ER 120mg daily   Follow-up TTE     HFpEF  Aortic stenosis  LVEF 60 to 65% 9/2024  LVEF 60-65% 6/9/25, Moderate to severe aortic stenosis with some mild regurgitation  History of moderate aortic stenosis, significant on exam  Patient with improving venous stasis per patient        History of DVT  Venous stasis  Chronically anticoagulated with Eliquis     Generalized abdominal discomfort  Diarrhea, chronic intermittent  Unclear, unlikely due to poor perfusion  No significant discomfort at this time, continue to monitor.  Elevated Alk phos in setting of abdominal discomfort and right upper quadrant pain.  Will obtain right upper quadrant ultrasound at this time, discussed at length with the patient options if gallstone/cholecystitis found.  Patient at this moment declines any surgical intervention, but is open to other management at this time and would like to proceed with ultrasound.     CKD 4  Slightly increased creatinine from yesterday 1.63, GFR continues at roughly 32-33  Consult nephrology today given some continued lower extremity  edema in setting of heart failure and CKD  Continue to avoid nephrotoxic medications, contrast     Chronic problems:      Chronic hyperkalemia -CTM at this time, no hyperkalemia noted  GERD -PPI  Hyperlipidemia -continue statin     FN:  - IVF: None  - Diet: N.p.o., resume cardiac diet when appropriate         Subjective:   Patient reports continued abdominal pain and discomfort.      Temp:  [97.9 °F (36.6 °C)-98.4 °F (36.9 °C)] 98.4 °F (36.9 °C)  Pulse:  [65-78] 73  Resp:  [18-20] 18  BP: (152-193)/(56-76) 152/56  SpO2:  [96 %-100 %] 98 %     Cardiovascular:      Rate and Rhythm: Normal rate and regular rhythm.      Heart sounds: Murmur heard.      Harsh midsystolic murmur is present with a grade of 5/6 at the upper right sternal border radiating to the neck.   Pulmonary:      Effort: Pulmonary effort is normal.      Breath sounds: Normal breath sounds.   Abdominal:      General: Abdomen is flat. Bowel sounds are normal.      Palpations: Abdomen is soft.      Tenderness: There is generalized abdominal tenderness and tenderness in the right upper quadrant.      Comments: Mild diffuse abdominal tenderness, worse in right upper quadrant.   Skin:     General: Skin is warm and dry.      Capillary Refill: Capillary refill takes less than 2 seconds.      Comments: Significant bilateral lower extremity venous stasis, greater induration on right anterior lower shin and left posterior below calf with signs of previous wounds healing   Neurological:      General: No focal deficit present.      Mental Status: She is alert and oriented to person, place, and time.      Lab 06/09/25  0159 06/09/25  0955 06/10/25  0645   RBC 3.79* 3.25* 3.18*   HGB 10.8* 9.3* 9.0*   HCT 33.3* 29.3* 28.9*   MCV 87.9 90.2 90.9   MCH 28.5 28.6 28.3   MCHC 32.4 31.7 31.1   RDW 16.8* 16.4* 16.6*   NEPRELIM 3.55  --  3.17   WBC 5.2 5.6 5.1   .0 223.0 220.0  220.0      Lab 06/09/25  0200 06/09/25  1008   * 85   BUN 29* 21   CREATSERUM 1.84*  1.60*   EGFRCR 28* 33*   CA 9.2 8.8    145   K 4.9 4.8   * 116*   CO2 21.0 22.0        CARDIOLOGY  Assessment/Plan:     Paroxysmal SVT  AVNRT recurrent   - presented with SVT with HR in 160's associated wit CP  - CP resolved after termination of SVT  - pt had plan for ablation if recurrent AVNRT  - currently on diltiazem 120mg daily   - NSR with Hr in 70-80's     Elevated high sensitivity trop  - trop -265-348  - echo with LVEF 60-65%, no RWMA, G2DD, RA/RA dilated. Elevated LA pressure, moderate to severe aortic stenosis and mild aortic regurgitation. Mean systolic gradient  29mmHg, valve area 1.2cm^2, VTI 0.68cm^2/m^2     Moderate to severe Aortic stenosis      HFpEF  - echo as above  - mild edema to BLE, likely multifactorial from HFpEF and stage IV CKD.   - volume expanded on exam   - on isosorbide, hydralazine      CKD  - baseline creatinine around 2-2.3  - may benefit from nephrology consult      HTN  - on hydralazine, isosorbide, diltiazem      HLD  - on statin     H/o DVT/PE  - historically on eliquis now on heparin gtt     Chronic anemia  - baseline around 8  - hgb of 9 today     Plan;  - continue AC with heparin gtt  - continue isosorbide, diltiazem  - will increase the hydralazine dose to 75mg daily  - will give her a dose of lasix today   -  HF order set placed, daily weight and strict intake and output monitoring     MEDICATIONS ADMINISTERED IN LAST 1 DAY:  atorvastatin (Lipitor) tab 40 mg       Date Action Dose Route User    6/9/2025 2049 Given 40 mg Oral SestoHarini avalos, RN          heparin (Porcine) 58670 units/250mL infusion ACS/AFIB CONTINUOUS       Date Action Dose Route User    6/10/2025 0829 Hi-Risk Rate/Dose Change 700 Units/hr Intravenous Christina Lees RN    6/10/2025 0724 Hi-Risk Rate/Dose Change 800 Units/hr Intravenous Harini Edwards, RN    6/10/2025 0008 Hi-Risk Rate/Dose Change 800 Units/hr Intravenous Harini Edwards, RN    6/9/2025 1533 New Bag 700  Units/hr Intravenous Christina Lees RN          dilTIAZem ER (Dilacor XR) 24 hr cap 120 mg       Date Action Dose Route User    6/10/2025 0818 Given 120 mg Oral Christina Lees RN          furosemide (Lasix) 10 mg/mL injection 40 mg       Date Action Dose Route User    6/10/2025 1223 Given 40 mg Intravenous Christina Lees RN          gabapentin (Neurontin) cap 300 mg       Date Action Dose Route User    6/9/2025 2049 Given 300 mg Oral Sestoso, Harini Vaughn RN          hydrALAzine (Apresoline) 20 mg/mL injection 5 mg       Date Action Dose Route User    6/10/2025 0519 Given 5 mg Intravenous Grace, Harini Vaughn RN          hydrALAZINE (Apresoline) tab 50 mg       Date Action Dose Route User    6/10/2025 0818 Given 50 mg Oral Christina Lees RN    6/9/2025 2049 Given 50 mg Oral Sestoso, Harini Vaughn RN    6/9/2025 1638 Given 50 mg Oral Christina Lees RN          isosorbide mononitrate ER (Imdur) 24 hr tab 60 mg       Date Action Dose Route User    6/10/2025 0818 Given 60 mg Oral Christina Lees RN          loperamide (Imodium) cap 2 mg       Date Action Dose Route User    6/9/2025 2358 Given 2 mg Oral Sestoso, Harini Vaughn RN          magnesium oxide (Mag-Ox) tab 400 mg       Date Action Dose Route User    6/10/2025 0818 Given 400 mg Oral Christina Lees RN          oxyCODONE-acetaminophen (Percocet)  MG per tab 1 tablet       Date Action Dose Route User    6/10/2025 0732 Given 1 tablet Oral Ryantobailey, Harini Vaughn RN    6/9/2025 2106 Given 1 tablet Oral Grace, Harini Vaughn RN          pantoprazole (Protonix) DR tab 40 mg       Date Action Dose Route User    6/10/2025 0732 Given 40 mg Oral Sestoso, Harini Vaughn RN    6/9/2025 1638 Given 40 mg Oral Christina Lese RN          cholecalciferol (Vitamin D3) tab 2,000 Units       Date Action Dose Route User    6/10/2025 0819 Given 2,000 Units Oral Christina Lees, RN            Vitals (last day)       Date/Time Temp Pulse Resp BP SpO2 Weight O2 Device O2  Flow Rate (L/min) [facility]    06/10/25 1222 -- 66 18 122/53 98 % -- None (Room air) -- FV    06/10/25 0814 98.2 °F (36.8 °C) 76 18 163/79 94 % -- None (Room air) -- FV    06/10/25 0730 -- 73 18 152/56 98 % -- None (Room air) -- SS    06/10/25 0502 98.4 °F (36.9 °C) 69 18 164/59 96 % -- None (Room air) -- SS    06/09/25 2043 98.1 °F (36.7 °C) 72 18 163/71 98 % -- None (Room air) -- SS    06/09/25 1633 98 °F (36.7 °C) 73 18 170/76 98 % -- None (Room air) -- FV    06/09/25 1028 97.9 °F (36.6 °C) 65 20 193/70 100 % -- None (Room air) -- FV    06/09/25 0626 97.7 °F (36.5 °C) 73 20 178/66 96 % -- None (Room air) -- FW    06/09/25 0551 -- 60 25 175/70 -- -- None (Room air) -- FH    06/09/25 0515 -- 61 27 177/76 97 % -- None (Room air) -- ER    06/09/25 0445 -- 63 25 165/73 98 % -- None (Room air) -- ER    06/09/25 0430 -- 63 23 161/61 99 % -- None (Room air) -- ER    06/09/25 0415 -- 68 22 160/64 98 % -- None (Room air) -- ER    06/09/25 0400 -- 69 27 136/61 99 % -- None (Room air) -- ER    06/09/25 0153 98.1 °F (36.7 °C) 160 30 -- 98 % -- None (Room air) -- ER

## 2025-06-10 NOTE — DIETARY NOTE
NUTRITION EDUCATION NOTE     Received consult for \"heart failure diet education.\" Verbally reviewed basic low sodium diet restrictions. Provided with Low Sodium Nutrition Therapy handout to reinforce. Receptive to instruction. May benefit from outpt f/u. Expect good compliance.      Sneha Strauss, MS, RD, LDN  Clinical Dietitian  [id number]

## 2025-06-10 NOTE — PROGRESS NOTES
Progress Note  Hannah Boss Patient Status:  Inpatient    1945 MRN [id number]   Location [facility] 3W/SW Attending Constantin Herman MD   Hosp Day # 1 PCP Diana Lopez DO     Subjective:  Pt resting comfortably in chair. Denies any chest pain, but has SOB with activities.     Objective:  BP (!) 163/79 (BP Location: Right arm)   Pulse 76   Temp 98.2 °F (36.8 °C) (Oral)   Resp 18   Wt 162 lb 11.2 oz (73.8 kg)   SpO2 94%   BMI 28.82 kg/m²     Telemetry: NSR  HR 70-80's      Intake/Output:    Intake/Output Summary (Last 24 hours) at 6/10/2025 1040  Last data filed at 6/10/2025 0826  Gross per 24 hour   Intake 589.13 ml   Output --   Net 589.13 ml       Last 3 Weights   25 0620 162 lb 11.2 oz (73.8 kg)   25 1916 164 lb (74.4 kg)   04/15/25 1418 170 lb (77.1 kg)       Labs:  Recent Labs   Lab 25  0200 25  1008 06/10/25  0718   * 85 89   BUN 29* 21 23   CREATSERUM 1.84* 1.60* 1.63*   EGFRCR 28* 33* 32*   CA 9.2 8.8 8.8    145 140   K 4.9 4.8 4.6   * 116* 113*   CO2 21.0 22.0 22.0     Recent Labs   Lab 25  0159 25  0955 06/10/25  0645   RBC 3.79* 3.25* 3.18*   HGB 10.8* 9.3* 9.0*   HCT 33.3* 29.3* 28.9*   MCV 87.9 90.2 90.9   MCH 28.5 28.6 28.3   MCHC 32.4 31.7 31.1   RDW 16.8* 16.4* 16.6*   NEPRELIM 3.55  --  3.17   WBC 5.2 5.6 5.1   .0 223.0 220.0  220.0         Recent Labs   Lab 25  0401 25  1008 25  1400   TROPHS 173* 371* 348*     Lab Results   Component Value Date    INR 1.30 (H) 2024    INR 1.21 (H) 2024    INR 1.11 10/07/2023       Diagnostics:     Review of Systems   Respiratory:  Positive for shortness of breath. Negative for cough, hemoptysis, sputum production and wheezing.    Cardiovascular: Negative.          Physical Exam:    Physical Exam  Vitals reviewed.   Constitutional:       General: She is not in acute distress.     Appearance: She is obese. She is not ill-appearing.   Neck:       Vascular: No JVD.   Cardiovascular:      Rate and Rhythm: Normal rate and regular rhythm.      Heart sounds: Murmur heard.      No friction rub. No gallop.      Comments: Mild edema  Musculoskeletal:      Cervical back: Normal range of motion.      Right lower leg: Edema present.      Left lower leg: Edema present.   Skin:     General: Skin is warm.      Comments: Pruney skin   Neurological:      Mental Status: She is alert and oriented to person, place, and time.       Medications:  Scheduled Medications[1]  Medication Infusions[2]    Assessment/Plan:    Paroxysmal SVT  AVNRT recurrent   - presented with SVT with HR in 160's associated wit CP  - CP resolved after termination of SVT  - pt had plan for ablation if recurrent AVNRT  - currently on diltiazem 120mg daily   - NSR with Hr in 70-80's    Elevated high sensitivity trop  - trop -[id number]  - echo with LVEF 60-65%, no RWMA, G2DD, RA/RA dilated. Elevated LA pressure, moderate to severe aortic stenosis and mild aortic regurgitation. Mean systolic gradient  29mmHg, valve area 1.2cm^2, VTI 0.68cm^2/m^2    Moderate to severe Aortic stenosis     HFpEF  - echo as above  - mild edema to BLE, likely multifactorial from HFpEF and stage IV CKD.   - volume expanded on exam   - on isosorbide, hydralazine     CKD  - baseline creatinine around 2-2.3  - may benefit from nephrology consult     HTN  - on hydralazine, isosorbide, diltiazem     HLD  - on statin    H/o DVT/PE  - historically on eliquis now on heparin gtt    Chronic anemia  - baseline around 8  - hgb of 9 today    Plan;  - continue AC with heparin gtt  - continue isosorbide, diltiazem  - will increase the hydralazine dose to 75mg daily  - will give her a dose of lasix today   -  HF order set placed, daily weight and strict intake and output monitoring     Plan discussed with pt and RN   ELISA Matias  [facility]  6/10/2025  10:40  AM      =======================================================  Note reviewed and labs reviewed.  Agree with above assessment and plan.  Tele with no further events. Remains in SR.   In regards to AVNRT, continue medical management for now with PO diltiazem 120mg daily   Continue tele monitoring.   I have personally performed the medical decision making in its entirety.   Alex Green DO           [1]    cholecalciferol  2,000 Units Oral Daily    pantoprazole  40 mg Oral BID AC    magnesium oxide  400 mg Oral Daily    atorvastatin  40 mg Oral Nightly    isosorbide mononitrate ER  60 mg Oral Daily    gabapentin  300 mg Oral Nightly    hydrALAZINE  50 mg Oral TID    ferrous sulfate  325 mg Oral Once per day on Tuesday Thursday Saturday    sodium zirconium cyclosilicate  10 g Oral Daily    [Held by provider] apixaban  5 mg Oral BID    dilTIAZem ER  120 mg Oral Daily   [2]    continuous dose heparin 700 Units/hr (06/10/25 0829)

## 2025-06-10 NOTE — CM/SW NOTE
Received MDO for HH RN.  Please see SW note from yesterday.    PLAN: Home w/ [facility] RN - pending med clear        SW/CM to remain available for support and/or discharge planning.         MS ChloeW, LSW [id number]

## 2025-06-10 NOTE — PROGRESS NOTES
[facility] Hospitalist Progress Note     CC: Hospital Follow up    PCP: Diana Lopez DO       Assessment/Plan:     Principal Problem:    Acute chest pain  Active Problems:    Elevated troponin    SVT (supraventricular tachycardia) (HCC)    79 year old female with spinal stenosis, aortic stenosis, HFpEF, CKD 4, GERD, anemia, HLD, bradycardia, recurrent hyperkalemia, who presented with 9/10 CP with associated SOB prior to arrival with relief after aspirin. Admitted for SVT with resolution after diltiazem.     Active problems:      SVT, known history AVNRT   NSTEMI  Patient improved with diltiazem in ED  Troponin peaked 371 on 6/9 12:4, likely demand  EKG with no ST changes elevations  Heparin initiated in ED, nitro given, aspirin prior to arrival  Statin, hydralazine, Imdur prior to arrival     Plan:   Continue heparin  N.p.o. except for meds  Consider beta-blocker, oxygen as needed  Follow-up cardiology recommendations  PO diltiazem ER 120mg daily   Follow-up TTE     HFpEF  Aortic stenosis  LVEF 60 to 65% 9/2024  LVEF 60-65% 6/9/25, Moderate to severe aortic stenosis with some mild regurgitation  History of moderate aortic stenosis, significant on exam  Patient with improving venous stasis per patient      History of DVT  Venous stasis  Chronically anticoagulated with Eliquis     Generalized abdominal discomfort  Diarrhea, chronic intermittent  Unclear, unlikely due to poor perfusion  No significant discomfort at this time, continue to monitor.  Elevated Alk phos in setting of abdominal discomfort and right upper quadrant pain.  Will obtain right upper quadrant ultrasound at this time, discussed at length with the patient options if gallstone/cholecystitis found.  Patient at this moment declines any surgical intervention, but is open to other management at this time and would like to proceed with ultrasound.     CKD 4  Slightly increased creatinine from yesterday 1.63, GFR continues at roughly  32-33  Consult nephrology today given some continued lower extremity edema in setting of heart failure and CKD  Continue to avoid nephrotoxic medications, contrast    Chronic problems:      Chronic hyperkalemia -CTM at this time, no hyperkalemia noted  GERD -PPI  Hyperlipidemia -continue statin     FN:  - IVF: None  - Diet: N.p.o., resume cardiac diet when appropriate     DVT Prophy: Heparin  Lines: Peripheral IV     Dispo: pending clinical course  Code status: Full    Questions/concerns were discussed with patient and/or family by bedside.    Total Time spent with patient and coordinating care: Over 30 minutes    Thank You,  Constantin Herman MD    Hospitalist with [facility]     Subjective:     Improved pain, no palpitations or chest pain at this time.  Patient reports continued abdominal pain and discomfort.  Discussed plan at length with patient.  All questions answered discussed.  Discussions regarding gallbladder listed above.    OBJECTIVE:    Blood pressure 152/56, pulse 73, temperature 98.4 °F (36.9 °C), temperature source Oral, resp. rate 18, weight 162 lb 11.2 oz (73.8 kg), SpO2 98%, not currently breastfeeding.    Temp:  [97.9 °F (36.6 °C)-98.4 °F (36.9 °C)] 98.4 °F (36.9 °C)  Pulse:  [65-78] 73  Resp:  [18-20] 18  BP: (152-193)/(56-76) 152/56  SpO2:  [96 %-100 %] 98 %      Intake/Output:    Intake/Output Summary (Last 24 hours) at 6/10/2025 0732  Last data filed at 6/10/2025 0647  Gross per 24 hour   Intake 469.13 ml   Output --   Net 469.13 ml       Last 3 Weights   06/09/25 0620 162 lb 11.2 oz (73.8 kg)   05/12/25 1916 164 lb (74.4 kg)   04/15/25 1418 170 lb (77.1 kg)       Exam   Physical Exam  Vitals reviewed.   Constitutional:       Appearance: Normal appearance.   Cardiovascular:      Rate and Rhythm: Normal rate and regular rhythm.      Heart sounds: Murmur heard.      Harsh midsystolic murmur is present with a grade of 5/6 at the upper right sternal border radiating to the neck.    Pulmonary:      Effort: Pulmonary effort is normal.      Breath sounds: Normal breath sounds.   Abdominal:      General: Abdomen is flat. Bowel sounds are normal.      Palpations: Abdomen is soft.      Tenderness: There is generalized abdominal tenderness and tenderness in the right upper quadrant.      Comments: Mild diffuse abdominal tenderness, worse in right upper quadrant.   Skin:     General: Skin is warm and dry.      Capillary Refill: Capillary refill takes less than 2 seconds.      Comments: Significant bilateral lower extremity venous stasis, greater induration on right anterior lower shin and left posterior below calf with signs of previous wounds healing   Neurological:      General: No focal deficit present.      Mental Status: She is alert and oriented to person, place, and time.             Data Review:       Labs:     Recent Labs   Lab 06/09/25  0159 06/09/25  0955 06/10/25  0645   RBC 3.79* 3.25* 3.18*   HGB 10.8* 9.3* 9.0*   HCT 33.3* 29.3* 28.9*   MCV 87.9 90.2 90.9   MCH 28.5 28.6 28.3   MCHC 32.4 31.7 31.1   RDW 16.8* 16.4* 16.6*   NEPRELIM 3.55  --  3.17   WBC 5.2 5.6 5.1   .0 223.0 220.0  220.0         Recent Labs   Lab 06/09/25  0200 06/09/25  1008   * 85   BUN 29* 21   CREATSERUM 1.84* 1.60*   EGFRCR 28* 33*   CA 9.2 8.8    145   K 4.9 4.8   * 116*   CO2 21.0 22.0       No results for input(s): \"ALT\", \"AST\", \"ALB\", \"AMYLASE\", \"LIPASE\", \"LDH\" in the last 168 hours.    Invalid input(s): \"ALPHOS\", \"TBIL\", \"DBIL\", \"TPROT\"      Imaging:  XR CHEST AP PORTABLE  (CPT=71045)  Result Date: 6/9/2025  CONCLUSION:  Stable chest demonstrate borderline cardiomegaly without radiographically evident acute intrathoracic process.    A preliminary report was issued by the [email or web address] teleradiology service. There are no major discrepancies.   Dictated by (CST): Go Choudhary MD on 6/09/2025 at 1:25 PM     Finalized by (CST): Go Choudhary MD on 6/09/2025 at 1:26 PM               Meds:     Scheduled Medications[1]  Medication Infusions[2]  PRN Medications[3]           [1]    cholecalciferol  2,000 Units Oral Daily    pantoprazole  40 mg Oral BID AC    magnesium oxide  400 mg Oral Daily    atorvastatin  40 mg Oral Nightly    isosorbide mononitrate ER  60 mg Oral Daily    gabapentin  300 mg Oral Nightly    hydrALAZINE  50 mg Oral TID    ferrous sulfate  325 mg Oral Once per day on Tuesday Thursday Saturday    sodium zirconium cyclosilicate  10 g Oral Daily    [Held by provider] apixaban  5 mg Oral BID    dilTIAZem ER  120 mg Oral Daily   [2]    continuous dose heparin 800 Units/hr (06/10/25 0724)   [3]   meclizine    trolamine salicyliate    oxyCODONE-acetaminophen    loperamide

## 2025-06-11 ENCOUNTER — APPOINTMENT (OUTPATIENT)
Dept: ULTRASOUND IMAGING | Facility: HOSPITAL | Age: 80
End: 2025-06-11
Attending: STUDENT IN AN ORGANIZED HEALTH CARE EDUCATION/TRAINING PROGRAM
Payer: MEDICARE

## 2025-06-11 VITALS
DIASTOLIC BLOOD PRESSURE: 67 MMHG | HEART RATE: 78 BPM | TEMPERATURE: 98 F | SYSTOLIC BLOOD PRESSURE: 142 MMHG | WEIGHT: 162.81 LBS | BODY MASS INDEX: 29 KG/M2 | OXYGEN SATURATION: 97 % | RESPIRATION RATE: 18 BRPM

## 2025-06-11 PROBLEM — R79.89 ELEVATED TROPONIN: Status: RESOLVED | Noted: 2021-09-12 | Resolved: 2025-06-11

## 2025-06-11 PROBLEM — R07.9 ACUTE CHEST PAIN: Status: RESOLVED | Noted: 2025-06-09 | Resolved: 2025-06-11

## 2025-06-11 PROBLEM — I47.10 SVT (SUPRAVENTRICULAR TACHYCARDIA) (HCC): Status: RESOLVED | Noted: 2024-01-19 | Resolved: 2025-06-11

## 2025-06-11 LAB
APTT PPP: 210 SECONDS (ref 23–36)
APTT PPP: 40 SECONDS (ref 23–36)
GGT SERPL-CCNC: 81 U/L (ref ?–38)
IRON SATN MFR SERPL: 21 % (ref 15–50)
IRON SERPL-MCNC: 79 UG/DL (ref 50–170)
MAGNESIUM SERPL-MCNC: 2 MG/DL (ref 1.6–2.6)
PLATELET # BLD AUTO: 235 10(3)UL (ref 150–450)
TOTAL IRON BINDING CAPACITY: 380 UG/DL (ref 250–425)
TRANSFERRIN SERPL-MCNC: 307 MG/DL (ref 250–380)

## 2025-06-11 RX ORDER — ACETAMINOPHEN 325 MG/1
650 TABLET ORAL EVERY 6 HOURS PRN
Status: DISCONTINUED | OUTPATIENT
Start: 2025-06-11 | End: 2025-06-11

## 2025-06-11 RX ORDER — FUROSEMIDE 20 MG/1
20 TABLET ORAL DAILY
Qty: 30 TABLET | Refills: 1 | Status: SHIPPED | OUTPATIENT
Start: 2025-06-12 | End: 2025-06-18

## 2025-06-11 RX ORDER — FUROSEMIDE 20 MG/1
20 TABLET ORAL DAILY
Status: DISCONTINUED | OUTPATIENT
Start: 2025-06-11 | End: 2025-06-11

## 2025-06-11 RX ORDER — DILTIAZEM HYDROCHLORIDE 120 MG/1
120 CAPSULE, EXTENDED RELEASE ORAL DAILY
Qty: 30 CAPSULE | Refills: 11 | Status: SHIPPED | OUTPATIENT
Start: 2025-06-12 | End: 2026-06-12
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
## 2025-06-11 NOTE — PROGRESS NOTES
Progress Note  Hannah Boss Patient Status:  Inpatient    1945 MRN [id number]   Location [facility] 3W/SW Attending Constantin Herman MD   Hosp Day # 1 PCP Diana Lopez,      Subjective   No cardiac complains, still with abdominal pain, plan for abd US this morning.       Objective:   /65 (BP Location: Right arm)   Pulse 77   Temp 98 °F (36.7 °C) (Oral)   Resp 18   Wt 162 lb 11.2 oz (73.8 kg)   SpO2 96%   BMI 28.82 kg/m²     Telemetry: sinus rhythm, no recurrence of SVT     Intake/Output:    Intake/Output Summary (Last 24 hours) at 6/10/2025 1905  Last data filed at 6/10/2025 1625  Gross per 24 hour   Intake 1059.13 ml   Output 425 ml   Net 634.13 ml       Wt Readings from Last 3 Encounters:   25 162 lb 11.2 oz (73.8 kg)   25 164 lb (74.4 kg)   04/15/25 170 lb (77.1 kg)         Labs:  Recent Labs   Lab 25  0200 25  1008 06/10/25  0718   * 85 89   BUN 29* 21 23   CREATSERUM 1.84* 1.60* 1.63*   EGFRCR 28* 33* 32*   CA 9.2 8.8 8.8    145 140   K 4.9 4.8 4.6   * 116* 113*   CO2 21.0 22.0 22.0     Recent Labs   Lab 25  0159 25  0955 06/10/25  0645   RBC 3.79* 3.25* 3.18*   HGB 10.8* 9.3* 9.0*   HCT 33.3* 29.3* 28.9*   MCV 87.9 90.2 90.9   MCH 28.5 28.6 28.3   MCHC 32.4 31.7 31.1   RDW 16.8* 16.4* 16.6*   NEPRELIM 3.55  --  3.17   WBC 5.2 5.6 5.1   .0 223.0 220.0  220.0         Recent Labs   Lab 25  0401 25  1008 25  1400   TROPHS 173* 371* 348*         Review of Systems:     10 point review of systems completed and negative except as noted in HPI      Exam:     Physical Exam:  General: Alert and oriented x 3. No apparent distress.   HEENT: Normocephalic, neck supple, no thyromegaly or adenopathy.  Neck: No JVD, carotids 2+, no bruits.  Cardiac: Regular rate and rhythm. S1, S2 normal. No murmur, pericardial rub, S3, or extra cardiac sounds.  Lungs: Clear without wheezes, rales, rhonchi or dullness.  Normal  excursions and effort.  Abdomen: Soft, non-tender. BS-present.  Extremities: Without clubbing or cyanosis. No edema.    Neurologic: Alert and oriented, normal affect. No focal defects  Skin: Warm and dry.       Diagnostic Studies:     Echo 6/925  Conclusions:     1. Left ventricle: The cavity size was normal. Wall thickness was mildly      increased. Systolic function was normal. The estimated ejection fraction      was 60-65%, by 3D assessment. No diagnostic evidence for regional wall      motion abnormalities. Features are consistent with a pseudonormal left      ventricular filling pattern, with concomitant abnormal relaxation and      increased filling pressure - grade 2 diastolic dysfunction.   2. Left atrium: The left atrial volume was markedly increased.   3. Right atrium: The atrium was markedly dilated.   4. Atrial septum: The septum bowed from left to right, consistent with      increased left atrial pressure.   5. Aortic valve: Cusp separation was moderately reduced. Transvalvular      velocity was increased, due to stenosis. The findings were consistent      with moderate to severe stenosis. There was mild regurgitation. The peak      systolic velocity was 3.61m/sec. The mean systolic gradient was 29mm Hg.      The valve area (VTI) was 1.2cm^2. The valve area (VTI) index was      0.68cm^2/m^2.   Impressions:  This study is compared with previous dated 09/14/2024:     Assessment and Plan:     Assessment:  # paroxysmal SVT, known h/o AVNRT   Terminated with IV cardizem, now on PO diltiazem, no recurrence per tele review   Monitor tele with h/o bradycardia   EP eval   # mildly elevated troponin likely supply/ demand mismatch secondary to tachycardia   Peaked at 371 and downtrending   Echo showed preserved LVEF, G2DD, no rwma, moderate aortic stenosis   Started on heparin gtt in ed   # moderate aortic stenosis   Peak velocity 3.61m/sec, mean systolic gradient 29mm Hg, valve areac1.2cm^2.  Clinically  compensated    OP surveillance with routine echocardiogram   # chronic HFpEF  S/p IV lasix 40 mg x1, Appears volume compensated   Consider low dose PO lasix given chronic lower extremity edema   Strict I/Os and daily standing weights   # chronic ? lower extremity edema with h/o venous statis - improving   # CKD stage IV - baseline Cr ~ 2-3   Cr 1.84 on admission   Nephrology following   # HTN - borderline controlled   Continue home hydralazine and imdur   # HLD - on statin   # H/o PE/DVT - on lifelong AC with eliquis, currently held, on heparin gtt   # DMT2 - A1c 6.5       Plan:  No recurrence of SVT/ AVNRT with diltiazem thus far   Clinically appears volume compensated, trial low dose PO lasix   Please ensure strict I/Os and standing daily weights (patient is ambulatory and able to stand)    Cr thus far stable, monitor renal function closely, obtain BMP this am   Monitor electrolytes, check magnesium   She is npo with plan for abdominal US per nursing report, continue heparin gtt for now, if no procedure planned will resume PO eliquis     Plan of care discussed with patient, RN.      Juliana Camp, APRN  6/11/2025  Ph [phone] ([place])  Ph [phone] ([place])        =======================================================  Note reviewed and labs reviewed.  Agree with above assessment and plan.  Remains in SR. Tele without sig events.   Continue medical management of AVNRT with PO diltiazem.   Will have patient follow-up with EP.   I have personally performed the medical decision making in its entirety.   Alex Green, DO

## 2025-06-11 NOTE — PLAN OF CARE
Problem: Patient Centered Care  Goal: Patient preferences are identified and integrated in the patient's plan of care  Description: Interventions:  - What would you like us to know as we care for you? Home w/ son  - Provide timely, complete, and accurate information to patient/family  - Incorporate patient and family knowledge, values, beliefs, and cultural backgrounds into the planning and delivery of care  - Encourage patient/family to participate in care and decision-making at the level they choose  - Honor patient and family perspectives and choices  6/11/2025 0435 by Harini Edwards, RN  Outcome: Progressing    Problem: CARDIOVASCULAR - ADULT  Goal: Maintains optimal cardiac output and hemodynamic stability  Description: INTERVENTIONS:  - Monitor vital signs, rhythm, and trends  - Monitor for bleeding, hypotension and signs of decreased cardiac output  - Evaluate effectiveness of vasoactive medications to optimize hemodynamic stability  - Monitor arterial and/or venous puncture sites for bleeding and/or hematoma  - Assess quality of pulses, skin color and temperature  - Assess for signs of decreased coronary artery perfusion - ex. Angina  - Evaluate fluid balance, assess for edema, trend weights  6/11/2025 0435 by Harini Edwards RN  Outcome: Progressing  6/11/2025 0435 by Harini Edwards RN  Outcome: Progressing  Goal: Absence of cardiac arrhythmias or at baseline  Description: INTERVENTIONS:  - Continuous cardiac monitoring, monitor vital signs, obtain 12 lead EKG if indicated  - Evaluate effectiveness of antiarrhythmic and heart rate control medications as ordered  - Initiate emergency measures for life threatening arrhythmias  - Monitor electrolytes and administer replacement therapy as ordered  6/11/2025 0435 by Harini Edwards RN  Outcome: Progressing  6/11/2025 0435 by Harini Edwards RN  Outcome: Progressing     Problem: RESPIRATORY - ADULT  Goal: Achieves optimal  ventilation and oxygenation  Description: INTERVENTIONS:  - Assess for changes in respiratory status  - Assess for changes in mentation and behavior  - Position to facilitate oxygenation and minimize respiratory effort  - Oxygen supplementation based on oxygen saturation or ABGs  - Provide Smoking Cessation handout, if applicable  - Encourage broncho-pulmonary hygiene including cough, deep breathe, Incentive Spirometry  - Assess the need for suctioning and perform as needed  - Assess and instruct to report SOB or any respiratory difficulty  - Respiratory Therapy support as indicated  - Manage/alleviate anxiety  - Monitor for signs/symptoms of CO2 retention  6/11/2025 0435 by Harini Edwards RN  Outcome: Progressing  6/11/2025 0435 by Harini Edwards RN  Outcome: Progressing     Problem: METABOLIC/FLUID AND ELECTROLYTES - ADULT  Goal: Electrolytes maintained within normal limits  Description: INTERVENTIONS:  - Monitor labs and rhythm and assess patient for signs and symptoms of electrolyte imbalances  - Administer electrolyte replacement as ordered  - Monitor response to electrolyte replacements, including rhythm and repeat lab results as appropriate  - Fluid restriction as ordered  - Instruct patient on fluid and nutrition restrictions as appropriate  6/11/2025 0435 by Harini Edwards RN  Outcome: Progressing  6/11/2025 0435 by Harini Edwards RN  Outcome: Progressing  Goal: Hemodynamic stability and optimal renal function maintained  Description: INTERVENTIONS:  - Monitor labs and assess for signs and symptoms of volume excess or deficit  - Monitor intake, output and patient weight  - Monitor urine specific gravity, serum osmolarity and serum sodium as indicated or ordered  - Monitor response to interventions for patient's volume status, including labs, urine output, blood pressure (other measures as available)  - Encourage oral intake as appropriate  - Instruct patient on fluid and  nutrition restrictions as appropriate  6/11/2025 0435 by Harini Edwards RN  Outcome: Progressing  6/11/2025 0435 by Harini Edwards RN  Outcome: Progressing     Problem: SKIN/TISSUE INTEGRITY - ADULT  Goal: Skin integrity remains intact  Description: INTERVENTIONS  - Assess and document risk factors for pressure ulcer development  - Assess and document skin integrity  - Monitor for areas of redness and/or skin breakdown  - Initiate interventions, skin care algorithm/standards of care as needed  6/11/2025 0435 by Harini Edwards RN  Outcome: Progressing  6/11/2025 0435 by Harini Edwards RN  Outcome: Progressing  Goal: Incision(s), wounds(s) or drain site(s) healing without S/S of infection  Description: INTERVENTIONS:  - Assess and document risk factors for pressure ulcer development  - Assess and document skin integrity  - Assess and document dressing/incision, wound bed, drain sites and surrounding tissue  - Implement wound care per orders  - Initiate isolation precautions as appropriate  - Initiate Pressure Ulcer prevention bundle as indicated  6/11/2025 0435 by Harini Edwards RN  Outcome: Progressing  6/11/2025 0435 by Harini Edwards RN  Outcome: Progressing     Problem: HEMATOLOGIC - ADULT  Goal: Maintains hematologic stability  Description: INTERVENTIONS  - Assess for signs and symptoms of bleeding or hemorrhage  - Monitor labs and vital signs for trends  - Administer supportive blood products/factors, fluids and medications as ordered and appropriate  - Administer supportive blood products/factors as ordered and appropriate  6/11/2025 0435 by Harini Edwards RN  Outcome: Progressing  6/11/2025 0435 by Harini Edwards RN  Outcome: Progressing  Goal: Free from bleeding injury  Description: (Example usage: patient with low platelets)  INTERVENTIONS:  - Avoid intramuscular injections, enemas and rectal medication administration  - Ensure safe mobilization  of patient  - Hold pressure on venipuncture sites to achieve adequate hemostasis  - Assess for signs and symptoms of internal bleeding  - Monitor lab trends  - Patient is to report abnormal signs of bleeding to staff  - Avoid use of toothpicks and dental floss  - Use electric shaver for shaving  - Use soft bristle tooth brush  - Limit straining and forceful nose blowing  6/11/2025 0435 by Harini Edwards, RN  Outcome: Progressing  6/11/2025 0435 by Harini Edwards RN  Outcome: Progressing     Problem: MUSCULOSKELETAL - ADULT  Goal: Return mobility to safest level of function  Description: INTERVENTIONS:  - Assess patient stability and activity tolerance for standing, transferring and ambulating w/ or w/o assistive devices  - Assist with transfers and ambulation using safe patient handling equipment as needed  - Ensure adequate protection for wounds/incisions during mobilization  - Obtain PT/OT consults as needed  - Advance activity as appropriate  - Communicate ordered activity level and limitations with patient/family  6/11/2025 0435 by Harini Edwards RN  Outcome: Progressing  6/11/2025 0435 by Harini Edwards, RN  Outcome: Progressing

## 2025-06-11 NOTE — DISCHARGE SUMMARY
Hospitalist Discharge Summary    Admission Date/Time  6/9/2025  1:50 AM  Discharge Date  06/11/25    PCP  Diana Lopez DO     Discharging Hospitalist:  Constantin Herman MD    Disposition:  Home    Follow Up Appointments  Diana Lopez DO  [address]  [facility] [address]  [phone]    Follow up in 3 day(s)      Alex Baumann DO  [address]  [address]  [place] [address]  [phone]    Schedule an appointment as soon as possible for a visit      Appointments to be made by PCP: Cardiology follow-up    Primary Hospital Problems/Hospital Course Summary  79 year old female with spinal stenosis, aortic stenosis, HFpEF, CKD 4, GERD, anemia, HLD, bradycardia, recurrent hyperkalemia, who presented with 9/10 CP with associated SOB prior to arrival with relief after aspirin. Admitted for SVT with resolution after diltiazem.     SVT, known history AVNRT, Resolved  NSTEMI, resolved  Patient improved with diltiazem in ED  Troponin peaked 371 on 6/9 12:4, likely demand  EKG with no ST changes elevations  Heparin initiated in ED, nitro given, aspirin prior to arrival  Statin, hydralazine, Imdur prior to arrival  Transition to PO diltiazem ER 120mg daily      HFpEF  Aortic stenosis  LVEF 60 to 65% 9/2024  LVEF 60-65% 6/9/25, Moderate to severe aortic stenosis with some mild regurgitation  History of moderate aortic stenosis, significant on exam  Patient with improving venous stasis   Discontinue IV Furosemide, trail PO furosemide 20 mg on discharge  Add Empagliflozin 10mg Per nephrology recommendations         History of DVT  Venous stasis  Chronically anticoagulated with Eliquis     Generalized abdominal discomfort, resolved  Diarrhea, chronic intermittent  Unclear, unlikely due to poor perfusion  No significant discomfort at this time, continue to monitor.  Patient with previous cholecystectomy, mildly elevated alk phos  GGT ordered, will need follow-up on discharge     CKD 4  Slightly increased creatinine from  yesterday 1.63, GFR continues at roughly 32-33  Improved per nephrology  Add Empagliflozin 10mg Per nephrology recommendations      Chronic problems:      Chronic hyperkalemia -CTM at this time, no hyperkalemia noted  GERD -PPI  Hyperlipidemia -continue statin    Medication Changes  PO diltiazem ER 120mg daily   P.o. furosemide 20 mg daily  P.o. empagliflozin 10 mg daily    Important Follow Up Items  Labs: Follow-up GGT  Imaging: None  Medications: Follow-up cardiac medications as above, titrate as appropriate  Incidental findings: Elevated alk phos, follow-up GGT    Procedures/Diagnostics  None    Change in Code Status: None, full    Discharge Medications       Medication List        START taking these medications      dilTIAZem HCl ER Beads 120 MG Cp24  Commonly known as: TIAZAC  Take 1 capsule (120 mg total) by mouth daily.  Start taking on: June 12, 2025     empagliflozin 10 MG Tabs  Commonly known as: Jardiance  Take 1 tablet (10 mg total) by mouth daily.  Start taking on: June 12, 2025     furosemide 20 MG Tabs  Commonly known as: Lasix  Take 1 tablet (20 mg total) by mouth daily.  Start taking on: June 12, 2025            CONTINUE taking these medications      atorvastatin 40 MG Tabs  Commonly known as: Lipitor     bisacodyl 10 MG Supp  Commonly known as: Dulcolax  Place 1 suppository (10 mg total) rectally daily as needed.     cholecalciferol 50 MCG (2000 UT) Tabs  Commonly known as: Vitamin D3     Eliquis 5 MG Tabs  Generic drug: apixaban     ferrous sulfate 325 (65 FE) MG Tbec     gabapentin 300 MG Caps  Commonly known as: Neurontin     hydrALAZINE 50 MG Tabs  Commonly known as: Apresoline  Take 1 tablet (50 mg total) by mouth 3 (three) times daily.     isosorbide mononitrate ER 60 MG Tb24  Commonly known as: Imdur     magnesium oxide 400 MG Tabs  Commonly known as: Mag-Ox  Take 1 tablet (400 mg total) by mouth daily.     meclizine 25 MG Tabs  Commonly known as: Antivert     oxyCODONE-acetaminophen   MG Tabs  Commonly known as: Percocet  Take 1 tablet by mouth every 8 (eight) hours as needed for Pain (Max 3/day).     pantoprazole 40 MG Tbec  Commonly known as: Protonix     Polyethylene Glycol 3350 17 g Pack  Commonly known as: MIRALAX  Take 17 g by mouth daily as needed.     sodium zirconium cyclosilicate 10 g Pack  Commonly known as: Lokelma  Take 1 packet (10 g total) by mouth daily.     trolamine salicyliate 10 % Crea  Apply to painful joints or muscles up to three times a day as needed               Where to Get Your Medications        These medications were sent to [facility] [address] [place], [place] - [address] [facility] [facility] [phone], [phone]  [address] [address]      Phone: [phone]   dilTIAZem HCl ER Beads 120 MG Cp24  empagliflozin 10 MG Tabs  furosemide 20 MG Tabs       I reconciled current and discharge medications on the day of discharge.    Imaging/Diagnostic Reports  CARD ECHO 2D DOPPLER (CPT=93306)  Result Date: 2025  Transthoracic Echocardiogram Name:Hannah Boss Date: 2025 :  1945 Ht:  (63in)  BP: 158 / 67 MRN:  [id number]    Age:  79years    Wt:  (162lb) HR: 61bpm Loc:  [facility]       Gndr: F          BSA: 1.77m^2 Sonographer: Iris Armijo Ordering:    amanda Carlos Consulting:  Lisa High ---------------------------------------------------------------------------- History/Indications:  Supraventricular tachycardia. Chest pain. ---------------------------------------------------------------------------- Procedure information:  A transthoracic complete 2D study was performed. Additional evaluation included M-mode, complete spectral Doppler, and color Doppler.  Patient status:  Inpatient.  Location:  Bedside.    Comparison was made to the study of 2024.    This was a routine study. Transthoracic echocardiography for diagnosis, ventricular function evaluation, and assessment of valvular function. Image quality was adequate. ECG rhythm:    Normal sinus ---------------------------------------------------------------------------- Conclusions: 1. Left ventricle: The cavity size was normal. Wall thickness was mildly    increased. Systolic function was normal. The estimated ejection fraction    was 60-65%, by 3D assessment. No diagnostic evidence for regional wall    motion abnormalities. Features are consistent with a pseudonormal left    ventricular filling pattern, with concomitant abnormal relaxation and    increased filling pressure - grade 2 diastolic dysfunction. 2. Left atrium: The left atrial volume was markedly increased. 3. Right atrium: The atrium was markedly dilated. 4. Atrial septum: The septum bowed from left to right, consistent with    increased left atrial pressure. 5. Aortic valve: Cusp separation was moderately reduced. Transvalvular    velocity was increased, due to stenosis. The findings were consistent    with moderate to severe stenosis. There was mild regurgitation. The peak    systolic velocity was 3.61m/sec. The mean systolic gradient was 29mm Hg.    The valve area (VTI) was 1.2cm^2. The valve area (VTI) index was    0.68cm^2/m^2. Impressions:  This study is compared with previous dated 09/14/2024: * ---------------------------------------------------------------------------- * Findings: Left ventricle:  The cavity size was normal. Wall thickness was mildly increased. Systolic function was normal. The estimated ejection fraction was 60-65%, by 3D assessment. No diagnostic evidence for regional wall motion abnormalities. Features are consistent with a pseudonormal left ventricular filling pattern, with concomitant abnormal relaxation and increased filling pressure - grade 2 diastolic dysfunction. Left atrium:  The left atrial volume was markedly increased. Right ventricle:  The cavity size was normal. Systolic function was normal. Right atrium:  The atrium was markedly dilated. Mitral valve:  The valve was structurally normal.  Leaflet separation was normal.  Doppler:  Transvalvular velocity was within the normal range. There was no evidence for stenosis. There was trivial regurgitation.    The valve area by pressure half-time was 2.89cm^2. The valve area index by pressure half-time was 1.63cm^2/m^2. Aortic valve:   The valve was trileaflet. The leaflets were moderately thickened and moderately calcified. Cusp separation was moderately reduced. Doppler:  Transvalvular velocity was increased, due to stenosis. The findings were consistent with moderate to severe stenosis. There was mild regurgitation.    The valve area (VTI) was 1.2cm^2. The valve area (VTI) index was 0.68cm^2/m^2.    The mean systolic gradient was 29mm Hg. The peak systolic gradient was 52mm Hg. Tricuspid valve:  The valve is structurally normal. Leaflet separation was normal.  Doppler:  Transvalvular velocity was within the normal range. There was no evidence for stenosis. There was trivial regurgitation. Pulmonic valve:   The valve is structurally normal. Cusp separation was normal.  Doppler:  Transvalvular velocity was within the normal range. There was no evidence for stenosis. There was trivial regurgitation. Pericardium:   There was no pericardial effusion. Aorta: Aortic root: The aortic root was normal. Ascending aorta: The ascending aorta was normal. Pulmonary arteries: Systolic pressure could not be accurately estimated. Systemic veins:  Central venous respirophasic diameter changes are in the normal range (>50%). Inferior vena cava: The IVC was dilated. Atrial septum:  The septum bowed from left to right, consistent with increased left atrial pressure. ---------------------------------------------------------------------------- Measurements  Left ventricle       Value          Ref       09/14/2024  LV end-diastolic     131   ml       --------- ----------  volume, 3D  LV end-systolic      51    ml       --------- ----------  volume, 3D  LV ejection          61    %         54 - 74   ----------  fraction, 3D  LV end-diastolic (H) 74    ml/m^2   <=71      ----------  volume/bsa, 3D  LV end-systolic  (H) 29    ml/m^2   <=28      ----------  volume/bsa, 3D  LV wall mass, 3D     125   g        --------- ----------  LV wall              71    g/m^2    --------- ----------  mass/bsa, 3D  IVS thickness,   (H) 1.2   cm       0.6 - 0.9 1.1  ED, PLAX  LV ID, ED, PLAX      4.5   cm       3.8 - 5.2 4.2  LV ID, ES, PLAX      3.0   cm       2.2 - 3.5 2.8  LV PW thickness, (H) 1.2   cm       0.6 - 0.9 1.0  ED, PLAX  IVS/LV PW ratio,     1.00           --------- 1.10  ED, PLAX  LV PW/LV ID          0.27           --------- 0.24  ratio, ED, PLAX  LV ejection          62    %        54 - 74   62  fraction  Stroke               61    ml/m^2   --------- 34  volume/bsa, 2D  LV e', lateral   (L) 4.4   cm/sec   >=10.0    7.3  LV E/e', lateral (H) 23             <=13      18  LV e', medial    (L) 4.5   cm/sec   >=7.0     7.4  LV E/e', medial      22             --------- 17  LV e', average       4.4   cm/sec   --------- 7.3  LV E/e', average (H) 22             <=14      17  LVOT                 Value          Ref       09/14/2024  LVOT ID              2     cm       --------- 1.7  LVOT peak            1.32  m/sec    --------- 1.45  velocity, S  LVOT VTI, S          30.1  cm       --------- 28.3  LVOT peak            7     mm Hg    --------- 8  gradient, S  LVOT mean            3     mm Hg    --------- 5  gradient, S  Stroke volume        95    ml       --------- 64  (SV), LVOT DP  Stroke index         53    ml/m^2   --------- 34  (SV/bsa), LVOT  DP  Aortic valve         Value          Ref       09/14/2024  Aortic valve         3.61  m/sec    --------- 3.17  peak velocity, S  Aortic valve         89.0  cm       --------- 60.7  VTI, S  Aortic mean          29    mm Hg    --------- 24  gradient, S  Aortic peak          52    mm Hg    --------- 40  gradient, S  Aortic pressure      427   ms       ---------  ----------  half-time  Aortic valve         1.2   cm^2     --------- 1.06  area, VTI  Aortic valve         0.68  cm^2/m^2 --------- 0.57  area/bsa, VTI  Velocity ratio,      0.37           --------- 0.46  peak, LVOT/AV  Aortic regurg        4.63  m/sec    --------- ----------  velocity, ED  Aortic regurg        318   cm/s^2   --------- ----------  deceleration  Aortic regurg        427   ms       --------- ----------  pressure  half-time  Aortic regurg        86    mm Hg    --------- ----------  gradient, ED  Aortic root          Value          Ref       09/14/2024  Aortic root ID       3.0   cm       2.5 - 4.0 3.1  Ascending aorta      Value          Ref       09/14/2024  Ascending aorta      2.9   cm       1.9 - 3.5 2.8  ID  Left atrium          Value          Ref       09/14/2024  LA ID, A-P, ES       3.4   cm       2.7 - 3.8 ----------  LA volume, S     (H) 116   ml       22 - 52   ----------  LA volume/bsa, S (H) 66    ml/m^2   16 - 34   36  LA volume, ES,   (H) 118   ml       22 - 52   75  1-p A4C  LA volume, ES,   (H) 111   ml       22 - 52   60  1-p A2C  LA volume, ES,       124   ml       --------- 84  A/L  LA volume/bsa,   (H) 70    ml/m^2   16 - 34   45  ES, A/L  LA/aortic root       1.13           --------- ----------  ratio  LA volume, ES,   (H) 95    ml       22 - 52   ----------  3D  LA volume/bsa,       54    ml/m^2   --------- ----------  ES, 3D  Mitral valve         Value          Ref       09/14/2024  Mitral E-wave        0.98  m/sec    --------- 1.28  peak velocity  Mitral A-wave        1.29  m/sec    --------- 1.41  peak velocity  Mitral               261   ms       --------- 137  deceleration  time  Mitral pressure      76    ms       --------- ----------  half-time  Mitral peak          4     mm Hg    --------- 7  gradient, D  Mitral E/A           0.8            --------- 0.9  ratio, peak  Mitral valve         2.89  cm^2     --------- ----------  area, PHT, DP  Mitral valve         1.63   cm^2/m^2 --------- ----------  area/bsa, PHT,  DP  Inferior vena        Value          Ref       09/14/2024  cava  ID               (H) 2.3   cm       <=2.1     2.2  Right ventricle      Value          Ref       09/14/2024  TAPSE, MM            2.78  cm       >=1.70    2.61  RV s', lateral       20.5  cm/sec   >=9.5     15.3 Legend: (L)  and  (H)  mariana values outside specified reference range. ---------------------------------------------------------------------------- Prepared and electronically signed by Ousmane Batista 06/09/2025 17:12     XR CHEST AP PORTABLE  (CPT=71045)  Result Date: 6/9/2025  PROCEDURE: XR CHEST AP PORTABLE  (CPT=71045) TIME: 0256.   COMPARISON: [facility], CT CHEST ABDOMEN PELVIS (CPT=71250/83802), 5/12/2025, 8:20 PM.  [facility], XR CHEST AP PORTABLE (CPT=71045), 3/09/2025, 4:17 PM.  [facility], XR CHEST AP PORTABLE (CPT=71045), 12/17/2024, 2:17 PM.  [facility], XR CHEST AP PORTABLE (CPT=71045), 9/19/2024, 10:08 AM.  [facility], XR CHEST AP PORTABLE (CPT=71045), 9/17/2024, 11:09 AM.  INDICATIONS: Supraventricular tachycardia with acute-onset chest wall pain.  TECHNIQUE:   Single view.   FINDINGS:  CARDIAC/VASC: The cardiomediastinal silhouette is accentuated by AP technique, but likely stably borderline enlarged. There is mild tortuosity of the thoracic aorta with peripheral atherosclerotic vascular calcification. The pulmonary vascularity is within normal limits. MEDIAST/DIANNE: No visible mass or adenopathy. LUNGS/PLEURA: No airspace consolidation, pleural effusion, or pneumothorax is evident.  BONES: Mild degenerative changes of the thoracic spine are apparent. There is no fracture or visible bony lesion. OTHER: Leads overlie the chest and obscure underlying detail.           CONCLUSION:  Stable chest demonstrate borderline cardiomegaly without radiographically evident acute intrathoracic process.    A preliminary  report was issued by the [facility] teleradiology service. There are no major discrepancies.   Dictated by (CST): Go Choudhary MD on 6/09/2025 at 1:25 PM     Finalized by (CST): Go Choudhary MD on 6/09/2025 at 1:26 PM          CT KNEE RIGHT (LEF=09298)  Result Date: 5/12/2025  PROCEDURE: CT KNEE RIGHT (CPT=73700)  COMPARISON: None.  INDICATIONS: Abnormal right knee radiograph.  Right knee pain.  TECHNIQUE: Multi-planar CT images of the right knee were obtained without intravenous contrast material.  Automated exposure control for dose reduction was used. Adjustment of the mA and/or kV was done based on the patient's size. Use of iterative reconstruction technique for dose reduction was used.  Dose information is transmitted to the ACR (American College of Radiology) NRDR (National Radiology Data Registry) which includes the Dose Index Registry.  FINDINGS:  BONES: Patchy bony demineralization.  No acute fracture or subluxation.  A large subchondral cyst in the posterior tibial plateau has a small chronic articular surface defect adjacent to PCL attachment which accounts for the lucency on plain film.  Ossified body adjacent to the lateral tibial eminence.  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  Chronic ossified body in the suprapatellar space. SOFT TISSUES: Marked superficial soft tissue edema.  Atrophy of the medial gastrocnemius. OTHER: Atherosclerotic vascular calcification.         CONCLUSION:  1. No acute fracture or subluxation. 2. A large subchondral cyst in posteromedial tibial plateau has a chronic articular surface defect adjacent to the PCL attachment which accounts for the radiolucency seen on plain radiograph. 3. 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  4. Small knee joint effusion. 5. Marked edema in the superficial soft tissues. 6. Patchy bony demineralization.    Dictated by  (CST): Eric Alarcon MD on 5/12/2025 at 11:37 PM     Finalized by (CST): Eric Alarcon MD on 5/12/2025 at 11:45 PM          CT FACIAL BONES (CPT=70486)  Result Date: 5/12/2025  PROCEDURE: CT FACIAL BONES (CPT=70486)  COMPARISON: None.  INDICATIONS: Trauma, facial pain.  TECHNIQUE:   CT images were obtained without intravenous contrast.  Automated exposure control for dose reduction was used.  Dose information is transmitted to the ACR (American College of Radiology) NRDR (National Radiology Data Registry) which includes  the Dose Index Registry.   FINDINGS:  BONES: No acute fracture or suspicious bone lesion. Temporomandibular joints intact.  Advanced degenerative changes in the spine.  Elongated ossified stylohyoid ligaments extend into the parapharyngeal space. ORBITS: Globes are intact. No orbital fracture. SINUSES/MASTOIDS:  No acute sinusitis or mastoiditis.  Small focus of mucosal thickening in the right ethmoid sinus. SOFT TISSUES: No suspicious mass, lymphadenopathy,  or fluid collection.   OTHER: Atherosclerotic vascular calcification.   Multiple dental caries, chipped teeth, and some fragmented teeth with periodontal abscesses.         CONCLUSION:  1. No acute fracture. 2. Poor dentition as described above.    Dictated by (CST): Eric Alarcon MD on 5/12/2025 at 10:02 PM     Finalized by (CST): Eric Alarcon MD on 5/12/2025 at 10:07 PM          CT CHEST+ABDOMEN+PELVIS(CPT=71250/30623)  Result Date: 5/12/2025  PROCEDURE: CT CHEST ABDOMEN PELVIS (CPT=71250/77395)  COMPARISON: [facility], CT CHEST PE AORTA (IV ONLY) (CPT=71260), 9/13/2022, 8:20 PM.  [facility], CT ABDOMEN+PELVIS (CPT=74176), 5/01/2024, 12:23 PM.  INDICATIONS: Trauma, known retroperitoneal sarcoma, and chest pain.  TECHNIQUE:   CT images of the chest, abdomen and pelvis were obtained without intravenous contrast material.  Automated exposure control for dose reduction was used. Adjustment of the mA and/or kV  was done based on the patient's size. Use of iterative reconstruction technique for dose reduction was used.  Dose information is transmitted to the ACR (American College of Radiology) NRDR (National Radiology Data Registry) which includes the Dose Index Registry.   CHEST FINDINGS: CARDIAC: Moderate to marked coronary artery calcification.  Aortic valve and mitral annular calcification. MEDIASTINUM/DIANNE: Normal.  No mass or adenopathy.  PULMONARY ARTERIES: Normal caliber main pulmonary artery..  AORTA: Normal.  No aneurysm. LUNGS/PLEURA: Mild biapical centrilobular emphysema.  Bandlike scarring and focal region of bronchiectasis in the anterior right upper lobe with calcified broncholith and a more central calcified right upper lobe pulmonary granuloma.  Multiple calcified granulomas in the right upper lobe.  Two 3 mm noncalcified peripheral right upper lobe pulmonary nodule likely represent small granulomas.  Calcified right lower lobe pulmonary granuloma and few calcified granulomas in the left lung.  Trachea and mainstem bronchi patent. No consolidation or pleural effusion. CHEST WALL: Normal.  No mass or axillary adenopathy.  OTHER: Negative.  ABDOMEN/PELVIS FINDINGS:  LIVER: Calcified granulomas. SPLEEN: Normal unenhanced spleen.  PANCREAS: Normal unenhanced pancreas.  BILIARY: Post cholecystectomy with mild stable dilatation of central intrahepatic biliary tree and extrahepatic biliary tree.. ADRENALS: Normal unenhanced adrenals.  KIDNEYS: Stable bilateral upper pole renal cysts. AORTA/VASCULAR: No aneurysm.  Atherosclerotic calcification. LYMPHADENOPATHY:  Stable 5.8 x 3.9 by 6.6 cm cm midline retroperitoneal soft tissue mass anterior to the aorta and cava. BOWEL/MESENTERY: Colonic diverticulosis.  Normal appendix. No obstruction, bowel wall thickening or mesenteric mass.  ABDOMINAL WALL: Small fat containing umbilical hernia. URINARY BLADDER: Partially collapsed. ASCITES:                           None  PELVIC ORGANS: A 1 cm calcified uterine fibroid. BONES: No suspect bone lesion. OTHER: Negative.   BONES: No suspect bone lesion or or acute fracture.  Minimal chronic anterior wedging of mid to lower thoracic vertebral bodies.  Multilevel moderate to advanced degenerative changes in the spine.         CONCLUSION:  1. No acute finding in the chest abdomen or pelvis. 2. 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    Dictated by (CST): Eric Alarcon MD on 5/12/2025 at 9:49 PM     Finalized by (CST): Eric Alarcon MD on 5/12/2025 at 10:02 PM          CT SPINE CERVICAL (CPT=72125)  Result Date: 5/12/2025  PROCEDURE: CT SPINE CERVICAL (CPT=72125)  COMPARISON: [facility], CT CHEST ABDOMEN PELVIS (CPT=71250/62266), 5/12/2025, 8:20 PM.  [facility], CT SPINE CERVICAL (CPT=72125), 10/26/2024, 7:30 AM.  INDICATIONS: Trauma, neck pain  TECHNIQUE:   Multi-planar CT images were obtained without intravenous contrast material.  Automated exposure control for dose reduction was used. Adjustment of the mA and/or kV was done based on the patient's size. Use of iterative reconstruction technique for dose reduction was used.  Dose information is transmitted to the ACR (American College of Radiology) NRDR (National Radiology Data Registry) which includes the Dose Index Registry.   FINDINGS:  CRANIOCERVICAL AREA:   Normal foramen magnum with no Chiari malformation. PARASPINAL AREA: Normal with no visible mass.  BONES: Vertebral bodies are intact and anatomically aligned.  Prominent endplate Schmorl's nodes at C3 and C5, and C6.  Multilevel moderate to advanced degenerative disc disease/spondylosis.  Mild multilevel facet arthrosis.  Mild central narrowing C3-4,  C4-5, C5-6 and C6-7.  No high-grade central narrowing.  No moderate or high-grade foraminal narrowing.   OTHER:  Elongated ossified stylohyoid ligaments.  Centrilobular emphysema and a 3 mm  peripheral right upper lobe pulmonary nodule.  Atherosclerotic vascular calcification.          CONCLUSION:  1. No acute fracture or subluxation.    Dictated by (CST): Eric Alarcon MD on 5/12/2025 at 9:43 PM     Finalized by (CST): Eric Alarcon MD on 5/12/2025 at 9:48 PM          XR KNEE (1 OR 2 VIEWS), RIGHT (CPT=73560)  Result Date: 5/12/2025  PROCEDURE: XR KNEE (1 OR 2 VIEWS), RIGHT (CPT=73560)  COMPARISON: None.  INDICATIONS: Right knee pain post fall.  TECHNIQUE: 2 views were obtained without weightbearing technique.           CONCLUSION:  1. A radiolucency at interface between medial tibial eminence and medial tibial plateau concerning for a nondisplaced fracture. 2. Tricompartmental osteoarthritis most pronounced in the patellofemoral compartment. 3. Chronic os ossific focus above the patella. 4. Small knee joint effusion.    Dictated by (CST): Eric Alarcon MD on 5/12/2025 at 9:23 PM     Finalized by (CST): Eric Alarocn MD on 5/12/2025 at 9:26 PM          XR FOOT, COMPLETE (MIN 3 VIEWS), RIGHT (CPT=73630)  Result Date: 5/12/2025  PROCEDURE: XR FOOT, COMPLETE (MIN 3 VIEWS), RIGHT (CPT=73630)  COMPARISON: None.  INDICATIONS: Right foot pain post fall.  TECHNIQUE: 3 views were obtained.   FINDINGS:  BONES: No acute fracture or subluxation.  Osteoarthritis of ankle, posterior subtalar joint, IP joints of toes, and 1st and 2nd MTP joint.  Hallux valgus and bunion.  No bony destructive changes.  Calcaneal spurs. SOFT TISSUES: Generalized soft tissue swelling.  A questionable ulceration along the medial aspect of the 1st MTP joint.  Calcified phleboliths related to chronic venous stasis. OTHER: Vascular calcifications.         CONCLUSION:  1. No acute fracture or subluxation. 2. A questionable ulceration medial to the 1st MTP joint.    Dictated by (CST): Eric Alarcon MD on 5/12/2025 at 9:20 PM     Finalized by (CST): Eric Alarcon MD on 5/12/2025 at 9:23 PM          XR HIP W OR WO PELVIS 2 OR 3 VIEWS,  RIGHT (CPT=73502)  Result Date: 5/12/2025  PROCEDURE: XR HIP W OR WO PELVIS 2 OR 3 VIEWS, RIGHT (CPT=73502)  COMPARISON: [facility], XR HIP W OR WO PELVIS 2 OR 3 VIEWS, RIGHT (CPT=73502), 9/13/2024, 1:16 PM.  INDICATIONS: Right hip pain post fall.  TECHNIQUE: 3 views were obtained.   FINDINGS:  BONES: No acute fracture suspicious bone lesion.  Hip joints are relatively preserved bilaterally.  Moderate degenerative changes lower lumbar spine.  Mild degenerative change of the SI joints.  OTHER: Vascular calcifications.         CONCLUSION:  1. No acute fracture or subluxation.    Dictated by (CST): Eric Alarcon MD on 5/12/2025 at 9:18 PM     Finalized by (CST): Eric Alarcon MD on 5/12/2025 at 9:20 PM          XR ANKLE (MIN 3 VIEWS), RIGHT (CPT=73610)  Result Date: 5/12/2025  PROCEDURE: XR ANKLE (MIN 3 VIEWS), RIGHT (CPT=73610)  COMPARISON: None.  INDICATIONS: Right ankle pain post fall  TECHNIQUE: 3 views were obtained.   FINDINGS:  BONES: Mild osteoarthritis of ankle and subtalar joint.  No acute fracture or suspicious bone lesion.  Calcaneal spurs. SOFT TISSUES: Diffuse soft tissue swelling.  Calcified phleboliths in the lower extremity soft tissues compatible with chronic venous stasis. OTHER: Atherosclerotic vascular calcification.         CONCLUSION:  1. No acute fracture or subluxation.    Dictated by (CST): Eric Alarcon MD on 5/12/2025 at 9:17 PM     Finalized by (CST): Eric Alarcon MD on 5/12/2025 at 9:18 PM          XR SHOULDER, COMPLETE (MIN 2 VIEWS), RIGHT (CPT=73030)  Result Date: 5/12/2025  PROCEDURE: XR SHOULDER, COMPLETE (MIN 2 VIEWS), RIGHT (CPT=73030)  COMPARISON: [facility], XR SHOULDER, COMPLETE (MIN 2 VIEWS), RIGHT (CPT=73030), 9/13/2024, 1:16 PM.  INDICATIONS: Right shoulder pain post fall.  TECHNIQUE: 3 views were obtained.           CONCLUSION:  1. No acute fracture or subluxation. 2. Cortical irregularity of the greater tuberosity compatible with rotator  cuff pathology. 3. Mild-to-moderate AC joint and mild glenohumeral joint osteoarthritis.    Dictated by (CST): Eric Alarcon MD on 5/12/2025 at 9:16 PM     Finalized by (CST): Eric Alarcon MD on 5/12/2025 at 9:17 PM          CT BRAIN OR HEAD (CPT=70450)  Result Date: 5/12/2025  PROCEDURE: CT BRAIN OR HEAD (CPT=70450)  COMPARISON: [facility], CT BRAIN OR HEAD (CPT=70450), 10/26/2024, 7:24 AM.  INDICATIONS: Fall; headache, dizziness, and weakness.  TECHNIQUE: CT images were obtained without contrast material.  Automated exposure control for dose reduction was used.  Dose information is transmitted to the ACR (American College of Radiology) NRDR (National Radiology Data Registry) which includes the Dose Index Registry.  FINDINGS:  CEREBRUM: Mild decreased attenuation in periventricular white matter.  No edema, hemorrhage, mass or acute infarct. CEREBELLUM: No edema, hemorrhage, mass or acute infarct. BRAINSTEM: No edema, hemorrhage, mass or acute infarct. CSF SPACES: No hydrocephalus, subarachnoid hemorrhage, or mass.  SKULL: Skull base and calvarium are intact. SINUSES: No acute sinusitis or mastoiditis.  ORBITS: Limited views are unremarkable.   OTHER: Atherosclerotic calcification basilar artery and bilateral cavernous carotid arteries.  Stable empty sella turcica (typically asymptomatic normal variant).         CONCLUSION:  1. No acute intracranial finding. 2. Mild age related changes in the cerebral white matter. 3. Large vessel intracranial atherosclerosis.    Dictated by (CST): Eric Alarcon MD on 5/12/2025 at 9:11 PM     Finalized by (CST): Eric Alarcon MD on 5/12/2025 at 9:14 PM            Secondary Discharge Diagnoses  Elevated alk phos    Pertinent Physical Exam At Time of Discharge  Vitals:    06/10/25 2333 06/11/25 0537 06/11/25 0835 06/11/25 1035   BP: 149/88 159/60 144/59 142/67   BP Location: Right arm Right arm Right arm    Pulse: 77 66 67 78   Resp: 18 18 18    Temp:  98.3 °F  (36.8 °C) 97.9 °F (36.6 °C)    TempSrc:  Oral Oral    SpO2: 96% 98% 97%    Weight:  162 lb 12.8 oz (73.8 kg)        Physical Exam  Vitals reviewed.   Constitutional:       Appearance: Normal appearance.   Cardiovascular:      Rate and Rhythm: Normal rate and regular rhythm.      Heart sounds: Murmur heard.      Harsh midsystolic murmur is present with a grade of 5/6 at the upper right sternal border radiating to the neck.   Pulmonary:      Effort: Pulmonary effort is normal.      Breath sounds: Normal breath sounds.   Abdominal:      General: Abdomen is flat. Bowel sounds are normal.      Palpations: Abdomen is soft.      Tenderness: There is no abdominal tenderness.   Skin:     General: Skin is warm and dry.      Capillary Refill: Capillary refill takes less than 2 seconds.      Comments: Significantly improved bilateral lower extremity edema and venous stasis, with signs of previous wounds healing   Neurological:      General: No focal deficit present.      Mental Status: She is alert and oriented to person, place, and time.       Total time coordinating care > 30 mins.    Patient had opportunity to ask questions and stated understanding and agreed with therapeutic plan as outlined.     Constantin Herman MD  6/11/2025

## 2025-06-11 NOTE — CM/SW NOTE
06/11/25 1400   Discharge disposition   Expected discharge disposition Home-Health   Post Acute Care Provider   ([facility])   Discharge transportation Private car     Per chart, pt has DC order for today.  DC Summary uploaded to [facility] via [facility].    Pt is cleared from SW/CM stand point.      PLAN: Home w/ [facility]            CATINA Corona, SARAVANANW [phone]
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
## 2025-06-13 ENCOUNTER — TELEPHONE (OUTPATIENT)
Dept: NEPHROLOGY | Facility: CLINIC | Age: 80
End: 2025-06-13

## 2025-06-13 DIAGNOSIS — E87.5 HYPERKALEMIA: ICD-10-CM

## 2025-06-13 DIAGNOSIS — N18.30 STAGE 3 CHRONIC KIDNEY DISEASE, UNSPECIFIED WHETHER STAGE 3A OR 3B CKD (HCC): Primary | ICD-10-CM

## 2025-06-13 NOTE — TELEPHONE ENCOUNTER
Dr Ronnell TAYLOR please see note below scheduled patient on 8/5/25 for hospital follow up was discharged yesterday .  Component      Latest Ref Rng 6/9/2025 6/10/2025   Glucose      70 - 99 mg/dL 85  89    Glucose       199 (H)     Sodium      136 - 145 mmol/L 145  140    Sodium       144     Potassium      3.5 - 5.1 mmol/L 4.8  4.6    Potassium       4.9     Chloride      98 - 112 mmol/L 116 (H)  113 (H)    Chloride       113 (H)     Carbon Dioxide, Total      21.0 - 32.0 mmol/L 22.0  22.0    Carbon Dioxide, Total       21.0     ANION GAP      0 - 18 mmol/L 7  5    ANION GAP       10     BUN      9 - 23 mg/dL 21  23    BUN       29 (H)     CREATININE      0.55 - 1.02 mg/dL 1.60 (H)  1.63 (H)    CREATININE       1.84 (H)     BUN/CREATININE RATIO      10.0 - 20.0  13.1  14.1    BUN/CREATININE RATIO       15.8     CALCIUM      8.7 - 10.4 mg/dL 8.8  8.8    CALCIUM       9.2     CALCULATED OSMOLALITY      275 - 295 mOsm/kg 302 (H)  293    CALCULATED OSMOLALITY       309 (H)     Albumin      3.2 - 4.8 g/dL  3.7    ALT (SGPT)      10 - 49 U/L  26    AST (SGOT)      <34 U/L  29    ALKALINE PHOSPHATASE      55 - 142 U/L  165 (H)    Total Bilirubin      0.2 - 1.1 mg/dL  0.3    PROTEIN, TOTAL      5.7 - 8.2 g/dL  6.1    Globulin      2.0 - 3.5 g/dL  2.4    A/G Ratio      1.0 - 2.0   1.5    EGFR      >=60 mL/min/1.73m2 33 (L)  32 (L)    EGFR       28 (L)        Legend:  (H) High  (L) Low

## 2025-06-13 NOTE — TELEPHONE ENCOUNTER
The patient called to schedule a hospital follow up with Dr. Reynaga. The patient was offered the first available appointment.

## 2025-06-14 ENCOUNTER — APPOINTMENT (OUTPATIENT)
Dept: CT IMAGING | Facility: HOSPITAL | Age: 80
End: 2025-06-14
Attending: STUDENT IN AN ORGANIZED HEALTH CARE EDUCATION/TRAINING PROGRAM
Payer: MEDICARE

## 2025-06-14 ENCOUNTER — HOSPITAL ENCOUNTER (INPATIENT)
Facility: HOSPITAL | Age: 80
LOS: 4 days | Discharge: HOME HEALTH | End: 2025-06-18
Attending: STUDENT IN AN ORGANIZED HEALTH CARE EDUCATION/TRAINING PROGRAM | Admitting: HOSPITALIST
Payer: MEDICARE

## 2025-06-14 DIAGNOSIS — N17.9 AKI (ACUTE KIDNEY INJURY): Primary | ICD-10-CM

## 2025-06-14 DIAGNOSIS — G89.29 CHRONIC BILATERAL LOW BACK PAIN WITH BILATERAL SCIATICA: ICD-10-CM

## 2025-06-14 DIAGNOSIS — E87.5 HYPERKALEMIA: ICD-10-CM

## 2025-06-14 DIAGNOSIS — M48.061 SPINAL STENOSIS OF LUMBAR REGION AT MULTIPLE LEVELS: ICD-10-CM

## 2025-06-14 DIAGNOSIS — M79.672 PAIN IN BOTH FEET: ICD-10-CM

## 2025-06-14 DIAGNOSIS — M47.816 LUMBAR FACET ARTHROPATHY: ICD-10-CM

## 2025-06-14 DIAGNOSIS — M79.89 BILATERAL SWELLING OF FEET: ICD-10-CM

## 2025-06-14 DIAGNOSIS — M54.41 CHRONIC BILATERAL LOW BACK PAIN WITH BILATERAL SCIATICA: ICD-10-CM

## 2025-06-14 DIAGNOSIS — M54.42 CHRONIC BILATERAL LOW BACK PAIN WITH BILATERAL SCIATICA: ICD-10-CM

## 2025-06-14 DIAGNOSIS — M47.816 LUMBAR SPONDYLOSIS: ICD-10-CM

## 2025-06-14 DIAGNOSIS — M79.671 PAIN IN BOTH FEET: ICD-10-CM

## 2025-06-14 DIAGNOSIS — R60.0 BILATERAL LEG EDEMA: ICD-10-CM

## 2025-06-14 LAB
ALBUMIN SERPL-MCNC: 4.7 G/DL (ref 3.2–4.8)
ALBUMIN/GLOB SERPL: 1.7 {RATIO} (ref 1–2)
ALP LIVER SERPL-CCNC: 311 U/L (ref 55–142)
ALT SERPL-CCNC: 316 U/L (ref 10–49)
ANION GAP SERPL CALC-SCNC: 4 MMOL/L (ref 0–18)
AST SERPL-CCNC: 711 U/L (ref ?–34)
BASOPHILS # BLD AUTO: 0.03 X10(3) UL (ref 0–0.2)
BASOPHILS NFR BLD AUTO: 0.5 %
BILIRUB SERPL-MCNC: 0.3 MG/DL (ref 0.2–1.1)
BILIRUB UR QL: NEGATIVE
BUN BLD-MCNC: 67 MG/DL (ref 9–23)
BUN/CREAT SERPL: 17.1 (ref 10–20)
CALCIUM BLD-MCNC: 9.3 MG/DL (ref 8.7–10.4)
CHLORIDE SERPL-SCNC: 108 MMOL/L (ref 98–112)
CK SERPL-CCNC: 187 U/L (ref 34–145)
CLARITY UR: CLEAR
CO2 SERPL-SCNC: 24 MMOL/L (ref 21–32)
COLOR UR: YELLOW
CREAT BLD-MCNC: 3.92 MG/DL (ref 0.55–1.02)
DEPRECATED RDW RBC AUTO: 58.1 FL (ref 35.1–46.3)
EGFRCR SERPLBLD CKD-EPI 2021: 11 ML/MIN/1.73M2 (ref 60–?)
EOSINOPHIL # BLD AUTO: 0.09 X10(3) UL (ref 0–0.7)
EOSINOPHIL NFR BLD AUTO: 1.6 %
ERYTHROCYTE [DISTWIDTH] IN BLOOD BY AUTOMATED COUNT: 17 % (ref 11–15)
GLOBULIN PLAS-MCNC: 2.8 G/DL (ref 2–3.5)
GLUCOSE BLD-MCNC: 114 MG/DL (ref 70–99)
GLUCOSE BLDC GLUCOMTR-MCNC: 105 MG/DL (ref 70–99)
GLUCOSE BLDC GLUCOMTR-MCNC: 122 MG/DL (ref 70–99)
GLUCOSE BLDC GLUCOMTR-MCNC: 95 MG/DL (ref 70–99)
GLUCOSE UR-MCNC: 50 MG/DL
HCT VFR BLD AUTO: 33.3 % (ref 35–48)
HGB BLD-MCNC: 10.2 G/DL (ref 12–16)
HGB UR QL STRIP.AUTO: NEGATIVE
IMM GRANULOCYTES # BLD AUTO: 0.01 X10(3) UL (ref 0–1)
IMM GRANULOCYTES NFR BLD: 0.2 %
KETONES UR-MCNC: NEGATIVE MG/DL
LEUKOCYTE ESTERASE UR QL STRIP.AUTO: NEGATIVE
LYMPHOCYTES # BLD AUTO: 1.26 X10(3) UL (ref 1–4)
LYMPHOCYTES NFR BLD AUTO: 21.9 %
MCH RBC QN AUTO: 28.1 PG (ref 26–34)
MCHC RBC AUTO-ENTMCNC: 30.6 G/DL (ref 31–37)
MCV RBC AUTO: 91.7 FL (ref 80–100)
MONOCYTES # BLD AUTO: 0.63 X10(3) UL (ref 0.1–1)
MONOCYTES NFR BLD AUTO: 10.9 %
NEUTROPHILS # BLD AUTO: 3.74 X10 (3) UL (ref 1.5–7.7)
NEUTROPHILS # BLD AUTO: 3.74 X10(3) UL (ref 1.5–7.7)
NEUTROPHILS NFR BLD AUTO: 64.9 %
NITRITE UR QL STRIP.AUTO: NEGATIVE
OSMOLALITY SERPL CALC.SUM OF ELEC: 302 MOSM/KG (ref 275–295)
PH UR: 5 [PH] (ref 5–8)
PLATELET # BLD AUTO: 252 10(3)UL (ref 150–450)
POTASSIUM SERPL-SCNC: 5.9 MMOL/L (ref 3.5–5.1)
PROT SERPL-MCNC: 7.5 G/DL (ref 5.7–8.2)
RBC # BLD AUTO: 3.63 X10(6)UL (ref 3.8–5.3)
SODIUM SERPL-SCNC: 136 MMOL/L (ref 136–145)
SP GR UR STRIP: 1.02 (ref 1–1.03)
UROBILINOGEN UR STRIP-ACNC: NORMAL
WBC # BLD AUTO: 5.8 X10(3) UL (ref 4–11)

## 2025-06-14 PROCEDURE — 74176 CT ABD & PELVIS W/O CONTRAST: CPT | Performed by: STUDENT IN AN ORGANIZED HEALTH CARE EDUCATION/TRAINING PROGRAM

## 2025-06-14 RX ORDER — DIAZEPAM 5 MG/1
5 TABLET ORAL DAILY PRN
COMMUNITY
End: 2025-06-18

## 2025-06-14 RX ORDER — METOCLOPRAMIDE HYDROCHLORIDE 5 MG/ML
10 INJECTION INTRAMUSCULAR; INTRAVENOUS EVERY 8 HOURS PRN
Status: DISCONTINUED | OUTPATIENT
Start: 2025-06-14 | End: 2025-06-18

## 2025-06-14 RX ORDER — CALCIUM GLUCONATE 20 MG/ML
2 INJECTION, SOLUTION INTRAVENOUS ONCE
Status: DISCONTINUED | OUTPATIENT
Start: 2025-06-14 | End: 2025-06-14

## 2025-06-14 RX ORDER — BUMETANIDE 1 MG/1
1 TABLET ORAL DAILY
COMMUNITY
End: 2025-06-18

## 2025-06-14 RX ORDER — HYDRALAZINE HYDROCHLORIDE 25 MG/1
25 TABLET, FILM COATED ORAL EVERY 6 HOURS PRN
Status: DISCONTINUED | OUTPATIENT
Start: 2025-06-14 | End: 2025-06-18

## 2025-06-14 RX ORDER — GABAPENTIN 300 MG/1
300 CAPSULE ORAL NIGHTLY
Status: DISCONTINUED | OUTPATIENT
Start: 2025-06-14 | End: 2025-06-18

## 2025-06-14 RX ORDER — FERROUS SULFATE 325(65) MG
325 TABLET, DELAYED RELEASE (ENTERIC COATED) ORAL
Status: DISCONTINUED | OUTPATIENT
Start: 2025-06-14 | End: 2025-06-18

## 2025-06-14 RX ORDER — HYDRALAZINE HYDROCHLORIDE 25 MG/1
25 TABLET, FILM COATED ORAL 2 TIMES DAILY
COMMUNITY
Start: 2025-05-11 | End: 2025-06-18

## 2025-06-14 RX ORDER — BISACODYL 10 MG
10 SUPPOSITORY, RECTAL RECTAL
Status: DISCONTINUED | OUTPATIENT
Start: 2025-06-14 | End: 2025-06-18

## 2025-06-14 RX ORDER — PANTOPRAZOLE SODIUM 40 MG/1
40 TABLET, DELAYED RELEASE ORAL
Status: DISCONTINUED | OUTPATIENT
Start: 2025-06-15 | End: 2025-06-16

## 2025-06-14 RX ORDER — DEXTROSE MONOHYDRATE 25 G/50ML
INJECTION, SOLUTION INTRAVENOUS ONCE
Status: DISCONTINUED | OUTPATIENT
Start: 2025-06-14 | End: 2025-06-14

## 2025-06-14 RX ORDER — ATORVASTATIN CALCIUM 40 MG/1
40 TABLET, FILM COATED ORAL NIGHTLY
Status: DISCONTINUED | OUTPATIENT
Start: 2025-06-14 | End: 2025-06-18

## 2025-06-14 RX ORDER — DILTIAZEM HYDROCHLORIDE 120 MG/1
120 CAPSULE, EXTENDED RELEASE ORAL DAILY
Status: DISCONTINUED | OUTPATIENT
Start: 2025-06-15 | End: 2025-06-18

## 2025-06-14 RX ORDER — ACETAMINOPHEN 500 MG
500 TABLET ORAL EVERY 4 HOURS PRN
Status: DISCONTINUED | OUTPATIENT
Start: 2025-06-14 | End: 2025-06-15

## 2025-06-14 RX ORDER — DEXTROSE MONOHYDRATE 25 G/50ML
25 INJECTION, SOLUTION INTRAVENOUS
Status: DISCONTINUED | OUTPATIENT
Start: 2025-06-14 | End: 2025-06-14

## 2025-06-14 RX ORDER — POLYETHYLENE GLYCOL 3350 17 G/17G
17 POWDER, FOR SOLUTION ORAL DAILY PRN
Status: DISCONTINUED | OUTPATIENT
Start: 2025-06-14 | End: 2025-06-18

## 2025-06-14 RX ORDER — NICOTINE 21 MG/24HR
1 PATCH, TRANSDERMAL 24 HOURS TRANSDERMAL NIGHTLY
Status: DISCONTINUED | OUTPATIENT
Start: 2025-06-15 | End: 2025-06-18

## 2025-06-14 RX ORDER — SODIUM CHLORIDE 9 MG/ML
INJECTION, SOLUTION INTRAVENOUS CONTINUOUS
Status: DISCONTINUED | OUTPATIENT
Start: 2025-06-14 | End: 2025-06-17

## 2025-06-14 RX ORDER — ONDANSETRON 2 MG/ML
4 INJECTION INTRAMUSCULAR; INTRAVENOUS EVERY 6 HOURS PRN
Status: DISCONTINUED | OUTPATIENT
Start: 2025-06-14 | End: 2025-06-18

## 2025-06-14 NOTE — ED INITIAL ASSESSMENT (HPI)
Hannah arrives from home via EMS c/o dehydration and near syncopal episode. Pt is at baseline mentation per EMS and is well known the them. EMS states pt house is extremely hot.    VSS  BG 120s
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
## 2025-06-15 LAB
ALBUMIN SERPL-MCNC: 3.9 G/DL (ref 3.2–4.8)
ALP LIVER SERPL-CCNC: 337 U/L (ref 55–142)
ALT SERPL-CCNC: 462 U/L (ref 10–49)
ANION GAP SERPL CALC-SCNC: 5 MMOL/L (ref 0–18)
APAP SERPL-MCNC: <2 UG/ML (ref 10–20)
AST SERPL-CCNC: 826 U/L (ref ?–34)
ATRIAL RATE: 81 BPM
ATRIAL RATE: 84 BPM
BILIRUB DIRECT SERPL-MCNC: 0.2 MG/DL (ref ?–0.3)
BILIRUB SERPL-MCNC: 0.4 MG/DL (ref 0.2–1.1)
BILIRUB UR QL: NEGATIVE
BUN BLD-MCNC: 58 MG/DL (ref 9–23)
BUN/CREAT SERPL: 18.4 (ref 10–20)
CALCIUM BLD-MCNC: 8.6 MG/DL (ref 8.7–10.4)
CHLORIDE SERPL-SCNC: 113 MMOL/L (ref 98–112)
CLARITY UR: CLEAR
CO2 SERPL-SCNC: 22 MMOL/L (ref 21–32)
COLOR UR: COLORLESS
CREAT BLD-MCNC: 3.16 MG/DL (ref 0.55–1.02)
DEPRECATED RDW RBC AUTO: 58.3 FL (ref 35.1–46.3)
EGFRCR SERPLBLD CKD-EPI 2021: 15 ML/MIN/1.73M2 (ref 60–?)
ERYTHROCYTE [DISTWIDTH] IN BLOOD BY AUTOMATED COUNT: 17.1 % (ref 11–15)
GLUCOSE BLD-MCNC: 91 MG/DL (ref 70–99)
GLUCOSE BLDC GLUCOMTR-MCNC: 114 MG/DL (ref 70–99)
GLUCOSE BLDC GLUCOMTR-MCNC: 119 MG/DL (ref 70–99)
GLUCOSE BLDC GLUCOMTR-MCNC: 122 MG/DL (ref 70–99)
GLUCOSE BLDC GLUCOMTR-MCNC: 142 MG/DL (ref 70–99)
GLUCOSE BLDC GLUCOMTR-MCNC: 147 MG/DL (ref 70–99)
GLUCOSE UR-MCNC: 200 MG/DL
HAV IGM SER QL: NONREACTIVE
HBV CORE IGM SER QL: NONREACTIVE
HBV SURFACE AG SERPL QL IA: NONREACTIVE
HCT VFR BLD AUTO: 33 % (ref 35–48)
HCV AB SERPL QL IA: NONREACTIVE
HGB BLD-MCNC: 9.9 G/DL (ref 12–16)
HGB UR QL STRIP.AUTO: NEGATIVE
INR BLD: 0.97 (ref 0.8–1.2)
KETONES UR-MCNC: NEGATIVE MG/DL
LEUKOCYTE ESTERASE UR QL STRIP.AUTO: NEGATIVE
MCH RBC QN AUTO: 28 PG (ref 26–34)
MCHC RBC AUTO-ENTMCNC: 30 G/DL (ref 31–37)
MCV RBC AUTO: 93.5 FL (ref 80–100)
NITRITE UR QL STRIP.AUTO: NEGATIVE
OSMOLALITY SERPL CALC.SUM OF ELEC: 306 MOSM/KG (ref 275–295)
P AXIS: 59 DEGREES
P AXIS: 65 DEGREES
P-R INTERVAL: 154 MS
P-R INTERVAL: 162 MS
PH UR: 5.5 [PH] (ref 5–8)
PLATELET # BLD AUTO: 220 10(3)UL (ref 150–450)
POTASSIUM SERPL-SCNC: 4.9 MMOL/L (ref 3.5–5.1)
PROT SERPL-MCNC: 6.1 G/DL (ref 5.7–8.2)
PROT UR-MCNC: NEGATIVE MG/DL
PROTHROMBIN TIME: 13.5 SECONDS (ref 11.6–14.8)
Q-T INTERVAL: 378 MS
Q-T INTERVAL: 398 MS
QRS DURATION: 72 MS
QRS DURATION: 72 MS
QTC CALCULATION (BEZET): 446 MS
QTC CALCULATION (BEZET): 462 MS
R AXIS: 11 DEGREES
R AXIS: 14 DEGREES
RBC # BLD AUTO: 3.53 X10(6)UL (ref 3.8–5.3)
SODIUM SERPL-SCNC: 140 MMOL/L (ref 136–145)
SP GR UR STRIP: 1.01 (ref 1–1.03)
T AXIS: 129 DEGREES
T AXIS: 99 DEGREES
UROBILINOGEN UR STRIP-ACNC: NORMAL
VENTRICULAR RATE: 81 BPM
VENTRICULAR RATE: 84 BPM
WBC # BLD AUTO: 4 X10(3) UL (ref 4–11)

## 2025-06-15 PROCEDURE — 99223 1ST HOSP IP/OBS HIGH 75: CPT | Performed by: INTERNAL MEDICINE

## 2025-06-15 RX ORDER — METOPROLOL TARTRATE 1 MG/ML
INJECTION, SOLUTION INTRAVENOUS
Status: COMPLETED | OUTPATIENT
Start: 2025-06-15 | End: 2025-06-15

## 2025-06-15 RX ORDER — METOPROLOL TARTRATE 1 MG/ML
INJECTION, SOLUTION INTRAVENOUS
Status: DISPENSED
Start: 2025-06-15 | End: 2025-06-15

## 2025-06-15 RX ORDER — NICOTINE POLACRILEX 4 MG
30 LOZENGE BUCCAL
Status: DISCONTINUED | OUTPATIENT
Start: 2025-06-15 | End: 2025-06-18

## 2025-06-15 RX ORDER — OXYCODONE HYDROCHLORIDE 5 MG/1
5 TABLET ORAL EVERY 6 HOURS PRN
Refills: 0 | Status: DISCONTINUED | OUTPATIENT
Start: 2025-06-15 | End: 2025-06-18

## 2025-06-15 RX ORDER — POLYETHYLENE GLYCOL 3350 17 G/17G
17 POWDER, FOR SOLUTION ORAL ONCE
Status: DISCONTINUED | OUTPATIENT
Start: 2025-06-15 | End: 2025-06-18

## 2025-06-15 RX ORDER — NICOTINE POLACRILEX 4 MG
15 LOZENGE BUCCAL
Status: DISCONTINUED | OUTPATIENT
Start: 2025-06-15 | End: 2025-06-18

## 2025-06-15 RX ORDER — MORPHINE SULFATE 2 MG/ML
2 INJECTION, SOLUTION INTRAMUSCULAR; INTRAVENOUS ONCE
Refills: 0 | Status: COMPLETED | OUTPATIENT
Start: 2025-06-15 | End: 2025-06-15

## 2025-06-15 RX ORDER — HYDRALAZINE HYDROCHLORIDE 25 MG/1
25 TABLET, FILM COATED ORAL 2 TIMES DAILY
Status: DISCONTINUED | OUTPATIENT
Start: 2025-06-15 | End: 2025-06-16

## 2025-06-15 RX ORDER — ISOSORBIDE MONONITRATE 60 MG/1
60 TABLET, EXTENDED RELEASE ORAL DAILY
Status: DISCONTINUED | OUTPATIENT
Start: 2025-06-15 | End: 2025-06-18

## 2025-06-15 RX ORDER — DEXTROSE MONOHYDRATE 25 G/50ML
50 INJECTION, SOLUTION INTRAVENOUS
Status: DISCONTINUED | OUTPATIENT
Start: 2025-06-15 | End: 2025-06-18

## 2025-06-15 NOTE — PLAN OF CARE
Patient is A&Ox4. RA. SCD's for DVT prophylaxis. Tolerating diet. Accucheck ACHS. Continuous IVF. Voiding via pure wick. Vaughn boots on. Mepilex to vaughn heels. Sacrum intact. Bed alarm on. Bed locked and lowered. Call light within reach.     Problem: Patient Centered Care  Goal: Patient preferences are identified and integrated in the patient's plan of care  Description: Interventions:  - What would you like us to know as we care for you?   - Provide timely, complete, and accurate information to patient/family  - Incorporate patient and family knowledge, values, beliefs, and cultural backgrounds into the planning and delivery of care  - Encourage patient/family to participate in care and decision-making at the level they choose  - Honor patient and family perspectives and choices  Outcome: Progressing     Problem: Patient/Family Goals  Goal: Patient/Family Long Term Goal  Description: Patient's Long Term Goal:     Interventions:  -   - See additional Care Plan goals for specific interventions  Outcome: Progressing  Goal: Patient/Family Short Term Goal  Description: Patient's Short Term Goal:     Interventions:   -   - See additional Care Plan goals for specific interventions  Outcome: Progressing
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
## 2025-06-15 NOTE — PLAN OF CARE
Pt alert and oriented. NS @50. Plan for abd ultrasound and NPO at 0000. Call light within  reach. Safety precautions in place.  Problem: Patient Centered Care  Goal: Patient preferences are identified and integrated in the patient's plan of care  Description: Interventions:  - What would you like us to know as we care for you? From home with son   - Provide timely, complete, and accurate information to patient/family  - Incorporate patient and family knowledge, values, beliefs, and cultural backgrounds into the planning and delivery of care  - Encourage patient/family to participate in care and decision-making at the level they choose  - Honor patient and family perspectives and choices  Outcome: Progressing     Problem: Patient/Family Goals  Goal: Patient/Family Long Term Goal  Description: Patient's Long Term Goal: Discharge    Interventions:  - Follow MD orders  - See additional Care Plan goals for specific interventions  Outcome: Progressing  Goal: Patient/Family Short Term Goal  Description: Patient's Short Term Goal: No pain    Interventions:   - Pain management  - See additional Care Plan goals for specific interventions  Outcome: Progressing
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
## 2025-06-15 NOTE — SPIRITUAL CARE NOTE
Spiritual Care Visit Note    Patient Name: Hannah Boss Date of Spiritual Care Visit: 06/15/25   : 1945 Primary Dx: DICK (acute kidney injury)       Referred By: Referral From: Care Coordination, Nurse    Spiritual Care Taxonomy:    Intended Effects: Build relationship of care and support    Methods: Collaborate with care team member, Offer emotional support, Offer support    Interventions: Assist someone with Advance Directives, Explain  role, Provide hospitality, Silent prayer    Visit Type/Summary:     - PoA: New PoAH Created: Visited patient in response to PoA for Healthcare consult/request. Created a new PoAH for Healthcare document that, per the patient, accurately reflects their wishes. Gave the patient the original PoAH document along with copies. Confirmed that the PoAH primary agent name and contact information have been entered into the Epic, Advance Directives section. A copy of the new PoAH document has been placed on the patient's paper chart. Patient is decisional. Son at bedside.  remains available for follow up.    Spiritual Care support can be requested via an Epic consult. For urgent/immediate needs, please contact the On Call  at: [place]: ext [phone]    Esequiel GALLAGHER  Chaplain Resident  [phone]

## 2025-06-15 NOTE — ED QUICK NOTES
Orders for admission, patient is aware of plan and ready to go upstairs. Any questions, please call ED RN miley at extension [phone].     Patient Covid vaccination status: Unvaccinated     COVID Test Ordered in ED: None    COVID Suspicion at Admission: N/A    Running Infusions: Medication Infusions[1] None    Mental Status/LOC at time of transport: aox3-4    Other pertinent information:   CIWA score: N/A   NIH score:  N/A             [1]
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
## 2025-06-15 NOTE — PROGRESS NOTES
RRT called to room 408    On my arrival patient complaining of abdominal discomfort, otherwise no complaints.  As per RN heart rate in the 130s up from a baseline of 75.    On exam  Temperature 97.2  Pulse 134  Respiration 18  /80  Pulse ox 98% on room air  Alert and oriented in no distress  Chest clear  Heart tachycardic regular  Abdomen soft  Extremities no edema    Assessment and plan    SVT  Attempted vagal maneuvers, was about to give Lopressor when patient converted to sinus rhythm.    20 minutes spent on the care of this patient.

## 2025-06-15 NOTE — CM/SW NOTE
MDO for HHC    Per chart review pt was last dc on 6/11/25 to home with [facility]    CM sent ref in aidin to confirm status/services, vic done    Plan  Pending    / to remain available for support and/or discharge planning.     Inge Sanchez, RN    Ext [phone]
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
## 2025-06-15 NOTE — PLAN OF CARE
RRT    *See RRT Documentation Record*    Reason the RRT was called: Patient c/o chest pain  Assessment of patient leading up to RRT: Patient A&Ox4, RA, endorsing severe chest  pain  Interventions/Testin lead EKG, 1x 2 mg morphine IV  Patient Outcome/Disposition: Patient A&Ox4, RA, VSS, Denying new chest pain and or SOB  Family Notified: no  Name of family notified: N/A, pt denied

## 2025-06-16 ENCOUNTER — APPOINTMENT (OUTPATIENT)
Dept: ULTRASOUND IMAGING | Facility: HOSPITAL | Age: 80
End: 2025-06-16
Attending: INTERNAL MEDICINE
Payer: MEDICARE

## 2025-06-16 LAB
ALBUMIN SERPL-MCNC: 3.7 G/DL (ref 3.2–4.8)
ALBUMIN/GLOB SERPL: 1.8 {RATIO} (ref 1–2)
ALP LIVER SERPL-CCNC: 292 U/L (ref 55–142)
ALT SERPL-CCNC: 248 U/L (ref 10–49)
ANION GAP SERPL CALC-SCNC: 2 MMOL/L (ref 0–18)
AST SERPL-CCNC: 204 U/L (ref ?–34)
BASOPHILS # BLD AUTO: 0.05 X10(3) UL (ref 0–0.2)
BASOPHILS NFR BLD AUTO: 1.1 %
BILIRUB SERPL-MCNC: 0.3 MG/DL (ref 0.2–1.1)
BUN BLD-MCNC: 52 MG/DL (ref 9–23)
BUN/CREAT SERPL: 22.7 (ref 10–20)
CALCIUM BLD-MCNC: 8.7 MG/DL (ref 8.7–10.4)
CHLORIDE SERPL-SCNC: 114 MMOL/L (ref 98–112)
CO2 SERPL-SCNC: 25 MMOL/L (ref 21–32)
CREAT BLD-MCNC: 2.29 MG/DL (ref 0.55–1.02)
DEPRECATED RDW RBC AUTO: 57.5 FL (ref 35.1–46.3)
EGFRCR SERPLBLD CKD-EPI 2021: 21 ML/MIN/1.73M2 (ref 60–?)
EOSINOPHIL # BLD AUTO: 0.23 X10(3) UL (ref 0–0.7)
EOSINOPHIL NFR BLD AUTO: 5.1 %
ERYTHROCYTE [DISTWIDTH] IN BLOOD BY AUTOMATED COUNT: 16.9 % (ref 11–15)
GLOBULIN PLAS-MCNC: 2.1 G/DL (ref 2–3.5)
GLUCOSE BLD-MCNC: 92 MG/DL (ref 70–99)
GLUCOSE BLDC GLUCOMTR-MCNC: 132 MG/DL (ref 70–99)
GLUCOSE BLDC GLUCOMTR-MCNC: 156 MG/DL (ref 70–99)
GLUCOSE BLDC GLUCOMTR-MCNC: 158 MG/DL (ref 70–99)
GLUCOSE BLDC GLUCOMTR-MCNC: 97 MG/DL (ref 70–99)
HCT VFR BLD AUTO: 29.1 % (ref 35–48)
HGB BLD-MCNC: 8.9 G/DL (ref 12–16)
IMM GRANULOCYTES # BLD AUTO: 0.01 X10(3) UL (ref 0–1)
IMM GRANULOCYTES NFR BLD: 0.2 %
INR BLD: 1.24 (ref 0.8–1.2)
LYMPHOCYTES # BLD AUTO: 1.05 X10(3) UL (ref 1–4)
LYMPHOCYTES NFR BLD AUTO: 23.5 %
MAGNESIUM SERPL-MCNC: 2.3 MG/DL (ref 1.6–2.6)
MCH RBC QN AUTO: 28.3 PG (ref 26–34)
MCHC RBC AUTO-ENTMCNC: 30.6 G/DL (ref 31–37)
MCV RBC AUTO: 92.7 FL (ref 80–100)
MONOCYTES # BLD AUTO: 0.53 X10(3) UL (ref 0.1–1)
MONOCYTES NFR BLD AUTO: 11.9 %
NEUTROPHILS # BLD AUTO: 2.6 X10 (3) UL (ref 1.5–7.7)
NEUTROPHILS # BLD AUTO: 2.6 X10(3) UL (ref 1.5–7.7)
NEUTROPHILS NFR BLD AUTO: 58.2 %
OSMOLALITY SERPL CALC.SUM OF ELEC: 306 MOSM/KG (ref 275–295)
PLATELET # BLD AUTO: 210 10(3)UL (ref 150–450)
POTASSIUM SERPL-SCNC: 5.5 MMOL/L (ref 3.5–5.1)
PROT SERPL-MCNC: 5.8 G/DL (ref 5.7–8.2)
PROTHROMBIN TIME: 16.3 SECONDS (ref 11.6–14.8)
RBC # BLD AUTO: 3.14 X10(6)UL (ref 3.8–5.3)
SODIUM SERPL-SCNC: 141 MMOL/L (ref 136–145)
WBC # BLD AUTO: 4.5 X10(3) UL (ref 4–11)

## 2025-06-16 PROCEDURE — 93976 VASCULAR STUDY: CPT | Performed by: INTERNAL MEDICINE

## 2025-06-16 PROCEDURE — 99233 SBSQ HOSP IP/OBS HIGH 50: CPT | Performed by: INTERNAL MEDICINE

## 2025-06-16 PROCEDURE — 76705 ECHO EXAM OF ABDOMEN: CPT | Performed by: INTERNAL MEDICINE

## 2025-06-16 RX ORDER — HYDRALAZINE HYDROCHLORIDE 50 MG/1
50 TABLET, FILM COATED ORAL 2 TIMES DAILY
Status: DISCONTINUED | OUTPATIENT
Start: 2025-06-16 | End: 2025-06-18

## 2025-06-16 NOTE — PROGRESS NOTES
[facility]  GI Progress Note      Hannah Boss Patient Status:  Inpatient    1945 MRN [id number]   Location [facility] 3W/SW Attending Janak Mortensen MD   Hosp Day # 2 PCP Diana Lopez DO          SUBJECTIVE:     She denies abdominal pain, nausea or vomiting.     OBJECTIVE:    Height: --  Weight: 161 lb 12.8 oz (73.4 kg) (448)  BSA (Calculated - sq m): --  Pulse: 62 (1041)  BP: 192/60 (1041)  Temp: 97.9 °F (36.6 °C) (1041)  Do Not Use - Resp Rate: --  SpO2: 100 % (1041)        General: Alert, oriented, no acute distress  Lungs: Clear  Heart: Normal S1 and S2  Abdomen: Soft, non-tender. Bowel sounds are positive.   Extremities: No edema    LAB RESULTS:       Lab Results   Component Value Date    WBC 4.5 2025    HGB 8.9 2025    HCT 29.1 2025    .0 2025    CREATSERUM 2.29 2025    BUN 52 2025     2025    K 5.5 2025     2025    CO2 25.0 2025    GLU 92 2025    CA 8.7 2025    ALB 3.7 2025    ALKPHO 292 2025    BILT 0.3 2025    TP 5.8 2025     2025     2025    INR 1.24 2025    PTP 16.3 2025    MG 2.3 2025    PGLU 97 2025         Latest Reference Range & Units 06/15/25 12:08   HEPATITIS A AB, IGM Nonreactive   Nonreactive   HBc IgM AB Nonreactive   Nonreactive   HBSAg Screen Nonreactive   Nonreactive   HCV AB Nonreactive   Nonreactive       RADIOLOGY RESULTS:    Doppler US pending      ASSESSMENT:    Elevated LFT's    The etiology of elevated LFT\"s is unclear. LFT's are improving.     PLAN:    Follow LFT's.  Await US results.     Chris Barrett MD

## 2025-06-16 NOTE — DISCHARGE INSTRUCTIONS
Please follow up with your PCP in 1 week    Please follow up with your Nephrologist and GI team in 1-2 weeks and have kidney and liver function tests done.    Please follow up with oncology as directed.      Please follow up with cardiology in 2 weeks.        HOME HEALTH:  Sometimes managing your health at home requires assistance.  The [facility] team has recognized your preference to use   [facility]  [address] [place] [id number]  Phone: [phone]  Fax: [phone]  A representative from the home health agency will contact you or your family to schedule your first visit.

## 2025-06-16 NOTE — PROGRESS NOTES
[facility]  part of [facility]    Progress Note    Hannah Boss Patient Status:  Inpatient    1945 MRN [id number]   Location [facility] 3W/SW Attending Janak Mortensen MD   Hosp Day # 2 PCP Diana Lopez DO       Subjective:   Hannah Boss is a(n) 79 year old female who I am seeing for DICK    The patient is resting.  She admits to drinking soda pop every day      Objective:   BP (!) 192/60 (BP Location: Right arm)   Pulse 62   Temp 97.9 °F (36.6 °C) (Oral)   Resp 16   Ht 5' 3\" (1.6 m)   Wt 161 lb 12.8 oz (73.4 kg)   SpO2 100%   BMI 28.66 kg/m²      Intake/Output Summary (Last 24 hours) at 2025 1147  Last data filed at 2025 1008  Gross per 24 hour   Intake 1724.17 ml   Output 850 ml   Net 874.17 ml     Wt Readings from Last 1 Encounters:   25 161 lb 12.8 oz (73.4 kg)       Exam  Gen: No acute distress, Heent: NC AT, mucous memb clear, neck supple  Pulm: Lungs clear, normal respiratory effort  CV: Heart with regular rate and rhythm, no edema  Abd: Abdomen soft, nontender, nondistended, no organomegaly, bowel sounds present  Skin: no symptoms reported  Psych: alert and oriented    Assessment and Plan:       1 - DICK/hyperkalemia  Creatinine is improved.  Low potassium diet.  Also Lokelma continue current IV fluid rate.     2-transaminitis  Right upper quadrant ultrasound pending.  GI to see patient     3 -tachycardia  She is on diltiazem     4 -leiomyosarcoma  Follows with [facility]            Results:     Recent Labs   Lab 06/10/25  0645 25  0625 25  1742 06/15/25  0549 25  0615   RBC 3.18*  --  3.63* 3.53* 3.14*   HGB 9.0*  --  10.2* 9.9* 8.9*   HCT 28.9*  --  33.3* 33.0* 29.1*   MCV 90.9  --  91.7 93.5 92.7   MCH 28.3  --  28.1 28.0 28.3   MCHC 31.1  --  30.6* 30.0* 30.6*   RDW 16.6*  --  17.0* 17.1* 16.9*   NEPRELIM 3.17  --  3.74  --  2.60   WBC 5.1  --  5.8 4.0 4.5   .0  220.0   < > 252.0 220.0 210.0    < > = values in this  interval not displayed.         Recent Labs   Lab 06/14/25  1742 06/15/25  0549 06/16/25  0616   * 91 92   BUN 67* 58* 52*   CREATSERUM 3.92* 3.16* 2.29*   CA 9.3 8.6* 8.7    140 141   K 5.9* 4.9 5.5*    113* 114*   CO2 24.0 22.0 25.0          CT ABDOMEN+PELVIS(CPT=74176)  Result Date: 6/14/2025  CONCLUSION:  1. 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 Stable mass effect effect on IVC or IVC extension .  Further assessment would require IV contrast. 2. Large amount of stool in colon suggests constipation.  Colonic diverticulosis.     Dictated by (CST): Eric Alarcon MD on 6/14/2025 at 7:33 PM     Finalized by (CST): Eric Alarcon MD on 6/14/2025 at 7:44 PM                ANN-MARIE GRANDA MD  6/16/2025

## 2025-06-16 NOTE — PLAN OF CARE
Problem: Patient Centered Care  Goal: Patient preferences are identified and integrated in the patient's plan of care  Description: Interventions:  - What would you like us to know as we care for you? Has chronic pain  - Provide timely, complete, and accurate information to patient/family  - Incorporate patient and family knowledge, values, beliefs, and cultural backgrounds into the planning and delivery of care  - Encourage patient/family to participate in care and decision-making at the level they choose  - Honor patient and family perspectives and choices  Outcome: Progressing

## 2025-06-16 NOTE — PROGRESS NOTES
Progress Note  Hannah Boss Patient Status:  Inpatient    1945 MRN [id number]   Location [facility] 3W/SW Attending Janak Mortensen MD   Hosp Day # 2 PCP Diana Lopez DO     Subjective:  Denies cardiac complaints    Objective:  BP (!) 162/59 (BP Location: Right arm)   Pulse 61   Temp 98.3 °F (36.8 °C) (Oral)   Resp 20   Ht 5' 3\" (1.6 m)   Wt 161 lb 12.8 oz (73.4 kg)   SpO2 99%   BMI 28.66 kg/m²     Telemetry: SR 60s    Intake/Output:    Intake/Output Summary (Last 24 hours) at 2025 1028  Last data filed at 2025 1008  Gross per 24 hour   Intake 1734.17 ml   Output 850 ml   Net 884.17 ml     Last 3 Weights   25 0448 161 lb 12.8 oz (73.4 kg)   06/15/25 2013 163 lb 2.3 oz (74 kg)   25 1733 163 lb 2.3 oz (74 kg)   25 0537 162 lb 12.8 oz (73.8 kg)   25 0620 162 lb 11.2 oz (73.8 kg)   25 1916 164 lb (74.4 kg)     Labs:  Recent Labs   Lab 06/14/25  1742 06/15/25  0549 25  0616   * 91 92   BUN 67* 58* 52*   CREATSERUM 3.92* 3.16* 2.29*   EGFRCR 11* 15* 21*   CA 9.3 8.6* 8.7    140 141   K 5.9* 4.9 5.5*    113* 114*   CO2 24.0 22.0 25.0     Recent Labs   Lab 06/10/25  0645 25  0625 25  1742 06/15/25  0549 25  0615   RBC 3.18*  --  3.63* 3.53* 3.14*   HGB 9.0*  --  10.2* 9.9* 8.9*   HCT 28.9*  --  33.3* 33.0* 29.1*   MCV 90.9  --  91.7 93.5 92.7   MCH 28.3  --  28.1 28.0 28.3   MCHC 31.1  --  30.6* 30.0* 30.6*   RDW 16.6*  --  17.0* 17.1* 16.9*   NEPRELIM 3.17  --  3.74  --  2.60   WBC 5.1  --  5.8 4.0 4.5   .0  220.0   < > 252.0 220.0 210.0    < > = values in this interval not displayed.     Recent Labs   Lab 25  1400 25  1742   TROPHS 348*  --    CK  --  187*     Lab Results   Component Value Date/Time    HDL 40 2025 04:01 AM    LDL 32 2025 04:01 AM    TRIG 106 2025 04:01 AM     Lab Results   Component Value Date    DDIMER 1.13 (H) 2023     Lab Results   Component Value Date     TSH 0.008 (L) 07/03/2024     Review of Systems:   Constitutional: No fevers, chills, fatigue or night sweats.  Respiratory: Denies cough, wheezing or shortness of breath.  CV: Denies chest pain, palpitations, orthopnea, PND or dizziness.  GI: No nausea, vomiting or diarrhea. No blood in stools.    Physical Exam:   General: Alert, cooperative, no distress, appears stated age.  Neck: no JVD.  Lungs: Clear to auscultation bilaterally.  Chest wall: No tenderness or deformity.  Heart: Regular rate and rhythm, S1, S2 normal, no murmur, click, rub or gallop.  Abdomen: Soft, non-tender. Bowel sounds normal. No masses,  No organomegaly.  Extremities: Extremities normal, atraumatic, no cyanosis or edema.  Pulses: 2+ and symmetric all extremities.  Neurologic: Grossly intact.    Medications:  Scheduled Medications[1]  Medication Infusions[2]    Assessment:    79 year old female with PMH of moderate aortic stenosis, AVNRT, DVT/PE, HTN, CKD III-IV, DMII who presented with DICK on CKD and dehydration. Cardiology was consulted for PSVT.      PSVT  Spontaneous conversion to sinus  -known hx AVNRT with difficult to control rates historically  -diltiazem home  dose 120mg daily    Near syncope  2/2 dehydrdation     DICK on CKD3  Baseline 2-2.3  -3.92 on admission, currently 2.3  -2/2 dehydration, poor PO intake  -nephrology following     Chronic HFpEF  LVEF 60-65% with G2DD last admission last week with moderate to severe aortic stenosis, mild aortic regurgitation  -dehydrated this admission 2/2 poor oral intake  -proBNP 10,705 in March, 2025  -GDMT: bumex, hydralazine, isosorbide PTA, holding bumex currently due to DICK    HTN  BP up to 190s  -hydralazine, isosorbide, diltiazem  -will increase hydralazine dosing for better control     HLD-statin, LDL controlled 32    Transaminitis  LFTs elevated on admission  -likely 2/2 dehydration  -trending down  -GI following  -liver US pending, CT A/P revealed retroperitoneal mass (known  retroperitoneal sarcoma), slightly increased in sized compared to previous imaging,  constipation, colonic diverticulosis     Rheumatoid Arthritis     Hx DVT  On eliquis     DMII-A1c 6.5     Acute on Chronic Anemia  Hgb baseline ~8  -today 7, 1u PRBC per primary     Hyperkalemia  K+ 5.9 on admission  -lokelma per nephrology      Plan:  -increase hydralazine to 50mg TID for better BP control   -continue eliquis, diltiazem, imdur  -continue holding statin with elevated LFTs, trending down  -lokelma per nephrology      Plan of care discussed with patient, RN.        Kailee Fong, MSN, APN, FNP-BC, CCK  6/16/2025  10:28 AM        =======================================================  Note reviewed and labs reviewed.  Agree with above assessment and plan.  In regard to PSVT, the patient spontaneously converted to sinus rhythm; given her history of AVNRT, we will continue diltiazem and recommend outpatient follow-up with electrophysiology. Her episode of near syncope appears related to intravascular volume depletion and has resolved with rehydration. HFpEF is chronic and currently volume-depleted; diuretics remain on hold due to DICK. Hypertension has been difficult to control with elevated pressures into the 190s; hydralazine has been up-titrated accordingly. Statin therapy is on hold due to transaminitis, with close monitoring of liver enzymes. The patient remains on Eliquis for history of DVT/PE  I have personally performed the medical decision making in its entirety.   Alex Green DO           [1]    polyethylene glycol (PEG 3350)  17 g Oral Once    isosorbide mononitrate ER  60 mg Oral Daily    hydrALAZINE  25 mg Oral BID    sodium zirconium cyclosilicate  10 g Oral Daily    insulin aspart  1-5 Units Subcutaneous TID CC    [Held by provider] atorvastatin  40 mg Oral Nightly    dilTIAZem HCl ER Beads  120 mg Oral Daily    apixaban  5 mg Oral BID    ferrous sulfate  325 mg Oral Once per day on Tuesday Thursday  Saturday    gabapentin  300 mg Oral Nightly    pantoprazole  40 mg Oral BID AC    nicotine  1 patch Transdermal Nightly   [2]    sodium chloride 50 mL/hr at 06/15/25 1152

## 2025-06-16 NOTE — PROGRESS NOTES
[facility] Hospitalist Progress Note     CC: Hospital Follow up    PCP: Diana Lopez DO       Assessment/Plan:     Principal Problem:    DICK (acute kidney injury)  Active Problems:    Hyperkalemia    Ms. Boss is a 79 year old female with spinal stenosis, aortic stenosis, HFpEF, CKD 4, GERD, anemia, HLD, leiomyosarcoma follows with [facility] (dr. Benedict), RA, hs of GI bleeds 2/2 to AVMs, type 2 DM,  bradycardia, recurrent hyperkalemia, who was hospitalized from 6/9-6/11 for SVT, who presents with dehydration, pre-syncope, DICK and hyperkalemia, transaminitis,       DICK on CKD stage 4  Hyperkalemia  - cre prior to DC 1.63, on admission 3.9, K 5.9  - holding lasix, holding jardiance  - IVF ordered  - renal and Cards consulted  - continue daily lokelma   - Cre slowly improved, 2.29 today      Transaminitis  - no RUQ pain, but generalized abd pain  - LFT's sig elevated   - CT abd without liver findings  - hold statin  - no ETOH use  - has been taking tylenol with percocet, will check tylenol level, stop percocet change to oxycodone -> tylenol level normal  - Bili ok, INR ok  - will check RUQ US, acute hepatitis negative,    - consulted GI given persistent sig elevated LFT's  - LFT's improving      SVT  AvNRT  HTN  - had RRT last night, spontaneous conversion to NSR  - resumed dilt  - Cards consulted     Chronic HFpEF  Aortic Stenosis  - LVEF 60-65% on 6/9/25, moderate to severe aortic stenosis,   - appears hypovolemic  - hold lasix, hold empagliflozin  - cards consulted      Abd pain  Leiomyosarcoma  - has had this for 10 years  - was following with [facility] last note from april 2024 s/p radiation, has not seen them > 1 year  - she states they no longer take her insurance, and she hasn't followed up with another oncologist  - CT here (without contrast) shows persistent sarcoma poss larger that CT from 2024  - will need onc referral, will consider inpatient eval tomorrow pending progress     Type 2  DM  - holding jardiance  - SSI and accuchecks     Hs of DVT  - on eliquis     GERD  Hs of GI bleed  - continue PPI     RA  Spinal stenosis  - not on meds for RA  - gabapentin continued     HLD  - statin held with transaminitis      Active tobacco use  - cessation counseling on DC     FN:  - IVF: 50cc  - Diet: adv      DVT Prophy:scd, eliquis  Lines: PIV     Dispo: pending clinical course    Questions/concerns were discussed with patient and/or family by bedside.    Thank You,  Janak Mortensen MD    Hospitalist with [facility]     Subjective:     No CP, SOB, or N/V. Notes persistent back/abd pain, slightly improved after BM, no fevers no other complaints     OBJECTIVE:    Blood pressure (!) 192/60, pulse 62, temperature 97.9 °F (36.6 °C), temperature source Oral, resp. rate 16, height 5' 3\" (1.6 m), weight 161 lb 12.8 oz (73.4 kg), SpO2 100%, not currently breastfeeding.    Temp:  [97.9 °F (36.6 °C)-98.6 °F (37 °C)] 97.9 °F (36.6 °C)  Pulse:  [61-74] 62  Resp:  [16-20] 16  BP: (141-192)/(51-62) 192/60  SpO2:  [96 %-100 %] 100 %      Intake/Output:    Intake/Output Summary (Last 24 hours) at 6/16/2025 1115  Last data filed at 6/16/2025 1008  Gross per 24 hour   Intake 1734.17 ml   Output 850 ml   Net 884.17 ml       Last 3 Weights   06/16/25 0448 161 lb 12.8 oz (73.4 kg)   06/15/25 2013 163 lb 2.3 oz (74 kg)   06/14/25 1733 163 lb 2.3 oz (74 kg)   06/11/25 0537 162 lb 12.8 oz (73.8 kg)   06/09/25 0620 162 lb 11.2 oz (73.8 kg)   05/12/25 1916 164 lb (74.4 kg)       Exam    Gen: No acute distress,    Heent: NC AT   Pulm: Lungs clear, normal respiratory effort  CV: Heart with regular rate and rhythm, systolic murmur noted, no peripheral edema  Abd: Abdomen soft, mild tender to palpation nondistended,   MSK: no clubbing, no cyanosis  Skin: no rashes or lesions       Data Review:       Labs:     Recent Labs   Lab 06/10/25  0645 06/11/25  0625 06/14/25  1742 06/15/25  0549 06/16/25  0615   RBC 3.18*  --  3.63* 3.53*  3.14*   HGB 9.0*  --  10.2* 9.9* 8.9*   HCT 28.9*  --  33.3* 33.0* 29.1*   MCV 90.9  --  91.7 93.5 92.7   MCH 28.3  --  28.1 28.0 28.3   MCHC 31.1  --  30.6* 30.0* 30.6*   RDW 16.6*  --  17.0* 17.1* 16.9*   NEPRELIM 3.17  --  3.74  --  2.60   WBC 5.1  --  5.8 4.0 4.5   .0  220.0   < > 252.0 220.0 210.0    < > = values in this interval not displayed.         Recent Labs   Lab 06/14/25  1742 06/15/25  0549 06/16/25  0616   * 91 92   BUN 67* 58* 52*   CREATSERUM 3.92* 3.16* 2.29*   EGFRCR 11* 15* 21*   CA 9.3 8.6* 8.7    140 141   K 5.9* 4.9 5.5*    113* 114*   CO2 24.0 22.0 25.0       Recent Labs   Lab 06/10/25  0718 06/14/25  1742 06/15/25  0549 06/16/25  0616   ALT 26 316* 462* 248*   AST 29 711* 826* 204*   ALB 3.7 4.7 3.9 3.7         Imaging:  CT ABDOMEN+PELVIS(CPT=74176)  Result Date: 6/14/2025  CONCLUSION:  1. 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 Stable mass effect effect on IVC or IVC extension .  Further assessment would require IV contrast. 2. Large amount of stool in colon suggests constipation.  Colonic diverticulosis.     Dictated by (CST): Eric Alarcon MD on 6/14/2025 at 7:33 PM     Finalized by (CST): Eric Alarcon MD on 6/14/2025 at 7:44 PM              Meds:     Scheduled Medications[1]  Medication Infusions[2]  PRN Medications[3]           [1]    hydrALAZINE  50 mg Oral BID    polyethylene glycol (PEG 3350)  17 g Oral Once    isosorbide mononitrate ER  60 mg Oral Daily    sodium zirconium cyclosilicate  10 g Oral Daily    insulin aspart  1-5 Units Subcutaneous TID CC    [Held by provider] atorvastatin  40 mg Oral Nightly    dilTIAZem HCl ER Beads  120 mg Oral Daily    apixaban  5 mg Oral BID    ferrous sulfate  325 mg Oral Once per day on Tuesday Thursday Saturday    gabapentin  300 mg Oral Nightly    pantoprazole  40 mg Oral BID AC    nicotine  1 patch  Transdermal Nightly   [2]    sodium chloride 50 mL/hr at 06/15/25 1157   [3]   oxyCODONE    glucose **OR** glucose **OR** glucose-vitamin C **OR** dextrose **OR** glucose **OR** glucose **OR** glucose-vitamin C    bisacodyl    polyethylene glycol (PEG 3350)    melatonin    ondansetron    metoclopramide    hydrALAZINE

## 2025-06-17 LAB
ALBUMIN SERPL-MCNC: 3.6 G/DL (ref 3.2–4.8)
ALBUMIN/GLOB SERPL: 1.7 {RATIO} (ref 1–2)
ALP LIVER SERPL-CCNC: 267 U/L (ref 55–142)
ALT SERPL-CCNC: 161 U/L (ref 10–49)
ANION GAP SERPL CALC-SCNC: 4 MMOL/L (ref 0–18)
AST SERPL-CCNC: 87 U/L (ref ?–34)
BASOPHILS # BLD AUTO: 0.04 X10(3) UL (ref 0–0.2)
BASOPHILS NFR BLD AUTO: 0.8 %
BILIRUB DIRECT SERPL-MCNC: <0.1 MG/DL (ref ?–0.3)
BILIRUB SERPL-MCNC: 0.2 MG/DL (ref 0.2–1.1)
BUN BLD-MCNC: 50 MG/DL (ref 9–23)
BUN/CREAT SERPL: 27.2 (ref 10–20)
CALCIUM BLD-MCNC: 8.8 MG/DL (ref 8.7–10.4)
CHLORIDE SERPL-SCNC: 112 MMOL/L (ref 98–112)
CO2 SERPL-SCNC: 24 MMOL/L (ref 21–32)
CREAT BLD-MCNC: 1.84 MG/DL (ref 0.55–1.02)
DEPRECATED RDW RBC AUTO: 53.6 FL (ref 35.1–46.3)
EGFRCR SERPLBLD CKD-EPI 2021: 28 ML/MIN/1.73M2 (ref 60–?)
EOSINOPHIL # BLD AUTO: 0.22 X10(3) UL (ref 0–0.7)
EOSINOPHIL NFR BLD AUTO: 4.5 %
ERYTHROCYTE [DISTWIDTH] IN BLOOD BY AUTOMATED COUNT: 16.6 % (ref 11–15)
GLOBULIN PLAS-MCNC: 2.1 G/DL (ref 2–3.5)
GLUCOSE BLD-MCNC: 94 MG/DL (ref 70–99)
GLUCOSE BLDC GLUCOMTR-MCNC: 100 MG/DL (ref 70–99)
GLUCOSE BLDC GLUCOMTR-MCNC: 105 MG/DL (ref 70–99)
GLUCOSE BLDC GLUCOMTR-MCNC: 108 MG/DL (ref 70–99)
GLUCOSE BLDC GLUCOMTR-MCNC: 166 MG/DL (ref 70–99)
HCT VFR BLD AUTO: 27.4 % (ref 35–48)
HGB BLD-MCNC: 8.6 G/DL (ref 12–16)
IMM GRANULOCYTES # BLD AUTO: 0.01 X10(3) UL (ref 0–1)
IMM GRANULOCYTES NFR BLD: 0.2 %
INR BLD: 1.23 (ref 0.8–1.2)
LYMPHOCYTES # BLD AUTO: 1.13 X10(3) UL (ref 1–4)
LYMPHOCYTES NFR BLD AUTO: 22.9 %
MAGNESIUM SERPL-MCNC: 2.1 MG/DL (ref 1.6–2.6)
MCH RBC QN AUTO: 27.7 PG (ref 26–34)
MCHC RBC AUTO-ENTMCNC: 31.4 G/DL (ref 31–37)
MCV RBC AUTO: 88.4 FL (ref 80–100)
MONOCYTES # BLD AUTO: 0.61 X10(3) UL (ref 0.1–1)
MONOCYTES NFR BLD AUTO: 12.3 %
NEUTROPHILS # BLD AUTO: 2.93 X10 (3) UL (ref 1.5–7.7)
NEUTROPHILS # BLD AUTO: 2.93 X10(3) UL (ref 1.5–7.7)
NEUTROPHILS NFR BLD AUTO: 59.3 %
OSMOLALITY SERPL CALC.SUM OF ELEC: 303 MOSM/KG (ref 275–295)
PLATELET # BLD AUTO: 228 10(3)UL (ref 150–450)
POTASSIUM SERPL-SCNC: 5 MMOL/L (ref 3.5–5.1)
PROT SERPL-MCNC: 5.7 G/DL (ref 5.7–8.2)
PROTHROMBIN TIME: 16.2 SECONDS (ref 11.6–14.8)
RBC # BLD AUTO: 3.1 X10(6)UL (ref 3.8–5.3)
SODIUM SERPL-SCNC: 140 MMOL/L (ref 136–145)
WBC # BLD AUTO: 4.9 X10(3) UL (ref 4–11)

## 2025-06-17 PROCEDURE — 99232 SBSQ HOSP IP/OBS MODERATE 35: CPT | Performed by: INTERNAL MEDICINE

## 2025-06-17 PROCEDURE — 99223 1ST HOSP IP/OBS HIGH 75: CPT | Performed by: STUDENT IN AN ORGANIZED HEALTH CARE EDUCATION/TRAINING PROGRAM

## 2025-06-17 RX ORDER — HYDRALAZINE HYDROCHLORIDE 50 MG/1
50 TABLET, FILM COATED ORAL 2 TIMES DAILY
Qty: 90 TABLET | Refills: 1 | Status: SHIPPED | OUTPATIENT
Start: 2025-06-17

## 2025-06-17 NOTE — PLAN OF CARE
MANNIE Casey.  Plan: Discharge tomorrow pending medical clearance.   Problem: Patient Centered Care  Goal: Patient preferences are identified and integrated in the patient's plan of care  Description: Interventions:  - What would you like us to know as we care for you? From home  - Provide timely, complete, and accurate information to patient/family  - Incorporate patient and family knowledge, values, beliefs, and cultural backgrounds into the planning and delivery of care  - Encourage patient/family to participate in care and decision-making at the level they choose  - Honor patient and family perspectives and choices  Outcome: Progressing     Problem: Patient/Family Goals  Goal: Patient/Family Long Term Goal  Description: Patient's Long Term Goal: discharge home    Interventions:  - monitor vs, assess pain, ambulate as tolerated  - See additional Care Plan goals for specific interventions  Outcome: Progressing  Goal: Patient/Family Short Term Goal  Description: Patient's Short Term Goal: Manage pain monitor kidney & electrolytes    Interventions:   - assess pain, monitor electrolytes   - See additional Care Plan goals for specific interventions  Outcome: Progressing

## 2025-06-17 NOTE — PROGRESS NOTES
Progress Note  Hannah Boss Patient Status:  Inpatient    1945 MRN [id number]   Location [facility] 3W/SW Attending Janak Mortensen MD   Hosp Day # 3 PCP Diana Lopez DO     Subjective:  Denies cardiac complaints, BP improved, feels good    Objective:  /51 (BP Location: Right arm)   Pulse 63   Temp 98 °F (36.7 °C) (Oral)   Resp 17   Ht 5' 3\" (1.6 m)   Wt 152 lb 14.4 oz (69.4 kg)   SpO2 97%   BMI 27.09 kg/m²     Telemetry: SR 60s    Intake/Output:    Intake/Output Summary (Last 24 hours) at 2025 0712  Last data filed at 2025 0640  Gross per 24 hour   Intake 1358 ml   Output 1025 ml   Net 333 ml     Last 3 Weights   25 0640 152 lb 14.4 oz (69.4 kg)   25 0448 161 lb 12.8 oz (73.4 kg)   06/15/25 2013 163 lb 2.3 oz (74 kg)   25 1733 163 lb 2.3 oz (74 kg)   25 0537 162 lb 12.8 oz (73.8 kg)   25 0620 162 lb 11.2 oz (73.8 kg)   25 1916 164 lb (74.4 kg)     Labs:  Recent Labs   Lab 06/15/25  0549 25  0616 25  0557   GLU 91 92 94   BUN 58* 52* 50*   CREATSERUM 3.16* 2.29* 1.84*   EGFRCR 15* 21* 28*   CA 8.6* 8.7 8.8    141 140   K 4.9 5.5* 5.0   * 114* 112   CO2 22.0 25.0 24.0     Recent Labs   Lab 25  1742 06/15/25  0549 25  0615 25  0557   RBC 3.63* 3.53* 3.14* 3.10*   HGB 10.2* 9.9* 8.9* 8.6*   HCT 33.3* 33.0* 29.1* 27.4*   MCV 91.7 93.5 92.7 88.4   MCH 28.1 28.0 28.3 27.7   MCHC 30.6* 30.0* 30.6* 31.4   RDW 17.0* 17.1* 16.9* 16.6*   NEPRELIM 3.74  --  2.60 2.93   WBC 5.8 4.0 4.5 4.9   .0 220.0 210.0 228.0     Recent Labs   Lab 25  1742   *     Lab Results   Component Value Date/Time    HDL 40 2025 04:01 AM    LDL 32 2025 04:01 AM    TRIG 106 2025 04:01 AM     Lab Results   Component Value Date    DDIMER 1.13 (H) 2023     Lab Results   Component Value Date    TSH 0.008 (L) 2024     Review of Systems:   Constitutional: No fevers, chills, fatigue or night  sweats.  Respiratory: Denies cough, wheezing or shortness of breath.  CV: Denies chest pain, palpitations, orthopnea, PND or dizziness.  GI: No nausea, vomiting or diarrhea. No blood in stools.    Physical Exam:   General: Alert, cooperative, no distress, appears stated age.  Neck: no JVD.  Lungs: Clear to auscultation bilaterally.  Chest wall: No tenderness or deformity.  Heart: Regular rate and rhythm, S1, S2 normal, no murmur, click, rub or gallop.  Abdomen: Soft, non-tender. Bowel sounds normal. No masses,  No organomegaly.  Extremities: Extremities normal, atraumatic, no cyanosis or edema.  Pulses: 2+ and symmetric all extremities.  Neurologic: Grossly intact.    Medications:  Scheduled Medications[1]  Medication Infusions[2]    Assessment:    79 year old female with PMH of moderate aortic stenosis, AVNRT, DVT/PE, HTN, CKD III-IV, DMII who presented with DICK on CKD and dehydration. Cardiology was consulted for PSVT.      PSVT  Spontaneous conversion to sinus  -known hx AVNRT with difficult to control rates historically  -diltiazem home  dose 120mg daily  -has EP f/u scheduled next week    Near syncope  2/2 dehydrdation  -states she does not drink water because she doesn't like it and it makes her throw up, states she drinks pepsi only at home  -lives home with son but is alone ~90% of the time, does not cook for herself, orders out     DICK on CKD3  Baseline 2-2.3  -3.92 on admission, currently 1.84  -2/2 dehydration, poor PO intake  -nephrology following     Chronic HFpEF  LVEF 60-65% with G2DD last admission last week with moderate to severe aortic stenosis, mild aortic regurgitation  -dehydrated this admission 2/2 poor oral intake  -proBNP 10,705 in March, 2025  -GDMT: bumex, hydralazine, isosorbide PTA, holding bumex currently due to DICK    HTN  BP improved with higher hydralazine dosing  -hydralazine, isosorbide, diltiazem    HLD-statin, LDL controlled 32    Transaminitis  LFTs elevated on  admission  -likely 2/2 dehydration  -trending down  -GI following  -liver US unremarkable, CT A/P revealed retroperitoneal mass (known retroperitoneal sarcoma), slightly increased in sized compared to previous imaging,  constipation, colonic diverticulosis     Rheumatoid Arthritis     Hx DVT  On eliquis     DMII-A1c 6.5     Chronic Anemia  Hgb baseline ~8    Hyperkalemia  K+ 5.9 on admission  -lokelma per nephrology, improving      Plan:  -continue hydralazine, eliquis, diltiazem, imdur  -continue holding statin with elevated LFTs, trending down  -lokelma per nephrology  -f/u with Dr. Batista as OP as scheduled to discuss ablation  -stable from cardiac standpoint, we will sign off, please contact us if needed with additional concerns      Plan of care discussed with patient, RN.        Kailee Fong, MSN, APN, FNP-BC, CCK  06/17/25  7:12 AM           [1]    hydrALAZINE  50 mg Oral BID    polyethylene glycol (PEG 3350)  17 g Oral Once    isosorbide mononitrate ER  60 mg Oral Daily    sodium zirconium cyclosilicate  10 g Oral Daily    insulin aspart  1-5 Units Subcutaneous TID CC    [Held by provider] atorvastatin  40 mg Oral Nightly    dilTIAZem HCl ER Beads  120 mg Oral Daily    apixaban  5 mg Oral BID    ferrous sulfate  325 mg Oral Once per day on Tuesday Thursday Saturday    gabapentin  300 mg Oral Nightly    nicotine  1 patch Transdermal Nightly   [2]    sodium chloride 50 mL/hr at 06/16/25 1204

## 2025-06-17 NOTE — PROGRESS NOTES
[facility]  GI Progress Note      Hannah Boss Patient Status:  Inpatient    1945 MRN [id number]   Location [facility] 3W/SW Attending Janak Mortensen MD   Hosp Day # 3 PCP Diana Lopez DO          SUBJECTIVE:     She denies abdominal pain, nausea or vomiting.     OBJECTIVE:    Height: --  Weight: 152 lb 14.4 oz (69.4 kg) (640)  BSA (Calculated - sq m): --  Pulse: 84 (846)  BP: 166/58 (846)  Temp: 98.1 °F (36.7 °C) (846)  Do Not Use - Resp Rate: --  SpO2: 100 % (846)        General: Alert, oriented, no acute distress  Lungs: Clear  Heart: Normal S1 and S2  Abdomen: Soft, non-tender. Bowel sounds are positive.   Extremities: No edema    LAB RESULTS:       Lab Results   Component Value Date    WBC 4.9 2025    HGB 8.6 2025    HCT 27.4 2025    .0 2025    CREATSERUM 1.84 2025    BUN 50 2025     2025    K 5.0 2025     2025    CO2 24.0 2025    GLU 94 2025    CA 8.8 2025    ALB 3.6 2025    ALKPHO 267 2025    BILT 0.2 2025    TP 5.7 2025    AST 87 2025     2025    INR 1.23 2025    PTP 16.2 2025    MG 2.1 2025    PGLU 108 2025         Latest Reference Range & Units 06/15/25 12:08   HEPATITIS A AB, IGM Nonreactive   Nonreactive   HBc IgM AB Nonreactive   Nonreactive   HBSAg Screen Nonreactive   Nonreactive   HCV AB Nonreactive   Nonreactive       RADIOLOGY RESULTS:    PROCEDURE:US LIVER LIMITED WITH DOPPLER (CPT=76705/54514)     COMPARISON:None.     INDICATIONS:elevated transaminases, retroperitoneal LAD, eval liver as well as vasculature     TECHNIQUE:    Grayscale ultrasound of the liver and gallbladder with Doppler duplex ultrasound of the liver.     GRAYSCALE FINDINGS:             LIVER:               The liver measures 13.95 cm craniocaudally.  Increased echogenicity.  No significant masses.    BILE DUCTS:    No  intra or extrahepatic bile duct dilation.  Common bile duct measures 3.2 mm.    OTHER:             The gallbladder is surgically absent.  The pancreas is within normal limits..       The right kidney measures 9.34 cm.  No evidence for nephrolithiasis or hydronephrosis.  There is a anechoic simple cyst measuring 1.2 x 1.6 x 1.8 cm in the upper pole of the right kidney.     LIVER DOPPLER FINDINGS:  HEPATIC ARTERY:       A normal low resistance hepatic arterial waveform is present with hepatopetal flow as expected.  Peak systolic velocity is 53.1 cm/sec.  Resistive index is 0.81.  PORTAL VEIN:               Normal size main portal vein with normal hepatopetal flow.  Main portal vein velocity is 31.6 cm/sec.  HEPATIC VEINS:           Normal hepatofugal flow and hepatic venous waveforms/phasicity.    SPLENIC VEIN:              Velocity of 17.9 centimeters/second .  OTHER:             The pancreas is within normal limits.              =====  CONCLUSION:      1. Hepatic steatosis.  No focal hepatic lesion.     2. Patency and normal directionality hepatic vasculature.         Dictated by (CST): Elizabeth Singh MD on 6/16/2025 at 1:25 PM       Finalized by (CST): Elizabeth Singh MD on 6/16/2025 at 1:31 PM       ASSESSMENT:    Elevated LFT's    The etiology of elevated LFT\"s is unclear. LFT's continue to  improve.    PLAN:    Follow LFT's.  OK to discharge from GI standpoint. LFT's should be repeated in one week. She should follow with Dr. PICHARDO after discharge.     Chris Barrett MD
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
## 2025-06-17 NOTE — PLAN OF CARE
Problem: Patient Centered Care  Goal: Patient preferences are identified and integrated in the patient's plan of care  Description: Interventions:  - What would you like us to know as we care for you  - Provide timely, complete, and accurate information to patient/family  - Incorporate patient and family knowledge, values, beliefs, and cultural backgrounds into the planning and delivery of care  - Encourage patient/family to participate in care and decision-making at the level they choose  - Honor patient and family perspectives and choices  Outcome: Progressing     Problem: Patient/Family Goals  Goal: Patient/Family Long Term Goal  Description: Patient's Long Term Goal:     Interventions:  - See additional Care Plan goals for specific interventions  Outcome: Progressing  Goal: Patient/Family Short Term Goal  Description: Patient's Short Term Goal:     Interventions:   - See additional Care Plan goals for specific interventions  Outcome: Progressing

## 2025-06-17 NOTE — PROGRESS NOTES
[facility]  part of [facility]    Progress Note    Hannah Boss Patient Status:  Inpatient    1945 MRN [id number]   Location [facility] 3W/SW Attending Janak Mortensen MD   Hosp Day # 3 PCP Diana Lopez DO       Subjective:   Hannah Boss is a(n) 79 year old female who I am seeing for DICK    Patient is resting  Objective:   BP (!) 166/58 (BP Location: Right arm)   Pulse 84   Temp 98.1 °F (36.7 °C) (Oral)   Resp 16   Ht 5' 3\" (1.6 m)   Wt 152 lb 14.4 oz (69.4 kg)   SpO2 100%   BMI 27.09 kg/m²      Intake/Output Summary (Last 24 hours) at 2025 1118  Last data filed at 2025 0700  Gross per 24 hour   Intake 2008 ml   Output 925 ml   Net 1083 ml     Wt Readings from Last 1 Encounters:   25 152 lb 14.4 oz (69.4 kg)       Exam  Gen: No acute distress, Heent: NC AT, mucous memb clear, neck supple  Pulm: Lungs clear, normal respiratory effort  CV: Heart with regular rate and rhythm, no edema  Abd: Abdomen soft, nontender, nondistended, no organomegaly, bowel sounds present  Skin: no symptoms reported  Psych: alert and oriented    Assessment and Plan:       1 - DICK/hyperkalemia  Creatinine is improved.  Low potassium diet.  Also Lokelma, off IV fluids     2-transaminitis  GI following     3 -tachycardia  She is on diltiazem     4 -leiomyosarcoma  Follows with [facility]               Results:     Recent Labs   Lab 25  1742 06/15/25  0549 25  0615 25  0557   RBC 3.63* 3.53* 3.14* 3.10*   HGB 10.2* 9.9* 8.9* 8.6*   HCT 33.3* 33.0* 29.1* 27.4*   MCV 91.7 93.5 92.7 88.4   MCH 28.1 28.0 28.3 27.7   MCHC 30.6* 30.0* 30.6* 31.4   RDW 17.0* 17.1* 16.9* 16.6*   NEPRELIM 3.74  --  2.60 2.93   WBC 5.8 4.0 4.5 4.9   .0 220.0 210.0 228.0         Recent Labs   Lab 06/15/25  0549 25  0616 25  0557   GLU 91 92 94   BUN 58* 52* 50*   CREATSERUM 3.16* 2.29* 1.84*   CA 8.6* 8.7 8.8    141 140   K 4.9 5.5* 5.0   * 114* 112   CO2 22.0  25.0 24.0          US LIVER LIMITED WITH DOPPLER (CPT=76705/27934)  Result Date: 6/16/2025  CONCLUSION:   1. Hepatic steatosis.  No focal hepatic lesion.  2. Patency and normal directionality hepatic vasculature.    Dictated by (CST): Elizabeth Singh MD on 6/16/2025 at 1:25 PM     Finalized by (CST): Elizabeth Singh MD on 6/16/2025 at 1:31 PM                ANN-MARIE GRANDA MD  6/17/2025

## 2025-06-17 NOTE — PLAN OF CARE
Pt alert and oriented x4. Pt on room air. PRN oxycodone given for  pain. IVF dc and oncology on per MD. Report given to Jeanine ARGUETA. Pt up walking in hallway. Potential plan to dc tomorrow.   Problem: Patient Centered Care  Goal: Patient preferences are identified and integrated in the patient's plan of care  Description: Interventions:  - What would you like us to know as we care for you? From home  - Provide timely, complete, and accurate information to patient/family  - Incorporate patient and family knowledge, values, beliefs, and cultural backgrounds into the planning and delivery of care  - Encourage patient/family to participate in care and decision-making at the level they choose  - Honor patient and family perspectives and choices  Outcome: Progressing     Problem: Patient/Family Goals  Goal: Patient/Family Long Term Goal  Description: Patient's Long Term Goal: discharge home    Interventions:  - monitor vs, assess pain, ambulate as tolerated  - See additional Care Plan goals for specific interventions  Outcome: Progressing  Goal: Patient/Family Short Term Goal  Description: Patient's Short Term Goal: Manage pain monitor kidney & electrolytes    Interventions:   - assess pain, monitor electrolytes   - See additional Care Plan goals for specific interventions  Outcome: Progressing

## 2025-06-17 NOTE — PAYOR COMM NOTE
--------------  ADMISSION REVIEW     Payor: LAUREEN ARTEAGA O  Subscriber #:  [id number]  Authorization Number: [id number]    Admit date: 6/14/25  Admit time: 10:28 PM       ED Provider Notes        HPI  79-year-old female with history of aortic stenosis, HFpEF, CKD 4, HLD, AVNRT, presents with family concern for dehydration.  Was brought in by EMS from home.  Reportedly was having a near syncopal episode.  On initial evaluation she is following commands but is not talking, I am told by EMS she is at baseline.  Will await family for additional history.    ED Triage Vitals [06/14/25 1733]   /49   Pulse 86   Resp 20   Temp 98.2 °F (36.8 °C)   Temp src Tympanic   SpO2 97 %   O2 Device None (Room air)     BP: (!) 174/59 (prn BP med given)  Pulse: 77  Resp: 16  Temp: 97.7 °F (36.5 °C)  Temp src: Oral  MAP (mmHg): 94    Oxygen Therapy  SpO2: 96 %  O2 Device: None (Room air)  Pulse Oximetry Type: Intermittent  Oximetry Probe Site Changed: No  Pulse Ox Probe Location: Right hand  HENT: dry MM  Neck: normal range of motion, no tenderness, supple.  Eyes: PERRL, EOMI, conjunctiva normal, no discharge. Sclera anicteric.  Cardiovascular: rr +loud systolic murmur c/w aortic stenosis  Respiratory: Normal breath sounds, no respiratory distress, no wheezing, no chest tenderness.  GI: Bowel sounds normal, Soft, no tenderness, no masses, no pulsatile masses.  : No CVA tenderness.  Skin: Warm, dry, no erythema, no rash.  Musculoskeletal: Intact distal pulses, Chronic 2+ pedal edema, no tenderness, no cyanosis, no clubbing. Good range of motion in all major joints. No tenderness to palpation or major deformities noted. Back- No tenderness.  Neurologic: Alert, moves all four extremities, follows commands, symmetric face.  Psych: Calm, cooperative    Labs Reviewed   COMP METABOLIC PANEL (14) - Abnormal; Notable for the following components:       Result Value    Glucose 114 (*)     Potassium 5.9 (*)     BUN 67 (*)     Creatinine 3.92 (*)      Calculated Osmolality 302 (*)     eGFR-Cr 11 (*)      (*)      (*)     Alkaline Phosphatase 311 (*)     All other components within normal limits   CBC WITH DIFFERENTIAL WITH PLATELET - Abnormal; Notable for the following components:    RBC 3.63 (*)     HGB 10.2 (*)     HCT 33.3 (*)     MCHC 30.6 (*)     RDW-SD 58.1 (*)     RDW 17.0 (*)     All other components within normal limits   URINALYSIS WITH CULTURE REFLEX - Abnormal; Notable for the following components:    Glucose Urine 50 (*)     Protein Urine Trace (*)     All other components within normal limits   CK CREATINE KINASE (NOT CREATININE) - Abnormal; Notable for the following components:     (*)     All other components within normal limits   POCT GLUCOSE - Abnormal; Notable for the following components:    POC Glucose  122 (*)     All other components within normal limits   POCT GLUCOSE - Abnormal; Notable for the following components:    POC Glucose  105 (*)    Admission disposition: 6/14/2025  7:58 PM    Disposition and Plan     Clinical Impression:  1. DICK (acute kidney injury)    2. Hyperkalemia       Disposition:  Admit  6/14/2025  7:58 pm       6/15/25  H&P      History of Present Illness:   Ms. Boss is a 79 year old female with spinal stenosis, aortic stenosis, HFpEF, CKD 4, GERD, anemia, HLD, leiomyosarcoma follows with [facility] (dr. Benedict), RA, hs of GI bleeds 2/2 to AVMs, type 2 DM,  bradycardia, recurrent hyperkalemia, who was hospitalized from 6/9-6/11 for SVT, who presents with dehydration, pre-syncope, DICK and hyperkalemia.  Patient states she a little dehydrated, at home, not eating much, states she otherwise feels ok, but per report, had near syncopal episode, and poor oral intake since DC from hospital.  She also notes intermittent chest pain, none currently, no nausea or vomiting, no fevers or chills, no HA or vision changes.      /52 (BP Location: Right arm)   Pulse 80   Temp 98.7 °F (37.1 °C)  (Oral)   Resp 16   Ht 5' 3\" (1.6 m)   Wt 163 lb 2.3 oz (74 kg)   SpO2 97%   BMI 28.90 kg/m²   General:  Alert, no distress   Head:  Normocephalic, without obvious abnormality, atraumatic.   Eyes:  Sclera anicteric, No conjunctival pallor,    Nose: Nares normal. Septum midline.     Throat: Lips, mucosa, and tongue normal. Teeth and gums normal.   Neck: Supple    Lungs:   Clear to auscultation bilaterally. Normal effort   Chest wall:  No tenderness or deformity.   Heart:  Regular rate and rhythm, S1, S2 normal, no murmur, rub or gallop appreciated   Abdomen:   Soft, mild TTP in lower quadrant, no rebound or guarding, ND   Extremities: Extremities normal, atraumatic, no cyanosis, trace edema    Skin: Skin color, texture, turgor normal. No rashes or lesions.    Neurologic: Normal strength, no focal deficit appreciated      Lab 06/09/25  0159 06/09/25  0955 06/10/25  0645 06/11/25  0625 06/14/25  1742 06/15/25  0549   WBC 5.2 5.6 5.1  --  5.8 4.0   HGB 10.8* 9.3* 9.0*  --  10.2* 9.9*   MCV 87.9 90.2 90.9  --  91.7 93.5   .0 223.0 220.0  220.0 235.0 252.0 220.0      Lab 06/09/25  0200 06/09/25  1008 06/10/25  0718 06/11/25  0625 06/14/25  1742 06/15/25  0549    145 140  --  136 140   K 4.9 4.8 4.6  --  5.9* 4.9   * 116* 113*  --  108 113*   CO2 21.0 22.0 22.0  --  24.0 22.0   BUN 29* 21 23  --  67* 58*   CREATSERUM 1.84* 1.60* 1.63*  --  3.92* 3.16*   * 85 89  --  114* 91   CA 9.2 8.8 8.8  --  9.3 8.6*   MG  --   --  1.9 2.0  --   --       Lab 06/10/25  0718 06/14/25  1742   ALT 26 316*   AST 29 711*   ALB 3.7 4.7       Radiology: CT ABDOMEN+PELVIS(CPT=74176)  Result Date: 6/14/2025  CONCLUSION:            1. 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 Stable mass effect effect on IVC or IVC extension .  Further assessment would require IV contrast. 2. Large amount of stool in colon  suggests constipation.  Colonic diverticulosis.     Dictated by (CST): Eric Alarcon MD on 6/14/2025 at 7:33 PM     Finalized by (CST): Eric Alarcon MD on 6/14/2025 at 7:44 PM                 ASSESSMENT / PLAN:   Ms. Boss is a 79 year old female with spinal stenosis, aortic stenosis, HFpEF, CKD 4, GERD, anemia, HLD, leiomyosarcoma follows with [facility] (dr. Benedict), RA, hs of GI bleeds 2/2 to AVMs, type 2 DM,  bradycardia, recurrent hyperkalemia, who was hospitalized from 6/9-6/11 for SVT, who presents with dehydration, pre-syncope, DICK and hyperkalemia.      DICK on CKD stage 4  Hyperkalemia  - cre prior to DC 1.63, on admission 3.9, K 5.9  - holding lasix, holding jardiance  - IVF ordered  - renal and Cards consulted  - continue daily lokelma      Transaminitis  - no RUQ pain, but generalized abd pain  - LFT's sig elevated   - CT abd without liver findings  - hold statin  - no ETOH use  - has been taking tylenol with percocet, will check tylenol level, stop percocet change to oxycodone  - will check RUQ US, acute hepatitis panel, repeat LFt's this am sig elevated  - will consult GI given persistent sig elevated LFT's     SVT  AvNRT  HTN  - had RRT last night, spontaneous conversion to NSR  - resumed dilt  - Cards consulted     Chronic HFpEF  Aortic Stenosis  - LVEF 60-65% on 6/9/25, moderate to severe aortic stenosis,   - appears hypovolemic  - hold lasix, hold empagliflozin  - cards consulted      Abd pain  Leiomyosarcoma  - has had this for 10 years  - was following with [facility] last note from april 2024 s/p radiation, has not seen them > 1 year  - she states they no longer take her insurance, and she hasn't followed up with another oncologist  - CT here (without contrast) shows persistent sarcoma poss larger that CT from 2024  - will need onc referral, will consider inpatient eval tomorrow pending progress     Type 2 DM  - holding jardiance  - SSI and accuchecks     Hs of DVT  - on eliquis      GERD  Hs of GI bleed  - continue PPI     RA  Spinal stenosis  - not on meds for RA  - gabapentin continued     HLD  - statin held with transaminitis      Active tobacco use  - cessation counseling on DC     FN:  - IVF: 50cc  - Diet: adv      DVT Prophy:scd, eliquis          6/16/25        Assessment/Plan:   Ms. Boss is a 79 year old female with spinal stenosis, aortic stenosis, HFpEF, CKD 4, GERD, anemia, HLD, leiomyosarcoma follows with [facility] (dr. Benedict), RA, hs of GI bleeds 2/2 to AVMs, type 2 DM,  bradycardia, recurrent hyperkalemia, who was hospitalized from 6/9-6/11 for SVT, who presents with dehydration, pre-syncope, DICK and hyperkalemia, transaminitis,       DICK on CKD stage 4  Hyperkalemia  - cre prior to DC 1.63, on admission 3.9, K 5.9  - holding lasix, holding jardiance  - IVF ordered  - renal and Cards consulted  - continue daily lokelma   - Cre slowly improved, 2.29 today      Transaminitis  - no RUQ pain, but generalized abd pain  - LFT's sig elevated   - CT abd without liver findings  - hold statin  - has been taking tylenol with percocet, will check tylenol level, stop percocet change to oxycodone -> tylenol level normal  - Bili ok, INR ok  - will check RUQ US, acute hepatitis negative,    - consulted GI given persistent sig elevated LFT's  - LFT's improving      SVT  AvNRT  HTN  - had RRT last night, spontaneous conversion to NSR  - resumed dilt  - Cards consulted     Chronic HFpEF  Aortic Stenosis  - LVEF 60-65% on 6/9/25, moderate to severe aortic stenosis,   - appears hypovolemic  - hold lasix, hold empagliflozin  - cards consulted      Abd pain  Leiomyosarcoma  - has had this for 10 years  - was following with [facility] last note from april 2024 s/p radiation, has not seen them > 1 year  - she states they no longer take her insurance, and she hasn't followed up with another oncologist  - CT here (without contrast) shows persistent sarcoma poss larger that CT from 2024  - will  need onc referral, will consider inpatient eval tomorrow pending progress     Type 2 DM  - holding jardiance  - SSI and accuchecks     Hs of DVT  - on eliquis     GERD  Hs of GI bleed  - continue PPI     RA  Spinal stenosis  - not on meds for RA  - gabapentin continued     HLD  - statin held with transaminitis      Active tobacco use  - cessation counseling on DC     FN:  - IVF: 50cc  - Diet: adv      DVT Prophy:scd, eliquis      Subjective:     Notes persistent back/abd pain, slightly improved after BM, no fevers no other complaints      Blood pressure (!) 192/60, pulse 62, temperature 97.9 °F (36.6 °C), temperature source Oral, resp. rate 16, height 5' 3\" (1.6 m), weight 161 lb 12.8 oz (73.4 kg), SpO2 100%, not currently breastfeeding.     Temp:  [97.9 °F (36.6 °C)-98.6 °F (37 °C)] 97.9 °F (36.6 °C)  Pulse:  [61-74] 62  Resp:  [16-20] 16  BP: (141-192)/(51-62) 192/60  SpO2:  [96 %-100 %] 100 %     Heent: NC AT   Pulm: Lungs clear, normal respiratory effort  CV: Heart with regular rate and rhythm, systolic murmur noted, no peripheral edema  Abd: Abdomen soft, mild tender to palpation nondistended,   MSK: no clubbing, no cyanosis  Skin: no rashes or lesions      Lab 06/10/25  0645 06/14/25  1742 06/15/25  0549 06/16/25  0615   RBC 3.18* 3.63* 3.53* 3.14*   HGB 9.0* 10.2* 9.9* 8.9*   HCT 28.9* 33.3* 33.0* 29.1*   MCV 90.9 91.7 93.5 92.7   MCH 28.3 28.1 28.0 28.3   MCHC 31.1 30.6* 30.0* 30.6*   RDW 16.6* 17.0* 17.1* 16.9*   NEPRELIM 3.17 3.74  --  2.60   WBC 5.1 5.8 4.0 4.5   .0  220.0 252.0 220.0 210.0     Lab 06/14/25  1742 06/15/25  0549 06/16/25  0616   * 91 92   BUN 67* 58* 52*   CREATSERUM 3.92* 3.16* 2.29*   EGFRCR 11* 15* 21*   CA 9.3 8.6* 8.7    140 141   K 5.9* 4.9 5.5*    113* 114*   CO2 24.0 22.0 25.0      Lab 06/10/25  0718 06/14/25  1742 06/15/25  0549 06/16/25  0616   ALT 26 316* 462* 248*   AST 29 711* 826* 204*   ALB 3.7 4.7 3.9 3.7    CT ABDOMEN+PELVIS(CPT=74176)  Result  Date: 6/14/2025  CONCLUSION:  1. 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 Stable mass effect effect on IVC or IVC extension .  Further assessment would require IV contrast. 2. Large amount of stool in colon suggests constipation.  Colonic diverticulosis.     Dictated by (CST): Eric Alarcon MD on 6/14/2025 at 7:33 PM     Finalized by (CST): Eric Alarcon MD on 6/14/2025 at 7:44 PM                 Meds:      [Scheduled Medications]    [Scheduled Medications]   hydrALAZINE  50 mg Oral BID    polyethylene glycol (PEG 3350)  17 g Oral Once    isosorbide mononitrate ER  60 mg Oral Daily    sodium zirconium cyclosilicate  10 g Oral Daily    insulin aspart  1-5 Units Subcutaneous TID CC    [Held by provider] atorvastatin  40 mg Oral Nightly    dilTIAZem HCl ER Beads  120 mg Oral Daily    apixaban  5 mg Oral BID    ferrous sulfate  325 mg Oral Once per day on Tuesday Thursday Saturday    gabapentin  300 mg Oral Nightly    pantoprazole  40 mg Oral BID AC    nicotine  1 patch Transdermal Nightly     [Medication Infusions]    [Medication Infusions]   sodium chloride 50 mL/hr at 06/15/25 1157                    CARDIOLOGY  Assessment:     79 year old female with PMH of moderate aortic stenosis, AVNRT, DVT/PE, HTN, CKD III-IV, DMII who presented with DICK on CKD and dehydration. Cardiology was consulted for PSVT.      PSVT  Spontaneous conversion to sinus  -known hx AVNRT with difficult to control rates historically  -diltiazem home  dose 120mg daily     Near syncope  2/2 dehydrdation     DICK on CKD3  Baseline 2-2.3  -3.92 on admission, currently 2.3  -2/2 dehydration, poor PO intake  -nephrology following     Chronic HFpEF  LVEF 60-65% with G2DD last admission last week with moderate to severe aortic stenosis, mild aortic regurgitation  -dehydrated this admission 2/2 poor oral intake  -proBNP 10,705 in March,  2025  -GDMT: bumex, hydralazine, isosorbide PTA, holding bumex currently due to DICK     HTN  BP up to 190s  -hydralazine, isosorbide, diltiazem  -will increase hydralazine dosing for better control     HLD-statin, LDL controlled 32     Transaminitis  LFTs elevated on admission  -likely 2/2 dehydration  -trending down  -GI following  -liver US pending, CT A/P revealed retroperitoneal mass (known retroperitoneal sarcoma), slightly increased in sized compared to previous imaging,  constipation, colonic diverticulosis     Rheumatoid Arthritis     Hx DVT  On eliquis     DMII-A1c 6.5     Acute on Chronic Anemia  Hgb baseline ~8  -today 7, 1u PRBC per primary     Hyperkalemia  K+ 5.9 on admission  -lokelma per nephrology        Plan:  -increase hydralazine to 50mg TID for better BP control   -continue eliquis, diltiazem, imdur  -continue holding statin with elevated LFTs, trending down  -lokelma per nephrology          MEDICATIONS ADMINISTERED IN LAST 1 DAY:  apixaban (Eliquis) tab 5 mg       Date Action Dose Route User    6/17/2025 0848 Given 5 mg Oral Ullrich, Nallely    6/16/2025 2043 Given 5 mg Oral Mirtha Dueñas RN          dilTIAZem ER (Dilacor XR) 24 hr cap 120 mg       Date Action Dose Route User    6/17/2025 0848 Given 120 mg Oral Ullrich, Emily          gabapentin (Neurontin) cap 300 mg       Date Action Dose Route User    6/16/2025 2043 Given 300 mg Oral Mirtha Dueñas RN          hydrALAZINE (Apresoline) tab 25 mg       Date Action Dose Route User    6/17/2025 0438 Given 25 mg Oral Mirtha Dueñas RN          hydrALAZINE (Apresoline) tab 50 mg       Date Action Dose Route User    6/17/2025 0848 Given 50 mg Oral Ullrich, Nallely    6/16/2025 2043 Given 50 mg Oral Mirtha Dueñas RN          isosorbide mononitrate ER (Imdur) 24 hr tab 60 mg       Date Action Dose Route User    6/17/2025 0847 Given 60 mg Oral Ullrich, Emily          oxyCODONE immediate release tab 5 mg       Date Action Dose  Route User    6/17/2025 0848 Given 5 mg Oral Ullrich, Nallely    6/17/2025 0039 Given 5 mg Oral Mirtha Dueñas, RN    6/16/2025 1659 Given 5 mg Oral Ullrich, Nallely          sodium chloride 0.9% infusion       Date Action Dose Route User    6/16/2025 1204 New Bag (none) Intravenous Ullrich, Nallely            Vitals (last day)       Date/Time Temp Pulse Resp BP SpO2 Weight O2 Device O2 Flow Rate (L/min) [facility]    06/17/25 0846 98.1 °F (36.7 °C) 84 16 166/58 100 % -- None (Room air) --     06/16/25 2042 98 °F (36.7 °C) 67 18 130/47 99 % -- None (Room air) -- CC    06/16/25 1900 -- 72 -- -- -- -- -- -- GT    06/16/25 1659 -- -- -- 112/57 -- -- -- --     06/16/25 1514 98.2 °F (36.8 °C) 63 16 136/48 99 % -- None (Room air) --     06/16/25 1041 97.9 °F (36.6 °C) 62 16 192/60 100 % -- None (Room air) --     06/16/25 0448 -- -- -- -- -- 161 lb 12.8 oz (73.4 kg) -- -- GG    06/16/25 0448 98.3 °F (36.8 °C) 61 20 162/59 99 % -- None (Room air) -- AP

## 2025-06-17 NOTE — PROGRESS NOTES
[facility] Hospitalist Progress Note     CC: Hospital Follow up    PCP: Diana Lopez DO       Assessment/Plan:     Principal Problem:    DICK (acute kidney injury)  Active Problems:    Hyperkalemia    Ms. Boss is a 79 year old female with spinal stenosis, aortic stenosis, HFpEF, CKD 4, GERD, anemia, HLD, leiomyosarcoma follows with [facility] (dr. Benedict), RA, hs of GI bleeds 2/2 to AVMs, type 2 DM,  bradycardia, recurrent hyperkalemia, who was hospitalized from 6/9-6/11 for SVT, who presents with dehydration, pre-syncope, DICK and hyperkalemia, transaminitis, improved      DICK on CKD stage 4  Hyperkalemia  - cre prior to DC 1.63, on admission 3.9, K 5.9  - holding lasix, holding jardiance  - renal and Cards consulted  - continue daily lokelma   - Cre slowly improved, now 1.84  - IVF stopped   - will monitor 1 day of IVF and make sure renal function stable and does not retain fluid      Transaminitis  - no RUQ pain, but generalized abd pain  - LFT's sig elevated   - CT abd without liver findings  - hold statin  - no ETOH use  - has been taking tylenol with percocet, will check tylenol level, stop percocet change to oxycodone -> tylenol level normal  - Bili ok, INR ok  - RUQ US without acute findings, acute hepatitis negative,    - consulted GI given persistent sig elevated LFT's  - LFT's improving      SVT  AvNRT  HTN  - had RRT last night, spontaneous conversion to NSR  - resumed dilt  - Cards consulted  - needs EP fu for ablation      Chronic HFpEF  Aortic Stenosis  - LVEF 60-65% on 6/9/25, moderate to severe aortic stenosis,   - appears hypovolemic  - hold lasix, hold empagliflozin  - cards consulted      Abd pain  Leiomyosarcoma  - has had this for 10 years  - was following with [facility] last note from april 2024 s/p radiation, has not seen them > 1 year  - she states they no longer take her insurance, and she hasn't followed up with another oncologist  - CT here (without contrast) shows  persistent sarcoma poss larger that CT from 2024  - will need onc referral, consulted oncology     Anemia  - chronic  - hgb 8-9  - no signs of blood loss     Type 2 DM  - holding jardiance  - SSI and accuchecks     Hs of DVT  - on eliquis     GERD  Hs of GI bleed  - continue PPI     RA  Spinal stenosis  - not on meds for RA  - gabapentin continued     HLD  - statin held with transaminitis      Active tobacco use  - cessation counseling on DC     FN:  - IVF: stopped  - Diet: adv      DVT Prophy:scd, eliquis  Lines: PIV     Dispo: home tomorrow if stable     Called daljit valenzuela 6/17    Questions/concerns were discussed with patient and/or family by bedside.    Thank You,  Janak Mortensen MD    Hospitalist with [facility]     Subjective:     No CP, SOB, or N/V. Abd pain improved appetite improved,    OBJECTIVE:    Blood pressure 139/49, pulse 65, temperature 98.1 °F (36.7 °C), temperature source Oral, resp. rate 16, height 5' 3\" (1.6 m), weight 152 lb 14.4 oz (69.4 kg), SpO2 100%, not currently breastfeeding.    Temp:  [98 °F (36.7 °C)-98.2 °F (36.8 °C)] 98.1 °F (36.7 °C)  Pulse:  [63-84] 65  Resp:  [16-18] 16  BP: (112-172)/(47-62) 139/49  SpO2:  [97 %-100 %] 100 %      Intake/Output:    Intake/Output Summary (Last 24 hours) at 6/17/2025 1309  Last data filed at 6/17/2025 0700  Gross per 24 hour   Intake 1708 ml   Output 925 ml   Net 783 ml       Last 3 Weights   06/17/25 0640 152 lb 14.4 oz (69.4 kg)   06/16/25 0448 161 lb 12.8 oz (73.4 kg)   06/15/25 2013 163 lb 2.3 oz (74 kg)   06/14/25 1733 163 lb 2.3 oz (74 kg)   06/11/25 0537 162 lb 12.8 oz (73.8 kg)   06/09/25 0620 162 lb 11.2 oz (73.8 kg)   05/12/25 1916 164 lb (74.4 kg)       Exam    Gen: No acute distress,    Heent: NC AT   Pulm: Lungs clear, normal respiratory effort  CV: Heart with regular rate and rhythm, systolic murmur noted, no peripheral edema  Abd: Abdomen soft, mild tender to palpation nondistended,   MSK: no clubbing, no cyanosis  Skin: no  rashes or lesions       Data Review:       Labs:     Recent Labs   Lab 06/14/25  1742 06/15/25  0549 06/16/25  0615 06/17/25  0557   RBC 3.63* 3.53* 3.14* 3.10*   HGB 10.2* 9.9* 8.9* 8.6*   HCT 33.3* 33.0* 29.1* 27.4*   MCV 91.7 93.5 92.7 88.4   MCH 28.1 28.0 28.3 27.7   MCHC 30.6* 30.0* 30.6* 31.4   RDW 17.0* 17.1* 16.9* 16.6*   NEPRELIM 3.74  --  2.60 2.93   WBC 5.8 4.0 4.5 4.9   .0 220.0 210.0 228.0         Recent Labs   Lab 06/15/25  0549 06/16/25  0616 06/17/25  0557   GLU 91 92 94   BUN 58* 52* 50*   CREATSERUM 3.16* 2.29* 1.84*   EGFRCR 15* 21* 28*   CA 8.6* 8.7 8.8    141 140   K 4.9 5.5* 5.0   * 114* 112   CO2 22.0 25.0 24.0       Recent Labs   Lab 06/14/25  1742 06/15/25  0549 06/16/25  0616 06/17/25  0557   * 462* 248* 161*   * 826* 204* 87*   ALB 4.7 3.9 3.7 3.6         Imaging:  US LIVER LIMITED WITH DOPPLER (CPT=76705/31931)  Result Date: 6/16/2025  CONCLUSION:   1. Hepatic steatosis.  No focal hepatic lesion.  2. Patency and normal directionality hepatic vasculature.    Dictated by (CST): Elizabeth Singh MD on 6/16/2025 at 1:25 PM     Finalized by (CST): Elizabeth Singh MD on 6/16/2025 at 1:31 PM          CT ABDOMEN+PELVIS(CPT=74176)  Result Date: 6/14/2025  CONCLUSION:  1. 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 Stable mass effect effect on IVC or IVC extension .  Further assessment would require IV contrast. 2. Large amount of stool in colon suggests constipation.  Colonic diverticulosis.     Dictated by (CST): Eric Alarcon MD on 6/14/2025 at 7:33 PM     Finalized by (CST): Eric Alarcon MD on 6/14/2025 at 7:44 PM              Meds:     Scheduled Medications[1]  Medication Infusions[2]  PRN Medications[3]           [1]    hydrALAZINE  50 mg Oral BID    polyethylene glycol (PEG 3350)  17 g Oral Once    isosorbide mononitrate ER  60 mg Oral Daily     sodium zirconium cyclosilicate  10 g Oral Daily    insulin aspart  1-5 Units Subcutaneous TID CC    [Held by provider] atorvastatin  40 mg Oral Nightly    dilTIAZem HCl ER Beads  120 mg Oral Daily    apixaban  5 mg Oral BID    ferrous sulfate  325 mg Oral Once per day on Tuesday Thursday Saturday    gabapentin  300 mg Oral Nightly    nicotine  1 patch Transdermal Nightly   [2] [3]   oxyCODONE    glucose **OR** glucose **OR** glucose-vitamin C **OR** dextrose **OR** glucose **OR** glucose **OR** glucose-vitamin C    bisacodyl    polyethylene glycol (PEG 3350)    melatonin    ondansetron    metoclopramide    hydrALAZINE

## 2025-06-18 ENCOUNTER — TELEPHONE (OUTPATIENT)
Age: 80
End: 2025-06-18

## 2025-06-18 ENCOUNTER — TELEPHONE (OUTPATIENT)
Dept: NEPHROLOGY | Facility: CLINIC | Age: 80
End: 2025-06-18

## 2025-06-18 VITALS
RESPIRATION RATE: 16 BRPM | WEIGHT: 164.81 LBS | OXYGEN SATURATION: 100 % | BODY MASS INDEX: 29.2 KG/M2 | HEIGHT: 63 IN | SYSTOLIC BLOOD PRESSURE: 152 MMHG | TEMPERATURE: 98 F | HEART RATE: 98 BPM | DIASTOLIC BLOOD PRESSURE: 55 MMHG

## 2025-06-18 LAB
ALBUMIN SERPL-MCNC: 4 G/DL (ref 3.2–4.8)
ALP LIVER SERPL-CCNC: 257 U/L (ref 55–142)
ALT SERPL-CCNC: 123 U/L (ref 10–49)
ANION GAP SERPL CALC-SCNC: 3 MMOL/L (ref 0–18)
AST SERPL-CCNC: 52 U/L (ref ?–34)
BASOPHILS # BLD AUTO: 0.05 X10(3) UL (ref 0–0.2)
BASOPHILS NFR BLD AUTO: 1 %
BILIRUB DIRECT SERPL-MCNC: <0.1 MG/DL (ref ?–0.3)
BILIRUB SERPL-MCNC: 0.3 MG/DL (ref 0.2–1.1)
BUN BLD-MCNC: 43 MG/DL (ref 9–23)
BUN/CREAT SERPL: 24 (ref 10–20)
CALCIUM BLD-MCNC: 9.1 MG/DL (ref 8.7–10.4)
CHLORIDE SERPL-SCNC: 110 MMOL/L (ref 98–112)
CO2 SERPL-SCNC: 25 MMOL/L (ref 21–32)
CREAT BLD-MCNC: 1.79 MG/DL (ref 0.55–1.02)
DEPRECATED HBV CORE AB SER IA-ACNC: 164 NG/ML (ref 50–306)
DEPRECATED RDW RBC AUTO: 54.4 FL (ref 35.1–46.3)
EGFRCR SERPLBLD CKD-EPI 2021: 29 ML/MIN/1.73M2 (ref 60–?)
EOSINOPHIL # BLD AUTO: 0.21 X10(3) UL (ref 0–0.7)
EOSINOPHIL NFR BLD AUTO: 4.2 %
ERYTHROCYTE [DISTWIDTH] IN BLOOD BY AUTOMATED COUNT: 16.4 % (ref 11–15)
GLUCOSE BLD-MCNC: 122 MG/DL (ref 70–99)
GLUCOSE BLDC GLUCOMTR-MCNC: 101 MG/DL (ref 70–99)
HCT VFR BLD AUTO: 28.9 % (ref 35–48)
HGB BLD-MCNC: 9 G/DL (ref 12–16)
IMM GRANULOCYTES # BLD AUTO: 0.01 X10(3) UL (ref 0–1)
IMM GRANULOCYTES NFR BLD: 0.2 %
LYMPHOCYTES # BLD AUTO: 1.16 X10(3) UL (ref 1–4)
LYMPHOCYTES NFR BLD AUTO: 23.4 %
MCH RBC QN AUTO: 28 PG (ref 26–34)
MCHC RBC AUTO-ENTMCNC: 31.1 G/DL (ref 31–37)
MCV RBC AUTO: 89.8 FL (ref 80–100)
MONOCYTES # BLD AUTO: 0.48 X10(3) UL (ref 0.1–1)
MONOCYTES NFR BLD AUTO: 9.7 %
NEUTROPHILS # BLD AUTO: 3.05 X10 (3) UL (ref 1.5–7.7)
NEUTROPHILS # BLD AUTO: 3.05 X10(3) UL (ref 1.5–7.7)
NEUTROPHILS NFR BLD AUTO: 61.5 %
OSMOLALITY SERPL CALC.SUM OF ELEC: 298 MOSM/KG (ref 275–295)
PLATELET # BLD AUTO: 244 10(3)UL (ref 150–450)
POTASSIUM SERPL-SCNC: 4.7 MMOL/L (ref 3.5–5.1)
PROT SERPL-MCNC: 6.3 G/DL (ref 5.7–8.2)
RBC # BLD AUTO: 3.22 X10(6)UL (ref 3.8–5.3)
SODIUM SERPL-SCNC: 138 MMOL/L (ref 136–145)
WBC # BLD AUTO: 5 X10(3) UL (ref 4–11)

## 2025-06-18 PROCEDURE — 99232 SBSQ HOSP IP/OBS MODERATE 35: CPT | Performed by: INTERNAL MEDICINE

## 2025-06-18 NOTE — OCCUPATIONAL THERAPY NOTE
OCCUPATIONAL THERAPY EVALUATION - INPATIENT     Room Number: 327/327-A  Evaluation Date: 2025  Type of Evaluation: Initial       Physician Order: IP Consult to Occupational Therapy  Reason for Therapy: ADL/IADL Dysfunction and Discharge Planning    OCCUPATIONAL THERAPY ASSESSMENT   Patient is a 79 year old female admitted 2025 for syncope and dehydration, CKD.  Prior to admission, patient's baseline is independent with ADLs and ambulation with RW.  Patient is currently functioning near baseline with self care/ADLs.  Patient is requiring stand-by assist. Pt appears close to her baseline functional level and does not have further acute skilled OT needs.     Patient will benefit from with no additional skilled services but increased support at home.    PLAN DURING HOSPITALIZATION           OCCUPATIONAL THERAPY MEDICAL/SOCIAL HISTORY   Problem List  Principal Problem:    DICK (acute kidney injury)  Active Problems:    Hyperkalemia    HOME SITUATION  Type of Home: House  Home Layout: One level  Lives With: Family  Shower/Tub and Equipment: Walk-in shower; Shower chair  Drives: No  Patient Regularly Uses: Rolling walker    Stairs in Home: 1 MARANDA  Use of Assistive Device(s): RW    Prior Level of [place]: Pt lives with her family in a house. She is independent with ADLs at baseline. She has a walk in shower with a seat.     SUBJECTIVE  \"I am ready to go home\"    OCCUPATIONAL THERAPY EXAMINATION      OBJECTIVE       PAIN ASSESSMENT  Ratin      ACTIVITY TOLERANCE  Pulse: 98  Heart Rate Source: Monitor                   O2 SATURATIONS       COGNITION  Overall Cognitive Status:  WFL - within functional limits      Communication: wfl    Behavioral/Emotional/Social: wfl    RANGE OF MOTION   Upper extremity ROM is within functional limits     STRENGTH ASSESSMENT  Upper extremity strength is within functional limits     COORDINATION  Gross Motor: WFL   Fine Motor: WFL     ACTIVITIES OF DAILY LIVING  ASSESSMENT  AM-PAC ‘6-Clicks’ Inpatient Daily Activity Short Form  How much help from another person does the patient currently need…  -   Putting on and taking off regular lower body clothing?: A Little  -   Bathing (including washing, rinsing, drying)?: A Little  -   Toileting, which includes using toilet, bedpan or urinal? : A Little  -   Putting on and taking off regular upper body clothing?: None  -   Taking care of personal grooming such as brushing teeth?: None  -   Eating meals?: None    AM-PAC Score:  Score: 21  Approx Degree of Impairment: 32.79%  Standardized Score (AM-PAC Scale): 44.27  CMS Modifier (G-Code): CJ    FUNCTIONAL TRANSFER ASSESSMENT  Sit to Stand: Edge of Bed  Edge of Bed: Stand-by Assist  Toilet Transfer: Stand-by Assist    BED MOBILITY  Supine to Sit : Stand-by Assist    BALANCE ASSESSMENT  Static Sitting: Supervision  Static Standing: Supervision    FUNCTIONAL ADL ASSESSMENT  Eating: Independent  Grooming Standing: Stand-by Assist  Bathing Standing: Stand-by Assist  UB Dressing Seated: Independent  LB Dressing Seated: Stand-by Assist  Toileting Standing: Stand-by Assist    THERAPEUTIC EXERCISE     Skilled Therapy Provided: RN approved session. Pt received in the bed, agreeable to tx. Pt reports \"no\" pain. Pt completed the following with SBA: bed mobility, functional transfers, ambulation with RW, and LB dressing task. Pt does not have any questions or concerns about discharging home. Will sign off.      EDUCATION PROVIDED  Patient Education : Role of Occupational Therapy; Plan of Care; Discharge Recommendations; Functional Transfer Techniques  Patient's Response to Education: Verbalized Understanding    The patient's Approx Degree of Impairment: 32.79% has been calculated based on documentation in the [facility] '6 clicks' Inpatient Daily Activity Short Form.  Research supports that patients with this level of impairment may benefit from home.  Final disposition will be made by  interdisciplinary medical team.     Patient End of Session: Up in chair, Needs met, Call light within reach, RN aware of session/findings, All patient questions and concerns addressed, Hospital anti-slip socks, Alarm set        Patient Evaluation Complexity Level:   Occupational Profile/Medical History LOW - Brief history including review of medical or therapy records    Specific performance deficits impacting engagement in ADL/IADL LOW  1 - 3 performance deficits    Client Assessment/Performance Deficits LOW - No comorbidities nor modifications of tasks    Clinical Decision Making LOW - Analysis of occupational profile, problem-focused assessments, limited treatment options    Overall Complexity LOW     OT Session     Therapeutic Activity: 10 minutes

## 2025-06-18 NOTE — PROGRESS NOTES
[facility]  part of [facility]    Progress Note    Hannah Boss Patient Status:  Inpatient    1945 MRN [id number]   Location [facility] 3W/SW Attending Janak Mortensen MD   Hosp Day # 4 PCP Diana Lopez DO       Subjective:   Hannah Boss is a(n) 79 year old female who I am seeing for DICK    Resting      Objective:   /55 (BP Location: Right arm)   Pulse 70   Temp 97.8 °F (36.6 °C) (Oral)   Resp 16   Ht 5' 3\" (1.6 m)   Wt 164 lb 12.8 oz (74.8 kg)   SpO2 100%   BMI 29.19 kg/m²      Intake/Output Summary (Last 24 hours) at 2025 0950  Last data filed at 2025 0833  Gross per 24 hour   Intake 320 ml   Output 850 ml   Net -530 ml     Wt Readings from Last 1 Encounters:   25 164 lb 12.8 oz (74.8 kg)       Exam  Gen: No acute distress, Heent: NC AT, mucous memb clear, neck supple  Pulm: Lungs clear, normal respiratory effort  CV: Heart with regular rate and rhythm, no edema  Abd: Abdomen soft, nontender, nondistended, no organomegaly, bowel sounds present  Skin: no symptoms reported  Psych: alert and oriented    Assessment and Plan:       1 - DICK/hyperkalemia  Creatinine is improved.  Low potassium diet.  Also Lokelma, off IV fluids     2-transaminitis  GI following     3 -tachycardia  She is on diltiazem     4 -leiomyosarcoma  Follows with [facility]     Advised the patient that she needed to follow-up with Dr. Reynaga, her nephrologist.  She agrees to do so          Results:     Recent Labs   Lab 25  0615 25  0557 25  0755   RBC 3.14* 3.10* 3.22*   HGB 8.9* 8.6* 9.0*   HCT 29.1* 27.4* 28.9*   MCV 92.7 88.4 89.8   MCH 28.3 27.7 28.0   MCHC 30.6* 31.4 31.1   RDW 16.9* 16.6* 16.4*   NEPRELIM 2.60 2.93 3.05   WBC 4.5 4.9 5.0   .0 228.0 244.0         Recent Labs   Lab 25  0616 25  0557 25  0755   GLU 92 94 122*   BUN 52* 50* 43*   CREATSERUM 2.29* 1.84* 1.79*   CA 8.7 8.8 9.1    140 138   K 5.5* 5.0 4.7   *  112 110   CO2 25.0 24.0 25.0          No results found.        ANN-MARIE GRANDA MD  6/18/2025

## 2025-06-18 NOTE — PHYSICAL THERAPY NOTE
PHYSICAL THERAPY EVALUATION - INPATIENT     Room Number: 327/327-A  Evaluation Date: 2025  Type of Evaluation: Initial   Physician Order: PT Eval and Treat    Presenting Problem: acute kidney injury, near syncopal episode     Reason for Therapy: Mobility Dysfunction and Discharge Planning    PHYSICAL THERAPY ASSESSMENT   Patient is a 79 year old female admitted 2025 for acute kidney injury, near syncopal episode.  Prior to admission, patient's baseline is independent with personal ADLs, has caregiver/family assist for IADLs, ambulates with RW.  Patient is currently functioning near baseline with bed mobility, transfers, and gait therefore is safe to return home with additional support and no further skilled therapy needs at this time.     PLAN  Patient has been evaluated and presents with no skilled Physical Therapy needs at this time.  Patient will be discharged from Physical Therapy services. Please re-order if a new functional limitation presents during this admission.    PT Device Recommendation: Rolling walker    PHYSICAL THERAPY MEDICAL/SOCIAL HISTORY     Problem List  Principal Problem:    DICK (acute kidney injury)  Active Problems:    Hyperkalemia      HOME SITUATION  Type of Home: House  Home Layout: One level  Stairs to Enter : 0   Lives With: Family, Caregiver part-time (PT CG assist with household tasks, 3-4 days/week, for 4 hours/day)    Drives: No   Patient Regularly Uses: Rolling walker      SUBJECTIVE  \"I am ready to go home\"     PHYSICAL THERAPY EXAMINATION   OBJECTIVE  Precautions: None  Fall Risk: Standard fall risk    WEIGHT BEARING RESTRICTION  none    PAIN ASSESSMENT  Ratin    COGNITION  Overall Cognitive Status:  WFL - within functional limits    RANGE OF MOTION AND STRENGTH ASSESSMENT  Upper extremity ROM and strength are within functional limits.  Lower extremity ROM is within functional limits.  Lower extremity strength is within functional limits.    BALANCE  Static Sitting:  Good  Dynamic Sitting: Fair +  Static Standing: Fair  Dynamic Standing: Fair -    ACTIVITY TOLERANCE  Pulse: 98  Heart Rate Source: Monitor    AM-PAC '6-Clicks' INPATIENT SHORT FORM - BASIC MOBILITY  How much difficulty does the patient currently have...  Patient Difficulty: Turning over in bed (including adjusting bedclothes, sheets and blankets)?: A Little   Patient Difficulty: Sitting down on and standing up from a chair with arms (e.g., wheelchair, bedside commode, etc.): A Little   Patient Difficulty: Moving from lying on back to sitting on the side of the bed?: A Little   How much help from another person does the patient currently need...   Help from Another: Moving to and from a bed to a chair (including a wheelchair)?: A Little   Help from Another: Need to walk in hospital room?: A Little   Help from Another: Climbing 3-5 steps with a railing?: A Little     AM-PAC Score:  Raw Score: 18   Approx Degree of Impairment: 46.58%   Standardized Score (AM-PAC Scale): 43.63   CMS Modifier (G-Code): CK    FUNCTIONAL ABILITY STATUS  Functional Mobility/Gait Assessment  Gait Assistance: Other (Comment) (SBA)  Distance (ft): 80  Assistive Device: Rolling walker  Pattern: Shuffle (slow pace, flexed posture)  Supine to Sit: stand-by assist  Sit to Stand: stand-by assist    Exercise/Education Provided:  Education Provided To: Patient     Patient Education: Role of Physical Therapy, Plan of Care, DME Recommendations, Functional Transfer Techniques, Fall Prevention, Posture/Positioning, Energy Conservation, Proper Body Mechanics, Gait Training     Patient's Response to Education: Verbalized Understanding, Returned Demonstration     Skilled Therapy Provided: Pt received resting in bed and agreeable to activity. No c/o pain. Demos bed mobility, STS transfers to/from bed/chair, and hallway ambulation with RW; completed all with SBA only. Demos overall steady gait, no LOB. Denied fatigue and SOB with activity. HR=98 bpm with  activity. Per discussion with pt, no further questions or concerns regarding safe mobility/self-care and DC home likely today. Pt was left sitting in chair with needs within reach, handoff to RN complete.     The patient's Approx Degree of Impairment: 46.58% has been calculated based on documentation in the [facility] '6 clicks' Inpatient Basic Mobility Short Form.  Research supports that patients with this level of impairment may benefit from home with HH PT however anticipate no needs.  Final disposition will be made by interdisciplinary medical team.    Patient End of Session: Up in chair, Needs met, Call light within reach, RN aware of session/findings, All patient questions and concerns addressed, Hospital anti-slip socks    Patient was able to achieve the following ...   Patient able to transfer  Safely with SBA     Patient able to ambulate on level surfaces  Safely with SBA      Patient Evaluation Complexity Level:  History Low - no personal factors and/or co-morbidities   Examination of body systems Low -  addressing 1-2 elements   Clinical Presentation Low- Stable   Clinical Decision Making  Low Complexity     Therapeutic Activity:  10 minutes

## 2025-06-18 NOTE — TELEPHONE ENCOUNTER
Called and spoke with Hannah..  She is going to call her PCP to get a referral to follow up with Dr. Aaron as she is not able to follow up at [facility] any longer.  We also spoke with patient about needing the CD of her CT scans for her visit with Dr. Aaron.  Patient stated understanding. I will follow up with patient in about a week to see if she has referral and then we can schedule a follow up with Dr. Aaron

## 2025-06-18 NOTE — CM/SW NOTE
06/18/25 1100   Discharge disposition   Expected discharge disposition Home-Health   Post Acute Care Provider Other  ([facility])   Discharge transportation Private car     The patient received a MDO for discharge.    The patient is reserved with [facility].    The patient will be transported home via private car.    SW/CM to remain available for support and/or discharge planning.     Jennie DOMINIQUE, LSW  Discharge Planner [id number]

## 2025-06-18 NOTE — TELEPHONE ENCOUNTER
----- Message from ANN-MARIE GRANDA sent at 6/18/2025  9:51 AM CDT -----  Hello, could somebody please reach out to this patient's son.  She needs a follow-up with Dr. Reynaga though it does not need to be urgent    She has not seen her in a long time

## 2025-06-18 NOTE — DISCHARGE SUMMARY
General Medicine Discharge Summary     Patient ID:  Hannah Boss  79 year old  8/14/1945    Admit date: 6/14/2025    Discharge date and time: 6/18/2025    Attending Physician: Janak Mortensen MD     Consults: IP CONSULT TO NEPHROLOGY  IP CONSULT TO CARDIOLOGY  IP CONSULT TO NEPHROLOGY  IP CONSULT TO CARDIOLOGY  IP CONSULT TO SOCIAL WORK  CONSULT  - HOME HEALTH  IP CONSULT TO GASTROENTEROLOGY  IP CONSULT TO SPIRITUAL CARE  IP CONSULT TO SOCIAL WORK  IP CONSULT TO ONCOLOGY  IP CONSULT TO SOCIAL WORK  IP CONSULT TO SOCIAL WORK    Primary Care Physician: Diana Lopez DO     Reason for admission: dehydration, pre syncope, DICK    Risk For Readmission: moderate    Discharge Diagnoses:   See Additional Discharge Diagnoses in Hospital Course    Discharged Condition: good    Follow-up with labs/images appointments:      Discharge Instructions         Please follow up with your PCP in 1 week    Please follow up with your Nephrologist and GI team in 1-2 weeks and have kidney and liver function tests done.    Please follow up with oncology as directed.      Please follow up with cardiology in 2 weeks.      Exam  CV: RRR  Lungs CTAB  Ext: no edema   Abd: soft NT, ND,     HPI:   History of Present Illness:   Ms. Boss is a 79 year old female with spinal stenosis, aortic stenosis, HFpEF, CKD 4, GERD, anemia, HLD, leiomyosarcoma, RA, hs of GI bleeds 2/2 to AVMs, type 2 DM,  bradycardia, recurrent hyperkalemia, who was hospitalized from 6/9-6/11 for SVT, who presents with dehydration, pre-syncope, DICK and hyperkalemia.  Patient states she a little dehydrated, at home, not eating much, states she otherwise feels ok, but per report, had near syncopal episode, and poor oral intake since DC from hospital.  She also notes intermittent chest pain, none currently, no nausea or vomiting, no fevers or chills, no HA or vision changes.          Hospital Course:   Ms. Boss is a 79 year old female with spinal stenosis, aortic  stenosis, HFpEF, CKD 4, GERD, anemia, HLD, leiomyosarcoma, RA, hs of GI bleeds 2/2 to AVMs, type 2 DM,  bradycardia, recurrent hyperkalemia, who was hospitalized from 6/9-6/11 for SVT, who presents with dehydration, pre-syncope, DICK and hyperkalemia, transaminitis, improved, tolerating diet, cleared by Cards, renal , GI and ONC for DC.  Needs close fu as outpatient with PCP, Nephrology, Oncology and cardiology on DC, discussed with patient on son extensively.  Agreeable to DC home.        DICK on CKD stage 4  Hyperkalemia  - cre prior to DC 1.63, on admission 3.9, K 5.9  - holding lasix, holding jardiance on DC  - renal and Cards consulted  - continue daily lokelma   - Cre slowly improved, now 1.79  - renal ok with DC  - fu with renal on DC     Transaminitis  - no RUQ pain, but generalized abd pain  - LFT's sig elevated   - CT abd without liver findings  - no ETOH use  - has been taking tylenol with percocet, will check tylenol level, stop percocet change to oxycodone -> tylenol level normal  - Bili ok, INR ok  - RUQ US without acute findings, acute hepatitis negative,    - consulted GI given persistent sig elevated LFT's-> likely hypoperfusion per GI as rapid improvement noted   - LFT's improving -> fu with GI as outpatient, repeat labs in 1 week     SVT  AvNRT  HTN  - had RRT last night, spontaneous conversion to NSR  - resumed dilt  - Cards consulted  - needs EP fu for ablation -> fu with cardiology      Chronic HFpEF  Aortic Stenosis  - LVEF 60-65% on 6/9/25, moderate to severe aortic stenosis,   - appears hypovolemic on admit, no euvolemic  - hold lasix, hold empagliflozin on DC per nephrology/cardiology   - cards rec fu       Abd pain  Leiomyosarcoma  - has had this for 10 years  - was following with [facility] last note from april 2024 s/p radiation, has not seen them > 1 year  - she states they no longer take her insurance, and she hasn't followed up with another oncologist  - CT here (without contrast)  shows persistent sarcoma poss larger that CT from 2024  - Oncology consulted here, saw Dr. Ruvalcaba, plan for fu with them as Outpatient      Anemia  - chronic  - hgb 8-9  - no signs of blood loss     Type 2 DM  - holding jardiance  - BS stable      Hs of DVT  - on eliquis     GERD  Hs of GI bleed  - PPI stopped per GI     RA  Spinal stenosis  - not on meds for RA  - gabapentin continued     HLD  - statin held with transaminitis -> ok to resume pre GI     Active tobacco use  - cessation counseling provided, patient pre contemplative        Operative Procedures:      Imaging: US LIVER LIMITED WITH DOPPLER (CPT=76705/58978)  Result Date: 6/16/2025  CONCLUSION:   1. Hepatic steatosis.  No focal hepatic lesion.  2. Patency and normal directionality hepatic vasculature.    Dictated by (CST): Elizabeth Singh MD on 6/16/2025 at 1:25 PM     Finalized by (CST): Elizabeth Singh MD on 6/16/2025 at 1:31 PM              Disposition: home    Activity: activity as tolerated  Diet: regular diet  Wound Care: as directed  Code Status: Full Code        Home Medication Changes:     Med list     Medication List        CHANGE how you take these medications      hydrALAZINE 50 MG Tabs  Commonly known as: Apresoline  Take 1 tablet (50 mg total) by mouth in the morning and 1 tablet (50 mg total) before bedtime.  What changed:   medication strength  how much to take            CONTINUE taking these medications      atorvastatin 40 MG Tabs  Commonly known as: Lipitor     bisacodyl 10 MG Supp  Commonly known as: Dulcolax  Place 1 suppository (10 mg total) rectally daily as needed.     dilTIAZem HCl ER Beads 120 MG Cp24  Commonly known as: TIAZAC  Take 1 capsule (120 mg total) by mouth daily.     Eliquis 5 MG Tabs  Generic drug: apixaban     ferrous sulfate 325 (65 FE) MG Tbec     gabapentin 300 MG Caps  Commonly known as: Neurontin     isosorbide mononitrate ER 60 MG Tb24  Commonly known as: Imdur     magnesium oxide 400 MG Tabs  Commonly  known as: Mag-Ox  Take 1 tablet (400 mg total) by mouth daily.     meclizine 25 MG Tabs  Commonly known as: Antivert     Polyethylene Glycol 3350 17 g Pack  Commonly known as: MIRALAX  Take 17 g by mouth daily as needed.     sodium zirconium cyclosilicate 10 g Pack  Commonly known as: Lokelma  Take 1 packet (10 g total) by mouth daily.            STOP taking these medications      bumetanide 1 MG Tabs  Commonly known as: Bumex     cholecalciferol 50 MCG (2000 UT) Tabs  Commonly known as: Vitamin D3     diazePAM 5 MG Tabs  Commonly known as: Valium     empagliflozin 10 MG Tabs  Commonly known as: Jardiance     furosemide 20 MG Tabs  Commonly known as: Lasix     oxyCODONE-acetaminophen  MG Tabs  Commonly known as: Percocet     pantoprazole 40 MG Tbec  Commonly known as: Protonix     trolamine salicyliate 10 % Crea               Where to Get Your Medications        These medications were sent to [place] [address] [place], [place] - [address] [facility] [phone], [phone]  [address] [address]      Phone: [phone]   hydrALAZINE 50 MG Tabs         FU   Follow-up Information       Ousmane Batista MD. Go to.    Specialties: Cardiac Electrophysiology, CARDIOLOGY  Why: 6/25/25 @9:00a  Contact information:  [address]  [address]  [facility] [address]  [phone]               Diana Lopez DO. Schedule an appointment as soon as possible for a visit in 1 week(s).    Specialty: Family Medicine  Contact information:  [address]  [facility] [address]  [phone]               Janet Reynaga MD. Schedule an appointment as soon as possible for a visit in 1 week(s).    Specialty: NEPHROLOGY  Contact information:  [address] [facility]  [address]  [facility] [address]  [phone]               Koki Ruvalcaba MD. Schedule an appointment as soon as possible for a visit in 1 week(s).    Specialties: Hematology and Oncology, HEMATOLOGY  Contact information:  [address] [facility]  [address]  [phone]               Mary Anderson MD. Schedule an appointment as soon as possible for a visit in 2 week(s).    Specialty: GASTROENTEROLOGY  Contact information:  [address]  [address]  [facility] [id number] [phone]                             NE instructions:      Other Discharge Instructions:         Please follow up with your PCP in 1 week    Please follow up with your Nephrologist and GI team in 1-2 weeks and have kidney and liver function tests done.    Please follow up with oncology as directed.      Please follow up with cardiology in 2 weeks.        HOME HEALTH:  Sometimes managing your health at home requires assistance.  The [employer] team has recognized your preference to use   [employer]  [address] [place] [address]  Phone: [phone]  Fax: [phone]  A representative from the home health agency will contact you or your family to schedule your first visit.            I reconciled current and discharge medications on day of discharge, discussed changes with patient and noted changes above.       Total Time Coordinating Care: Greater than 30 minutes    Patient had opportunity to ask questions and state understand and agree with therapeutic plan as outlined    Thank You,    Janak Mortensen MD   Hospitalist with [facility]

## 2025-06-18 NOTE — PROGRESS NOTES
[facility]  GI Progress Note      Hannah Boss Patient Status:  Inpatient    1945 MRN [id number]   Location [facility] 3W/SW Attending Janak Mortensen MD   Hosp Day # 4 PCP Diana Lopez DO          SUBJECTIVE:     She denies abdominal pain, nausea or vomiting.     OBJECTIVE:    Height: --  Weight: 164 lb 12.8 oz (74.8 kg) (516)  BSA (Calculated - sq m): --  Pulse: 70 (838)  BP: 152/55 (838)  Temp: 97.8 °F (36.6 °C) (838)  Do Not Use - Resp Rate: --  SpO2: 100 % (838)        General: Alert, oriented, no acute distress  Lungs: Clear  Heart: Normal S1 and S2  Abdomen: Soft, non-tender. Bowel sounds are positive.   Extremities: No edema    LAB RESULTS:       Lab Results   Component Value Date    WBC 5.0 2025    HGB 9.0 2025    HCT 28.9 2025    .0 2025    CREATSERUM 1.79 2025    BUN 43 2025     2025    K 4.7 2025     2025    CO2 25.0 2025     2025    CA 9.1 2025    ALB 4.0 2025    ALKPHO 257 2025    BILT 0.3 2025    TP 6.3 2025    AST 52 2025     2025    PGLU 101 2025        ASSESSMENT:    Elevated LFT's    The etiology of elevated LFT\"s is unclear. LFT's continue to  improve.    PLAN:    Follow LFT's.  OK to discharge from GI standpoint. LFT's should be repeated in one week. She should follow with Dr. PICHARDO after discharge.     Chris Barrett MD

## 2025-06-18 NOTE — PLAN OF CARE
Patient was provided with discharge instructions, education, and follow up information. Patient's son present for discharge instructions with patient's consent. No new prescriptions. Patient verbalizes understanding of follow up information. Patient has no questions after reviewing all instructions and will be going home. Patient transported to exit in wheelchair. Belongings taken with patient.    Problem: Patient Centered Care  Goal: Patient preferences are identified and integrated in the patient's plan of care  Description: Interventions:  - What would you like us to know as we care for you? From home  - Provide timely, complete, and accurate information to patient/family  - Incorporate patient and family knowledge, values, beliefs, and cultural backgrounds into the planning and delivery of care  - Encourage patient/family to participate in care and decision-making at the level they choose  - Honor patient and family perspectives and choices  Outcome: Adequate for Discharge     Problem: Patient/Family Goals  Goal: Patient/Family Long Term Goal  Description: Patient's Long Term Goal: discharge home    Interventions:  - monitor vs, assess pain, ambulate as tolerated  - See additional Care Plan goals for specific interventions  Outcome: Adequate for Discharge  Goal: Patient/Family Short Term Goal  Description: Patient's Short Term Goal: Manage pain monitor kidney & electrolytes    Interventions:   - assess pain, monitor electrolytes   - See additional Care Plan goals for specific interventions  Outcome: Adequate for Discharge

## 2025-06-18 NOTE — PLAN OF CARE
Problem: Patient Centered Care  Goal: Patient preferences are identified and integrated in the patient's plan of care  Description: Interventions:  - What would you like us to know as we care for you? From home  - Provide timely, complete, and accurate information to patient/family  - Incorporate patient and family knowledge, values, beliefs, and cultural backgrounds into the planning and delivery of care  - Encourage patient/family to participate in care and decision-making at the level they choose  - Honor patient and family perspectives and choices  Outcome: Progressing     Problem: Patient/Family Goals  Goal: Patient/Family Long Term Goal  Description: Patient's Long Term Goal: discharge home    Interventions:  - monitor vs, assess pain, ambulate as tolerated  - See additional Care Plan goals for specific interventions  Outcome: Progressing  Goal: Patient/Family Short Term Goal  Description: Patient's Short Term Goal: Manage pain monitor kidney & electrolytes    Interventions:   - assess pain, monitor electrolytes   - See additional Care Plan goals for specific interventions  Outcome: Progressing

## 2025-06-19 ENCOUNTER — PATIENT OUTREACH (OUTPATIENT)
Age: 80
End: 2025-06-19

## 2025-06-19 NOTE — PAYOR COMM NOTE
--------------  CONTINUED STAY REVIEW    Payor: [facility] [facility]  Subscriber #:  [id number]  Authorization Number: [id number]    Admit date: 6/14/25  Admit time: 10:28 PM    PLEASE REVIEW ADDITIONAL CLINICAL INFORMATION. REQUESTING RECONSIDERATION OF INPATIENT STATUS. THANK YOU.     61/5/25  NEPHROLOGY  Reason for Consultation:   DICK  History of Present Illness:   Patient is a 79 year old female who was admitted to the hospital for DICK (acute kidney injury):  The patient follows with Dr. Reynaga of nephrology.  She has a history of chronic kidney disease stage III with a GFR baseline of approximately 1.6.     Was brought to the emergency room yesterday due to dehydration and nearly passing out.  History is obtained by the patient and her son.  They say that she has not been eating much at home or drinking very well either.     Her creatinine was up to 3.9 and her potassium was elevated as well.     Lab 06/09/25  0159 06/10/25  0645 06/11/25  0625 06/14/25  1742 06/15/25  0549   RBC 3.79* 3.18*  --  3.63* 3.53*   HGB 10.8* 9.0*  --  10.2* 9.9*   HCT 33.3* 28.9*  --  33.3* 33.0*   MCV 87.9 90.9  --  91.7 93.5   MCH 28.5 28.3  --  28.1 28.0   MCHC 32.4 31.1  --  30.6* 30.0*   RDW 16.8* 16.6*  --  17.0* 17.1*   NEPRELIM 3.55 3.17  --  3.74  --    WBC 5.2 5.1  --  5.8 4.0   .0 220.0  220.0 235.0 252.0 220.0     Lab 06/10/25  0718 06/14/25  1742 06/15/25  0549   GLU 89 114* 91   BUN 23 67* 58*   CREATSERUM 1.63* 3.92* 3.16*   CA 8.8 9.3 8.6*    136 140   K 4.6 5.9* 4.9   * 108 113*   CO2 22.0 24.0 22.0    CT ABDOMEN+PELVIS(CPT=74176)  Result Date: 6/14/2025  CONCLUSION:  1. 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 Stable mass effect effect on IVC or IVC extension .  Further assessment would require IV contrast. 2. Large amount of stool in colon suggests constipation.  Colonic diverticulosis.      Dictated by (CST): Eric Alarcon MD on 6/14/2025 at 7:33 PM     Finalized by (CST): Eric Alarcon MD on 6/14/2025 at 7:44 PM           Impression/Receommendations:      1 - DICK/hyperkalemia  Creatinine is improved.  Continue current IV fluid rate.     2-transaminitis  Right upper quadrant ultrasound pending.  GI to see patient     3 -tachycardia  Had an RRT last night.  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  History is obtained by the patient and her son.  They say that she has not been eating much at home or drinking very well either.     Her creatinine was up to 3.9 and her potassium was elevated as well.     She has a hx of spinal stenosis, aortic stenosis, HFpEF, CKD 4, GERD, anemia, HLD, leiomyosarcoma follows with [facility] (dr. Benedict), RA,  bradycardia, recurrent hyperkalemia, who was hospitalized from 6/9-6/11 for SVT, who presents with dehydration, pre-syncope, DICK and hyperkalemia.  Patient states she a little dehydrated, at home, not eating much, states she otherwise feels ok, but per report, had near syncopal episode, and poor oral intake since DC from hospital.  She also notes  intermittent chest pain, none currently, no nausea or vomiting, no fevers or chills, no HA or vision changes.          6/16/25  Lab 06/14/25  1742 06/15/25  0549 06/16/25  0616   * 91 92   BUN 67* 58* 52*   CREATSERUM 3.92* 3.16* 2.29*   EGFRCR 11* 15* 21*   CA 9.3 8.6* 8.7    140 141   K 5.9* 4.9 5.5*    113* 114*   CO2 24.0 22.0 25.0      Lab 06/10/25  0645 06/14/25  1742 06/15/25  0549 06/16/25  0615   RBC 3.18* 3.63* 3.53* 3.14*   HGB 9.0* 10.2* 9.9* 8.9*   HCT 28.9* 33.3* 33.0* 29.1*   MCV 90.9 91.7 93.5 92.7   MCH 28.3 28.1 28.0 28.3   MCHC 31.1 30.6* 30.0* 30.6*   RDW 16.6* 17.0* 17.1* 16.9*   NEPRELIM 3.17 3.74  --  2.60   WBC 5.1 5.8 4.0 4.5   .0  220.0 252.0 220.0 210.0   Assessment:     79 year old female with PMH of moderate aortic stenosis, AVNRT, DVT/PE, HTN, CKD III-IV, DMII who presented with DICK on CKD and dehydration. Cardiology was consulted for PSVT.      PSVT  Spontaneous conversion to sinus  -known hx AVNRT with difficult to control rates historically  -diltiazem home  dose 120mg daily     Near syncope  2/2 dehydrdation     DICK on CKD3  Baseline 2-2.3  -3.92 on admission, currently 2.3  -2/2 dehydration, poor PO intake  -nephrology following     Chronic HFpEF  LVEF 60-65% with G2DD last admission last week with moderate to severe aortic stenosis, mild aortic regurgitation  -dehydrated this admission 2/2 poor oral intake  -proBNP 10,705 in March, 2025  -GDMT: bumex, hydralazine, isosorbide PTA, holding bumex currently due to DICK     HTN  BP up to 190s  -hydralazine, isosorbide, diltiazem  -will increase hydralazine dosing for better control     HLD-statin, LDL controlled 32     Transaminitis  LFTs elevated on admission  -likely 2/2 dehydration  -trending down  -GI following  -liver US pending, CT A/P revealed retroperitoneal mass (known retroperitoneal sarcoma), slightly increased in sized compared to previous imaging,  constipation, colonic diverticulosis      Rheumatoid Arthritis     Hx DVT  On eliquis     DMII-A1c 6.5     Acute on Chronic Anemia  Hgb baseline ~8  -today 7, 1u PRBC per primary     Hyperkalemia  K+ 5.9 on admission  -lokelma per nephrology        Plan:  -increase hydralazine to 50mg TID for better BP control   -continue eliquis, diltiazem, imdur  -continue holding statin with elevated LFTs, trending down  -lokelma per nephrology         6/17/25        CARDIOLOGY  /51 (BP Location: Right arm)   Pulse 63   Temp 98 °F (36.7 °C) (Oral)   Resp 17   Ht 5' 3\" (1.6 m)   Wt 152 lb 14.4 oz (69.4 kg)   SpO2 97%   BMI 27.09 kg/m²      Telemetry: SR 60s     Lab 06/15/25  0549 06/16/25  0616 06/17/25  0557   GLU 91 92 94   BUN 58* 52* 50*   CREATSERUM 3.16* 2.29* 1.84*   EGFRCR 15* 21* 28*   CA 8.6* 8.7 8.8    141 140   K 4.9 5.5* 5.0   * 114* 112   CO2 22.0 25.0 24.0      Lab 06/14/25  1742 06/15/25  0549 06/16/25  0615 06/17/25  0557   RBC 3.63* 3.53* 3.14* 3.10*   HGB 10.2* 9.9* 8.9* 8.6*   HCT 33.3* 33.0* 29.1* 27.4*   MCV 91.7 93.5 92.7 88.4   MCH 28.1 28.0 28.3 27.7   MCHC 30.6* 30.0* 30.6* 31.4   RDW 17.0* 17.1* 16.9* 16.6*   NEPRELIM 3.74  --  2.60 2.93   WBC 5.8 4.0 4.5 4.9   .0 220.0 210.0 228.0      Lab 06/14/25  1742   *    Physical Exam:   General: Alert, cooperative, no distress, appears stated age.  Neck: no JVD.  Lungs: Clear to auscultation bilaterally.  Chest wall: No tenderness or deformity.  Heart: Regular rate and rhythm, S1, S2 normal, no murmur, click, rub or gallop.  Abdomen: Soft, non-tender. Bowel sounds normal. No masses,  No organomegaly.  Extremities: Extremities normal, atraumatic, no cyanosis or edema.  Pulses: 2+ and symmetric all extremities.  Neurologic: Grossly intact.    [Scheduled Medications]   hydrALAZINE  50 mg Oral BID    polyethylene glycol (PEG 3350)  17 g Oral Once    isosorbide mononitrate ER  60 mg Oral Daily    sodium zirconium cyclosilicate  10 g Oral Daily    insulin aspart  1-5  Units Subcutaneous TID CC    [Held by provider] atorvastatin  40 mg Oral Nightly    dilTIAZem HCl ER Beads  120 mg Oral Daily    apixaban  5 mg Oral BID    ferrous sulfate  325 mg Oral Once per day on Tuesday Thursday Saturday    gabapentin  300 mg Oral Nightly    nicotine  1 patch Transdermal Nightly     [Medication Infusions]    [Medication Infusions]   sodium chloride 50 mL/hr at 06/16/25 1204      Assessment:     79 year old female with PMH of moderate aortic stenosis, AVNRT, DVT/PE, HTN, CKD III-IV, DMII who presented with DICK on CKD and dehydration. Cardiology was consulted for PSVT.      PSVT  Spontaneous conversion to sinus  -known hx AVNRT with difficult to control rates historically  -diltiazem home  dose 120mg daily  -has EP f/u scheduled next week     Near syncope  2/2 dehydrdation  -states she does not drink water because she doesn't like it and it makes her throw up, states she drinks pepsi only at home  -lives home with son but is alone ~90% of the time, does not cook for herself, orders out     DICK on CKD3  Baseline 2-2.3  -3.92 on admission, currently 1.84  -2/2 dehydration, poor PO intake  -nephrology following     Chronic HFpEF  LVEF 60-65% with G2DD last admission last week with moderate to severe aortic stenosis, mild aortic regurgitation  -dehydrated this admission 2/2 poor oral intake  -proBNP 10,705 in March, 2025  -GDMT: bumex, hydralazine, isosorbide PTA, holding bumex currently due to DICK     HTN  BP improved with higher hydralazine dosing  -hydralazine, isosorbide, diltiazem     HLD-statin, LDL controlled 32     Transaminitis  LFTs elevated on admission  -likely 2/2 dehydration  -trending down  -GI following  -liver US unremarkable, CT A/P revealed retroperitoneal mass (known retroperitoneal sarcoma), slightly increased in sized compared to previous imaging,  constipation, colonic diverticulosis     Rheumatoid Arthritis     Hx DVT  On eliquis     DMII-A1c 6.5     Chronic Anemia  Hgb  baseline ~8     Hyperkalemia  K+ 5.9 on admission  -lokelma per nephrology, improving        Plan:  -continue hydralazine, eliquis, diltiazem, imdur  -continue holding statin with elevated LFTs, trending down  -lokelma per nephrology                      INTERNAL MEDICINE  Assessment/Plan:      Principal Problem:    DICK (acute kidney injury)  Active Problems:    Hyperkalemia     Ms. Boss is a 79 year old female with spinal stenosis, aortic stenosis, HFpEF, CKD 4, GERD, anemia, HLD, leiomyosarcoma follows with [facility] (dr. Benedict), RA, hs of GI bleeds 2/2 to AVMs, type 2 DM,  bradycardia, recurrent hyperkalemia, who was hospitalized from 6/9-6/11 for SVT, who presents with dehydration, pre-syncope, DICK and hyperkalemia, transaminitis, improved      DICK on CKD stage 4  Hyperkalemia  - cre prior to DC 1.63, on admission 3.9, K 5.9  - holding lasix, holding jardiance  - renal and Cards consulted  - continue daily lokelma   - Cre slowly improved, now 1.84  - IVF stopped   - will monitor 1 day of IVF and make sure renal function stable and does not retain fluid      Transaminitis  -  generalized abd pain  - LFT's sig elevated   - CT abd without liver findings  - hold statin  - no ETOH use  - has been taking tylenol with percocet, will check tylenol level, stop percocet change to oxycodone -> tylenol level normal  - Bili ok, INR ok  - RUQ US without acute findings, acute hepatitis negative,    - consulted GI given persistent sig elevated LFT's  - LFT's improving      SVT  AvNRT  HTN  - had RRT last night, spontaneous conversion to NSR  - resumed dilt  - Cards consulted  - needs EP fu for ablation      Chronic HFpEF  Aortic Stenosis  - LVEF 60-65% on 6/9/25, moderate to severe aortic stenosis,   - appears hypovolemic  - hold lasix, hold empagliflozin  - cards consulted      Abd pain  Leiomyosarcoma  - has had this for 10 years  - was following with [facility] last note from april 2024 s/p radiation, has not seen  them > 1 year  - she states they no longer take her insurance, and she hasn't followed up with another oncologist  - CT here (without contrast) shows persistent sarcoma poss larger that CT from 2024  - will need onc referral, consulted oncology      Anemia  - chronic  - hgb 8-9  - no signs of blood loss     Type 2 DM  - holding jardiance  - SSI and accuchecks     Hs of DVT  - on eliquis     GERD  Hs of GI bleed  - continue PPI     RA  Spinal stenosis  - not on meds for RA  - gabapentin continued     HLD  - statin held with transaminitis     Medications 06/14 06/15 06/16 06/17 06/18      morphINE PF 2 MG/ML injection 2 mg  Dose: 2 mg  Freq: Once Route: IV  Start: 06/15/25 0500 End: 06/15/25 0451     03583           sodium chloride 0.9 % IV bolus 1,000 mL  Dose: 1,000 mL  Freq: Once Route: IV  Start: 06/14/25 1820 End: 06/14/25 2006 [id number] 20064            sodium zirconium cyclosilicate (Lokelma) oral packet 20 g  Dose: 20 g  Freq: Once Route: OR  Start: 06/14/25 2021 End: 06/14/25 2151   Admin Instructions:   Empty entire contents of the packet(s) into a glass with 3 tablespoons (45 mL) of water. Stir well and drink immediately; if powder remains in the glass, add water, stir and drink immediately; repeat until no powder remains. Administer other oral medications 2 hours before or 2 hours after dose.    [id number]               Medications 06/14 06/15 06/16 06/17 06/18   apixaban (Eliquis) tab 5 mg  Dose: 5 mg  Freq: 2 times daily Route: OR  Start: 06/14/25 2245 End: 06/18/25 1423    01386      23641     94969      1042 [C]9     685196      921328     006030      353325        atorvastatin (Lipitor) tab 40 mg  Dose: 40 mg  Freq: Nightly Route: OR  Start: 06/14/25 2245 End: 06/18/25 1423    [id number]      0929 [C]15     210016 210017      (2100) [C]18      137352        dilTIAZem ER (Dilacor XR) 24 hr cap 120 mg  Dose: 120 mg  Freq: Daily Route: OR  Start: 06/15/25 0930 End: 06/18/25 1423   Admin Instructions:    Swallow whole.  Do not chew, break or crush.     [id number]      1042 [C]23      058976      297000        ferrous sulfate DR tab 325 mg  Dose: 325 mg  Freq: Once per day on Tuesday Thursday Saturday Route: OR  Start: 06/14/25 2245 End: 06/18/25 1423   Admin Instructions:   Do not crush    [id number]        [id number]         gabapentin (Neurontin) cap 300 mg  Dose: 300 mg  Freq: Nightly Route: OR  Start: 06/14/25 2245 End: 06/18/25 1423    784486      027394      597912      373202         hydrALAZINE (Apresoline) tab 25 mg  Dose: 25 mg  Freq: 2 times daily Route: OR  Start: 06/15/25 0945 End: 06/16/25 1051     833927     238633      1042 [C]34          hydrALAZINE (Apresoline) tab 25 mg  Dose: 25 mg  Freq: Every 6 hours PRN Route: OR  PRN Comment: for SBP > 170  Start: 06/14/25 2240 End: 06/18/25 1423    322061        143652         hydrALAZINE (Apresoline) tab 50 mg  Dose: 50 mg  Freq: 2 times daily Route: OR  Start: 06/16/25 2100 End: 06/18/25 1423      481147      677142     571832      274661        isosorbide mononitrate ER (Imdur) 24 hr tab 60 mg  Dose: 60 mg  Freq: Daily Route: OR  Start: 06/15/25 0945 End: 06/18/25 1423   Admin Instructions:   Do not crush     209545      1042 [C]53      328932      260429        oxyCODONE immediate release tab 5 mg  Dose: 5 mg  Freq: Every 6 hours PRN Route: OR  PRN Reason: severe pain  Start: 06/15/25 1119 End: 06/18/25 1423     753582     219231      597547     1042 [C]64     818689      602201     346105     618296      492240     535935        pantoprazole (Protonix) DR tab 40 mg  Dose: 40 mg  Freq: 2 times daily before meals Route: OR  Start: 06/15/25 0700 End: 06/16/25 1630   Admin Instructions:   Do not crush     [id number]     195489      853712     74          sodium chloride 0.9% infusion  Rate: 50 mL/hr  Freq: Continuous Route: IV  Start: 06/14/25 2245 End: 06/17/25 1029    320610      978204      410877      969798         sodium zirconium cyclosilicate (Lokelma) oral  packet 10 g  Dose: 10 g  Freq: Daily Route: OR  Start: 06/15/25 0945 End: 06/18/25 1423   Admin Instructions:   Empty entire contents of the packet(s) into a glass with =3 tablespoons (45 mL) of water. Stir well and drink immediately; if powder remains in the glass, add water, stir and drink immediately; repeat until no powder remains. Administer other oral medications =2 hours before or 2 hours after dose.     1320 [C]80      790811      921526      082523              Vitals (last day) before discharge       Date/Time Temp Pulse Resp BP SpO2 Weight O2 Device O2 Flow Rate (L/min) [facility]    06/18/25 1029 -- 98 -- -- -- -- -- -- DP    06/18/25 1015 -- 98 -- -- -- -- -- --     06/18/25 0838 97.8 °F (36.6 °C) 70 16 152/55 100 % -- None (Room air) --     06/18/25 0516 -- -- -- -- -- 164 lb 12.8 oz (74.8 kg) -- --     06/18/25 0300 -- 66 -- -- -- -- -- --     06/18/25 0216 98.2 °F (36.8 °C) 64 18 138/54 98 % -- None (Room air) --     06/17/25 1948 98.7 °F (37.1 °C) 75 20 150/62 97 % -- None (Room air) --     06/17/25 1900 -- 81 -- -- -- -- -- --     06/17/25 1404 98 °F (36.7 °C) 74 16 120/49 98 % -- None (Room air) --     06/17/25 1206 -- 65 -- 139/49 -- -- -- --     06/17/25 0846 98.1 °F (36.7 °C) 84 16 166/58 100 % -- None (Room air) --     06/17/25 0640 -- -- -- -- -- 152 lb 14.4 oz (69.4 kg) -- --     06/17/25 0550 -- 63 -- 138/51 -- -- -- -- CC    06/17/25 0433 98 °F (36.7 °C) 69 17 172/62 97 % -- None (Room air) --     06/17/25 0300 -- 64 -- -- -- -- -- -- GT

## 2025-06-19 NOTE — PROGRESS NOTES
This writer contacted patient at [phone] for [facility] post-hospital discharge follow up call on 6/19/2025 at 11:37 AM. There was no answer and I was unable to reach the patient at this time. I left a nondescript message with my contact information and the reason for my call on voicemail.

## 2025-06-20 NOTE — PROGRESS NOTES
This writer contacted patient at [phone] for [facility] post-hospital discharge follow up call on 6/20/2025 at 8:04 AM. There was no answer and I was unable to reach the patient at this time. I left a nondescript message with my contact information and the reason for my call on voicemail.    A [facility] message was sent.

## 2025-06-23 NOTE — PROGRESS NOTES
This writer contacted patient's son (preferred contact)for [facility] post-hospital discharge follow up call on 6/23/2025 at 8:21 AM. There was no answer and I was unable to reach the patient at this time. I left a nondescript message with my contact information and the reason for my call on voicemail.

## 2025-06-27 ENCOUNTER — TELEPHONE (OUTPATIENT)
Age: 80
End: 2025-06-27

## 2025-06-27 NOTE — TELEPHONE ENCOUNTER
Called patient and unable to LM on unidentified VM- called son John and LM on VM to call back regarding appointment with Dr. Aaron for mother Hannah.  Phone number provided.

## 2025-06-30 ENCOUNTER — APPOINTMENT (OUTPATIENT)
Dept: GENERAL RADIOLOGY | Facility: HOSPITAL | Age: 80
End: 2025-06-30
Attending: EMERGENCY MEDICINE
Payer: MEDICARE

## 2025-06-30 ENCOUNTER — TELEPHONE (OUTPATIENT)
Age: 80
End: 2025-06-30

## 2025-06-30 ENCOUNTER — HOSPITAL ENCOUNTER (EMERGENCY)
Facility: HOSPITAL | Age: 80
Discharge: HOME | End: 2025-07-01
Attending: EMERGENCY MEDICINE
Payer: MEDICARE

## 2025-06-30 DIAGNOSIS — N17.9 AKI (ACUTE KIDNEY INJURY): ICD-10-CM

## 2025-06-30 DIAGNOSIS — I47.10 SVT (SUPRAVENTRICULAR TACHYCARDIA) (HCC): Primary | ICD-10-CM

## 2025-06-30 DIAGNOSIS — M79.89 LEG SWELLING: ICD-10-CM

## 2025-06-30 LAB
ALBUMIN SERPL-MCNC: 4.2 G/DL (ref 3.2–4.8)
ALBUMIN/GLOB SERPL: 1.8 {RATIO} (ref 1–2)
ALP LIVER SERPL-CCNC: 172 U/L (ref 55–142)
ALT SERPL-CCNC: 31 U/L (ref 10–49)
ANION GAP SERPL CALC-SCNC: 7 MMOL/L (ref 0–18)
AST SERPL-CCNC: 29 U/L (ref ?–34)
BASOPHILS # BLD AUTO: 0.04 X10(3) UL (ref 0–0.2)
BASOPHILS NFR BLD AUTO: 0.5 %
BILIRUB SERPL-MCNC: 0.2 MG/DL (ref 0.2–1.1)
BUN BLD-MCNC: 52 MG/DL (ref 9–23)
BUN/CREAT SERPL: 21.7 (ref 10–20)
CALCIUM BLD-MCNC: 8.6 MG/DL (ref 8.7–10.4)
CHLORIDE SERPL-SCNC: 115 MMOL/L (ref 98–112)
CO2 SERPL-SCNC: 21 MMOL/L (ref 21–32)
CREAT BLD-MCNC: 2.4 MG/DL (ref 0.55–1.02)
DEPRECATED RDW RBC AUTO: 55.7 FL (ref 35.1–46.3)
EGFRCR SERPLBLD CKD-EPI 2021: 20 ML/MIN/1.73M2 (ref 60–?)
EOSINOPHIL # BLD AUTO: 0.13 X10(3) UL (ref 0–0.7)
EOSINOPHIL NFR BLD AUTO: 1.7 %
ERYTHROCYTE [DISTWIDTH] IN BLOOD BY AUTOMATED COUNT: 17.3 % (ref 11–15)
GLOBULIN PLAS-MCNC: 2.4 G/DL (ref 2–3.5)
GLUCOSE BLD-MCNC: 150 MG/DL (ref 70–99)
GLUCOSE BLDC GLUCOMTR-MCNC: 145 MG/DL (ref 70–99)
HCT VFR BLD AUTO: 30.1 % (ref 35–48)
HGB BLD-MCNC: 9.6 G/DL (ref 12–16)
IMM GRANULOCYTES # BLD AUTO: 0.01 X10(3) UL (ref 0–1)
IMM GRANULOCYTES NFR BLD: 0.1 %
LYMPHOCYTES # BLD AUTO: 1.7 X10(3) UL (ref 1–4)
LYMPHOCYTES NFR BLD AUTO: 21.9 %
MCH RBC QN AUTO: 28.4 PG (ref 26–34)
MCHC RBC AUTO-ENTMCNC: 31.9 G/DL (ref 31–37)
MCV RBC AUTO: 89.1 FL (ref 80–100)
MONOCYTES # BLD AUTO: 0.46 X10(3) UL (ref 0.1–1)
MONOCYTES NFR BLD AUTO: 5.9 %
NEUTROPHILS # BLD AUTO: 5.41 X10 (3) UL (ref 1.5–7.7)
NEUTROPHILS # BLD AUTO: 5.41 X10(3) UL (ref 1.5–7.7)
NEUTROPHILS NFR BLD AUTO: 69.9 %
NT-PROBNP SERPL-MCNC: 2880 PG/ML (ref ?–450)
OSMOLALITY SERPL CALC.SUM OF ELEC: 313 MOSM/KG (ref 275–295)
PLATELET # BLD AUTO: 281 10(3)UL (ref 150–450)
POTASSIUM SERPL-SCNC: 4.9 MMOL/L (ref 3.5–5.1)
PROT SERPL-MCNC: 6.6 G/DL (ref 5.7–8.2)
RBC # BLD AUTO: 3.38 X10(6)UL (ref 3.8–5.3)
SODIUM SERPL-SCNC: 143 MMOL/L (ref 136–145)
WBC # BLD AUTO: 7.8 X10(3) UL (ref 4–11)

## 2025-06-30 PROCEDURE — 71045 X-RAY EXAM CHEST 1 VIEW: CPT | Performed by: STUDENT IN AN ORGANIZED HEALTH CARE EDUCATION/TRAINING PROGRAM

## 2025-06-30 PROCEDURE — 82962 GLUCOSE BLOOD TEST: CPT

## 2025-06-30 PROCEDURE — 99291 CRITICAL CARE FIRST HOUR: CPT

## 2025-06-30 PROCEDURE — 83880 ASSAY OF NATRIURETIC PEPTIDE: CPT | Performed by: EMERGENCY MEDICINE

## 2025-06-30 PROCEDURE — 96361 HYDRATE IV INFUSION ADD-ON: CPT

## 2025-06-30 PROCEDURE — 80053 COMPREHEN METABOLIC PANEL: CPT

## 2025-06-30 PROCEDURE — 80053 COMPREHEN METABOLIC PANEL: CPT | Performed by: EMERGENCY MEDICINE

## 2025-06-30 PROCEDURE — 93010 ELECTROCARDIOGRAM REPORT: CPT

## 2025-06-30 PROCEDURE — 96374 THER/PROPH/DIAG INJ IV PUSH: CPT

## 2025-06-30 PROCEDURE — 93005 ELECTROCARDIOGRAM TRACING: CPT

## 2025-06-30 PROCEDURE — 85025 COMPLETE CBC W/AUTO DIFF WBC: CPT | Performed by: EMERGENCY MEDICINE

## 2025-06-30 PROCEDURE — 85025 COMPLETE CBC W/AUTO DIFF WBC: CPT

## 2025-06-30 RX ORDER — DILTIAZEM HYDROCHLORIDE 5 MG/ML
INJECTION INTRAVENOUS
Status: COMPLETED
Start: 2025-06-30 | End: 2025-06-30

## 2025-06-30 RX ORDER — DILTIAZEM HYDROCHLORIDE 5 MG/ML
15 INJECTION INTRAVENOUS ONCE
Status: COMPLETED | OUTPATIENT
Start: 2025-06-30 | End: 2025-06-30

## 2025-06-30 NOTE — TELEPHONE ENCOUNTER
Called and LM on VM on home number and son Willis VM. Requesting call back regarding HMO referral from PCP for recommended post hospital visit with Dr. Aaron.

## 2025-06-30 NOTE — TELEPHONE ENCOUNTER
----- Message from Roxanne ROLDAN sent at 6/25/2025  7:59 AM CDT -----    ----- Message -----  From: Gabriela Luna, RN  Sent: 6/25/2025  12:00 AM CDT  To: Noreen Aaron Rns    Call patient to see if she has referral from PCP to see Dr. Aaron - patient has HMO - also follow up on patient getting recent CT on CD for appointment.

## 2025-07-01 VITALS
RESPIRATION RATE: 24 BRPM | OXYGEN SATURATION: 97 % | SYSTOLIC BLOOD PRESSURE: 158 MMHG | HEART RATE: 66 BPM | DIASTOLIC BLOOD PRESSURE: 56 MMHG

## 2025-07-01 LAB
ATRIAL RATE: 61 BPM
P AXIS: 243 DEGREES
P-R INTERVAL: 150 MS
Q-T INTERVAL: 304 MS
Q-T INTERVAL: 450 MS
QRS DURATION: 62 MS
QRS DURATION: 64 MS
QTC CALCULATION (BEZET): 453 MS
QTC CALCULATION (BEZET): 469 MS
R AXIS: 34 DEGREES
R AXIS: 54 DEGREES
T AXIS: -69 DEGREES
T AXIS: 79 DEGREES
VENTRICULAR RATE: 143 BPM
VENTRICULAR RATE: 61 BPM

## 2025-07-01 PROCEDURE — 96375 TX/PRO/DX INJ NEW DRUG ADDON: CPT

## 2025-07-01 RX ORDER — FUROSEMIDE 10 MG/ML
40 INJECTION INTRAMUSCULAR; INTRAVENOUS ONCE
Status: COMPLETED | OUTPATIENT
Start: 2025-07-01 | End: 2025-07-01
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
## 2025-07-28 ENCOUNTER — APPOINTMENT (OUTPATIENT)
Facility: LOCATION | Age: 80
End: 2025-07-28

## 2025-08-04 ENCOUNTER — TELEPHONE (OUTPATIENT)
Facility: LOCATION | Age: 80
End: 2025-08-04

## 2025-08-04 ENCOUNTER — TELEPHONE (OUTPATIENT)
Dept: NEPHROLOGY | Facility: CLINIC | Age: 80
End: 2025-08-04

## 2025-08-05 ENCOUNTER — OFFICE VISIT (OUTPATIENT)
Facility: CLINIC | Age: 80
End: 2025-08-05

## 2025-08-05 ENCOUNTER — LAB ENCOUNTER (OUTPATIENT)
Dept: LAB | Facility: HOSPITAL | Age: 80
End: 2025-08-05
Attending: INTERNAL MEDICINE

## 2025-08-05 VITALS
DIASTOLIC BLOOD PRESSURE: 61 MMHG | SYSTOLIC BLOOD PRESSURE: 165 MMHG | WEIGHT: 181 LBS | HEART RATE: 72 BPM | BODY MASS INDEX: 32 KG/M2

## 2025-08-05 DIAGNOSIS — R60.9 EDEMA, UNSPECIFIED TYPE: ICD-10-CM

## 2025-08-05 DIAGNOSIS — N18.30 STAGE 3 CHRONIC KIDNEY DISEASE, UNSPECIFIED WHETHER STAGE 3A OR 3B CKD (HCC): Primary | ICD-10-CM

## 2025-08-05 DIAGNOSIS — N25.81 SECONDARY HYPERPARATHYROIDISM OF RENAL ORIGIN (HCC): ICD-10-CM

## 2025-08-05 DIAGNOSIS — E87.5 HYPERKALEMIA: ICD-10-CM

## 2025-08-05 DIAGNOSIS — I12.9 HYPERTENSIVE KIDNEY DISEASE WITH STAGE 3 CHRONIC KIDNEY DISEASE, UNSPECIFIED WHETHER STAGE 3A OR 3B CKD (HCC): ICD-10-CM

## 2025-08-05 DIAGNOSIS — N18.30 HYPERTENSIVE KIDNEY DISEASE WITH STAGE 3 CHRONIC KIDNEY DISEASE, UNSPECIFIED WHETHER STAGE 3A OR 3B CKD (HCC): ICD-10-CM

## 2025-08-05 DIAGNOSIS — C48.0 MALIGNANT NEOPLASM OF RETROPERITONEUM (HCC): ICD-10-CM

## 2025-08-05 DIAGNOSIS — N18.30 STAGE 3 CHRONIC KIDNEY DISEASE, UNSPECIFIED WHETHER STAGE 3A OR 3B CKD (HCC): ICD-10-CM

## 2025-08-05 DIAGNOSIS — I82.4Z2 DEEP VEIN THROMBOSIS (DVT) OF DISTAL VEIN OF LEFT LOWER EXTREMITY, UNSPECIFIED CHRONICITY (HCC): ICD-10-CM

## 2025-08-05 DIAGNOSIS — I50.32 DIASTOLIC DYSFUNCTION WITH CHRONIC HEART FAILURE (HCC): ICD-10-CM

## 2025-08-05 DIAGNOSIS — N18.30 ANEMIA DUE TO STAGE 3 CHRONIC KIDNEY DISEASE, UNSPECIFIED WHETHER STAGE 3A OR 3B CKD (HCC): ICD-10-CM

## 2025-08-05 DIAGNOSIS — D63.1 ANEMIA DUE TO STAGE 3 CHRONIC KIDNEY DISEASE, UNSPECIFIED WHETHER STAGE 3A OR 3B CKD (HCC): ICD-10-CM

## 2025-08-05 DIAGNOSIS — F17.200 TOBACCO DEPENDENCE: ICD-10-CM

## 2025-08-05 DIAGNOSIS — E11.21 TYPE 2 DIABETES MELLITUS WITH NEPHROPATHY (HCC): ICD-10-CM

## 2025-08-05 PROBLEM — J96.01 ACUTE RESPIRATORY FAILURE WITH HYPOXIA (HCC): Status: ACTIVE | Noted: 2025-08-05

## 2025-08-05 LAB
ALBUMIN SERPL-MCNC: 4.1 G/DL (ref 3.2–4.8)
ANION GAP SERPL CALC-SCNC: 4 MMOL/L (ref 0–18)
BUN BLD-MCNC: 21 MG/DL (ref 9–23)
BUN/CREAT SERPL: 11.5 (ref 10–20)
CALCIUM BLD-MCNC: 8.8 MG/DL (ref 8.7–10.4)
CHLORIDE SERPL-SCNC: 116 MMOL/L (ref 98–112)
CO2 SERPL-SCNC: 22 MMOL/L (ref 21–32)
CREAT BLD-MCNC: 1.82 MG/DL (ref 0.55–1.02)
DEPRECATED HBV CORE AB SER IA-ACNC: 36 NG/ML (ref 50–306)
DEPRECATED RDW RBC AUTO: 53.6 FL (ref 35.1–46.3)
EGFRCR SERPLBLD CKD-EPI 2021: 28 ML/MIN/1.73M2 (ref 60–?)
ERYTHROCYTE [DISTWIDTH] IN BLOOD BY AUTOMATED COUNT: 15.9 % (ref 11–15)
GLUCOSE BLD-MCNC: 96 MG/DL (ref 70–99)
HCT VFR BLD AUTO: 28.5 % (ref 35–48)
HGB BLD-MCNC: 9 G/DL (ref 12–16)
IRON SATN MFR SERPL: 12 % (ref 15–50)
IRON SERPL-MCNC: 50 UG/DL (ref 50–170)
MCH RBC QN AUTO: 29.3 PG (ref 26–34)
MCHC RBC AUTO-ENTMCNC: 31.6 G/DL (ref 31–37)
MCV RBC AUTO: 92.8 FL (ref 80–100)
OSMOLALITY SERPL CALC.SUM OF ELEC: 297 MOSM/KG (ref 275–295)
PHOSPHATE SERPL-MCNC: 2.9 MG/DL (ref 2.4–5.1)
PLATELET # BLD AUTO: 234 10(3)UL (ref 150–450)
POTASSIUM SERPL-SCNC: 5.3 MMOL/L (ref 3.5–5.1)
PTH-INTACT SERPL-MCNC: 123.1 PG/ML (ref 18.5–88)
RBC # BLD AUTO: 3.07 X10(6)UL (ref 3.8–5.3)
SODIUM SERPL-SCNC: 142 MMOL/L (ref 136–145)
TOTAL IRON BINDING CAPACITY: 401 UG/DL (ref 250–425)
TRANSFERRIN SERPL-MCNC: 326 MG/DL (ref 250–380)
WBC # BLD AUTO: 7.5 X10(3) UL (ref 4–11)

## 2025-08-05 PROCEDURE — 1160F RVW MEDS BY RX/DR IN RCRD: CPT | Performed by: INTERNAL MEDICINE

## 2025-08-05 PROCEDURE — 3077F SYST BP >= 140 MM HG: CPT | Performed by: INTERNAL MEDICINE

## 2025-08-05 PROCEDURE — 84466 ASSAY OF TRANSFERRIN: CPT

## 2025-08-05 PROCEDURE — 99214 OFFICE O/P EST MOD 30 MIN: CPT | Performed by: INTERNAL MEDICINE

## 2025-08-05 PROCEDURE — 1111F DSCHRG MED/CURRENT MED MERGE: CPT | Performed by: INTERNAL MEDICINE

## 2025-08-05 PROCEDURE — 83540 ASSAY OF IRON: CPT

## 2025-08-05 PROCEDURE — 85027 COMPLETE CBC AUTOMATED: CPT

## 2025-08-05 PROCEDURE — 36415 COLL VENOUS BLD VENIPUNCTURE: CPT

## 2025-08-05 PROCEDURE — 1125F AMNT PAIN NOTED PAIN PRSNT: CPT | Performed by: INTERNAL MEDICINE

## 2025-08-05 PROCEDURE — 3078F DIAST BP <80 MM HG: CPT | Performed by: INTERNAL MEDICINE

## 2025-08-05 PROCEDURE — 82728 ASSAY OF FERRITIN: CPT

## 2025-08-05 PROCEDURE — 80069 RENAL FUNCTION PANEL: CPT

## 2025-08-05 PROCEDURE — 83970 ASSAY OF PARATHORMONE: CPT

## 2025-08-05 PROCEDURE — 1159F MED LIST DOCD IN RCRD: CPT | Performed by: INTERNAL MEDICINE

## 2025-08-07 ENCOUNTER — NURSE NAVIGATOR ENCOUNTER (OUTPATIENT)
Facility: LOCATION | Age: 80
End: 2025-08-07

## 2025-08-07 ENCOUNTER — OFFICE VISIT (OUTPATIENT)
Facility: LOCATION | Age: 80
End: 2025-08-07
Attending: SPECIALIST

## 2025-08-07 VITALS
TEMPERATURE: 98 F | OXYGEN SATURATION: 93 % | DIASTOLIC BLOOD PRESSURE: 56 MMHG | HEART RATE: 62 BPM | SYSTOLIC BLOOD PRESSURE: 132 MMHG | RESPIRATION RATE: 16 BRPM

## 2025-08-07 DIAGNOSIS — Z72.0 DECLINED SMOKING CESSATION: ICD-10-CM

## 2025-08-07 DIAGNOSIS — C48.0 RETROPERITONEAL SARCOMA (HCC): Primary | ICD-10-CM

## 2025-08-07 DIAGNOSIS — C49.9 LEIOMYOSARCOMA (HCC): ICD-10-CM

## 2025-08-11 ENCOUNTER — TELEPHONE (OUTPATIENT)
Dept: NEPHROLOGY | Facility: CLINIC | Age: 80
End: 2025-08-11

## 2025-08-15 PROBLEM — N18.30 ANEMIA DUE TO STAGE 3 CHRONIC KIDNEY DISEASE (HCC): Status: ACTIVE | Noted: 2025-08-15

## 2025-08-15 PROBLEM — D63.1 ANEMIA DUE TO STAGE 3 CHRONIC KIDNEY DISEASE (HCC): Status: ACTIVE | Noted: 2025-08-15

## 2025-08-15 PROBLEM — N18.30 CHRONIC KIDNEY DISEASE, STAGE III (MODERATE) (HCC): Status: ACTIVE | Noted: 2025-08-15

## (undated) DEVICE — PENROSE DRAIN 12" X 1/4: Brand: CARDINAL HEALTH

## (undated) DEVICE — OMNIPAQUE 240ML VIAL

## (undated) DEVICE — TOWEL OR BLU 16X26 STRL

## (undated) DEVICE — GIJAW SINGLE-USE BIOPSY FORCEPS WITH NEEDLE: Brand: GIJAW

## (undated) DEVICE — MEDI-VAC NON-CONDUCTIVE SUCTION TUBING: Brand: CARDINAL HEALTH

## (undated) DEVICE — PATIENT RETURN ELECTRODE, SINGLE-USE, CONTACT QUALITY MONITORING, ADULT, WITH 9FT CORD, FOR PATIENTS WEIGING OVER 33LBS. (15KG): Brand: MEGADYNE

## (undated) DEVICE — ENCORE® LATEX ACCLAIM SIZE 6.5, STERILE LATEX POWDER-FREE SURGICAL GLOVE: Brand: ENCORE

## (undated) DEVICE — GAMMEX® PI HYBRID SIZE 7.5, STERILE POWDER-FREE SURGICAL GLOVE, POLYISOPRENE AND NEOPRENE BLEND: Brand: GAMMEX

## (undated) DEVICE — Device: Brand: DUAL NARE NASAL CANNULAE FEMALE LUER CON 7FT O2 TUBE

## (undated) DEVICE — SYRINGE BULB 50/CS 48/PLT: Brand: MEDEGEN MEDICAL PRODUCTS, LLC

## (undated) DEVICE — SUCTION CANISTER, 3000CC,SAFELINER: Brand: DEROYAL

## (undated) DEVICE — GAMMEX® PI HYBRID SIZE 6.5, STERILE POWDER-FREE SURGICAL GLOVE, POLYISOPRENE AND NEOPRENE BLEND: Brand: GAMMEX

## (undated) DEVICE — 12 ML SYRINGE LUER-LOCK TIP: Brand: MONOJECT

## (undated) DEVICE — SOLUTION IRRIG 1000ML 0.9% NACL USP BTL

## (undated) DEVICE — 60 ML SYRINGE REGULAR TIP: Brand: MONOJECT

## (undated) DEVICE — KIT CLEAN ENDOKIT 1.1OZ GOWNX2

## (undated) DEVICE — CAPSULE ENDOSCP 11.4X26.2MM BIOCOMPATIBLE

## (undated) DEVICE — CONMED SCOPE SAVER BITE BLOCK, 20X27 MM: Brand: SCOPE SAVER

## (undated) DEVICE — KIT ENDO ORCAPOD 160/180/190

## (undated) DEVICE — NEEDLE SPINAL 22X5 405148

## (undated) DEVICE — V2 SPECIMEN COLLECTION MANIFOLD KIT: Brand: NEPTUNE

## (undated) DEVICE — YANKAUER,BULB TIP,W/O VENT,RIGID,STERILE: Brand: MEDLINE

## (undated) DEVICE — SUTURE VCRL SZ 3-0 L27IN ABSRB UD L26MM SH

## (undated) DEVICE — PEN: MARKING STD PT 100/CS: Brand: MEDICAL ACTION INDUSTRIES

## (undated) DEVICE — BANDAGE ROLL,100% COTTON, 6 PLY, LARGE: Brand: KERLIX

## (undated) DEVICE — PENCAN® 24 GA. X 4 IN. (100 MM), BUPIVACAINE 0.75% WITH DEXTROSE 8.25% TRAY (KIT): Brand: PENCAN®

## (undated) DEVICE — PERIFIX® 18 GA. X 3-1/2 IN. (90 MM) TUOHY, 5 ML GLASS LUER LOCK LOR TRAY (KIT): Brand: PERIFIX®

## (undated) DEVICE — FIAPC® PROBE W/ FILTER 2200 A OD 2.3MM/6.9FR; L 2.2M/7.2FT: Brand: ERBE

## (undated) DEVICE — MEDI-VAC NON-CONDUCTIVE SUCTION TUBING 6MM X 1.8M (6FT.) L: Brand: CARDINAL HEALTH

## (undated) DEVICE — CHLORAPREP ORANGE TINT 10.5ML

## (undated) DEVICE — CO2 CANNULA,SSOFT,ADLT,7O2,4CO2,FEMALE: Brand: MEDLINE

## (undated) DEVICE — NEEDLE SPINAL 25X3-1/2 BLUE

## (undated) DEVICE — SPINOCAN® 18 GA. X 3-1/2 IN. (90 MM) SPINAL NEEDLE: Brand: SPINOCAN®

## (undated) DEVICE — Device

## (undated) DEVICE — 20 ML SYRINGE LUER-LOCK TIP: Brand: MONOJECT

## (undated) DEVICE — HEAD & NECK: Brand: MEDLINE INDUSTRIES, INC.

## (undated) DEVICE — IV SET MINI BORE

## (undated) NOTE — LETTER
[facility] 3W/SW  [address] [facility] [address]  [phone]    Blood Transfusion Consent    In the course of your treatment, it may become necessary to administer a transfusion of blood or blood components. This form provides basic information concerning this procedure and, if signed by you, authorizes its administration. By signing this form, you agree that all of your questions about the administration of blood or blood products have been answered by the ordering medical professional or designee.    Description of Procedure  Blood is introduced into one of your veins, commonly in the arm, using a sterilized disposable needle. The amount of blood transfused, and whether the transfusion will be of blood or blood components is a judgement the physician will make based on your particular needs.    Risks  The transfusion is a common procedure of low risk.  MINOR AND TEMPORARY REACTIONS ARE NOT UNCOMMON, including a slight bruise, swelling or local reaction in the area where the needle pierces your skin, or a nonserious reaction to the transfused material itself, including headache, fever or mild skin reaction, such as rash.  Serious reactions are possible, though very unlikely, and include severe allergic reaction (shock) and destruction (hemolysis) of transfused blood cells.  Infectious diseases which are known to be transmitted by blood transfusion include certain types of viral Hepatitis(liver infection from a virus), Human Immunodeficiency Virus (HIV-1,2) infection, a viral infection known to cause Acquired Immunodeficiency Syndrome (AIDS), as well as certain other bacterial, viral, and parasitic diseases. While a minimal risk of acquiring an infectious disease from transfused blood exists, in accordance with the Federal and State law, all due care has been taken in donor selection and testing to avoid transmission of disease.    Alternatives  If loss of blood poses serious threats during your  treatment, THERE IS NO EFFECTIVE ALTERNATIVE TO BLOOD TRANSFUSION. However, if you have any further questions on this matter, your provider will fully explain the alternatives to you if it has not already been done.    I, ______________________________, have read/had read to me the above. I understand the matters bearing on the decision whether or not to authorize a transfusion of blood or blood components. I have no questions which have not been answered to my full satisfaction. I hereby consent to such transfusion as my physician may deem necessary or advisable in the course of my treatment.    ______________________________________________                    ___________________________  (Signature of Patient or Responsible party in case of minor,                 (Printed Name of Patient or incompetent, or unconscious patient)              Responsible Party)    ___________________________               _____________________  (Relationship to Patient if not self)                                    (Date and Time)    __________________________                                                           ______________________              (Signature of Witness)               (Printed Name of Witness)     Language line ()    Telephone/Verbal/Video Consent    __________________________                     ____________________  (Signature of 2nd Witness           (Printed Name of 2nd  Telephone/Verbal/Video Consent)           Witness)    Patient Name: Hannah Boss     : 1945                 Printed: 2025     Medical Record #: [id number]      Rev: 2023

## (undated) NOTE — LETTER
[address], [place]  Authorization for Invasive Procedures  1.  I hereby authorize Dr. Can Camargo , my physician and whomever may be designated as the doctor's assistant, to perform the following operation and/or procedure:  Superior following are some, but not all, of the potential risks that can occur: fever and allergic reactions, hemolytic reactions, transmission of disease such as hepatitis, AIDS, cytomegalovirus (CMV), and flluid overload.  In the event that I wish to have autolog administration of sedation/analgesia as may be necessary or desirable in the judgment of my physician.      Signature of Patient:  ________________________________________________ Date: _________Time: _________    Responsible person in case of minor or unco

## (undated) NOTE — LETTER
[address] [place] [address]      Authorization for Surgical Operation and Procedure     Date:___________                                                                                                         Time:_______ potential risks that can occur: fever and allergic reactions, hemolytic reactions, transmission of diseases such as Hepatitis, AIDS and Cytomegalovirus (CMV) and fluid overload.   In the event that I wish to have an autologous transfusion of my own blood, o attending physician will determine when the applicable recovery period ends for purposes of reinstating the DNAR order.   10. Patients having a sterilization procedure: I understand that if the procedure is successful the results will be permanent and it wi _______________________________________________________________ _____________________________  [facility] PhysicianGeoff                                                                                         (Date)                                   (Time

## (undated) NOTE — LETTER
[facility]  Administration of Anesthesia  IHannah agree to be cared for by a physician anesthesiologist alone and/or with a nurse anesthetist, who is specially trained to monitor me and give me medicine to put me to sleep or keep me comfortable during my procedure    I understand that my anesthesiologist and/or anesthetist is not an employee or agent of [facility] or [employer]. He or she works for [facility]    As the patient asking for anesthesia services, I agree to:  Allow the anesthesiologist (anesthesia doctor) to give me medicine and do additional procedures as necessary. Some examples are: Starting or using an “IV” to give me medicine, fluids or blood during my procedure, and having a breathing tube placed to help me breathe when I’m asleep (intubation). In the event that my heart stops working properly, I understand that my anesthesiologist will make every effort to sustain my life, unless otherwise directed by [facility] Do Not Resuscitate documents.  Tell my anesthesia doctor before my procedure:  If I am pregnant.  The last time that I ate or drank.  iii. All of the medicines I take (including prescriptions, herbal supplements, and pills I can buy without a prescription (including street drugs/illegal medications). Failure to inform my anesthesiologist about these medicines may increase my risk of anesthetic complications.  iv.If I am allergic to anything or have had a reaction to anesthesia before.  I understand how the anesthesia medicine will help me (benefits).  I understand that with any type of anesthesia medicine there are risks:  The most common risks are: nausea, vomiting, sore throat, muscle soreness, damage to my eyes, mouth, or teeth (from breathing tube placement).  Rare risks include: remembering what happened during my procedure, allergic reactions to medications, injury to my airway, heart, lungs, vision, nerves, or  muscles and in extremely rare instances death.  My doctor has explained to me other choices available to me for my care (alternatives).  Pregnant Patients (“epidural”):  I understand that the risks of having an epidural (medicine given into my back to help control pain during labor), include itching, low blood pressure, difficulty urinating, headache or slowing of the baby’s heart. Very rare risks include infection, bleeding, seizure, irregular heart rhythms and nerve injury.  Regional Anesthesia (“spinal”, “epidural”, & “nerve blocks”):  I understand that rare but potential complications include headache, bleeding, infection, seizure, irregular heart rhythms, and nerve injury.    _____________________________________________________________________________  Patient (or Representative) Signature/Relationship to Patient  Date   Time    _____________________________________________________________________________   Name (if used)    Language/Organization   Time    _____________________________________________________________________________  Nurse Anesthetist Signature     Date   Time  _____________________________________________________________________________  Anesthesiologist Signature     Date   Time  I have discussed the procedure and information above with the patient (or patient’s representative) and answered their questions. The patient or their representative has agreed to have anesthesia services.    _____________________________________________________________________________  Witness        Date   Time  I have verified that the signature is that of the patient or patient’s representative, and that it was signed before the procedure  Patient Name: Hannah Boss     : 1945                 Printed: 3/22/2024 at 1:31 PM    Medical Record #: [id number]                                            Page 1 of 1  ----------ANESTHESIA CONSENT----------

## (undated) NOTE — Clinical Note
I saw Park Bauman today in the HF clinic. She was having recurrent fluid overload on her dose of furosemide 40 mg daily, she is responding well switching to Bumex 1 mg twice daily, weight is down 7 pounds and lower extremity edema has improved.   Renal function

## (undated) NOTE — MR AVS SNAPSHOT
[address] [address]  [phone]  [phone]               Thank you for choosing us for your health care visit with Sary Winters MD.  We are glad to serve you and happy to provide you with Commonly known as:  NEURONTIN           glipiZIDE 10 MG Tabs   Take 10 mg by mouth daily with breakfast.   Commonly known as:  GLUCOTROL           * HYDROcodone-acetaminophen  MG Tabs   Commonly known as:  NORCO           * HYDROcodone-acetaminophen not sign up before the expiration date, you must request a new code. Your unique [facility] Access Code: [email or web address]  Expires: 7/16/2017  8:44 AM    If you have questions, you can call [phone] to talk to our [place] Staff.  Remember, [facility]

## (undated) NOTE — IP AVS SNAPSHOT
[facility]            (For Outpatient Use Only) Initial Admit Date: 2022   Inpt/Obs Admit Date: Inpt: N/A / Obs: 22   Discharge Date:    Hospital Acct:  [de-identified]   MRN: [de-identified]   CSN: [id number]   CEID: [id number]        ENCOUNTER  Patient Class: Observation Admitting Provider: Trinh Maldonado MD Unit: [address] [place] [address]/   Hospital Service: Medical Attending Provider: Trinh Maldonado MD   Bed: 330-A   Visit Type:   Referring Physician: No ref. provider found Billing Flag:    Admit Diagnosis: Altered mental status, unspecified altered mental status type [R41.82]      PATIENT  Legal Name:   Shree Crisostomo    Legal Sex: Female  Gender ID:              [address] [facility] Floor Name:    PCP:  Physician Josefina Home: [phone]   Address:  [address] [place] [address] : 1945 (76 yrs) Mobile: [phone]         [place] [facility] [facility] [address] Marital:  Language: [address] [place] [address] Drive: Rizwana Zavala SSN4: [id number] Hoahaoism: [address] Not [place]*     Race: Black Or  Ethnicity: Non  Or [address] [place] [address]   Name Relationship Legal Guardian? Home Phone Work Phone Mobile Phone   1. John Boss  2.  Ariadna Gary    [phone]      [phone]  [phone]     GUARANTOR  Guarantor: Suellen Luu : 1945 Home Phone: [phone]   Address: [address] [place] [address]  Sex: Female Work Phone:    City/State/Zip: [place], [address] [place] [address]   Rel. to Patient: Self Guarantor ID: [id number]   GUARANTOR EMPLOYER   Employer:  Status: RETIRED     COVERAGE  PRIMARY INSURANCE   PayorSchano Byrd [place] O   Group Number: [id number] Insurance Type: [address] Name: Otoniel Singh : 1945   Subscriber ID: [id number] Pt Rel to Subscriber: Self   SECONDARY INSURANCE   Payor:  Plan:    Group Number:  Insurance Type:    Subscriber Name:  Subscriber :    Subscriber ID:  Pt Rel to Subscriber:    TERTIARY INSURANCE   Payor:  Plan:    Group Number:  Insurance Type: Subscriber Name:  Aminata Ahmadi :    Subscriber ID:  Pt Rel to Subscriber:    Hospital Account Financial Class: Medicare Advantage    2022

## (undated) NOTE — LETTER
To Whom It May Concern:    Yani Merida has been under our care regarding ongoing medical issues. Her son ___________________________________ will need to be off work 4/22/22-4/23/22 to assist in her care. Please feel free to contact us if there are any questions.       Sincerely,            Dong Lynn MD    [place] [id number]  [phone]        Document generated by:  Dong Lynn MD

## (undated) NOTE — LETTER
Date & Time: 12/20/2024, 12:56 AM  Patient: Hannah Boss  Encounter Provider(s):    Kam Berman MD Bankosly, Feras, MD Laforgia, Carlo, MD Jung, Kelly Marie, Saundra Carty APN       To Whom It May Concern:    Hannah Boss was seen and treated in our department on 12/17/2024. She {Return to school/sport/work:[id number]}.    If you have any questions or concerns, please do not hesitate to call.        _____________________________  Physician/APC Signature

## (undated) NOTE — IP AVS SNAPSHOT
[facility]            (For Outpatient Use Only) Initial Admit Date: 2022   Inpt/Obs Admit Date: Inpt: 22 / Obs: N/A   Discharge Date:    Kacy Finnegan:  [de-identified]   MRN: [de-identified]   CSN: [id number]   CEID: [id number]        ENCOUNTER  Patient Class: Inpatient Admitting Provider: Umesh Celaya MD Unit: [address]/[facility] Service: Medical Attending Provider: Nu Orourke MD   Bed: 529-A   Visit Type:   Referring Physician: No ref. provider found Billing Flag:    Admit Diagnosis: Weakness generalized [R53.1]      PATIENT  Legal Name:   Esdras Lopez    Legal Sex: Female  Gender ID:              Pref Name:    PCP:  Raven Del Valle Home: [phone]   Address:  [address] [place] [address] : 1945 (77 yrs) Mobile: [phone]         [place] [place] [facility] [address] Marital:  Language: Palak Bluel: Saskia Walker SSN4: [id number] Uatsdin: [address] [facility]*     Race: Black Or  Ethnicity: Non  Or [address] [place] [address] Road CONTACT   Name Relationship Legal Guardian? Home Phone Work Phone Mobile Phone   1. John Boss  2.  [facility]    95 [phone]      [phone]  [phone]     GUARANTOR  Guarantor: Sorin Edgar : 1945 Home Phone: [phone]   Address: [address] [place] [address]  Sex: Female Work Phone:    City/State/Zip: [place] [address]   Rel. to Patient: Self Guarantor ID: [id number]   GUARANTOR EMPLOYER   Employer:  Status: RETIRED     COVERAGE  PRIMARY INSURANCE   PayPreston [place] O   Group Number: [id number] Insurance Type: [address] Name: Riaz Laws : 1945   Subscriber ID: [id number] Pt Rel to Subscriber: Self   SECONDARY INSURANCE   Payor:  Plan:    Group Number:  Insurance Type:    Subscriber Name:  Subscriber :    Subscriber ID:  Pt Rel to Subscriber:    TERTIARY INSURANCE   Payor:  Plan:    Group Number:  Insurance Type:    Subscriber Name:  Subscriber : Subscriber ID:  Pt Rel to Subscriber:    Hospital Account Financial Class: Medicare Advantage    January 1, 2023

## (undated) NOTE — LETTER
[facility]  Administration of Anesthesia  1. I, Joselito Bey, or _________________________________ acting on her behalf, (Patient) (Dependent/Representative) request to receive anesthesia for my pending procedure/operation/treatment.   A infections, high spinal block, spinal bleeding, seizure, cardiac arrest and death. 7. AWARENESS: I understand that it is possible (but unlikely) to have explicit memory of events from the operating room while under general anesthesia.   8. ELECTROCONVULSIV unconscious pt /Relationship    My signature below affirms that prior to the time of the procedure, I have explained to the patient and/or his/her guardian, the risks and benefits of undergoing anesthesia, as well as any reasonable alternatives.     _______

## (undated) NOTE — LETTER
[facility]  [address], [place], [place]  Authorization for Surgical Operation and Procedure                                                                                           I hereby authorize Doc Hernandez MD, my physician and his/her assistants (if applicable), which may include medical students, residents, and/or fellows, to perform the following surgical operation/ procedure and administer such anesthesia as may be determined necessary by my physician: Operation/Procedure name (s) on Hannah Boss   2.   I recognize that during the surgical operation/procedure, unforeseen conditions may necessitate additional or different procedures than those listed above.  I, therefore, further authorize and request that the above-named surgeon, assistants, or designees perform such procedures as are, in their judgment, necessary and desirable.    3.   My surgeon/physician has discussed prior to my surgery the potential benefits, risks and side effects of this procedure; the likelihood of achieving goals; and potential problems that might occur during recuperation.  They also discussed reasonable alternatives to the procedure, including risks, benefits, and side effects related to the alternatives and risks related to not receiving this procedure.  I have had all my questions answered and I acknowledge that no guarantee has been made as to the result that may be obtained.    4.   Should the need arise during my operation/procedure, which includes change of level of care prior to discharge, I also consent to the administration of blood and/or blood products.  Further, I understand that despite careful testing and screening of blood or blood products by collecting agencies, I may still be subject to ill effects as a result of receiving a blood transfusion and/or blood products.  The following are some, but not all, of the potential risks that can occur: fever and allergic reactions, hemolytic  reactions, transmission of diseases such as Hepatitis, AIDS and Cytomegalovirus (CMV) and fluid overload.  In the event that I wish to have an autologous transfusion of my own blood, or a directed donor transfusion, I will discuss this with my physician.  Check only if Refusing Blood or Blood Products  I understand refusal of blood or blood products as deemed necessary by my physician may have serious consequences to my condition to include possible death. I hereby assume responsibility for my refusal and release the hospital, its personnel, and my physicians from any responsibility for the consequences of my refusal.    o  Refuse   5.   I authorize the use of any specimen, organs, tissues, body parts or foreign objects that may be removed from my body during the operation/procedure for diagnosis, research or teaching purposes and their subsequent disposal by hospital authorities.  I also authorize the release of specimen test results and/or written reports to my treating physician on the hospital medical staff or other referring or consulting physicians involved in my care, at the discretion of the Pathologist or my treating physician.    6.   I consent to the photographing or videotaping of the operations or procedures to be performed, including appropriate portions of my body for medical, scientific, or educational purposes, provided my identity is not revealed by the pictures or by descriptive texts accompanying them.  If the procedure has been photographed/videotaped, the surgeon will obtain the original picture, image, videotape or CD.  The hospital will not be responsible for storage, release or maintenance of the picture, image, tape or CD.    7.   I consent to the presence of a  or observers in the operating room as deemed necessary by my physician or their designees.    8.   I recognize that in the event my procedure results in extended X-Ray/fluoroscopy time, I may develop a skin  reaction.    9. If I have a Do Not Attempt Resuscitation (DNAR) order in place, that status will be suspended while in the operating room, procedural suite, and during the recovery period unless otherwise explicitly stated by me (or a person authorized to consent on my behalf). The surgeon or my attending physician will determine when the applicable recovery period ends for purposes of reinstating the DNAR order.  10. Patients having a sterilization procedure: I understand that if the procedure is successful the results will be permanent and it will therefore be impossible for me to inseminate, conceive, or bear children.  I also understand that the procedure is intended to result in sterility, although the result has not been guaranteed.   11. I acknowledge that my physician has explained sedation/analgesia administration to me including the risk and benefits I consent to the administration of sedation/analgesia as may be necessary or desirable in the judgment of my physician.    I CERTIFY THAT I HAVE READ AND FULLY UNDERSTAND THE ABOVE CONSENT TO OPERATION and/or OTHER PROCEDURE.     _________________________________________ _________________________________     ___________________________________  Signature of Patient     Signature of Responsible Person                   Printed Name of Responsible Person                              _________________________________________ ______________________________        ___________________________________  Signature of Witness         Date  Time         Relationship to Patient    STATEMENT OF PHYSICIAN My signature below affirms that prior to the time of the procedure; I have explained to the patient and/or his/her legal representative, the risks and benefits involved in the proposed treatment and any reasonable alternative to the proposed treatment. I have also explained the risks and benefits involved in refusal of the proposed treatment and alternatives to the  proposed treatment and have answered the patient's questions. If I have a significant financial interest in a co-management agreement or a significant financial interest in any product or implant, or other significant relationship used in this procedure/surgery, I have disclosed this and had a discussion with my patient.     _______________________________________________________________ _____________________________  (Signature of Physician)                                                                                         (Date)                                   (Time)  Patient Name: Hannah Boss    : 1945   Printed: 3/21/2024      Medical Record #: [id number]                                              Page 1 of 1

## (undated) NOTE — MR AVS SNAPSHOT
[address]  [facility] [id number]  [phone]  [phone]               Thank you for choosing us for your health care visit with Milagro Mills MD.  We are glad to serve you and happy to provide you wi Take 50 mg by mouth daily. Commonly known as:  TENORMIN           Atorvastatin Calcium 80 MG Tabs   80 mg daily. Commonly known as:  LIPITOR           Calcium Carbonate-Vitamin D 600-400 MG-UNIT Tabs   Take 1 tablet by mouth 2 (two) times daily. * Notice: This list has 2 medication(s) that are the same as other medications prescribed for you. Read the directions carefully, and ask your doctor or other care provider to review them with you.          Where to Get Your Medications      You can get t purposes(e.g., legal testing, employment testing). Ur. Oxycodone Screen None Detected None Detected      Assay Cutoff: 300 ng/mL    Ur. Phencyclidine Screen None Detected None Detected      Assay Cutoff: 25 ng/mL    Ur.  Propoxyphene Screen None Detected

## (undated) NOTE — LETTER
[facility]  [address], [place], [place]    Authorization for Surgical Operation and Procedure                               I hereby authorize Mary Anderson MD, my physician and his/her assistants (if applicable), which may include medical students, residents, and/or fellows, to perform the following surgical operation/ procedure and administer such anesthesia as may be determined necessary by my physician: Operation/Procedure name (s) ESOPHAGOGASTRODUODENOSCOPY (EGD) and possible CAPSULE ENDOSCOPY on Hannah Boss   2.   I recognize that during the surgical operation/procedure, unforeseen conditions may necessitate additional or different procedures than those listed above.  I, therefore, further authorize and request that the above-named surgeon, assistants, or designees perform such procedures as are, in their judgment, necessary and desirable.    3.   My surgeon/physician has discussed prior to my surgery the potential benefits, risks and side effects of this procedure; the likelihood of achieving goals; and potential problems that might occur during recuperation.  They also discussed reasonable alternatives to the procedure, including risks, benefits, and side effects related to the alternatives and risks related to not receiving this procedure.  I have had all my questions answered and I acknowledge that no guarantee has been made as to the result that may be obtained.    4.   Should the need arise during my operation/procedure, which includes change of level of care prior to discharge, I also consent to the administration of blood and/or blood products.  Further, I understand that despite careful testing and screening of blood or blood products by collecting agencies, I may still be subject to ill effects as a result of receiving a blood transfusion and/or blood products.  The following are some, but not all, of the potential risks that can occur: fever and allergic reactions,  hemolytic reactions, transmission of diseases such as Hepatitis, AIDS and Cytomegalovirus (CMV) and fluid overload.  In the event that I wish to have an autologous transfusion of my own blood, or a directed donor transfusion, I will discuss this with my physician.  Check only if Refusing Blood or Blood Products  I understand refusal of blood or blood products as deemed necessary by my physician may have serious consequences to my condition to include possible death. I hereby assume responsibility for my refusal and release the hospital, its personnel, and my physicians from any responsibility for the consequences of my refusal.    o  Refuse   5.   I authorize the use of any specimen, organs, tissues, body parts or foreign objects that may be removed from my body during the operation/procedure for diagnosis, research or teaching purposes and their subsequent disposal by hospital authorities.  I also authorize the release of specimen test results and/or written reports to my treating physician on the hospital medical staff or other referring or consulting physicians involved in my care, at the discretion of the Pathologist or my treating physician.    6.   I consent to the photographing or videotaping of the operations or procedures to be performed, including appropriate portions of my body for medical, scientific, or educational purposes, provided my identity is not revealed by the pictures or by descriptive texts accompanying them.  If the procedure has been photographed/videotaped, the surgeon will obtain the original picture, image, videotape or CD.  The hospital will not be responsible for storage, release or maintenance of the picture, image, tape or CD.    7.   I consent to the presence of a  or observers in the operating room as deemed necessary by my physician or their designees.    8.   I recognize that in the event my procedure results in extended X-Ray/fluoroscopy time, I may develop a  skin reaction.    9. If I have a Do Not Attempt Resuscitation (DNAR) order in place, that status will be suspended while in the operating room, procedural suite, and during the recovery period unless otherwise explicitly stated by me (or a person authorized to consent on my behalf). The surgeon or my attending physician will determine when the applicable recovery period ends for purposes of reinstating the DNAR order.  10. Patients having a sterilization procedure: I understand that if the procedure is successful the results will be permanent and it will therefore be impossible for me to inseminate, conceive, or bear children.  I also understand that the procedure is intended to result in sterility, although the result has not been guaranteed.   11. I acknowledge that my physician has explained sedation/analgesia administration to me including the risk and benefits I consent to the administration of sedation/analgesia as may be necessary or desirable in the judgment of my physician.    I CERTIFY THAT I HAVE READ AND FULLY UNDERSTAND THE ABOVE CONSENT TO OPERATION and/or OTHER PROCEDURE.     ____________________________________  _________________________________        ______________________________  Signature of Patient    Signature of Responsible Person                Printed Name of Responsible Person                                      ____________________________________  _____________________________                ________________________________  Signature of Witness        Date  Time         Relationship to Patient    STATEMENT OF PHYSICIAN My signature below affirms that prior to the time of the procedure; I have explained to the patient and/or his/her legal representative, the risks and benefits involved in the proposed treatment and any reasonable alternative to the proposed treatment. I have also explained the risks and benefits involved in refusal of the proposed treatment and alternatives to the  proposed treatment and have answered the patient's questions. If I have a significant financial interest in a co-management agreement or a significant financial interest in any product or implant, or other significant relationship used in this procedure/surgery, I have disclosed this and had a discussion with my patient.     _____________________________________________________              _____________________________  (Signature of Physician)                                                                                         (Date)                                   (Time)  Patient Name: Hannah Boss      : 1945      Printed: 2024     Medical Record #: [id number]                                      Page 1 of 1

## (undated) NOTE — LETTER
[facility]  [address], [place], [place]  Authorization for Surgical Operation and Procedure                                                                                           I hereby authorize Doc Hernandez MD, my physician and his/her assistants (if applicable), which may include medical students, residents, and/or fellows, to perform the following surgical operation/ procedure and administer such anesthesia as may be determined necessary by my physician: Operation/Procedure name (s) Esophagogastroduodenoscopy and colonoscopy on Hannah Boss   2.   I recognize that during the surgical operation/procedure, unforeseen conditions may necessitate additional or different procedures than those listed above.  I, therefore, further authorize and request that the above-named surgeon, assistants, or designees perform such procedures as are, in their judgment, necessary and desirable.    3.   My surgeon/physician has discussed prior to my surgery the potential benefits, risks and side effects of this procedure; the likelihood of achieving goals; and potential problems that might occur during recuperation.  They also discussed reasonable alternatives to the procedure, including risks, benefits, and side effects related to the alternatives and risks related to not receiving this procedure.  I have had all my questions answered and I acknowledge that no guarantee has been made as to the result that may be obtained.    4.   Should the need arise during my operation/procedure, which includes change of level of care prior to discharge, I also consent to the administration of blood and/or blood products.  Further, I understand that despite careful testing and screening of blood or blood products by collecting agencies, I may still be subject to ill effects as a result of receiving a blood transfusion and/or blood products.  The following are some, but not all, of the potential risks that can occur:  fever and allergic reactions, hemolytic reactions, transmission of diseases such as Hepatitis, AIDS and Cytomegalovirus (CMV) and fluid overload.  In the event that I wish to have an autologous transfusion of my own blood, or a directed donor transfusion, I will discuss this with my physician.  Check only if Refusing Blood or Blood Products  I understand refusal of blood or blood products as deemed necessary by my physician may have serious consequences to my condition to include possible death. I hereby assume responsibility for my refusal and release the hospital, its personnel, and my physicians from any responsibility for the consequences of my refusal.    o  Refuse   5.   I authorize the use of any specimen, organs, tissues, body parts or foreign objects that may be removed from my body during the operation/procedure for diagnosis, research or teaching purposes and their subsequent disposal by hospital authorities.  I also authorize the release of specimen test results and/or written reports to my treating physician on the hospital medical staff or other referring or consulting physicians involved in my care, at the discretion of the Pathologist or my treating physician.    6.   I consent to the photographing or videotaping of the operations or procedures to be performed, including appropriate portions of my body for medical, scientific, or educational purposes, provided my identity is not revealed by the pictures or by descriptive texts accompanying them.  If the procedure has been photographed/videotaped, the surgeon will obtain the original picture, image, videotape or CD.  The hospital will not be responsible for storage, release or maintenance of the picture, image, tape or CD.    7.   I consent to the presence of a  or observers in the operating room as deemed necessary by my physician or their designees.    8.   I recognize that in the event my procedure results in extended  X-Ray/fluoroscopy time, I may develop a skin reaction.    9. If I have a Do Not Attempt Resuscitation (DNAR) order in place, that status will be suspended while in the operating room, procedural suite, and during the recovery period unless otherwise explicitly stated by me (or a person authorized to consent on my behalf). The surgeon or my attending physician will determine when the applicable recovery period ends for purposes of reinstating the DNAR order.  10. Patients having a sterilization procedure: I understand that if the procedure is successful the results will be permanent and it will therefore be impossible for me to inseminate, conceive, or bear children.  I also understand that the procedure is intended to result in sterility, although the result has not been guaranteed.   11. I acknowledge that my physician has explained sedation/analgesia administration to me including the risk and benefits I consent to the administration of sedation/analgesia as may be necessary or desirable in the judgment of my physician.    I CERTIFY THAT I HAVE READ AND FULLY UNDERSTAND THE ABOVE CONSENT TO OPERATION and/or OTHER PROCEDURE.     _________________________________________ _________________________________     ___________________________________  Signature of Patient     Signature of Responsible Person                   Printed Name of Responsible Person                              _________________________________________ ______________________________        ___________________________________  Signature of Witness         Date  Time         Relationship to Patient    STATEMENT OF PHYSICIAN My signature below affirms that prior to the time of the procedure; I have explained to the patient and/or his/her legal representative, the risks and benefits involved in the proposed treatment and any reasonable alternative to the proposed treatment. I have also explained the risks and benefits involved in refusal of the  proposed treatment and alternatives to the proposed treatment and have answered the patient's questions. If I have a significant financial interest in a co-management agreement or a significant financial interest in any product or implant, or other significant relationship used in this procedure/surgery, I have disclosed this and had a discussion with my patient.     _______________________________________________________________ _____________________________  (Signature of Physician)                                                                                         (Date)                                   (Time)  Patient Name: Hannah Boss    : 1945   Printed: 3/21/2024      Medical Record #: [id number]                                              Page 1 of 1

## (undated) NOTE — LETTER
[address], [place], [place]    Authorization for Surgical Operation and Procedure                               I hereby authorize Olaf High MD, my physician and his/her assistants (if applicable), which may include medical students, residents, and/or fellows, to perform the following surgical operation/ procedure and administer such anesthesia as may be determined necessary by my physician: Operation/Procedure name (s) ESOPHAGOGASTRODUODENOSCOPY (EGD) WITH PUSH ENTEROSCOPY/ WITH ARGON PLASMA COAGULATION on Hannah Boss   2.   I recognize that during the surgical operation/procedure, unforeseen conditions may necessitate additional or different procedures than those listed above.  I, therefore, further authorize and request that the above-named surgeon, assistants, or designees perform such procedures as are, in their judgment, necessary and desirable.    3.   My surgeon/physician has discussed prior to my surgery the potential benefits, risks and side effects of this procedure; the likelihood of achieving goals; and potential problems that might occur during recuperation.  They also discussed reasonable alternatives to the procedure, including risks, benefits, and side effects related to the alternatives and risks related to not receiving this procedure.  I have had all my questions answered and I acknowledge that no guarantee has been made as to the result that may be obtained.    4.   Should the need arise during my operation/procedure, which includes change of level of care prior to discharge, I also consent to the administration of blood and/or blood products.  Further, I understand that despite careful testing and screening of blood or blood products by collecting agencies, I may still be subject to ill effects as a result of receiving a blood transfusion and/or blood products.  The following are some, but not all, of the potential risks that can occur: fever and  allergic reactions, hemolytic reactions, transmission of diseases such as Hepatitis, AIDS and Cytomegalovirus (CMV) and fluid overload.  In the event that I wish to have an autologous transfusion of my own blood, or a directed donor transfusion, I will discuss this with my physician.  Check only if Refusing Blood or Blood Products  I understand refusal of blood or blood products as deemed necessary by my physician may have serious consequences to my condition to include possible death. I hereby assume responsibility for my refusal and release the hospital, its personnel, and my physicians from any responsibility for the consequences of my refusal.    o  Refuse   5.   I authorize the use of any specimen, organs, tissues, body parts or foreign objects that may be removed from my body during the operation/procedure for diagnosis, research or teaching purposes and their subsequent disposal by hospital authorities.  I also authorize the release of specimen test results and/or written reports to my treating physician on the hospital medical staff or other referring or consulting physicians involved in my care, at the discretion of the Pathologist or my treating physician.    6.   I consent to the photographing or videotaping of the operations or procedures to be performed, including appropriate portions of my body for medical, scientific, or educational purposes, provided my identity is not revealed by the pictures or by descriptive texts accompanying them.  If the procedure has been photographed/videotaped, the surgeon will obtain the original picture, image, videotape or CD.  The hospital will not be responsible for storage, release or maintenance of the picture, image, tape or CD.    7.   I consent to the presence of a  or observers in the operating room as deemed necessary by my physician or their designees.    8.   I recognize that in the event my procedure results in extended X-Ray/fluoroscopy  time, I may develop a skin reaction.    9. If I have a Do Not Attempt Resuscitation (DNAR) order in place, that status will be suspended while in the operating room, procedural suite, and during the recovery period unless otherwise explicitly stated by me (or a person authorized to consent on my behalf). The surgeon or my attending physician will determine when the applicable recovery period ends for purposes of reinstating the DNAR order.  10. Patients having a sterilization procedure: I understand that if the procedure is successful the results will be permanent and it will therefore be impossible for me to inseminate, conceive, or bear children.  I also understand that the procedure is intended to result in sterility, although the result has not been guaranteed.   11. I acknowledge that my physician has explained sedation/analgesia administration to me including the risk and benefits I consent to the administration of sedation/analgesia as may be necessary or desirable in the judgment of my physician.    I CERTIFY THAT I HAVE READ AND FULLY UNDERSTAND THE ABOVE CONSENT TO OPERATION and/or OTHER PROCEDURE.     ____________________________________  _________________________________        ______________________________  Signature of Patient    Signature of Responsible Person                Printed Name of Responsible Person                                      ____________________________________  _____________________________                ________________________________  Signature of Witness        Date  Time         Relationship to Patient    STATEMENT OF PHYSICIAN My signature below affirms that prior to the time of the procedure; I have explained to the patient and/or his/her legal representative, the risks and benefits involved in the proposed treatment and any reasonable alternative to the proposed treatment. I have also explained the risks and benefits involved in refusal of the proposed treatment and  alternatives to the proposed treatment and have answered the patient's questions. If I have a significant financial interest in a co-management agreement or a significant financial interest in any product or implant, or other significant relationship used in this procedure/surgery, I have disclosed this and had a discussion with my patient.     _____________________________________________________              _____________________________  (Signature of Physician)                                                                                         (Date)                                   (Time)  Patient Name: Hannah Boss      : 1945      Printed: 2025     Medical Record #: [id number]                                      Page 1 of 1

## (undated) NOTE — LETTER
[facility]  Administration of Anesthesia  IHannah agree to be cared for by a physician anesthesiologist alone and/or with a nurse anesthetist, who is specially trained to monitor me and give me medicine to put me to sleep or keep me comfortable during my procedure    I understand that my anesthesiologist and/or anesthetist is not an employee or agent of [facility] or [employer]. He or she works for [facility]    As the patient asking for anesthesia services, I agree to:  Allow the anesthesiologist (anesthesia doctor) to give me medicine and do additional procedures as necessary. Some examples are: Starting or using an “IV” to give me medicine, fluids or blood during my procedure, and having a breathing tube placed to help me breathe when I’m asleep (intubation). In the event that my heart stops working properly, I understand that my anesthesiologist will make every effort to sustain my life, unless otherwise directed by [facility] Do Not Resuscitate documents.  Tell my anesthesia doctor before my procedure:  If I am pregnant.  The last time that I ate or drank.  iii. All of the medicines I take (including prescriptions, herbal supplements, and pills I can buy without a prescription (including street drugs/illegal medications). Failure to inform my anesthesiologist about these medicines may increase my risk of anesthetic complications.  iv.If I am allergic to anything or have had a reaction to anesthesia before.  I understand how the anesthesia medicine will help me (benefits).  I understand that with any type of anesthesia medicine there are risks:  The most common risks are: nausea, vomiting, sore throat, muscle soreness, damage to my eyes, mouth, or teeth (from breathing tube placement).  Rare risks include: remembering what happened during my procedure, allergic reactions to medications, injury to my airway, heart, lungs, vision, nerves, or  muscles and in extremely rare instances death.  My doctor has explained to me other choices available to me for my care (alternatives).  Pregnant Patients (“epidural”):  I understand that the risks of having an epidural (medicine given into my back to help control pain during labor), include itching, low blood pressure, difficulty urinating, headache or slowing of the baby’s heart. Very rare risks include infection, bleeding, seizure, irregular heart rhythms and nerve injury.  Regional Anesthesia (“spinal”, “epidural”, & “nerve blocks”):  I understand that rare but potential complications include headache, bleeding, infection, seizure, irregular heart rhythms, and nerve injury.    _____________________________________________________________________________  Patient (or Representative) Signature/Relationship to Patient  Date   Time    _____________________________________________________________________________   Name (if used)    Language/Organization   Time    _____________________________________________________________________________  Nurse Anesthetist Signature     Date   Time  _____________________________________________________________________________  Anesthesiologist Signature     Date   Time  I have discussed the procedure and information above with the patient (or patient’s representative) and answered their questions. The patient or their representative has agreed to have anesthesia services.    _____________________________________________________________________________  Witness        Date   Time  I have verified that the signature is that of the patient or patient’s representative, and that it was signed before the procedure  Patient Name: Hannah Boss     : 1945                 Printed: 2025 at 10:08 AM    Medical Record #: [id number]                                            Page 1 of 1  ----------ANESTHESIA CONSENT----------

## (undated) NOTE — LETTER
[facility]  Administration of Anesthesia  IHannah agree to be cared for by a physician anesthesiologist alone and/or with a nurse anesthetist, who is specially trained to monitor me and give me medicine to put me to sleep or keep me comfortable during my procedure    I understand that my anesthesiologist and/or anesthetist is not an employee or agent of [facility] or [employer]. He or she works for [facility]    As the patient asking for anesthesia services, I agree to:  Allow the anesthesiologist (anesthesia doctor) to give me medicine and do additional procedures as necessary. Some examples are: Starting or using an “IV” to give me medicine, fluids or blood during my procedure, and having a breathing tube placed to help me breathe when I’m asleep (intubation). In the event that my heart stops working properly, I understand that my anesthesiologist will make every effort to sustain my life, unless otherwise directed by [facility] Do Not Resuscitate documents.  Tell my anesthesia doctor before my procedure:  If I am pregnant.  The last time that I ate or drank.  iii. All of the medicines I take (including prescriptions, herbal supplements, and pills I can buy without a prescription (including street drugs/illegal medications). Failure to inform my anesthesiologist about these medicines may increase my risk of anesthetic complications.  iv.If I am allergic to anything or have had a reaction to anesthesia before.  I understand how the anesthesia medicine will help me (benefits).  I understand that with any type of anesthesia medicine there are risks:  The most common risks are: nausea, vomiting, sore throat, muscle soreness, damage to my eyes, mouth, or teeth (from breathing tube placement).  Rare risks include: remembering what happened during my procedure, allergic reactions to medications, injury to my airway, heart, lungs, vision, nerves, or  muscles and in extremely rare instances death.  My doctor has explained to me other choices available to me for my care (alternatives).  Pregnant Patients (“epidural”):  I understand that the risks of having an epidural (medicine given into my back to help control pain during labor), include itching, low blood pressure, difficulty urinating, headache or slowing of the baby’s heart. Very rare risks include infection, bleeding, seizure, irregular heart rhythms and nerve injury.  Regional Anesthesia (“spinal”, “epidural”, & “nerve blocks”):  I understand that rare but potential complications include headache, bleeding, infection, seizure, irregular heart rhythms, and nerve injury.    _____________________________________________________________________________  Patient (or Representative) Signature/Relationship to Patient  Date   Time    _____________________________________________________________________________   Name (if used)    Language/Organization   Time    _____________________________________________________________________________  Nurse Anesthetist Signature     Date   Time  _____________________________________________________________________________  Anesthesiologist Signature     Date   Time  I have discussed the procedure and information above with the patient (or patient’s representative) and answered their questions. The patient or their representative has agreed to have anesthesia services.    _____________________________________________________________________________  Witness        Date   Time  I have verified that the signature is that of the patient or patient’s representative, and that it was signed before the procedure  Patient Name: Hannah Boss     : 1945                 Printed: 2024 at 12:04 PM    Medical Record #: [id number]                                            Page 1 of 1  ----------ANESTHESIA CONSENT----------

## (undated) NOTE — LETTER
[facility]  [address], [place], [place]    Authorization for Surgical Operation and Procedure                               I hereby authorize Mary Anderson MD, my physician and his/her assistants (if applicable), which may include medical students, residents, and/or fellows, to perform the following surgical operation/ procedure and administer such anesthesia as may be determined necessary by my physician: Operation/Procedure name (s) ESOPHAGOGASTRODUODENOSCOPY (EGD) on Hannah Boss   2.   I recognize that during the surgical operation/procedure, unforeseen conditions may necessitate additional or different procedures than those listed above.  I, therefore, further authorize and request that the above-named surgeon, assistants, or designees perform such procedures as are, in their judgment, necessary and desirable.    3.   My surgeon/physician has discussed prior to my surgery the potential benefits, risks and side effects of this procedure; the likelihood of achieving goals; and potential problems that might occur during recuperation.  They also discussed reasonable alternatives to the procedure, including risks, benefits, and side effects related to the alternatives and risks related to not receiving this procedure.  I have had all my questions answered and I acknowledge that no guarantee has been made as to the result that may be obtained.    4.   Should the need arise during my operation/procedure, which includes change of level of care prior to discharge, I also consent to the administration of blood and/or blood products.  Further, I understand that despite careful testing and screening of blood or blood products by collecting agencies, I may still be subject to ill effects as a result of receiving a blood transfusion and/or blood products.  The following are some, but not all, of the potential risks that can occur: fever and allergic reactions, hemolytic reactions, transmission of  diseases such as Hepatitis, AIDS and Cytomegalovirus (CMV) and fluid overload.  In the event that I wish to have an autologous transfusion of my own blood, or a directed donor transfusion, I will discuss this with my physician.  Check only if Refusing Blood or Blood Products  I understand refusal of blood or blood products as deemed necessary by my physician may have serious consequences to my condition to include possible death. I hereby assume responsibility for my refusal and release the hospital, its personnel, and my physicians from any responsibility for the consequences of my refusal.    o  Refuse   5.   I authorize the use of any specimen, organs, tissues, body parts or foreign objects that may be removed from my body during the operation/procedure for diagnosis, research or teaching purposes and their subsequent disposal by hospital authorities.  I also authorize the release of specimen test results and/or written reports to my treating physician on the hospital medical staff or other referring or consulting physicians involved in my care, at the discretion of the Pathologist or my treating physician.    6.   I consent to the photographing or videotaping of the operations or procedures to be performed, including appropriate portions of my body for medical, scientific, or educational purposes, provided my identity is not revealed by the pictures or by descriptive texts accompanying them.  If the procedure has been photographed/videotaped, the surgeon will obtain the original picture, image, videotape or CD.  The hospital will not be responsible for storage, release or maintenance of the picture, image, tape or CD.    7.   I consent to the presence of a  or observers in the operating room as deemed necessary by my physician or their designees.    8.   I recognize that in the event my procedure results in extended X-Ray/fluoroscopy time, I may develop a skin reaction.    9. If I have a Do Not  Attempt Resuscitation (DNAR) order in place, that status will be suspended while in the operating room, procedural suite, and during the recovery period unless otherwise explicitly stated by me (or a person authorized to consent on my behalf). The surgeon or my attending physician will determine when the applicable recovery period ends for purposes of reinstating the DNAR order.  10. Patients having a sterilization procedure: I understand that if the procedure is successful the results will be permanent and it will therefore be impossible for me to inseminate, conceive, or bear children.  I also understand that the procedure is intended to result in sterility, although the result has not been guaranteed.   11. I acknowledge that my physician has explained sedation/analgesia administration to me including the risk and benefits I consent to the administration of sedation/analgesia as may be necessary or desirable in the judgment of my physician.    I CERTIFY THAT I HAVE READ AND FULLY UNDERSTAND THE ABOVE CONSENT TO OPERATION and/or OTHER PROCEDURE.     ____________________________________  _________________________________        ______________________________  Signature of Patient    Signature of Responsible Person                Printed Name of Responsible Person                                      ____________________________________  _____________________________                ________________________________  Signature of Witness        Date  Time         Relationship to Patient    STATEMENT OF PHYSICIAN My signature below affirms that prior to the time of the procedure; I have explained to the patient and/or his/her legal representative, the risks and benefits involved in the proposed treatment and any reasonable alternative to the proposed treatment. I have also explained the risks and benefits involved in refusal of the proposed treatment and alternatives to the proposed treatment and have answered the  patient's questions. If I have a significant financial interest in a co-management agreement or a significant financial interest in any product or implant, or other significant relationship used in this procedure/surgery, I have disclosed this and had a discussion with my patient.     _____________________________________________________              _____________________________  (Signature of Physician)                                                                                         (Date)                                   (Time)  Patient Name: Hannah Boss      : 1945      Printed: 2024     Medical Record #: [id number]                                      Page 1 of 1